# Patient Record
Sex: FEMALE | Race: BLACK OR AFRICAN AMERICAN | NOT HISPANIC OR LATINO | Employment: UNEMPLOYED | URBAN - METROPOLITAN AREA
[De-identification: names, ages, dates, MRNs, and addresses within clinical notes are randomized per-mention and may not be internally consistent; named-entity substitution may affect disease eponyms.]

---

## 2017-01-13 DIAGNOSIS — F32.A DEPRESSION, UNSPECIFIED DEPRESSION TYPE: ICD-10-CM

## 2017-01-13 RX ORDER — SERTRALINE HYDROCHLORIDE 100 MG/1
TABLET, FILM COATED ORAL
Qty: 30 TABLET | Refills: 0 | Status: SHIPPED | OUTPATIENT
Start: 2017-01-13 | End: 2017-05-03 | Stop reason: SDUPTHER

## 2017-01-13 RX ORDER — SERTRALINE HYDROCHLORIDE 100 MG/1
TABLET, FILM COATED ORAL
Qty: 30 TABLET | Refills: 0 | Status: SHIPPED | OUTPATIENT
Start: 2017-01-13 | End: 2017-02-23 | Stop reason: SDUPTHER

## 2017-02-23 ENCOUNTER — PATIENT MESSAGE (OUTPATIENT)
Dept: FAMILY MEDICINE | Facility: CLINIC | Age: 62
End: 2017-02-23

## 2017-02-23 DIAGNOSIS — T78.40XA ALLERGIC REACTION, INITIAL ENCOUNTER: ICD-10-CM

## 2017-02-23 DIAGNOSIS — F32.A DEPRESSION, UNSPECIFIED DEPRESSION TYPE: ICD-10-CM

## 2017-02-23 DIAGNOSIS — R52 PAIN: ICD-10-CM

## 2017-03-01 RX ORDER — SERTRALINE HYDROCHLORIDE 100 MG/1
TABLET, FILM COATED ORAL
Qty: 30 TABLET | Refills: 0 | Status: SHIPPED | OUTPATIENT
Start: 2017-03-01 | End: 2017-06-26

## 2017-03-01 RX ORDER — OXYCODONE AND ACETAMINOPHEN 7.5; 325 MG/1; MG/1
1 TABLET ORAL 2 TIMES DAILY
Qty: 60 TABLET | Refills: 0 | Status: SHIPPED | OUTPATIENT
Start: 2017-03-01 | End: 2017-05-08

## 2017-03-01 RX ORDER — TRIAMCINOLONE ACETONIDE 1 MG/G
OINTMENT TOPICAL 2 TIMES DAILY
Qty: 454 G | Refills: 0 | Status: SHIPPED | OUTPATIENT
Start: 2017-03-01 | End: 2017-08-09 | Stop reason: SDUPTHER

## 2017-03-02 ENCOUNTER — PATIENT MESSAGE (OUTPATIENT)
Dept: FAMILY MEDICINE | Facility: CLINIC | Age: 62
End: 2017-03-02

## 2017-03-02 DIAGNOSIS — I10 ESSENTIAL HYPERTENSION: ICD-10-CM

## 2017-03-02 RX ORDER — LORATADINE 10 MG/1
10 TABLET ORAL DAILY
Qty: 30 TABLET | Refills: 5 | Status: SHIPPED | OUTPATIENT
Start: 2017-03-02 | End: 2017-05-03 | Stop reason: SDUPTHER

## 2017-03-02 RX ORDER — SPIRONOLACTONE 25 MG/1
25 TABLET ORAL DAILY
Qty: 30 TABLET | Refills: 2 | Status: SHIPPED | OUTPATIENT
Start: 2017-03-02 | End: 2017-06-26 | Stop reason: SDUPTHER

## 2017-03-13 DIAGNOSIS — R52 PAIN: ICD-10-CM

## 2017-03-13 DIAGNOSIS — G57.12 MERALGIA PARAESTHETICA, LEFT: ICD-10-CM

## 2017-03-13 RX ORDER — IBUPROFEN 400 MG/1
TABLET ORAL
Qty: 60 TABLET | Refills: 0 | Status: SHIPPED | OUTPATIENT
Start: 2017-03-13 | End: 2017-05-08

## 2017-05-03 ENCOUNTER — PATIENT MESSAGE (OUTPATIENT)
Dept: FAMILY MEDICINE | Facility: CLINIC | Age: 62
End: 2017-05-03

## 2017-05-03 ENCOUNTER — TELEPHONE (OUTPATIENT)
Dept: FAMILY MEDICINE | Facility: CLINIC | Age: 62
End: 2017-05-03

## 2017-05-03 DIAGNOSIS — J30.2 SEASONAL ALLERGIC RHINITIS, UNSPECIFIED ALLERGIC RHINITIS TRIGGER: Primary | ICD-10-CM

## 2017-05-03 DIAGNOSIS — F32.A DEPRESSION, UNSPECIFIED DEPRESSION TYPE: ICD-10-CM

## 2017-05-03 DIAGNOSIS — R52 PAIN: ICD-10-CM

## 2017-05-03 RX ORDER — OXYCODONE AND ACETAMINOPHEN 7.5; 325 MG/1; MG/1
1 TABLET ORAL 2 TIMES DAILY
Qty: 60 TABLET | Refills: 0 | Status: CANCELLED | OUTPATIENT
Start: 2017-05-03

## 2017-05-08 ENCOUNTER — LAB VISIT (OUTPATIENT)
Dept: LAB | Facility: HOSPITAL | Age: 62
End: 2017-05-08
Attending: FAMILY MEDICINE
Payer: COMMERCIAL

## 2017-05-08 ENCOUNTER — OFFICE VISIT (OUTPATIENT)
Dept: FAMILY MEDICINE | Facility: CLINIC | Age: 62
End: 2017-05-08
Payer: COMMERCIAL

## 2017-05-08 VITALS
BODY MASS INDEX: 50.66 KG/M2 | TEMPERATURE: 99 F | HEIGHT: 63 IN | RESPIRATION RATE: 14 BRPM | DIASTOLIC BLOOD PRESSURE: 76 MMHG | WEIGHT: 285.94 LBS | OXYGEN SATURATION: 95 % | SYSTOLIC BLOOD PRESSURE: 128 MMHG | HEART RATE: 82 BPM

## 2017-05-08 DIAGNOSIS — E11.9 TYPE 2 DIABETES MELLITUS WITHOUT COMPLICATION, WITHOUT LONG-TERM CURRENT USE OF INSULIN: ICD-10-CM

## 2017-05-08 DIAGNOSIS — I10 ESSENTIAL HYPERTENSION: Primary | ICD-10-CM

## 2017-05-08 DIAGNOSIS — F32.A DEPRESSION, UNSPECIFIED DEPRESSION TYPE: ICD-10-CM

## 2017-05-08 DIAGNOSIS — R52 PAIN: ICD-10-CM

## 2017-05-08 DIAGNOSIS — N39.0 URINARY TRACT INFECTION WITHOUT HEMATURIA, SITE UNSPECIFIED: ICD-10-CM

## 2017-05-08 DIAGNOSIS — M51.36 DDD (DEGENERATIVE DISC DISEASE), LUMBAR: ICD-10-CM

## 2017-05-08 DIAGNOSIS — I10 ESSENTIAL HYPERTENSION: ICD-10-CM

## 2017-05-08 DIAGNOSIS — G47.01 INSOMNIA DUE TO MEDICAL CONDITION: ICD-10-CM

## 2017-05-08 DIAGNOSIS — M54.16 LUMBAR RADICULAR PAIN: ICD-10-CM

## 2017-05-08 DIAGNOSIS — E66.01 MORBID OBESITY, UNSPECIFIED OBESITY TYPE: ICD-10-CM

## 2017-05-08 DIAGNOSIS — R51.9 NONINTRACTABLE HEADACHE, UNSPECIFIED CHRONICITY PATTERN, UNSPECIFIED HEADACHE TYPE: ICD-10-CM

## 2017-05-08 LAB
ALBUMIN SERPL BCP-MCNC: 3.3 G/DL
ALP SERPL-CCNC: 87 U/L
ALT SERPL W/O P-5'-P-CCNC: 11 U/L
ANION GAP SERPL CALC-SCNC: 8 MMOL/L
AST SERPL-CCNC: 15 U/L
BASOPHILS # BLD AUTO: 0.02 K/UL
BASOPHILS NFR BLD: 0.3 %
BILIRUB SERPL-MCNC: 0.5 MG/DL
BILIRUB SERPL-MCNC: NEGATIVE MG/DL
BLOOD URINE, POC: ABNORMAL
BUN SERPL-MCNC: 31 MG/DL
CALCIUM SERPL-MCNC: 9.9 MG/DL
CHLORIDE SERPL-SCNC: 104 MMOL/L
CHOLEST/HDLC SERPL: 3 {RATIO}
CO2 SERPL-SCNC: 30 MMOL/L
COLOR, POC UA: ABNORMAL
CREAT SERPL-MCNC: 1.3 MG/DL
DIFFERENTIAL METHOD: ABNORMAL
EOSINOPHIL # BLD AUTO: 0.2 K/UL
EOSINOPHIL NFR BLD: 2.4 %
ERYTHROCYTE [DISTWIDTH] IN BLOOD BY AUTOMATED COUNT: 15 %
EST. GFR  (AFRICAN AMERICAN): 50.5 ML/MIN/1.73 M^2
EST. GFR  (NON AFRICAN AMERICAN): 43.8 ML/MIN/1.73 M^2
GLUCOSE SERPL-MCNC: 84 MG/DL
GLUCOSE UR QL STRIP: NORMAL
HCT VFR BLD AUTO: 40.1 %
HDL/CHOLESTEROL RATIO: 33.5 %
HDLC SERPL-MCNC: 206 MG/DL
HDLC SERPL-MCNC: 69 MG/DL
HGB BLD-MCNC: 13.2 G/DL
KETONES UR QL STRIP: NEGATIVE
LDLC SERPL CALC-MCNC: 125 MG/DL
LEUKOCYTE ESTERASE URINE, POC: ABNORMAL
LYMPHOCYTES # BLD AUTO: 2.2 K/UL
LYMPHOCYTES NFR BLD: 31.9 %
MCH RBC QN AUTO: 30.9 PG
MCHC RBC AUTO-ENTMCNC: 32.9 %
MCV RBC AUTO: 94 FL
MONOCYTES # BLD AUTO: 0.5 K/UL
MONOCYTES NFR BLD: 7.2 %
NEUTROPHILS # BLD AUTO: 3.9 K/UL
NEUTROPHILS NFR BLD: 57.9 %
NITRITE, POC UA: POSITIVE
NONHDLC SERPL-MCNC: 137 MG/DL
PH, POC UA: 5
PLATELET # BLD AUTO: 283 K/UL
PMV BLD AUTO: 10.9 FL
POTASSIUM SERPL-SCNC: 5.2 MMOL/L
PROT SERPL-MCNC: 7.4 G/DL
PROTEIN, POC: ABNORMAL
RBC # BLD AUTO: 4.27 M/UL
SODIUM SERPL-SCNC: 142 MMOL/L
SPECIFIC GRAVITY, POC UA: 1.02
T4 FREE SERPL-MCNC: 1.06 NG/DL
TRIGL SERPL-MCNC: 60 MG/DL
TSH SERPL DL<=0.005 MIU/L-ACNC: 5.99 UIU/ML
UROBILINOGEN, POC UA: NORMAL
WBC # BLD AUTO: 6.8 K/UL

## 2017-05-08 PROCEDURE — 84439 ASSAY OF FREE THYROXINE: CPT

## 2017-05-08 PROCEDURE — 85025 COMPLETE CBC W/AUTO DIFF WBC: CPT

## 2017-05-08 PROCEDURE — 80061 LIPID PANEL: CPT

## 2017-05-08 PROCEDURE — 80053 COMPREHEN METABOLIC PANEL: CPT

## 2017-05-08 PROCEDURE — 1160F RVW MEDS BY RX/DR IN RCRD: CPT | Mod: S$GLB,,, | Performed by: FAMILY MEDICINE

## 2017-05-08 PROCEDURE — 87077 CULTURE AEROBIC IDENTIFY: CPT

## 2017-05-08 PROCEDURE — 87186 SC STD MICRODIL/AGAR DIL: CPT

## 2017-05-08 PROCEDURE — 99214 OFFICE O/P EST MOD 30 MIN: CPT | Mod: 25,S$GLB,, | Performed by: FAMILY MEDICINE

## 2017-05-08 PROCEDURE — 99999 PR PBB SHADOW E&M-EST. PATIENT-LVL IV: CPT | Mod: PBBFAC,,, | Performed by: FAMILY MEDICINE

## 2017-05-08 PROCEDURE — 3078F DIAST BP <80 MM HG: CPT | Mod: S$GLB,,, | Performed by: FAMILY MEDICINE

## 2017-05-08 PROCEDURE — 84443 ASSAY THYROID STIM HORMONE: CPT

## 2017-05-08 PROCEDURE — 83036 HEMOGLOBIN GLYCOSYLATED A1C: CPT

## 2017-05-08 PROCEDURE — 3074F SYST BP LT 130 MM HG: CPT | Mod: S$GLB,,, | Performed by: FAMILY MEDICINE

## 2017-05-08 PROCEDURE — 3060F POS MICROALBUMINURIA REV: CPT | Mod: S$GLB,,, | Performed by: FAMILY MEDICINE

## 2017-05-08 PROCEDURE — 3046F HEMOGLOBIN A1C LEVEL >9.0%: CPT | Mod: S$GLB,,, | Performed by: FAMILY MEDICINE

## 2017-05-08 PROCEDURE — 81002 URINALYSIS NONAUTO W/O SCOPE: CPT | Mod: S$GLB,,, | Performed by: FAMILY MEDICINE

## 2017-05-08 PROCEDURE — 87086 URINE CULTURE/COLONY COUNT: CPT

## 2017-05-08 PROCEDURE — 87088 URINE BACTERIA CULTURE: CPT

## 2017-05-08 PROCEDURE — 36415 COLL VENOUS BLD VENIPUNCTURE: CPT | Mod: PO

## 2017-05-08 RX ORDER — LORATADINE 10 MG/1
10 TABLET ORAL DAILY
Qty: 30 TABLET | Refills: 5 | Status: SHIPPED | OUTPATIENT
Start: 2017-05-08 | End: 2018-01-31

## 2017-05-08 RX ORDER — OXYCODONE AND ACETAMINOPHEN 10; 325 MG/1; MG/1
1 TABLET ORAL EVERY 12 HOURS PRN
Qty: 60 TABLET | Refills: 0 | Status: SHIPPED | OUTPATIENT
Start: 2017-05-08 | End: 2017-08-09 | Stop reason: SDUPTHER

## 2017-05-08 RX ORDER — ALPRAZOLAM 0.25 MG/1
0.25 TABLET ORAL NIGHTLY PRN
Qty: 30 TABLET | Refills: 0 | Status: SHIPPED | OUTPATIENT
Start: 2017-05-08 | End: 2017-05-08

## 2017-05-08 RX ORDER — CIPROFLOXACIN 500 MG/1
500 TABLET ORAL 2 TIMES DAILY
Qty: 14 TABLET | Refills: 0 | Status: SHIPPED | OUTPATIENT
Start: 2017-05-08 | End: 2017-05-15

## 2017-05-08 RX ORDER — SERTRALINE HYDROCHLORIDE 100 MG/1
TABLET, FILM COATED ORAL
Qty: 30 TABLET | Refills: 0 | Status: SHIPPED | OUTPATIENT
Start: 2017-05-08 | End: 2017-05-08 | Stop reason: SDUPTHER

## 2017-05-08 RX ORDER — ALPRAZOLAM 0.5 MG/1
0.5 TABLET ORAL 3 TIMES DAILY
Qty: 15 TABLET | Refills: 0 | Status: SHIPPED | OUTPATIENT
Start: 2017-05-08 | End: 2017-06-07

## 2017-05-08 NOTE — PROGRESS NOTES
Chief Complaint   Patient presents with    Hypertension     f/u htn, pain med , a1c       HPI  Sandee Evans is a 61 y.o. female with multiple medical diagnoses as listed in the medical history and problem list that presents for follow up.    Headaches - few month hx, intermittent, prog worsening, states mild visual changes, no nausea or photophobia, no elevated BP readings, 2 episodes last 3 months. No UTI symptoms. +diff sleeping at night.     URI - +sinus congestion, PND, +sinus pressure.     Pt is known to me and was last seen by me on 12/13/2016.    PAST MEDICAL HISTORY:  Past Medical History:   Diagnosis Date    Anticoagulant long-term use     Xarelto    Arthritis     Atrial fibrillation     Degenerative disc disease     Depression     Diabetes mellitus     TYPE 2    Hyperlipidemia     Hypertension     Obesity, morbid     Other abnormal glucose     pre-diabetes    Sleep apnea     Smoker     Thyroid disease     on meds 8-9 years ago. hypothyroidism.no malignancy    TMJ (temporomandibular joint disorder)     jaw clicking    Urge incontinence     Wears glasses        PAST SURGICAL HISTORY:  Past Surgical History:   Procedure Laterality Date    BREAST LUMPECTOMY Right     HYSTERECTOMY      rt.knee surgery 2016      underarm gland\ Bilateral        SOCIAL HISTORY:  Social History     Social History    Marital status:      Spouse name: N/A    Number of children: N/A    Years of education: N/A     Occupational History    Not on file.     Social History Main Topics    Smoking status: Current Every Day Smoker     Packs/day: 0.25     Years: 30.00     Types: Cigarettes    Smokeless tobacco: Never Used    Alcohol use No    Drug use: No    Sexual activity: Yes     Partners: Male     Other Topics Concern    Not on file     Social History Narrative       FAMILY HISTORY:  Family History   Problem Relation Age of Onset    Diabetes Mother     Diabetes Brother     No Known Problems  Father     No Known Problems Sister     No Known Problems Maternal Aunt     No Known Problems Maternal Uncle     No Known Problems Paternal Aunt     No Known Problems Paternal Uncle     No Known Problems Maternal Grandmother     No Known Problems Maternal Grandfather     No Known Problems Paternal Grandmother     No Known Problems Paternal Grandfather     Amblyopia Neg Hx     Blindness Neg Hx     Cancer Neg Hx     Cataracts Neg Hx     Glaucoma Neg Hx     Hypertension Neg Hx     Macular degeneration Neg Hx     Retinal detachment Neg Hx     Strabismus Neg Hx     Stroke Neg Hx     Thyroid disease Neg Hx        ALLERGIES AND MEDICATIONS: updated and reviewed.  Review of patient's allergies indicates:   Allergen Reactions    Ace inhibitors      Cough       Current Outpatient Prescriptions   Medication Sig Dispense Refill    fluticasone (FLONASE) 50 mcg/actuation nasal spray 1 spray by Nasal route as needed.   12    hydrOXYzine (ATARAX) 25 MG tablet Take 1 tablet (25 mg total) by mouth 3 (three) times daily as needed for Itching. 40 tablet 1    loratadine (CLARITIN) 10 mg tablet Take 1 tablet (10 mg total) by mouth once daily. 30 tablet 5    metformin (GLUCOPHAGE) 500 MG tablet Take 1 tablet (500 mg total) by mouth 2 (two) times daily with meals. 60 tablet 5    sertraline (ZOLOFT) 100 MG tablet TAKE 1 TABLET(100 MG) BY MOUTH EVERY DAY 30 tablet 0    simvastatin (ZOCOR) 20 MG tablet Take 1 tablet (20 mg total) by mouth every evening. 30 tablet 5    spironolactone (ALDACTONE) 25 MG tablet Take 1 tablet (25 mg total) by mouth once daily. 30 tablet 2    triamcinolone acetonide 0.1% (KENALOG) 0.1 % ointment Apply topically 2 (two) times daily. No more than 7-14 days at a time, between knees & upper thighs. 454 g 0    alprazolam (XANAX) 0.5 MG tablet Take 1 tablet (0.5 mg total) by mouth 3 (three) times daily. 15 tablet 0    ciprofloxacin HCl (CIPRO) 500 MG tablet Take 1 tablet (500 mg total) by  "mouth 2 (two) times daily. 14 tablet 0    oxycodone-acetaminophen (PERCOCET)  mg per tablet Take 1 tablet by mouth every 12 (twelve) hours as needed for Pain. 60 tablet 0     No current facility-administered medications for this visit.        ROS  Review of Systems   Constitutional: Negative for activity change, appetite change and fever.   HENT: Negative for congestion and sore throat.    Eyes: Negative for visual disturbance.   Respiratory: Negative for cough and shortness of breath.    Cardiovascular: Negative for chest pain.   Gastrointestinal: Negative for abdominal pain, diarrhea, nausea and vomiting.   Endocrine: Negative.    Genitourinary: Negative for dysuria.   Musculoskeletal: Positive for arthralgias and myalgias. Negative for back pain.   Skin: Negative for rash.   Allergic/Immunologic: Negative.    Neurological: Positive for headaches. Negative for dizziness and weakness.   Hematological: Negative.    Psychiatric/Behavioral: Positive for sleep disturbance. Negative for agitation and confusion.       Physical Exam  Vitals:    05/08/17 1309   BP: 128/76   Pulse: 82   Resp: 14   Temp: 98.7 °F (37.1 °C)    Body mass index is 50.65 kg/(m^2).  Weight: 129.7 kg (285 lb 15 oz)   Height: 5' 3" (160 cm)     Physical Exam   Constitutional: She is oriented to person, place, and time. She appears well-developed and well-nourished.   HENT:   Head: Normocephalic and atraumatic.   Eyes: Conjunctivae and EOM are normal. Pupils are equal, round, and reactive to light.   Neck: Normal range of motion. Neck supple.   Cardiovascular: Normal rate, regular rhythm and normal heart sounds.    Pulmonary/Chest: Effort normal and breath sounds normal.   Abdominal: Soft. Bowel sounds are normal.   Musculoskeletal: Normal range of motion.   Neurological: She is alert and oriented to person, place, and time. She has normal reflexes.   Skin: Skin is warm and dry.   Psychiatric: She has a normal mood and affect. Her behavior is " normal. Judgment and thought content normal.       Health Maintenance       Date Due Completion Date    TETANUS VACCINE 12/28/1971 ---    Zoster Vaccine 12/28/2013 ---    Hemoglobin A1c 5/5/2017 5/5/2016    Override on 7/22/2014: Not Clinically Appropriate    Influenza Vaccine 8/1/2017 1/13/2016    Override on 1/1/2015: Done (3/18/15 pt stated had it done 1/2015 at [facility] with Dr. Dodd)    Foot Exam 8/5/2017 8/5/2016    Override on 8/7/2015: Done    Mammogram 5/17/2018 5/17/2016    Colonoscopy 8/2/2018 8/2/2013 (Done)    Override on 8/2/2013: Done    Pneumococcal PPSV23 (Medium Risk) (2) 12/28/2018 5/20/2013    Lipid Panel 4/7/2020 4/7/2015          Assessment & Plan    Essential hypertension  -     Hemoglobin A1c; Future; Expected date: 5/8/17  -     POCT urine dipstick without microscope  -     CBC auto differential; Future; Expected date: 5/8/17  -     Comprehensive metabolic panel; Future; Expected date: 5/8/17  -     Lipid panel; Future; Expected date: 5/8/17  -     T4, free; Future; Expected date: 5/8/17  -     TSH; Future; Expected date: 5/8/17  -     Hemoglobin A1c; Future; Expected date: 5/8/17  - Assess labs today    Depression, unspecified depression type  - Continue current medication regimen as prescribed    Lumbar radicular pain  -     oxycodone-acetaminophen (PERCOCET)  mg per tablet; Take 1 tablet by mouth every 12 (twelve) hours as needed for Pain.  Dispense: 60 tablet; Refill: 0    Insomnia due to medical condition  -     alprazolam (XANAX) 0.5 MG tablet; Take 1 tablet (0.5 mg total) by mouth 3 (three) times daily.  Dispense: 15 tablet; Refill: 0  - Continue current medication regimen as prescribed    Type 2 diabetes mellitus without complication, without long-term current use of insulin  - Continue current medication regimen as prescribed    Nonintractable headache, unspecified chronicity pattern, unspecified headache type  - Will treat UTI at this time and assess  labs  - Pending lab testing    Urinary tract infection without hematuria, site unspecified  -     CULTURE, URINE  -     ciprofloxacin HCl (CIPRO) 500 MG tablet; Take 1 tablet (500 mg total) by mouth 2 (two) times daily.  Dispense: 14 tablet; Refill: 0  - Treat at this time.        Return in about 4 weeks (around 6/5/2017).

## 2017-05-09 LAB
ESTIMATED AVG GLUCOSE: 126 MG/DL
ESTIMATED AVG GLUCOSE: 126 MG/DL
HBA1C MFR BLD HPLC: 6 %
HBA1C MFR BLD HPLC: 6 %

## 2017-05-11 LAB — BACTERIA UR CULT: NORMAL

## 2017-05-12 ENCOUNTER — TELEPHONE (OUTPATIENT)
Dept: FAMILY MEDICINE | Facility: CLINIC | Age: 62
End: 2017-05-12

## 2017-05-12 NOTE — TELEPHONE ENCOUNTER
L/M on patient's cell that I faxed over her refill on her Xanax #[phone] no r/f's, to her pharmacy, [facility] on [address].

## 2017-05-26 DIAGNOSIS — E11.9 TYPE 2 DIABETES MELLITUS WITHOUT COMPLICATION: ICD-10-CM

## 2017-06-26 DIAGNOSIS — I10 ESSENTIAL HYPERTENSION: ICD-10-CM

## 2017-06-26 DIAGNOSIS — F32.A DEPRESSION, UNSPECIFIED DEPRESSION TYPE: ICD-10-CM

## 2017-06-26 RX ORDER — SERTRALINE HYDROCHLORIDE 100 MG/1
TABLET, FILM COATED ORAL
Qty: 30 TABLET | Refills: 0 | Status: SHIPPED | OUTPATIENT
Start: 2017-06-26 | End: 2017-08-09 | Stop reason: SDUPTHER

## 2017-06-26 RX ORDER — SPIRONOLACTONE 25 MG/1
TABLET ORAL
Qty: 30 TABLET | Refills: 0 | Status: SHIPPED | OUTPATIENT
Start: 2017-06-26 | End: 2017-08-09 | Stop reason: SDUPTHER

## 2017-07-07 ENCOUNTER — OFFICE VISIT (OUTPATIENT)
Dept: FAMILY MEDICINE | Facility: CLINIC | Age: 62
End: 2017-07-07
Payer: COMMERCIAL

## 2017-07-07 VITALS
OXYGEN SATURATION: 95 % | DIASTOLIC BLOOD PRESSURE: 84 MMHG | WEIGHT: 285.25 LBS | BODY MASS INDEX: 50.54 KG/M2 | RESPIRATION RATE: 17 BRPM | SYSTOLIC BLOOD PRESSURE: 110 MMHG | HEART RATE: 92 BPM | HEIGHT: 63 IN | TEMPERATURE: 99 F

## 2017-07-07 DIAGNOSIS — J01.90 ACUTE SINUSITIS, RECURRENCE NOT SPECIFIED, UNSPECIFIED LOCATION: Primary | ICD-10-CM

## 2017-07-07 PROCEDURE — 99213 OFFICE O/P EST LOW 20 MIN: CPT | Mod: S$GLB,,, | Performed by: FAMILY MEDICINE

## 2017-07-07 PROCEDURE — 99999 PR PBB SHADOW E&M-EST. PATIENT-LVL III: CPT | Mod: PBBFAC,,, | Performed by: FAMILY MEDICINE

## 2017-07-07 RX ORDER — ALPRAZOLAM 0.5 MG/1
TABLET ORAL
Refills: 0 | COMMUNITY
Start: 2017-05-08 | End: 2017-08-16

## 2017-07-07 RX ORDER — ALPRAZOLAM 0.25 MG/1
TABLET ORAL
Refills: 0 | COMMUNITY
Start: 2017-05-12 | End: 2017-08-09 | Stop reason: SDUPTHER

## 2017-07-07 RX ORDER — METHYLPREDNISOLONE 4 MG/1
TABLET ORAL
Qty: 1 PACKAGE | Refills: 0 | Status: SHIPPED | OUTPATIENT
Start: 2017-07-07 | End: 2018-01-31

## 2017-08-09 ENCOUNTER — OFFICE VISIT (OUTPATIENT)
Dept: FAMILY MEDICINE | Facility: CLINIC | Age: 62
End: 2017-08-09
Payer: COMMERCIAL

## 2017-08-09 VITALS
TEMPERATURE: 98 F | HEART RATE: 90 BPM | HEIGHT: 63 IN | DIASTOLIC BLOOD PRESSURE: 74 MMHG | WEIGHT: 278.44 LBS | OXYGEN SATURATION: 97 % | BODY MASS INDEX: 49.34 KG/M2 | RESPIRATION RATE: 17 BRPM | SYSTOLIC BLOOD PRESSURE: 128 MMHG

## 2017-08-09 DIAGNOSIS — T78.40XA ALLERGIC REACTION, INITIAL ENCOUNTER: ICD-10-CM

## 2017-08-09 DIAGNOSIS — I48.20 CHRONIC ATRIAL FIBRILLATION: ICD-10-CM

## 2017-08-09 DIAGNOSIS — G47.01 INSOMNIA DUE TO MEDICAL CONDITION: ICD-10-CM

## 2017-08-09 DIAGNOSIS — E66.01 MORBID OBESITY, UNSPECIFIED OBESITY TYPE: ICD-10-CM

## 2017-08-09 DIAGNOSIS — I10 ESSENTIAL HYPERTENSION: ICD-10-CM

## 2017-08-09 DIAGNOSIS — E78.5 HYPERLIPIDEMIA, UNSPECIFIED HYPERLIPIDEMIA TYPE: ICD-10-CM

## 2017-08-09 DIAGNOSIS — M17.11 PRIMARY OSTEOARTHRITIS OF RIGHT KNEE: Primary | ICD-10-CM

## 2017-08-09 DIAGNOSIS — M54.16 LUMBAR RADICULAR PAIN: ICD-10-CM

## 2017-08-09 DIAGNOSIS — F32.A DEPRESSION, UNSPECIFIED DEPRESSION TYPE: ICD-10-CM

## 2017-08-09 DIAGNOSIS — M17.10 OSTEOARTHRITIS, LOCALIZED, KNEE: ICD-10-CM

## 2017-08-09 DIAGNOSIS — E11.9 TYPE 2 DIABETES MELLITUS WITHOUT COMPLICATION, UNSPECIFIED LONG TERM INSULIN USE STATUS: ICD-10-CM

## 2017-08-09 PROCEDURE — 3044F HG A1C LEVEL LT 7.0%: CPT | Mod: S$GLB,,, | Performed by: FAMILY MEDICINE

## 2017-08-09 PROCEDURE — 3008F BODY MASS INDEX DOCD: CPT | Mod: S$GLB,,, | Performed by: FAMILY MEDICINE

## 2017-08-09 PROCEDURE — 3074F SYST BP LT 130 MM HG: CPT | Mod: S$GLB,,, | Performed by: FAMILY MEDICINE

## 2017-08-09 PROCEDURE — 99214 OFFICE O/P EST MOD 30 MIN: CPT | Mod: S$GLB,,, | Performed by: FAMILY MEDICINE

## 2017-08-09 PROCEDURE — 3078F DIAST BP <80 MM HG: CPT | Mod: S$GLB,,, | Performed by: FAMILY MEDICINE

## 2017-08-09 PROCEDURE — 99999 PR PBB SHADOW E&M-EST. PATIENT-LVL III: CPT | Mod: PBBFAC,,, | Performed by: FAMILY MEDICINE

## 2017-08-09 PROCEDURE — 82570 ASSAY OF URINE CREATININE: CPT

## 2017-08-09 RX ORDER — SERTRALINE HYDROCHLORIDE 100 MG/1
TABLET, FILM COATED ORAL
Qty: 90 TABLET | Refills: 1 | Status: SHIPPED | OUTPATIENT
Start: 2017-08-09 | End: 2018-01-02 | Stop reason: SDUPTHER

## 2017-08-09 RX ORDER — SIMVASTATIN 20 MG/1
20 TABLET, FILM COATED ORAL NIGHTLY
Qty: 90 TABLET | Refills: 1 | Status: SHIPPED | OUTPATIENT
Start: 2017-08-09 | End: 2018-01-02 | Stop reason: SDUPTHER

## 2017-08-09 RX ORDER — TRIAMCINOLONE ACETONIDE 1 MG/G
OINTMENT TOPICAL 2 TIMES DAILY
Qty: 454 G | Refills: 0 | Status: SHIPPED | OUTPATIENT
Start: 2017-08-09 | End: 2018-05-04 | Stop reason: SDUPTHER

## 2017-08-09 RX ORDER — METFORMIN HYDROCHLORIDE 500 MG/1
500 TABLET ORAL 2 TIMES DAILY WITH MEALS
Qty: 180 TABLET | Refills: 2 | Status: SHIPPED | OUTPATIENT
Start: 2017-08-09 | End: 2018-01-02 | Stop reason: SDUPTHER

## 2017-08-09 RX ORDER — ALPRAZOLAM 0.25 MG/1
TABLET ORAL
Qty: 30 TABLET | Refills: 0 | Status: SHIPPED | OUTPATIENT
Start: 2017-08-09 | End: 2017-08-16 | Stop reason: SDUPTHER

## 2017-08-09 RX ORDER — OXYCODONE AND ACETAMINOPHEN 10; 325 MG/1; MG/1
1 TABLET ORAL EVERY 12 HOURS PRN
Qty: 60 TABLET | Refills: 0 | Status: SHIPPED | OUTPATIENT
Start: 2017-08-09 | End: 2017-08-16 | Stop reason: SDUPTHER

## 2017-08-09 RX ORDER — SPIRONOLACTONE 25 MG/1
TABLET ORAL
Qty: 90 TABLET | Refills: 1 | Status: SHIPPED | OUTPATIENT
Start: 2017-08-09 | End: 2018-01-02 | Stop reason: SDUPTHER

## 2017-08-09 NOTE — PROGRESS NOTES
Chief Complaint   Patient presents with    Diabetes    Routine Foot Care    lt knee pain       HPI  Sandee Evans is a 61 y.o. female with multiple medical diagnoses as listed in the medical history and problem list that presents for follow up.      Left knee pain - overall states pain medications not lasting as long as previously. R knee s/p TKA which is doing well.    Cardiology/HTN, HLD - overall doing well. Compliant with medications.    DM2 - states overall doing well, well controlled.     Pt is known to me and was last seen by me on 5/8/2017.    PAST MEDICAL HISTORY:  Past Medical History:   Diagnosis Date    Anticoagulant long-term use     Xarelto    Arthritis     Atrial fibrillation     Degenerative disc disease     Depression     Diabetes mellitus     TYPE 2    Hyperlipidemia     Hypertension     Obesity, morbid     Other abnormal glucose     pre-diabetes    Sleep apnea     Smoker     Thyroid disease     on meds 8-9 years ago. hypothyroidism.no malignancy    TMJ (temporomandibular joint disorder)     jaw clicking    Urge incontinence     Wears glasses        PAST SURGICAL HISTORY:  Past Surgical History:   Procedure Laterality Date    BREAST LUMPECTOMY Right     HYSTERECTOMY      rt.knee surgery 2016      underarm gland\ Bilateral        SOCIAL HISTORY:  Social History     Social History    Marital status:      Spouse name: N/A    Number of children: N/A    Years of education: N/A     Occupational History    Not on file.     Social History Main Topics    Smoking status: Current Every Day Smoker     Packs/day: 0.25     Years: 30.00     Types: Cigarettes    Smokeless tobacco: Never Used    Alcohol use No    Drug use: No    Sexual activity: Yes     Partners: Male     Other Topics Concern    Not on file     Social History Narrative    No narrative on file       FAMILY HISTORY:  Family History   Problem Relation Age of Onset    Diabetes Mother     Diabetes Brother      No Known Problems Father     No Known Problems Sister     No Known Problems Maternal Aunt     No Known Problems Maternal Uncle     No Known Problems Paternal Aunt     No Known Problems Paternal Uncle     No Known Problems Maternal Grandmother     No Known Problems Maternal Grandfather     No Known Problems Paternal Grandmother     No Known Problems Paternal Grandfather     Amblyopia Neg Hx     Blindness Neg Hx     Cancer Neg Hx     Cataracts Neg Hx     Glaucoma Neg Hx     Hypertension Neg Hx     Macular degeneration Neg Hx     Retinal detachment Neg Hx     Strabismus Neg Hx     Stroke Neg Hx     Thyroid disease Neg Hx        ALLERGIES AND MEDICATIONS: updated and reviewed.  Review of patient's allergies indicates:   Allergen Reactions    Ace inhibitors      Cough       Current Outpatient Prescriptions   Medication Sig Dispense Refill    fluticasone (FLONASE) 50 mcg/actuation nasal spray 1 spray by Nasal route as needed.   12    metformin (GLUCOPHAGE) 500 MG tablet Take 1 tablet (500 mg total) by mouth 2 (two) times daily with meals. 180 tablet 2    sertraline (ZOLOFT) 100 MG tablet TAKE 1 TABLET(100 MG) BY MOUTH EVERY DAY 90 tablet 1    simvastatin (ZOCOR) 20 MG tablet Take 1 tablet (20 mg total) by mouth every evening. 90 tablet 1    spironolactone (ALDACTONE) 25 MG tablet TAKE 1 TABLET(25 MG) BY MOUTH EVERY DAY 90 tablet 1    triamcinolone acetonide 0.1% (KENALOG) 0.1 % ointment Apply topically 2 (two) times daily. No more than 7-14 days at a time, between knees & upper thighs. 454 g 0    alprazolam (XANAX) 0.25 MG tablet TK ONE PO QD AT NIGHT PRN FOR ANXIETY 30 tablet 0    hydrOXYzine (ATARAX) 25 MG tablet Take 1 tablet (25 mg total) by mouth 3 (three) times daily as needed for Itching. 40 tablet 1    loratadine (CLARITIN) 10 mg tablet Take 1 tablet (10 mg total) by mouth once daily. 30 tablet 5    methylPREDNISolone (MEDROL DOSEPACK) 4 mg tablet use as directed 1 Package 0     "oxycodone-acetaminophen (PERCOCET)  mg per tablet Take 1 tablet by mouth every 12 (twelve) hours as needed for Pain. 60 tablet 0     No current facility-administered medications for this visit.        ROS  Review of Systems   Constitutional: Negative for activity change, appetite change and fever.   HENT: Negative for congestion and sore throat.    Eyes: Negative for visual disturbance.   Respiratory: Negative for cough and shortness of breath.    Cardiovascular: Negative for chest pain.   Gastrointestinal: Negative for abdominal pain, diarrhea, nausea and vomiting.   Endocrine: Negative.    Genitourinary: Negative for dysuria.   Musculoskeletal: Positive for arthralgias. Negative for back pain.   Skin: Negative for rash.   Allergic/Immunologic: Negative.    Neurological: Negative for dizziness and weakness.   Hematological: Negative.    Psychiatric/Behavioral: Negative for agitation and confusion.       Physical Exam  Vitals:    08/09/17 1101   BP: 128/74   Pulse: 90   Resp: 17   Temp: 98.3 °F (36.8 °C)    Body mass index is 49.32 kg/m².  Weight: 126.3 kg (278 lb 7.1 oz)   Height: 5' 3" (160 cm)     Physical Exam   Constitutional: She is oriented to person, place, and time. She appears well-developed and well-nourished.   HENT:   Head: Normocephalic.   Neurological: She is alert and oriented to person, place, and time.   Psychiatric: She has a normal mood and affect. Her behavior is normal. Judgment and thought content normal.       Health Maintenance       Date Due Completion Date    TETANUS VACCINE 12/28/1971 ---    Zoster Vaccine 12/28/2013 ---    Urine Microalbumin 01/13/2017 1/13/2016    Override on 7/22/2014: Not Clinically Appropriate    Influenza Vaccine 08/01/2017 1/13/2016    Override on 1/1/2015: Done (3/18/15 pt stated had it done 1/2015 [facility] with Dr. Dodd)    Foot Exam 08/05/2017 8/5/2016    Override on 8/7/2015: Done    Eye Exam 12/27/2017 12/27/2016    Override on 12/27/2016: " Done    Override on 3/20/2012: Done    Lipid Panel 05/08/2018 5/8/2017    Hemoglobin A1c 05/08/2018 5/8/2017    Override on 7/22/2014: Not Clinically Appropriate    Mammogram 05/17/2018 5/17/2016    Colonoscopy 08/02/2018 8/2/2013 (Done)    Override on 8/2/2013: Done    Pneumococcal PPSV23 (Medium Risk) (2) 12/28/2018 5/20/2013          Assessment & Plan    Lumbar radicular pain  -     Discontinue: oxycodone-acetaminophen (PERCOCET)  mg per tablet; Take 1 tablet by mouth every 12 (twelve) hours as needed for Pain.  Dispense: 60 tablet; Refill: 0  - Continue current medication regimen as prescribed    Depression, unspecified depression type  -     sertraline (ZOLOFT) 100 MG tablet; TAKE 1 TABLET(100 MG) BY MOUTH EVERY DAY  Dispense: 90 tablet; Refill: 1  -     Discontinue: alprazolam (XANAX) 0.25 MG tablet; TK ONE PO QD AT NIGHT PRN FOR ANXIETY  Dispense: 30 tablet; Refill: 0    Hyperlipidemia, unspecified hyperlipidemia type  -     simvastatin (ZOCOR) 20 MG tablet; Take 1 tablet (20 mg total) by mouth every evening.  Dispense: 90 tablet; Refill: 1    Chronic atrial fibrillation, Essential hypertension  -     spironolactone (ALDACTONE) 25 MG tablet; TAKE 1 TABLET(25 MG) BY MOUTH EVERY DAY  Dispense: 90 tablet; Refill: 1  - Continue current medication regimen as prescribed    Osteoarthritis, localized, knee  -     oxycodone-acetaminophen (PERCOCET)  mg per tablet; Take 1 tablet by mouth every 12 (twelve) hours as needed for Pain.  Dispense: 60 tablet; Refill: 0    Allergic reaction, initial encounter  -     triamcinolone acetonide 0.1% (KENALOG) 0.1 % ointment; Apply topically 2 (two) times daily. No more than 7-14 days at a time, between knees & upper thighs.  Dispense: 454 g; Refill: 0    Type 2 diabetes mellitus without complication, unspecified long term insulin use status  -     metformin (GLUCOPHAGE) 500 MG tablet; Take 1 tablet (500 mg total) by mouth 2 (two) times daily with meals.  Dispense: 180  tablet; Refill: 2  -     Microalbumin/creatinine urine ratio  - Continue current medication regimen as prescribed        Return in about 4 weeks (around 9/6/2017).

## 2017-08-10 LAB
CREAT UR-MCNC: 231 MG/DL
MICROALBUMIN UR DL<=1MG/L-MCNC: 29 UG/ML
MICROALBUMIN/CREATININE RATIO: 12.6 UG/MG

## 2017-08-16 ENCOUNTER — TELEPHONE (OUTPATIENT)
Dept: FAMILY MEDICINE | Facility: CLINIC | Age: 62
End: 2017-08-16

## 2017-08-16 DIAGNOSIS — F32.A DEPRESSION, UNSPECIFIED DEPRESSION TYPE: ICD-10-CM

## 2017-08-16 DIAGNOSIS — M54.16 LUMBAR RADICULAR PAIN: ICD-10-CM

## 2017-08-16 DIAGNOSIS — M17.10 OSTEOARTHRITIS, LOCALIZED, KNEE: ICD-10-CM

## 2017-08-16 RX ORDER — ALPRAZOLAM 0.25 MG/1
TABLET ORAL
Qty: 30 TABLET | Refills: 0 | Status: SHIPPED | OUTPATIENT
Start: 2017-08-16 | End: 2018-05-28

## 2017-08-16 RX ORDER — OXYCODONE AND ACETAMINOPHEN 10; 325 MG/1; MG/1
1 TABLET ORAL EVERY 12 HOURS PRN
Qty: 60 TABLET | Refills: 0 | Status: SHIPPED | OUTPATIENT
Start: 2017-08-16 | End: 2018-01-02 | Stop reason: SDUPTHER

## 2017-08-16 NOTE — TELEPHONE ENCOUNTER
Patient called in regards to her pain meds she thought were sent at [facility] 8/9. Patient notified of print issues that day and  agreed to send to pharmacy but will need paper script going forward.lm for patient to be notified of the same.

## 2017-11-29 ENCOUNTER — PATIENT MESSAGE (OUTPATIENT)
Dept: OPTOMETRY | Facility: CLINIC | Age: 62
End: 2017-11-29

## 2017-12-18 ENCOUNTER — OFFICE VISIT (OUTPATIENT)
Dept: OPTOMETRY | Facility: CLINIC | Age: 62
End: 2017-12-18
Payer: COMMERCIAL

## 2017-12-18 DIAGNOSIS — H52.7 REFRACTIVE ERROR: ICD-10-CM

## 2017-12-18 DIAGNOSIS — H25.13 NUCLEAR SCLEROSIS, BILATERAL: ICD-10-CM

## 2017-12-18 DIAGNOSIS — H04.123 DRY EYE SYNDROME, BILATERAL: ICD-10-CM

## 2017-12-18 DIAGNOSIS — E11.9 DIABETES MELLITUS TYPE 2 WITHOUT RETINOPATHY: Primary | ICD-10-CM

## 2017-12-18 PROCEDURE — 92015 DETERMINE REFRACTIVE STATE: CPT | Mod: S$GLB,,, | Performed by: OPTOMETRIST

## 2017-12-18 PROCEDURE — 99999 PR PBB SHADOW E&M-EST. PATIENT-LVL II: CPT | Mod: PBBFAC,,, | Performed by: OPTOMETRIST

## 2017-12-18 PROCEDURE — 92014 COMPRE OPH EXAM EST PT 1/>: CPT | Mod: S$GLB,,, | Performed by: OPTOMETRIST

## 2017-12-18 NOTE — PROGRESS NOTES
"Subjective:       Patient ID: Sandee Evans is a 61 y.o. female      Chief Complaint   Patient presents with    Diabetic Eye Exam     History of Present Illness  Dls: 12/27/16 Dr. Hung    Pt here for diabetic eye exam.   Pt states no changes in vision. Pt wears pal's. Pt c/o dry eyes ou itching ou tearing ou no burning no pain some ha's no floaters.     Eye meds:  otc gtts ou prn     Hemoglobin A1C       Date                     Value               Ref Range           Status                05/08/2017               6.0                 4.5 - 6.2 %         Final                  05/05/2016               5.8                 4.5 - 6.2 %         Final            ----------     Assessment/Plan:     1. Diabetes mellitus type 2 without retinopathy  No diabetic retinopathy. Educated patient on importance of good blood sugar control, compliance with meds, and follow up care with PCP. Return in 1 year for dilated eye exam, sooner PRN.    2. Dry eye syndrome, bilateral  Recommend artificial tears. 1 drop 4x per day and PRN. Discussed different drop options - AT/PFAT/gel/ointment. Chronicity of disease and treatment discussed.    3. Nuclear sclerosis, bilateral  Educated patient on the presence of cataracts and effects on vision, including clouded, blurred or dim vision, increasing difficulty with vision at night, sensitivity to light and glare, need for brighter light for reading and other activities, and seeing "halos" around lights. No surgery at this time. Recheck in one year.    4. Refractive error  Educated patient on refractive error and discussed lens options. Dispensed updated spectacle Rx. Educated about adaptation period to new specs.    Eyeglass Final Rx     Eyeglass Final Rx       Sphere Cylinder Axis Add    Right -0.75 +1.75 175 +2.50    Left -0.50 +0.75 005 +2.50    Type:  PAL    Expiration Date:  12/19/2018                Return in about 1 year (around 12/18/2018) for Diabetic Eye Exam.     "

## 2017-12-18 NOTE — PATIENT INSTRUCTIONS
Cataracts are an expected change to your eye when the natural lens inside of the eye becomes cloudy or opaque. You currently do have cataracts, but they do not require any surgery at this time. We will monitor their development with your annual eye exam and help you decide when the appropriate time for them to be removed.        What Are Cataracts?    A clear lens in the eye focuses light. This lets the eye see images sharply. With age, the lens slowly becomes cloudy. The cloudy lens is a cataract. A cataract scatters light and makes it hard for the eye to focus. Cataracts often form in both eyes. But one lens may cloud faster than the other.    The aging of your lens  Your lens may cloud so slowly that you don`t notice any vision changes at first. But as the cataract gets worse, the eye has a harder time focusing. In early stages, glasses may help you see better. As the lens gets more cloudy, your doctor may recommend surgery to restore your vision.    A clear lens allows your eye to bring objects sharply into focus.  A mild cataract may slightly blur your vision.  A dense cataract can severely blur your vision.    Date Last Reviewed: 6/14/2015  © 0893-2632 [employer]. [address] [place] [address], [place] [address]. All rights reserved. This information is not intended as a substitute for professional medical care. Always follow your healthcare professional's instructions.

## 2017-12-31 ENCOUNTER — HOSPITAL ENCOUNTER (EMERGENCY)
Facility: OTHER | Age: 62
Discharge: HOME | End: 2017-12-31
Attending: INTERNAL MEDICINE
Payer: COMMERCIAL

## 2017-12-31 VITALS
HEART RATE: 105 BPM | OXYGEN SATURATION: 95 % | DIASTOLIC BLOOD PRESSURE: 76 MMHG | BODY MASS INDEX: 48.73 KG/M2 | HEIGHT: 63 IN | WEIGHT: 275 LBS | SYSTOLIC BLOOD PRESSURE: 123 MMHG | TEMPERATURE: 100 F | RESPIRATION RATE: 18 BRPM

## 2017-12-31 DIAGNOSIS — B34.9 ACUTE VIRAL SYNDROME: ICD-10-CM

## 2017-12-31 DIAGNOSIS — L73.2 HIDRADENITIS SUPPURATIVA OF RIGHT AXILLA: Primary | ICD-10-CM

## 2017-12-31 LAB
CTP QC/QA: YES
FLUAV AG NPH QL: NEGATIVE
FLUBV AG NPH QL: NEGATIVE
POCT GLUCOSE: 101 MG/DL (ref 70–110)

## 2017-12-31 PROCEDURE — 99284 EMERGENCY DEPT VISIT MOD MDM: CPT

## 2017-12-31 PROCEDURE — 87804 INFLUENZA ASSAY W/OPTIC: CPT

## 2017-12-31 PROCEDURE — 25000003 PHARM REV CODE 250: Performed by: EMERGENCY MEDICINE

## 2017-12-31 RX ORDER — DOXYCYCLINE 100 MG/1
100 CAPSULE ORAL 2 TIMES DAILY
Qty: 20 CAPSULE | Refills: 0 | Status: SHIPPED | OUTPATIENT
Start: 2017-12-31 | End: 2018-01-10

## 2017-12-31 RX ORDER — ACETAMINOPHEN 500 MG
1000 TABLET ORAL
Status: COMPLETED | OUTPATIENT
Start: 2017-12-31 | End: 2017-12-31

## 2017-12-31 RX ORDER — AZELASTINE 1 MG/ML
2 SPRAY, METERED NASAL 2 TIMES DAILY
Qty: 30 ML | Refills: 0 | Status: SHIPPED | OUTPATIENT
Start: 2017-12-31 | End: 2018-05-28

## 2017-12-31 RX ORDER — FLUTICASONE PROPIONATE 50 MCG
2 SPRAY, SUSPENSION (ML) NASAL DAILY
Qty: 15 G | Refills: 0 | Status: SHIPPED | OUTPATIENT
Start: 2017-12-31 | End: 2019-06-18 | Stop reason: SDUPTHER

## 2017-12-31 RX ORDER — OSELTAMIVIR PHOSPHATE 75 MG/1
75 CAPSULE ORAL 2 TIMES DAILY
Qty: 10 CAPSULE | Refills: 0 | Status: SHIPPED | OUTPATIENT
Start: 2017-12-31 | End: 2018-01-05

## 2017-12-31 RX ADMIN — ACETAMINOPHEN 1000 MG: 500 TABLET ORAL at 05:12

## 2018-01-01 ENCOUNTER — PATIENT MESSAGE (OUTPATIENT)
Dept: FAMILY MEDICINE | Facility: CLINIC | Age: 63
End: 2018-01-01

## 2018-01-01 NOTE — ED NOTES
Pt presents with c/o headache, productive cough with green sputum, dizziness, nausea; pt reports OTC meds not helping to control symptoms; pt reports family members with similar symptoms; no resp distress noted; resp clear, E/U; pt on RA; skin warm, dry, intact; NADN: will continue to monitor

## 2018-01-01 NOTE — ED PROVIDER NOTES
Encounter Date: 12/31/2017       History     Chief Complaint   Patient presents with    flu like symptoms     HA, cough, nausea since last night     62-year-old female presents to the emergency department complaining of cough, headache and fever since last night.  She also would like treatment for recurrent hidradenitis right axillary region.      The history is provided by the patient. No  was used.   URI   The primary symptoms include fever, cough and rash. The current episode started yesterday. This is a new problem. The problem has not changed since onset.The fever began yesterday. The fever has been unchanged since its onset. The maximum temperature recorded prior to her arrival was unknown.   The cough began yesterday. The cough is productive. The sputum is clear.   The onset of the illness is associated with exposure to sick contacts. Symptoms associated with the illness include congestion and rhinorrhea.     Review of patient's allergies indicates:   Allergen Reactions    Ace inhibitors      Cough       Past Medical History:   Diagnosis Date    Anticoagulant long-term use     Xarelto    Arthritis     Atrial fibrillation     Degenerative disc disease     Depression     Diabetes mellitus     TYPE 2    Hyperlipidemia     Hypertension     Nuclear sclerosis, bilateral 12/18/2017    Obesity, morbid     Other abnormal glucose     pre-diabetes    Sleep apnea     Smoker     Thyroid disease     on meds 8-9 years ago. hypothyroidism.no malignancy    TMJ (temporomandibular joint disorder)     jaw clicking    Urge incontinence     Wears glasses      Past Surgical History:   Procedure Laterality Date    BREAST LUMPECTOMY Right     HYSTERECTOMY      rt.knee surgery 2016      underarm gland\ Bilateral      Family History   Problem Relation Age of Onset    Diabetes Mother     Diabetes Brother     No Known Problems Father     No Known Problems Sister     No Known Problems  Maternal Aunt     No Known Problems Maternal Uncle     No Known Problems Paternal Aunt     No Known Problems Paternal Uncle     No Known Problems Maternal Grandmother     No Known Problems Maternal Grandfather     No Known Problems Paternal Grandmother     No Known Problems Paternal Grandfather     Amblyopia Neg Hx     Blindness Neg Hx     Cancer Neg Hx     Cataracts Neg Hx     Glaucoma Neg Hx     Hypertension Neg Hx     Macular degeneration Neg Hx     Retinal detachment Neg Hx     Strabismus Neg Hx     Stroke Neg Hx     Thyroid disease Neg Hx      Social History   Substance Use Topics    Smoking status: Current Every Day Smoker     Packs/day: 0.25     Years: 30.00     Types: Cigarettes    Smokeless tobacco: Never Used    Alcohol use No     Review of Systems   Constitutional: Positive for fever.   HENT: Positive for congestion and rhinorrhea.    Respiratory: Positive for cough.    Skin: Positive for color change and rash.   All other systems reviewed and are negative.      Physical Exam     Initial Vitals [12/31/17 1655]   BP Pulse Resp Temp SpO2   (!) 139/90 (!) 116 20 (!) 101.2 °F (38.4 °C) 95 %      MAP       106.33         Physical Exam    Nursing note and vitals reviewed.  Constitutional: She appears well-developed and well-nourished. No distress.   Obese   HENT:   Head: Normocephalic and atraumatic.   Right Ear: External ear normal.   Left Ear: External ear normal.   Clear post nasal drip, posterior oropharyngeal erythema without exudate or edema, enlarged nasal turbinates, clear nasal discharge   Eyes: EOM are normal.   Neck: Normal range of motion.   Cardiovascular: Normal rate and regular rhythm.   Pulmonary/Chest: Breath sounds normal. No respiratory distress.   Abdominal: Soft.   Musculoskeletal: Normal range of motion.   Neurological: She is alert. She has normal strength.   Skin: Skin is warm and dry.   Right axillary erythema with multiple areas of induration and slight tenderness  to palpation   Psychiatric: She has a normal mood and affect.         ED Course   Procedures  Labs Reviewed   POCT INFLUENZA A/B   POCT GLUCOSE   POCT GLUCOSE             Medical Decision Making:   Initial Assessment:   62-year-old female presents to the emergency department complaining of cough, headache and fever since last night.  She also would like treatment for recurrent hidradenitis right axillary region.  Differential Diagnosis:   Influenza  Acute nasopharyngitis  Bronchitis  Pneumonia  Abscess  Cellulitis  Hidradenitis  ED Management:  Patient was given instructions for acute viral syndrome and hidradenitis.  Prescriptions for doxycycline, Astelin, fluticasone, and Tamiflu were given.  Patient was advised to follow-up with her primary care physician in 2 days for reevaluation.                   ED Course      Clinical Impression:   The primary encounter diagnosis was Hidradenitis suppurativa of right axilla. A diagnosis of Acute viral syndrome was also pertinent to this visit.    Disposition:   Disposition: Discharged  Condition: Stable                        Minesh Li MD  12/31/17 3908

## 2018-01-02 ENCOUNTER — OFFICE VISIT (OUTPATIENT)
Dept: FAMILY MEDICINE | Facility: CLINIC | Age: 63
End: 2018-01-02
Payer: COMMERCIAL

## 2018-01-02 VITALS
DIASTOLIC BLOOD PRESSURE: 72 MMHG | SYSTOLIC BLOOD PRESSURE: 118 MMHG | TEMPERATURE: 99 F | HEART RATE: 84 BPM | RESPIRATION RATE: 14 BRPM | HEIGHT: 63 IN | WEIGHT: 273.13 LBS | OXYGEN SATURATION: 96 % | BODY MASS INDEX: 48.39 KG/M2

## 2018-01-02 DIAGNOSIS — E11.9 TYPE 2 DIABETES MELLITUS WITHOUT COMPLICATION, UNSPECIFIED LONG TERM INSULIN USE STATUS: ICD-10-CM

## 2018-01-02 DIAGNOSIS — M17.11 PRIMARY OSTEOARTHRITIS OF RIGHT KNEE: ICD-10-CM

## 2018-01-02 DIAGNOSIS — M51.36 DDD (DEGENERATIVE DISC DISEASE), LUMBAR: ICD-10-CM

## 2018-01-02 DIAGNOSIS — E66.01 OBESITY, MORBID: ICD-10-CM

## 2018-01-02 DIAGNOSIS — M17.10 OSTEOARTHRITIS, LOCALIZED, KNEE: ICD-10-CM

## 2018-01-02 DIAGNOSIS — Z23 NEEDS FLU SHOT: Primary | ICD-10-CM

## 2018-01-02 DIAGNOSIS — I10 ESSENTIAL HYPERTENSION: ICD-10-CM

## 2018-01-02 DIAGNOSIS — J06.9 UPPER RESPIRATORY TRACT INFECTION, UNSPECIFIED TYPE: ICD-10-CM

## 2018-01-02 DIAGNOSIS — I48.20 CHRONIC ATRIAL FIBRILLATION: ICD-10-CM

## 2018-01-02 DIAGNOSIS — G89.29 OTHER CHRONIC PAIN: ICD-10-CM

## 2018-01-02 DIAGNOSIS — F32.A DEPRESSION, UNSPECIFIED DEPRESSION TYPE: ICD-10-CM

## 2018-01-02 DIAGNOSIS — M54.16 LUMBAR RADICULAR PAIN: ICD-10-CM

## 2018-01-02 DIAGNOSIS — E78.5 HYPERLIPIDEMIA, UNSPECIFIED HYPERLIPIDEMIA TYPE: ICD-10-CM

## 2018-01-02 PROCEDURE — 99999 PR PBB SHADOW E&M-EST. PATIENT-LVL III: CPT | Mod: PBBFAC,,, | Performed by: FAMILY MEDICINE

## 2018-01-02 PROCEDURE — 99214 OFFICE O/P EST MOD 30 MIN: CPT | Mod: S$GLB,,, | Performed by: FAMILY MEDICINE

## 2018-01-02 RX ORDER — OXYCODONE AND ACETAMINOPHEN 10; 325 MG/1; MG/1
1 TABLET ORAL EVERY 12 HOURS PRN
Qty: 60 TABLET | Refills: 0 | Status: SHIPPED | OUTPATIENT
Start: 2018-01-02 | End: 2018-05-28 | Stop reason: SDUPTHER

## 2018-01-02 RX ORDER — SERTRALINE HYDROCHLORIDE 100 MG/1
TABLET, FILM COATED ORAL
Qty: 90 TABLET | Refills: 1 | Status: SHIPPED | OUTPATIENT
Start: 2018-01-02 | End: 2018-05-28 | Stop reason: SDUPTHER

## 2018-01-02 RX ORDER — SPIRONOLACTONE 25 MG/1
TABLET ORAL
Qty: 90 TABLET | Refills: 1 | Status: SHIPPED | OUTPATIENT
Start: 2018-01-02 | End: 2018-08-07

## 2018-01-02 RX ORDER — SIMVASTATIN 20 MG/1
20 TABLET, FILM COATED ORAL NIGHTLY
Qty: 90 TABLET | Refills: 1 | Status: SHIPPED | OUTPATIENT
Start: 2018-01-02 | End: 2018-07-03 | Stop reason: SDUPTHER

## 2018-01-02 RX ORDER — METFORMIN HYDROCHLORIDE 500 MG/1
500 TABLET ORAL 2 TIMES DAILY WITH MEALS
Qty: 180 TABLET | Refills: 2 | Status: SHIPPED | OUTPATIENT
Start: 2018-01-02 | End: 2018-05-28 | Stop reason: SDUPTHER

## 2018-01-02 RX ORDER — CODEINE PHOSPHATE AND GUAIFENESIN 10; 100 MG/5ML; MG/5ML
5 SOLUTION ORAL 3 TIMES DAILY PRN
Qty: 180 ML | Refills: 0 | Status: SHIPPED | OUTPATIENT
Start: 2018-01-02 | End: 2018-01-12

## 2018-01-02 NOTE — PROGRESS NOTES
Chief Complaint   Patient presents with    URI     this is an ED f/u viral        HPI  Sandee Evans is a 62 y.o. female with multiple medical diagnoses as listed in the medical history and problem list that presents for URI.    URI - improving symptoms, improved nausea and HA, +continues cough which hinders sleep    R arm discomfort/lesion - dx with H. Suppurativa, currently on doxycycline, +exudate, yellow/bloody, 1 month hx, also hx of procedure.      Pt is known to me and was last seen by me on 8/9/2017.    PAST MEDICAL HISTORY:  Past Medical History:   Diagnosis Date    Anticoagulant long-term use     Xarelto    Arthritis     Atrial fibrillation     Degenerative disc disease     Depression     Diabetes mellitus     TYPE 2    Hyperlipidemia     Hypertension     Nuclear sclerosis, bilateral 12/18/2017    Obesity, morbid     Other abnormal glucose     pre-diabetes    Sleep apnea     Smoker     Thyroid disease     on meds 8-9 years ago. hypothyroidism.no malignancy    TMJ (temporomandibular joint disorder)     jaw clicking    Urge incontinence     Wears glasses        PAST SURGICAL HISTORY:  Past Surgical History:   Procedure Laterality Date    BREAST LUMPECTOMY Right     HYSTERECTOMY      rt.knee surgery 2016      underarm gland\ Bilateral        SOCIAL HISTORY:  Social History     Social History    Marital status:      Spouse name: N/A    Number of children: N/A    Years of education: N/A     Occupational History    Not on file.     Social History Main Topics    Smoking status: Current Every Day Smoker     Packs/day: 0.25     Years: 30.00     Types: Cigarettes    Smokeless tobacco: Never Used    Alcohol use No    Drug use: No    Sexual activity: Yes     Partners: Male     Other Topics Concern    Not on file     Social History Narrative    No narrative on file       FAMILY HISTORY:  Family History   Problem Relation Age of Onset    Diabetes Mother     Diabetes Brother      No Known Problems Father     No Known Problems Sister     No Known Problems Maternal Aunt     No Known Problems Maternal Uncle     No Known Problems Paternal Aunt     No Known Problems Paternal Uncle     No Known Problems Maternal Grandmother     No Known Problems Maternal Grandfather     No Known Problems Paternal Grandmother     No Known Problems Paternal Grandfather     Amblyopia Neg Hx     Blindness Neg Hx     Cancer Neg Hx     Cataracts Neg Hx     Glaucoma Neg Hx     Hypertension Neg Hx     Macular degeneration Neg Hx     Retinal detachment Neg Hx     Strabismus Neg Hx     Stroke Neg Hx     Thyroid disease Neg Hx        ALLERGIES AND MEDICATIONS: updated and reviewed.  Review of patient's allergies indicates:   Allergen Reactions    Ace inhibitors      Cough       Current Outpatient Prescriptions   Medication Sig Dispense Refill    alprazolam (XANAX) 0.25 MG tablet TK ONE PO QD AT NIGHT PRN FOR ANXIETY 30 tablet 0    azelastine (ASTELIN) 137 mcg (0.1 %) nasal spray 2 sprays (274 mcg total) by Nasal route 2 (two) times daily. 30 mL 0    fluticasone (FLONASE) 50 mcg/actuation nasal spray 2 sprays by Each Nare route once daily. 15 g 0    loratadine (CLARITIN) 10 mg tablet Take 1 tablet (10 mg total) by mouth once daily. 30 tablet 5    metFORMIN (GLUCOPHAGE) 500 MG tablet Take 1 tablet (500 mg total) by mouth 2 (two) times daily with meals. 180 tablet 2    sertraline (ZOLOFT) 100 MG tablet TAKE 1 TABLET(100 MG) BY MOUTH EVERY DAY 90 tablet 1    simvastatin (ZOCOR) 20 MG tablet Take 1 tablet (20 mg total) by mouth every evening. 90 tablet 1    spironolactone (ALDACTONE) 25 MG tablet TAKE 1 TABLET(25 MG) BY MOUTH EVERY DAY 90 tablet 1    triamcinolone acetonide 0.1% (KENALOG) 0.1 % ointment Apply topically 2 (two) times daily. No more than 7-14 days at a time, between knees & upper thighs. 454 g 0    methylPREDNISolone (MEDROL DOSEPACK) 4 mg tablet use as directed 1 Package 0     "oxyCODONE-acetaminophen (PERCOCET)  mg per tablet Take 1 tablet by mouth every 12 (twelve) hours as needed for Pain. 60 tablet 0     No current facility-administered medications for this visit.        ROS  Review of Systems   Constitutional: Negative for activity change and unexpected weight change.   HENT: Positive for rhinorrhea. Negative for hearing loss and trouble swallowing.    Eyes: Positive for discharge and visual disturbance.   Respiratory: Negative for wheezing.    Cardiovascular: Negative for chest pain and palpitations.   Gastrointestinal: Negative for blood in stool, constipation, diarrhea and vomiting.   Endocrine: Positive for polydipsia and polyuria.   Genitourinary: Negative for difficulty urinating, dysuria, hematuria and menstrual problem.   Musculoskeletal: Positive for arthralgias and joint swelling. Negative for neck pain.   Neurological: Positive for weakness and headaches.   Psychiatric/Behavioral: Positive for dysphoric mood. Negative for confusion.       Physical Exam  Vitals:    01/02/18 0939   BP: 118/72   Pulse: 84   Resp: 14   Temp: 99.4 °F (37.4 °C)    Body mass index is 48.39 kg/m².  Weight: 123.9 kg (273 lb 2.4 oz)   Height: 5' 3" (160 cm)     Physical Exam   Constitutional: She is oriented to person, place, and time. She appears well-developed and well-nourished.   HENT:   Head: Normocephalic.   Mouth/Throat: No oropharyngeal exudate.   Erythematous pharynx  Erythematous, edematous nares  Mild dull TMs bilaterally   Eyes: Pupils are equal, round, and reactive to light. No scleral icterus.   Neck: Normal range of motion. Neck supple.   Cardiovascular: Normal rate, regular rhythm and normal heart sounds.    Pulmonary/Chest: Effort normal and breath sounds normal. No respiratory distress.   Abdominal: Soft. Bowel sounds are normal. There is no tenderness.   Musculoskeletal:   R axilla - 5 cm in length, indurated area, +drainage   Lymphadenopathy:     She has cervical adenopathy. "   Neurological: She is alert and oriented to person, place, and time.   Skin: Skin is warm. No rash noted.   Psychiatric: She has a normal mood and affect. Her behavior is normal. Judgment and thought content normal.       Health Maintenance       Date Due Completion Date    TETANUS VACCINE 12/28/1971 ---    Zoster Vaccine 12/28/2013 ---    Influenza Vaccine 08/01/2017 1/13/2016    Override on 1/1/2015: Done (3/18/15 pt stated had it done 1/2015 [facility] with Dr. Dodd)    Lipid Panel 05/08/2018 5/8/2017    Hemoglobin A1c 05/08/2018 5/8/2017    Override on 7/22/2014: Not Clinically Appropriate    Mammogram 05/17/2018 5/17/2016    Colonoscopy 08/02/2018 8/2/2013 (Done)    Override on 8/2/2013: Done    Foot Exam 08/09/2018 8/9/2017    Override on 8/7/2015: Done    Urine Microalbumin 08/09/2018 8/9/2017    Override on 7/22/2014: Not Clinically Appropriate    Eye Exam 12/18/2018 12/18/2017    Override on 12/18/2017: Done    Override on 12/27/2016: Done    Override on 3/20/2012: Done    Pneumococcal PPSV23 (Medium Risk) (2) 12/28/2018 5/20/2013          Assessment & Plan    Other chronic pain, DDD (degenerative disc disease), lumbar  - Continue current medication regimen as prescribed     Depression, unspecified depression type  -     sertraline (ZOLOFT) 100 MG tablet; TAKE 1 TABLET(100 MG) BY MOUTH EVERY DAY  Dispense: 90 tablet; Refill: 1  - Continue current medication regimen as prescribed    Hyperlipidemia, unspecified hyperlipidemia type  -     simvastatin (ZOCOR) 20 MG tablet; Take 1 tablet (20 mg total) by mouth every evening.  Dispense: 90 tablet; Refill: 1  - Continue current medication regimen as prescribed    Chronic atrial fibrillation  - Continue current medication regimen as prescribed  - Cont to monitor    Primary osteoarthritis of right knee, Lumbar radicular pain  -     oxyCODONE-acetaminophen (PERCOCET)  mg per tablet; Take 1 tablet by mouth every 12 (twelve) hours as needed for Pain.   Dispense: 60 tablet; Refill: 0    Osteoarthritis, localized, knee  -     oxyCODONE-acetaminophen (PERCOCET)  mg per tablet; Take 1 tablet by mouth every 12 (twelve) hours as needed for Pain.  Dispense: 60 tablet; Refill: 0    Essential hypertension  -     spironolactone (ALDACTONE) 25 MG tablet; TAKE 1 TABLET(25 MG) BY MOUTH EVERY DAY  Dispense: 90 tablet; Refill: 1    Type 2 diabetes mellitus without complication, unspecified long term insulin use status  -     metFORMIN (GLUCOPHAGE) 500 MG tablet; Take 1 tablet (500 mg total) by mouth 2 (two) times daily with meals.  Dispense: 180 tablet; Refill: 2    Upper respiratory tract infection, unspecified type  -     guaifenesin-codeine 100-10 mg/5 ml (CHERATUSSIN AC)  mg/5 mL syrup; Take 5 mLs by mouth 3 (three) times daily as needed.  Dispense: 180 mL; Refill: 0  - Monitor symptoms at this time  - Symptoms improving        Follow-up in about 4 weeks (around 1/30/2018), or if symptoms worsen or fail to improve.

## 2018-01-31 ENCOUNTER — OFFICE VISIT (OUTPATIENT)
Dept: FAMILY MEDICINE | Facility: CLINIC | Age: 63
End: 2018-01-31
Payer: COMMERCIAL

## 2018-01-31 VITALS
TEMPERATURE: 100 F | RESPIRATION RATE: 20 BRPM | OXYGEN SATURATION: 93 % | HEIGHT: 63 IN | BODY MASS INDEX: 47.7 KG/M2 | DIASTOLIC BLOOD PRESSURE: 80 MMHG | HEART RATE: 103 BPM | SYSTOLIC BLOOD PRESSURE: 142 MMHG | WEIGHT: 269.19 LBS

## 2018-01-31 DIAGNOSIS — J18.9 ATYPICAL PNEUMONIA: Primary | ICD-10-CM

## 2018-01-31 DIAGNOSIS — R09.81 SINUS CONGESTION: ICD-10-CM

## 2018-01-31 PROCEDURE — 99999 PR PBB SHADOW E&M-EST. PATIENT-LVL III: CPT | Mod: PBBFAC,,, | Performed by: FAMILY MEDICINE

## 2018-01-31 PROCEDURE — 3008F BODY MASS INDEX DOCD: CPT | Mod: S$GLB,,, | Performed by: FAMILY MEDICINE

## 2018-01-31 PROCEDURE — 99214 OFFICE O/P EST MOD 30 MIN: CPT | Mod: S$GLB,,, | Performed by: FAMILY MEDICINE

## 2018-01-31 RX ORDER — CODEINE PHOSPHATE AND GUAIFENESIN 10; 100 MG/5ML; MG/5ML
5 SOLUTION ORAL 3 TIMES DAILY PRN
Qty: 236 ML | Refills: 0 | Status: SHIPPED | OUTPATIENT
Start: 2018-01-31 | End: 2018-02-05 | Stop reason: SDUPTHER

## 2018-01-31 RX ORDER — LEVOCETIRIZINE DIHYDROCHLORIDE 5 MG/1
5 TABLET, FILM COATED ORAL NIGHTLY
Qty: 30 TABLET | Refills: 11 | Status: SHIPPED | OUTPATIENT
Start: 2018-01-31 | End: 2018-05-28

## 2018-01-31 RX ORDER — AZITHROMYCIN 250 MG/1
TABLET, FILM COATED ORAL
Qty: 6 TABLET | Refills: 0 | Status: SHIPPED | OUTPATIENT
Start: 2018-01-31 | End: 2018-02-05 | Stop reason: ALTCHOICE

## 2018-01-31 RX ORDER — LORATADINE 10 MG/1
10 TABLET ORAL DAILY
Qty: 30 TABLET | Refills: 3 | Status: CANCELLED | OUTPATIENT
Start: 2018-01-31

## 2018-01-31 NOTE — PROGRESS NOTES
Subjective:       Patient ID: Sandee Evans is a 62 y.o. female.    Chief Complaint: Sinus Problem (chills, sore throat, cough, chest congestion and headache; dx flu in 12/31/2017)    HPI    Onset of flu A on 12/31 that involved coughing chest congestion that began to improve, but has worsened again with the same sxs with the addition of throat pain x 5 days ago.     She was taking cough syrup and tylenol which have helped somewhat.         Current Outpatient Prescriptions on File Prior to Visit   Medication Sig Dispense Refill    alprazolam (XANAX) 0.25 MG tablet TK ONE PO QD AT NIGHT PRN FOR ANXIETY 30 tablet 0    azelastine (ASTELIN) 137 mcg (0.1 %) nasal spray 2 sprays (274 mcg total) by Nasal route 2 (two) times daily. 30 mL 0    fluticasone (FLONASE) 50 mcg/actuation nasal spray 2 sprays by Each Nare route once daily. 15 g 0    metFORMIN (GLUCOPHAGE) 500 MG tablet Take 1 tablet (500 mg total) by mouth 2 (two) times daily with meals. 180 tablet 2    oxyCODONE-acetaminophen (PERCOCET)  mg per tablet Take 1 tablet by mouth every 12 (twelve) hours as needed for Pain. 60 tablet 0    sertraline (ZOLOFT) 100 MG tablet TAKE 1 TABLET(100 MG) BY MOUTH EVERY DAY 90 tablet 1    simvastatin (ZOCOR) 20 MG tablet Take 1 tablet (20 mg total) by mouth every evening. 90 tablet 1    spironolactone (ALDACTONE) 25 MG tablet TAKE 1 TABLET(25 MG) BY MOUTH EVERY DAY 90 tablet 1    triamcinolone acetonide 0.1% (KENALOG) 0.1 % ointment Apply topically 2 (two) times daily. No more than 7-14 days at a time, between knees & upper thighs. 454 g 0    [DISCONTINUED] loratadine (CLARITIN) 10 mg tablet Take 1 tablet (10 mg total) by mouth once daily. 30 tablet 5    [DISCONTINUED] methylPREDNISolone (MEDROL DOSEPACK) 4 mg tablet use as directed 1 Package 0     No current facility-administered medications on file prior to visit.        Past Medical History:   Diagnosis Date    Anticoagulant long-term use     Xarelto     Arthritis     Atrial fibrillation     Degenerative disc disease     Depression     Diabetes mellitus     TYPE 2    Hyperlipidemia     Hypertension     Nuclear sclerosis, bilateral 12/18/2017    Obesity, morbid     Other abnormal glucose     pre-diabetes    Sleep apnea     Smoker     Thyroid disease     on meds 8-9 years ago. hypothyroidism.no malignancy    TMJ (temporomandibular joint disorder)     jaw clicking    Urge incontinence     Wears glasses        Family History   Problem Relation Age of Onset    Diabetes Mother     Diabetes Brother     No Known Problems Father     No Known Problems Sister     No Known Problems Maternal Aunt     No Known Problems Maternal Uncle     No Known Problems Paternal Aunt     No Known Problems Paternal Uncle     No Known Problems Maternal Grandmother     No Known Problems Maternal Grandfather     No Known Problems Paternal Grandmother     No Known Problems Paternal Grandfather     Amblyopia Neg Hx     Blindness Neg Hx     Cancer Neg Hx     Cataracts Neg Hx     Glaucoma Neg Hx     Hypertension Neg Hx     Macular degeneration Neg Hx     Retinal detachment Neg Hx     Strabismus Neg Hx     Stroke Neg Hx     Thyroid disease Neg Hx         reports that she has been smoking Cigarettes.  She has a 7.50 pack-year smoking history. She has never used smokeless tobacco. She reports that she does not drink alcohol or use drugs.    Review of Systems   Constitutional: Positive for chills.   HENT: Positive for postnasal drip, rhinorrhea and sore throat.    Respiratory: Positive for cough and wheezing.    Cardiovascular: Negative for chest pain.   Allergic/Immunologic: Positive for environmental allergies.   Neurological: Positive for headaches.       Objective:     Vitals:    01/31/18 1146   BP: (!) 142/80   Pulse: 103   Resp: 20   Temp: 100.2 °F (37.9 °C)        Physical Exam   Constitutional: She is oriented to person, place, and time. She appears  well-developed and well-nourished. No distress.   HENT:   Head: Normocephalic and atraumatic.   Right Ear: External ear normal. Tympanic membrane is not injected and not bulging. No middle ear effusion.   Left Ear: External ear normal. Tympanic membrane is not injected and not bulging.  No middle ear effusion.   Nose: No mucosal edema.   Mouth/Throat: Posterior oropharyngeal erythema present. No oropharyngeal exudate, posterior oropharyngeal edema or tonsillar abscesses.   PND   Eyes: Conjunctivae and EOM are normal. Right eye exhibits no discharge. Left eye exhibits no discharge. No scleral icterus.   Cardiovascular: Normal rate and regular rhythm.  Exam reveals no gallop and no friction rub.    No murmur heard.  Pulmonary/Chest: Effort normal and breath sounds normal. No respiratory distress. She has no wheezes. She has no rales (minimal in RLLF that clear with coughing).   Lymphadenopathy:     She has no cervical adenopathy.   Neurological: She is oriented to person, place, and time.   Skin: She is not diaphoretic.   Psychiatric: She has a normal mood and affect.   Vitals reviewed.      Assessment:       1. Atypical pneumonia    2. Sinus congestion        Plan:       Sandee was seen today for sinus problem.    Diagnoses and all orders for this visit:    Atypical pneumonia  -     guaifenesin-codeine 100-10 mg/5 ml (TUSSI-ORGANIDIN NR)  mg/5 mL syrup; Take 5 mLs by mouth 3 (three) times daily as needed for Cough.  -     azithromycin (ZITHROMAX Z-HEMA) 250 MG tablet; Take 2 pills day 1, then 1 pill day 2-5.  Patient with 20+ day history of respiratory symptoms.  New dx - pt educated on disease etiology, prognosis and treatment. Answered pts questions.    Sinus congestion  -     levocetirizine (XYZAL) 5 MG tablet; Take 1 tablet (5 mg total) by mouth every evening.  New dx - pt educated on disease etiology, prognosis and treatment. Answered pts questions.    Other orders  -     Cancel: loratadine (CLARITIN) 10 mg  tablet; Take 1 tablet (10 mg total) by mouth once daily.            Follow-up if symptoms worsen or fail to improve.      Pt verbalized understanding and agreed with our plan.

## 2018-01-31 NOTE — PROGRESS NOTES
Health Maintenance             Zoster Vaccine hx chickenpox ; inform pt can get vaccine at pharmacy.     Influenza Vaccine Postpone

## 2018-02-05 ENCOUNTER — HOSPITAL ENCOUNTER (OUTPATIENT)
Dept: RADIOLOGY | Facility: HOSPITAL | Age: 63
Discharge: HOME | End: 2018-02-05
Attending: FAMILY MEDICINE
Payer: COMMERCIAL

## 2018-02-05 ENCOUNTER — OFFICE VISIT (OUTPATIENT)
Dept: FAMILY MEDICINE | Facility: CLINIC | Age: 63
End: 2018-02-05
Payer: COMMERCIAL

## 2018-02-05 VITALS
HEIGHT: 63 IN | OXYGEN SATURATION: 94 % | SYSTOLIC BLOOD PRESSURE: 102 MMHG | TEMPERATURE: 98 F | HEART RATE: 83 BPM | BODY MASS INDEX: 45.58 KG/M2 | WEIGHT: 257.25 LBS | RESPIRATION RATE: 24 BRPM | DIASTOLIC BLOOD PRESSURE: 60 MMHG

## 2018-02-05 DIAGNOSIS — J18.9 ATYPICAL PNEUMONIA: ICD-10-CM

## 2018-02-05 DIAGNOSIS — Z23 NEED FOR VACCINATION: Primary | ICD-10-CM

## 2018-02-05 DIAGNOSIS — M51.36 DDD (DEGENERATIVE DISC DISEASE), LUMBAR: ICD-10-CM

## 2018-02-05 DIAGNOSIS — F32.A DEPRESSION, UNSPECIFIED DEPRESSION TYPE: ICD-10-CM

## 2018-02-05 DIAGNOSIS — J06.9 UPPER RESPIRATORY TRACT INFECTION, UNSPECIFIED TYPE: ICD-10-CM

## 2018-02-05 DIAGNOSIS — I10 ESSENTIAL HYPERTENSION: ICD-10-CM

## 2018-02-05 DIAGNOSIS — R11.0 NAUSEA: ICD-10-CM

## 2018-02-05 PROCEDURE — 3008F BODY MASS INDEX DOCD: CPT | Mod: S$GLB,,, | Performed by: FAMILY MEDICINE

## 2018-02-05 PROCEDURE — 71046 X-RAY EXAM CHEST 2 VIEWS: CPT | Mod: TC,PO

## 2018-02-05 PROCEDURE — 96372 THER/PROPH/DIAG INJ SC/IM: CPT | Mod: S$GLB,,, | Performed by: FAMILY MEDICINE

## 2018-02-05 PROCEDURE — 94640 AIRWAY INHALATION TREATMENT: CPT | Mod: 59,S$GLB,, | Performed by: FAMILY MEDICINE

## 2018-02-05 PROCEDURE — 71046 X-RAY EXAM CHEST 2 VIEWS: CPT | Mod: 26,,, | Performed by: RADIOLOGY

## 2018-02-05 PROCEDURE — 99999 PR PBB SHADOW E&M-EST. PATIENT-LVL III: CPT | Mod: PBBFAC,,, | Performed by: FAMILY MEDICINE

## 2018-02-05 PROCEDURE — 99214 OFFICE O/P EST MOD 30 MIN: CPT | Mod: 25,S$GLB,, | Performed by: FAMILY MEDICINE

## 2018-02-05 RX ORDER — CODEINE PHOSPHATE AND GUAIFENESIN 10; 100 MG/5ML; MG/5ML
5 SOLUTION ORAL 3 TIMES DAILY PRN
Qty: 236 ML | Refills: 0 | Status: SHIPPED | OUTPATIENT
Start: 2018-02-05 | End: 2018-02-15

## 2018-02-05 RX ORDER — PROMETHAZINE HYDROCHLORIDE 25 MG/ML
12.5 INJECTION, SOLUTION INTRAMUSCULAR; INTRAVENOUS
Status: COMPLETED | OUTPATIENT
Start: 2018-02-05 | End: 2018-02-05

## 2018-02-05 RX ORDER — PROMETHAZINE HYDROCHLORIDE 25 MG/ML
25 INJECTION, SOLUTION INTRAMUSCULAR; INTRAVENOUS
Status: DISCONTINUED | OUTPATIENT
Start: 2018-02-05 | End: 2018-02-05

## 2018-02-05 RX ORDER — ALBUTEROL SULFATE 90 UG/1
2 AEROSOL, METERED RESPIRATORY (INHALATION) EVERY 6 HOURS PRN
Qty: 1 EACH | Refills: 11 | Status: SHIPPED | OUTPATIENT
Start: 2018-02-05 | End: 2021-02-18

## 2018-02-05 RX ORDER — ALBUTEROL SULFATE 2.5 MG/.5ML
2.5 SOLUTION RESPIRATORY (INHALATION)
Status: COMPLETED | OUTPATIENT
Start: 2018-02-05 | End: 2018-02-05

## 2018-02-05 RX ADMIN — ALBUTEROL SULFATE 2.5 MG: 2.5 SOLUTION RESPIRATORY (INHALATION) at 03:02

## 2018-02-05 RX ADMIN — PROMETHAZINE HYDROCHLORIDE 12.5 MG: 25 INJECTION, SOLUTION INTRAMUSCULAR; INTRAVENOUS at 04:02

## 2018-02-05 NOTE — PROGRESS NOTES
No chief complaint on file.      HPI  Sandee Evans is a 62 y.o. female with multiple medical diagnoses as listed in the medical history and problem list that presents for follow up.      Dx with flu A 1 month ago, improved, symptoms worsened 1 week ago, dx with atypical pna. Today - increased cough productive yellow/brown, decreased appetite, +nausea, no diarrhea, +fever. No changes in urinary symptoms.     Pt is known to me and was last seen by me on 1/2/2018.    PAST MEDICAL HISTORY:  Past Medical History:   Diagnosis Date    Anticoagulant long-term use     Xarelto    Arthritis     Atrial fibrillation     Degenerative disc disease     Depression     Diabetes mellitus     TYPE 2    Hyperlipidemia     Hypertension     Nuclear sclerosis, bilateral 12/18/2017    Obesity, morbid     Other abnormal glucose     pre-diabetes    Sleep apnea     Smoker     Thyroid disease     on meds 8-9 years ago. hypothyroidism.no malignancy    TMJ (temporomandibular joint disorder)     jaw clicking    Urge incontinence     Wears glasses        PAST SURGICAL HISTORY:  Past Surgical History:   Procedure Laterality Date    BREAST LUMPECTOMY Right     HYSTERECTOMY      rt.knee surgery 2016      underarm gland\ Bilateral        SOCIAL HISTORY:  Social History     Social History    Marital status:      Spouse name: N/A    Number of children: N/A    Years of education: N/A     Occupational History    Not on file.     Social History Main Topics    Smoking status: Current Every Day Smoker     Packs/day: 0.25     Years: 30.00     Types: Cigarettes    Smokeless tobacco: Never Used    Alcohol use No    Drug use: No    Sexual activity: Yes     Partners: Male     Other Topics Concern    Not on file     Social History Narrative    No narrative on file       FAMILY HISTORY:  Family History   Problem Relation Age of Onset    Diabetes Mother     Diabetes Brother     No Known Problems Father     No Known  Problems Sister     No Known Problems Maternal Aunt     No Known Problems Maternal Uncle     No Known Problems Paternal Aunt     No Known Problems Paternal Uncle     No Known Problems Maternal Grandmother     No Known Problems Maternal Grandfather     No Known Problems Paternal Grandmother     No Known Problems Paternal Grandfather     Amblyopia Neg Hx     Blindness Neg Hx     Cancer Neg Hx     Cataracts Neg Hx     Glaucoma Neg Hx     Hypertension Neg Hx     Macular degeneration Neg Hx     Retinal detachment Neg Hx     Strabismus Neg Hx     Stroke Neg Hx     Thyroid disease Neg Hx        ALLERGIES AND MEDICATIONS: updated and reviewed.  Review of patient's allergies indicates:   Allergen Reactions    Ace inhibitors      Cough       Current Outpatient Prescriptions   Medication Sig Dispense Refill    alprazolam (XANAX) 0.25 MG tablet TK ONE PO QD AT NIGHT PRN FOR ANXIETY 30 tablet 0    levocetirizine (XYZAL) 5 MG tablet Take 1 tablet (5 mg total) by mouth every evening. 30 tablet 11    metFORMIN (GLUCOPHAGE) 500 MG tablet Take 1 tablet (500 mg total) by mouth 2 (two) times daily with meals. 180 tablet 2    oxyCODONE-acetaminophen (PERCOCET)  mg per tablet Take 1 tablet by mouth every 12 (twelve) hours as needed for Pain. 60 tablet 0    sertraline (ZOLOFT) 100 MG tablet TAKE 1 TABLET(100 MG) BY MOUTH EVERY DAY 90 tablet 1    simvastatin (ZOCOR) 20 MG tablet Take 1 tablet (20 mg total) by mouth every evening. 90 tablet 1    spironolactone (ALDACTONE) 25 MG tablet TAKE 1 TABLET(25 MG) BY MOUTH EVERY DAY 90 tablet 1    triamcinolone acetonide 0.1% (KENALOG) 0.1 % ointment Apply topically 2 (two) times daily. No more than 7-14 days at a time, between knees & upper thighs. 454 g 0    albuterol 90 mcg/actuation inhaler Inhale 2 puffs into the lungs every 6 (six) hours as needed for Wheezing. 1 each 11    azelastine (ASTELIN) 137 mcg (0.1 %) nasal spray 2 sprays (274 mcg total) by Nasal  "route 2 (two) times daily. 30 mL 0    fluticasone (FLONASE) 50 mcg/actuation nasal spray 2 sprays by Each Nare route once daily. 15 g 0    guaifenesin-codeine 100-10 mg/5 ml (TUSSI-ORGANIDIN NR)  mg/5 mL syrup Take 5 mLs by mouth 3 (three) times daily as needed for Cough. 236 mL 0     No current facility-administered medications for this visit.        ROS  Review of Systems   Constitutional: Negative for activity change, appetite change and fever.   HENT: Positive for congestion, postnasal drip, rhinorrhea, sinus pressure and sneezing.    Eyes: Positive for itching.   Respiratory: Positive for cough. Negative for shortness of breath and wheezing.    Cardiovascular: Negative for chest pain.   Gastrointestinal: Negative for abdominal pain, diarrhea and nausea.   Endocrine: Negative for polydipsia.   Genitourinary: Negative for dysuria.   Musculoskeletal: Negative for neck stiffness.   Skin: Negative for rash.   Neurological: Negative for numbness.   Psychiatric/Behavioral: Negative for agitation and dysphoric mood.       Physical Exam  Vitals:    02/05/18 1430   BP: 102/60   Pulse: 83   Resp: (!) 24   Temp: 97.8 °F (36.6 °C)    Body mass index is 45.57 kg/m².  Weight: 116.7 kg (257 lb 4.4 oz)   Height: 5' 3" (160 cm)     Physical Exam   Constitutional: She is oriented to person, place, and time. She appears well-developed and well-nourished.   HENT:   Head: Normocephalic.   Mouth/Throat: No oropharyngeal exudate.   Erythematous pharynx  Erythematous, edematous nares  Mild dull TMs bilaterally   Eyes: Pupils are equal, round, and reactive to light. No scleral icterus.   Neck: Normal range of motion. Neck supple.   Cardiovascular: Normal rate, regular rhythm and normal heart sounds.    Pulmonary/Chest: She has wheezes.   Abdominal: Soft. Bowel sounds are normal. There is no tenderness.   Lymphadenopathy:     She has cervical adenopathy.   Neurological: She is alert and oriented to person, place, and time. "   Skin: Skin is warm. No rash noted.   Psychiatric: She has a normal mood and affect. Her behavior is normal. Judgment and thought content normal.       Health Maintenance       Date Due Completion Date    TETANUS VACCINE 12/28/1971 ---    Zoster Vaccine 12/28/2013 ---    Influenza Vaccine 08/01/2017 1/13/2016    Override on 1/1/2015: Done (3/18/15 pt stated had it done 1/2015 at [facility] with Dr. Dodd)    Lipid Panel 05/08/2018 5/8/2017    Hemoglobin A1c 05/08/2018 5/8/2017    Override on 7/22/2014: Not Clinically Appropriate    Mammogram 05/17/2018 5/17/2016    Colonoscopy 08/02/2018 8/2/2013 (Done)    Override on 8/2/2013: Done    Foot Exam 08/09/2018 8/9/2017    Override on 8/7/2015: Done    Urine Microalbumin 08/09/2018 8/9/2017    Override on 7/22/2014: Not Clinically Appropriate    Eye Exam 12/18/2018 12/18/2017    Override on 12/18/2017: Done    Override on 12/27/2016: Done    Override on 3/20/2012: Done    Pneumococcal PPSV23 (Medium Risk) (2) 12/28/2018 5/20/2013    Low Dose Statin 02/05/2019 2/5/2018          Assessment & Plan    Need for vaccination    Upper respiratory tract infection, unspecified type  -     X-Ray Chest PA And Lateral; Future; Expected date: 02/05/2018  -     albuterol sulfate nebulizer solution 2.5 mg; Take 2.5 mg by nebulization one time.    Atypical pneumonia  -     guaifenesin-codeine 100-10 mg/5 ml (TUSSI-ORGANIDIN NR)  mg/5 mL syrup; Take 5 mLs by mouth 3 (three) times daily as needed for Cough.  Dispense: 236 mL; Refill: 0  -     albuterol 90 mcg/actuation inhaler; Inhale 2 puffs into the lungs every 6 (six) hours as needed for Wheezing.  Dispense: 1 each; Refill: 11  - Will provide symptomatic relief  - Overall improving slowly    Nausea  -     Lipase; Future; Expected date: 02/05/2018  -     Amylase; Future; Expected date: 02/05/2018  -     promethazine injection 12.5 mg; Inject 0.5 mLs (12.5 mg total) into the muscle one time.    HTN, chronic AFIB  - Continue  current medication regimen as prescribed        Follow-up in about 4 weeks (around 3/5/2018).

## 2018-02-06 ENCOUNTER — LAB VISIT (OUTPATIENT)
Dept: LAB | Facility: HOSPITAL | Age: 63
End: 2018-02-06
Attending: FAMILY MEDICINE
Payer: COMMERCIAL

## 2018-02-06 DIAGNOSIS — E11.9 TYPE 2 DIABETES MELLITUS WITHOUT COMPLICATION: ICD-10-CM

## 2018-02-06 PROCEDURE — 82043 UR ALBUMIN QUANTITATIVE: CPT

## 2018-02-07 LAB
CREAT UR-MCNC: 370 MG/DL
MICROALBUMIN UR DL<=1MG/L-MCNC: 808 UG/ML
MICROALBUMIN/CREATININE RATIO: 218.4 UG/MG

## 2018-02-21 ENCOUNTER — OFFICE VISIT (OUTPATIENT)
Dept: FAMILY MEDICINE | Facility: CLINIC | Age: 63
End: 2018-02-21
Payer: COMMERCIAL

## 2018-02-21 VITALS
HEIGHT: 63 IN | RESPIRATION RATE: 17 BRPM | WEIGHT: 257.06 LBS | TEMPERATURE: 98 F | BODY MASS INDEX: 45.55 KG/M2 | OXYGEN SATURATION: 97 % | SYSTOLIC BLOOD PRESSURE: 116 MMHG | HEART RATE: 76 BPM | DIASTOLIC BLOOD PRESSURE: 74 MMHG

## 2018-02-21 DIAGNOSIS — Z23 NEED FOR 23-POLYVALENT PNEUMOCOCCAL POLYSACCHARIDE VACCINE: ICD-10-CM

## 2018-02-21 DIAGNOSIS — M51.36 DDD (DEGENERATIVE DISC DISEASE), LUMBAR: Primary | ICD-10-CM

## 2018-02-21 DIAGNOSIS — M54.16 LUMBAR RADICULAR PAIN: ICD-10-CM

## 2018-02-21 DIAGNOSIS — R19.7 DIARRHEA, UNSPECIFIED TYPE: ICD-10-CM

## 2018-02-21 DIAGNOSIS — I10 ESSENTIAL HYPERTENSION: ICD-10-CM

## 2018-02-21 DIAGNOSIS — Z23 NEED FOR INFLUENZA VACCINATION: ICD-10-CM

## 2018-02-21 DIAGNOSIS — E78.5 HYPERLIPIDEMIA, UNSPECIFIED HYPERLIPIDEMIA TYPE: ICD-10-CM

## 2018-02-21 DIAGNOSIS — E11.9 TYPE 2 DIABETES MELLITUS WITHOUT COMPLICATION, WITHOUT LONG-TERM CURRENT USE OF INSULIN: ICD-10-CM

## 2018-02-21 DIAGNOSIS — F32.A DEPRESSION, UNSPECIFIED DEPRESSION TYPE: ICD-10-CM

## 2018-02-21 PROCEDURE — 90686 IIV4 VACC NO PRSV 0.5 ML IM: CPT | Mod: S$GLB,,, | Performed by: FAMILY MEDICINE

## 2018-02-21 PROCEDURE — 90471 IMMUNIZATION ADMIN: CPT | Mod: S$GLB,,, | Performed by: FAMILY MEDICINE

## 2018-02-21 PROCEDURE — 3074F SYST BP LT 130 MM HG: CPT | Mod: S$GLB,,, | Performed by: FAMILY MEDICINE

## 2018-02-21 PROCEDURE — 99214 OFFICE O/P EST MOD 30 MIN: CPT | Mod: 25,S$GLB,, | Performed by: FAMILY MEDICINE

## 2018-02-21 PROCEDURE — 90472 IMMUNIZATION ADMIN EACH ADD: CPT | Mod: S$GLB,,, | Performed by: FAMILY MEDICINE

## 2018-02-21 PROCEDURE — 90732 PPSV23 VACC 2 YRS+ SUBQ/IM: CPT | Mod: S$GLB,,, | Performed by: FAMILY MEDICINE

## 2018-02-21 PROCEDURE — 99999 PR PBB SHADOW E&M-EST. PATIENT-LVL III: CPT | Mod: PBBFAC,,, | Performed by: FAMILY MEDICINE

## 2018-02-21 PROCEDURE — 3078F DIAST BP <80 MM HG: CPT | Mod: S$GLB,,, | Performed by: FAMILY MEDICINE

## 2018-02-21 NOTE — PROGRESS NOTES
Chief Complaint   Patient presents with    Pneumonia     follow up     Immunizations     flu , zoster ,        HPI  Sandee Evans is a 62 y.o. female with multiple medical diagnoses as listed in the medical history and problem list that presents for follow up.      Recently dx with URI/atypical PNA - symptoms today resolved.     R shoulder - 2 day hx, intermittent prior, no meds at this time.    L knee pain - chronic, ice/soak, known severe OA, pending decision on TKA.     Diarrhea - most meals, 30 minutes after will have diarrhea, although intermittently will have normal BM, states improved with carb intake. Few month hx overall.     Pt is known to me and was last seen by me on 2/5/2018.    PAST MEDICAL HISTORY:  Past Medical History:   Diagnosis Date    Anticoagulant long-term use     Xarelto    Arthritis     Atrial fibrillation     Degenerative disc disease     Depression     Diabetes mellitus     TYPE 2    Hyperlipidemia     Hypertension     Nuclear sclerosis, bilateral 12/18/2017    Obesity, morbid     Other abnormal glucose     pre-diabetes    Sleep apnea     Smoker     Thyroid disease     on meds 8-9 years ago. hypothyroidism.no malignancy    TMJ (temporomandibular joint disorder)     jaw clicking    Urge incontinence     Wears glasses        PAST SURGICAL HISTORY:  Past Surgical History:   Procedure Laterality Date    BREAST LUMPECTOMY Right     HYSTERECTOMY      rt.knee surgery 2016      underarm gland\ Bilateral        SOCIAL HISTORY:  Social History     Social History    Marital status:      Spouse name: N/A    Number of children: N/A    Years of education: N/A     Occupational History    Not on file.     Social History Main Topics    Smoking status: Current Every Day Smoker     Packs/day: 0.25     Years: 30.00     Types: Cigarettes    Smokeless tobacco: Never Used    Alcohol use No    Drug use: No    Sexual activity: Yes     Partners: Male     Other Topics  Concern    Not on file     Social History Narrative    No narrative on file       FAMILY HISTORY:  Family History   Problem Relation Age of Onset    Diabetes Mother     Diabetes Brother     No Known Problems Father     No Known Problems Sister     No Known Problems Maternal Aunt     No Known Problems Maternal Uncle     No Known Problems Paternal Aunt     No Known Problems Paternal Uncle     No Known Problems Maternal Grandmother     No Known Problems Maternal Grandfather     No Known Problems Paternal Grandmother     No Known Problems Paternal Grandfather     Amblyopia Neg Hx     Blindness Neg Hx     Cancer Neg Hx     Cataracts Neg Hx     Glaucoma Neg Hx     Hypertension Neg Hx     Macular degeneration Neg Hx     Retinal detachment Neg Hx     Strabismus Neg Hx     Stroke Neg Hx     Thyroid disease Neg Hx        ALLERGIES AND MEDICATIONS: updated and reviewed.  Review of patient's allergies indicates:   Allergen Reactions    Ace inhibitors      Cough       Current Outpatient Prescriptions   Medication Sig Dispense Refill    albuterol 90 mcg/actuation inhaler Inhale 2 puffs into the lungs every 6 (six) hours as needed for Wheezing. 1 each 11    alprazolam (XANAX) 0.25 MG tablet TK ONE PO QD AT NIGHT PRN FOR ANXIETY 30 tablet 0    fluticasone (FLONASE) 50 mcg/actuation nasal spray 2 sprays by Each Nare route once daily. 15 g 0    metFORMIN (GLUCOPHAGE) 500 MG tablet Take 1 tablet (500 mg total) by mouth 2 (two) times daily with meals. 180 tablet 2    oxyCODONE-acetaminophen (PERCOCET)  mg per tablet Take 1 tablet by mouth every 12 (twelve) hours as needed for Pain. 60 tablet 0    sertraline (ZOLOFT) 100 MG tablet TAKE 1 TABLET(100 MG) BY MOUTH EVERY DAY 90 tablet 1    simvastatin (ZOCOR) 20 MG tablet Take 1 tablet (20 mg total) by mouth every evening. 90 tablet 1    spironolactone (ALDACTONE) 25 MG tablet TAKE 1 TABLET(25 MG) BY MOUTH EVERY DAY 90 tablet 1    azelastine  "(ASTELIN) 137 mcg (0.1 %) nasal spray 2 sprays (274 mcg total) by Nasal route 2 (two) times daily. 30 mL 0    levocetirizine (XYZAL) 5 MG tablet Take 1 tablet (5 mg total) by mouth every evening. 30 tablet 11    triamcinolone acetonide 0.1% (KENALOG) 0.1 % ointment Apply topically 2 (two) times daily. No more than 7-14 days at a time, between knees & upper thighs. 454 g 0     No current facility-administered medications for this visit.        ROS  Review of Systems   Per HPI    Physical Exam  Vitals:    02/21/18 1044   BP: 116/74   Pulse: 76   Resp: 17   Temp: 97.8 °F (36.6 °C)    Body mass index is 45.54 kg/m².  Weight: 116.6 kg (257 lb 0.9 oz)   Height: 5' 3" (160 cm)     Physical Exam   Constitutional: She is oriented to person, place, and time. She appears well-developed and well-nourished.   HENT:   Head: Normocephalic and atraumatic.   Eyes: Conjunctivae and EOM are normal. Pupils are equal, round, and reactive to light.   Neck: Normal range of motion. Neck supple.   Cardiovascular: Normal rate, regular rhythm and normal heart sounds.    Pulmonary/Chest: Effort normal and breath sounds normal.   Abdominal: Soft. Bowel sounds are normal.   Musculoskeletal: Normal range of motion.   Neurological: She is alert and oriented to person, place, and time. She has normal reflexes.   Skin: Skin is warm and dry.   Psychiatric: She has a normal mood and affect. Her behavior is normal. Judgment and thought content normal.       Health Maintenance       Date Due Completion Date    TETANUS VACCINE 12/28/1971 ---    Zoster Vaccine 12/28/2013 ---    Influenza Vaccine 08/01/2017 1/13/2016    Override on 1/1/2015: Done (3/18/15 pt stated had it done 1/2015 at [facility] with Dr. Dodd)    Lipid Panel 05/08/2018 5/8/2017    Hemoglobin A1c 05/08/2018 5/8/2017    Override on 7/22/2014: Not Clinically Appropriate    Mammogram 05/17/2018 5/17/2016    Colonoscopy 08/02/2018 8/2/2013 (Done)    Override on 8/2/2013: Done    " Foot Exam 08/09/2018 8/9/2017    Override on 8/7/2015: Done    Eye Exam 12/18/2018 12/18/2017    Override on 12/18/2017: Done    Override on 12/27/2016: Done    Override on 3/20/2012: Done    Pneumococcal PPSV23 (Medium Risk) (2) 12/28/2018 5/20/2013    Urine Microalbumin 02/06/2019 2/6/2018    Override on 7/22/2014: Not Clinically Appropriate    Low Dose Statin 02/09/2019 2/9/2018          Assessment & Plan    DDD (degenerative disc disease), lumbar, Lumbar radicular pain  - Continue current medication regimen as prescribed    Depression, unspecified depression type  - Continue current medication regimen as prescribed    Essential hypertension  - Continue current medication regimen as prescribed  - Overall doing well    Hyperlipidemia, unspecified hyperlipidemia type  - Continue current medication regimen as prescribed    Type 2 diabetes mellitus without complication, without long-term current use of insulin  - Continue current medication regimen as prescribed    Diarrhea, unspecified type  -     Tissue transglutaminase, IgA; Future; Expected date: 02/21/2018  -     IgA; Future; Expected date: 02/21/2018  -     H. PYLORI ANTIBODY, IGG; Future; Expected date: 02/21/2018  -     US Abdomen Complete; Future; Expected date: 02/21/2018  - Assess labs and imaging     Need for influenza vaccination  -     Influenza - Quadrivalent (3 years & older) (PF)  -     (In Office Administered) Pneumococcal Polysaccharide Vaccine (23 Valent) (SQ/IM)    Need for 23-polyvalent pneumococcal polysaccharide vaccine        Follow-up in about 4 weeks (around 3/21/2018).

## 2018-02-22 ENCOUNTER — LAB VISIT (OUTPATIENT)
Dept: LAB | Facility: HOSPITAL | Age: 63
End: 2018-02-22
Attending: FAMILY MEDICINE
Payer: COMMERCIAL

## 2018-02-22 DIAGNOSIS — R19.7 DIARRHEA, UNSPECIFIED TYPE: ICD-10-CM

## 2018-02-22 LAB — IGA SERPL-MCNC: 247 MG/DL

## 2018-02-22 PROCEDURE — 82784 ASSAY IGA/IGD/IGG/IGM EACH: CPT

## 2018-02-22 PROCEDURE — 86677 HELICOBACTER PYLORI ANTIBODY: CPT

## 2018-02-22 PROCEDURE — 83516 IMMUNOASSAY NONANTIBODY: CPT

## 2018-02-22 PROCEDURE — 36415 COLL VENOUS BLD VENIPUNCTURE: CPT | Mod: PO

## 2018-02-24 ENCOUNTER — PATIENT MESSAGE (OUTPATIENT)
Dept: FAMILY MEDICINE | Facility: CLINIC | Age: 63
End: 2018-02-24

## 2018-02-26 LAB
H PYLORI IGG SERPL QL IA: NEGATIVE
TTG IGA SER IA-ACNC: 9 UNITS

## 2018-03-07 ENCOUNTER — HOSPITAL ENCOUNTER (OUTPATIENT)
Dept: RADIOLOGY | Facility: HOSPITAL | Age: 63
Discharge: HOME | End: 2018-03-07
Attending: FAMILY MEDICINE
Payer: COMMERCIAL

## 2018-03-07 DIAGNOSIS — R19.7 DIARRHEA, UNSPECIFIED TYPE: ICD-10-CM

## 2018-03-07 PROCEDURE — 76700 US EXAM ABDOM COMPLETE: CPT | Mod: 26,,, | Performed by: RADIOLOGY

## 2018-03-07 PROCEDURE — 76700 US EXAM ABDOM COMPLETE: CPT | Mod: TC

## 2018-03-13 DIAGNOSIS — F32.A DEPRESSION, UNSPECIFIED DEPRESSION TYPE: ICD-10-CM

## 2018-03-13 RX ORDER — SERTRALINE HYDROCHLORIDE 100 MG/1
TABLET, FILM COATED ORAL
Qty: 90 TABLET | Refills: 0 | Status: SHIPPED | OUTPATIENT
Start: 2018-03-13 | End: 2018-05-28 | Stop reason: SDUPTHER

## 2018-04-18 ENCOUNTER — PATIENT MESSAGE (OUTPATIENT)
Dept: FAMILY MEDICINE | Facility: CLINIC | Age: 63
End: 2018-04-18

## 2018-04-18 ENCOUNTER — OFFICE VISIT (OUTPATIENT)
Dept: FAMILY MEDICINE | Facility: CLINIC | Age: 63
End: 2018-04-18
Payer: COMMERCIAL

## 2018-04-18 VITALS
WEIGHT: 247.38 LBS | BODY MASS INDEX: 43.83 KG/M2 | HEIGHT: 63 IN | TEMPERATURE: 99 F | DIASTOLIC BLOOD PRESSURE: 86 MMHG | SYSTOLIC BLOOD PRESSURE: 122 MMHG | HEART RATE: 85 BPM | OXYGEN SATURATION: 95 % | RESPIRATION RATE: 16 BRPM

## 2018-04-18 DIAGNOSIS — G89.29 CHRONIC PAIN OF LEFT KNEE: ICD-10-CM

## 2018-04-18 DIAGNOSIS — Z23 NEED FOR VACCINATION: Primary | ICD-10-CM

## 2018-04-18 DIAGNOSIS — R60.0 LOWER EXTREMITY EDEMA: ICD-10-CM

## 2018-04-18 DIAGNOSIS — M25.562 CHRONIC PAIN OF LEFT KNEE: ICD-10-CM

## 2018-04-18 PROCEDURE — 96372 THER/PROPH/DIAG INJ SC/IM: CPT | Mod: S$GLB,,, | Performed by: FAMILY MEDICINE

## 2018-04-18 PROCEDURE — 99214 OFFICE O/P EST MOD 30 MIN: CPT | Mod: 25,S$GLB,, | Performed by: FAMILY MEDICINE

## 2018-04-18 PROCEDURE — 3079F DIAST BP 80-89 MM HG: CPT | Mod: CPTII,S$GLB,, | Performed by: FAMILY MEDICINE

## 2018-04-18 PROCEDURE — 99999 PR PBB SHADOW E&M-EST. PATIENT-LVL IV: CPT | Mod: PBBFAC,,, | Performed by: FAMILY MEDICINE

## 2018-04-18 PROCEDURE — 3074F SYST BP LT 130 MM HG: CPT | Mod: CPTII,S$GLB,, | Performed by: FAMILY MEDICINE

## 2018-04-18 RX ORDER — KETOROLAC TROMETHAMINE 30 MG/ML
30 INJECTION, SOLUTION INTRAMUSCULAR; INTRAVENOUS ONCE
Status: COMPLETED | OUTPATIENT
Start: 2018-04-18 | End: 2018-04-18

## 2018-04-18 RX ORDER — LORATADINE 10 MG/1
TABLET ORAL
Refills: 5 | COMMUNITY
Start: 2018-03-17 | End: 2018-05-28

## 2018-04-18 RX ADMIN — KETOROLAC TROMETHAMINE 30 MG: 30 INJECTION, SOLUTION INTRAMUSCULAR; INTRAVENOUS at 01:04

## 2018-04-18 NOTE — PROGRESS NOTES
Chief Complaint   Patient presents with    Leg Swelling     level 10 pain in left leg       HPI  Sandee Evans is a 62 y.o. female with multiple medical diagnoses as listed in the medical history and problem list that presents for L leg swelling.      L leg edema - 3 day hx, prog worsening, states diff ambulating and weight bearing, no injury sustained, L knee recommended to be replaced, also s/p R TKA. States increased activity    Pt is known to me and was last seen by me on 2/21/2018.    PAST MEDICAL HISTORY:  Past Medical History:   Diagnosis Date    Anticoagulant long-term use     Xarelto    Arthritis     Atrial fibrillation     Degenerative disc disease     Depression     Diabetes mellitus     TYPE 2    Hyperlipidemia     Hypertension     Nuclear sclerosis, bilateral 12/18/2017    Obesity, morbid     Other abnormal glucose     pre-diabetes    Sleep apnea     Smoker     Thyroid disease     on meds 8-9 years ago. hypothyroidism.no malignancy    TMJ (temporomandibular joint disorder)     jaw clicking    Urge incontinence     Wears glasses        PAST SURGICAL HISTORY:  Past Surgical History:   Procedure Laterality Date    BREAST LUMPECTOMY Right     HYSTERECTOMY      rt.knee surgery 2016      underarm gland\ Bilateral        SOCIAL HISTORY:  Social History     Social History    Marital status:      Spouse name: N/A    Number of children: N/A    Years of education: N/A     Occupational History    Not on file.     Social History Main Topics    Smoking status: Current Every Day Smoker     Packs/day: 0.25     Years: 30.00     Types: Cigarettes    Smokeless tobacco: Never Used    Alcohol use No    Drug use: No    Sexual activity: Yes     Partners: Male     Other Topics Concern    Not on file     Social History Narrative    No narrative on file       FAMILY HISTORY:  Family History   Problem Relation Age of Onset    Diabetes Mother     Diabetes Brother     No Known Problems  Father     No Known Problems Sister     No Known Problems Maternal Aunt     No Known Problems Maternal Uncle     No Known Problems Paternal Aunt     No Known Problems Paternal Uncle     No Known Problems Maternal Grandmother     No Known Problems Maternal Grandfather     No Known Problems Paternal Grandmother     No Known Problems Paternal Grandfather     Amblyopia Neg Hx     Blindness Neg Hx     Cancer Neg Hx     Cataracts Neg Hx     Glaucoma Neg Hx     Hypertension Neg Hx     Macular degeneration Neg Hx     Retinal detachment Neg Hx     Strabismus Neg Hx     Stroke Neg Hx     Thyroid disease Neg Hx        ALLERGIES AND MEDICATIONS: updated and reviewed.  Review of patient's allergies indicates:   Allergen Reactions    Ace inhibitors      Cough       Current Outpatient Prescriptions   Medication Sig Dispense Refill    albuterol 90 mcg/actuation inhaler Inhale 2 puffs into the lungs every 6 (six) hours as needed for Wheezing. 1 each 11    alprazolam (XANAX) 0.25 MG tablet TK ONE PO QD AT NIGHT PRN FOR ANXIETY 30 tablet 0    azelastine (ASTELIN) 137 mcg (0.1 %) nasal spray 2 sprays (274 mcg total) by Nasal route 2 (two) times daily. 30 mL 0    fluticasone (FLONASE) 50 mcg/actuation nasal spray 2 sprays by Each Nare route once daily. 15 g 0    levocetirizine (XYZAL) 5 MG tablet Take 1 tablet (5 mg total) by mouth every evening. 30 tablet 11    metFORMIN (GLUCOPHAGE) 500 MG tablet Take 1 tablet (500 mg total) by mouth 2 (two) times daily with meals. 180 tablet 2    oxyCODONE-acetaminophen (PERCOCET)  mg per tablet Take 1 tablet by mouth every 12 (twelve) hours as needed for Pain. 60 tablet 0    sertraline (ZOLOFT) 100 MG tablet TAKE 1 TABLET(100 MG) BY MOUTH EVERY DAY 90 tablet 1    sertraline (ZOLOFT) 100 MG tablet TAKE 1 TABLET(100 MG) BY MOUTH EVERY DAY 90 tablet 0    simvastatin (ZOCOR) 20 MG tablet Take 1 tablet (20 mg total) by mouth every evening. 90 tablet 1     "spironolactone (ALDACTONE) 25 MG tablet TAKE 1 TABLET(25 MG) BY MOUTH EVERY DAY 90 tablet 1    triamcinolone acetonide 0.1% (KENALOG) 0.1 % ointment Apply topically 2 (two) times daily. No more than 7-14 days at a time, between knees & upper thighs. 454 g 0    loratadine (CLARITIN) 10 mg tablet   5     No current facility-administered medications for this visit.        ROS  Review of Systems   Constitutional: Negative for activity change, appetite change and fever.   HENT: Negative for congestion and sore throat.    Eyes: Negative for visual disturbance.   Respiratory: Negative for cough and shortness of breath.    Cardiovascular: Negative for chest pain.   Gastrointestinal: Negative for abdominal pain, diarrhea, nausea and vomiting.   Endocrine: Negative.    Genitourinary: Negative for dysuria.   Musculoskeletal: Positive for arthralgias and myalgias. Negative for back pain.   Skin: Negative for rash.   Allergic/Immunologic: Negative.    Neurological: Negative for dizziness and weakness.   Hematological: Negative.    Psychiatric/Behavioral: Positive for sleep disturbance. Negative for agitation and confusion.       Physical Exam  Vitals:    04/18/18 1147   BP: 122/86   Pulse: 85   Resp: 16   Temp: 98.6 °F (37 °C)    Body mass index is 43.82 kg/m².  Weight: 112.2 kg (247 lb 5.7 oz)   Height: 5' 3" (160 cm)     Physical Exam   Constitutional: She is oriented to person, place, and time. She appears well-developed and well-nourished.   HENT:   Head: Normocephalic and atraumatic.   Eyes: Conjunctivae and EOM are normal. Pupils are equal, round, and reactive to light.   Neck: Normal range of motion. Neck supple.   Cardiovascular: Normal rate, regular rhythm and normal heart sounds.    Pulmonary/Chest: Effort normal and breath sounds normal.   Abdominal: Soft. Bowel sounds are normal.   Musculoskeletal: Normal range of motion.   L knee - severe dec ROM, TTP global   Neurological: She is alert and oriented to person, " place, and time. She has normal reflexes.   Skin: Skin is warm and dry.   Psychiatric: She has a normal mood and affect. Her behavior is normal. Judgment and thought content normal.       Health Maintenance       Date Due Completion Date    TETANUS VACCINE 12/28/1971 ---    Zoster Vaccine 12/28/2013 ---    Mammogram 05/17/2018 5/17/2016    Lipid Panel 05/08/2018 5/8/2017    Hemoglobin A1c 05/08/2018 5/8/2017    Override on 7/22/2014: Not Clinically Appropriate    Colonoscopy 08/02/2018 8/2/2013 (Done)    Override on 8/2/2013: Done    Foot Exam 08/09/2018 8/9/2017    Override on 8/7/2015: Done    Eye Exam 12/18/2018 12/18/2017    Override on 12/18/2017: Done    Override on 12/27/2016: Done    Override on 3/20/2012: Done    Urine Microalbumin 02/06/2019 2/6/2018    Override on 7/22/2014: Not Clinically Appropriate    Low Dose Statin 04/18/2019 4/18/2018    Pneumococcal PPSV23 (Medium Risk) (2) 02/21/2023 2/21/2018          Assessment & Plan    Lower extremity edema  -     US Lower Extremity Veins Bilateral; Future; Expected date: 04/18/2018  -     Ambulatory referral to Orthopedics  - Will refer for full evaluation, pt is s/p R TKA    Chronic pain of left knee  -     ketorolac injection 30 mg; Inject 1 mL (30 mg total) into the muscle once.  -     Ambulatory referral to Orthopedics        Follow-up in about 4 weeks (around 5/16/2018).

## 2018-04-25 ENCOUNTER — PATIENT MESSAGE (OUTPATIENT)
Dept: FAMILY MEDICINE | Facility: CLINIC | Age: 63
End: 2018-04-25

## 2018-04-25 DIAGNOSIS — Z12.31 ENCOUNTER FOR SCREENING MAMMOGRAM FOR BREAST CANCER: Primary | ICD-10-CM

## 2018-04-27 ENCOUNTER — HOSPITAL ENCOUNTER (OUTPATIENT)
Dept: RADIOLOGY | Facility: HOSPITAL | Age: 63
Discharge: HOME | End: 2018-04-27
Attending: FAMILY MEDICINE
Payer: COMMERCIAL

## 2018-04-27 DIAGNOSIS — I73.9 PAD (PERIPHERAL ARTERY DISEASE): Primary | ICD-10-CM

## 2018-04-27 DIAGNOSIS — R60.0 LOWER EXTREMITY EDEMA: ICD-10-CM

## 2018-04-27 PROCEDURE — 93970 EXTREMITY STUDY: CPT | Mod: 26,,, | Performed by: RADIOLOGY

## 2018-04-27 PROCEDURE — 93970 EXTREMITY STUDY: CPT | Mod: TC

## 2018-05-01 ENCOUNTER — PATIENT MESSAGE (OUTPATIENT)
Dept: FAMILY MEDICINE | Facility: CLINIC | Age: 63
End: 2018-05-01

## 2018-05-02 ENCOUNTER — TELEPHONE (OUTPATIENT)
Dept: ADMINISTRATIVE | Facility: HOSPITAL | Age: 63
End: 2018-05-02

## 2018-05-04 DIAGNOSIS — T78.40XA ALLERGIC REACTION, INITIAL ENCOUNTER: ICD-10-CM

## 2018-05-07 RX ORDER — TRIAMCINOLONE ACETONIDE 1 MG/G
OINTMENT TOPICAL
Qty: 454 G | Refills: 0 | Status: SHIPPED | OUTPATIENT
Start: 2018-05-07 | End: 2019-06-18 | Stop reason: SDUPTHER

## 2018-05-10 DIAGNOSIS — E11.9 TYPE 2 DIABETES MELLITUS WITHOUT COMPLICATION: ICD-10-CM

## 2018-05-21 ENCOUNTER — HOSPITAL ENCOUNTER (OUTPATIENT)
Dept: RADIOLOGY | Facility: HOSPITAL | Age: 63
Discharge: HOME | End: 2018-05-21
Attending: FAMILY MEDICINE
Payer: COMMERCIAL

## 2018-05-21 ENCOUNTER — PATIENT MESSAGE (OUTPATIENT)
Dept: FAMILY MEDICINE | Facility: CLINIC | Age: 63
End: 2018-05-21

## 2018-05-21 DIAGNOSIS — Z12.31 ENCOUNTER FOR SCREENING MAMMOGRAM FOR BREAST CANCER: ICD-10-CM

## 2018-05-21 DIAGNOSIS — E11.9 TYPE 2 DIABETES MELLITUS WITHOUT COMPLICATION: ICD-10-CM

## 2018-05-21 PROCEDURE — 77063 BREAST TOMOSYNTHESIS BI: CPT | Mod: 26,,, | Performed by: RADIOLOGY

## 2018-05-21 PROCEDURE — 77067 SCR MAMMO BI INCL CAD: CPT | Mod: 26,,, | Performed by: RADIOLOGY

## 2018-05-21 PROCEDURE — 77067 SCR MAMMO BI INCL CAD: CPT | Mod: TC

## 2018-05-28 ENCOUNTER — LAB VISIT (OUTPATIENT)
Dept: LAB | Facility: HOSPITAL | Age: 63
End: 2018-05-28
Attending: FAMILY MEDICINE
Payer: COMMERCIAL

## 2018-05-28 ENCOUNTER — OFFICE VISIT (OUTPATIENT)
Dept: FAMILY MEDICINE | Facility: CLINIC | Age: 63
End: 2018-05-28
Payer: COMMERCIAL

## 2018-05-28 VITALS
DIASTOLIC BLOOD PRESSURE: 88 MMHG | WEIGHT: 257.06 LBS | BODY MASS INDEX: 45.55 KG/M2 | SYSTOLIC BLOOD PRESSURE: 138 MMHG | OXYGEN SATURATION: 95 % | RESPIRATION RATE: 20 BRPM | HEART RATE: 88 BPM | TEMPERATURE: 99 F | HEIGHT: 63 IN

## 2018-05-28 DIAGNOSIS — I48.20 CHRONIC ATRIAL FIBRILLATION: ICD-10-CM

## 2018-05-28 DIAGNOSIS — E11.9 TYPE 2 DIABETES MELLITUS WITHOUT COMPLICATION, WITHOUT LONG-TERM CURRENT USE OF INSULIN: ICD-10-CM

## 2018-05-28 DIAGNOSIS — M54.16 LUMBAR RADICULAR PAIN: ICD-10-CM

## 2018-05-28 DIAGNOSIS — Z23 NEED FOR VACCINATION: Primary | ICD-10-CM

## 2018-05-28 DIAGNOSIS — F32.A DEPRESSION, UNSPECIFIED DEPRESSION TYPE: ICD-10-CM

## 2018-05-28 DIAGNOSIS — E11.9 TYPE 2 DIABETES MELLITUS WITHOUT COMPLICATION: ICD-10-CM

## 2018-05-28 DIAGNOSIS — M51.36 DDD (DEGENERATIVE DISC DISEASE), LUMBAR: ICD-10-CM

## 2018-05-28 DIAGNOSIS — I73.9 PVD (PERIPHERAL VASCULAR DISEASE): ICD-10-CM

## 2018-05-28 DIAGNOSIS — M17.11 PRIMARY OSTEOARTHRITIS OF RIGHT KNEE: ICD-10-CM

## 2018-05-28 DIAGNOSIS — M17.10 OSTEOARTHRITIS, LOCALIZED, KNEE: ICD-10-CM

## 2018-05-28 DIAGNOSIS — G89.29 OTHER CHRONIC PAIN: ICD-10-CM

## 2018-05-28 DIAGNOSIS — E66.01 OBESITY, MORBID: ICD-10-CM

## 2018-05-28 LAB
CHOLEST SERPL-MCNC: 219 MG/DL
CHOLEST/HDLC SERPL: 3.5 {RATIO}
ESTIMATED AVG GLUCOSE: 108 MG/DL
HBA1C MFR BLD HPLC: 5.4 %
HDLC SERPL-MCNC: 63 MG/DL
HDLC SERPL: 28.8 %
LDLC SERPL CALC-MCNC: 135 MG/DL
NONHDLC SERPL-MCNC: 156 MG/DL
TRIGL SERPL-MCNC: 105 MG/DL

## 2018-05-28 PROCEDURE — 80061 LIPID PANEL: CPT

## 2018-05-28 PROCEDURE — 3075F SYST BP GE 130 - 139MM HG: CPT | Mod: CPTII,S$GLB,, | Performed by: FAMILY MEDICINE

## 2018-05-28 PROCEDURE — 3079F DIAST BP 80-89 MM HG: CPT | Mod: CPTII,S$GLB,, | Performed by: FAMILY MEDICINE

## 2018-05-28 PROCEDURE — 3008F BODY MASS INDEX DOCD: CPT | Mod: CPTII,S$GLB,, | Performed by: FAMILY MEDICINE

## 2018-05-28 PROCEDURE — 36415 COLL VENOUS BLD VENIPUNCTURE: CPT | Mod: PO

## 2018-05-28 PROCEDURE — 99999 PR PBB SHADOW E&M-EST. PATIENT-LVL III: CPT | Mod: PBBFAC,,, | Performed by: FAMILY MEDICINE

## 2018-05-28 PROCEDURE — 90471 IMMUNIZATION ADMIN: CPT | Mod: S$GLB,,, | Performed by: FAMILY MEDICINE

## 2018-05-28 PROCEDURE — 99214 OFFICE O/P EST MOD 30 MIN: CPT | Mod: 25,S$GLB,, | Performed by: FAMILY MEDICINE

## 2018-05-28 PROCEDURE — 90715 TDAP VACCINE 7 YRS/> IM: CPT | Mod: S$GLB,,, | Performed by: FAMILY MEDICINE

## 2018-05-28 PROCEDURE — 3044F HG A1C LEVEL LT 7.0%: CPT | Mod: CPTII,S$GLB,, | Performed by: FAMILY MEDICINE

## 2018-05-28 PROCEDURE — 83036 HEMOGLOBIN GLYCOSYLATED A1C: CPT

## 2018-05-28 RX ORDER — OXYCODONE AND ACETAMINOPHEN 10; 325 MG/1; MG/1
1 TABLET ORAL EVERY 12 HOURS PRN
Qty: 60 TABLET | Refills: 0 | Status: SHIPPED | OUTPATIENT
Start: 2018-05-28 | End: 2018-11-14 | Stop reason: SDUPTHER

## 2018-05-28 RX ORDER — SERTRALINE HYDROCHLORIDE 100 MG/1
TABLET, FILM COATED ORAL
Qty: 90 TABLET | Refills: 1 | Status: SHIPPED | OUTPATIENT
Start: 2018-05-28 | End: 2018-10-11 | Stop reason: SDUPTHER

## 2018-05-28 RX ORDER — METFORMIN HYDROCHLORIDE 500 MG/1
500 TABLET ORAL 2 TIMES DAILY WITH MEALS
Qty: 180 TABLET | Refills: 2 | Status: SHIPPED | OUTPATIENT
Start: 2018-05-28 | End: 2019-06-18 | Stop reason: SDUPTHER

## 2018-05-28 NOTE — PROGRESS NOTES
Tdap vaccine administered IM to left deltoid. VIS form given. Tolerated well. No adverse reaction noted.

## 2018-05-28 NOTE — PROGRESS NOTES
Chief Complaint   Patient presents with    Annual Exam       HPI  Sandee Evans is a 62 y.o. female with multiple medical diagnoses as listed in the medical history and problem list that presents for annual exam.      Annual exam    Has vascular follow up for PVD.    Ortho - pending referral for severe DJD.      States decreased appetite previously has changed to increased appetite over the past few weeks. Ice cream, brownies, pasta, potato chips.     Pt is known to me and was last seen by me on 4/18/2018.    PAST MEDICAL HISTORY:  Past Medical History:   Diagnosis Date    Anticoagulant long-term use     Xarelto    Arthritis     Atrial fibrillation     Breast cyst     Degenerative disc disease     Depression     Diabetes mellitus     TYPE 2    Hyperlipidemia     Hypertension     Nuclear sclerosis, bilateral 12/18/2017    Obesity, morbid     Other abnormal glucose     pre-diabetes    Sleep apnea     Smoker     Thyroid disease     on meds 8-9 years ago. hypothyroidism.no malignancy    TMJ (temporomandibular joint disorder)     jaw clicking    Urge incontinence     Wears glasses        PAST SURGICAL HISTORY:  Past Surgical History:   Procedure Laterality Date    BREAST BIOPSY Right     over 10 yrs ago/ benign    BREAST CYST EXCISION Right     BREAST LUMPECTOMY Right     over 10 yrs ago, ex bx/ benign    HYSTERECTOMY      OOPHORECTOMY      rt.knee surgery 2016      underarm gland\ Bilateral        SOCIAL HISTORY:  Social History     Social History    Marital status:      Spouse name: N/A    Number of children: N/A    Years of education: N/A     Occupational History    Not on file.     Social History Main Topics    Smoking status: Current Every Day Smoker     Packs/day: 0.25     Years: 30.00     Types: Cigarettes    Smokeless tobacco: Never Used    Alcohol use No    Drug use: No    Sexual activity: Yes     Partners: Male     Other Topics Concern    Not on file     Social  History Narrative    No narrative on file       FAMILY HISTORY:  Family History   Problem Relation Age of Onset    Diabetes Mother     Diabetes Brother     No Known Problems Father     No Known Problems Sister     No Known Problems Maternal Aunt     No Known Problems Maternal Uncle     No Known Problems Paternal Aunt     No Known Problems Paternal Uncle     No Known Problems Maternal Grandmother     No Known Problems Maternal Grandfather     No Known Problems Paternal Grandmother     No Known Problems Paternal Grandfather     Amblyopia Neg Hx     Blindness Neg Hx     Cancer Neg Hx     Cataracts Neg Hx     Glaucoma Neg Hx     Hypertension Neg Hx     Macular degeneration Neg Hx     Retinal detachment Neg Hx     Strabismus Neg Hx     Stroke Neg Hx     Thyroid disease Neg Hx        ALLERGIES AND MEDICATIONS: updated and reviewed.  Review of patient's allergies indicates:   Allergen Reactions    Ace inhibitors      Cough       Current Outpatient Prescriptions   Medication Sig Dispense Refill    albuterol 90 mcg/actuation inhaler Inhale 2 puffs into the lungs every 6 (six) hours as needed for Wheezing. 1 each 11    fluticasone (FLONASE) 50 mcg/actuation nasal spray 2 sprays by Each Nare route once daily. 15 g 0    metFORMIN (GLUCOPHAGE) 500 MG tablet Take 1 tablet (500 mg total) by mouth 2 (two) times daily with meals. 180 tablet 2    oxyCODONE-acetaminophen (PERCOCET)  mg per tablet Take 1 tablet by mouth every 12 (twelve) hours as needed for Pain. 60 tablet 0    sertraline (ZOLOFT) 100 MG tablet TAKE 1 TABLET(100 MG) BY MOUTH EVERY DAY 90 tablet 1    simvastatin (ZOCOR) 20 MG tablet Take 1 tablet (20 mg total) by mouth every evening. 90 tablet 1    spironolactone (ALDACTONE) 25 MG tablet TAKE 1 TABLET(25 MG) BY MOUTH EVERY DAY 90 tablet 1    triamcinolone acetonide 0.1% (KENALOG) 0.1 % ointment APPLY BETWEEN KNEES AND UPPER THIGHS TWICE DAILY FOR NO MORE THAN 7-14 DAYS 454 g 0  "    No current facility-administered medications for this visit.        ROS  Review of Systems   Constitutional: Positive for activity change. Negative for unexpected weight change.   HENT: Negative for hearing loss, rhinorrhea and trouble swallowing.    Eyes: Positive for visual disturbance. Negative for discharge.   Respiratory: Negative for chest tightness and wheezing.    Cardiovascular: Negative for chest pain and palpitations.   Gastrointestinal: Positive for constipation. Negative for blood in stool, diarrhea and vomiting.   Endocrine: Negative for polydipsia and polyuria.   Genitourinary: Negative for difficulty urinating, dysuria, hematuria and menstrual problem.   Musculoskeletal: Positive for arthralgias, joint swelling and neck pain.   Neurological: Negative for weakness and headaches.   Psychiatric/Behavioral: Positive for dysphoric mood. Negative for confusion.       Physical Exam  Vitals:    05/28/18 1125   BP: 138/88   Pulse: 88   Resp: 20   Temp: 99.3 °F (37.4 °C)    Body mass index is 45.54 kg/m².  Weight: 116.6 kg (257 lb 0.9 oz)   Height: 5' 3" (160 cm)     Physical Exam   Constitutional: She is oriented to person, place, and time. She appears well-developed and well-nourished.   HENT:   Head: Normocephalic and atraumatic.   Eyes: Conjunctivae and EOM are normal. Pupils are equal, round, and reactive to light.   Neck: Normal range of motion. Neck supple.   Cardiovascular: Normal rate, regular rhythm and normal heart sounds.    Pulmonary/Chest: Effort normal and breath sounds normal.   Abdominal: Soft. Bowel sounds are normal.   Musculoskeletal: Normal range of motion.   L knee - severe dec ROM, TTP global   Neurological: She is alert and oriented to person, place, and time. She has normal reflexes.   Skin: Skin is warm and dry.   Psychiatric: She has a normal mood and affect. Her behavior is normal. Judgment and thought content normal.       Health Maintenance       Date Due Completion Date    " TETANUS VACCINE 12/28/1973 ---    Zoster Vaccine 12/28/2013 ---    Lipid Panel 05/08/2018 5/8/2017    Hemoglobin A1c 05/08/2018 5/8/2017    Override on 7/22/2014: Not Clinically Appropriate    Influenza Vaccine 08/01/2018 2/21/2018    Override on 1/1/2015: Done (3/18/15 pt stated had it done 1/2015 at [facility] with Dr. Ddod)    Colonoscopy 08/02/2018 8/2/2013 (Done)    Override on 8/2/2013: Done    Foot Exam 08/09/2018 8/9/2017    Override on 8/7/2015: Done    Eye Exam 12/18/2018 12/18/2017    Override on 12/18/2017: Done    Override on 12/27/2016: Done    Override on 3/20/2012: Done    Urine Microalbumin 02/06/2019 2/6/2018    Override on 7/22/2014: Not Clinically Appropriate    Low Dose Statin 05/28/2019 5/28/2018    Mammogram 05/21/2020 5/21/2018    Pneumococcal PPSV23 (Medium Risk) (2) 02/21/2023 2/21/2018          Assessment & Plan    Need for vaccination  -     Tdap Vaccine    Other chronic pain, DDD (degenerative disc disease), lumbar  - Continue current medication regimen as prescribed    Depression, unspecified depression type  -     sertraline (ZOLOFT) 100 MG tablet; TAKE 1 TABLET(100 MG) BY MOUTH EVERY DAY  Dispense: 90 tablet; Refill: 1    Obesity, morbid, Type 2 diabetes mellitus without complication, without long-term current use of insulin  - Continue current medication regimen as prescribed  - Discussed dietary restrictions  -      metFORMIN (GLUCOPHAGE) 500 MG tablet; Take 1 tablet (500 mg total) by mouth 2 (two) times daily with meals.  Dispense: 180 tablet; Refill: 2    Lumbar radicular pain  -     oxyCODONE-acetaminophen (PERCOCET)  mg per tablet; Take 1 tablet by mouth every 12 (twelve) hours as needed for Pain.  Dispense: 60 tablet; Refill: 0    Osteoarthritis, localized, knee  -     oxyCODONE-acetaminophen (PERCOCET)  mg per tablet; Take 1 tablet by mouth every 12 (twelve) hours as needed for Pain.  Dispense: 60 tablet; Refill: 0    Chronic A fib, PVD  - Continue current  medication regimen as prescribed  - Cont follow up with Vascular as scheduled    Follow-up in about 4 weeks (around 6/25/2018).

## 2018-05-31 ENCOUNTER — OFFICE VISIT (OUTPATIENT)
Dept: VASCULAR SURGERY | Facility: CLINIC | Age: 63
End: 2018-05-31
Payer: COMMERCIAL

## 2018-05-31 VITALS
BODY MASS INDEX: 45.57 KG/M2 | SYSTOLIC BLOOD PRESSURE: 124 MMHG | DIASTOLIC BLOOD PRESSURE: 80 MMHG | HEIGHT: 63 IN | WEIGHT: 257.19 LBS | HEART RATE: 80 BPM

## 2018-05-31 DIAGNOSIS — R60.0 LEG EDEMA, LEFT: Primary | ICD-10-CM

## 2018-05-31 DIAGNOSIS — I73.9 PVD (PERIPHERAL VASCULAR DISEASE): ICD-10-CM

## 2018-05-31 PROCEDURE — 99999 PR PBB SHADOW E&M-EST. PATIENT-LVL III: CPT | Mod: PBBFAC,,, | Performed by: SURGERY

## 2018-05-31 PROCEDURE — 99244 OFF/OP CNSLTJ NEW/EST MOD 40: CPT | Mod: S$GLB,,, | Performed by: SURGERY

## 2018-05-31 NOTE — PATIENT INSTRUCTIONS
Low-Salt Diet  This diet removes foods that are high in salt. It also limits the amount of salt you use when cooking. It is most often used for people with high blood pressure, edema (fluid retention), and kidney, liver, or heart disease.  Table salt contains the mineral sodium. Your body needs sodium to work normally. But too much sodium can make your health problems worse. Your healthcare provider is recommending a low-salt (also called low-sodium) diet for you. Your total daily allowance of salt is 1,500 to 2,300 milligrams (mg). It is less than 1 teaspoon of table salt. This means you can have only about 500 to 700 mg of sodium at each meal. People with certain health problems should limit salt intake to the lower end of the recommended range.    When you cook, dont add much salt. If you can cook without using salt, even better. Dont add salt to your food at the table.  When shopping, read food labels. Salt is often called sodium on the label. Choose foods that are salt-free, low salt, or very low salt. Note that foods with reduced salt may not lower your salt intake enough.    Beans, potatoes, and pasta  Ok: Dry beans, split peas, lentils, potatoes, rice, macaroni, pasta, spaghetti without added salt  Avoid: Potato chips, tortilla chips, and similar products  Breads and cereals  Ok: Low-sodium breads, rolls, cereals, and cakes; low-salt crackers, matzo crackers  Avoid: Salted crackers, pretzels, popcorn, Latvian toast, pancakes, muffins  Dairy  Ok: Milk, chocolate milk, hot chocolate mix, low-salt cheeses, and yogurt  Avoid: Processed cheese and cheese spreads; Roquefort, Camembert, and cottage cheese; buttermilk, instant breakfast drink  Desserts  Ok: Ice cream, frozen yogurt, juice bars, gelatin, cookies and pies, sugar, honey, jelly, hard candy  Avoid: Most pies, cakes and cookies prepared or processed with salt; instant pudding  Drinks  Ok: Tea, coffee, fizzy (carbonated) drinks, juices  Avoid: Flavored  coffees, electrolyte replacement drinks, sports drinks  Meats  Ok: All fresh meat, fish, poultry, low-salt tuna, eggs, egg substitute  Avoid: Smoked, pickled, brine-cured, or salted meats and fish. This includes david, chipped beef, corned beef, hot dogs, deli meats, ham, kosher meats, salt pork, sausage, canned tuna, salted codfish, smoked salmon, herring, sardines, or anchovies.  Seasonings and spices  Ok: Most seasonings are okay. Good substitutes for salt include: fresh herb blends, hot sauce, lemon, garlic, taveras, vinegar, dry mustard, parsley, cilantro, horseradish, tomato paste, regular margarine, mayonnaise, unsalted butter, cream cheese, vegetable oil, cream, low-salt salad dressing and gravy.  Avoid: Regular ketchup, relishes, pickles, soy sauce, teriyaki sauce, Worcestershire sauce, BBQ sauce, tartar sauce, meat tenderizer, chili sauce, regular gravy, regular salad dressing, salted butter  Soups  Ok: Low-salt soups and broths made with allowed foods  Avoid: Bouillon cubes, soups with smoked or salted meats, regular soup and broth  Vegetables  Ok: Most vegetables are okay; also low-salt tomato and vegetable juices  Avoid: Sauerkraut and other brine-soaked vegetables; pickles and other pickled vegetables; tomato juice, olives  Date Last Reviewed: 8/1/2016 © 2000-2017 [employer]. [address] [place] [id number]. All rights reserved. This information is not intended as a substitute for professional medical care. Always follow your healthcare professional's instructions.        Tips for Using Less Salt    Most people with heart problems need to eat less salt (sodium). Reducing the amount of salt you eat may help control your blood pressure. The higher your blood pressure, the greater your risk for heart disease, stroke, blindness, and kidney problems.  At the store  · Make low-salt choices by reading labels carefully. Look for the total amount of sodium per serving.  · Use more fresh  food. Buy more fruits and vegetables. Select lean meats, fish, and poultry.  · Use fewer frozen, canned, and packaged foods which often contain a lot of sodium.  · Use plain frozen vegetables without sauces or toppings. These products are often low- or no-sodium.  · Opt for reduced-sodium or no-salt-added versions of canned vegetables and soups.  In the kitchen  · Don't add salt to food when you're cooking. Season with flavorings such as onion, garlic, pepper, salt-free herbal blends, and lemon or lime juice.  · Use a cookbook containing low-salt recipes. It can give you ideas for tasty meals that are healthy for your heart.  · Sprinkle salt-free herbal blends on vegetables and meat.  · Drain and rinse canned foods, such as canned beans and vegetables, before cooking or eating.  Eating out  · Tell the  you're on a low-salt diet. Ask questions about the menu.  · Order fish, chicken, and meat broiled, baked, poached, or grilled without salt, butter, or breading.  · Use lemon, pepper, and salt-free herb mixes to add flavor.  · Choose plain steamed rice, boiled noodles, and baked or boiled potatoes. Top potatoes with chives and a little sour cream.     Beware! Salt goes by many other names. Limit foods with these words listed as ingredients: salt, sodium, soy sauce, baking soda, baking powder, MSG, monosodium, Na (the chemical symbol for sodium). Some antacids are also high in salt.   Date Last Reviewed: 6/19/2015  © 1375-6025 [employer]. [address] [place] [address], [place] [address]. All rights reserved. This information is not intended as a substitute for professional medical care. Always follow your healthcare professional's instructions.        Low-Salt Choices  Eating salt (sodium) can make your body retain too much water. Excess water makes your heart work harder. Canned, packaged, and frozen foods are easy to prepare, but they are often high in sodium. Here are some ideas for low-salt foods you  can easily prepare yourself.    For breakfast  · Fruit or 100% fruit juice  · Whole-wheat bread or an English muffin. Compare sodium content on labels.  · Low-fat milk or yogurt  · Unsalted eggs  · Shredded wheat  · Corn tortillas  · Unsalted steamed rice  · Regular (not instant) hot cereal, made without salt  Stay away from:  · Sausage, david, and ham  · Flour tortillas  · Packaged muffins, pancakes, and biscuits  · Instant hot cereals  · Cottage cheese  For lunch and dinner  · Fresh fish, chicken, turkey, or meat--baked, broiled, or roasted without salt  · Dry beans, cooked without salt  · Tofu, stir-fried without salt  · Unsalted fresh fruit and vegetables, or frozen or canned fruit and vegetables with no added salt  Stay away from:  · Lunch or deli meat that is cured or smoked  · Cheese  · Tomato juice and catsup  · Canned vegetables, soups, and fish not labeled as no-salt-added or reduced sodium  · Packaged gravies and sauces  · Olives, pickles, and relish  · Bottled salad dressings  For snacks and desserts  · Yogurt  · Unsalted, air popped popcorn  · Unsalted nuts or seeds  Stay away from:  · Pies and cakes  · Packaged dessert mixes  · Pizza  · Canned and packaged puddings  · Pretzels, chips, crackers, and nuts--unless the label says unsalted  Date Last Reviewed: 6/17/2015  © 7979-2571 [employer]. [address] [place] [address]. All rights reserved. This information is not intended as a substitute for professional medical care. Always follow your healthcare professional's instructions.        Bakers Cyst    You have a Bakers cyst. This is a lump or bulge in the back of your knee. It is caused when extra joint fluid flows into a small sac behind the knee. The extra fluid occurs because arthritis or a torn cartilage irritates the knee joint.  A small Bakers cyst often has no symptoms. A larger cyst can cause knee pain or a feeling of pressure behind your knee when you try to fully  straighten or bend that joint. A Bakers cyst can leak, leading fluid to move down into your lower leg. This causes swelling, pain, and redness.  Treatment involves draining the extra fluid. Or medicine may be injected to reduce redness and swelling. If the extra fluid is caused by a torn cartilage, then surgery to repair the cartilage may be the best treatment option. If arthritis is the cause, and it does not get better with treatment, surgery can be done to remove the cyst.  Home care  · If you have knee pain, stay off the affected leg as much as possible until the pain eases.  · Apply an ice pack to the painful area for no more than 20 minutes. Do this every 3 to 6 hours for the first 24 to 48 hours. Keep using ice packs 3 to 4 times a day for the next few days, as needed for pain. To make an ice pack, put ice cubes in a sealed zip-lock plastic bag. Wrap the bag in a clean, thin towel or cloth. Never put ice or an ice pack directly on the skin.  · If you were given a hook-and-loop knee brace, you may open the brace to apply ice. Unless told otherwise, you may remove the brace to bathe and sleep.  · You may use over-the-counter pain medicine to control pain, unless another medicine was prescribed. Talk with your provider before using these medicines if you have chronic liver or kidney disease, or have ever had a stomach ulcer or GI (gastrointestinal) bleeding.     · If crutches or a walker have been recommended, dont bear full weight on your injured leg until you can do so without pain. Check with your provider before returning to sports or full work duties.  Follow-up care  Follow up with your healthcare provider within 1 to 2 weeks, or as advised.  If X-rays were taken, you will be notified of any new findings that may affect your care.  When to seek medical advice  Call your healthcare provider right away if any of these occur:  · Toes or foot become swollen, cold, blue, numb or tingly  · Pain or swelling  increases  · Warmth or redness appears over the knee  · Redness, swelling or pain occurs in the calf or lower leg  Date Last Reviewed: 11/20/2015 © 2000-2017 [employer]. [address] [place] [address], [place] [address]. All rights reserved. This information is not intended as a substitute for professional medical care. Always follow your healthcare professional's instructions.        Treatment for Bakers Cyst (Popliteal Cyst)  A Bakers cyst (popliteal cyst) is a fluid-filled sac that forms behind the knee.  Types of treatment  You likely wont need any treatment if you dont have any symptoms from your Bakers cyst. Some Bakers cysts go away without any treatment. If your cyst starts causing symptoms, you might need treatment at that time.  If you do have symptoms, you may be treated depending on the cause of your cyst. For example, you may need medicine for rheumatoid arthritis. Or you may need physical therapy for osteoarthritis.  Other treatments for a Bakers cyst can include:  · Over-the-counter pain medicines  · Arthrocentesis to remove extra fluid from the joint space  · Steroid injection into the joint to reduce cyst size  · Surgery to remove the cyst  Possible complications of a Bakers cyst  In rare cases, a Bakers cyst may cause complications. The cyst may get larger, which may cause redness and swelling. The cyst may also rupture, causing warmth, redness, and pain in your calf.  The symptoms may be the same as a blood clot in the veins of the legs. Your health care provider may need imaging tests of your leg to make sure you dont have a clot. Rupture can also lead to its own complications, such as:  · Trapping of a tibial nerve. This cases calf pain and numbness behind the leg. It can be treated with arthrocentesis and steroid injections.  · Blockage of the popliteal artery. This causes pain and lack of blood flow to the leg. It can also be treated with arthrocentesis and steroid  injections.  · Compartment syndrome. This causes intense pain and problems moving the foot or toes. Compartment syndrome is a medical emergency. It needs immediate surgery. It can lead to permanent muscle damage if not treated right away.  When to call the health care provider  If your cyst starts causing mild symptoms, plan to see your health care provider soon. See him or her right away if you have symptoms such as redness and swelling of your leg. These symptoms may mean your Bakers cyst has ruptured.      Date Last Reviewed: 7/21/2015 © 2000-2017 [employer]. [address] [place] [id number]. All rights reserved. This information is not intended as a substitute for professional medical care. Always follow your healthcare professional's instructions.        Understanding Bakers Cyst (Popliteal Cyst)  A Bakers cyst (popliteal cyst) is a fluid-filled sac that forms behind the knee.  Parts of the knee  The knee is a complex joint that has many parts. The lower end of the thighbone (femur) rotates on the upper end of the shinbone (tibia). There are several small bursae around the knee joint. These are small sacs filled with a special fluid (synovial fluid) that cushions the rest of the joint. Between the bones is a space that also contains this fluid.  What causes a Bakers cyst?  It is caused when extra fluid from the knee joint flows into the small bursa that sits behind the knee. When this sac fills with too much fluid, its called a Bakers cyst. This might happen when an injury or disease irritates the knee joint.   In adults, other problems with the knee joint often cause the Bakers cyst. Injury or a knee disorder can change the normal structure of the knee joint. This can cause a cyst to form.  The synovial fluid inside the joint space may build up as a result of injury or disease. As the pressure builds up, the fluid may bulge into the back of the knee. This can cause the  cyst.  Symptoms of a Bakers cyst  A Bakers cyst often doesnt cause symptoms. A cyst will more often be seen on an imaging test, like MRI, done for other reasons. If you do have symptoms, they may include:  · Pain in the back of the knee  · Knee stiffness  · Sense of swelling or fullness behind the knee, especially when you straighten your leg  · A swelling behind the knee that goes away when you bend your knee  These symptoms tend to get worse when standing for a long time, or being active.  Diagnosing a Bakers cyst  Your healthcare provider will ask you about your medical history and your symptoms. He or she will give you a physical exam, which will include a careful exam of your knee. Its important to make sure your symptoms are caused by a Bakers cyst and not a tumor or a blood clot.  If the cause of your symptoms is not clear, you may have imaging tests, such as:  · Ultrasound, to look at the cyst in more detail  · X-ray, to get more information about the bones of the joint  · MRI, if the diagnosis is still unclear after ultrasound, or if your health care provider is considering surgery  Date Last Reviewed: 4/1/2017  © 1602-9468 [employer]. [address] [place] [address]. All rights reserved. This information is not intended as a substitute for professional medical care. Always follow your healthcare professional's instructions.        Smoking and Peripheral Arterial Disease (PAD)  Smoking is the greatest single danger to the health of your arteries. It puts you at higher risk for peripheral arterial disease (PAD). PAD is a disease of the arteries in the legs. If you have PAD, its likely that other parts of your body are diseased, too. That puts you at high risk for heart attack or stroke. Read on to learn how smoking can lead to PAD and affect your health.  How can smoking lead to PAD?  Smoking causes swelling and redness (inflammation) that leads to plaque forming. Plaque is a  waxy material made up of cholesterol and other particles. It can build up in your artery walls. When there is too much plaque, your arteries can become narrowed and restrict blood flow. This then raises your risk for PAD and blood clots. It also worsens other risk factors, such as high blood pressure and high cholesterol. These are things that make you more likely to have artery disease.  What happens if you dont quit smoking?  · You have 2 to 4 times the risk of dying from a heart attack or stroke as a nonsmoker.  · You have a greater risk of getting severe PAD, pain in your legs when walking (claudication), dead body tissue due to lack of blood flow (gangrene), or having a leg or foot amputated.  · You are at greater risk for abdominal aortic aneurysm (AAA). This is a bulge in the aorta, a major artery. It can burst suddenly and be fatal.  What happens if you quit smoking?  · Your risk for heart attack and stroke drops as soon as you quit smoking.  · After 1 year of not smoking, your risk for heart attack falls by 50%.  · In 5 to 15 years after you quit, your risk for heart attack or stroke is the same as someone who never smoked.  · Your risk for amputation and other complications of PAD is reduced.  · Your risk of developing AAA decreases.     For more information  · Instinctiv.gov/wquo-gh-lh-expert  · National Cancer [place] Smoking Quitline:5-388-21H-QUIT (5-918-042-8569)      Date Last Reviewed: 6/1/2016  © 5970-1800 [employer]. [address] [place] [address], [place] [address]. All rights reserved. This information is not intended as a substitute for professional medical care. Always follow your healthcare professional's instructions.        A Walking Program for Peripheral Arterial Disease (PAD)  Peripheral arterial disease (PAD) is a condition where the arteries in the legs are severely damaged. When you have PAD, walking can be painful. So you may start to walk less. Walking less makes your leg  muscles weaker. It then becomes harder and more painful to walk. A walking program can break this cycle. This sheet gives you tips on how to get started.     Walking farther without pain  Exercise strengthens leg muscles that are out of shape. It also trains these muscles to work with less oxygen. This helps your leg muscles work better even with reduced blood flow to your legs. When you have PAD, a walking program can be helpful. Your program can:  · Let you walk longer and farther without claudication. This is an ache in your legs during exercise that goes away with rest.  · Let you do more and be more active  · Add to your overall health and well-being  · Help you control your blood sugar and blood pressure  · Help you become healthier with no risk and at little or no cost  Getting started  Your local hospital, vascular center, or cardiac rehab center may have a special walking program for people with PAD. If so, this is your best option. But if you cant find a program, or its not covered by insurance, you can still walk on your own. Follow these steps at each session:  · Step 1. Start at a pace that lets you walk for 5 to 10 minutes before you start to feel claudication. This feeling is unpleasant, but it doesnt hurt you. Keep going until the pain makes you feel that you need to stop.  · Step 2. Stop and rest for 3 to 5 minutes, just long enough for the pain to go away. You can rest standing or sitting. (Some people like to bring along a cane or a lightweight folding chair.)  · Step 3. Again walk at a pace that lets you walk for 5 to 10 minutes more before you feel pain. This may be slower than your starting pace in step 1. Then rest again.  · Step 4. Repeat this process until youve walked for 45 minutes. This should be about 60 to 80 minutes total, including resting time. You may not be able to do a full 45 minutes at first. Do as much as you can, and increase your time as you improve.  Making the most of  your program  · Walk at least 3 times a week. Take no more than 2 days off between sessions. If you stop walking, even for a week or two, you can lose the health benefits of your program.  · Find a good place to walk. A treadmill or a track may be better at first. That way, you wont run the risk of going too far and finding that you cant walk back. Be sure to have a place to walk indoors in bad weather, such as a gym or a mall.  · Wear shoes with sturdy, flexible soles. The heel should fit without slipping. You should have room to wiggle your toes.  · Keep track of how long you walk. A pedometer will show your daily progress. It can also show how much farther you can walk as time goes by.  · Ask a friend to keep you company. You may enjoy walking with someone else. Or you may want to make your walking sessions a time to relax by yourself.  · Make it fun. Listen to music while you walk and rest. Walk in a favorite park. Get to know the people at the gym, or the folks that you pass on your route. While you rest, you can window-shop, read, or feed the birds. Do whatever will help you enjoy your exercise sessions.  Date Last Reviewed: 6/1/2016  © 1257-7173 [employer]. [address] [place] [address], [place] [id number]. All rights reserved. This information is not intended as a substitute for professional medical care. Always follow your healthcare professional's instructions.        Understanding Peripheral Arterial Disease    Peripheral arteries deliver oxygen-rich blood to the tissues outside the heart. As you age, your arteries become stiffer and thicker. In addition, risk factors, such as smoking and high cholesterol, can damage the artery lining. This allows a buildup of fat and other materials (plaque) to form within the artery walls. The buildup of plaque narrows the space inside the artery and sometimes blocks blood flow. Peripheral arterial disease (PAD) happens when blood flow through the arteries is  reduced because of plaque buildup. It often happens in the legs and feet, but can also happen elsewhere in the body. If this buildup happens in the a large artery in the neck (carotid artery), it can be a major contributor to stroke.  A healthy artery  An artery is a muscular tube that carries oxgen rich blood and nutrients from the heart to the rest of the body. It has a smooth lining and flexible walls that allow blood to pass freely. When active, muscles need more oxygen. This increases blood flow. Healthy arteries can adapt to meet this need.  A damaged artery    PAD begins when the lining of an artery is damaged. This is often because of risk factors, such as smoking, older age, or diabetes. Plaque then starts to form within the artery wall. At this stage, blood flows normally, so youre not likely to have symptoms.  A narrowed artery    If plaque continues to build up, the space inside the artery narrows. The artery walls become less able to expand. The artery still provides enough blood and oxygen to your muscles during rest. But when youre active, the increased demand for blood cant be met. As a result, your leg may cramp or ache when you walk.  A blocked artery    An artery can become blocked by plaque or by a blood clot lodged in a narrowed section. When this happens, oxygen cant reach the muscle below the blockage. Then you may feel pain when lying down or when you are not active (rest pain). This type of pain is especially common at night when youre lying flat. In time, the affected tissue can die. This can lead to the loss of a toe or foot.  Date Last Reviewed: 5/1/2016 © 2000-2017 [employer]. [address] [place] [address], [place] [address]. All rights reserved. This information is not intended as a substitute for professional medical care. Always follow your healthcare professional's instructions.

## 2018-05-31 NOTE — LETTER
May 31, 2018      Steven Ruffin MD  [address] [place] [address]           [facility]  [address] [facility] [address]  [facility] [address]  Phone: [phone]  Fax: [phone]          Patient: Sandee Evans   MR Number: [id number]   YOB: 1955   Date of Visit: 5/31/2018       Dear Dr. Steven Ruffin:    Thank you for referring Sandee Evans to me for evaluation. Attached you will find relevant portions of my assessment and plan of care.    If you have questions, please do not hesitate to call me. I look forward to following Sandee Evans along with you.    Sincerely,    Stas Elam MD    Enclosure  CC:  No Recipients    If you would like to receive this communication electronically, please contact [email or web address] or [phone] to request more information on [employer] Link access.    For providers and/or their staff who would like to refer a patient to [facility], please contact us through our one-stop-shop provider referral line, [facility] , at [phone].    If you feel you have received this communication in error or would no longer like to receive these types of communications, please e-mail [email or web address]

## 2018-06-02 DIAGNOSIS — J30.2 SEASONAL ALLERGIC RHINITIS: ICD-10-CM

## 2018-06-04 RX ORDER — LORATADINE 10 MG/1
TABLET ORAL
Qty: 30 TABLET | Refills: 0 | Status: SHIPPED | OUTPATIENT
Start: 2018-06-04 | End: 2018-07-03 | Stop reason: SDUPTHER

## 2018-07-03 DIAGNOSIS — J30.2 SEASONAL ALLERGIC RHINITIS: ICD-10-CM

## 2018-07-03 DIAGNOSIS — E78.5 HYPERLIPIDEMIA, UNSPECIFIED HYPERLIPIDEMIA TYPE: ICD-10-CM

## 2018-07-03 RX ORDER — LORATADINE 10 MG/1
TABLET ORAL
Qty: 30 TABLET | Refills: 0 | Status: SHIPPED | OUTPATIENT
Start: 2018-07-03 | End: 2018-08-10 | Stop reason: SDUPTHER

## 2018-07-03 RX ORDER — SIMVASTATIN 20 MG/1
TABLET, FILM COATED ORAL
Qty: 90 TABLET | Refills: 0 | Status: SHIPPED | OUTPATIENT
Start: 2018-07-03 | End: 2018-10-02 | Stop reason: SDUPTHER

## 2018-08-07 DIAGNOSIS — I10 ESSENTIAL HYPERTENSION: ICD-10-CM

## 2018-08-07 RX ORDER — SPIRONOLACTONE 25 MG/1
TABLET ORAL
Qty: 90 TABLET | Refills: 0 | Status: SHIPPED | OUTPATIENT
Start: 2018-08-07 | End: 2018-11-14 | Stop reason: SDUPTHER

## 2018-08-10 DIAGNOSIS — J30.2 SEASONAL ALLERGIC RHINITIS: ICD-10-CM

## 2018-08-10 RX ORDER — LORATADINE 10 MG/1
TABLET ORAL
Qty: 30 TABLET | Refills: 0 | Status: SHIPPED | OUTPATIENT
Start: 2018-08-10 | End: 2019-06-18

## 2018-08-30 ENCOUNTER — OFFICE VISIT (OUTPATIENT)
Dept: VASCULAR SURGERY | Facility: CLINIC | Age: 63
End: 2018-08-30
Payer: COMMERCIAL

## 2018-08-30 ENCOUNTER — HOSPITAL ENCOUNTER (OUTPATIENT)
Dept: RADIOLOGY | Facility: HOSPITAL | Age: 63
Discharge: HOME | End: 2018-08-30
Attending: SURGERY
Payer: COMMERCIAL

## 2018-08-30 VITALS
HEIGHT: 63 IN | HEART RATE: 78 BPM | DIASTOLIC BLOOD PRESSURE: 80 MMHG | SYSTOLIC BLOOD PRESSURE: 140 MMHG | BODY MASS INDEX: 46.34 KG/M2 | WEIGHT: 261.56 LBS

## 2018-08-30 DIAGNOSIS — R60.0 LEG EDEMA, LEFT: ICD-10-CM

## 2018-08-30 DIAGNOSIS — I87.2 VENOUS INSUFFICIENCY OF BOTH LOWER EXTREMITIES: Primary | ICD-10-CM

## 2018-08-30 PROCEDURE — 3079F DIAST BP 80-89 MM HG: CPT | Mod: CPTII,S$GLB,, | Performed by: SURGERY

## 2018-08-30 PROCEDURE — 99214 OFFICE O/P EST MOD 30 MIN: CPT | Mod: S$GLB,,, | Performed by: SURGERY

## 2018-08-30 PROCEDURE — 99999 PR PBB SHADOW E&M-EST. PATIENT-LVL III: CPT | Mod: PBBFAC,,, | Performed by: SURGERY

## 2018-08-30 PROCEDURE — 93970 EXTREMITY STUDY: CPT | Mod: TC

## 2018-08-30 PROCEDURE — 93970 EXTREMITY STUDY: CPT | Mod: 26,,, | Performed by: RADIOLOGY

## 2018-08-30 PROCEDURE — 3008F BODY MASS INDEX DOCD: CPT | Mod: CPTII,S$GLB,, | Performed by: SURGERY

## 2018-08-30 PROCEDURE — 3077F SYST BP >= 140 MM HG: CPT | Mod: CPTII,S$GLB,, | Performed by: SURGERY

## 2018-08-30 NOTE — PATIENT INSTRUCTIONS
Understanding Chronic Venous Insufficiency  Problems with the veins in the legs may lead to chronic venous insufficiency (CVI). CVI means that there is a long-term problem with the veins not being able to pump blood back to your heart. When this happens, blood stays in the legs and causes swelling and aching.   Two problems that may lead to chronic venous insufficiency are:  · Damaged valves. Valves keep blood flowing from the legs through the blood vessels and back to the heart. When the valves are damaged, blood does not flow as well.   · Deep vein thrombosis (DVT). Blood clots may form in the deep veins of the legs. This may cause pain, redness, and swelling in the legs. It may also block the flow of blood back to the heart. Seek immediate medical care if you have these symptoms.  · A blood clot in the leg can also break off and travel to the lungs. This is called pulmonary embolism (PE). In the lungs, the clot can cut off the flow of blood. This may cause chest pain, trouble breathing, sweating, a fast heartbeat, coughing (may cough up blood), and fainting. It is a medical emergency and may cause death. Call 911 if you have these symptoms.  · Healthcare providers call the two conditions, DVT and PE, venous thromboembolism (VTE).  CVI cant be cured, but you can control leg swelling to reduce the likelihood of ulcers (sores).  Recognizing the symptoms  Be aware of the following:  · If you stand or sit with your feet down for long periods, your legs may ache or feel heavy.  · Swollen ankles are possibly the most common symptom of CVI.  · As swelling increases, the skin over your ankles may show red spots or a brownish tinge. The skin may feel leathery or scaly, and may start to itch.  · If swelling is not controlled, an ulcer (open wound) may form.  What you can do  Reduce your risk of developing ulcers by doing the following:  · Increase blood flow back to your heart by elevating your legs, exercising daily,  and wearing elastic stockings.  · Boost blood flow in your legs by losing excess weight.  · If you must stand or sit in one place for a period of time, keep your blood moving by wiggling your toes, shifting your body position, and rising up on the balls of your feet.    Date Last Reviewed: 5/1/2016  © 2249-6902 [employer]. [address] [place] [address], [place] [address]. All rights reserved. This information is not intended as a substitute for professional medical care. Always follow your healthcare professional's instructions.        Putting on Compression Stockings     Turn the stocking inside-out, then fit it over your toes and heel.          Roll the stocking up your leg.            Once stockings are on, make sure the top of the stocking is about two fingers width below the crease of the knee (or the groin if you wear thigh-high stockings).          Use equipment, such as a stocking solo, or wear rubber gloves to make it easier to put on compression stockings.         Elastic compression stockings are prescribed to treat many vein problems. Wearing them may be the most important thing you do to manage your symptoms. The stockings fit tightly around your ankle, gradually reducing in pressure as they go up your legs. This helps keep blood flowing to your heart. As a result, swelling is reduced. Your healthcare provider will prescribe stockings at a safe pressure for you. He or she will also tell you how often to wear and remove the stockings. Follow these instructions closely. Also, do not buy or wear compression stockings without first seeing your healthcare provider.  Tips for wear and care  To wear stockings safely and to get the most benefit:  · Wear the length prescribed by your healthcare provider.  · Pull them to the designated height and no farther. Dont let them bunch at the top. This can restrict blood flow and increase swelling.  · Wear the stockings for the amount of time your healthcare  provider recommends. Replace them when they start to feel loose. This will likely be every 3 to 6 months.  · Remove them as your healthcare provider directs. When removed, wash your legs. Then check your legs and feet for sores. Call your healthcare provider if you find a sore. Dont put the stockings back on unless your healthcare provider directs.   · Wash the stockings as instructed. They may need to be hand-washed.  Date Last Reviewed: 5/1/2016  © 9583-7569 [employer]. [address] [place] [address], [place] [address]. All rights reserved. This information is not intended as a substitute for professional medical care. Always follow your healthcare professional's instructions.

## 2018-08-30 NOTE — LETTER
August 30, 2018        Steven Ruffin MD  [address] [place] [address]             [facility]  [address] [facility] [address]  [facility] [address]  Phone: [phone]  Fax: [phone]   Patient: Sandee Evans   MR Number: [id number]   YOB: 1955   Date of Visit: 8/30/2018       Dear Dr. Ruffin:    Thank you for referring Sandee Evans to me for evaluation. Below are the relevant portions of my assessment and plan of care.            If you have questions, please do not hesitate to call me. I look forward to following Sandee along with you.    Sincerely,      Stas Elam MD           CC  No Recipients

## 2018-10-02 DIAGNOSIS — E78.5 HYPERLIPIDEMIA, UNSPECIFIED HYPERLIPIDEMIA TYPE: ICD-10-CM

## 2018-10-02 RX ORDER — SIMVASTATIN 20 MG/1
TABLET, FILM COATED ORAL
Qty: 90 TABLET | Refills: 0 | Status: SHIPPED | OUTPATIENT
Start: 2018-10-02 | End: 2019-01-14 | Stop reason: SDUPTHER

## 2018-10-11 DIAGNOSIS — F32.A DEPRESSION, UNSPECIFIED DEPRESSION TYPE: ICD-10-CM

## 2018-10-11 RX ORDER — SERTRALINE HYDROCHLORIDE 100 MG/1
TABLET, FILM COATED ORAL
Qty: 90 TABLET | Refills: 1 | Status: ON HOLD | OUTPATIENT
Start: 2018-10-11 | End: 2019-02-22 | Stop reason: SDUPTHER

## 2018-11-06 ENCOUNTER — PATIENT MESSAGE (OUTPATIENT)
Dept: FAMILY MEDICINE | Facility: CLINIC | Age: 63
End: 2018-11-06

## 2018-11-14 ENCOUNTER — OFFICE VISIT (OUTPATIENT)
Dept: FAMILY MEDICINE | Facility: CLINIC | Age: 63
End: 2018-11-14
Payer: COMMERCIAL

## 2018-11-14 VITALS
HEIGHT: 63 IN | RESPIRATION RATE: 16 BRPM | OXYGEN SATURATION: 96 % | BODY MASS INDEX: 47.93 KG/M2 | DIASTOLIC BLOOD PRESSURE: 80 MMHG | SYSTOLIC BLOOD PRESSURE: 116 MMHG | HEART RATE: 90 BPM | WEIGHT: 270.5 LBS | TEMPERATURE: 99 F

## 2018-11-14 DIAGNOSIS — E66.01 OBESITY, MORBID: ICD-10-CM

## 2018-11-14 DIAGNOSIS — M17.11 PRIMARY OSTEOARTHRITIS OF RIGHT KNEE: ICD-10-CM

## 2018-11-14 DIAGNOSIS — M51.36 DDD (DEGENERATIVE DISC DISEASE), LUMBAR: ICD-10-CM

## 2018-11-14 DIAGNOSIS — I48.20 CHRONIC ATRIAL FIBRILLATION: ICD-10-CM

## 2018-11-14 DIAGNOSIS — Z12.11 SCREEN FOR COLON CANCER: ICD-10-CM

## 2018-11-14 DIAGNOSIS — I10 ESSENTIAL HYPERTENSION: ICD-10-CM

## 2018-11-14 DIAGNOSIS — M17.10 OSTEOARTHRITIS, LOCALIZED, KNEE: ICD-10-CM

## 2018-11-14 DIAGNOSIS — M54.16 LUMBAR RADICULAR PAIN: ICD-10-CM

## 2018-11-14 DIAGNOSIS — Z23 NEED FOR INFLUENZA VACCINATION: Primary | ICD-10-CM

## 2018-11-14 PROCEDURE — 99999 PR PBB SHADOW E&M-EST. PATIENT-LVL V: CPT | Mod: PBBFAC,,, | Performed by: FAMILY MEDICINE

## 2018-11-14 PROCEDURE — 99214 OFFICE O/P EST MOD 30 MIN: CPT | Mod: 25,S$GLB,, | Performed by: FAMILY MEDICINE

## 2018-11-14 PROCEDURE — 90686 IIV4 VACC NO PRSV 0.5 ML IM: CPT | Mod: S$GLB,,, | Performed by: FAMILY MEDICINE

## 2018-11-14 PROCEDURE — 90471 IMMUNIZATION ADMIN: CPT | Mod: S$GLB,,, | Performed by: FAMILY MEDICINE

## 2018-11-14 PROCEDURE — 3008F BODY MASS INDEX DOCD: CPT | Mod: CPTII,S$GLB,, | Performed by: FAMILY MEDICINE

## 2018-11-14 PROCEDURE — 3079F DIAST BP 80-89 MM HG: CPT | Mod: CPTII,S$GLB,, | Performed by: FAMILY MEDICINE

## 2018-11-14 PROCEDURE — 3074F SYST BP LT 130 MM HG: CPT | Mod: CPTII,S$GLB,, | Performed by: FAMILY MEDICINE

## 2018-11-14 RX ORDER — SPIRONOLACTONE 25 MG/1
TABLET ORAL
Qty: 90 TABLET | Refills: 2 | Status: SHIPPED | OUTPATIENT
Start: 2018-11-14 | End: 2019-08-01 | Stop reason: SDUPTHER

## 2018-11-14 RX ORDER — OXYCODONE AND ACETAMINOPHEN 10; 325 MG/1; MG/1
1 TABLET ORAL EVERY 12 HOURS PRN
Qty: 60 TABLET | Refills: 0 | Status: SHIPPED | OUTPATIENT
Start: 2018-11-14 | End: 2018-11-14 | Stop reason: SDUPTHER

## 2018-11-14 RX ORDER — OXYCODONE AND ACETAMINOPHEN 10; 325 MG/1; MG/1
1 TABLET ORAL DAILY
Qty: 60 TABLET | Refills: 0 | Status: ON HOLD | OUTPATIENT
Start: 2018-11-14 | End: 2019-02-22 | Stop reason: HOSPADM

## 2018-11-14 NOTE — PROGRESS NOTES
Chief Complaint   Patient presents with    Diabetic Foot Exam    Hyperlipidemia     follow up        HPI  Sandee Evans is a 62 y.o. female with multiple medical diagnoses as listed in the medical history and problem list that presents for follow up.    Ortho - pending L TKA, has follow up today.     Vascular - also pending follow up.    Pt is known to me and was last seen by me on 5/28/2018.    PAST MEDICAL HISTORY:  Past Medical History:   Diagnosis Date    Anticoagulant long-term use     Xarelto    Arthritis     Atrial fibrillation     Breast cyst     Degenerative disc disease     Depression     Diabetes mellitus     TYPE 2    Hyperlipidemia     Hypertension     Nuclear sclerosis, bilateral 12/18/2017    Obesity, morbid     Other abnormal glucose     pre-diabetes    Sleep apnea     Smoker     Thyroid disease     on meds 8-9 years ago. hypothyroidism.no malignancy    TMJ (temporomandibular joint disorder)     jaw clicking    Urge incontinence     Wears glasses        PAST SURGICAL HISTORY:  Past Surgical History:   Procedure Laterality Date    ARTHROPLASTY-KNEE Right 5/23/2016    Performed [facility]    BREAST BIOPSY Right     over 10 yrs ago/ benign    BREAST CYST EXCISION Right     BREAST LUMPECTOMY Right     over 10 yrs ago, ex bx/ benign    CARDIOVERSION N/A 7/20/2015    Performed by [facility]ardo [facility]ik [facility] at [facility] CATH LAB    COLONOSCOPY N/A 8/2/2013    [facility] at [facility] ENDO (4TH FLR)    ESOPHAGOGASTRODUODENOSCOPY (EGD) N/A 6/22/2015    Performed by Kevin Yeager [facility] at [facility] ENDO (2ND FLR)    HYSTERECTOMY      OOPHORECTOMY      rt.knee surgery 2016      ULTRASOUND-ENDOSCOPIC-UPPER N/A 6/22/2015    Performed by [facility] at [facility] ENDO (2ND FLR)    underarm gland\ Bilateral        SOCIAL HISTORY:  Social History     Socioeconomic History    Marital status:      Spouse name: Not on file    Number  of children: Not on file    Years of education: Not on file    Highest education level: Not on file   Social Needs    Financial resource strain: Not on file    Food insecurity - worry: Not on file    Food insecurity - inability: Not on file    Transportation needs - medical: Not on file    Transportation needs - non-medical: Not on file   Occupational History    Not on file   Tobacco Use    Smoking status: Current Every Day Smoker     Packs/day: 0.25     Years: 30.00     Pack years: 7.50     Types: Cigarettes    Smokeless tobacco: Never Used   Substance and Sexual Activity    Alcohol use: No    Drug use: No    Sexual activity: Yes     Partners: Male   Other Topics Concern    Not on file   Social History Narrative    Not on file       FAMILY HISTORY:  Family History   Problem Relation Age of Onset    Diabetes Mother     Diabetes Brother     No Known Problems Father     No Known Problems Sister     No Known Problems Maternal Aunt     No Known Problems Maternal Uncle     No Known Problems Paternal Aunt     No Known Problems Paternal Uncle     No Known Problems Maternal Grandmother     No Known Problems Maternal Grandfather     No Known Problems Paternal Grandmother     No Known Problems Paternal Grandfather     Amblyopia Neg Hx     Blindness Neg Hx     Cancer Neg Hx     Cataracts Neg Hx     Glaucoma Neg Hx     Hypertension Neg Hx     Macular degeneration Neg Hx     Retinal detachment Neg Hx     Strabismus Neg Hx     Stroke Neg Hx     Thyroid disease Neg Hx        ALLERGIES AND MEDICATIONS: updated and reviewed.  Review of patient's allergies indicates:   Allergen Reactions    Ace inhibitors      Cough       Current Outpatient Medications   Medication Sig Dispense Refill    albuterol 90 mcg/actuation inhaler Inhale 2 puffs into the lungs every 6 (six) hours as needed for Wheezing. 1 each 11    fluticasone (FLONASE) 50 mcg/actuation nasal spray 2 sprays by Each Nare route once  "daily. 15 g 0    loratadine (CLARITIN) 10 mg tablet TAKE 1 TABLET(10 MG) BY MOUTH EVERY DAY 30 tablet 0    metFORMIN (GLUCOPHAGE) 500 MG tablet Take 1 tablet (500 mg total) by mouth 2 (two) times daily with meals. 180 tablet 2    sertraline (ZOLOFT) 100 MG tablet TAKE 1 TABLET(100 MG) BY MOUTH EVERY DAY 90 tablet 1    simvastatin (ZOCOR) 20 MG tablet TAKE 1 TABLET(20 MG) BY MOUTH EVERY EVENING 90 tablet 0    spironolactone (ALDACTONE) 25 MG tablet TAKE 1 TABLET(25 MG) BY MOUTH EVERY DAY 90 tablet 2    triamcinolone acetonide 0.1% (KENALOG) 0.1 % ointment APPLY BETWEEN KNEES AND UPPER THIGHS TWICE DAILY FOR NO MORE THAN 7-14 DAYS 454 g 0    oxyCODONE-acetaminophen (PERCOCET)  mg per tablet Take 1 tablet by mouth once daily. 60 tablet 0     No current facility-administered medications for this visit.        ROS  Review of Systems   Constitutional: Negative for activity change, appetite change and fever.   HENT: Negative for congestion and sore throat.    Eyes: Negative for visual disturbance.   Respiratory: Negative for cough and shortness of breath.    Cardiovascular: Negative for chest pain.   Gastrointestinal: Negative for abdominal pain, diarrhea, nausea and vomiting.   Endocrine: Negative.    Genitourinary: Negative for dysuria.   Musculoskeletal: Positive for myalgias. Negative for arthralgias and back pain.   Skin: Negative for rash.   Allergic/Immunologic: Negative.    Neurological: Negative for dizziness, weakness and headaches.   Hematological: Negative.    Psychiatric/Behavioral: Positive for sleep disturbance. Negative for agitation and confusion.       Physical Exam  Vitals:    11/14/18 0808   BP: 116/80   Pulse: 90   Resp: 16   Temp: 99 °F (37.2 °C)    Body mass index is 47.92 kg/m².  Weight: 122.7 kg (270 lb 8.1 oz)   Height: 5' 3" (160 cm)     Physical Exam   Constitutional: She is oriented to person, place, and time. She appears well-developed and well-nourished.   HENT:   Head: " Normocephalic and atraumatic.   Eyes: Conjunctivae and EOM are normal. Pupils are equal, round, and reactive to light.   Neck: Normal range of motion. Neck supple.   Cardiovascular: Normal rate, regular rhythm and normal heart sounds.   Pulmonary/Chest: Effort normal and breath sounds normal.   Abdominal: Soft. Bowel sounds are normal.   Musculoskeletal: Normal range of motion.   L knee - severe dec ROM, TTP global   Neurological: She is alert and oriented to person, place, and time. She has normal reflexes.   Skin: Skin is warm and dry.   Psychiatric: She has a normal mood and affect. Her behavior is normal. Judgment and thought content normal.       Health Maintenance       Date Due Completion Date    Zoster Vaccine 12/28/2013 ---    Influenza Vaccine 08/01/2018 2/21/2018    Override on 1/1/2015: Done (3/18/15 pt stated had it done 1/2015 at [facility] with Dr. Dodd)    Colonoscopy 08/02/2018 8/2/2013 (Done)    Override on 8/2/2013: Done    Foot Exam 08/09/2018 8/9/2017    Override on 8/7/2015: Done    Eye Exam 12/18/2018 12/18/2017    Override on 12/18/2017: Done    Override on 12/27/2016: Done    Override on 3/20/2012: Done    Urine Microalbumin 02/06/2019 2/6/2018    Override on 7/22/2014: Not Clinically Appropriate    Lipid Panel 05/28/2019 5/28/2018    Hemoglobin A1c 05/28/2019 5/28/2018    Override on 7/22/2014: Not Clinically Appropriate    Low Dose Statin 10/02/2019 10/2/2018    Mammogram 05/21/2020 5/21/2018    Pneumococcal PPSV23 (Medium Risk) (2) 02/21/2023 2/21/2018    TETANUS VACCINE 05/28/2028 5/28/2018          Assessment & Plan    Need for influenza vaccination  -     Influenza - Quadrivalent (3 years & older) (PF)    Screen for colon cancer  -     Case request GI: COLONOSCOPY    Lumbar radicular pain  -     Discontinue: oxyCODONE-acetaminophen (PERCOCET)  mg per tablet; Take 1 tablet by mouth every 12 (twelve) hours as needed for Pain.  Dispense: 60 tablet; Refill: 0  -      oxyCODONE-acetaminophen (PERCOCET)  mg per tablet; Take 1 tablet by mouth once daily.  Dispense: 60 tablet; Refill: 0    Osteoarthritis, localized, knee  -     Discontinue: oxyCODONE-acetaminophen (PERCOCET)  mg per tablet; Take 1 tablet by mouth every 12 (twelve) hours as needed for Pain.  Dispense: 60 tablet; Refill: 0  -     oxyCODONE-acetaminophen (PERCOCET)  mg per tablet; Take 1 tablet by mouth once daily.  Dispense: 60 tablet; Refill: 0    Essential hypertension, Chronic atrial fibrillation  -     spironolactone (ALDACTONE) 25 MG tablet; TAKE 1 TABLET(25 MG) BY MOUTH EVERY DAY  Dispense: 90 tablet; Refill: 2  - Continue current medication regimen as prescribed    DDD (degenerative disc disease), lumbar  - Continue current medication regimen as prescribed    Obesity, morbid, Primary osteoarthritis of right knee  - Pending L TKA, with hope to increase activity    Other orders  -     Cancel: Case request GI: COLONOSCOPY        Follow-up in about 3 months (around 2/14/2019), or if symptoms worsen or fail to improve.

## 2019-01-14 ENCOUNTER — TELEPHONE (OUTPATIENT)
Dept: FAMILY MEDICINE | Facility: CLINIC | Age: 64
End: 2019-01-14

## 2019-01-14 DIAGNOSIS — E78.5 HYPERLIPIDEMIA, UNSPECIFIED HYPERLIPIDEMIA TYPE: ICD-10-CM

## 2019-01-14 RX ORDER — SIMVASTATIN 20 MG/1
TABLET, FILM COATED ORAL
Qty: 90 TABLET | Refills: 0 | Status: SHIPPED | OUTPATIENT
Start: 2019-01-14 | End: 2019-07-24 | Stop reason: SDUPTHER

## 2019-01-30 ENCOUNTER — OFFICE VISIT (OUTPATIENT)
Dept: FAMILY MEDICINE | Facility: CLINIC | Age: 64
End: 2019-01-30
Payer: COMMERCIAL

## 2019-01-30 ENCOUNTER — HOSPITAL ENCOUNTER (OUTPATIENT)
Dept: RADIOLOGY | Facility: HOSPITAL | Age: 64
Discharge: HOME | End: 2019-01-30
Attending: FAMILY MEDICINE
Payer: COMMERCIAL

## 2019-01-30 VITALS
TEMPERATURE: 98 F | WEIGHT: 273.81 LBS | BODY MASS INDEX: 48.52 KG/M2 | HEIGHT: 63 IN | DIASTOLIC BLOOD PRESSURE: 94 MMHG | HEART RATE: 88 BPM | RESPIRATION RATE: 17 BRPM | SYSTOLIC BLOOD PRESSURE: 138 MMHG | OXYGEN SATURATION: 97 %

## 2019-01-30 DIAGNOSIS — Z12.11 COLON CANCER SCREENING: Primary | ICD-10-CM

## 2019-01-30 DIAGNOSIS — G89.29 OTHER CHRONIC PAIN: ICD-10-CM

## 2019-01-30 DIAGNOSIS — Z01.810 PREOP CARDIOVASCULAR EXAM: ICD-10-CM

## 2019-01-30 DIAGNOSIS — I73.9 PVD (PERIPHERAL VASCULAR DISEASE): ICD-10-CM

## 2019-01-30 DIAGNOSIS — M94.9 DISORDER OF BONE AND ARTICULAR CARTILAGE: ICD-10-CM

## 2019-01-30 DIAGNOSIS — M89.9 DISORDER OF BONE AND ARTICULAR CARTILAGE: ICD-10-CM

## 2019-01-30 DIAGNOSIS — I48.20 CHRONIC ATRIAL FIBRILLATION: ICD-10-CM

## 2019-01-30 DIAGNOSIS — M51.36 DDD (DEGENERATIVE DISC DISEASE), LUMBAR: ICD-10-CM

## 2019-01-30 LAB
BILIRUB SERPL-MCNC: ABNORMAL MG/DL
BLOOD URINE, POC: ABNORMAL
COLOR, POC UA: YELLOW
GLUCOSE UR QL STRIP: NORMAL
KETONES UR QL STRIP: ABNORMAL
LEUKOCYTE ESTERASE URINE, POC: ABNORMAL
NITRITE, POC UA: ABNORMAL
PH, POC UA: 5
PROTEIN, POC: ABNORMAL
SPECIFIC GRAVITY, POC UA: 1.02
UROBILINOGEN, POC UA: NORMAL

## 2019-01-30 PROCEDURE — 3080F PR MOST RECENT DIASTOLIC BLOOD PRESSURE >= 90 MM HG: ICD-10-PCS | Mod: CPTII,S$GLB,, | Performed by: FAMILY MEDICINE

## 2019-01-30 PROCEDURE — 3008F BODY MASS INDEX DOCD: CPT | Mod: CPTII,S$GLB,, | Performed by: FAMILY MEDICINE

## 2019-01-30 PROCEDURE — 81001 POCT URINALYSIS, DIPSTICK OR TABLET REAGENT, AUTOMATED, WITH MICROSCOP: ICD-10-PCS | Mod: S$GLB,,, | Performed by: FAMILY MEDICINE

## 2019-01-30 PROCEDURE — 71046 XR CHEST PA AND LATERAL: ICD-10-PCS | Mod: 26,,, | Performed by: RADIOLOGY

## 2019-01-30 PROCEDURE — 99999 PR PBB SHADOW E&M-EST. PATIENT-LVL IV: ICD-10-PCS | Mod: PBBFAC,,, | Performed by: FAMILY MEDICINE

## 2019-01-30 PROCEDURE — 81001 URINALYSIS AUTO W/SCOPE: CPT | Mod: S$GLB,,, | Performed by: FAMILY MEDICINE

## 2019-01-30 PROCEDURE — 99214 PR OFFICE/OUTPT VISIT, EST, LEVL IV, 30-39 MIN: ICD-10-PCS | Mod: 25,S$GLB,, | Performed by: FAMILY MEDICINE

## 2019-01-30 PROCEDURE — 71046 X-RAY EXAM CHEST 2 VIEWS: CPT | Mod: 26,,, | Performed by: RADIOLOGY

## 2019-01-30 PROCEDURE — 1101F PR PT FALLS ASSESS DOC 0-1 FALLS W/OUT INJ PAST YR: ICD-10-PCS | Mod: CPTII,S$GLB,, | Performed by: FAMILY MEDICINE

## 2019-01-30 PROCEDURE — 3008F PR BODY MASS INDEX (BMI) DOCUMENTED: ICD-10-PCS | Mod: CPTII,S$GLB,, | Performed by: FAMILY MEDICINE

## 2019-01-30 PROCEDURE — 99214 OFFICE O/P EST MOD 30 MIN: CPT | Mod: 25,S$GLB,, | Performed by: FAMILY MEDICINE

## 2019-01-30 PROCEDURE — 3075F SYST BP GE 130 - 139MM HG: CPT | Mod: CPTII,S$GLB,, | Performed by: FAMILY MEDICINE

## 2019-01-30 PROCEDURE — 71046 X-RAY EXAM CHEST 2 VIEWS: CPT | Mod: TC,FY,PO

## 2019-01-30 PROCEDURE — 1101F PT FALLS ASSESS-DOCD LE1/YR: CPT | Mod: CPTII,S$GLB,, | Performed by: FAMILY MEDICINE

## 2019-01-30 PROCEDURE — 3080F DIAST BP >= 90 MM HG: CPT | Mod: CPTII,S$GLB,, | Performed by: FAMILY MEDICINE

## 2019-01-30 PROCEDURE — 3075F PR MOST RECENT SYSTOLIC BLOOD PRESS GE 130-139MM HG: ICD-10-PCS | Mod: CPTII,S$GLB,, | Performed by: FAMILY MEDICINE

## 2019-01-30 PROCEDURE — 99999 PR PBB SHADOW E&M-EST. PATIENT-LVL IV: CPT | Mod: PBBFAC,,, | Performed by: FAMILY MEDICINE

## 2019-01-30 NOTE — PROGRESS NOTES
Chief Complaint   Patient presents with    Pre-op Exam     [facility]        HPI  Sandee Evans is a 63 y.o. female with multiple medical diagnoses as listed in the medical history and problem list that presents for preop.    Cardiology - hx of Afib, prior anticoagulation, followed closely, as follow up visit scheduled.     Here for preop of L TKA.     Today denies CP, SOB    Pt is known to me and was last seen by me on 11/14/2018.    PAST MEDICAL HISTORY:  Past Medical History:   Diagnosis Date    Anticoagulant long-term use     Xarelto    Arthritis     Atrial fibrillation     Breast cyst     Degenerative disc disease     Depression     Diabetes mellitus     TYPE 2    Hyperlipidemia     Hypertension     Nuclear sclerosis, bilateral 12/18/2017    Obesity, morbid     Other abnormal glucose     pre-diabetes    Sleep apnea     Smoker     Thyroid disease     on meds 8-9 years ago. hypothyroidism.no malignancy    TMJ (temporomandibular joint disorder)     jaw clicking    Urge incontinence     Wears glasses        PAST SURGICAL HISTORY:  Past Surgical History:   Procedure Laterality Date    ARTHROPLASTY-KNEE Right 5/23/2016    Performed by Med Hanson [facility] at [facility] OR    BREAST BIOPSY Right     over 10 yrs ago/ benign    BREAST CYST EXCISION Right     BREAST LUMPECTOMY Right     over 10 yrs ago, ex bx/ benign    CARDIOVERSION N/A 7/20/2015    Performed by Eduardo Naik MD at [facility] CATH LAB    COLONOSCOPY N/A 8/2/2013    Performed by Dakotah Montoya MD at [facility] ENDO (4TH FLR)    ESOPHAGOGASTRODUODENOSCOPY (EGD) N/A 6/22/2015    Performed by Kevin Yeager MD at [facility] ENDO (2ND FLR)    HYSTERECTOMY      OOPHORECTOMY      rt.knee surgery 2016      ULTRASOUND-ENDOSCOPIC-UPPER N/A 6/22/2015    Performed by Kevin [facility]ager [facility] at [facility] ENDO (2ND FLR)    underarm gland\ Bilateral        SOCIAL HISTORY:  Social History     Socioeconomic History     Marital status:      Spouse name: Not on file    Number of children: Not on file    Years of education: Not on file    Highest education level: Not on file   Social Needs    Financial resource strain: Not on file    Food insecurity - worry: Not on file    Food insecurity - inability: Not on file    Transportation needs - medical: Not on file    Transportation needs - non-medical: Not on file   Occupational History    Not on file   Tobacco Use    Smoking status: Current Every Day Smoker     Packs/day: 0.25     Years: 30.00     Pack years: 7.50     Types: Cigarettes    Smokeless tobacco: Never Used   Substance and Sexual Activity    Alcohol use: No    Drug use: No    Sexual activity: Yes     Partners: Male   Other Topics Concern    Not on file   Social History Narrative    Not on file       FAMILY HISTORY:  Family History   Problem Relation Age of Onset    Diabetes Mother     Diabetes Brother     No Known Problems Father     No Known Problems Sister     No Known Problems Maternal Aunt     No Known Problems Maternal Uncle     No Known Problems Paternal Aunt     No Known Problems Paternal Uncle     No Known Problems Maternal Grandmother     No Known Problems Maternal Grandfather     No Known Problems Paternal Grandmother     No Known Problems Paternal Grandfather     Amblyopia Neg Hx     Blindness Neg Hx     Cancer Neg Hx     Cataracts Neg Hx     Glaucoma Neg Hx     Hypertension Neg Hx     Macular degeneration Neg Hx     Retinal detachment Neg Hx     Strabismus Neg Hx     Stroke Neg Hx     Thyroid disease Neg Hx        ALLERGIES AND MEDICATIONS: updated and reviewed.  Review of patient's allergies indicates:   Allergen Reactions    Ace inhibitors      Cough       Current Outpatient Medications   Medication Sig Dispense Refill    albuterol 90 mcg/actuation inhaler Inhale 2 puffs into the lungs every 6 (six) hours as needed for Wheezing. 1 each 11    fluticasone  "(FLONASE) 50 mcg/actuation nasal spray 2 sprays by Each Nare route once daily. 15 g 0    metFORMIN (GLUCOPHAGE) 500 MG tablet Take 1 tablet (500 mg total) by mouth 2 (two) times daily with meals. 180 tablet 2    oxyCODONE-acetaminophen (PERCOCET)  mg per tablet Take 1 tablet by mouth once daily. 60 tablet 0    sertraline (ZOLOFT) 100 MG tablet TAKE 1 TABLET(100 MG) BY MOUTH EVERY DAY 90 tablet 1    simvastatin (ZOCOR) 20 MG tablet TAKE 1 TABLET(20 MG) BY MOUTH EVERY EVENING 90 tablet 0    spironolactone (ALDACTONE) 25 MG tablet TAKE 1 TABLET(25 MG) BY MOUTH EVERY DAY 90 tablet 2    triamcinolone acetonide 0.1% (KENALOG) 0.1 % ointment APPLY BETWEEN KNEES AND UPPER THIGHS TWICE DAILY FOR NO MORE THAN 7-14 DAYS 454 g 0    loratadine (CLARITIN) 10 mg tablet TAKE 1 TABLET(10 MG) BY MOUTH EVERY DAY 30 tablet 0     No current facility-administered medications for this visit.        ROS  Review of Systems   Constitutional: Negative for activity change, appetite change and fever.   HENT: Negative for congestion and sore throat.    Eyes: Negative for visual disturbance.   Respiratory: Negative for cough and shortness of breath.    Cardiovascular: Negative for chest pain.   Gastrointestinal: Negative for abdominal pain, diarrhea, nausea and vomiting.   Endocrine: Negative.    Genitourinary: Negative for dysuria.   Musculoskeletal: Positive for arthralgias, gait problem, joint swelling and myalgias. Negative for back pain.   Skin: Negative for rash.   Allergic/Immunologic: Negative.    Neurological: Negative for dizziness, weakness and headaches.   Hematological: Negative.    Psychiatric/Behavioral: Positive for sleep disturbance. Negative for agitation and confusion.       Physical Exam  Vitals:    01/30/19 1141   BP: (!) 138/94   Pulse: 88   Resp: 17   Temp: 97.7 °F (36.5 °C)    Body mass index is 48.5 kg/m².  Weight: 124.2 kg (273 lb 13 oz)   Height: 5' 3" (160 cm)     Physical Exam   Constitutional: She is " oriented to person, place, and time. She appears well-developed and well-nourished.   HENT:   Head: Normocephalic and atraumatic.   Eyes: Conjunctivae and EOM are normal. Pupils are equal, round, and reactive to light.   Neck: Normal range of motion. Neck supple.   Cardiovascular: Normal rate, regular rhythm and normal heart sounds.   Pulmonary/Chest: Effort normal and breath sounds normal.   Abdominal: Soft. Bowel sounds are normal.   Musculoskeletal: Normal range of motion.   L knee - severe dec ROM, TTP global   Neurological: She is alert and oriented to person, place, and time. She has normal reflexes.   Skin: Skin is warm and dry.   Psychiatric: She has a normal mood and affect. Her behavior is normal. Judgment and thought content normal.       Health Maintenance       Date Due Completion Date    DEXA SCAN 12/28/1993 ---    Colonoscopy 08/02/2018 8/2/2013 (Done)    Override on 8/2/2013: Done    Foot Exam 08/09/2018 8/9/2017    Override on 8/7/2015: Done    Eye Exam 12/18/2018 12/18/2017    Override on 12/18/2017: Done    Override on 12/27/2016: Done    Override on 3/20/2012: Done    Urine Microalbumin 02/06/2019 2/6/2018    Override on 7/22/2014: Not Clinically Appropriate    Zoster Vaccine 11/13/2019 (Originally 12/28/2013) ---    Pneumococcal Vaccine (65+ Low/Medium Risk) (1 of 2 - PCV13) 02/21/2019 2/21/2018    Lipid Panel 05/28/2019 5/28/2018    Hemoglobin A1c 05/28/2019 5/28/2018    Override on 7/22/2014: Not Clinically Appropriate    Low Dose Statin 01/30/2020 1/30/2019    Mammogram 05/21/2020 5/21/2018    TETANUS VACCINE 05/28/2028 5/28/2018          Assessment & Plan    Colon cancer screening  -     Case request GI: COLONOSCOPY    Disorder of bone and articular cartilage  -     DXA Bone Density Spine And Hip; Future; Expected date: 01/30/2019    Other chronic pain, DDD (degenerative disc disease), lumbar  - Continue current medication regimen as prescribed    Chronic atrial fibrillation, PVD (peripheral  vascular disease)  - Continue current medication regimen as prescribed    Preop cardiovascular exam  -     POCT URINE DIPSTICK WITH MICROSCOPE, AUTOMATED  -     X-Ray Chest PA And Lateral; Future; Expected date: 01/30/2019  -     CBC auto differential; Future; Expected date: 01/30/2019  -     Comprehensive metabolic panel; Future; Expected date: 01/30/2019  -     Protime-INR; Future; Expected date: 01/30/2019  -     Sedimentation rate; Future; Expected date: 01/30/2019  - Assess labs, imaging, EKG  - Pending review        Follow-up in about 3 months (around 4/30/2019), or if symptoms worsen or fail to improve.

## 2019-02-04 ENCOUNTER — HOSPITAL ENCOUNTER (OUTPATIENT)
Dept: RADIOLOGY | Facility: HOSPITAL | Age: 64
Discharge: HOME | End: 2019-02-04
Attending: FAMILY MEDICINE
Payer: COMMERCIAL

## 2019-02-04 DIAGNOSIS — M94.9 DISORDER OF BONE AND ARTICULAR CARTILAGE: ICD-10-CM

## 2019-02-04 DIAGNOSIS — M89.9 DISORDER OF BONE AND ARTICULAR CARTILAGE: ICD-10-CM

## 2019-02-04 PROCEDURE — 77080 DXA BONE DENSITY AXIAL: CPT | Mod: TC

## 2019-02-04 PROCEDURE — 77080 DEXA BONE DENSITY SPINE HIP: ICD-10-PCS | Mod: 26,,, | Performed by: RADIOLOGY

## 2019-02-04 PROCEDURE — 77080 DXA BONE DENSITY AXIAL: CPT | Mod: 26,,, | Performed by: RADIOLOGY

## 2019-02-06 ENCOUNTER — OFFICE VISIT (OUTPATIENT)
Dept: CARDIOLOGY | Facility: CLINIC | Age: 64
End: 2019-02-06
Payer: COMMERCIAL

## 2019-02-06 VITALS
BODY MASS INDEX: 48.43 KG/M2 | RESPIRATION RATE: 20 BRPM | OXYGEN SATURATION: 96 % | SYSTOLIC BLOOD PRESSURE: 122 MMHG | HEART RATE: 85 BPM | WEIGHT: 273.38 LBS | DIASTOLIC BLOOD PRESSURE: 68 MMHG

## 2019-02-06 DIAGNOSIS — I73.9 PVD (PERIPHERAL VASCULAR DISEASE): ICD-10-CM

## 2019-02-06 DIAGNOSIS — I48.91 A-FIB: ICD-10-CM

## 2019-02-06 DIAGNOSIS — I10 ESSENTIAL HYPERTENSION: ICD-10-CM

## 2019-02-06 DIAGNOSIS — E11.9 TYPE 2 DIABETES MELLITUS WITHOUT COMPLICATION, WITHOUT LONG-TERM CURRENT USE OF INSULIN: ICD-10-CM

## 2019-02-06 DIAGNOSIS — E78.2 MIXED HYPERLIPIDEMIA: ICD-10-CM

## 2019-02-06 DIAGNOSIS — I48.20 CHRONIC ATRIAL FIBRILLATION: Primary | ICD-10-CM

## 2019-02-06 DIAGNOSIS — G89.29 OTHER CHRONIC PAIN: ICD-10-CM

## 2019-02-06 PROCEDURE — 3044F HG A1C LEVEL LT 7.0%: CPT | Mod: CPTII,S$GLB,, | Performed by: INTERNAL MEDICINE

## 2019-02-06 PROCEDURE — 93000 ELECTROCARDIOGRAM COMPLETE: CPT | Mod: S$GLB,,, | Performed by: INTERNAL MEDICINE

## 2019-02-06 PROCEDURE — 99214 OFFICE O/P EST MOD 30 MIN: CPT | Mod: S$GLB,,, | Performed by: INTERNAL MEDICINE

## 2019-02-06 PROCEDURE — 1101F PR PT FALLS ASSESS DOC 0-1 FALLS W/OUT INJ PAST YR: ICD-10-PCS | Mod: CPTII,S$GLB,, | Performed by: INTERNAL MEDICINE

## 2019-02-06 PROCEDURE — 3078F PR MOST RECENT DIASTOLIC BLOOD PRESSURE < 80 MM HG: ICD-10-PCS | Mod: CPTII,S$GLB,, | Performed by: INTERNAL MEDICINE

## 2019-02-06 PROCEDURE — 3008F BODY MASS INDEX DOCD: CPT | Mod: CPTII,S$GLB,, | Performed by: INTERNAL MEDICINE

## 2019-02-06 PROCEDURE — 3074F PR MOST RECENT SYSTOLIC BLOOD PRESSURE < 130 MM HG: ICD-10-PCS | Mod: CPTII,S$GLB,, | Performed by: INTERNAL MEDICINE

## 2019-02-06 PROCEDURE — 93000 EKG 12-LEAD: ICD-10-PCS | Mod: S$GLB,,, | Performed by: INTERNAL MEDICINE

## 2019-02-06 PROCEDURE — 1101F PT FALLS ASSESS-DOCD LE1/YR: CPT | Mod: CPTII,S$GLB,, | Performed by: INTERNAL MEDICINE

## 2019-02-06 PROCEDURE — 99999 PR PBB SHADOW E&M-EST. PATIENT-LVL III: ICD-10-PCS | Mod: PBBFAC,,, | Performed by: INTERNAL MEDICINE

## 2019-02-06 PROCEDURE — 99214 PR OFFICE/OUTPT VISIT, EST, LEVL IV, 30-39 MIN: ICD-10-PCS | Mod: S$GLB,,, | Performed by: INTERNAL MEDICINE

## 2019-02-06 PROCEDURE — 3078F DIAST BP <80 MM HG: CPT | Mod: CPTII,S$GLB,, | Performed by: INTERNAL MEDICINE

## 2019-02-06 PROCEDURE — 3044F PR MOST RECENT HEMOGLOBIN A1C LEVEL <7.0%: ICD-10-PCS | Mod: CPTII,S$GLB,, | Performed by: INTERNAL MEDICINE

## 2019-02-06 PROCEDURE — 3008F PR BODY MASS INDEX (BMI) DOCUMENTED: ICD-10-PCS | Mod: CPTII,S$GLB,, | Performed by: INTERNAL MEDICINE

## 2019-02-06 PROCEDURE — 99999 PR PBB SHADOW E&M-EST. PATIENT-LVL III: CPT | Mod: PBBFAC,,, | Performed by: INTERNAL MEDICINE

## 2019-02-06 PROCEDURE — 3074F SYST BP LT 130 MM HG: CPT | Mod: CPTII,S$GLB,, | Performed by: INTERNAL MEDICINE

## 2019-02-06 NOTE — PROGRESS NOTES
Subjective:    Patient ID:  Sandee Evans is a 63 y.o. female who presents for follow-up of Atrial Fibrillation      HPI  Previous history:  Here for f/u of atrial fibrillation. Doesn't have any complaints. Hasn't felt palp. Is in rehabilitation for her knee and tolerated surgery. She's not had any chest pain, shortness of breath or palpitations. She's expands no PND, orthopnea or lower edema. She denies any dizziness, presyncope or syncope. His had no problems with her medicines in particular her blood thinner.  She did not go to her sleep clinic appointment.  She has a previous diagnosis of obstructive sleep apnea from prior sleep study but never had her mask arranged.    Today:  Patient is here for follow-up of paroxysmal atrial fibrillation.  She is labeled as chronic and has been in normal sinus rhythm and was last seen in 2016.  EKG today confirms this.  She was lost to follow-up and thought she was discharged because she felt everything was okay.  She has not taken her blood thinner but is only been on aspirin.  She gets shortness of breath on heavy activity but relieved with rest.  She does have some associated substernal heaviness.  She has had no breakthrough tachycardia palpitations.  She denies any PND, orthopnea has stable lower edema relieved with elevation.  She is preop for knee replacement and is unable to go up a flight of stairs due to her degenerative joint condition    Review of Systems   Constitution: Negative.   HENT: Negative.    Eyes: Negative.    Cardiovascular: Positive for dyspnea on exertion. Negative for chest pain, irregular heartbeat, leg swelling, near-syncope, orthopnea, palpitations, paroxysmal nocturnal dyspnea and syncope.   Respiratory: Positive for shortness of breath.    Skin: Negative.    Musculoskeletal: Positive for joint pain and joint swelling.   Gastrointestinal: Negative for abdominal pain, constipation and diarrhea.   Genitourinary: Negative for dysuria.    Neurological: Negative.    Psychiatric/Behavioral: Negative.         Objective:    Physical Exam   Constitutional: She is oriented to person, place, and time. She appears well-developed and well-nourished.   HENT:   Head: Normocephalic and atraumatic.   Eyes: Conjunctivae and EOM are normal. Pupils are equal, round, and reactive to light.   Neck: Normal range of motion. Neck supple. No thyromegaly present.   Cardiovascular: Normal rate and regular rhythm.   No murmur heard.  Pulmonary/Chest: Effort normal and breath sounds normal. No respiratory distress.   Abdominal: Soft. Bowel sounds are normal.   Musculoskeletal: She exhibits no edema.   Neurological: She is alert and oriented to person, place, and time.   Skin: Skin is warm and dry.   Psychiatric: She has a normal mood and affect. Her behavior is normal.       EKG shows normal sinus rhythm nonspecific ST-T changes    Assessment:       1. Chronic atrial fibrillation    2. PVD (peripheral vascular disease)    3. Essential hypertension    4. Mixed hyperlipidemia    5. Type 2 diabetes mellitus without complication, without long-term current use of insulin    6. Other chronic pain    7. A-fib         Plan:       -continue current medical therapy  -intermediate pretest probability with low functional exercise tolerance, plan for baseline echo and nuclear stress  -check Holter with arrhythmia history  -referred to sleep clinic a currently refuses  -xarelto for OAC  -counseled on tobacco and referred to clinic    RTC  1 week with testing now

## 2019-02-08 ENCOUNTER — HOSPITAL ENCOUNTER (OUTPATIENT)
Dept: CARDIOLOGY | Facility: HOSPITAL | Age: 64
Discharge: HOME | End: 2019-02-08
Attending: INTERNAL MEDICINE
Payer: COMMERCIAL

## 2019-02-08 ENCOUNTER — HOSPITAL ENCOUNTER (OUTPATIENT)
Dept: RADIOLOGY | Facility: HOSPITAL | Age: 64
Discharge: HOME | End: 2019-02-08
Attending: INTERNAL MEDICINE
Payer: COMMERCIAL

## 2019-02-08 VITALS — HEIGHT: 62 IN | BODY MASS INDEX: 47.11 KG/M2 | WEIGHT: 256 LBS

## 2019-02-08 DIAGNOSIS — I48.20 CHRONIC ATRIAL FIBRILLATION: ICD-10-CM

## 2019-02-08 LAB
AORTIC ROOT ANNULUS: 2.64 CM
AORTIC VALVE CUSP SEPERATION: 1.73 CM
AV INDEX (PROSTH): 0.8
AV MEAN GRADIENT: 3 MMHG
AV PEAK GRADIENT: 7.95 MMHG
AV VALVE AREA: 3.79 CM2
AV VELOCITY RATIO: 0.75
BSA FOR ECHO PROCEDURE: 2.25 M2
CV ECHO LV RWT: 0.52 CM
CV STRESS BASE HR: 72 BPM
DIASTOLIC BLOOD PRESSURE: 86 MMHG
DOP CALC AO PEAK VEL: 1.41 M/S
DOP CALC AO VTI: 27.51 CM
DOP CALC LVOT AREA: 4.75 CM2
DOP CALC LVOT DIAMETER: 2.46 CM
DOP CALC LVOT PEAK VEL: 1.06 M/S
DOP CALC LVOT STROKE VOLUME: 104.13 CM3
DOP CALCLVOT PEAK VEL VTI: 21.92 CM
E WAVE DECELERATION TIME: 156.01 MSEC
E/A RATIO: 1.27
ECHO LV POSTERIOR WALL: 1.12 CM (ref 0.6–1.1)
FRACTIONAL SHORTENING: 36 % (ref 28–44)
INTERVENTRICULAR SEPTUM: 1.09 CM (ref 0.6–1.1)
IVRT: 0.09 MSEC
LA MAJOR: 5.8 CM
LA MINOR: 5.32 CM
LA WIDTH: 4.58 CM
LEFT ATRIUM SIZE: 3.53 CM
LEFT ATRIUM VOLUME INDEX: 35.9 ML/M2
LEFT ATRIUM VOLUME: 76.26 CM3
LEFT INTERNAL DIMENSION IN SYSTOLE: 2.75 CM (ref 2.1–4)
LEFT VENTRICLE DIASTOLIC VOLUME INDEX: 39.63 ML/M2
LEFT VENTRICLE DIASTOLIC VOLUME: 84.14 ML
LEFT VENTRICLE MASS INDEX: 77.8 G/M2
LEFT VENTRICLE SYSTOLIC VOLUME INDEX: 13.3 ML/M2
LEFT VENTRICLE SYSTOLIC VOLUME: 28.25 ML
LEFT VENTRICULAR INTERNAL DIMENSION IN DIASTOLE: 4.32 CM (ref 3.5–6)
LEFT VENTRICULAR MASS: 165.19 G
MV PEAK A VEL: 0.63 M/S
MV PEAK E VEL: 0.8 M/S
NUC REST DIASTOLIC VOLUME INDEX: 109
NUC REST EJECTION FRACTION: 55
NUC REST SYSTOLIC VOLUME INDEX: 49
OHS CV CPX 85 PERCENT MAX PREDICTED HEART RATE MALE: 126
OHS CV CPX MAX PREDICTED HEART RATE: 149
OHS CV CPX PATIENT IS FEMALE: 1
OHS CV CPX PATIENT IS MALE: 0
OHS CV CPX PEAK DIASTOLIC BLOOD PRESSURE: 79 MMHG
OHS CV CPX PEAK HEAR RATE: 91 BPM
OHS CV CPX PEAK RATE PRESSURE PRODUCT: NORMAL
OHS CV CPX PEAK SYSTOLIC BLOOD PRESSURE: 223 MMHG
OHS CV CPX PERCENT MAX PREDICTED HEART RATE ACHIEVED: 61
OHS CV CPX RATE PRESSURE PRODUCT PRESENTING: NORMAL
PISA TR MAX VEL: 3.01 M/S
PULM VEIN S/D RATIO: 1.44
PV PEAK D VEL: 0.62 M/S
PV PEAK S VEL: 0.89 M/S
RA MAJOR: 4.45 CM
RA WIDTH: 3.67 CM
RIGHT VENTRICULAR END-DIASTOLIC DIMENSION: 2.94 CM
RV TISSUE DOPPLER FREE WALL SYSTOLIC VELOCITY 1 (APICAL 4 CHAMBER VIEW): 7.11 M/S
SINUS: 2.53 CM
STJ: 2.48 CM
SYSTOLIC BLOOD PRESSURE: 156 MMHG
TR MAX PG: 36.24 MMHG
TRICUSPID ANNULAR PLANE SYSTOLIC EXCURSION: 1.57 CM

## 2019-02-08 PROCEDURE — 93016 STRESS TEST WITH MYOCARDIAL PERFUSION (CUPID ONLY): ICD-10-PCS | Mod: ,,, | Performed by: INTERNAL MEDICINE

## 2019-02-08 PROCEDURE — 93306 TTE W/DOPPLER COMPLETE: CPT

## 2019-02-08 PROCEDURE — 93306 TTE W/DOPPLER COMPLETE: CPT | Mod: 26,,, | Performed by: INTERNAL MEDICINE

## 2019-02-08 PROCEDURE — 93018 STRESS TEST WITH MYOCARDIAL PERFUSION (CUPID ONLY): ICD-10-PCS | Mod: ,,, | Performed by: INTERNAL MEDICINE

## 2019-02-08 PROCEDURE — 93018 CV STRESS TEST I&R ONLY: CPT | Mod: ,,, | Performed by: INTERNAL MEDICINE

## 2019-02-08 PROCEDURE — 93227 HOLTER MONITOR - 24 HOUR (CUPID ONLY): ICD-10-PCS | Mod: ,,, | Performed by: INTERNAL MEDICINE

## 2019-02-08 PROCEDURE — 93016 CV STRESS TEST SUPVJ ONLY: CPT | Mod: ,,, | Performed by: INTERNAL MEDICINE

## 2019-02-08 PROCEDURE — 63600175 PHARM REV CODE 636 W HCPCS: Performed by: INTERNAL MEDICINE

## 2019-02-08 PROCEDURE — 93227 XTRNL ECG REC<48 HR R&I: CPT | Mod: ,,, | Performed by: INTERNAL MEDICINE

## 2019-02-08 PROCEDURE — 93306 TRANSTHORACIC ECHO (TTE) COMPLETE (CUPID ONLY): ICD-10-PCS | Mod: 26,,, | Performed by: INTERNAL MEDICINE

## 2019-02-08 PROCEDURE — 78452 STRESS TEST WITH MYOCARDIAL PERFUSION (CUPID ONLY): ICD-10-PCS | Mod: 26,,, | Performed by: INTERNAL MEDICINE

## 2019-02-08 PROCEDURE — 93017 CV STRESS TEST TRACING ONLY: CPT

## 2019-02-08 PROCEDURE — A9502 TC99M TETROFOSMIN: HCPCS

## 2019-02-08 PROCEDURE — 93225 XTRNL ECG REC<48 HRS REC: CPT

## 2019-02-08 PROCEDURE — 78452 HT MUSCLE IMAGE SPECT MULT: CPT | Mod: 26,,, | Performed by: INTERNAL MEDICINE

## 2019-02-08 RX ORDER — REGADENOSON 0.08 MG/ML
0.4 INJECTION, SOLUTION INTRAVENOUS ONCE
Status: COMPLETED | OUTPATIENT
Start: 2019-02-08 | End: 2019-02-08

## 2019-02-08 RX ADMIN — REGADENOSON 0.4 MG: 0.08 INJECTION, SOLUTION INTRAVENOUS at 09:02

## 2019-02-12 LAB
OHS CV EVENT MONITOR DAY: 1
OHS CV HOLTER LENGTH DECIMAL HOURS: 47.98
OHS CV HOLTER LENGTH HOURS: 23
OHS CV HOLTER LENGTH MINUTES: 59

## 2019-02-13 ENCOUNTER — HOSPITAL ENCOUNTER (OUTPATIENT)
Dept: PREADMISSION TESTING | Facility: HOSPITAL | Age: 64
Discharge: HOME | End: 2019-02-13
Attending: ORTHOPAEDIC SURGERY
Payer: COMMERCIAL

## 2019-02-13 VITALS
OXYGEN SATURATION: 98 % | RESPIRATION RATE: 17 BRPM | TEMPERATURE: 99 F | SYSTOLIC BLOOD PRESSURE: 135 MMHG | DIASTOLIC BLOOD PRESSURE: 90 MMHG | HEIGHT: 63 IN | BODY MASS INDEX: 48.98 KG/M2 | HEART RATE: 94 BPM | WEIGHT: 276.44 LBS

## 2019-02-13 DIAGNOSIS — Z01.818 PRE-OP TESTING: Primary | ICD-10-CM

## 2019-02-13 LAB
APTT BLDCRRT: 30.5 SEC
ESTIMATED AVG GLUCOSE: 111 MG/DL
HBA1C MFR BLD HPLC: 5.5 %

## 2019-02-13 PROCEDURE — 85730 THROMBOPLASTIN TIME PARTIAL: CPT

## 2019-02-13 PROCEDURE — 83036 HEMOGLOBIN GLYCOSYLATED A1C: CPT

## 2019-02-13 RX ORDER — ASPIRIN 81 MG/1
81 TABLET ORAL DAILY
Status: ON HOLD | COMMUNITY
End: 2019-02-22 | Stop reason: HOSPADM

## 2019-02-13 NOTE — DISCHARGE INSTRUCTIONS
"  Your surgery is scheduled for __Tuesday Feb. 19, 2019__.    Call [phone] between 2 p.m. and 5 p.m. on   _Monday_ to find out your arrival time for the day of your surgery.      Please report to SAME DAY SURGERY UNIT on the 2nd FLOOR at _______ a.m.  Use front door entrance. The doors open at 0530 am.      If you need WHEELCHAIR assistance please call  [phone] from your cell phone or "[phone]"  from the  hospital courtesy phone located to the right after you enter the hospital lobby.      INSTRUCTIONS IMPORTANT!!!  ¨ Do not eat or drink after 12 midnight-including water. OK to brush teeth, no   gum, candy or mints!    ¨ Take only these medicines with a small swallow of water-morning of surgery.  Take Claritin and pain med (if needed) with water.    Return Sat. Feb. 16th at 11 AM [facility]  ADDITIONAL BLOOD TEST    __x__  Prep instructions:   SHOWER     _x___  Please shower using Hibiclens soap the night before AND  the morning of your surgery/procedure. Do not use Hibiclens on your face or genitals               If your surgery is around your belly button (Navel) be sure to wash inside your belly button also. Rinse hibiclens off completely.  _x___  No shaving of procedural area at least 4-5 days before surgery due to  increased risk of skin irritation and/or possible infection.  _x___  Do not wear makeup, including mascara. WEARING EYE MAKEUP MAY LEAD TO SERIOUS EYE INJURY during surgery.  _x___  No powder, lotions or creams to your body.  _x___  You may wear only deodorant on the day of surgery.  _x___  Please remove all jewelry, including piercings and leave at home.  _x___  No money or valuables needed. Please leave at home.  You may bring your cell phone.  ____  Please bring any documents given by your doctor.  _x___  If going home the same day, arrange for a ride home. You will not be able to   drive if Anesthesia was used.  ____  Children under 18 years require a parent / guardian present " the entire time   they are in surgery / recovery.  _x___  Wear loose fitting clothing. Allow for dressings, bandages.  ____  Stop Aspirin, Ibuprofen, Motrin and Aleve at least 3-5 days before  surgery, unless otherwise instructed by your doctor, or the nurse.      x       You MAY use Tylenol/acetaminophen until day of surgery.  _x___  If you take diabetic medication, do not take am of surgery unless instructed by Doctor.  _x___  Call MD for temperature above 101 degrees.        _x___ Stop taking any Fish Oil supplement or any Vitamins that contain Vitamin    E at least 5 days prior to surgery.              I have read or had read and explained to me, and understand the above information.  Additional comments or instructions:Please call   [phone] if you have any questions regarding the instructions above.

## 2019-02-14 ENCOUNTER — OFFICE VISIT (OUTPATIENT)
Dept: CARDIOLOGY | Facility: CLINIC | Age: 64
End: 2019-02-14
Payer: COMMERCIAL

## 2019-02-14 VITALS
BODY MASS INDEX: 49.21 KG/M2 | SYSTOLIC BLOOD PRESSURE: 124 MMHG | HEART RATE: 83 BPM | OXYGEN SATURATION: 93 % | RESPIRATION RATE: 16 BRPM | WEIGHT: 277.75 LBS | DIASTOLIC BLOOD PRESSURE: 80 MMHG

## 2019-02-14 DIAGNOSIS — E11.9 TYPE 2 DIABETES MELLITUS WITHOUT COMPLICATION, WITHOUT LONG-TERM CURRENT USE OF INSULIN: ICD-10-CM

## 2019-02-14 DIAGNOSIS — I10 ESSENTIAL HYPERTENSION: ICD-10-CM

## 2019-02-14 DIAGNOSIS — F32.0 CURRENT MILD EPISODE OF MAJOR DEPRESSIVE DISORDER WITHOUT PRIOR EPISODE: ICD-10-CM

## 2019-02-14 DIAGNOSIS — E78.2 MIXED HYPERLIPIDEMIA: ICD-10-CM

## 2019-02-14 DIAGNOSIS — I48.0 PAROXYSMAL ATRIAL FIBRILLATION: Primary | ICD-10-CM

## 2019-02-14 DIAGNOSIS — E66.01 OBESITY, MORBID: ICD-10-CM

## 2019-02-14 DIAGNOSIS — I73.9 PVD (PERIPHERAL VASCULAR DISEASE): ICD-10-CM

## 2019-02-14 PROCEDURE — 3008F BODY MASS INDEX DOCD: CPT | Mod: CPTII,S$GLB,, | Performed by: INTERNAL MEDICINE

## 2019-02-14 PROCEDURE — 3079F PR MOST RECENT DIASTOLIC BLOOD PRESSURE 80-89 MM HG: ICD-10-PCS | Mod: CPTII,S$GLB,, | Performed by: INTERNAL MEDICINE

## 2019-02-14 PROCEDURE — 3079F DIAST BP 80-89 MM HG: CPT | Mod: CPTII,S$GLB,, | Performed by: INTERNAL MEDICINE

## 2019-02-14 PROCEDURE — 3044F PR MOST RECENT HEMOGLOBIN A1C LEVEL <7.0%: ICD-10-PCS | Mod: CPTII,S$GLB,, | Performed by: INTERNAL MEDICINE

## 2019-02-14 PROCEDURE — 99214 PR OFFICE/OUTPT VISIT, EST, LEVL IV, 30-39 MIN: ICD-10-PCS | Mod: S$GLB,,, | Performed by: INTERNAL MEDICINE

## 2019-02-14 PROCEDURE — 1101F PR PT FALLS ASSESS DOC 0-1 FALLS W/OUT INJ PAST YR: ICD-10-PCS | Mod: CPTII,S$GLB,, | Performed by: INTERNAL MEDICINE

## 2019-02-14 PROCEDURE — 99999 PR PBB SHADOW E&M-EST. PATIENT-LVL III: ICD-10-PCS | Mod: PBBFAC,,, | Performed by: INTERNAL MEDICINE

## 2019-02-14 PROCEDURE — 99214 OFFICE O/P EST MOD 30 MIN: CPT | Mod: S$GLB,,, | Performed by: INTERNAL MEDICINE

## 2019-02-14 PROCEDURE — 3074F PR MOST RECENT SYSTOLIC BLOOD PRESSURE < 130 MM HG: ICD-10-PCS | Mod: CPTII,S$GLB,, | Performed by: INTERNAL MEDICINE

## 2019-02-14 PROCEDURE — 1101F PT FALLS ASSESS-DOCD LE1/YR: CPT | Mod: CPTII,S$GLB,, | Performed by: INTERNAL MEDICINE

## 2019-02-14 PROCEDURE — 3008F PR BODY MASS INDEX (BMI) DOCUMENTED: ICD-10-PCS | Mod: CPTII,S$GLB,, | Performed by: INTERNAL MEDICINE

## 2019-02-14 PROCEDURE — 3074F SYST BP LT 130 MM HG: CPT | Mod: CPTII,S$GLB,, | Performed by: INTERNAL MEDICINE

## 2019-02-14 PROCEDURE — 3044F HG A1C LEVEL LT 7.0%: CPT | Mod: CPTII,S$GLB,, | Performed by: INTERNAL MEDICINE

## 2019-02-14 PROCEDURE — 99999 PR PBB SHADOW E&M-EST. PATIENT-LVL III: CPT | Mod: PBBFAC,,, | Performed by: INTERNAL MEDICINE

## 2019-02-14 RX ORDER — METOPROLOL TARTRATE 25 MG/1
25 TABLET, FILM COATED ORAL 2 TIMES DAILY
Qty: 60 TABLET | Refills: 3 | Status: SHIPPED | OUTPATIENT
Start: 2019-02-14 | End: 2019-10-24 | Stop reason: SDUPTHER

## 2019-02-14 NOTE — LETTER
February 14, 2019    Sandee Evans  [address] [address]             [facility] [place] - Cardiology  [address]  [place] [address]  Phone: [phone]   Mrs. Sandee Evans was seen by me on 2/14/2019 and is cleared for surgery with low to intermediate risk. Ok to hold Xerelto for 48 hours, must continue beta-blocker.        If you have any questions or concerns, please don't hesitate to call.    Sincerely,      Eduardo Naik MD

## 2019-02-14 NOTE — PROGRESS NOTES
Subjective:    Patient ID:  Sandee Evans is a 63 y.o. female who presents for follow-up of Results      HPI    Previous history:  Patient is here for follow-up of paroxysmal atrial fibrillation.  She is labeled as chronic and has been in normal sinus rhythm and was last seen in 2016.  EKG today confirms this.  She was lost to follow-up and thought she was discharged because she felt everything was okay.  She has not taken her blood thinner but is only been on aspirin.  She gets shortness of breath on heavy activity but relieved with rest.  She does have some associated substernal heaviness.  She has had no breakthrough tachycardia palpitations.  She denies any PND, orthopnea has stable lower edema relieved with elevation.  She is preop for knee replacement and is unable to go up a flight of stairs due to her degenerative joint condition    Today:  Here for follow-up of paroxysmal atrial fibrillation.  She underwent testing as below for preop clearance.  We if cleared at low to intermediate risk and she is aware that anything can happen with the stress of surgery and in particular with her tobacco use disorder.  We discussed that this is the main things she needs to work on postoperatively as well as rehab.  She denies any further cardiopulmonary complaints.  We went over her medicines as well and discussed that she has to hold her blood thinner for at least 2 days prior to the procedure but continue her beta-blocker perioperatively.    Review of Systems   Constitution: Negative.   HENT: Negative.    Eyes: Negative.    Cardiovascular: Positive for dyspnea on exertion. Negative for chest pain, irregular heartbeat, leg swelling, near-syncope, orthopnea, palpitations, paroxysmal nocturnal dyspnea and syncope.   Respiratory: Positive for shortness of breath.    Skin: Negative.    Musculoskeletal: Positive for joint pain and joint swelling.   Gastrointestinal: Negative for abdominal pain, constipation and diarrhea.    Genitourinary: Negative for dysuria.   Neurological: Negative.    Psychiatric/Behavioral: Negative.         Objective:    Physical Exam   Constitutional: She is oriented to person, place, and time. She appears well-developed and well-nourished.   HENT:   Head: Normocephalic and atraumatic.   Eyes: Conjunctivae and EOM are normal. Pupils are equal, round, and reactive to light.   Neck: Normal range of motion. Neck supple. No thyromegaly present.   Cardiovascular: Normal rate and regular rhythm.   No murmur heard.  Pulmonary/Chest: Effort normal and breath sounds normal. No respiratory distress.   Abdominal: Soft. Bowel sounds are normal.   Musculoskeletal: She exhibits no edema.   Neurological: She is alert and oriented to person, place, and time.   Skin: Skin is warm and dry.   Psychiatric: She has a normal mood and affect. Her behavior is normal.       EKG shows normal sinus rhythm nonspecific ST-T changes    Echo:  2-19  · Normal left ventricular systolic function. The estimated ejection fraction is 65%  · No wall motion abnormalities.  · Concentric left ventricular remodeling.  · Normal right ventricular systolic function.  · Indeterminate central venous pressure. Estimated PA pressure is at least 36.24 mmHg.  · Interatrial septal aneurysm present.    NST:  · Normal Marian MPI  · The perfusion scan is free of evidence from myocardial ischemia or injury.  · An ejection fraction of 55 % at rest  · LV cavity size is normal at rest.  · Resting wall motion is physiologic.     Holter:  Sinus rhythm with infrequent PACs/PVCs  Single 5 beat run of SVT.  Diary not returned.    Assessment:       1. Paroxysmal atrial fibrillation    2. Essential hypertension    3. Mixed hyperlipidemia    4. Current mild episode of major depressive disorder without prior episode    5. Type 2 diabetes mellitus without complication, without long-term current use of insulin    6. PVD (peripheral vascular disease)    7. Obesity, morbid          Plan:       -continue current medical therapy  -continue beta-blocker perioperatively  -low to intermediate risk for CV events, okay to hold OAC times 48 hr for the procedure  -referred to sleep clinic a currently refuses  -xarelto for OAC  -counseled on tobacco and referred to clinic    RTC 6 months

## 2019-02-18 ENCOUNTER — ANESTHESIA EVENT (OUTPATIENT)
Dept: SURGERY | Facility: HOSPITAL | Age: 64
DRG: 470 | End: 2019-02-18
Payer: COMMERCIAL

## 2019-02-19 ENCOUNTER — ANESTHESIA (OUTPATIENT)
Dept: SURGERY | Facility: HOSPITAL | Age: 64
DRG: 470 | End: 2019-02-19
Payer: COMMERCIAL

## 2019-02-19 ENCOUNTER — HOSPITAL ENCOUNTER (INPATIENT)
Facility: HOSPITAL | Age: 64
LOS: 3 days | Discharge: HOME HEALTH | DRG: 470 | End: 2019-02-22
Attending: ORTHOPAEDIC SURGERY | Admitting: ORTHOPAEDIC SURGERY
Payer: COMMERCIAL

## 2019-02-19 DIAGNOSIS — F32.A DEPRESSION, UNSPECIFIED DEPRESSION TYPE: ICD-10-CM

## 2019-02-19 DIAGNOSIS — M17.12 PRIMARY OSTEOARTHRITIS OF LEFT KNEE: Primary | ICD-10-CM

## 2019-02-19 DIAGNOSIS — I73.9 PVD (PERIPHERAL VASCULAR DISEASE): ICD-10-CM

## 2019-02-19 DIAGNOSIS — M17.11 OSTEOARTHRITIS OF RIGHT KNEE, UNSPECIFIED OSTEOARTHRITIS TYPE: ICD-10-CM

## 2019-02-19 DIAGNOSIS — I48.0 PAROXYSMAL ATRIAL FIBRILLATION: ICD-10-CM

## 2019-02-19 LAB
BASOPHILS # BLD AUTO: 0.03 K/UL
BASOPHILS NFR BLD: 0.4 %
DIFFERENTIAL METHOD: NORMAL
EOSINOPHIL # BLD AUTO: 0.2 K/UL
EOSINOPHIL NFR BLD: 2.6 %
ERYTHROCYTE [DISTWIDTH] IN BLOOD BY AUTOMATED COUNT: 14.3 %
HCT VFR BLD AUTO: 39 %
HGB BLD-MCNC: 12.5 G/DL
LYMPHOCYTES # BLD AUTO: 2.6 K/UL
LYMPHOCYTES NFR BLD: 35.6 %
MCH RBC QN AUTO: 30.3 PG
MCHC RBC AUTO-ENTMCNC: 32.1 G/DL
MCV RBC AUTO: 95 FL
MONOCYTES # BLD AUTO: 0.7 K/UL
MONOCYTES NFR BLD: 9.7 %
NEUTROPHILS # BLD AUTO: 3.8 K/UL
NEUTROPHILS NFR BLD: 51.7 %
PLATELET # BLD AUTO: 245 K/UL
PMV BLD AUTO: 10.3 FL
POCT GLUCOSE: 101 MG/DL (ref 70–110)
POCT GLUCOSE: 152 MG/DL (ref 70–110)
POCT GLUCOSE: 80 MG/DL (ref 70–110)
POCT GLUCOSE: 94 MG/DL (ref 70–110)
RBC # BLD AUTO: 4.12 M/UL
WBC # BLD AUTO: 7.33 K/UL

## 2019-02-19 PROCEDURE — 63600175 PHARM REV CODE 636 W HCPCS: Performed by: NURSE ANESTHETIST, CERTIFIED REGISTERED

## 2019-02-19 PROCEDURE — S0020 INJECTION, BUPIVICAINE HYDRO: HCPCS | Performed by: ANESTHESIOLOGY

## 2019-02-19 PROCEDURE — 36415 COLL VENOUS BLD VENIPUNCTURE: CPT

## 2019-02-19 PROCEDURE — 63600175 PHARM REV CODE 636 W HCPCS: Performed by: ORTHOPAEDIC SURGERY

## 2019-02-19 PROCEDURE — D9220A PRA ANESTHESIA: Mod: CRNA,,, | Performed by: NURSE ANESTHETIST, CERTIFIED REGISTERED

## 2019-02-19 PROCEDURE — 37000009 HC ANESTHESIA EA ADD 15 MINS: Performed by: ORTHOPAEDIC SURGERY

## 2019-02-19 PROCEDURE — D9220A PRA ANESTHESIA: Mod: ANES,,, | Performed by: ANESTHESIOLOGY

## 2019-02-19 PROCEDURE — 37000008 HC ANESTHESIA 1ST 15 MINUTES: Performed by: ORTHOPAEDIC SURGERY

## 2019-02-19 PROCEDURE — D9220A PRA ANESTHESIA: ICD-10-PCS | Mod: CRNA,,, | Performed by: NURSE ANESTHETIST, CERTIFIED REGISTERED

## 2019-02-19 PROCEDURE — 36000710: Performed by: ORTHOPAEDIC SURGERY

## 2019-02-19 PROCEDURE — 11000001 HC ACUTE MED/SURG PRIVATE ROOM

## 2019-02-19 PROCEDURE — 97530 THERAPEUTIC ACTIVITIES: CPT

## 2019-02-19 PROCEDURE — C9290 INJ, BUPIVACAINE LIPOSOME: HCPCS | Performed by: ORTHOPAEDIC SURGERY

## 2019-02-19 PROCEDURE — 25000003 PHARM REV CODE 250: Performed by: ANESTHESIOLOGY

## 2019-02-19 PROCEDURE — S0020 INJECTION, BUPIVICAINE HYDRO: HCPCS | Performed by: ORTHOPAEDIC SURGERY

## 2019-02-19 PROCEDURE — D9220A PRA ANESTHESIA: ICD-10-PCS | Mod: ANES,,, | Performed by: ANESTHESIOLOGY

## 2019-02-19 PROCEDURE — C1713 ANCHOR/SCREW BN/BN,TIS/BN: HCPCS | Performed by: ORTHOPAEDIC SURGERY

## 2019-02-19 PROCEDURE — 85025 COMPLETE CBC W/AUTO DIFF WBC: CPT

## 2019-02-19 PROCEDURE — 27201423 OPTIME MED/SURG SUP & DEVICES STERILE SUPPLY: Performed by: ORTHOPAEDIC SURGERY

## 2019-02-19 PROCEDURE — 36000711: Performed by: ORTHOPAEDIC SURGERY

## 2019-02-19 PROCEDURE — 71000039 HC RECOVERY, EACH ADD'L HOUR: Performed by: ORTHOPAEDIC SURGERY

## 2019-02-19 PROCEDURE — 25000003 PHARM REV CODE 250: Performed by: ORTHOPAEDIC SURGERY

## 2019-02-19 PROCEDURE — 71000033 HC RECOVERY, INTIAL HOUR: Performed by: ORTHOPAEDIC SURGERY

## 2019-02-19 PROCEDURE — C1776 JOINT DEVICE (IMPLANTABLE): HCPCS | Performed by: ORTHOPAEDIC SURGERY

## 2019-02-19 DEVICE — TRAY TIBIAL CEMENT SZ 3 COBAL: Type: IMPLANTABLE DEVICE | Site: KNEE | Status: FUNCTIONAL

## 2019-02-19 DEVICE — PATELLA OVAL DOME 35MM: Type: IMPLANTABLE DEVICE | Site: KNEE | Status: FUNCTIONAL

## 2019-02-19 DEVICE — CEMENT BONE SMARTSET HV 40 GR.: Type: IMPLANTABLE DEVICE | Site: KNEE | Status: FUNCTIONAL

## 2019-02-19 DEVICE — INSERT TIBIAL PFC SZ 3 10MM: Type: IMPLANTABLE DEVICE | Site: KNEE | Status: FUNCTIONAL

## 2019-02-19 DEVICE — IMPLANTABLE DEVICE: Type: IMPLANTABLE DEVICE | Site: KNEE | Status: FUNCTIONAL

## 2019-02-19 RX ORDER — FLUTICASONE PROPIONATE 50 MCG
2 SPRAY, SUSPENSION (ML) NASAL DAILY
Status: DISCONTINUED | OUTPATIENT
Start: 2019-02-19 | End: 2019-02-22 | Stop reason: HOSPADM

## 2019-02-19 RX ORDER — SODIUM CHLORIDE, SODIUM LACTATE, POTASSIUM CHLORIDE, CALCIUM CHLORIDE 600; 310; 30; 20 MG/100ML; MG/100ML; MG/100ML; MG/100ML
INJECTION, SOLUTION INTRAVENOUS CONTINUOUS
Status: DISCONTINUED | OUTPATIENT
Start: 2019-02-19 | End: 2019-02-20

## 2019-02-19 RX ORDER — MORPHINE SULFATE 10 MG/ML
2 INJECTION INTRAMUSCULAR; INTRAVENOUS; SUBCUTANEOUS
Status: DISCONTINUED | OUTPATIENT
Start: 2019-02-19 | End: 2019-02-22 | Stop reason: HOSPADM

## 2019-02-19 RX ORDER — GABAPENTIN 400 MG/1
1200 CAPSULE ORAL
Status: DISCONTINUED | OUTPATIENT
Start: 2019-02-19 | End: 2019-02-19

## 2019-02-19 RX ORDER — SODIUM CHLORIDE, SODIUM LACTATE, POTASSIUM CHLORIDE, CALCIUM CHLORIDE 600; 310; 30; 20 MG/100ML; MG/100ML; MG/100ML; MG/100ML
INJECTION, SOLUTION INTRAVENOUS CONTINUOUS
Status: DISCONTINUED | OUTPATIENT
Start: 2019-02-19 | End: 2019-02-19

## 2019-02-19 RX ORDER — BUPIVACAINE HYDROCHLORIDE 2.5 MG/ML
INJECTION, SOLUTION INFILTRATION; PERINEURAL
Status: DISCONTINUED | OUTPATIENT
Start: 2019-02-19 | End: 2019-02-19 | Stop reason: HOSPADM

## 2019-02-19 RX ORDER — CELECOXIB 100 MG/1
400 CAPSULE ORAL
Status: DISCONTINUED | OUTPATIENT
Start: 2019-02-19 | End: 2019-02-19

## 2019-02-19 RX ORDER — METFORMIN HYDROCHLORIDE 500 MG/1
500 TABLET ORAL 2 TIMES DAILY WITH MEALS
Status: DISCONTINUED | OUTPATIENT
Start: 2019-02-19 | End: 2019-02-20

## 2019-02-19 RX ORDER — OXYCODONE HYDROCHLORIDE 5 MG/1
10 TABLET ORAL EVERY 4 HOURS PRN
Status: DISCONTINUED | OUTPATIENT
Start: 2019-02-19 | End: 2019-02-22 | Stop reason: HOSPADM

## 2019-02-19 RX ORDER — METHYLPREDNISOLONE ACETATE 40 MG/ML
INJECTION, SUSPENSION INTRA-ARTICULAR; INTRALESIONAL; INTRAMUSCULAR; SOFT TISSUE
Status: DISCONTINUED | OUTPATIENT
Start: 2019-02-19 | End: 2019-02-19 | Stop reason: HOSPADM

## 2019-02-19 RX ORDER — ZOLPIDEM TARTRATE 5 MG/1
5 TABLET ORAL NIGHTLY PRN
Status: DISCONTINUED | OUTPATIENT
Start: 2019-02-19 | End: 2019-02-22 | Stop reason: HOSPADM

## 2019-02-19 RX ORDER — ONDANSETRON 2 MG/ML
4 INJECTION INTRAMUSCULAR; INTRAVENOUS EVERY 12 HOURS PRN
Status: DISCONTINUED | OUTPATIENT
Start: 2019-02-19 | End: 2019-02-20

## 2019-02-19 RX ORDER — SPIRONOLACTONE 25 MG/1
25 TABLET ORAL DAILY
Status: DISCONTINUED | OUTPATIENT
Start: 2019-02-19 | End: 2019-02-21

## 2019-02-19 RX ORDER — MUPIROCIN 20 MG/G
1 OINTMENT TOPICAL 2 TIMES DAILY
Status: DISCONTINUED | OUTPATIENT
Start: 2019-02-19 | End: 2019-02-22 | Stop reason: HOSPADM

## 2019-02-19 RX ORDER — BUPIVACAINE HYDROCHLORIDE 5 MG/ML
INJECTION, SOLUTION EPIDURAL; INTRACAUDAL
Status: COMPLETED | OUTPATIENT
Start: 2019-02-19 | End: 2019-02-19

## 2019-02-19 RX ORDER — HYDROMORPHONE HYDROCHLORIDE 2 MG/ML
0.2 INJECTION, SOLUTION INTRAMUSCULAR; INTRAVENOUS; SUBCUTANEOUS EVERY 5 MIN PRN
Status: DISCONTINUED | OUTPATIENT
Start: 2019-02-19 | End: 2019-02-19 | Stop reason: HOSPADM

## 2019-02-19 RX ORDER — CEFAZOLIN SODIUM 1 G/3ML
INJECTION, POWDER, FOR SOLUTION INTRAMUSCULAR; INTRAVENOUS
Status: DISCONTINUED | OUTPATIENT
Start: 2019-02-19 | End: 2019-02-19 | Stop reason: HOSPADM

## 2019-02-19 RX ORDER — FAMOTIDINE 20 MG/1
20 TABLET, FILM COATED ORAL 2 TIMES DAILY
Status: DISCONTINUED | OUTPATIENT
Start: 2019-02-19 | End: 2019-02-22 | Stop reason: HOSPADM

## 2019-02-19 RX ORDER — LIDOCAINE HYDROCHLORIDE 10 MG/ML
1 INJECTION, SOLUTION EPIDURAL; INFILTRATION; INTRACAUDAL; PERINEURAL ONCE
Status: DISCONTINUED | OUTPATIENT
Start: 2019-02-19 | End: 2019-02-19

## 2019-02-19 RX ORDER — GENTAMICIN SULFATE 40 MG/ML
INJECTION, SOLUTION INTRAMUSCULAR; INTRAVENOUS
Status: DISCONTINUED | OUTPATIENT
Start: 2019-02-19 | End: 2019-02-19 | Stop reason: HOSPADM

## 2019-02-19 RX ORDER — BISACODYL 10 MG
10 SUPPOSITORY, RECTAL RECTAL DAILY PRN
Status: DISCONTINUED | OUTPATIENT
Start: 2019-02-19 | End: 2019-02-22 | Stop reason: HOSPADM

## 2019-02-19 RX ORDER — SIMVASTATIN 10 MG/1
20 TABLET, FILM COATED ORAL NIGHTLY
Status: DISCONTINUED | OUTPATIENT
Start: 2019-02-19 | End: 2019-02-22 | Stop reason: HOSPADM

## 2019-02-19 RX ORDER — SODIUM CHLORIDE 0.9 % (FLUSH) 0.9 %
3 SYRINGE (ML) INJECTION
Status: DISCONTINUED | OUTPATIENT
Start: 2019-02-19 | End: 2019-02-22 | Stop reason: HOSPADM

## 2019-02-19 RX ORDER — ALBUTEROL SULFATE 90 UG/1
2 AEROSOL, METERED RESPIRATORY (INHALATION) EVERY 6 HOURS PRN
Status: DISCONTINUED | OUTPATIENT
Start: 2019-02-19 | End: 2019-02-20

## 2019-02-19 RX ORDER — ACETAMINOPHEN 10 MG/ML
1000 INJECTION, SOLUTION INTRAVENOUS EVERY 8 HOURS
Status: COMPLETED | OUTPATIENT
Start: 2019-02-19 | End: 2019-02-20

## 2019-02-19 RX ORDER — SERTRALINE HYDROCHLORIDE 50 MG/1
100 TABLET, FILM COATED ORAL DAILY
Status: DISCONTINUED | OUTPATIENT
Start: 2019-02-19 | End: 2019-02-22 | Stop reason: HOSPADM

## 2019-02-19 RX ORDER — DOCUSATE SODIUM 100 MG/1
100 CAPSULE, LIQUID FILLED ORAL EVERY 12 HOURS
Status: DISCONTINUED | OUTPATIENT
Start: 2019-02-19 | End: 2019-02-22 | Stop reason: HOSPADM

## 2019-02-19 RX ORDER — TRANEXAMIC ACID 100 MG/ML
2000 INJECTION, SOLUTION INTRAVENOUS ONCE
Status: DISCONTINUED | OUTPATIENT
Start: 2019-02-19 | End: 2019-02-19

## 2019-02-19 RX ORDER — SODIUM CHLORIDE 0.9 % (FLUSH) 0.9 %
5 SYRINGE (ML) INJECTION
Status: DISCONTINUED | OUTPATIENT
Start: 2019-02-19 | End: 2019-02-22 | Stop reason: HOSPADM

## 2019-02-19 RX ORDER — PHENYLEPHRINE HYDROCHLORIDE 10 MG/ML
INJECTION INTRAVENOUS
Status: DISCONTINUED | OUTPATIENT
Start: 2019-02-19 | End: 2019-02-19

## 2019-02-19 RX ORDER — METOPROLOL TARTRATE 25 MG/1
25 TABLET, FILM COATED ORAL 2 TIMES DAILY
Status: DISCONTINUED | OUTPATIENT
Start: 2019-02-19 | End: 2019-02-22 | Stop reason: HOSPADM

## 2019-02-19 RX ORDER — MIDAZOLAM HYDROCHLORIDE 1 MG/ML
INJECTION, SOLUTION INTRAMUSCULAR; INTRAVENOUS
Status: DISCONTINUED | OUTPATIENT
Start: 2019-02-19 | End: 2019-02-19

## 2019-02-19 RX ADMIN — MORPHINE SULFATE 2 MG: 10 INJECTION INTRAVENOUS at 08:02

## 2019-02-19 RX ADMIN — TRANEXAMIC ACID 2000 MG: 100 INJECTION, SOLUTION INTRAVENOUS at 11:02

## 2019-02-19 RX ADMIN — SERTRALINE HYDROCHLORIDE 100 MG: 50 TABLET ORAL at 05:02

## 2019-02-19 RX ADMIN — CELECOXIB 400 MG: 100 CAPSULE ORAL at 08:02

## 2019-02-19 RX ADMIN — CEFAZOLIN SODIUM 50 ML: 2 SOLUTION INTRAVENOUS at 10:02

## 2019-02-19 RX ADMIN — FAMOTIDINE 20 MG: 20 TABLET ORAL at 09:02

## 2019-02-19 RX ADMIN — METFORMIN HYDROCHLORIDE 500 MG: 500 TABLET ORAL at 05:02

## 2019-02-19 RX ADMIN — RIVAROXABAN 10 MG: 10 TABLET, FILM COATED ORAL at 05:02

## 2019-02-19 RX ADMIN — ACETAMINOPHEN 1000 MG: 10 INJECTION, SOLUTION INTRAVENOUS at 01:02

## 2019-02-19 RX ADMIN — MIDAZOLAM HYDROCHLORIDE 1 MG: 1 INJECTION, SOLUTION INTRAMUSCULAR; INTRAVENOUS at 10:02

## 2019-02-19 RX ADMIN — PHENYLEPHRINE HYDROCHLORIDE 100 MCG: 10 INJECTION INTRAVENOUS at 10:02

## 2019-02-19 RX ADMIN — DOCUSATE SODIUM 100 MG: 100 CAPSULE, LIQUID FILLED ORAL at 09:02

## 2019-02-19 RX ADMIN — SODIUM CHLORIDE, SODIUM LACTATE, POTASSIUM CHLORIDE, AND CALCIUM CHLORIDE: .6; .31; .03; .02 INJECTION, SOLUTION INTRAVENOUS at 09:02

## 2019-02-19 RX ADMIN — ACETAMINOPHEN 1000 MG: 10 INJECTION, SOLUTION INTRAVENOUS at 09:02

## 2019-02-19 RX ADMIN — GABAPENTIN 1200 MG: 400 CAPSULE ORAL at 08:02

## 2019-02-19 RX ADMIN — BUPIVACAINE HYDROCHLORIDE 3 ML: 5 INJECTION, SOLUTION EPIDURAL; INTRACAUDAL; PERINEURAL at 10:02

## 2019-02-19 RX ADMIN — SIMVASTATIN 20 MG: 10 TABLET, FILM COATED ORAL at 09:02

## 2019-02-19 RX ADMIN — OXYCODONE HYDROCHLORIDE 10 MG: 5 TABLET ORAL at 05:02

## 2019-02-19 RX ADMIN — SPIRONOLACTONE 25 MG: 25 TABLET ORAL at 06:02

## 2019-02-19 RX ADMIN — CEFAZOLIN SODIUM 50 ML: 2 SOLUTION INTRAVENOUS at 09:02

## 2019-02-19 RX ADMIN — MUPIROCIN 1 G: 20 OINTMENT TOPICAL at 09:02

## 2019-02-19 RX ADMIN — MIDAZOLAM HYDROCHLORIDE 1 MG: 1 INJECTION, SOLUTION INTRAMUSCULAR; INTRAVENOUS at 09:02

## 2019-02-19 RX ADMIN — SODIUM CHLORIDE, SODIUM LACTATE, POTASSIUM CHLORIDE, AND CALCIUM CHLORIDE: .6; .31; .03; .02 INJECTION, SOLUTION INTRAVENOUS at 08:02

## 2019-02-19 RX ADMIN — SODIUM CHLORIDE, SODIUM LACTATE, POTASSIUM CHLORIDE, AND CALCIUM CHLORIDE 100 ML/HR: .6; .31; .03; .02 INJECTION, SOLUTION INTRAVENOUS at 01:02

## 2019-02-19 RX ADMIN — METOPROLOL TARTRATE 25 MG: 25 TABLET ORAL at 09:02

## 2019-02-19 RX ADMIN — MORPHINE SULFATE 2 MG: 10 INJECTION INTRAVENOUS at 11:02

## 2019-02-19 NOTE — ANESTHESIA PROCEDURE NOTES
Spinal    Diagnosis: tkr left  Patient location during procedure: holding area  Start time: 2/19/2019 9:55 AM  Timeout: 2/19/2019 9:55 AM  End time: 2/19/2019 10:10 AM  Staffing  Anesthesiologist: Tino Sullivan MD  Performed: anesthesiologist   Preanesthetic Checklist  Completed: patient identified, site marked, pre-op evaluation, timeout performed, IV checked, risks and benefits discussed and monitors and equipment checked  Spinal Block  Patient position: sitting  Prep: ChloraPrep  Patient monitoring: heart rate, cardiac monitor and continuous pulse ox  Approach: midline  Location: L2-3  Injection technique: single shot  CSF Fluid: clear free-flowing CSF  Needle  Needle type: pencil-tip   Needle gauge: 22 G  Needle length: 3.5 in  Additional Documentation: negative aspiration for CSF, negative aspiration for heme and no paresthesia on injection  Needle localization: anatomical landmarks  Assessment  Sensory level: T6   Dermatomal levels determined by alcohol wipe  Ease of block: easy  Patient's tolerance of the procedure: comfortable throughout block and no complaints  Additional Notes  2 attempts with 25 g..could not localize..procedure completed with 22 gauge..

## 2019-02-19 NOTE — ANESTHESIA POSTPROCEDURE EVALUATION
"Anesthesia Post Evaluation    Patient: Sandee Evans    Procedure(s) Performed: Procedure(s) (LRB):  ARTHROPLASTY, KNEE, TOTAL (Left)    Final Anesthesia Type: spinal  Patient location during evaluation: PACU  Patient participation: Yes- Able to Participate  Level of consciousness: awake and alert, oriented and awake  Post-procedure vital signs: reviewed and stable  Pain management: adequate  Airway patency: patent  PONV status at discharge: No PONV  Anesthetic complications: no      Cardiovascular status: blood pressure returned to baseline, hemodynamically stable and stable  Respiratory status: unassisted and spontaneous ventilation  Hydration status: euvolemic  Follow-up not needed.        Visit Vitals  BP (!) 145/65 (BP Location: Right arm, Patient Position: Lying)   Pulse 62   Temp 36.5 °C (97.7 °F) (Axillary)   Resp 16   Ht 5' 3" (1.6 m)   Wt 125.4 kg (276 lb 7 oz)   SpO2 95%   Breastfeeding? No   BMI 48.97 kg/m²       Pain/Dianne Score: Pain Rating Prior to Med Admin: 0 (2/19/2019  1:16 PM)  Pain Rating Post Med Admin: 0 (2/19/2019 12:23 PM)  Dianne Score: 8 (2/19/2019 12:50 PM)        "

## 2019-02-19 NOTE — PROGRESS NOTES
Physical Therapy   CPM placement    Sandee Evans   MRN: [id number]     PT orders received for CPM placement s/p L TKA. Patient had spinal and no complaint of pain. CPM placed to left knee and set at 0-80 degrees. CPM placed at 13:30 and nurse notified to remove at 15:30. PT will continue to follow patient.     Jessica Dorantes, PT

## 2019-02-19 NOTE — ANESTHESIA PREPROCEDURE EVALUATION
02/19/2019  Sandee Evans is a 63 y.o., female.    Anesthesia Evaluation         Review of Systems  Anesthesia Hx:  No previous Anesthesia   Social:  Non-Smoker    Hematology/Oncology:  Hematology Normal   Oncology Normal     EENT/Dental:EENT/Dental Normal   Cardiovascular:   Hypertension Dysrhythmias atrial fibrillation Stress and echo ok  Patient chart listed as on xarelto..patieant said she has script and will begin after surgery..has not taken this med for over 1 year..newly perscribed..patient asked several times to avoid confusion..crna present and pacu nurse   Pulmonary:   COPD Sleep Apnea    Renal/:  Renal/ Normal     Hepatic/GI:  Hepatic/GI Normal    Musculoskeletal:   Arthritis     Neurological:   Neuromuscular Disease,    Endocrine:   Diabetes    Dermatological:  Skin Normal    Psych:   Psychiatric History          Physical Exam  General:  Well nourished    Airway/Jaw/Neck:  Airway Findings: Mallampati: II TM Distance: < 4 cm      Dental:  DENTAL FINDINGS: Normal   Chest/Lungs:  Chest/Lungs Clear    Heart/Vascular:  Heart Findings: Normal       Mental Status:  Mental Status Findings:  Cooperative, Alert and Oriented         Anesthesia Plan  Type of Anesthesia, risks & benefits discussed:  Anesthesia Type:  general  Patient's Preference:   Intra-op Monitoring Plan: standard ASA monitors  Intra-op Monitoring Plan Comments:   Post Op Pain Control Plan: multimodal analgesia, IV/PO Opioids PRN and per primary service following discharge from PACU  Post Op Pain Control Plan Comments:   Induction:    Beta Blocker:  Patient is not currently on a Beta-Blocker (No further documentation required).       Informed Consent: Patient understands risks and agrees with Anesthesia plan.  Questions answered. Anesthesia consent signed with patient.  ASA Score: 3     Day of Surgery Review of History & Physical:     H&P update referred to the provider.  H&P completed by Anesthesiologist.   Anesthesia Plan Notes: npo        Ready For Surgery From Anesthesia Perspective.

## 2019-02-19 NOTE — TRANSFER OF CARE
"Anesthesia Transfer of Care Note    Patient: Sandee Evans    Procedure(s) Performed: Procedure(s) (LRB):  ARTHROPLASTY, KNEE, TOTAL (Left)    Patient location: PACU    Anesthesia Type: MAC and spinal    Transport from OR: Transported from OR on room air with adequate spontaneous ventilation    Post pain: adequate analgesia    Post assessment: no apparent anesthetic complications    Post vital signs: stable    Level of consciousness: sedated    Nausea/Vomiting: no nausea/vomiting    Complications: none    Transfer of care protocol was followed      Last vitals:   Visit Vitals  BP (!) 145/65 (BP Location: Right arm, Patient Position: Lying)   Pulse 62   Temp 36.5 °C (97.7 °F) (Axillary)   Resp 16   Ht 5' 3" (1.6 m)   Wt 125.4 kg (276 lb 7 oz)   SpO2 95%   Breastfeeding? No   BMI 48.97 kg/m²     "

## 2019-02-19 NOTE — BRIEF OP NOTE
Operative Note         SUMMARY     Surgery Date: 2/19/2019     Surgeon(s) and Role:     * Med Hanson MD - Primary    Pre-op Diagnosis:  Primary osteoarthritis of left knee [M17.12]    Post-op Diagnosis:  Same, obesity, DM    Procedure(s) (LRB):  ARTHROPLASTY, KNEE, TOTAL (Left)    Anesthesia: Choice    Findings/Key Components:    arthritis    Specimen:    No specimen to Pathology orders placed during procedure(s).     Specimen (12h ago, onward)    None        Pt at increased risk complications due to obesity, DM, Afib, stopped smoking 3 weeks ago. Pt understood increased risks and still insisted she needed surgery, unable to live with pain.    Estimated Blood Loss: 20 mL

## 2019-02-19 NOTE — OP NOTE
DATE OF PROCEDURE:  02/19/2019.    SURGEON:  Med Hanson M.D.    ASSISTANT:  GLENROY Owens, DIVYA.    PREOPERATIVE DIAGNOSES:  Severe osteoarthritis, left knee, morbid obesity,   diabetes and AFib.    POSTOPERATIVE DIAGNOSES:  Severe osteoarthritis, left knee, morbid obesity,   diabetes and AFib.    PROCEDURE:  Left total knee replacement with DePuy sigma cruciate retaining   cemented prosthesis size 4 narrow femur, 3 tibia, 35 mm patellar button.    ANESTHESIA:  Spinal.    COMPLICATIONS:  None.    BLOOD LOSS:  100 mL    BLOOD GIVEN:  None.    INDICATIONS FOR PROCEDURE:  Mr. Sandee Evans is a 63-year-old female who   previously underwent right total knee replacement with no complications.  She   has had increasing pain in the left knee and reports she is no longer able to   tolerate the pain and has minimal ability to ambulate.  She has 15-degree   flexion contracture and only 85 degrees of flexion.  She is morbidly obese.  She   was a previous smoker and reports she stopped smoking three weeks ago and will   continue to stop smoking.  He has diabetes and AFib, although she was only   taking aspirin.  She was cleared by Cardiology and primary care physician for   surgery.  Surgery was postponed and delayed as long as possible because the   patient has increased risk and we wanted her stop smoking.  We wanted to undergo   weight loss, but this was not possible according to her.  She understands she   has an increased risk of infection, DVT, blood clot and arthrofibrosis and early   loosening due to her morbid obesity.  She reports she cannot live with   recurrent pain and insists on undergoing left total knee replacement.  She had a   successful right total knee replacement with no complications.  There is no   guarantee that she would have no complications in left total knee replacement.    She understands this.    PROCEDURE IN DETAIL:  Following obtaining proper informed consent from the    patient, she was taken to Operating Room and given spinal anesthesia.  The   patient was given IV antibiotics and tranexamic acid.  The left leg was prepped   and draped in usual sterile fashion.  Tourniquet was inflated at 300 mmHg for 43   minutes during the case.  After prepping and draping, an anterior midline   incision was made and medial parapatellar arthrotomy was performed.  The patient   had a great deal of synovitis and a partial synovectomy was performed.    Osteophytes were removed.  Medial and lateral meniscus were excised.  The ACL   was absent.  The PCL was retained.    Retractors were placed around the proximal tibia to protect soft tissue.  An   extramedullary tibial guide was used to cut the tibia perpendicular to the shaft   of the tibia.  Soft tissue releases were necessary medially to align the knee.    A drillbit was used to enter the intramedullary canal of the femur and a long   intramedullary guidewire was used with the distal cutting block set at 12 mm.    The distal cut was made and the sizer was placed and a size 4 narrow was the   best fit for the femur.  A size 4 block cuts were made and the remainder of the   ligaments were protected with retractors.  The patella was very arthritic and   was resurfaced with 35 mm, all polyethylene, 3-hole button patella.  Trials were   placed and after ligament balancing, a 10 mm curved plus insert provided   excellent stability and full range of motion.    All 3 components were cemented in place using third generation cement technique.    Once cement was hardened, the tourniquet was released and hemostasis obtained   using electrocautery.  The capsule was injected with Marcaine, Duramorph,   Depo-Medrol, and Exparel.  Wound was irrigated again and closed with #1   interrupted Ethibond sutures deep, #1 running Vicryl suture deep and 2-0 Vicryl   sutures subcutaneous with staples to the skin.  Sterile dressing was applied.    The patient was taken to  the Recovery Room in stable condition.      AWA/TIFFANY  dd: 02/19/2019 12:26:51 (CST)  td: 02/19/2019 12:52:43 (CST)  Doc ID   #[id number]  Job ID #[id number]    CC:

## 2019-02-19 NOTE — PROGRESS NOTES
Physical Therapy  Missed Evaluation    Sandee Evans   MRN: [id number]     PT evaluation orders received and chart reviewed. Attempted PT evaluation but patient unable to maintain alertness level in order to participate in PT evaluation. Nurse Galo notified. Will attempt evaluation again in AM.     Jessica Dorantes, PT

## 2019-02-20 LAB
ANION GAP SERPL CALC-SCNC: 8 MMOL/L
BASOPHILS # BLD AUTO: 0.01 K/UL
BASOPHILS NFR BLD: 0.1 %
BUN SERPL-MCNC: 25 MG/DL
CALCIUM SERPL-MCNC: 9.1 MG/DL
CHLORIDE SERPL-SCNC: 104 MMOL/L
CO2 SERPL-SCNC: 27 MMOL/L
CREAT SERPL-MCNC: 1 MG/DL
DIFFERENTIAL METHOD: ABNORMAL
EOSINOPHIL # BLD AUTO: 0 K/UL
EOSINOPHIL NFR BLD: 0.1 %
ERYTHROCYTE [DISTWIDTH] IN BLOOD BY AUTOMATED COUNT: 14.3 %
EST. GFR  (AFRICAN AMERICAN): >60 ML/MIN/1.73 M^2
EST. GFR  (NON AFRICAN AMERICAN): 59 ML/MIN/1.73 M^2
GLUCOSE SERPL-MCNC: 147 MG/DL
HCT VFR BLD AUTO: 39.2 %
HGB BLD-MCNC: 12.3 G/DL
LYMPHOCYTES # BLD AUTO: 1.2 K/UL
LYMPHOCYTES NFR BLD: 11.2 %
MCH RBC QN AUTO: 30.8 PG
MCHC RBC AUTO-ENTMCNC: 31.4 G/DL
MCV RBC AUTO: 98 FL
MONOCYTES # BLD AUTO: 0.6 K/UL
MONOCYTES NFR BLD: 5.8 %
NEUTROPHILS # BLD AUTO: 8.6 K/UL
NEUTROPHILS NFR BLD: 82.8 %
PLATELET # BLD AUTO: 318 K/UL
PMV BLD AUTO: 10.9 FL
POCT GLUCOSE: 118 MG/DL (ref 70–110)
POCT GLUCOSE: 120 MG/DL (ref 70–110)
POCT GLUCOSE: 139 MG/DL (ref 70–110)
POCT GLUCOSE: 144 MG/DL (ref 70–110)
POTASSIUM SERPL-SCNC: 5.1 MMOL/L
RBC # BLD AUTO: 4 M/UL
SODIUM SERPL-SCNC: 139 MMOL/L
WBC # BLD AUTO: 10.44 K/UL

## 2019-02-20 PROCEDURE — 25000003 PHARM REV CODE 250: Performed by: ORTHOPAEDIC SURGERY

## 2019-02-20 PROCEDURE — 97161 PT EVAL LOW COMPLEX 20 MIN: CPT

## 2019-02-20 PROCEDURE — 97165 OT EVAL LOW COMPLEX 30 MIN: CPT

## 2019-02-20 PROCEDURE — 94761 N-INVAS EAR/PLS OXIMETRY MLT: CPT

## 2019-02-20 PROCEDURE — 11000001 HC ACUTE MED/SURG PRIVATE ROOM

## 2019-02-20 PROCEDURE — 97116 GAIT TRAINING THERAPY: CPT

## 2019-02-20 PROCEDURE — 97110 THERAPEUTIC EXERCISES: CPT

## 2019-02-20 PROCEDURE — 25000003 PHARM REV CODE 250: Performed by: HOSPITALIST

## 2019-02-20 PROCEDURE — 80048 BASIC METABOLIC PNL TOTAL CA: CPT

## 2019-02-20 PROCEDURE — 36415 COLL VENOUS BLD VENIPUNCTURE: CPT

## 2019-02-20 PROCEDURE — 94799 UNLISTED PULMONARY SVC/PX: CPT

## 2019-02-20 PROCEDURE — 99900035 HC TECH TIME PER 15 MIN (STAT)

## 2019-02-20 PROCEDURE — 25000242 PHARM REV CODE 250 ALT 637 W/ HCPCS: Performed by: ORTHOPAEDIC SURGERY

## 2019-02-20 PROCEDURE — 63600175 PHARM REV CODE 636 W HCPCS: Performed by: ORTHOPAEDIC SURGERY

## 2019-02-20 PROCEDURE — 85025 COMPLETE CBC W/AUTO DIFF WBC: CPT

## 2019-02-20 PROCEDURE — 97535 SELF CARE MNGMENT TRAINING: CPT

## 2019-02-20 RX ORDER — INSULIN ASPART 100 [IU]/ML
1-10 INJECTION, SOLUTION INTRAVENOUS; SUBCUTANEOUS
Status: DISCONTINUED | OUTPATIENT
Start: 2019-02-20 | End: 2019-02-22 | Stop reason: HOSPADM

## 2019-02-20 RX ORDER — ONDANSETRON 2 MG/ML
8 INJECTION INTRAMUSCULAR; INTRAVENOUS EVERY 8 HOURS PRN
Status: DISCONTINUED | OUTPATIENT
Start: 2019-02-20 | End: 2019-02-22 | Stop reason: HOSPADM

## 2019-02-20 RX ORDER — IPRATROPIUM BROMIDE AND ALBUTEROL SULFATE 2.5; .5 MG/3ML; MG/3ML
3 SOLUTION RESPIRATORY (INHALATION) EVERY 6 HOURS PRN
Status: DISCONTINUED | OUTPATIENT
Start: 2019-02-20 | End: 2019-02-22 | Stop reason: HOSPADM

## 2019-02-20 RX ORDER — IBUPROFEN 200 MG
24 TABLET ORAL
Status: DISCONTINUED | OUTPATIENT
Start: 2019-02-20 | End: 2019-02-22 | Stop reason: HOSPADM

## 2019-02-20 RX ORDER — IBUPROFEN 200 MG
16 TABLET ORAL
Status: DISCONTINUED | OUTPATIENT
Start: 2019-02-20 | End: 2019-02-22 | Stop reason: HOSPADM

## 2019-02-20 RX ORDER — GLUCAGON 1 MG
1 KIT INJECTION
Status: DISCONTINUED | OUTPATIENT
Start: 2019-02-20 | End: 2019-02-22 | Stop reason: HOSPADM

## 2019-02-20 RX ADMIN — MUPIROCIN 1 G: 20 OINTMENT TOPICAL at 09:02

## 2019-02-20 RX ADMIN — METOPROLOL TARTRATE 25 MG: 25 TABLET ORAL at 08:02

## 2019-02-20 RX ADMIN — FLUTICASONE PROPIONATE 100 MCG: 50 SPRAY, METERED NASAL at 02:02

## 2019-02-20 RX ADMIN — ACETAMINOPHEN 1000 MG: 10 INJECTION, SOLUTION INTRAVENOUS at 05:02

## 2019-02-20 RX ADMIN — FLUTICASONE PROPIONATE 100 MCG: 50 SPRAY, METERED NASAL at 08:02

## 2019-02-20 RX ADMIN — DOCUSATE SODIUM 100 MG: 100 CAPSULE, LIQUID FILLED ORAL at 10:02

## 2019-02-20 RX ADMIN — SODIUM CHLORIDE, SODIUM LACTATE, POTASSIUM CHLORIDE, AND CALCIUM CHLORIDE: .6; .31; .03; .02 INJECTION, SOLUTION INTRAVENOUS at 05:02

## 2019-02-20 RX ADMIN — SIMVASTATIN 20 MG: 10 TABLET, FILM COATED ORAL at 09:02

## 2019-02-20 RX ADMIN — METOPROLOL TARTRATE 25 MG: 25 TABLET ORAL at 10:02

## 2019-02-20 RX ADMIN — FAMOTIDINE 20 MG: 20 TABLET ORAL at 10:02

## 2019-02-20 RX ADMIN — FAMOTIDINE 20 MG: 20 TABLET ORAL at 08:02

## 2019-02-20 RX ADMIN — DOCUSATE SODIUM 100 MG: 100 CAPSULE, LIQUID FILLED ORAL at 08:02

## 2019-02-20 RX ADMIN — OXYCODONE HYDROCHLORIDE 10 MG: 5 TABLET ORAL at 02:02

## 2019-02-20 RX ADMIN — OXYCODONE HYDROCHLORIDE 10 MG: 5 TABLET ORAL at 08:02

## 2019-02-20 RX ADMIN — RIVAROXABAN 10 MG: 10 TABLET, FILM COATED ORAL at 08:02

## 2019-02-20 RX ADMIN — SPIRONOLACTONE 25 MG: 25 TABLET ORAL at 08:02

## 2019-02-20 RX ADMIN — METFORMIN HYDROCHLORIDE 500 MG: 500 TABLET ORAL at 05:02

## 2019-02-20 RX ADMIN — METFORMIN HYDROCHLORIDE 500 MG: 500 TABLET ORAL at 08:02

## 2019-02-20 RX ADMIN — MUPIROCIN 1 G: 20 OINTMENT TOPICAL at 08:02

## 2019-02-20 RX ADMIN — MORPHINE SULFATE 2 MG: 10 INJECTION INTRAVENOUS at 12:02

## 2019-02-20 RX ADMIN — OXYCODONE HYDROCHLORIDE 10 MG: 5 TABLET ORAL at 10:02

## 2019-02-20 RX ADMIN — SERTRALINE HYDROCHLORIDE 100 MG: 50 TABLET ORAL at 08:02

## 2019-02-20 NOTE — PROGRESS NOTES
Progress Note  Orthopedics    Admit Date: 2/19/2019  Post-operative Day: 1 Day Post-Op  Hospital Day: 2    Follow-up For: Procedure(s) (LRB):  ARTHROPLASTY, KNEE, TOTAL (Left)    SUBJECTIVE:     Sandee Evans is 63 y.o. and is now 1 day post surgery. Pain is controlled with current analgesics.. She  She is ambulating.  Weight Bearing: WBAT    A&O. AFVSS. NVI L LE.  Incision clean and dry. No active bleeding/drainage. H&H - 12.3/39.2. Ambulating 40 feet with PT.    OBJECTIVE:     Vital Signs (Most Recent)  Temp: 98.4 °F (36.9 °C) (02/20/19 0745)  Pulse: 67 (02/20/19 0745)  Resp: 18 (02/20/19 0745)  BP: (!) 143/82 (02/20/19 0745)  SpO2: (!) 92 % (02/20/19 0900)      Physical Exam:  Dressing: clean, dry and intact  Incision: healing well, no significant drainage, no dehiscence, no significant erythema  DVT Evaluation: No evidence of DVT seen on physical exam.  Neurovascular: 5/5 motor strength, sensation intact, palpable pulses    Laboratory:  Recent Results (from the past 24 hour(s))   CBC auto differential    Collection Time: 02/19/19  1:04 PM   Result Value Ref Range    WBC 7.33 3.90 - 12.70 K/uL    RBC 4.12 4.00 - 5.40 M/uL    Hemoglobin 12.5 12.0 - 16.0 g/dL    Hematocrit 39.0 37.0 - 48.5 %    MCV 95 82 - 98 fL    MCH 30.3 27.0 - 31.0 pg    MCHC 32.1 32.0 - 36.0 g/dL    RDW 14.3 11.5 - 14.5 %    Platelets 245 150 - 350 K/uL    MPV 10.3 9.2 - 12.9 fL    Gran # (ANC) 3.8 1.8 - 7.7 K/uL    Lymph # 2.6 1.0 - 4.8 K/uL    Mono # 0.7 0.3 - 1.0 K/uL    Eos # 0.2 0.0 - 0.5 K/uL    Baso # 0.03 0.00 - 0.20 K/uL    Gran% 51.7 38.0 - 73.0 %    Lymph% 35.6 18.0 - 48.0 %    Mono% 9.7 4.0 - 15.0 %    Eosinophil% 2.6 0.0 - 8.0 %    Basophil% 0.4 0.0 - 1.9 %    Differential Method Automated    POCT glucose    Collection Time: 02/19/19  3:51 PM   Result Value Ref Range    POCT Glucose 94 70 - 110 mg/dL   POCT glucose    Collection Time: 02/19/19  7:49 PM   Result Value Ref Range    POCT Glucose 152 (H) 70 - 110 mg/dL   CBC auto  differential    Collection Time: 02/20/19  3:56 AM   Result Value Ref Range    WBC 10.44 3.90 - 12.70 K/uL    RBC 4.00 4.00 - 5.40 M/uL    Hemoglobin 12.3 12.0 - 16.0 g/dL    Hematocrit 39.2 37.0 - 48.5 %    MCV 98 82 - 98 fL    MCH 30.8 27.0 - 31.0 pg    MCHC 31.4 (L) 32.0 - 36.0 g/dL    RDW 14.3 11.5 - 14.5 %    Platelets 318 150 - 350 K/uL    MPV 10.9 9.2 - 12.9 fL    Gran # (ANC) 8.6 (H) 1.8 - 7.7 K/uL    Lymph # 1.2 1.0 - 4.8 K/uL    Mono # 0.6 0.3 - 1.0 K/uL    Eos # 0.0 0.0 - 0.5 K/uL    Baso # 0.01 0.00 - 0.20 K/uL    Gran% 82.8 (H) 38.0 - 73.0 %    Lymph% 11.2 (L) 18.0 - 48.0 %    Mono% 5.8 4.0 - 15.0 %    Eosinophil% 0.1 0.0 - 8.0 %    Basophil% 0.1 0.0 - 1.9 %    Differential Method Automated    Basic metabolic panel    Collection Time: 02/20/19  3:56 AM   Result Value Ref Range    Sodium 139 136 - 145 mmol/L    Potassium 5.1 3.5 - 5.1 mmol/L    Chloride 104 95 - 110 mmol/L    CO2 27 23 - 29 mmol/L    Glucose 147 (H) 70 - 110 mg/dL    BUN, Bld 25 (H) 8 - 23 mg/dL    Creatinine 1.0 0.5 - 1.4 mg/dL    Calcium 9.1 8.7 - 10.5 mg/dL    Anion Gap 8 8 - 16 mmol/L    eGFR if African American >60 >60 mL/min/1.73 m^2    eGFR if non African American 59 (A) >60 mL/min/1.73 m^2   POCT glucose    Collection Time: 02/20/19  8:39 AM   Result Value Ref Range    POCT Glucose 144 (H) 70 - 110 mg/dL   POCT glucose    Collection Time: 02/20/19 11:58 AM   Result Value Ref Range    POCT Glucose 120 (H) 70 - 110 mg/dL         ASSESSMENT/PLAN:     Assessment: orthopedic stable  Status Post: L TKA    Plan: con't with PT and DVT prophy. Aquacel changed today. Will stay on Xarleto 10mg po qd while here and 3 weeks at home, then resume 20mg po qd as per Cards recommendation.  Plan d/c home in 1-2 days with HH PT.

## 2019-02-20 NOTE — PROGRESS NOTES
SW met with patient to discuss discharge plan. SW informed patient therapy recommends home health/PT with a rolling walker. Patient in agreement with [facility] arranging home health and rolling walker. Patient informed SW she would like a hospital bed. SW informed patient she would have to find out from DME provider if insurance would be willing to pay cost of hospital bed due to patient not being bed bound. Patient informed SW she would not want the hospital bed that was provided to her from last DME company. SW informed patient she can find out cash price for an electric bed. MATIAS uploaded clinicals via [facility] to [facility] to arrange home health services for patient. MATIAS contacted [place] with [facility] to arrange for rolling walker and obtain price of hospital bed.

## 2019-02-20 NOTE — PLAN OF CARE
Problem: Occupational Therapy Goal  Goal: Occupational Therapy Goal  Goals to be met by: 2/23/19    Patient will increase functional independence with ADLs by performing:    UE Dressing with Modified [place].  LE Dressing with Stand-by Assistance.  Grooming while standing at sink with Stand-by Assistance.  Toileting from bedside commode with Modified [place] and Supervision for hygiene and clothing management.   Supine to sit with Modified [place] and Supervision.  Step transfer with Stand-by Assistance  Toilet transfer to bedside commode with Stand-by Assistance.  Upper extremity exercise program x15 reps per handout, with independence.    Outcome: Ongoing (interventions implemented as appropriate)  The patient will benefit from OT to address functional deficits.    Comments: The patient is requesting a hospital bed and a tub transfer bench for home use.

## 2019-02-20 NOTE — PLAN OF CARE
Problem: Adult Inpatient Plan of Care  Goal: Plan of Care Review  Outcome: Ongoing (interventions implemented as appropriate)  bg monitoring and SSI prn, pt able to address needs, moves independently, IVF cont and IV Tylenol, medicated x 2 Morphine 2 mg, safety maintained, sister at bedside assisting w/care, bed low locked and in position, will cont plan of care

## 2019-02-20 NOTE — PROGRESS NOTES
0100 pt states she would like small catheter to be discontinued at 0600 when scheduled Tylenol due

## 2019-02-20 NOTE — NURSING
Placed call out to Dr. Hanson in reference to elevated BP, spoke to Dr. Stanton, states to consult Hospitalist for BP management.

## 2019-02-20 NOTE — PT/OT/SLP EVAL
Physical Therapy Evaluation    Patient Name:  Sandee Evans   MRN:  [id number]    Recommendations:     Discharge Recommendations:  (home with home health PT )   Discharge Equipment Recommendations: tub bench (patient requesting hospital bed )   Barriers to discharge: bedroom on the 2nd floor    Assessment:     Sandee Evans is a 63 y.o. female admitted with a medical diagnosis of DJD (degenerative joint disease) of knee.  She presents with the following impairments/functional limitations:  weakness, impaired functional mobilty, gait instability, impaired balance, decreased lower extremity function, decreased safety awareness, pain, impaired skin, edema, orthopedic precautions, impaired joint extensibility, decreased ROM.    Rehab Prognosis: Good; patient would benefit from acute skilled PT services to address these deficits and reach maximum level of function.    Recent Surgery: Procedure(s) (LRB):  ARTHROPLASTY, KNEE, TOTAL (Left) 1 Day Post-Op    Plan:     During this hospitalization, patient to be seen BID to address the identified rehab impairments via gait training, therapeutic activities, therapeutic exercises and progress toward the following goals:    · Plan of Care Expires:  03/06/19    Subjective     Chief Complaint: Pain in left knee.   Patient/Family Comments/goals: To walk.   Pain/Comfort:  · Pain Rating 1: 3/10  · Location - Side 1: Left  · Location 1: knee  · Pain Addressed 1: Pre-medicate for activity, Cessation of Activity, Nurse notified  · Pain Rating Post-Intervention 1: 7/10    Living Environment:  Patient lives with spouse in a 2 story house. Prior to admission, patients level of function was ambulatory with a hurrycane. Equipment used at home: bedside commode, walker, rolling, hurrycane, and quad cane. Upon discharge, patient will have assistance from spouse, sister, and daughter.    Objective:     Communicated with nurse Rosenthal prior to session.  Patient found long sitting in bed with  peripheral IV, cryotherapy with spouse and sister upon PT entry to room.    General Precautions: Standard, fall   Orthopedic Precautions:LLE weight bearing as tolerated   Braces: N/A     Exams:  · Cognitive Exam:  Patient is oriented to Person, Place, Time and Situation  · Gross Motor Coordination:  limited on L LE due to pain  · Sensation:    · -       Intact  · Skin Integrity/Edema:      · -       Skin integrity: Visible skin intact  · RLE ROM: WFL  · RLE Strength: WFL  · LLE ROM: WFL except limited at knee ~10 to 50 degrees  · LLE Strength: WFL    Functional Mobility:  · Bed Mobility:     · Supine to Sit: stand by assistance  · Transfers:     · Sit to Stand:  minimum assistance with rolling walker  · Gait:  Patient ambulated 40ft with Rolling Walker and CGA using 3-point gait. Patient demonstrated decreased dmitriy, decreased velocity of limb motion, decreased step length and decreased toe-to-floor clearance during gait due to impaired balance, decreased flexibility, pain, decreased ROM and decreased strength.    Therapeutic Activities and Exercises:  Patient educated in and performed B LE seated therex x10 reps for A/P, LAQ, heel slies, and hip flex.     AM-PAC 6 CLICK MOBILITY  Total Score:18     Patient left up in chair with all lines intact, call button in reach, nurse Galo notified and spouse and sister present.    GOALS:   Multidisciplinary Problems     Physical Therapy Goals        Problem: Physical Therapy Goal    Goal Priority Disciplines Outcome Goal Variances Interventions   Physical Therapy Goal     PT, PT/OT      Description:  Goals to be met by: 3/6/19    Patient will increase functional independence with mobility by performin. Sit to stand transfer with Supervision  2. Gait x250 feet with Supervision using Rolling Walker  3. Lower extremity exercise program x30 reps per handout, with supervision                      History:     Past Medical History:   Diagnosis Date    Anticoagulant  long-term use     Xarelto    Arthritis     Atrial fibrillation     Breast cyst     Degenerative disc disease     Depression     Diabetes mellitus     Pre diabetic    Hyperlipidemia     Hypertension     Nuclear sclerosis, bilateral 12/18/2017    Obesity, morbid     Other abnormal glucose     pre-diabetes    Sleep apnea     Smoker     Thyroid disease     on meds 8-9 years ago. hypothyroidism.no malignancy    TMJ (temporomandibular joint disorder)     jaw clicking    Urge incontinence     Wears glasses        Past Surgical History:   Procedure Laterality Date    ARTHROPLASTY, KNEE, TOTAL Left 2/19/2019    [facility]    ARTHROPLASTY-KNEE Right 5/23/2016    [facility]    BREAST BIOPSY Right     over 10 yrs ago/ benign    BREAST CYST EXCISION Right     BREAST LUMPECTOMY Right     over 10 yrs ago, ex bx/ benign    CARDIOVERSION N/A 7/20/2015    [facility]ik [facility] at [facility] CATH LAB    COLONOSCOPY N/A 8/2/2013    Performed by [facility]oya [facility] at [facility] ENDO (4TH FLR)    ESOPHAGOGASTRODUODENOSCOPY (EGD) N/A 6/22/2015    Performed by Kevin Yeager MD at [facility] ENDO (2ND FLR)    HYSTERECTOMY      OOPHORECTOMY      rt.knee surgery 2016      ULTRASOUND-ENDOSCOPIC-UPPER N/A 6/22/2015    Performed by [facility] at [facility] ENDO (2ND FLR)    underarm gland\ Bilateral        Time Tracking:     PT Received On: 02/20/19  PT Start Time: 0955     PT Stop Time: 1039  PT Total Time (min): 44 min     Billable Minutes: Evaluation  15 and Therapeutic Exercise 15 with OT present    Jessica Dorantes, PT  02/20/2019

## 2019-02-20 NOTE — PT/OT/SLP EVAL
Occupational Therapy   Evaluation/Treatment    Name: Sandee Evans  MRN: [id number]  Admitting Diagnosis:  DJD (degenerative joint disease) of knee 1 Day Post-Op    Recommendations:     Discharge Recommendations: (home with family assist)  Discharge Equipment Recommendations:  tub bench, hospital bed  Barriers to discharge:  Inaccessible home environment(bed and bathroom is on the 2nd floor)    Assessment:     Sandee Evans is a 63 y.o. female with a medical diagnosis of DJD (degenerative joint disease) of knee.  The patient tolerated OT eval with good pain tolerance. Performance deficits affecting function: impaired self care skills, impaired endurance, impaired functional mobilty, gait instability, decreased lower extremity function, impaired balance, pain, decreased safety awareness, edema, impaired skin, orthopedic precautions.      Rehab Prognosis: Good; patient would benefit from acute skilled OT services to address these deficits and reach maximum level of function.       Plan:     Patient to be seen 6 x/week to address the above listed problems via self-care/home management, therapeutic activities, therapeutic exercises  · Plan of Care Expires: 02/23/19  · Plan of Care Reviewed with: patient, spouse, sibling(sister)    Subjective     Chief Complaint: ready to walk  Patient/Family Comments/goals: return to PLOF    Occupational Profile:  Living Environment: The patient lives with her spouse in a 2 story house with a bedroom and bathroom on the 2nd floor.   Previous level of function: Mod I amb and self care  Roles and Routines: The spouse assist the patient as needed at home. The patient states she had a hospital bed on the 1st floor after her 1st TKA  Equipment Used at Home:  bedside commode, walker, rolling  Assistance upon Discharge: spouse, sister, daughter    Pain/Comfort:  Pain Rating 1: 3/10  Location - Side 1: Left  Location 1: knee  Pain Addressed 1: Nurse notified, Cessation of Activity,  Pre-medicate for activity    Patients cultural, spiritual, Hinduism conflicts given the current situation:      Objective:     Communicated with: nurseGalo prior to session.  Patient found sitting in bed in long sitting with spouse and sister present with cryotherapy, peripheral IV upon OT entry to room.    General Precautions: Standard, fall, diabetic   Orthopedic Precautions:LLE weight bearing as tolerated   Braces: N/A     Occupational Performance:    Bed Mobility:    · Patient completed Scooting/Bridging with stand by assistance  · Patient completed Supine to Sit with stand by assistance    Functional Mobility/Transfers:  · Patient completed Sit <> Stand Transfer with contact guard assistance and and verbal cues for technique  with  rolling walker   · Functional Mobility: The patient amb using a RWn with CGA  in the hallway with PT with chair to follow for safety. The patient c/o right thigh cramp while ambulating.    Activities of Daily Living:  · Lower Body Dressing: stand by assistance to don B socks in bed from long sitting.   · Toileting: The patient requested a diaper 2* urinery incontinence. The patient was given a BSC for future use. The patient was educated re: need to use a BSC with the assist of nursing.    Cognitive/Visual Perceptual:  Cognitive/Psychosocial Skills:     -       Oriented to: Person, Place, Time and Situation   -       Follows Commands/attention:Follows multistep  commands  -       Communication: clear/fluent  -       Memory: No Deficits noted  -       Safety awareness/insight to disability: intact   -       Mood/Affect/Coping skills/emotional control: Appropriate to situation    Physical Exam:  Balance: -       good sitting, fair+ stand  Postural examination/scapula alignment:    -       No postural abnormalities identified  Skin integrity: left knee incision covered by dressing, Polar Ice and Ace wrap  Edema:  left leg  Sensation:    -       Intact  Upper Extremity Range of  Motion:     -       Right Upper Extremity: WFL  -       Left Upper Extremity: WFL  Upper Extremity Strength:    -       Right Upper Extremity: WFL  -       Left Upper Extremity: WFL    AMPAC 6 Click ADL:  AMPAC Total Score: 20    Treatment & Education:  The patient participated in OT eval. The patient was educated re: OT role and POC. The patient was given a BSC and educated re: need to use a BSC as able with nursing assist.  Education:    Patient left up in chair with all lines intact, call button in reach, nurse notified and family present    GOALS:   Multidisciplinary Problems     Occupational Therapy Goals        Problem: Occupational Therapy Goal    Goal Priority Disciplines Outcome Interventions   Occupational Therapy Goal     OT, PT/OT Ongoing (interventions implemented as appropriate)    Description:  Goals to be met by: 2/23/19    Patient will increase functional independence with ADLs by performing:    UE Dressing with Modified Rayle.  LE Dressing with Stand-by Assistance.  Grooming while standing at sink with Stand-by Assistance.  Toileting from bedside commode with Modified Rayle and Supervision for hygiene and clothing management.   Supine to sit with Modified Rayle and Supervision.  Step transfer with Stand-by Assistance  Toilet transfer to bedside commode with Stand-by Assistance.  Upper extremity exercise program x15 reps per handout, with independence.                      History:     Past Medical History:   Diagnosis Date    Anticoagulant long-term use     Xarelto    Arthritis     Atrial fibrillation     Breast cyst     Degenerative disc disease     Depression     Diabetes mellitus     Pre diabetic    Hyperlipidemia     Hypertension     Nuclear sclerosis, bilateral 12/18/2017    Obesity, morbid     Other abnormal glucose     pre-diabetes    Sleep apnea     Smoker     Thyroid disease     on meds 8-9 years ago. hypothyroidism.no malignancy    TMJ  (temporomandibular joint disorder)     jaw clicking    Urge incontinence     Wears glasses          Past Surgical History:   Procedure Laterality Date    ARTHROPLASTY, KNEE, TOTAL Left 2/19/2019    [facility]formed [facility] at [facility]    ARTHROPLASTY-KNEE Right 5/23/2016    [facility] at [facility]    BREAST BIOPSY Right     over 10 yrs ago/ benign    BREAST CYST EXCISION Right     BREAST LUMPECTOMY Right     over 10 yrs ago, ex bx/ benign    CARDIOVERSION N/A 7/20/2015    Performed by Eduardo [facility]ik [facility] at [facility] CATH LAB    COLONOSCOPY N/A 8/2/2013    Performed by [facility] at [facility] ENDO (4TH FLR)    ESOPHAGOGASTRODUODENOSCOPY (EGD) N/A 6/22/2015    Performed by Kevin Yeager MD at [facility] ENDO (2ND FLR)    HYSTERECTOMY      OOPHORECTOMY      rt.knee surgery 2016      ULTRASOUND-ENDOSCOPIC-UPPER N/A 6/22/2015    Performed by [facility]ager [facility] at [facility] ENDO (2ND FLR)    underarm gland\ Bilateral        Time Tracking:     OT Date of Treatment: 02/20/19  OT Start Time: 0953  OT Stop Time: 1016  OT Total Time (min): 23 min    Billable Minutes:Evaluation 15 (with PT)  Self Care/Home Management 8  Total Time 23     Christy Lazaro OT  2/20/2019

## 2019-02-20 NOTE — PLAN OF CARE
Problem: Physical Therapy Goal  Goal: Physical Therapy Goal  Goals to be met by: 3/6/19    Patient will increase functional independence with mobility by performin. Sit to stand transfer with Supervision  2. Gait x250 feet with Supervision using Rolling Walker  3. Lower extremity exercise program x30 reps per handout, with supervision      Patient ambulated ~40ft with RW and CGA. She would benefit from HH PT at discharge.

## 2019-02-21 LAB
ANION GAP SERPL CALC-SCNC: 8 MMOL/L
BASOPHILS # BLD AUTO: 0.01 K/UL
BASOPHILS NFR BLD: 0.1 %
BUN SERPL-MCNC: 29 MG/DL
CALCIUM SERPL-MCNC: 8.7 MG/DL
CHLORIDE SERPL-SCNC: 104 MMOL/L
CO2 SERPL-SCNC: 28 MMOL/L
CREAT SERPL-MCNC: 0.9 MG/DL
DIFFERENTIAL METHOD: ABNORMAL
EOSINOPHIL # BLD AUTO: 0.1 K/UL
EOSINOPHIL NFR BLD: 1.2 %
ERYTHROCYTE [DISTWIDTH] IN BLOOD BY AUTOMATED COUNT: 14.2 %
EST. GFR  (AFRICAN AMERICAN): >60 ML/MIN/1.73 M^2
EST. GFR  (NON AFRICAN AMERICAN): >60 ML/MIN/1.73 M^2
ESTIMATED AVG GLUCOSE: 117 MG/DL
GLUCOSE SERPL-MCNC: 131 MG/DL
HBA1C MFR BLD HPLC: 5.7 %
HCT VFR BLD AUTO: 35 %
HGB BLD-MCNC: 11 G/DL
LYMPHOCYTES # BLD AUTO: 2.4 K/UL
LYMPHOCYTES NFR BLD: 26.2 %
MAGNESIUM SERPL-MCNC: 1.9 MG/DL
MCH RBC QN AUTO: 30.7 PG
MCHC RBC AUTO-ENTMCNC: 31.4 G/DL
MCV RBC AUTO: 98 FL
MONOCYTES # BLD AUTO: 0.8 K/UL
MONOCYTES NFR BLD: 9.3 %
NEUTROPHILS # BLD AUTO: 5.7 K/UL
NEUTROPHILS NFR BLD: 63.2 %
PHOSPHATE SERPL-MCNC: 2.7 MG/DL
PLATELET # BLD AUTO: 275 K/UL
PMV BLD AUTO: 10.8 FL
POCT GLUCOSE: 124 MG/DL (ref 70–110)
POCT GLUCOSE: 130 MG/DL (ref 70–110)
POCT GLUCOSE: 139 MG/DL (ref 70–110)
POCT GLUCOSE: 147 MG/DL (ref 70–110)
POTASSIUM SERPL-SCNC: 4.5 MMOL/L
RBC # BLD AUTO: 3.58 M/UL
SODIUM SERPL-SCNC: 140 MMOL/L
WBC # BLD AUTO: 9.04 K/UL

## 2019-02-21 PROCEDURE — 25000003 PHARM REV CODE 250: Performed by: HOSPITALIST

## 2019-02-21 PROCEDURE — 97535 SELF CARE MNGMENT TRAINING: CPT

## 2019-02-21 PROCEDURE — 84100 ASSAY OF PHOSPHORUS: CPT

## 2019-02-21 PROCEDURE — 97530 THERAPEUTIC ACTIVITIES: CPT

## 2019-02-21 PROCEDURE — 83735 ASSAY OF MAGNESIUM: CPT

## 2019-02-21 PROCEDURE — 97116 GAIT TRAINING THERAPY: CPT

## 2019-02-21 PROCEDURE — 25000003 PHARM REV CODE 250: Performed by: ORTHOPAEDIC SURGERY

## 2019-02-21 PROCEDURE — 80048 BASIC METABOLIC PNL TOTAL CA: CPT

## 2019-02-21 PROCEDURE — 97110 THERAPEUTIC EXERCISES: CPT

## 2019-02-21 PROCEDURE — 63600175 PHARM REV CODE 636 W HCPCS: Performed by: ORTHOPAEDIC SURGERY

## 2019-02-21 PROCEDURE — 11000001 HC ACUTE MED/SURG PRIVATE ROOM

## 2019-02-21 PROCEDURE — 94761 N-INVAS EAR/PLS OXIMETRY MLT: CPT

## 2019-02-21 PROCEDURE — 83036 HEMOGLOBIN GLYCOSYLATED A1C: CPT

## 2019-02-21 PROCEDURE — 36415 COLL VENOUS BLD VENIPUNCTURE: CPT

## 2019-02-21 PROCEDURE — 94799 UNLISTED PULMONARY SVC/PX: CPT

## 2019-02-21 PROCEDURE — 85025 COMPLETE CBC W/AUTO DIFF WBC: CPT

## 2019-02-21 RX ORDER — ASPIRIN 81 MG/1
81 TABLET ORAL DAILY
Status: DISCONTINUED | OUTPATIENT
Start: 2019-02-21 | End: 2019-02-22

## 2019-02-21 RX ORDER — CLONIDINE HYDROCHLORIDE 0.1 MG/1
0.1 TABLET ORAL EVERY 4 HOURS PRN
Status: DISCONTINUED | OUTPATIENT
Start: 2019-02-21 | End: 2019-02-22 | Stop reason: HOSPADM

## 2019-02-21 RX ADMIN — MUPIROCIN 1 G: 20 OINTMENT TOPICAL at 08:02

## 2019-02-21 RX ADMIN — SIMVASTATIN 20 MG: 10 TABLET, FILM COATED ORAL at 08:02

## 2019-02-21 RX ADMIN — MORPHINE SULFATE 2 MG: 10 INJECTION INTRAVENOUS at 06:02

## 2019-02-21 RX ADMIN — OXYCODONE HYDROCHLORIDE 10 MG: 5 TABLET ORAL at 03:02

## 2019-02-21 RX ADMIN — METOPROLOL TARTRATE 25 MG: 25 TABLET ORAL at 08:02

## 2019-02-21 RX ADMIN — OXYCODONE HYDROCHLORIDE 10 MG: 5 TABLET ORAL at 08:02

## 2019-02-21 RX ADMIN — FAMOTIDINE 20 MG: 20 TABLET ORAL at 09:02

## 2019-02-21 RX ADMIN — METOPROLOL TARTRATE 25 MG: 25 TABLET ORAL at 09:02

## 2019-02-21 RX ADMIN — FAMOTIDINE 20 MG: 20 TABLET ORAL at 08:02

## 2019-02-21 RX ADMIN — OXYCODONE HYDROCHLORIDE 10 MG: 5 TABLET ORAL at 01:02

## 2019-02-21 RX ADMIN — FLUTICASONE PROPIONATE 100 MCG: 50 SPRAY, METERED NASAL at 09:02

## 2019-02-21 RX ADMIN — ZOLPIDEM TARTRATE 5 MG: 5 TABLET ORAL at 01:02

## 2019-02-21 RX ADMIN — SERTRALINE HYDROCHLORIDE 100 MG: 50 TABLET ORAL at 09:02

## 2019-02-21 RX ADMIN — DOCUSATE SODIUM 100 MG: 100 CAPSULE, LIQUID FILLED ORAL at 09:02

## 2019-02-21 RX ADMIN — RIVAROXABAN 10 MG: 10 TABLET, FILM COATED ORAL at 09:02

## 2019-02-21 RX ADMIN — ASPIRIN 81 MG: 81 TABLET, COATED ORAL at 09:02

## 2019-02-21 RX ADMIN — DOCUSATE SODIUM 100 MG: 100 CAPSULE, LIQUID FILLED ORAL at 08:02

## 2019-02-21 RX ADMIN — MUPIROCIN 1 G: 20 OINTMENT TOPICAL at 09:02

## 2019-02-21 NOTE — PROGRESS NOTES
Pt educated on current,  new  Medication. Pt states she will not accept Insulin. Pt states she has never and will not accept insulin and request to speak with a doctor to discuss her DM management.

## 2019-02-21 NOTE — PT/OT/SLP PROGRESS
Physical Therapy Treatment    Patient Name:  Sandee Evans   MRN:  [id number]    Recommendations:     Discharge Recommendations:  (home with home health PT and family assistance)   Discharge Equipment Recommendations: tub bench(patient requesting hospital bed due to bedroom on 2nd floor)   Barriers to discharge: bedroom on 2nd level    Assessment:     Sandee Evans is a 63 y.o. female admitted with a medical diagnosis of DJD (degenerative joint disease) of knee.  She presents with the following impairments/functional limitations:  weakness, impaired endurance, impaired functional mobilty, gait instability, impaired balance, decreased lower extremity function, decreased safety awareness, pain, decreased ROM, edema, impaired joint extensibility, orthopedic precautions, impaired skin.    Rehab Prognosis: Good; patient would benefit from acute skilled PT services to address these deficits and reach maximum level of function.    Recent Surgery: Procedure(s) (LRB):  ARTHROPLASTY, KNEE, TOTAL (Left) 1 Day Post-Op    Plan:     During this hospitalization, patient to be seen BID to address the identified rehab impairments via gait training, therapeutic activities, therapeutic exercises and progress toward the following goals:    · Plan of Care Expires:  03/06/19    Subjective     Chief Complaint: Pain from CPM.   Patient/Family Comments/goals: To walk without pain.   Pain/Comfort:  · Pain Rating 1: (yes but did not rate with number)  · Location - Side 1: Left  · Location 1: knee  · Pain Addressed 1: Pre-medicate for activity, Cessation of Activity, Nurse notified, Reposition  · Pain Rating Post-Intervention 1: 7/10      Objective:     Communicated with nurse Rosenthal prior to session.  Patient found supine in bed with CPM on and cryotherapy, peripheral IV with daughter present upon PT entry to room.     General Precautions: Standard, fall   Orthopedic Precautions:LLE weight bearing as tolerated   Braces: N/A     Functional  Mobility:  · Bed Mobility:     · Supine to Sit: minimum assistance for L LE management  · Transfers:     · Sit to Stand:  minimum assistance with rolling walker  · Gait:  Patient ambulated 80ft with Rolling Walker and CGA using 3-point gait. Patient demonstrated decreased dmitriy, decreased velocity of limb motion, decreased step length and decreased toe-to-floor clearance during gait due to impaired balance, decreased flexibility, pain, decreased ROM and decreased strength.    AM-PAC 6 CLICK MOBILITY  Turning over in bed (including adjusting bedclothes, sheets and blankets)?: 4  Sitting down on and standing up from a chair with arms (e.g., wheelchair, bedside commode, etc.): 3  Moving from lying on back to sitting on the side of the bed?: 4  Moving to and from a bed to a chair (including a wheelchair)?: 3  Need to walk in hospital room?: 3  Climbing 3-5 steps with a railing?: 1  Basic Mobility Total Score: 18     Therapeutic Activities and Exercises:   Patient educated in and performed B LE seated therex x10 reps for A/P, LAQ, and heel slides.    Patient left up in chair with all lines intact, call button in reach, nurse Galo notified and daughter present.    GOALS:   Multidisciplinary Problems     Physical Therapy Goals        Problem: Physical Therapy Goal    Goal Priority Disciplines Outcome Goal Variances Interventions   Physical Therapy Goal     PT, PT/OT      Description:  Goals to be met by: 3/6/19    Patient will increase functional independence with mobility by performin. Sit to stand transfer with Supervision  2. Gait x250 feet with Supervision using Rolling Walker  3. Lower extremity exercise program x30 reps per handout, with supervision                      Time Tracking:     PT Received On: 19  PT Start Time: 1534     PT Stop Time: 1605  PT Total Time (min): 31 min     Billable Minutes: Gait Training 16 and Therapeutic Exercise 15    Treatment Type: Treatment  PT/PTA: PT     PTA Visit  Number: 0     Jessica Dorantes, PT  02/20/2019

## 2019-02-21 NOTE — PROGRESS NOTES
Pt polar ice wrap on and secure. Ice container refilled. Pt tolerating ice therapy well. Resting with eyes close. No s/s of distress.

## 2019-02-21 NOTE — PROGRESS NOTES
[facility] Hospital Medicine  Progress Note    Patient Name: Sandee Evans  MRN: [id number]  Patient Class: IP- Inpatient   Admission Date: 2/19/2019  Length of Stay: 2 days  Attending Physician: Med Hanson MD  Primary Care Provider: Steven Ruffin MD        Subjective:     Principal Problem:DJD (degenerative joint disease) of knee    HPI:  See H&P.    Hospital Course:  Patient is S/P elective  Left  TKA,duo to severe OA,  PT,Ot is following.  hospital medicine is doing medical management,exept pain on knee,she has no other complain,  No BM yet,added keke lax and lactulsoe,  started on prn clonidine ,DC aldactone,check daily BMP.    Interval History: complain of knee pain.    Review of Systems   Constitutional: Positive for activity change and appetite change.   HENT: Negative for congestion and drooling.    Eyes: Negative for discharge and itching.   Respiratory: Negative for apnea.    Genitourinary: Negative for difficulty urinating.   Musculoskeletal: Positive for arthralgias.     Objective:     Vital Signs (Most Recent):  Temp: 98.8 °F (37.1 °C) (02/21/19 0835)  Pulse: 72 (02/21/19 0835)  Resp: 18 (02/21/19 0835)  BP: (!) 150/79 (02/21/19 0835)  SpO2: (!) 94 % (02/21/19 0835) Vital Signs (24h Range):  Temp:  [98.1 °F (36.7 °C)-98.8 °F (37.1 °C)] 98.8 °F (37.1 °C)  Pulse:  [69-85] 72  Resp:  [17-18] 18  SpO2:  [90 %-96 %] 94 %  BP: (146-174)/(73-81) 150/79     Weight: 125.4 kg (276 lb 7 oz)  Body mass index is 48.97 kg/m².    Intake/Output Summary (Last 24 hours) at 2/21/2019 0851  Last data filed at 2/20/2019 1735  Gross per 24 hour   Intake 480 ml   Output --   Net 480 ml      Physical Exam   Constitutional: She is oriented to person, place, and time. No distress.   HENT:   Head: Atraumatic.   Eyes: EOM are normal.   Neck: Neck supple.   Cardiovascular: Normal rate and regular rhythm.   Pulmonary/Chest: She is in respiratory distress.   Abdominal: Soft.   Musculoskeletal: She  exhibits tenderness.   Neurological: She is oriented to person, place, and time.       Significant Labs:   BMP:   Recent Labs   Lab 02/20/19  0356 02/21/19  0344   *  --      --    K 5.1  --      --    CO2 27  --    BUN 25*  --    CREATININE 1.0  --    CALCIUM 9.1  --    MG  --  1.9     CBC:   Recent Labs   Lab 02/19/19  1304 02/20/19  0356 02/21/19  0344   WBC 7.33 10.44 9.04   HGB 12.5 12.3 11.0*   HCT 39.0 39.2 35.0*    318 275       Significant Imaging: reviewed.    Assessment/Plan:      * DJD (degenerative joint disease) of knee    S/P elective left TKA,pain management,PT,OT        PVD (peripheral vascular disease)    On statin,ASA.       Type 2 diabetes mellitus without complication    On SSI,Hba1c 5.7.       Chronic atrial fibrillation    Stable on BB and  OAC.       Obesity, morbid    Lose weight as out patient.       Essential hypertension    started on prn clonidine ,DC aldactone,check daily BMP.       Hyperlipidemia    On statin.         VTE Risk Mitigation (From admission, onward)        Ordered     rivaroxaban tablet 10 mg  Daily      02/19/19 1523     IP VTE HIGH RISK PATIENT  Once      02/19/19 1523     Place sequential compression device  Until discontinued      02/19/19 1323     Place sequential compression device  Until discontinued      02/19/19 1323     Place SAI hose  Until discontinued      02/19/19 1323     Place SAI hose  Until discontinued      02/19/19 1323     Place sequential compression device  Until discontinued      02/19/19 1323              Luz Ware MD  Department of [place] [facility]   [facility]

## 2019-02-21 NOTE — CONSULTS
[facility]  Hospital Medicine  Consult    Patient Name: Sandee Evans  MRN: [id number]  Admission Date: 02/20/2019  Attending Physician: Rafael Dolan MD, MPH      PCP:     Steven Ruffin MD    CC/reason for consult:     Hospital Medicine consulted for blood pressure and other medical management    HISTORY OF PRESENT ILLNESS:     Sandee Evans is a 63 y.o. female that (in part)  has a past medical history of Anticoagulant long-term use, Arthritis, Atrial fibrillation, Breast cyst, Degenerative disc disease, Depression, Diabetes mellitus, Hyperlipidemia, Hypertension, Nuclear sclerosis, bilateral, Obesity, morbid, Other abnormal glucose, Sleep apnea, Smoker, Thyroid disease, TMJ (temporomandibular joint disorder), Urge incontinence, and Wears glasses.  has a past surgical history that includes Hysterectomy; underarm gland\ (Bilateral); rt.knee surgery 2016; Oophorectomy; Breast cyst excision (Right); Breast lumpectomy (Right); Breast biopsy (Right); and Total knee arthroplasty (Left, 2/19/2019). Presents to [facility] for elective left knee arthroplasty due to severe osteoarthritis.  Patient is postop day 1.  Underwent surgery without anesthetic or surgical complications.      The patient is only had 1 reading of systolic blood pressure greater than 160 mm Hg in 24 hr.  Home medication regimen was reviewed the patient and she states she is compliant with her medications at home.  She denies cough, shortness of breath, looks, stridor, wheezing, or night sweats.  No chest pain, dyspnea exertion, syncope, PND, edema, cyanosis, or palpitations.  Painful the active and passive range of motion of right knee which is expected after surgery.  Well controlled with current pain regimen.  No issues with constipation.  She is a cigarette smoker.  Smoking cessation discussed.  Offered nicotine patch.    Hospital medicine has been asked to admit for further evaluation and  treatment.     Of  REVIEW OF SYSTEMS:     -- Constitutional: No fever or chills.  -- Eyes: No visual changes, diplopia, pain, tearing, blind spots, or discharge.   -- Ears, nose, mouth, throat, and face: No congestion, sore throat, epistaxis, d/c, bleeding gums, neck stiffness masses, or dental issues.  -- Respiratory: No cough, shortness of breath, hemoptysis, stridor, wheezing, or night sweats.  -- Cardiovascular: No chest pain, ESQUIVEL, syncope, PND, edema, cyanosis, or palpitations.   -- Gastrointestinal: No vomiting, abdominal pain, hematemesis, melena, dyspepsia, or change in bowel habits.  -- Genitourinary: No hematuria, dysuria, frequency, urgency, nocturia, polyuria, stones, or incontinence.  -- Integument/breast: No rash, pruritis, pigmentation changes, dryness, or changes in hair  -- Hematologic/lymphatic:  Positive for easy bruising.  Denies lymphadenopathy.   -- Musculoskeletal:  As above in the HPI  -- Neurological: No seizures, headaches, incoordination, paraesthesias, ataxia, vertigo, or tremors.  -- Behavioral/Psych: No auditory or visual hallucinations, depression, or suicidal/homicidal ideations.  -- Endocrine: No heat or cold intolerance, polydipsia, or unintentional weight gain / loss.  -- Allergy/Immunologic: No recurrent infections or adverse reaction to food, insects, or difficulty breathing.        PAST MEDICAL / SURGICAL HISTORY:     Past Medical History:   Diagnosis Date    Anticoagulant long-term use     Xarelto    Arthritis     Atrial fibrillation     Breast cyst     Degenerative disc disease     Depression     Diabetes mellitus     Pre diabetic    Hyperlipidemia     Hypertension     Nuclear sclerosis, bilateral 12/18/2017    Obesity, morbid     Other abnormal glucose     pre-diabetes    Sleep apnea     Smoker     Thyroid disease     on meds 8-9 years ago. hypothyroidism.no malignancy    TMJ (temporomandibular joint disorder)     jaw clicking    Urge incontinence     Wears  glasses      Past Surgical History:   Procedure Laterality Date    ARTHROPLASTY, KNEE, TOTAL Left 2/19/2019    [facility]    ARTHROPLASTY-KNEE Right 5/23/2016    [facility]    BREAST BIOPSY Right     over 10 yrs ago/ benign    BREAST CYST EXCISION Right     BREAST LUMPECTOMY Right     over 10 yrs ago, ex bx/ benign    CARDIOVERSION N/A 7/20/2015    [facility] CATH LAB    COLONOSCOPY N/A 8/2/2013    [facility] ENDO (4TH FLR)    ESOPHAGOGASTRODUODENOSCOPY (EGD) N/A 6/22/2015    Performed by Kevin [facility]ager [facility] at [facility] ENDO (2ND FLR)    HYSTERECTOMY      OOPHORECTOMY      rt.knee surgery 2016      ULTRASOUND-ENDOSCOPIC-UPPER N/A 6/22/2015    Performed by [facility] at [facility] ENDO (2ND FLR)    underarm gland\ Bilateral          FAMILY HISTORY:     Family History   Problem Relation Age of Onset    Diabetes Mother     Diabetes Brother     No Known Problems Father     No Known Problems Sister     No Known Problems Maternal Aunt     No Known Problems Maternal Uncle     No Known Problems Paternal Aunt     No Known Problems Paternal Uncle     No Known Problems Maternal Grandmother     No Known Problems Maternal Grandfather     No Known Problems Paternal Grandmother     No Known Problems Paternal Grandfather     Amblyopia Neg Hx     Blindness Neg Hx     Cancer Neg Hx     Cataracts Neg Hx     Glaucoma Neg Hx     Hypertension Neg Hx     Macular degeneration Neg Hx     Retinal detachment Neg Hx     Strabismus Neg Hx     Stroke Neg Hx     Thyroid disease Neg Hx          SOCIAL HISTORY:     Social History     Socioeconomic History    Marital status:      Spouse name: None    Number of children: None    Years of education: None    Highest education level: None   Social Needs    Financial resource strain: None    Food insecurity -  worry: None    Food insecurity - inability: None    Transportation needs - medical: None    Transportation needs - non-medical: None   Occupational History    None   Tobacco Use    Smoking status: Current Every Day Smoker     Packs/day: 0.25     Years: 30.00     Pack years: 7.50     Types: Cigarettes    Smokeless tobacco: Never Used   Substance and Sexual Activity    Alcohol use: No    Drug use: No    Sexual activity: Yes     Partners: Male   Other Topics Concern    None   Social History Narrative    None         ALLERGIES:       Review of patient's allergies indicates:   Allergen Reactions    Ace inhibitors      Cough           HEALTH SCREENING:     Influenza vaccine  up-to-date for this season.  Prevnar 13 pneumonia vaccine = no evidence of previous vaccination found in the medical record      HOME MEDICATIONS:     Prior to Admission medications    Medication Sig Start Date End Date Taking? Authorizing Provider   albuterol 90 mcg/actuation inhaler Inhale 2 puffs into the lungs every 6 (six) hours as needed for Wheezing. 2/5/18  Yes Steven Ruffin MD   fluticasone (FLONASE) 50 mcg/actuation nasal spray 2 sprays by Each Nare route once daily. 12/31/17  Yes Minesh Li MD   metFORMIN (GLUCOPHAGE) 500 MG tablet Take 1 tablet (500 mg total) by mouth 2 (two) times daily with meals. 5/28/18  Yes Steven Ruffin MD   rivaroxaban (XARELTO) 20 mg Tab Take 1 tablet (20 mg total) by mouth once daily. 2/14/19  Yes Eduardo Naik MD   sertraline (ZOLOFT) 100 MG tablet TAKE 1 TABLET(100 MG) BY MOUTH EVERY DAY 10/11/18  Yes Steven Ruffin MD   simvastatin (ZOCOR) 20 MG tablet TAKE 1 TABLET(20 MG) BY MOUTH EVERY EVENING 1/14/19  Yes Steven Ruffin MD   spironolactone (ALDACTONE) 25 MG tablet TAKE 1 TABLET(25 MG) BY MOUTH EVERY DAY 11/14/18  Yes Steven Ruffin MD   aspirin (ECOTRIN) 81 MG EC tablet Take 81 mg by mouth once daily.    Historical Provider, MD   loratadine (CLARITIN) 10 mg tablet TAKE 1  TABLET(10 MG) BY MOUTH EVERY DAY 8/10/18   Myah Carballo PA-C   metoprolol tartrate (LOPRESSOR) 25 MG tablet Take 1 tablet (25 mg total) by mouth 2 (two) times daily. 2/14/19   Eduardo aNik MD   oxyCODONE-acetaminophen (PERCOCET)  mg per tablet Take 1 tablet by mouth once daily. 11/14/18   Steven Ruffin MD   triamcinolone acetonide 0.1% (KENALOG) 0.1 % ointment APPLY BETWEEN KNEES AND UPPER THIGHS TWICE DAILY FOR NO MORE THAN 7-14 DAYS 5/7/18   Steven Ruffin MD          HOSPITAL MEDICATIONS:     Scheduled Meds:    docusate sodium  100 mg Oral Q12H    famotidine  20 mg Oral BID    fluticasone  2 spray Each Nare Daily    metoprolol tartrate  25 mg Oral BID    mupirocin  1 g Nasal BID    rivaroxaban  10 mg Oral Daily    sertraline  100 mg Oral Daily    simvastatin  20 mg Oral QHS    spironolactone  25 mg Oral Daily     Continuous Infusions:   PRN Meds: albuterol-ipratropium, bisacodyl, dextrose 50%, dextrose 50%, glucagon (human recombinant), glucose, glucose, insulin aspart U-100, morphine, ondansetron, oxyCODONE, pneumoc 13-savannah conj-dip cr(PF), sodium chloride 0.9%, sodium chloride 0.9%, zolpidem      PHYSICAL EXAM:     Wt Readings from Last 1 Encounters:   02/18/19 1007 125.4 kg (276 lb 7 oz)     Body mass index is 48.97 kg/m².  Vitals:    02/20/19 1345 02/20/19 1712 02/20/19 1934 02/20/19 2038   BP: (!) 174/77 (!) 146/74 (!) 157/73    BP Location:   Left arm    Patient Position:   Sitting    Pulse: 70 69 78 81   Resp:  18 18 17   Temp:  98.4 °F (36.9 °C) 98.2 °F (36.8 °C)    TempSrc:  Oral Oral    SpO2: (!) 90% 95% 95% 96%   Weight:       Height:              -- General appearance: well developed. appears stated age   -- Head: normocephalic, atraumatic   -- Eyes: conjunctivae clear. Extraocular muscles intact  -- Nose: Nares normal. Septum midline.   -- Mouth/Throat: lips, mucosa, and tongue normal. no throat erythema.   -- Neck: supple, symmetrical, trachea midline, no JVD and thyroid not  grossly enlarged, appears symmetric  -- Lungs: clear to auscultation bilaterally. normal respiratory effort. No use of accessory muscles.   -- Chest wall: no tenderness. equal bilateral chest rise   -- Heart: regular rate and regular rhythm. S1, S2 normal.  no click, rub or gallop   -- Abdomen: soft, non-tender, non-distended, non-tympanic; bowel sounds normal; no masses  -- Extremities:  Right knee with surgical dressing intact.  No obvious bleeding or drainage. Some mild pain with both active and passive range of motion.  no cyanosis, clubbing or edema.   -- Pulses: 2+ and symmetric   -- Skin:  Scattered ecchymoses.  color normal, texture normal, turgor normal. No rashes or lesions.   -- Neurologic: Normal strength and tone. No focal numbness or weakness. CNII-XII intact. David coma scale: eyes open spontaneously-4, oriented & converses-5, obeys commands-6.      LABORATORY STUDIES:     Recent Results (from the past 36 hour(s))   POCT glucose    Collection Time: 02/19/19 12:47 PM   Result Value Ref Range    POCT Glucose 80 70 - 110 mg/dL   CBC auto differential    Collection Time: 02/19/19  1:04 PM   Result Value Ref Range    WBC 7.33 3.90 - 12.70 K/uL    RBC 4.12 4.00 - 5.40 M/uL    Hemoglobin 12.5 12.0 - 16.0 g/dL    Hematocrit 39.0 37.0 - 48.5 %    MCV 95 82 - 98 fL    MCH 30.3 27.0 - 31.0 pg    MCHC 32.1 32.0 - 36.0 g/dL    RDW 14.3 11.5 - 14.5 %    Platelets 245 150 - 350 K/uL    MPV 10.3 9.2 - 12.9 fL    Gran # (ANC) 3.8 1.8 - 7.7 K/uL    Lymph # 2.6 1.0 - 4.8 K/uL    Mono # 0.7 0.3 - 1.0 K/uL    Eos # 0.2 0.0 - 0.5 K/uL    Baso # 0.03 0.00 - 0.20 K/uL    Gran% 51.7 38.0 - 73.0 %    Lymph% 35.6 18.0 - 48.0 %    Mono% 9.7 4.0 - 15.0 %    Eosinophil% 2.6 0.0 - 8.0 %    Basophil% 0.4 0.0 - 1.9 %    Differential Method Automated    POCT glucose    Collection Time: 02/19/19  3:51 PM   Result Value Ref Range    POCT Glucose 94 70 - 110 mg/dL   POCT glucose    Collection Time: 02/19/19  7:49 PM   Result Value Ref  Range    POCT Glucose 152 (H) 70 - 110 mg/dL   CBC auto differential    Collection Time: 02/20/19  3:56 AM   Result Value Ref Range    WBC 10.44 3.90 - 12.70 K/uL    RBC 4.00 4.00 - 5.40 M/uL    Hemoglobin 12.3 12.0 - 16.0 g/dL    Hematocrit 39.2 37.0 - 48.5 %    MCV 98 82 - 98 fL    MCH 30.8 27.0 - 31.0 pg    MCHC 31.4 (L) 32.0 - 36.0 g/dL    RDW 14.3 11.5 - 14.5 %    Platelets 318 150 - 350 K/uL    MPV 10.9 9.2 - 12.9 fL    Gran # (ANC) 8.6 (H) 1.8 - 7.7 K/uL    Lymph # 1.2 1.0 - 4.8 K/uL    Mono # 0.6 0.3 - 1.0 K/uL    Eos # 0.0 0.0 - 0.5 K/uL    Baso # 0.01 0.00 - 0.20 K/uL    Gran% 82.8 (H) 38.0 - 73.0 %    Lymph% 11.2 (L) 18.0 - 48.0 %    Mono% 5.8 4.0 - 15.0 %    Eosinophil% 0.1 0.0 - 8.0 %    Basophil% 0.1 0.0 - 1.9 %    Differential Method Automated    Basic metabolic panel    Collection Time: 02/20/19  3:56 AM   Result Value Ref Range    Sodium 139 136 - 145 mmol/L    Potassium 5.1 3.5 - 5.1 mmol/L    Chloride 104 95 - 110 mmol/L    CO2 27 23 - 29 mmol/L    Glucose 147 (H) 70 - 110 mg/dL    BUN, Bld 25 (H) 8 - 23 mg/dL    Creatinine 1.0 0.5 - 1.4 mg/dL    Calcium 9.1 8.7 - 10.5 mg/dL    Anion Gap 8 8 - 16 mmol/L    eGFR if African American >60 >60 mL/min/1.73 m^2    eGFR if non African American 59 (A) >60 mL/min/1.73 m^2   POCT glucose    Collection Time: 02/20/19  8:39 AM   Result Value Ref Range    POCT Glucose 144 (H) 70 - 110 mg/dL   POCT glucose    Collection Time: 02/20/19 11:58 AM   Result Value Ref Range    POCT Glucose 120 (H) 70 - 110 mg/dL   POCT glucose    Collection Time: 02/20/19  5:15 PM   Result Value Ref Range    POCT Glucose 139 (H) 70 - 110 mg/dL   POCT glucose    Collection Time: 02/20/19  7:35 PM   Result Value Ref Range    POCT Glucose 118 (H) 70 - 110 mg/dL       Lab Results   Component Value Date    INR 0.9 01/30/2019    INR 0.9 05/23/2016    INR 1.3 (H) 05/16/2016     Lab Results   Component Value Date    HGBA1C 5.5 02/13/2019     Recent Labs     02/19/19  0840 02/19/19  1247  02/19/19  1551 02/19/19  1949 02/20/19  0839 02/20/19  1158 02/20/19  1715 02/20/19  1935   POCTGLUCOSE 101 80 94 152* 144* 120* 139* 118*           CONSULTS:     IP CONSULT TO HOSPITALIST       ASSESSMENT & PLAN:     Primary Diagnosis:  DJD (degenerative joint disease) of knee    Active Hospital Problems    Diagnosis  POA    *DJD (degenerative joint disease) of knee [M17.10]  Yes     Priority: 1 - High    PVD (peripheral vascular disease) [I73.9]  Yes    Type 2 diabetes mellitus without complication [E11.9]  Yes    Chronic atrial fibrillation [I48.2]  Yes    Hyperlipidemia [E78.5]  Yes    Hypertension [I10]  Yes    Obesity, morbid [E66.01]  Yes      Resolved Hospital Problems   No resolved problems to display.       Degenerative joint disease of right knee status post total knee arthroplasty  · Management per primary team.  · No acute issues    Essential Hypertension  · Goal while inpatient is a systolic blood pressure less than 160mmHg.  Patient has had only 1 reading with systolic blood pressure greater than 160 in the last 24 hr.  Likely secondary to pain in the perioperative period  · BP in acceptable range at this time  · Continue current home regimen with hold parameters  · PRN antihypertensives available    Chronic anticoagulation  · For treatment of chronic atrial fibrillation  · Will stay on Xarleto 10mg po qd while here and 3 weeks at home, then resume 20mg po qd as per Cards recommendation.  · Monitor closely for bleeding in this perioperative period    chronic atrial fibrillation  · Currently rate controlled  · Maintain with beta-blocker with hold parameters  · Monitor on telemetry  · Maintain magnesium around 2.0  · Maintain potassium around 4.0  · Continue Xarelto as noted above    Diabetes mellitus type 2  · BG in acceptable range at this time  · Maintain w/ subcutaneous insulin management order set  · Hold oral diabetic meds  · ADA 1800 kcal diet  · BG goal while in patient is  <180mg/dL  · HgA1c = Pending    Hyperlipidemia   · Lipid panel - as an outpatient  · Cardiac diet  · Continue statin    Morbid obesity  Body mass index is 48.97 kg/m².  · Order a cardiac diet  · Counseling given  · Nutrition consult as an outpatient            VTE Risk Mitigation (From admission, onward)        Ordered     rivaroxaban tablet 10 mg  Daily      02/19/19 1523     IP VTE HIGH RISK PATIENT  Once      02/19/19 1523     Place sequential compression device  Until discontinued      02/19/19 1323     Place sequential compression device  Until discontinued      02/19/19 1323     Place SAI hose  Until discontinued      02/19/19 1323     Place SAI hose  Until discontinued      02/19/19 1323     Place sequential compression device  Until discontinued      02/19/19 1323            Adult PRN medications available   DVT prophylaxis given       DISPOSITION:     Hospital Medicine service will continue to follow for further evaluation and treatment.    Chart reviewed and updated where applicable.      ===============================================================    Rafael Dolan MD, MPH  Department of Hospital Medicine   [facility]  [phone] pg  (7pm - 6am)          This note is dictated using [employer] voice recognition software.  There are word recognition mistakes that are occasionally missed on review.

## 2019-02-21 NOTE — PLAN OF CARE
Problem: Diabetes Comorbidity  Goal: Blood Glucose Level Within Desired Range    Intervention: Maintain Glycemic Control   02/21/19 1334   Monitor and Manage Ketoacidosis   Glycemic Management blood glucose monitoring;oral hydration promoted         Problem: Adult Inpatient Plan of Care  Goal: Plan of Care Review   02/21/19 0424   Plan of Care Review   Plan of Care Reviewed With patient;daughter

## 2019-02-21 NOTE — PROGRESS NOTES
Kevan with [facility] informed  patient already received a rolling walker and hospital 5/26/16, insurance will not cover replacement until 2021. Kevan informed MATIAS steve price for an electric bed is $1495.00.

## 2019-02-21 NOTE — HOSPITAL COURSE
Patient is S/P elective  Left  TKA,duo to severe OA,  PT,OT is following.  hospital medicine is doing medical management,exept pain on knee,she has no other complain,  No BM yet,added miralax and lactulsoe,did not work,will have nema.  started on prn clonidine ,DC aldactone,check daily BMP.  Pain is better  controled today.

## 2019-02-21 NOTE — NURSING
As per Physical therapist, ok to leave CPM off secondary to pt c/o it be very uncomfortable and bars digging into to her thighs, pt and daughter informed that its ok to leave CPM, both agree.

## 2019-02-21 NOTE — PROGRESS NOTES
Pt states pain of 9/10 and that she is restless. Pt polar ice refilled. PRN oxy 10mg po and Ambien administered to assist with pt comfort.

## 2019-02-21 NOTE — PT/OT/SLP PROGRESS
Occupational Therapy   Treatment    Name: Sandee Evans  MRN: [id number]  Admitting Diagnosis:  DJD (degenerative joint disease) of knee  2 Days Post-Op    Recommendations:     Discharge Recommendations: (home with family assist)  Discharge Equipment Recommendations:  tub bench, hospital bed  Barriers to discharge: N/A       Assessment:     Sandee Evans is a 63 y.o. female with a medical diagnosis of DJD (degenerative joint disease) of knee.  She presents with the following performance deficits affecting function: weakness, impaired endurance, impaired self care skills, impaired functional mobilty, gait instability, decreased lower extremity function, decreased upper extremity function, decreased safety awareness, pain, decreased ROM, edema, orthopedic precautions. Patient tolerated grooming tasks standing at sink today. Patient progressing.   Rehab Prognosis:  Good; patient would benefit from acute skilled OT services to address these deficits and reach maximum level of function.       Plan:     Patient to be seen 6 x/week to address the above listed problems via self-care/home management, therapeutic activities, therapeutic exercises  · Plan of Care Expires: 02/23/19  · Plan of Care Reviewed with: patient, spouse, sibling(sister)    Subjective   Patient agreeable to therapy.   Pain/Comfort:  · Pain Rating 1: 9/10  · Location - Side 1: Left  · Location 1: knee  · Pain Addressed 1: Pre-medicate for activity, Reposition, Nurse notified  · Pain Rating Post-Intervention 1: 5/10    Objective:     Communicated with: Nurse Otero prior to session.  Patient found up in chair with peripheral IV upon OT entry to room.    General Precautions: Standard, fall   Orthopedic Precautions:LLE weight bearing as tolerated   Braces: N/A     Occupational Performance:     Bed Mobility:    · Patient completed Scooting/Bridging with stand by assistance  · Patient completed Sit to Supine with minimum assistance with LLE     Functional  Mobility/Transfers:  · Patient completed Sit <> Stand Transfer with contact guard assistance and minimum assistance  with  rolling walker   · Functional Mobility: Patient able to ambulate from chair to sink and bed with CGA/RW and verbal cues for safety awareness.     Activities of Daily Living:  · Grooming: stand by assistance standing at sink to perform oral care   · Upper Body Dressing: modified independence to doff back gown seated EOB    [facility] Click ADL: 20    Treatment & Education:  Patient performed bed mobility, functional transfers, and ADL's as above.  Patient educated on knee safety with ADL's and transfers. Patient provided with handout.  Patient educated on and performed BUE therex HEP with green theraband x 10-15 reps. Patient performed x 10 reps B shoulder abd, B shoulder flex, B shoulder horizontal abd, and x 15 reps B elbow flex, and B elbow ext between rest. Patient educated on HEP via verbal instruction, demonstration, and handout.   Patient verbalizes understanding of the above.     Patient left HOB elevated, FCD placed, polar ice refilled and placed, with all lines intact, call button in reach, bed alarm on, nurse notified and family presentEducation:      GOALS:   Multidisciplinary Problems     Occupational Therapy Goals        Problem: Occupational Therapy Goal    Goal Priority Disciplines Outcome Interventions   Occupational Therapy Goal     OT, PT/OT Ongoing (interventions implemented as appropriate)    Description:  Goals to be met by: 2/23/19    Patient will increase functional independence with ADLs by performing:    UE Dressing with Modified [place].  LE Dressing with Stand-by Assistance.  Grooming while standing at sink with Stand-by Assistance. Met  Toileting from bedside commode with Modified [place] and Supervision for hygiene and clothing management.   Supine to sit with Modified [place] and Supervision.  Step transfer with Stand-by Assistance  Toilet transfer to  bedside commode with Stand-by Assistance.  Upper extremity exercise program x15 reps per handout, with independence.                      Time Tracking:     OT Date of Treatment: 02/21/19  OT Start Time: 1559  OT Stop Time: 1630  OT Total Time (min): 31 min    Billable Minutes:Self Care/Home Management 15  Therapeutic Exercise 15    CHANTELLE Aguirre  2/21/2019

## 2019-02-21 NOTE — PLAN OF CARE
Problem: Adult Inpatient Plan of Care  Goal: Plan of Care Review  Pt comfort level met via PRN PO meds. Pt able to rest post PRN insomnia med. Pt IP=100 during shift. Pt Tolerates polar ice therapy and and ectention of LLE/ Knee. Pt fever and chill free. Voids. No BM during shift. Pt AOx4

## 2019-02-21 NOTE — PT/OT/SLP PROGRESS
"Physical Therapy Treatment    Patient Name:  Sandee Evans   MRN:  [id number]    Recommendations:     Discharge Recommendations:  (Home with HH PT and family assistance)   Discharge Equipment Recommendations: tub bench(pt requesting hospital bed due to bedroom on 2nd floor)   Barriers to discharge: Inaccessible home (2nd story home)    Assessment:     Sandee Evans is a 63 y.o. female admitted with a medical diagnosis of DJD (degenerative joint disease) of knee.  She presents with the following impairments/functional limitations:  weakness, impaired endurance, impaired self care skills, impaired functional mobilty, impaired balance, gait instability, decreased coordination, decreased lower extremity function, pain, decreased ROM, edema, impaired joint extensibility, orthopedic precautions, decreased safety awareness, decreased upper extremity function, impaired coordination, impaired skin.  Pt limited with ambulation today due to increased lethargy from taking Ambien last night.  Pt continuously fell asleep during session requiring increased verbal and tactile cues to stay awake.    Rehab Prognosis: Good; patient would benefit from acute skilled PT services to address these deficits and reach maximum level of function.    Recent Surgery: Procedure(s) (LRB):  ARTHROPLASTY, KNEE, TOTAL (Left) 2 Days Post-Op    Plan:     During this hospitalization, patient to be seen BID to address the identified rehab impairments via gait training, therapeutic activities, therapeutic exercises and progress toward the following goals:    · Plan of Care Expires:  03/06/19    Subjective     Chief Complaint: tired  Patient/Family Comments/goals: Pt states " I am so tired from taking that medicine."  Pain/Comfort:  · Pain Rating 1: 8/10(0/10 at rest 8/10 with ambulation)  · Location - Side 1: Left  · Location 1: knee  · Pain Addressed 1: Pre-medicate for activity, Reposition, Cessation of Activity, Nurse notified      Objective: "     Communicated with pt's nurseDebbie, prior to session.  Patient found HOB elevated cryotherapy, peripheral IV(B FCD)  upon PT entry to room.     General Precautions: Standard, fall   Orthopedic Precautions:LLE weight bearing as tolerated   Braces:       Functional Mobility: Pt falling asleep and required extra time to perform all tasks  · Bed Mobility:     · Scooting: contact guard assistance  · Supine to Sit: Min A for leg lift  · Sit to Supine: Min A for leg lift  · Transfers:     · Sit to Stand:  stand by assistance and contact guard assistance with rolling walker  · Bed to Chair: contact guard assistance with  rolling walker  using  Step Transfer  · Gait: Pt ambulated ~50ft with RW, SBA with decreased heel>toe.  Pt falling asleep during ambulation requiring cues to stay awake due to safety.  Pt with decreased dmitriy, saftey, weight shifting ability, postural control, and foot clearance  · Balance: good sitting and standing      AM-PAC 6 CLICK MOBILITY  Turning over in bed (including adjusting bedclothes, sheets and blankets)?: 4  Sitting down on and standing up from a chair with arms (e.g., wheelchair, bedside commode, etc.): 3  Moving from lying on back to sitting on the side of the bed?: 4  Moving to and from a bed to a chair (including a wheelchair)?: 3  Need to walk in hospital room?: 3  Climbing 3-5 steps with a railing?: 2  Basic Mobility Total Score: 19       Therapeutic Activities and Exercises: CPM placed to pt's LLE 0-80* at 1100 and to be removed at 1300.  Pt's nurse, Debbie, notified.   Pt unable to perform exercises at this time due to continuously falling asleep during treatment session.  Per pt's nurse, pt took ambien last night to assist with sleeping.    Patient left HOB elevated CPM to LLE, FCDS with all lines intact, call button in reach, bed alarm on and pt's nursedebbie notified..    GOALS:   Multidisciplinary Problems     Physical Therapy Goals        Problem: Physical Therapy Goal     Goal Priority Disciplines Outcome Goal Variances Interventions   Physical Therapy Goal     PT, PT/OT      Description:  Goals to be met by: 3/6/19    Patient will increase functional independence with mobility by performin. Sit to stand transfer with Supervision  2. Gait x250 feet with Supervision using Rolling Walker  3. Lower extremity exercise program x30 reps per handout, with supervision                      Time Tracking:     PT Received On: 19  PT Start Time: 1025     PT Stop Time: 1100  PT Total Time (min): 35 min     Billable Minutes: Gait Training 25 and Therapeutic Activity 10    Treatment Type: Treatment  PT/PTA: PTA     PTA Visit Number: 1     Vivien Keller, PTA  2019

## 2019-02-21 NOTE — PT/OT/SLP PROGRESS
"PM Physical Therapy Treatment    Patient Name:  Sandee Evans   MRN:  [id number]    Recommendations:     Discharge Recommendations:  (Home with HHPT and family assistance)   Discharge Equipment Recommendations: tub bench(pt requesting hospital bed due to pt's room on 2nd floor)   Barriers to discharge: Inaccessible home    Assessment:     Sandee Evans is a 63 y.o. female admitted with a medical diagnosis of DJD (degenerative joint disease) of knee.  She presents with the following impairments/functional limitations:  weakness, impaired endurance, impaired functional mobilty, impaired balance, impaired self care skills, gait instability, decreased coordination, decreased lower extremity function, decreased upper extremity function, decreased safety awareness, decreased ROM, edema, impaired joint extensibility, impaired coordination, impaired skin, orthopedic precautions.  Pt with improved alertness this afternoon.  Pt ambulated ~200ft with RW, Sup.    Rehab Prognosis: Good; patient would benefit from acute skilled PT services to address these deficits and reach maximum level of function.    Recent Surgery: Procedure(s) (LRB):  ARTHROPLASTY, KNEE, TOTAL (Left) 2 Days Post-Op    Plan:     During this hospitalization, patient to be seen BID to address the identified rehab impairments via gait training, therapeutic activities, therapeutic exercises and progress toward the following goals:    · Plan of Care Expires:  03/06/19    Subjective     Chief Complaint: needing to use the bathroom  Patient/Family Comments/goals: Pt states " I am up now.  I just needed to sleep that off."  Pain/Comfort:  · Pain Rating 1: 8/10  · Location - Side 1: Left  · Location 1: knee  · Pain Addressed 1: Reposition, Cessation of Activity, Nurse notified      Objective:     Communicated with pt's nurse, Shanna, prior to session.  Patient found HOB elevated with daughter present FCD, peripheral IV, cryotherapy  upon PT entry to room.     General " "Precautions: Standard, fall   Orthopedic Precautions:LLE weight bearing as tolerated   Braces:       Functional Mobility:  · Bed Mobility:     · Scooting: Min A with leg lift  · Supine to Sit: Min A with leg lift  · Transfers:     · Sit to Stand:  stand by assistance with no AD and rolling walker  · Bed to BScommode: contact guard assistance with  no AD  using  Stand Pivot  · Gait: Pt ambulated ~200ft with RW, Sup with cues for proper RW mangament, safety awareness, and sequencing.  Pt with decreased step length, velocity of limb, weight shifting ability, dmitriy, and step length  · Balance: good sitting and standing      AM-PAC 6 CLICK MOBILITY  Turning over in bed (including adjusting bedclothes, sheets and blankets)?: 4  Sitting down on and standing up from a chair with arms (e.g., wheelchair, bedside commode, etc.): 4  Moving from lying on back to sitting on the side of the bed?: 3  Moving to and from a bed to a chair (including a wheelchair)?: 4  Need to walk in hospital room?: 4  Climbing 3-5 steps with a railing?: 3  Basic Mobility Total Score: 22       Therapeutic Activities and Exercises:   Pt performed therex to BLEs x15 reps AROM including: hip flexion, LAQs, ankle pumps, quad sets, heel slides( 5 x10" holds to LLE only)  Pt's nurse, Shanna, educated to place CPM this evening [id number], Shanna, verbalized understanding.    Patient left up in chair with all lines intact, call button in reach, pt's nurse, Shanna, notified and daughter and friend present..    GOALS:   Multidisciplinary Problems     Physical Therapy Goals        Problem: Physical Therapy Goal    Goal Priority Disciplines Outcome Goal Variances Interventions   Physical Therapy Goal     PT, PT/OT Ongoing (interventions implemented as appropriate)     Description:  Goals to be met by: 3/6/19    Patient will increase functional independence with mobility by performin. Sit to stand transfer with Supervision  2. Gait x250 feet with Supervision using " Rolling Walker  3. Lower extremity exercise program x30 reps per handout, with supervision                      Time Tracking:     PT Received On: 02/21/19  PT Start Time: 1519     PT Stop Time: 1550  PT Total Time (min): 31 min     Billable Minutes: Gait Training 16 and Therapeutic Exercise 15    Treatment Type: Treatment  PT/PTA: PTA     PTA Visit Number: 1     Vivienper Keller, PTA  02/21/2019

## 2019-02-21 NOTE — SUBJECTIVE & OBJECTIVE
Interval History: complain of knee pain.    Review of Systems   Constitutional: Positive for activity change and appetite change.   HENT: Negative for congestion and drooling.    Eyes: Negative for discharge and itching.   Respiratory: Negative for apnea.    Genitourinary: Negative for difficulty urinating.   Musculoskeletal: Positive for arthralgias.     Objective:     Vital Signs (Most Recent):  Temp: 98.8 °F (37.1 °C) (02/21/19 0835)  Pulse: 72 (02/21/19 0835)  Resp: 18 (02/21/19 0835)  BP: (!) 150/79 (02/21/19 0835)  SpO2: (!) 94 % (02/21/19 0835) Vital Signs (24h Range):  Temp:  [98.1 °F (36.7 °C)-98.8 °F (37.1 °C)] 98.8 °F (37.1 °C)  Pulse:  [69-85] 72  Resp:  [17-18] 18  SpO2:  [90 %-96 %] 94 %  BP: (146-174)/(73-81) 150/79     Weight: 125.4 kg (276 lb 7 oz)  Body mass index is 48.97 kg/m².    Intake/Output Summary (Last 24 hours) at 2/21/2019 0851  Last data filed at 2/20/2019 1735  Gross per 24 hour   Intake 480 ml   Output --   Net 480 ml      Physical Exam   Constitutional: She is oriented to person, place, and time. No distress.   HENT:   Head: Atraumatic.   Eyes: EOM are normal.   Neck: Neck supple.   Cardiovascular: Normal rate and regular rhythm.   Pulmonary/Chest: She is in respiratory distress.   Abdominal: Soft.   Musculoskeletal: She exhibits tenderness.   Neurological: She is oriented to person, place, and time.       Significant Labs:   BMP:   Recent Labs   Lab 02/20/19  0356 02/21/19  0344   *  --      --    K 5.1  --      --    CO2 27  --    BUN 25*  --    CREATININE 1.0  --    CALCIUM 9.1  --    MG  --  1.9     CBC:   Recent Labs   Lab 02/19/19  1304 02/20/19  0356 02/21/19  0344   WBC 7.33 10.44 9.04   HGB 12.5 12.3 11.0*   HCT 39.0 39.2 35.0*    318 874       Significant Imaging: reviewed.

## 2019-02-21 NOTE — PLAN OF CARE
Problem: Occupational Therapy Goal  Goal: Occupational Therapy Goal  Goals to be met by: 2/23/19    Patient will increase functional independence with ADLs by performing:    UE Dressing with Modified [place].  LE Dressing with Stand-by Assistance.  Grooming while standing at sink with Stand-by Assistance. Met  Toileting from bedside commode with Modified [place] and Supervision for hygiene and clothing management.   Supine to sit with Modified [place] and Supervision.  Step transfer with Stand-by Assistance  Toilet transfer to bedside commode with Stand-by Assistance.  Upper extremity exercise program x15 reps per handout, with independence.     Outcome: Ongoing (interventions implemented as appropriate)  Patient tolerated standing at sink for grooming tasks today. Patient progressing and will benefit from continued OT to address functional deficits.

## 2019-02-21 NOTE — PLAN OF CARE
Problem: Physical Therapy Goal  Goal: Physical Therapy Goal  Goals to be met by: 3/6/19    Patient will increase functional independence with mobility by performin. Sit to stand transfer with Supervision  2. Gait x250 feet with Supervision using Rolling Walker  3. Lower extremity exercise program x30 reps per handout, with supervision     Outcome: Ongoing (interventions implemented as appropriate)  Pt limited with ambulation today due to increased lethargy from taking Ambien last night.  Pt continuously fell asleep during session requiring increased verbal and tactile cues to stay awake.    1550:Pt with improved alertness this afternoon.  Pt ambulated ~200ft with RW, Sup.

## 2019-02-22 VITALS
RESPIRATION RATE: 17 BRPM | BODY MASS INDEX: 48.98 KG/M2 | HEART RATE: 65 BPM | SYSTOLIC BLOOD PRESSURE: 152 MMHG | DIASTOLIC BLOOD PRESSURE: 90 MMHG | TEMPERATURE: 98 F | WEIGHT: 276.44 LBS | HEIGHT: 63 IN | OXYGEN SATURATION: 94 %

## 2019-02-22 PROBLEM — K59.09 OTHER CONSTIPATION: Status: ACTIVE | Noted: 2019-02-22

## 2019-02-22 LAB
ANION GAP SERPL CALC-SCNC: 7 MMOL/L
BASOPHILS # BLD AUTO: 0.01 K/UL
BASOPHILS NFR BLD: 0.1 %
BUN SERPL-MCNC: 24 MG/DL
CALCIUM SERPL-MCNC: 8.8 MG/DL
CHLORIDE SERPL-SCNC: 103 MMOL/L
CO2 SERPL-SCNC: 32 MMOL/L
CREAT SERPL-MCNC: 1 MG/DL
DIFFERENTIAL METHOD: ABNORMAL
EOSINOPHIL # BLD AUTO: 0.2 K/UL
EOSINOPHIL NFR BLD: 2.1 %
ERYTHROCYTE [DISTWIDTH] IN BLOOD BY AUTOMATED COUNT: 14.3 %
EST. GFR  (AFRICAN AMERICAN): >60 ML/MIN/1.73 M^2
EST. GFR  (NON AFRICAN AMERICAN): 59 ML/MIN/1.73 M^2
GLUCOSE SERPL-MCNC: 116 MG/DL
HCT VFR BLD AUTO: 33.8 %
HGB BLD-MCNC: 10.3 G/DL
LYMPHOCYTES # BLD AUTO: 2.7 K/UL
LYMPHOCYTES NFR BLD: 28.7 %
MCH RBC QN AUTO: 29.9 PG
MCHC RBC AUTO-ENTMCNC: 30.5 G/DL
MCV RBC AUTO: 98 FL
MONOCYTES # BLD AUTO: 1 K/UL
MONOCYTES NFR BLD: 10.5 %
NEUTROPHILS # BLD AUTO: 5.5 K/UL
NEUTROPHILS NFR BLD: 58.6 %
PLATELET # BLD AUTO: 270 K/UL
PMV BLD AUTO: 10.8 FL
POCT GLUCOSE: 106 MG/DL (ref 70–110)
POCT GLUCOSE: 129 MG/DL (ref 70–110)
POTASSIUM SERPL-SCNC: 4.7 MMOL/L
RBC # BLD AUTO: 3.44 M/UL
SODIUM SERPL-SCNC: 142 MMOL/L
WBC # BLD AUTO: 9.31 K/UL

## 2019-02-22 PROCEDURE — 25000003 PHARM REV CODE 250: Performed by: HOSPITALIST

## 2019-02-22 PROCEDURE — 36415 COLL VENOUS BLD VENIPUNCTURE: CPT

## 2019-02-22 PROCEDURE — 80048 BASIC METABOLIC PNL TOTAL CA: CPT

## 2019-02-22 PROCEDURE — 25000003 PHARM REV CODE 250: Performed by: ORTHOPAEDIC SURGERY

## 2019-02-22 PROCEDURE — 97110 THERAPEUTIC EXERCISES: CPT

## 2019-02-22 PROCEDURE — 85025 COMPLETE CBC W/AUTO DIFF WBC: CPT

## 2019-02-22 PROCEDURE — 97116 GAIT TRAINING THERAPY: CPT

## 2019-02-22 RX ORDER — SERTRALINE HYDROCHLORIDE 100 MG/1
TABLET, FILM COATED ORAL
Qty: 90 TABLET | Refills: 1
Start: 2019-02-22 | End: 2019-07-09 | Stop reason: SDUPTHER

## 2019-02-22 RX ADMIN — RIVAROXABAN 10 MG: 10 TABLET, FILM COATED ORAL at 09:02

## 2019-02-22 RX ADMIN — OXYCODONE HYDROCHLORIDE 10 MG: 5 TABLET ORAL at 06:02

## 2019-02-22 RX ADMIN — OXYCODONE HYDROCHLORIDE 10 MG: 5 TABLET ORAL at 02:02

## 2019-02-22 RX ADMIN — DOCUSATE SODIUM 100 MG: 100 CAPSULE, LIQUID FILLED ORAL at 09:02

## 2019-02-22 RX ADMIN — SERTRALINE HYDROCHLORIDE 100 MG: 50 TABLET ORAL at 09:02

## 2019-02-22 RX ADMIN — METOPROLOL TARTRATE 25 MG: 25 TABLET ORAL at 09:02

## 2019-02-22 RX ADMIN — FLUTICASONE PROPIONATE 100 MCG: 50 SPRAY, METERED NASAL at 09:02

## 2019-02-22 RX ADMIN — MUPIROCIN 1 G: 20 OINTMENT TOPICAL at 09:02

## 2019-02-22 RX ADMIN — OXYCODONE HYDROCHLORIDE 10 MG: 5 TABLET ORAL at 12:02

## 2019-02-22 RX ADMIN — SODIUM PHOSPHATE, DIBASIC AND SODIUM PHOSPHATE, MONOBASIC 1 ENEMA: 7; 19 ENEMA RECTAL at 09:02

## 2019-02-22 RX ADMIN — FAMOTIDINE 20 MG: 20 TABLET ORAL at 09:02

## 2019-02-22 NOTE — PT/OT/SLP DISCHARGE
Occupational Therapy Discharge Summary    Sandee Evans  MRN: [id number]   Principal Problem: DJD (degenerative joint disease) of knee      Patient Discharged from acute Occupational Therapy on 2/22/19.  Please refer to prior OT notes for functional status.    Assessment:      Goals partially met.    Objective:     GOALS:   Multidisciplinary Problems     Occupational Therapy Goals        Problem: Occupational Therapy Goal    Goal Priority Disciplines Outcome Interventions   Occupational Therapy Goal     OT, PT/OT Ongoing (interventions implemented as appropriate)    Description:  Goals to be met by: 2/23/19    Patient will increase functional independence with ADLs by performing:    UE Dressing with Modified [place].  LE Dressing with Stand-by Assistance.  Grooming while standing at sink with Stand-by Assistance. Met  Toileting from bedside commode with Modified [place] and Supervision for hygiene and clothing management.   Supine to sit with Modified [place] and Supervision.  Step transfer with Stand-by Assistance  Toilet transfer to bedside commode with Stand-by Assistance.  Upper extremity exercise program x15 reps per handout, with independence.                       Reasons for Discontinuation of Therapy Services  Transfer to alternate level of care.      Plan:     Patient Discharged to: Home with Home Health Service    Christy Lazaro OT  2/22/2019

## 2019-02-22 NOTE — NURSING
Pt trying to leave prior to PT. Patient notified she needs to see PT prior to d/c. PT in the room, will cont to monitor.

## 2019-02-22 NOTE — SUBJECTIVE & OBJECTIVE
Interval History: no BM yet.    Review of Systems   Constitutional: Positive for activity change and appetite change.   HENT: Negative for congestion and drooling.    Eyes: Negative for discharge and itching.   Respiratory: Negative for apnea.    Gastrointestinal: Positive for constipation.   Genitourinary: Negative for difficulty urinating.   Musculoskeletal: Positive for arthralgias.     Objective:     Vital Signs (Most Recent):  Temp: 98.2 °F (36.8 °C) (02/22/19 0807)  Pulse: 75 (02/22/19 0819)  Resp: 18 (02/22/19 0807)  BP: (!) 144/66 (02/22/19 0819)  SpO2: 95 % (02/22/19 0819) Vital Signs (24h Range):  Temp:  [98 °F (36.7 °C)-98.8 °F (37.1 °C)] 98.2 °F (36.8 °C)  Pulse:  [69-76] 75  Resp:  [18-20] 18  SpO2:  [93 %-96 %] 95 %  BP: (135-161)/() 144/66     Weight: 125.4 kg (276 lb 7 oz)  Body mass index is 48.97 kg/m².    Intake/Output Summary (Last 24 hours) at 2/22/2019 0829  Last data filed at 2/21/2019 1800  Gross per 24 hour   Intake 720 ml   Output --   Net 720 ml      Physical Exam   Constitutional: She is oriented to person, place, and time. No distress.   HENT:   Head: Atraumatic.   Eyes: EOM are normal.   Neck: Neck supple.   Cardiovascular: Normal rate and regular rhythm.   Pulmonary/Chest: She is in respiratory distress.   Abdominal: Soft.   Musculoskeletal: She exhibits tenderness.   Neurological: She is oriented to person, place, and time.       Significant Labs:   BMP:   Recent Labs   Lab 02/21/19  0344 02/22/19  0452   * 116*    142   K 4.5 4.7    103   CO2 28 32*   BUN 29* 24*   CREATININE 0.9 1.0   CALCIUM 8.7 8.8   MG 1.9  --      CBC:   Recent Labs   Lab 02/21/19  0344 02/22/19  0452   WBC 9.04 9.31   HGB 11.0* 10.3*   HCT 35.0* 33.8*    270       Significant Imaging: reviewed.

## 2019-02-22 NOTE — PT/OT/SLP PROGRESS
Physical Therapy Treatment    Patient Name:  Sandee Evans   MRN:  [id number]    10:10AM Attempted PT treatment but patient on the toilet after being given an enema.       Recommendations:     Discharge Recommendations:  (home with home health PT and family assistance)   Discharge Equipment Recommendations: tub bench(patient requesting hospital bed due to bedroom on 2nd floor)   Barriers to discharge: None    Assessment:     Sandee Evans is a 63 y.o. female admitted with a medical diagnosis of DJD (degenerative joint disease) of knee.  She presents with the following impairments/functional limitations:  weakness, impaired endurance, impaired functional mobilty, gait instability, impaired balance, decreased lower extremity function, decreased ROM, orthopedic precautions, impaired skin, decreased safety awareness, impaired joint extensibility.    Rehab Prognosis: Good; patient would benefit from acute skilled PT services to address these deficits and reach maximum level of function.    Recent Surgery: Procedure(s) (LRB):  ARTHROPLASTY, KNEE, TOTAL (Left) 3 Days Post-Op    Plan:     During this hospitalization, patient to be seen BID to address the identified rehab impairments via gait training, therapeutic activities, therapeutic exercises and progress toward the following goals:    · Plan of Care Expires:  03/06/19    Subjective     Chief Complaint: Pain in left knee.   Patient/Family Comments/goals: To go home.   Pain/Comfort:  · Pain Rating 1: other (see comments)(yes but did not rate with number)  · Location - Side 1: Left  · Location 1: knee  · Pain Addressed 1: Pre-medicate for activity, Cessation of Activity, Nurse notified    Objective:     Communicated with nurse Agruelles prior to session.  Patient found seated in bedside chair peripheral IV  upon PT entry to room.     General Precautions: Standard, fall   Orthopedic Precautions:LLE weight bearing as tolerated   Braces: N/A     Functional  Mobility:  · Transfers:     · Sit to Stand:  stand by assistance with rolling walker  · Gait:  Patient ambulated 135ft with Rolling Walker and SBA using 3-point gait. Patient demonstrated decreased dmitriy, decreased velocity of limb motion, decreased step length and decreased toe-to-floor clearance during gait due to impaired balance, pain, decreased ROM and decreased strength.      AM-PAC 6 CLICK MOBILITY  Turning over in bed (including adjusting bedclothes, sheets and blankets)?: 4  Sitting down on and standing up from a chair with arms (e.g., wheelchair, bedside commode, etc.): 4  Moving from lying on back to sitting on the side of the bed?: 4  Moving to and from a bed to a chair (including a wheelchair)?: 4  Need to walk in hospital room?: 4  Climbing 3-5 steps with a railing?: 3  Basic Mobility Total Score: 23     Therapeutic Activities and Exercises:   Patient performed B LE seated therex x15 reps for A/P, LAQ (AAROM on L), heel slides, and hip flex (AAROM on L). Patient stated that she has the exercise handout from [facility] [place] and she will perform them at home.     Patient left up in chair with all lines intact, call button in reach, nurse Kindra notified and spouse and family present.    GOALS:   Multidisciplinary Problems     Physical Therapy Goals        Problem: Physical Therapy Goal    Goal Priority Disciplines Outcome Goal Variances Interventions   Physical Therapy Goal     PT, PT/OT Ongoing (interventions implemented as appropriate)     Description:  Goals to be met by: 3/6/19    Patient will increase functional independence with mobility by performin. Sit to stand transfer with Supervision  2. Gait x250 feet with Supervision using Rolling Walker  3. Lower extremity exercise program x30 reps per handout, with supervision                      Time Tracking:     PT Received On: 19  PT Start Time: 1135     PT Stop Time: 1203  PT Total Time (min): 28 min     Billable Minutes: Gait Training 14  and Therapeutic Exercise 14    Treatment Type: Treatment  PT/PTA: PT     PTA Visit Number: 0     Jessica Dorantes, PT  02/22/2019     13:14 Patient declined second PT session stating that she was ready to go home and did not feel that she needed another session. Nurse Arguelles notified.

## 2019-02-22 NOTE — NURSING
Placed call to REYNA Mcgovern regarding pt not having BM. PA notified that enema was administered with no result and pt passing gas. Will cont to monitor.

## 2019-02-22 NOTE — NURSING
New orders given to continue with D/C even if no BM occurs per REYNA Mcgovern. If pt does not have BM by Monday 2/25, go to the ER. Will notify patient.

## 2019-02-22 NOTE — PLAN OF CARE
"SW met with patient to provide discharge follow-up instructions. SW reviewed with patient contents of "[facility] Packet" including "help at home", "things patient responsible for to manage her health at home" and "preferences". Patient was able to verbalize her help at home will be her spouse and adult children. SW discussed with patient how she will manage her health at home is by going on her doctor appointments and taking her medications. SW reiterated to patient she took her preferences into consideration by arranging home health with [facility]. EDUCATION: Patient provided with educational information on Post-Operative Discharge Instructions.  Information reviewed and placed in "My Healthcare Packet" to be brought home for patient to use as resource after discharge.  Information included:  signs and symptoms to look for and when to call the doctor if experiencing any symptoms that may indicate a medical emergency: CALL 911. All questions answered.  Teach back method used. Patient was able to verbalize understanding of signs and symptoms and managing her health by paying attention to black stool and fever. SW informed nurse Kindra tripathi manger completed all discharge planning for patient and is clear to discharge from case management standpoint.       02/22/19 1107   Final Note   Assessment Type Final Discharge Note   Anticipated Discharge Disposition Home-Health  ([facility], [facility])   Hospital Follow Up  Appt(s) scheduled? Yes   Discharge plans and expectations educations in teach back method with documentation complete? Yes   Right Care Referral Info   Post Acute Recommendation SNF / Sub-Acute Rehab   Referral Type Homecare   Facility Name [address] [address]     "

## 2019-02-22 NOTE — NURSING
D/C instructions given to patient regarding follow up appts, medications to take and how to care for self @ home. Pt educated on keeping SAI hose on x 3 weeks. Xaretla 10 mg x3 weeks then resume 20 mg dose. Ok to shower, no bath, do not remove dressing. Pt stated understanding. Pt in no distress. Will cont to monitor. Awaiting PT for last session and then will be d/c'ed home. PT educated if she does not have BM by Monday, go to ED, pt stated understanding.

## 2019-02-22 NOTE — NURSING
Pt requesting another enema as she didn't have a BM with the first enema. Md varghese, need to wait to see if BM occurs. PT requesting a hospital bed and a XL shower chair, will notify CM.

## 2019-02-22 NOTE — PROGRESS NOTES
MATIAS contacted Dr. Hanson office @ [phone] to schedule ortho follow-up, MATIAS spoke to Kisha, appointment scheduled for 3/6/19 @ 2:15pm.

## 2019-02-22 NOTE — DISCHARGE SUMMARY
".Physician Discharge Summary     Patient ID:  Sandee Evans  [id number]  63 y.o.  1955    Admit date: 2/19/2019    Discharge date and time: No discharge date for patient encounter.     Admitting Physician: Med Hanson MD     Admission Diagnoses: Primary osteoarthritis of left knee [M17.12]  Primary osteoarthritis of left knee [M17.12]  Primary osteoarthritis of left knee [M17.12]  Primary osteoarthritis of left knee [M17.12]    Discharge Diagnoses: L knee OA    Admission Condition: good    Discharged Condition: good    Discharge Exam:    BP (!) 144/66   Pulse 75   Temp 98.2 °F (36.8 °C) (Oral)   Resp 18   Ht 5' 3" (1.6 m)   Wt 125.4 kg (276 lb 7 oz)   SpO2 95%   Breastfeeding? No   BMI 48.97 kg/m²     General Appearance:    Alert, cooperative, no distress, appears stated age   Head:    Normocephalic, without obvious abnormality, atraumatic   Eyes:    PERRL, conjunctiva/corneas clear, EOM's intact, fundi     benign, both eyes   Ears:    Normal TM's and external ear canals, both ears   Nose:   Nares normal, septum midline, mucosa normal, no drainage    or sinus tenderness   Throat:   Lips, mucosa, and tongue normal; teeth and gums normal   Neck:   Supple, symmetrical, trachea midline, no adenopathy;     thyroid:  no enlargement/tenderness/nodules; no carotid    bruit or JVD   Back:     Symmetric, no curvature, ROM normal, no CVA tenderness   Lungs:     Clear to auscultation bilaterally, respirations unlabored   Chest Wall:    No tenderness or deformity    Heart:    Regular rate and rhythm, S1 and S2 normal, no murmur, rub   or gallop   Breast Exam:    No tenderness, masses, or nipple abnormality   Abdomen:     Soft, non-tender, bowel sounds active all four quadrants,     no masses, no organomegaly   Genitalia:    Normal female without lesion, discharge or tenderness   Rectal:    Normal tone, no masses or tenderness;    guaiac negative stool   Extremities:   Extremities normal, atraumatic, no " cyanosis or edema   Pulses:   2+ and symmetric all extremities   Skin:   Skin color, texture, turgor normal, no rashes or lesions   Lymph nodes:   Cervical, supraclavicular, and axillary nodes normal   Neurologic:   CNII-XII intact, normal strength, sensation and reflexes     throughout       Disposition: Home Health    Patient Instructions:   Current Discharge Medication List      CONTINUE these medications which have CHANGED    Details   rivaroxaban (XARELTO) 10 mg Tab Take 1 tablet (10 mg total) by mouth once daily.  Qty: 21 tablet, Refills: 0      sertraline (ZOLOFT) 100 MG tablet TAKE 1 TABLET(100 MG) BY MOUTH EVERY DAY - start this dose after 21 days of 10mg dose  Qty: 90 tablet, Refills: 1    Associated Diagnoses: Depression, unspecified depression type         CONTINUE these medications which have NOT CHANGED    Details   albuterol 90 mcg/actuation inhaler Inhale 2 puffs into the lungs every 6 (six) hours as needed for Wheezing.  Qty: 1 each, Refills: 11    Associated Diagnoses: Atypical pneumonia      fluticasone (FLONASE) 50 mcg/actuation nasal spray 2 sprays by Each Nare route once daily.  Qty: 15 g, Refills: 0      metFORMIN (GLUCOPHAGE) 500 MG tablet Take 1 tablet (500 mg total) by mouth 2 (two) times daily with meals.  Qty: 180 tablet, Refills: 2      simvastatin (ZOCOR) 20 MG tablet TAKE 1 TABLET(20 MG) BY MOUTH EVERY EVENING  Qty: 90 tablet, Refills: 0    Associated Diagnoses: Hyperlipidemia, unspecified hyperlipidemia type      spironolactone (ALDACTONE) 25 MG tablet TAKE 1 TABLET(25 MG) BY MOUTH EVERY DAY  Qty: 90 tablet, Refills: 2    Associated Diagnoses: Essential hypertension      loratadine (CLARITIN) 10 mg tablet TAKE 1 TABLET(10 MG) BY MOUTH EVERY DAY  Qty: 30 tablet, Refills: 0    Associated Diagnoses: Seasonal allergic rhinitis      metoprolol tartrate (LOPRESSOR) 25 MG tablet Take 1 tablet (25 mg total) by mouth 2 (two) times daily.  Qty: 60 tablet, Refills: 3      triamcinolone acetonide  0.1% (KENALOG) 0.1 % ointment APPLY BETWEEN KNEES AND UPPER THIGHS TWICE DAILY FOR NO MORE THAN 7-14 DAYS  Qty: 454 g, Refills: 0    Associated Diagnoses: Allergic reaction, initial encounter         STOP taking these medications       aspirin (ECOTRIN) 81 MG EC tablet        oxyCODONE-acetaminophen (PERCOCET)  mg per tablet            Activity: activity as tolerated  Diet: diabetic diet  Wound Care: keep wound clean and dry, reinforce dressing PRN, ice to area for comfort and DO NOT REMOVE AQUACELL    Follow-up with Dr Hanson in 2 weeks.    Signed:  Med Hanson  2/22/2019  8:58 AM

## 2019-02-22 NOTE — PLAN OF CARE
Problem: Adult Inpatient Plan of Care  Goal: Plan of Care Review  Outcome: Ongoing (interventions implemented as appropriate)   02/22/19 0131   Plan of Care Review   Plan of Care Reviewed With patient   Uneventful hours. No falls or injuries noted.turned with assist.Encourage weight and turning every 2 hours and prn. Dressing tact to left knee Polar ice in place..Afebrile. Pain under control at this time.

## 2019-02-22 NOTE — PROGRESS NOTES
Per patient request SW contacted [facility] to order electric hospital bed. Patient spouse will cover cost.

## 2019-02-22 NOTE — PROGRESS NOTES
Progress Note  Orthopedics    Admit Date: 2/19/2019  Post-operative Day: 3 Days Post-Op  Hospital Day: 4    Follow-up For: Procedure(s) (LRB):  ARTHROPLASTY, KNEE, TOTAL (Left)    SUBJECTIVE:     Sandee Evans is 63 y.o. and is now 3 days post surgery. Pain is controlled with current analgesics.. She  She is ambulating.  Weight Bearing: WBAT    A&O. AFVSS. NVI L LE. Incision clean and dry.  H&H - 11.0/35.0.  Ambulating 200 feet with PT.    OBJECTIVE:     Vital Signs (Most Recent)  Temp: 98.2 °F (36.8 °C) (02/22/19 0807)  Pulse: 75 (02/22/19 0819)  Resp: 18 (02/22/19 0807)  BP: (!) 144/66 (02/22/19 0819)  SpO2: 95 % (02/22/19 0819)      Physical Exam:  Dressing: clean, dry and intact  Incision: healing well, no significant drainage, no dehiscence, no significant erythema  DVT Evaluation: No evidence of DVT seen on physical exam.  Negative Bre's sign.  Neurovascular: 5/5 motor strength, sensation intact, palpable pulses    Laboratory:  Recent Results (from the past 24 hour(s))   POCT glucose    Collection Time: 02/21/19 11:52 AM   Result Value Ref Range    POCT Glucose 147 (H) 70 - 110 mg/dL   POCT glucose    Collection Time: 02/21/19  5:20 PM   Result Value Ref Range    POCT Glucose 139 (H) 70 - 110 mg/dL   CBC auto differential    Collection Time: 02/22/19  4:52 AM   Result Value Ref Range    WBC 9.31 3.90 - 12.70 K/uL    RBC 3.44 (L) 4.00 - 5.40 M/uL    Hemoglobin 10.3 (L) 12.0 - 16.0 g/dL    Hematocrit 33.8 (L) 37.0 - 48.5 %    MCV 98 82 - 98 fL    MCH 29.9 27.0 - 31.0 pg    MCHC 30.5 (L) 32.0 - 36.0 g/dL    RDW 14.3 11.5 - 14.5 %    Platelets 270 150 - 350 K/uL    MPV 10.8 9.2 - 12.9 fL    Gran # (ANC) 5.5 1.8 - 7.7 K/uL    Lymph # 2.7 1.0 - 4.8 K/uL    Mono # 1.0 0.3 - 1.0 K/uL    Eos # 0.2 0.0 - 0.5 K/uL    Baso # 0.01 0.00 - 0.20 K/uL    Gran% 58.6 38.0 - 73.0 %    Lymph% 28.7 18.0 - 48.0 %    Mono% 10.5 4.0 - 15.0 %    Eosinophil% 2.1 0.0 - 8.0 %    Basophil% 0.1 0.0 - 1.9 %    Differential Method  Automated    Basic metabolic panel    Collection Time: 02/22/19  4:52 AM   Result Value Ref Range    Sodium 142 136 - 145 mmol/L    Potassium 4.7 3.5 - 5.1 mmol/L    Chloride 103 95 - 110 mmol/L    CO2 32 (H) 23 - 29 mmol/L    Glucose 116 (H) 70 - 110 mg/dL    BUN, Bld 24 (H) 8 - 23 mg/dL    Creatinine 1.0 0.5 - 1.4 mg/dL    Calcium 8.8 8.7 - 10.5 mg/dL    Anion Gap 7 (L) 8 - 16 mmol/L    eGFR if African American >60 >60 mL/min/1.73 m^2    eGFR if non African American 59 (A) >60 mL/min/1.73 m^2         ASSESSMENT/PLAN:     Assessment: orthopedic stable, condition improving  Status Post: L TKA    Plan: discharge home this PM after PT X2.  WBAT. OK to shower once home.  Do not remove Aquacel dressing.  Con't Teds B/L for 3 weeks.  Con't Xarelto 10mg po qd for 3 weeks. Then resume cardiology dose of 20mg po qd.  RTC 2 weeks.

## 2019-02-22 NOTE — PROGRESS NOTES
[facility] Hospital Medicine  Progress Note    Patient Name: Sandee Evans  MRN: [id number]  Patient Class: IP- Inpatient   Admission Date: 2/19/2019  Length of Stay: 3 days  Attending Physician: Med Hanson MD  Primary Care Provider: Steven Ruffin MD        Subjective:     Principal Problem:DJD (degenerative joint disease) of knee    HPI:  See H&P.    Hospital Course:  Patient is S/P elective  Left  TKA,duo to severe OA,  PT,OT is following.  hospital medicine is doing medical management,exept pain on knee,she has no other complain,  No BM yet,added miralax and lactulsoe,did not work,will have nema.  started on prn clonidine ,DC aldactone,check daily BMP.  Pain is better  controled today.    Interval History: no BM yet.    Review of Systems   Constitutional: Positive for activity change and appetite change.   HENT: Negative for congestion and drooling.    Eyes: Negative for discharge and itching.   Respiratory: Negative for apnea.    Gastrointestinal: Positive for constipation.   Genitourinary: Negative for difficulty urinating.   Musculoskeletal: Positive for arthralgias.     Objective:     Vital Signs (Most Recent):  Temp: 98.2 °F (36.8 °C) (02/22/19 0807)  Pulse: 75 (02/22/19 0819)  Resp: 18 (02/22/19 0807)  BP: (!) 144/66 (02/22/19 0819)  SpO2: 95 % (02/22/19 0819) Vital Signs (24h Range):  Temp:  [98 °F (36.7 °C)-98.8 °F (37.1 °C)] 98.2 °F (36.8 °C)  Pulse:  [69-76] 75  Resp:  [18-20] 18  SpO2:  [93 %-96 %] 95 %  BP: (135-161)/() 144/66     Weight: 125.4 kg (276 lb 7 oz)  Body mass index is 48.97 kg/m².    Intake/Output Summary (Last 24 hours) at 2/22/2019 0829  Last data filed at 2/21/2019 1800  Gross per 24 hour   Intake 720 ml   Output --   Net 720 ml      Physical Exam   Constitutional: She is oriented to person, place, and time. No distress.   HENT:   Head: Atraumatic.   Eyes: EOM are normal.   Neck: Neck supple.   Cardiovascular: Normal rate and regular rhythm.    Pulmonary/Chest: She is in respiratory distress.   Abdominal: Soft.   Musculoskeletal: She exhibits tenderness.   Neurological: She is oriented to person, place, and time.       Significant Labs:   BMP:   Recent Labs   Lab 02/21/19  0344 02/22/19  0452   * 116*    142   K 4.5 4.7    103   CO2 28 32*   BUN 29* 24*   CREATININE 0.9 1.0   CALCIUM 8.7 8.8   MG 1.9  --      CBC:   Recent Labs   Lab 02/21/19  0344 02/22/19  0452   WBC 9.04 9.31   HGB 11.0* 10.3*   HCT 35.0* 33.8*    270       Significant Imaging: reviewed.    Assessment/Plan:      * DJD (degenerative joint disease) of knee    S/P elective left TKA,pain management,PT,OT ,pain is better today.       Other constipation    Pills did not word,will have eema.       PVD (peripheral vascular disease)    On statin,ASA.       Type 2 diabetes mellitus without complication    On SSI,Hba1c 5.7.       Chronic atrial fibrillation    Stable on BB and  OAC.       Obesity, morbid    Lose weight as out patient.       Essential hypertension    started on prn clonidine ,DC aldactone,check daily BMP.       Hyperlipidemia    On statin.         VTE Risk Mitigation (From admission, onward)        Ordered     rivaroxaban tablet 10 mg  Daily      02/19/19 1523     IP VTE HIGH RISK PATIENT  Once      02/19/19 1523     Place sequential compression device  Until discontinued      02/19/19 1323     Place sequential compression device  Until discontinued      02/19/19 1323     Place SAI hose  Until discontinued      02/19/19 1323     Place SAI hose  Until discontinued      02/19/19 1323     Place sequential compression device  Until discontinued      02/19/19 1323              Luz Ware MD  Department of Hospital Medicine   [facility]

## 2019-02-22 NOTE — PROGRESS NOTES
Follow-up Information     Med Hanson MD. Go on 3/6/2019.    Specialty:  Orthopedic Surgery  Why:  For suture removal, For wound re-check, Outpatient Services, Orthopedic Surgery Follow-up Appointment, Please arrive to clinic for 2:15PM   Contact information:  [address] [place] [address]  [phone]             [facility].    Specialty:  Home Health Services  Why:  Home Health Provider  Contact information:  [address]  [place] [address]  [phone]                   [facility]    WRITTEN HEALTHCARE AND DISCHARGE INFORMATION                        Help at Home           [phone]  After discharge for assistance [facility] On Call Nurse Care Line 24/7  Assistance    Things You are responsible For To Manage Your Care At Home:  1.    Getting your prescriptions filled   2.    Taking your medications as directed, DO NOT MISS ANY DOSES!  3.    Going to your follow-up doctor appointment. This is important because it  allow the doctor to monitor your progress and determine if  any changes need to made to your treatment plan.     Thank you for choosing [facility] for your care.  Please answer any calls you may receive from [facility] we want to continue to support you as you manage your healthcare needs. [facility] is happy to have the opportunity to serve you.     Sincerely,  Your [facility] Team,  Genevieve Manley LMSW   II  [phone]    WRITTEN HEALTHCARE AND DISCHARGE INFORMATION                        Help at Home           [phone]  After discharge for assistance [facility] On Call Nurse Care Line 24/7  Assistance    Things You are responsible For To Manage Your Care At Home:  1.    Getting your prescriptions filled   2.    Taking your medications as directed, DO NOT MISS ANY DOSES!  3.    Going to your follow-up doctor appointment. This is important because it  allow the doctor to monitor your progress and  determine if  any changes need to made to your treatment plan.     Thank you for choosing [facility] for your care.  Please answer any calls you may receive from [facility] we want to continue to support you as you manage your healthcare needs. [facility] is happy to have the opportunity to serve you.     Sincerely,  Your [facility] Team,  Genevieve Manley MATIAS   II  [phone]

## 2019-03-19 NOTE — PRE-PROCEDURE INSTRUCTIONS
Spoke with client and instructed to hold taking of Xarelto for two days prior to colonoscopy verbalized good understanding of this .

## 2019-03-21 DIAGNOSIS — Z12.11 COLON CANCER SCREENING: Primary | ICD-10-CM

## 2019-06-04 DIAGNOSIS — Z12.39 SCREENING FOR BREAST CANCER: Primary | ICD-10-CM

## 2019-06-04 DIAGNOSIS — E11.9 DIABETES MELLITUS WITHOUT COMPLICATION: ICD-10-CM

## 2019-06-18 ENCOUNTER — OFFICE VISIT (OUTPATIENT)
Dept: OPTOMETRY | Facility: CLINIC | Age: 64
End: 2019-06-18
Payer: COMMERCIAL

## 2019-06-18 ENCOUNTER — HOSPITAL ENCOUNTER (OUTPATIENT)
Dept: RADIOLOGY | Facility: HOSPITAL | Age: 64
Discharge: HOME | End: 2019-06-18
Attending: FAMILY MEDICINE
Payer: COMMERCIAL

## 2019-06-18 ENCOUNTER — OFFICE VISIT (OUTPATIENT)
Dept: FAMILY MEDICINE | Facility: CLINIC | Age: 64
End: 2019-06-18
Payer: COMMERCIAL

## 2019-06-18 VITALS
HEART RATE: 92 BPM | SYSTOLIC BLOOD PRESSURE: 116 MMHG | BODY MASS INDEX: 48.2 KG/M2 | DIASTOLIC BLOOD PRESSURE: 80 MMHG | HEIGHT: 63 IN | WEIGHT: 272.06 LBS | RESPIRATION RATE: 20 BRPM | OXYGEN SATURATION: 97 % | TEMPERATURE: 99 F

## 2019-06-18 DIAGNOSIS — H25.13 NUCLEAR SCLEROSIS, BILATERAL: ICD-10-CM

## 2019-06-18 DIAGNOSIS — E11.9 TYPE 2 DIABETES MELLITUS WITHOUT RETINOPATHY: Primary | ICD-10-CM

## 2019-06-18 DIAGNOSIS — H04.123 DRY EYE SYNDROME, BILATERAL: ICD-10-CM

## 2019-06-18 DIAGNOSIS — E78.2 MIXED HYPERLIPIDEMIA: ICD-10-CM

## 2019-06-18 DIAGNOSIS — Z12.39 SCREENING FOR BREAST CANCER: ICD-10-CM

## 2019-06-18 DIAGNOSIS — E11.9 TYPE 2 DIABETES MELLITUS WITHOUT COMPLICATION, WITHOUT LONG-TERM CURRENT USE OF INSULIN: ICD-10-CM

## 2019-06-18 DIAGNOSIS — H52.7 REFRACTIVE ERROR: ICD-10-CM

## 2019-06-18 DIAGNOSIS — M25.562 ACUTE PAIN OF LEFT KNEE: ICD-10-CM

## 2019-06-18 DIAGNOSIS — J30.2 SEASONAL ALLERGIC RHINITIS, UNSPECIFIED TRIGGER: ICD-10-CM

## 2019-06-18 DIAGNOSIS — M25.562 ACUTE PAIN OF LEFT KNEE: Primary | ICD-10-CM

## 2019-06-18 DIAGNOSIS — Z96.652 CHRONIC KNEE PAIN AFTER TOTAL REPLACEMENT OF LEFT KNEE JOINT: ICD-10-CM

## 2019-06-18 DIAGNOSIS — Z23 NEED FOR PNEUMOCOCCAL VACCINATION: ICD-10-CM

## 2019-06-18 DIAGNOSIS — L98.9 SKIN LESION: ICD-10-CM

## 2019-06-18 DIAGNOSIS — M25.562 CHRONIC KNEE PAIN AFTER TOTAL REPLACEMENT OF LEFT KNEE JOINT: ICD-10-CM

## 2019-06-18 DIAGNOSIS — G89.29 CHRONIC KNEE PAIN AFTER TOTAL REPLACEMENT OF LEFT KNEE JOINT: ICD-10-CM

## 2019-06-18 LAB
LEFT EYE DM RETINOPATHY: NEGATIVE
RIGHT EYE DM RETINOPATHY: NEGATIVE

## 2019-06-18 PROCEDURE — 77063 BREAST TOMOSYNTHESIS BI: CPT | Mod: 26,,, | Performed by: RADIOLOGY

## 2019-06-18 PROCEDURE — 3079F DIAST BP 80-89 MM HG: CPT | Mod: CPTII,S$GLB,, | Performed by: FAMILY MEDICINE

## 2019-06-18 PROCEDURE — 99999 PR PBB SHADOW E&M-EST. PATIENT-LVL V: ICD-10-PCS | Mod: PBBFAC,,, | Performed by: FAMILY MEDICINE

## 2019-06-18 PROCEDURE — 90670 PCV13 VACCINE IM: CPT | Mod: S$GLB,,, | Performed by: FAMILY MEDICINE

## 2019-06-18 PROCEDURE — 99215 PR OFFICE/OUTPT VISIT, EST, LEVL V, 40-54 MIN: ICD-10-PCS | Mod: S$GLB,,, | Performed by: FAMILY MEDICINE

## 2019-06-18 PROCEDURE — 90670 PNEUMOCOCCAL CONJUGATE VACCINE 13-VALENT LESS THAN 5YO & GREATER THAN: ICD-10-PCS | Mod: S$GLB,,, | Performed by: FAMILY MEDICINE

## 2019-06-18 PROCEDURE — 73562 X-RAY EXAM OF KNEE 3: CPT | Mod: 26,LT,, | Performed by: RADIOLOGY

## 2019-06-18 PROCEDURE — 1101F PR PT FALLS ASSESS DOC 0-1 FALLS W/OUT INJ PAST YR: ICD-10-PCS | Mod: CPTII,S$GLB,, | Performed by: FAMILY MEDICINE

## 2019-06-18 PROCEDURE — 92015 PR REFRACTION: ICD-10-PCS | Mod: S$GLB,,, | Performed by: OPTOMETRIST

## 2019-06-18 PROCEDURE — 73562 X-RAY EXAM OF KNEE 3: CPT | Mod: TC,FY,PO,LT

## 2019-06-18 PROCEDURE — 3079F PR MOST RECENT DIASTOLIC BLOOD PRESSURE 80-89 MM HG: ICD-10-PCS | Mod: CPTII,S$GLB,, | Performed by: FAMILY MEDICINE

## 2019-06-18 PROCEDURE — 77063 MAMMO DIGITAL SCREENING BILAT WITH TOMOSYNTHESIS_CAD: ICD-10-PCS | Mod: 26,,, | Performed by: RADIOLOGY

## 2019-06-18 PROCEDURE — 90471 PNEUMOCOCCAL CONJUGATE VACCINE 13-VALENT LESS THAN 5YO & GREATER THAN: ICD-10-PCS | Mod: S$GLB,,, | Performed by: FAMILY MEDICINE

## 2019-06-18 PROCEDURE — 99999 PR PBB SHADOW E&M-EST. PATIENT-LVL V: CPT | Mod: PBBFAC,,, | Performed by: FAMILY MEDICINE

## 2019-06-18 PROCEDURE — 3044F PR MOST RECENT HEMOGLOBIN A1C LEVEL <7.0%: ICD-10-PCS | Mod: CPTII,S$GLB,, | Performed by: FAMILY MEDICINE

## 2019-06-18 PROCEDURE — 99999 PR PBB SHADOW E&M-EST. PATIENT-LVL I: ICD-10-PCS | Mod: PBBFAC,,, | Performed by: OPTOMETRIST

## 2019-06-18 PROCEDURE — 3008F BODY MASS INDEX DOCD: CPT | Mod: CPTII,S$GLB,, | Performed by: FAMILY MEDICINE

## 2019-06-18 PROCEDURE — 73562 XR KNEE 3 VIEW LEFT: ICD-10-PCS | Mod: 26,LT,, | Performed by: RADIOLOGY

## 2019-06-18 PROCEDURE — 77067 MAMMO DIGITAL SCREENING BILAT WITH TOMOSYNTHESIS_CAD: ICD-10-PCS | Mod: 26,,, | Performed by: RADIOLOGY

## 2019-06-18 PROCEDURE — 92014 PR EYE EXAM, EST PATIENT,COMPREHESV: ICD-10-PCS | Mod: S$GLB,,, | Performed by: OPTOMETRIST

## 2019-06-18 PROCEDURE — 3074F PR MOST RECENT SYSTOLIC BLOOD PRESSURE < 130 MM HG: ICD-10-PCS | Mod: CPTII,S$GLB,, | Performed by: FAMILY MEDICINE

## 2019-06-18 PROCEDURE — 3074F SYST BP LT 130 MM HG: CPT | Mod: CPTII,S$GLB,, | Performed by: FAMILY MEDICINE

## 2019-06-18 PROCEDURE — 92014 COMPRE OPH EXAM EST PT 1/>: CPT | Mod: S$GLB,,, | Performed by: OPTOMETRIST

## 2019-06-18 PROCEDURE — 77067 SCR MAMMO BI INCL CAD: CPT | Mod: 26,,, | Performed by: RADIOLOGY

## 2019-06-18 PROCEDURE — 90471 IMMUNIZATION ADMIN: CPT | Mod: S$GLB,,, | Performed by: FAMILY MEDICINE

## 2019-06-18 PROCEDURE — 3008F PR BODY MASS INDEX (BMI) DOCUMENTED: ICD-10-PCS | Mod: CPTII,S$GLB,, | Performed by: FAMILY MEDICINE

## 2019-06-18 PROCEDURE — 3044F HG A1C LEVEL LT 7.0%: CPT | Mod: CPTII,S$GLB,, | Performed by: FAMILY MEDICINE

## 2019-06-18 PROCEDURE — 77067 SCR MAMMO BI INCL CAD: CPT | Mod: TC,PO

## 2019-06-18 PROCEDURE — 99999 PR PBB SHADOW E&M-EST. PATIENT-LVL I: CPT | Mod: PBBFAC,,, | Performed by: OPTOMETRIST

## 2019-06-18 PROCEDURE — 99215 OFFICE O/P EST HI 40 MIN: CPT | Mod: S$GLB,,, | Performed by: FAMILY MEDICINE

## 2019-06-18 PROCEDURE — 92015 DETERMINE REFRACTIVE STATE: CPT | Mod: S$GLB,,, | Performed by: OPTOMETRIST

## 2019-06-18 PROCEDURE — 1101F PT FALLS ASSESS-DOCD LE1/YR: CPT | Mod: CPTII,S$GLB,, | Performed by: FAMILY MEDICINE

## 2019-06-18 RX ORDER — NAPROXEN 375 MG/1
375 TABLET ORAL 2 TIMES DAILY PRN
Qty: 180 TABLET | Refills: 1 | Status: SHIPPED | OUTPATIENT
Start: 2019-06-18 | End: 2019-06-18

## 2019-06-18 RX ORDER — FLUTICASONE PROPIONATE 50 MCG
2 SPRAY, SUSPENSION (ML) NASAL DAILY
Qty: 15 G | Refills: 0 | Status: SHIPPED | OUTPATIENT
Start: 2019-06-18 | End: 2019-07-22 | Stop reason: SDUPTHER

## 2019-06-18 RX ORDER — TRIAMCINOLONE ACETONIDE 1 MG/G
OINTMENT TOPICAL
Qty: 454 G | Refills: 0 | Status: SHIPPED | OUTPATIENT
Start: 2019-06-18 | End: 2019-07-24 | Stop reason: SDUPTHER

## 2019-06-18 RX ORDER — METFORMIN HYDROCHLORIDE 500 MG/1
500 TABLET ORAL 2 TIMES DAILY WITH MEALS
Qty: 180 TABLET | Refills: 2 | Status: SHIPPED | OUTPATIENT
Start: 2019-06-18 | End: 2020-01-21 | Stop reason: SDUPTHER

## 2019-06-18 RX ORDER — RIVAROXABAN 20 MG/1
20 TABLET, FILM COATED ORAL DAILY
Refills: 11 | COMMUNITY
Start: 2019-05-10 | End: 2020-03-23 | Stop reason: SDUPTHER

## 2019-06-18 RX ORDER — TRAMADOL HYDROCHLORIDE 50 MG/1
50 TABLET ORAL EVERY 12 HOURS PRN
Qty: 15 TABLET | Refills: 0 | Status: SHIPPED | OUTPATIENT
Start: 2019-06-18 | End: 2019-07-05 | Stop reason: SDUPTHER

## 2019-06-18 NOTE — PROGRESS NOTES
Health Maintenance Due   Topic     Colonoscopy  Scheduled 6/25/19    Foot Exam  Foot prep to be examine today if PCP have time     Eye Exam  Will get pt to schedule to Dr Hung    Urine Microalbumin  Scheduled to get collected today     Pneumococcal Vaccine (65+ Low/Medium Risk) (1 of 2 - PCV13) Orders in ; ok to get today     Lipid Panel  Scheduled to get done today      Zoster: hx chickenpox ; inform pt can get vaccine at pharmacy.

## 2019-06-18 NOTE — PROGRESS NOTES
Please inform patient that the x-ray shows some mild swelling but no fracture or other misalignment.  Continue treatment plan as discussed.  Patient to notify PCP in 2 weeks if not progressing.

## 2019-06-18 NOTE — PATIENT INSTRUCTIONS
Paul - [place] - Rosa Henriquez    Tylenol Arthritis (acetaminophen) - You can take 2 Tylenol Arthritis 1,300mg 3 times per day for pain. Maximum 3,900mg per day. Dosing spaced at least 8 hours apart.    Tylenol Extra strength (acetaminophen 500mg) - you can take up to 2 pills (100mg) 3 times per day as needed for pain. Maximum 3,000mg per day. Dosing spaced at least 4 hours apart.     Zyrtec (cetirizine) 10mg at night.

## 2019-06-18 NOTE — PROGRESS NOTES
Subjective:       Patient ID: Sandee Evans is a 63 y.o. female      Chief Complaint   Patient presents with    Diabetic Eye Exam     History of Present Illness  DLS:  12/18/17 Dr Abhilash SALGADO's PRN OU   No eye surgery     Pt here for diabetic eye check. Pt states [facility] OU is foggy mostly at near. Pt denies flashes or eye pain. Pt states itching, tearing, floaters and headaches.     Hemoglobin A1C       Date                     Value               Ref Range           Status              02/21/2019               5.7 (H)             4.0 - 5.6 %         Final              02/13/2019               5.5                 4.0 - 5.6 %         Final                05/28/2018               5.4                 4.0 - 5.6 %         Final               Assessment/Plan:     1. Type 2 diabetes mellitus without retinopathy  No diabetic retinopathy. Discussed with pt the effects of diabetes on vision, importance of good blood sugar control, compliance with meds, and follow up care with PCP. Return in 1 year for dilated eye exam, sooner PRN.    2. Dry eye syndrome, bilateral  Recommend artificial tears (Systane/Refresh/Blink/Thera Tears). 1 drop 4x per day and PRN. Discussed different drop options - AT/PFAT/gel/ointment. Chronicity of disease and treatment discussed.    3. Nuclear sclerosis, bilateral  Educated patient on refractive error and discussed lens options. Dispensed updated spectacle Rx. Educated about adaptation period to new specs.    4. Refractive error  Educated patient on refractive error and discussed lens options. Dispensed updated spectacle Rx. Educated about adaptation period to new specs.    Eyeglass Final Rx     Eyeglass Final Rx       Sphere Cylinder Axis Add    Right -0.50 +1.50 175 +2.75    Left [place] +0.75 005 +2.75    Expiration Date:  6/18/2020                  Follow up in about 1 year (around 6/18/2020) for Diabetic Eye Exam.

## 2019-06-18 NOTE — PROGRESS NOTES
Subjective:       Patient ID: Sandee Evans is a 63 y.o. female.    Chief Complaint: Knee Pain (follow up surgery on left knee from 2/19/19)    HPI    Dm2  - pt states that she has been off her metformin due to lack of refills. She is requesting a refill.     Breast can screening - due for mammo    Allergic Rhinitis - Chronic - stable     Falls - last fall was 3 days. She attributes this to loosing her balance. She landed on her L knee and she is experiencing significant anterior pain when she bears weight on that side.           Current Outpatient Medications on File Prior to Visit   Medication Sig Dispense Refill    albuterol 90 mcg/actuation inhaler Inhale 2 puffs into the lungs every 6 (six) hours as needed for Wheezing. 1 each 11    metoprolol tartrate (LOPRESSOR) 25 MG tablet Take 1 tablet (25 mg total) by mouth 2 (two) times daily. 60 tablet 3    rivaroxaban (XARELTO) 10 mg Tab Take 1 tablet (10 mg total) by mouth once daily. 21 tablet 0    sertraline (ZOLOFT) 100 MG tablet TAKE 1 TABLET(100 MG) BY MOUTH EVERY DAY - start this dose after 21 days of 10mg dose 90 tablet 1    simvastatin (ZOCOR) 20 MG tablet TAKE 1 TABLET(20 MG) BY MOUTH EVERY EVENING 90 tablet 0    spironolactone (ALDACTONE) 25 MG tablet TAKE 1 TABLET(25 MG) BY MOUTH EVERY DAY 90 tablet 2    XARELTO 20 mg Tab Take 20 mg by mouth once daily.  11    [DISCONTINUED] fluticasone (FLONASE) 50 mcg/actuation nasal spray 2 sprays by Each Nare route once daily. 15 g 0    [DISCONTINUED] metFORMIN (GLUCOPHAGE) 500 MG tablet Take 1 tablet (500 mg total) by mouth 2 (two) times daily with meals. 180 tablet 2    [DISCONTINUED] triamcinolone acetonide 0.1% (KENALOG) 0.1 % ointment APPLY BETWEEN KNEES AND UPPER THIGHS TWICE DAILY FOR NO MORE THAN 7-14 DAYS 454 g 0    polyethylene glycol (COLYTE) 240-22.72-6.72 -5.84 gram SolR TK 400O ML PO  ONCE  0    [DISCONTINUED] loratadine (CLARITIN) 10 mg tablet TAKE 1 TABLET(10 MG) BY MOUTH EVERY DAY 30  tablet 0     No current facility-administered medications on file prior to visit.        Past Medical History:   Diagnosis Date    Anticoagulant long-term use     Xarelto    Arthritis     Atrial fibrillation     Breast cyst     Degenerative disc disease     Depression     Diabetes mellitus     Pre diabetic    Hyperlipidemia     Hypertension     Nuclear sclerosis, bilateral 12/18/2017    Obesity, morbid     Other abnormal glucose     pre-diabetes    Sleep apnea     Smoker     Thyroid disease     on meds 8-9 years ago. hypothyroidism.no malignancy    TMJ (temporomandibular joint disorder)     jaw clicking    Urge incontinence     Wears glasses        Family History   Problem Relation Age of Onset    Diabetes Mother     Diabetes Brother     No Known Problems Father     No Known Problems Sister     No Known Problems Maternal Aunt     No Known Problems Maternal Uncle     No Known Problems Paternal Aunt     No Known Problems Paternal Uncle     No Known Problems Maternal Grandmother     No Known Problems Maternal Grandfather     No Known Problems Paternal Grandmother     No Known Problems Paternal Grandfather     Amblyopia Neg Hx     Blindness Neg Hx     Cancer Neg Hx     Cataracts Neg Hx     Glaucoma Neg Hx     Hypertension Neg Hx     Macular degeneration Neg Hx     Retinal detachment Neg Hx     Strabismus Neg Hx     Stroke Neg Hx     Thyroid disease Neg Hx         reports that she has been smoking cigarettes.  She started smoking about 46 years ago. She has been smoking about 0.41 packs per day. She has never used smokeless tobacco. She reports that she does not drink alcohol or use drugs.    Review of Systems   Constitutional: Negative for activity change and unexpected weight change.   HENT: Negative for hearing loss, rhinorrhea and trouble swallowing.    Eyes: Positive for discharge and visual disturbance.   Respiratory: Positive for wheezing. Negative for chest tightness.     Cardiovascular: Negative for chest pain and palpitations.   Gastrointestinal: Positive for constipation. Negative for blood in stool, diarrhea and vomiting.   Endocrine: Negative for polydipsia and polyuria.   Genitourinary: Negative for difficulty urinating, dysuria, hematuria and menstrual problem.   Musculoskeletal: Positive for arthralgias and joint swelling. Negative for neck pain.   Neurological: Positive for headaches. Negative for weakness.   Psychiatric/Behavioral: Negative for dysphoric mood.       Objective:     Vitals:    06/18/19 0929   BP: 116/80   Pulse: 92   Resp: 20   Temp: 98.6 °F (37 °C)        Physical Exam   Constitutional: She appears well-developed. No distress.   MO   HENT:   Head: Normocephalic and atraumatic.   Eyes: Conjunctivae are normal. No scleral icterus.   Pulmonary/Chest: Effort normal.   Musculoskeletal:   antalgic gait   Neurological: She is alert.   Skin: She is not diaphoretic.   Psychiatric: She has a normal mood and affect. Her behavior is normal.   Vitals reviewed.      Assessment:       1. Acute pain of left knee    2. Chronic knee pain after total replacement of left knee joint    3. Need for pneumococcal vaccination    4. Mixed hyperlipidemia    5. Seasonal allergic rhinitis, unspecified trigger    6. Type 2 diabetes mellitus without complication, without long-term current use of insulin    7. Skin lesion        Plan:       Sandee was seen today for knee pain.    Diagnoses and all orders for this visit:    Acute pain of left knee  -     X-Ray Knee 3 View Left; Future  -     Discontinue: naproxen (EC-NAPROSYN) 375 MG TbEC EC tablet; Take 1 tablet (375 mg total) by mouth 2 (two) times daily as needed.  -     traMADol (ULTRAM) 50 mg tablet; Take 1 tablet (50 mg total) by mouth every 12 (twelve) hours as needed for Pain.  New dx - pt educated on disease etiology, prognosis and treatment. Answered pts questions.    I recommended patient use Tylenol arthritis for baseline pain  and to use the tramadol only when pain is severe.  She is not a candidate for anti-inflammatories due to being on chronic Xarelto therapy for atrial fibrillation.     Patient understands that tramadol not a long-term due to its risks.  Patient understands this is a high risk medication and nose had taken appropriately.  Chronic knee pain after total replacement of left knee joint  -     traMADol (ULTRAM) 50 mg tablet; Take 1 tablet (50 mg total) by mouth every 12 (twelve) hours as needed for Pain.    Need for pneumococcal vaccination  -     Pneumococcal Conjugate Vaccine (13 Valent) (IM)    Mixed hyperlipidemia  -     Lipid panel; Future    Seasonal allergic rhinitis, unspecified trigger  -     fluticasone propionate (FLONASE) 50 mcg/actuation nasal spray; 2 sprays (100 mcg total) by Each Nare route once daily.  - Chronic - stable     Pt is doing well on current therapy. No side effects noted. Will continue current therapy.  Type 2 diabetes mellitus without complication, without long-term current use of insulin  -     metFORMIN (GLUCOPHAGE) 500 MG tablet; Take 1 tablet (500 mg total) by mouth 2 (two) times daily with meals.  - Chronic - stable     Pt is doing well on current therapy. No side effects noted. Will continue current therapy.    Skin lesion  -     triamcinolone acetonide 0.1% (KENALOG) 0.1 % ointment; APPLY BETWEEN KNEES AND UPPER THIGHS TWICE DAILY FOR NO MORE THAN 7-14 DAYS  - Chronic - stable     Pt is doing well on current therapy. No side effects noted. Will continue current therapy.          Follow up in about 1 month (around 7/16/2019) for L knee pain, allergies, routine f/u.        Pt verbalized understanding and agreed with our plan.

## 2019-06-19 ENCOUNTER — TELEPHONE (OUTPATIENT)
Dept: FAMILY MEDICINE | Facility: CLINIC | Age: 64
End: 2019-06-19

## 2019-06-19 NOTE — TELEPHONE ENCOUNTER
----- Message from Ivan Clement MD sent at 6/19/2019 12:52 PM CDT -----  Please notify patient results are normal! Please have pt follow up or contact me if they have any questions. Follow up as previously discussed. Thanks.

## 2019-06-19 NOTE — TELEPHONE ENCOUNTER
----- Message from Ivan Clement MD sent at 6/18/2019  4:48 PM CDT -----  Please inform patient that the x-ray shows some mild swelling but no fracture or other misalignment.  Continue treatment plan as discussed.  Patient to notify PCP in 2 weeks if not progressing.

## 2019-06-25 ENCOUNTER — ANESTHESIA EVENT (OUTPATIENT)
Dept: ENDOSCOPY | Facility: HOSPITAL | Age: 64
End: 2019-06-25
Payer: COMMERCIAL

## 2019-06-25 ENCOUNTER — ANESTHESIA (OUTPATIENT)
Dept: ENDOSCOPY | Facility: HOSPITAL | Age: 64
End: 2019-06-25
Payer: COMMERCIAL

## 2019-06-25 ENCOUNTER — HOSPITAL ENCOUNTER (OUTPATIENT)
Facility: HOSPITAL | Age: 64
Discharge: HOME | End: 2019-06-25
Attending: INTERNAL MEDICINE | Admitting: INTERNAL MEDICINE
Payer: COMMERCIAL

## 2019-06-25 VITALS
TEMPERATURE: 98 F | HEART RATE: 63 BPM | DIASTOLIC BLOOD PRESSURE: 67 MMHG | BODY MASS INDEX: 48.2 KG/M2 | OXYGEN SATURATION: 100 % | SYSTOLIC BLOOD PRESSURE: 147 MMHG | RESPIRATION RATE: 18 BRPM | HEIGHT: 63 IN | WEIGHT: 272 LBS

## 2019-06-25 DIAGNOSIS — Z12.11 SCREEN FOR COLON CANCER: ICD-10-CM

## 2019-06-25 LAB — POCT GLUCOSE: 82 MG/DL (ref 70–110)

## 2019-06-25 PROCEDURE — G0121 COLON CA SCRN NOT HI RSK IND: HCPCS | Performed by: INTERNAL MEDICINE

## 2019-06-25 PROCEDURE — D9220A PRA ANESTHESIA: Mod: ANES,,, | Performed by: ANESTHESIOLOGY

## 2019-06-25 PROCEDURE — D9220A PRA ANESTHESIA: ICD-10-PCS | Mod: CRNA,,, | Performed by: REGISTERED NURSE

## 2019-06-25 PROCEDURE — 37000009 HC ANESTHESIA EA ADD 15 MINS: Performed by: INTERNAL MEDICINE

## 2019-06-25 PROCEDURE — D9220A PRA ANESTHESIA: ICD-10-PCS | Mod: ANES,,, | Performed by: ANESTHESIOLOGY

## 2019-06-25 PROCEDURE — 37000008 HC ANESTHESIA 1ST 15 MINUTES: Performed by: INTERNAL MEDICINE

## 2019-06-25 PROCEDURE — G0105 COLORECTAL SCRN; HI RISK IND: HCPCS | Performed by: INTERNAL MEDICINE

## 2019-06-25 PROCEDURE — G0105 COLORECTAL SCRN; HI RISK IND: ICD-10-PCS | Mod: ,,, | Performed by: INTERNAL MEDICINE

## 2019-06-25 PROCEDURE — 63600175 PHARM REV CODE 636 W HCPCS: Performed by: REGISTERED NURSE

## 2019-06-25 PROCEDURE — G0105 COLORECTAL SCRN; HI RISK IND: HCPCS | Mod: ,,, | Performed by: INTERNAL MEDICINE

## 2019-06-25 PROCEDURE — D9220A PRA ANESTHESIA: Mod: CRNA,,, | Performed by: REGISTERED NURSE

## 2019-06-25 PROCEDURE — 25000003 PHARM REV CODE 250: Performed by: ANESTHESIOLOGY

## 2019-06-25 RX ORDER — PROPOFOL 10 MG/ML
VIAL (ML) INTRAVENOUS
Status: COMPLETED
Start: 2019-06-25 | End: 2019-06-25

## 2019-06-25 RX ORDER — LIDOCAINE HYDROCHLORIDE 20 MG/ML
INJECTION, SOLUTION EPIDURAL; INFILTRATION; INTRACAUDAL; PERINEURAL
Status: DISCONTINUED
Start: 2019-06-25 | End: 2019-06-25 | Stop reason: HOSPADM

## 2019-06-25 RX ORDER — ESMOLOL HYDROCHLORIDE 10 MG/ML
INJECTION INTRAVENOUS
Status: DISCONTINUED
Start: 2019-06-25 | End: 2019-06-25 | Stop reason: WASHOUT

## 2019-06-25 RX ORDER — HYDRALAZINE HYDROCHLORIDE 20 MG/ML
INJECTION INTRAMUSCULAR; INTRAVENOUS
Status: DISCONTINUED | OUTPATIENT
Start: 2019-06-25 | End: 2019-06-25

## 2019-06-25 RX ORDER — SODIUM CHLORIDE 9 MG/ML
INJECTION, SOLUTION INTRAVENOUS CONTINUOUS
Status: DISCONTINUED | OUTPATIENT
Start: 2019-06-25 | End: 2019-06-25 | Stop reason: HOSPADM

## 2019-06-25 RX ORDER — LIDOCAINE HCL/PF 100 MG/5ML
SYRINGE (ML) INTRAVENOUS
Status: DISCONTINUED | OUTPATIENT
Start: 2019-06-25 | End: 2019-06-25

## 2019-06-25 RX ORDER — HYDRALAZINE HYDROCHLORIDE 20 MG/ML
INJECTION INTRAMUSCULAR; INTRAVENOUS
Status: COMPLETED
Start: 2019-06-25 | End: 2019-06-25

## 2019-06-25 RX ORDER — PROPOFOL 10 MG/ML
VIAL (ML) INTRAVENOUS
Status: DISCONTINUED | OUTPATIENT
Start: 2019-06-25 | End: 2019-06-25

## 2019-06-25 RX ADMIN — PROPOFOL 20 MG: 10 INJECTION, EMULSION INTRAVENOUS at 10:06

## 2019-06-25 RX ADMIN — HYDRALAZINE HYDROCHLORIDE 10 MG: 20 INJECTION INTRAMUSCULAR; INTRAVENOUS at 10:06

## 2019-06-25 RX ADMIN — PROPOFOL 30 MG: 10 INJECTION, EMULSION INTRAVENOUS at 10:06

## 2019-06-25 RX ADMIN — PROPOFOL 40 MG: 10 INJECTION, EMULSION INTRAVENOUS at 10:06

## 2019-06-25 RX ADMIN — SODIUM CHLORIDE: 0.9 INJECTION, SOLUTION INTRAVENOUS at 09:06

## 2019-06-25 RX ADMIN — LIDOCAINE HYDROCHLORIDE 100 MG: 20 INJECTION, SOLUTION INTRAVENOUS at 10:06

## 2019-06-25 RX ADMIN — PROPOFOL 70 MG: 10 INJECTION, EMULSION INTRAVENOUS at 10:06

## 2019-06-25 NOTE — [employer] PATIENT INSTRUCTIONS
Discharge Summary/Instructions after an Endoscopic Procedure  Patient Name: Sandee Evans  Patient MRN: [id number]  Patient YOB: 1955 Tuesday, June 25, 2019  Micheline Flores MD  RESTRICTIONS:  During your procedure today, you received medications for sedation.  These   medications may affect your judgment, balance and coordination.  Therefore,   for 24 hours, you have the following restrictions:   - DO NOT drive a car, operate machinery, make legal/financial decisions,   sign important papers or drink alcohol.    ACTIVITY:  Today: no heavy lifting, straining or running due to procedural   sedation/anesthesia.  The following day: return to full activity including work.  DIET:  Eat and drink normally unless instructed otherwise.     TREATMENT FOR COMMON SIDE EFFECTS:  - Mild abdominal pain, nausea, belching, bloating or excessive gas:  rest,   eat lightly and use a heating pad.  - Sore Throat: treat with throat lozenges and/or gargle with warm salt   water.  - Because air was used during the procedure, expelling large amounts of air   from your rectum or belching is normal.  - If a bowel prep was taken, you may not have a bowel movement for 1-3 days.    This is normal.  SYMPTOMS TO WATCH FOR AND REPORT TO YOUR PHYSICIAN:  1. Abdominal pain or bloating, other than gas cramps.  2. Chest pain.  3. Back pain.  4. Signs of infection such as: chills or fever occurring within 24 hours   after the procedure.  5. Rectal bleeding, which would show as bright red, maroon, or black stools.   (A tablespoon of blood from the rectum is not serious, especially if   hemorrhoids are present.)  6. Vomiting.  7. Weakness or dizziness.  GO DIRECTLY TO THE NEAREST EMERGENCY ROOM IF YOU HAVE ANY OF THE FOLLOWING:      Difficulty breathing              Chills and/or fever over 101 F   Persistent vomiting and/or vomiting blood   Severe abdominal pain   Severe chest pain   Black, tarry stools   Bleeding- more than one  tablespoon   Any other symptom or condition that you feel may need urgent attention  Your doctor recommends these additional instructions:  If any biopsies were taken, your doctors clinic will contact you in 1 to 2   weeks with any results.  - Discharge patient to home.   - Resume previous diet.   - Repeat colonoscopy in 5 years for surveillance.  For questions, problems or results please call your physician - Micheline Flores MD at Work:  [phone].  [facility] can be reached at [phone]     IF A COMPLICATION OR EMERGENCY SITUATION ARISES AND YOU ARE UNABLE TO REACH   YOUR PHYSICIAN - GO DIRECTLY TO THE EMERGENCY ROOM.  MD Micheline Moore MD  6/25/2019 10:52:07 AM  This report has been verified and signed electronically.  [employer]

## 2019-06-25 NOTE — ANESTHESIA PREPROCEDURE EVALUATION
06/25/2019    Pre-operative evaluation for Procedure(s) (LRB):  COLONOSCOPY (N/A)    Sandee Evans is a 63 y.o. female     Patient Active Problem List   Diagnosis    Urge incontinence    DDD (degenerative disc disease), lumbar    DJD (degenerative joint disease) of knee    Hyperlipidemia    Depressed    Insomnia    Vitamin D deficiency disease    Lumbar radicular pain    Essential hypertension    Obesity, morbid    Colon polyps    Diverticulosis    Chronic pain    Pre-diabetes    A-fib    Dysphagia    Chronic atrial fibrillation    Nausea    History of pancreatitis    Osteoarthritis of right knee    Gross hematuria    Depression    Type 2 diabetes mellitus without complication    Dry eye syndrome, bilateral    Nuclear sclerosis, bilateral    Refractive error    Hidradenitis suppurativa of right axilla    Acute viral syndrome    PVD (peripheral vascular disease)    Other constipation       Review of patient's allergies indicates:   Allergen Reactions    Ace inhibitors      Cough         No current facility-administered medications on file prior to encounter.      Current Outpatient Medications on File Prior to Encounter   Medication Sig Dispense Refill    albuterol 90 mcg/actuation inhaler Inhale 2 puffs into the lungs every 6 (six) hours as needed for Wheezing. 1 each 11    spironolactone (ALDACTONE) 25 MG tablet TAKE 1 TABLET(25 MG) BY MOUTH EVERY DAY 90 tablet 2       Past Surgical History:   Procedure Laterality Date    ARTHROPLASTY, KNEE, TOTAL Left 2/19/2019    [facility]    ARTHROPLASTY-KNEE Right 5/23/2016    [facility]    BREAST BIOPSY Right     over 10 yrs ago/ benign    BREAST CYST EXCISION Right     BREAST LUMPECTOMY Right     over 10 yrs ago, ex bx/ benign    CARDIOVERSION N/A  7/20/2015    [facility]    COLONOSCOPY N/A 8/2/2013    [facility]O (4TH F[facility])    ESOPHAGOGASTRODUODENOSCOPY (EGD) N/A 6/22/2015    Performed by [facility] at [facility] (2ND FLR)    HYSTERECTOMY      OOPHORECTOMY      rt.knee surgery 2016      ULTRASOUND-ENDOSCOPIC-UPPER N/A 6/22/2015    Performed [facility] at [facility] (2ND FLR)    underarm gland\ Bilateral        BMP  Lab Results   Component Value Date     02/22/2019    K 4.7 02/22/2019     02/22/2019    CO2 32 (H) 02/22/2019    BUN 24 (H) 02/22/2019    CREATININE 1.0 02/22/2019    CALCIUM 8.8 02/22/2019    ANIONGAP 7 (L) 02/22/2019    ESTGFRAFRICA >60 02/22/2019    EGFRNONAA 59 (A) 02/22/2019       2D Echo:  Results for orders placed or performed during the hospital encounter of 05/18/15   2D echo with color flow doppler   Result Value Ref Range    QEF 55 55 - 65    Mitral Valve Regurgitation MILD     Diastolic Dysfunction Yes (A)     Est. PA Systolic Pressure 32.59     Tricuspid Valve Regurgitation MILD          Anesthesia Evaluation    I have reviewed the Patient Summary Reports.     I have reviewed the Medications.     Review of Systems  Anesthesia Hx:  No previous Anesthesia  History of prior surgery of interest to airway management or planning: Denies Family Hx of Anesthesia complications.   Denies Personal Hx of Anesthesia complications.   Social:  Non-Smoker    Hematology/Oncology:  Hematology Normal   Oncology Normal     EENT/Dental:EENT/Dental Normal   Cardiovascular:   Hypertension Dysrhythmias atrial fibrillation Last took Xarelto on Satur   Pulmonary:   COPD Sleep Apnea    Renal/:  Renal/ Normal     Hepatic/GI:  Hepatic/GI Normal Bowel Prep. H/o pancreatitis    Musculoskeletal:   Arthritis     Neurological:   Neuromuscular Disease,    Endocrine:   Diabetes (borderline diabetic)  Metabolic Disorders, Obesity / BMI >  30  Dermatological:  Skin Normal    Psych:   Psychiatric History          Physical Exam  General:  Well nourished    Airway/Jaw/Neck:  Airway Findings: Mallampati: II TM Distance: < 4 cm  Jaw/Neck Findings:  Neck ROM: Normal ROM      Dental:  Dental Findings: Periodontal disease, Mild   Chest/Lungs:  Chest/Lungs Findings: Clear to auscultation     Heart/Vascular:  Heart Findings: Rhythm: Irregularly Irregular        Mental Status:  Mental Status Findings:  Cooperative, Alert and Oriented         Anesthesia Plan  Type of Anesthesia, risks & benefits discussed:  Anesthesia Type:  general  Patient's Preference:   Intra-op Monitoring Plan: standard ASA monitors  Intra-op Monitoring Plan Comments:   Post Op Pain Control Plan: multimodal analgesia, IV/PO Opioids PRN and per primary service following discharge from PACU  Post Op Pain Control Plan Comments:   Induction:   IV  Beta Blocker:  Patient is not currently on a Beta-Blocker (No further documentation required).       Informed Consent: Patient understands risks and agrees with Anesthesia plan.  Questions answered. Anesthesia consent signed with patient.  ASA Score: 3     Day of Surgery Review of History & Physical: I have interviewed and examined the patient. I have reviewed the patient's H&P dated:  There are no significant changes.  H&P update referred to the provider.     Anesthesia Plan Notes: npo        Ready For Surgery From Anesthesia Perspective.

## 2019-06-25 NOTE — ANESTHESIA POSTPROCEDURE EVALUATION
Anesthesia Post Evaluation    Patient: Sadnee Evans    Procedure(s) Performed: Procedure(s) (LRB):  COLONOSCOPY (N/A)    Final Anesthesia Type: general  Patient location during evaluation: GI PACU  Patient participation: Yes- Able to Participate  Level of consciousness: awake and alert and oriented  Post-procedure vital signs: reviewed and stable  Pain management: adequate  Airway patency: patent  PONV status at discharge: No PONV  Anesthetic complications: no      Cardiovascular status: blood pressure returned to baseline and hemodynamically stable  Respiratory status: unassisted, spontaneous ventilation and room air  Hydration status: euvolemic  Follow-up not needed.          Vitals Value Taken Time   /69 6/25/2019 11:08 AM   Temp 36.5 °C (97.7 °F) 6/25/2019 10:53 AM   Pulse 63 6/25/2019 11:08 AM   Resp 18 6/25/2019 11:08 AM   SpO2 100 % 6/25/2019 11:08 AM         No case tracking events are documented in the log.      Pain/Dianne Score: Dianne Score: 10 (6/25/2019 11:08 AM)

## 2019-06-25 NOTE — H&P
HPI:  Pt is a 63 y.o. female with a h/o colon polyps  here for colonoscopy.    Past Medical History:   Diagnosis Date    Anticoagulant long-term use     Xarelto    Arthritis     Atrial fibrillation     Breast cyst     Degenerative disc disease     Depression     Diabetes mellitus     Pre diabetic    Hyperlipidemia     Hypertension     Nuclear sclerosis, bilateral 12/18/2017    Obesity, morbid     Other abnormal glucose     pre-diabetes    Sleep apnea     Smoker     Thyroid disease     on meds 8-9 years ago. hypothyroidism.no malignancy    TMJ (temporomandibular joint disorder)     jaw clicking    Urge incontinence     Wears glasses        Past Surgical History:   Procedure Laterality Date    ARTHROPLASTY, KNEE, TOTAL Left 2/19/2019    [facility]    ARTHROPLASTY-KNEE Right 5/23/2016    [facility] by Med [facility] at [facility] OR    BREAST BIOPSY Right     over 10 yrs ago/ benign    BREAST CYST EXCISION Right     BREAST LUMPECTOMY Right     over 10 yrs ago, ex bx/ benign    CARDIOVERSION N/A 7/20/2015    Performed by [facility]ik [facility] at [facility] CATH LAB    COLONOSCOPY N/A 8/2/2013    [facility] at [facility] ENDO (4TH FLR)    ESOPHAGOGASTRODUODENOSCOPY (EGD) N/A 6/22/2015    Performed by Kevin Yeager [facility] at [facility] ENDO (2ND FLR)    HYSTERECTOMY      OOPHORECTOMY      rt.knee surgery 2016      ULTRASOUND-ENDOSCOPIC-UPPER N/A 6/22/2015    Performed by [facility] at [facility] ENDO (2ND FLR)    underarm gland\ Bilateral        Family History   Problem Relation Age of Onset    Diabetes Mother     Diabetes Brother     No Known Problems Father     No Known Problems Sister     No Known Problems Maternal Aunt     No Known Problems Maternal Uncle     No Known Problems Paternal Aunt     No Known Problems Paternal Uncle     No Known Problems Maternal Grandmother     No Known Problems Maternal Grandfather     No  Known Problems Paternal Grandmother     No Known Problems Paternal Grandfather     Amblyopia Neg Hx     Blindness Neg Hx     Cancer Neg Hx     Cataracts Neg Hx     Glaucoma Neg Hx     Hypertension Neg Hx     Macular degeneration Neg Hx     Retinal detachment Neg Hx     Strabismus Neg Hx     Stroke Neg Hx     Thyroid disease Neg Hx        Social History     Socioeconomic History    Marital status:      Spouse name: Not on file    Number of children: Not on file    Years of education: Not on file    Highest education level: Not on file   Occupational History    Not on file   Social Needs    Financial resource strain: Not hard at all    Food insecurity:     Worry: Never true     Inability: Never true    Transportation needs:     Medical: No     Non-medical: No   Tobacco Use    Smoking status: Current Every Day Smoker     Packs/day: 0.41     Types: Cigarettes     Start date: 1973    Smokeless tobacco: Never Used    Tobacco comment: 6/18/19 smoke 5 cig a day    Substance and Sexual Activity    Alcohol use: No     Frequency: Never     Drinks per session: Patient refused     Binge frequency: Never    Drug use: No    Sexual activity: Yes     Partners: Male   Lifestyle    Physical activity:     Days per week: 1 day     Minutes per session: 10 min    Stress: Only a little   Relationships    Social connections:     Talks on phone: Three times a week     Gets together: Once a week     Attends [employer] service: Not on file     Active member of club or organization: Yes     Attends meetings of clubs or organizations: 1 to 4 times per year     Relationship status:    Other Topics Concern    Not on file   Social History Narrative    Not on file           Review of patient's allergies indicates:   Allergen Reactions    Ace inhibitors      Cough         No current facility-administered medications on file prior to encounter.      Current Outpatient Medications on File Prior to Encounter  "  Medication Sig Dispense Refill    albuterol 90 mcg/actuation inhaler Inhale 2 puffs into the lungs every 6 (six) hours as needed for Wheezing. 1 each 11    spironolactone (ALDACTONE) 25 MG tablet TAKE 1 TABLET(25 MG) BY MOUTH EVERY DAY 90 tablet 2     Scheduled Meds:  Continuous Infusions:   sodium chloride 0.9% 50 mL/hr at 06/25/19 0946     PRN Meds:.    Review of Systems:  CONSTITUTIONAL: Negative for weakness, fever, weight loss, weight gain.  HEENT: Eyes: Negative for double/blurred vision. Ears: Negative pain or loss of hearing. Nose:Negative for nasal congestion. Negative for rhinorrhea Mouth: Negative for dry mouth/pain Throat: Negative for masses or hoarseness.  CARDIOVASCULAR: Negative for chest pain or palpitations.  RESPIRATORY: Negative for SOB, wheezes  GASTROINTESTINAL: See HPI  GENITOURINARY: Negative for dysuria/hematuria.  MUSCULOSKELETAL: Negative for osteoarthritis, muscle pain.  SKIN: Negative for rashes/lesions.  NEUROLOGIC: Negative for headaches, numbness/tingling.  ENDOCRINE: Negative for diabetes or thyroid abnormalities.  HEMATOLOGIC: Negative for anemia or blood dyscrasias.    Patient Vitals for the past 24 hrs:   BP Temp Temp src Pulse Resp SpO2 Height Weight   06/25/19 0945 (!) 167/77 98.2 °F (36.8 °C) Oral 66 18 97 % 5' 3" (1.6 m) 123.4 kg (272 lb)       Gen: Well developed, well nourished, no apparent distress, cooperative  HEENT: Anicteric, PERRLA, normocephalic, neck symmetrical  CV: S1, S2, no murmers/rubs, non-displaced PMI  Lungs: CTA-B, normal excursion  Abd: Soft, NT, ND, normal BS's, no HSM  Ext: No c/c/e, 1+ DP pulses to BLE's    Labs  No results for input(s): GLUCOSE, CALCIUM, PROT, NA, K, CO2, CL, BUN, CREATININE, ALKPHOS, ALT, AST, BILITOT in the last 24 hours.    Invalid input(s):  ALBUMIN  No results found for this or any previous visit (from the past 336 hour(s)).  No results for input(s): PT, INR, APTT in the last 24 hours.      Assessment:  Pt. Is a 63 y.o. female " with a h/o colon polyps    Recommendations:  colonoscopy    I would like to take this opportunity to thank you for this consult.  If you have any questions or concerns, please do not hesitate to contact me.

## 2019-06-25 NOTE — TRANSFER OF CARE
"Anesthesia Transfer of Care Note    Patient: Sandee Evans    Procedure(s) Performed: Procedure(s) (LRB):  COLONOSCOPY (N/A)    Patient location: GI    Anesthesia Type: general    Transport from OR: Transported from OR on room air with adequate spontaneous ventilation    Post pain: adequate analgesia    Post assessment: no apparent anesthetic complications and tolerated procedure well    Post vital signs: stable    Level of consciousness: awake and alert    Nausea/Vomiting: no nausea/vomiting    Complications: none    Transfer of care protocol was followed      Last vitals:   Visit Vitals  BP (!) 158/72   Pulse (!) 57   Temp 36.5 °C (97.7 °F) (Oral)   Resp 18   Ht 5' 3" (1.6 m)   Wt 123.4 kg (272 lb)   SpO2 100%   Breastfeeding? No   BMI 48.18 kg/m²     "

## 2019-07-01 DIAGNOSIS — M25.562 ACUTE PAIN OF LEFT KNEE: ICD-10-CM

## 2019-07-01 DIAGNOSIS — Z96.652 CHRONIC KNEE PAIN AFTER TOTAL REPLACEMENT OF LEFT KNEE JOINT: ICD-10-CM

## 2019-07-01 DIAGNOSIS — G89.29 CHRONIC KNEE PAIN AFTER TOTAL REPLACEMENT OF LEFT KNEE JOINT: ICD-10-CM

## 2019-07-01 DIAGNOSIS — M25.562 CHRONIC KNEE PAIN AFTER TOTAL REPLACEMENT OF LEFT KNEE JOINT: ICD-10-CM

## 2019-07-05 DIAGNOSIS — M25.562 ACUTE PAIN OF LEFT KNEE: ICD-10-CM

## 2019-07-05 DIAGNOSIS — Z96.652 CHRONIC KNEE PAIN AFTER TOTAL REPLACEMENT OF LEFT KNEE JOINT: ICD-10-CM

## 2019-07-05 DIAGNOSIS — G89.29 CHRONIC KNEE PAIN AFTER TOTAL REPLACEMENT OF LEFT KNEE JOINT: ICD-10-CM

## 2019-07-05 DIAGNOSIS — M25.562 CHRONIC KNEE PAIN AFTER TOTAL REPLACEMENT OF LEFT KNEE JOINT: ICD-10-CM

## 2019-07-05 RX ORDER — TRAMADOL HYDROCHLORIDE 50 MG/1
TABLET ORAL
Qty: 15 TABLET | Refills: 0 | OUTPATIENT
Start: 2019-07-05

## 2019-07-05 RX ORDER — TRAMADOL HYDROCHLORIDE 50 MG/1
50 TABLET ORAL EVERY 12 HOURS PRN
Qty: 6 TABLET | Refills: 0 | Status: SHIPPED | OUTPATIENT
Start: 2019-07-05 | End: 2019-07-24

## 2019-07-09 ENCOUNTER — PATIENT MESSAGE (OUTPATIENT)
Dept: FAMILY MEDICINE | Facility: CLINIC | Age: 64
End: 2019-07-09

## 2019-07-09 DIAGNOSIS — F32.A DEPRESSION, UNSPECIFIED DEPRESSION TYPE: ICD-10-CM

## 2019-07-09 RX ORDER — SERTRALINE HYDROCHLORIDE 100 MG/1
TABLET, FILM COATED ORAL
Qty: 90 TABLET | Refills: 1 | Status: CANCELLED
Start: 2019-07-09

## 2019-07-09 RX ORDER — SERTRALINE HYDROCHLORIDE 100 MG/1
TABLET, FILM COATED ORAL
Qty: 90 TABLET | Refills: 1
Start: 2019-07-09 | End: 2019-07-10 | Stop reason: SDUPTHER

## 2019-07-10 ENCOUNTER — PATIENT MESSAGE (OUTPATIENT)
Dept: FAMILY MEDICINE | Facility: CLINIC | Age: 64
End: 2019-07-10

## 2019-07-10 DIAGNOSIS — F32.A DEPRESSION, UNSPECIFIED DEPRESSION TYPE: ICD-10-CM

## 2019-07-10 RX ORDER — SERTRALINE HYDROCHLORIDE 100 MG/1
TABLET, FILM COATED ORAL
Qty: 90 TABLET | Refills: 1 | Status: SHIPPED | OUTPATIENT
Start: 2019-07-10 | End: 2019-07-24

## 2019-07-22 DIAGNOSIS — J30.2 SEASONAL ALLERGIC RHINITIS, UNSPECIFIED TRIGGER: ICD-10-CM

## 2019-07-22 RX ORDER — FLUTICASONE PROPIONATE 50 MCG
SPRAY, SUSPENSION (ML) NASAL
Qty: 16 ML | Refills: 6 | Status: SHIPPED | OUTPATIENT
Start: 2019-07-22 | End: 2020-01-09 | Stop reason: SDUPTHER

## 2019-07-24 ENCOUNTER — OFFICE VISIT (OUTPATIENT)
Dept: FAMILY MEDICINE | Facility: CLINIC | Age: 64
End: 2019-07-24
Payer: COMMERCIAL

## 2019-07-24 ENCOUNTER — TELEPHONE (OUTPATIENT)
Dept: FAMILY MEDICINE | Facility: CLINIC | Age: 64
End: 2019-07-24

## 2019-07-24 VITALS
HEART RATE: 75 BPM | DIASTOLIC BLOOD PRESSURE: 80 MMHG | OXYGEN SATURATION: 95 % | WEIGHT: 276.44 LBS | SYSTOLIC BLOOD PRESSURE: 124 MMHG | RESPIRATION RATE: 20 BRPM | TEMPERATURE: 98 F | BODY MASS INDEX: 48.98 KG/M2 | HEIGHT: 63 IN

## 2019-07-24 DIAGNOSIS — I10 ESSENTIAL HYPERTENSION: ICD-10-CM

## 2019-07-24 DIAGNOSIS — F32.0 CURRENT MILD EPISODE OF MAJOR DEPRESSIVE DISORDER WITHOUT PRIOR EPISODE: Primary | ICD-10-CM

## 2019-07-24 DIAGNOSIS — I48.20 CHRONIC ATRIAL FIBRILLATION: ICD-10-CM

## 2019-07-24 DIAGNOSIS — E78.5 HYPERLIPIDEMIA, UNSPECIFIED HYPERLIPIDEMIA TYPE: ICD-10-CM

## 2019-07-24 DIAGNOSIS — L98.9 SKIN LESION: ICD-10-CM

## 2019-07-24 PROCEDURE — 1101F PR PT FALLS ASSESS DOC 0-1 FALLS W/OUT INJ PAST YR: ICD-10-PCS | Mod: CPTII,S$GLB,, | Performed by: FAMILY MEDICINE

## 2019-07-24 PROCEDURE — 99999 PR PBB SHADOW E&M-EST. PATIENT-LVL IV: CPT | Mod: PBBFAC,,, | Performed by: FAMILY MEDICINE

## 2019-07-24 PROCEDURE — 3079F PR MOST RECENT DIASTOLIC BLOOD PRESSURE 80-89 MM HG: ICD-10-PCS | Mod: CPTII,S$GLB,, | Performed by: FAMILY MEDICINE

## 2019-07-24 PROCEDURE — 99214 OFFICE O/P EST MOD 30 MIN: CPT | Mod: S$GLB,,, | Performed by: FAMILY MEDICINE

## 2019-07-24 PROCEDURE — 3074F PR MOST RECENT SYSTOLIC BLOOD PRESSURE < 130 MM HG: ICD-10-PCS | Mod: CPTII,S$GLB,, | Performed by: FAMILY MEDICINE

## 2019-07-24 PROCEDURE — 3008F BODY MASS INDEX DOCD: CPT | Mod: CPTII,S$GLB,, | Performed by: FAMILY MEDICINE

## 2019-07-24 PROCEDURE — 3008F PR BODY MASS INDEX (BMI) DOCUMENTED: ICD-10-PCS | Mod: CPTII,S$GLB,, | Performed by: FAMILY MEDICINE

## 2019-07-24 PROCEDURE — 1101F PT FALLS ASSESS-DOCD LE1/YR: CPT | Mod: CPTII,S$GLB,, | Performed by: FAMILY MEDICINE

## 2019-07-24 PROCEDURE — 99999 PR PBB SHADOW E&M-EST. PATIENT-LVL IV: ICD-10-PCS | Mod: PBBFAC,,, | Performed by: FAMILY MEDICINE

## 2019-07-24 PROCEDURE — 3079F DIAST BP 80-89 MM HG: CPT | Mod: CPTII,S$GLB,, | Performed by: FAMILY MEDICINE

## 2019-07-24 PROCEDURE — 3074F SYST BP LT 130 MM HG: CPT | Mod: CPTII,S$GLB,, | Performed by: FAMILY MEDICINE

## 2019-07-24 PROCEDURE — 99214 PR OFFICE/OUTPT VISIT, EST, LEVL IV, 30-39 MIN: ICD-10-PCS | Mod: S$GLB,,, | Performed by: FAMILY MEDICINE

## 2019-07-24 RX ORDER — TRIAMCINOLONE ACETONIDE 1 MG/G
OINTMENT TOPICAL
Qty: 454 G | Refills: 0 | Status: SHIPPED | OUTPATIENT
Start: 2019-07-24 | End: 2020-12-21 | Stop reason: SDUPTHER

## 2019-07-24 RX ORDER — SIMVASTATIN 20 MG/1
TABLET, FILM COATED ORAL
Qty: 90 TABLET | Refills: 1 | Status: SHIPPED | OUTPATIENT
Start: 2019-07-24 | End: 2020-01-07 | Stop reason: SDUPTHER

## 2019-07-24 NOTE — PROGRESS NOTES
Health Maintenance Due   Topic     Foot Exam  Foot prep to be examine today if PCP have time        hx chickenpox ; inform pt can get vaccine at pharmacy.

## 2019-07-25 ENCOUNTER — PATIENT MESSAGE (OUTPATIENT)
Dept: FAMILY MEDICINE | Facility: CLINIC | Age: 64
End: 2019-07-25

## 2019-08-01 DIAGNOSIS — I10 ESSENTIAL HYPERTENSION: ICD-10-CM

## 2019-08-02 RX ORDER — SPIRONOLACTONE 25 MG/1
TABLET ORAL
Qty: 90 TABLET | Refills: 2 | Status: SHIPPED | OUTPATIENT
Start: 2019-08-02 | End: 2020-02-27 | Stop reason: SDUPTHER

## 2019-08-29 ENCOUNTER — LAB VISIT (OUTPATIENT)
Dept: LAB | Facility: HOSPITAL | Age: 64
End: 2019-08-29
Attending: PHYSICIAN ASSISTANT
Payer: COMMERCIAL

## 2019-08-29 ENCOUNTER — OFFICE VISIT (OUTPATIENT)
Dept: FAMILY MEDICINE | Facility: CLINIC | Age: 64
End: 2019-08-29
Payer: COMMERCIAL

## 2019-08-29 VITALS
BODY MASS INDEX: 48.98 KG/M2 | HEART RATE: 84 BPM | TEMPERATURE: 99 F | HEIGHT: 63 IN | DIASTOLIC BLOOD PRESSURE: 70 MMHG | OXYGEN SATURATION: 95 % | WEIGHT: 276.44 LBS | RESPIRATION RATE: 18 BRPM | SYSTOLIC BLOOD PRESSURE: 118 MMHG

## 2019-08-29 DIAGNOSIS — N30.01 ACUTE CYSTITIS WITH HEMATURIA: Primary | ICD-10-CM

## 2019-08-29 DIAGNOSIS — R73.03 PRE-DIABETES: ICD-10-CM

## 2019-08-29 DIAGNOSIS — M51.36 DDD (DEGENERATIVE DISC DISEASE), LUMBAR: ICD-10-CM

## 2019-08-29 DIAGNOSIS — B35.1 ONYCHOMYCOSIS: ICD-10-CM

## 2019-08-29 LAB
ALBUMIN SERPL BCP-MCNC: 3.2 G/DL (ref 3.5–5.2)
ALP SERPL-CCNC: 95 U/L (ref 55–135)
ALT SERPL W/O P-5'-P-CCNC: 10 U/L (ref 10–44)
ANION GAP SERPL CALC-SCNC: 8 MMOL/L (ref 8–16)
AST SERPL-CCNC: 13 U/L (ref 10–40)
BILIRUB SERPL-MCNC: 0.6 MG/DL (ref 0.1–1)
BILIRUB SERPL-MCNC: ABNORMAL MG/DL
BLOOD URINE, POC: ABNORMAL
BUN SERPL-MCNC: 19 MG/DL (ref 8–23)
CALCIUM SERPL-MCNC: 9.7 MG/DL (ref 8.7–10.5)
CHLORIDE SERPL-SCNC: 100 MMOL/L (ref 95–110)
CO2 SERPL-SCNC: 31 MMOL/L (ref 23–29)
COLOR, POC UA: ABNORMAL
CREAT SERPL-MCNC: 1 MG/DL (ref 0.5–1.4)
EST. GFR  (AFRICAN AMERICAN): >60 ML/MIN/1.73 M^2
EST. GFR  (NON AFRICAN AMERICAN): 59.3 ML/MIN/1.73 M^2
ESTIMATED AVG GLUCOSE: 111 MG/DL (ref 68–131)
GLUCOSE SERPL-MCNC: 92 MG/DL (ref 70–110)
GLUCOSE UR QL STRIP: NORMAL
HBA1C MFR BLD HPLC: 5.5 % (ref 4–5.6)
KETONES UR QL STRIP: ABNORMAL
LEUKOCYTE ESTERASE URINE, POC: ABNORMAL
NITRITE, POC UA: POSITIVE
PH, POC UA: 5
POTASSIUM SERPL-SCNC: 4.5 MMOL/L (ref 3.5–5.1)
PROT SERPL-MCNC: 7.4 G/DL (ref 6–8.4)
PROTEIN, POC: ABNORMAL
SODIUM SERPL-SCNC: 139 MMOL/L (ref 136–145)
SPECIFIC GRAVITY, POC UA: 1.02
UROBILINOGEN, POC UA: 1

## 2019-08-29 PROCEDURE — 1101F PT FALLS ASSESS-DOCD LE1/YR: CPT | Mod: CPTII,S$GLB,, | Performed by: PHYSICIAN ASSISTANT

## 2019-08-29 PROCEDURE — 3008F BODY MASS INDEX DOCD: CPT | Mod: CPTII,S$GLB,, | Performed by: PHYSICIAN ASSISTANT

## 2019-08-29 PROCEDURE — 81002 URINALYSIS NONAUTO W/O SCOPE: CPT | Mod: S$GLB,,, | Performed by: PHYSICIAN ASSISTANT

## 2019-08-29 PROCEDURE — 99214 PR OFFICE/OUTPT VISIT, EST, LEVL IV, 30-39 MIN: ICD-10-PCS | Mod: 25,S$GLB,, | Performed by: PHYSICIAN ASSISTANT

## 2019-08-29 PROCEDURE — 3008F PR BODY MASS INDEX (BMI) DOCUMENTED: ICD-10-PCS | Mod: CPTII,S$GLB,, | Performed by: PHYSICIAN ASSISTANT

## 2019-08-29 PROCEDURE — 3074F PR MOST RECENT SYSTOLIC BLOOD PRESSURE < 130 MM HG: ICD-10-PCS | Mod: CPTII,S$GLB,, | Performed by: PHYSICIAN ASSISTANT

## 2019-08-29 PROCEDURE — 3074F SYST BP LT 130 MM HG: CPT | Mod: CPTII,S$GLB,, | Performed by: PHYSICIAN ASSISTANT

## 2019-08-29 PROCEDURE — 80053 COMPREHEN METABOLIC PANEL: CPT

## 2019-08-29 PROCEDURE — 3078F PR MOST RECENT DIASTOLIC BLOOD PRESSURE < 80 MM HG: ICD-10-PCS | Mod: CPTII,S$GLB,, | Performed by: PHYSICIAN ASSISTANT

## 2019-08-29 PROCEDURE — 3078F DIAST BP <80 MM HG: CPT | Mod: CPTII,S$GLB,, | Performed by: PHYSICIAN ASSISTANT

## 2019-08-29 PROCEDURE — 36415 COLL VENOUS BLD VENIPUNCTURE: CPT | Mod: PO

## 2019-08-29 PROCEDURE — 99214 OFFICE O/P EST MOD 30 MIN: CPT | Mod: 25,S$GLB,, | Performed by: PHYSICIAN ASSISTANT

## 2019-08-29 PROCEDURE — 83036 HEMOGLOBIN GLYCOSYLATED A1C: CPT

## 2019-08-29 PROCEDURE — 99999 PR PBB SHADOW E&M-EST. PATIENT-LVL V: CPT | Mod: PBBFAC,,, | Performed by: PHYSICIAN ASSISTANT

## 2019-08-29 PROCEDURE — 81002 POCT URINE DIPSTICK WITHOUT MICROSCOPE: ICD-10-PCS | Mod: S$GLB,,, | Performed by: PHYSICIAN ASSISTANT

## 2019-08-29 PROCEDURE — 99999 PR PBB SHADOW E&M-EST. PATIENT-LVL V: ICD-10-PCS | Mod: PBBFAC,,, | Performed by: PHYSICIAN ASSISTANT

## 2019-08-29 PROCEDURE — 1101F PR PT FALLS ASSESS DOC 0-1 FALLS W/OUT INJ PAST YR: ICD-10-PCS | Mod: CPTII,S$GLB,, | Performed by: PHYSICIAN ASSISTANT

## 2019-08-29 RX ORDER — CICLOPIROX 80 MG/ML
SOLUTION TOPICAL NIGHTLY
Qty: 6.6 ML | Refills: 1 | Status: SHIPPED | OUTPATIENT
Start: 2019-08-29 | End: 2021-02-18

## 2019-08-29 RX ORDER — SULFAMETHOXAZOLE AND TRIMETHOPRIM 800; 160 MG/1; MG/1
1 TABLET ORAL 2 TIMES DAILY
Qty: 10 TABLET | Refills: 0 | Status: SHIPPED | OUTPATIENT
Start: 2019-08-29 | End: 2019-09-03

## 2019-08-29 RX ORDER — SERTRALINE HYDROCHLORIDE 100 MG/1
TABLET, FILM COATED ORAL
Refills: 1 | COMMUNITY
Start: 2019-08-05 | End: 2020-09-03

## 2019-08-29 NOTE — PROGRESS NOTES
Subjective:       Patient ID: Sandee vEans is a 63 y.o. female.    Chief Complaint: Urinary Tract Infection    Urinary Tract Infection    This is a new problem. The current episode started yesterday. The problem has been unchanged. The quality of the pain is described as burning. There has been no fever. Associated symptoms include frequency, hesitancy and withholding. Pertinent negatives include no discharge, flank pain, hematuria, nausea or weight loss. She has tried nothing for the symptoms. There is no history of recurrent UTIs.     Social History     Socioeconomic History    Marital status:      Spouse name: Not on file    Number of children: Not on file    Years of education: Not on file    Highest education level: Not on file   Occupational History    Not on file   Social Needs    Financial resource strain: Not hard at all    Food insecurity:     Worry: Never true     Inability: Never true    Transportation needs:     Medical: No     Non-medical: No   Tobacco Use    Smoking status: Current Every Day Smoker     Packs/day: 0.41     Types: Cigarettes     Start date: 1973    Smokeless tobacco: Never Used    Tobacco comment: 6/18/19 smoke 5 cig a day casual   Substance and Sexual Activity    Alcohol use: No     Frequency: Never     Drinks per session: Patient refused     Binge frequency: Never    Drug use: No    Sexual activity: Yes     Partners: Male   Lifestyle    Physical activity:     Days per week: 1 day     Minutes per session: 10 min    Stress: Only a little   Relationships    Social connections:     Talks on phone: Three times a week     Gets together: Once a week     Attends Confucianism service: Not on file     Active member of club or organization: Yes     Attends meetings of clubs or organizations: 1 to 4 times per year     Relationship status:    Other Topics Concern    Not on file   Social History Narrative    Not on file       Review of Systems   Constitutional:  Negative for fever and weight loss.   Cardiovascular: Negative for chest pain.   Gastrointestinal: Positive for bowel incontinence. Negative for abdominal pain and nausea.   Genitourinary: Positive for bladder incontinence, dysuria, frequency and hesitancy. Negative for flank pain, hematuria and pelvic pain.   Musculoskeletal: Positive for back pain.   Neurological: Positive for tingling, weakness, numbness, headaches and paresthesias.       Objective:     Sensory exam of the foot is normal, tested with the monofilament. Good pulses, no lesions or ulcers, good peripheral pulses.    Physical Exam   Constitutional: She is oriented to person, place, and time. She appears well-developed. No distress.   obese   HENT:   Head: Normocephalic and atraumatic.   Abdominal: There is no CVA tenderness.   Neurological: She is alert and oriented to person, place, and time.   Skin: Skin is warm and dry. She is not diaphoretic.   Psychiatric: She has a normal mood and affect. Her behavior is normal.   Vitals reviewed.      Assessment:       1. Acute cystitis with hematuria    2. Pre-diabetes    3. DDD (degenerative disc disease), lumbar    4. Onychomycosis        Plan:         Sandee was seen today for urinary tract infection.    Diagnoses and all orders for this visit:    Acute cystitis with hematuria  -     POCT URINE DIPSTICK WITHOUT MICROSCOPE  -     sulfamethoxazole-trimethoprim 800-160mg (BACTRIM DS) 800-160 mg Tab; Take 1 tablet by mouth 2 (two) times daily. for 5 days  -     Push fluids, tylenol as needed, call if no relief, unable to send for culture due to not enough urine. If still having symptoms will need new specimen     Pre-diabetes  -     Hemoglobin A1c; Future  -     Comprehensive metabolic panel; Future    DDD (degenerative disc disease), lumbar  -     Ambulatory Referral to Physical/Occupational Therapy    Onychomycosis  -     ciclopirox (PENLAC) 8 % Soln; Apply topically nightly.

## 2019-09-13 ENCOUNTER — CLINICAL SUPPORT (OUTPATIENT)
Dept: REHABILITATION | Facility: HOSPITAL | Age: 64
End: 2019-09-13
Attending: PHYSICIAN ASSISTANT
Payer: COMMERCIAL

## 2019-09-13 DIAGNOSIS — M54.50 CHRONIC BILATERAL LOW BACK PAIN WITHOUT SCIATICA: ICD-10-CM

## 2019-09-13 DIAGNOSIS — G89.29 CHRONIC BILATERAL LOW BACK PAIN WITHOUT SCIATICA: ICD-10-CM

## 2019-09-13 PROCEDURE — 97162 PT EVAL MOD COMPLEX 30 MIN: CPT | Mod: PN

## 2019-09-13 NOTE — PROGRESS NOTES
"  [facility] - PHYSICAL THERAPY EVALUATION     Name: Sandee Evans  Clinic Number: [id number]    Therapy Diagnosis:   Encounter Diagnosis   Name Primary?    Chronic bilateral low back pain without sciatica       Physician: Myah Carballo, DIVYA    Physician Orders: PT Eval and Treat   Medical Diagnosis from Referral: DDD (degenerative disc disease), lumbar  Evaluation Date: 9/13/2019  Authorization Period Expiration: 9/30/19  Plan of Care Expiration: 9/30/19  Reassessment Due: 9/30/19  Visit # / Visits authorized: 1/ 8    Time In: 12:55  Time Out: 2:00  Total Billable Time: 65 minutes    Precautions:  Afib, PVD, Left TKA, right knee surgery (unspecifed)       Subjective   Date of onset: Last MD visit to address symptoms 8/29/19  History of current condition - Sandee reports: she has had pain for more than 10 years. So far it has been fairly tolerable but recently  It has worsened over the past year. She thought it was because of her knees but she contineus to have low back pain with prolonged standing (<5 mintues). She does report tingling down her lateral  left leg which started before she had the knee surgery. Her doctor reports this is due to her "veins." She reports she is worsening overall.     Secondary: Right neck pain pain with coughing      Medical History:   Past Medical History:   Diagnosis Date    Anticoagulant long-term use     Xarelto    Arthritis     Atrial fibrillation     Breast cyst     Degenerative disc disease     Depression     Diabetes mellitus     Pre diabetic    Hyperlipidemia     Hypertension     Nuclear sclerosis, bilateral 12/18/2017    Obesity, morbid     Other abnormal glucose     pre-diabetes    Sleep apnea     Smoker     Thyroid disease     on meds 8-9 years ago. hypothyroidism.no malignancy    TMJ (temporomandibular joint disorder)     jaw clicking    Urge incontinence     Wears glasses        Surgical History:   Sandee Evans  has a past surgical history " that includes Hysterectomy; underarm gland\ (Bilateral); rt.knee surgery 2016; Oophorectomy; Breast cyst excision (Right); Breast lumpectomy (Right); Breast biopsy (Right); Total knee arthroplasty (Left, 2/19/2019); and Colonoscopy (N/A, 6/25/2019).    Medications:   Sandee has a current medication list which includes the following prescription(s): albuterol, ciclopirox, fluticasone propionate, metformin, metoprolol tartrate, sertraline, simvastatin, spironolactone, triamcinolone acetonide 0.1%, and xarelto.    Allergies:   Review of patient's allergies indicates:   Allergen Reactions    Ace inhibitors      Cough          Imaging,    2/4/2013       Narrative     MRI LUMBAR SPINE WITHOUT CONTRAST    COMPARISON: Lumbar spine radiograph 4/30/12    TECHNIQUE:  Sagittal T1, T2, and STIR;  axial T1 and T2 sequences through the lumbar spine were acquired without contrast.    FINDINGS:  There are 5 lumbar type vertebrae.    Vertebral bodies are normal in height and alignment.  Marrow signal intensity is normal.  The conus terminates at L1.  The distal cord and cauda equina are normal.    L1-L2:  No disc herniation. No spinal canal or neuroforaminal narrowing.    L2-L3:  Minimal diffuse disc bulge.  No spinal canal or neuroforamina narrowing.    L3-L4:  Minimal diffuse disc bulge and mild facet arthropathy.  No spinal canal or neuroforamina narrowing.    L4-L5:  There is a mild diffuse disc bulge and severe bilateral facet arthropathy associated with thickening of the ligamentum flavum.  These contribute to mild spinal canal narrowing and mild left neuroforaminal narrowing.    L5-S1:  There is severe bilateral facet arthropathy and mild diffuse disc bulging contributing to mild spinal canal narrowing and moderate left and mild right neuroforamina narrowing.    A masslike structure is partially imaged, and demonstrates T1 and T2 hyperintensity and appears to be situated within the left oblique musculature.  This is  incompletely evaluated but may represent an intramuscular lipoma.  Please see image 26 of series   6.      Impression       Degenerative features worst at L4-5 and L5-S1 level there is mild spinal canal narrowing and bearing region are from narrowing predominately related to severe facet arthropathy.    Possible 5-cm mass partially imaged, which appears be within the left oblique musculature and may represent a lipoma.  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  Where is your pain the worst? Back   2.  Is your pain constant or intermittent? Intermittent  3.  Does bending forward make your typical pain worse? NO, rising from bend does increase pain   4.  Since the start of your back pain, has there been a change in your bowel or bladder? No   5.  What can't you do now that you use to be able to do? Run up stairs, running after grandchildren, clean her home in one day, showering without holding on       Pts goals: Be able to go up stairs and shower without holding onto something       Red Flag Screening:   Cough  Sneeze  Strain: (+) some increased neck pain with  coughing   Bladder/ bowel: (--) Some urinary leakage and loose stools   Falls: (+) Fell on uneven surface and lost balance, needed assistance to get, when doing PT for leg , fell on knee when doing exercises   Night pain: (--)  Unexplained weight loss: (--)  General health: Good     OBJECTIVE     Postural examination/scapula alignment: Rounded shoulder, Head forward, Increased kyphosis and significant lordosis  Joint integrity: Not tested   Sitting: Fair  Standing: Fair  Correction of posture: better with lumbar roll    MOVEMENT LOSS    ROM Loss   Flexion hypermobile   Extension minimal loss   Side bending Right minimal loss   Side bending Left minimal loss   Rotation Right moderate loss   Rotation Left moderate loss     Single Leg Stance: Right 10 seconds, Left 7 seconds   Trendelenburg: Right +, Left +  Functional Squat Mechanics: Poor knee, lumbar and hip flexion, unstable on rising       Hip Flexiblity:   Supine Flexion:              Right minimal loss   Left minimal loss  Supine Internal rotation: Right minimal loss Left minimal loss  Hamstrings 90/90:          Right within functional limits Left within functional limits  Prone Quadriceps:         Right  Left   Prone Extension:            Right  Left           REPEATED TEST MOVEMENTS:  Repeated Flexion in Standing no effect   Repeated Extension in Standing end range pain  no worse   Repeated Flexion in lying better   Repeated Extension in lying  NT             Lower Extremity Strength   Right LE Left LE    Hip flexion: 4-/5 Hip flexion: 3+/5   Hip extension:   Hip extension:    Hip abduction: 4-/5 Hip abduction: 4-/5   Hip adduction:  4-/5 Hip adduction:  4-/5   Hip Internal rotation    Hip Internal rotation    Knee Flexion 4/5 Knee Flexion 4+/5   Knee Extension 4+/5 Knee Extension 5/5   Ankle dorsiflexion: 4+/5 Ankle dorsiflexion: 4+/5   Ankle plantarflexion: 4/5 Ankle plantarflexion: 4/5       GAIT:  Assistive Device used: none  Level of Assistance:  independent  Patient displays the following gait deviations:  antalgic gait.     Special Tests:   Test Name  Test Result   Prone Instability Test    SI Joint Provocation Test    Straight Leg Raise (--)   Neural Tension Test (--)   Crossed Straight Leg Raise (--)   Walking on toes Unable to perform   Walking on heels  Unable to perform       NEUROLOGICAL SCREENING     Sensory deficit: LE intact to light touch    CMS Impairment/Limitation/Restriction for FOTO Lumbar Survey    Therapist reviewed FOTO scores for Sandee Evans on 9/13/2019.   FOTO documents entered into [employer] - see Media section.    Limitation Score: 62%%  Category: Mobility    Current : CL = least 60% but < 80% impaired, limited or restricted  Goal: CK = at least 40% but < 60% impaired, limited or restricted  Discharge:              Treatment   Treatment Time In: 1:45  Treatment Time Out: 1:50  Total Treatment time separate from Evaluation: 10 minutes      Sandee received therapeutic exercises to develop/improve posture, lumbar/cervical ROM, strength and muscular endurance for 10 minutes including the following exercises:     Clamshells 10x 3x, 4x weekly   Supine pelvic tilts 5-10 s/h 5x, 2x daily  LTR 10x 3x/daily  SKTC 10x, 2-3x/daily   Z-lie 10-20 min, 2x daily    Written Home Exercises Provided: yes.  Exercises were reviewed and Sandee was able to demonstrate them prior to the end of the session.  Sandee demonstrated good  understanding of the education provided.     See EMR under Patient Instructions for exercises provided 9/13/2019.      Education provided:   - Patient received education regarding proper posture and body mechanics.  Patient was given top 10 tips handout which discusses posture seated, standing, lifting correctly, components of exercise, importance of nutrition and hydration, and importance of sleep.  - Wade roll tried, recommended, and purchase information was provided.  - Pt encouraged to perform HEP 2-3x daily to maximize  "treatment benefits. Pt expressed understanding of instruction and will try.   - Pt educated on strategies to safely perform examination and exercise using "Stop Light" analogy to describe how to avoid or stop irritating motions, proceed with caution with some movements, and progress positive exercises.   - Pt informed physical therapy examination is performed with their consent. They are able to stop at any time if people feels uncomfortable or not willing to perform any activity. Pt agrees.   - Discussed smoking cessation program and other stress management strategies to reduce tobacco smoking frequency.         Assessment   Sandee is a 63 y.o. female referred to [facility] with a medical diagnosis of DDD (degenerative disc disease), lumbar. Pt presents with decreased lumbar ROM and LE ROM, decreased LE strength, decreased hip flexibility, poor postural alignment, poor body mechanics, impaired SLS balance, poor-fair tolerance to ADLs. Pt has previously completed PT for her knees but does not continue HEP and maintains a relatively sedentary lifestyle. She feels she is ready to improve activity level and make healthy changes to improve her health such as exercising more and reducing tobacco smoking.       Pain Pattern: 1 PEN       Pt prognosis is Good.   Pt will benefit from skilled outpatient Physical Therapy to address the deficits stated above and in the chart below, provide pt/family education, and to maximize pt's level of independence. Based on the above history and physical examination an active physical therapy program is recommended.  Pt will continue to benefit from skilled outpatient physical therapy to address the deficits listed below in the chart, provide pt/family education and to maximize pt's level of independence in the home and community environment. .       Plan of care discussed with patient: Yes  Pt's spiritual, cultural and educational needs considered and patient is agreeable to the " plan of care and goals as stated below:     Anticipated Barriers for therapy: Previous non-compliance with PT HEP, sedentary lifestyle, obesity     PT Evaluation Completed? Yes    Medical necessity is demonstrated by the following problem list.    Pt presents with the following impairments:     History  Co-morbidities and personal factors that may impact the plan of care Co-morbidities:   see precautions    Personal Factors:   lifestyle     moderate   Examination  Body Structures and Functions, activity limitations and participation restrictions that may impact the plan of care Body Regions:   back  lower extremities    Body Systems:    gross symmetry  ROM  strength  gross coordinated movement  balance  gait  transitions  motor control    Participation Restrictions:   None    Activity limitations:   Learning and applying knowledge  no deficits    General Tasks and Commands  no deficits    Communication  no deficits    Mobility  lifting and carrying objects  walking  driving (bike, car, motorcycle)  Stairs    Self care  dressing    Domestic Life  shopping  cooking  doing house work (cleaning house, washing dishes, laundry)  assisting others    Interactions/Relationships  family relationships    Life Areas  no deficits    Community and Social Life  community life  recreation and leisure  Moravian and spirituality         high   Clinical Presentation evolving clinical presentation with changing clinical characteristics moderate   Decision Making/ Complexity Score: moderate       GOALS: Pt is in agreement with the following goals.    Short term goals:  6 weeks or 10 visits   1.  Pt will demonstrate increased lumbar extension ROM to minimal limitations from the initial ROM value with improvements noted in functional ROM and ability to perform ADLs.  2.  Pt will demonstrate increased MMT of LE to at least 4/5 in all planes improved ability to perform bending, lifting, and carrying activities safely, confidently.    3.   "Patient report a reduction in worst pain score by 1-2 points for improved tolerance for standing up to 5 minutes.  4.  Pt able to perform HEP correctly with minimal cueing or supervision from therapist to encourage independent management of symptoms.       Long term goals: 13 weeks or 20 visits   1. Pt will demonstrate increased lumbar ROM to WFL initial ROM value, resulting in improved ability to perform functional fwd bending while standing and sitting.   2. Pt will demonstrate increased maximum MMT to at least 4+/5 compared to the initial value resulting in improved ability to perform bending, lifting, and carrying activities safely, confidently.  3. Pt to demonstrate ability to independently control and reduce their pain through posture positioning and mechanical movements throughout a typical day.  4.  Pt will demonstrate reduced pain and improved functional outcomes as reported on the FOTO by reaching a score of CK = at least 40% but < 60% impaired, limited or restricted or less in order to demonstrate subjective improvement in pt's condition.    5. Pt will demonstrate independence with the HEP at discharge  6.  Pt will report ability to ascend and descend stairs without increased pain and minimal use of UE assistance (patient goal)  7.  Pt will demonstrate appropriate squat technique for improved tolerance of lifting and carrying functional activity needed for household tasks.       Plan   Outpatient physical therapy 2x week for 10 weeks or 20 visits to include the following:   - Patient education  - Therapeutic exercise  - Manual therapy  - Performance testing   - Neuromuscular Re-education  - Therapeutic activity   - Modalities    Pt may be seen by PTA as part of the rehabilitation team.     Therapist: Tiffanie Galloway, PT  9/14/2019    "I certify the need for these services furnished under this plan of treatment and while under my care."    ____________________________________  Physician/Referring " Practitioner    _______________  Date of Signature

## 2019-09-14 PROBLEM — M54.50 CHRONIC BILATERAL LOW BACK PAIN WITHOUT SCIATICA: Status: ACTIVE | Noted: 2019-09-14

## 2019-09-14 PROBLEM — G89.29 CHRONIC BILATERAL LOW BACK PAIN WITHOUT SCIATICA: Status: ACTIVE | Noted: 2019-09-14

## 2019-09-14 NOTE — PLAN OF CARE
"  [facility] - PHYSICAL THERAPY EVALUATION     Name: Sandee Evans  Clinic Number: [id number]    Therapy Diagnosis:   Encounter Diagnosis   Name Primary?    Chronic bilateral low back pain without sciatica       Physician: Myah Carballo, DIVYA    Physician Orders: PT Eval and Treat   Medical Diagnosis from Referral: DDD (degenerative disc disease), lumbar  Evaluation Date: 9/13/2019  Authorization Period Expiration: 9/30/19  Plan of Care Expiration: 9/30/19  Reassessment Due: 9/30/19  Visit # / Visits authorized: 1/ 8    Time In: 12:55  Time Out: 2:00  Total Billable Time: 65 minutes    Precautions:  Afib, PVD, Left TKA, right knee surgery (unspecifed)       Subjective   Date of onset: Last MD visit to address symptoms 8/29/19  History of current condition - Sandee reports: she has had pain for more than 10 years. So far it has been fairly tolerable but recently  It has worsened over the past year. She thought it was because of her knees but she contineus to have low back pain with prolonged standing (<5 mintues). She does report tingling down her lateral  left leg which started before she had the knee surgery. Her doctor reports this is due to her "veins." She reports she is worsening overall.     Secondary: Right neck pain pain with coughing      Medical History:   Past Medical History:   Diagnosis Date    Anticoagulant long-term use     Xarelto    Arthritis     Atrial fibrillation     Breast cyst     Degenerative disc disease     Depression     Diabetes mellitus     Pre diabetic    Hyperlipidemia     Hypertension     Nuclear sclerosis, bilateral 12/18/2017    Obesity, morbid     Other abnormal glucose     pre-diabetes    Sleep apnea     Smoker     Thyroid disease     on meds 8-9 years ago. hypothyroidism.no malignancy    TMJ (temporomandibular joint disorder)     jaw clicking    Urge incontinence     Wears glasses        Surgical History:   Sandee Evans  has a past surgical history " that includes Hysterectomy; underarm gland\ (Bilateral); rt.knee surgery 2016; Oophorectomy; Breast cyst excision (Right); Breast lumpectomy (Right); Breast biopsy (Right); Total knee arthroplasty (Left, 2/19/2019); and Colonoscopy (N/A, 6/25/2019).    Medications:   Sandee has a current medication list which includes the following prescription(s): albuterol, ciclopirox, fluticasone propionate, metformin, metoprolol tartrate, sertraline, simvastatin, spironolactone, triamcinolone acetonide 0.1%, and xarelto.    Allergies:   Review of patient's allergies indicates:   Allergen Reactions    Ace inhibitors      Cough          Imaging,    2/4/2013       Narrative     MRI LUMBAR SPINE WITHOUT CONTRAST    COMPARISON: Lumbar spine radiograph 4/30/12    TECHNIQUE:  Sagittal T1, T2, and STIR;  axial T1 and T2 sequences through the lumbar spine were acquired without contrast.    FINDINGS:  There are 5 lumbar type vertebrae.    Vertebral bodies are normal in height and alignment.  Marrow signal intensity is normal.  The conus terminates at L1.  The distal cord and cauda equina are normal.    L1-L2:  No disc herniation. No spinal canal or neuroforaminal narrowing.    L2-L3:  Minimal diffuse disc bulge.  No spinal canal or neuroforamina narrowing.    L3-L4:  Minimal diffuse disc bulge and mild facet arthropathy.  No spinal canal or neuroforamina narrowing.    L4-L5:  There is a mild diffuse disc bulge and severe bilateral facet arthropathy associated with thickening of the ligamentum flavum.  These contribute to mild spinal canal narrowing and mild left neuroforaminal narrowing.    L5-S1:  There is severe bilateral facet arthropathy and mild diffuse disc bulging contributing to mild spinal canal narrowing and moderate left and mild right neuroforamina narrowing.    A masslike structure is partially imaged, and demonstrates T1 and T2 hyperintensity and appears to be situated within the left oblique musculature.  This is  incompletely evaluated but may represent an intramuscular lipoma.  Please see image 26 of series   6.      Impression       Degenerative features worst at L4-5 and L5-S1 level there is mild spinal canal narrowing and bearing region are from narrowing predominately related to severe facet arthropathy.    Possible 5-cm mass partially imaged, which appears be within the left oblique musculature and may represent a lipoma.  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  Where is your pain the worst? Back   2.  Is your pain constant or intermittent? Intermittent  3.  Does bending forward make your typical pain worse? NO, rising from bend does increase pain   4.  Since the start of your back pain, has there been a change in your bowel or bladder? No   5.  What can't you do now that you use to be able to do? Run up stairs, running after grandchildren, clean her home in one day, showering without holding on       Pts goals: Be able to go up stairs and shower without holding onto something       Red Flag Screening:   Cough  Sneeze  Strain: (+) some increased neck pain with  coughing   Bladder/ bowel: (--) Some urinary leakage and loose stools   Falls: (+) Fell on uneven surface and lost balance, needed assistance to get, when doing PT for leg , fell on knee when doing exercises   Night pain: (--)  Unexplained weight loss: (--)  General health: Good     OBJECTIVE     Postural examination/scapula alignment: Rounded shoulder, Head forward, Increased kyphosis and significant lordosis  Joint integrity: Not tested   Sitting: Fair  Standing: Fair  Correction of posture: better with lumbar roll    MOVEMENT LOSS    ROM Loss   Flexion hypermobile   Extension minimal loss   Side bending Right minimal loss   Side bending Left minimal loss   Rotation Right moderate loss   Rotation Left moderate loss     Single Leg Stance: Right 10 seconds, Left 7 seconds   Trendelenburg: Right +, Left +  Functional Squat Mechanics: Poor knee, lumbar and hip flexion, unstable on rising       Hip Flexiblity:   Supine Flexion:              Right minimal loss   Left minimal loss  Supine Internal rotation: Right minimal loss Left minimal loss  Hamstrings 90/90:          Right within functional limits Left within functional limits  Prone Quadriceps:         Right  Left   Prone Extension:            Right  Left           REPEATED TEST MOVEMENTS:  Repeated Flexion in Standing no effect   Repeated Extension in Standing end range pain  no worse   Repeated Flexion in lying better   Repeated Extension in lying  NT             Lower Extremity Strength   Right LE Left LE    Hip flexion: 4-/5 Hip flexion: 3+/5   Hip extension:   Hip extension:    Hip abduction: 4-/5 Hip abduction: 4-/5   Hip adduction:  4-/5 Hip adduction:  4-/5   Hip Internal rotation    Hip Internal rotation    Knee Flexion 4/5 Knee Flexion 4+/5   Knee Extension 4+/5 Knee Extension 5/5   Ankle dorsiflexion: 4+/5 Ankle dorsiflexion: 4+/5   Ankle plantarflexion: 4/5 Ankle plantarflexion: 4/5       GAIT:  Assistive Device used: none  Level of Assistance:  independent  Patient displays the following gait deviations:  antalgic gait.     Special Tests:   Test Name  Test Result   Prone Instability Test    SI Joint Provocation Test    Straight Leg Raise (--)   Neural Tension Test (--)   Crossed Straight Leg Raise (--)   Walking on toes Unable to perform   Walking on heels  Unable to perform       NEUROLOGICAL SCREENING     Sensory deficit: LE intact to light touch    CMS Impairment/Limitation/Restriction for FOTO Lumbar Survey    Therapist reviewed FOTO scores for Sandee Evans on 9/13/2019.   FOTO documents entered into [facility] - see Media section.    Limitation Score: 62%%  Category: Mobility    Current : CL = least 60% but < 80% impaired, limited or restricted  Goal: CK = at least 40% but < 60% impaired, limited or restricted  Discharge:              Treatment   Treatment Time In: 1:45  Treatment Time Out: 1:50  Total Treatment time separate from Evaluation: 10 minutes      Sandee received therapeutic exercises to develop/improve posture, lumbar/cervical ROM, strength and muscular endurance for 10 minutes including the following exercises:     Clamshells 10x 3x, 4x weekly   Supine pelvic tilts 5-10 s/h 5x, 2x daily  LTR 10x 3x/daily  SKTC 10x, 2-3x/daily   Z-lie 10-20 min, 2x daily    Written Home Exercises Provided: yes.  Exercises were reviewed and Sandee was able to demonstrate them prior to the end of the session.  Sandee demonstrated good  understanding of the education provided.     See EMR under Patient Instructions for exercises provided 9/13/2019.      Education provided:   - Patient received education regarding proper posture and body mechanics.  Patient was given top 10 tips handout which discusses posture seated, standing, lifting correctly, components of exercise, importance of nutrition and hydration, and importance of sleep.  - Wade roll tried, recommended, and purchase information was provided.  - Pt encouraged to perform HEP 2-3x daily to maximize  "treatment benefits. Pt expressed understanding of instruction and will try.   - Pt educated on strategies to safely perform examination and exercise using "Stop Light" analogy to describe how to avoid or stop irritating motions, proceed with caution with some movements, and progress positive exercises.   - Pt informed physical therapy examination is performed with their consent. They are able to stop at any time if people feels uncomfortable or not willing to perform any activity. Pt agrees.   - Discussed smoking cessation program and other stress management strategies to reduce tobacco smoking frequency.         Assessment   Sandee is a 63 y.o. female referred to [facility] with a medical diagnosis of DDD (degenerative disc disease), lumbar. Pt presents with decreased lumbar ROM and LE ROM, decreased LE strength, decreased hip flexibility, poor postural alignment, poor body mechanics, impaired SLS balance, poor-fair tolerance to ADLs. Pt has previously completed PT for her knees but does not continue HEP and maintains a relatively sedentary lifestyle. She feels she is ready to improve activity level and make healthy changes to improve her health such as exercising more and reducing tobacco smoking. She reported feeling better by the of the session today.       Pain Pattern: 1 PEN       Pt prognosis is Good.   Pt will benefit from skilled outpatient Physical Therapy to address the deficits stated above and in the chart below, provide pt/family education, and to maximize pt's level of independence. Based on the above history and physical examination an active physical therapy program is recommended.  Pt will continue to benefit from skilled outpatient physical therapy to address the deficits listed below in the chart, provide pt/family education and to maximize pt's level of independence in the home and community environment. .       Plan of care discussed with patient: Yes  Pt's spiritual, cultural and " educational needs considered and patient is agreeable to the plan of care and goals as stated below:     Anticipated Barriers for therapy: Previous non-compliance with PT HEP, sedentary lifestyle, obesity     PT Evaluation Completed? Yes    Medical necessity is demonstrated by the following problem list.    Pt presents with the following impairments:     History  Co-morbidities and personal factors that may impact the plan of care Co-morbidities:   see precautions    Personal Factors:   lifestyle     moderate   Examination  Body Structures and Functions, activity limitations and participation restrictions that may impact the plan of care Body Regions:   back  lower extremities    Body Systems:    gross symmetry  ROM  strength  gross coordinated movement  balance  gait  transitions  motor control    Participation Restrictions:   None    Activity limitations:   Learning and applying knowledge  no deficits    General Tasks and Commands  no deficits    Communication  no deficits    Mobility  lifting and carrying objects  walking  driving (bike, car, motorcycle)  Stairs    Self care  dressing    Domestic Life  shopping  cooking  doing house work (cleaning house, washing dishes, laundry)  assisting others    Interactions/Relationships  family relationships    Life Areas  no deficits    Community and Social Life  community life  recreation and leisure  Cheondoism and spirituality         high   Clinical Presentation evolving clinical presentation with changing clinical characteristics moderate   Decision Making/ Complexity Score: moderate       GOALS: Pt is in agreement with the following goals.    Short term goals:  6 weeks or 10 visits   1.  Pt will demonstrate increased lumbar extension ROM to minimal limitations from the initial ROM value with improvements noted in functional ROM and ability to perform ADLs.  2.  Pt will demonstrate increased MMT of LE to at least 4/5 in all planes improved ability to perform bending,  "lifting, and carrying activities safely, confidently.    3.  Patient report a reduction in worst pain score by 1-2 points for improved tolerance for standing up to 5 minutes.  4.  Pt able to perform HEP correctly with minimal cueing or supervision from therapist to encourage independent management of symptoms.       Long term goals: 13 weeks or 20 visits   1. Pt will demonstrate increased lumbar ROM to WFL initial ROM value, resulting in improved ability to perform functional fwd bending while standing and sitting.   2. Pt will demonstrate increased maximum MMT to at least 4+/5 compared to the initial value resulting in improved ability to perform bending, lifting, and carrying activities safely, confidently.  3. Pt to demonstrate ability to independently control and reduce their pain through posture positioning and mechanical movements throughout a typical day.  4.  Pt will demonstrate reduced pain and improved functional outcomes as reported on the FOTO by reaching a score of CK = at least 40% but < 60% impaired, limited or restricted or less in order to demonstrate subjective improvement in pt's condition.    5. Pt will demonstrate independence with the HEP at discharge  6.  Pt will report ability to ascend and descend stairs without increased pain and minimal use of UE assistance (patient goal)  7.  Pt will demonstrate appropriate squat technique for improved tolerance of lifting and carrying functional activity needed for household tasks.       Plan   Outpatient physical therapy 2x week for 10 weeks or 20 visits to include the following:   - Patient education  - Therapeutic exercise  - Manual therapy  - Performance testing   - Neuromuscular Re-education  - Therapeutic activity   - Modalities    Pt may be seen by PTA as part of the rehabilitation team.     Therapist: Tiffanie Galloway, PT  9/14/2019    "I certify the need for these services furnished under this plan of treatment and while under my " "care."    ____________________________________  Physician/Referring Practitioner    _______________  Date of Signature            "

## 2019-09-20 ENCOUNTER — CLINICAL SUPPORT (OUTPATIENT)
Dept: REHABILITATION | Facility: HOSPITAL | Age: 64
End: 2019-09-20
Attending: PHYSICIAN ASSISTANT
Payer: COMMERCIAL

## 2019-09-20 DIAGNOSIS — M54.50 CHRONIC BILATERAL LOW BACK PAIN WITHOUT SCIATICA: ICD-10-CM

## 2019-09-20 DIAGNOSIS — G89.29 CHRONIC BILATERAL LOW BACK PAIN WITHOUT SCIATICA: ICD-10-CM

## 2019-09-20 PROCEDURE — 97110 THERAPEUTIC EXERCISES: CPT | Mod: PN

## 2019-09-20 NOTE — PROGRESS NOTES
Physical Therapy Daily Treatment Note     Name: Sandee Evans  Clinic Number: [id number]    Therapy Diagnosis: No diagnosis found.  Physician: Myah Carballo, DIVYA    Visit Date: 9/20/2019    Physician Orders: PT Eval and Treat   Medical Diagnosis from Referral: DDD (degenerative disc disease), lumbar  Evaluation Date: 9/13/2019  Authorization Period Expiration: 9/30/19  Plan of Care Expiration: 9/30/19  Reassessment Due: 9/30/19  Visit # / Visits authorized: 2/ 8       Time In:  9:05  Time Out: 10:13     Total Billable Time: 58 minutes    Precautions:  Afib, PVD, Left TKA, right knee surgery (unspecifed)     Subjective     Pt reports: she felt pretty good after the last session. Her back feels pretty good but her upper shoulders are sore.  She was compliant with home exercise program.  Response to previous treatment: with minimal soreness  Functional change: no change yet    Pain: 0/10  Location: central tailbone pain    Objective     Sandee received therapeutic exercises to develop strength, endurance, ROM, flexibility, posture and core stabilization for 50 minutes including:      Clamshells 10x 3x, 4x weekly   Supine pelvic tilts 5-10 s/h 5x   LTR 10x   SKTC 10x  Open book stretch 10x  Standing hip abduction 10x 2  Standing and seated heel to toe raises 10 x 2   Seated alternating hip flexion 15x 2   Use of lumbar roll and postural corrections  8 minutes on Nustep       Home Exercises Provided and Patient Education Provided     Education provided:   - Postural corrections  -Purpose and importance of regular exercise.     Written Home Exercises Provided: yes.  Exercises were reviewed and Sandee was able to demonstrate them prior to the end of the session.  Sandee demonstrated good  understanding of the education provided.     See EMR under Patient Instructions for exercises provided 9/20/2019.    Assessment     Pt presents to physical therapy for 2nd visit following initial evaluation. Pt reports minimal shoulder  soreness following initial evaluation. Reviewed HEP exercises. Pt performed with mild verbal cues for technique.  Progressed supine and upright exercises to improve core and hip strength with positive response.   Sandee is progressing well towards her goals.   Pt prognosis is Good.     Pt will continue to benefit from skilled outpatient physical therapy to address the deficits listed in the problem list box on initial evaluation, provide pt/family education and to maximize pt's level of independence in the home and community environment.     Pt's spiritual, cultural and educational needs considered and pt agreeable to plan of care and goals.     Anticipated barriers to physical therapy: Previous non-compliance with PT HEP, sedentary lifestyle, obesity     Goals: Short term goals:  6 weeks or 10 visits   1.  Pt will demonstrate increased lumbar extension ROM to minimal limitations from the initial ROM value with improvements noted in functional ROM and ability to perform ADLs.  2.  Pt will demonstrate increased MMT of LE to at least 4/5 in all planes improved ability to perform bending, lifting, and carrying activities safely, confidently.     3.  Patient report a reduction in worst pain score by 1-2 points for improved tolerance for standing up to 5 minutes.  4.  Pt able to perform HEP correctly with minimal cueing or supervision from therapist to encourage independent management of symptoms.         Long term goals: 13 weeks or 20 visits   1. Pt will demonstrate increased lumbar ROM to WFL initial ROM value, resulting in improved ability to perform functional fwd bending while standing and sitting.   2. Pt will demonstrate increased maximum MMT to at least 4+/5 compared to the initial value resulting in improved ability to perform bending, lifting, and carrying activities safely, confidently.  3. Pt to demonstrate ability to independently control and reduce their pain through posture positioning and mechanical  movements throughout a typical day.  4.  Pt will demonstrate reduced pain and improved functional outcomes as reported on the FOTO by reaching a score of CK = at least 40% but < 60% impaired, limited or restricted or less in order to demonstrate subjective improvement in pt's condition.    5. Pt will demonstrate independence with the HEP at discharge  6.  Pt will report ability to ascend and descend stairs without increased pain and minimal use of UE assistance (patient goal)  7.  Pt will demonstrate appropriate squat technique for improved tolerance of lifting and carrying functional activity needed for household tasks.        Plan     Outpatient physical therapy 1-2x week for 4-8 weeks  - Patient education  - Therapeutic exercise  - Manual therapy  - Performance testing   - Neuromuscular Re-education  - Therapeutic activity   - Modalities     Pt may be seen by PTA as part of the rehabilitation team.        Tiffanie Galloway, PT

## 2019-09-24 ENCOUNTER — CLINICAL SUPPORT (OUTPATIENT)
Dept: REHABILITATION | Facility: HOSPITAL | Age: 64
End: 2019-09-24
Attending: PHYSICIAN ASSISTANT
Payer: COMMERCIAL

## 2019-09-24 DIAGNOSIS — G89.29 CHRONIC BILATERAL LOW BACK PAIN WITHOUT SCIATICA: ICD-10-CM

## 2019-09-24 DIAGNOSIS — M54.50 CHRONIC BILATERAL LOW BACK PAIN WITHOUT SCIATICA: ICD-10-CM

## 2019-09-24 PROCEDURE — 97110 THERAPEUTIC EXERCISES: CPT | Mod: PN

## 2019-09-24 NOTE — PROGRESS NOTES
Physical Therapy Daily Treatment Note     Name: Sandee Evans  Clinic Number: [id number]    Therapy Diagnosis:   Encounter Diagnosis   Name Primary?    Chronic bilateral low back pain without sciatica      Physician: Myah Carballo, DIVYA    Visit Date: 9/24/2019    Physician Orders: PT Eval and Treat   Medical Diagnosis from Referral: DDD (degenerative disc disease), lumbar  Evaluation Date: 9/13/2019  Authorization Period Expiration: 9/30/19  Plan of Care Expiration: 9/30/19  Reassessment Due: 9/30/19  Visit # / Visits authorized: 3/ 8       Time In:  9:15  Time Out: 10:00     Total Billable Time: 45 minutes    Precautions:  Afib, PVD, Left TKA, right knee surgery (unspecifed)     Subjective     Pt reports: she felt pretty good after the last session. She has a headache today which is why she was late to the session.      She was compliant with home exercise program.  Response to previous treatment: with minimal soreness  Functional change: no change yet    Pain: 0/10  Location: central tailbone pain    Objective     Sandee received therapeutic exercises to develop strength, endurance, ROM, flexibility, posture and core stabilization for 50 minutes including:    Bolded exercises performed today:    LTR 10x   SKTC 10x  Clamshells 10x 3x, 4x weekly   Supine pelvic tilts 5-10 s/h 5x   Open book stretch 10x  Standing hip abduction 10x 2  Standing and seated heel to toe raises 10 x 2   Seated alternating hip flexion 15x 2       +supine cervical retraction   +Seated cervical retraction +extension with towel over pressure  +Scapular retraction 10x   +Hip hinging forward lumbar flexion, seated 15x   +upper trapezius stretch 3x 15 s/h     Manual: STM to upper traps, Cervical traction +cervical retraction with PT over pressure 5 minutes    Home Exercises Provided and Patient Education Provided     Education provided:   - Postural corrections  -Purpose and importance of daily exercise.     Written Home Exercises Provided:  yes.  Exercises were reviewed and Sandee was able to demonstrate them prior to the end of the session.  Sandee demonstrated good  understanding of the education provided.     See EMR under Patient Instructions for exercises provided 9/20/2019.    Assessment     Pt presents to physical therapy with moderate headache which abolished with gentle cervical traction and neck exercises, postural correction strategies. Also recommended use of cervical roll to help improve neutral cervical alignment while sleeping. Pt reports moderate increase in low back pain after standing hip extension exercises. Reduced following hip hinging lumbar flexion exercises. Attempted to perform cardio on bike and TM but pt had difficulty getting on machines. Plan to work on sit<>stand, stairs, review lumbar HEP and postural exercises next session.       Sandee is progressing well towards her goals.   Pt prognosis is Good.     Pt will continue to benefit from skilled outpatient physical therapy to address the deficits listed in the problem list box on initial evaluation, provide pt/family education and to maximize pt's level of independence in the home and community environment.     Pt's spiritual, cultural and educational needs considered and pt agreeable to plan of care and goals.     Anticipated barriers to physical therapy: Previous non-compliance with PT HEP, sedentary lifestyle, obesity     Goals: Short term goals:  6 weeks or 10 visits   1.  Pt will demonstrate increased lumbar extension ROM to minimal limitations from the initial ROM value with improvements noted in functional ROM and ability to perform ADLs.  2.  Pt will demonstrate increased MMT of LE to at least 4/5 in all planes improved ability to perform bending, lifting, and carrying activities safely, confidently.     3.  Patient report a reduction in worst pain score by 1-2 points for improved tolerance for standing up to 5 minutes.  4.  Pt able to perform HEP correctly with  minimal cueing or supervision from therapist to encourage independent management of symptoms.         Long term goals: 13 weeks or 20 visits   1. Pt will demonstrate increased lumbar ROM to WFL initial ROM value, resulting in improved ability to perform functional fwd bending while standing and sitting.   2. Pt will demonstrate increased maximum MMT to at least 4+/5 compared to the initial value resulting in improved ability to perform bending, lifting, and carrying activities safely, confidently.  3. Pt to demonstrate ability to independently control and reduce their pain through posture positioning and mechanical movements throughout a typical day.  4.  Pt will demonstrate reduced pain and improved functional outcomes as reported on the FOTO by reaching a score of CK = at least 40% but < 60% impaired, limited or restricted or less in order to demonstrate subjective improvement in pt's condition.    5. Pt will demonstrate independence with the HEP at discharge  6.  Pt will report ability to ascend and descend stairs without increased pain and minimal use of UE assistance (patient goal)  7.  Pt will demonstrate appropriate squat technique for improved tolerance of lifting and carrying functional activity needed for household tasks.        Plan     Outpatient physical therapy 1-2x week for 4-8 weeks  - Patient education  - Therapeutic exercise  - Manual therapy  - Performance testing   - Neuromuscular Re-education  - Therapeutic activity   - Modalities     Pt may be seen by PTA as part of the rehabilitation team.        Tiffanie Galloway, PT

## 2019-09-26 ENCOUNTER — CLINICAL SUPPORT (OUTPATIENT)
Dept: REHABILITATION | Facility: HOSPITAL | Age: 64
End: 2019-09-26
Attending: PHYSICIAN ASSISTANT
Payer: COMMERCIAL

## 2019-09-26 DIAGNOSIS — M54.50 CHRONIC BILATERAL LOW BACK PAIN WITHOUT SCIATICA: ICD-10-CM

## 2019-09-26 DIAGNOSIS — G89.29 CHRONIC BILATERAL LOW BACK PAIN WITHOUT SCIATICA: ICD-10-CM

## 2019-09-26 PROCEDURE — 97110 THERAPEUTIC EXERCISES: CPT | Mod: PN

## 2019-09-26 NOTE — PLAN OF CARE
Physical Therapy Daily Treatment Note     Name: Sandee Evans  Clinic Number: [id number]    Therapy Diagnosis:   Encounter Diagnosis   Name Primary?    Chronic bilateral low back pain without sciatica      Physician: Myah Carballo, DIVYA    Visit Date: 9/26/2019    Physician Orders: PT Eval and Treat   Medical Diagnosis from Referral: DDD (degenerative disc disease), lumbar  Evaluation Date: 9/13/2019  Authorization Period Expiration: 9/30/19  Plan of Care Expiration: 9/30/19  Reassessment Due: 10/16/19   Visit # / Visits authorized: 4/ 8       Time In:  9:15  Time Out: 10:00     Total Billable Time: 45 minutes    Precautions:  Afib, PVD, Left TKA, right knee surgery (unspecifed)     Subjective     Pt reports: she felt pretty good after the last session. Her headache resolved.    She was compliant with home exercise program.  Response to previous treatment: with some soreness of the legs  Functional change: able to stand up to 30 minutes during bible study    Pain: 0/10  Location: central tailbone pain    Objective     Sandee received therapeutic exercises to develop strength, endurance, ROM, flexibility, posture and core stabilization for 60 minutes including:    Bolded exercises performed today:    LTR 10x   SKTC 10x  Clamshells 10x 2x  Supine pelvic tilts 5-10 s/h 5x   + PPT + bridge 5x  Open book stretch 10x  Standing hip abduction 10x 2  Standing and seated heel to toe raises 10 x 2   Seated alternating hip flexion 15x 2       SIt<>stand 5 reps, 3x   +Toe taps on stairs 10x  +Step ups 10x 2 (with and without UE support)     Supine cervical retraction   Seated cervical retraction + extension with towel over pressure  Scapular retraction 10x   Hip hinging forward lumbar flexion, seated 15x   Upper trapezius stretch 3x 15 s/h     Home Exercises Provided and Patient Education Provided     Education provided:   - Postural corrections  -Purpose and importance of daily exercise.     Written Home Exercises Provided:  yes.  Exercises were reviewed and Sandee was able to demonstrate them prior to the end of the session.  Sandee demonstrated good  understanding of the education provided.     See EMR under Patient Instructions for exercises provided 9/20/2019.    Assessment     Pt presents to physical therapy with minimal low back and neck stiffness which improved by end of treatment session. Pt able to perform HEP with minimal v/c for technique. Progressed some upright exercises to hep with transitional motions such as sit<>stand. Also attempted toe tapping and step ups on 4' stairs per difficulty ascending stairs with moderate difficulty. Also attempted some balance on airex with v/c to engage core. Pt is demonstrating improved tolerance to dynamic balance and hip stability exercises and reports improving functional tolerance to prolonged standing activity.     Sandee is progressing well towards her goals.   Pt prognosis is Good.     Pt will continue to benefit from skilled outpatient physical therapy to address the deficits listed in the problem list box on initial evaluation, provide pt/family education and to maximize pt's level of independence in the home and community environment.     Pt's spiritual, cultural and educational needs considered and pt agreeable to plan of care and goals.     Anticipated barriers to physical therapy: Previous non-compliance with PT HEP, sedentary lifestyle, obesity     Goals: Short term goals:  4 weeks or 4 visits   1.  Pt will demonstrate increased lumbar extension ROM to minimal limitations from the initial ROM value with improvements noted in functional ROM and ability to perform ADLs. Progressing  2.  Pt will demonstrate increased MMT of LE to at least 4/5 in all planes improved ability to perform bending, lifting, and carrying activities safely, confidently.  Progressing     3.  Patient report a reduction in worst pain score by 1-2 points for improved tolerance for standing up to 5 minutes.  Met 9/26/19  4.  Pt able to perform HEP correctly with minimal cueing or supervision from therapist to encourage independent management of symptoms. Progressing        Long term goals: 8 weeks or 8 visits   1. Pt will demonstrate increased lumbar ROM to WFL initial ROM value, resulting in improved ability to perform functional fwd bending while standing and sitting.   2. Pt will demonstrate increased maximum MMT to at least 4+/5 compared to the initial value resulting in improved ability to perform bending, lifting, and carrying activities safely, confidently.  3. Pt to demonstrate ability to independently control and reduce their pain through posture positioning and mechanical movements throughout a typical day.  4.  Pt will demonstrate reduced pain and improved functional outcomes as reported on the FOTO by reaching a score of CK = at least 40% but < 60% impaired, limited or restricted or less in order to demonstrate subjective improvement in pt's condition.    5. Pt will demonstrate independence with the HEP at discharge  6.  Pt will report ability to ascend and descend stairs without increased pain and minimal use of UE assistance (patient goal)  7.  Pt will demonstrate appropriate squat technique for improved tolerance of lifting and carrying functional activity needed for household tasks.        Plan     Outpatient physical therapy 1-2x week for 4-8 weeks  - Patient education  - Therapeutic exercise  - Manual therapy  - Performance testing   - Neuromuscular Re-education  - Therapeutic activity   - Modalities     Pt may be seen by PTA as part of the rehabilitation team.        Tiffanie Galloway, PT

## 2019-09-26 NOTE — PROGRESS NOTES
Physical Therapy Daily Treatment Note     Name: Sandee Evans  Clinic Number: [id number]    Therapy Diagnosis:   Encounter Diagnosis   Name Primary?    Chronic bilateral low back pain without sciatica      Physician: Myah Carballo, DIVYA    Visit Date: 9/26/2019    Physician Orders: PT Eval and Treat   Medical Diagnosis from Referral: DDD (degenerative disc disease), lumbar  Evaluation Date: 9/13/2019  Authorization Period Expiration: 9/30/19  Plan of Care Expiration: 9/30/19  Reassessment Due: 10/16/19   Visit # / Visits authorized: 4/ 8       Time In:  9:15  Time Out: 10:00     Total Billable Time: 45 minutes    Precautions:  Afib, PVD, Left TKA, right knee surgery (unspecifed)     Subjective     Pt reports: she felt pretty good after the last session. Her headache resolved.    She was compliant with home exercise program.  Response to previous treatment: with some soreness of the legs  Functional change: able to stand up to 30 minutes during bible study    Pain: 0/10  Location: central tailbone pain    Objective     Sandee received therapeutic exercises to develop strength, endurance, ROM, flexibility, posture and core stabilization for 60 minutes including:    Bolded exercises performed today:    LTR 10x   SKTC 10x  Clamshells 10x 2x  Supine pelvic tilts 5-10 s/h 5x   + PPT + bridge 5x  Open book stretch 10x  Standing hip abduction 10x 2  Standing and seated heel to toe raises 10 x 2   Seated alternating hip flexion 15x 2       SIt<>stand 5 reps, 3x   +Toe taps on stairs 10x  +Step ups 10x 2 (with and without UE support)     Supine cervical retraction   Seated cervical retraction + extension with towel over pressure  Scapular retraction 10x   Hip hinging forward lumbar flexion, seated 15x   Upper trapezius stretch 3x 15 s/h     Home Exercises Provided and Patient Education Provided     Education provided:   - Postural corrections  -Purpose and importance of daily exercise.     Written Home Exercises Provided:  yes.  Exercises were reviewed and Sandee was able to demonstrate them prior to the end of the session.  Sandee demonstrated good  understanding of the education provided.     See EMR under Patient Instructions for exercises provided 9/20/2019.    Assessment     Pt presents to physical therapy with minimal low back and neck stiffness which improved by end of treatment session. Pt able to perform HEP with minimal v/c for technique. Progressed some upright exercises to hep with transitional motions such as sit<>stand. Also attempted toe tapping and step ups on 4' stairs per difficulty ascending stairs with moderate difficulty. Also attempted some balance on airex with v/c to engage core. Pt is demonstrating improved tolerance to dynamic balance and hip stability exercises and reports improving functional tolerance to prolonged standing activity.     Sandee is progressing well towards her goals.   Pt prognosis is Good.     Pt will continue to benefit from skilled outpatient physical therapy to address the deficits listed in the problem list box on initial evaluation, provide pt/family education and to maximize pt's level of independence in the home and community environment.     Pt's spiritual, cultural and educational needs considered and pt agreeable to plan of care and goals.     Anticipated barriers to physical therapy: Previous non-compliance with PT HEP, sedentary lifestyle, obesity     Goals: Short term goals:  4 weeks or 4 visits   1.  Pt will demonstrate increased lumbar extension ROM to minimal limitations from the initial ROM value with improvements noted in functional ROM and ability to perform ADLs. Progressing  2.  Pt will demonstrate increased MMT of LE to at least 4/5 in all planes improved ability to perform bending, lifting, and carrying activities safely, confidently.  Progressing     3.  Patient report a reduction in worst pain score by 1-2 points for improved tolerance for standing up to 5 minutes.  Met 9/26/19  4.  Pt able to perform HEP correctly with minimal cueing or supervision from therapist to encourage independent management of symptoms. Progressing        Long term goals: 8 weeks or 8 visits   1. Pt will demonstrate increased lumbar ROM to WFL initial ROM value, resulting in improved ability to perform functional fwd bending while standing and sitting.   2. Pt will demonstrate increased maximum MMT to at least 4+/5 compared to the initial value resulting in improved ability to perform bending, lifting, and carrying activities safely, confidently.  3. Pt to demonstrate ability to independently control and reduce their pain through posture positioning and mechanical movements throughout a typical day.  4.  Pt will demonstrate reduced pain and improved functional outcomes as reported on the FOTO by reaching a score of CK = at least 40% but < 60% impaired, limited or restricted or less in order to demonstrate subjective improvement in pt's condition.    5. Pt will demonstrate independence with the HEP at discharge  6.  Pt will report ability to ascend and descend stairs without increased pain and minimal use of UE assistance (patient goal)  7.  Pt will demonstrate appropriate squat technique for improved tolerance of lifting and carrying functional activity needed for household tasks.        Plan     Outpatient physical therapy 1-2x week for 4-8 weeks  - Patient education  - Therapeutic exercise  - Manual therapy  - Performance testing   - Neuromuscular Re-education  - Therapeutic activity   - Modalities     Pt may be seen by PTA as part of the rehabilitation team.        Tiffanie Galloway, PT

## 2019-10-04 ENCOUNTER — CLINICAL SUPPORT (OUTPATIENT)
Dept: REHABILITATION | Facility: HOSPITAL | Age: 64
End: 2019-10-04
Attending: PHYSICIAN ASSISTANT
Payer: COMMERCIAL

## 2019-10-04 DIAGNOSIS — M54.50 CHRONIC BILATERAL LOW BACK PAIN WITHOUT SCIATICA: ICD-10-CM

## 2019-10-04 DIAGNOSIS — G89.29 CHRONIC BILATERAL LOW BACK PAIN WITHOUT SCIATICA: ICD-10-CM

## 2019-10-04 PROCEDURE — 97110 THERAPEUTIC EXERCISES: CPT | Mod: PN

## 2019-10-04 NOTE — PROGRESS NOTES
"  Physical Therapy Daily Treatment Note     Name: Sandee Evans  Clinic Number: [id number]    Therapy Diagnosis:   Encounter Diagnosis   Name Primary?    Chronic bilateral low back pain without sciatica      Physician: Myah Carballo, DIVYA    Visit Date: 10/4/2019    Physician Orders: PT Eval and Treat   Medical Diagnosis from Referral: DDD (degenerative disc disease), lumbar  Evaluation Date: 9/13/2019  Authorization Period Expiration: 9/30/19  Plan of Care Expiration: 9/30/19  Reassessment Due: 10/16/19   Visit # / Visits authorized: 5/ 8       Time In:  1040  Time Out: 1130     Total Billable Time: 30 minutes    Precautions:  Afib, PVD, Left TKA, right knee surgery (unspecifed)     Subjective     Pt reports: continued pain, mostly when standing for an extended amount of time or walking a long distance.    She was compliant with home exercise program.  Response to previous treatment: no adverse reaction  Functional change: able to stand up to 30 minutes during bible study    Pain: did not rate/10  Location: central tailbone pain    Objective     Sandee received therapeutic exercises to develop strength, endurance, ROM, flexibility, posture and core stabilization for 60 minutes including:    Bolded exercises performed today:   NuStep x 8'  LTR 2 x 10   SKTC 10x  Clamshells 2 x 10  Supine pelvic tilts 5-10 s/h 5x   + PPT + bridge 2 x 5  Open book stretch 10x  Standing hip abduction 10x 2  Standing and seated heel to toe raises 10 x 2   Seated alternating hip flexion 15x 2       SIt<>stand 5 reps,  with cueing for eccentric control   Toe taps on stairs 2 x 10  Step ups 2 x 10 (with and without UE support)     Supine cervical retraction   Seated cervical retraction + extension with towel over pressure  Scapular retraction 1   Hip hinging forward lumbar flexion, seated 15x   Upper trapezius stretch 3x 30"     Home Exercises Provided and Patient Education Provided     Education provided:   - Postural corrections  -Purpose " and importance of daily exercise.     Written Home Exercises Provided: yes.  Exercises were reviewed and Sandee was able to demonstrate them prior to the end of the session.  Sandee demonstrated good  understanding of the education provided.     See EMR under Patient Instructions for exercises provided 9/20/2019.    Assessment     Pt demonstrated good tolerance to session and progression of exercises. Verbal and tactile cueing for postural awareness and body mechanics. Pt required heavy cueing for awareness and for eccentric control during sit<>stands.     Sandee is progressing well towards her goals.   Pt prognosis is Good.     Pt will continue to benefit from skilled outpatient physical therapy to address the deficits listed in the problem list box on initial evaluation, provide pt/family education and to maximize pt's level of independence in the home and community environment.     Pt's spiritual, cultural and educational needs considered and pt agreeable to plan of care and goals.     Anticipated barriers to physical therapy: Previous non-compliance with PT HEP, sedentary lifestyle, obesity     Goals: Short term goals:  4 weeks or 4 visits   1.  Pt will demonstrate increased lumbar extension ROM to minimal limitations from the initial ROM value with improvements noted in functional ROM and ability to perform ADLs. Progressing  2.  Pt will demonstrate increased MMT of LE to at least 4/5 in all planes improved ability to perform bending, lifting, and carrying activities safely, confidently.  Progressing     3.  Patient report a reduction in worst pain score by 1-2 points for improved tolerance for standing up to 5 minutes. Met 9/26/19  4.  Pt able to perform HEP correctly with minimal cueing or supervision from therapist to encourage independent management of symptoms. Progressing        Long term goals: 8 weeks or 8 visits   1. Pt will demonstrate increased lumbar ROM to WFL initial ROM value, resulting in  improved ability to perform functional fwd bending while standing and sitting.   2. Pt will demonstrate increased maximum MMT to at least 4+/5 compared to the initial value resulting in improved ability to perform bending, lifting, and carrying activities safely, confidently.  3. Pt to demonstrate ability to independently control and reduce their pain through posture positioning and mechanical movements throughout a typical day.  4.  Pt will demonstrate reduced pain and improved functional outcomes as reported on the FOTO by reaching a score of CK = at least 40% but < 60% impaired, limited or restricted or less in order to demonstrate subjective improvement in pt's condition.    5. Pt will demonstrate independence with the HEP at discharge  6.  Pt will report ability to ascend and descend stairs without increased pain and minimal use of UE assistance (patient goal)  7.  Pt will demonstrate appropriate squat technique for improved tolerance of lifting and carrying functional activity needed for household tasks.        Plan     Outpatient physical therapy 1-2x week for 4-8 weeks  - Patient education  - Therapeutic exercise  - Manual therapy  - Performance testing   - Neuromuscular Re-education  - Therapeutic activity   - Modalities     Pt may be seen by PTA as part of the rehabilitation team.        Ana Harrington, PTA

## 2019-10-10 ENCOUNTER — PATIENT OUTREACH (OUTPATIENT)
Dept: ADMINISTRATIVE | Facility: HOSPITAL | Age: 64
End: 2019-10-10

## 2019-10-22 ENCOUNTER — CLINICAL SUPPORT (OUTPATIENT)
Dept: REHABILITATION | Facility: HOSPITAL | Age: 64
End: 2019-10-22
Attending: PHYSICIAN ASSISTANT
Payer: COMMERCIAL

## 2019-10-22 DIAGNOSIS — M54.50 CHRONIC BILATERAL LOW BACK PAIN WITHOUT SCIATICA: ICD-10-CM

## 2019-10-22 DIAGNOSIS — G89.29 CHRONIC BILATERAL LOW BACK PAIN WITHOUT SCIATICA: ICD-10-CM

## 2019-10-22 PROCEDURE — 97110 THERAPEUTIC EXERCISES: CPT | Mod: PN

## 2019-10-22 NOTE — PROGRESS NOTES
"       Stas Elam MD VI                       [facility]                         08/30/2018    HPI:  Sandee Evans is a 62 y.o. female with   Patient Active Problem List   Diagnosis    Urge incontinence    DDD (degenerative disc disease), lumbar    DJD (degenerative joint disease) of knee    Hyperlipidemia    Depressed    Insomnia    Vitamin D deficiency disease    Lumbar radicular pain    Hypertension    Obesity, morbid    Colon polyps    Diverticulosis    Chronic pain    Pre-diabetes    A-fib    Dysphagia    Chronic atrial fibrillation    Nausea    History of pancreatitis    Osteoarthritis of right knee    Gross hematuria    Depression    Type 2 diabetes mellitus without complication    Dry eye syndrome, bilateral    Nuclear sclerosis, bilateral    Refractive error    Hidradenitis suppurativa of right axilla    Acute viral syndrome    PVD (peripheral vascular disease)    being managed by PCP and specialists who is here today for evaluation of PVD.  Pt has bilateral knee pain, is s/p R knee replacement.  Uses a cane to walk due to some mornings having difficulty getting out of bed.  Describes left lower extremity "pin and needle" pain.  Patient states location is left knee radiating down calf occurring for 2 years.  Associated signs and symptoms include edema.  Quality is sharp and severity is 10/10.  Symptoms began after her R knee replacement 2 years ago.  Alleviating factors include pain medication.  Worsening factors include standing and pressure.  Also has lower back pain.    no MI  no Stroke  Tobacco use: 1.5 pack per week x 30 yrs    Interval history: cont to experience LLE pain, thigh paresthesias and "Pin and needle" pain from L knee to calf, +edema LLE, not compliant with compression.  Compliant with low Na diet.    Past Medical History:   Diagnosis Date    Anticoagulant long-term use     Xarelto    Arthritis     Atrial fibrillation     Breast " CM=596 bpm, OVOV=525/64 mmhg, BmX0=719.0 %, Resp=17 B/min, EtCO2=35 mmHg, Apnea=3 Seconds, Pierre=3 cyst     Degenerative disc disease     Depression     Diabetes mellitus     TYPE 2    Hyperlipidemia     Hypertension     Nuclear sclerosis, bilateral 12/18/2017    Obesity, morbid     Other abnormal glucose     pre-diabetes    Sleep apnea     Smoker     Thyroid disease     on meds 8-9 years ago. hypothyroidism.no malignancy    TMJ (temporomandibular joint disorder)     jaw clicking    Urge incontinence     Wears glasses      Past Surgical History:   Procedure Laterality Date    BREAST BIOPSY Right     over 10 yrs ago/ benign    BREAST CYST EXCISION Right     BREAST LUMPECTOMY Right     over 10 yrs ago, ex bx/ benign    HYSTERECTOMY      OOPHORECTOMY      rt.knee surgery 2016      underarm gland\ Bilateral      Family History   Problem Relation Age of Onset    Diabetes Mother     Diabetes Brother     No Known Problems Father     No Known Problems Sister     No Known Problems Maternal Aunt     No Known Problems Maternal Uncle     No Known Problems Paternal Aunt     No Known Problems Paternal Uncle     No Known Problems Maternal Grandmother     No Known Problems Maternal Grandfather     No Known Problems Paternal Grandmother     No Known Problems Paternal Grandfather     Amblyopia Neg Hx     Blindness Neg Hx     Cancer Neg Hx     Cataracts Neg Hx     Glaucoma Neg Hx     Hypertension Neg Hx     Macular degeneration Neg Hx     Retinal detachment Neg Hx     Strabismus Neg Hx     Stroke Neg Hx     Thyroid disease Neg Hx      Social History     Socioeconomic History    Marital status:      Spouse name: Not on file    Number of children: Not on file    Years of education: Not on file    Highest education level: Not on file   Social Needs    Financial resource strain: Not on file    Food insecurity - worry: Not on file    Food insecurity - inability: Not on file    Transportation needs - medical: Not on file    Transportation needs - non-medical: Not on file    Occupational History    Not on file   Tobacco Use    Smoking status: Current Every Day Smoker     Packs/day: 0.25     Years: 30.00     Pack years: 7.50     Types: Cigarettes    Smokeless tobacco: Never Used   Substance and Sexual Activity    Alcohol use: No    Drug use: No    Sexual activity: Yes     Partners: Male   Other Topics Concern    Not on file   Social History Narrative    Not on file       Current Outpatient Medications:     albuterol 90 mcg/actuation inhaler, Inhale 2 puffs into the lungs every 6 (six) hours as needed for Wheezing., Disp: 1 each, Rfl: 11    fluticasone (FLONASE) 50 mcg/actuation nasal spray, 2 sprays by Each Nare route once daily., Disp: 15 g, Rfl: 0    loratadine (CLARITIN) 10 mg tablet, TAKE 1 TABLET(10 MG) BY MOUTH EVERY DAY, Disp: 30 tablet, Rfl: 0    metFORMIN (GLUCOPHAGE) 500 MG tablet, Take 1 tablet (500 mg total) by mouth 2 (two) times daily with meals., Disp: 180 tablet, Rfl: 2    oxyCODONE-acetaminophen (PERCOCET)  mg per tablet, Take 1 tablet by mouth every 12 (twelve) hours as needed for Pain., Disp: 60 tablet, Rfl: 0    sertraline (ZOLOFT) 100 MG tablet, TAKE 1 TABLET(100 MG) BY MOUTH EVERY DAY, Disp: 90 tablet, Rfl: 1    spironolactone (ALDACTONE) 25 MG tablet, TAKE 1 TABLET(25 MG) BY MOUTH EVERY DAY, Disp: 90 tablet, Rfl: 0    triamcinolone acetonide 0.1% (KENALOG) 0.1 % ointment, APPLY BETWEEN KNEES AND UPPER THIGHS TWICE DAILY FOR NO MORE THAN 7-14 DAYS, Disp: 454 g, Rfl: 0    simvastatin (ZOCOR) 20 MG tablet, TAKE 1 TABLET(20 MG) BY MOUTH EVERY EVENING, Disp: 90 tablet, Rfl: 0    REVIEW OF SYSTEMS:  General: No fevers or chills; ENT: No sore throat; Allergy and Immunology: no persistent infections; Hematological and Lymphatic: No history of bleeding or easy bruising; Endocrine: negative; Respiratory: no cough, shortness of breath, or wheezing; Cardiovascular: no chest pain or dyspnea on exertion; Gastrointestinal: no abdominal pain/back, change  in bowel habits, or bloody stools; Genito-Urinary: no dysuria, trouble voiding, or hematuria; Musculoskeletal: negative; Neurological: no TIA or stroke symptoms; Psychiatric: no nervousness, anxiety or depression.    PHYSICAL EXAM:   Right Arm BP - Sittin/80 (18 1317)  Left Arm BP - Sittin/78 (18 1317)  Pulse: 78         General appearance:  Alert, well-appearing, and in no distress.  Oriented to person, place, and time                    Neurological: Normal speech, no focal findings noted; CN II - XII grossly intact. RLE with sensation to light touch, LLE with sensation to light touch.            Musculoskeletal: Digits/nail without cyanosis/clubbing.  Strength 5/5 BLE.                    Neck: Supple, no significant adenopathy, no carotid bruit can be auscultated                  Chest:  Clear to auscultation, no wheezes, rales or rhonchi, symmetric air entry. No use of accessory muscles               Cardiac: Normal rate and regular rhythm, S1 and S2 normal            Abdomen: Soft, nontender, nondistended, no masses or organomegaly, no hernia     No rebound tenderness noted; bowel sounds normal     No groin adenopathy      Extremities:   1+ R femoral pulse, 1+ L femoral pulse     1+ R popliteal pulse, 1+ L popliteal pulse     1+ R PT pulse, 1+ L PT pulse     2+ R DP pulse, 2+ L DP pulse     2+ RLE edema, 2+ LLE edema    Skin: RLE without wound; LLE without wound    LAB RESULTS:  No results found for: CBC  Lab Results   Component Value Date    LABPROT 9.8 2016    INR 0.9 2016     Lab Results   Component Value Date     2017    K 5.2 (H) 2017     2017    CO2 30 (H) 2017    GLU 84 2017    BUN 31 (H) 2017    CREATININE 1.3 2017    CALCIUM 9.9 2017    ANIONGAP 8 2017    EGFRNONAA 43.8 (A) 2017     Lab Results   Component Value Date    WBC 6.80 2017    RBC 4.27 2017    HGB 13.2 2017    HCT 40.1  05/08/2017    MCV 94 05/08/2017    MCH 30.9 05/08/2017    MCHC 32.9 05/08/2017    RDW 15.0 (H) 05/08/2017     05/08/2017    MPV 10.9 05/08/2017    GRAN 3.9 05/08/2017    GRAN 57.9 05/08/2017    LYMPH 2.2 05/08/2017    LYMPH 31.9 05/08/2017    MONO 0.5 05/08/2017    MONO 7.2 05/08/2017    EOS 0.2 05/08/2017    BASO 0.02 05/08/2017    EOSINOPHIL 2.4 05/08/2017    BASOPHIL 0.3 05/08/2017    DIFFMETHOD Automated 05/08/2017     .  Lab Results   Component Value Date    HGBA1C 5.4 05/28/2018       IMAGING:  All pertinent imaging has been reviewed and interpreted independently.    -No DVT, incidentally detected L SFA stenosis proximally.    8/2018: Bilateral GSV reflux.  No DVT    IMP/PLAN:  62 y.o. female with   Patient Active Problem List   Diagnosis    Urge incontinence    DDD (degenerative disc disease), lumbar    DJD (degenerative joint disease) of knee    Hyperlipidemia    Depressed    Insomnia    Vitamin D deficiency disease    Lumbar radicular pain    Hypertension    Obesity, morbid    Colon polyps    Diverticulosis    Chronic pain    Pre-diabetes    A-fib    Dysphagia    Chronic atrial fibrillation    Nausea    History of pancreatitis    Osteoarthritis of right knee    Gross hematuria    Depression    Type 2 diabetes mellitus without complication    Dry eye syndrome, bilateral    Nuclear sclerosis, bilateral    Refractive error    Hidradenitis suppurativa of right axilla    Acute viral syndrome    PVD (peripheral vascular disease)    being managed by PCP and specialists who is here today for evaluation of LLE pain.    -Incidentally detected L SFA stenosis, no DVT; LLE pain likely MSK in etiology - rec eval by Ortho for mgmt of knee pain, arthritis and Baker's cyst possibly causing compression to surrounding structures  -BLE edema - recommend compression with Rx stockings, elevation, dietary changes associated with water and sodium intake discussed at length with patient; pt  states she desires to have her L knee evaluated and be seen in f/u to discuss EVLT  -RTC 4 weeks    I spent 15 minutes evaluating this patient and greater than 50% of the time was spent counseling, coordinator care and discussing the plan of care.  All questions were answered and patient stated understanding with agreement with the above treatment plan.    Stas Elam MD [facility]  Vascular and Endovascular Surgery

## 2019-10-22 NOTE — PROGRESS NOTES
"  Physical Therapy Daily Treatment Note     Name: Sandee Evans  Clinic Number: [id number]    Therapy Diagnosis:   Encounter Diagnosis   Name Primary?    Chronic bilateral low back pain without sciatica      Physician: Myah Carballo, DIVYA    Visit Date: 10/22/2019    Physician Orders: PT Eval and Treat   Medical Diagnosis from Referral: DDD (degenerative disc disease), lumbar  Evaluation Date: 9/13/2019  Authorization Period Expiration: 9/30/19  Plan of Care Expiration: 9/30/19  Reassessment Due: 10/16/19   Visit # / Visits authorized: 6/ 8       Time In:  1040  Time Out: 1130     Total Billable Time: 40 minutes    Precautions:  Afib, PVD, Left TKA, right knee surgery (unspecifed)     Subjective     Pt reports:overall therapy is helping.     She was compliant with home exercise program.  Response to previous treatment: no adverse reaction  Functional change: able to stand up to 30 minutes during bible study    Pain: 4/10  Location: central tailbone pain    Objective     Sandee received therapeutic exercises to develop strength, endurance, ROM, flexibility, posture and core stabilization for 60 minutes including:    Bolded exercises performed today:   NuStep x 8'  LTR 2 x 10   SKTC 10x  Clamshells 2 x 10  Supine pelvic tilts 5-10 s/h 5x   + PPT + bridge 2 x 5  Open book stretch 10x  Standing hip abduction 10x 2  Standing and seated heel to toe raises 10 x 2   Seated alternating hip flexion 15x 2       SIt<>stand 5 reps,  with cueing for eccentric control   Toe taps on stairs 2 x 10  Step ups 2 x 10 (with and without UE support)     Supine cervical retraction   Seated cervical retraction + extension with towel over pressure  Scapular retraction 1   Hip hinging forward lumbar flexion, seated 15x   Upper trapezius stretch 3x 30"     Home Exercises Provided and Patient Education Provided     Education provided:   - Postural corrections  -Purpose and importance of daily exercise.     Written Home Exercises Provided: " yes.  Exercises were reviewed and Sandee was able to demonstrate them prior to the end of the session.  Sandee demonstrated good  understanding of the education provided.     See EMR under Patient Instructions for exercises provided 9/20/2019.    Assessment     No c/o increased discomfort with prescribed activities. Requires decreased cueing for postural awareness,  body mechanics and eccentric control with squatting. Good response to exercise progression. Sandee is progressing well towards her goals.     Pt prognosis is Good.     Pt will continue to benefit from skilled outpatient physical therapy to address the deficits listed in the problem list box on initial evaluation, provide pt/family education and to maximize pt's level of independence in the home and community environment.     Pt's spiritual, cultural and educational needs considered and pt agreeable to plan of care and goals.     Anticipated barriers to physical therapy: Previous non-compliance with PT HEP, sedentary lifestyle, obesity     Goals: Short term goals:  4 weeks or 4 visits   1.  Pt will demonstrate increased lumbar extension ROM to minimal limitations from the initial ROM value with improvements noted in functional ROM and ability to perform ADLs. Progressing  2.  Pt will demonstrate increased MMT of LE to at least 4/5 in all planes improved ability to perform bending, lifting, and carrying activities safely, confidently.  Progressing     3.  Patient report a reduction in worst pain score by 1-2 points for improved tolerance for standing up to 5 minutes. Met 9/26/19  4.  Pt able to perform HEP correctly with minimal cueing or supervision from therapist to encourage independent management of symptoms. Progressing        Long term goals: 8 weeks or 8 visits   1. Pt will demonstrate increased lumbar ROM to WFL initial ROM value, resulting in improved ability to perform functional fwd bending while standing and sitting.   2. Pt will demonstrate  increased maximum MMT to at least 4+/5 compared to the initial value resulting in improved ability to perform bending, lifting, and carrying activities safely, confidently.  3. Pt to demonstrate ability to independently control and reduce their pain through posture positioning and mechanical movements throughout a typical day.  4.  Pt will demonstrate reduced pain and improved functional outcomes as reported on the FOTO by reaching a score of CK = at least 40% but < 60% impaired, limited or restricted or less in order to demonstrate subjective improvement in pt's condition.    5. Pt will demonstrate independence with the HEP at discharge  6.  Pt will report ability to ascend and descend stairs without increased pain and minimal use of UE assistance (patient goal)  7.  Pt will demonstrate appropriate squat technique for improved tolerance of lifting and carrying functional activity needed for household tasks.        Plan     Outpatient physical therapy 1-2x week for 4-8 weeks  - Patient education  - Therapeutic exercise  - Manual therapy  - Performance testing   - Neuromuscular Re-education  - Therapeutic activity   - Modalities     Pt may be seen by PTA as part of the rehabilitation team.        Сергей Brumfield, PT

## 2019-10-24 ENCOUNTER — OFFICE VISIT (OUTPATIENT)
Dept: FAMILY MEDICINE | Facility: CLINIC | Age: 64
End: 2019-10-24
Payer: COMMERCIAL

## 2019-10-24 VITALS
TEMPERATURE: 99 F | HEART RATE: 78 BPM | WEIGHT: 275.38 LBS | BODY MASS INDEX: 48.79 KG/M2 | OXYGEN SATURATION: 96 % | DIASTOLIC BLOOD PRESSURE: 80 MMHG | RESPIRATION RATE: 17 BRPM | SYSTOLIC BLOOD PRESSURE: 128 MMHG | HEIGHT: 63 IN

## 2019-10-24 DIAGNOSIS — Z00.00 ANNUAL PHYSICAL EXAM: ICD-10-CM

## 2019-10-24 DIAGNOSIS — E11.9 TYPE 2 DIABETES MELLITUS WITHOUT COMPLICATION, WITHOUT LONG-TERM CURRENT USE OF INSULIN: ICD-10-CM

## 2019-10-24 DIAGNOSIS — I48.0 PAROXYSMAL ATRIAL FIBRILLATION: Primary | ICD-10-CM

## 2019-10-24 PROCEDURE — 99214 PR OFFICE/OUTPT VISIT, EST, LEVL IV, 30-39 MIN: ICD-10-PCS | Mod: S$GLB,,, | Performed by: FAMILY MEDICINE

## 2019-10-24 PROCEDURE — 3074F SYST BP LT 130 MM HG: CPT | Mod: CPTII,S$GLB,, | Performed by: FAMILY MEDICINE

## 2019-10-24 PROCEDURE — 99214 OFFICE O/P EST MOD 30 MIN: CPT | Mod: S$GLB,,, | Performed by: FAMILY MEDICINE

## 2019-10-24 PROCEDURE — 99999 PR PBB SHADOW E&M-EST. PATIENT-LVL IV: CPT | Mod: PBBFAC,,, | Performed by: FAMILY MEDICINE

## 2019-10-24 PROCEDURE — 3074F PR MOST RECENT SYSTOLIC BLOOD PRESSURE < 130 MM HG: ICD-10-PCS | Mod: CPTII,S$GLB,, | Performed by: FAMILY MEDICINE

## 2019-10-24 PROCEDURE — 3044F HG A1C LEVEL LT 7.0%: CPT | Mod: CPTII,S$GLB,, | Performed by: FAMILY MEDICINE

## 2019-10-24 PROCEDURE — 3044F PR MOST RECENT HEMOGLOBIN A1C LEVEL <7.0%: ICD-10-PCS | Mod: CPTII,S$GLB,, | Performed by: FAMILY MEDICINE

## 2019-10-24 PROCEDURE — 1101F PT FALLS ASSESS-DOCD LE1/YR: CPT | Mod: CPTII,S$GLB,, | Performed by: FAMILY MEDICINE

## 2019-10-24 PROCEDURE — 1101F PR PT FALLS ASSESS DOC 0-1 FALLS W/OUT INJ PAST YR: ICD-10-PCS | Mod: CPTII,S$GLB,, | Performed by: FAMILY MEDICINE

## 2019-10-24 PROCEDURE — 3008F BODY MASS INDEX DOCD: CPT | Mod: CPTII,S$GLB,, | Performed by: FAMILY MEDICINE

## 2019-10-24 PROCEDURE — 3008F PR BODY MASS INDEX (BMI) DOCUMENTED: ICD-10-PCS | Mod: CPTII,S$GLB,, | Performed by: FAMILY MEDICINE

## 2019-10-24 PROCEDURE — 3079F PR MOST RECENT DIASTOLIC BLOOD PRESSURE 80-89 MM HG: ICD-10-PCS | Mod: CPTII,S$GLB,, | Performed by: FAMILY MEDICINE

## 2019-10-24 PROCEDURE — 3079F DIAST BP 80-89 MM HG: CPT | Mod: CPTII,S$GLB,, | Performed by: FAMILY MEDICINE

## 2019-10-24 PROCEDURE — 99999 PR PBB SHADOW E&M-EST. PATIENT-LVL IV: ICD-10-PCS | Mod: PBBFAC,,, | Performed by: FAMILY MEDICINE

## 2019-10-24 RX ORDER — METOPROLOL TARTRATE 25 MG/1
25 TABLET, FILM COATED ORAL 2 TIMES DAILY
Qty: 180 TABLET | Refills: 1 | Status: SHIPPED | OUTPATIENT
Start: 2019-10-24 | End: 2020-03-10

## 2019-10-25 NOTE — PROGRESS NOTES
Subjective:       Patient ID: Sandee Evans is a 63 y.o. female.    Chief Complaint: Follow-up (3 month) and Diabetic Foot Exam      HPI  63-year-old female presents to [facility] care.  Patient states she is doing well overall.  Denies any complaints at this time.  States she may need a refill on her metoprolol.      Review of Systems   Constitutional: Negative.    HENT: Negative.    Respiratory: Negative.    Cardiovascular: Negative.    Gastrointestinal: Negative.    Endocrine: Negative.    Genitourinary: Negative.    Musculoskeletal: Negative.    Neurological: Negative.    Psychiatric/Behavioral: Negative.           Past Medical History:   Diagnosis Date    Anticoagulant long-term use     Xarelto    Arthritis     Atrial fibrillation     Breast cyst     Degenerative disc disease     Depression     Diabetes mellitus     Pre diabetic    Hyperlipidemia     Hypertension     Nuclear sclerosis, bilateral 12/18/2017    Obesity, morbid     Other abnormal glucose     pre-diabetes    Sleep apnea     Smoker     Thyroid disease     on meds 8-9 years ago. hypothyroidism.no malignancy    TMJ (temporomandibular joint disorder)     jaw clicking    Urge incontinence     Wears glasses      Past Surgical History:   Procedure Laterality Date    BREAST BIOPSY Right     over 10 yrs ago/ benign    BREAST CYST EXCISION Right     BREAST LUMPECTOMY Right     over 10 yrs ago, ex bx/ benign    COLONOSCOPY N/A 6/25/2019    Procedure: COLONOSCOPY;  Surgeon: Micheline Flores MD;  Location: [facility] ENDO;  Service: Endoscopy;  Laterality: N/A;  RX XARELTO ok to hold (2 days) per Dr. Naik see scan 3/20/19    HYSTERECTOMY      OOPHORECTOMY      rt.knee surgery 2016      TOTAL KNEE ARTHROPLASTY Left 2/19/2019    Procedure: ARTHROPLASTY, KNEE, TOTAL;  Surgeon: Med Hanson MD;  Location: [facility] OR;  Service: Orthopedics;  Laterality: Left;  10AM START PER  PER JAYLIN TEXT @ 8:34AM ON 2-18-19  SUPINE  [facility] TANMAY  SHALINI [phone] TEXTED HIM ON 1-24-19 @ 7:57AM  RN PRE OP 2-13-19--BMI--48.9    underarm gland\ Bilateral      Family History   Problem Relation Age of Onset    Diabetes Mother     Diabetes Brother     No Known Problems Father     No Known Problems Sister     No Known Problems Maternal Aunt     No Known Problems Maternal Uncle     No Known Problems Paternal Aunt     No Known Problems Paternal Uncle     No Known Problems Maternal Grandmother     No Known Problems Maternal Grandfather     No Known Problems Paternal Grandmother     No Known Problems Paternal Grandfather     Amblyopia Neg Hx     Blindness Neg Hx     Cancer Neg Hx     Cataracts Neg Hx     Glaucoma Neg Hx     Hypertension Neg Hx     Macular degeneration Neg Hx     Retinal detachment Neg Hx     Strabismus Neg Hx     Stroke Neg Hx     Thyroid disease Neg Hx      Social History     Socioeconomic History    Marital status:      Spouse name: Not on file    Number of children: Not on file    Years of education: Not on file    Highest education level: Not on file   Occupational History    Not on file   Social Needs    Financial resource strain: Not hard at all    Food insecurity:     Worry: Never true     Inability: Never true    Transportation needs:     Medical: No     Non-medical: No   Tobacco Use    Smoking status: Current Every Day Smoker     Packs/day: 0.41     Types: Cigarettes     Start date: 1973    Smokeless tobacco: Never Used    Tobacco comment: 6/18/19 smoke 5 cig a day casual   Substance and Sexual Activity    Alcohol use: No     Frequency: Never     Drinks per session: Patient refused     Binge frequency: Never    Drug use: No    Sexual activity: Yes     Partners: Male   Lifestyle    Physical activity:     Days per week: 1 day     Minutes per session: 10 min    Stress: Only a little   Relationships    Social connections:     Talks on phone: Three times a week     Gets together: Once a week      "Attends Hoah[employer]ism service: Not on file     Active member of club or organization: Yes     Attends meetings of clubs or organizations: 1 to 4 times per year     Relationship status:    Other Topics Concern    Not on file   Social History Narrative    Not on file       Current Outpatient Medications:     albuterol 90 mcg/actuation inhaler, Inhale 2 puffs into the lungs every 6 (six) hours as needed for Wheezing., Disp: 1 each, Rfl: 11    ciclopirox (PENLAC) 8 % Soln, Apply topically nightly., Disp: 6.6 mL, Rfl: 1    fluticasone propionate (FLONASE) 50 mcg/actuation nasal spray, USE 2 SPRAYS IN EACH NOSTRIL ONCE DAILY, Disp: 16 mL, Rfl: 6    metFORMIN (GLUCOPHAGE) 500 MG tablet, Take 1 tablet (500 mg total) by mouth 2 (two) times daily with meals., Disp: 180 tablet, Rfl: 2    metoprolol tartrate (LOPRESSOR) 25 MG tablet, Take 1 tablet (25 mg total) by mouth 2 (two) times daily., Disp: 180 tablet, Rfl: 1    sertraline (ZOLOFT) 100 MG tablet, , Disp: , Rfl: 1    simvastatin (ZOCOR) 20 MG tablet, TAKE 1 TABLET(20 MG) BY MOUTH EVERY EVENING, Disp: 90 tablet, Rfl: 1    spironolactone (ALDACTONE) 25 MG tablet, TAKE 1 TABLET(25 MG) BY MOUTH EVERY DAY, Disp: 90 tablet, Rfl: 2    triamcinolone acetonide 0.1% (KENALOG) 0.1 % ointment, APPLY BETWEEN KNEES AND UPPER THIGHS TWICE DAILY FOR NO MORE THAN 7-14 DAYS, Disp: 454 g, Rfl: 0    XARELTO 20 mg Tab, Take 20 mg by mouth once daily., Disp: , Rfl: 11   Objective:      Vitals:    10/24/19 1027   BP: 128/80   BP Location: Right arm   Patient Position: Sitting   BP Method: Medium (Manual)   Pulse: 78   Resp: 17   Temp: 98.6 °F (37 °C)   TempSrc: Oral   SpO2: 96%   Weight: 124.9 kg (275 lb 5.7 oz)   Height: 5' 3" (1.6 m)       Physical Exam   Constitutional: She is oriented to person, place, and time. No distress.   HENT:   Head: Normocephalic and atraumatic.   Eyes: Conjunctivae are normal.   Neck: Neck supple.   Cardiovascular: Normal rate, regular rhythm and " normal heart sounds. Exam reveals no gallop and no friction rub.   No murmur heard.  Pulmonary/Chest: Effort normal and breath sounds normal. She has no wheezes. She has no rales.   Neurological: She is alert and oriented to person, place, and time.   Skin: Skin is warm and dry.   Psychiatric: She has a normal mood and affect. Her behavior is normal. Judgment and thought content normal.      Protective Sensation (w/ 10 gram monofilament):  Right: Intact  Left: Intact    Visual Inspection:  Normal -  Bilateral    Pedal Pulses:   Right: Present  Left: Present    Posterior tibialis:   Right:Present  Left: Present        Assessment:       1. Paroxysmal atrial fibrillation    2. Type 2 diabetes mellitus without complication, without long-term current use of insulin    3. Annual physical exam        Plan:       Paroxysmal atrial fibrillation  -     metoprolol tartrate (LOPRESSOR) 25 MG tablet; Take 1 tablet (25 mg total) by mouth 2 (two) times daily.  Dispense: 180 tablet; Refill: 1    Type 2 diabetes mellitus without complication, without long-term current use of insulin  - Cont meds  -     Comprehensive metabolic panel; Future; Expected date: 10/24/2019  -     Hemoglobin A1c; Future; Expected date: 10/24/2019    Annual physical exam  -     CBC auto differential; Future; Expected date: 10/24/2019  -     Comprehensive metabolic panel; Future; Expected date: 10/24/2019  -     Hemoglobin A1c; Future; Expected date: 10/24/2019  -     TSH; Future; Expected date: 10/24/2019  -     Lipid panel; Future; Expected date: 10/24/2019  -     T4, free; Future; Expected date: 10/24/2019    lab work for visit in 3 months.              Kuldeep Miller MD

## 2019-10-29 ENCOUNTER — CLINICAL SUPPORT (OUTPATIENT)
Dept: FAMILY MEDICINE | Facility: CLINIC | Age: 64
End: 2019-10-29
Payer: COMMERCIAL

## 2019-10-29 DIAGNOSIS — Z23 FLU VACCINE NEED: Primary | ICD-10-CM

## 2019-10-29 PROCEDURE — 90471 FLU VACCINE - HIGH DOSE (65+) PRESERVATIVE FREE IM: ICD-10-PCS | Mod: S$GLB,,, | Performed by: FAMILY MEDICINE

## 2019-10-29 PROCEDURE — 99999 PR PBB SHADOW E&M-EST. PATIENT-LVL I: ICD-10-PCS | Mod: PBBFAC,,,

## 2019-10-29 PROCEDURE — 90662 FLU VACCINE - HIGH DOSE (65+) PRESERVATIVE FREE IM: ICD-10-PCS | Mod: S$GLB,,, | Performed by: FAMILY MEDICINE

## 2019-10-29 PROCEDURE — 99999 PR PBB SHADOW E&M-EST. PATIENT-LVL I: CPT | Mod: PBBFAC,,,

## 2019-10-29 PROCEDURE — 90471 IMMUNIZATION ADMIN: CPT | Mod: S$GLB,,, | Performed by: FAMILY MEDICINE

## 2019-10-29 PROCEDURE — 90662 IIV NO PRSV INCREASED AG IM: CPT | Mod: S$GLB,,, | Performed by: FAMILY MEDICINE

## 2019-11-06 ENCOUNTER — CLINICAL SUPPORT (OUTPATIENT)
Dept: REHABILITATION | Facility: HOSPITAL | Age: 64
End: 2019-11-06
Attending: PHYSICIAN ASSISTANT
Payer: COMMERCIAL

## 2019-11-06 DIAGNOSIS — M54.50 CHRONIC BILATERAL LOW BACK PAIN WITHOUT SCIATICA: ICD-10-CM

## 2019-11-06 DIAGNOSIS — G89.29 CHRONIC BILATERAL LOW BACK PAIN WITHOUT SCIATICA: ICD-10-CM

## 2019-11-06 PROCEDURE — 97110 THERAPEUTIC EXERCISES: CPT | Mod: PN

## 2019-11-06 NOTE — PROGRESS NOTES
"  Physical Therapy Daily Treatment Note     Name: Sandee Evans  Clinic Number: [id number]    Therapy Diagnosis:   Encounter Diagnosis   Name Primary?    Chronic bilateral low back pain without sciatica      Physician: Myah Carballo, DIVYA    Visit Date: 11/6/2019    Physician Orders: PT Eval and Treat   Medical Diagnosis from Referral: DDD (degenerative disc disease), lumbar  Evaluation Date: 9/13/2019  Authorization Period Expiration: 9/30/19  Plan of Care Expiration: 9/30/19  Reassessment Due: 10/16/19   Visit # / Visits authorized: 3/20 (7 total visits)       Time In:  1040  Time Out: 1145   Total Time: 65  Total Billable Time: 60  minutes    Precautions:  Afib, PVD, Left TKA, right knee surgery (unspecifed)     Subjective     Pt reports: pain continues to decrease with therapy.  No longer experiences tingling down LLE.     She was compliant with home exercise program.  Response to previous treatment: no adverse reaction  Functional change: able to stand up to 30 minutes during bible study    Pain: 1/10  Location: central tailbone pain    Objective     Sandee received therapeutic exercises to develop strength, endurance, ROM, flexibility, posture and core stabilization for 55 minutes including:    Bolded exercises performed today:    NuStep x 8'    Supine:   LTR 2 x 10   SKTC 5 x10"   Clamshells with Yellow TB 2 x 10 (Progressed 11/6)  Supine pelvic tilts 3" x20  PPT + bridge 2 x 10  Open book stretch 10x  + Ball Squeezes 3" x20    Standing:   SIt<>stands from 18in plyo(blue foam on top) x10 reps,  with cueing for eccentric control   Toe taps on stairs 2 x 10 (without UE assist)  Step ups 2 x 10 (without UE support)   +Scapular retractions with RED TB x20  + Pulldowns with RED TB x20  + Pallof Press (resisted Rotation) with Yellow TB x20      Seated:  Hip hinging forward lumbar flexion, seated x15  Upper trapezius stretch 3x 30"       Not Performed:  Standing hip abduction 10x 2  Standing and seated heel to toe " raises 10 x 2   Seated alternating hip flexion 15x 2     Supine cervical retraction   Seated cervical retraction + extension with towel over pressure    Manual Therapy to L IT band and quad x10 minutes.  MFR via IASTM        Home Exercises Provided and Patient Education Provided     Education provided:   - Postural corrections  -Purpose and importance of daily exercise.     Written Home Exercises Provided: yes.  Exercises were reviewed and Sandee was able to demonstrate them prior to the end of the session.  Sandee demonstrated good  understanding of the education provided.     See EMR under Patient Instructions for exercises provided 9/20/2019.    Assessment     Pt demonstrated good tolerance to session and progression of exercises. Verbal and tactile cueing for postural awareness and body mechanics. Improved eccentric control with stand>sit.  However, pt displays decreased core and gluteal strength.  Tingling to LLE has been abolished and pain has centralized to lumbar region only.  Myofascial inconsistencies to L distal IT band/quad region.  Improve glut and core strengthening as tolerated by pt.    Sandee is progressing well towards her goals.   Pt prognosis is Good.     Pt will continue to benefit from skilled outpatient physical therapy to address the deficits listed in the problem list box on initial evaluation, provide pt/family education and to maximize pt's level of independence in the home and community environment.     Pt's spiritual, cultural and educational needs considered and pt agreeable to plan of care and goals.     Anticipated barriers to physical therapy: Previous non-compliance with PT HEP, sedentary lifestyle, obesity     Goals: Short term goals:  4 weeks or 4 visits   1.  Pt will demonstrate increased lumbar extension ROM to minimal limitations from the initial ROM value with improvements noted in functional ROM and ability to perform ADLs. Progressing  2.  Pt will demonstrate increased MMT  of LE to at least 4/5 in all planes improved ability to perform bending, lifting, and carrying activities safely, confidently.  Progressing     3.  Patient report a reduction in worst pain score by 1-2 points for improved tolerance for standing up to 5 minutes. Met 9/26/19  4.  Pt able to perform HEP correctly with minimal cueing or supervision from therapist to encourage independent management of symptoms. Progressing        Long term goals: 8 weeks or 8 visits   1. Pt will demonstrate increased lumbar ROM to WFL initial ROM value, resulting in improved ability to perform functional fwd bending while standing and sitting.   2. Pt will demonstrate increased maximum MMT to at least 4+/5 compared to the initial value resulting in improved ability to perform bending, lifting, and carrying activities safely, confidently.  3. Pt to demonstrate ability to independently control and reduce their pain through posture positioning and mechanical movements throughout a typical day.  4.  Pt will demonstrate reduced pain and improved functional outcomes as reported on the FOTO by reaching a score of CK = at least 40% but < 60% impaired, limited or restricted or less in order to demonstrate subjective improvement in pt's condition.    5. Pt will demonstrate independence with the HEP at discharge  6.  Pt will report ability to ascend and descend stairs without increased pain and minimal use of UE assistance (patient goal)  7.  Pt will demonstrate appropriate squat technique for improved tolerance of lifting and carrying functional activity needed for household tasks.        Plan     Outpatient physical therapy 1-2x week for 4-8 weeks  - Patient education  - Therapeutic exercise  - Manual therapy  - Performance testing   - Neuromuscular Re-education  - Therapeutic activity   - Modalities     Pt may be seen by PTA as part of the rehabilitation team.        Vivien Keller PTA

## 2019-11-08 ENCOUNTER — CLINICAL SUPPORT (OUTPATIENT)
Dept: REHABILITATION | Facility: HOSPITAL | Age: 64
End: 2019-11-08
Attending: PHYSICIAN ASSISTANT
Payer: COMMERCIAL

## 2019-11-08 DIAGNOSIS — G89.29 CHRONIC BILATERAL LOW BACK PAIN WITHOUT SCIATICA: ICD-10-CM

## 2019-11-08 DIAGNOSIS — M54.50 CHRONIC BILATERAL LOW BACK PAIN WITHOUT SCIATICA: ICD-10-CM

## 2019-11-08 PROCEDURE — 97110 THERAPEUTIC EXERCISES: CPT | Mod: PN

## 2019-11-08 PROCEDURE — 97140 MANUAL THERAPY 1/> REGIONS: CPT | Mod: PN

## 2019-11-08 NOTE — PROGRESS NOTES
"  Physical Therapy Daily Treatment Note     Name: Sandee Evans  Clinic Number: [id number]    Therapy Diagnosis:   Encounter Diagnosis   Name Primary?    Chronic bilateral low back pain without sciatica      Physician: Myah Carballo, DIVYA    Visit Date: 11/8/2019    Physician Orders: PT Eval and Treat   Medical Diagnosis from Referral: DDD (degenerative disc disease), lumbar  Evaluation Date: 9/13/2019  Authorization Period Expiration: 9/30/19  Plan of Care Expiration: 9/30/19  Reassessment Due: 10/16/19   Visit # / Visits authorized: 4/20 (7 total visits)       Time In:  1545 (pt late to session)  Time Out: 1635   Total Time: 50  Total Billable Time: 40  minutes    Precautions:  Afib, PVD, Left TKA, right knee surgery (unspecifed)     Subjective     Pt reports: mild increase in pain today, rating pain 3/10  She was compliant with home exercise program.  Response to previous treatment: no adverse reaction  Functional change: able to stand up to 30 minutes during bible study    Pain: 3/10  Location: central tailbone pain    Objective     Sandee received therapeutic exercises to develop strength, endurance, ROM, flexibility, posture and core stabilization for 30 minutes including:    Bolded exercises performed today:    NuStep x 8'    Supine:   LTR 2 x 10   SKTC 5 x10"   Clamshells with Yellow TB 2 x 10 (Progressed 11/6)  Supine pelvic tilts 3" x20  PPT + bridge 2 x 10  Open book stretch 10x   Ball Squeezes 3" x20    Standing:   SIt<>stands from 18in plyo(blue foam on top) x10 reps,  with cueing for eccentric control   Toe taps on stairs 2 x 10 (without UE assist)  Step ups 2 x 10 (without UE support)   Scapular retractions with RED TB x20   Pulldowns with RED TB x20   Pallof Press (resisted Rotation) with Yellow TB x20      Seated:  Hip hinging forward lumbar flexion, seated x15  Upper trapezius stretch 3x 30"       Not Performed:  Standing hip abduction 10x 2  Standing and seated heel to toe raises 10 x 2   Seated " alternating hip flexion 15x 2     Supine cervical retraction   Seated cervical retraction + extension with towel over pressure    Manual Therapy to L IT band and quad x10 minutes.  MFR via theraroller -minimal to no pressure  IASTM      MHP to L ITB/quad x 8 minutes to decrease pain, tightness and soreness.    Home Exercises Provided and Patient Education Provided     Education provided:   - Postural corrections  -Purpose and importance of daily exercise.     Written Home Exercises Provided: yes.  Exercises were reviewed and Sandee was able to demonstrate them prior to the end of the session.  Sandee demonstrated good  understanding of the education provided.     See EMR under Patient Instructions for exercises provided 9/20/2019.    Assessment     Pt tolerated session well despite initial reports of pain.  Verbal and tactile cueing for postural awareness and body mechanics. Pt able to express tingling sensation to L LE has ceased, but does endorse soreness.  Myofascial inconsistencies to L distal IT band/quad region.Pt with significant tenderness to L distal ITB/lateral quad and could only tolerate minimal pressure. Pt able to express relief with MHP.  Improve glute and core strengthening as tolerated by pt.    Sandee is progressing well towards her goals.   Pt prognosis is Good.     Pt will continue to benefit from skilled outpatient physical therapy to address the deficits listed in the problem list box on initial evaluation, provide pt/family education and to maximize pt's level of independence in the home and community environment.     Pt's spiritual, cultural and educational needs considered and pt agreeable to plan of care and goals.     Anticipated barriers to physical therapy: Previous non-compliance with PT HEP, sedentary lifestyle, obesity     Goals: Short term goals:  4 weeks or 4 visits   1.  Pt will demonstrate increased lumbar extension ROM to minimal limitations from the initial ROM value with  improvements noted in functional ROM and ability to perform ADLs. Progressing  2.  Pt will demonstrate increased MMT of LE to at least 4/5 in all planes improved ability to perform bending, lifting, and carrying activities safely, confidently.  Progressing     3.  Patient report a reduction in worst pain score by 1-2 points for improved tolerance for standing up to 5 minutes. Met 9/26/19  4.  Pt able to perform HEP correctly with minimal cueing or supervision from therapist to encourage independent management of symptoms. Progressing        Long term goals: 8 weeks or 8 visits   1. Pt will demonstrate increased lumbar ROM to WFL initial ROM value, resulting in improved ability to perform functional fwd bending while standing and sitting.   2. Pt will demonstrate increased maximum MMT to at least 4+/5 compared to the initial value resulting in improved ability to perform bending, lifting, and carrying activities safely, confidently.  3. Pt to demonstrate ability to independently control and reduce their pain through posture positioning and mechanical movements throughout a typical day.  4.  Pt will demonstrate reduced pain and improved functional outcomes as reported on the FOTO by reaching a score of CK = at least 40% but < 60% impaired, limited or restricted or less in order to demonstrate subjective improvement in pt's condition.    5. Pt will demonstrate independence with the HEP at discharge  6.  Pt will report ability to ascend and descend stairs without increased pain and minimal use of UE assistance (patient goal)  7.  Pt will demonstrate appropriate squat technique for improved tolerance of lifting and carrying functional activity needed for household tasks.        Plan     Outpatient physical therapy 1-2x week for 4-8 weeks  - Patient education  - Therapeutic exercise  - Manual therapy  - Performance testing   - Neuromuscular Re-education  - Therapeutic activity   - Modalities     Pt may be seen by PTA as  part of the rehabilitation team.        Ana Harrington, PTA

## 2019-11-14 ENCOUNTER — CLINICAL SUPPORT (OUTPATIENT)
Dept: REHABILITATION | Facility: HOSPITAL | Age: 64
End: 2019-11-14
Attending: PHYSICIAN ASSISTANT
Payer: COMMERCIAL

## 2019-11-14 DIAGNOSIS — M54.50 CHRONIC BILATERAL LOW BACK PAIN WITHOUT SCIATICA: ICD-10-CM

## 2019-11-14 DIAGNOSIS — G89.29 CHRONIC BILATERAL LOW BACK PAIN WITHOUT SCIATICA: ICD-10-CM

## 2019-11-14 PROCEDURE — 97110 THERAPEUTIC EXERCISES: CPT | Mod: PN

## 2019-11-14 NOTE — PROGRESS NOTES
"  Physical Therapy Daily Treatment Note     Name: Sandee Evans  Clinic Number: [id number]    Therapy Diagnosis:   Encounter Diagnosis   Name Primary?    Chronic bilateral low back pain without sciatica      Physician: Myah Carballo PA-C    Visit Date: 11/14/2019    Physician Orders: PT Eval and Treat   Medical Diagnosis from Referral: DDD (degenerative disc disease), lumbar  Evaluation Date: 9/13/2019  Authorization Period Expiration: 9/30/19    Visit # / Visits authorized: 5/20        Time In:  1000  Time Out: 1055    Total Billable Time: 40  minutes    Precautions:  Afib, PVD, Left TKA, right knee surgery (unspecifed)     Subjective     Pt reports: overall the low back has improved with therapy.   She was compliant with home exercise program.  Response to previous treatment: no adverse reaction  Functional change: none since last visit.     Pain: 2/10  Location: central tailbone pain    Objective     MOVEMENT LOSS     ROM Loss   Flexion hypermobile   Extension minimal loss   Side bending Right minimal loss   Side bending Left minimal loss   Rotation Right minmal loss   Rotation Left Minimal loss        Sandee received therapeutic exercises to develop strength, endurance, ROM, flexibility, posture and core stabilization for 55 minutes including:    Bolded exercises performed today:    NuStep x 8'    Supine:   LTR 2 x 10   SKTC 5 x10"   Clamshells with Yellow TB 2 x 10 (Progressed 11/6)  Supine pelvic tilts 3" x20  PPT + bridge 2 x 10  Open book stretch 10x   Ball Squeezes 3" x20    Standing:   SIt<>stands from 18in plyo(blue foam on top) x10 reps,  with cueing for eccentric control   Toe taps on stairs 2 x 10 (without UE assist)  Step ups 2 x 10 (without UE support)   Scapular retractions with RED TB x20   Pulldowns with RED TB x20   Pallof Press (resisted Rotation) with Yellow TB x20      Seated:  Hip hinging forward lumbar flexion, seated x15  Upper trapezius stretch 3x 30"       Home Exercises Provided and " Patient Education Provided     Education provided:   - Postural corrections  -Purpose and importance of daily exercise.     Written Home Exercises Provided: yes.  Exercises were reviewed and Sandee was able to demonstrate them prior to the end of the session.  Sandee demonstrated good  understanding of the education provided.     See EMR under Patient Instructions for exercises provided 9/20/2019.    Assessment     Pt presents ambulating with improved posture today, decrease in trunk flexion. Requires min cueing with pelvic mobility and abdominal response. Good response to exercise progression.  Sandee is progressing well towards her goals.   Pt prognosis is Good.     Pt will continue to benefit from skilled outpatient physical therapy to address the deficits listed in the problem list box on initial evaluation, provide pt/family education and to maximize pt's level of independence in the home and community environment.     Pt's spiritual, cultural and educational needs considered and pt agreeable to plan of care and goals.     Anticipated barriers to physical therapy: Previous non-compliance with PT HEP, sedentary lifestyle, obesity       Long term goals: 8 weeks or 8 visits   Updated 11/14/19  Partially MET, in progress  1. Pt will demonstrate increased lumbar ROM to WFL initial ROM value, resulting in improved ability to perform functional fwd bending while standing and sitting.  MET  2. Pt will demonstrate increased maximum MMT to at least 4+/5 compared to the initial value resulting in improved ability to perform bending, lifting, and carrying activities safely, confidently. MET  3. Pt to demonstrate ability to independently control and reduce their pain through posture positioning and mechanical movements throughout a typical day. MET  4.  Pt will demonstrate reduced pain and improved functional outcomes as reported on the FOTO by reaching a score of CK = at least 40% but < 60% impaired, limited or restricted  or less in order to demonstrate subjective improvement in pt's condition.  NT  5. Pt will demonstrate independence with the HEP at discharge.  MET  6.  Pt will report ability to ascend and descend stairs without increased pain and minimal use of UE assistance (patient goal) NT  7.  Pt will demonstrate appropriate squat technique for improved tolerance of lifting and carrying functional activity needed for household tasks. NT       Plan     Outpatient physical therapy 1-2x week for 4-8 weeks  - Patient education  - Therapeutic exercise  - Manual therapy  - Performance testing   - Neuromuscular Re-education  - Therapeutic activity   - Modalities     Will continue 1-2 more visits progressing to d/c planning.        Сергей Brumfield, PT

## 2020-01-07 DIAGNOSIS — E78.5 HYPERLIPIDEMIA, UNSPECIFIED HYPERLIPIDEMIA TYPE: ICD-10-CM

## 2020-01-07 NOTE — TELEPHONE ENCOUNTER
Last Office Visit Info:   The patient's last visit with Kuldeep Miller MD was on 10/24/2019.    The patient's last visit in current department was on 10/29/2019.        Last CBC Results:   Lab Results   Component Value Date    WBC 9.31 02/22/2019    HGB 10.3 (L) 02/22/2019    HCT 33.8 (L) 02/22/2019     02/22/2019       Last CMP Results  Lab Results   Component Value Date     08/29/2019    K 4.5 08/29/2019     08/29/2019    CO2 31 (H) 08/29/2019    BUN 19 08/29/2019    CREATININE 1.0 08/29/2019    CALCIUM 9.7 08/29/2019    ALBUMIN 3.2 (L) 08/29/2019    AST 13 08/29/2019    ALT 10 08/29/2019       Last Lipids  Lab Results   Component Value Date    CHOL 211 (H) 06/18/2019    TRIG 90 06/18/2019    HDL 65 06/18/2019    LDLCALC 128.0 06/18/2019       Last A1C  Lab Results   Component Value Date    HGBA1C 5.5 08/29/2019       Last TSH  Lab Results   Component Value Date    TSH 5.988 (H) 05/08/2017         Current Med Refills  Medication List with Changes/Refills   Current Medications    ALBUTEROL 90 MCG/ACTUATION INHALER    Inhale 2 puffs into the lungs every 6 (six) hours as needed for Wheezing.       Start Date: 2/5/2018  End Date: --    CICLOPIROX (PENLAC) 8 % SOLN    Apply topically nightly.       Start Date: 8/29/2019 End Date: --    FLUTICASONE PROPIONATE (FLONASE) 50 MCG/ACTUATION NASAL SPRAY    USE 2 SPRAYS IN EACH NOSTRIL ONCE DAILY       Start Date: 7/22/2019 End Date: --    METFORMIN (GLUCOPHAGE) 500 MG TABLET    Take 1 tablet (500 mg total) by mouth 2 (two) times daily with meals.       Start Date: 6/18/2019 End Date: --    METOPROLOL TARTRATE (LOPRESSOR) 25 MG TABLET    Take 1 tablet (25 mg total) by mouth 2 (two) times daily.       Start Date: 10/24/2019End Date: 4/21/2020    SERTRALINE (ZOLOFT) 100 MG TABLET           Start Date: 8/5/2019  End Date: --    SIMVASTATIN (ZOCOR) 20 MG TABLET    TAKE 1 TABLET(20 MG) BY MOUTH EVERY EVENING       Start Date: 7/24/2019 End Date: --     SPIRONOLACTONE (ALDACTONE) 25 MG TABLET    TAKE 1 TABLET(25 MG) BY MOUTH EVERY DAY       Start Date: 8/2/2019  End Date: --    TRIAMCINOLONE ACETONIDE 0.1% (KENALOG) 0.1 % OINTMENT    APPLY BETWEEN KNEES AND UPPER THIGHS TWICE DAILY FOR NO MORE THAN 7-14 DAYS       Start Date: 7/24/2019 End Date: --    XARELTO 20 MG TAB    Take 20 mg by mouth once daily.       Start Date: 5/10/2019 End Date: --       Order(s) placed per written order guidelines:     Please advise.

## 2020-01-09 DIAGNOSIS — J30.2 SEASONAL ALLERGIC RHINITIS, UNSPECIFIED TRIGGER: ICD-10-CM

## 2020-01-10 RX ORDER — SIMVASTATIN 20 MG/1
TABLET, FILM COATED ORAL
Qty: 90 TABLET | Refills: 1 | Status: SHIPPED | OUTPATIENT
Start: 2020-01-10 | End: 2020-01-30 | Stop reason: SDUPTHER

## 2020-01-10 RX ORDER — FLUTICASONE PROPIONATE 50 MCG
2 SPRAY, SUSPENSION (ML) NASAL DAILY
Qty: 16 ML | Refills: 6 | Status: SHIPPED | OUTPATIENT
Start: 2020-01-10 | End: 2020-01-30 | Stop reason: SDUPTHER

## 2020-01-21 DIAGNOSIS — E11.9 TYPE 2 DIABETES MELLITUS WITHOUT COMPLICATION, WITHOUT LONG-TERM CURRENT USE OF INSULIN: ICD-10-CM

## 2020-01-21 RX ORDER — METFORMIN HYDROCHLORIDE 500 MG/1
500 TABLET ORAL 2 TIMES DAILY WITH MEALS
Qty: 180 TABLET | Refills: 2 | Status: SHIPPED | OUTPATIENT
Start: 2020-01-21 | End: 2020-10-02 | Stop reason: SDUPTHER

## 2020-01-21 NOTE — TELEPHONE ENCOUNTER
Last Office Visit Info:   The patient's last visit with Kuldeep Miller MD was on 10/24/2019.    The patient's last visit in current department was on 10/29/2019.        Last CBC Results:   Lab Results   Component Value Date    WBC 9.31 02/22/2019    HGB 10.3 (L) 02/22/2019    HCT 33.8 (L) 02/22/2019     02/22/2019       Last CMP Results  Lab Results   Component Value Date     08/29/2019    K 4.5 08/29/2019     08/29/2019    CO2 31 (H) 08/29/2019    BUN 19 08/29/2019    CREATININE 1.0 08/29/2019    CALCIUM 9.7 08/29/2019    ALBUMIN 3.2 (L) 08/29/2019    AST 13 08/29/2019    ALT 10 08/29/2019       Last Lipids  Lab Results   Component Value Date    CHOL 211 (H) 06/18/2019    TRIG 90 06/18/2019    HDL 65 06/18/2019    LDLCALC 128.0 06/18/2019       Last A1C  Lab Results   Component Value Date    HGBA1C 5.5 08/29/2019       Last TSH  Lab Results   Component Value Date    TSH 5.988 (H) 05/08/2017         Current Med Refills  Medication List with Changes/Refills   Current Medications    ALBUTEROL 90 MCG/ACTUATION INHALER    Inhale 2 puffs into the lungs every 6 (six) hours as needed for Wheezing.       Start Date: 2/5/2018  End Date: --    CICLOPIROX (PENLAC) 8 % SOLN    Apply topically nightly.       Start Date: 8/29/2019 End Date: --    FLUTICASONE PROPIONATE (FLONASE) 50 MCG/ACTUATION NASAL SPRAY    2 sprays (100 mcg total) by Each Nostril route once daily.       Start Date: 1/10/2020 End Date: --    METFORMIN (GLUCOPHAGE) 500 MG TABLET    Take 1 tablet (500 mg total) by mouth 2 (two) times daily with meals.       Start Date: 6/18/2019 End Date: --    METOPROLOL TARTRATE (LOPRESSOR) 25 MG TABLET    Take 1 tablet (25 mg total) by mouth 2 (two) times daily.       Start Date: 10/24/2019End Date: 4/21/2020    SERTRALINE (ZOLOFT) 100 MG TABLET           Start Date: 8/5/2019  End Date: --    SIMVASTATIN (ZOCOR) 20 MG TABLET    TAKE 1 TABLET(20 MG) BY MOUTH EVERY EVENING       Start Date: 1/10/2020 End  Date: --    SPIRONOLACTONE (ALDACTONE) 25 MG TABLET    TAKE 1 TABLET(25 MG) BY MOUTH EVERY DAY       Start Date: 8/2/2019  End Date: --    TRIAMCINOLONE ACETONIDE 0.1% (KENALOG) 0.1 % OINTMENT    APPLY BETWEEN KNEES AND UPPER THIGHS TWICE DAILY FOR NO MORE THAN 7-14 DAYS       Start Date: 7/24/2019 End Date: --    XARELTO 20 MG TAB    Take 20 mg by mouth once daily.       Start Date: 5/10/2019 End Date: --       Order(s) placed per written order guidelines:     Please advise.

## 2020-01-30 DIAGNOSIS — E78.5 HYPERLIPIDEMIA, UNSPECIFIED HYPERLIPIDEMIA TYPE: ICD-10-CM

## 2020-01-30 DIAGNOSIS — J30.2 SEASONAL ALLERGIC RHINITIS, UNSPECIFIED TRIGGER: ICD-10-CM

## 2020-01-30 NOTE — TELEPHONE ENCOUNTER
Last Office Visit Info:   The patient's last visit with Kuldeep Miller MD was on 10/24/2019.    The patient's last visit in current department was on 10/29/2019.        Last CBC Results:   Lab Results   Component Value Date    WBC 9.31 02/22/2019    HGB 10.3 (L) 02/22/2019    HCT 33.8 (L) 02/22/2019     02/22/2019       Last CMP Results  Lab Results   Component Value Date     08/29/2019    K 4.5 08/29/2019     08/29/2019    CO2 31 (H) 08/29/2019    BUN 19 08/29/2019    CREATININE 1.0 08/29/2019    CALCIUM 9.7 08/29/2019    ALBUMIN 3.2 (L) 08/29/2019    AST 13 08/29/2019    ALT 10 08/29/2019       Last Lipids  Lab Results   Component Value Date    CHOL 211 (H) 06/18/2019    TRIG 90 06/18/2019    HDL 65 06/18/2019    LDLCALC 128.0 06/18/2019       Last A1C  Lab Results   Component Value Date    HGBA1C 5.5 08/29/2019       Last TSH  Lab Results   Component Value Date    TSH 5.988 (H) 05/08/2017         Current Med Refills  Medication List with Changes/Refills   Current Medications    ALBUTEROL 90 MCG/ACTUATION INHALER    Inhale 2 puffs into the lungs every 6 (six) hours as needed for Wheezing.       Start Date: 2/5/2018  End Date: --    CICLOPIROX (PENLAC) 8 % SOLN    Apply topically nightly.       Start Date: 8/29/2019 End Date: --    FLUTICASONE PROPIONATE (FLONASE) 50 MCG/ACTUATION NASAL SPRAY    2 sprays (100 mcg total) by Each Nostril route once daily.       Start Date: 1/10/2020 End Date: --    METFORMIN (GLUCOPHAGE) 500 MG TABLET    Take 1 tablet (500 mg total) by mouth 2 (two) times daily with meals.       Start Date: 1/21/2020 End Date: --    METOPROLOL TARTRATE (LOPRESSOR) 25 MG TABLET    Take 1 tablet (25 mg total) by mouth 2 (two) times daily.       Start Date: 10/24/2019End Date: 4/21/2020    SERTRALINE (ZOLOFT) 100 MG TABLET           Start Date: 8/5/2019  End Date: --    SIMVASTATIN (ZOCOR) 20 MG TABLET    TAKE 1 TABLET(20 MG) BY MOUTH EVERY EVENING       Start Date: 1/10/2020 End  Date: --    SPIRONOLACTONE (ALDACTONE) 25 MG TABLET    TAKE 1 TABLET(25 MG) BY MOUTH EVERY DAY       Start Date: 8/2/2019  End Date: --    TRIAMCINOLONE ACETONIDE 0.1% (KENALOG) 0.1 % OINTMENT    APPLY BETWEEN KNEES AND UPPER THIGHS TWICE DAILY FOR NO MORE THAN 7-14 DAYS       Start Date: 7/24/2019 End Date: --    XARELTO 20 MG TAB    Take 20 mg by mouth once daily.       Start Date: 5/10/2019 End Date: --       Order(s) placed per written order guidelines:     Please advise.

## 2020-02-06 RX ORDER — FLUTICASONE PROPIONATE 50 MCG
2 SPRAY, SUSPENSION (ML) NASAL DAILY
Qty: 16 ML | Refills: 6 | Status: SHIPPED | OUTPATIENT
Start: 2020-02-06 | End: 2020-07-06

## 2020-02-06 RX ORDER — SIMVASTATIN 20 MG/1
TABLET, FILM COATED ORAL
Qty: 90 TABLET | Refills: 1 | Status: SHIPPED | OUTPATIENT
Start: 2020-02-06 | End: 2020-07-06

## 2020-02-27 DIAGNOSIS — I10 ESSENTIAL HYPERTENSION: ICD-10-CM

## 2020-02-27 RX ORDER — SPIRONOLACTONE 25 MG/1
TABLET ORAL
Qty: 90 TABLET | Refills: 2 | Status: SHIPPED | OUTPATIENT
Start: 2020-02-27 | End: 2020-10-02 | Stop reason: SDUPTHER

## 2020-02-27 NOTE — TELEPHONE ENCOUNTER
Last Office Visit Info:   The patient's last visit with Kuldeep Miller MD was on 10/24/2019.    The patient's last visit in current department was on 10/29/2019.        Last CBC Results:   Lab Results   Component Value Date    WBC 9.31 02/22/2019    HGB 10.3 (L) 02/22/2019    HCT 33.8 (L) 02/22/2019     02/22/2019       Last CMP Results  Lab Results   Component Value Date     08/29/2019    K 4.5 08/29/2019     08/29/2019    CO2 31 (H) 08/29/2019    BUN 19 08/29/2019    CREATININE 1.0 08/29/2019    CALCIUM 9.7 08/29/2019    ALBUMIN 3.2 (L) 08/29/2019    AST 13 08/29/2019    ALT 10 08/29/2019       Last Lipids  Lab Results   Component Value Date    CHOL 211 (H) 06/18/2019    TRIG 90 06/18/2019    HDL 65 06/18/2019    LDLCALC 128.0 06/18/2019       Last A1C  Lab Results   Component Value Date    HGBA1C 5.5 08/29/2019       Last TSH  Lab Results   Component Value Date    TSH 5.988 (H) 05/08/2017         Current Med Refills  Medication List with Changes/Refills   Current Medications    ALBUTEROL 90 MCG/ACTUATION INHALER    Inhale 2 puffs into the lungs every 6 (six) hours as needed for Wheezing.       Start Date: 2/5/2018  End Date: --    CICLOPIROX (PENLAC) 8 % SOLN    Apply topically nightly.       Start Date: 8/29/2019 End Date: --    FLUTICASONE PROPIONATE (FLONASE) 50 MCG/ACTUATION NASAL SPRAY    2 sprays (100 mcg total) by Each Nostril route once daily.       Start Date: 2/6/2020  End Date: --    METFORMIN (GLUCOPHAGE) 500 MG TABLET    Take 1 tablet (500 mg total) by mouth 2 (two) times daily with meals.       Start Date: 1/21/2020 End Date: --    METOPROLOL TARTRATE (LOPRESSOR) 25 MG TABLET    Take 1 tablet (25 mg total) by mouth 2 (two) times daily.       Start Date: 10/24/2019End Date: 4/21/2020    SERTRALINE (ZOLOFT) 100 MG TABLET           Start Date: 8/5/2019  End Date: --    SIMVASTATIN (ZOCOR) 20 MG TABLET    TAKE 1 TABLET(20 MG) BY MOUTH EVERY EVENING       Start Date: 2/6/2020  End  Date: --    SPIRONOLACTONE (ALDACTONE) 25 MG TABLET    TAKE 1 TABLET(25 MG) BY MOUTH EVERY DAY       Start Date: 8/2/2019  End Date: --    TRIAMCINOLONE ACETONIDE 0.1% (KENALOG) 0.1 % OINTMENT    APPLY BETWEEN KNEES AND UPPER THIGHS TWICE DAILY FOR NO MORE THAN 7-14 DAYS       Start Date: 7/24/2019 End Date: --    XARELTO 20 MG TAB    Take 20 mg by mouth once daily.       Start Date: 5/10/2019 End Date: --       Order(s) placed per written order guidelines:     Please advise.

## 2020-03-03 DIAGNOSIS — I48.0 PAROXYSMAL ATRIAL FIBRILLATION: ICD-10-CM

## 2020-03-10 RX ORDER — METOPROLOL TARTRATE 25 MG/1
TABLET, FILM COATED ORAL
Qty: 180 TABLET | Refills: 1 | Status: SHIPPED | OUTPATIENT
Start: 2020-03-10 | End: 2020-09-06 | Stop reason: SDUPTHER

## 2020-03-23 ENCOUNTER — PATIENT MESSAGE (OUTPATIENT)
Dept: FAMILY MEDICINE | Facility: CLINIC | Age: 65
End: 2020-03-23

## 2020-03-24 RX ORDER — RIVAROXABAN 20 MG/1
20 TABLET, FILM COATED ORAL DAILY
Qty: 90 TABLET | Refills: 0 | Status: SHIPPED | OUTPATIENT
Start: 2020-03-24 | End: 2020-04-28 | Stop reason: SDUPTHER

## 2020-04-02 DIAGNOSIS — I48.0 PAROXYSMAL ATRIAL FIBRILLATION: ICD-10-CM

## 2020-04-02 RX ORDER — METOPROLOL TARTRATE 25 MG/1
TABLET, FILM COATED ORAL
Qty: 180 TABLET | Refills: 1 | OUTPATIENT
Start: 2020-04-02

## 2020-04-24 ENCOUNTER — LAB VISIT (OUTPATIENT)
Dept: LAB | Facility: HOSPITAL | Age: 65
End: 2020-04-24
Attending: FAMILY MEDICINE
Payer: COMMERCIAL

## 2020-04-24 DIAGNOSIS — E11.9 TYPE 2 DIABETES MELLITUS WITHOUT COMPLICATION, WITHOUT LONG-TERM CURRENT USE OF INSULIN: ICD-10-CM

## 2020-04-24 DIAGNOSIS — Z00.00 ANNUAL PHYSICAL EXAM: ICD-10-CM

## 2020-04-24 LAB
ALBUMIN SERPL BCP-MCNC: 3.3 G/DL (ref 3.5–5.2)
ALP SERPL-CCNC: 79 U/L (ref 55–135)
ALT SERPL W/O P-5'-P-CCNC: 18 U/L (ref 10–44)
ANION GAP SERPL CALC-SCNC: 10 MMOL/L (ref 8–16)
AST SERPL-CCNC: 15 U/L (ref 10–40)
BASOPHILS # BLD AUTO: 0.03 K/UL (ref 0–0.2)
BASOPHILS NFR BLD: 0.4 % (ref 0–1.9)
BILIRUB SERPL-MCNC: 0.5 MG/DL (ref 0.1–1)
BUN SERPL-MCNC: 15 MG/DL (ref 8–23)
CALCIUM SERPL-MCNC: 10.1 MG/DL (ref 8.7–10.5)
CHLORIDE SERPL-SCNC: 102 MMOL/L (ref 95–110)
CHOLEST SERPL-MCNC: 171 MG/DL (ref 120–199)
CHOLEST/HDLC SERPL: 3.7 {RATIO} (ref 2–5)
CO2 SERPL-SCNC: 29 MMOL/L (ref 23–29)
CREAT SERPL-MCNC: 1.3 MG/DL (ref 0.5–1.4)
DIFFERENTIAL METHOD: ABNORMAL
EOSINOPHIL # BLD AUTO: 0.1 K/UL (ref 0–0.5)
EOSINOPHIL NFR BLD: 1.2 % (ref 0–8)
ERYTHROCYTE [DISTWIDTH] IN BLOOD BY AUTOMATED COUNT: 14.3 % (ref 11.5–14.5)
EST. GFR  (AFRICAN AMERICAN): 49 ML/MIN/1.73 M^2
EST. GFR  (NON AFRICAN AMERICAN): 43 ML/MIN/1.73 M^2
ESTIMATED AVG GLUCOSE: 114 MG/DL (ref 68–131)
GLUCOSE SERPL-MCNC: 106 MG/DL (ref 70–110)
HBA1C MFR BLD HPLC: 5.6 % (ref 4–5.6)
HCT VFR BLD AUTO: 46 % (ref 37–48.5)
HDLC SERPL-MCNC: 46 MG/DL (ref 40–75)
HDLC SERPL: 26.9 % (ref 20–50)
HGB BLD-MCNC: 14.5 G/DL (ref 12–16)
IMM GRANULOCYTES # BLD AUTO: 0.01 K/UL (ref 0–0.04)
IMM GRANULOCYTES NFR BLD AUTO: 0.1 % (ref 0–0.5)
LDLC SERPL CALC-MCNC: 108 MG/DL (ref 63–159)
LYMPHOCYTES # BLD AUTO: 3.1 K/UL (ref 1–4.8)
LYMPHOCYTES NFR BLD: 41.8 % (ref 18–48)
MCH RBC QN AUTO: 30.4 PG (ref 27–31)
MCHC RBC AUTO-ENTMCNC: 31.5 G/DL (ref 32–36)
MCV RBC AUTO: 96 FL (ref 82–98)
MONOCYTES # BLD AUTO: 0.5 K/UL (ref 0.3–1)
MONOCYTES NFR BLD: 6.7 % (ref 4–15)
NEUTROPHILS # BLD AUTO: 3.7 K/UL (ref 1.8–7.7)
NEUTROPHILS NFR BLD: 49.8 % (ref 38–73)
NONHDLC SERPL-MCNC: 125 MG/DL
NRBC BLD-RTO: 0 /100 WBC
PLATELET # BLD AUTO: 341 K/UL (ref 150–350)
PMV BLD AUTO: 10.4 FL (ref 9.2–12.9)
POTASSIUM SERPL-SCNC: 4.5 MMOL/L (ref 3.5–5.1)
PROT SERPL-MCNC: 7.5 G/DL (ref 6–8.4)
RBC # BLD AUTO: 4.77 M/UL (ref 4–5.4)
SODIUM SERPL-SCNC: 141 MMOL/L (ref 136–145)
T4 FREE SERPL-MCNC: 1.13 NG/DL (ref 0.71–1.51)
TRIGL SERPL-MCNC: 85 MG/DL (ref 30–150)
TSH SERPL DL<=0.005 MIU/L-ACNC: 3.07 UIU/ML (ref 0.4–4)
WBC # BLD AUTO: 7.44 K/UL (ref 3.9–12.7)

## 2020-04-24 PROCEDURE — 36415 COLL VENOUS BLD VENIPUNCTURE: CPT

## 2020-04-24 PROCEDURE — 84443 ASSAY THYROID STIM HORMONE: CPT

## 2020-04-24 PROCEDURE — 84439 ASSAY OF FREE THYROXINE: CPT

## 2020-04-24 PROCEDURE — 80053 COMPREHEN METABOLIC PANEL: CPT

## 2020-04-24 PROCEDURE — 83036 HEMOGLOBIN GLYCOSYLATED A1C: CPT

## 2020-04-24 PROCEDURE — 85025 COMPLETE CBC W/AUTO DIFF WBC: CPT

## 2020-04-24 PROCEDURE — 80061 LIPID PANEL: CPT

## 2020-04-28 ENCOUNTER — OFFICE VISIT (OUTPATIENT)
Dept: FAMILY MEDICINE | Facility: CLINIC | Age: 65
End: 2020-04-28
Payer: COMMERCIAL

## 2020-04-28 DIAGNOSIS — B96.89 ACUTE BACTERIAL SINUSITIS: Primary | ICD-10-CM

## 2020-04-28 DIAGNOSIS — I48.20 CHRONIC ATRIAL FIBRILLATION: ICD-10-CM

## 2020-04-28 DIAGNOSIS — J01.90 ACUTE BACTERIAL SINUSITIS: Primary | ICD-10-CM

## 2020-04-28 PROCEDURE — 1101F PT FALLS ASSESS-DOCD LE1/YR: CPT | Mod: CPTII,,, | Performed by: FAMILY MEDICINE

## 2020-04-28 PROCEDURE — 1159F MED LIST DOCD IN RCRD: CPT | Mod: ,,, | Performed by: FAMILY MEDICINE

## 2020-04-28 PROCEDURE — 1159F PR MEDICATION LIST DOCUMENTED IN MEDICAL RECORD: ICD-10-PCS | Mod: ,,, | Performed by: FAMILY MEDICINE

## 2020-04-28 PROCEDURE — 99214 PR OFFICE/OUTPT VISIT, EST, LEVL IV, 30-39 MIN: ICD-10-PCS | Mod: 95,,, | Performed by: FAMILY MEDICINE

## 2020-04-28 PROCEDURE — 99214 OFFICE O/P EST MOD 30 MIN: CPT | Mod: 95,,, | Performed by: FAMILY MEDICINE

## 2020-04-28 PROCEDURE — 1101F PR PT FALLS ASSESS DOC 0-1 FALLS W/OUT INJ PAST YR: ICD-10-PCS | Mod: CPTII,,, | Performed by: FAMILY MEDICINE

## 2020-04-28 RX ORDER — RIVAROXABAN 20 MG/1
20 TABLET, FILM COATED ORAL DAILY
Qty: 90 TABLET | Refills: 3 | Status: SHIPPED | OUTPATIENT
Start: 2020-04-28 | End: 2020-09-06 | Stop reason: SDUPTHER

## 2020-04-28 RX ORDER — ACETAMINOPHEN AND CODEINE PHOSPHATE 120; 12 MG/5ML; MG/5ML
5 SOLUTION ORAL NIGHTLY
Qty: 240 ML | Refills: 0 | Status: SHIPPED | OUTPATIENT
Start: 2020-04-28 | End: 2020-07-10 | Stop reason: SDUPTHER

## 2020-04-28 RX ORDER — AMOXICILLIN AND CLAVULANATE POTASSIUM 875; 125 MG/1; MG/1
1 TABLET, FILM COATED ORAL EVERY 12 HOURS
Qty: 14 TABLET | Refills: 0 | Status: SHIPPED | OUTPATIENT
Start: 2020-04-28 | End: 2020-05-05

## 2020-04-28 NOTE — PROGRESS NOTES
The patient location is: home  The chief complaint leading to consultation is: congestion  Visit type: audiovisual  Total time spent with patient: 20 mins  Each patient to whom he or she provides medical services by telemedicine is:  (1) informed of the relationship between the physician and patient and the respective role of any other health care provider with respect to management of the patient; and (2) notified that he or she may decline to receive medical services by telemedicine and may withdraw from such care at any time.    Subjective:       Patient ID: Sandee vEans is a 64 y.o. female.    Chief Complaint: No chief complaint on file.      HPI  65 yo female presents for uri symptoms. Feels she has symptoms for a month. Endorses cough, congestion, and postnasal drip. Feels flonase helps. Denies any f/c. States she would like to make sure she has her refills for her other conditions.    Review of Systems   Constitutional: Negative for activity change and unexpected weight change.   HENT: Positive for rhinorrhea. Negative for hearing loss and trouble swallowing.    Eyes: Positive for discharge and visual disturbance.   Respiratory: Positive for wheezing. Negative for chest tightness.    Cardiovascular: Positive for palpitations. Negative for chest pain.   Gastrointestinal: Positive for constipation and diarrhea. Negative for vomiting.   Endocrine: Positive for polyuria.   Genitourinary: Negative for difficulty urinating, dysuria, hematuria and menstrual problem.   Musculoskeletal: Positive for arthralgias and joint swelling. Negative for neck pain.   Neurological: Negative for weakness and headaches.   Psychiatric/Behavioral: Negative for confusion and dysphoric mood.          Past Medical History:   Diagnosis Date    Anticoagulant long-term use     Xarelto    Arthritis     Atrial fibrillation     Breast cyst     Degenerative disc disease     Depression     Diabetes mellitus     Pre diabetic     Hyperlipidemia     Hypertension     Nuclear sclerosis, bilateral 12/18/2017    Obesity, morbid     Other abnormal glucose     pre-diabetes    Sleep apnea     Smoker     Thyroid disease     on meds 8-9 years ago. hypothyroidism.no malignancy    TMJ (temporomandibular joint disorder)     jaw clicking    Urge incontinence     Wears glasses      Past Surgical History:   Procedure Laterality Date    BREAST BIOPSY Right     over 10 yrs ago/ benign    BREAST CYST EXCISION Right     BREAST LUMPECTOMY Right     over 10 yrs ago, ex bx/ benign    COLONOSCOPY N/A 6/25/2019    Procedure: COLONOSCOPY;  Surgeon: Micheline Flores MD;  Location: [facility] ENDO;  Service: Endoscopy;  Laterality: N/A;  RX XARELTO ok to hold (2 days) per Dr. Naik see scan 3/20/19    HYSTERECTOMY      OOPHORECTOMY      rt.knee surgery 2016      TOTAL KNEE ARTHROPLASTY Left 2/19/2019    Procedure: ARTHROPLASTY, KNEE, TOTAL;  Surgeon: Med Hanson MD;  Location: [facility] OR;  Service: Orthopedics;  Laterality: Left;  10AM START PER  PER JAYLIN TEXT @ 8:34AM ON 2-18-19  SUPINE  HARRIS LOYA [phone] TEXTED HIM ON 1-24-19 @ 7:57AM  RN PRE OP 2-13-19--BMI--48.9    underarm gland\ Bilateral      Family History   Problem Relation Age of Onset    Diabetes Mother     Diabetes Brother     No Known Problems Father     No Known Problems Sister     No Known Problems Maternal Aunt     No Known Problems Maternal Uncle     No Known Problems Paternal Aunt     No Known Problems Paternal Uncle     No Known Problems Maternal Grandmother     No Known Problems Maternal Grandfather     No Known Problems Paternal Grandmother     No Known Problems Paternal Grandfather     Amblyopia Neg Hx     Blindness Neg Hx     Cancer Neg Hx     Cataracts Neg Hx     Glaucoma Neg Hx     Hypertension Neg Hx     Macular degeneration Neg Hx     Retinal detachment Neg Hx     Strabismus Neg Hx     Stroke Neg Hx     Thyroid disease Neg Hx       Social History     Socioeconomic History    Marital status:      Spouse name: Not on file    Number of children: Not on file    Years of education: Not on file    Highest education level: Not on file   Occupational History    Not on file   Social Needs    Financial resource strain: Not hard at all    Food insecurity:     Worry: Never true     Inability: Never true    Transportation needs:     Medical: No     Non-medical: No   Tobacco Use    Smoking status: Current Every Day Smoker     Packs/day: 0.41     Types: Cigarettes     Start date: 1973    Smokeless tobacco: Never Used    Tobacco comment: 6/18/19 smoke 5 cig a day casual   Substance and Sexual Activity    Alcohol use: No     Frequency: Never     Drinks per session: Patient refused     Binge frequency: Never    Drug use: No    Sexual activity: Yes     Partners: Male   Lifestyle    Physical activity:     Days per week: 1 day     Minutes per session: 10 min    Stress: Only a little   Relationships    Social connections:     Talks on phone: Three times a week     Gets together: Once a week     Attends Zoroastrian service: Not on file     Active member of club or organization: Yes     Attends meetings of clubs or organizations: 1 to 4 times per year     Relationship status:    Other Topics Concern    Not on file   Social History Narrative    Not on file       Current Outpatient Medications:     acetaminophen with codeine (ACETAMINOPHEN-CODEINE) 120mg 12mg 5mL Soln, Take 5 mLs by mouth every evening., Disp: 240 mL, Rfl: 0    albuterol 90 mcg/actuation inhaler, Inhale 2 puffs into the lungs every 6 (six) hours as needed for Wheezing., Disp: 1 each, Rfl: 11    amoxicillin-clavulanate 875-125mg (AUGMENTIN) 875-125 mg per tablet, Take 1 tablet by mouth every 12 (twelve) hours. for 7 days, Disp: 14 tablet, Rfl: 0    ciclopirox (PENLAC) 8 % Soln, Apply topically nightly., Disp: 6.6 mL, Rfl: 1    fluticasone propionate (FLONASE) 50  mcg/actuation nasal spray, 2 sprays (100 mcg total) by Each Nostril route once daily., Disp: 16 mL, Rfl: 6    metFORMIN (GLUCOPHAGE) 500 MG tablet, Take 1 tablet (500 mg total) by mouth 2 (two) times daily with meals., Disp: 180 tablet, Rfl: 2    metoprolol tartrate (LOPRESSOR) 25 MG tablet, TAKE 1 TABLET(25 MG) BY MOUTH TWICE DAILY, Disp: 180 tablet, Rfl: 1    sertraline (ZOLOFT) 100 MG tablet, , Disp: , Rfl: 1    simvastatin (ZOCOR) 20 MG tablet, TAKE 1 TABLET(20 MG) BY MOUTH EVERY EVENING, Disp: 90 tablet, Rfl: 1    spironolactone (ALDACTONE) 25 MG tablet, TAKE 1 TABLET(25 MG) BY MOUTH EVERY DAY, Disp: 90 tablet, Rfl: 2    triamcinolone acetonide 0.1% (KENALOG) 0.1 % ointment, APPLY BETWEEN KNEES AND UPPER THIGHS TWICE DAILY FOR NO MORE THAN 7-14 DAYS, Disp: 454 g, Rfl: 0    XARELTO 20 mg Tab, Take 1 tablet (20 mg total) by mouth once daily., Disp: 90 tablet, Rfl: 3   Objective:      There were no vitals filed for this visit.    Physical Exam   Constitutional: She is oriented to person, place, and time. No distress.   HENT:   Head: Normocephalic and atraumatic.   Eyes: Conjunctivae are normal.   Neck: Neck supple.   Pulmonary/Chest: Effort normal.   Musculoskeletal: Normal range of motion.   Neurological: She is alert and oriented to person, place, and time.   Skin: No rash noted. She is not diaphoretic.   Psychiatric: She has a normal mood and affect. Her behavior is normal. Judgment and thought content normal.          Assessment:       1. Acute bacterial sinusitis    2. Chronic atrial fibrillation        Plan:       Acute bacterial sinusitis  -     amoxicillin-clavulanate 875-125mg (AUGMENTIN) 875-125 mg per tablet; Take 1 tablet by mouth every 12 (twelve) hours. for 7 days  Dispense: 14 tablet; Refill: 0  -     acetaminophen with codeine (ACETAMINOPHEN-CODEINE) 120mg 12mg 5mL Soln; Take 5 mLs by mouth every evening.  Dispense: 240 mL; Refill: 0    Chronic atrial fibrillation  -     XARELTO 20 mg Tab;  Take 1 tablet (20 mg total) by mouth once daily.  Dispense: 90 tablet; Refill: 3    pt had her annual labs done as well. Shows slight decrease in GFR. Will recheck in 6-12 months            Kuldeep Miller MD      Patient note was created using [employer].  Any errors in syntax or even information may not have been identified and edited on initial review prior to signing this note.

## 2020-05-04 ENCOUNTER — PATIENT MESSAGE (OUTPATIENT)
Dept: FAMILY MEDICINE | Facility: CLINIC | Age: 65
End: 2020-05-04

## 2020-05-04 DIAGNOSIS — B96.89 ACUTE BACTERIAL SINUSITIS: Primary | ICD-10-CM

## 2020-05-04 DIAGNOSIS — J01.90 ACUTE BACTERIAL SINUSITIS: Primary | ICD-10-CM

## 2020-05-05 RX ORDER — DOXYCYCLINE 100 MG/1
100 CAPSULE ORAL 2 TIMES DAILY
Qty: 14 CAPSULE | Refills: 0 | Status: SHIPPED | OUTPATIENT
Start: 2020-05-05 | End: 2020-05-12

## 2020-06-12 ENCOUNTER — TELEPHONE (OUTPATIENT)
Dept: FAMILY MEDICINE | Facility: CLINIC | Age: 65
End: 2020-06-12

## 2020-06-12 DIAGNOSIS — Z12.31 ENCOUNTER FOR SCREENING MAMMOGRAM FOR BREAST CANCER: Primary | ICD-10-CM

## 2020-06-12 NOTE — TELEPHONE ENCOUNTER
----- Message from Miguel Hart sent at 6/12/2020  8:47 AM CDT -----  Contact: Sandee [phone]  Type:  Mammogram    Caller is requesting to schedule their annual mammogram appointment.  Order is not listed in EPIC.  Please enter order and contact patient to schedule.    Name of Caller: Sandee     Where would they like the mammogram performed? [facility]     Would the patient rather a call back or a response via [facility]? Call back     Best Call Back Number: [phone]

## 2020-06-18 ENCOUNTER — HOSPITAL ENCOUNTER (OUTPATIENT)
Dept: RADIOLOGY | Facility: HOSPITAL | Age: 65
Discharge: HOME | End: 2020-06-18
Attending: FAMILY MEDICINE
Payer: COMMERCIAL

## 2020-06-18 DIAGNOSIS — Z12.31 ENCOUNTER FOR SCREENING MAMMOGRAM FOR BREAST CANCER: ICD-10-CM

## 2020-06-18 PROCEDURE — 77067 SCR MAMMO BI INCL CAD: CPT | Mod: 26,,, | Performed by: RADIOLOGY

## 2020-06-18 PROCEDURE — 77067 MAMMO DIGITAL SCREENING BILAT WITH TOMOSYNTHESIS_CAD: ICD-10-PCS | Mod: 26,,, | Performed by: RADIOLOGY

## 2020-06-18 PROCEDURE — 77067 SCR MAMMO BI INCL CAD: CPT | Mod: TC,PO

## 2020-06-18 PROCEDURE — 77063 MAMMO DIGITAL SCREENING BILAT WITH TOMOSYNTHESIS_CAD: ICD-10-PCS | Mod: 26,,, | Performed by: RADIOLOGY

## 2020-06-18 PROCEDURE — 77063 BREAST TOMOSYNTHESIS BI: CPT | Mod: 26,,, | Performed by: RADIOLOGY

## 2020-07-07 ENCOUNTER — TELEPHONE (OUTPATIENT)
Dept: FAMILY MEDICINE | Facility: CLINIC | Age: 65
End: 2020-07-07

## 2020-07-07 NOTE — TELEPHONE ENCOUNTER
----- Message from Miguel Hart sent at 7/7/2020 10:35 AM CDT -----  Regarding: call back  Type: Patient Call Back    Who called:Sandee     What is the request in detail: The patient is returning a call to the staff     Can the clinic reply by [facility]?no    Would the patient rather a call back or a response via [facility]? Call back     Best call back number:[phone]

## 2020-07-08 ENCOUNTER — PATIENT OUTREACH (OUTPATIENT)
Dept: ADMINISTRATIVE | Facility: OTHER | Age: 65
End: 2020-07-08

## 2020-07-09 ENCOUNTER — OFFICE VISIT (OUTPATIENT)
Dept: OPTOMETRY | Facility: CLINIC | Age: 65
End: 2020-07-09
Payer: COMMERCIAL

## 2020-07-09 DIAGNOSIS — H10.13 ALLERGIC CONJUNCTIVITIS, BILATERAL: ICD-10-CM

## 2020-07-09 DIAGNOSIS — H25.13 NUCLEAR SCLEROSIS, BILATERAL: ICD-10-CM

## 2020-07-09 DIAGNOSIS — E11.9 TYPE 2 DIABETES MELLITUS WITHOUT RETINOPATHY: Primary | ICD-10-CM

## 2020-07-09 DIAGNOSIS — H04.123 DRY EYE SYNDROME, BILATERAL: ICD-10-CM

## 2020-07-09 DIAGNOSIS — H52.7 REFRACTIVE ERROR: ICD-10-CM

## 2020-07-09 LAB
LEFT EYE DM RETINOPATHY: NEGATIVE
RIGHT EYE DM RETINOPATHY: NEGATIVE

## 2020-07-09 PROCEDURE — 99999 PR PBB SHADOW E&M-EST. PATIENT-LVL III: ICD-10-PCS | Mod: PBBFAC,,, | Performed by: OPTOMETRIST

## 2020-07-09 PROCEDURE — 92014 COMPRE OPH EXAM EST PT 1/>: CPT | Mod: S$GLB,,, | Performed by: OPTOMETRIST

## 2020-07-09 PROCEDURE — 99999 PR PBB SHADOW E&M-EST. PATIENT-LVL III: CPT | Mod: PBBFAC,,, | Performed by: OPTOMETRIST

## 2020-07-09 PROCEDURE — 92015 DETERMINE REFRACTIVE STATE: CPT | Mod: S$GLB,,, | Performed by: OPTOMETRIST

## 2020-07-09 PROCEDURE — 92014 PR EYE EXAM, EST PATIENT,COMPREHESV: ICD-10-PCS | Mod: S$GLB,,, | Performed by: OPTOMETRIST

## 2020-07-09 PROCEDURE — 92015 PR REFRACTION: ICD-10-PCS | Mod: S$GLB,,, | Performed by: OPTOMETRIST

## 2020-07-09 NOTE — PROGRESS NOTES
Subjective:       Patient ID: Sandee Evans is a 64 y.o. female      Chief Complaint   Patient presents with    Diabetic Eye Exam     History of Present Illness  DLS:  06/18/19 Dr Hung    Pt here for diabetic eye check. Pt states that eyes run a lot of water and   they itch. Vision is ok with glasses.  Pt denies flashes or eye pain.      AT's PRN OU- occasionally   No eye surgery     Hemoglobin A1C       Date                     Value               Ref Range           Status                04/24/2020               5.6                 4.0 - 5.6 %         Final                 08/29/2019               5.5                 4.0 - 5.6 %         Final                 02/21/2019               5.7 (H)             4.0 - 5.6 %         Final                           Assessment/Plan:     1. Type 2 diabetes mellitus without retinopathy  No diabetic retinopathy. Discussed with pt the effects of diabetes on vision, importance of good blood sugar control, compliance with meds, and follow up care with PCP. Return in 1 year for dilated eye exam, sooner PRN.    2. Dry eye syndrome, bilateral  No diabetic retinopathy. Discussed with pt the effects of diabetes on vision, importance of good blood sugar control, compliance with meds, and follow up care with PCP. Return in 1 year for dilated eye exam, sooner PRN.    3. Allergic conjunctivitis, bilateral  Recommend Zaditor or Alaway BID OU PRN or pataday QD/BID OU  for itchiness. Artificial tears 3-4/xday.     4. Nuclear sclerosis, bilateral  Educated pt on presence of cataracts and effects on vision. No surgery at this time. Recheck in one year.    5. Refractive error  Educated patient on refractive error and discussed lens options. Dispensed updated spectacle Rx. Educated about adaptation period to new specs.    Eyeglass Final Rx     Eyeglass Final Rx       Sphere Cylinder Axis Add    Right Elbert +1.00 175 +2.75    Left Elbert +0.75 005 +2.75    Expiration Date: 7/10/2021                   Follow up in about 1 year (around 7/9/2021) for Diabetic Eye Exam.

## 2020-07-10 ENCOUNTER — OFFICE VISIT (OUTPATIENT)
Dept: FAMILY MEDICINE | Facility: CLINIC | Age: 65
End: 2020-07-10
Payer: COMMERCIAL

## 2020-07-10 VITALS
WEIGHT: 286.63 LBS | HEIGHT: 63 IN | RESPIRATION RATE: 20 BRPM | DIASTOLIC BLOOD PRESSURE: 80 MMHG | OXYGEN SATURATION: 96 % | TEMPERATURE: 99 F | SYSTOLIC BLOOD PRESSURE: 128 MMHG | HEART RATE: 88 BPM | BODY MASS INDEX: 50.79 KG/M2

## 2020-07-10 DIAGNOSIS — J32.9 CHRONIC SINUSITIS, UNSPECIFIED LOCATION: ICD-10-CM

## 2020-07-10 DIAGNOSIS — J01.90 ACUTE BACTERIAL SINUSITIS: Primary | ICD-10-CM

## 2020-07-10 DIAGNOSIS — B96.89 ACUTE BACTERIAL SINUSITIS: Primary | ICD-10-CM

## 2020-07-10 PROCEDURE — 3079F DIAST BP 80-89 MM HG: CPT | Mod: CPTII,S$GLB,, | Performed by: FAMILY MEDICINE

## 2020-07-10 PROCEDURE — 1101F PR PT FALLS ASSESS DOC 0-1 FALLS W/OUT INJ PAST YR: ICD-10-PCS | Mod: CPTII,S$GLB,, | Performed by: FAMILY MEDICINE

## 2020-07-10 PROCEDURE — 1101F PT FALLS ASSESS-DOCD LE1/YR: CPT | Mod: CPTII,S$GLB,, | Performed by: FAMILY MEDICINE

## 2020-07-10 PROCEDURE — 99214 PR OFFICE/OUTPT VISIT, EST, LEVL IV, 30-39 MIN: ICD-10-PCS | Mod: S$GLB,,, | Performed by: FAMILY MEDICINE

## 2020-07-10 PROCEDURE — 1126F PR PAIN SEVERITY QUANTIFIED, NO PAIN PRESENT: ICD-10-PCS | Mod: S$GLB,,, | Performed by: FAMILY MEDICINE

## 2020-07-10 PROCEDURE — 3074F PR MOST RECENT SYSTOLIC BLOOD PRESSURE < 130 MM HG: ICD-10-PCS | Mod: CPTII,S$GLB,, | Performed by: FAMILY MEDICINE

## 2020-07-10 PROCEDURE — 3074F SYST BP LT 130 MM HG: CPT | Mod: CPTII,S$GLB,, | Performed by: FAMILY MEDICINE

## 2020-07-10 PROCEDURE — 3008F PR BODY MASS INDEX (BMI) DOCUMENTED: ICD-10-PCS | Mod: CPTII,S$GLB,, | Performed by: FAMILY MEDICINE

## 2020-07-10 PROCEDURE — 1126F AMNT PAIN NOTED NONE PRSNT: CPT | Mod: S$GLB,,, | Performed by: FAMILY MEDICINE

## 2020-07-10 PROCEDURE — 3008F BODY MASS INDEX DOCD: CPT | Mod: CPTII,S$GLB,, | Performed by: FAMILY MEDICINE

## 2020-07-10 PROCEDURE — 99214 OFFICE O/P EST MOD 30 MIN: CPT | Mod: S$GLB,,, | Performed by: FAMILY MEDICINE

## 2020-07-10 PROCEDURE — 99999 PR PBB SHADOW E&M-EST. PATIENT-LVL IV: CPT | Mod: PBBFAC,,, | Performed by: FAMILY MEDICINE

## 2020-07-10 PROCEDURE — 1159F MED LIST DOCD IN RCRD: CPT | Mod: S$GLB,,, | Performed by: FAMILY MEDICINE

## 2020-07-10 PROCEDURE — 3079F PR MOST RECENT DIASTOLIC BLOOD PRESSURE 80-89 MM HG: ICD-10-PCS | Mod: CPTII,S$GLB,, | Performed by: FAMILY MEDICINE

## 2020-07-10 PROCEDURE — 1159F PR MEDICATION LIST DOCUMENTED IN MEDICAL RECORD: ICD-10-PCS | Mod: S$GLB,,, | Performed by: FAMILY MEDICINE

## 2020-07-10 PROCEDURE — 99999 PR PBB SHADOW E&M-EST. PATIENT-LVL IV: ICD-10-PCS | Mod: PBBFAC,,, | Performed by: FAMILY MEDICINE

## 2020-07-10 RX ORDER — AZITHROMYCIN 250 MG/1
TABLET, FILM COATED ORAL
Qty: 6 TABLET | Refills: 0 | Status: SHIPPED | OUTPATIENT
Start: 2020-07-10 | End: 2020-07-15

## 2020-07-10 RX ORDER — ACETAMINOPHEN AND CODEINE PHOSPHATE 120; 12 MG/5ML; MG/5ML
5 SOLUTION ORAL NIGHTLY
Qty: 240 ML | Refills: 0 | Status: SHIPPED | OUTPATIENT
Start: 2020-07-10 | End: 2021-02-18

## 2020-07-10 NOTE — PROGRESS NOTES
Subjective:       Patient ID: Sandee Evans is a 64 y.o. female.    Chief Complaint: Sinus Problem      HPI  64-year-old female presents for congestion.  Patient states the nose started about 1 week ago.  Patient was advised to take Zyrtec and Flonase which she feels has helped.  Patient states she has issues every few months and takes Flonase on a daily basis.  Has not had a follow-up with ENT.  Denies any fever chills.  States she has occasional issues with shortness of breath.  Review of Systems   Constitutional: Negative.  Negative for chills and fever.   HENT: Positive for postnasal drip and sore throat. Negative for ear pain.    Respiratory: Positive for cough, shortness of breath and wheezing.    Cardiovascular: Negative.  Negative for chest pain.   Gastrointestinal: Negative.    Endocrine: Negative.    Genitourinary: Negative.    Musculoskeletal: Negative.    Allergic/Immunologic: Positive for environmental allergies.   Neurological: Negative.  Negative for headaches.   Psychiatric/Behavioral: Negative.           Past Medical History:   Diagnosis Date    Anticoagulant long-term use     Xarelto    Arthritis     Atrial fibrillation     Breast cyst     Degenerative disc disease     Depression     Diabetes mellitus     Pre diabetic    Hyperlipidemia     Hypertension     Nuclear sclerosis, bilateral 12/18/2017    Obesity, morbid     Other abnormal glucose     pre-diabetes    Sleep apnea     Smoker     Thyroid disease     on meds 8-9 years ago. hypothyroidism.no malignancy    TMJ (temporomandibular joint disorder)     jaw clicking    Urge incontinence     Wears glasses      Past Surgical History:   Procedure Laterality Date    BREAST BIOPSY Right     over 10 yrs ago/ benign    BREAST CYST EXCISION Right     BREAST LUMPECTOMY Right     over 10 yrs ago, ex bx/ benign    COLONOSCOPY N/A 6/25/2019    Procedure: COLONOSCOPY;  Surgeon: Micheline Flores MD;  Location: [facility];  Service:  Endoscopy;  Laterality: N/A;  RX XARELTO ok to hold (2 days) per Dr. Naik see scan 3/20/19    HYSTERECTOMY      OOPHORECTOMY      rt.knee surgery 2016      TOTAL KNEE ARTHROPLASTY Left 2/19/2019    Procedure: ARTHROPLASTY, KNEE, TOTAL;  Surgeon: Med Hanson MD;  Location: [facility];  Service: Orthopedics;  Laterality: Left;  10AM START PER  PER JAYLIN TEXT @ 8:34AM ON 2-18-19  SUPINE  HARRIS LOYA [phone] TEXTED HIM ON 1-24-19 @ 7:57AM  RN PRE OP 2-13-19--BMI--48.9    underarm gland\ Bilateral      Family History   Problem Relation Age of Onset    Diabetes Mother     Diabetes Brother     No Known Problems Father     No Known Problems Sister     No Known Problems Maternal Aunt     No Known Problems Maternal Uncle     No Known Problems Paternal Aunt     No Known Problems Paternal Uncle     No Known Problems Maternal Grandmother     No Known Problems Maternal Grandfather     No Known Problems Paternal Grandmother     No Known Problems Paternal Grandfather     Amblyopia Neg Hx     Blindness Neg Hx     Cancer Neg Hx     Cataracts Neg Hx     Glaucoma Neg Hx     Hypertension Neg Hx     Macular degeneration Neg Hx     Retinal detachment Neg Hx     Strabismus Neg Hx     Stroke Neg Hx     Thyroid disease Neg Hx      Social History     Socioeconomic History    Marital status:      Spouse name: Not on file    Number of children: Not on file    Years of education: Not on file    Highest education level: Not on file   Occupational History    Not on file   Social Needs    Financial resource strain: Not hard at all    Food insecurity     Worry: Never true     Inability: Never true    Transportation needs     Medical: No     Non-medical: No   Tobacco Use    Smoking status: Current Every Day Smoker     Packs/day: 0.41     Types: Cigarettes     Start date: 1973    Smokeless tobacco: Never Used    Tobacco comment: 6/18/19 smoke 5 cig a day casual   Substance and Sexual  Activity    Alcohol use: No     Frequency: Never     Drinks per session: Patient refused     Binge frequency: Never    Drug use: No    Sexual activity: Yes     Partners: Male   Lifestyle    Physical activity     Days per week: 1 day     Minutes per session: 10 min    Stress: To some extent   Relationships    Social connections     Talks on phone: Once a week     Gets together: Twice a week     Attends Restorationism service: Not on file     Active member of club or organization: Yes     Attends meetings of clubs or organizations: Patient refused     Relationship status:    Other Topics Concern    Not on file   Social History Narrative    Not on file       Current Outpatient Medications:     acetaminophen with codeine (ACETAMINOPHEN-CODEINE) 120mg 12mg 5mL Soln, Take 5 mLs by mouth every evening., Disp: 240 mL, Rfl: 0    albuterol 90 mcg/actuation inhaler, Inhale 2 puffs into the lungs every 6 (six) hours as needed for Wheezing., Disp: 1 each, Rfl: 11    ciclopirox (PENLAC) 8 % Soln, Apply topically nightly., Disp: 6.6 mL, Rfl: 1    fluticasone propionate (FLONASE) 50 mcg/actuation nasal spray, SHAKE LIQUID AND USE 2 SPRAYS(100 MCG) IN EACH NOSTRIL EVERY DAY, Disp: 48 g, Rfl: 0    metFORMIN (GLUCOPHAGE) 500 MG tablet, Take 1 tablet (500 mg total) by mouth 2 (two) times daily with meals., Disp: 180 tablet, Rfl: 2    metoprolol tartrate (LOPRESSOR) 25 MG tablet, TAKE 1 TABLET(25 MG) BY MOUTH TWICE DAILY, Disp: 180 tablet, Rfl: 1    simvastatin (ZOCOR) 20 MG tablet, TAKE 1 TABLET(20 MG) BY MOUTH EVERY EVENING, Disp: 90 tablet, Rfl: 1    spironolactone (ALDACTONE) 25 MG tablet, TAKE 1 TABLET(25 MG) BY MOUTH EVERY DAY, Disp: 90 tablet, Rfl: 2    triamcinolone acetonide 0.1% (KENALOG) 0.1 % ointment, APPLY BETWEEN KNEES AND UPPER THIGHS TWICE DAILY FOR NO MORE THAN 7-14 DAYS, Disp: 454 g, Rfl: 0    XARELTO 20 mg Tab, Take 1 tablet (20 mg total) by mouth once daily., Disp: 90 tablet, Rfl: 3     "azithromycin (Z-HEMA) 250 MG tablet, Take 2 tablets by mouth on day 1; Take 1 tablet by mouth on days 2-5, Disp: 6 tablet, Rfl: 0    sertraline (ZOLOFT) 100 MG tablet, , Disp: , Rfl: 1   Objective:      Vitals:    07/10/20 1344   BP: 128/80   BP Location: Left arm   Patient Position: Sitting   BP Method: Large (Automatic)   Pulse: 88   Resp: 20   Temp: 99 °F (37.2 °C)   TempSrc: Oral   SpO2: 96%   Weight: 130 kg (286 lb 9.6 oz)   Height: 5' 3" (1.6 m)       Physical Exam  Constitutional:       General: She is not in acute distress.  HENT:      Head: Normocephalic and atraumatic.      Nose: Congestion and rhinorrhea present.   Eyes:      Conjunctiva/sclera: Conjunctivae normal.   Neck:      Musculoskeletal: Neck supple.   Cardiovascular:      Rate and Rhythm: Normal rate and regular rhythm.      Heart sounds: Normal heart sounds. No murmur. No friction rub. No gallop.    Pulmonary:      Effort: Pulmonary effort is normal.      Breath sounds: Normal breath sounds. No wheezing or rales.   Skin:     General: Skin is warm and dry.   Neurological:      Mental Status: She is alert and oriented to person, place, and time.   Psychiatric:         Behavior: Behavior normal.         Thought Content: Thought content normal.         Judgment: Judgment normal.            Assessment:       1. Acute bacterial sinusitis    2. Chronic sinusitis, unspecified location        Plan:       Acute bacterial sinusitis  - Advised pt to take abx if symptoms last a total of 10-14 days. Advised pt about otc meds to use. Advised pt about honey and saltwater gargling PRN.  -     acetaminophen with codeine (ACETAMINOPHEN-CODEINE) 120mg 12mg 5mL Soln; Take 5 mLs by mouth every evening.  Dispense: 240 mL; Refill: 0  -     azithromycin (Z-HEMA) 250 MG tablet; Take 2 tablets by mouth on day 1; Take 1 tablet by mouth on days 2-5  Dispense: 6 tablet; Refill: 0    Chronic sinusitis, unspecified location  -     Ambulatory referral/consult to ENT; Future; " Expected date: 07/17/2020                  Kuldeep Miller MD      Patient note was created using [employer].  Any errors in syntax or even information may not have been identified and edited on initial review prior to signing this note.

## 2020-07-16 ENCOUNTER — TELEPHONE (OUTPATIENT)
Dept: FAMILY MEDICINE | Facility: CLINIC | Age: 65
End: 2020-07-16

## 2020-08-20 ENCOUNTER — TELEPHONE (OUTPATIENT)
Dept: OTOLARYNGOLOGY | Facility: CLINIC | Age: 65
End: 2020-08-20

## 2020-08-20 DIAGNOSIS — J32.9 SINUSITIS, UNSPECIFIED CHRONICITY, UNSPECIFIED LOCATION: Primary | ICD-10-CM

## 2020-08-20 NOTE — TELEPHONE ENCOUNTER
Called Patient and explained to her that she will need to have a Covid Test prior to see Dr. Nascimento, Patient has full understanding and scheduled.

## 2020-08-21 DIAGNOSIS — E11.9 TYPE 2 DIABETES MELLITUS WITHOUT COMPLICATION: ICD-10-CM

## 2020-08-28 ENCOUNTER — LAB VISIT (OUTPATIENT)
Dept: FAMILY MEDICINE | Facility: CLINIC | Age: 65
End: 2020-08-28
Payer: COMMERCIAL

## 2020-08-28 DIAGNOSIS — J32.9 SINUSITIS, UNSPECIFIED CHRONICITY, UNSPECIFIED LOCATION: ICD-10-CM

## 2020-08-28 PROCEDURE — U0003 INFECTIOUS AGENT DETECTION BY NUCLEIC ACID (DNA OR RNA); SEVERE ACUTE RESPIRATORY SYNDROME CORONAVIRUS 2 (SARS-COV-2) (CORONAVIRUS DISEASE [COVID-19]), AMPLIFIED PROBE TECHNIQUE, MAKING USE OF HIGH THROUGHPUT TECHNOLOGIES AS DESCRIBED BY CMS-2020-01-R: HCPCS

## 2020-08-29 LAB — SARS-COV-2 RNA RESP QL NAA+PROBE: NOT DETECTED

## 2020-08-31 ENCOUNTER — OFFICE VISIT (OUTPATIENT)
Dept: OTOLARYNGOLOGY | Facility: CLINIC | Age: 65
End: 2020-08-31
Payer: COMMERCIAL

## 2020-08-31 VITALS
TEMPERATURE: 99 F | BODY MASS INDEX: 50.45 KG/M2 | WEIGHT: 284.75 LBS | DIASTOLIC BLOOD PRESSURE: 80 MMHG | HEIGHT: 63 IN | SYSTOLIC BLOOD PRESSURE: 122 MMHG

## 2020-08-31 DIAGNOSIS — G47.33 OSA (OBSTRUCTIVE SLEEP APNEA): Primary | ICD-10-CM

## 2020-08-31 DIAGNOSIS — R05.9 COUGH: ICD-10-CM

## 2020-08-31 DIAGNOSIS — R09.A2 GLOBUS SENSATION: ICD-10-CM

## 2020-08-31 DIAGNOSIS — J30.1 NON-SEASONAL ALLERGIC RHINITIS DUE TO POLLEN: ICD-10-CM

## 2020-08-31 PROCEDURE — 1126F AMNT PAIN NOTED NONE PRSNT: CPT | Mod: S$GLB,,, | Performed by: OTOLARYNGOLOGY

## 2020-08-31 PROCEDURE — 1159F PR MEDICATION LIST DOCUMENTED IN MEDICAL RECORD: ICD-10-PCS | Mod: S$GLB,,, | Performed by: OTOLARYNGOLOGY

## 2020-08-31 PROCEDURE — 3074F PR MOST RECENT SYSTOLIC BLOOD PRESSURE < 130 MM HG: ICD-10-PCS | Mod: CPTII,S$GLB,, | Performed by: OTOLARYNGOLOGY

## 2020-08-31 PROCEDURE — 1159F MED LIST DOCD IN RCRD: CPT | Mod: S$GLB,,, | Performed by: OTOLARYNGOLOGY

## 2020-08-31 PROCEDURE — 3074F SYST BP LT 130 MM HG: CPT | Mod: CPTII,S$GLB,, | Performed by: OTOLARYNGOLOGY

## 2020-08-31 PROCEDURE — 31575 PR LARYNGOSCOPY, FLEXIBLE; DIAGNOSTIC: ICD-10-PCS | Mod: S$GLB,,, | Performed by: OTOLARYNGOLOGY

## 2020-08-31 PROCEDURE — 3008F BODY MASS INDEX DOCD: CPT | Mod: CPTII,S$GLB,, | Performed by: OTOLARYNGOLOGY

## 2020-08-31 PROCEDURE — 1126F PR PAIN SEVERITY QUANTIFIED, NO PAIN PRESENT: ICD-10-PCS | Mod: S$GLB,,, | Performed by: OTOLARYNGOLOGY

## 2020-08-31 PROCEDURE — 3079F PR MOST RECENT DIASTOLIC BLOOD PRESSURE 80-89 MM HG: ICD-10-PCS | Mod: CPTII,S$GLB,, | Performed by: OTOLARYNGOLOGY

## 2020-08-31 PROCEDURE — 99204 OFFICE O/P NEW MOD 45 MIN: CPT | Mod: 25,S$GLB,, | Performed by: OTOLARYNGOLOGY

## 2020-08-31 PROCEDURE — 1101F PT FALLS ASSESS-DOCD LE1/YR: CPT | Mod: CPTII,S$GLB,, | Performed by: OTOLARYNGOLOGY

## 2020-08-31 PROCEDURE — 99204 PR OFFICE/OUTPT VISIT, NEW, LEVL IV, 45-59 MIN: ICD-10-PCS | Mod: 25,S$GLB,, | Performed by: OTOLARYNGOLOGY

## 2020-08-31 PROCEDURE — 3008F PR BODY MASS INDEX (BMI) DOCUMENTED: ICD-10-PCS | Mod: CPTII,S$GLB,, | Performed by: OTOLARYNGOLOGY

## 2020-08-31 PROCEDURE — 1101F PR PT FALLS ASSESS DOC 0-1 FALLS W/OUT INJ PAST YR: ICD-10-PCS | Mod: CPTII,S$GLB,, | Performed by: OTOLARYNGOLOGY

## 2020-08-31 PROCEDURE — 31575 DIAGNOSTIC LARYNGOSCOPY: CPT | Mod: S$GLB,,, | Performed by: OTOLARYNGOLOGY

## 2020-08-31 PROCEDURE — 3079F DIAST BP 80-89 MM HG: CPT | Mod: CPTII,S$GLB,, | Performed by: OTOLARYNGOLOGY

## 2020-08-31 RX ORDER — AZELASTINE 1 MG/ML
1 SPRAY, METERED NASAL 2 TIMES DAILY
Qty: 30 ML | Refills: 3 | Status: SHIPPED | OUTPATIENT
Start: 2020-08-31 | End: 2023-04-13 | Stop reason: SDUPTHER

## 2020-08-31 NOTE — PROGRESS NOTES
OTOLARYNGOLOGY CLINIC NOTE  Date:  08/31/2020     Chief complaint:  Chief Complaint   Patient presents with    Sinus Problem    Nasal Congestion       History of Present Illness  Sandee Evans is a 64 y.o. female  presenting today for a new evaluation and treatment of   postnaasl drainage-symptoms have been going on for years. Diagnosed with sinus infection last in july of 2020. Needs abx twice per year  When gets sinus infections feels heaviness around eyes and head. And coughs. Gets congested. No runny nose. Gets every spring and fall are worst times of year. When gets infections has colored phlegm. Cough gets worse with sinus infections but also had cough daily with some clear phlegm   + throat clearing. Occasional globus  No issues with swallowing  No voice change- only with congestion   Uses flonase daily ( mostly daily) ; just squirts straight into her nose.     Has sleep apnea but does not wear mask. Has never tried.   No allergy testing    No rhinorrhea    Past Medical History  Past Medical History:   Diagnosis Date    Anticoagulant long-term use     Xarelto    Arthritis     Atrial fibrillation     Breast cyst     Degenerative disc disease     Depression     Diabetes mellitus     Pre diabetic    Hyperlipidemia     Hypertension     Nuclear sclerosis, bilateral 12/18/2017    Obesity, morbid     Other abnormal glucose     pre-diabetes    Sleep apnea     Smoker     Thyroid disease     on meds 8-9 years ago. hypothyroidism.no malignancy    TMJ (temporomandibular joint disorder)     jaw clicking    Urge incontinence     Wears glasses         Past Surgical History  Past Surgical History:   Procedure Laterality Date    BREAST BIOPSY Right     over 10 yrs ago/ benign    BREAST CYST EXCISION Right     BREAST LUMPECTOMY Right     over 10 yrs ago, ex bx/ benign    COLONOSCOPY N/A 6/25/2019    Procedure: COLONOSCOPY;  Surgeon: Micheline Flores MD;  Location: [facility];  Service: Endoscopy;   Laterality: N/A;  RX XARELTO ok to hold (2 days) per Dr. Naik see scan 3/20/19    HYSTERECTOMY      OOPHORECTOMY      rt.knee surgery 2016      TOTAL KNEE ARTHROPLASTY Left 2/19/2019    Procedure: ARTHROPLASTY, KNEE, TOTAL;  Surgeon: Med Hanson MD;  Location: [facility];  Service: Orthopedics;  Laterality: Left;  10AM START PER  PER JAYLIN TEXT @ 8:34AM ON 2-18-19  SUPINE  HARRIS LOYA [phone] TEXTED HIM ON 1-24-19 @ 7:57AM  RN PRE OP 2-13-19--BMI--48.9    underarm gland\ Bilateral         Medications  Current Outpatient Medications on File Prior to Visit   Medication Sig Dispense Refill    acetaminophen with codeine (ACETAMINOPHEN-CODEINE) 120mg 12mg 5mL Soln Take 5 mLs by mouth every evening. 240 mL 0    albuterol 90 mcg/actuation inhaler Inhale 2 puffs into the lungs every 6 (six) hours as needed for Wheezing. 1 each 11    ciclopirox (PENLAC) 8 % Soln Apply topically nightly. 6.6 mL 1    fluticasone propionate (FLONASE) 50 mcg/actuation nasal spray SHAKE LIQUID AND USE 2 SPRAYS(100 MCG) IN EACH NOSTRIL EVERY DAY 48 g 0    metFORMIN (GLUCOPHAGE) 500 MG tablet Take 1 tablet (500 mg total) by mouth 2 (two) times daily with meals. 180 tablet 2    metoprolol tartrate (LOPRESSOR) 25 MG tablet TAKE 1 TABLET(25 MG) BY MOUTH TWICE DAILY 180 tablet 1    sertraline (ZOLOFT) 100 MG tablet   1    simvastatin (ZOCOR) 20 MG tablet TAKE 1 TABLET(20 MG) BY MOUTH EVERY EVENING 90 tablet 1    spironolactone (ALDACTONE) 25 MG tablet TAKE 1 TABLET(25 MG) BY MOUTH EVERY DAY 90 tablet 2    triamcinolone acetonide 0.1% (KENALOG) 0.1 % ointment APPLY BETWEEN KNEES AND UPPER THIGHS TWICE DAILY FOR NO MORE THAN 7-14 DAYS 454 g 0    XARELTO 20 mg Tab Take 1 tablet (20 mg total) by mouth once daily. 90 tablet 3     No current facility-administered medications on file prior to visit.        Review of Systems  Review of Systems   Constitutional: Positive for malaise/fatigue.   HENT: Negative for congestion,  ear pain and nosebleeds.         Occasional itching of ears- on and off. No change in smell   Eyes: Negative for double vision.        Has floaters   Respiratory: Positive for cough, shortness of breath and wheezing.    Cardiovascular: Negative for palpitations.   Gastrointestinal: Negative.  Negative for heartburn.   Genitourinary: Negative.    Musculoskeletal: Positive for back pain.   Skin: Positive for rash.   Neurological: Negative.  Negative for dizziness and headaches.   Psychiatric/Behavioral: Positive for depression.        Social History   reports that she has been smoking cigarettes. She started smoking about 47 years ago. She has been smoking about 0.41 packs per day. She has never used smokeless tobacco. She reports that she does not drink alcohol or use drugs.     Family History  Family History   Problem Relation Age of Onset    Diabetes Mother     Diabetes Brother         Physical Exam   Vitals:    08/31/20 0852   BP: 122/80   Temp: 98.5 °F (36.9 °C)    Body mass index is 50.44 kg/m².          GENERAL: no acute distress.  HEAD: normocephalic.   SKIN: no lesions of skin of head and neck area.  EYES: lids and lashes normal. Pupils equal and reactive to light. Extraocular motions intact. No proptosis  EARS: external ear without lesion, normal pinna shape and position.  External auditory canal with normal cerumen, tympanic membrane fully visible, no perforation , no retraction. No middle ear effusion. Ossicles intact.   NOSE: external nose without abnormality, septum grossly midline on anterior rhinoscopy  ORAL CAVITY/OROPHARYNX: no mass or lesion of gingiva/buccal mucosa/floor of mouth/tongue. tongue midline and mobile. No fasciculations.  Symmetric palate rise. Uvula midline.   NECK: trachea midline. No parotid mass nor tenderness. No submandibular gland mass nor tenderness.   LYMPH NODES:No cervical lymphadenopathy.  RESPIRATORY: no stridor, no stertor. Voice normal. Respirations nonlabored.  NEURO:  alert, responds to questions appropriately.   Cranial nerve exam as indicated in above sections and additionally showed intact facial sensation to light touch bilaterally in V1,V2 and V3. Facial movement symmetric with good eye closure and symmetric smile.   PSYCH:mood appropriate    PROCEDURE NOTE  NAME OF PROCEDURE: Flexible Laryngoscopy, diagnostic  INDICATIONS: chronic cough, throat clearing  FINDINGS: no LPR    Consent: After procedure was explained in detail and all questions answered, verbal consent was obtained for performing flexible laryngoscopy.  Anesthesia: topical 4% lidocaine and neosynephrine  Procedure: With patient in seated position, the scope was inserted into the bilateral  nasal passageway and then advanced atraumatically on the right side into the nasopharynx to examine the following structures  Nasal cavity: left septum deviated, turbinate hypertrophy on left more than right . No purulent drainage. Edema of middle meatus on left.   Nasopharynx: no mass or lesion noted in nasopharynx.   Oropharynx: base of tongue without  mass or ulceration. Lingual tonsils normal in appearance  Hypopharynx: posterior pharyngeal wall without mass or lesion. No pooling of secretions. Pyriform sinuses visible without mass or lesion  Larynx: epiglottis normal without lesion. False vocal folds without edema/erythema/lesion. True vocal folds mobile and without lesion. No interarytenoid edema nor erythema . Postcricoid region without edema or lesion  Subglottis: visualized portion of subglottis normal in appearance    After examination performed, the scope was removed atraumatically . The patient tolerated the procedure well.Photodocumentation obtained with representative images below, all images uploaded in media section of epic.                         Imaging:  The patient does not have any pertinent and/or recent imaging of the head and neck.     Labs:  CBC  Recent Labs   Lab 02/21/19  0344 02/22/19  0450  04/24/20  0906   WBC 9.04 9.31 7.44   Hemoglobin 11.0 L 10.3 L 14.5   Hematocrit 35.0 L 33.8 L 46.0   Mean Corpuscular Volume 98 98 96   Platelets 275 270 341     BMP  Recent Labs   Lab 02/21/19  0344 02/22/19  0452 08/29/19  1020 04/24/20  0906   Glucose 131 H 116 H 92 106   Sodium 140 142 139 141   Potassium 4.5 4.7 4.5 4.5   Chloride 104 103 100 102   CO2 28 32 H 31 H 29   BUN, Bld 29 H 24 H 19 15   Creatinine 0.9 1.0 1.0 1.3   Calcium 8.7 8.8 9.7 10.1   Phosphorus 2.7  --   --   --    Magnesium 1.9  --   --   --      COAGS  Recent Labs   Lab 01/30/19  1356   INR 0.9       Assessment  1. HIEU (obstructive sleep apnea)  - Ambulatory referral/consult to Sleep Disorders; Future  - COVID-19 Routine Screening; Future    2. Cough    3. Non-seasonal allergic rhinitis due to pollen    4. Globus sensation       Plan:  Discussed plan of care with patient in detail and all questions answered. Patient reported understanding of plan of care.     Allergic rhinitis: Will start dual nasal spray therapy as combo of steroid and antihistamine nasal spray has been shown in evidence based studies to be better than either alone. Discussed medication administration technique ( point toward outer corner of eye and not towards nasal septum) and use nasal saline prior to medication sprays. Has rx for flonase, astelin rx sent. Reviewed expected side effects    Globus likely secondary to PND    Cough: could be due to PND from [place]. No evidence of laryngopharyngo reflux on exam today.  If persists, may need sinus ct scan. Will also get input from pulmonary regarding cough     HIEU: have referred her to pulm for sleep eval as well for untreated HIEU. Last seen in 2015- I am unable to find prior sleep study in epic    Follow up 2-3 months

## 2020-08-31 NOTE — PATIENT INSTRUCTIONS
"Information and instructions from your visit with me today:    · Go ahead and start using both of these medication sprays after saline rinse for 1 month to see if it helps with cough and breathing through nose.   · Start using the following medication nasal sprays:   · Fluticasone spray:    This medication is a steroid spray. It stays within the nose and does not have absorption into the body that leads to side effects that one has with oral steroid medication. Fluticasone nasal spray is the same as the Flonase brand nasal spray. Discuss with your pharmacist if the price is lower over the counter or with a prescription ( this varies depending on insurance). The medication that is over the counter is the same as the prescription medication. Use this medication as instructed on the prescription, 1-2 sprays on each side of your nose twice daily.     · Azelastine  spray:  This medication is an antihistamine used to treat nasal symptoms of allergy, which works specifically in the nose unlike antihistamine pills which have more of an effect on the whole body. Use this medication as instructed on the prescription, 1 spray on each side of your nose twice daily.     Additional instructions for medication sprays  Place the tip of the medication bottle in your nose and aim slightly up and out on each side to get medication high and deep into your nose and sinuses, and not have it all deposit in the very front of your nose. Aim the tip of the nozzle towards the outer corner of your eye . You can imagine aiming towards the back of your eyeball on each side for this, as opposed to straight back to the center of your nose and head.     You need to use this medication every day regardless of symptoms, as it takes time ( a few weeks) to work and get the benefits. It does not work on an "as needed" basis like taking a decongestant. If your symptoms only occur in a particular season, then the medication can be used seasonally instead " of year long. For seasonal symptoms, you should start using the spray twice daily a month before when you normally have symptoms ( for example, if symptoms start in August, should start at the end of June).     · Start nasal irrigations with saline solution- you can either use a rinse or a mist spray:    SALINE SINUS RINSE ([employer] brand): You should do a full bottle, half on one side of your nose and half on the other, 1-2 times per day (or more if able to, you cannot do this too much). Follow the instructions on the box: mix the salt packet with clean water (bottle, previously boiled, distilled, etc -- not tap water) to the line on the bottle to make the irrigation.         NASAL SALINE SPRAY ( simply saline and arm and hammer are examples) There are several different brands found in the cold and flu aisle of the pharmacy. You can use any brand of saline spray - this will deliver the saline by a gentle mist ( if you have difficulty or discomfort with nasal rinse/ a lot of fluid in the nose, this will be more comfortable).     Always rinse your nose with saline prior to using medication sprays and wait a couple of hours before using again. You can use the saline throughout the day to help with stuffy nose or dry nose.    · Do not use nasal decongestant sprays such as Afrin or similar products long term ( over 3 days) .  This can cause long term physical nasal addiction. Afrin should only be used if having nose bleeds, severe nasal congestion , or severe ear pain/fullness and should not be used for more than 2-3 days in a row . It is a not a medication that should be used for a long period of time.     It was nice meeting you today, and I look forward to helping you feel better soon. Please don't hesitate to call if you have any other questions or concerns, or if I can be of any assistance in the meantime.      Priscilla Nascimento MD    [facility]     Phone  [phone]    Fax       [phone]        Priscilla Nascimento MD  Otorhinolaryngology

## 2020-09-02 ENCOUNTER — PATIENT OUTREACH (OUTPATIENT)
Dept: ADMINISTRATIVE | Facility: OTHER | Age: 65
End: 2020-09-02

## 2020-09-03 ENCOUNTER — TELEPHONE (OUTPATIENT)
Dept: PULMONOLOGY | Facility: CLINIC | Age: 65
End: 2020-09-03

## 2020-09-03 ENCOUNTER — OFFICE VISIT (OUTPATIENT)
Dept: PULMONOLOGY | Facility: CLINIC | Age: 65
End: 2020-09-03
Payer: COMMERCIAL

## 2020-09-03 VITALS
HEIGHT: 63 IN | WEIGHT: 284.5 LBS | OXYGEN SATURATION: 96 % | HEART RATE: 100 BPM | SYSTOLIC BLOOD PRESSURE: 110 MMHG | DIASTOLIC BLOOD PRESSURE: 80 MMHG | BODY MASS INDEX: 50.41 KG/M2

## 2020-09-03 DIAGNOSIS — G47.00 INSOMNIA, UNSPECIFIED TYPE: ICD-10-CM

## 2020-09-03 DIAGNOSIS — F17.200 TOBACCO DEPENDENCE: ICD-10-CM

## 2020-09-03 DIAGNOSIS — G47.33 OSA (OBSTRUCTIVE SLEEP APNEA): Primary | ICD-10-CM

## 2020-09-03 DIAGNOSIS — R06.09 DOE (DYSPNEA ON EXERTION): ICD-10-CM

## 2020-09-03 DIAGNOSIS — E66.01 OBESITY, MORBID: ICD-10-CM

## 2020-09-03 PROCEDURE — 1126F AMNT PAIN NOTED NONE PRSNT: CPT | Mod: S$GLB,,, | Performed by: NURSE PRACTITIONER

## 2020-09-03 PROCEDURE — 3079F PR MOST RECENT DIASTOLIC BLOOD PRESSURE 80-89 MM HG: ICD-10-PCS | Mod: CPTII,S$GLB,, | Performed by: NURSE PRACTITIONER

## 2020-09-03 PROCEDURE — 3074F PR MOST RECENT SYSTOLIC BLOOD PRESSURE < 130 MM HG: ICD-10-PCS | Mod: CPTII,S$GLB,, | Performed by: NURSE PRACTITIONER

## 2020-09-03 PROCEDURE — 3008F BODY MASS INDEX DOCD: CPT | Mod: CPTII,S$GLB,, | Performed by: NURSE PRACTITIONER

## 2020-09-03 PROCEDURE — 1159F PR MEDICATION LIST DOCUMENTED IN MEDICAL RECORD: ICD-10-PCS | Mod: S$GLB,,, | Performed by: NURSE PRACTITIONER

## 2020-09-03 PROCEDURE — 1101F PR PT FALLS ASSESS DOC 0-1 FALLS W/OUT INJ PAST YR: ICD-10-PCS | Mod: CPTII,S$GLB,, | Performed by: NURSE PRACTITIONER

## 2020-09-03 PROCEDURE — 1159F MED LIST DOCD IN RCRD: CPT | Mod: S$GLB,,, | Performed by: NURSE PRACTITIONER

## 2020-09-03 PROCEDURE — 1126F PR PAIN SEVERITY QUANTIFIED, NO PAIN PRESENT: ICD-10-PCS | Mod: S$GLB,,, | Performed by: NURSE PRACTITIONER

## 2020-09-03 PROCEDURE — 1101F PT FALLS ASSESS-DOCD LE1/YR: CPT | Mod: CPTII,S$GLB,, | Performed by: NURSE PRACTITIONER

## 2020-09-03 PROCEDURE — 99999 PR PBB SHADOW E&M-EST. PATIENT-LVL V: CPT | Mod: PBBFAC,,, | Performed by: NURSE PRACTITIONER

## 2020-09-03 PROCEDURE — 99203 OFFICE O/P NEW LOW 30 MIN: CPT | Mod: S$GLB,,, | Performed by: NURSE PRACTITIONER

## 2020-09-03 PROCEDURE — 3008F PR BODY MASS INDEX (BMI) DOCUMENTED: ICD-10-PCS | Mod: CPTII,S$GLB,, | Performed by: NURSE PRACTITIONER

## 2020-09-03 PROCEDURE — 3074F SYST BP LT 130 MM HG: CPT | Mod: CPTII,S$GLB,, | Performed by: NURSE PRACTITIONER

## 2020-09-03 PROCEDURE — 3079F DIAST BP 80-89 MM HG: CPT | Mod: CPTII,S$GLB,, | Performed by: NURSE PRACTITIONER

## 2020-09-03 PROCEDURE — 99203 PR OFFICE/OUTPT VISIT, NEW, LEVL III, 30-44 MIN: ICD-10-PCS | Mod: S$GLB,,, | Performed by: NURSE PRACTITIONER

## 2020-09-03 PROCEDURE — 99999 PR PBB SHADOW E&M-EST. PATIENT-LVL V: ICD-10-PCS | Mod: PBBFAC,,, | Performed by: NURSE PRACTITIONER

## 2020-09-03 RX ORDER — TRAZODONE HYDROCHLORIDE 50 MG/1
TABLET ORAL
Qty: 60 TABLET | Refills: 1 | Status: SHIPPED | OUTPATIENT
Start: 2020-09-03 | End: 2021-01-19

## 2020-09-03 NOTE — LETTER
September 10, 2020      Priscilla Nascimento MD  [address] [facility] [address]  [place] [address]           [facility]  [address]  [place] [address]  Phone: [phone]  Fax: [phone]          Patient: Sandee Evans   MR Number: [id number]   YOB: 1955   Date of Visit: 9/3/2020       Dear Dr. Priscilla Nascimento:    Thank you for referring Sandee Evans to me for evaluation. Attached you will find relevant portions of my assessment and plan of care.    If you have questions, please do not hesitate to call me. I look forward to following Sandee Evans along with you.    Sincerely,    Cherelle Bray, SURJIT    Enclosure  CC:  No Recipients    If you would like to receive this communication electronically, please contact [email or web address] or [phone] to request more information on [facility] Link access.    For providers and/or their staff who would like to refer a patient to [facility], please contact us through our one-stop-shop provider referral line, [facility], at [phone].    If you feel you have received this communication in error or would no longer like to receive these types of communications, please e-mail [email or web address]

## 2020-09-03 NOTE — PROGRESS NOTES
Sandee Evans  was seen as a new patient at the request of Priscilla Linda MD for the evaluation of  possible sleep apnea.    CHIEF COMPLAINT:    Chief Complaint   Patient presents with    Sleep Apnea       HISTORY OF PRESENT ILLNESS: Sandee Evans is a 64 y.o. female smoker with Obesity, HTN, DM, HLD, Atrial fibrillation, PVD, s/p bilateral knee sx no pain  is here for sleep evaluation. Patient with symptoms of  snoring, excessive daytime sleepiness, fatigue and difficulty staying asleep .      Patient does not have: sleep paralysis, cataplexy, sleep walking,, hypnic hallucinations and bruxism.  Patient does have: RLS, frequent tossing and turning    [place] Sleepiness Scale score 1    SLEEP ROUTINE:  Activity the hour prior to sleep: watch tv    Bed partner:    Time to bed:  10 pm   Lights off:  usually  Sleep onset latency:  Several hours        Disruptions or awakenings:    None to several times   Wakeup time:      6 am to 2 pm depends on how the night went  Perceived sleep quality:  Tire on most days     Daytime naps:      Occasional 2 hours nap (drowsy)    Weekend sleep routine:      Same schedule  Caffeine use: coffee 2-3 cups  exercise habit:   none        PAST MEDICAL HISTORY:    Active Ambulatory Problems     Diagnosis Date Noted    Urge incontinence 01/11/2013    DDD (degenerative disc disease), lumbar 01/11/2013    DJD (degenerative joint disease) of knee 01/11/2013    Hyperlipidemia 01/11/2013    Depressed 01/11/2013    Insomnia 01/11/2013    Vitamin D deficiency disease 01/11/2013    Lumbar radicular pain 01/11/2013    Essential hypertension 01/11/2013    Obesity, morbid     Colon polyps 08/09/2013    Diverticulosis 08/09/2013    Chronic pain 08/09/2013    Pre-diabetes 08/09/2013    A-fib 03/20/2015    Dysphagia 06/22/2015    Chronic atrial fibrillation 07/20/2015    Nausea 09/22/2015    History of pancreatitis 09/22/2015    Osteoarthritis of right knee  05/23/2016    Gross hematuria 05/24/2016    Depression 05/30/2016    Type 2 diabetes mellitus without complication 05/08/2017    Dry eye syndrome, bilateral 12/18/2017    Nuclear sclerosis, bilateral 12/18/2017    Refractive error 12/18/2017    Hidradenitis suppurativa of right axilla 12/31/2017    Acute viral syndrome 12/31/2017    PVD (peripheral vascular disease) 05/28/2018    Other constipation 02/22/2019    Screen for colon cancer 06/25/2019    Chronic bilateral low back pain without sciatica 09/14/2019    ESQUIVEL (dyspnea on exertion) 09/03/2020    Tobacco dependence 09/03/2020    HIEU (obstructive sleep apnea) 09/03/2020     Resolved Ambulatory Problems     Diagnosis Date Noted    Encounter for screening colonoscopy 08/02/2013     Past Medical History:   Diagnosis Date    Anticoagulant long-term use     Arthritis     Atrial fibrillation     Breast cyst     Degenerative disc disease     Diabetes mellitus     Hypertension     Other abnormal glucose     Sleep apnea     Smoker     Thyroid disease     TMJ (temporomandibular joint disorder)     Wears glasses                 PAST SURGICAL HISTORY:    Past Surgical History:   Procedure Laterality Date    BREAST BIOPSY Right     over 10 yrs ago/ benign    BREAST CYST EXCISION Right     BREAST LUMPECTOMY Right     over 10 yrs ago, ex bx/ benign    COLONOSCOPY N/A 6/25/2019    Procedure: COLONOSCOPY;  Surgeon: Micheline Flores MD;  Location: [facility] ENDO;  Service: Endoscopy;  Laterality: N/A;  RX XARELTO ok to hold (2 days) per Dr. Naik see scan 3/20/19    HYSTERECTOMY      OOPHORECTOMY      rt.knee surgery 2016      TOTAL KNEE ARTHROPLASTY Left 2/19/2019    Procedure: ARTHROPLASTY, KNEE, TOTAL;  Surgeon: Med Hanson MD;  Location: [facility] OR;  Service: Orthopedics;  Laterality: Left;  10AM START PER  PER JAYLIN TEXT @ 8:34AM ON 2-18-19  SUPINE  HARRIS TANMAY SHALINI [phone] TEXTED HIM ON 1-24-19 @ 7:57AM  RN PRE OP  2-13-19--BMI--48.9    underarm gland\ Bilateral          FAMILY HISTORY:                Family History   Problem Relation Age of Onset    Diabetes Mother     Diabetes Brother     No Known Problems Father     No Known Problems Sister     No Known Problems Maternal Aunt     No Known Problems Maternal Uncle     No Known Problems Paternal Aunt     No Known Problems Paternal Uncle     No Known Problems Maternal Grandmother     No Known Problems Maternal Grandfather     No Known Problems Paternal Grandmother     No Known Problems Paternal Grandfather     Amblyopia Neg Hx     Blindness Neg Hx     Cancer Neg Hx     Cataracts Neg Hx     Glaucoma Neg Hx     Hypertension Neg Hx     Macular degeneration Neg Hx     Retinal detachment Neg Hx     Strabismus Neg Hx     Stroke Neg Hx     Thyroid disease Neg Hx        SOCIAL HISTORY:          Tobacco:   Social History     Tobacco Use   Smoking Status Current Every Day Smoker    Packs/day: 0.50    Years: 47.00    Pack years: 23.50    Types: Cigarettes    Start date: 1973   Smokeless Tobacco Never Used   Tobacco Comment    6/18/19 smoke 5 cig a day casual       alcohol use:    Social History     Substance and Sexual Activity   Alcohol Use No    Frequency: Never    Drinks per session: Patient refused    Binge frequency: Never                 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 240 mL 0    albuterol 90 mcg/actuation inhaler Inhale 2 puffs into the lungs every 6 (six) hours as needed for Wheezing. 1 each 11    azelastine (ASTELIN) 137 mcg (0.1 %) nasal spray 1 spray (137 mcg total) by Nasal route 2 (two) times daily. 30 mL 3     ciclopirox (PENLAC) 8 % Soln Apply topically nightly. 6.6 mL 1    fluticasone propionate (FLONASE) 50 mcg/actuation nasal spray SHAKE LIQUID AND USE 2 SPRAYS(100 MCG) IN EACH NOSTRIL EVERY DAY 48 g 0    metFORMIN (GLUCOPHAGE) 500 MG tablet Take 1 tablet (500 mg total) by mouth 2 (two) times daily with meals. 180 tablet 2    simvastatin (ZOCOR) 20 MG tablet TAKE 1 TABLET(20 MG) BY MOUTH EVERY EVENING 90 tablet 1    spironolactone (ALDACTONE) 25 MG tablet TAKE 1 TABLET(25 MG) BY MOUTH EVERY DAY 90 tablet 2    triamcinolone acetonide 0.1% (KENALOG) 0.1 % ointment APPLY BETWEEN KNEES AND UPPER THIGHS TWICE DAILY FOR NO MORE THAN 7-14 DAYS 454 g 0    XARELTO 20 mg Tab Take 1 tablet (20 mg total) by mouth once daily. 90 tablet 3    metoprolol tartrate (LOPRESSOR) 25 MG tablet Take 1 tablet (25 mg total) by mouth 2 (two) times a day. 180 tablet 1    traZODone (DESYREL) 50 MG tablet Take 1-2 tablet by mouth nightly as needed for sleep 60 tablet 1     No current facility-administered medications for this visit.                   REVIEW OF SYSTEMS:   Review of Systems   Constitutional: Positive for fatigue. Negative for fever, chills, weight loss, weight gain, activity change, appetite change and night sweats.   HENT: Negative for congestion.    Eyes: Negative.    Respiratory: Positive for snoring, dyspnea on extertion (associate with weight ) and somnolence. Negative for cough, sputum production, chest tightness, wheezing, orthopnea, previous hospitialization due to pulmonary problems and use of rescue inhaler.    Cardiovascular: Negative for chest pain and palpitations.   Genitourinary: Negative.    Endocrine: endocrine negative   Musculoskeletal: Negative for gait problem.   Skin: Negative.    Gastrointestinal: Negative for acid reflux.   Neurological: Negative.    Psychiatric/Behavioral: Positive for sleep disturbance.        Feel can't stretch leg        PHYSICAL EXAM:  Vitals:    09/03/20 0923 09/03/20 0954  "  BP: (!) 155/99 110/80   Pulse: 100    SpO2: 96%    Weight: 129.1 kg (284 lb 8.1 oz)    Height: 5' 3" (1.6 m)    PainSc: 0-No pain      Body mass index is 50.4 kg/m².   Physical Exam   Constitutional: She is oriented to person, place, and time. She appears well-developed. No distress. She is obese.   HENT:   Head: Normocephalic.   Neck: Neck supple.   Cardiovascular: Normal rate, regular rhythm and normal heart sounds.   Pulmonary/Chest: Normal expansion, effort normal and breath sounds normal.   Musculoskeletal:         General: No edema.   Neurological: She is alert and oriented to person, place, and time.   ambulatory   Skin: Skin is warm. She is not diaphoretic.   Psychiatric: She has a normal mood and affect. Her behavior is normal. Judgment and thought content normal.                                               DATA:  TSH:  Lab Results   Component Value Date    TSH 3.074 04/24/2020     CBC:  Lab Results   Component Value Date    WBC 7.44 04/24/2020    HGB 14.5 04/24/2020    HCT 46.0 04/24/2020    MCV 96 04/24/2020     04/24/2020     BMP:  Lab Results   Component Value Date     04/24/2020    K 4.5 04/24/2020     04/24/2020    CO2 29 04/24/2020    BUN 15 04/24/2020    CREATININE 1.3 04/24/2020    CALCIUM 10.1 04/24/2020    ANIONGAP 10 04/24/2020    ESTGFRAFRICA 49 (A) 04/24/2020    EGFRNONAA 43 (A) 04/24/2020     HgbA1C:  Lab Results   Component Value Date    HGBA1C 5.6 04/24/2020            ASSESSMENT    ICD-10-CM ICD-9-CM    1. HIEU (obstructive sleep apnea)  G47.33 327.23 Ambulatory referral/consult to Sleep Disorders      Home Sleep Studies   2. Obesity, morbid  E66.01 278.01 Ambulatory referral/consult to Bariatric Medicine   3. ESQUIVEL (dyspnea on exertion)  R06.09 786.09 Complete PFT with bronchodilator      Six Minute Walk Test to qualify for Home Oxygen   4. Tobacco dependence  F17.200 305.1    5. Insomnia, unspecified type  G47.00 780.52 traZODone (DESYREL) 50 MG tablet "       PLAN:    Problem List Items Addressed This Visit        Unprioritized    ESQUIVEL (dyspnea on exertion)    Overview     Current heavy smoker-recommend smoking cessation  Pulmonary studies         Relevant Orders    Complete PFT with bronchodilator    Six Minute Walk Test to qualify for Home Oxygen    Insomnia    Overview     Trial trazodone to help with sleep onset         Relevant Medications    traZODone (DESYREL) 50 MG tablet    Obesity, morbid    Overview     Patient is aware of need for weight loss  and has been asked to make an effort to improve diet and interested in bariatric medicine           Relevant Orders    Ambulatory referral/consult to Bariatric Medicine    HIEU (obstructive sleep apnea) - Primary    Overview     Pt symptomatic-snoring, excessive daytime sleepiness, fatigue and difficulty staying asleep. Plan: HST         Relevant Orders    Home Sleep Studies    Tobacco dependence          Thank you for allowing me the opportunity to participate in the care of your patient.    Patient will Follow up follow up following test (audiovisit).     Please cc note to  Priscilla Linda MD.

## 2020-09-03 NOTE — PATIENT INSTRUCTIONS
Dr. Alyx Lopez  [facility]  [address] [facility] [address]  [phone]    Information regarding testing ordered by your provider today:    HOME SLEEP STUDY:  Your provider has ordered a home sleep study. This order has been sent to our billing/pre-service department to be approved by your insurance company. Once the study has been approved (this can take up to 14 business days depending on your insurance), we will call you to schedule an appointment. Once the appointment is scheduled, our Financial Clearance Call Center will be able to provide you with an anticipated out of pocket cost such as a co-payment or unmet deductible amount. The Financial Clearance Call Center can be reached at [phone]. You may also call your insurance company directly and give them the procedure code CPT 28666 to receive an estimate of your out of pocket cost.

## 2020-09-06 DIAGNOSIS — I48.20 CHRONIC ATRIAL FIBRILLATION: ICD-10-CM

## 2020-09-06 DIAGNOSIS — I48.0 PAROXYSMAL ATRIAL FIBRILLATION: ICD-10-CM

## 2020-09-10 RX ORDER — METOPROLOL TARTRATE 25 MG/1
25 TABLET, FILM COATED ORAL 2 TIMES DAILY
Qty: 180 TABLET | Refills: 1 | Status: SHIPPED | OUTPATIENT
Start: 2020-09-10 | End: 2020-10-02 | Stop reason: SDUPTHER

## 2020-09-11 RX ORDER — RIVAROXABAN 20 MG/1
20 TABLET, FILM COATED ORAL DAILY
Qty: 90 TABLET | Refills: 0 | Status: SHIPPED | OUTPATIENT
Start: 2020-09-11 | End: 2020-10-02 | Stop reason: SDUPTHER

## 2020-09-23 ENCOUNTER — TELEPHONE (OUTPATIENT)
Dept: SLEEP MEDICINE | Facility: HOSPITAL | Age: 65
End: 2020-09-23

## 2020-10-02 ENCOUNTER — TELEPHONE (OUTPATIENT)
Dept: FAMILY MEDICINE | Facility: CLINIC | Age: 65
End: 2020-10-02

## 2020-10-02 ENCOUNTER — OFFICE VISIT (OUTPATIENT)
Dept: FAMILY MEDICINE | Facility: CLINIC | Age: 65
End: 2020-10-02
Payer: COMMERCIAL

## 2020-10-02 VITALS
RESPIRATION RATE: 20 BRPM | HEART RATE: 77 BPM | OXYGEN SATURATION: 94 % | SYSTOLIC BLOOD PRESSURE: 120 MMHG | WEIGHT: 288.81 LBS | HEIGHT: 63 IN | TEMPERATURE: 98 F | BODY MASS INDEX: 51.17 KG/M2 | DIASTOLIC BLOOD PRESSURE: 84 MMHG

## 2020-10-02 DIAGNOSIS — Z23 NEED FOR INFLUENZA VACCINATION: ICD-10-CM

## 2020-10-02 DIAGNOSIS — I48.20 CHRONIC ATRIAL FIBRILLATION: ICD-10-CM

## 2020-10-02 DIAGNOSIS — L73.2 HIDRADENITIS SUPPURATIVA OF LEFT AXILLA: Primary | ICD-10-CM

## 2020-10-02 DIAGNOSIS — E11.9 TYPE 2 DIABETES MELLITUS WITHOUT COMPLICATION, WITHOUT LONG-TERM CURRENT USE OF INSULIN: ICD-10-CM

## 2020-10-02 DIAGNOSIS — I10 ESSENTIAL HYPERTENSION: ICD-10-CM

## 2020-10-02 DIAGNOSIS — I48.0 PAROXYSMAL ATRIAL FIBRILLATION: ICD-10-CM

## 2020-10-02 DIAGNOSIS — E78.5 HYPERLIPIDEMIA, UNSPECIFIED HYPERLIPIDEMIA TYPE: ICD-10-CM

## 2020-10-02 PROCEDURE — 99214 PR OFFICE/OUTPT VISIT, EST, LEVL IV, 30-39 MIN: ICD-10-PCS | Mod: 25,S$GLB,, | Performed by: FAMILY MEDICINE

## 2020-10-02 PROCEDURE — 3079F PR MOST RECENT DIASTOLIC BLOOD PRESSURE 80-89 MM HG: ICD-10-PCS | Mod: CPTII,S$GLB,, | Performed by: FAMILY MEDICINE

## 2020-10-02 PROCEDURE — 3074F PR MOST RECENT SYSTOLIC BLOOD PRESSURE < 130 MM HG: ICD-10-PCS | Mod: CPTII,S$GLB,, | Performed by: FAMILY MEDICINE

## 2020-10-02 PROCEDURE — 1101F PT FALLS ASSESS-DOCD LE1/YR: CPT | Mod: CPTII,S$GLB,, | Performed by: FAMILY MEDICINE

## 2020-10-02 PROCEDURE — 99999 PR PBB SHADOW E&M-EST. PATIENT-LVL IV: ICD-10-PCS | Mod: PBBFAC,,, | Performed by: FAMILY MEDICINE

## 2020-10-02 PROCEDURE — 1125F AMNT PAIN NOTED PAIN PRSNT: CPT | Mod: S$GLB,,, | Performed by: FAMILY MEDICINE

## 2020-10-02 PROCEDURE — 3008F BODY MASS INDEX DOCD: CPT | Mod: CPTII,S$GLB,, | Performed by: FAMILY MEDICINE

## 2020-10-02 PROCEDURE — 90662 FLU VACCINE - HIGH DOSE (65+) PRESERVATIVE FREE IM: ICD-10-PCS | Mod: S$GLB,,, | Performed by: FAMILY MEDICINE

## 2020-10-02 PROCEDURE — 90471 IMMUNIZATION ADMIN: CPT | Mod: S$GLB,,, | Performed by: FAMILY MEDICINE

## 2020-10-02 PROCEDURE — 1159F PR MEDICATION LIST DOCUMENTED IN MEDICAL RECORD: ICD-10-PCS | Mod: S$GLB,,, | Performed by: FAMILY MEDICINE

## 2020-10-02 PROCEDURE — 96372 PR INJECTION,THERAP/PROPH/DIAG2ST, IM OR SUBCUT: ICD-10-PCS | Mod: 59,S$GLB,, | Performed by: FAMILY MEDICINE

## 2020-10-02 PROCEDURE — 99999 PR PBB SHADOW E&M-EST. PATIENT-LVL IV: CPT | Mod: PBBFAC,,, | Performed by: FAMILY MEDICINE

## 2020-10-02 PROCEDURE — 3079F DIAST BP 80-89 MM HG: CPT | Mod: CPTII,S$GLB,, | Performed by: FAMILY MEDICINE

## 2020-10-02 PROCEDURE — 90471 FLU VACCINE - HIGH DOSE (65+) PRESERVATIVE FREE IM: ICD-10-PCS | Mod: S$GLB,,, | Performed by: FAMILY MEDICINE

## 2020-10-02 PROCEDURE — 99214 OFFICE O/P EST MOD 30 MIN: CPT | Mod: 25,S$GLB,, | Performed by: FAMILY MEDICINE

## 2020-10-02 PROCEDURE — 3044F PR MOST RECENT HEMOGLOBIN A1C LEVEL <7.0%: ICD-10-PCS | Mod: CPTII,S$GLB,, | Performed by: FAMILY MEDICINE

## 2020-10-02 PROCEDURE — 3008F PR BODY MASS INDEX (BMI) DOCUMENTED: ICD-10-PCS | Mod: CPTII,S$GLB,, | Performed by: FAMILY MEDICINE

## 2020-10-02 PROCEDURE — 1101F PR PT FALLS ASSESS DOC 0-1 FALLS W/OUT INJ PAST YR: ICD-10-PCS | Mod: CPTII,S$GLB,, | Performed by: FAMILY MEDICINE

## 2020-10-02 PROCEDURE — 3044F HG A1C LEVEL LT 7.0%: CPT | Mod: CPTII,S$GLB,, | Performed by: FAMILY MEDICINE

## 2020-10-02 PROCEDURE — 90662 IIV NO PRSV INCREASED AG IM: CPT | Mod: S$GLB,,, | Performed by: FAMILY MEDICINE

## 2020-10-02 PROCEDURE — 1125F PR PAIN SEVERITY QUANTIFIED, PAIN PRESENT: ICD-10-PCS | Mod: S$GLB,,, | Performed by: FAMILY MEDICINE

## 2020-10-02 PROCEDURE — 96372 THER/PROPH/DIAG INJ SC/IM: CPT | Mod: 59,S$GLB,, | Performed by: FAMILY MEDICINE

## 2020-10-02 PROCEDURE — 1159F MED LIST DOCD IN RCRD: CPT | Mod: S$GLB,,, | Performed by: FAMILY MEDICINE

## 2020-10-02 PROCEDURE — 3074F SYST BP LT 130 MM HG: CPT | Mod: CPTII,S$GLB,, | Performed by: FAMILY MEDICINE

## 2020-10-02 RX ORDER — RIVAROXABAN 20 MG/1
20 TABLET, FILM COATED ORAL DAILY
Qty: 30 TABLET | Refills: 5 | Status: SHIPPED | OUTPATIENT
Start: 2020-10-02 | End: 2021-04-01 | Stop reason: SDUPTHER

## 2020-10-02 RX ORDER — KETOROLAC TROMETHAMINE 30 MG/ML
30 INJECTION, SOLUTION INTRAMUSCULAR; INTRAVENOUS
Status: COMPLETED | OUTPATIENT
Start: 2020-10-02 | End: 2020-10-02

## 2020-10-02 RX ORDER — TRAMADOL HYDROCHLORIDE 50 MG/1
50 TABLET ORAL EVERY 8 HOURS PRN
Qty: 25 TABLET | Refills: 0 | Status: ON HOLD | OUTPATIENT
Start: 2020-10-02 | End: 2021-01-26 | Stop reason: CLARIF

## 2020-10-02 RX ORDER — SULFAMETHOXAZOLE AND TRIMETHOPRIM 800; 160 MG/1; MG/1
1 TABLET ORAL 2 TIMES DAILY
Qty: 14 TABLET | Refills: 0 | Status: SHIPPED | OUTPATIENT
Start: 2020-10-02 | End: 2020-10-09

## 2020-10-02 RX ORDER — SPIRONOLACTONE 25 MG/1
TABLET ORAL
Qty: 30 TABLET | Refills: 5 | Status: ON HOLD | OUTPATIENT
Start: 2020-10-02 | End: 2021-01-26 | Stop reason: CLARIF

## 2020-10-02 RX ORDER — METOPROLOL TARTRATE 25 MG/1
25 TABLET, FILM COATED ORAL 2 TIMES DAILY
Qty: 60 TABLET | Refills: 5 | Status: SHIPPED | OUTPATIENT
Start: 2020-10-02 | End: 2021-03-08 | Stop reason: SDUPTHER

## 2020-10-02 RX ORDER — METFORMIN HYDROCHLORIDE 500 MG/1
500 TABLET ORAL 2 TIMES DAILY WITH MEALS
Qty: 60 TABLET | Refills: 5 | Status: SHIPPED | OUTPATIENT
Start: 2020-10-02 | End: 2021-04-01 | Stop reason: SDUPTHER

## 2020-10-02 RX ORDER — SIMVASTATIN 20 MG/1
20 TABLET, FILM COATED ORAL NIGHTLY
Qty: 30 TABLET | Refills: 5 | Status: SHIPPED | OUTPATIENT
Start: 2020-10-02 | End: 2021-02-09

## 2020-10-02 RX ADMIN — KETOROLAC TROMETHAMINE 30 MG: 30 INJECTION, SOLUTION INTRAMUSCULAR; INTRAVENOUS at 03:10

## 2020-10-02 NOTE — PROGRESS NOTES
Pt received influenza vaccine/ketorolac inj. Tolerated well. Pt instructed to wait 15mins to be assessed for adverse reactions. verbalized understanding.

## 2020-10-02 NOTE — TELEPHONE ENCOUNTER
----- Message from Marysol Lewis sent at 10/2/2020  9:06 AM CDT -----  Type: Patient Call Back    Who called: Self     What is the request in detail: calling in regards to message left earlier. Pt. States she is in extreme pain from cyst under her arm and needs to be seen today by .     Can the clinic reply by [facility]? Call back     Would the patient rather a call back or a response via [facility]? Call back     Best call back number: [phone]

## 2020-10-05 ENCOUNTER — PATIENT MESSAGE (OUTPATIENT)
Dept: ADMINISTRATIVE | Facility: HOSPITAL | Age: 65
End: 2020-10-05

## 2020-10-06 NOTE — PROGRESS NOTES
Subjective:       Patient ID: Sandee Evans is a 64 y.o. female.    Chief Complaint: Cyst (Left armpit)      HPI  [age] female presents for severe pain in her left axilla.  States the pain has been severe from yesterday.  Is extremely.  Any fever chills.  States she had this once before. States she needs refills medications as well.    Review of Systems   Constitutional: Negative.    HENT: Negative.    Respiratory: Negative.    Cardiovascular: Negative.    Gastrointestinal: Negative.    Endocrine: Negative.    Genitourinary: Negative.    Musculoskeletal: Positive for myalgias.   Neurological: Negative.    Psychiatric/Behavioral: Negative.           Past Medical History:   Diagnosis Date    Anticoagulant long-term use     Xarelto    Arthritis     Atrial fibrillation     Breast cyst     Degenerative disc disease     Depression     Diabetes mellitus     Pre diabetic    Hyperlipidemia     Hypertension     Nuclear sclerosis, bilateral 12/18/2017    Obesity, morbid     Other abnormal glucose     pre-diabetes    Sleep apnea     Smoker     Thyroid disease     on meds 8-9 years ago. hypothyroidism.no malignancy    TMJ (temporomandibular joint disorder)     jaw clicking    Urge incontinence     Wears glasses      Past Surgical History:   Procedure Laterality Date    BREAST BIOPSY Right     over 10 yrs ago/ benign    BREAST CYST EXCISION Right     BREAST LUMPECTOMY Right     over 10 yrs ago, ex bx/ benign    COLONOSCOPY N/A 6/25/2019    Procedure: COLONOSCOPY;  Surgeon: Micheline Flores MD;  Location: [facility] ENDO;  Service: Endoscopy;  Laterality: N/A;  RX XARELTO ok to hold (2 days) per Dr. Naik see scan 3/20/19    HYSTERECTOMY      OOPHORECTOMY      rt.knee surgery 2016      TOTAL KNEE ARTHROPLASTY Left 2/19/2019    Procedure: ARTHROPLASTY, KNEE, TOTAL;  Surgeon: Med Hanson MD;  Location: [facility] OR;  Service: Orthopedics;  Laterality: Left;  10AM START PER  PER JAYLIN TEXT @  8:34AM ON 2-18-19  SUPINE  HARRIS LOYA [phone] TEXTED HIM ON 1-24-19 @ 7:57AM  RN PRE OP 2-13-19--BMI--48.9    underarm gland\ Bilateral      Family History   Problem Relation Age of Onset    Diabetes Mother     Diabetes Brother     No Known Problems Father     No Known Problems Sister     No Known Problems Maternal Aunt     No Known Problems Maternal Uncle     No Known Problems Paternal Aunt     No Known Problems Paternal Uncle     No Known Problems Maternal Grandmother     No Known Problems Maternal Grandfather     No Known Problems Paternal Grandmother     No Known Problems Paternal Grandfather     Amblyopia Neg Hx     Blindness Neg Hx     Cancer Neg Hx     Cataracts Neg Hx     Glaucoma Neg Hx     Hypertension Neg Hx     Macular degeneration Neg Hx     Retinal detachment Neg Hx     Strabismus Neg Hx     Stroke Neg Hx     Thyroid disease Neg Hx      Social History     Socioeconomic History    Marital status:      Spouse name: Not on file    Number of children: Not on file    Years of education: Not on file    Highest education level: Not on file   Occupational History    Not on file   Social Needs    Financial resource strain: Not hard at all    Food insecurity     Worry: Never true     Inability: Never true    Transportation needs     Medical: No     Non-medical: No   Tobacco Use    Smoking status: Current Every Day Smoker     Packs/day: 0.50     Years: 47.00     Pack years: 23.50     Types: Cigarettes     Start date: 1973    Smokeless tobacco: Never Used    Tobacco comment: 6/18/19 smoke 5 cig a day casual   Substance and Sexual Activity    Alcohol use: No     Frequency: Never     Drinks per session: Patient refused     Binge frequency: Never    Drug use: No    Sexual activity: Yes     Partners: Male   Lifestyle    Physical activity     Days per week: 1 day     Minutes per session: 10 min    Stress: To some extent   Relationships    Social connections      Talks on phone: Once a week     Gets together: Twice a week     Attends Sikhism service: Not on file     Active member of club or organization: Yes     Attends meetings of clubs or organizations: Patient refused     Relationship status:    Other Topics Concern    Not on file   Social History Narrative    Not on file       Current Outpatient Medications:     acetaminophen with codeine (ACETAMINOPHEN-CODEINE) 120mg 12mg 5mL Soln, Take 5 mLs by mouth every evening., Disp: 240 mL, Rfl: 0    albuterol 90 mcg/actuation inhaler, Inhale 2 puffs into the lungs every 6 (six) hours as needed for Wheezing., Disp: 1 each, Rfl: 11    azelastine (ASTELIN) 137 mcg (0.1 %) nasal spray, 1 spray (137 mcg total) by Nasal route 2 (two) times daily., Disp: 30 mL, Rfl: 3    ciclopirox (PENLAC) 8 % Soln, Apply topically nightly., Disp: 6.6 mL, Rfl: 1    fluticasone propionate (FLONASE) 50 mcg/actuation nasal spray, SHAKE LIQUID AND USE 2 SPRAYS(100 MCG) IN EACH NOSTRIL EVERY DAY, Disp: 48 g, Rfl: 0    metFORMIN (GLUCOPHAGE) 500 MG tablet, Take 1 tablet (500 mg total) by mouth 2 (two) times daily with meals., Disp: 60 tablet, Rfl: 5    metoprolol tartrate (LOPRESSOR) 25 MG tablet, Take 1 tablet (25 mg total) by mouth 2 (two) times a day., Disp: 60 tablet, Rfl: 5    simvastatin (ZOCOR) 20 MG tablet, Take 1 tablet (20 mg total) by mouth every evening., Disp: 30 tablet, Rfl: 5    spironolactone (ALDACTONE) 25 MG tablet, TAKE 1 TABLET(25 MG) BY MOUTH EVERY DAY, Disp: 30 tablet, Rfl: 5    traZODone (DESYREL) 50 MG tablet, Take 1-2 tablet by mouth nightly as needed for sleep, Disp: 60 tablet, Rfl: 1    triamcinolone acetonide 0.1% (KENALOG) 0.1 % ointment, APPLY BETWEEN KNEES AND UPPER THIGHS TWICE DAILY FOR NO MORE THAN 7-14 DAYS, Disp: 454 g, Rfl: 0    XARELTO 20 mg Tab, Take 1 tablet (20 mg total) by mouth once daily., Disp: 30 tablet, Rfl: 5    sulfamethoxazole-trimethoprim 800-160mg (BACTRIM DS) 800-160 mg Tab,  "Take 1 tablet by mouth 2 (two) times daily. for 7 days, Disp: 14 tablet, Rfl: 0    traMADoL (ULTRAM) 50 mg tablet, Take 1 tablet (50 mg total) by mouth every 8 (eight) hours as needed for Pain., Disp: 25 tablet, Rfl: 0   Objective:      Vitals:    10/02/20 1452   BP: 120/84   BP Location: Right arm   Patient Position: Sitting   BP Method: Large (Manual)   Pulse: 77   Resp: 20   Temp: 98.3 °F (36.8 °C)   TempSrc: Oral   SpO2: (!) 94%   Weight: 131 kg (288 lb 12.8 oz)   Height: 5' 3" (1.6 m)       Physical Exam  Constitutional:       General: She is not in acute distress.  HENT:      Head: Normocephalic and atraumatic.   Eyes:      Conjunctiva/sclera: Conjunctivae normal.   Neck:      Musculoskeletal: Neck supple.   Cardiovascular:      Rate and Rhythm: Normal rate and regular rhythm.      Heart sounds: Normal heart sounds. No murmur. No friction rub. No gallop.    Pulmonary:      Effort: Pulmonary effort is normal.      Breath sounds: Normal breath sounds. No wheezing or rales.   Musculoskeletal:         General: Tenderness (over L axilla. erythematous and indurated) present.   Skin:     General: Skin is warm and dry.   Neurological:      Mental Status: She is alert and oriented to person, place, and time.   Psychiatric:         Behavior: Behavior normal.         Thought Content: Thought content normal.         Judgment: Judgment normal.            Assessment:       1. Hidradenitis suppurativa of left axilla    2. Need for influenza vaccination    3. Type 2 diabetes mellitus without complication, without long-term current use of insulin    4. Essential hypertension    5. Hyperlipidemia, unspecified hyperlipidemia type    6. Chronic atrial fibrillation    7. Paroxysmal atrial fibrillation        Plan:       Hidradenitis suppurativa of left axilla  -     traMADoL (ULTRAM) 50 mg tablet; Take 1 tablet (50 mg total) by mouth every 8 (eight) hours as needed for Pain.  Dispense: 25 tablet; Refill: 0  -     ketorolac " injection 30 mg  -     sulfamethoxazole-trimethoprim 800-160mg (BACTRIM DS) 800-160 mg Tab; Take 1 tablet by mouth 2 (two) times daily. for 7 days  Dispense: 14 tablet; Refill: 0    Need for influenza vaccination  -     Influenza - High Dose (65+) (PF) (IM)    Type 2 diabetes mellitus without complication, without long-term current use of insulin  -     metFORMIN (GLUCOPHAGE) 500 MG tablet; Take 1 tablet (500 mg total) by mouth 2 (two) times daily with meals.  Dispense: 60 tablet; Refill: 5    Essential hypertension  -     spironolactone (ALDACTONE) 25 MG tablet; TAKE 1 TABLET(25 MG) BY MOUTH EVERY DAY  Dispense: 30 tablet; Refill: 5    Hyperlipidemia, unspecified hyperlipidemia type  -     simvastatin (ZOCOR) 20 MG tablet; Take 1 tablet (20 mg total) by mouth every evening.  Dispense: 30 tablet; Refill: 5    Chronic atrial fibrillation  -     XARELTO 20 mg Tab; Take 1 tablet (20 mg total) by mouth once daily.  Dispense: 30 tablet; Refill: 5    Paroxysmal atrial fibrillation  -     metoprolol tartrate (LOPRESSOR) 25 MG tablet; Take 1 tablet (25 mg total) by mouth 2 (two) times a day.  Dispense: 60 tablet; Refill: 5    sent augmentin. Refilled meds.            Patient note was created using [employer].  Any errors in syntax or even information may not have been identified and edited on initial review prior to signing this note.

## 2020-10-13 ENCOUNTER — HOSPITAL ENCOUNTER (OUTPATIENT)
Dept: SLEEP MEDICINE | Facility: HOSPITAL | Age: 65
Discharge: HOME | End: 2020-10-13
Attending: NURSE PRACTITIONER
Payer: COMMERCIAL

## 2020-10-13 DIAGNOSIS — G47.33 OSA (OBSTRUCTIVE SLEEP APNEA): ICD-10-CM

## 2020-10-13 PROCEDURE — 95800 PR SLEEP STUDY, UNATTENDED, RECORD HEART RATE/O2 SAT/RESP ANAL/SLEEP TIME: ICD-10-PCS | Mod: 26,,, | Performed by: INTERNAL MEDICINE

## 2020-10-13 PROCEDURE — 95800 SLP STDY UNATTENDED: CPT | Mod: 26,,, | Performed by: INTERNAL MEDICINE

## 2020-10-13 PROCEDURE — 95800 SLP STDY UNATTENDED: CPT

## 2020-10-14 NOTE — PROGRESS NOTES
The HST device was returned and uploaded this morning. There was no note attached, this indicates that no issues reported by the patient during the study.

## 2020-10-15 ENCOUNTER — PATIENT MESSAGE (OUTPATIENT)
Dept: PULMONOLOGY | Facility: CLINIC | Age: 65
End: 2020-10-15

## 2020-10-15 DIAGNOSIS — G47.33 OSA (OBSTRUCTIVE SLEEP APNEA): Primary | ICD-10-CM

## 2020-10-15 NOTE — PROCEDURES
"Dear Provider,     You have ordered sleep LAB services to perform the sleep study for Sandee Evans.  The sleep study that you ordered is complete.      Please find Sleep Study result in "Chart Review" under the "Media tab."      As the ordering provider, you are responsible for reviewing the results and implementing a treatment plan with your patient.    If you need a Sleep Medicine provider to explain the sleep study findings and arrange treatment for the patient, please refer patient for consultation to our Sleep Clinic via [facility] with Ambulatory Consult Sleep.    To do that please place an order for an  "Ambulatory Consult Sleep" - it will go to our clinic work queue for our Medical Assistant to contact the patient for an appointment.     For any questions, please contact our clinic staff at [phone] to talk to clinical staff.   "

## 2020-10-15 NOTE — TELEPHONE ENCOUNTER
Pt inform of HST results, recommend in lab cpap titration study. Pt verbalized understandig, an email has been sen to pt as well.

## 2020-11-03 ENCOUNTER — TELEPHONE (OUTPATIENT)
Dept: SLEEP MEDICINE | Facility: HOSPITAL | Age: 65
End: 2020-11-03

## 2020-11-13 DIAGNOSIS — E11.9 TYPE 2 DIABETES MELLITUS WITHOUT COMPLICATION: ICD-10-CM

## 2020-12-21 ENCOUNTER — OFFICE VISIT (OUTPATIENT)
Dept: FAMILY MEDICINE | Facility: CLINIC | Age: 65
End: 2020-12-21
Payer: COMMERCIAL

## 2020-12-21 ENCOUNTER — PATIENT OUTREACH (OUTPATIENT)
Dept: ADMINISTRATIVE | Facility: HOSPITAL | Age: 65
End: 2020-12-21

## 2020-12-21 ENCOUNTER — HOSPITAL ENCOUNTER (EMERGENCY)
Facility: HOSPITAL | Age: 65
Discharge: HOME | End: 2020-12-21
Attending: EMERGENCY MEDICINE
Payer: COMMERCIAL

## 2020-12-21 ENCOUNTER — HOSPITAL ENCOUNTER (OUTPATIENT)
Dept: RADIOLOGY | Facility: HOSPITAL | Age: 65
Discharge: HOME | End: 2020-12-21
Attending: PHYSICIAN ASSISTANT
Payer: COMMERCIAL

## 2020-12-21 VITALS
OXYGEN SATURATION: 97 % | SYSTOLIC BLOOD PRESSURE: 114 MMHG | TEMPERATURE: 98 F | BODY MASS INDEX: 48.2 KG/M2 | HEIGHT: 63 IN | HEART RATE: 78 BPM | WEIGHT: 272.06 LBS | DIASTOLIC BLOOD PRESSURE: 76 MMHG | RESPIRATION RATE: 18 BRPM

## 2020-12-21 VITALS
TEMPERATURE: 99 F | HEIGHT: 63 IN | RESPIRATION RATE: 17 BRPM | DIASTOLIC BLOOD PRESSURE: 91 MMHG | OXYGEN SATURATION: 94 % | HEART RATE: 91 BPM | SYSTOLIC BLOOD PRESSURE: 163 MMHG | BODY MASS INDEX: 48.2 KG/M2 | WEIGHT: 272 LBS

## 2020-12-21 DIAGNOSIS — K85.90 ACUTE PANCREATITIS WITHOUT INFECTION OR NECROSIS, UNSPECIFIED PANCREATITIS TYPE: Primary | ICD-10-CM

## 2020-12-21 DIAGNOSIS — L98.9 SKIN LESION: ICD-10-CM

## 2020-12-21 DIAGNOSIS — K85.90 ACUTE PANCREATITIS, UNSPECIFIED COMPLICATION STATUS, UNSPECIFIED PANCREATITIS TYPE: Primary | ICD-10-CM

## 2020-12-21 DIAGNOSIS — R10.13 EPIGASTRIC ABDOMINAL PAIN: ICD-10-CM

## 2020-12-21 DIAGNOSIS — R10.9 RIGHT SIDED ABDOMINAL PAIN: ICD-10-CM

## 2020-12-21 DIAGNOSIS — L73.2 HIDRADENITIS SUPPURATIVA OF LEFT AXILLA: ICD-10-CM

## 2020-12-21 LAB
CTP QC/QA: YES
SARS-COV-2 RDRP RESP QL NAA+PROBE: NEGATIVE

## 2020-12-21 PROCEDURE — U0002 COVID-19 LAB TEST NON-CDC: HCPCS | Performed by: EMERGENCY MEDICINE

## 2020-12-21 PROCEDURE — 76700 US EXAM ABDOM COMPLETE: CPT | Mod: TC

## 2020-12-21 PROCEDURE — 1125F AMNT PAIN NOTED PAIN PRSNT: CPT | Mod: S$GLB,,, | Performed by: PHYSICIAN ASSISTANT

## 2020-12-21 PROCEDURE — 99999 PR PBB SHADOW E&M-EST. PATIENT-LVL V: ICD-10-PCS | Mod: PBBFAC,,, | Performed by: PHYSICIAN ASSISTANT

## 2020-12-21 PROCEDURE — 3288F FALL RISK ASSESSMENT DOCD: CPT | Mod: CPTII,S$GLB,, | Performed by: PHYSICIAN ASSISTANT

## 2020-12-21 PROCEDURE — 1159F PR MEDICATION LIST DOCUMENTED IN MEDICAL RECORD: ICD-10-PCS | Mod: S$GLB,,, | Performed by: PHYSICIAN ASSISTANT

## 2020-12-21 PROCEDURE — 3288F PR FALLS RISK ASSESSMENT DOCUMENTED: ICD-10-PCS | Mod: CPTII,S$GLB,, | Performed by: PHYSICIAN ASSISTANT

## 2020-12-21 PROCEDURE — 3008F BODY MASS INDEX DOCD: CPT | Mod: CPTII,S$GLB,, | Performed by: PHYSICIAN ASSISTANT

## 2020-12-21 PROCEDURE — 3078F PR MOST RECENT DIASTOLIC BLOOD PRESSURE < 80 MM HG: ICD-10-PCS | Mod: CPTII,S$GLB,, | Performed by: PHYSICIAN ASSISTANT

## 2020-12-21 PROCEDURE — 1101F PT FALLS ASSESS-DOCD LE1/YR: CPT | Mod: CPTII,S$GLB,, | Performed by: PHYSICIAN ASSISTANT

## 2020-12-21 PROCEDURE — 99999 PR PBB SHADOW E&M-EST. PATIENT-LVL V: CPT | Mod: PBBFAC,,, | Performed by: PHYSICIAN ASSISTANT

## 2020-12-21 PROCEDURE — 25500020 PHARM REV CODE 255: Performed by: EMERGENCY MEDICINE

## 2020-12-21 PROCEDURE — 99214 PR OFFICE/OUTPT VISIT, EST, LEVL IV, 30-39 MIN: ICD-10-PCS | Mod: S$GLB,,, | Performed by: PHYSICIAN ASSISTANT

## 2020-12-21 PROCEDURE — 99285 EMERGENCY DEPT VISIT HI MDM: CPT | Mod: 25

## 2020-12-21 PROCEDURE — 1101F PR PT FALLS ASSESS DOC 0-1 FALLS W/OUT INJ PAST YR: ICD-10-PCS | Mod: CPTII,S$GLB,, | Performed by: PHYSICIAN ASSISTANT

## 2020-12-21 PROCEDURE — 1125F PR PAIN SEVERITY QUANTIFIED, PAIN PRESENT: ICD-10-PCS | Mod: S$GLB,,, | Performed by: PHYSICIAN ASSISTANT

## 2020-12-21 PROCEDURE — 96374 THER/PROPH/DIAG INJ IV PUSH: CPT

## 2020-12-21 PROCEDURE — 3078F DIAST BP <80 MM HG: CPT | Mod: CPTII,S$GLB,, | Performed by: PHYSICIAN ASSISTANT

## 2020-12-21 PROCEDURE — 99214 OFFICE O/P EST MOD 30 MIN: CPT | Mod: S$GLB,,, | Performed by: PHYSICIAN ASSISTANT

## 2020-12-21 PROCEDURE — 25000003 PHARM REV CODE 250: Performed by: EMERGENCY MEDICINE

## 2020-12-21 PROCEDURE — 3074F PR MOST RECENT SYSTOLIC BLOOD PRESSURE < 130 MM HG: ICD-10-PCS | Mod: CPTII,S$GLB,, | Performed by: PHYSICIAN ASSISTANT

## 2020-12-21 PROCEDURE — 76700 US EXAM ABDOM COMPLETE: CPT | Mod: 26,,, | Performed by: RADIOLOGY

## 2020-12-21 PROCEDURE — 1159F MED LIST DOCD IN RCRD: CPT | Mod: S$GLB,,, | Performed by: PHYSICIAN ASSISTANT

## 2020-12-21 PROCEDURE — 3074F SYST BP LT 130 MM HG: CPT | Mod: CPTII,S$GLB,, | Performed by: PHYSICIAN ASSISTANT

## 2020-12-21 PROCEDURE — 76700 US ABDOMEN COMPLETE: ICD-10-PCS | Mod: 26,,, | Performed by: RADIOLOGY

## 2020-12-21 PROCEDURE — 3008F PR BODY MASS INDEX (BMI) DOCUMENTED: ICD-10-PCS | Mod: CPTII,S$GLB,, | Performed by: PHYSICIAN ASSISTANT

## 2020-12-21 PROCEDURE — 63600175 PHARM REV CODE 636 W HCPCS: Performed by: EMERGENCY MEDICINE

## 2020-12-21 PROCEDURE — 96375 TX/PRO/DX INJ NEW DRUG ADDON: CPT

## 2020-12-21 RX ORDER — ONDANSETRON 2 MG/ML
4 INJECTION INTRAMUSCULAR; INTRAVENOUS
Status: COMPLETED | OUTPATIENT
Start: 2020-12-21 | End: 2020-12-21

## 2020-12-21 RX ORDER — OXYCODONE AND ACETAMINOPHEN 5; 325 MG/1; MG/1
1 TABLET ORAL EVERY 4 HOURS PRN
Qty: 13 TABLET | Refills: 0 | Status: SHIPPED | OUTPATIENT
Start: 2020-12-21 | End: 2020-12-24 | Stop reason: SDUPTHER

## 2020-12-21 RX ORDER — TRIAMCINOLONE ACETONIDE 1 MG/G
OINTMENT TOPICAL
Qty: 454 G | Refills: 0 | Status: SHIPPED | OUTPATIENT
Start: 2020-12-21 | End: 2021-07-08 | Stop reason: SDUPTHER

## 2020-12-21 RX ORDER — ONDANSETRON 4 MG/1
4 TABLET, FILM COATED ORAL EVERY 6 HOURS
Qty: 12 TABLET | Refills: 0 | Status: SHIPPED | OUTPATIENT
Start: 2020-12-21 | End: 2021-04-01

## 2020-12-21 RX ORDER — MORPHINE SULFATE 4 MG/ML
4 INJECTION, SOLUTION INTRAMUSCULAR; INTRAVENOUS
Status: COMPLETED | OUTPATIENT
Start: 2020-12-21 | End: 2020-12-21

## 2020-12-21 RX ADMIN — IOHEXOL 100 ML: 350 INJECTION, SOLUTION INTRAVENOUS at 03:12

## 2020-12-21 RX ADMIN — MORPHINE SULFATE 4 MG: 4 INJECTION, SOLUTION INTRAMUSCULAR; INTRAVENOUS at 03:12

## 2020-12-21 RX ADMIN — SODIUM CHLORIDE 1000 ML: 0.9 INJECTION, SOLUTION INTRAVENOUS at 03:12

## 2020-12-21 RX ADMIN — ONDANSETRON 4 MG: 2 INJECTION INTRAMUSCULAR; INTRAVENOUS at 03:12

## 2020-12-21 NOTE — DISCHARGE INSTRUCTIONS
Thank you for coming to our Emergency Department today. It is important to remember that some problems are difficult to diagnose and may not be found during your first visit. Be sure to follow up with your primary care doctor and review any labs/imaging that was performed with them. If you do not have a primary care doctor, you may contact the one listed on your discharge paperwork or you may also call the [facility] Appointment Desk at [phone] to schedule an appointment with one.     All medications may potentially have side effects and it is impossible to predict which medications may give you side effects. If you feel that you are having a negative effect of any medication you should immediately stop taking them and seek medical attention.    Return to the ER with any questions/concerns, new/concerning symptoms, worsening or failure to improve. Do not drive or make any important decisions for 24 hours if you have received any pain medications, sedatives or mood altering drugs during your ER visit.

## 2020-12-21 NOTE — ED TRIAGE NOTES
Pt presents to the ED from her PCP due to abnormal lab values. States they informed her to come here because of her pancreas. NAD noted. VSS.  at the bedside. Will continue to monitor.

## 2020-12-21 NOTE — ED PROVIDER NOTES
"Encounter Date: 12/21/2020    SCRIBE #1 NOTE: I, Trace Harrington, am scribing for, and in the presence of,  Chris German MD. I have scribed the following portions of the note - Other sections scribed: HPI, ROS, PE.       History     Chief Complaint   Patient presents with    Nausea     started x 3 weeks     Vomiting     sent from MD office for "Pancreastitis"     The patient is a 64-year-old female with a PMHx of A-fib, Diabetes Mellitus, Hypertension, and Hyperlipidemia who presents to the ED complaining of epigastric abdominal pain that onset three weeks ago. She has associated nausea and vomiting. She reports three episodes of emesis last night, and two the night before. She states she was referred to ED by primary care physician for acute pancreatitis. She reports dehydration. She denies fever. She notes sick contact with her grandchild. No alcohol or drug use, but admits to tobacco.    The history is provided by the patient. No  was used.     Review of patient's allergies indicates:   Allergen Reactions    Ace inhibitors      Cough       Past Medical History:   Diagnosis Date    Anticoagulant long-term use     Xarelto    Arthritis     Atrial fibrillation     Breast cyst     Degenerative disc disease     Depression     Diabetes mellitus     Pre diabetic    Hyperlipidemia     Hypertension     Nuclear sclerosis, bilateral 12/18/2017    Obesity, morbid     Other abnormal glucose     pre-diabetes    Sleep apnea     Smoker     Thyroid disease     on meds 8-9 years ago. hypothyroidism.no malignancy    TMJ (temporomandibular joint disorder)     jaw clicking    Urge incontinence     Wears glasses      Past Surgical History:   Procedure Laterality Date    BREAST BIOPSY Right     over 10 yrs ago/ benign    BREAST CYST EXCISION Right     BREAST LUMPECTOMY Right     over 10 yrs ago, ex bx/ benign    COLONOSCOPY N/A 6/25/2019    Procedure: COLONOSCOPY;  Surgeon: Micheline Flores, " MD;  Location: [facility] ENDO;  Service: Endoscopy;  Laterality: N/A;  RX XARELTO ok to hold (2 days) per Dr. Naik see scan 3/20/19    HYSTERECTOMY      OOPHORECTOMY      rt.knee surgery 2016      TOTAL KNEE ARTHROPLASTY Left 2/19/2019    Procedure: ARTHROPLASTY, KNEE, TOTAL;  Surgeon: Med Hanson MD;  Location: [facility] OR;  Service: Orthopedics;  Laterality: Left;  10AM START PER  PER JAYLIN TEXT @ 8:34AM ON 2-18-19  SUPINE  HARRIS LOYA [phone] TEXTED HIM ON 1-24-19 @ 7:57AM  RN PRE OP 2-13-19--BMI--48.9    underarm gland\ Bilateral      Family History   Problem Relation Age of Onset    Diabetes Mother     Diabetes Brother     No Known Problems Father     No Known Problems Sister     No Known Problems Maternal Aunt     No Known Problems Maternal Uncle     No Known Problems Paternal Aunt     No Known Problems Paternal Uncle     No Known Problems Maternal Grandmother     No Known Problems Maternal Grandfather     No Known Problems Paternal Grandmother     No Known Problems Paternal Grandfather     Amblyopia Neg Hx     Blindness Neg Hx     Cancer Neg Hx     Cataracts Neg Hx     Glaucoma Neg Hx     Hypertension Neg Hx     Macular degeneration Neg Hx     Retinal detachment Neg Hx     Strabismus Neg Hx     Stroke Neg Hx     Thyroid disease Neg Hx      Social History     Tobacco Use    Smoking status: Current Every Day Smoker     Packs/day: 0.50     Years: 47.00     Pack years: 23.50     Types: Cigarettes     Start date: 1973    Smokeless tobacco: Never Used    Tobacco comment: 6/18/19 smoke 5 cig a day casual   Substance Use Topics    Alcohol use: No     Frequency: Never     Drinks per session: Patient refused     Binge frequency: Never    Drug use: No     Review of Systems   Constitutional: Negative for chills and fever.   HENT: Negative for congestion and sore throat.    Eyes: Negative for pain and visual disturbance.   Respiratory: Negative for chest tightness and  shortness of breath.    Cardiovascular: Negative for chest pain.   Gastrointestinal: Positive for abdominal pain (epigastric), nausea and vomiting.   Endocrine: Negative for polydipsia and polyuria.   Musculoskeletal: Negative for back pain, neck pain and neck stiffness.   Skin: Negative for rash.   Allergic/Immunologic: Negative for immunocompromised state.   Neurological: Negative for dizziness and weakness.   Hematological: Does not bruise/bleed easily.   Psychiatric/Behavioral: Negative for agitation and behavioral problems.   All other systems reviewed and are negative.      Physical Exam     Initial Vitals [12/21/20 1438]   BP Pulse Resp Temp SpO2   126/87 80 20 98.7 °F (37.1 °C) 97 %      MAP       --         Physical Exam    Nursing note and vitals reviewed.  Constitutional: She appears well-developed and well-nourished.   HENT:   Head: Normocephalic and atraumatic.   Mouth/Throat: Oropharynx is clear and moist and mucous membranes are normal.   Eyes: Conjunctivae and EOM are normal. Pupils are equal, round, and reactive to light. Right conjunctiva is not injected. Left conjunctiva is not injected. No scleral icterus.   Neck: Normal range of motion and full passive range of motion without pain. Neck supple.   Cardiovascular: Normal rate, regular rhythm, S1 normal, S2 normal, normal heart sounds and normal pulses. Exam reveals no gallop and no friction rub.    No murmur heard.  Pulses:       Radial pulses are 2+ on the right side and 2+ on the left side.   Pulmonary/Chest: Effort normal and breath sounds normal. No respiratory distress.   Abdominal: Soft. She exhibits no distension. There is abdominal tenderness.   Epigastric tenderness.   Musculoskeletal: Normal range of motion. No edema.      Comments: Good active ROM of all extremities. No lower extremity edema or cyanosis.    Neurological: No cranial nerve deficit. Gait normal.   A&Ox4, normal speech.   Skin: Skin is warm. No ecchymosis and no rash noted.    Psychiatric: She has a normal mood and affect. Thought content normal.         ED Course   Procedures  Labs Reviewed   SARS-COV-2 RDRP GENE          Imaging Results          CT Abdomen Pelvis With Contrast (Final result)  Result time 12/21/20 16:15:38    Final result by Ronal Salgado MD (12/21/20 16:15:38)                 Impression:      1. CT findings consistent with acute interstitial edematous pancreatitis.  No evidence of parenchymal necrosis.  No peripancreatic drainable fluid collections.  Adjacent splenic vasculature appears patent.  2. Stable right adrenal adenoma.  3. Colonic diverticulosis without associated inflammatory changes.  4. Too small to characterize renal hypodensities, one of which was characterized as a simple cyst on recent abdominal ultrasound.  5. Enhancing hepatic lesions, similar in size when compared to the previous CT and favored to represent hemangiomas.  6. Enlarging fat attenuation lesion along the inferior aspect of the left lateral abdominal wall musculature, favored to represent a lipoma and presumably intermuscular in location.  7. Prominent aortic and coronary atherosclerotic calcification.      Electronically signed by: Ronal Salgado MD  Date:    12/21/2020  Time:    16:15             Narrative:    EXAMINATION:  CT ABDOMEN PELVIS WITH CONTRAST    CLINICAL HISTORY:  Epigastric pain;    TECHNIQUE:  5 mm axial images were obtained through the abdomen and pelvis following administration of 100 cc of Omnipaque 350 IV contrast.  Coronal and sagittal reformats were performed.    COMPARISON:  CT 05/24/2016.    FINDINGS:  Visualized heart demonstrates moderate dense coronary artery atherosclerosis and moderate calcification of the aortic annulus.  No pericardial effusion.    Visualized lungs are clear.  No pleural effusions.    The liver is normal in size.  Lesions within the right hepatic dome and superior aspect of the left lobe/segment 2 demonstrate apparent peripheral  discontinuous nodular enhancement suggesting hemangiomas and appear unchanged when compared to the previous CT.  Portal vasculature is patent.  No intrahepatic bile duct dilatation.    Gallbladder is unremarkable.  Common bile duct is normal in caliber.    Stomach, duodenum and spleen are normal.    2.1 cm low-attenuation right adrenal lesion appears unchanged when compared to the previous exam where it demonstrated characteristics consistent with an adenoma.  There is stable nodularity of the left adrenal gland without discrete lesion.    There is peripancreatic fat stranding.  Pancreatic parenchyma demonstrates homogeneous enhancement without focal lesions or hypoenhancing regions.  No pancreatic ductal dilatation.  No peripancreatic drainable fluid collections.    Kidneys are normal in size and location.  There are bilateral too small to characterize renal hypodensities, none of which meet criteria for simple cysts.  No nephrolithiasis.  No hydronephrosis or hydroureter.  Bladder is fluid filled and unremarkable.    Uterus is surgically absent.  Ovaries are not definitely seen.  No pelvic free fluid.    The small bowel is normal in caliber.  There are several scattered colonic diverticuli.  The appendix is normal.  No obstruction or inflammatory changes.    The abdominal aorta tapers normally with prominent atherosclerotic calcification.  Celiac artery, SMA, bilateral renal arteries and SHAHZAD demonstrate appropriate contrast opacification.  IVC and iliac vessels are not well evaluated secondary to technique.    No ascites or intra-abdominal free air.  No abdominal or pelvic lymph node enlargement.  No focal mesenteric masses.    There has been interval increased size of a fat containing lesion located along the inferior aspect of the left lateral abdominal wall musculature, now measuring 10.9 cm (coronal series 601, image 42) and consistent with a lipoma.  Remaining visualized subcutaneous soft tissues are within  normal limits.    There are degenerative changes of the spine.  Subchondral sclerosis noted about the SI joints, presumably degenerative in nature.  No acute osseous abnormalities.                                 Medical Decision Making:   Initial Assessment:   64 year old patient presenting 2/2 abdominal pain in the epigastrium with the reported elevated lipase level.  I was able to review labs and lipase is mildly elevated.  CT abdomen pelvis or the patient along with COVID.    Also considered but less likely:     AAA: location inconsistent, no bruits, no history of HTN  Cholecystitis: location inconsistent, no relation with meals, negative maynards  SBO:  CT negative  Appendicitis: location inconsistent, no fever, no rebound/guarding  Mesenteric ischemia: HPI inconsistent, does not coincide with meals, other dx more likely  Kidney stone: no radiation to back or cva tenderness, no dysuria, no hematuria  Pancreatitis: no history of alcohol abuse, unlikely gallstone obstructing, location inconsistent  Diverticulitis: age and location not most common, no history of diverticulitis, no fever, no wbc  Ectopic:  Status post menopause    Patient discharged home with percocet and zofran. Return precautions given, patient understands and agrees with plan. All questions answered.  Instructed to follow up with PCP. I discussed with the patient/family the diagnosis, treatment plan, indications for return to the emergency department, and for expected follow-up. The patient/family verbalized an understanding. The patient/family is asked if there are any questions or concerns. We discuss the case, until all issues are addressed to the patient/familys satisfaction. Patient/family understands and is agreeable to the plan.   Chris German        Clinical Tests:   Lab Tests: Ordered and Reviewed  Radiological Study: Ordered and Reviewed            Scribe Attestation:   Scribe #1: I performed the above scribed service and the  documentation accurately describes the services I performed. I attest to the accuracy of the note.                      Clinical Impression:     ICD-10-CM ICD-9-CM   1. Acute pancreatitis without infection or necrosis, unspecified pancreatitis type  K85.90 577.0                   1. Acute pancreatitis without infection or necrosis, unspecified pancreatitis type            ED Disposition Condition    Discharge Stable        ED Prescriptions     Medication Sig Dispense Start Date End Date Auth. Provider    oxyCODONE-acetaminophen (PERCOCET) 5-325 mg per tablet Take 1 tablet by mouth every 4 (four) hours as needed. 13 tablet 12/21/2020  Chris Arreola MD    ondansetron (ZOFRAN) 4 MG tablet Take 1 tablet (4 mg total) by mouth every 6 (six) hours. 12 tablet 12/21/2020  Chris Arreola MD        Follow-up Information     Follow up With Specialties Details Why Contact Info    Kuldeep Miller MD Family Medicine Schedule an appointment as soon as possible for a visit in 2 days  [address] [place] [address]  [phone]                              I, chris arreola, personally performed the services described in this documentation. All medical record entries made by the scribe were at my direction and in my presence. I have reviewed the chart and agree that the record reflects my personal performance and is accurate and complete.                 Chris Arreola MD  12/21/20 3032

## 2020-12-21 NOTE — PROGRESS NOTES
Subjective:       Patient ID: Sandee Evans is a 64 y.o. female.    Chief Complaint: Nausea (stared 3 weeks ago ), Abdominal Pain, and Constipation    Abdominal Pain  This is a new problem. The current episode started 1 to 4 weeks ago. The onset quality is gradual. The problem occurs constantly. The problem has been unchanged. The pain is located in the generalized abdominal region. The quality of the pain is aching. The abdominal pain does not radiate. Associated symptoms include anorexia, belching, constipation, diarrhea (x3), flatus, nausea and vomiting. Pertinent negatives include no fever, headaches, hematochezia, melena or myalgias. Treatments tried: immodium. The treatment provided no relief. Her past medical history is significant for pancreatitis. There is no history of gallstones.     Pt with a h/o pancreatitis in 2015. Denies alcohol intake.    Social History     Socioeconomic History    Marital status:      Spouse name: Not on file    Number of children: Not on file    Years of education: Not on file    Highest education level: Not on file   Occupational History    Not on file   Social Needs    Financial resource strain: Not hard at all    Food insecurity     Worry: Never true     Inability: Never true    Transportation needs     Medical: No     Non-medical: No   Tobacco Use    Smoking status: Current Every Day Smoker     Packs/day: 0.50     Years: 47.00     Pack years: 23.50     Types: Cigarettes     Start date: 1973    Smokeless tobacco: Never Used    Tobacco comment: 6/18/19 smoke 5 cig a day casual   Substance and Sexual Activity    Alcohol use: No     Frequency: Never     Drinks per session: Patient refused     Binge frequency: Never    Drug use: No    Sexual activity: Yes     Partners: Male   Lifestyle    Physical activity     Days per week: 1 day     Minutes per session: 10 min    Stress: To some extent   Relationships    Social connections     Talks on phone: Once a  week     Gets together: Twice a week     Attends [employer] service: Not on file     Active member of club or organization: Yes     Attends meetings of clubs or organizations: Patient refused     Relationship status:    Other Topics Concern    Not on file   Social History Narrative    Not on file       Review of Systems   Constitutional: Negative for chills, diaphoresis and fever.   Respiratory: Negative for shortness of breath.    Gastrointestinal: Positive for abdominal pain, anorexia, constipation, diarrhea (x3), flatus, nausea and vomiting. Negative for hematochezia and melena.   Musculoskeletal: Negative for myalgias.   Neurological: Negative for headaches.         Objective:      Physical Exam  Constitutional:       Appearance: Normal appearance.      Comments: Patient appears uncomfortable   Cardiovascular:      Rate and Rhythm: Normal rate and regular rhythm.      Pulses: Normal pulses.      Heart sounds: Normal heart sounds.   Abdominal:      General: Abdomen is flat. Bowel sounds are normal.      Tenderness: There is abdominal tenderness in the right upper quadrant and epigastric area. There is no guarding or rebound.      Hernia: No hernia is present.   Skin:     General: Skin is warm and dry.   Neurological:      General: No focal deficit present.      Mental Status: She is alert and oriented to person, place, and time.   Psychiatric:         Mood and Affect: Mood normal.         Behavior: Behavior normal.         Assessment:       1. Acute pancreatitis, unspecified complication status, unspecified pancreatitis type    2. Right sided abdominal pain    3. Epigastric abdominal pain    4. Skin lesion        Plan:         Sandee was seen today for nausea, abdominal pain and constipation.    Diagnoses and all orders for this visit:    Acute pancreatitis, unspecified complication status, unspecified pancreatitis type  -   Discussed results with patient, advised ED for IV fluids and pain control, she  will go now (2:10 pm)     Right sided abdominal pain  -     Lipase; Future  -     Comprehensive Metabolic Panel; Future  -     CBC Auto Differential; Future  -     US Abdomen Complete; Future    Epigastric abdominal pain  -     Lipase; Future  -     Comprehensive Metabolic Panel; Future  -     CBC Auto Differential; Future  -     US Abdomen Complete; Future    Skin lesion  -     triamcinolone acetonide 0.1% (KENALOG) 0.1 % ointment; APPLY BETWEEN KNEES AND UPPER THIGHS TWICE DAILY FOR NO MORE THAN 7-14 DAYS

## 2020-12-24 ENCOUNTER — OFFICE VISIT (OUTPATIENT)
Dept: FAMILY MEDICINE | Facility: CLINIC | Age: 65
End: 2020-12-24
Payer: COMMERCIAL

## 2020-12-24 VITALS
WEIGHT: 273.13 LBS | BODY MASS INDEX: 48.39 KG/M2 | TEMPERATURE: 98 F | SYSTOLIC BLOOD PRESSURE: 128 MMHG | HEART RATE: 112 BPM | RESPIRATION RATE: 16 BRPM | OXYGEN SATURATION: 96 % | HEIGHT: 63 IN | DIASTOLIC BLOOD PRESSURE: 88 MMHG

## 2020-12-24 DIAGNOSIS — Z72.0 TOBACCO USE: ICD-10-CM

## 2020-12-24 DIAGNOSIS — K85.80 OTHER ACUTE PANCREATITIS WITHOUT INFECTION OR NECROSIS: Primary | ICD-10-CM

## 2020-12-24 DIAGNOSIS — D18.03 HEMANGIOMA OF LIVER: ICD-10-CM

## 2020-12-24 PROCEDURE — 99214 OFFICE O/P EST MOD 30 MIN: CPT | Mod: S$GLB,,, | Performed by: FAMILY MEDICINE

## 2020-12-24 PROCEDURE — 3074F SYST BP LT 130 MM HG: CPT | Mod: CPTII,S$GLB,, | Performed by: FAMILY MEDICINE

## 2020-12-24 PROCEDURE — 1159F MED LIST DOCD IN RCRD: CPT | Mod: S$GLB,,, | Performed by: FAMILY MEDICINE

## 2020-12-24 PROCEDURE — 3288F FALL RISK ASSESSMENT DOCD: CPT | Mod: CPTII,S$GLB,, | Performed by: FAMILY MEDICINE

## 2020-12-24 PROCEDURE — 3079F DIAST BP 80-89 MM HG: CPT | Mod: CPTII,S$GLB,, | Performed by: FAMILY MEDICINE

## 2020-12-24 PROCEDURE — 1126F AMNT PAIN NOTED NONE PRSNT: CPT | Mod: S$GLB,,, | Performed by: FAMILY MEDICINE

## 2020-12-24 PROCEDURE — 1126F PR PAIN SEVERITY QUANTIFIED, NO PAIN PRESENT: ICD-10-PCS | Mod: S$GLB,,, | Performed by: FAMILY MEDICINE

## 2020-12-24 PROCEDURE — 3288F PR FALLS RISK ASSESSMENT DOCUMENTED: ICD-10-PCS | Mod: CPTII,S$GLB,, | Performed by: FAMILY MEDICINE

## 2020-12-24 PROCEDURE — 1159F PR MEDICATION LIST DOCUMENTED IN MEDICAL RECORD: ICD-10-PCS | Mod: S$GLB,,, | Performed by: FAMILY MEDICINE

## 2020-12-24 PROCEDURE — 99999 PR PBB SHADOW E&M-EST. PATIENT-LVL III: ICD-10-PCS | Mod: PBBFAC,,, | Performed by: FAMILY MEDICINE

## 2020-12-24 PROCEDURE — 1101F PT FALLS ASSESS-DOCD LE1/YR: CPT | Mod: CPTII,S$GLB,, | Performed by: FAMILY MEDICINE

## 2020-12-24 PROCEDURE — 3074F PR MOST RECENT SYSTOLIC BLOOD PRESSURE < 130 MM HG: ICD-10-PCS | Mod: CPTII,S$GLB,, | Performed by: FAMILY MEDICINE

## 2020-12-24 PROCEDURE — 3008F BODY MASS INDEX DOCD: CPT | Mod: CPTII,S$GLB,, | Performed by: FAMILY MEDICINE

## 2020-12-24 PROCEDURE — 3079F PR MOST RECENT DIASTOLIC BLOOD PRESSURE 80-89 MM HG: ICD-10-PCS | Mod: CPTII,S$GLB,, | Performed by: FAMILY MEDICINE

## 2020-12-24 PROCEDURE — 1101F PR PT FALLS ASSESS DOC 0-1 FALLS W/OUT INJ PAST YR: ICD-10-PCS | Mod: CPTII,S$GLB,, | Performed by: FAMILY MEDICINE

## 2020-12-24 PROCEDURE — 99214 PR OFFICE/OUTPT VISIT, EST, LEVL IV, 30-39 MIN: ICD-10-PCS | Mod: S$GLB,,, | Performed by: FAMILY MEDICINE

## 2020-12-24 PROCEDURE — 3008F PR BODY MASS INDEX (BMI) DOCUMENTED: ICD-10-PCS | Mod: CPTII,S$GLB,, | Performed by: FAMILY MEDICINE

## 2020-12-24 PROCEDURE — 99999 PR PBB SHADOW E&M-EST. PATIENT-LVL III: CPT | Mod: PBBFAC,,, | Performed by: FAMILY MEDICINE

## 2020-12-24 RX ORDER — ONDANSETRON 8 MG/1
8 TABLET, ORALLY DISINTEGRATING ORAL EVERY 8 HOURS PRN
Qty: 40 TABLET | Refills: 0 | Status: SHIPPED | OUTPATIENT
Start: 2020-12-24 | End: 2021-01-06 | Stop reason: SDUPTHER

## 2020-12-24 RX ORDER — OXYCODONE AND ACETAMINOPHEN 5; 325 MG/1; MG/1
1 TABLET ORAL EVERY 4 HOURS PRN
Qty: 45 TABLET | Refills: 0 | Status: SHIPPED | OUTPATIENT
Start: 2020-12-24 | End: 2021-03-02 | Stop reason: SDUPTHER

## 2020-12-29 NOTE — PROGRESS NOTES
Subjective:       Patient ID: Sandee Evans is a 65 y.o. female.    Chief Complaint: Follow-up      HPI   65-year-old female presents for ED follow-up.  Patient was complaining about abdominal pain was referred to ED.  Is found to have elevated lipase.  Patient received a CT scan which showed pancreatitis.  Patient denies any alcohol use but does have a significant history of smoking.  Has mild elevation in lipase previously as well.  CT showed hemangiomas on liver as well.      Review of Systems   Constitutional: Negative.    HENT: Negative.    Respiratory: Negative.    Cardiovascular: Negative.    Gastrointestinal: Negative.    Endocrine: Negative.    Genitourinary: Negative.    Musculoskeletal: Negative.    Neurological: Negative.    Psychiatric/Behavioral: Negative.           Past Medical History:   Diagnosis Date    Anticoagulant long-term use     Xarelto    Arthritis     Atrial fibrillation     Breast cyst     Degenerative disc disease     Depression     Diabetes mellitus     Pre diabetic    Hyperlipidemia     Hypertension     Nuclear sclerosis, bilateral 12/18/2017    Obesity, morbid     Other abnormal glucose     pre-diabetes    Sleep apnea     Smoker     Thyroid disease     on meds 8-9 years ago. hypothyroidism.no malignancy    TMJ (temporomandibular joint disorder)     jaw clicking    Urge incontinence     Wears glasses      Past Surgical History:   Procedure Laterality Date    BREAST BIOPSY Right     over 10 yrs ago/ benign    BREAST CYST EXCISION Right     BREAST LUMPECTOMY Right     over 10 yrs ago, ex bx/ benign    COLONOSCOPY N/A 6/25/2019    Procedure: COLONOSCOPY;  Surgeon: Micheline Flores MD;  Location: [facility];  Service: Endoscopy;  Laterality: N/A;  RX XARELTO ok to hold (2 days) per Dr. Naik see scan 3/20/19    HYSTERECTOMY      OOPHORECTOMY      rt.knee surgery 2016      TOTAL KNEE ARTHROPLASTY Left 2/19/2019    Procedure: ARTHROPLASTY, KNEE, TOTAL;  Surgeon:  Med Hanson MD;  Location: [facility] OR;  Service: Orthopedics;  Laterality: Left;  10AM START PER  PER JAYLIN TEXT @ 8:34AM ON 2-18-19  SUPINE  HARRIS LOYA [phone] TEXTED HIM ON 1-24-19 @ 7:57AM  RN PRE OP 2-13-19--BMI--48.9    underarm gland\ Bilateral      Family History   Problem Relation Age of Onset    Diabetes Mother     Diabetes Brother     No Known Problems Father     No Known Problems Sister     No Known Problems Maternal Aunt     No Known Problems Maternal Uncle     No Known Problems Paternal Aunt     No Known Problems Paternal Uncle     No Known Problems Maternal Grandmother     No Known Problems Maternal Grandfather     No Known Problems Paternal Grandmother     No Known Problems Paternal Grandfather     Amblyopia Neg Hx     Blindness Neg Hx     Cancer Neg Hx     Cataracts Neg Hx     Glaucoma Neg Hx     Hypertension Neg Hx     Macular degeneration Neg Hx     Retinal detachment Neg Hx     Strabismus Neg Hx     Stroke Neg Hx     Thyroid disease Neg Hx      Social History     Socioeconomic History    Marital status:      Spouse name: Not on file    Number of children: Not on file    Years of education: Not on file    Highest education level: Not on file   Occupational History    Not on file   Social Needs    Financial resource strain: Not hard at all    Food insecurity     Worry: Never true     Inability: Never true    Transportation needs     Medical: No     Non-medical: No   Tobacco Use    Smoking status: Current Every Day Smoker     Packs/day: 0.50     Years: 47.00     Pack years: 23.50     Types: Cigarettes     Start date: 1973    Smokeless tobacco: Never Used    Tobacco comment: 6/18/19 smoke 5 cig a day casual   Substance and Sexual Activity    Alcohol use: No     Frequency: Never     Drinks per session: Patient refused     Binge frequency: Never    Drug use: No    Sexual activity: Yes     Partners: Male   Lifestyle    Physical activity      Days per week: 1 day     Minutes per session: 10 min    Stress: To some extent   Relationships    Social connections     Talks on phone: Once a week     Gets together: Twice a week     Attends Judaism service: Not on file     Active member of club or organization: Yes     Attends meetings of clubs or organizations: Patient refused     Relationship status:    Other Topics Concern    Not on file   Social History Narrative    Not on file       Current Outpatient Medications:     acetaminophen with codeine (ACETAMINOPHEN-CODEINE) 120mg 12mg 5mL Soln, Take 5 mLs by mouth every evening. (Patient not taking: Reported on 12/21/2020), Disp: 240 mL, Rfl: 0    albuterol 90 mcg/actuation inhaler, Inhale 2 puffs into the lungs every 6 (six) hours as needed for Wheezing. (Patient not taking: Reported on 12/21/2020), Disp: 1 each, Rfl: 11    azelastine (ASTELIN) 137 mcg (0.1 %) nasal spray, 1 spray (137 mcg total) by Nasal route 2 (two) times daily., Disp: 30 mL, Rfl: 3    ciclopirox (PENLAC) 8 % Soln, Apply topically nightly., Disp: 6.6 mL, Rfl: 1    fluticasone propionate (FLONASE) 50 mcg/actuation nasal spray, SHAKE LIQUID AND USE 2 SPRAYS(100 MCG) IN EACH NOSTRIL EVERY DAY (Patient not taking: Reported on 12/21/2020), Disp: 48 g, Rfl: 0    metFORMIN (GLUCOPHAGE) 500 MG tablet, Take 1 tablet (500 mg total) by mouth 2 (two) times daily with meals., Disp: 60 tablet, Rfl: 5    metoprolol tartrate (LOPRESSOR) 25 MG tablet, Take 1 tablet (25 mg total) by mouth 2 (two) times a day., Disp: 60 tablet, Rfl: 5    ondansetron (ZOFRAN) 4 MG tablet, Take 1 tablet (4 mg total) by mouth every 6 (six) hours., Disp: 12 tablet, Rfl: 0    ondansetron (ZOFRAN-ODT) 8 MG TbDL, Take 1 tablet (8 mg total) by mouth every 8 (eight) hours as needed (nausea)., Disp: 40 tablet, Rfl: 0    oxyCODONE-acetaminophen (PERCOCET) 5-325 mg per tablet, Take 1 tablet by mouth every 4 (four) hours as needed., Disp: 45 tablet, Rfl: 0     "simvastatin (ZOCOR) 20 MG tablet, Take 1 tablet (20 mg total) by mouth every evening., Disp: 30 tablet, Rfl: 5    spironolactone (ALDACTONE) 25 MG tablet, TAKE 1 TABLET(25 MG) BY MOUTH EVERY DAY, Disp: 30 tablet, Rfl: 5    traMADoL (ULTRAM) 50 mg tablet, Take 1 tablet (50 mg total) by mouth every 8 (eight) hours as needed for Pain. (Patient not taking: Reported on 12/21/2020), Disp: 25 tablet, Rfl: 0    traZODone (DESYREL) 50 MG tablet, Take 1-2 tablet by mouth nightly as needed for sleep, Disp: 60 tablet, Rfl: 1    triamcinolone acetonide 0.1% (KENALOG) 0.1 % ointment, APPLY BETWEEN KNEES AND UPPER THIGHS TWICE DAILY FOR NO MORE THAN 7-14 DAYS, Disp: 454 g, Rfl: 0    XARELTO 20 mg Tab, Take 1 tablet (20 mg total) by mouth once daily., Disp: 30 tablet, Rfl: 5   Objective:      Vitals:    12/24/20 0819   BP: 128/88   BP Location: Left arm   Patient Position: Sitting   BP Method: Large (Manual)   Pulse: (!) 112   Resp: 16   Temp: 98.1 °F (36.7 °C)   SpO2: 96%   Weight: 123.9 kg (273 lb 2.4 oz)   Height: 5' 3" (1.6 m)       Physical Exam  Constitutional:       General: She is not in acute distress.  HENT:      Head: Normocephalic and atraumatic.   Eyes:      Conjunctiva/sclera: Conjunctivae normal.   Neck:      Musculoskeletal: Neck supple.   Cardiovascular:      Rate and Rhythm: Normal rate and regular rhythm.      Heart sounds: Normal heart sounds. No murmur. No friction rub. No gallop.    Pulmonary:      Effort: Pulmonary effort is normal.      Breath sounds: Normal breath sounds. No wheezing or rales.   Abdominal:      Tenderness: There is abdominal tenderness.   Skin:     General: Skin is warm and dry.   Neurological:      Mental Status: She is alert and oriented to person, place, and time.   Psychiatric:         Behavior: Behavior normal.         Thought Content: Thought content normal.         Judgment: Judgment normal.            Assessment:       1. Other acute pancreatitis without infection or necrosis  "   2. Tobacco use    3. Hemangioma of liver        Plan:       Other acute pancreatitis without infection or necrosis  - Advised aggressive hydration. Advised complete cessation of tob use. Most likely pt has chronic pancreatitis due to smoking. Given percocets for pain relief and zofran for nausea. Referred to GI due to CT findings.  -     oxyCODONE-acetaminophen (PERCOCET) 5-325 mg per tablet; Take 1 tablet by mouth every 4 (four) hours as needed.  Dispense: 45 tablet; Refill: 0  -     Ambulatory referral/consult to Gastroenterology; Future; Expected date: 12/31/2020  -     ondansetron (ZOFRAN-ODT) 8 MG TbDL; Take 1 tablet (8 mg total) by mouth every 8 (eight) hours as needed (nausea).  Dispense: 40 tablet; Refill: 0    Tobacco use  Importance of smoking cessation discussed with patient. 5 minutes spent counseling patient on risks of smoking, benefits of stopping and ways of stopping. Patient is ready to quit. States she will f/u w/ smoking cessation.    Hemangioma of liver  -     Ambulatory referral/consult to Gastroenterology; Future; Expected date: 12/31/2020                Patient note was created using [employer].  Any errors in syntax or even information may not have been identified and edited on initial review prior to signing this note.

## 2021-01-04 ENCOUNTER — PATIENT MESSAGE (OUTPATIENT)
Dept: ADMINISTRATIVE | Facility: HOSPITAL | Age: 66
End: 2021-01-04

## 2021-01-06 ENCOUNTER — OFFICE VISIT (OUTPATIENT)
Dept: FAMILY MEDICINE | Facility: CLINIC | Age: 66
End: 2021-01-06
Payer: COMMERCIAL

## 2021-01-06 VITALS
WEIGHT: 275.13 LBS | HEART RATE: 71 BPM | DIASTOLIC BLOOD PRESSURE: 82 MMHG | SYSTOLIC BLOOD PRESSURE: 110 MMHG | BODY MASS INDEX: 48.74 KG/M2 | RESPIRATION RATE: 18 BRPM | TEMPERATURE: 98 F | OXYGEN SATURATION: 97 %

## 2021-01-06 DIAGNOSIS — D18.03 HEMANGIOMA OF LIVER: ICD-10-CM

## 2021-01-06 DIAGNOSIS — K85.80 OTHER ACUTE PANCREATITIS WITHOUT INFECTION OR NECROSIS: Primary | ICD-10-CM

## 2021-01-06 DIAGNOSIS — Z72.0 TOBACCO USE: ICD-10-CM

## 2021-01-06 PROCEDURE — 1126F AMNT PAIN NOTED NONE PRSNT: CPT | Mod: S$GLB,,, | Performed by: FAMILY MEDICINE

## 2021-01-06 PROCEDURE — 3288F PR FALLS RISK ASSESSMENT DOCUMENTED: ICD-10-PCS | Mod: CPTII,S$GLB,, | Performed by: FAMILY MEDICINE

## 2021-01-06 PROCEDURE — 3008F PR BODY MASS INDEX (BMI) DOCUMENTED: ICD-10-PCS | Mod: CPTII,S$GLB,, | Performed by: FAMILY MEDICINE

## 2021-01-06 PROCEDURE — 99999 PR PBB SHADOW E&M-EST. PATIENT-LVL IV: CPT | Mod: PBBFAC,,, | Performed by: FAMILY MEDICINE

## 2021-01-06 PROCEDURE — 3079F DIAST BP 80-89 MM HG: CPT | Mod: CPTII,S$GLB,, | Performed by: FAMILY MEDICINE

## 2021-01-06 PROCEDURE — 3008F BODY MASS INDEX DOCD: CPT | Mod: CPTII,S$GLB,, | Performed by: FAMILY MEDICINE

## 2021-01-06 PROCEDURE — 1101F PT FALLS ASSESS-DOCD LE1/YR: CPT | Mod: CPTII,S$GLB,, | Performed by: FAMILY MEDICINE

## 2021-01-06 PROCEDURE — 1126F PR PAIN SEVERITY QUANTIFIED, NO PAIN PRESENT: ICD-10-PCS | Mod: S$GLB,,, | Performed by: FAMILY MEDICINE

## 2021-01-06 PROCEDURE — 1159F MED LIST DOCD IN RCRD: CPT | Mod: S$GLB,,, | Performed by: FAMILY MEDICINE

## 2021-01-06 PROCEDURE — 99999 PR PBB SHADOW E&M-EST. PATIENT-LVL IV: ICD-10-PCS | Mod: PBBFAC,,, | Performed by: FAMILY MEDICINE

## 2021-01-06 PROCEDURE — 3074F SYST BP LT 130 MM HG: CPT | Mod: CPTII,S$GLB,, | Performed by: FAMILY MEDICINE

## 2021-01-06 PROCEDURE — 3079F PR MOST RECENT DIASTOLIC BLOOD PRESSURE 80-89 MM HG: ICD-10-PCS | Mod: CPTII,S$GLB,, | Performed by: FAMILY MEDICINE

## 2021-01-06 PROCEDURE — 3288F FALL RISK ASSESSMENT DOCD: CPT | Mod: CPTII,S$GLB,, | Performed by: FAMILY MEDICINE

## 2021-01-06 PROCEDURE — 99214 OFFICE O/P EST MOD 30 MIN: CPT | Mod: S$GLB,,, | Performed by: FAMILY MEDICINE

## 2021-01-06 PROCEDURE — 1159F PR MEDICATION LIST DOCUMENTED IN MEDICAL RECORD: ICD-10-PCS | Mod: S$GLB,,, | Performed by: FAMILY MEDICINE

## 2021-01-06 PROCEDURE — 99214 PR OFFICE/OUTPT VISIT, EST, LEVL IV, 30-39 MIN: ICD-10-PCS | Mod: S$GLB,,, | Performed by: FAMILY MEDICINE

## 2021-01-06 PROCEDURE — 3074F PR MOST RECENT SYSTOLIC BLOOD PRESSURE < 130 MM HG: ICD-10-PCS | Mod: CPTII,S$GLB,, | Performed by: FAMILY MEDICINE

## 2021-01-06 PROCEDURE — 1101F PR PT FALLS ASSESS DOC 0-1 FALLS W/OUT INJ PAST YR: ICD-10-PCS | Mod: CPTII,S$GLB,, | Performed by: FAMILY MEDICINE

## 2021-01-06 RX ORDER — ONDANSETRON 8 MG/1
8 TABLET, ORALLY DISINTEGRATING ORAL EVERY 8 HOURS PRN
Qty: 40 TABLET | Refills: 0 | Status: SHIPPED | OUTPATIENT
Start: 2021-01-06 | End: 2021-03-11 | Stop reason: SDUPTHER

## 2021-01-11 ENCOUNTER — TELEPHONE (OUTPATIENT)
Dept: GASTROENTEROLOGY | Facility: CLINIC | Age: 66
End: 2021-01-11

## 2021-01-11 ENCOUNTER — PATIENT OUTREACH (OUTPATIENT)
Dept: ADMINISTRATIVE | Facility: OTHER | Age: 66
End: 2021-01-11

## 2021-01-12 ENCOUNTER — OFFICE VISIT (OUTPATIENT)
Dept: GASTROENTEROLOGY | Facility: CLINIC | Age: 66
End: 2021-01-12
Payer: COMMERCIAL

## 2021-01-12 ENCOUNTER — LAB VISIT (OUTPATIENT)
Dept: LAB | Facility: HOSPITAL | Age: 66
End: 2021-01-12
Attending: INTERNAL MEDICINE
Payer: COMMERCIAL

## 2021-01-12 VITALS — BODY MASS INDEX: 47.8 KG/M2 | WEIGHT: 269.81 LBS

## 2021-01-12 DIAGNOSIS — K85.80 OTHER ACUTE PANCREATITIS WITHOUT INFECTION OR NECROSIS: ICD-10-CM

## 2021-01-12 DIAGNOSIS — D18.03 HEMANGIOMA OF LIVER: ICD-10-CM

## 2021-01-12 PROCEDURE — 3008F BODY MASS INDEX DOCD: CPT | Mod: CPTII,S$GLB,, | Performed by: INTERNAL MEDICINE

## 2021-01-12 PROCEDURE — 99999 PR PBB SHADOW E&M-EST. PATIENT-LVL III: CPT | Mod: PBBFAC,,, | Performed by: INTERNAL MEDICINE

## 2021-01-12 PROCEDURE — 82787 IGG 1 2 3 OR 4 EACH: CPT | Mod: 59

## 2021-01-12 PROCEDURE — 99204 OFFICE O/P NEW MOD 45 MIN: CPT | Mod: S$GLB,,, | Performed by: INTERNAL MEDICINE

## 2021-01-12 PROCEDURE — 99204 PR OFFICE/OUTPT VISIT, NEW, LEVL IV, 45-59 MIN: ICD-10-PCS | Mod: S$GLB,,, | Performed by: INTERNAL MEDICINE

## 2021-01-12 PROCEDURE — 1159F MED LIST DOCD IN RCRD: CPT | Mod: S$GLB,,, | Performed by: INTERNAL MEDICINE

## 2021-01-12 PROCEDURE — 36415 COLL VENOUS BLD VENIPUNCTURE: CPT

## 2021-01-12 PROCEDURE — 99999 PR PBB SHADOW E&M-EST. PATIENT-LVL III: ICD-10-PCS | Mod: PBBFAC,,, | Performed by: INTERNAL MEDICINE

## 2021-01-12 PROCEDURE — 1159F PR MEDICATION LIST DOCUMENTED IN MEDICAL RECORD: ICD-10-PCS | Mod: S$GLB,,, | Performed by: INTERNAL MEDICINE

## 2021-01-12 PROCEDURE — 3008F PR BODY MASS INDEX (BMI) DOCUMENTED: ICD-10-PCS | Mod: CPTII,S$GLB,, | Performed by: INTERNAL MEDICINE

## 2021-01-15 ENCOUNTER — PATIENT MESSAGE (OUTPATIENT)
Dept: GASTROENTEROLOGY | Facility: CLINIC | Age: 66
End: 2021-01-15

## 2021-01-15 LAB
IGG1 SER-MCNC: 604 MG/DL (ref 382–929)
IGG2 SER-MCNC: 331 MG/DL (ref 242–700)
IGG3 SER-MCNC: 128 MG/DL (ref 22–176)
IGG4 SER-MCNC: 17 MG/DL (ref 4–86)

## 2021-01-20 ENCOUNTER — TELEPHONE (OUTPATIENT)
Dept: GASTROENTEROLOGY | Facility: CLINIC | Age: 66
End: 2021-01-20

## 2021-01-20 ENCOUNTER — PATIENT MESSAGE (OUTPATIENT)
Dept: GASTROENTEROLOGY | Facility: CLINIC | Age: 66
End: 2021-01-20

## 2021-01-20 DIAGNOSIS — K85.80 OTHER ACUTE PANCREATITIS WITHOUT INFECTION OR NECROSIS: Primary | ICD-10-CM

## 2021-01-20 DIAGNOSIS — Z01.812 PRE-PROCEDURE LAB EXAM: ICD-10-CM

## 2021-01-22 ENCOUNTER — TELEPHONE (OUTPATIENT)
Dept: ENDOSCOPY | Facility: HOSPITAL | Age: 66
End: 2021-01-22

## 2021-01-23 ENCOUNTER — LAB VISIT (OUTPATIENT)
Dept: INTERNAL MEDICINE | Facility: CLINIC | Age: 66
End: 2021-01-23
Payer: COMMERCIAL

## 2021-01-23 DIAGNOSIS — Z01.812 PRE-PROCEDURE LAB EXAM: ICD-10-CM

## 2021-01-23 PROCEDURE — U0003 INFECTIOUS AGENT DETECTION BY NUCLEIC ACID (DNA OR RNA); SEVERE ACUTE RESPIRATORY SYNDROME CORONAVIRUS 2 (SARS-COV-2) (CORONAVIRUS DISEASE [COVID-19]), AMPLIFIED PROBE TECHNIQUE, MAKING USE OF HIGH THROUGHPUT TECHNOLOGIES AS DESCRIBED BY CMS-2020-01-R: HCPCS

## 2021-01-24 LAB — SARS-COV-2 RNA RESP QL NAA+PROBE: NOT DETECTED

## 2021-01-26 ENCOUNTER — ANESTHESIA (OUTPATIENT)
Dept: ENDOSCOPY | Facility: HOSPITAL | Age: 66
End: 2021-01-26
Payer: COMMERCIAL

## 2021-01-26 ENCOUNTER — HOSPITAL ENCOUNTER (OUTPATIENT)
Facility: HOSPITAL | Age: 66
Discharge: HOME | End: 2021-01-26
Attending: INTERNAL MEDICINE | Admitting: INTERNAL MEDICINE
Payer: COMMERCIAL

## 2021-01-26 ENCOUNTER — ANESTHESIA EVENT (OUTPATIENT)
Dept: ENDOSCOPY | Facility: HOSPITAL | Age: 66
End: 2021-01-26
Payer: COMMERCIAL

## 2021-01-26 VITALS
SYSTOLIC BLOOD PRESSURE: 110 MMHG | OXYGEN SATURATION: 94 % | BODY MASS INDEX: 45.93 KG/M2 | HEART RATE: 90 BPM | WEIGHT: 269 LBS | TEMPERATURE: 98 F | HEIGHT: 64 IN | RESPIRATION RATE: 12 BRPM | DIASTOLIC BLOOD PRESSURE: 70 MMHG

## 2021-01-26 PROCEDURE — 63600175 PHARM REV CODE 636 W HCPCS: Performed by: NURSE ANESTHETIST, CERTIFIED REGISTERED

## 2021-01-26 PROCEDURE — 25000003 PHARM REV CODE 250: Performed by: NURSE ANESTHETIST, CERTIFIED REGISTERED

## 2021-01-26 PROCEDURE — 37000009 HC ANESTHESIA EA ADD 15 MINS: Performed by: INTERNAL MEDICINE

## 2021-01-26 PROCEDURE — 43259 EGD US EXAM DUODENUM/JEJUNUM: CPT | Mod: ,,, | Performed by: INTERNAL MEDICINE

## 2021-01-26 PROCEDURE — 37000008 HC ANESTHESIA 1ST 15 MINUTES: Performed by: INTERNAL MEDICINE

## 2021-01-26 PROCEDURE — 43259 EGD US EXAM DUODENUM/JEJUNUM: CPT | Performed by: INTERNAL MEDICINE

## 2021-01-26 PROCEDURE — 43259 PR ENDOSCOPIC ULTRASOUND EXAM: ICD-10-PCS | Mod: ,,, | Performed by: INTERNAL MEDICINE

## 2021-01-26 RX ORDER — SODIUM CHLORIDE 0.9 % (FLUSH) 0.9 %
10 SYRINGE (ML) INJECTION
Status: CANCELLED | OUTPATIENT
Start: 2021-01-26

## 2021-01-26 RX ORDER — PROPOFOL 10 MG/ML
VIAL (ML) INTRAVENOUS
Status: DISCONTINUED | OUTPATIENT
Start: 2021-01-26 | End: 2021-01-26

## 2021-01-26 RX ORDER — SODIUM CHLORIDE 9 MG/ML
INJECTION, SOLUTION INTRAVENOUS CONTINUOUS
Status: CANCELLED | OUTPATIENT
Start: 2021-01-26

## 2021-01-26 RX ORDER — LIDOCAINE HYDROCHLORIDE 20 MG/ML
INJECTION INTRAVENOUS
Status: DISCONTINUED | OUTPATIENT
Start: 2021-01-26 | End: 2021-01-26

## 2021-01-26 RX ORDER — PROPOFOL 10 MG/ML
VIAL (ML) INTRAVENOUS CONTINUOUS PRN
Status: DISCONTINUED | OUTPATIENT
Start: 2021-01-26 | End: 2021-01-26

## 2021-01-26 RX ADMIN — PROPOFOL 150 MCG/KG/MIN: 10 INJECTION, EMULSION INTRAVENOUS at 08:01

## 2021-01-26 RX ADMIN — LIDOCAINE HYDROCHLORIDE 100 MG: 20 INJECTION, SOLUTION INTRAVENOUS at 08:01

## 2021-01-26 RX ADMIN — PROPOFOL 80 MG: 10 INJECTION, EMULSION INTRAVENOUS at 08:01

## 2021-02-09 ENCOUNTER — OFFICE VISIT (OUTPATIENT)
Dept: FAMILY MEDICINE | Facility: CLINIC | Age: 66
End: 2021-02-09
Payer: COMMERCIAL

## 2021-02-09 DIAGNOSIS — I73.9 PVD (PERIPHERAL VASCULAR DISEASE): ICD-10-CM

## 2021-02-09 DIAGNOSIS — Z87.19 HISTORY OF PANCREATITIS: Primary | ICD-10-CM

## 2021-02-09 DIAGNOSIS — Z72.0 TOBACCO USE: ICD-10-CM

## 2021-02-09 DIAGNOSIS — I25.10 CORONARY ARTERY DISEASE, ANGINA PRESENCE UNSPECIFIED, UNSPECIFIED VESSEL OR LESION TYPE, UNSPECIFIED WHETHER NATIVE OR TRANSPLANTED HEART: ICD-10-CM

## 2021-02-09 DIAGNOSIS — I48.20 CHRONIC ATRIAL FIBRILLATION: ICD-10-CM

## 2021-02-09 DIAGNOSIS — I10 ESSENTIAL HYPERTENSION: ICD-10-CM

## 2021-02-09 DIAGNOSIS — I70.0 AORTIC ATHEROSCLEROSIS: ICD-10-CM

## 2021-02-09 PROCEDURE — 1159F PR MEDICATION LIST DOCUMENTED IN MEDICAL RECORD: ICD-10-PCS | Mod: ,,, | Performed by: FAMILY MEDICINE

## 2021-02-09 PROCEDURE — 99214 OFFICE O/P EST MOD 30 MIN: CPT | Mod: 95,,, | Performed by: FAMILY MEDICINE

## 2021-02-09 PROCEDURE — 1159F MED LIST DOCD IN RCRD: CPT | Mod: ,,, | Performed by: FAMILY MEDICINE

## 2021-02-09 PROCEDURE — 99214 PR OFFICE/OUTPT VISIT, EST, LEVL IV, 30-39 MIN: ICD-10-PCS | Mod: 95,,, | Performed by: FAMILY MEDICINE

## 2021-02-09 RX ORDER — ATORVASTATIN CALCIUM 40 MG/1
40 TABLET, FILM COATED ORAL DAILY
Qty: 90 TABLET | Refills: 3 | Status: SHIPPED | OUTPATIENT
Start: 2021-02-09 | End: 2021-04-01 | Stop reason: SDUPTHER

## 2021-02-18 ENCOUNTER — OFFICE VISIT (OUTPATIENT)
Dept: CARDIOLOGY | Facility: CLINIC | Age: 66
End: 2021-02-18
Payer: COMMERCIAL

## 2021-02-18 ENCOUNTER — CLINICAL SUPPORT (OUTPATIENT)
Dept: SMOKING CESSATION | Facility: CLINIC | Age: 66
End: 2021-02-18
Payer: COMMERCIAL

## 2021-02-18 VITALS
DIASTOLIC BLOOD PRESSURE: 76 MMHG | BODY MASS INDEX: 46.4 KG/M2 | SYSTOLIC BLOOD PRESSURE: 128 MMHG | WEIGHT: 271.81 LBS | OXYGEN SATURATION: 98 % | HEART RATE: 95 BPM | HEIGHT: 64 IN

## 2021-02-18 DIAGNOSIS — I25.10 CORONARY ARTERY DISEASE, ANGINA PRESENCE UNSPECIFIED, UNSPECIFIED VESSEL OR LESION TYPE, UNSPECIFIED WHETHER NATIVE OR TRANSPLANTED HEART: ICD-10-CM

## 2021-02-18 DIAGNOSIS — I48.0 PAROXYSMAL ATRIAL FIBRILLATION: ICD-10-CM

## 2021-02-18 DIAGNOSIS — R06.09 DOE (DYSPNEA ON EXERTION): ICD-10-CM

## 2021-02-18 DIAGNOSIS — F17.200 NICOTINE DEPENDENCE: Primary | ICD-10-CM

## 2021-02-18 DIAGNOSIS — I73.9 PVD (PERIPHERAL VASCULAR DISEASE): ICD-10-CM

## 2021-02-18 DIAGNOSIS — I48.20 CHRONIC ATRIAL FIBRILLATION: ICD-10-CM

## 2021-02-18 DIAGNOSIS — I10 ESSENTIAL HYPERTENSION: ICD-10-CM

## 2021-02-18 DIAGNOSIS — I70.0 AORTIC ATHEROSCLEROSIS: ICD-10-CM

## 2021-02-18 DIAGNOSIS — E78.2 MIXED HYPERLIPIDEMIA: Primary | ICD-10-CM

## 2021-02-18 PROCEDURE — 3078F DIAST BP <80 MM HG: CPT | Mod: CPTII,S$GLB,, | Performed by: INTERNAL MEDICINE

## 2021-02-18 PROCEDURE — 1159F PR MEDICATION LIST DOCUMENTED IN MEDICAL RECORD: ICD-10-PCS | Mod: S$GLB,,, | Performed by: INTERNAL MEDICINE

## 2021-02-18 PROCEDURE — 3288F FALL RISK ASSESSMENT DOCD: CPT | Mod: CPTII,S$GLB,, | Performed by: INTERNAL MEDICINE

## 2021-02-18 PROCEDURE — 3074F PR MOST RECENT SYSTOLIC BLOOD PRESSURE < 130 MM HG: ICD-10-PCS | Mod: CPTII,S$GLB,, | Performed by: INTERNAL MEDICINE

## 2021-02-18 PROCEDURE — 99999 PR PBB SHADOW E&M-EST. PATIENT-LVL I: ICD-10-PCS | Mod: PBBFAC,,,

## 2021-02-18 PROCEDURE — 99404 PREV MED CNSL INDIV APPRX 60: CPT | Mod: S$GLB,,,

## 2021-02-18 PROCEDURE — 3078F PR MOST RECENT DIASTOLIC BLOOD PRESSURE < 80 MM HG: ICD-10-PCS | Mod: CPTII,S$GLB,, | Performed by: INTERNAL MEDICINE

## 2021-02-18 PROCEDURE — 1126F PR PAIN SEVERITY QUANTIFIED, NO PAIN PRESENT: ICD-10-PCS | Mod: S$GLB,,, | Performed by: INTERNAL MEDICINE

## 2021-02-18 PROCEDURE — 99214 OFFICE O/P EST MOD 30 MIN: CPT | Mod: S$GLB,,, | Performed by: INTERNAL MEDICINE

## 2021-02-18 PROCEDURE — 1101F PR PT FALLS ASSESS DOC 0-1 FALLS W/OUT INJ PAST YR: ICD-10-PCS | Mod: CPTII,S$GLB,, | Performed by: INTERNAL MEDICINE

## 2021-02-18 PROCEDURE — 99999 PR PBB SHADOW E&M-EST. PATIENT-LVL IV: ICD-10-PCS | Mod: PBBFAC,,, | Performed by: INTERNAL MEDICINE

## 2021-02-18 PROCEDURE — 3008F PR BODY MASS INDEX (BMI) DOCUMENTED: ICD-10-PCS | Mod: CPTII,S$GLB,, | Performed by: INTERNAL MEDICINE

## 2021-02-18 PROCEDURE — 3008F BODY MASS INDEX DOCD: CPT | Mod: CPTII,S$GLB,, | Performed by: INTERNAL MEDICINE

## 2021-02-18 PROCEDURE — 93000 ELECTROCARDIOGRAM COMPLETE: CPT | Mod: S$GLB,,, | Performed by: INTERNAL MEDICINE

## 2021-02-18 PROCEDURE — 99999 PR PBB SHADOW E&M-EST. PATIENT-LVL I: CPT | Mod: PBBFAC,,,

## 2021-02-18 PROCEDURE — 1101F PT FALLS ASSESS-DOCD LE1/YR: CPT | Mod: CPTII,S$GLB,, | Performed by: INTERNAL MEDICINE

## 2021-02-18 PROCEDURE — 3288F PR FALLS RISK ASSESSMENT DOCUMENTED: ICD-10-PCS | Mod: CPTII,S$GLB,, | Performed by: INTERNAL MEDICINE

## 2021-02-18 PROCEDURE — 1126F AMNT PAIN NOTED NONE PRSNT: CPT | Mod: S$GLB,,, | Performed by: INTERNAL MEDICINE

## 2021-02-18 PROCEDURE — 99404 PR PREVENT COUNSEL,INDIV,60 MIN: ICD-10-PCS | Mod: S$GLB,,,

## 2021-02-18 PROCEDURE — 99214 PR OFFICE/OUTPT VISIT, EST, LEVL IV, 30-39 MIN: ICD-10-PCS | Mod: S$GLB,,, | Performed by: INTERNAL MEDICINE

## 2021-02-18 PROCEDURE — 93000 EKG 12-LEAD: ICD-10-PCS | Mod: S$GLB,,, | Performed by: INTERNAL MEDICINE

## 2021-02-18 PROCEDURE — 99999 PR PBB SHADOW E&M-EST. PATIENT-LVL IV: CPT | Mod: PBBFAC,,, | Performed by: INTERNAL MEDICINE

## 2021-02-18 PROCEDURE — 3074F SYST BP LT 130 MM HG: CPT | Mod: CPTII,S$GLB,, | Performed by: INTERNAL MEDICINE

## 2021-02-18 PROCEDURE — 1159F MED LIST DOCD IN RCRD: CPT | Mod: S$GLB,,, | Performed by: INTERNAL MEDICINE

## 2021-02-18 RX ORDER — MICONAZOLE NITRATE 2 %
CREAM (GRAM) TOPICAL
Qty: 200 EACH | Refills: 0 | Status: SHIPPED | OUTPATIENT
Start: 2021-02-18 | End: 2023-02-10

## 2021-02-18 RX ORDER — VARENICLINE TARTRATE 0.5 (11)-1
KIT ORAL
Qty: 53 TABLET | Refills: 0 | Status: SHIPPED | OUTPATIENT
Start: 2021-02-18 | End: 2022-08-15

## 2021-02-25 ENCOUNTER — IMMUNIZATION (OUTPATIENT)
Dept: OBSTETRICS AND GYNECOLOGY | Facility: CLINIC | Age: 66
End: 2021-02-25
Payer: COMMERCIAL

## 2021-02-25 DIAGNOSIS — Z23 NEED FOR VACCINATION: Primary | ICD-10-CM

## 2021-02-25 PROCEDURE — 0001A COVID-19, MRNA, LNP-S, PF, 30 MCG/0.3 ML DOSE VACCINE: ICD-10-PCS | Mod: CV19,S$GLB,, | Performed by: FAMILY MEDICINE

## 2021-02-25 PROCEDURE — 91300 COVID-19, MRNA, LNP-S, PF, 30 MCG/0.3 ML DOSE VACCINE: ICD-10-PCS | Mod: S$GLB,,, | Performed by: FAMILY MEDICINE

## 2021-02-25 PROCEDURE — 0001A COVID-19, MRNA, LNP-S, PF, 30 MCG/0.3 ML DOSE VACCINE: CPT | Mod: CV19,S$GLB,, | Performed by: FAMILY MEDICINE

## 2021-02-25 PROCEDURE — 91300 COVID-19, MRNA, LNP-S, PF, 30 MCG/0.3 ML DOSE VACCINE: CPT | Mod: S$GLB,,, | Performed by: FAMILY MEDICINE

## 2021-02-27 NOTE — PROGRESS NOTES
Flu and Pneumococcal 23 vaccines administered IM to left deltoid.VIS forms given. Tolerated well. No complaints. No adverse reaction noted.   Juni Lockhart  [facility]  [address] [place] [address]  Phone: [phone]  Fax: [phone]  Follow Up Time: 1-3 Days

## 2021-03-01 ENCOUNTER — CLINICAL SUPPORT (OUTPATIENT)
Dept: SMOKING CESSATION | Facility: CLINIC | Age: 66
End: 2021-03-01
Payer: COMMERCIAL

## 2021-03-01 ENCOUNTER — HOSPITAL ENCOUNTER (OUTPATIENT)
Dept: CARDIOLOGY | Facility: HOSPITAL | Age: 66
Discharge: HOME | End: 2021-03-01
Attending: INTERNAL MEDICINE
Payer: COMMERCIAL

## 2021-03-01 DIAGNOSIS — I48.20 CHRONIC ATRIAL FIBRILLATION: ICD-10-CM

## 2021-03-01 DIAGNOSIS — R06.09 DOE (DYSPNEA ON EXERTION): ICD-10-CM

## 2021-03-01 DIAGNOSIS — I10 ESSENTIAL HYPERTENSION: ICD-10-CM

## 2021-03-01 DIAGNOSIS — I25.10 CORONARY ARTERY DISEASE, ANGINA PRESENCE UNSPECIFIED, UNSPECIFIED VESSEL OR LESION TYPE, UNSPECIFIED WHETHER NATIVE OR TRANSPLANTED HEART: ICD-10-CM

## 2021-03-01 DIAGNOSIS — I48.0 PAROXYSMAL ATRIAL FIBRILLATION: ICD-10-CM

## 2021-03-01 DIAGNOSIS — I73.9 PVD (PERIPHERAL VASCULAR DISEASE): ICD-10-CM

## 2021-03-01 DIAGNOSIS — I70.0 AORTIC ATHEROSCLEROSIS: ICD-10-CM

## 2021-03-01 DIAGNOSIS — E78.2 MIXED HYPERLIPIDEMIA: ICD-10-CM

## 2021-03-01 DIAGNOSIS — F17.200 NICOTINE DEPENDENCE: Primary | ICD-10-CM

## 2021-03-01 LAB
AV INDEX (PROSTH): 1.01
AV MEAN GRADIENT: 3 MMHG
AV PEAK GRADIENT: 4 MMHG
AV VALVE AREA: 3.24 CM2
AV VELOCITY RATIO: 0.87
CV ECHO LV RWT: 0.55 CM
DOP CALC AO PEAK VEL: 1.05 M/S
DOP CALC AO VTI: 16.6 CM
DOP CALC LVOT AREA: 3.2 CM2
DOP CALC LVOT DIAMETER: 2.02 CM
DOP CALC LVOT PEAK VEL: 0.91 M/S
DOP CALC LVOT STROKE VOLUME: 53.75 CM3
DOP CALC RVOT PEAK VEL: 0.47 M/S
DOP CALC RVOT VTI: 6.55 CM
DOP CALCLVOT PEAK VEL VTI: 16.78 CM
E WAVE DECELERATION TIME: 128.17 MSEC
E/A RATIO: 2.58
ECHO LV POSTERIOR WALL: 1.24 CM (ref 0.6–1.1)
FRACTIONAL SHORTENING: 33 % (ref 28–44)
INTERVENTRICULAR SEPTUM: 0.95 CM (ref 0.6–1.1)
IVRT: 114.19 MSEC
LA MAJOR: 6.16 CM
LA MINOR: 6.69 CM
LA WIDTH: 6.36 CM
LEFT ATRIUM SIZE: 5.24 CM
LEFT ATRIUM VOLUME MOD: 171.69 CM3
LEFT ATRIUM VOLUME: 181.69 CM3
LEFT INTERNAL DIMENSION IN SYSTOLE: 3 CM (ref 2.1–4)
LEFT VENTRICLE DIASTOLIC VOLUME: 91.4 ML
LEFT VENTRICLE SYSTOLIC VOLUME: 35.07 ML
LEFT VENTRICULAR INTERNAL DIMENSION IN DIASTOLE: 4.48 CM (ref 3.5–6)
LEFT VENTRICULAR MASS: 172.68 G
MV MEAN GRADIENT: 0 MMHG
MV PEAK A VEL: 0.31 M/S
MV PEAK E VEL: 0.8 M/S
MV PEAK GRADIENT: 4 MMHG
MV STENOSIS PRESSURE HALF TIME: 37.17 MS
MV VALVE AREA P 1/2 METHOD: 5.92 CM2
PISA MRMAX VEL: 0.05 M/S
PISA TR MAX VEL: 3.74 M/S
PULM VEIN S/D RATIO: 1.25
PV MEAN GRADIENT: 1 MMHG
PV PEAK D VEL: 0.4 M/S
PV PEAK GRADIENT: 1 MMHG
PV PEAK S VEL: 0.5 M/S
PV PEAK VELOCITY: 0.69 CM/S
RA MAJOR: 5.24 CM
RA PRESSURE: 8 MMHG
RA WIDTH: 4.2 CM
RIGHT VENTRICULAR END-DIASTOLIC DIMENSION: 3.79 CM
SINUS: 3.62 CM
STJ: 3 CM
TR MAX PG: 56 MMHG
TRICUSPID ANNULAR PLANE SYSTOLIC EXCURSION: 1.25 CM
TV REST PULMONARY ARTERY PRESSURE: 64 MMHG

## 2021-03-01 PROCEDURE — 99999 PR PBB SHADOW E&M-EST. PATIENT-LVL I: ICD-10-PCS | Mod: PBBFAC,,,

## 2021-03-01 PROCEDURE — 93227 XTRNL ECG REC<48 HR R&I: CPT | Mod: ,,, | Performed by: INTERNAL MEDICINE

## 2021-03-01 PROCEDURE — 93306 ECHO (CUPID ONLY): ICD-10-PCS | Mod: 26,,, | Performed by: INTERNAL MEDICINE

## 2021-03-01 PROCEDURE — 99999 PR PBB SHADOW E&M-EST. PATIENT-LVL I: CPT | Mod: PBBFAC,,,

## 2021-03-01 PROCEDURE — 93226 XTRNL ECG REC<48 HR SCAN A/R: CPT

## 2021-03-01 PROCEDURE — 93306 TTE W/DOPPLER COMPLETE: CPT

## 2021-03-01 PROCEDURE — 99404 PR PREVENT COUNSEL,INDIV,60 MIN: ICD-10-PCS | Mod: S$GLB,,,

## 2021-03-01 PROCEDURE — 93227 HOLTER MONITOR - 24 HOUR (CUPID ONLY): ICD-10-PCS | Mod: ,,, | Performed by: INTERNAL MEDICINE

## 2021-03-01 PROCEDURE — 99404 PREV MED CNSL INDIV APPRX 60: CPT | Mod: S$GLB,,,

## 2021-03-01 PROCEDURE — 93306 TTE W/DOPPLER COMPLETE: CPT | Mod: 26,,, | Performed by: INTERNAL MEDICINE

## 2021-03-02 ENCOUNTER — LAB VISIT (OUTPATIENT)
Dept: LAB | Facility: HOSPITAL | Age: 66
End: 2021-03-02
Attending: PHYSICIAN ASSISTANT
Payer: COMMERCIAL

## 2021-03-02 ENCOUNTER — TELEPHONE (OUTPATIENT)
Dept: GASTROENTEROLOGY | Facility: CLINIC | Age: 66
End: 2021-03-02

## 2021-03-02 ENCOUNTER — OFFICE VISIT (OUTPATIENT)
Dept: FAMILY MEDICINE | Facility: CLINIC | Age: 66
End: 2021-03-02
Payer: COMMERCIAL

## 2021-03-02 VITALS
WEIGHT: 270.5 LBS | BODY MASS INDEX: 46.18 KG/M2 | DIASTOLIC BLOOD PRESSURE: 70 MMHG | HEART RATE: 66 BPM | TEMPERATURE: 98 F | SYSTOLIC BLOOD PRESSURE: 104 MMHG | HEIGHT: 64 IN | OXYGEN SATURATION: 98 % | RESPIRATION RATE: 18 BRPM

## 2021-03-02 LAB
BASOPHILS # BLD AUTO: 0.03 K/UL (ref 0–0.2)
BASOPHILS NFR BLD: 0.4 % (ref 0–1.9)
DIFFERENTIAL METHOD: ABNORMAL
EOSINOPHIL # BLD AUTO: 0.1 K/UL (ref 0–0.5)
EOSINOPHIL NFR BLD: 1.7 % (ref 0–8)
ERYTHROCYTE [DISTWIDTH] IN BLOOD BY AUTOMATED COUNT: 13.7 % (ref 11.5–14.5)
HCT VFR BLD AUTO: 48.2 % (ref 37–48.5)
HGB BLD-MCNC: 15 G/DL (ref 12–16)
IMM GRANULOCYTES # BLD AUTO: 0.01 K/UL (ref 0–0.04)
IMM GRANULOCYTES NFR BLD AUTO: 0.1 % (ref 0–0.5)
LIPASE SERPL-CCNC: 40 U/L (ref 4–60)
LYMPHOCYTES # BLD AUTO: 2.8 K/UL (ref 1–4.8)
LYMPHOCYTES NFR BLD: 38 % (ref 18–48)
MCH RBC QN AUTO: 31.3 PG (ref 27–31)
MCHC RBC AUTO-ENTMCNC: 31.1 G/DL (ref 32–36)
MCV RBC AUTO: 100 FL (ref 82–98)
MONOCYTES # BLD AUTO: 0.5 K/UL (ref 0.3–1)
MONOCYTES NFR BLD: 6.1 % (ref 4–15)
NEUTROPHILS # BLD AUTO: 4 K/UL (ref 1.8–7.7)
NEUTROPHILS NFR BLD: 53.7 % (ref 38–73)
NRBC BLD-RTO: 0 /100 WBC
OHS CV EVENT MONITOR DAY: 0
OHS CV HOLTER LENGTH DECIMAL HOURS: 23.98
OHS CV HOLTER LENGTH HOURS: 23
OHS CV HOLTER LENGTH MINUTES: 59
PLATELET # BLD AUTO: 311 K/UL (ref 150–350)
PMV BLD AUTO: 11.5 FL (ref 9.2–12.9)
RBC # BLD AUTO: 4.8 M/UL (ref 4–5.4)
WBC # BLD AUTO: 7.48 K/UL (ref 3.9–12.7)

## 2021-03-02 PROCEDURE — 85025 COMPLETE CBC W/AUTO DIFF WBC: CPT

## 2021-03-02 PROCEDURE — 3074F SYST BP LT 130 MM HG: CPT | Mod: CPTII,S$GLB,, | Performed by: PHYSICIAN ASSISTANT

## 2021-03-02 PROCEDURE — 1101F PR PT FALLS ASSESS DOC 0-1 FALLS W/OUT INJ PAST YR: ICD-10-PCS | Mod: CPTII,S$GLB,, | Performed by: PHYSICIAN ASSISTANT

## 2021-03-02 PROCEDURE — 3008F PR BODY MASS INDEX (BMI) DOCUMENTED: ICD-10-PCS | Mod: CPTII,S$GLB,, | Performed by: PHYSICIAN ASSISTANT

## 2021-03-02 PROCEDURE — 1159F MED LIST DOCD IN RCRD: CPT | Mod: S$GLB,,, | Performed by: PHYSICIAN ASSISTANT

## 2021-03-02 PROCEDURE — 99214 PR OFFICE/OUTPT VISIT, EST, LEVL IV, 30-39 MIN: ICD-10-PCS | Mod: S$GLB,,, | Performed by: PHYSICIAN ASSISTANT

## 2021-03-02 PROCEDURE — 99214 OFFICE O/P EST MOD 30 MIN: CPT | Mod: S$GLB,,, | Performed by: PHYSICIAN ASSISTANT

## 2021-03-02 PROCEDURE — 1159F PR MEDICATION LIST DOCUMENTED IN MEDICAL RECORD: ICD-10-PCS | Mod: S$GLB,,, | Performed by: PHYSICIAN ASSISTANT

## 2021-03-02 PROCEDURE — 3078F DIAST BP <80 MM HG: CPT | Mod: CPTII,S$GLB,, | Performed by: PHYSICIAN ASSISTANT

## 2021-03-02 PROCEDURE — 1125F PR PAIN SEVERITY QUANTIFIED, PAIN PRESENT: ICD-10-PCS | Mod: S$GLB,,, | Performed by: PHYSICIAN ASSISTANT

## 2021-03-02 PROCEDURE — 1101F PT FALLS ASSESS-DOCD LE1/YR: CPT | Mod: CPTII,S$GLB,, | Performed by: PHYSICIAN ASSISTANT

## 2021-03-02 PROCEDURE — 1125F AMNT PAIN NOTED PAIN PRSNT: CPT | Mod: S$GLB,,, | Performed by: PHYSICIAN ASSISTANT

## 2021-03-02 PROCEDURE — 83690 ASSAY OF LIPASE: CPT

## 2021-03-02 PROCEDURE — 99999 PR PBB SHADOW E&M-EST. PATIENT-LVL IV: ICD-10-PCS | Mod: PBBFAC,,, | Performed by: PHYSICIAN ASSISTANT

## 2021-03-02 PROCEDURE — 3288F PR FALLS RISK ASSESSMENT DOCUMENTED: ICD-10-PCS | Mod: CPTII,S$GLB,, | Performed by: PHYSICIAN ASSISTANT

## 2021-03-02 PROCEDURE — 3288F FALL RISK ASSESSMENT DOCD: CPT | Mod: CPTII,S$GLB,, | Performed by: PHYSICIAN ASSISTANT

## 2021-03-02 PROCEDURE — 3078F PR MOST RECENT DIASTOLIC BLOOD PRESSURE < 80 MM HG: ICD-10-PCS | Mod: CPTII,S$GLB,, | Performed by: PHYSICIAN ASSISTANT

## 2021-03-02 PROCEDURE — 3074F PR MOST RECENT SYSTOLIC BLOOD PRESSURE < 130 MM HG: ICD-10-PCS | Mod: CPTII,S$GLB,, | Performed by: PHYSICIAN ASSISTANT

## 2021-03-02 PROCEDURE — 3008F BODY MASS INDEX DOCD: CPT | Mod: CPTII,S$GLB,, | Performed by: PHYSICIAN ASSISTANT

## 2021-03-02 PROCEDURE — 99999 PR PBB SHADOW E&M-EST. PATIENT-LVL IV: CPT | Mod: PBBFAC,,, | Performed by: PHYSICIAN ASSISTANT

## 2021-03-02 PROCEDURE — 36415 COLL VENOUS BLD VENIPUNCTURE: CPT | Mod: PO

## 2021-03-02 RX ORDER — OXYCODONE AND ACETAMINOPHEN 5; 325 MG/1; MG/1
1 TABLET ORAL EVERY 4 HOURS PRN
Qty: 30 TABLET | Refills: 0 | Status: SHIPPED | OUTPATIENT
Start: 2021-03-02 | End: 2022-08-15

## 2021-03-08 ENCOUNTER — OFFICE VISIT (OUTPATIENT)
Dept: CARDIOLOGY | Facility: CLINIC | Age: 66
End: 2021-03-08
Payer: COMMERCIAL

## 2021-03-08 ENCOUNTER — TELEPHONE (OUTPATIENT)
Dept: SMOKING CESSATION | Facility: CLINIC | Age: 66
End: 2021-03-08

## 2021-03-08 ENCOUNTER — TELEPHONE (OUTPATIENT)
Dept: FAMILY MEDICINE | Facility: CLINIC | Age: 66
End: 2021-03-08

## 2021-03-08 VITALS
WEIGHT: 265 LBS | HEART RATE: 84 BPM | SYSTOLIC BLOOD PRESSURE: 128 MMHG | BODY MASS INDEX: 45.24 KG/M2 | OXYGEN SATURATION: 98 % | DIASTOLIC BLOOD PRESSURE: 70 MMHG | HEIGHT: 64 IN

## 2021-03-08 DIAGNOSIS — I48.20 CHRONIC ATRIAL FIBRILLATION: ICD-10-CM

## 2021-03-08 DIAGNOSIS — I48.0 PAROXYSMAL ATRIAL FIBRILLATION: ICD-10-CM

## 2021-03-08 DIAGNOSIS — I73.9 PVD (PERIPHERAL VASCULAR DISEASE): ICD-10-CM

## 2021-03-08 DIAGNOSIS — I10 ESSENTIAL HYPERTENSION: ICD-10-CM

## 2021-03-08 DIAGNOSIS — E78.2 MIXED HYPERLIPIDEMIA: ICD-10-CM

## 2021-03-08 DIAGNOSIS — R06.09 DOE (DYSPNEA ON EXERTION): Primary | ICD-10-CM

## 2021-03-08 PROCEDURE — 99214 PR OFFICE/OUTPT VISIT, EST, LEVL IV, 30-39 MIN: ICD-10-PCS | Mod: S$GLB,,, | Performed by: INTERNAL MEDICINE

## 2021-03-08 PROCEDURE — 1126F PR PAIN SEVERITY QUANTIFIED, NO PAIN PRESENT: ICD-10-PCS | Mod: S$GLB,,, | Performed by: INTERNAL MEDICINE

## 2021-03-08 PROCEDURE — 1159F PR MEDICATION LIST DOCUMENTED IN MEDICAL RECORD: ICD-10-PCS | Mod: S$GLB,,, | Performed by: INTERNAL MEDICINE

## 2021-03-08 PROCEDURE — 3074F SYST BP LT 130 MM HG: CPT | Mod: CPTII,S$GLB,, | Performed by: INTERNAL MEDICINE

## 2021-03-08 PROCEDURE — 3074F PR MOST RECENT SYSTOLIC BLOOD PRESSURE < 130 MM HG: ICD-10-PCS | Mod: CPTII,S$GLB,, | Performed by: INTERNAL MEDICINE

## 2021-03-08 PROCEDURE — 3078F PR MOST RECENT DIASTOLIC BLOOD PRESSURE < 80 MM HG: ICD-10-PCS | Mod: CPTII,S$GLB,, | Performed by: INTERNAL MEDICINE

## 2021-03-08 PROCEDURE — 1101F PT FALLS ASSESS-DOCD LE1/YR: CPT | Mod: CPTII,S$GLB,, | Performed by: INTERNAL MEDICINE

## 2021-03-08 PROCEDURE — 3008F PR BODY MASS INDEX (BMI) DOCUMENTED: ICD-10-PCS | Mod: CPTII,S$GLB,, | Performed by: INTERNAL MEDICINE

## 2021-03-08 PROCEDURE — 99214 OFFICE O/P EST MOD 30 MIN: CPT | Mod: S$GLB,,, | Performed by: INTERNAL MEDICINE

## 2021-03-08 PROCEDURE — 99999 PR PBB SHADOW E&M-EST. PATIENT-LVL IV: CPT | Mod: PBBFAC,,, | Performed by: INTERNAL MEDICINE

## 2021-03-08 PROCEDURE — 3288F PR FALLS RISK ASSESSMENT DOCUMENTED: ICD-10-PCS | Mod: CPTII,S$GLB,, | Performed by: INTERNAL MEDICINE

## 2021-03-08 PROCEDURE — 1126F AMNT PAIN NOTED NONE PRSNT: CPT | Mod: S$GLB,,, | Performed by: INTERNAL MEDICINE

## 2021-03-08 PROCEDURE — 1159F MED LIST DOCD IN RCRD: CPT | Mod: S$GLB,,, | Performed by: INTERNAL MEDICINE

## 2021-03-08 PROCEDURE — 3078F DIAST BP <80 MM HG: CPT | Mod: CPTII,S$GLB,, | Performed by: INTERNAL MEDICINE

## 2021-03-08 PROCEDURE — 3008F BODY MASS INDEX DOCD: CPT | Mod: CPTII,S$GLB,, | Performed by: INTERNAL MEDICINE

## 2021-03-08 PROCEDURE — 3288F FALL RISK ASSESSMENT DOCD: CPT | Mod: CPTII,S$GLB,, | Performed by: INTERNAL MEDICINE

## 2021-03-08 PROCEDURE — 99999 PR PBB SHADOW E&M-EST. PATIENT-LVL IV: ICD-10-PCS | Mod: PBBFAC,,, | Performed by: INTERNAL MEDICINE

## 2021-03-08 PROCEDURE — 1101F PR PT FALLS ASSESS DOC 0-1 FALLS W/OUT INJ PAST YR: ICD-10-PCS | Mod: CPTII,S$GLB,, | Performed by: INTERNAL MEDICINE

## 2021-03-08 RX ORDER — METOPROLOL TARTRATE 50 MG/1
50 TABLET ORAL 2 TIMES DAILY
Qty: 180 TABLET | Refills: 3 | Status: SHIPPED | OUTPATIENT
Start: 2021-03-08 | End: 2021-07-29

## 2021-03-09 ENCOUNTER — PATIENT MESSAGE (OUTPATIENT)
Dept: FAMILY MEDICINE | Facility: CLINIC | Age: 66
End: 2021-03-09

## 2021-03-11 ENCOUNTER — OFFICE VISIT (OUTPATIENT)
Dept: FAMILY MEDICINE | Facility: CLINIC | Age: 66
End: 2021-03-11
Payer: COMMERCIAL

## 2021-03-11 VITALS
TEMPERATURE: 98 F | DIASTOLIC BLOOD PRESSURE: 74 MMHG | HEART RATE: 76 BPM | BODY MASS INDEX: 46.43 KG/M2 | OXYGEN SATURATION: 99 % | WEIGHT: 270.5 LBS | SYSTOLIC BLOOD PRESSURE: 118 MMHG | RESPIRATION RATE: 18 BRPM

## 2021-03-11 DIAGNOSIS — F41.9 ANXIETY: ICD-10-CM

## 2021-03-11 DIAGNOSIS — K21.9 GASTROESOPHAGEAL REFLUX DISEASE WITHOUT ESOPHAGITIS: ICD-10-CM

## 2021-03-11 DIAGNOSIS — D17.1 LIPOMA OF TORSO: ICD-10-CM

## 2021-03-11 DIAGNOSIS — K85.80 OTHER ACUTE PANCREATITIS WITHOUT INFECTION OR NECROSIS: Primary | ICD-10-CM

## 2021-03-11 DIAGNOSIS — K85.80 OTHER ACUTE PANCREATITIS WITHOUT INFECTION OR NECROSIS: ICD-10-CM

## 2021-03-11 PROCEDURE — 3078F PR MOST RECENT DIASTOLIC BLOOD PRESSURE < 80 MM HG: ICD-10-PCS | Mod: CPTII,S$GLB,, | Performed by: FAMILY MEDICINE

## 2021-03-11 PROCEDURE — 1125F AMNT PAIN NOTED PAIN PRSNT: CPT | Mod: S$GLB,,, | Performed by: FAMILY MEDICINE

## 2021-03-11 PROCEDURE — 1101F PT FALLS ASSESS-DOCD LE1/YR: CPT | Mod: CPTII,S$GLB,, | Performed by: FAMILY MEDICINE

## 2021-03-11 PROCEDURE — 1159F PR MEDICATION LIST DOCUMENTED IN MEDICAL RECORD: ICD-10-PCS | Mod: S$GLB,,, | Performed by: FAMILY MEDICINE

## 2021-03-11 PROCEDURE — 1101F PR PT FALLS ASSESS DOC 0-1 FALLS W/OUT INJ PAST YR: ICD-10-PCS | Mod: CPTII,S$GLB,, | Performed by: FAMILY MEDICINE

## 2021-03-11 PROCEDURE — 99214 PR OFFICE/OUTPT VISIT, EST, LEVL IV, 30-39 MIN: ICD-10-PCS | Mod: S$GLB,,, | Performed by: FAMILY MEDICINE

## 2021-03-11 PROCEDURE — 3078F DIAST BP <80 MM HG: CPT | Mod: CPTII,S$GLB,, | Performed by: FAMILY MEDICINE

## 2021-03-11 PROCEDURE — 3288F FALL RISK ASSESSMENT DOCD: CPT | Mod: CPTII,S$GLB,, | Performed by: FAMILY MEDICINE

## 2021-03-11 PROCEDURE — 99999 PR PBB SHADOW E&M-EST. PATIENT-LVL IV: ICD-10-PCS | Mod: PBBFAC,,, | Performed by: FAMILY MEDICINE

## 2021-03-11 PROCEDURE — 3288F PR FALLS RISK ASSESSMENT DOCUMENTED: ICD-10-PCS | Mod: CPTII,S$GLB,, | Performed by: FAMILY MEDICINE

## 2021-03-11 PROCEDURE — 3074F PR MOST RECENT SYSTOLIC BLOOD PRESSURE < 130 MM HG: ICD-10-PCS | Mod: CPTII,S$GLB,, | Performed by: FAMILY MEDICINE

## 2021-03-11 PROCEDURE — 99214 OFFICE O/P EST MOD 30 MIN: CPT | Mod: S$GLB,,, | Performed by: FAMILY MEDICINE

## 2021-03-11 PROCEDURE — 3074F SYST BP LT 130 MM HG: CPT | Mod: CPTII,S$GLB,, | Performed by: FAMILY MEDICINE

## 2021-03-11 PROCEDURE — 1159F MED LIST DOCD IN RCRD: CPT | Mod: S$GLB,,, | Performed by: FAMILY MEDICINE

## 2021-03-11 PROCEDURE — 99999 PR PBB SHADOW E&M-EST. PATIENT-LVL IV: CPT | Mod: PBBFAC,,, | Performed by: FAMILY MEDICINE

## 2021-03-11 PROCEDURE — 1125F PR PAIN SEVERITY QUANTIFIED, PAIN PRESENT: ICD-10-PCS | Mod: S$GLB,,, | Performed by: FAMILY MEDICINE

## 2021-03-11 PROCEDURE — 3008F BODY MASS INDEX DOCD: CPT | Mod: CPTII,S$GLB,, | Performed by: FAMILY MEDICINE

## 2021-03-11 PROCEDURE — 3008F PR BODY MASS INDEX (BMI) DOCUMENTED: ICD-10-PCS | Mod: CPTII,S$GLB,, | Performed by: FAMILY MEDICINE

## 2021-03-11 RX ORDER — SPIRONOLACTONE 25 MG/1
25 TABLET ORAL DAILY
COMMUNITY
Start: 2021-03-09 | End: 2021-04-08

## 2021-03-11 RX ORDER — PANTOPRAZOLE SODIUM 20 MG/1
20 TABLET, DELAYED RELEASE ORAL DAILY
Qty: 30 TABLET | Refills: 0 | Status: SHIPPED | OUTPATIENT
Start: 2021-03-11 | End: 2021-03-12

## 2021-03-11 RX ORDER — ONDANSETRON 8 MG/1
8 TABLET, ORALLY DISINTEGRATING ORAL EVERY 6 HOURS PRN
Qty: 60 TABLET | Refills: 2 | Status: SHIPPED | OUTPATIENT
Start: 2021-03-11 | End: 2023-03-29

## 2021-03-11 RX ORDER — SERTRALINE HYDROCHLORIDE 50 MG/1
50 TABLET, FILM COATED ORAL DAILY
Qty: 30 TABLET | Refills: 0 | Status: SHIPPED | OUTPATIENT
Start: 2021-03-11 | End: 2021-04-01 | Stop reason: SDUPTHER

## 2021-03-12 RX ORDER — PANTOPRAZOLE SODIUM 20 MG/1
TABLET, DELAYED RELEASE ORAL
Qty: 90 TABLET | Refills: 0 | Status: SHIPPED | OUTPATIENT
Start: 2021-03-12 | End: 2021-04-08

## 2021-03-15 ENCOUNTER — PATIENT OUTREACH (OUTPATIENT)
Dept: ADMINISTRATIVE | Facility: OTHER | Age: 66
End: 2021-03-15

## 2021-03-17 ENCOUNTER — OFFICE VISIT (OUTPATIENT)
Dept: GASTROENTEROLOGY | Facility: CLINIC | Age: 66
End: 2021-03-17
Payer: COMMERCIAL

## 2021-03-17 DIAGNOSIS — K85.80 OTHER ACUTE PANCREATITIS WITHOUT INFECTION OR NECROSIS: Primary | ICD-10-CM

## 2021-03-17 PROCEDURE — 1101F PR PT FALLS ASSESS DOC 0-1 FALLS W/OUT INJ PAST YR: ICD-10-PCS | Mod: CPTII,S$GLB,, | Performed by: INTERNAL MEDICINE

## 2021-03-17 PROCEDURE — 99999 PR PBB SHADOW E&M-EST. PATIENT-LVL III: ICD-10-PCS | Mod: PBBFAC,,, | Performed by: INTERNAL MEDICINE

## 2021-03-17 PROCEDURE — 99999 PR PBB SHADOW E&M-EST. PATIENT-LVL III: CPT | Mod: PBBFAC,,, | Performed by: INTERNAL MEDICINE

## 2021-03-17 PROCEDURE — 3288F FALL RISK ASSESSMENT DOCD: CPT | Mod: CPTII,S$GLB,, | Performed by: INTERNAL MEDICINE

## 2021-03-17 PROCEDURE — 1101F PT FALLS ASSESS-DOCD LE1/YR: CPT | Mod: CPTII,S$GLB,, | Performed by: INTERNAL MEDICINE

## 2021-03-17 PROCEDURE — 1159F MED LIST DOCD IN RCRD: CPT | Mod: S$GLB,,, | Performed by: INTERNAL MEDICINE

## 2021-03-17 PROCEDURE — 3288F PR FALLS RISK ASSESSMENT DOCUMENTED: ICD-10-PCS | Mod: CPTII,S$GLB,, | Performed by: INTERNAL MEDICINE

## 2021-03-17 PROCEDURE — 1159F PR MEDICATION LIST DOCUMENTED IN MEDICAL RECORD: ICD-10-PCS | Mod: S$GLB,,, | Performed by: INTERNAL MEDICINE

## 2021-03-17 PROCEDURE — 99213 PR OFFICE/OUTPT VISIT, EST, LEVL III, 20-29 MIN: ICD-10-PCS | Mod: S$GLB,,, | Performed by: INTERNAL MEDICINE

## 2021-03-17 PROCEDURE — 99213 OFFICE O/P EST LOW 20 MIN: CPT | Mod: S$GLB,,, | Performed by: INTERNAL MEDICINE

## 2021-03-18 ENCOUNTER — IMMUNIZATION (OUTPATIENT)
Dept: OBSTETRICS AND GYNECOLOGY | Facility: CLINIC | Age: 66
End: 2021-03-18
Payer: COMMERCIAL

## 2021-03-18 DIAGNOSIS — Z23 NEED FOR VACCINATION: Primary | ICD-10-CM

## 2021-03-18 PROCEDURE — 0002A COVID-19, MRNA, LNP-S, PF, 30 MCG/0.3 ML DOSE VACCINE: ICD-10-PCS | Mod: CV19,S$GLB,, | Performed by: FAMILY MEDICINE

## 2021-03-18 PROCEDURE — 91300 COVID-19, MRNA, LNP-S, PF, 30 MCG/0.3 ML DOSE VACCINE: CPT | Mod: S$GLB,,, | Performed by: FAMILY MEDICINE

## 2021-03-18 PROCEDURE — 0002A COVID-19, MRNA, LNP-S, PF, 30 MCG/0.3 ML DOSE VACCINE: CPT | Mod: CV19,S$GLB,, | Performed by: FAMILY MEDICINE

## 2021-03-18 PROCEDURE — 91300 COVID-19, MRNA, LNP-S, PF, 30 MCG/0.3 ML DOSE VACCINE: ICD-10-PCS | Mod: S$GLB,,, | Performed by: FAMILY MEDICINE

## 2021-04-01 ENCOUNTER — OFFICE VISIT (OUTPATIENT)
Dept: FAMILY MEDICINE | Facility: CLINIC | Age: 66
End: 2021-04-01
Payer: COMMERCIAL

## 2021-04-01 ENCOUNTER — LAB VISIT (OUTPATIENT)
Dept: LAB | Facility: HOSPITAL | Age: 66
End: 2021-04-01
Attending: FAMILY MEDICINE
Payer: COMMERCIAL

## 2021-04-01 VITALS
OXYGEN SATURATION: 97 % | RESPIRATION RATE: 18 BRPM | WEIGHT: 259.5 LBS | SYSTOLIC BLOOD PRESSURE: 100 MMHG | DIASTOLIC BLOOD PRESSURE: 70 MMHG | BODY MASS INDEX: 44.3 KG/M2 | HEART RATE: 68 BPM | TEMPERATURE: 98 F | HEIGHT: 64 IN

## 2021-04-01 DIAGNOSIS — I48.20 CHRONIC ATRIAL FIBRILLATION: ICD-10-CM

## 2021-04-01 DIAGNOSIS — I25.10 CORONARY ARTERY DISEASE, ANGINA PRESENCE UNSPECIFIED, UNSPECIFIED VESSEL OR LESION TYPE, UNSPECIFIED WHETHER NATIVE OR TRANSPLANTED HEART: ICD-10-CM

## 2021-04-01 DIAGNOSIS — E11.9 TYPE 2 DIABETES MELLITUS WITHOUT COMPLICATION, WITHOUT LONG-TERM CURRENT USE OF INSULIN: ICD-10-CM

## 2021-04-01 DIAGNOSIS — F41.9 ANXIETY: ICD-10-CM

## 2021-04-01 DIAGNOSIS — I73.9 PVD (PERIPHERAL VASCULAR DISEASE): ICD-10-CM

## 2021-04-01 DIAGNOSIS — I70.0 AORTIC ATHEROSCLEROSIS: ICD-10-CM

## 2021-04-01 DIAGNOSIS — Z00.00 ANNUAL PHYSICAL EXAM: Primary | ICD-10-CM

## 2021-04-01 DIAGNOSIS — G47.00 INSOMNIA, UNSPECIFIED TYPE: ICD-10-CM

## 2021-04-01 LAB
ALBUMIN SERPL BCP-MCNC: 3.3 G/DL (ref 3.5–5.2)
ALP SERPL-CCNC: 90 U/L (ref 55–135)
ALT SERPL W/O P-5'-P-CCNC: 11 U/L (ref 10–44)
ANION GAP SERPL CALC-SCNC: 9 MMOL/L (ref 8–16)
AST SERPL-CCNC: 16 U/L (ref 10–40)
BASOPHILS # BLD AUTO: 0.04 K/UL (ref 0–0.2)
BASOPHILS NFR BLD: 0.5 % (ref 0–1.9)
BILIRUB SERPL-MCNC: 1.1 MG/DL (ref 0.1–1)
BUN SERPL-MCNC: 20 MG/DL (ref 8–23)
CALCIUM SERPL-MCNC: 9.5 MG/DL (ref 8.7–10.5)
CHLORIDE SERPL-SCNC: 101 MMOL/L (ref 95–110)
CHOLEST SERPL-MCNC: 134 MG/DL (ref 120–199)
CHOLEST/HDLC SERPL: 3.4 {RATIO} (ref 2–5)
CO2 SERPL-SCNC: 29 MMOL/L (ref 23–29)
CREAT SERPL-MCNC: 1.4 MG/DL (ref 0.5–1.4)
DIFFERENTIAL METHOD: ABNORMAL
EOSINOPHIL # BLD AUTO: 0.1 K/UL (ref 0–0.5)
EOSINOPHIL NFR BLD: 1.7 % (ref 0–8)
ERYTHROCYTE [DISTWIDTH] IN BLOOD BY AUTOMATED COUNT: 14 % (ref 11.5–14.5)
EST. GFR  (AFRICAN AMERICAN): 44.9 ML/MIN/1.73 M^2
EST. GFR  (NON AFRICAN AMERICAN): 38.9 ML/MIN/1.73 M^2
GLUCOSE SERPL-MCNC: 99 MG/DL (ref 70–110)
HCT VFR BLD AUTO: 49.6 % (ref 37–48.5)
HDLC SERPL-MCNC: 40 MG/DL (ref 40–75)
HDLC SERPL: 29.9 % (ref 20–50)
HGB BLD-MCNC: 15.9 G/DL (ref 12–16)
IMM GRANULOCYTES # BLD AUTO: 0.03 K/UL (ref 0–0.04)
IMM GRANULOCYTES NFR BLD AUTO: 0.4 % (ref 0–0.5)
LDLC SERPL CALC-MCNC: 80 MG/DL (ref 63–159)
LYMPHOCYTES # BLD AUTO: 2.6 K/UL (ref 1–4.8)
LYMPHOCYTES NFR BLD: 34 % (ref 18–48)
MCH RBC QN AUTO: 31.7 PG (ref 27–31)
MCHC RBC AUTO-ENTMCNC: 32.1 G/DL (ref 32–36)
MCV RBC AUTO: 99 FL (ref 82–98)
MONOCYTES # BLD AUTO: 0.6 K/UL (ref 0.3–1)
MONOCYTES NFR BLD: 7.4 % (ref 4–15)
NEUTROPHILS # BLD AUTO: 4.2 K/UL (ref 1.8–7.7)
NEUTROPHILS NFR BLD: 56 % (ref 38–73)
NONHDLC SERPL-MCNC: 94 MG/DL
NRBC BLD-RTO: 0 /100 WBC
PLATELET # BLD AUTO: 310 K/UL (ref 150–450)
PMV BLD AUTO: 11.4 FL (ref 9.2–12.9)
POTASSIUM SERPL-SCNC: 4.8 MMOL/L (ref 3.5–5.1)
PROT SERPL-MCNC: 7.5 G/DL (ref 6–8.4)
RBC # BLD AUTO: 5.01 M/UL (ref 4–5.4)
SODIUM SERPL-SCNC: 139 MMOL/L (ref 136–145)
T4 FREE SERPL-MCNC: 1.21 NG/DL (ref 0.71–1.51)
TRIGL SERPL-MCNC: 70 MG/DL (ref 30–150)
TSH SERPL DL<=0.005 MIU/L-ACNC: 4.88 UIU/ML (ref 0.4–4)
WBC # BLD AUTO: 7.58 K/UL (ref 3.9–12.7)

## 2021-04-01 PROCEDURE — 3008F PR BODY MASS INDEX (BMI) DOCUMENTED: ICD-10-PCS | Mod: CPTII,S$GLB,, | Performed by: FAMILY MEDICINE

## 2021-04-01 PROCEDURE — 3008F BODY MASS INDEX DOCD: CPT | Mod: CPTII,S$GLB,, | Performed by: FAMILY MEDICINE

## 2021-04-01 PROCEDURE — 84439 ASSAY OF FREE THYROXINE: CPT | Performed by: FAMILY MEDICINE

## 2021-04-01 PROCEDURE — 99397 PER PM REEVAL EST PAT 65+ YR: CPT | Mod: S$GLB,,, | Performed by: FAMILY MEDICINE

## 2021-04-01 PROCEDURE — 3288F PR FALLS RISK ASSESSMENT DOCUMENTED: ICD-10-PCS | Mod: CPTII,S$GLB,, | Performed by: FAMILY MEDICINE

## 2021-04-01 PROCEDURE — 99999 PR PBB SHADOW E&M-EST. PATIENT-LVL IV: CPT | Mod: PBBFAC,,, | Performed by: FAMILY MEDICINE

## 2021-04-01 PROCEDURE — 1126F PR PAIN SEVERITY QUANTIFIED, NO PAIN PRESENT: ICD-10-PCS | Mod: S$GLB,,, | Performed by: FAMILY MEDICINE

## 2021-04-01 PROCEDURE — 80053 COMPREHEN METABOLIC PANEL: CPT | Performed by: FAMILY MEDICINE

## 2021-04-01 PROCEDURE — 99397 PR PREVENTIVE VISIT,EST,65 & OVER: ICD-10-PCS | Mod: S$GLB,,, | Performed by: FAMILY MEDICINE

## 2021-04-01 PROCEDURE — 84443 ASSAY THYROID STIM HORMONE: CPT | Performed by: FAMILY MEDICINE

## 2021-04-01 PROCEDURE — 1126F AMNT PAIN NOTED NONE PRSNT: CPT | Mod: S$GLB,,, | Performed by: FAMILY MEDICINE

## 2021-04-01 PROCEDURE — 1101F PR PT FALLS ASSESS DOC 0-1 FALLS W/OUT INJ PAST YR: ICD-10-PCS | Mod: CPTII,S$GLB,, | Performed by: FAMILY MEDICINE

## 2021-04-01 PROCEDURE — 85025 COMPLETE CBC W/AUTO DIFF WBC: CPT | Performed by: FAMILY MEDICINE

## 2021-04-01 PROCEDURE — 99999 PR PBB SHADOW E&M-EST. PATIENT-LVL IV: ICD-10-PCS | Mod: PBBFAC,,, | Performed by: FAMILY MEDICINE

## 2021-04-01 PROCEDURE — 1101F PT FALLS ASSESS-DOCD LE1/YR: CPT | Mod: CPTII,S$GLB,, | Performed by: FAMILY MEDICINE

## 2021-04-01 PROCEDURE — 80061 LIPID PANEL: CPT | Performed by: FAMILY MEDICINE

## 2021-04-01 PROCEDURE — 83036 HEMOGLOBIN GLYCOSYLATED A1C: CPT | Performed by: FAMILY MEDICINE

## 2021-04-01 PROCEDURE — 3288F FALL RISK ASSESSMENT DOCD: CPT | Mod: CPTII,S$GLB,, | Performed by: FAMILY MEDICINE

## 2021-04-01 PROCEDURE — 36415 COLL VENOUS BLD VENIPUNCTURE: CPT | Mod: PO | Performed by: FAMILY MEDICINE

## 2021-04-01 RX ORDER — SIMVASTATIN 20 MG/1
20 TABLET, FILM COATED ORAL NIGHTLY
COMMUNITY
Start: 2021-03-18 | End: 2021-04-01

## 2021-04-01 RX ORDER — TRAZODONE HYDROCHLORIDE 50 MG/1
TABLET ORAL
Qty: 60 TABLET | Refills: 1 | Status: SHIPPED | OUTPATIENT
Start: 2021-04-01 | End: 2021-06-16

## 2021-04-01 RX ORDER — RIVAROXABAN 20 MG/1
20 TABLET, FILM COATED ORAL DAILY
Qty: 30 TABLET | Refills: 5 | Status: SHIPPED | OUTPATIENT
Start: 2021-04-01 | End: 2021-05-05

## 2021-04-01 RX ORDER — METFORMIN HYDROCHLORIDE 500 MG/1
500 TABLET ORAL
Qty: 90 TABLET | Refills: 1 | Status: SHIPPED | OUTPATIENT
Start: 2021-04-01 | End: 2021-04-08

## 2021-04-01 RX ORDER — METOPROLOL TARTRATE 25 MG/1
25 TABLET, FILM COATED ORAL 2 TIMES DAILY
COMMUNITY
Start: 2021-03-09 | End: 2021-04-01

## 2021-04-01 RX ORDER — ATORVASTATIN CALCIUM 40 MG/1
40 TABLET, FILM COATED ORAL DAILY
Qty: 90 TABLET | Refills: 3 | Status: SHIPPED | OUTPATIENT
Start: 2021-04-01 | End: 2021-09-21 | Stop reason: SDUPTHER

## 2021-04-01 RX ORDER — SERTRALINE HYDROCHLORIDE 50 MG/1
50 TABLET, FILM COATED ORAL DAILY
Qty: 90 TABLET | Refills: 1 | Status: SHIPPED | OUTPATIENT
Start: 2021-04-01 | End: 2021-07-29 | Stop reason: SDUPTHER

## 2021-04-02 LAB
ESTIMATED AVG GLUCOSE: 114 MG/DL (ref 68–131)
HBA1C MFR BLD: 5.6 % (ref 4–5.6)

## 2021-04-06 ENCOUNTER — PATIENT MESSAGE (OUTPATIENT)
Dept: FAMILY MEDICINE | Facility: CLINIC | Age: 66
End: 2021-04-06

## 2021-04-08 ENCOUNTER — OFFICE VISIT (OUTPATIENT)
Dept: FAMILY MEDICINE | Facility: CLINIC | Age: 66
End: 2021-04-08
Payer: COMMERCIAL

## 2021-04-08 DIAGNOSIS — I48.20 CHRONIC ATRIAL FIBRILLATION: ICD-10-CM

## 2021-04-08 DIAGNOSIS — K21.9 GASTROESOPHAGEAL REFLUX DISEASE WITHOUT ESOPHAGITIS: ICD-10-CM

## 2021-04-08 DIAGNOSIS — E11.9 TYPE 2 DIABETES MELLITUS WITHOUT COMPLICATION, WITHOUT LONG-TERM CURRENT USE OF INSULIN: ICD-10-CM

## 2021-04-08 DIAGNOSIS — R79.89 ELEVATED TSH: ICD-10-CM

## 2021-04-08 DIAGNOSIS — I10 ESSENTIAL HYPERTENSION: Primary | ICD-10-CM

## 2021-04-08 DIAGNOSIS — R80.9 MICROALBUMINURIA: ICD-10-CM

## 2021-04-08 DIAGNOSIS — K85.80 OTHER ACUTE PANCREATITIS WITHOUT INFECTION OR NECROSIS: ICD-10-CM

## 2021-04-08 PROCEDURE — 99214 PR OFFICE/OUTPT VISIT, EST, LEVL IV, 30-39 MIN: ICD-10-PCS | Mod: 95,,, | Performed by: FAMILY MEDICINE

## 2021-04-08 PROCEDURE — 99214 OFFICE O/P EST MOD 30 MIN: CPT | Mod: 95,,, | Performed by: FAMILY MEDICINE

## 2021-04-08 RX ORDER — LOSARTAN POTASSIUM 25 MG/1
25 TABLET ORAL DAILY
Qty: 90 TABLET | Refills: 1 | Status: SHIPPED | OUTPATIENT
Start: 2021-04-08 | End: 2021-09-21 | Stop reason: SDUPTHER

## 2021-04-08 RX ORDER — FAMOTIDINE 20 MG/1
20 TABLET, FILM COATED ORAL 2 TIMES DAILY PRN
Qty: 60 TABLET | Refills: 2 | Status: SHIPPED | OUTPATIENT
Start: 2021-04-08 | End: 2023-01-26 | Stop reason: ALTCHOICE

## 2021-04-28 ENCOUNTER — PATIENT MESSAGE (OUTPATIENT)
Dept: FAMILY MEDICINE | Facility: CLINIC | Age: 66
End: 2021-04-28

## 2021-05-04 DIAGNOSIS — I48.20 CHRONIC ATRIAL FIBRILLATION: ICD-10-CM

## 2021-05-04 DIAGNOSIS — E78.5 HYPERLIPIDEMIA, UNSPECIFIED HYPERLIPIDEMIA TYPE: ICD-10-CM

## 2021-05-05 RX ORDER — SIMVASTATIN 20 MG/1
TABLET, FILM COATED ORAL
Qty: 90 TABLET | OUTPATIENT
Start: 2021-05-05

## 2021-05-05 RX ORDER — RIVAROXABAN 20 MG/1
TABLET, FILM COATED ORAL
Qty: 90 TABLET | Refills: 1 | Status: SHIPPED | OUTPATIENT
Start: 2021-05-05 | End: 2021-09-21 | Stop reason: SDUPTHER

## 2021-05-11 NOTE — PROGRESS NOTES
Chief Complaint   Patient presents with    Cough     stared 2 weeks ago     lt earache     earche stared this week     yellow mocus    Sore Throat    Chest Congestion       Sandee Evans is a 61 y.o. female who presents per the Chief Complaint.  Pt is known to this practice but has not been seen by me previously.  All known chronic medical issues have been documented.          Cough   This is a new problem. The current episode started 1 to 4 weeks ago. The problem has been unchanged. The problem occurs every few minutes. The cough is productive of sputum. Associated symptoms include nasal congestion, rhinorrhea and a sore throat. Pertinent negatives include no chest pain, chills, ear congestion, ear pain, fever, headaches, heartburn, hemoptysis, myalgias, postnasal drip, rash, shortness of breath, sweats, weight loss or wheezing. Nothing aggravates the symptoms. She has tried OTC cough suppressant for the symptoms. The treatment provided mild relief. There is no history of asthma, bronchiectasis, bronchitis, COPD, emphysema, environmental allergies or pneumonia.   URI    This is a new problem. The current episode started 1 to 4 weeks ago. The problem has been gradually improving. There has been no fever. Associated symptoms include congestion, coughing, a plugged ear sensation, rhinorrhea and a sore throat. Pertinent negatives include no abdominal pain, chest pain, diarrhea, dysuria, ear pain, headaches, joint pain, joint swelling, nausea, neck pain, rash, sinus pain, sneezing, swollen glands, vomiting or wheezing. She has tried antihistamine and NSAIDs for the symptoms. The treatment provided moderate relief.        ROS  Review of Systems   Constitutional: Negative for chills, diaphoresis, fever and weight loss.   HENT: Positive for congestion, rhinorrhea and sore throat. Negative for ear pain, postnasal drip, sinus pressure, sneezing and trouble swallowing.    Eyes: Negative for pain and discharge.  "  Respiratory: Positive for cough. Negative for hemoptysis, shortness of breath and wheezing.    Cardiovascular: Negative for chest pain.   Gastrointestinal: Negative for abdominal pain, constipation, diarrhea, heartburn, nausea and vomiting.   Genitourinary: Negative for difficulty urinating, dysuria, flank pain and urgency.   Musculoskeletal: Negative.  Negative for joint pain, myalgias and neck pain.   Skin: Negative.  Negative for rash.   Allergic/Immunologic: Negative for environmental allergies, food allergies and immunocompromised state.   Neurological: Negative for dizziness, light-headedness and headaches.   Psychiatric/Behavioral: Negative.        Physical Exam  Vitals:    07/07/17 1152   BP: 110/84   Pulse: 92   Resp: 17   Temp: 99.4 °F (37.4 °C)    Body mass index is 50.53 kg/m².  Weight: 129.4 kg (285 lb 4.4 oz)   Height: 5' 3" (160 cm)     Physical Exam   Constitutional: She appears well-developed and well-nourished. She is cooperative.  Non-toxic appearance. She does not have a sickly appearance. She does not appear ill. No distress.   HENT:   Head: Normocephalic and atraumatic.   Right Ear: Hearing, external ear and ear canal normal. No tenderness. No foreign bodies. No mastoid tenderness. Tympanic membrane is bulging. Tympanic membrane is not injected, not scarred, not perforated, not erythematous and not retracted. No decreased hearing is noted.   Left Ear: Hearing, external ear and ear canal normal. No tenderness. No foreign bodies. No mastoid tenderness. Tympanic membrane is bulging. Tympanic membrane is not injected, not scarred, not perforated, not erythematous and not retracted. No decreased hearing is noted.   Nose: Mucosal edema and rhinorrhea present. No nose lacerations, sinus tenderness, nasal deformity, septal deviation or nasal septal hematoma. No epistaxis. Right sinus exhibits no maxillary sinus tenderness and no frontal sinus tenderness. Left sinus exhibits no maxillary sinus " tenderness and no frontal sinus tenderness.   Mouth/Throat: Uvula is midline and mucous membranes are normal. She does not have dentures. No oral lesions. No dental abscesses or dental caries. Posterior oropharyngeal erythema present. No oropharyngeal exudate, posterior oropharyngeal edema or tonsillar abscesses.   Eyes: Conjunctivae and EOM are normal. Pupils are equal, round, and reactive to light. Right eye exhibits no chemosis, no discharge and no exudate. No foreign body present in the right eye. Left eye exhibits no chemosis, no discharge and no exudate. No foreign body present in the left eye. No scleral icterus.   Neck: Normal range of motion and full passive range of motion without pain. No neck rigidity.   Cardiovascular: Normal rate, regular rhythm, S1 normal, S2 normal and normal heart sounds.  Exam reveals no gallop and no friction rub.    No murmur heard.  Pulmonary/Chest: Effort normal and breath sounds normal. She has no decreased breath sounds. She has no wheezes. She has no rhonchi. She has no rales.   Lymphadenopathy:        Head (right side): No submental, no submandibular, no tonsillar, no preauricular and no posterior auricular adenopathy present.        Head (left side): No submental, no submandibular, no tonsillar, no preauricular and no posterior auricular adenopathy present.   Neurological: She is alert.       Assessment & Plan    1. Acute sinusitis, recurrence not specified, unspecified location  Patient was advised to remain hydrated throughout illness and to use an antihistamine like loratadine or cetirizine daily.  I advised that a multi-symptom medication like Nyquil or Tylenol Cold and Flu or a sinus decongestant like Sudafed may raise blood pressure and should be used for no more than three days to alleviate symptoms.  If symptoms worsen, patient should follow up for further evaluation.  Short course of oral steroid given for symptom relief.  - methylPREDNISolone (MEDROL DOSEPACK) 4  mg tablet; use as directed  Dispense: 1 Package; Refill: 0      Follow up documented    ACTIVE MEDICAL ISSUES:  Documented in Problem List    PAST MEDICAL HISTORY  Documented    PAST SURGICAL HISTORY:  Documented    SOCIAL HISTORY:  Documented    FAMILY HISTORY:  Documented    ALLERGIES AND MEDICATIONS: updated and reviewed.  Documented    Health Maintenance       Date Due Completion Date    TETANUS VACCINE 12/28/1971 ---    Zoster Vaccine 12/28/2013 ---    Urine Microalbumin 01/13/2017 1/13/2016    Override on 7/22/2014: Not Clinically Appropriate    Foot Exam 08/05/2017 8/5/2016    Override on 8/7/2015: Done    Influenza Vaccine 08/01/2017 1/13/2016    Override on 1/1/2015: Done (3/18/15 pt stated had it done 1/2015 at [facility] with Dr. Dodd)    Eye Exam 12/27/2017 12/27/2016    Override on 12/27/2016: Done    Override on 3/20/2012: Done    Lipid Panel 05/08/2018 5/8/2017    Hemoglobin A1c 05/08/2018 5/8/2017    Override on 7/22/2014: Not Clinically Appropriate    Mammogram 05/17/2018 5/17/2016    Colonoscopy 08/02/2018 8/2/2013 (Done)    Override on 8/2/2013: Done    Pneumococcal PPSV23 (Medium Risk) (2) 12/28/2018 5/20/2013           Yes - the patient is able to be screened

## 2021-06-23 DIAGNOSIS — Z12.31 OTHER SCREENING MAMMOGRAM: ICD-10-CM

## 2021-06-29 ENCOUNTER — HOSPITAL ENCOUNTER (OUTPATIENT)
Dept: RADIOLOGY | Facility: HOSPITAL | Age: 66
Discharge: HOME | End: 2021-06-29
Attending: FAMILY MEDICINE
Payer: COMMERCIAL

## 2021-06-29 DIAGNOSIS — Z12.31 OTHER SCREENING MAMMOGRAM: ICD-10-CM

## 2021-06-29 PROCEDURE — 77063 BREAST TOMOSYNTHESIS BI: CPT | Mod: 26,,, | Performed by: RADIOLOGY

## 2021-06-29 PROCEDURE — 77067 SCR MAMMO BI INCL CAD: CPT | Mod: 26,,, | Performed by: RADIOLOGY

## 2021-06-29 PROCEDURE — 77063 MAMMO DIGITAL SCREENING BILAT WITH TOMO: ICD-10-PCS | Mod: 26,,, | Performed by: RADIOLOGY

## 2021-06-29 PROCEDURE — 77067 MAMMO DIGITAL SCREENING BILAT WITH TOMO: ICD-10-PCS | Mod: 26,,, | Performed by: RADIOLOGY

## 2021-06-29 PROCEDURE — 77067 SCR MAMMO BI INCL CAD: CPT | Mod: TC

## 2021-07-08 ENCOUNTER — OFFICE VISIT (OUTPATIENT)
Dept: FAMILY MEDICINE | Facility: CLINIC | Age: 66
End: 2021-07-08
Payer: COMMERCIAL

## 2021-07-08 VITALS
TEMPERATURE: 99 F | HEIGHT: 64 IN | SYSTOLIC BLOOD PRESSURE: 138 MMHG | OXYGEN SATURATION: 93 % | RESPIRATION RATE: 20 BRPM | WEIGHT: 290.38 LBS | DIASTOLIC BLOOD PRESSURE: 92 MMHG | HEART RATE: 82 BPM | BODY MASS INDEX: 49.57 KG/M2

## 2021-07-08 DIAGNOSIS — R06.02 SOB (SHORTNESS OF BREATH): Primary | ICD-10-CM

## 2021-07-08 DIAGNOSIS — R51.9 NONINTRACTABLE HEADACHE, UNSPECIFIED CHRONICITY PATTERN, UNSPECIFIED HEADACHE TYPE: ICD-10-CM

## 2021-07-08 DIAGNOSIS — F17.210 NICOTINE DEPENDENCE, CIGARETTES, UNCOMPLICATED: ICD-10-CM

## 2021-07-08 DIAGNOSIS — Z72.0 TOBACCO USE: ICD-10-CM

## 2021-07-08 DIAGNOSIS — I83.893 VARICOSE VEINS OF LEG WITH SWELLING, BILATERAL: ICD-10-CM

## 2021-07-08 DIAGNOSIS — L98.9 SKIN LESION: ICD-10-CM

## 2021-07-08 DIAGNOSIS — R60.0 BILATERAL LOWER EXTREMITY EDEMA: ICD-10-CM

## 2021-07-08 PROCEDURE — 3008F PR BODY MASS INDEX (BMI) DOCUMENTED: ICD-10-PCS | Mod: CPTII,S$GLB,, | Performed by: FAMILY MEDICINE

## 2021-07-08 PROCEDURE — 99214 OFFICE O/P EST MOD 30 MIN: CPT | Mod: S$GLB,,, | Performed by: FAMILY MEDICINE

## 2021-07-08 PROCEDURE — 3044F PR MOST RECENT HEMOGLOBIN A1C LEVEL <7.0%: ICD-10-PCS | Mod: CPTII,S$GLB,, | Performed by: FAMILY MEDICINE

## 2021-07-08 PROCEDURE — 3008F BODY MASS INDEX DOCD: CPT | Mod: CPTII,S$GLB,, | Performed by: FAMILY MEDICINE

## 2021-07-08 PROCEDURE — 1159F PR MEDICATION LIST DOCUMENTED IN MEDICAL RECORD: ICD-10-PCS | Mod: S$GLB,,, | Performed by: FAMILY MEDICINE

## 2021-07-08 PROCEDURE — 99214 PR OFFICE/OUTPT VISIT, EST, LEVL IV, 30-39 MIN: ICD-10-PCS | Mod: S$GLB,,, | Performed by: FAMILY MEDICINE

## 2021-07-08 PROCEDURE — 1101F PR PT FALLS ASSESS DOC 0-1 FALLS W/OUT INJ PAST YR: ICD-10-PCS | Mod: CPTII,S$GLB,, | Performed by: FAMILY MEDICINE

## 2021-07-08 PROCEDURE — 1126F AMNT PAIN NOTED NONE PRSNT: CPT | Mod: S$GLB,,, | Performed by: FAMILY MEDICINE

## 2021-07-08 PROCEDURE — 99999 PR PBB SHADOW E&M-EST. PATIENT-LVL V: CPT | Mod: PBBFAC,,, | Performed by: FAMILY MEDICINE

## 2021-07-08 PROCEDURE — 3288F FALL RISK ASSESSMENT DOCD: CPT | Mod: CPTII,S$GLB,, | Performed by: FAMILY MEDICINE

## 2021-07-08 PROCEDURE — 99999 PR PBB SHADOW E&M-EST. PATIENT-LVL V: ICD-10-PCS | Mod: PBBFAC,,, | Performed by: FAMILY MEDICINE

## 2021-07-08 PROCEDURE — 1126F PR PAIN SEVERITY QUANTIFIED, NO PAIN PRESENT: ICD-10-PCS | Mod: S$GLB,,, | Performed by: FAMILY MEDICINE

## 2021-07-08 PROCEDURE — 3044F HG A1C LEVEL LT 7.0%: CPT | Mod: CPTII,S$GLB,, | Performed by: FAMILY MEDICINE

## 2021-07-08 PROCEDURE — 1159F MED LIST DOCD IN RCRD: CPT | Mod: S$GLB,,, | Performed by: FAMILY MEDICINE

## 2021-07-08 PROCEDURE — 3288F PR FALLS RISK ASSESSMENT DOCUMENTED: ICD-10-PCS | Mod: CPTII,S$GLB,, | Performed by: FAMILY MEDICINE

## 2021-07-08 PROCEDURE — 1101F PT FALLS ASSESS-DOCD LE1/YR: CPT | Mod: CPTII,S$GLB,, | Performed by: FAMILY MEDICINE

## 2021-07-08 RX ORDER — FUROSEMIDE 20 MG/1
20 TABLET ORAL 2 TIMES DAILY
Qty: 60 TABLET | Refills: 0 | Status: ON HOLD | OUTPATIENT
Start: 2021-07-08 | End: 2021-07-24 | Stop reason: SDUPTHER

## 2021-07-08 RX ORDER — TRIAMCINOLONE ACETONIDE 1 MG/G
OINTMENT TOPICAL
Qty: 454 G | Refills: 11 | Status: SHIPPED | OUTPATIENT
Start: 2021-07-08 | End: 2022-10-21

## 2021-07-12 ENCOUNTER — TELEPHONE (OUTPATIENT)
Dept: NEUROLOGY | Facility: CLINIC | Age: 66
End: 2021-07-12

## 2021-07-12 ENCOUNTER — PATIENT OUTREACH (OUTPATIENT)
Dept: ADMINISTRATIVE | Facility: OTHER | Age: 66
End: 2021-07-12

## 2021-07-12 DIAGNOSIS — E11.9 TYPE 2 DIABETES MELLITUS WITHOUT COMPLICATION, UNSPECIFIED WHETHER LONG TERM INSULIN USE: Primary | ICD-10-CM

## 2021-07-13 ENCOUNTER — HOSPITAL ENCOUNTER (OUTPATIENT)
Dept: RADIOLOGY | Facility: HOSPITAL | Age: 66
Discharge: HOME | End: 2021-07-13
Attending: FAMILY MEDICINE
Payer: COMMERCIAL

## 2021-07-13 DIAGNOSIS — R06.02 SOB (SHORTNESS OF BREATH): ICD-10-CM

## 2021-07-13 DIAGNOSIS — R60.0 BILATERAL LOWER EXTREMITY EDEMA: ICD-10-CM

## 2021-07-13 PROCEDURE — 93970 EXTREMITY STUDY: CPT | Mod: TC

## 2021-07-13 PROCEDURE — 93970 EXTREMITY STUDY: CPT | Mod: 26,,, | Performed by: RADIOLOGY

## 2021-07-13 PROCEDURE — 93970 US LOWER EXTREMITY VEINS BILATERAL: ICD-10-PCS | Mod: 26,,, | Performed by: RADIOLOGY

## 2021-07-14 ENCOUNTER — OFFICE VISIT (OUTPATIENT)
Dept: NEUROLOGY | Facility: CLINIC | Age: 66
End: 2021-07-14
Payer: COMMERCIAL

## 2021-07-14 VITALS
BODY MASS INDEX: 47.09 KG/M2 | HEIGHT: 64 IN | DIASTOLIC BLOOD PRESSURE: 90 MMHG | SYSTOLIC BLOOD PRESSURE: 138 MMHG | HEART RATE: 88 BPM | WEIGHT: 275.81 LBS

## 2021-07-14 DIAGNOSIS — R51.9 NONINTRACTABLE HEADACHE, UNSPECIFIED CHRONICITY PATTERN, UNSPECIFIED HEADACHE TYPE: ICD-10-CM

## 2021-07-14 DIAGNOSIS — G47.33 OSA (OBSTRUCTIVE SLEEP APNEA): ICD-10-CM

## 2021-07-14 DIAGNOSIS — R41.3 MEMORY LOSS: Primary | ICD-10-CM

## 2021-07-14 PROCEDURE — 3008F BODY MASS INDEX DOCD: CPT | Mod: CPTII,S$GLB,, | Performed by: NEUROLOGICAL SURGERY

## 2021-07-14 PROCEDURE — 1101F PR PT FALLS ASSESS DOC 0-1 FALLS W/OUT INJ PAST YR: ICD-10-PCS | Mod: CPTII,S$GLB,, | Performed by: NEUROLOGICAL SURGERY

## 2021-07-14 PROCEDURE — 99203 OFFICE O/P NEW LOW 30 MIN: CPT | Mod: S$GLB,,, | Performed by: NEUROLOGICAL SURGERY

## 2021-07-14 PROCEDURE — 99999 PR PBB SHADOW E&M-EST. PATIENT-LVL V: CPT | Mod: PBBFAC,,, | Performed by: NEUROLOGICAL SURGERY

## 2021-07-14 PROCEDURE — 1101F PT FALLS ASSESS-DOCD LE1/YR: CPT | Mod: CPTII,S$GLB,, | Performed by: NEUROLOGICAL SURGERY

## 2021-07-14 PROCEDURE — 3075F SYST BP GE 130 - 139MM HG: CPT | Mod: CPTII,S$GLB,, | Performed by: NEUROLOGICAL SURGERY

## 2021-07-14 PROCEDURE — 3080F PR MOST RECENT DIASTOLIC BLOOD PRESSURE >= 90 MM HG: ICD-10-PCS | Mod: CPTII,S$GLB,, | Performed by: NEUROLOGICAL SURGERY

## 2021-07-14 PROCEDURE — 3288F PR FALLS RISK ASSESSMENT DOCUMENTED: ICD-10-PCS | Mod: CPTII,S$GLB,, | Performed by: NEUROLOGICAL SURGERY

## 2021-07-14 PROCEDURE — 1126F AMNT PAIN NOTED NONE PRSNT: CPT | Mod: CPTII,S$GLB,, | Performed by: NEUROLOGICAL SURGERY

## 2021-07-14 PROCEDURE — 3075F PR MOST RECENT SYSTOLIC BLOOD PRESS GE 130-139MM HG: ICD-10-PCS | Mod: CPTII,S$GLB,, | Performed by: NEUROLOGICAL SURGERY

## 2021-07-14 PROCEDURE — 1126F PR PAIN SEVERITY QUANTIFIED, NO PAIN PRESENT: ICD-10-PCS | Mod: CPTII,S$GLB,, | Performed by: NEUROLOGICAL SURGERY

## 2021-07-14 PROCEDURE — 3044F HG A1C LEVEL LT 7.0%: CPT | Mod: CPTII,S$GLB,, | Performed by: NEUROLOGICAL SURGERY

## 2021-07-14 PROCEDURE — 3288F FALL RISK ASSESSMENT DOCD: CPT | Mod: CPTII,S$GLB,, | Performed by: NEUROLOGICAL SURGERY

## 2021-07-14 PROCEDURE — 3008F PR BODY MASS INDEX (BMI) DOCUMENTED: ICD-10-PCS | Mod: CPTII,S$GLB,, | Performed by: NEUROLOGICAL SURGERY

## 2021-07-14 PROCEDURE — 99203 PR OFFICE/OUTPT VISIT, NEW, LEVL III, 30-44 MIN: ICD-10-PCS | Mod: S$GLB,,, | Performed by: NEUROLOGICAL SURGERY

## 2021-07-14 PROCEDURE — 99999 PR PBB SHADOW E&M-EST. PATIENT-LVL V: ICD-10-PCS | Mod: PBBFAC,,, | Performed by: NEUROLOGICAL SURGERY

## 2021-07-14 PROCEDURE — 3044F PR MOST RECENT HEMOGLOBIN A1C LEVEL <7.0%: ICD-10-PCS | Mod: CPTII,S$GLB,, | Performed by: NEUROLOGICAL SURGERY

## 2021-07-14 PROCEDURE — 3080F DIAST BP >= 90 MM HG: CPT | Mod: CPTII,S$GLB,, | Performed by: NEUROLOGICAL SURGERY

## 2021-07-15 ENCOUNTER — OFFICE VISIT (OUTPATIENT)
Dept: FAMILY MEDICINE | Facility: CLINIC | Age: 66
End: 2021-07-15
Payer: COMMERCIAL

## 2021-07-15 VITALS
TEMPERATURE: 98 F | RESPIRATION RATE: 20 BRPM | OXYGEN SATURATION: 98 % | HEART RATE: 87 BPM | DIASTOLIC BLOOD PRESSURE: 80 MMHG | WEIGHT: 275.13 LBS | SYSTOLIC BLOOD PRESSURE: 128 MMHG | BODY MASS INDEX: 46.97 KG/M2 | HEIGHT: 64 IN

## 2021-07-15 DIAGNOSIS — R60.0 BILATERAL LOWER EXTREMITY EDEMA: ICD-10-CM

## 2021-07-15 DIAGNOSIS — R06.02 SOB (SHORTNESS OF BREATH): Primary | ICD-10-CM

## 2021-07-15 PROCEDURE — 3288F PR FALLS RISK ASSESSMENT DOCUMENTED: ICD-10-PCS | Mod: CPTII,S$GLB,, | Performed by: FAMILY MEDICINE

## 2021-07-15 PROCEDURE — 1159F PR MEDICATION LIST DOCUMENTED IN MEDICAL RECORD: ICD-10-PCS | Mod: S$GLB,,, | Performed by: FAMILY MEDICINE

## 2021-07-15 PROCEDURE — 3008F BODY MASS INDEX DOCD: CPT | Mod: CPTII,S$GLB,, | Performed by: FAMILY MEDICINE

## 2021-07-15 PROCEDURE — 3008F PR BODY MASS INDEX (BMI) DOCUMENTED: ICD-10-PCS | Mod: CPTII,S$GLB,, | Performed by: FAMILY MEDICINE

## 2021-07-15 PROCEDURE — 99999 PR PBB SHADOW E&M-EST. PATIENT-LVL IV: ICD-10-PCS | Mod: PBBFAC,,, | Performed by: FAMILY MEDICINE

## 2021-07-15 PROCEDURE — 1101F PR PT FALLS ASSESS DOC 0-1 FALLS W/OUT INJ PAST YR: ICD-10-PCS | Mod: CPTII,S$GLB,, | Performed by: FAMILY MEDICINE

## 2021-07-15 PROCEDURE — 1126F AMNT PAIN NOTED NONE PRSNT: CPT | Mod: S$GLB,,, | Performed by: FAMILY MEDICINE

## 2021-07-15 PROCEDURE — 99999 PR PBB SHADOW E&M-EST. PATIENT-LVL IV: CPT | Mod: PBBFAC,,, | Performed by: FAMILY MEDICINE

## 2021-07-15 PROCEDURE — 1159F MED LIST DOCD IN RCRD: CPT | Mod: S$GLB,,, | Performed by: FAMILY MEDICINE

## 2021-07-15 PROCEDURE — 1126F PR PAIN SEVERITY QUANTIFIED, NO PAIN PRESENT: ICD-10-PCS | Mod: S$GLB,,, | Performed by: FAMILY MEDICINE

## 2021-07-15 PROCEDURE — 3288F FALL RISK ASSESSMENT DOCD: CPT | Mod: CPTII,S$GLB,, | Performed by: FAMILY MEDICINE

## 2021-07-15 PROCEDURE — 99214 OFFICE O/P EST MOD 30 MIN: CPT | Mod: S$GLB,,, | Performed by: FAMILY MEDICINE

## 2021-07-15 PROCEDURE — 1101F PT FALLS ASSESS-DOCD LE1/YR: CPT | Mod: CPTII,S$GLB,, | Performed by: FAMILY MEDICINE

## 2021-07-15 PROCEDURE — 99214 PR OFFICE/OUTPT VISIT, EST, LEVL IV, 30-39 MIN: ICD-10-PCS | Mod: S$GLB,,, | Performed by: FAMILY MEDICINE

## 2021-07-16 ENCOUNTER — OFFICE VISIT (OUTPATIENT)
Dept: CARDIOLOGY | Facility: CLINIC | Age: 66
End: 2021-07-16
Payer: COMMERCIAL

## 2021-07-16 VITALS
SYSTOLIC BLOOD PRESSURE: 130 MMHG | OXYGEN SATURATION: 86 % | DIASTOLIC BLOOD PRESSURE: 88 MMHG | BODY MASS INDEX: 46.67 KG/M2 | WEIGHT: 273.38 LBS | HEIGHT: 64 IN | HEART RATE: 102 BPM

## 2021-07-16 DIAGNOSIS — I48.20 CHRONIC ATRIAL FIBRILLATION: ICD-10-CM

## 2021-07-16 DIAGNOSIS — E78.2 MIXED HYPERLIPIDEMIA: ICD-10-CM

## 2021-07-16 DIAGNOSIS — I50.33 ACUTE ON CHRONIC DIASTOLIC CONGESTIVE HEART FAILURE: ICD-10-CM

## 2021-07-16 DIAGNOSIS — G47.33 OSA (OBSTRUCTIVE SLEEP APNEA): ICD-10-CM

## 2021-07-16 DIAGNOSIS — R06.09 DOE (DYSPNEA ON EXERTION): Primary | ICD-10-CM

## 2021-07-16 DIAGNOSIS — I10 ESSENTIAL HYPERTENSION: ICD-10-CM

## 2021-07-16 PROCEDURE — 3075F SYST BP GE 130 - 139MM HG: CPT | Mod: CPTII,S$GLB,, | Performed by: INTERNAL MEDICINE

## 2021-07-16 PROCEDURE — 99999 PR PBB SHADOW E&M-EST. PATIENT-LVL IV: CPT | Mod: PBBFAC,,, | Performed by: INTERNAL MEDICINE

## 2021-07-16 PROCEDURE — 99214 OFFICE O/P EST MOD 30 MIN: CPT | Mod: S$GLB,,, | Performed by: INTERNAL MEDICINE

## 2021-07-16 PROCEDURE — 93000 ELECTROCARDIOGRAM COMPLETE: CPT | Mod: S$GLB,,, | Performed by: INTERNAL MEDICINE

## 2021-07-16 PROCEDURE — 99999 PR PBB SHADOW E&M-EST. PATIENT-LVL IV: ICD-10-PCS | Mod: PBBFAC,,, | Performed by: INTERNAL MEDICINE

## 2021-07-16 PROCEDURE — 1159F PR MEDICATION LIST DOCUMENTED IN MEDICAL RECORD: ICD-10-PCS | Mod: S$GLB,,, | Performed by: INTERNAL MEDICINE

## 2021-07-16 PROCEDURE — 3288F FALL RISK ASSESSMENT DOCD: CPT | Mod: CPTII,S$GLB,, | Performed by: INTERNAL MEDICINE

## 2021-07-16 PROCEDURE — 1126F PR PAIN SEVERITY QUANTIFIED, NO PAIN PRESENT: ICD-10-PCS | Mod: S$GLB,,, | Performed by: INTERNAL MEDICINE

## 2021-07-16 PROCEDURE — 1159F MED LIST DOCD IN RCRD: CPT | Mod: S$GLB,,, | Performed by: INTERNAL MEDICINE

## 2021-07-16 PROCEDURE — 1101F PR PT FALLS ASSESS DOC 0-1 FALLS W/OUT INJ PAST YR: ICD-10-PCS | Mod: CPTII,S$GLB,, | Performed by: INTERNAL MEDICINE

## 2021-07-16 PROCEDURE — 1101F PT FALLS ASSESS-DOCD LE1/YR: CPT | Mod: CPTII,S$GLB,, | Performed by: INTERNAL MEDICINE

## 2021-07-16 PROCEDURE — 3288F PR FALLS RISK ASSESSMENT DOCUMENTED: ICD-10-PCS | Mod: CPTII,S$GLB,, | Performed by: INTERNAL MEDICINE

## 2021-07-16 PROCEDURE — 3079F DIAST BP 80-89 MM HG: CPT | Mod: CPTII,S$GLB,, | Performed by: INTERNAL MEDICINE

## 2021-07-16 PROCEDURE — 93000 EKG 12-LEAD: ICD-10-PCS | Mod: S$GLB,,, | Performed by: INTERNAL MEDICINE

## 2021-07-16 PROCEDURE — 99214 PR OFFICE/OUTPT VISIT, EST, LEVL IV, 30-39 MIN: ICD-10-PCS | Mod: S$GLB,,, | Performed by: INTERNAL MEDICINE

## 2021-07-16 PROCEDURE — 3075F PR MOST RECENT SYSTOLIC BLOOD PRESS GE 130-139MM HG: ICD-10-PCS | Mod: CPTII,S$GLB,, | Performed by: INTERNAL MEDICINE

## 2021-07-16 PROCEDURE — 3079F PR MOST RECENT DIASTOLIC BLOOD PRESSURE 80-89 MM HG: ICD-10-PCS | Mod: CPTII,S$GLB,, | Performed by: INTERNAL MEDICINE

## 2021-07-16 PROCEDURE — 3008F BODY MASS INDEX DOCD: CPT | Mod: CPTII,S$GLB,, | Performed by: INTERNAL MEDICINE

## 2021-07-16 PROCEDURE — 1126F AMNT PAIN NOTED NONE PRSNT: CPT | Mod: S$GLB,,, | Performed by: INTERNAL MEDICINE

## 2021-07-16 PROCEDURE — 3008F PR BODY MASS INDEX (BMI) DOCUMENTED: ICD-10-PCS | Mod: CPTII,S$GLB,, | Performed by: INTERNAL MEDICINE

## 2021-07-19 ENCOUNTER — PATIENT MESSAGE (OUTPATIENT)
Dept: FAMILY MEDICINE | Facility: CLINIC | Age: 66
End: 2021-07-19

## 2021-07-20 ENCOUNTER — HOSPITAL ENCOUNTER (INPATIENT)
Facility: HOSPITAL | Age: 66
LOS: 4 days | Discharge: HOME HEALTH | DRG: 291 | End: 2021-07-24
Attending: EMERGENCY MEDICINE | Admitting: HOSPITALIST
Payer: COMMERCIAL

## 2021-07-20 DIAGNOSIS — I48.91 ATRIAL FIBRILLATION, UNSPECIFIED TYPE: ICD-10-CM

## 2021-07-20 DIAGNOSIS — J40 BRONCHITIS WITH ACUTE WHEEZING: ICD-10-CM

## 2021-07-20 DIAGNOSIS — J81.0 ACUTE PULMONARY EDEMA: ICD-10-CM

## 2021-07-20 DIAGNOSIS — R06.03 RESPIRATORY DISTRESS: ICD-10-CM

## 2021-07-20 DIAGNOSIS — R06.02 SHORTNESS OF BREATH: ICD-10-CM

## 2021-07-20 DIAGNOSIS — I50.33 ACUTE ON CHRONIC DIASTOLIC CONGESTIVE HEART FAILURE: Primary | ICD-10-CM

## 2021-07-20 DIAGNOSIS — R60.0 BILATERAL LOWER EXTREMITY EDEMA: ICD-10-CM

## 2021-07-20 DIAGNOSIS — R06.02 SOB (SHORTNESS OF BREATH): ICD-10-CM

## 2021-07-20 DIAGNOSIS — I50.9 CHF (CONGESTIVE HEART FAILURE): ICD-10-CM

## 2021-07-20 DIAGNOSIS — R09.02 HYPOXIA: ICD-10-CM

## 2021-07-20 DIAGNOSIS — G47.33 OSA (OBSTRUCTIVE SLEEP APNEA): ICD-10-CM

## 2021-07-20 PROBLEM — R53.81 DEBILITY: Status: ACTIVE | Noted: 2021-07-20

## 2021-07-20 PROBLEM — J96.01 ACUTE HYPOXEMIC RESPIRATORY FAILURE: Status: ACTIVE | Noted: 2021-07-20

## 2021-07-20 PROBLEM — G93.41 ACUTE METABOLIC ENCEPHALOPATHY: Status: ACTIVE | Noted: 2021-07-20

## 2021-07-20 PROBLEM — I27.20 PULMONARY HYPERTENSION: Status: ACTIVE | Noted: 2021-07-20

## 2021-07-20 PROBLEM — Z72.0 TOBACCO ABUSE: Status: ACTIVE | Noted: 2021-07-20

## 2021-07-20 PROBLEM — E03.9 HYPOTHYROIDISM: Status: ACTIVE | Noted: 2021-07-20

## 2021-07-20 LAB
ALBUMIN SERPL BCP-MCNC: 3.2 G/DL (ref 3.5–5.2)
ALLENS TEST: ABNORMAL
ALP SERPL-CCNC: 92 U/L (ref 55–135)
ALT SERPL W/O P-5'-P-CCNC: 15 U/L (ref 10–44)
AMMONIA PLAS-SCNC: 56 UMOL/L (ref 10–50)
ANION GAP SERPL CALC-SCNC: 8 MMOL/L (ref 8–16)
AORTIC ROOT ANNULUS: 3.21 CM
AORTIC VALVE CUSP SEPERATION: 2.02 CM
AST SERPL-CCNC: 18 U/L (ref 10–40)
AV INDEX (PROSTH): 0.66
AV MEAN GRADIENT: 4 MMHG
AV PEAK GRADIENT: 6 MMHG
AV VALVE AREA: 2.23 CM2
AV VELOCITY RATIO: 0.79
BASOPHILS # BLD AUTO: 0.02 K/UL (ref 0–0.2)
BASOPHILS NFR BLD: 0.3 % (ref 0–1.9)
BILIRUB SERPL-MCNC: 0.9 MG/DL (ref 0.1–1)
BNP SERPL-MCNC: 367 PG/ML (ref 0–99)
BSA FOR ECHO PROCEDURE: 2.36 M2
BUN SERPL-MCNC: 18 MG/DL (ref 8–23)
CALCIUM SERPL-MCNC: 9.3 MG/DL (ref 8.7–10.5)
CHLORIDE SERPL-SCNC: 98 MMOL/L (ref 95–110)
CHOLEST SERPL-MCNC: 136 MG/DL (ref 120–199)
CHOLEST/HDLC SERPL: 2.8 {RATIO} (ref 2–5)
CO2 SERPL-SCNC: 36 MMOL/L (ref 23–29)
CREAT SERPL-MCNC: 1 MG/DL (ref 0.5–1.4)
CTP QC/QA: YES
CV ECHO LV RWT: 0.41 CM
D DIMER PPP IA.FEU-MCNC: 0.69 MG/L FEU
DELSYS: ABNORMAL
DIFFERENTIAL METHOD: ABNORMAL
DOP CALC AO PEAK VEL: 1.21 M/S
DOP CALC AO VTI: 23.96 CM
DOP CALC LVOT AREA: 3.4 CM2
DOP CALC LVOT DIAMETER: 2.07 CM
DOP CALC LVOT PEAK VEL: 0.95 M/S
DOP CALC LVOT STROKE VOLUME: 53.55 CM3
DOP CALCLVOT PEAK VEL VTI: 15.92 CM
ECHO LV POSTERIOR WALL: 0.96 CM (ref 0.6–1.1)
EJECTION FRACTION: 60 %
EOSINOPHIL # BLD AUTO: 0.2 K/UL (ref 0–0.5)
EOSINOPHIL NFR BLD: 2.5 % (ref 0–8)
EP: 8
ERYTHROCYTE [DISTWIDTH] IN BLOOD BY AUTOMATED COUNT: 14.7 % (ref 11.5–14.5)
ERYTHROCYTE [SEDIMENTATION RATE] IN BLOOD BY WESTERGREN METHOD: 8 MM/H
EST. GFR  (AFRICAN AMERICAN): >60 ML/MIN/1.73 M^2
EST. GFR  (NON AFRICAN AMERICAN): 58 ML/MIN/1.73 M^2
FIO2: 0.3
FRACTIONAL SHORTENING: 28 % (ref 28–44)
GLUCOSE SERPL-MCNC: 93 MG/DL (ref 70–110)
HCO3 UR-SCNC: 42.9 MMOL/L (ref 24–28)
HCO3 UR-SCNC: 43.9 MMOL/L (ref 24–28)
HCO3 UR-SCNC: 46.2 MMOL/L (ref 24–28)
HCT VFR BLD AUTO: 44.5 % (ref 37–48.5)
HDLC SERPL-MCNC: 48 MG/DL (ref 40–75)
HDLC SERPL: 35.3 % (ref 20–50)
HGB BLD-MCNC: 14.5 G/DL (ref 12–16)
IMM GRANULOCYTES # BLD AUTO: 0.04 K/UL (ref 0–0.04)
IMM GRANULOCYTES NFR BLD AUTO: 0.6 % (ref 0–0.5)
INTERVENTRICULAR SEPTUM: 1.09 CM (ref 0.6–1.1)
IP: 15
IVRT: 66.9 MSEC
LA MAJOR: 7.19 CM
LA MINOR: 6.98 CM
LA WIDTH: 4.44 CM
LDLC SERPL CALC-MCNC: 76.6 MG/DL (ref 63–159)
LEFT ATRIUM SIZE: 4.76 CM
LEFT ATRIUM VOLUME INDEX: 57.1 ML/M2
LEFT ATRIUM VOLUME: 127.25 CM3
LEFT INTERNAL DIMENSION IN SYSTOLE: 3.34 CM (ref 2.1–4)
LEFT VENTRICLE DIASTOLIC VOLUME INDEX: 45.21 ML/M2
LEFT VENTRICLE DIASTOLIC VOLUME: 100.82 ML
LEFT VENTRICLE MASS INDEX: 75 G/M2
LEFT VENTRICLE SYSTOLIC VOLUME INDEX: 20.4 ML/M2
LEFT VENTRICLE SYSTOLIC VOLUME: 45.6 ML
LEFT VENTRICULAR INTERNAL DIMENSION IN DIASTOLE: 4.67 CM (ref 3.5–6)
LEFT VENTRICULAR MASS: 168.35 G
LYMPHOCYTES # BLD AUTO: 2 K/UL (ref 1–4.8)
LYMPHOCYTES NFR BLD: 31.5 % (ref 18–48)
MAGNESIUM SERPL-MCNC: 1.7 MG/DL (ref 1.6–2.6)
MCH RBC QN AUTO: 31.8 PG (ref 27–31)
MCHC RBC AUTO-ENTMCNC: 32.6 G/DL (ref 32–36)
MCV RBC AUTO: 98 FL (ref 82–98)
MIN VOL: 6
MODE: ABNORMAL
MONOCYTES # BLD AUTO: 0.7 K/UL (ref 0.3–1)
MONOCYTES NFR BLD: 10.8 % (ref 4–15)
MV PEAK E VEL: 0.83 M/S
NEUTROPHILS # BLD AUTO: 3.4 K/UL (ref 1.8–7.7)
NEUTROPHILS NFR BLD: 54.3 % (ref 38–73)
NONHDLC SERPL-MCNC: 88 MG/DL
NRBC BLD-RTO: 0 /100 WBC
PCO2 BLDA: 68.3 MMHG (ref 35–45)
PCO2 BLDA: 79.4 MMHG (ref 35–45)
PCO2 BLDA: 87 MMHG (ref 35–45)
PH SMN: 7.33 [PH] (ref 7.35–7.45)
PH SMN: 7.35 [PH] (ref 7.35–7.45)
PH SMN: 7.41 [PH] (ref 7.35–7.45)
PISA TR MAX VEL: 3.2 M/S
PLATELET # BLD AUTO: 260 K/UL (ref 150–450)
PMV BLD AUTO: 10.8 FL (ref 9.2–12.9)
PO2 BLDA: 55 MMHG (ref 80–100)
PO2 BLDA: 68 MMHG (ref 80–100)
PO2 BLDA: 95 MMHG (ref 80–100)
POC BE: 14 MMOL/L
POC BE: 14 MMOL/L
POC BE: 15 MMOL/L
POC SATURATED O2: 86 % (ref 95–100)
POC SATURATED O2: 91 % (ref 95–100)
POC SATURATED O2: 96 % (ref 95–100)
POC TCO2: 45 MMOL/L (ref 23–27)
POC TCO2: 46 MMOL/L (ref 23–27)
POC TCO2: 49 MMOL/L (ref 23–27)
POCT GLUCOSE: 161 MG/DL (ref 70–110)
POTASSIUM SERPL-SCNC: 3.9 MMOL/L (ref 3.5–5.1)
PROT SERPL-MCNC: 7.1 G/DL (ref 6–8.4)
PV PEAK VELOCITY: 1.05 CM/S
RA MAJOR: 5.98 CM
RA PRESSURE: 8 MMHG
RA WIDTH: 5.14 CM
RBC # BLD AUTO: 4.56 M/UL (ref 4–5.4)
RIGHT VENTRICULAR END-DIASTOLIC DIMENSION: 4.27 CM
RV TISSUE DOPPLER FREE WALL SYSTOLIC VELOCITY 1 (APICAL 4 CHAMBER VIEW): 7.52 CM/S
SAMPLE: ABNORMAL
SARS-COV-2 RDRP RESP QL NAA+PROBE: NEGATIVE
SINUS: 3.15 CM
SITE: ABNORMAL
SODIUM SERPL-SCNC: 142 MMOL/L (ref 136–145)
SPONT RATE: 15
STJ: 2.4 CM
TR MAX PG: 41 MMHG
TRICUSPID ANNULAR PLANE SYSTOLIC EXCURSION: 1.34 CM
TRIGL SERPL-MCNC: 57 MG/DL (ref 30–150)
TROPONIN I SERPL DL<=0.01 NG/ML-MCNC: 0.01 NG/ML (ref 0–0.03)
TROPONIN I SERPL DL<=0.01 NG/ML-MCNC: <0.006 NG/ML (ref 0–0.03)
TSH SERPL DL<=0.005 MIU/L-ACNC: 1.14 UIU/ML (ref 0.4–4)
TV REST PULMONARY ARTERY PRESSURE: 49 MMHG
WBC # BLD AUTO: 6.31 K/UL (ref 3.9–12.7)

## 2021-07-20 PROCEDURE — 96374 THER/PROPH/DIAG INJ IV PUSH: CPT

## 2021-07-20 PROCEDURE — 84484 ASSAY OF TROPONIN QUANT: CPT | Performed by: PHYSICIAN ASSISTANT

## 2021-07-20 PROCEDURE — 99285 EMERGENCY DEPT VISIT HI MDM: CPT | Mod: 25

## 2021-07-20 PROCEDURE — 93010 ELECTROCARDIOGRAM REPORT: CPT | Mod: ,,, | Performed by: INTERNAL MEDICINE

## 2021-07-20 PROCEDURE — 21400001 HC TELEMETRY ROOM

## 2021-07-20 PROCEDURE — 27100107 HC POCKET PEAK FLOW METER

## 2021-07-20 PROCEDURE — 25000242 PHARM REV CODE 250 ALT 637 W/ HCPCS: Performed by: HOSPITALIST

## 2021-07-20 PROCEDURE — 99900035 HC TECH TIME PER 15 MIN (STAT)

## 2021-07-20 PROCEDURE — 63600175 PHARM REV CODE 636 W HCPCS: Performed by: HOSPITALIST

## 2021-07-20 PROCEDURE — 36600 WITHDRAWAL OF ARTERIAL BLOOD: CPT

## 2021-07-20 PROCEDURE — 83735 ASSAY OF MAGNESIUM: CPT | Performed by: HOSPITALIST

## 2021-07-20 PROCEDURE — 82140 ASSAY OF AMMONIA: CPT | Performed by: HOSPITALIST

## 2021-07-20 PROCEDURE — 25000242 PHARM REV CODE 250 ALT 637 W/ HCPCS: Performed by: EMERGENCY MEDICINE

## 2021-07-20 PROCEDURE — 94761 N-INVAS EAR/PLS OXIMETRY MLT: CPT

## 2021-07-20 PROCEDURE — 82746 ASSAY OF FOLIC ACID SERUM: CPT | Performed by: HOSPITALIST

## 2021-07-20 PROCEDURE — 94640 AIRWAY INHALATION TREATMENT: CPT

## 2021-07-20 PROCEDURE — 80053 COMPREHEN METABOLIC PANEL: CPT | Performed by: PHYSICIAN ASSISTANT

## 2021-07-20 PROCEDURE — 82803 BLOOD GASES ANY COMBINATION: CPT

## 2021-07-20 PROCEDURE — 85379 FIBRIN DEGRADATION QUANT: CPT | Performed by: HOSPITALIST

## 2021-07-20 PROCEDURE — 84484 ASSAY OF TROPONIN QUANT: CPT | Mod: 91 | Performed by: HOSPITALIST

## 2021-07-20 PROCEDURE — 94660 CPAP INITIATION&MGMT: CPT

## 2021-07-20 PROCEDURE — 86592 SYPHILIS TEST NON-TREP QUAL: CPT | Performed by: HOSPITALIST

## 2021-07-20 PROCEDURE — 25000003 PHARM REV CODE 250: Performed by: HOSPITALIST

## 2021-07-20 PROCEDURE — 99223 1ST HOSP IP/OBS HIGH 75: CPT | Mod: 25,,, | Performed by: INTERNAL MEDICINE

## 2021-07-20 PROCEDURE — 80061 LIPID PANEL: CPT | Performed by: HOSPITALIST

## 2021-07-20 PROCEDURE — 27000190 HC CPAP FULL FACE MASK W/VALVE

## 2021-07-20 PROCEDURE — 27000221 HC OXYGEN, UP TO 24 HOURS

## 2021-07-20 PROCEDURE — 93005 ELECTROCARDIOGRAM TRACING: CPT

## 2021-07-20 PROCEDURE — 93010 EKG 12-LEAD: ICD-10-PCS | Mod: ,,, | Performed by: INTERNAL MEDICINE

## 2021-07-20 PROCEDURE — 82607 VITAMIN B-12: CPT | Performed by: HOSPITALIST

## 2021-07-20 PROCEDURE — 83036 HEMOGLOBIN GLYCOSYLATED A1C: CPT | Performed by: HOSPITALIST

## 2021-07-20 PROCEDURE — 85025 COMPLETE CBC W/AUTO DIFF WBC: CPT | Performed by: PHYSICIAN ASSISTANT

## 2021-07-20 PROCEDURE — 84443 ASSAY THYROID STIM HORMONE: CPT | Performed by: HOSPITALIST

## 2021-07-20 PROCEDURE — U0002 COVID-19 LAB TEST NON-CDC: HCPCS | Performed by: PHYSICIAN ASSISTANT

## 2021-07-20 PROCEDURE — 94760 N-INVAS EAR/PLS OXIMETRY 1: CPT

## 2021-07-20 PROCEDURE — 63600175 PHARM REV CODE 636 W HCPCS: Performed by: EMERGENCY MEDICINE

## 2021-07-20 PROCEDURE — 99223 PR INITIAL HOSPITAL CARE,LEVL III: ICD-10-PCS | Mod: 25,,, | Performed by: INTERNAL MEDICINE

## 2021-07-20 PROCEDURE — 83880 ASSAY OF NATRIURETIC PEPTIDE: CPT | Performed by: PHYSICIAN ASSISTANT

## 2021-07-20 RX ORDER — FAMOTIDINE 20 MG/1
20 TABLET, FILM COATED ORAL DAILY
Status: DISCONTINUED | OUTPATIENT
Start: 2021-07-20 | End: 2021-07-24 | Stop reason: HOSPADM

## 2021-07-20 RX ORDER — IPRATROPIUM BROMIDE AND ALBUTEROL SULFATE 2.5; .5 MG/3ML; MG/3ML
3 SOLUTION RESPIRATORY (INHALATION) EVERY 4 HOURS PRN
Status: DISCONTINUED | OUTPATIENT
Start: 2021-07-20 | End: 2021-07-21

## 2021-07-20 RX ORDER — AZELASTINE 1 MG/ML
1 SPRAY, METERED NASAL 2 TIMES DAILY
Status: DISCONTINUED | OUTPATIENT
Start: 2021-07-20 | End: 2021-07-24 | Stop reason: HOSPADM

## 2021-07-20 RX ORDER — ATORVASTATIN CALCIUM 40 MG/1
40 TABLET, FILM COATED ORAL DAILY
Status: DISCONTINUED | OUTPATIENT
Start: 2021-07-20 | End: 2021-07-24 | Stop reason: HOSPADM

## 2021-07-20 RX ORDER — SODIUM CHLORIDE 0.9 % (FLUSH) 0.9 %
10 SYRINGE (ML) INJECTION
Status: DISCONTINUED | OUTPATIENT
Start: 2021-07-20 | End: 2021-07-24 | Stop reason: HOSPADM

## 2021-07-20 RX ORDER — AMOXICILLIN 250 MG
1 CAPSULE ORAL 2 TIMES DAILY
Status: DISCONTINUED | OUTPATIENT
Start: 2021-07-20 | End: 2021-07-24 | Stop reason: HOSPADM

## 2021-07-20 RX ORDER — ONDANSETRON 2 MG/ML
4 INJECTION INTRAMUSCULAR; INTRAVENOUS EVERY 8 HOURS PRN
Status: DISCONTINUED | OUTPATIENT
Start: 2021-07-20 | End: 2021-07-24 | Stop reason: HOSPADM

## 2021-07-20 RX ORDER — FLUTICASONE PROPIONATE 50 MCG
1 SPRAY, SUSPENSION (ML) NASAL DAILY
Status: DISCONTINUED | OUTPATIENT
Start: 2021-07-21 | End: 2021-07-24 | Stop reason: HOSPADM

## 2021-07-20 RX ORDER — SERTRALINE HYDROCHLORIDE 50 MG/1
50 TABLET, FILM COATED ORAL DAILY
Status: DISCONTINUED | OUTPATIENT
Start: 2021-07-20 | End: 2021-07-24 | Stop reason: HOSPADM

## 2021-07-20 RX ORDER — METHYLPREDNISOLONE SOD SUCC 125 MG
125 VIAL (EA) INJECTION
Status: COMPLETED | OUTPATIENT
Start: 2021-07-20 | End: 2021-07-20

## 2021-07-20 RX ORDER — FUROSEMIDE 10 MG/ML
60 INJECTION INTRAMUSCULAR; INTRAVENOUS
Status: COMPLETED | OUTPATIENT
Start: 2021-07-20 | End: 2021-07-20

## 2021-07-20 RX ORDER — FUROSEMIDE 10 MG/ML
40 INJECTION INTRAMUSCULAR; INTRAVENOUS
Status: DISCONTINUED | OUTPATIENT
Start: 2021-07-20 | End: 2021-07-21

## 2021-07-20 RX ORDER — IPRATROPIUM BROMIDE AND ALBUTEROL SULFATE 2.5; .5 MG/3ML; MG/3ML
3 SOLUTION RESPIRATORY (INHALATION)
Status: COMPLETED | OUTPATIENT
Start: 2021-07-20 | End: 2021-07-20

## 2021-07-20 RX ORDER — NICOTINE 7MG/24HR
1 PATCH, TRANSDERMAL 24 HOURS TRANSDERMAL DAILY
Status: DISCONTINUED | OUTPATIENT
Start: 2021-07-20 | End: 2021-07-24 | Stop reason: HOSPADM

## 2021-07-20 RX ORDER — METOPROLOL TARTRATE 50 MG/1
50 TABLET ORAL 2 TIMES DAILY
Status: DISCONTINUED | OUTPATIENT
Start: 2021-07-20 | End: 2021-07-21

## 2021-07-20 RX ORDER — CYCLOBENZAPRINE HCL 5 MG
5 TABLET ORAL EVERY 8 HOURS PRN
Status: DISCONTINUED | OUTPATIENT
Start: 2021-07-20 | End: 2021-07-22

## 2021-07-20 RX ORDER — LOSARTAN POTASSIUM 25 MG/1
25 TABLET ORAL DAILY
Status: DISCONTINUED | OUTPATIENT
Start: 2021-07-20 | End: 2021-07-21

## 2021-07-20 RX ADMIN — METOPROLOL TARTRATE 50 MG: 50 TABLET, FILM COATED ORAL at 03:07

## 2021-07-20 RX ADMIN — LOSARTAN POTASSIUM 25 MG: 25 TABLET, FILM COATED ORAL at 02:07

## 2021-07-20 RX ADMIN — IPRATROPIUM BROMIDE AND ALBUTEROL SULFATE 3 ML: .5; 3 SOLUTION RESPIRATORY (INHALATION) at 06:07

## 2021-07-20 RX ADMIN — METHYLPREDNISOLONE SODIUM SUCCINATE 125 MG: 125 INJECTION, POWDER, FOR SOLUTION INTRAMUSCULAR; INTRAVENOUS at 12:07

## 2021-07-20 RX ADMIN — FUROSEMIDE 40 MG: 10 INJECTION, SOLUTION INTRAMUSCULAR; INTRAVENOUS at 02:07

## 2021-07-20 RX ADMIN — FUROSEMIDE 60 MG: 10 INJECTION, SOLUTION INTRAMUSCULAR; INTRAVENOUS at 11:07

## 2021-07-20 RX ADMIN — FAMOTIDINE 20 MG: 20 TABLET ORAL at 02:07

## 2021-07-20 RX ADMIN — ATORVASTATIN CALCIUM 40 MG: 40 TABLET, FILM COATED ORAL at 02:07

## 2021-07-20 RX ADMIN — IPRATROPIUM BROMIDE AND ALBUTEROL SULFATE 3 ML: .5; 3 SOLUTION RESPIRATORY (INHALATION) at 11:07

## 2021-07-21 PROBLEM — J96.21 ACUTE ON CHRONIC RESPIRATORY FAILURE WITH HYPOXIA AND HYPERCAPNIA: Status: ACTIVE | Noted: 2021-07-20

## 2021-07-21 PROBLEM — J96.22 ACUTE ON CHRONIC RESPIRATORY FAILURE WITH HYPOXIA AND HYPERCAPNIA: Status: ACTIVE | Noted: 2021-07-20

## 2021-07-21 LAB
ALLENS TEST: ABNORMAL
AMMONIA PLAS-SCNC: 51 UMOL/L (ref 10–50)
ANION GAP SERPL CALC-SCNC: 12 MMOL/L (ref 8–16)
ANION GAP SERPL CALC-SCNC: 15 MMOL/L (ref 8–16)
BUN SERPL-MCNC: 16 MG/DL (ref 8–23)
BUN SERPL-MCNC: 28 MG/DL (ref 8–23)
CALCIUM SERPL-MCNC: 9.4 MG/DL (ref 8.7–10.5)
CALCIUM SERPL-MCNC: 9.6 MG/DL (ref 8.7–10.5)
CHLORIDE SERPL-SCNC: 91 MMOL/L (ref 95–110)
CHLORIDE SERPL-SCNC: 95 MMOL/L (ref 95–110)
CO2 SERPL-SCNC: 35 MMOL/L (ref 23–29)
CO2 SERPL-SCNC: 38 MMOL/L (ref 23–29)
CREAT SERPL-MCNC: 1 MG/DL (ref 0.5–1.4)
CREAT SERPL-MCNC: 1.6 MG/DL (ref 0.5–1.4)
DELSYS: ABNORMAL
EST. GFR  (AFRICAN AMERICAN): 38 ML/MIN/1.73 M^2
EST. GFR  (AFRICAN AMERICAN): >60 ML/MIN/1.73 M^2
EST. GFR  (NON AFRICAN AMERICAN): 33 ML/MIN/1.73 M^2
EST. GFR  (NON AFRICAN AMERICAN): 58 ML/MIN/1.73 M^2
ESTIMATED AVG GLUCOSE: 111 MG/DL (ref 68–131)
FIO2: 40
FOLATE SERPL-MCNC: 8.8 NG/ML (ref 4–24)
GLUCOSE SERPL-MCNC: 113 MG/DL (ref 70–110)
GLUCOSE SERPL-MCNC: 141 MG/DL (ref 70–110)
HBA1C MFR BLD: 5.5 % (ref 4–5.6)
HCO3 UR-SCNC: 48.6 MMOL/L (ref 24–28)
MAGNESIUM SERPL-MCNC: 1.5 MG/DL (ref 1.6–2.6)
MAGNESIUM SERPL-MCNC: 1.7 MG/DL (ref 1.6–2.6)
MODE: ABNORMAL
PCO2 BLDA: 74.4 MMHG (ref 35–45)
PH SMN: 7.42 [PH] (ref 7.35–7.45)
PO2 BLDA: 75 MMHG (ref 80–100)
POC BE: 19 MMOL/L
POC SATURATED O2: 94 % (ref 95–100)
POC TCO2: >50 MMOL/L (ref 23–27)
POTASSIUM SERPL-SCNC: 4 MMOL/L (ref 3.5–5.1)
POTASSIUM SERPL-SCNC: 5.1 MMOL/L (ref 3.5–5.1)
SAMPLE: ABNORMAL
SITE: ABNORMAL
SODIUM SERPL-SCNC: 142 MMOL/L (ref 136–145)
SODIUM SERPL-SCNC: 144 MMOL/L (ref 136–145)
TROPONIN I SERPL DL<=0.01 NG/ML-MCNC: <0.006 NG/ML (ref 0–0.03)
VIT B12 SERPL-MCNC: 927 PG/ML (ref 210–950)

## 2021-07-21 PROCEDURE — 94640 AIRWAY INHALATION TREATMENT: CPT

## 2021-07-21 PROCEDURE — 20000000 HC ICU ROOM

## 2021-07-21 PROCEDURE — 25000003 PHARM REV CODE 250: Performed by: HOSPITALIST

## 2021-07-21 PROCEDURE — 80048 BASIC METABOLIC PNL TOTAL CA: CPT | Performed by: HOSPITALIST

## 2021-07-21 PROCEDURE — 90792 PR PSYCHIATRIC DIAGNOSTIC EVALUATION W/MEDICAL SERVICES: ICD-10-PCS | Mod: ,,, | Performed by: PSYCHIATRY & NEUROLOGY

## 2021-07-21 PROCEDURE — 99223 PR INITIAL HOSPITAL CARE,LEVL III: ICD-10-PCS | Mod: ,,, | Performed by: INTERNAL MEDICINE

## 2021-07-21 PROCEDURE — 83735 ASSAY OF MAGNESIUM: CPT | Mod: 91 | Performed by: INTERNAL MEDICINE

## 2021-07-21 PROCEDURE — 82140 ASSAY OF AMMONIA: CPT | Performed by: FAMILY MEDICINE

## 2021-07-21 PROCEDURE — 36600 WITHDRAWAL OF ARTERIAL BLOOD: CPT

## 2021-07-21 PROCEDURE — 36415 COLL VENOUS BLD VENIPUNCTURE: CPT | Performed by: INTERNAL MEDICINE

## 2021-07-21 PROCEDURE — 82803 BLOOD GASES ANY COMBINATION: CPT

## 2021-07-21 PROCEDURE — 99900035 HC TECH TIME PER 15 MIN (STAT)

## 2021-07-21 PROCEDURE — 99223 1ST HOSP IP/OBS HIGH 75: CPT | Mod: ,,, | Performed by: INTERNAL MEDICINE

## 2021-07-21 PROCEDURE — 90792 PSYCH DIAG EVAL W/MED SRVCS: CPT | Mod: ,,, | Performed by: PSYCHIATRY & NEUROLOGY

## 2021-07-21 PROCEDURE — 63600175 PHARM REV CODE 636 W HCPCS: Performed by: INTERNAL MEDICINE

## 2021-07-21 PROCEDURE — 94660 CPAP INITIATION&MGMT: CPT

## 2021-07-21 PROCEDURE — 83735 ASSAY OF MAGNESIUM: CPT | Performed by: HOSPITALIST

## 2021-07-21 PROCEDURE — 99291 PR CRITICAL CARE, E/M 30-74 MINUTES: ICD-10-PCS | Mod: ,,, | Performed by: INTERNAL MEDICINE

## 2021-07-21 PROCEDURE — 80048 BASIC METABOLIC PNL TOTAL CA: CPT | Mod: 91 | Performed by: INTERNAL MEDICINE

## 2021-07-21 PROCEDURE — 25000242 PHARM REV CODE 250 ALT 637 W/ HCPCS: Performed by: HOSPITALIST

## 2021-07-21 PROCEDURE — 94761 N-INVAS EAR/PLS OXIMETRY MLT: CPT

## 2021-07-21 PROCEDURE — 25000242 PHARM REV CODE 250 ALT 637 W/ HCPCS: Performed by: INTERNAL MEDICINE

## 2021-07-21 PROCEDURE — 99291 CRITICAL CARE FIRST HOUR: CPT | Mod: ,,, | Performed by: INTERNAL MEDICINE

## 2021-07-21 PROCEDURE — 63600175 PHARM REV CODE 636 W HCPCS: Performed by: HOSPITALIST

## 2021-07-21 PROCEDURE — 36415 COLL VENOUS BLD VENIPUNCTURE: CPT | Performed by: FAMILY MEDICINE

## 2021-07-21 PROCEDURE — 27000221 HC OXYGEN, UP TO 24 HOURS

## 2021-07-21 PROCEDURE — 94760 N-INVAS EAR/PLS OXIMETRY 1: CPT

## 2021-07-21 RX ORDER — OXYCODONE AND ACETAMINOPHEN 5; 325 MG/1; MG/1
1 TABLET ORAL EVERY 6 HOURS PRN
Status: DISCONTINUED | OUTPATIENT
Start: 2021-07-21 | End: 2021-07-24 | Stop reason: HOSPADM

## 2021-07-21 RX ORDER — IPRATROPIUM BROMIDE AND ALBUTEROL SULFATE 2.5; .5 MG/3ML; MG/3ML
3 SOLUTION RESPIRATORY (INHALATION) EVERY 4 HOURS
Status: DISCONTINUED | OUTPATIENT
Start: 2021-07-21 | End: 2021-07-24 | Stop reason: HOSPADM

## 2021-07-21 RX ORDER — FUROSEMIDE 10 MG/ML
60 INJECTION INTRAMUSCULAR; INTRAVENOUS ONCE
Status: COMPLETED | OUTPATIENT
Start: 2021-07-21 | End: 2021-07-21

## 2021-07-21 RX ADMIN — FLUTICASONE PROPIONATE 50 MCG: 50 SPRAY, METERED NASAL at 11:07

## 2021-07-21 RX ADMIN — ATORVASTATIN CALCIUM 40 MG: 40 TABLET, FILM COATED ORAL at 10:07

## 2021-07-21 RX ADMIN — RIVAROXABAN 20 MG: 20 TABLET, FILM COATED ORAL at 04:07

## 2021-07-21 RX ADMIN — IPRATROPIUM BROMIDE AND ALBUTEROL SULFATE 3 ML: .5; 3 SOLUTION RESPIRATORY (INHALATION) at 11:07

## 2021-07-21 RX ADMIN — DOCUSATE SODIUM 50 MG AND SENNOSIDES 8.6 MG 1 TABLET: 8.6; 5 TABLET, FILM COATED ORAL at 10:07

## 2021-07-21 RX ADMIN — FUROSEMIDE 60 MG: 10 INJECTION, SOLUTION INTRAMUSCULAR; INTRAVENOUS at 10:07

## 2021-07-21 RX ADMIN — IPRATROPIUM BROMIDE AND ALBUTEROL SULFATE 3 ML: .5; 3 SOLUTION RESPIRATORY (INHALATION) at 04:07

## 2021-07-21 RX ADMIN — IPRATROPIUM BROMIDE AND ALBUTEROL SULFATE 3 ML: .5; 3 SOLUTION RESPIRATORY (INHALATION) at 07:07

## 2021-07-21 RX ADMIN — LOSARTAN POTASSIUM 25 MG: 25 TABLET, FILM COATED ORAL at 10:07

## 2021-07-21 RX ADMIN — SERTRALINE 50 MG: 50 TABLET, FILM COATED ORAL at 10:07

## 2021-07-21 RX ADMIN — FAMOTIDINE 20 MG: 20 TABLET ORAL at 10:07

## 2021-07-21 RX ADMIN — METHYLPREDNISOLONE SODIUM SUCCINATE 80 MG: 40 INJECTION, POWDER, FOR SOLUTION INTRAMUSCULAR; INTRAVENOUS at 09:07

## 2021-07-21 RX ADMIN — METOPROLOL TARTRATE 50 MG: 50 TABLET, FILM COATED ORAL at 10:07

## 2021-07-21 RX ADMIN — FUROSEMIDE 40 MG: 10 INJECTION, SOLUTION INTRAMUSCULAR; INTRAVENOUS at 01:07

## 2021-07-21 RX ADMIN — AZELASTINE HYDROCHLORIDE 137 MCG: 137 SPRAY, METERED NASAL at 11:07

## 2021-07-21 RX ADMIN — AZELASTINE HYDROCHLORIDE 137 MCG: 137 SPRAY, METERED NASAL at 09:07

## 2021-07-22 ENCOUNTER — PATIENT MESSAGE (OUTPATIENT)
Dept: PULMONOLOGY | Facility: CLINIC | Age: 66
End: 2021-07-22

## 2021-07-22 ENCOUNTER — PATIENT MESSAGE (OUTPATIENT)
Dept: OPTOMETRY | Facility: CLINIC | Age: 66
End: 2021-07-22

## 2021-07-22 PROBLEM — J96.02 ACUTE RESPIRATORY FAILURE WITH HYPOXIA AND HYPERCAPNIA: Status: ACTIVE | Noted: 2021-07-20

## 2021-07-22 PROBLEM — N17.9 AKI (ACUTE KIDNEY INJURY): Status: ACTIVE | Noted: 2021-07-22

## 2021-07-22 PROBLEM — R06.03 RESPIRATORY DISTRESS: Status: ACTIVE | Noted: 2021-07-22

## 2021-07-22 LAB
ANION GAP SERPL CALC-SCNC: 11 MMOL/L (ref 8–16)
ANION GAP SERPL CALC-SCNC: 11 MMOL/L (ref 8–16)
BUN SERPL-MCNC: 32 MG/DL (ref 8–23)
BUN SERPL-MCNC: 41 MG/DL (ref 8–23)
CALCIUM SERPL-MCNC: 9 MG/DL (ref 8.7–10.5)
CALCIUM SERPL-MCNC: 9.4 MG/DL (ref 8.7–10.5)
CHLORIDE SERPL-SCNC: 90 MMOL/L (ref 95–110)
CHLORIDE SERPL-SCNC: 92 MMOL/L (ref 95–110)
CO2 SERPL-SCNC: 37 MMOL/L (ref 23–29)
CO2 SERPL-SCNC: 38 MMOL/L (ref 23–29)
CREAT SERPL-MCNC: 1.3 MG/DL (ref 0.5–1.4)
CREAT SERPL-MCNC: 1.6 MG/DL (ref 0.5–1.4)
EST. GFR  (AFRICAN AMERICAN): 38 ML/MIN/1.73 M^2
EST. GFR  (AFRICAN AMERICAN): 49 ML/MIN/1.73 M^2
EST. GFR  (NON AFRICAN AMERICAN): 33 ML/MIN/1.73 M^2
EST. GFR  (NON AFRICAN AMERICAN): 43 ML/MIN/1.73 M^2
GLUCOSE SERPL-MCNC: 134 MG/DL (ref 70–110)
GLUCOSE SERPL-MCNC: 253 MG/DL (ref 70–110)
MAGNESIUM SERPL-MCNC: 1.8 MG/DL (ref 1.6–2.6)
MAGNESIUM SERPL-MCNC: 1.8 MG/DL (ref 1.6–2.6)
POTASSIUM SERPL-SCNC: 3.8 MMOL/L (ref 3.5–5.1)
POTASSIUM SERPL-SCNC: 4 MMOL/L (ref 3.5–5.1)
RPR SER QL: NORMAL
SODIUM SERPL-SCNC: 138 MMOL/L (ref 136–145)
SODIUM SERPL-SCNC: 141 MMOL/L (ref 136–145)

## 2021-07-22 PROCEDURE — 94761 N-INVAS EAR/PLS OXIMETRY MLT: CPT

## 2021-07-22 PROCEDURE — 27000221 HC OXYGEN, UP TO 24 HOURS

## 2021-07-22 PROCEDURE — 63600175 PHARM REV CODE 636 W HCPCS: Performed by: INTERNAL MEDICINE

## 2021-07-22 PROCEDURE — 97161 PT EVAL LOW COMPLEX 20 MIN: CPT

## 2021-07-22 PROCEDURE — 25000003 PHARM REV CODE 250: Performed by: HOSPITALIST

## 2021-07-22 PROCEDURE — 25000242 PHARM REV CODE 250 ALT 637 W/ HCPCS: Performed by: HOSPITALIST

## 2021-07-22 PROCEDURE — 94760 N-INVAS EAR/PLS OXIMETRY 1: CPT

## 2021-07-22 PROCEDURE — 83735 ASSAY OF MAGNESIUM: CPT | Mod: 91 | Performed by: INTERNAL MEDICINE

## 2021-07-22 PROCEDURE — 94640 AIRWAY INHALATION TREATMENT: CPT

## 2021-07-22 PROCEDURE — 21400001 HC TELEMETRY ROOM

## 2021-07-22 PROCEDURE — 97165 OT EVAL LOW COMPLEX 30 MIN: CPT

## 2021-07-22 PROCEDURE — 63600175 PHARM REV CODE 636 W HCPCS: Performed by: HOSPITALIST

## 2021-07-22 PROCEDURE — 94660 CPAP INITIATION&MGMT: CPT

## 2021-07-22 PROCEDURE — 99900035 HC TECH TIME PER 15 MIN (STAT)

## 2021-07-22 PROCEDURE — 36415 COLL VENOUS BLD VENIPUNCTURE: CPT | Performed by: INTERNAL MEDICINE

## 2021-07-22 PROCEDURE — S4991 NICOTINE PATCH NONLEGEND: HCPCS | Performed by: HOSPITALIST

## 2021-07-22 PROCEDURE — 25000242 PHARM REV CODE 250 ALT 637 W/ HCPCS: Performed by: INTERNAL MEDICINE

## 2021-07-22 PROCEDURE — 80048 BASIC METABOLIC PNL TOTAL CA: CPT | Performed by: INTERNAL MEDICINE

## 2021-07-22 RX ORDER — BENZONATATE 100 MG/1
100 CAPSULE ORAL 3 TIMES DAILY PRN
Status: DISCONTINUED | OUTPATIENT
Start: 2021-07-22 | End: 2021-07-24 | Stop reason: HOSPADM

## 2021-07-22 RX ORDER — METOPROLOL TARTRATE 50 MG/1
100 TABLET ORAL 2 TIMES DAILY
Status: DISCONTINUED | OUTPATIENT
Start: 2021-07-22 | End: 2021-07-24 | Stop reason: HOSPADM

## 2021-07-22 RX ORDER — FUROSEMIDE 40 MG/1
40 TABLET ORAL DAILY
Status: DISCONTINUED | OUTPATIENT
Start: 2021-07-23 | End: 2021-07-24 | Stop reason: HOSPADM

## 2021-07-22 RX ORDER — MUPIROCIN 20 MG/G
OINTMENT TOPICAL 2 TIMES DAILY
Status: DISCONTINUED | OUTPATIENT
Start: 2021-07-22 | End: 2021-07-24 | Stop reason: HOSPADM

## 2021-07-22 RX ADMIN — FLUTICASONE PROPIONATE 50 MCG: 50 SPRAY, METERED NASAL at 08:07

## 2021-07-22 RX ADMIN — DOCUSATE SODIUM 50 MG AND SENNOSIDES 8.6 MG 1 TABLET: 8.6; 5 TABLET, FILM COATED ORAL at 08:07

## 2021-07-22 RX ADMIN — METHYLPREDNISOLONE SODIUM SUCCINATE 80 MG: 40 INJECTION, POWDER, FOR SOLUTION INTRAMUSCULAR; INTRAVENOUS at 08:07

## 2021-07-22 RX ADMIN — IPRATROPIUM BROMIDE AND ALBUTEROL SULFATE 3 ML: .5; 3 SOLUTION RESPIRATORY (INHALATION) at 04:07

## 2021-07-22 RX ADMIN — IPRATROPIUM BROMIDE AND ALBUTEROL SULFATE 3 ML: .5; 3 SOLUTION RESPIRATORY (INHALATION) at 11:07

## 2021-07-22 RX ADMIN — IPRATROPIUM BROMIDE AND ALBUTEROL SULFATE 3 ML: .5; 3 SOLUTION RESPIRATORY (INHALATION) at 03:07

## 2021-07-22 RX ADMIN — RIVAROXABAN 20 MG: 20 TABLET, FILM COATED ORAL at 05:07

## 2021-07-22 RX ADMIN — ATORVASTATIN CALCIUM 40 MG: 40 TABLET, FILM COATED ORAL at 08:07

## 2021-07-22 RX ADMIN — SERTRALINE 50 MG: 50 TABLET, FILM COATED ORAL at 08:07

## 2021-07-22 RX ADMIN — MUPIROCIN: 20 OINTMENT TOPICAL at 10:07

## 2021-07-22 RX ADMIN — IPRATROPIUM BROMIDE AND ALBUTEROL SULFATE 3 ML: .5; 3 SOLUTION RESPIRATORY (INHALATION) at 07:07

## 2021-07-22 RX ADMIN — NICOTINE 1 PATCH: 7 PATCH TRANSDERMAL at 08:07

## 2021-07-22 RX ADMIN — BENZONATATE 100 MG: 100 CAPSULE ORAL at 08:07

## 2021-07-22 RX ADMIN — FAMOTIDINE 20 MG: 20 TABLET ORAL at 08:07

## 2021-07-22 RX ADMIN — AZELASTINE HYDROCHLORIDE 137 MCG: 137 SPRAY, METERED NASAL at 08:07

## 2021-07-22 RX ADMIN — MUPIROCIN: 20 OINTMENT TOPICAL at 08:07

## 2021-07-22 RX ADMIN — METOPROLOL TARTRATE 100 MG: 50 TABLET, FILM COATED ORAL at 10:07

## 2021-07-22 RX ADMIN — METHYLPREDNISOLONE SODIUM SUCCINATE 80 MG: 40 INJECTION, POWDER, FOR SOLUTION INTRAMUSCULAR; INTRAVENOUS at 09:07

## 2021-07-22 RX ADMIN — METOPROLOL TARTRATE 100 MG: 50 TABLET, FILM COATED ORAL at 08:07

## 2021-07-23 LAB
ANION GAP SERPL CALC-SCNC: 10 MMOL/L (ref 8–16)
ANION GAP SERPL CALC-SCNC: 9 MMOL/L (ref 8–16)
BASOPHILS # BLD AUTO: 0.01 K/UL (ref 0–0.2)
BASOPHILS NFR BLD: 0.1 % (ref 0–1.9)
BUN SERPL-MCNC: 41 MG/DL (ref 8–23)
BUN SERPL-MCNC: 42 MG/DL (ref 8–23)
CALCIUM SERPL-MCNC: 9 MG/DL (ref 8.7–10.5)
CALCIUM SERPL-MCNC: 9.6 MG/DL (ref 8.7–10.5)
CHLORIDE SERPL-SCNC: 91 MMOL/L (ref 95–110)
CHLORIDE SERPL-SCNC: 93 MMOL/L (ref 95–110)
CO2 SERPL-SCNC: 37 MMOL/L (ref 23–29)
CO2 SERPL-SCNC: 39 MMOL/L (ref 23–29)
CREAT SERPL-MCNC: 1.2 MG/DL (ref 0.5–1.4)
CREAT SERPL-MCNC: 1.4 MG/DL (ref 0.5–1.4)
DIFFERENTIAL METHOD: ABNORMAL
EOSINOPHIL # BLD AUTO: 0 K/UL (ref 0–0.5)
EOSINOPHIL NFR BLD: 0 % (ref 0–8)
ERYTHROCYTE [DISTWIDTH] IN BLOOD BY AUTOMATED COUNT: 14.6 % (ref 11.5–14.5)
EST. GFR  (AFRICAN AMERICAN): 45 ML/MIN/1.73 M^2
EST. GFR  (AFRICAN AMERICAN): 54 ML/MIN/1.73 M^2
EST. GFR  (NON AFRICAN AMERICAN): 39 ML/MIN/1.73 M^2
EST. GFR  (NON AFRICAN AMERICAN): 47 ML/MIN/1.73 M^2
GLUCOSE SERPL-MCNC: 146 MG/DL (ref 70–110)
GLUCOSE SERPL-MCNC: 183 MG/DL (ref 70–110)
HCT VFR BLD AUTO: 44.9 % (ref 37–48.5)
HGB BLD-MCNC: 14.3 G/DL (ref 12–16)
IMM GRANULOCYTES # BLD AUTO: 0.05 K/UL (ref 0–0.04)
IMM GRANULOCYTES NFR BLD AUTO: 0.4 % (ref 0–0.5)
LYMPHOCYTES # BLD AUTO: 1 K/UL (ref 1–4.8)
LYMPHOCYTES NFR BLD: 8.8 % (ref 18–48)
MAGNESIUM SERPL-MCNC: 2 MG/DL (ref 1.6–2.6)
MAGNESIUM SERPL-MCNC: 2 MG/DL (ref 1.6–2.6)
MCH RBC QN AUTO: 31.6 PG (ref 27–31)
MCHC RBC AUTO-ENTMCNC: 31.8 G/DL (ref 32–36)
MCV RBC AUTO: 99 FL (ref 82–98)
MONOCYTES # BLD AUTO: 0.2 K/UL (ref 0.3–1)
MONOCYTES NFR BLD: 1.6 % (ref 4–15)
NEUTROPHILS # BLD AUTO: 10.4 K/UL (ref 1.8–7.7)
NEUTROPHILS NFR BLD: 89.1 % (ref 38–73)
NRBC BLD-RTO: 0 /100 WBC
PLATELET # BLD AUTO: 251 K/UL (ref 150–450)
PMV BLD AUTO: 11.3 FL (ref 9.2–12.9)
POTASSIUM SERPL-SCNC: 3.9 MMOL/L (ref 3.5–5.1)
POTASSIUM SERPL-SCNC: 4.3 MMOL/L (ref 3.5–5.1)
RBC # BLD AUTO: 4.53 M/UL (ref 4–5.4)
SODIUM SERPL-SCNC: 139 MMOL/L (ref 136–145)
SODIUM SERPL-SCNC: 140 MMOL/L (ref 136–145)
WBC # BLD AUTO: 11.64 K/UL (ref 3.9–12.7)

## 2021-07-23 PROCEDURE — 80048 BASIC METABOLIC PNL TOTAL CA: CPT | Mod: 91 | Performed by: HOSPITALIST

## 2021-07-23 PROCEDURE — 21400001 HC TELEMETRY ROOM

## 2021-07-23 PROCEDURE — 97535 SELF CARE MNGMENT TRAINING: CPT

## 2021-07-23 PROCEDURE — S4991 NICOTINE PATCH NONLEGEND: HCPCS | Performed by: HOSPITALIST

## 2021-07-23 PROCEDURE — 94664 DEMO&/EVAL PT USE INHALER: CPT

## 2021-07-23 PROCEDURE — 63600175 PHARM REV CODE 636 W HCPCS: Performed by: HOSPITALIST

## 2021-07-23 PROCEDURE — 25000003 PHARM REV CODE 250: Performed by: HOSPITALIST

## 2021-07-23 PROCEDURE — 85025 COMPLETE CBC W/AUTO DIFF WBC: CPT | Performed by: HOSPITALIST

## 2021-07-23 PROCEDURE — 25000242 PHARM REV CODE 250 ALT 637 W/ HCPCS: Performed by: HOSPITALIST

## 2021-07-23 PROCEDURE — 94660 CPAP INITIATION&MGMT: CPT

## 2021-07-23 PROCEDURE — 99900035 HC TECH TIME PER 15 MIN (STAT)

## 2021-07-23 PROCEDURE — 97116 GAIT TRAINING THERAPY: CPT | Mod: CQ

## 2021-07-23 PROCEDURE — 83735 ASSAY OF MAGNESIUM: CPT | Mod: 91 | Performed by: HOSPITALIST

## 2021-07-23 PROCEDURE — 94640 AIRWAY INHALATION TREATMENT: CPT

## 2021-07-23 PROCEDURE — 94799 UNLISTED PULMONARY SVC/PX: CPT

## 2021-07-23 PROCEDURE — 27000646 HC AEROBIKA DEVICE

## 2021-07-23 PROCEDURE — 97530 THERAPEUTIC ACTIVITIES: CPT | Mod: CQ

## 2021-07-23 PROCEDURE — 36415 COLL VENOUS BLD VENIPUNCTURE: CPT | Performed by: HOSPITALIST

## 2021-07-23 PROCEDURE — 94761 N-INVAS EAR/PLS OXIMETRY MLT: CPT

## 2021-07-23 RX ADMIN — MUPIROCIN: 20 OINTMENT TOPICAL at 08:07

## 2021-07-23 RX ADMIN — IPRATROPIUM BROMIDE AND ALBUTEROL SULFATE 3 ML: .5; 3 SOLUTION RESPIRATORY (INHALATION) at 03:07

## 2021-07-23 RX ADMIN — METHYLPREDNISOLONE SODIUM SUCCINATE 80 MG: 40 INJECTION, POWDER, FOR SOLUTION INTRAMUSCULAR; INTRAVENOUS at 09:07

## 2021-07-23 RX ADMIN — DOCUSATE SODIUM 50 MG AND SENNOSIDES 8.6 MG 1 TABLET: 8.6; 5 TABLET, FILM COATED ORAL at 08:07

## 2021-07-23 RX ADMIN — FUROSEMIDE 40 MG: 40 TABLET ORAL at 08:07

## 2021-07-23 RX ADMIN — RIVAROXABAN 20 MG: 20 TABLET, FILM COATED ORAL at 04:07

## 2021-07-23 RX ADMIN — METOPROLOL TARTRATE 100 MG: 50 TABLET, FILM COATED ORAL at 09:07

## 2021-07-23 RX ADMIN — NICOTINE 1 PATCH: 7 PATCH TRANSDERMAL at 08:07

## 2021-07-23 RX ADMIN — IPRATROPIUM BROMIDE AND ALBUTEROL SULFATE 3 ML: .5; 3 SOLUTION RESPIRATORY (INHALATION) at 04:07

## 2021-07-23 RX ADMIN — IPRATROPIUM BROMIDE AND ALBUTEROL SULFATE 3 ML: .5; 3 SOLUTION RESPIRATORY (INHALATION) at 11:07

## 2021-07-23 RX ADMIN — METHYLPREDNISOLONE SODIUM SUCCINATE 80 MG: 40 INJECTION, POWDER, FOR SOLUTION INTRAMUSCULAR; INTRAVENOUS at 08:07

## 2021-07-23 RX ADMIN — AZELASTINE HYDROCHLORIDE 137 MCG: 137 SPRAY, METERED NASAL at 09:07

## 2021-07-23 RX ADMIN — AZELASTINE HYDROCHLORIDE 137 MCG: 137 SPRAY, METERED NASAL at 08:07

## 2021-07-23 RX ADMIN — IPRATROPIUM BROMIDE AND ALBUTEROL SULFATE 3 ML: .5; 3 SOLUTION RESPIRATORY (INHALATION) at 07:07

## 2021-07-23 RX ADMIN — FAMOTIDINE 20 MG: 20 TABLET ORAL at 08:07

## 2021-07-23 RX ADMIN — IPRATROPIUM BROMIDE AND ALBUTEROL SULFATE 3 ML: .5; 3 SOLUTION RESPIRATORY (INHALATION) at 08:07

## 2021-07-23 RX ADMIN — FLUTICASONE PROPIONATE 50 MCG: 50 SPRAY, METERED NASAL at 08:07

## 2021-07-23 RX ADMIN — ATORVASTATIN CALCIUM 40 MG: 40 TABLET, FILM COATED ORAL at 08:07

## 2021-07-23 RX ADMIN — SERTRALINE 50 MG: 50 TABLET, FILM COATED ORAL at 08:07

## 2021-07-23 RX ADMIN — MUPIROCIN: 20 OINTMENT TOPICAL at 09:07

## 2021-07-23 RX ADMIN — METOPROLOL TARTRATE 100 MG: 50 TABLET, FILM COATED ORAL at 08:07

## 2021-07-24 VITALS
WEIGHT: 256.81 LBS | RESPIRATION RATE: 18 BRPM | OXYGEN SATURATION: 97 % | SYSTOLIC BLOOD PRESSURE: 147 MMHG | HEIGHT: 64 IN | HEART RATE: 79 BPM | BODY MASS INDEX: 43.84 KG/M2 | DIASTOLIC BLOOD PRESSURE: 88 MMHG | TEMPERATURE: 98 F

## 2021-07-24 LAB
ANION GAP SERPL CALC-SCNC: 14 MMOL/L (ref 8–16)
BASOPHILS # BLD AUTO: 0.01 K/UL (ref 0–0.2)
BASOPHILS NFR BLD: 0.1 % (ref 0–1.9)
BUN SERPL-MCNC: 44 MG/DL (ref 8–23)
CALCIUM SERPL-MCNC: 9.7 MG/DL (ref 8.7–10.5)
CHLORIDE SERPL-SCNC: 92 MMOL/L (ref 95–110)
CO2 SERPL-SCNC: 33 MMOL/L (ref 23–29)
CREAT SERPL-MCNC: 1.4 MG/DL (ref 0.5–1.4)
DIFFERENTIAL METHOD: ABNORMAL
EOSINOPHIL # BLD AUTO: 0 K/UL (ref 0–0.5)
EOSINOPHIL NFR BLD: 0 % (ref 0–8)
ERYTHROCYTE [DISTWIDTH] IN BLOOD BY AUTOMATED COUNT: 14.5 % (ref 11.5–14.5)
EST. GFR  (AFRICAN AMERICAN): 45 ML/MIN/1.73 M^2
EST. GFR  (NON AFRICAN AMERICAN): 39 ML/MIN/1.73 M^2
GLUCOSE SERPL-MCNC: 263 MG/DL (ref 70–110)
HCT VFR BLD AUTO: 50 % (ref 37–48.5)
HGB BLD-MCNC: 15.9 G/DL (ref 12–16)
IMM GRANULOCYTES # BLD AUTO: 0.07 K/UL (ref 0–0.04)
IMM GRANULOCYTES NFR BLD AUTO: 0.7 % (ref 0–0.5)
LYMPHOCYTES # BLD AUTO: 1.1 K/UL (ref 1–4.8)
LYMPHOCYTES NFR BLD: 10.4 % (ref 18–48)
MAGNESIUM SERPL-MCNC: 2 MG/DL (ref 1.6–2.6)
MCH RBC QN AUTO: 31.2 PG (ref 27–31)
MCHC RBC AUTO-ENTMCNC: 31.8 G/DL (ref 32–36)
MCV RBC AUTO: 98 FL (ref 82–98)
MONOCYTES # BLD AUTO: 0.3 K/UL (ref 0.3–1)
MONOCYTES NFR BLD: 2.8 % (ref 4–15)
NEUTROPHILS # BLD AUTO: 9 K/UL (ref 1.8–7.7)
NEUTROPHILS NFR BLD: 86 % (ref 38–73)
NRBC BLD-RTO: 0 /100 WBC
PLATELET # BLD AUTO: 269 K/UL (ref 150–450)
PMV BLD AUTO: 11.8 FL (ref 9.2–12.9)
POTASSIUM SERPL-SCNC: 3.8 MMOL/L (ref 3.5–5.1)
RBC # BLD AUTO: 5.09 M/UL (ref 4–5.4)
SODIUM SERPL-SCNC: 139 MMOL/L (ref 136–145)
WBC # BLD AUTO: 10.45 K/UL (ref 3.9–12.7)

## 2021-07-24 PROCEDURE — S4991 NICOTINE PATCH NONLEGEND: HCPCS | Performed by: HOSPITALIST

## 2021-07-24 PROCEDURE — 94761 N-INVAS EAR/PLS OXIMETRY MLT: CPT

## 2021-07-24 PROCEDURE — 99900035 HC TECH TIME PER 15 MIN (STAT)

## 2021-07-24 PROCEDURE — 80048 BASIC METABOLIC PNL TOTAL CA: CPT | Performed by: HOSPITALIST

## 2021-07-24 PROCEDURE — 25000003 PHARM REV CODE 250: Performed by: HOSPITALIST

## 2021-07-24 PROCEDURE — 94640 AIRWAY INHALATION TREATMENT: CPT

## 2021-07-24 PROCEDURE — 25000242 PHARM REV CODE 250 ALT 637 W/ HCPCS: Performed by: HOSPITALIST

## 2021-07-24 PROCEDURE — 36415 COLL VENOUS BLD VENIPUNCTURE: CPT | Performed by: HOSPITALIST

## 2021-07-24 PROCEDURE — 94799 UNLISTED PULMONARY SVC/PX: CPT

## 2021-07-24 PROCEDURE — 83735 ASSAY OF MAGNESIUM: CPT | Performed by: HOSPITALIST

## 2021-07-24 PROCEDURE — 85025 COMPLETE CBC W/AUTO DIFF WBC: CPT | Performed by: HOSPITALIST

## 2021-07-24 PROCEDURE — 63600175 PHARM REV CODE 636 W HCPCS: Performed by: HOSPITALIST

## 2021-07-24 RX ORDER — FUROSEMIDE 20 MG/1
20 TABLET ORAL 2 TIMES DAILY
Qty: 60 TABLET | Refills: 0 | Status: SHIPPED | OUTPATIENT
Start: 2021-07-24 | End: 2021-07-29 | Stop reason: SDUPTHER

## 2021-07-24 RX ADMIN — AZELASTINE HYDROCHLORIDE 137 MCG: 137 SPRAY, METERED NASAL at 10:07

## 2021-07-24 RX ADMIN — FLUTICASONE PROPIONATE 50 MCG: 50 SPRAY, METERED NASAL at 10:07

## 2021-07-24 RX ADMIN — FUROSEMIDE 40 MG: 40 TABLET ORAL at 10:07

## 2021-07-24 RX ADMIN — IPRATROPIUM BROMIDE AND ALBUTEROL SULFATE 3 ML: .5; 3 SOLUTION RESPIRATORY (INHALATION) at 08:07

## 2021-07-24 RX ADMIN — METHYLPREDNISOLONE SODIUM SUCCINATE 80 MG: 40 INJECTION, POWDER, FOR SOLUTION INTRAMUSCULAR; INTRAVENOUS at 10:07

## 2021-07-24 RX ADMIN — FAMOTIDINE 20 MG: 20 TABLET ORAL at 10:07

## 2021-07-24 RX ADMIN — ATORVASTATIN CALCIUM 40 MG: 40 TABLET, FILM COATED ORAL at 10:07

## 2021-07-24 RX ADMIN — NICOTINE 1 PATCH: 7 PATCH TRANSDERMAL at 09:07

## 2021-07-24 RX ADMIN — DOCUSATE SODIUM 50 MG AND SENNOSIDES 8.6 MG 1 TABLET: 8.6; 5 TABLET, FILM COATED ORAL at 10:07

## 2021-07-24 RX ADMIN — SERTRALINE 50 MG: 50 TABLET, FILM COATED ORAL at 10:07

## 2021-07-24 RX ADMIN — IPRATROPIUM BROMIDE AND ALBUTEROL SULFATE 3 ML: .5; 3 SOLUTION RESPIRATORY (INHALATION) at 11:07

## 2021-07-24 RX ADMIN — MUPIROCIN: 20 OINTMENT TOPICAL at 09:07

## 2021-07-24 RX ADMIN — IPRATROPIUM BROMIDE AND ALBUTEROL SULFATE 3 ML: .5; 3 SOLUTION RESPIRATORY (INHALATION) at 04:07

## 2021-07-24 RX ADMIN — METOPROLOL TARTRATE 100 MG: 50 TABLET, FILM COATED ORAL at 10:07

## 2021-07-25 PROCEDURE — G0180 MD CERTIFICATION HHA PATIENT: HCPCS | Mod: ,,, | Performed by: HOSPITALIST

## 2021-07-25 PROCEDURE — G0180 PR HOME HEALTH MD CERTIFICATION: ICD-10-PCS | Mod: ,,, | Performed by: HOSPITALIST

## 2021-07-26 ENCOUNTER — LAB VISIT (OUTPATIENT)
Dept: LAB | Facility: HOSPITAL | Age: 66
End: 2021-07-26
Attending: FAMILY MEDICINE
Payer: COMMERCIAL

## 2021-07-26 DIAGNOSIS — E78.2 MIXED HYPERLIPIDEMIA: ICD-10-CM

## 2021-07-26 DIAGNOSIS — I48.20 CHRONIC ATRIAL FIBRILLATION: ICD-10-CM

## 2021-07-26 DIAGNOSIS — R06.09 DOE (DYSPNEA ON EXERTION): ICD-10-CM

## 2021-07-26 DIAGNOSIS — E11.9 TYPE 2 DIABETES MELLITUS WITHOUT COMPLICATION, WITHOUT LONG-TERM CURRENT USE OF INSULIN: ICD-10-CM

## 2021-07-26 DIAGNOSIS — R80.9 MICROALBUMINURIA: ICD-10-CM

## 2021-07-26 DIAGNOSIS — I50.33 ACUTE ON CHRONIC DIASTOLIC CONGESTIVE HEART FAILURE: ICD-10-CM

## 2021-07-26 DIAGNOSIS — K21.9 GASTROESOPHAGEAL REFLUX DISEASE WITHOUT ESOPHAGITIS: ICD-10-CM

## 2021-07-26 DIAGNOSIS — R60.0 BILATERAL LOWER EXTREMITY EDEMA: ICD-10-CM

## 2021-07-26 DIAGNOSIS — R79.89 ELEVATED TSH: ICD-10-CM

## 2021-07-26 DIAGNOSIS — K85.80 OTHER ACUTE PANCREATITIS WITHOUT INFECTION OR NECROSIS: ICD-10-CM

## 2021-07-26 DIAGNOSIS — I10 ESSENTIAL HYPERTENSION: ICD-10-CM

## 2021-07-26 DIAGNOSIS — R06.02 SOB (SHORTNESS OF BREATH): ICD-10-CM

## 2021-07-26 DIAGNOSIS — G47.33 OSA (OBSTRUCTIVE SLEEP APNEA): ICD-10-CM

## 2021-07-26 LAB
ALBUMIN SERPL BCP-MCNC: 2.9 G/DL (ref 3.5–5.2)
ALP SERPL-CCNC: 76 U/L (ref 55–135)
ALT SERPL W/O P-5'-P-CCNC: 20 U/L (ref 10–44)
ANION GAP SERPL CALC-SCNC: 10 MMOL/L (ref 8–16)
ANION GAP SERPL CALC-SCNC: 10 MMOL/L (ref 8–16)
ANISOCYTOSIS BLD QL SMEAR: SLIGHT
AST SERPL-CCNC: 19 U/L (ref 10–40)
BASOPHILS # BLD AUTO: 0.02 K/UL (ref 0–0.2)
BASOPHILS NFR BLD: 0.2 % (ref 0–1.9)
BILIRUB SERPL-MCNC: 0.8 MG/DL (ref 0.1–1)
BNP SERPL-MCNC: 80 PG/ML (ref 0–99)
BNP SERPL-MCNC: 80 PG/ML (ref 0–99)
BUN SERPL-MCNC: 44 MG/DL (ref 8–23)
BUN SERPL-MCNC: 44 MG/DL (ref 8–23)
CALCIUM SERPL-MCNC: 9.5 MG/DL (ref 8.7–10.5)
CALCIUM SERPL-MCNC: 9.5 MG/DL (ref 8.7–10.5)
CHLORIDE SERPL-SCNC: 96 MMOL/L (ref 95–110)
CHLORIDE SERPL-SCNC: 96 MMOL/L (ref 95–110)
CO2 SERPL-SCNC: 38 MMOL/L (ref 23–29)
CO2 SERPL-SCNC: 38 MMOL/L (ref 23–29)
CREAT SERPL-MCNC: 1.5 MG/DL (ref 0.5–1.4)
CREAT SERPL-MCNC: 1.5 MG/DL (ref 0.5–1.4)
DACRYOCYTES BLD QL SMEAR: ABNORMAL
DIFFERENTIAL METHOD: ABNORMAL
EOSINOPHIL # BLD AUTO: 0.2 K/UL (ref 0–0.5)
EOSINOPHIL NFR BLD: 2.2 % (ref 0–8)
ERYTHROCYTE [DISTWIDTH] IN BLOOD BY AUTOMATED COUNT: 14.6 % (ref 11.5–14.5)
EST. GFR  (AFRICAN AMERICAN): 41.3 ML/MIN/1.73 M^2
EST. GFR  (AFRICAN AMERICAN): 41.3 ML/MIN/1.73 M^2
EST. GFR  (NON AFRICAN AMERICAN): 35.8 ML/MIN/1.73 M^2
EST. GFR  (NON AFRICAN AMERICAN): 35.8 ML/MIN/1.73 M^2
ESTIMATED AVG GLUCOSE: 120 MG/DL (ref 68–131)
FOLATE SERPL-MCNC: 6.1 NG/ML (ref 4–24)
GIANT PLATELETS BLD QL SMEAR: PRESENT
GLUCOSE SERPL-MCNC: 89 MG/DL (ref 70–110)
GLUCOSE SERPL-MCNC: 89 MG/DL (ref 70–110)
HBA1C MFR BLD: 5.8 % (ref 4–5.6)
HCT VFR BLD AUTO: 51 % (ref 37–48.5)
HGB BLD-MCNC: 16 G/DL (ref 12–16)
HYPOCHROMIA BLD QL SMEAR: ABNORMAL
IMM GRANULOCYTES # BLD AUTO: 0.03 K/UL (ref 0–0.04)
IMM GRANULOCYTES NFR BLD AUTO: 0.3 % (ref 0–0.5)
LYMPHOCYTES # BLD AUTO: 4.3 K/UL (ref 1–4.8)
LYMPHOCYTES NFR BLD: 46 % (ref 18–48)
MCH RBC QN AUTO: 31.5 PG (ref 27–31)
MCHC RBC AUTO-ENTMCNC: 31.4 G/DL (ref 32–36)
MCV RBC AUTO: 100 FL (ref 82–98)
MONOCYTES # BLD AUTO: 0.7 K/UL (ref 0.3–1)
MONOCYTES NFR BLD: 7.2 % (ref 4–15)
NEUTROPHILS # BLD AUTO: 4.1 K/UL (ref 1.8–7.7)
NEUTROPHILS NFR BLD: 44.1 % (ref 38–73)
NRBC BLD-RTO: 0 /100 WBC
OVALOCYTES BLD QL SMEAR: ABNORMAL
PLATELET # BLD AUTO: 300 K/UL (ref 150–450)
PLATELET BLD QL SMEAR: ABNORMAL
PMV BLD AUTO: 11.9 FL (ref 9.2–12.9)
POIKILOCYTOSIS BLD QL SMEAR: SLIGHT
POTASSIUM SERPL-SCNC: 4.1 MMOL/L (ref 3.5–5.1)
POTASSIUM SERPL-SCNC: 4.1 MMOL/L (ref 3.5–5.1)
PROT SERPL-MCNC: 6.5 G/DL (ref 6–8.4)
RBC # BLD AUTO: 5.08 M/UL (ref 4–5.4)
SODIUM SERPL-SCNC: 144 MMOL/L (ref 136–145)
SODIUM SERPL-SCNC: 144 MMOL/L (ref 136–145)
T4 FREE SERPL-MCNC: 1.12 NG/DL (ref 0.71–1.51)
TSH SERPL DL<=0.005 MIU/L-ACNC: 6.97 UIU/ML (ref 0.4–4)
VIT B12 SERPL-MCNC: 1025 PG/ML (ref 210–950)
WBC # BLD AUTO: 9.26 K/UL (ref 3.9–12.7)

## 2021-07-26 PROCEDURE — 80053 COMPREHEN METABOLIC PANEL: CPT | Performed by: FAMILY MEDICINE

## 2021-07-26 PROCEDURE — 84443 ASSAY THYROID STIM HORMONE: CPT | Performed by: FAMILY MEDICINE

## 2021-07-26 PROCEDURE — 82746 ASSAY OF FOLIC ACID SERUM: CPT | Performed by: FAMILY MEDICINE

## 2021-07-26 PROCEDURE — 36415 COLL VENOUS BLD VENIPUNCTURE: CPT | Mod: PO | Performed by: FAMILY MEDICINE

## 2021-07-26 PROCEDURE — 83036 HEMOGLOBIN GLYCOSYLATED A1C: CPT | Performed by: FAMILY MEDICINE

## 2021-07-26 PROCEDURE — 82607 VITAMIN B-12: CPT | Performed by: FAMILY MEDICINE

## 2021-07-26 PROCEDURE — 83880 ASSAY OF NATRIURETIC PEPTIDE: CPT | Performed by: FAMILY MEDICINE

## 2021-07-26 PROCEDURE — 84439 ASSAY OF FREE THYROXINE: CPT | Performed by: FAMILY MEDICINE

## 2021-07-26 PROCEDURE — 85025 COMPLETE CBC W/AUTO DIFF WBC: CPT | Performed by: FAMILY MEDICINE

## 2021-07-29 ENCOUNTER — OFFICE VISIT (OUTPATIENT)
Dept: CARDIOLOGY | Facility: CLINIC | Age: 66
End: 2021-07-29
Payer: COMMERCIAL

## 2021-07-29 ENCOUNTER — LAB VISIT (OUTPATIENT)
Dept: PRIMARY CARE CLINIC | Facility: CLINIC | Age: 66
End: 2021-07-29
Payer: COMMERCIAL

## 2021-07-29 ENCOUNTER — OFFICE VISIT (OUTPATIENT)
Dept: FAMILY MEDICINE | Facility: CLINIC | Age: 66
End: 2021-07-29
Payer: COMMERCIAL

## 2021-07-29 ENCOUNTER — OFFICE VISIT (OUTPATIENT)
Dept: PULMONOLOGY | Facility: CLINIC | Age: 66
End: 2021-07-29
Payer: COMMERCIAL

## 2021-07-29 VITALS
DIASTOLIC BLOOD PRESSURE: 60 MMHG | TEMPERATURE: 100 F | HEART RATE: 101 BPM | RESPIRATION RATE: 20 BRPM | SYSTOLIC BLOOD PRESSURE: 102 MMHG | WEIGHT: 262.56 LBS | HEIGHT: 64 IN | BODY MASS INDEX: 44.83 KG/M2 | OXYGEN SATURATION: 89 %

## 2021-07-29 VITALS
HEIGHT: 64 IN | HEART RATE: 92 BPM | WEIGHT: 263.25 LBS | OXYGEN SATURATION: 95 % | BODY MASS INDEX: 44.94 KG/M2 | TEMPERATURE: 100 F | SYSTOLIC BLOOD PRESSURE: 122 MMHG | DIASTOLIC BLOOD PRESSURE: 88 MMHG

## 2021-07-29 VITALS
BODY MASS INDEX: 43.28 KG/M2 | HEART RATE: 67 BPM | SYSTOLIC BLOOD PRESSURE: 112 MMHG | HEIGHT: 64 IN | DIASTOLIC BLOOD PRESSURE: 80 MMHG | OXYGEN SATURATION: 93 % | WEIGHT: 253.5 LBS

## 2021-07-29 DIAGNOSIS — E78.2 MIXED HYPERLIPIDEMIA: ICD-10-CM

## 2021-07-29 DIAGNOSIS — J44.1 COPD EXACERBATION: ICD-10-CM

## 2021-07-29 DIAGNOSIS — F32.0 CURRENT MILD EPISODE OF MAJOR DEPRESSIVE DISORDER WITHOUT PRIOR EPISODE: ICD-10-CM

## 2021-07-29 DIAGNOSIS — J20.9 ACUTE BRONCHITIS, UNSPECIFIED ORGANISM: ICD-10-CM

## 2021-07-29 DIAGNOSIS — R06.02 SOB (SHORTNESS OF BREATH): ICD-10-CM

## 2021-07-29 DIAGNOSIS — J96.12 CHRONIC RESPIRATORY FAILURE WITH HYPOXIA AND HYPERCAPNIA: Primary | ICD-10-CM

## 2021-07-29 DIAGNOSIS — R06.09 DOE (DYSPNEA ON EXERTION): Primary | ICD-10-CM

## 2021-07-29 DIAGNOSIS — R05.9 COUGH: ICD-10-CM

## 2021-07-29 DIAGNOSIS — I10 ESSENTIAL HYPERTENSION: ICD-10-CM

## 2021-07-29 DIAGNOSIS — F17.200 TOBACCO DEPENDENCE: ICD-10-CM

## 2021-07-29 DIAGNOSIS — I73.9 PVD (PERIPHERAL VASCULAR DISEASE): ICD-10-CM

## 2021-07-29 DIAGNOSIS — R09.89 PULMONARY CONGESTION: Primary | ICD-10-CM

## 2021-07-29 DIAGNOSIS — I27.20 PULMONARY HYPERTENSION: ICD-10-CM

## 2021-07-29 DIAGNOSIS — F41.9 ANXIETY: ICD-10-CM

## 2021-07-29 DIAGNOSIS — I50.33 ACUTE ON CHRONIC DIASTOLIC CONGESTIVE HEART FAILURE: ICD-10-CM

## 2021-07-29 DIAGNOSIS — Z72.0 TOBACCO USE: ICD-10-CM

## 2021-07-29 DIAGNOSIS — G47.33 OSA (OBSTRUCTIVE SLEEP APNEA): ICD-10-CM

## 2021-07-29 DIAGNOSIS — J96.11 CHRONIC RESPIRATORY FAILURE WITH HYPOXIA AND HYPERCAPNIA: Primary | ICD-10-CM

## 2021-07-29 DIAGNOSIS — I48.20 CHRONIC ATRIAL FIBRILLATION: ICD-10-CM

## 2021-07-29 DIAGNOSIS — R60.0 BILATERAL LOWER EXTREMITY EDEMA: ICD-10-CM

## 2021-07-29 PROCEDURE — 3074F PR MOST RECENT SYSTOLIC BLOOD PRESSURE < 130 MM HG: ICD-10-PCS | Mod: CPTII,S$GLB,, | Performed by: FAMILY MEDICINE

## 2021-07-29 PROCEDURE — 3288F FALL RISK ASSESSMENT DOCD: CPT | Mod: CPTII,S$GLB,, | Performed by: FAMILY MEDICINE

## 2021-07-29 PROCEDURE — 1160F RVW MEDS BY RX/DR IN RCRD: CPT | Mod: CPTII,S$GLB,, | Performed by: INTERNAL MEDICINE

## 2021-07-29 PROCEDURE — 1111F PR DISCHARGE MEDS RECONCILED W/ CURRENT OUTPATIENT MED LIST: ICD-10-PCS | Mod: CPTII,S$GLB,, | Performed by: INTERNAL MEDICINE

## 2021-07-29 PROCEDURE — 3288F PR FALLS RISK ASSESSMENT DOCUMENTED: ICD-10-PCS | Mod: CPTII,S$GLB,, | Performed by: INTERNAL MEDICINE

## 2021-07-29 PROCEDURE — 99999 PR PBB SHADOW E&M-EST. PATIENT-LVL V: CPT | Mod: PBBFAC,,, | Performed by: NURSE PRACTITIONER

## 2021-07-29 PROCEDURE — 1126F AMNT PAIN NOTED NONE PRSNT: CPT | Mod: CPTII,S$GLB,, | Performed by: NURSE PRACTITIONER

## 2021-07-29 PROCEDURE — 3008F PR BODY MASS INDEX (BMI) DOCUMENTED: ICD-10-PCS | Mod: CPTII,S$GLB,, | Performed by: FAMILY MEDICINE

## 2021-07-29 PROCEDURE — 1159F MED LIST DOCD IN RCRD: CPT | Mod: CPTII,S$GLB,, | Performed by: INTERNAL MEDICINE

## 2021-07-29 PROCEDURE — 1101F PT FALLS ASSESS-DOCD LE1/YR: CPT | Mod: CPTII,S$GLB,, | Performed by: INTERNAL MEDICINE

## 2021-07-29 PROCEDURE — 1111F PR DISCHARGE MEDS RECONCILED W/ CURRENT OUTPATIENT MED LIST: ICD-10-PCS | Mod: CPTII,S$GLB,, | Performed by: NURSE PRACTITIONER

## 2021-07-29 PROCEDURE — 1160F PR REVIEW ALL MEDS BY PRESCRIBER/CLIN PHARMACIST DOCUMENTED: ICD-10-PCS | Mod: CPTII,S$GLB,, | Performed by: NURSE PRACTITIONER

## 2021-07-29 PROCEDURE — 3074F PR MOST RECENT SYSTOLIC BLOOD PRESSURE < 130 MM HG: ICD-10-PCS | Mod: CPTII,S$GLB,, | Performed by: NURSE PRACTITIONER

## 2021-07-29 PROCEDURE — 3079F PR MOST RECENT DIASTOLIC BLOOD PRESSURE 80-89 MM HG: ICD-10-PCS | Mod: CPTII,S$GLB,, | Performed by: INTERNAL MEDICINE

## 2021-07-29 PROCEDURE — 99214 PR OFFICE/OUTPT VISIT, EST, LEVL IV, 30-39 MIN: ICD-10-PCS | Mod: S$GLB,,, | Performed by: INTERNAL MEDICINE

## 2021-07-29 PROCEDURE — 1101F PR PT FALLS ASSESS DOC 0-1 FALLS W/OUT INJ PAST YR: ICD-10-PCS | Mod: CPTII,S$GLB,, | Performed by: FAMILY MEDICINE

## 2021-07-29 PROCEDURE — 1101F PR PT FALLS ASSESS DOC 0-1 FALLS W/OUT INJ PAST YR: ICD-10-PCS | Mod: CPTII,S$GLB,, | Performed by: NURSE PRACTITIONER

## 2021-07-29 PROCEDURE — 3288F PR FALLS RISK ASSESSMENT DOCUMENTED: ICD-10-PCS | Mod: CPTII,S$GLB,, | Performed by: NURSE PRACTITIONER

## 2021-07-29 PROCEDURE — 3008F PR BODY MASS INDEX (BMI) DOCUMENTED: ICD-10-PCS | Mod: CPTII,S$GLB,, | Performed by: INTERNAL MEDICINE

## 2021-07-29 PROCEDURE — 99999 PR PBB SHADOW E&M-EST. PATIENT-LVL V: ICD-10-PCS | Mod: PBBFAC,,, | Performed by: NURSE PRACTITIONER

## 2021-07-29 PROCEDURE — 1159F PR MEDICATION LIST DOCUMENTED IN MEDICAL RECORD: ICD-10-PCS | Mod: CPTII,S$GLB,, | Performed by: INTERNAL MEDICINE

## 2021-07-29 PROCEDURE — 3288F FALL RISK ASSESSMENT DOCD: CPT | Mod: CPTII,S$GLB,, | Performed by: NURSE PRACTITIONER

## 2021-07-29 PROCEDURE — 3074F SYST BP LT 130 MM HG: CPT | Mod: CPTII,S$GLB,, | Performed by: NURSE PRACTITIONER

## 2021-07-29 PROCEDURE — 3044F PR MOST RECENT HEMOGLOBIN A1C LEVEL <7.0%: ICD-10-PCS | Mod: CPTII,S$GLB,, | Performed by: NURSE PRACTITIONER

## 2021-07-29 PROCEDURE — 3044F HG A1C LEVEL LT 7.0%: CPT | Mod: CPTII,S$GLB,, | Performed by: INTERNAL MEDICINE

## 2021-07-29 PROCEDURE — 99999 PR PBB SHADOW E&M-EST. PATIENT-LVL IV: ICD-10-PCS | Mod: PBBFAC,,, | Performed by: FAMILY MEDICINE

## 2021-07-29 PROCEDURE — 1126F PR PAIN SEVERITY QUANTIFIED, NO PAIN PRESENT: ICD-10-PCS | Mod: CPTII,S$GLB,, | Performed by: INTERNAL MEDICINE

## 2021-07-29 PROCEDURE — U0003 INFECTIOUS AGENT DETECTION BY NUCLEIC ACID (DNA OR RNA); SEVERE ACUTE RESPIRATORY SYNDROME CORONAVIRUS 2 (SARS-COV-2) (CORONAVIRUS DISEASE [COVID-19]), AMPLIFIED PROBE TECHNIQUE, MAKING USE OF HIGH THROUGHPUT TECHNOLOGIES AS DESCRIBED BY CMS-2020-01-R: HCPCS | Performed by: INTERNAL MEDICINE

## 2021-07-29 PROCEDURE — 99214 OFFICE O/P EST MOD 30 MIN: CPT | Mod: S$GLB,,, | Performed by: FAMILY MEDICINE

## 2021-07-29 PROCEDURE — 99999 PR PBB SHADOW E&M-EST. PATIENT-LVL V: ICD-10-PCS | Mod: PBBFAC,,, | Performed by: INTERNAL MEDICINE

## 2021-07-29 PROCEDURE — 3044F HG A1C LEVEL LT 7.0%: CPT | Mod: CPTII,S$GLB,, | Performed by: FAMILY MEDICINE

## 2021-07-29 PROCEDURE — 3074F SYST BP LT 130 MM HG: CPT | Mod: CPTII,S$GLB,, | Performed by: FAMILY MEDICINE

## 2021-07-29 PROCEDURE — 1126F AMNT PAIN NOTED NONE PRSNT: CPT | Mod: CPTII,S$GLB,, | Performed by: INTERNAL MEDICINE

## 2021-07-29 PROCEDURE — 99214 PR OFFICE/OUTPT VISIT, EST, LEVL IV, 30-39 MIN: ICD-10-PCS | Mod: S$GLB,,, | Performed by: FAMILY MEDICINE

## 2021-07-29 PROCEDURE — 1159F MED LIST DOCD IN RCRD: CPT | Mod: CPTII,S$GLB,, | Performed by: NURSE PRACTITIONER

## 2021-07-29 PROCEDURE — 3008F BODY MASS INDEX DOCD: CPT | Mod: CPTII,S$GLB,, | Performed by: INTERNAL MEDICINE

## 2021-07-29 PROCEDURE — 1111F DSCHRG MED/CURRENT MED MERGE: CPT | Mod: CPTII,S$GLB,, | Performed by: INTERNAL MEDICINE

## 2021-07-29 PROCEDURE — 3044F HG A1C LEVEL LT 7.0%: CPT | Mod: CPTII,S$GLB,, | Performed by: NURSE PRACTITIONER

## 2021-07-29 PROCEDURE — 3078F PR MOST RECENT DIASTOLIC BLOOD PRESSURE < 80 MM HG: ICD-10-PCS | Mod: CPTII,S$GLB,, | Performed by: FAMILY MEDICINE

## 2021-07-29 PROCEDURE — 3288F PR FALLS RISK ASSESSMENT DOCUMENTED: ICD-10-PCS | Mod: CPTII,S$GLB,, | Performed by: FAMILY MEDICINE

## 2021-07-29 PROCEDURE — 1111F PR DISCHARGE MEDS RECONCILED W/ CURRENT OUTPATIENT MED LIST: ICD-10-PCS | Mod: CPTII,S$GLB,, | Performed by: FAMILY MEDICINE

## 2021-07-29 PROCEDURE — 99214 OFFICE O/P EST MOD 30 MIN: CPT | Mod: S$GLB,,, | Performed by: NURSE PRACTITIONER

## 2021-07-29 PROCEDURE — 3074F SYST BP LT 130 MM HG: CPT | Mod: CPTII,S$GLB,, | Performed by: INTERNAL MEDICINE

## 2021-07-29 PROCEDURE — 1126F PR PAIN SEVERITY QUANTIFIED, NO PAIN PRESENT: ICD-10-PCS | Mod: CPTII,S$GLB,, | Performed by: FAMILY MEDICINE

## 2021-07-29 PROCEDURE — 1101F PT FALLS ASSESS-DOCD LE1/YR: CPT | Mod: CPTII,S$GLB,, | Performed by: FAMILY MEDICINE

## 2021-07-29 PROCEDURE — 3078F DIAST BP <80 MM HG: CPT | Mod: CPTII,S$GLB,, | Performed by: FAMILY MEDICINE

## 2021-07-29 PROCEDURE — 3044F PR MOST RECENT HEMOGLOBIN A1C LEVEL <7.0%: ICD-10-PCS | Mod: CPTII,S$GLB,, | Performed by: FAMILY MEDICINE

## 2021-07-29 PROCEDURE — 3079F PR MOST RECENT DIASTOLIC BLOOD PRESSURE 80-89 MM HG: ICD-10-PCS | Mod: CPTII,S$GLB,, | Performed by: NURSE PRACTITIONER

## 2021-07-29 PROCEDURE — 3079F DIAST BP 80-89 MM HG: CPT | Mod: CPTII,S$GLB,, | Performed by: NURSE PRACTITIONER

## 2021-07-29 PROCEDURE — 1111F DSCHRG MED/CURRENT MED MERGE: CPT | Mod: CPTII,S$GLB,, | Performed by: FAMILY MEDICINE

## 2021-07-29 PROCEDURE — 99214 OFFICE O/P EST MOD 30 MIN: CPT | Mod: S$GLB,,, | Performed by: INTERNAL MEDICINE

## 2021-07-29 PROCEDURE — 3288F FALL RISK ASSESSMENT DOCD: CPT | Mod: CPTII,S$GLB,, | Performed by: INTERNAL MEDICINE

## 2021-07-29 PROCEDURE — 3008F BODY MASS INDEX DOCD: CPT | Mod: CPTII,S$GLB,, | Performed by: NURSE PRACTITIONER

## 2021-07-29 PROCEDURE — 99214 PR OFFICE/OUTPT VISIT, EST, LEVL IV, 30-39 MIN: ICD-10-PCS | Mod: S$GLB,,, | Performed by: NURSE PRACTITIONER

## 2021-07-29 PROCEDURE — 99999 PR PBB SHADOW E&M-EST. PATIENT-LVL V: CPT | Mod: PBBFAC,,, | Performed by: INTERNAL MEDICINE

## 2021-07-29 PROCEDURE — 1126F PR PAIN SEVERITY QUANTIFIED, NO PAIN PRESENT: ICD-10-PCS | Mod: CPTII,S$GLB,, | Performed by: NURSE PRACTITIONER

## 2021-07-29 PROCEDURE — 3008F BODY MASS INDEX DOCD: CPT | Mod: CPTII,S$GLB,, | Performed by: FAMILY MEDICINE

## 2021-07-29 PROCEDURE — 3044F PR MOST RECENT HEMOGLOBIN A1C LEVEL <7.0%: ICD-10-PCS | Mod: CPTII,S$GLB,, | Performed by: INTERNAL MEDICINE

## 2021-07-29 PROCEDURE — 1101F PR PT FALLS ASSESS DOC 0-1 FALLS W/OUT INJ PAST YR: ICD-10-PCS | Mod: CPTII,S$GLB,, | Performed by: INTERNAL MEDICINE

## 2021-07-29 PROCEDURE — 3008F PR BODY MASS INDEX (BMI) DOCUMENTED: ICD-10-PCS | Mod: CPTII,S$GLB,, | Performed by: NURSE PRACTITIONER

## 2021-07-29 PROCEDURE — 1101F PT FALLS ASSESS-DOCD LE1/YR: CPT | Mod: CPTII,S$GLB,, | Performed by: NURSE PRACTITIONER

## 2021-07-29 PROCEDURE — 1159F PR MEDICATION LIST DOCUMENTED IN MEDICAL RECORD: ICD-10-PCS | Mod: CPTII,S$GLB,, | Performed by: NURSE PRACTITIONER

## 2021-07-29 PROCEDURE — 1160F RVW MEDS BY RX/DR IN RCRD: CPT | Mod: CPTII,S$GLB,, | Performed by: NURSE PRACTITIONER

## 2021-07-29 PROCEDURE — 1160F PR REVIEW ALL MEDS BY PRESCRIBER/CLIN PHARMACIST DOCUMENTED: ICD-10-PCS | Mod: CPTII,S$GLB,, | Performed by: INTERNAL MEDICINE

## 2021-07-29 PROCEDURE — 99999 PR PBB SHADOW E&M-EST. PATIENT-LVL IV: CPT | Mod: PBBFAC,,, | Performed by: FAMILY MEDICINE

## 2021-07-29 PROCEDURE — 3074F PR MOST RECENT SYSTOLIC BLOOD PRESSURE < 130 MM HG: ICD-10-PCS | Mod: CPTII,S$GLB,, | Performed by: INTERNAL MEDICINE

## 2021-07-29 PROCEDURE — 3079F DIAST BP 80-89 MM HG: CPT | Mod: CPTII,S$GLB,, | Performed by: INTERNAL MEDICINE

## 2021-07-29 PROCEDURE — 1111F DSCHRG MED/CURRENT MED MERGE: CPT | Mod: CPTII,S$GLB,, | Performed by: NURSE PRACTITIONER

## 2021-07-29 PROCEDURE — U0005 INFEC AGEN DETEC AMPLI PROBE: HCPCS | Performed by: INTERNAL MEDICINE

## 2021-07-29 PROCEDURE — 1126F AMNT PAIN NOTED NONE PRSNT: CPT | Mod: CPTII,S$GLB,, | Performed by: FAMILY MEDICINE

## 2021-07-29 RX ORDER — SERTRALINE HYDROCHLORIDE 100 MG/1
100 TABLET, FILM COATED ORAL DAILY
Qty: 90 TABLET | Refills: 1 | Status: SHIPPED | OUTPATIENT
Start: 2021-07-29 | End: 2021-09-21 | Stop reason: SDUPTHER

## 2021-07-29 RX ORDER — FUROSEMIDE 20 MG/1
40 TABLET ORAL 2 TIMES DAILY
Qty: 90 TABLET | Refills: 2 | Status: SHIPPED | OUTPATIENT
Start: 2021-07-29 | End: 2021-08-04

## 2021-07-29 RX ORDER — ALBUTEROL SULFATE 90 UG/1
2 AEROSOL, METERED RESPIRATORY (INHALATION) EVERY 6 HOURS PRN
Qty: 18 G | Refills: 2 | Status: SHIPPED | OUTPATIENT
Start: 2021-07-29 | End: 2021-08-20

## 2021-07-29 RX ORDER — METOPROLOL SUCCINATE 100 MG/1
100 TABLET, EXTENDED RELEASE ORAL DAILY
Qty: 90 TABLET | Refills: 3 | Status: SHIPPED | OUTPATIENT
Start: 2021-07-29 | End: 2021-09-21 | Stop reason: SDUPTHER

## 2021-07-29 RX ORDER — DOXYCYCLINE 100 MG/1
100 CAPSULE ORAL EVERY 12 HOURS
Qty: 20 CAPSULE | Refills: 0 | Status: SHIPPED | OUTPATIENT
Start: 2021-07-29 | End: 2021-08-08

## 2021-07-29 RX ORDER — PREDNISONE 20 MG/1
60 TABLET ORAL DAILY
Qty: 15 TABLET | Refills: 0 | Status: SHIPPED | OUTPATIENT
Start: 2021-07-29 | End: 2021-08-03

## 2021-07-30 LAB
SARS-COV-2 RNA RESP QL NAA+PROBE: NOT DETECTED
SARS-COV-2- CYCLE NUMBER: -1

## 2021-08-02 PROBLEM — J96.11 CHRONIC RESPIRATORY FAILURE WITH HYPOXIA AND HYPERCAPNIA: Status: ACTIVE | Noted: 2021-07-20

## 2021-08-02 PROBLEM — J96.12 CHRONIC RESPIRATORY FAILURE WITH HYPOXIA AND HYPERCAPNIA: Status: ACTIVE | Noted: 2021-07-20

## 2021-08-02 PROBLEM — J20.9 ACUTE BRONCHITIS: Status: ACTIVE | Noted: 2021-08-02

## 2021-08-02 PROBLEM — J44.1 COPD EXACERBATION: Status: ACTIVE | Noted: 2021-08-02

## 2021-08-03 ENCOUNTER — TELEPHONE (OUTPATIENT)
Dept: PULMONOLOGY | Facility: CLINIC | Age: 66
End: 2021-08-03

## 2021-08-04 RX ORDER — FUROSEMIDE 20 MG/1
TABLET ORAL
Qty: 368 TABLET | Refills: 0 | Status: SHIPPED | OUTPATIENT
Start: 2021-08-04 | End: 2021-08-16

## 2021-08-10 ENCOUNTER — EXTERNAL HOME HEALTH (OUTPATIENT)
Dept: HOME HEALTH SERVICES | Facility: HOSPITAL | Age: 66
End: 2021-08-10
Payer: COMMERCIAL

## 2021-08-16 ENCOUNTER — PATIENT MESSAGE (OUTPATIENT)
Dept: PULMONOLOGY | Facility: CLINIC | Age: 66
End: 2021-08-16

## 2021-08-17 ENCOUNTER — OFFICE VISIT (OUTPATIENT)
Dept: PULMONOLOGY | Facility: CLINIC | Age: 66
End: 2021-08-17
Payer: COMMERCIAL

## 2021-08-17 DIAGNOSIS — J96.12 CHRONIC RESPIRATORY FAILURE WITH HYPOXIA AND HYPERCAPNIA: Primary | ICD-10-CM

## 2021-08-17 DIAGNOSIS — J96.11 CHRONIC RESPIRATORY FAILURE WITH HYPOXIA AND HYPERCAPNIA: Primary | ICD-10-CM

## 2021-08-17 DIAGNOSIS — F17.200 TOBACCO DEPENDENCE: ICD-10-CM

## 2021-08-17 DIAGNOSIS — G47.33 OSA (OBSTRUCTIVE SLEEP APNEA): ICD-10-CM

## 2021-08-17 DIAGNOSIS — J42 CHRONIC BRONCHITIS, UNSPECIFIED CHRONIC BRONCHITIS TYPE: ICD-10-CM

## 2021-08-17 DIAGNOSIS — I27.20 PULMONARY HYPERTENSION: ICD-10-CM

## 2021-08-17 PROCEDURE — 99214 OFFICE O/P EST MOD 30 MIN: CPT | Mod: 95,,, | Performed by: NURSE PRACTITIONER

## 2021-08-17 PROCEDURE — 3044F HG A1C LEVEL LT 7.0%: CPT | Mod: CPTII,,, | Performed by: NURSE PRACTITIONER

## 2021-08-17 PROCEDURE — 99214 PR OFFICE/OUTPT VISIT, EST, LEVL IV, 30-39 MIN: ICD-10-PCS | Mod: 95,,, | Performed by: NURSE PRACTITIONER

## 2021-08-17 PROCEDURE — 1111F PR DISCHARGE MEDS RECONCILED W/ CURRENT OUTPATIENT MED LIST: ICD-10-PCS | Mod: CPTII,,, | Performed by: NURSE PRACTITIONER

## 2021-08-17 PROCEDURE — 3044F PR MOST RECENT HEMOGLOBIN A1C LEVEL <7.0%: ICD-10-PCS | Mod: CPTII,,, | Performed by: NURSE PRACTITIONER

## 2021-08-17 PROCEDURE — 1111F DSCHRG MED/CURRENT MED MERGE: CPT | Mod: CPTII,,, | Performed by: NURSE PRACTITIONER

## 2021-08-19 ENCOUNTER — LAB VISIT (OUTPATIENT)
Dept: LAB | Facility: HOSPITAL | Age: 66
End: 2021-08-19
Attending: INTERNAL MEDICINE
Payer: COMMERCIAL

## 2021-08-19 DIAGNOSIS — E78.2 MIXED HYPERLIPIDEMIA: ICD-10-CM

## 2021-08-19 DIAGNOSIS — G47.33 OSA (OBSTRUCTIVE SLEEP APNEA): ICD-10-CM

## 2021-08-19 DIAGNOSIS — I73.9 PVD (PERIPHERAL VASCULAR DISEASE): ICD-10-CM

## 2021-08-19 DIAGNOSIS — I50.33 ACUTE ON CHRONIC DIASTOLIC CONGESTIVE HEART FAILURE: ICD-10-CM

## 2021-08-19 DIAGNOSIS — I10 ESSENTIAL HYPERTENSION: ICD-10-CM

## 2021-08-19 DIAGNOSIS — I48.20 CHRONIC ATRIAL FIBRILLATION: ICD-10-CM

## 2021-08-19 DIAGNOSIS — R06.09 DOE (DYSPNEA ON EXERTION): ICD-10-CM

## 2021-08-19 LAB
ANION GAP SERPL CALC-SCNC: 9 MMOL/L (ref 8–16)
BNP SERPL-MCNC: 49 PG/ML (ref 0–99)
BUN SERPL-MCNC: 33 MG/DL (ref 8–23)
CALCIUM SERPL-MCNC: 9.5 MG/DL (ref 8.7–10.5)
CHLORIDE SERPL-SCNC: 97 MMOL/L (ref 95–110)
CO2 SERPL-SCNC: 36 MMOL/L (ref 23–29)
CREAT SERPL-MCNC: 1.5 MG/DL (ref 0.5–1.4)
EST. GFR  (AFRICAN AMERICAN): 41 ML/MIN/1.73 M^2
EST. GFR  (NON AFRICAN AMERICAN): 36 ML/MIN/1.73 M^2
GLUCOSE SERPL-MCNC: 77 MG/DL (ref 70–110)
POTASSIUM SERPL-SCNC: 4.1 MMOL/L (ref 3.5–5.1)
SODIUM SERPL-SCNC: 142 MMOL/L (ref 136–145)

## 2021-08-19 PROCEDURE — 83880 ASSAY OF NATRIURETIC PEPTIDE: CPT | Performed by: INTERNAL MEDICINE

## 2021-08-19 PROCEDURE — 36415 COLL VENOUS BLD VENIPUNCTURE: CPT | Mod: PO | Performed by: INTERNAL MEDICINE

## 2021-08-19 PROCEDURE — 80048 BASIC METABOLIC PNL TOTAL CA: CPT | Performed by: INTERNAL MEDICINE

## 2021-08-24 ENCOUNTER — PATIENT MESSAGE (OUTPATIENT)
Dept: PULMONOLOGY | Facility: CLINIC | Age: 66
End: 2021-08-24

## 2021-08-24 PROBLEM — J42 CHRONIC BRONCHITIS: Status: ACTIVE | Noted: 2021-08-24

## 2021-09-03 DIAGNOSIS — J44.1 COPD EXACERBATION: ICD-10-CM

## 2021-09-03 DIAGNOSIS — J20.9 ACUTE BRONCHITIS, UNSPECIFIED ORGANISM: ICD-10-CM

## 2021-09-03 DIAGNOSIS — R05.9 COUGH: ICD-10-CM

## 2021-09-07 ENCOUNTER — PATIENT MESSAGE (OUTPATIENT)
Dept: PULMONOLOGY | Facility: CLINIC | Age: 66
End: 2021-09-07

## 2021-09-07 RX ORDER — DOXYCYCLINE 100 MG/1
CAPSULE ORAL
Qty: 20 CAPSULE | Refills: 0 | OUTPATIENT
Start: 2021-09-07

## 2021-09-13 ENCOUNTER — OFFICE VISIT (OUTPATIENT)
Dept: PULMONOLOGY | Facility: CLINIC | Age: 66
End: 2021-09-13
Payer: COMMERCIAL

## 2021-09-13 DIAGNOSIS — G47.33 OSA (OBSTRUCTIVE SLEEP APNEA): ICD-10-CM

## 2021-09-13 DIAGNOSIS — F17.200 TOBACCO DEPENDENCE: ICD-10-CM

## 2021-09-13 DIAGNOSIS — J42 CHRONIC BRONCHITIS, UNSPECIFIED CHRONIC BRONCHITIS TYPE: ICD-10-CM

## 2021-09-13 DIAGNOSIS — J96.12 CHRONIC RESPIRATORY FAILURE WITH HYPOXIA AND HYPERCAPNIA: Primary | ICD-10-CM

## 2021-09-13 DIAGNOSIS — F17.210 NICOTINE DEPENDENCE, CIGARETTES, UNCOMPLICATED: ICD-10-CM

## 2021-09-13 DIAGNOSIS — J96.11 CHRONIC RESPIRATORY FAILURE WITH HYPOXIA AND HYPERCAPNIA: Primary | ICD-10-CM

## 2021-09-13 PROCEDURE — 4010F PR ACE/ARB THEARPY RXD/TAKEN: ICD-10-PCS | Mod: CPTII,95,, | Performed by: NURSE PRACTITIONER

## 2021-09-13 PROCEDURE — 99214 OFFICE O/P EST MOD 30 MIN: CPT | Mod: 95,,, | Performed by: NURSE PRACTITIONER

## 2021-09-13 PROCEDURE — 3060F PR POS MICROALBUMINURIA RESULT DOCUMENTED/REVIEW: ICD-10-PCS | Mod: CPTII,95,, | Performed by: NURSE PRACTITIONER

## 2021-09-13 PROCEDURE — 3044F HG A1C LEVEL LT 7.0%: CPT | Mod: CPTII,95,, | Performed by: NURSE PRACTITIONER

## 2021-09-13 PROCEDURE — 3060F POS MICROALBUMINURIA REV: CPT | Mod: CPTII,95,, | Performed by: NURSE PRACTITIONER

## 2021-09-13 PROCEDURE — 4010F ACE/ARB THERAPY RXD/TAKEN: CPT | Mod: CPTII,95,, | Performed by: NURSE PRACTITIONER

## 2021-09-13 PROCEDURE — 3044F PR MOST RECENT HEMOGLOBIN A1C LEVEL <7.0%: ICD-10-PCS | Mod: CPTII,95,, | Performed by: NURSE PRACTITIONER

## 2021-09-13 PROCEDURE — 99214 PR OFFICE/OUTPT VISIT, EST, LEVL IV, 30-39 MIN: ICD-10-PCS | Mod: 95,,, | Performed by: NURSE PRACTITIONER

## 2021-09-13 PROCEDURE — 3066F NEPHROPATHY DOC TX: CPT | Mod: CPTII,95,, | Performed by: NURSE PRACTITIONER

## 2021-09-13 PROCEDURE — 3066F PR DOCUMENTATION OF TREATMENT FOR NEPHROPATHY: ICD-10-PCS | Mod: CPTII,95,, | Performed by: NURSE PRACTITIONER

## 2021-09-14 ENCOUNTER — OFFICE VISIT (OUTPATIENT)
Dept: OPTOMETRY | Facility: CLINIC | Age: 66
End: 2021-09-14
Payer: COMMERCIAL

## 2021-09-14 DIAGNOSIS — H04.123 DRY EYE SYNDROME, BILATERAL: ICD-10-CM

## 2021-09-14 DIAGNOSIS — E11.36 TYPE 2 DIABETES MELLITUS WITH DIABETIC CATARACT, WITHOUT LONG-TERM CURRENT USE OF INSULIN: Primary | ICD-10-CM

## 2021-09-14 DIAGNOSIS — H25.13 NUCLEAR SCLEROSIS, BILATERAL: ICD-10-CM

## 2021-09-14 DIAGNOSIS — Z01.812 PRE-PROCEDURE LAB EXAM: ICD-10-CM

## 2021-09-14 DIAGNOSIS — H52.7 REFRACTIVE ERROR: ICD-10-CM

## 2021-09-14 PROCEDURE — 3060F PR POS MICROALBUMINURIA RESULT DOCUMENTED/REVIEW: ICD-10-PCS | Mod: CPTII,S$GLB,, | Performed by: OPTOMETRIST

## 2021-09-14 PROCEDURE — 99999 PR PBB SHADOW E&M-EST. PATIENT-LVL III: ICD-10-PCS | Mod: PBBFAC,,, | Performed by: OPTOMETRIST

## 2021-09-14 PROCEDURE — 3066F PR DOCUMENTATION OF TREATMENT FOR NEPHROPATHY: ICD-10-PCS | Mod: CPTII,S$GLB,, | Performed by: OPTOMETRIST

## 2021-09-14 PROCEDURE — 4010F PR ACE/ARB THEARPY RXD/TAKEN: ICD-10-PCS | Mod: CPTII,S$GLB,, | Performed by: OPTOMETRIST

## 2021-09-14 PROCEDURE — 3288F FALL RISK ASSESSMENT DOCD: CPT | Mod: CPTII,S$GLB,, | Performed by: OPTOMETRIST

## 2021-09-14 PROCEDURE — 92014 PR EYE EXAM, EST PATIENT,COMPREHESV: ICD-10-PCS | Mod: S$GLB,,, | Performed by: OPTOMETRIST

## 2021-09-14 PROCEDURE — 3044F HG A1C LEVEL LT 7.0%: CPT | Mod: CPTII,S$GLB,, | Performed by: OPTOMETRIST

## 2021-09-14 PROCEDURE — 3066F NEPHROPATHY DOC TX: CPT | Mod: CPTII,S$GLB,, | Performed by: OPTOMETRIST

## 2021-09-14 PROCEDURE — 1101F PR PT FALLS ASSESS DOC 0-1 FALLS W/OUT INJ PAST YR: ICD-10-PCS | Mod: CPTII,S$GLB,, | Performed by: OPTOMETRIST

## 2021-09-14 PROCEDURE — 1101F PT FALLS ASSESS-DOCD LE1/YR: CPT | Mod: CPTII,S$GLB,, | Performed by: OPTOMETRIST

## 2021-09-14 PROCEDURE — 4010F ACE/ARB THERAPY RXD/TAKEN: CPT | Mod: CPTII,S$GLB,, | Performed by: OPTOMETRIST

## 2021-09-14 PROCEDURE — 92014 COMPRE OPH EXAM EST PT 1/>: CPT | Mod: S$GLB,,, | Performed by: OPTOMETRIST

## 2021-09-14 PROCEDURE — 1159F MED LIST DOCD IN RCRD: CPT | Mod: CPTII,S$GLB,, | Performed by: OPTOMETRIST

## 2021-09-14 PROCEDURE — 92015 PR REFRACTION: ICD-10-PCS | Mod: S$GLB,,, | Performed by: OPTOMETRIST

## 2021-09-14 PROCEDURE — 3060F POS MICROALBUMINURIA REV: CPT | Mod: CPTII,S$GLB,, | Performed by: OPTOMETRIST

## 2021-09-14 PROCEDURE — 1160F PR REVIEW ALL MEDS BY PRESCRIBER/CLIN PHARMACIST DOCUMENTED: ICD-10-PCS | Mod: CPTII,S$GLB,, | Performed by: OPTOMETRIST

## 2021-09-14 PROCEDURE — 1159F PR MEDICATION LIST DOCUMENTED IN MEDICAL RECORD: ICD-10-PCS | Mod: CPTII,S$GLB,, | Performed by: OPTOMETRIST

## 2021-09-14 PROCEDURE — 1160F RVW MEDS BY RX/DR IN RCRD: CPT | Mod: CPTII,S$GLB,, | Performed by: OPTOMETRIST

## 2021-09-14 PROCEDURE — 92015 DETERMINE REFRACTIVE STATE: CPT | Mod: S$GLB,,, | Performed by: OPTOMETRIST

## 2021-09-14 PROCEDURE — 99999 PR PBB SHADOW E&M-EST. PATIENT-LVL III: CPT | Mod: PBBFAC,,, | Performed by: OPTOMETRIST

## 2021-09-14 PROCEDURE — 3288F PR FALLS RISK ASSESSMENT DOCUMENTED: ICD-10-PCS | Mod: CPTII,S$GLB,, | Performed by: OPTOMETRIST

## 2021-09-14 PROCEDURE — 3044F PR MOST RECENT HEMOGLOBIN A1C LEVEL <7.0%: ICD-10-PCS | Mod: CPTII,S$GLB,, | Performed by: OPTOMETRIST

## 2021-09-14 RX ORDER — METOPROLOL TARTRATE 50 MG/1
50 TABLET ORAL 2 TIMES DAILY
COMMUNITY
Start: 2021-08-25 | End: 2021-09-21

## 2021-09-16 ENCOUNTER — CLINICAL SUPPORT (OUTPATIENT)
Dept: SMOKING CESSATION | Facility: CLINIC | Age: 66
End: 2021-09-16
Payer: COMMERCIAL

## 2021-09-16 DIAGNOSIS — F17.200 NICOTINE DEPENDENCE: Primary | ICD-10-CM

## 2021-09-16 PROCEDURE — 99407 PR TOBACCO USE CESSATION INTENSIVE >10 MINUTES: ICD-10-PCS | Mod: S$GLB,,,

## 2021-09-16 PROCEDURE — 99407 BEHAV CHNG SMOKING > 10 MIN: CPT | Mod: S$GLB,,,

## 2021-09-19 ENCOUNTER — LAB VISIT (OUTPATIENT)
Dept: URGENT CARE | Facility: CLINIC | Age: 66
End: 2021-09-19
Payer: COMMERCIAL

## 2021-09-19 DIAGNOSIS — Z01.812 PRE-PROCEDURE LAB EXAM: ICD-10-CM

## 2021-09-19 PROCEDURE — U0005 INFEC AGEN DETEC AMPLI PROBE: HCPCS | Performed by: NURSE PRACTITIONER

## 2021-09-19 PROCEDURE — U0003 INFECTIOUS AGENT DETECTION BY NUCLEIC ACID (DNA OR RNA); SEVERE ACUTE RESPIRATORY SYNDROME CORONAVIRUS 2 (SARS-COV-2) (CORONAVIRUS DISEASE [COVID-19]), AMPLIFIED PROBE TECHNIQUE, MAKING USE OF HIGH THROUGHPUT TECHNOLOGIES AS DESCRIBED BY CMS-2020-01-R: HCPCS | Performed by: NURSE PRACTITIONER

## 2021-09-20 DIAGNOSIS — J44.1 COPD EXACERBATION: ICD-10-CM

## 2021-09-20 DIAGNOSIS — R05.9 COUGH: ICD-10-CM

## 2021-09-20 DIAGNOSIS — J20.9 ACUTE BRONCHITIS, UNSPECIFIED ORGANISM: ICD-10-CM

## 2021-09-20 LAB
SARS-COV-2 RNA RESP QL NAA+PROBE: NOT DETECTED
SARS-COV-2- CYCLE NUMBER: NORMAL

## 2021-09-20 RX ORDER — PREDNISONE 20 MG/1
TABLET ORAL
Qty: 15 TABLET | Refills: 0 | OUTPATIENT
Start: 2021-09-20

## 2021-09-21 ENCOUNTER — HOSPITAL ENCOUNTER (OUTPATIENT)
Dept: RESPIRATORY THERAPY | Facility: HOSPITAL | Age: 66
Discharge: HOME | End: 2021-09-21
Attending: NURSE PRACTITIONER
Payer: COMMERCIAL

## 2021-09-21 ENCOUNTER — OFFICE VISIT (OUTPATIENT)
Dept: FAMILY MEDICINE | Facility: CLINIC | Age: 66
End: 2021-09-21
Payer: COMMERCIAL

## 2021-09-21 ENCOUNTER — HOSPITAL ENCOUNTER (OUTPATIENT)
Dept: RADIOLOGY | Facility: HOSPITAL | Age: 66
Discharge: HOME | End: 2021-09-21
Attending: NURSE PRACTITIONER
Payer: COMMERCIAL

## 2021-09-21 VITALS
SYSTOLIC BLOOD PRESSURE: 132 MMHG | BODY MASS INDEX: 45.96 KG/M2 | TEMPERATURE: 98 F | HEART RATE: 93 BPM | OXYGEN SATURATION: 95 % | RESPIRATION RATE: 20 BRPM | DIASTOLIC BLOOD PRESSURE: 84 MMHG | HEIGHT: 64 IN | WEIGHT: 269.19 LBS

## 2021-09-21 VITALS — RESPIRATION RATE: 18 BRPM | OXYGEN SATURATION: 99 % | HEART RATE: 76 BPM

## 2021-09-21 DIAGNOSIS — R06.02 SOB (SHORTNESS OF BREATH): ICD-10-CM

## 2021-09-21 DIAGNOSIS — Z72.0 TOBACCO USE: ICD-10-CM

## 2021-09-21 DIAGNOSIS — F17.210 NICOTINE DEPENDENCE, CIGARETTES, UNCOMPLICATED: ICD-10-CM

## 2021-09-21 DIAGNOSIS — J44.1 COPD EXACERBATION: ICD-10-CM

## 2021-09-21 DIAGNOSIS — F41.9 ANXIETY: ICD-10-CM

## 2021-09-21 DIAGNOSIS — I50.33 ACUTE ON CHRONIC DIASTOLIC CONGESTIVE HEART FAILURE: ICD-10-CM

## 2021-09-21 DIAGNOSIS — I27.20 PULMONARY HYPERTENSION: ICD-10-CM

## 2021-09-21 DIAGNOSIS — G47.33 OSA (OBSTRUCTIVE SLEEP APNEA): ICD-10-CM

## 2021-09-21 DIAGNOSIS — I48.20 CHRONIC ATRIAL FIBRILLATION: ICD-10-CM

## 2021-09-21 DIAGNOSIS — J96.11 CHRONIC RESPIRATORY FAILURE WITH HYPOXIA AND HYPERCAPNIA: ICD-10-CM

## 2021-09-21 DIAGNOSIS — R60.0 BILATERAL LOWER EXTREMITY EDEMA: ICD-10-CM

## 2021-09-21 DIAGNOSIS — F17.200 TOBACCO DEPENDENCE: ICD-10-CM

## 2021-09-21 DIAGNOSIS — I70.0 AORTIC ATHEROSCLEROSIS: ICD-10-CM

## 2021-09-21 DIAGNOSIS — I25.10 CORONARY ARTERY DISEASE, ANGINA PRESENCE UNSPECIFIED, UNSPECIFIED VESSEL OR LESION TYPE, UNSPECIFIED WHETHER NATIVE OR TRANSPLANTED HEART: ICD-10-CM

## 2021-09-21 DIAGNOSIS — E78.2 MIXED HYPERLIPIDEMIA: ICD-10-CM

## 2021-09-21 DIAGNOSIS — I73.9 PVD (PERIPHERAL VASCULAR DISEASE): ICD-10-CM

## 2021-09-21 DIAGNOSIS — R80.9 MICROALBUMINURIA: ICD-10-CM

## 2021-09-21 DIAGNOSIS — J96.12 CHRONIC RESPIRATORY FAILURE WITH HYPOXIA AND HYPERCAPNIA: ICD-10-CM

## 2021-09-21 DIAGNOSIS — Z00.00 ANNUAL PHYSICAL EXAM: Primary | ICD-10-CM

## 2021-09-21 DIAGNOSIS — J42 CHRONIC BRONCHITIS, UNSPECIFIED CHRONIC BRONCHITIS TYPE: ICD-10-CM

## 2021-09-21 DIAGNOSIS — Z23 NEED FOR SHINGLES VACCINE: ICD-10-CM

## 2021-09-21 DIAGNOSIS — I10 ESSENTIAL HYPERTENSION: ICD-10-CM

## 2021-09-21 LAB
ALLENS TEST: ABNORMAL
DELSYS: ABNORMAL
HCO3 UR-SCNC: 33.9 MMOL/L (ref 24–28)
MODE: ABNORMAL
PCO2 BLDA: 55.6 MMHG (ref 35–45)
PH SMN: 7.39 [PH] (ref 7.35–7.45)
PO2 BLDA: 72 MMHG (ref 80–100)
POC BE: 7 MMOL/L
POC SATURATED O2: 94 % (ref 95–100)
POC TCO2: 36 MMOL/L (ref 23–27)
SAMPLE: ABNORMAL
SITE: ABNORMAL
SP02: 92

## 2021-09-21 PROCEDURE — 94060 EVALUATION OF WHEEZING: CPT | Mod: 26,,, | Performed by: INTERNAL MEDICINE

## 2021-09-21 PROCEDURE — 3079F DIAST BP 80-89 MM HG: CPT | Mod: CPTII,S$GLB,, | Performed by: FAMILY MEDICINE

## 2021-09-21 PROCEDURE — 99999 PR PBB SHADOW E&M-EST. PATIENT-LVL IV: ICD-10-PCS | Mod: PBBFAC,,, | Performed by: FAMILY MEDICINE

## 2021-09-21 PROCEDURE — 36600 WITHDRAWAL OF ARTERIAL BLOOD: CPT

## 2021-09-21 PROCEDURE — 3044F PR MOST RECENT HEMOGLOBIN A1C LEVEL <7.0%: ICD-10-PCS | Mod: CPTII,S$GLB,, | Performed by: FAMILY MEDICINE

## 2021-09-21 PROCEDURE — 1159F PR MEDICATION LIST DOCUMENTED IN MEDICAL RECORD: ICD-10-PCS | Mod: CPTII,S$GLB,, | Performed by: FAMILY MEDICINE

## 2021-09-21 PROCEDURE — 4010F PR ACE/ARB THEARPY RXD/TAKEN: ICD-10-PCS | Mod: CPTII,S$GLB,, | Performed by: FAMILY MEDICINE

## 2021-09-21 PROCEDURE — 1126F AMNT PAIN NOTED NONE PRSNT: CPT | Mod: CPTII,S$GLB,, | Performed by: FAMILY MEDICINE

## 2021-09-21 PROCEDURE — 3288F PR FALLS RISK ASSESSMENT DOCUMENTED: ICD-10-PCS | Mod: CPTII,S$GLB,, | Performed by: FAMILY MEDICINE

## 2021-09-21 PROCEDURE — 94010 BREATHING CAPACITY TEST: CPT

## 2021-09-21 PROCEDURE — 99900035 HC TECH TIME PER 15 MIN (STAT)

## 2021-09-21 PROCEDURE — 82803 BLOOD GASES ANY COMBINATION: CPT

## 2021-09-21 PROCEDURE — 3288F FALL RISK ASSESSMENT DOCD: CPT | Mod: CPTII,S$GLB,, | Performed by: FAMILY MEDICINE

## 2021-09-21 PROCEDURE — 3008F BODY MASS INDEX DOCD: CPT | Mod: CPTII,S$GLB,, | Performed by: FAMILY MEDICINE

## 2021-09-21 PROCEDURE — 99999 PR PBB SHADOW E&M-EST. PATIENT-LVL IV: CPT | Mod: PBBFAC,,, | Performed by: FAMILY MEDICINE

## 2021-09-21 PROCEDURE — 3060F POS MICROALBUMINURIA REV: CPT | Mod: CPTII,S$GLB,, | Performed by: FAMILY MEDICINE

## 2021-09-21 PROCEDURE — 99397 PR PREVENTIVE VISIT,EST,65 & OVER: ICD-10-PCS | Mod: 25,S$GLB,, | Performed by: FAMILY MEDICINE

## 2021-09-21 PROCEDURE — 1159F MED LIST DOCD IN RCRD: CPT | Mod: CPTII,S$GLB,, | Performed by: FAMILY MEDICINE

## 2021-09-21 PROCEDURE — 94727 PR PULM FUNCTION TEST BY GAS: ICD-10-PCS | Mod: 26,,, | Performed by: INTERNAL MEDICINE

## 2021-09-21 PROCEDURE — 94729 DIFFUSING CAPACITY: CPT

## 2021-09-21 PROCEDURE — 94060 PR EVAL OF BRONCHOSPASM: ICD-10-PCS | Mod: 26,,, | Performed by: INTERNAL MEDICINE

## 2021-09-21 PROCEDURE — 1126F PR PAIN SEVERITY QUANTIFIED, NO PAIN PRESENT: ICD-10-PCS | Mod: CPTII,S$GLB,, | Performed by: FAMILY MEDICINE

## 2021-09-21 PROCEDURE — 71271 CT CHEST LUNG SCREENING LOW DOSE: ICD-10-PCS | Mod: 26,,, | Performed by: RADIOLOGY

## 2021-09-21 PROCEDURE — 99397 PER PM REEVAL EST PAT 65+ YR: CPT | Mod: 25,S$GLB,, | Performed by: FAMILY MEDICINE

## 2021-09-21 PROCEDURE — 3075F PR MOST RECENT SYSTOLIC BLOOD PRESS GE 130-139MM HG: ICD-10-PCS | Mod: CPTII,S$GLB,, | Performed by: FAMILY MEDICINE

## 2021-09-21 PROCEDURE — 90750 ZOSTER RECOMBINANT VACCINE: ICD-10-PCS | Mod: S$GLB,,, | Performed by: FAMILY MEDICINE

## 2021-09-21 PROCEDURE — 3066F NEPHROPATHY DOC TX: CPT | Mod: CPTII,S$GLB,, | Performed by: FAMILY MEDICINE

## 2021-09-21 PROCEDURE — 90471 ZOSTER RECOMBINANT VACCINE: ICD-10-PCS | Mod: S$GLB,,, | Performed by: FAMILY MEDICINE

## 2021-09-21 PROCEDURE — 3044F HG A1C LEVEL LT 7.0%: CPT | Mod: CPTII,S$GLB,, | Performed by: FAMILY MEDICINE

## 2021-09-21 PROCEDURE — 94727 GAS DIL/WSHOT DETER LNG VOL: CPT | Mod: 26,,, | Performed by: INTERNAL MEDICINE

## 2021-09-21 PROCEDURE — 3060F PR POS MICROALBUMINURIA RESULT DOCUMENTED/REVIEW: ICD-10-PCS | Mod: CPTII,S$GLB,, | Performed by: FAMILY MEDICINE

## 2021-09-21 PROCEDURE — 71271 CT THORAX LUNG CANCER SCR C-: CPT | Mod: TC

## 2021-09-21 PROCEDURE — 25000242 PHARM REV CODE 250 ALT 637 W/ HCPCS: Performed by: NURSE PRACTITIONER

## 2021-09-21 PROCEDURE — 71271 CT THORAX LUNG CANCER SCR C-: CPT | Mod: 26,,, | Performed by: RADIOLOGY

## 2021-09-21 PROCEDURE — 90750 HZV VACC RECOMBINANT IM: CPT | Mod: S$GLB,,, | Performed by: FAMILY MEDICINE

## 2021-09-21 PROCEDURE — 3008F PR BODY MASS INDEX (BMI) DOCUMENTED: ICD-10-PCS | Mod: CPTII,S$GLB,, | Performed by: FAMILY MEDICINE

## 2021-09-21 PROCEDURE — 4010F ACE/ARB THERAPY RXD/TAKEN: CPT | Mod: CPTII,S$GLB,, | Performed by: FAMILY MEDICINE

## 2021-09-21 PROCEDURE — 3079F PR MOST RECENT DIASTOLIC BLOOD PRESSURE 80-89 MM HG: ICD-10-PCS | Mod: CPTII,S$GLB,, | Performed by: FAMILY MEDICINE

## 2021-09-21 PROCEDURE — 1101F PT FALLS ASSESS-DOCD LE1/YR: CPT | Mod: CPTII,S$GLB,, | Performed by: FAMILY MEDICINE

## 2021-09-21 PROCEDURE — 90471 IMMUNIZATION ADMIN: CPT | Mod: S$GLB,,, | Performed by: FAMILY MEDICINE

## 2021-09-21 PROCEDURE — 3066F PR DOCUMENTATION OF TREATMENT FOR NEPHROPATHY: ICD-10-PCS | Mod: CPTII,S$GLB,, | Performed by: FAMILY MEDICINE

## 2021-09-21 PROCEDURE — 3075F SYST BP GE 130 - 139MM HG: CPT | Mod: CPTII,S$GLB,, | Performed by: FAMILY MEDICINE

## 2021-09-21 PROCEDURE — 1101F PR PT FALLS ASSESS DOC 0-1 FALLS W/OUT INJ PAST YR: ICD-10-PCS | Mod: CPTII,S$GLB,, | Performed by: FAMILY MEDICINE

## 2021-09-21 PROCEDURE — 94729 PR C02/MEMBANE DIFFUSE CAPACITY: ICD-10-PCS | Mod: 26,,, | Performed by: INTERNAL MEDICINE

## 2021-09-21 PROCEDURE — 94727 GAS DIL/WSHOT DETER LNG VOL: CPT

## 2021-09-21 PROCEDURE — 94729 DIFFUSING CAPACITY: CPT | Mod: 26,,, | Performed by: INTERNAL MEDICINE

## 2021-09-21 RX ORDER — LOSARTAN POTASSIUM 25 MG/1
25 TABLET ORAL DAILY
Qty: 90 TABLET | Refills: 1 | Status: SHIPPED | OUTPATIENT
Start: 2021-09-21 | End: 2022-02-17 | Stop reason: SDUPTHER

## 2021-09-21 RX ORDER — RIVAROXABAN 20 MG/1
20 TABLET, FILM COATED ORAL DAILY
Qty: 90 TABLET | Refills: 3 | Status: SHIPPED | OUTPATIENT
Start: 2021-09-21 | End: 2022-02-17 | Stop reason: SDUPTHER

## 2021-09-21 RX ORDER — FUROSEMIDE 40 MG/1
40 TABLET ORAL 2 TIMES DAILY
Qty: 180 TABLET | Refills: 1 | Status: SHIPPED | OUTPATIENT
Start: 2021-09-21 | End: 2022-02-17 | Stop reason: SDUPTHER

## 2021-09-21 RX ORDER — ATORVASTATIN CALCIUM 40 MG/1
40 TABLET, FILM COATED ORAL DAILY
Qty: 90 TABLET | Refills: 1 | Status: SHIPPED | OUTPATIENT
Start: 2021-09-21 | End: 2022-06-10 | Stop reason: SDUPTHER

## 2021-09-21 RX ORDER — ALBUTEROL SULFATE 2.5 MG/.5ML
2.5 SOLUTION RESPIRATORY (INHALATION) ONCE
Status: COMPLETED | OUTPATIENT
Start: 2021-09-21 | End: 2021-09-21

## 2021-09-21 RX ORDER — METOPROLOL SUCCINATE 100 MG/1
100 TABLET, EXTENDED RELEASE ORAL DAILY
Qty: 90 TABLET | Refills: 3 | Status: SHIPPED | OUTPATIENT
Start: 2021-09-21 | End: 2022-03-23

## 2021-09-21 RX ORDER — SERTRALINE HYDROCHLORIDE 100 MG/1
100 TABLET, FILM COATED ORAL DAILY
Qty: 90 TABLET | Refills: 1 | Status: SHIPPED | OUTPATIENT
Start: 2021-09-21 | End: 2022-02-17 | Stop reason: SDUPTHER

## 2021-09-21 RX ORDER — ALBUTEROL SULFATE 90 UG/1
2 AEROSOL, METERED RESPIRATORY (INHALATION) EVERY 6 HOURS PRN
Qty: 54 G | Refills: 1 | Status: SHIPPED | OUTPATIENT
Start: 2021-09-21 | End: 2023-01-13 | Stop reason: SDUPTHER

## 2021-09-21 RX ADMIN — ALBUTEROL SULFATE 2.5 MG: 2.5 SOLUTION RESPIRATORY (INHALATION) at 09:09

## 2021-09-23 LAB
BRPFT: NORMAL
DLCO ADJ PRE: 15.01 ML/(MIN*MMHG)
DLCO SINGLE BREATH LLN: 16.1
DLCO SINGLE BREATH PRE REF: 73.7 %
DLCO SINGLE BREATH REF: 21.83
DLCOC SBVA LLN: 2.94
DLCOC SBVA PRE REF: 100.3 %
DLCOC SBVA REF: 4.39
DLCOC SINGLE BREATH LLN: 16.1
DLCOC SINGLE BREATH PRE REF: 68.7 %
DLCOC SINGLE BREATH REF: 21.83
DLCOVA LLN: 2.94
DLCOVA PRE REF: 107.5 %
DLCOVA PRE: 4.72 ML/(MIN*MMHG*L)
DLCOVA REF: 4.39
DLVAADJ PRE: 4.41 ML/(MIN*MMHG*L)
ERVN2 LLN: -16449.3
ERVN2 PRE REF: 77.6 %
ERVN2 PRE: 0.54 L
ERVN2 REF: 0.7
FEF 25 75 CHG: 22.4 %
FEF 25 75 LLN: 0.68
FEF 25 75 POST REF: 35.2 %
FEF 25 75 PRE REF: 28.8 %
FEF 25 75 REF: 1.73
FET100 CHG: 2.7 %
FEV1 CHG: 1.9 %
FEV1 FVC CHG: -1.8 %
FEV1 FVC LLN: 66
FEV1 FVC POST REF: 80.2 %
FEV1 FVC PRE REF: 81.7 %
FEV1 FVC REF: 79
FEV1 LLN: 1.39
FEV1 POST REF: 67.3 %
FEV1 PRE REF: 66 %
FEV1 REF: 1.97
FRCN2 LLN: 1.9
FRCN2 PRE REF: 57.8 %
FRCN2 REF: 2.72
FVC CHG: 3.8 %
FVC LLN: 1.8
FVC POST REF: 83.4 %
FVC PRE REF: 80.4 %
FVC REF: 2.52
IVC PRE: 1.92 L
IVC SINGLE BREATH LLN: 1.8
IVC SINGLE BREATH PRE REF: 76.2 %
IVC SINGLE BREATH REF: 2.52
PEF CHG: -8 %
PEF LLN: 3.11
PEF POST REF: 82.7 %
PEF PRE REF: 89.9 %
PEF REF: 5.12
POST FEF 25 75: 0.61 L/S
POST FET 100: 11.37 SEC
POST FEV1 FVC: 63.22 %
POST FEV1: 1.33 L
POST FVC: 2.1 L
POST PEF: 4.24 L/S
PRE DLCO: 16.08 ML/(MIN*MMHG)
PRE FEF 25 75: 0.5 L/S
PRE FET 100: 11.06 SEC
PRE FEV1 FVC: 64.36 %
PRE FEV1: 1.3 L
PRE FRC N2: 1.57 L
PRE FVC: 2.02 L
PRE PEF: 4.6 L/S
RVN2 LLN: 1.45
RVN2 PRE REF: 50.9 %
RVN2 PRE: 1.03 L
RVN2 REF: 2.02
RVN2TLCN2 LLN: 32.15
RVN2TLCN2 PRE REF: 80.1 %
RVN2TLCN2 PRE: 33.44 %
RVN2TLCN2 REF: 41.74
TLCN2 LLN: 3.98
TLCN2 PRE REF: 62 %
TLCN2 PRE: 3.08 L
TLCN2 REF: 4.97
VA PRE: 3.42 L
VA SINGLE BREATH LLN: 4.82
VA SINGLE BREATH PRE REF: 71 %
VA SINGLE BREATH REF: 4.82
VCMAXN2 LLN: 1.8
VCMAXN2 PRE REF: 81.4 %
VCMAXN2 PRE: 2.05 L
VCMAXN2 REF: 2.52

## 2021-09-28 ENCOUNTER — OFFICE VISIT (OUTPATIENT)
Dept: CARDIOLOGY | Facility: CLINIC | Age: 66
End: 2021-09-28
Payer: COMMERCIAL

## 2021-09-28 VITALS
HEART RATE: 71 BPM | DIASTOLIC BLOOD PRESSURE: 70 MMHG | HEIGHT: 64 IN | WEIGHT: 262.69 LBS | OXYGEN SATURATION: 93 % | BODY MASS INDEX: 44.85 KG/M2 | SYSTOLIC BLOOD PRESSURE: 110 MMHG | RESPIRATION RATE: 15 BRPM

## 2021-09-28 DIAGNOSIS — R06.09 DOE (DYSPNEA ON EXERTION): ICD-10-CM

## 2021-09-28 DIAGNOSIS — I73.9 PVD (PERIPHERAL VASCULAR DISEASE): ICD-10-CM

## 2021-09-28 DIAGNOSIS — I50.33 ACUTE ON CHRONIC DIASTOLIC CONGESTIVE HEART FAILURE: ICD-10-CM

## 2021-09-28 DIAGNOSIS — E78.2 MIXED HYPERLIPIDEMIA: ICD-10-CM

## 2021-09-28 DIAGNOSIS — I48.0 PAROXYSMAL ATRIAL FIBRILLATION: Primary | ICD-10-CM

## 2021-09-28 DIAGNOSIS — I10 ESSENTIAL HYPERTENSION: ICD-10-CM

## 2021-09-28 PROCEDURE — 3060F POS MICROALBUMINURIA REV: CPT | Mod: CPTII,S$GLB,, | Performed by: INTERNAL MEDICINE

## 2021-09-28 PROCEDURE — 4010F ACE/ARB THERAPY RXD/TAKEN: CPT | Mod: CPTII,S$GLB,, | Performed by: INTERNAL MEDICINE

## 2021-09-28 PROCEDURE — 1159F MED LIST DOCD IN RCRD: CPT | Mod: CPTII,S$GLB,, | Performed by: INTERNAL MEDICINE

## 2021-09-28 PROCEDURE — 99214 OFFICE O/P EST MOD 30 MIN: CPT | Mod: S$GLB,,, | Performed by: INTERNAL MEDICINE

## 2021-09-28 PROCEDURE — 99214 PR OFFICE/OUTPT VISIT, EST, LEVL IV, 30-39 MIN: ICD-10-PCS | Mod: S$GLB,,, | Performed by: INTERNAL MEDICINE

## 2021-09-28 PROCEDURE — 99999 PR PBB SHADOW E&M-EST. PATIENT-LVL V: CPT | Mod: PBBFAC,,, | Performed by: INTERNAL MEDICINE

## 2021-09-28 PROCEDURE — 1101F PR PT FALLS ASSESS DOC 0-1 FALLS W/OUT INJ PAST YR: ICD-10-PCS | Mod: CPTII,S$GLB,, | Performed by: INTERNAL MEDICINE

## 2021-09-28 PROCEDURE — 3066F PR DOCUMENTATION OF TREATMENT FOR NEPHROPATHY: ICD-10-PCS | Mod: CPTII,S$GLB,, | Performed by: INTERNAL MEDICINE

## 2021-09-28 PROCEDURE — 1126F AMNT PAIN NOTED NONE PRSNT: CPT | Mod: CPTII,S$GLB,, | Performed by: INTERNAL MEDICINE

## 2021-09-28 PROCEDURE — 3078F PR MOST RECENT DIASTOLIC BLOOD PRESSURE < 80 MM HG: ICD-10-PCS | Mod: CPTII,S$GLB,, | Performed by: INTERNAL MEDICINE

## 2021-09-28 PROCEDURE — 3044F HG A1C LEVEL LT 7.0%: CPT | Mod: CPTII,S$GLB,, | Performed by: INTERNAL MEDICINE

## 2021-09-28 PROCEDURE — 3008F PR BODY MASS INDEX (BMI) DOCUMENTED: ICD-10-PCS | Mod: CPTII,S$GLB,, | Performed by: INTERNAL MEDICINE

## 2021-09-28 PROCEDURE — 3066F NEPHROPATHY DOC TX: CPT | Mod: CPTII,S$GLB,, | Performed by: INTERNAL MEDICINE

## 2021-09-28 PROCEDURE — 3008F BODY MASS INDEX DOCD: CPT | Mod: CPTII,S$GLB,, | Performed by: INTERNAL MEDICINE

## 2021-09-28 PROCEDURE — 1159F PR MEDICATION LIST DOCUMENTED IN MEDICAL RECORD: ICD-10-PCS | Mod: CPTII,S$GLB,, | Performed by: INTERNAL MEDICINE

## 2021-09-28 PROCEDURE — 99999 PR PBB SHADOW E&M-EST. PATIENT-LVL V: ICD-10-PCS | Mod: PBBFAC,,, | Performed by: INTERNAL MEDICINE

## 2021-09-28 PROCEDURE — 3074F SYST BP LT 130 MM HG: CPT | Mod: CPTII,S$GLB,, | Performed by: INTERNAL MEDICINE

## 2021-09-28 PROCEDURE — 4010F PR ACE/ARB THEARPY RXD/TAKEN: ICD-10-PCS | Mod: CPTII,S$GLB,, | Performed by: INTERNAL MEDICINE

## 2021-09-28 PROCEDURE — 3078F DIAST BP <80 MM HG: CPT | Mod: CPTII,S$GLB,, | Performed by: INTERNAL MEDICINE

## 2021-09-28 PROCEDURE — 3288F PR FALLS RISK ASSESSMENT DOCUMENTED: ICD-10-PCS | Mod: CPTII,S$GLB,, | Performed by: INTERNAL MEDICINE

## 2021-09-28 PROCEDURE — 3044F PR MOST RECENT HEMOGLOBIN A1C LEVEL <7.0%: ICD-10-PCS | Mod: CPTII,S$GLB,, | Performed by: INTERNAL MEDICINE

## 2021-09-28 PROCEDURE — 1126F PR PAIN SEVERITY QUANTIFIED, NO PAIN PRESENT: ICD-10-PCS | Mod: CPTII,S$GLB,, | Performed by: INTERNAL MEDICINE

## 2021-09-28 PROCEDURE — 1101F PT FALLS ASSESS-DOCD LE1/YR: CPT | Mod: CPTII,S$GLB,, | Performed by: INTERNAL MEDICINE

## 2021-09-28 PROCEDURE — 3288F FALL RISK ASSESSMENT DOCD: CPT | Mod: CPTII,S$GLB,, | Performed by: INTERNAL MEDICINE

## 2021-09-28 PROCEDURE — 3074F PR MOST RECENT SYSTOLIC BLOOD PRESSURE < 130 MM HG: ICD-10-PCS | Mod: CPTII,S$GLB,, | Performed by: INTERNAL MEDICINE

## 2021-09-28 PROCEDURE — 3060F PR POS MICROALBUMINURIA RESULT DOCUMENTED/REVIEW: ICD-10-PCS | Mod: CPTII,S$GLB,, | Performed by: INTERNAL MEDICINE

## 2021-10-02 ENCOUNTER — PATIENT MESSAGE (OUTPATIENT)
Dept: PULMONOLOGY | Facility: CLINIC | Age: 66
End: 2021-10-02

## 2021-10-07 DIAGNOSIS — J44.1 COPD EXACERBATION: ICD-10-CM

## 2021-10-18 ENCOUNTER — PATIENT MESSAGE (OUTPATIENT)
Dept: PULMONOLOGY | Facility: CLINIC | Age: 66
End: 2021-10-18

## 2021-10-18 DIAGNOSIS — G47.33 OSA (OBSTRUCTIVE SLEEP APNEA): ICD-10-CM

## 2021-10-18 DIAGNOSIS — J96.12 CHRONIC RESPIRATORY FAILURE WITH HYPOXIA AND HYPERCAPNIA: Primary | ICD-10-CM

## 2021-10-18 DIAGNOSIS — J96.11 CHRONIC RESPIRATORY FAILURE WITH HYPOXIA AND HYPERCAPNIA: Primary | ICD-10-CM

## 2021-10-19 ENCOUNTER — TELEPHONE (OUTPATIENT)
Dept: PULMONOLOGY | Facility: CLINIC | Age: 66
End: 2021-10-19

## 2021-10-21 ENCOUNTER — PATIENT MESSAGE (OUTPATIENT)
Dept: PULMONOLOGY | Facility: CLINIC | Age: 66
End: 2021-10-21
Payer: COMMERCIAL

## 2021-10-26 ENCOUNTER — OFFICE VISIT (OUTPATIENT)
Dept: PULMONOLOGY | Facility: CLINIC | Age: 66
End: 2021-10-26
Payer: COMMERCIAL

## 2021-10-26 VITALS
DIASTOLIC BLOOD PRESSURE: 84 MMHG | WEIGHT: 265.19 LBS | OXYGEN SATURATION: 93 % | TEMPERATURE: 97 F | HEIGHT: 64 IN | HEART RATE: 75 BPM | SYSTOLIC BLOOD PRESSURE: 131 MMHG | BODY MASS INDEX: 45.27 KG/M2

## 2021-10-26 DIAGNOSIS — J98.11 ATELECTASIS: ICD-10-CM

## 2021-10-26 DIAGNOSIS — G47.33 OSA (OBSTRUCTIVE SLEEP APNEA): ICD-10-CM

## 2021-10-26 DIAGNOSIS — J44.9 CHRONIC OBSTRUCTIVE PULMONARY DISEASE, UNSPECIFIED COPD TYPE: Primary | ICD-10-CM

## 2021-10-26 DIAGNOSIS — J96.12 CHRONIC RESPIRATORY FAILURE WITH HYPERCAPNIA: ICD-10-CM

## 2021-10-26 DIAGNOSIS — F17.200 TOBACCO DEPENDENCE: ICD-10-CM

## 2021-10-26 DIAGNOSIS — I27.20 PULMONARY HYPERTENSION: ICD-10-CM

## 2021-10-26 PROCEDURE — 99999 PR PBB SHADOW E&M-EST. PATIENT-LVL V: CPT | Mod: PBBFAC,,, | Performed by: NURSE PRACTITIONER

## 2021-10-26 PROCEDURE — 1159F MED LIST DOCD IN RCRD: CPT | Mod: CPTII,S$GLB,, | Performed by: NURSE PRACTITIONER

## 2021-10-26 PROCEDURE — 3288F FALL RISK ASSESSMENT DOCD: CPT | Mod: CPTII,S$GLB,, | Performed by: NURSE PRACTITIONER

## 2021-10-26 PROCEDURE — 3044F PR MOST RECENT HEMOGLOBIN A1C LEVEL <7.0%: ICD-10-PCS | Mod: CPTII,S$GLB,, | Performed by: NURSE PRACTITIONER

## 2021-10-26 PROCEDURE — 1126F PR PAIN SEVERITY QUANTIFIED, NO PAIN PRESENT: ICD-10-PCS | Mod: CPTII,S$GLB,, | Performed by: NURSE PRACTITIONER

## 2021-10-26 PROCEDURE — 99214 OFFICE O/P EST MOD 30 MIN: CPT | Mod: S$GLB,,, | Performed by: NURSE PRACTITIONER

## 2021-10-26 PROCEDURE — 3066F NEPHROPATHY DOC TX: CPT | Mod: CPTII,S$GLB,, | Performed by: NURSE PRACTITIONER

## 2021-10-26 PROCEDURE — 4010F ACE/ARB THERAPY RXD/TAKEN: CPT | Mod: CPTII,S$GLB,, | Performed by: NURSE PRACTITIONER

## 2021-10-26 PROCEDURE — 1101F PR PT FALLS ASSESS DOC 0-1 FALLS W/OUT INJ PAST YR: ICD-10-PCS | Mod: CPTII,S$GLB,, | Performed by: NURSE PRACTITIONER

## 2021-10-26 PROCEDURE — 3008F PR BODY MASS INDEX (BMI) DOCUMENTED: ICD-10-PCS | Mod: CPTII,S$GLB,, | Performed by: NURSE PRACTITIONER

## 2021-10-26 PROCEDURE — 3060F POS MICROALBUMINURIA REV: CPT | Mod: CPTII,S$GLB,, | Performed by: NURSE PRACTITIONER

## 2021-10-26 PROCEDURE — 99999 PR PBB SHADOW E&M-EST. PATIENT-LVL V: ICD-10-PCS | Mod: PBBFAC,,, | Performed by: NURSE PRACTITIONER

## 2021-10-26 PROCEDURE — 3060F PR POS MICROALBUMINURIA RESULT DOCUMENTED/REVIEW: ICD-10-PCS | Mod: CPTII,S$GLB,, | Performed by: NURSE PRACTITIONER

## 2021-10-26 PROCEDURE — 1101F PT FALLS ASSESS-DOCD LE1/YR: CPT | Mod: CPTII,S$GLB,, | Performed by: NURSE PRACTITIONER

## 2021-10-26 PROCEDURE — 1126F AMNT PAIN NOTED NONE PRSNT: CPT | Mod: CPTII,S$GLB,, | Performed by: NURSE PRACTITIONER

## 2021-10-26 PROCEDURE — 3075F SYST BP GE 130 - 139MM HG: CPT | Mod: CPTII,S$GLB,, | Performed by: NURSE PRACTITIONER

## 2021-10-26 PROCEDURE — 3008F BODY MASS INDEX DOCD: CPT | Mod: CPTII,S$GLB,, | Performed by: NURSE PRACTITIONER

## 2021-10-26 PROCEDURE — 99214 PR OFFICE/OUTPT VISIT, EST, LEVL IV, 30-39 MIN: ICD-10-PCS | Mod: S$GLB,,, | Performed by: NURSE PRACTITIONER

## 2021-10-26 PROCEDURE — 3066F PR DOCUMENTATION OF TREATMENT FOR NEPHROPATHY: ICD-10-PCS | Mod: CPTII,S$GLB,, | Performed by: NURSE PRACTITIONER

## 2021-10-26 PROCEDURE — 4010F PR ACE/ARB THEARPY RXD/TAKEN: ICD-10-PCS | Mod: CPTII,S$GLB,, | Performed by: NURSE PRACTITIONER

## 2021-10-26 PROCEDURE — 3079F DIAST BP 80-89 MM HG: CPT | Mod: CPTII,S$GLB,, | Performed by: NURSE PRACTITIONER

## 2021-10-26 PROCEDURE — 3044F HG A1C LEVEL LT 7.0%: CPT | Mod: CPTII,S$GLB,, | Performed by: NURSE PRACTITIONER

## 2021-10-26 PROCEDURE — 3075F PR MOST RECENT SYSTOLIC BLOOD PRESS GE 130-139MM HG: ICD-10-PCS | Mod: CPTII,S$GLB,, | Performed by: NURSE PRACTITIONER

## 2021-10-26 PROCEDURE — 3288F PR FALLS RISK ASSESSMENT DOCUMENTED: ICD-10-PCS | Mod: CPTII,S$GLB,, | Performed by: NURSE PRACTITIONER

## 2021-10-26 PROCEDURE — 1159F PR MEDICATION LIST DOCUMENTED IN MEDICAL RECORD: ICD-10-PCS | Mod: CPTII,S$GLB,, | Performed by: NURSE PRACTITIONER

## 2021-10-26 PROCEDURE — 3079F PR MOST RECENT DIASTOLIC BLOOD PRESSURE 80-89 MM HG: ICD-10-PCS | Mod: CPTII,S$GLB,, | Performed by: NURSE PRACTITIONER

## 2021-11-16 ENCOUNTER — PATIENT MESSAGE (OUTPATIENT)
Dept: PULMONOLOGY | Facility: CLINIC | Age: 66
End: 2021-11-16
Payer: COMMERCIAL

## 2021-11-21 PROBLEM — J20.9 ACUTE BRONCHITIS: Status: RESOLVED | Noted: 2021-08-02 | Resolved: 2021-11-21

## 2021-11-22 ENCOUNTER — CLINICAL SUPPORT (OUTPATIENT)
Dept: FAMILY MEDICINE | Facility: CLINIC | Age: 66
End: 2021-11-22
Payer: COMMERCIAL

## 2021-11-22 DIAGNOSIS — Z23 NEED FOR SHINGLES VACCINE: Primary | ICD-10-CM

## 2021-11-22 PROCEDURE — 90471 ZOSTER RECOMBINANT VACCINE: ICD-10-PCS | Mod: S$GLB,,, | Performed by: FAMILY MEDICINE

## 2021-11-22 PROCEDURE — 90471 IMMUNIZATION ADMIN: CPT | Mod: S$GLB,,, | Performed by: FAMILY MEDICINE

## 2021-11-22 PROCEDURE — 90750 HZV VACC RECOMBINANT IM: CPT | Mod: S$GLB,,, | Performed by: FAMILY MEDICINE

## 2021-11-22 PROCEDURE — 90750 ZOSTER RECOMBINANT VACCINE: ICD-10-PCS | Mod: S$GLB,,, | Performed by: FAMILY MEDICINE

## 2021-11-22 PROCEDURE — 99499 NO LOS: ICD-10-PCS | Mod: S$GLB,,, | Performed by: FAMILY MEDICINE

## 2021-11-22 PROCEDURE — 99499 UNLISTED E&M SERVICE: CPT | Mod: S$GLB,,, | Performed by: FAMILY MEDICINE

## 2021-11-30 ENCOUNTER — CLINICAL SUPPORT (OUTPATIENT)
Dept: FAMILY MEDICINE | Facility: CLINIC | Age: 66
End: 2021-11-30
Payer: COMMERCIAL

## 2021-11-30 DIAGNOSIS — Z23 INFLUENZA VACCINE ADMINISTERED: Primary | ICD-10-CM

## 2021-11-30 PROCEDURE — 90471 IMMUNIZATION ADMIN: CPT | Mod: S$GLB,,, | Performed by: FAMILY MEDICINE

## 2021-11-30 PROCEDURE — 99999 PR PBB SHADOW E&M-EST. PATIENT-LVL I: CPT | Mod: PBBFAC,,,

## 2021-11-30 PROCEDURE — 90694 VACC AIIV4 NO PRSRV 0.5ML IM: CPT | Mod: S$GLB,,, | Performed by: FAMILY MEDICINE

## 2021-11-30 PROCEDURE — 90471 FLU VACCINE - QUADRIVALENT - ADJUVANTED: ICD-10-PCS | Mod: S$GLB,,, | Performed by: FAMILY MEDICINE

## 2021-11-30 PROCEDURE — 90694 FLU VACCINE - QUADRIVALENT - ADJUVANTED: ICD-10-PCS | Mod: S$GLB,,, | Performed by: FAMILY MEDICINE

## 2021-11-30 PROCEDURE — 99999 PR PBB SHADOW E&M-EST. PATIENT-LVL I: ICD-10-PCS | Mod: PBBFAC,,,

## 2021-12-07 ENCOUNTER — IMMUNIZATION (OUTPATIENT)
Dept: OBSTETRICS AND GYNECOLOGY | Facility: CLINIC | Age: 66
End: 2021-12-07
Payer: COMMERCIAL

## 2021-12-07 DIAGNOSIS — Z23 NEED FOR VACCINATION: Primary | ICD-10-CM

## 2021-12-07 PROCEDURE — 0004A COVID-19, MRNA, LNP-S, PF, 30 MCG/0.3 ML DOSE VACCINE: CPT | Mod: PBBFAC | Performed by: FAMILY MEDICINE

## 2022-02-17 DIAGNOSIS — R60.0 BILATERAL LOWER EXTREMITY EDEMA: ICD-10-CM

## 2022-02-17 DIAGNOSIS — F41.9 ANXIETY: ICD-10-CM

## 2022-02-17 DIAGNOSIS — I10 ESSENTIAL HYPERTENSION: ICD-10-CM

## 2022-02-17 DIAGNOSIS — R80.9 MICROALBUMINURIA: ICD-10-CM

## 2022-02-17 DIAGNOSIS — I48.20 CHRONIC ATRIAL FIBRILLATION: ICD-10-CM

## 2022-02-17 NOTE — TELEPHONE ENCOUNTER
No new care gaps identified.  Powered by [employer]. Reference number: [id number].   2/17/2022 3:23:00 AM CST

## 2022-02-25 RX ORDER — SERTRALINE HYDROCHLORIDE 100 MG/1
100 TABLET, FILM COATED ORAL DAILY
Qty: 90 TABLET | Refills: 0 | Status: SHIPPED | OUTPATIENT
Start: 2022-02-25 | End: 2022-06-10 | Stop reason: SDUPTHER

## 2022-02-25 RX ORDER — FUROSEMIDE 40 MG/1
40 TABLET ORAL 2 TIMES DAILY
Qty: 180 TABLET | Refills: 0 | Status: ON HOLD | OUTPATIENT
Start: 2022-02-25 | End: 2022-05-26 | Stop reason: SDUPTHER

## 2022-02-25 RX ORDER — RIVAROXABAN 20 MG/1
20 TABLET, FILM COATED ORAL DAILY
Qty: 90 TABLET | Refills: 0 | Status: SHIPPED | OUTPATIENT
Start: 2022-02-25 | End: 2022-06-10 | Stop reason: SDUPTHER

## 2022-02-25 RX ORDER — LOSARTAN POTASSIUM 25 MG/1
25 TABLET ORAL DAILY
Qty: 90 TABLET | Refills: 0 | Status: SHIPPED | OUTPATIENT
Start: 2022-02-25 | End: 2022-06-10 | Stop reason: SDUPTHER

## 2022-03-16 ENCOUNTER — TELEPHONE (OUTPATIENT)
Dept: FAMILY MEDICINE | Facility: CLINIC | Age: 67
End: 2022-03-16
Payer: COMMERCIAL

## 2022-03-16 ENCOUNTER — LAB VISIT (OUTPATIENT)
Dept: LAB | Facility: HOSPITAL | Age: 67
End: 2022-03-16
Attending: PHYSICIAN ASSISTANT
Payer: COMMERCIAL

## 2022-03-16 DIAGNOSIS — R30.0 DYSURIA: Primary | ICD-10-CM

## 2022-03-16 DIAGNOSIS — R30.0 DYSURIA: ICD-10-CM

## 2022-03-16 DIAGNOSIS — N30.01 ACUTE CYSTITIS WITH HEMATURIA: ICD-10-CM

## 2022-03-16 LAB
BILIRUB SERPL-MCNC: ABNORMAL MG/DL
BLOOD URINE, POC: ABNORMAL
CLARITY, POC UA: ABNORMAL
COLOR, POC UA: YELLOW
GLUCOSE UR QL STRIP: NORMAL
KETONES UR QL STRIP: ABNORMAL
LEUKOCYTE ESTERASE URINE, POC: ABNORMAL
NITRITE, POC UA: ABNORMAL
PH, POC UA: 6
PROTEIN, POC: ABNORMAL
SPECIFIC GRAVITY, POC UA: 1.01
UROBILINOGEN, POC UA: NORMAL

## 2022-03-16 PROCEDURE — 87086 URINE CULTURE/COLONY COUNT: CPT | Performed by: PHYSICIAN ASSISTANT

## 2022-03-16 PROCEDURE — 87088 URINE BACTERIA CULTURE: CPT | Performed by: PHYSICIAN ASSISTANT

## 2022-03-16 PROCEDURE — 81002 URINALYSIS NONAUTO W/O SCOPE: CPT | Mod: S$GLB,,, | Performed by: FAMILY MEDICINE

## 2022-03-16 PROCEDURE — 81002 POCT URINE DIPSTICK WITHOUT MICROSCOPE: ICD-10-PCS | Mod: S$GLB,,, | Performed by: FAMILY MEDICINE

## 2022-03-16 PROCEDURE — 87077 CULTURE AEROBIC IDENTIFY: CPT | Performed by: PHYSICIAN ASSISTANT

## 2022-03-16 PROCEDURE — 87186 SC STD MICRODIL/AGAR DIL: CPT | Performed by: PHYSICIAN ASSISTANT

## 2022-03-16 RX ORDER — SULFAMETHOXAZOLE AND TRIMETHOPRIM 800; 160 MG/1; MG/1
1 TABLET ORAL 2 TIMES DAILY
Qty: 14 TABLET | Refills: 0 | Status: SHIPPED | OUTPATIENT
Start: 2022-03-16 | End: 2022-03-23

## 2022-03-16 NOTE — TELEPHONE ENCOUNTER
----- Message from Jeni Moore sent at 3/16/2022  9:47 AM CDT -----  Type: Patient Call Back       What is the request in detail:  pt calling to speak to a nurse regarding calling in something for UTI.      Can the clinic reply by [facility]? No       Would the patient rather a call back or a response via [facility]? Call back       Best call back number: [phone]          [id number] [place] [address]  [address] [place] [address] [address]  [facility] [address]  Phone: [phone] Fax: [phone]          Thank you.

## 2022-03-16 NOTE — TELEPHONE ENCOUNTER
Pt having pain when she urinates, states started mild but now it is getting worse; no appointments available, are you able to place orders for urine specimen

## 2022-03-18 DIAGNOSIS — I48.20 CHRONIC ATRIAL FIBRILLATION: ICD-10-CM

## 2022-03-18 LAB — BACTERIA UR CULT: ABNORMAL

## 2022-03-18 NOTE — TELEPHONE ENCOUNTER
No new care gaps identified.  Powered by [employer]. Reference number: [id number].   3/18/2022 4:20:40 AM CDT

## 2022-03-23 RX ORDER — METOPROLOL SUCCINATE 100 MG/1
TABLET, EXTENDED RELEASE ORAL
Qty: 90 TABLET | Refills: 1 | Status: SHIPPED | OUTPATIENT
Start: 2022-03-23 | End: 2022-09-13 | Stop reason: SDUPTHER

## 2022-03-24 NOTE — TELEPHONE ENCOUNTER
Refill Authorization Note   Sandee Evans  is requesting a refill authorization.  Brief Assessment and Rationale for Refill:  Approve     Medication Therapy Plan:       Medication Reconciliation Completed: No   Comments:   --->Care Gap information included below if applicable.       Requested Prescriptions   Pending Prescriptions Disp Refills    metoprolol succinate (TOPROL-XL) 100 MG 24 hr tablet [Pharmacy Med Name: METOPROLOL ER SUCCINATE 100MG TABS] 90 tablet 1     Sig: TAKE 1 TABLET(100 MG) BY MOUTH EVERY DAY       Cardiovascular:  Beta Blockers Passed - 3/23/2022  7:01 PM        Passed - Patient is at least 18 years old        Passed - Last BP in normal range within 360 days     BP Readings from Last 3 Encounters:   10/26/21 131/84   09/28/21 110/70   09/21/21 132/84               Passed - Last Heart Rate in normal range within 360 days     Pulse Readings from Last 1 Encounters:   10/26/21 75              Passed - Valid encounter within last 15 months     Recent Visits  Date Type Provider Dept   09/21/21 Office Visit Kuldeep Miller MD [facility]   07/29/21 Office Visit Kuldeep Miller MD [facility]   07/15/21 Office Visit Kuldeep Miller MD [facility]   07/08/21 Office Visit Kuldeep Miller MD [facility]   04/08/21 Office Visit Kuldeep Miller MD [facility]   04/01/21 Office Visit Kuldeep Miller MD [facility]   03/11/21 Office Visit Kuldeep Miller MD [facility]   02/09/21 Office Visit Kuldeep Miller MD [facility]   01/06/21 Office Visit Kuldeep Miller MD [facility]   12/24/20 Office Visit Kuldeep Miller MD [facility]   Showing recent visits within past 720 days and meeting all other requirements  Future Appointments  No visits were found meeting these conditions.  Showing future appointments within next 150 days and meeting all other requirements                     Appointments  past 12m or future 3m with PCP    Date Provider   Last Visit   9/21/2021 Kuldeep Miller MD   Next Visit   Visit date not found Kuldeep Miller MD   ED visits in past 90 days: 0     Note composed:7:02 PM 03/23/2022

## 2022-05-11 DIAGNOSIS — E11.9 TYPE 2 DIABETES MELLITUS WITHOUT COMPLICATION: ICD-10-CM

## 2022-05-20 ENCOUNTER — HOSPITAL ENCOUNTER (INPATIENT)
Facility: HOSPITAL | Age: 67
LOS: 6 days | Discharge: HOME | DRG: 189 | End: 2022-05-26
Attending: EMERGENCY MEDICINE | Admitting: HOSPITALIST
Payer: COMMERCIAL

## 2022-05-20 DIAGNOSIS — R60.0 BILATERAL LOWER EXTREMITY EDEMA: ICD-10-CM

## 2022-05-20 DIAGNOSIS — J96.12 CHRONIC RESPIRATORY FAILURE WITH HYPERCAPNIA: ICD-10-CM

## 2022-05-20 DIAGNOSIS — R50.9 FEBRILE ILLNESS: ICD-10-CM

## 2022-05-20 DIAGNOSIS — J96.02 ACUTE RESPIRATORY FAILURE WITH HYPOXIA AND HYPERCARBIA: ICD-10-CM

## 2022-05-20 DIAGNOSIS — J96.11 CHRONIC RESPIRATORY FAILURE WITH HYPOXIA AND HYPERCAPNIA: Primary | ICD-10-CM

## 2022-05-20 DIAGNOSIS — I50.33 ACUTE ON CHRONIC DIASTOLIC CONGESTIVE HEART FAILURE: ICD-10-CM

## 2022-05-20 DIAGNOSIS — J41.0 SIMPLE CHRONIC BRONCHITIS: ICD-10-CM

## 2022-05-20 DIAGNOSIS — J96.90 RESPIRATORY FAILURE: ICD-10-CM

## 2022-05-20 DIAGNOSIS — R06.02 SOB (SHORTNESS OF BREATH): ICD-10-CM

## 2022-05-20 DIAGNOSIS — J96.12 CHRONIC RESPIRATORY FAILURE WITH HYPOXIA AND HYPERCAPNIA: Primary | ICD-10-CM

## 2022-05-20 DIAGNOSIS — J96.01 ACUTE RESPIRATORY FAILURE WITH HYPOXIA AND HYPERCARBIA: ICD-10-CM

## 2022-05-20 PROBLEM — R30.0 DYSURIA: Status: ACTIVE | Noted: 2022-05-20

## 2022-05-20 LAB
ALBUMIN SERPL BCP-MCNC: 3.1 G/DL (ref 3.5–5.2)
ALLENS TEST: ABNORMAL
ALP SERPL-CCNC: 86 U/L (ref 55–135)
ALT SERPL W/O P-5'-P-CCNC: 15 U/L (ref 10–44)
AMPHET+METHAMPHET UR QL: NEGATIVE
ANION GAP SERPL CALC-SCNC: 9 MMOL/L (ref 8–16)
AST SERPL-CCNC: 17 U/L (ref 10–40)
BARBITURATES UR QL SCN>200 NG/ML: NEGATIVE
BASOPHILS # BLD AUTO: 0.03 K/UL (ref 0–0.2)
BASOPHILS NFR BLD: 0.3 % (ref 0–1.9)
BENZODIAZ UR QL SCN>200 NG/ML: NEGATIVE
BILIRUB SERPL-MCNC: 1.6 MG/DL (ref 0.1–1)
BNP SERPL-MCNC: 452 PG/ML (ref 0–99)
BUN SERPL-MCNC: 21 MG/DL (ref 8–23)
BZE UR QL SCN: NEGATIVE
CALCIUM SERPL-MCNC: 9.3 MG/DL (ref 8.7–10.5)
CANNABINOIDS UR QL SCN: NEGATIVE
CHLORIDE SERPL-SCNC: 99 MMOL/L (ref 95–110)
CO2 SERPL-SCNC: 29 MMOL/L (ref 23–29)
CREAT SERPL-MCNC: 1.2 MG/DL (ref 0.5–1.4)
CREAT UR-MCNC: 132 MG/DL (ref 15–325)
CTP QC/QA: YES
CTP QC/QA: YES
D DIMER PPP IA.FEU-MCNC: 1.17 MG/L FEU
DELSYS: ABNORMAL
DIFFERENTIAL METHOD: ABNORMAL
EOSINOPHIL # BLD AUTO: 0 K/UL (ref 0–0.5)
EOSINOPHIL NFR BLD: 0.1 % (ref 0–8)
ERYTHROCYTE [DISTWIDTH] IN BLOOD BY AUTOMATED COUNT: 14.2 % (ref 11.5–14.5)
EST. GFR  (AFRICAN AMERICAN): 54 ML/MIN/1.73 M^2
EST. GFR  (NON AFRICAN AMERICAN): 47 ML/MIN/1.73 M^2
FLOW: 2
GLUCOSE SERPL-MCNC: 113 MG/DL (ref 70–110)
HCO3 UR-SCNC: 35.9 MMOL/L (ref 24–28)
HCT VFR BLD AUTO: 43.5 % (ref 37–48.5)
HGB BLD-MCNC: 14 G/DL (ref 12–16)
IMM GRANULOCYTES # BLD AUTO: 0.09 K/UL (ref 0–0.04)
IMM GRANULOCYTES NFR BLD AUTO: 0.8 % (ref 0–0.5)
LACTATE SERPL-SCNC: 1.2 MMOL/L (ref 0.5–2.2)
LYMPHOCYTES # BLD AUTO: 0.9 K/UL (ref 1–4.8)
LYMPHOCYTES NFR BLD: 7.7 % (ref 18–48)
MCH RBC QN AUTO: 32.3 PG (ref 27–31)
MCHC RBC AUTO-ENTMCNC: 32.2 G/DL (ref 32–36)
MCV RBC AUTO: 101 FL (ref 82–98)
METHADONE UR QL SCN>300 NG/ML: NEGATIVE
MODE: ABNORMAL
MONOCYTES # BLD AUTO: 1.1 K/UL (ref 0.3–1)
MONOCYTES NFR BLD: 9 % (ref 4–15)
NEUTROPHILS # BLD AUTO: 9.7 K/UL (ref 1.8–7.7)
NEUTROPHILS NFR BLD: 82.1 % (ref 38–73)
NRBC BLD-RTO: 0 /100 WBC
OPIATES UR QL SCN: NEGATIVE
PCO2 BLDA: 72.8 MMHG (ref 35–45)
PCP UR QL SCN>25 NG/ML: NEGATIVE
PH SMN: 7.3 [PH] (ref 7.35–7.45)
PLATELET # BLD AUTO: 191 K/UL (ref 150–450)
PMV BLD AUTO: 11.1 FL (ref 9.2–12.9)
PO2 BLDA: 43 MMHG (ref 40–60)
POC BE: 7 MMOL/L
POC MOLECULAR INFLUENZA A AGN: NEGATIVE
POC MOLECULAR INFLUENZA B AGN: NEGATIVE
POC SATURATED O2: 71 % (ref 95–100)
POC TCO2: 38 MMOL/L (ref 24–29)
POTASSIUM SERPL-SCNC: 4.5 MMOL/L (ref 3.5–5.1)
PROCALCITONIN SERPL IA-MCNC: 0.58 NG/ML
PROT SERPL-MCNC: 7.2 G/DL (ref 6–8.4)
RBC # BLD AUTO: 4.33 M/UL (ref 4–5.4)
SAMPLE: ABNORMAL
SARS-COV-2 RDRP RESP QL NAA+PROBE: NEGATIVE
SITE: ABNORMAL
SODIUM SERPL-SCNC: 137 MMOL/L (ref 136–145)
TOXICOLOGY INFORMATION: NORMAL
TROPONIN I SERPL DL<=0.01 NG/ML-MCNC: <0.006 NG/ML (ref 0–0.03)
WBC # BLD AUTO: 11.81 K/UL (ref 3.9–12.7)

## 2022-05-20 PROCEDURE — U0002 COVID-19 LAB TEST NON-CDC: HCPCS | Performed by: EMERGENCY MEDICINE

## 2022-05-20 PROCEDURE — 94640 AIRWAY INHALATION TREATMENT: CPT

## 2022-05-20 PROCEDURE — 96374 THER/PROPH/DIAG INJ IV PUSH: CPT

## 2022-05-20 PROCEDURE — 25000003 PHARM REV CODE 250: Performed by: EMERGENCY MEDICINE

## 2022-05-20 PROCEDURE — 82803 BLOOD GASES ANY COMBINATION: CPT

## 2022-05-20 PROCEDURE — 99900035 HC TECH TIME PER 15 MIN (STAT)

## 2022-05-20 PROCEDURE — 94761 N-INVAS EAR/PLS OXIMETRY MLT: CPT

## 2022-05-20 PROCEDURE — 94660 CPAP INITIATION&MGMT: CPT

## 2022-05-20 PROCEDURE — 20000000 HC ICU ROOM

## 2022-05-20 PROCEDURE — 80307 DRUG TEST PRSMV CHEM ANLYZR: CPT | Performed by: EMERGENCY MEDICINE

## 2022-05-20 PROCEDURE — 87502 INFLUENZA DNA AMP PROBE: CPT

## 2022-05-20 PROCEDURE — 93010 ELECTROCARDIOGRAM REPORT: CPT | Mod: ,,, | Performed by: INTERNAL MEDICINE

## 2022-05-20 PROCEDURE — 87077 CULTURE AEROBIC IDENTIFY: CPT | Performed by: EMERGENCY MEDICINE

## 2022-05-20 PROCEDURE — 84484 ASSAY OF TROPONIN QUANT: CPT | Performed by: EMERGENCY MEDICINE

## 2022-05-20 PROCEDURE — 27000221 HC OXYGEN, UP TO 24 HOURS

## 2022-05-20 PROCEDURE — 93005 ELECTROCARDIOGRAM TRACING: CPT

## 2022-05-20 PROCEDURE — 85025 COMPLETE CBC W/AUTO DIFF WBC: CPT | Performed by: EMERGENCY MEDICINE

## 2022-05-20 PROCEDURE — 99285 EMERGENCY DEPT VISIT HI MDM: CPT | Mod: 25

## 2022-05-20 PROCEDURE — 63600175 PHARM REV CODE 636 W HCPCS: Performed by: EMERGENCY MEDICINE

## 2022-05-20 PROCEDURE — 80053 COMPREHEN METABOLIC PANEL: CPT | Performed by: EMERGENCY MEDICINE

## 2022-05-20 PROCEDURE — 93010 EKG 12-LEAD: ICD-10-PCS | Mod: ,,, | Performed by: INTERNAL MEDICINE

## 2022-05-20 PROCEDURE — 25000242 PHARM REV CODE 250 ALT 637 W/ HCPCS: Performed by: EMERGENCY MEDICINE

## 2022-05-20 PROCEDURE — 85379 FIBRIN DEGRADATION QUANT: CPT | Performed by: EMERGENCY MEDICINE

## 2022-05-20 PROCEDURE — 84145 PROCALCITONIN (PCT): CPT | Performed by: EMERGENCY MEDICINE

## 2022-05-20 PROCEDURE — 27000190 HC CPAP FULL FACE MASK W/VALVE

## 2022-05-20 PROCEDURE — 87040 BLOOD CULTURE FOR BACTERIA: CPT | Mod: 59 | Performed by: EMERGENCY MEDICINE

## 2022-05-20 PROCEDURE — 87186 SC STD MICRODIL/AGAR DIL: CPT | Performed by: EMERGENCY MEDICINE

## 2022-05-20 PROCEDURE — 83605 ASSAY OF LACTIC ACID: CPT | Performed by: EMERGENCY MEDICINE

## 2022-05-20 PROCEDURE — 83880 ASSAY OF NATRIURETIC PEPTIDE: CPT | Performed by: EMERGENCY MEDICINE

## 2022-05-20 RX ORDER — SODIUM CHLORIDE 0.9 % (FLUSH) 0.9 %
10 SYRINGE (ML) INJECTION
Status: DISCONTINUED | OUTPATIENT
Start: 2022-05-20 | End: 2022-05-26 | Stop reason: HOSPADM

## 2022-05-20 RX ORDER — METHYLPREDNISOLONE SOD SUCC 125 MG
125 VIAL (EA) INJECTION EVERY 8 HOURS
Status: DISCONTINUED | OUTPATIENT
Start: 2022-05-21 | End: 2022-05-21

## 2022-05-20 RX ORDER — INSULIN ASPART 100 [IU]/ML
0-5 INJECTION, SOLUTION INTRAVENOUS; SUBCUTANEOUS EVERY 6 HOURS PRN
Status: DISCONTINUED | OUTPATIENT
Start: 2022-05-20 | End: 2022-05-21

## 2022-05-20 RX ORDER — METOPROLOL TARTRATE 1 MG/ML
5 INJECTION, SOLUTION INTRAVENOUS
Status: COMPLETED | OUTPATIENT
Start: 2022-05-20 | End: 2022-05-20

## 2022-05-20 RX ORDER — LOSARTAN POTASSIUM 25 MG/1
25 TABLET ORAL DAILY
Status: DISCONTINUED | OUTPATIENT
Start: 2022-05-21 | End: 2022-05-26 | Stop reason: HOSPADM

## 2022-05-20 RX ORDER — ATORVASTATIN CALCIUM 40 MG/1
40 TABLET, FILM COATED ORAL DAILY
Status: DISCONTINUED | OUTPATIENT
Start: 2022-05-21 | End: 2022-05-26 | Stop reason: HOSPADM

## 2022-05-20 RX ORDER — LEVOFLOXACIN 5 MG/ML
750 INJECTION, SOLUTION INTRAVENOUS
Status: DISCONTINUED | OUTPATIENT
Start: 2022-05-20 | End: 2022-05-21

## 2022-05-20 RX ORDER — GLUCAGON 1 MG
1 KIT INJECTION
Status: DISCONTINUED | OUTPATIENT
Start: 2022-05-20 | End: 2022-05-26 | Stop reason: HOSPADM

## 2022-05-20 RX ORDER — IPRATROPIUM BROMIDE AND ALBUTEROL SULFATE 2.5; .5 MG/3ML; MG/3ML
3 SOLUTION RESPIRATORY (INHALATION) EVERY 4 HOURS PRN
Status: DISCONTINUED | OUTPATIENT
Start: 2022-05-20 | End: 2022-05-21

## 2022-05-20 RX ORDER — FUROSEMIDE 10 MG/ML
60 INJECTION INTRAMUSCULAR; INTRAVENOUS
Status: DISCONTINUED | OUTPATIENT
Start: 2022-05-20 | End: 2022-05-22

## 2022-05-20 RX ORDER — ACETAMINOPHEN 500 MG
1000 TABLET ORAL
Status: COMPLETED | OUTPATIENT
Start: 2022-05-20 | End: 2022-05-20

## 2022-05-20 RX ORDER — METOPROLOL SUCCINATE 50 MG/1
100 TABLET, EXTENDED RELEASE ORAL DAILY
Status: DISCONTINUED | OUTPATIENT
Start: 2022-05-21 | End: 2022-05-26 | Stop reason: HOSPADM

## 2022-05-20 RX ORDER — IPRATROPIUM BROMIDE AND ALBUTEROL SULFATE 2.5; .5 MG/3ML; MG/3ML
3 SOLUTION RESPIRATORY (INHALATION) ONCE
Status: COMPLETED | OUTPATIENT
Start: 2022-05-20 | End: 2022-05-20

## 2022-05-20 RX ORDER — NALOXONE HCL 0.4 MG/ML
0.4 VIAL (ML) INJECTION ONCE
Status: DISCONTINUED | OUTPATIENT
Start: 2022-05-20 | End: 2022-05-21

## 2022-05-20 RX ADMIN — ACETAMINOPHEN 1000 MG: 500 TABLET ORAL at 04:05

## 2022-05-20 RX ADMIN — IPRATROPIUM BROMIDE AND ALBUTEROL SULFATE 3 ML: 2.5; .5 SOLUTION RESPIRATORY (INHALATION) at 04:05

## 2022-05-20 RX ADMIN — FUROSEMIDE 60 MG: 10 INJECTION, SOLUTION INTRAMUSCULAR; INTRAVENOUS at 11:05

## 2022-05-20 RX ADMIN — METOROPROLOL TARTRATE 5 MG: 5 INJECTION, SOLUTION INTRAVENOUS at 04:05

## 2022-05-20 NOTE — ED NOTES
Adult Physical Assessment  LOC: Sandee Evans, 66 y.o. female verified via two identifiers.  The patient is awake, alert, oriented and speaking minimally and in short answers  at this time.  APPEARANCE: Patient resting comfortably and appears to be in no acute distress at this time. Patient is clean and well groomed, patient's clothing is properly fastened.  SKIN:The skin is warm and dry, color consistent with ethnicity, patient has normal skin turgor and moist mucus membranes, skin intact, no breakdown or brusing noted.  MUSCULOSKELETAL: Patient moving all extremities well, no obvious swelling or deformities noted.  RESPIRATORY: PROLONGED exp wheeze, Airway is open and patent, respirations are spontaneous, patient has a INCREASED effort and rate, some accessory muscle use noted.  CARDIAC: Patient is in A-fib w intermittent RVR , capillary refill < 3 seconds in all extremities  ABDOMEN: OBESE, Soft and non tender to palpation, no abdominal distention noted. Bowel sounds present in all four quadrants.  NEUROLOGIC: Eyes open spontaneously, behavior appropriate to situation, follows commands, facial expression symmetrical, bilateral hand grasp equal and even, purposeful motor response noted, normal sensation in all extremities when touched with a finger.

## 2022-05-20 NOTE — ED PROVIDER NOTES
".  SCRIBE #1 NOTE: I, Ezio Mccann, am scribing for, and in the presence of,  Alistair Loya MD. I have scribed the following portions of the note - Other sections scribed: HPI, ROS, PE.            EM PHYSICIAN NOTE    HPI  This patient presents with a complaint of   Chief Complaint   Patient presents with    Shortness of Breath     Pt reporting SOB x "months ago" with new onset nausea and headache. Pt poor historian, denies CHF, COPD, asthma, HTN, DM.        HPI: Sandee Evans is a 66 y.o. female, with a PMHx of A-Fib, CHF, HLDM and HTN, who presents to the ED complaining of worsening SOB, onset a few months ago. Patient prompted to the ED after experiencing drowsiness, stating she slept most of today.  reports patient felt nauseas after eating and reports one episode of diarrhea yesterday. Additionally complaining of dysuria, onset 1 week ago. Reports generalized weakness, headache and cough. Also complains of bilateral leg swelling, with left leg affected more than right. Compliant with Xeralto, with last dose taken yesterday. Also endorses the use of at home breathing treatments and inhaler. Reports she is fully immunized against COVID-19. Endorses regular cigarette use. No other exacerbating or alleviating factors. Patient denies the use of O2 at home. Additionally denies fever, chest pain, rash or other associated symptoms.  She did not take her medications today.      REVIEW of PMH, SOC History and Family History:  Past Medical History:   Diagnosis Date    Anticoagulant long-term use     Xarelto    Arthritis     Atrial fibrillation     Breast cyst     CHF (congestive heart failure)     Degenerative disc disease     Edema     HLD (hyperlipidemia)     Hx of psychiatric care     Hyperlipidemia     Hypertension     Hypothyroidism     Nuclear sclerosis, bilateral 12/18/2017    Obesity, morbid     HIEU (obstructive sleep apnea)     Other abnormal glucose     pre-diabetes    " Pre-diabetes     Psychiatric problem     Requires assistance with activities of daily living (ADL)     Sleep apnea     Smoker     SOB (shortness of breath)     SOB (shortness of breath)     Thyroid disease     on meds 8-9 years ago. hypothyroidism.no malignancy    TMJ (temporomandibular joint disorder)     jaw clicking    Tobacco abuse     Unsteady gait     Urge incontinence     Weakness generalized     Wears glasses      Social history noncontributory  Family history noncontributory    Review of patient's allergies indicates:   Allergen Reactions    Ace inhibitors      Cough             REVIEW of SYSTEMS  Source: Patient  The nurse's notes and triage vital signs were reviewed.  GENERAL/CONSTITUTIONAL: SEE HPI  CARDIOVASCULAR: There is no report of chest pain   RESPIRATORY:  SEE HPI  GASTROINTESTINAL:  SEE HPI  MUSCULOSKELETAL:  SEE HPI  SKIN AND BREASTS: There is no report of easy bruising, skin redness, skin rash.  HEMATOLOGIC/LYMPHATIC: There is no report of anemia, bleeding or clotting defects.  Patient takes Xarelto for AFib  The remainder of the ROS is negative.        PHYSICAL EXAMINATION    ED Triage Vitals   Enc Vitals Group      BP 05/20/22 1411 (!) 168/84      Pulse 05/20/22 1411 108      Resp 05/20/22 1411 20      Temp 05/20/22 1411 (!) 102.1 °F (38.9 °C)      Temp src 05/20/22 1411 Oral      SpO2 05/20/22 1411 (!) 90 %      Weight 05/20/22 1413 267 lb      Height --       Head Circumference --       Peak Flow --       Pain Score --       Pain Loc --       Pain Edu? --       Excl. in GC? --      Vital signs and Pulse Ox reviewed in clinical context. Abnormalities noted: Hypertension. Febrile  Pt's level of consciousness is alert, and the patient is in mild distress.  Skin: warm, pink and dry. Capillary refill is less than 2 seconds.  Mucosa:moist  Head and Neck:  Neck is supple.  I am unable to appreciate JVD   Cardiac exam: Irregular regular. 2+ DP pulses bilaterally  Pulmonary exam:  prolonged expiratory phase with wheezing  Abd Exam: soft, nontender   Musculoskeletal: no joint tenderness, deformity or swelling   Neurologic: GCS: GCS 15; 5 over 5 strength, cranial nerves intact, neck supple       Initial Impression:  Febrile illness, hypoxia  Plan:  Rule out COVID, rule pneumonia, labs, imaging, reassess  Alistair Loya MD, 3:17 PM 5/20/2022      Medical decision making:   Nurses notes and Vital Signs reviewed.  Orders Placed This Encounter   Procedures    Blood culture    Blood culture    X-Ray Chest AP Portable    CT Head Without Contrast    US Lower Extremity Veins Bilateral    NM Lung Scan Ventilation Perfusion    CBC Auto Differential    Comprehensive Metabolic Panel    BNP    D-Dimer, Quantitative    Troponin I    Procalcitonin    Lactic Acid, Plasma    Basic metabolic panel    Magnesium    Phosphorus    CBC auto differential    Drug screen panel, emergency    Hemoglobin A1c    Diet Low Sodium, 2gm [facility]; Fluid - 1500mL    Vital signs    Cardiac Monitoring - Adult    Fluid restriction    Height and weight On admission. Standing Weight Method required.    Daily weights On presentation to floor. Standing Weight Method required.    Strict intake and output 1.5 Liters maximum per 24 hours.    Strict intake and output 1.5 Liters maximum per 24 hours.    Notify Physician    Notify Physician - Potential Need of Opioid Reversal    Place sequential compression device    Straight Cath    If any glucose result is less than 50 or greater than 400:    If 2nd result is less than 50 or greater than 400:    Re-check Blood Glucose    Full code    Inpatient consult to Social Work/Case Management    Inpatient consult to Registered Dietitian/Nutritionist    Oxygen Continuous    Inhalation Treatment Once    POCT Venous Blood Gas Once    Pulse Oximetry Q4H    Inhalation Treatment Q4H PRN    POCT COVID-19 Rapid Screening    POCT Influenza A/B Molecular     EKG 12-lead    Saline lock IV    Possible Hospitalization    Admit to Inpatient    Fall precautions       ED Course as of 05/20/22 2144   Fri May 20, 2022   1921 BNP is 452.  [MH]   1921 Lactic acid is normal [MH]   1921 D-dimer is elevated 1.17 [MH]   1921 Procalcitonin is elevated 0.58 [MH]   1921 CMP is  appears to be non contributory []   1922 CBC within limits [MH]   1922 Influenza negative [MH]   1922 COVID negative [MH]     1922 Chest x-ray is normal []   2032 CO2 is elevated at 72.  [MH]   2140 Patient has refused cath UA.  She did report dysuria.  Due to the fever, will add Rocephin []   2143 CXR unchanged []   2143 My independent interpretation of the EKG is atrial fib with RVR.  The rate is 127. There is no evidence of acute infarct.  []      ED Course User Index  [MH] Micelle JOAN Loya MD     Repeat sepsis exam at 1952 did not reveal a change in the patient's exam.  Capillary refill remains normal.  Patient is awake but occasionally dozes off.  Iregularly irregular.  No evidence of severe sepsis.    Diagnoses that have been ruled out:   None   Diagnoses that are still under consideration:   None   Final diagnoses:   SOB (shortness of breath)   Febrile illness   Acute respiratory failure with hypoxia and hypercarbia   Bilateral lower extremity edema       This note was created using Dictation Software.  This program may occasionally misinterpret certain words and phrases.      SCRIBE ATTESTATION NOTE:  I attest that I personally performed the services documented by the scribe and acknowledged and confirm the content of the note.   Nurses notes were reviewed.  Alistair Loya        Nurses notes were reviewed.    Scribe Attestation:   Scribe #1: I performed the above scribed service and the documentation accurately describes the services I performed. I attest to the accuracy of the note.          CRITICAL CARE TIME:  These services included the following: chart data review, reviewing  nursing notes and researching old charts from internal and external sources, documentation time, consultant collaboration regarding findings and treatment options, medication orders and management, direct patient care, vital sign assessments, physical exam reassessments, and ordering, interpreting and reviewing diagnostic studies/lab tests.    Aggregate critical care time was approximately 35 minutes, which includes only time during which I was engaged in work directly related to the patient's care, as described above, whether at the bedside or elsewhere in the Emergency Department.  It did not include time spent performing other reported procedures or the services of residents, students, nurses or physician assistants.        Transfer of Care: 9:37 PM  The medical management of Sandee Evans has been transferred to the admitting team.  At this time, the patient's condition is unchanged, and this was relayed to the accepting team.  Please see notes from the admitting team for continued care.  Alistair Loya 9:37 PM           ED Disposition Condition    Admit            Alistair Loya MD  05/20/22 2139       Alistair Loya MD  05/20/22 2140       Alistair Loya MD  05/20/22 2147       Alistair Loya MD  05/20/22 2252

## 2022-05-21 PROBLEM — B34.9 ACUTE VIRAL SYNDROME: Status: RESOLVED | Noted: 2017-12-31 | Resolved: 2022-05-21

## 2022-05-21 PROBLEM — R53.81 DEBILITY: Status: RESOLVED | Noted: 2021-07-20 | Resolved: 2022-05-21

## 2022-05-21 PROBLEM — K59.09 OTHER CONSTIPATION: Status: RESOLVED | Noted: 2019-02-22 | Resolved: 2022-05-21

## 2022-05-21 PROBLEM — G93.41 ACUTE METABOLIC ENCEPHALOPATHY: Status: RESOLVED | Noted: 2021-07-20 | Resolved: 2022-05-21

## 2022-05-21 PROBLEM — N17.9 AKI (ACUTE KIDNEY INJURY): Status: RESOLVED | Noted: 2021-07-22 | Resolved: 2022-05-21

## 2022-05-21 LAB
ANION GAP SERPL CALC-SCNC: 14 MMOL/L (ref 8–16)
AV INDEX (PROSTH): 0.65
AV MEAN GRADIENT: 3 MMHG
AV PEAK GRADIENT: 5 MMHG
AV VALVE AREA: 1.99 CM2
AV VELOCITY RATIO: 0.71
BASOPHILS # BLD AUTO: 0.02 K/UL (ref 0–0.2)
BASOPHILS NFR BLD: 0.2 % (ref 0–1.9)
BSA FOR ECHO PROCEDURE: 2.38 M2
BUN SERPL-MCNC: 21 MG/DL (ref 8–23)
CALCIUM SERPL-MCNC: 9.5 MG/DL (ref 8.7–10.5)
CHLORIDE SERPL-SCNC: 98 MMOL/L (ref 95–110)
CO2 SERPL-SCNC: 29 MMOL/L (ref 23–29)
CREAT SERPL-MCNC: 0.9 MG/DL (ref 0.5–1.4)
CV ECHO LV RWT: 0.37 CM
DIFFERENTIAL METHOD: ABNORMAL
DOP CALC AO PEAK VEL: 1.11 M/S
DOP CALC AO VTI: 21.44 CM
DOP CALC LVOT AREA: 3 CM2
DOP CALC LVOT DIAMETER: 1.97 CM
DOP CALC LVOT PEAK VEL: 0.79 M/S
DOP CALC LVOT STROKE VOLUME: 42.59 CM3
DOP CALCLVOT PEAK VEL VTI: 13.98 CM
E WAVE DECELERATION TIME: 154.6 MSEC
E/A RATIO: 4.89
E/E' RATIO: 6.29 M/S
ECHO LV POSTERIOR WALL: 0.95 CM (ref 0.6–1.1)
EJECTION FRACTION: 60 %
EOSINOPHIL # BLD AUTO: 0.1 K/UL (ref 0–0.5)
EOSINOPHIL NFR BLD: 0.7 % (ref 0–8)
ERYTHROCYTE [DISTWIDTH] IN BLOOD BY AUTOMATED COUNT: 14.1 % (ref 11.5–14.5)
EST. GFR  (AFRICAN AMERICAN): >60 ML/MIN/1.73 M^2
EST. GFR  (NON AFRICAN AMERICAN): >60 ML/MIN/1.73 M^2
FRACTIONAL SHORTENING: 30 % (ref 28–44)
GLUCOSE SERPL-MCNC: 91 MG/DL (ref 70–110)
HCT VFR BLD AUTO: 45.4 % (ref 37–48.5)
HGB BLD-MCNC: 14.8 G/DL (ref 12–16)
IMM GRANULOCYTES # BLD AUTO: 0.06 K/UL (ref 0–0.04)
IMM GRANULOCYTES NFR BLD AUTO: 0.5 % (ref 0–0.5)
INTERVENTRICULAR SEPTUM: 0.94 CM (ref 0.6–1.1)
IVRT: 88.49 MSEC
LA MAJOR: 7.38 CM
LA MINOR: 7.11 CM
LA WIDTH: 5.44 CM
LEFT ATRIUM SIZE: 5.37 CM
LEFT ATRIUM VOLUME INDEX: 80.3 ML/M2
LEFT ATRIUM VOLUME: 179.84 CM3
LEFT INTERNAL DIMENSION IN SYSTOLE: 3.6 CM (ref 2.1–4)
LEFT VENTRICLE DIASTOLIC VOLUME INDEX: 56.52 ML/M2
LEFT VENTRICLE DIASTOLIC VOLUME: 126.61 ML
LEFT VENTRICLE MASS INDEX: 79 G/M2
LEFT VENTRICLE SYSTOLIC VOLUME INDEX: 24.3 ML/M2
LEFT VENTRICLE SYSTOLIC VOLUME: 54.43 ML
LEFT VENTRICULAR INTERNAL DIMENSION IN DIASTOLE: 5.15 CM (ref 3.5–6)
LEFT VENTRICULAR MASS: 177.25 G
LV LATERAL E/E' RATIO: 4.63 M/S
LV SEPTAL E/E' RATIO: 9.78 M/S
LYMPHOCYTES # BLD AUTO: 1 K/UL (ref 1–4.8)
LYMPHOCYTES NFR BLD: 8.7 % (ref 18–48)
MAGNESIUM SERPL-MCNC: 1.7 MG/DL (ref 1.6–2.6)
MCH RBC QN AUTO: 32.1 PG (ref 27–31)
MCHC RBC AUTO-ENTMCNC: 32.6 G/DL (ref 32–36)
MCV RBC AUTO: 99 FL (ref 82–98)
MONOCYTES # BLD AUTO: 1.2 K/UL (ref 0.3–1)
MONOCYTES NFR BLD: 10.5 % (ref 4–15)
MV PEAK A VEL: 0.18 M/S
MV PEAK E VEL: 0.88 M/S
MV STENOSIS PRESSURE HALF TIME: 44.83 MS
MV VALVE AREA P 1/2 METHOD: 4.91 CM2
NEUTROPHILS # BLD AUTO: 9.3 K/UL (ref 1.8–7.7)
NEUTROPHILS NFR BLD: 79.4 % (ref 38–73)
NRBC BLD-RTO: 0 /100 WBC
PHOSPHATE SERPL-MCNC: 3.3 MG/DL (ref 2.7–4.5)
PISA TR MAX VEL: 2.51 M/S
PLATELET # BLD AUTO: ABNORMAL K/UL (ref 150–450)
PMV BLD AUTO: ABNORMAL FL (ref 9.2–12.9)
POCT GLUCOSE: 164 MG/DL (ref 70–110)
POCT GLUCOSE: 222 MG/DL (ref 70–110)
POCT GLUCOSE: 267 MG/DL (ref 70–110)
POCT GLUCOSE: 96 MG/DL (ref 70–110)
POCT GLUCOSE: 99 MG/DL (ref 70–110)
POTASSIUM SERPL-SCNC: 4.5 MMOL/L (ref 3.5–5.1)
PV PEAK VELOCITY: 0.63 CM/S
RA MAJOR: 5.38 CM
RA PRESSURE: 8 MMHG
RA WIDTH: 4.99 CM
RBC # BLD AUTO: 4.61 M/UL (ref 4–5.4)
RIGHT VENTRICULAR END-DIASTOLIC DIMENSION: 4.25 CM
RV TISSUE DOPPLER FREE WALL SYSTOLIC VELOCITY 1 (APICAL 4 CHAMBER VIEW): 6.65 CM/S
SINUS: 3.1 CM
SODIUM SERPL-SCNC: 141 MMOL/L (ref 136–145)
STJ: 2.08 CM
TDI LATERAL: 0.19 M/S
TDI SEPTAL: 0.09 M/S
TDI: 0.14 M/S
TR MAX PG: 25 MMHG
TRICUSPID ANNULAR PLANE SYSTOLIC EXCURSION: 1.81 CM
TV REST PULMONARY ARTERY PRESSURE: 33 MMHG
WBC # BLD AUTO: 11.68 K/UL (ref 3.9–12.7)

## 2022-05-21 PROCEDURE — 99223 PR INITIAL HOSPITAL CARE,LEVL III: ICD-10-PCS | Mod: ,,, | Performed by: INTERNAL MEDICINE

## 2022-05-21 PROCEDURE — 63600175 PHARM REV CODE 636 W HCPCS: Performed by: HOSPITALIST

## 2022-05-21 PROCEDURE — 36415 COLL VENOUS BLD VENIPUNCTURE: CPT | Performed by: EMERGENCY MEDICINE

## 2022-05-21 PROCEDURE — 83036 HEMOGLOBIN GLYCOSYLATED A1C: CPT | Performed by: EMERGENCY MEDICINE

## 2022-05-21 PROCEDURE — 85025 COMPLETE CBC W/AUTO DIFF WBC: CPT | Performed by: EMERGENCY MEDICINE

## 2022-05-21 PROCEDURE — 83735 ASSAY OF MAGNESIUM: CPT | Performed by: EMERGENCY MEDICINE

## 2022-05-21 PROCEDURE — 25000003 PHARM REV CODE 250: Performed by: EMERGENCY MEDICINE

## 2022-05-21 PROCEDURE — 63600175 PHARM REV CODE 636 W HCPCS: Performed by: EMERGENCY MEDICINE

## 2022-05-21 PROCEDURE — 84100 ASSAY OF PHOSPHORUS: CPT | Performed by: EMERGENCY MEDICINE

## 2022-05-21 PROCEDURE — 94761 N-INVAS EAR/PLS OXIMETRY MLT: CPT

## 2022-05-21 PROCEDURE — 99900035 HC TECH TIME PER 15 MIN (STAT)

## 2022-05-21 PROCEDURE — 94760 N-INVAS EAR/PLS OXIMETRY 1: CPT

## 2022-05-21 PROCEDURE — 99223 1ST HOSP IP/OBS HIGH 75: CPT | Mod: ,,, | Performed by: INTERNAL MEDICINE

## 2022-05-21 PROCEDURE — 87449 NOS EACH ORGANISM AG IA: CPT | Performed by: HOSPITALIST

## 2022-05-21 PROCEDURE — 25000003 PHARM REV CODE 250: Performed by: HOSPITALIST

## 2022-05-21 PROCEDURE — 21400001 HC TELEMETRY ROOM

## 2022-05-21 PROCEDURE — 27000207 HC ISOLATION

## 2022-05-21 PROCEDURE — 27000221 HC OXYGEN, UP TO 24 HOURS

## 2022-05-21 PROCEDURE — 80048 BASIC METABOLIC PNL TOTAL CA: CPT | Performed by: EMERGENCY MEDICINE

## 2022-05-21 PROCEDURE — 27000190 HC CPAP FULL FACE MASK W/VALVE

## 2022-05-21 PROCEDURE — 94660 CPAP INITIATION&MGMT: CPT

## 2022-05-21 RX ORDER — IBUPROFEN 200 MG
16 TABLET ORAL
Status: DISCONTINUED | OUTPATIENT
Start: 2022-05-21 | End: 2022-05-26 | Stop reason: HOSPADM

## 2022-05-21 RX ORDER — FLUTICASONE FUROATE AND VILANTEROL 100; 25 UG/1; UG/1
1 POWDER RESPIRATORY (INHALATION) DAILY
Status: DISCONTINUED | OUTPATIENT
Start: 2022-05-22 | End: 2022-05-26 | Stop reason: HOSPADM

## 2022-05-21 RX ORDER — LEVOFLOXACIN 750 MG/1
750 TABLET ORAL DAILY
Status: DISCONTINUED | OUTPATIENT
Start: 2022-05-22 | End: 2022-05-22

## 2022-05-21 RX ORDER — ONDANSETRON 2 MG/ML
4 INJECTION INTRAMUSCULAR; INTRAVENOUS EVERY 6 HOURS PRN
Status: DISCONTINUED | OUTPATIENT
Start: 2022-05-21 | End: 2022-05-26 | Stop reason: HOSPADM

## 2022-05-21 RX ORDER — INSULIN ASPART 100 [IU]/ML
0-5 INJECTION, SOLUTION INTRAVENOUS; SUBCUTANEOUS
Status: DISCONTINUED | OUTPATIENT
Start: 2022-05-21 | End: 2022-05-26 | Stop reason: HOSPADM

## 2022-05-21 RX ORDER — IBUPROFEN 200 MG
24 TABLET ORAL
Status: DISCONTINUED | OUTPATIENT
Start: 2022-05-21 | End: 2022-05-26 | Stop reason: HOSPADM

## 2022-05-21 RX ORDER — AMOXICILLIN 250 MG
1 CAPSULE ORAL DAILY PRN
Status: DISCONTINUED | OUTPATIENT
Start: 2022-05-21 | End: 2022-05-26 | Stop reason: HOSPADM

## 2022-05-21 RX ORDER — ACETAMINOPHEN 325 MG/1
650 TABLET ORAL EVERY 6 HOURS PRN
Status: DISCONTINUED | OUTPATIENT
Start: 2022-05-21 | End: 2022-05-26 | Stop reason: HOSPADM

## 2022-05-21 RX ORDER — PREDNISONE 20 MG/1
40 TABLET ORAL DAILY
Status: DISCONTINUED | OUTPATIENT
Start: 2022-05-21 | End: 2022-05-25

## 2022-05-21 RX ORDER — LEVALBUTEROL 1.25 MG/.5ML
1.25 SOLUTION, CONCENTRATE RESPIRATORY (INHALATION) EVERY 8 HOURS PRN
Status: DISCONTINUED | OUTPATIENT
Start: 2022-05-21 | End: 2022-05-26 | Stop reason: HOSPADM

## 2022-05-21 RX ORDER — SERTRALINE HYDROCHLORIDE 50 MG/1
100 TABLET, FILM COATED ORAL DAILY
Status: DISCONTINUED | OUTPATIENT
Start: 2022-05-21 | End: 2022-05-26 | Stop reason: HOSPADM

## 2022-05-21 RX ORDER — TRAZODONE HYDROCHLORIDE 50 MG/1
50 TABLET ORAL NIGHTLY PRN
Status: DISCONTINUED | OUTPATIENT
Start: 2022-05-21 | End: 2022-05-26 | Stop reason: HOSPADM

## 2022-05-21 RX ORDER — IPRATROPIUM BROMIDE AND ALBUTEROL SULFATE 2.5; .5 MG/3ML; MG/3ML
3 SOLUTION RESPIRATORY (INHALATION) EVERY 6 HOURS
Status: DISCONTINUED | OUTPATIENT
Start: 2022-05-21 | End: 2022-05-21

## 2022-05-21 RX ADMIN — FUROSEMIDE 60 MG: 10 INJECTION, SOLUTION INTRAMUSCULAR; INTRAVENOUS at 11:05

## 2022-05-21 RX ADMIN — RIVAROXABAN 20 MG: 20 TABLET, FILM COATED ORAL at 08:05

## 2022-05-21 RX ADMIN — ATORVASTATIN CALCIUM 40 MG: 40 TABLET, FILM COATED ORAL at 08:05

## 2022-05-21 RX ADMIN — LOSARTAN POTASSIUM 25 MG: 25 TABLET, FILM COATED ORAL at 08:05

## 2022-05-21 RX ADMIN — FUROSEMIDE 60 MG: 10 INJECTION, SOLUTION INTRAMUSCULAR; INTRAVENOUS at 10:05

## 2022-05-21 RX ADMIN — LEVOFLOXACIN 750 MG: 750 INJECTION, SOLUTION INTRAVENOUS at 12:05

## 2022-05-21 RX ADMIN — METHYLPREDNISOLONE SODIUM SUCCINATE 125 MG: 125 INJECTION, POWDER, FOR SOLUTION INTRAMUSCULAR; INTRAVENOUS at 06:05

## 2022-05-21 RX ADMIN — SERTRALINE HYDROCHLORIDE 100 MG: 50 TABLET ORAL at 11:05

## 2022-05-21 RX ADMIN — PREDNISONE 40 MG: 20 TABLET ORAL at 11:05

## 2022-05-21 RX ADMIN — METOPROLOL SUCCINATE 100 MG: 50 TABLET, EXTENDED RELEASE ORAL at 08:05

## 2022-05-21 NOTE — ASSESSMENT & PLAN NOTE
Patient refusing sample of UA.  Presumptive positive in setting of fever and respiratory decline.      My overall impression is sepsis. Vital signs were reviewed and noted in progress note.  Antibiotics given and cultures were taken.  Latest lactate reviewed, they are-   Latest Reference Range & Units 05/20/22 16:15   Lactate, Fadi 0.5 - 2.2 mmol/L 1.2 [1]

## 2022-05-21 NOTE — PROGRESS NOTES
[facility]  Progress Note    Patient Name: Sandee Evans  MRN: [id number]  Patient Class: IP- Inpatient   Admission Date: 5/20/2022  Length of Stay: 1 days  Attending Physician: Alexa Barbosa MD  Primary Care Provider: Kuldeep Miller MD        Subjective:     Principal Problem:Acute respiratory failure with hypoxia and hypercarbia        HPI:  66 y.o. female PMHx HIEU, ?COPD vs Asthma, CHF, Afib on Xarelto, HTN presents with presents with increased fatigue and AMS x 2-3 days.  Symptoms have, per family, progressively getting worse. Patient has SOB for the last few months also appears to be worsening.  reports patient felt nauseas after eating and reports one episode of diarrhea yesterday. Also endorses the use of at home breathing treatments and inhaler. Reports she is fully immunized against COVID-19.      ED course: WBC and lactic acid normal.  Elevated d dimer, procal and BNP. CXR and CTH unremarkable.Patient refused UA. VBG shows evidence of hypercapnia with ph7.3 pCOx 72. Patient was started on bipap by ED MD.       Admitted to Hospital Medicine for further evaluation      Overview/Hospital Course:  Ms Sandee Evans is a 66 y.o. woman admitted with acute hypoxic/hypercapnic respiratory failure due to acute on chronic diastolic CHF, exacerbation of COPD, and untreated HIEU. She initially required BiPAP but was quickly weaned to O2 by NC. She states that she sometimes skips lasix doses due to urinary incontinence. She does not have BiPAP at home. She is still using tobacco. Started IV lasix for CHF exacerbation, steroids/nebs/levofloxacin for COPD exacerbation. Stepped down to floor.       Interval History: Feeling much better this morning than last night. No shortness of breath currently. Lower extremity swelling improved. She states she misses lasix doses when she is busy out of the house due to urinary incontinence. She is still smoking cigarettes. She does not  have CPAP or BiPAP at home for HIEU treatment but is planning on fixing that this month at follow up appointment. Her  is at bedside and is very concerned with her shortness of breath.     Review of Systems   Constitutional:  Negative for chills and fever.   Respiratory:  Negative for cough, shortness of breath and wheezing.    Cardiovascular:  Negative for chest pain and leg swelling.   Gastrointestinal:  Negative for abdominal pain, constipation, diarrhea, nausea and vomiting.   Genitourinary:  Negative for difficulty urinating.   Musculoskeletal:  Negative for arthralgias and myalgias.   Skin:  Negative for rash.   Neurological:  Negative for weakness, numbness and headaches.   Psychiatric/Behavioral:  Negative for confusion.    Objective:     Vital Signs (Most Recent):  Temp: 97.4 °F (36.3 °C) (05/21/22 0300)  Pulse: 84 (05/21/22 0911)  Resp: (!) 21 (05/21/22 0911)  BP: 109/77 (05/21/22 0600)  SpO2: 95 % (05/21/22 0911)   Vital Signs (24h Range):  Temp:  [97.4 °F (36.3 °C)-102.1 °F (38.9 °C)] 97.4 °F (36.3 °C)  Pulse:  [] 84  Resp:  [18-28] 21  SpO2:  [89 %-100 %] 95 %  BP: (102-168)/(57-84) 109/77     Weight: 125.2 kg (276 lb)  Body mass index is 47.38 kg/m².    Intake/Output Summary (Last 24 hours) at 5/21/2022 1045  Last data filed at 5/21/2022 0614  Gross per 24 hour   Intake 149.98 ml   Output 2450 ml   Net -2300.02 ml      Physical Exam  Vitals and nursing note reviewed.   Constitutional:       General: She is not in acute distress.     Appearance: She is obese. She is not toxic-appearing.   HENT:      Head: Normocephalic and atraumatic.      Nose: Nose normal.      Mouth/Throat:      Mouth: Mucous membranes are moist.   Eyes:      General: No scleral icterus.     Conjunctiva/sclera: Conjunctivae normal.   Cardiovascular:      Rate and Rhythm: Normal rate. Rhythm irregular.      Pulses: Normal pulses.      Heart sounds: Normal heart sounds. No murmur heard.    No gallop.   Pulmonary:       Effort: Pulmonary effort is normal. No respiratory distress.      Breath sounds: Normal breath sounds. No wheezing or rales.      Comments: 2L NC  Abdominal:      General: Bowel sounds are normal. There is no distension.      Palpations: Abdomen is soft.      Tenderness: There is no abdominal tenderness. There is no guarding.   Musculoskeletal:      Right lower leg: Edema present.      Left lower leg: No edema.   Skin:     General: Skin is warm and dry.   Neurological:      Mental Status: She is alert and oriented to person, place, and time.       Significant Labs: All pertinent labs within the past 24 hours have been reviewed.    Significant Imaging: I have reviewed all pertinent imaging results/findings within the past 24 hours.      Assessment/Plan:      * Acute respiratory failure with hypoxia and hypercarbia  Patient admitted with Hypercapnic and Hypoxic which is Chronic.  she is not on home oxygen. Signs/symptoms of respiratory failure include- increased work of breathing present on admission. This has resolved after BiPAP.   - Labs and images were reviewed. Patient Has recent ABG, which has been reviewed..   - Supplemental oxygen was provided and noted- Nasal Cannula 2 LPM   - Respiratory failure is due to- CHF, COPD and HIEU/suspected OHS   - CHF: follow up TTE, continue lasix. Discussed need for lasix adherence  - COPD: continue steroids/nebs/levofloxacin. Discussed need for smoking cessation  - HIEU: Pulm consult- will order CPAP vs BiPAP for home use  - Pulm follow up on discharge         Dysuria  Check UA, especially in setting of fever and elevated procal    Chronic obstructive pulmonary disease with acute exacerbation  Admitted with shortness of breath, fever  With acute hypoxic/hypercapnic respiratory failure requiring BiPAP on admit.   Last PFTs 9/2021 with obstructive and restrictive physiology  CXR no infiltrate  Started prednisone 40mg daily x5 days, nebs, levofloxacin x5 days  Now on 2L NC.  Continue BiPAP QHS  Encouraged smoking cessation  Pulmonary follow up on discharge        Pulmonary hypertension  Group 2 and 3 PHTN  Repeat TTE      Acute on chronic diastolic congestive heart failure  Patient admitted with shortness of breath, edema consistent with acute on chronic diastolic CHF. Cause of exacerbation is missed lasix doses    BNP  Recent Labs   Lab 05/20/22  1615   *     CXR reviewed  Recent Labs   Lab 05/20/22  1615   TROPONINI <0.006     EKG personally reviewed and shows AFib  Started CHF pathway  Start on IV lasix diuresis. Monitor ins and outs  TTE pending   Continue Bb, ARB  Nutrition consulted for low salt cardiac diet education  Discussed need to take lasix on discharge   Cardiology follow up on discharge      HIEU (obstructive sleep apnea)  Does not currently have CPAP at home  Needs this prior to discharge as it contributes to her presentation with hypoxic/hypercapnic respiratory failure  Will discuss settings with Dr Puente today and place order. Case management will need to arrange this prior to discharge.     Tobacco dependence  Patient was counseled on smoking cessation for 4 minutes. We discussed how smoking is detrimental to the patient's health, specifically her respiratory failure.     Type 2 diabetes mellitus without complication  A1c:   Lab Results   Component Value Date    HGBA1C 6.3 (H) 09/21/2021   Well controlled    Meds: SSI PRN to maintain goal 140-180  ADA diet, accuchecks, hypoglycemic protocol      Longstanding persistent atrial fibrillation  Patient with Persistent (7 days or more) atrial fibrillation which is rate controlled currently with Beta Blocker. Patient is currently in atrial fibrillation. [id number] Score: 4. Anticoagulation indicated. Anticoagulation done with Xarelto.          Severe obesity (BMI >= 40)  Body mass index is 47.38 kg/m². Morbid obesity complicates all aspects of disease management from diagnostic modalities to treatment. Weight loss  encouraged and health benefits explained to patient.   - contributes to HIEU  - contributes to restrictive physiology seen on prior PFTs      Essential hypertension  BP is currently well controlled  Continue home losartan         VTE Risk Mitigation (From admission, onward)         Ordered     rivaroxaban tablet 20 mg  Daily         05/20/22 2210     IP VTE HIGH RISK PATIENT  Once         05/20/22 2210     Place sequential compression device  Until discontinued         05/20/22 2210                Discharge Planning   JACQUELINE:      Code Status: Full Code   Is the patient medically ready for discharge?:     Reason for patient still in hospital (select all that apply): Patient trending condition               Critical care time spent on the evaluation and treatment of severe organ dysfunction, review of pertinent labs and imaging studies, discussions with consulting providers and discussions with patient/family: 30 minutes.      Alexa Barbosa MD  Department of Hospital Medicine   [facility] [place] Int[place] Care

## 2022-05-21 NOTE — ASSESSMENT & PLAN NOTE
Patient admitted with Hypercapnic and Hypoxic which is Chronic.  she is not on home oxygen. Signs/symptoms of respiratory failure include- increased work of breathing present on admission. This has resolved after BiPAP.   - Labs and images were reviewed. Patient Has recent ABG, which has been reviewed..   - Supplemental oxygen was provided and noted- Nasal Cannula 2 LPM   - Respiratory failure is due to- CHF, COPD and HIEU/suspected OHS   - CHF: follow up TTE, continue lasix. Discussed need for lasix adherence  - COPD: continue steroids/nebs/levofloxacin. Discussed need for smoking cessation  - HIEU: Pulm consult- will order CPAP vs BiPAP for home use  - Pulm follow up on discharge

## 2022-05-21 NOTE — ASSESSMENT & PLAN NOTE
Admitted to inpatient status  -On admit noted elevated BNP without pulmonary edema on CXR  -Diuresis  -Strict ins/outs and daily weights  -Monitor on telemetry  - Consider Echo and Cardiology if no improvement of symptoms

## 2022-05-21 NOTE — HPI
66 y.o. female PMHx HIEU, ?COPD vs Asthma, CHF, Afib on Xarelto, HTN presents with presents with increased fatigue and AMS x 2-3 days.  Symptoms have, per family, progressively getting worse. Patient has SOB for the last few months also appears to be worsening.  reports patient felt nauseas after eating and reports one episode of diarrhea yesterday. Also endorses the use of at home breathing treatments and inhaler. Reports she is fully immunized against COVID-19.      ED course: WBC and lactic acid normal.  Elevated d dimer, procal and BNP. CXR and CTH unremarkable.Patient refused UA. VBG shows evidence of hypercapnia with ph7.3 pCOx 72. Patient was started on bipap by ED MD.       Admitted to Hospital Medicine for further evaluation

## 2022-05-21 NOTE — ASSESSMENT & PLAN NOTE
-ahi 90  -tolerated bipap well during hospitalization.  -would benefit from bipap upon discharge to prevent readmission  -patient does have a sleep clinic appointment 5/24/22.  It would be more expeditious if bipap set up can be arranged upon discharged.  SURJIT Castro can fine tune as an outpatient with titration study.

## 2022-05-21 NOTE — CONSULTS
Consult received for fluid and salt restricted diet education. This RD covering remotely for weekend coverage. Diet handouts attached to d/c paperwork. Inpatient RD to follow up with education.

## 2022-05-21 NOTE — SUBJECTIVE & OBJECTIVE
Past Medical History:   Diagnosis Date    Anticoagulant long-term use     Xarelto    Arthritis     Atrial fibrillation     Breast cyst     CHF (congestive heart failure)     Degenerative disc disease     Edema     HLD (hyperlipidemia)     Hx of psychiatric care     Hyperlipidemia     Hypertension     Hypothyroidism     Nuclear sclerosis, bilateral 12/18/2017    Obesity, morbid     HIEU (obstructive sleep apnea)     Other abnormal glucose     pre-diabetes    Pre-diabetes     Psychiatric problem     Requires assistance with activities of daily living (ADL)     Sleep apnea     Smoker     SOB (shortness of breath)     SOB (shortness of breath)     Thyroid disease     on meds 8-9 years ago. hypothyroidism.no malignancy    TMJ (temporomandibular joint disorder)     jaw clicking    Tobacco abuse     Unsteady gait     Urge incontinence     Weakness generalized     Wears glasses        Past Surgical History:   Procedure Laterality Date    BREAST BIOPSY Right     over 10 yrs ago/ benign    BREAST CYST EXCISION Right     BREAST LUMPECTOMY Right     over 10 yrs ago, ex bx/ benign    COLONOSCOPY N/A 6/25/2019    Procedure: COLONOSCOPY;  Surgeon: Micheline Flores MD;  Location: [facility] ENDO;  Service: Endoscopy;  Laterality: N/A;  RX XARELTO ok to hold (2 days) per Dr. Naik see scan 3/20/19    ENDOSCOPIC ULTRASOUND OF UPPER GASTROINTESTINAL TRACT N/A 1/26/2021    Procedure: ULTRASOUND-ENDOSCOPIC-UPPER;  Surgeon: Stevenson Philippe MD;  Location: [facility] ENDO;  Service: Endoscopy;  Laterality: N/A;    HYSTERECTOMY      JOINT REPLACEMENT Bilateral     knees    OOPHORECTOMY      rt.knee surgery 2016      TOTAL KNEE ARTHROPLASTY Left 2/19/2019    Procedure: ARTHROPLASTY, KNEE, TOTAL;  Surgeon: Med Hanson MD;  Location: [facility] OR;  Service: Orthopedics;  Laterality: Left;  10AM START PER  PER JAYLIN TEXT @ 8:34AM ON 2-18-19  SUPINE  HARRIS LOYA [phone] TEXTED HIM ON 1-24-19 @ 7:57AM  RN PRE OP 2-13-19--BMI--48.9    underarm  gland\ Bilateral        Review of patient's allergies indicates:   Allergen Reactions    Ace inhibitors      Cough         Family History       Problem Relation (Age of Onset)    Cancer Mother, Brother    Diabetes Mother, Brother    Hypertension Mother    No Known Problems Father, Sister, Maternal Aunt, Maternal Uncle, Paternal Aunt, Paternal Uncle, Maternal Grandmother, Maternal Grandfather, Paternal Grandmother, Paternal Grandfather          Tobacco Use    Smoking status: Current Every Day Smoker     Packs/day: 0.50     Years: 47.00     Pack years: 23.50     Types: Cigarettes     Start date: 1973    Smokeless tobacco: Never Used   Substance and Sexual Activity    Alcohol use: No    Drug use: No    Sexual activity: Yes     Partners: Male         Review of Systems   Constitutional:  Negative for chills, fever and unexpected weight change.   HENT:  Positive for congestion and tinnitus. Negative for sore throat.    Respiratory:  Positive for cough and shortness of breath. Negative for wheezing.    Cardiovascular:  Negative for chest pain and leg swelling.   Gastrointestinal:  Negative for abdominal pain, constipation, diarrhea, nausea and vomiting.   Genitourinary:  Negative for difficulty urinating.   Musculoskeletal:  Negative for arthralgias and myalgias.   Skin:  Negative for rash.   Neurological:  Positive for weakness and light-headedness. Negative for numbness and headaches.   Psychiatric/Behavioral:  Negative for confusion.    Objective:     Vital Signs (Most Recent):  Temp: 98.6 °F (37 °C) (05/21/22 1326)  Pulse: 98 (05/21/22 1326)  Resp: 18 (05/21/22 1326)  BP: 122/82 (05/21/22 1326)  SpO2: (!) 94 % (05/21/22 1326)   Vital Signs (24h Range):  Temp:  [97.4 °F (36.3 °C)-102.1 °F (38.9 °C)] 98.6 °F (37 °C)  Pulse:  [] 98  Resp:  [16-28] 18  SpO2:  [89 %-100 %] 94 %  BP: (102-138)/(57-84) 122/82     Weight: 125.2 kg (276 lb)  Body mass index is 47.38 kg/m².      Intake/Output Summary (Last 24 hours) at  5/21/2022 1538  Last data filed at 5/21/2022 0614  Gross per 24 hour   Intake 149.98 ml   Output 2450 ml   Net -2300.02 ml       Physical Exam  Vitals and nursing note reviewed.   Constitutional:       General: She is not in acute distress.     Appearance: She is obese. She is not toxic-appearing.   HENT:      Head: Normocephalic and atraumatic.      Nose: Nose normal.      Mouth/Throat:      Mouth: Mucous membranes are moist.   Eyes:      General: No scleral icterus.     Conjunctiva/sclera: Conjunctivae normal.   Cardiovascular:      Rate and Rhythm: Normal rate. Rhythm irregular.      Pulses: Normal pulses.      Heart sounds: Normal heart sounds. No murmur heard.    No gallop.   Pulmonary:      Effort: Pulmonary effort is normal. No respiratory distress.      Breath sounds: Normal breath sounds. No wheezing or rales.      Comments: 2L NC  Abdominal:      General: Bowel sounds are normal. There is no distension.      Palpations: Abdomen is soft.      Tenderness: There is no abdominal tenderness. There is no guarding.   Musculoskeletal:      Right lower leg: Edema present.      Left lower leg: No edema.   Skin:     General: Skin is warm and dry.   Neurological:      Mental Status: She is alert and oriented to person, place, and time.       Vents:  Oxygen Concentration (%): 30 (05/21/22 0911)    Lines/Drains/Airways       Peripheral Intravenous Line  Duration                  Peripheral IV - Single Lumen 05/20/22 1522 20 G Left Antecubital 1 day         Peripheral IV - Single Lumen 05/20/22 2320 20 G Left Upper Arm <1 day                    Significant Labs:    CBC/Anemia Profile:  Recent Labs   Lab 05/20/22  1615 05/21/22  0406   WBC 11.81 11.68   HGB 14.0 14.8   HCT 43.5 45.4    SEE COMMENT   * 99*   RDW 14.2 14.1        Chemistries:  Recent Labs   Lab 05/20/22  1615 05/21/22  0406    141   K 4.5 4.5   CL 99 98   CO2 29 29   BUN 21 21   CREATININE 1.2 0.9   CALCIUM 9.3 9.5   ALBUMIN 3.1*  --     PROT 7.2  --    BILITOT 1.6*  --    ALKPHOS 86  --    ALT 15  --    AST 17  --    MG  --  1.7   PHOS  --  3.3       BNP  Recent Labs   Lab 05/20/22  1615   *     Echo 5/21/22  The left ventricle is normal in size with normal systolic function.  The estimated ejection fraction is 60%.  Normal right ventricular size with normal right ventricular systolic function.  The estimated PA systolic pressure is 33 mmHg.  Atrial fibrillation observed.      ABGs:   Recent Labs   Lab 05/20/22 2007   PH 7.301*   PCO2 72.8*   HCO3 35.9*   POCSATURATED 71*   BE 7     11/13/20 Hsat ahi of 90    PFT 9/21/21 Ratio of 64%; FVC 2.02 L (80%); FEV1 1.3 L (66%); TLC 3.08 L (62%); dlco 16 (74%)     Significant Imaging:   I have reviewed all pertinent imaging results/findings within the past 24 hours.  CT: I have reviewed all pertinent results/findings within the past 24 hours and my personal findings are:  9/13/21 no increased reticulation.  No effusion.  No emphysematous changes  CXR: I have reviewed all pertinent results/findings within the past 24 hours and my personal findings are:  5/20/22 poor penetration.  no consolidation or effusion.

## 2022-05-21 NOTE — ASSESSMENT & PLAN NOTE
Admitted with shortness of breath, fever  With acute hypoxic/hypercapnic respiratory failure requiring BiPAP on admit.   Last PFTs 9/2021 with obstructive and restrictive physiology  CXR no infiltrate  Started prednisone 40mg daily x5 days, nebs, levofloxacin x5 days  Now on 2L NC. Continue BiPAP QHS  Encouraged smoking cessation  Pulmonary follow up on discharge

## 2022-05-21 NOTE — ASSESSMENT & PLAN NOTE
Patient with Hypercapnic Respiratory failure which is Acute on chronic.  she is not on home oxygen. Supplemental oxygen was provided and noted- Oxygen Concentration (%):  [30] 30.   Signs/symptoms of respiratory failure include- lethargy. Contributing diagnoses includes - CHF, COPD and Obesity Hypoventilation Labs and images were reviewed. Patient Has not had a recent ABG. Will treat underlying causes and adjust management of respiratory failure as follows- - nebs, steroids, antibiotics, bipap, supplemental oxygen and reassess.

## 2022-05-21 NOTE — ASSESSMENT & PLAN NOTE
Body mass index is 47.38 kg/m². Morbid obesity complicates all aspects of disease management from diagnostic modalities to treatment. Weight loss encouraged and health benefits explained to patient.   - contributes to HIEU  - contributes to restrictive physiology seen on prior PFTs

## 2022-05-21 NOTE — ASSESSMENT & PLAN NOTE
Patient with Persistent (7 days or more) atrial fibrillation which is uncontrolled currently with Beta Blocker. Patient is currently in atrial fibrillation.[id number] Score: 4. Anticoagulation indicated. Anticoagulation done with Xarelto.

## 2022-05-21 NOTE — ASSESSMENT & PLAN NOTE
Does not currently have CPAP at home  Needs this prior to discharge as it contributes to her presentation with hypoxic/hypercapnic respiratory failure  Will discuss settings with Dr Puente today and place order. Case management will need to arrange this prior to discharge.

## 2022-05-21 NOTE — ASSESSMENT & PLAN NOTE
A1c:   Lab Results   Component Value Date    HGBA1C 6.3 (H) 09/21/2021   Well controlled    Meds: SSI PRN to maintain goal 140-180  ADA diet, accuchecks, hypoglycemic protocol

## 2022-05-21 NOTE — ASSESSMENT & PLAN NOTE
Latest Reference Range & Units 09/21/21 08:51   Hemoglobin A1C External 4.0 - 5.6 % 6.3 (H) [1]     Serial accucheck. Diabetic diet. IRSS

## 2022-05-21 NOTE — ASSESSMENT & PLAN NOTE
Patient admitted with shortness of breath, edema consistent with acute on chronic diastolic CHF. Cause of exacerbation is missed lasix doses    BNP  Recent Labs   Lab 05/20/22  1615   *     CXR reviewed  Recent Labs   Lab 05/20/22  1615   TROPONINI <0.006     EKG personally reviewed and shows AFib  Started CHF pathway  Start on IV lasix diuresis. Monitor ins and outs  TTE pending   Continue Bb, ARB  Nutrition consulted for low salt cardiac diet education  Discussed need to take lasix on discharge   Cardiology follow up on discharge

## 2022-05-21 NOTE — HOSPITAL COURSE
Ms Sandee Evans is a 66 y.o. woman admitted with acute hypoxic/hypercapnic respiratory failure due to acute on chronic diastolic CHF, exacerbation of COPD, and untreated HIEU. She initially required BiPAP but was quickly weaned to O2 by NC. She states that she sometimes skips lasix doses due to urinary incontinence. She does not have BiPAP at home. She is still using tobacco. Started IV lasix for CHF exacerbation, steroids/nebs/levofloxacin for COPD exacerbation. Stepped down to floor on 5/21/22. Patient grew out E-coli on blood cultures- (S) to Cipro. Patient clinically improved and was discharged to home with out patient PT/OT on 5/24/22. Bi-pap will be arranged. Activity as tolerated. Diet- low NA/ADA 2000 yang diet    Already has pulmonary follow up.

## 2022-05-21 NOTE — CONSULTS
[facility] - Telemetry  Pulmonology  Consult Note    Patient Name: Sandee Evans  MRN: [id number]  Admission Date: 5/20/2022  Hospital Length of Stay: 1 days  Code Status: Full Code  Attending Physician: Alexa Barbosa MD  Primary Care Provider: Kuldeep Miller MD   Principal Problem: Acute respiratory failure with hypoxia and hypercarbia    [unfilled]  Subjective:     HPI:  Patient is 66 y.o. female  has a past medical history of Anticoagulant long-term use, Arthritis, Atrial fibrillation, Breast cyst, CHF (congestive heart failure), Degenerative disc disease, Edema, HLD (hyperlipidemia), psychiatric care, Hyperlipidemia, Hypertension, Hypothyroidism, Nuclear sclerosis, bilateral (12/18/2017), Obesity, morbid, HIEU (obstructive sleep apnea), Other abnormal glucose, Pre-diabetes, Psychiatric problem, Requires assistance with activities of daily living (ADL), Sleep apnea, Smoker, SOB (shortness of breath), SOB (shortness of breath), Thyroid disease, TMJ (temporomandibular joint disorder), Tobacco abuse, Unsteady gait, Urge incontinence, Weakness generalized, and Wears glasses. presented to [facility] on 5/20/22 with chronic sob.    chronic talbert 30 steps.  Lived with .   Difficulty with adl.  Symptoms have, per family, progressively getting worse. Patient has SOB for the last few months also appears to be worsening.  reports patient felt nauseas after eating and reports one episode of diarrhea yesterday. Also endorses the use of at home breathing treatments and inhaler. Reports she is fully immunized against COVID-19.      ED course: WBC and lactic acid normal.  Elevated d dimer, procal and BNP. CXR and CTH unremarkable.Patient refused UA. VBG shows evidence of hypercapnia with ph7.3 pCOx 72. Patient was started on bipap by ED MD.       Admitted to Hospital Medicine for further evaluation.  Patient was treated with lasix, steroid, nebs, and levaquin.  Pulmonary was consulted for further inputs.       Per patient, dyspnea has improved since admission.  No chest pain.  +lower extremities swelling.  +orthopnea  (4 pillows orthopnea). Orthopnea has improve since admission.  Patient is followed by SURJIT Bray as an outpatient. S/p hsat but did not get titration.  Patient has appointment with SURJIT Bray on 5/24/22.  Slept well with bipap during hospital stay.  Smoked 9 cigarettes per day.  Smoke since teenage years.        Past Medical History:   Diagnosis Date    Anticoagulant long-term use     Xarelto    Arthritis     Atrial fibrillation     Breast cyst     CHF (congestive heart failure)     Degenerative disc disease     Edema     HLD (hyperlipidemia)     Hx of psychiatric care     Hyperlipidemia     Hypertension     Hypothyroidism     Nuclear sclerosis, bilateral 12/18/2017    Obesity, morbid     HIEU (obstructive sleep apnea)     Other abnormal glucose     pre-diabetes    Pre-diabetes     Psychiatric problem     Requires assistance with activities of daily living (ADL)     Sleep apnea     Smoker     SOB (shortness of breath)     SOB (shortness of breath)     Thyroid disease     on meds 8-9 years ago. hypothyroidism.no malignancy    TMJ (temporomandibular joint disorder)     jaw clicking    Tobacco abuse     Unsteady gait     Urge incontinence     Weakness generalized     Wears glasses        Past Surgical History:   Procedure Laterality Date    BREAST BIOPSY Right     over 10 yrs ago/ benign    BREAST CYST EXCISION Right     BREAST LUMPECTOMY Right     over 10 yrs ago, ex bx/ benign    COLONOSCOPY N/A 6/25/2019    Procedure: COLONOSCOPY;  Surgeon: Micheline Flores MD;  [facility];  Service: Endoscopy;  Laterality: N/A;  RX XARELTO ok to hold (2 days) per Dr. Naik see scan 3/20/19    ENDOSCOPIC ULTRASOUND OF UPPER GASTROINTESTINAL TRACT N/A 1/26/2021    Procedure: ULTRASOUND-ENDOSCOPIC-UPPER;  Surgeon: Stevenson Philippe MD;  Location: [facility];  Service: Endoscopy;   Laterality: N/A;    HYSTERECTOMY      JOINT REPLACEMENT Bilateral     knees    OOPHORECTOMY      rt.knee surgery 2016      TOTAL KNEE ARTHROPLASTY Left 2/19/2019    Procedure: ARTHROPLASTY, KNEE, TOTAL;  Surgeon: Med Hanson MD;  Location: [facility] OR;  Service: Orthopedics;  Laterality: Left;  10AM START PER  PER JAYLIN TEXT @ 8:34AM ON 2-18-19  SUPINE  HARRIS LOYA [phone] TEXTED HIM ON 1-24-19 @ 7:57AM  RN PRE OP 2-13-19--BMI--48.9    underarm gland\ Bilateral        Review of patient's allergies indicates:   Allergen Reactions    Ace inhibitors      Cough         Family History       Problem Relation (Age of Onset)    Cancer Mother, Brother    Diabetes Mother, Brother    Hypertension Mother    No Known Problems Father, Sister, Maternal Aunt, Maternal Uncle, Paternal Aunt, Paternal Uncle, Maternal Grandmother, Maternal Grandfather, Paternal Grandmother, Paternal Grandfather          Tobacco Use    Smoking status: Current Every Day Smoker     Packs/day: 0.50     Years: 47.00     Pack years: 23.50     Types: Cigarettes     Start date: 1973    Smokeless tobacco: Never Used   Substance and Sexual Activity    Alcohol use: No    Drug use: No    Sexual activity: Yes     Partners: Male         Review of Systems   Constitutional:  Negative for chills, fever and unexpected weight change.   HENT:  Positive for congestion and tinnitus. Negative for sore throat.    Respiratory:  Positive for cough and shortness of breath. Negative for wheezing.    Cardiovascular:  Negative for chest pain and leg swelling.   Gastrointestinal:  Negative for abdominal pain, constipation, diarrhea, nausea and vomiting.   Genitourinary:  Negative for difficulty urinating.   Musculoskeletal:  Negative for arthralgias and myalgias.   Skin:  Negative for rash.   Neurological:  Positive for weakness and light-headedness. Negative for numbness and headaches.   Psychiatric/Behavioral:  Negative for confusion.    Objective:      Vital Signs (Most Recent):  Temp: 98.6 °F (37 °C) (05/21/22 1326)  Pulse: 98 (05/21/22 1326)  Resp: 18 (05/21/22 1326)  BP: 122/82 (05/21/22 1326)  SpO2: (!) 94 % (05/21/22 1326)   Vital Signs (24h Range):  Temp:  [97.4 °F (36.3 °C)-102.1 °F (38.9 °C)] 98.6 °F (37 °C)  Pulse:  [] 98  Resp:  [16-28] 18  SpO2:  [89 %-100 %] 94 %  BP: (102-138)/(57-84) 122/82     Weight: 125.2 kg (276 lb)  Body mass index is 47.38 kg/m².      Intake/Output Summary (Last 24 hours) at 5/21/2022 1538  Last data filed at 5/21/2022 0614  Gross per 24 hour   Intake 149.98 ml   Output 2450 ml   Net -2300.02 ml       Physical Exam  Vitals and nursing note reviewed.   Constitutional:       General: She is not in acute distress.     Appearance: She is obese. She is not toxic-appearing.   HENT:      Head: Normocephalic and atraumatic.      Nose: Nose normal.      Mouth/Throat:      Mouth: Mucous membranes are moist.   Eyes:      General: No scleral icterus.     Conjunctiva/sclera: Conjunctivae normal.   Cardiovascular:      Rate and Rhythm: Normal rate. Rhythm irregular.      Pulses: Normal pulses.      Heart sounds: Normal heart sounds. No murmur heard.    No gallop.   Pulmonary:      Effort: Pulmonary effort is normal. No respiratory distress.      Breath sounds: Normal breath sounds. No wheezing or rales.      Comments: 2L NC  Abdominal:      General: Bowel sounds are normal. There is no distension.      Palpations: Abdomen is soft.      Tenderness: There is no abdominal tenderness. There is no guarding.   Musculoskeletal:      Right lower leg: Edema present.      Left lower leg: No edema.   Skin:     General: Skin is warm and dry.   Neurological:      Mental Status: She is alert and oriented to person, place, and time.       Vents:  Oxygen Concentration (%): 30 (05/21/22 0911)    Lines/Drains/Airways       Peripheral Intravenous Line  Duration                  Peripheral IV - Single Lumen 05/20/22 1522 20 G Left Antecubital 1 day          Peripheral IV - Single Lumen 05/20/22 2320 20 G Left Upper Arm <1 day                    Significant Labs:    CBC/Anemia Profile:  Recent Labs   Lab 05/20/22  1615 05/21/22  0406   WBC 11.81 11.68   HGB 14.0 14.8   HCT 43.5 45.4    SEE COMMENT   * 99*   RDW 14.2 14.1        Chemistries:  Recent Labs   Lab 05/20/22  1615 05/21/22  0406    141   K 4.5 4.5   CL 99 98   CO2 29 29   BUN 21 21   CREATININE 1.2 0.9   CALCIUM 9.3 9.5   ALBUMIN 3.1*  --    PROT 7.2  --    BILITOT 1.6*  --    ALKPHOS 86  --    ALT 15  --    AST 17  --    MG  --  1.7   PHOS  --  3.3       BNP  Recent Labs   Lab 05/20/22  1615   *     Echo 5/21/22  · The left ventricle is normal in size with normal systolic function.  · The estimated ejection fraction is 60%.  · Normal right ventricular size with normal right ventricular systolic function.  · The estimated PA systolic pressure is 33 mmHg.  · Atrial fibrillation observed.      ABGs:   Recent Labs   Lab 05/20/22 2007   PH 7.301*   PCO2 72.8*   HCO3 35.9*   POCSATURATED 71*   BE 7     11/13/20 Hsat ahi of 90    PFT 9/21/21 Ratio of 64%; FVC 2.02 L (80%); FEV1 1.3 L (66%); TLC 3.08 L (62%); dlco 16 (74%)     Significant Imaging:   I have reviewed all pertinent imaging results/findings within the past 24 hours.  CT: I have reviewed all pertinent results/findings within the past 24 hours and my personal findings are:  9/13/21 no increased reticulation.  No effusion.  No emphysematous changes  CXR: I have reviewed all pertinent results/findings within the past 24 hours and my personal findings are:  5/20/22 poor penetration.  no consolidation or effusion.        ABG  Recent Labs   Lab 05/20/22 2007   PH 7.301*   PO2 43   PCO2 72.8*   HCO3 35.9*   BE 7     Assessment/Plan:     * Acute respiratory failure with hypoxia and hypercarbia  -mutifactorial.  Obesity hypoventilation + copd + diastolic dysfunction.    -agree with multi prong approach of diurestics + steroid +  nebs + inhalers.    -cxr without overt consolidation.  -recommend antibiotic de-escalation.     COPD (chronic obstructive pulmonary disease)  -fev1 66 %.   -encourage smoking cessation.  -agree with spiriva.  Will add laba/ics  -complete 5-10 days of steroid.        HIEU (obstructive sleep apnea)  -ahi 90  -tolerated bipap well during hospitalization.  -would benefit from bipap upon discharge to prevent readmission  -patient does have a sleep clinic appointment 5/24/22.  It would be more expeditious if bipap set up can be arranged upon discharged.  SURJIT Castro can fine tune as an outpatient with titration study.            Thank you for your consult. I will sign off. Please contact us if you have any additional questions.     Sebastian Puente MD  Pulmonology  [facility] - Telemetry

## 2022-05-21 NOTE — ASSESSMENT & PLAN NOTE
Patient was counseled on smoking cessation for 4 minutes. We discussed how smoking is detrimental to the patient's health, specifically her respiratory failure.

## 2022-05-21 NOTE — HPI
Patient is 66 y.o. female  has a past medical history of Anticoagulant long-term use, Arthritis, Atrial fibrillation, Breast cyst, CHF (congestive heart failure), Degenerative disc disease, Edema, HLD (hyperlipidemia), psychiatric care, Hyperlipidemia, Hypertension, Hypothyroidism, Nuclear sclerosis, bilateral (12/18/2017), Obesity, morbid, HIEU (obstructive sleep apnea), Other abnormal glucose, Pre-diabetes, Psychiatric problem, Requires assistance with activities of daily living (ADL), Sleep apnea, Smoker, SOB (shortness of breath), SOB (shortness of breath), Thyroid disease, TMJ (temporomandibular joint disorder), Tobacco abuse, Unsteady gait, Urge incontinence, Weakness generalized, and Wears glasses. presented to [facility] on 5/20/22 with chronic sob.    chronic talbert 30 steps.  Lived with .   Difficulty with adl.  Symptoms have, per family, progressively getting worse. Patient has SOB for the last few months also appears to be worsening.  reports patient felt nauseas after eating and reports one episode of diarrhea yesterday. Also endorses the use of at home breathing treatments and inhaler. Reports she is fully immunized against COVID-19.      ED course: WBC and lactic acid normal.  Elevated d dimer, procal and BNP. CXR and CTH unremarkable.Patient refused UA. VBG shows evidence of hypercapnia with ph7.3 pCOx 72. Patient was started on bipap by ED MD.       Admitted to Hospital Medicine for further evaluation.  Patient was treated with lasix, steroid, nebs, and levaquin.  Pulmonary was consulted for further inputs.      Per patient, dyspnea has improved since admission.  No chest pain.  +lower extremities swelling.  +orthopnea  (4 pillows orthopnea). Orthopnea has improve since admission.  Patient is followed by SURJIT Bray as an outpatient. S/p hsat but did not get titration.  Patient has appointment with SURJIT Bray on 5/24/22.  Slept well with bipap during hospital stay.  Smoked 9  cigarettes per day.  Smoke since teenage years.

## 2022-05-21 NOTE — ASSESSMENT & PLAN NOTE
Patient with Persistent (7 days or more) atrial fibrillation which is rate controlled currently with Beta Blocker. Patient is currently in atrial fibrillation. [id number] Score: 4. Anticoagulation indicated. Anticoagulation done with Xarelto.

## 2022-05-21 NOTE — PROVIDER TRANSFER
Ms Sandee Evans is a 66 y.o. woman admitted with acute hypoxic/hypercapnic respiratory failure due to acute on chronic diastolic CHF, exacerbation of COPD, and untreated HIEU. She initially required BiPAP but was quickly weaned to O2 by NC. She states that she sometimes skips lasix doses due to urinary incontinence. She does not have BiPAP at home. She is still using tobacco. Started IV lasix for CHF exacerbation, steroids/nebs/levofloxacin for COPD exacerbation. Stepped down to floor.     To do  - continue IV lasix, may be able to change to PO tomorrow  - needs home BiPAP set up on discharge  - walk test prior to discharge     Alexa Barbosa MD  05/21/2022  4:58 PM

## 2022-05-21 NOTE — PLAN OF CARE
Problem: Adult Inpatient Plan of Care  Goal: Plan of Care Review  Outcome: Ongoing, Progressing  Goal: Patient-Specific Goal (Individualized)  Outcome: Ongoing, Progressing  Goal: Absence of Hospital-Acquired Illness or Injury  Outcome: Ongoing, Progressing  Goal: Optimal Comfort and Wellbeing  Outcome: Ongoing, Progressing  Goal: Readiness for Transition of Care  Outcome: Ongoing, Progressing     Problem: Bariatric Environmental Safety  Goal: Safety Maintained with Care  Outcome: Ongoing, Progressing     Problem: Infection  Goal: Absence of Infection Signs and Symptoms  Outcome: Ongoing, Progressing     Problem: Diabetes Comorbidity  Goal: Blood Glucose Level Within Targeted Range  Outcome: Ongoing, Progressing     Problem: Fluid and Electrolyte Imbalance (Acute Kidney Injury/Impairment)  Goal: Fluid and Electrolyte Balance  Outcome: Ongoing, Progressing     Problem: Oral Intake Inadequate (Acute Kidney Injury/Impairment)  Goal: Optimal Nutrition Intake  Outcome: Ongoing, Progressing     Problem: Renal Function Impairment (Acute Kidney Injury/Impairment)  Goal: Effective Renal Function  Outcome: Ongoing, Progressing     Problem: Skin Injury Risk Increased  Goal: Skin Health and Integrity  Outcome: Ongoing, Progressing    Received pt from ED, escorted by the nurse and RT. The pt came up on the BiPAP with oxygen saturations in the 90s. Bed bath provided, linen changed, and new purewick applied upon arrival. The pt remained on BiPAP throughout the night, and tolerated it well. Per the pt, she has recently undergone a sleep study where they suggested she wear a BiPAP at home. However, the pt notes her insurance will not assist with the BiPAP payment until she reaches her deductible. Otherwise, she is compliant with her medications as prescribed by her PCP. The pt is resting comfortably, with no acute distress noted.

## 2022-05-21 NOTE — ASSESSMENT & PLAN NOTE
CT Sept 2021 Lungs: There are no abnormal opacities that require further evaluation.  The largest opacity in the right lung appears solid and measures 0.2 cm on series 4, image 104.  The lungs show no findings consistent with emphysema.  Right basilar scarring.  Dependent bilateral atelectasis.      an acute increase in symptoms beyond normal day-to-day variation that leads to a change in medication.     With hypoxemia will give supplemental oxygen be titrated to a target of 88 to 92 percent pulse oxygen saturation, rather than using high-flow, nontitrated oxygen, inhaled short-acting bronchodilator therapy, course of systemic glucocorticoids, antibiotics.    Continue NIV

## 2022-05-21 NOTE — H&P
"[facility] [place] Emergency Dept  [facility] Medicine  History & Physical    Patient Name: Sandee Evans  MRN: [id number]  Patient Class: IP- Inpatient  Admission Date: 5/20/2022  Attending Physician: Alexa Talbert MD  Primary Care Provider: Kuldeep Miller MD         Patient information was obtained from spouse/SO, past medical records and ER records.     Subjective:     Principal Problem:Acute respiratory failure with hypoxia and hypercarbia    Chief Complaint:     Chief Complaint   Patient presents with    Shortness of Breath     Pt reporting SOB x "months ago" with new onset nausea and headache. Pt poor historian, denies CHF, COPD, asthma, HTN, DM.        HPI: 66 y.o. female PMHx HIEU, ?COPD vs Asthma, CHF, Afib on Xarelto, HTN presents with presents with increased fatigue and AMS x 2-3 days.  Symptoms have, per family, progressively getting worse. Patient has SOB for the last few months also appears to be worsening.  reports patient felt nauseas after eating and reports one episode of diarrhea yesterday. Also endorses the use of at home breathing treatments and inhaler. Reports she is fully immunized against COVID-19.     ED course: WBC and lactic acid normal.  Elevated d dimer, procal and BNP. CXR and CTH unremarkable.Patient refused UA. VBG shows evidence of hypercapnia with ph7.3 pCOx 72. Patient was started on bipap by ED MD.      Admitted to Hospital Medicine for further evaluation    Past Medical History:   Diagnosis Date    Anticoagulant long-term use     Xarelto    Arthritis     Atrial fibrillation     Breast cyst     CHF (congestive heart failure)     Degenerative disc disease     Edema     HLD (hyperlipidemia)     Hx of psychiatric care     Hyperlipidemia     Hypertension     Hypothyroidism     Nuclear sclerosis, bilateral 12/18/2017    Obesity, morbid     HIEU (obstructive sleep apnea)     Other abnormal glucose     pre-diabetes    Pre-diabetes     Psychiatric problem     " Requires assistance with activities of daily living (ADL)     Sleep apnea     Smoker     SOB (shortness of breath)     SOB (shortness of breath)     Thyroid disease     on meds 8-9 years ago. hypothyroidism.no malignancy    TMJ (temporomandibular joint disorder)     jaw clicking    Tobacco abuse     Unsteady gait     Urge incontinence     Weakness generalized     Wears glasses      Past Surgical History:   Procedure Laterality Date    BREAST BIOPSY Right     over 10 yrs ago/ benign    BREAST CYST EXCISION Right     BREAST LUMPECTOMY Right     over 10 yrs ago, ex bx/ benign    COLONOSCOPY N/A 6/25/2019    Procedure: COLONOSCOPY;  Surgeon: Micheline Flores MD;  Location: [facility] ENDO;  Service: Endoscopy;  Laterality: N/A;  RX XARELTO ok to hold (2 days) per Dr. Naik see scan 3/20/19    ENDOSCOPIC ULTRASOUND OF UPPER GASTROINTESTINAL TRACT N/A 1/26/2021    Procedure: ULTRASOUND-ENDOSCOPIC-UPPER;  Surgeon: Stevenson Philippe MD;  [facility] ENDO;  Service: Endoscopy;  Laterality: N/A;    HYSTERECTOMY      JOINT REPLACEMENT Bilateral     knees    OOPHORECTOMY      rt.knee surgery 2016      TOTAL KNEE ARTHROPLASTY Left 2/19/2019    Procedure: ARTHROPLASTY, KNEE, TOTAL;  Surgeon: Med Hanson MD;  Location: [facility] OR;  Service: Orthopedics;  Laterality: Left;  10AM START PER  PER JAYLIN TEXT @ 8:34AM ON 2-18-19  SUPINE  MAIKELPAWAN WISELESINGER [phone] TEXTED HIM ON 1-24-19 @ 7:57AM  RN PRE OP 2-13-19--BMI--48.9    underarm gland\ Bilateral      Social History     Tobacco Use    Smoking status: Current Every Day Smoker     Packs/day: 0.50     Years: 47.00     Pack years: 23.50     Types: Cigarettes     Start date: 1973    Smokeless tobacco: Never Used   Substance Use Topics    Alcohol use: No    Drug use: No     Family History   Problem Relation Age of Onset    Diabetes Mother     Hypertension Mother     Cancer Mother     Diabetes Brother     Cancer Brother     No Known Problems Father      No Known Problems Sister     No Known Problems Maternal Aunt     No Known Problems Maternal Uncle     No Known Problems Paternal Aunt     No Known Problems Paternal Uncle     No Known Problems Maternal Grandmother     No Known Problems Maternal Grandfather     No Known Problems Paternal Grandmother     No Known Problems Paternal Grandfather     Amblyopia Neg Hx     Blindness Neg Hx     Cataracts Neg Hx     Glaucoma Neg Hx     Macular degeneration Neg Hx     Retinal detachment Neg Hx     Strabismus Neg Hx     Stroke Neg Hx     Thyroid disease Neg Hx      Review of patient's allergies indicates:   Allergen Reactions    Ace inhibitors      Cough         Current Outpatient Medications:     albuterol (PROVENTIL/VENTOLIN HFA) 90 mcg/actuation inhaler, Inhale 2 puffs into the lungs every 6 (six) hours as needed for Wheezing or Shortness of Breath. Rescue, Disp: 54 g, Rfl: 1    atorvastatin (LIPITOR) 40 MG tablet, Take 1 tablet (40 mg total) by mouth once daily., Disp: 90 tablet, Rfl: 1    azelastine (ASTELIN) 137 mcg (0.1 %) nasal spray, 1 spray (137 mcg total) by Nasal route 2 (two) times daily., Disp: 30 mL, Rfl: 3    famotidine (PEPCID) 20 MG tablet, Take 1 tablet (20 mg total) by mouth 2 (two) times daily as needed., Disp: 60 tablet, Rfl: 2    fluticasone propionate (FLONASE) 50 mcg/actuation nasal spray, SHAKE LIQUID AND USE 2 SPRAYS(100 MCG) IN EACH NOSTRIL EVERY DAY, Disp: 48 g, Rfl: 0    furosemide (LASIX) 40 MG tablet, Take 1 tablet (40 mg total) by mouth 2 (two) times a day., Disp: 180 tablet, Rfl: 0    losartan (COZAAR) 25 MG tablet, Take 1 tablet (25 mg total) by mouth once daily., Disp: 90 tablet, Rfl: 0    metoprolol succinate (TOPROL-XL) 100 MG 24 hr tablet, TAKE 1 TABLET(100 MG) BY MOUTH EVERY DAY, Disp: 90 tablet, Rfl: 1    nicotine, polacrilex, (NICORETTE) 2 mg Gum, Use 1-2 per hour in place of a cigarette. Limit to 10 a day - oral. . (Patient taking differently: Use 1-2 per  hour in place of a cigarette. Limit to 10 a day - oral. .), Disp: 200 each, Rfl: 0    ondansetron (ZOFRAN-ODT) 8 MG TbDL, Take 1 tablet (8 mg total) by mouth every 6 (six) hours as needed (nausea)., Disp: 60 tablet, Rfl: 2    oxyCODONE-acetaminophen (PERCOCET) 5-325 mg per tablet, Take 1 tablet by mouth every 4 (four) hours as needed., Disp: 30 tablet, Rfl: 0    sertraline (ZOLOFT) 100 MG tablet, Take 1 tablet (100 mg total) by mouth once daily., Disp: 90 tablet, Rfl: 0    SPIRIVA RESPIMAT 2.5 mcg/actuation inhaler, INHALE 2 PUFFS INTO THE LUNGS DAILY, Disp: 4 g, Rfl: 5    traZODone (DESYREL) 50 MG tablet, TAKE 1 TO 2 TABLETS BY MOUTH EVERY NIGHT AS NEEDED FOR SLEEP, Disp: 60 tablet, Rfl: 1    triamcinolone acetonide 0.1% (KENALOG) 0.1 % ointment, APPLY BETWEEN KNEES AND UPPER THIGHS TWICE DAILY FOR NO MORE THAN 7-14 DAYS, Disp: 454 g, Rfl: 11    varenicline (CHANTIX STARTING MONTH BOX) 0.5 mg (11)- 1 mg (42) tablet, Follow package directions. (Patient taking differently: Follow package directions.), Disp: 53 tablet, Rfl: 0    XARELTO 20 mg Tab, Take 1 tablet (20 mg total) by mouth once daily., Disp: 90 tablet, Rfl: 0         Review of Systems as per HPI  Review of Systems   Unable to perform ROS: Mental status change   Constitutional: Negative for fever.   Respiratory: Positive for cough.    Cardiovascular: Negative for chest pain.   Genitourinary: Positive for dysuria.   Neurological: Positive for weakness and headaches.         Physical Exam     ED Triage Vitals   Enc Vitals Group      BP 05/20/22 1411 (!) 168/84      Pulse 05/20/22 1411 108      Resp 05/20/22 1411 20      Temp 05/20/22 1411 (!) 102.1 °F (38.9 °C)      Temp src 05/20/22 1411 Oral      SpO2 05/20/22 1411 (!) 90 %      Weight 05/20/22 1413 267 lb      Height --       Head Circumference --       Peak Flow --       Pain Score --       Pain Loc --       Pain Edu? --       Excl. in GC? --      Vitals:    05/20/22 1645 05/20/22 1822 05/20/22  1903 05/20/22 2016   BP:   (!) 138/59    Pulse: 106  79 75   Resp: 19   (!) 21   Temp:  99.1 °F (37.3 °C)     TempSrc:  Oral     SpO2: 96%  96% 97%   Weight:           Vital signs and nursing assessment noted: resolving fever    Physical Exam  Constitutional:       General: She is not in acute distress.     Appearance: She is overweight. She is ill-appearing. She is not toxic-appearing.   Neurological:      General: No focal deficit present.      Mental Status: She is lethargic.      Cranial Nerves: Cranial nerves are intact.      Motor: No tremor, atrophy or seizure activity.   Psychiatric:      Comments: MARYJANE       HEENT:  PERRLA, EOMI, moist membranes, nl conjunctiva, no scleral icterus, no nystagmus, no nodes/nodules, soft, supple, FROM, no trachial deviation  CV:   Irregularly irregular, no m/r/g, 2+ radial pulses, <2sec cap refill, no obvious JVD  RESP:  CTA B, no w/r/r, equal and bilateral chest rise, no respiratory distress  ABD:   soft, Nontender, Nondistended, +BS, no guarding/rebound  :   Deferred  EXT:   FROM, RICARDO x 4, no edema, no swelling, no calf tenderness, no bony tenderness, no warmth or redness, no crepitus, no obvious deformity  LYMPH:  no gross adenopathy  SKIN:  Warm, dry, intact, no rashes/lesions or masses, nl color, no pallor       Tests   Significant Labs and/or Imaging: I have reviewed all pertinent results/findings within the past 24 hours.  Labs Reviewed   CBC W/ AUTO DIFFERENTIAL - Abnormal; Notable for the following components:       Result Value     (*)     MCH 32.3 (*)     Immature Granulocytes 0.8 (*)     Gran # (ANC) 9.7 (*)     Immature Grans (Abs) 0.09 (*)     Lymph # 0.9 (*)     Mono # 1.1 (*)     Gran % 82.1 (*)     Lymph % 7.7 (*)     All other components within normal limits   COMPREHENSIVE METABOLIC PANEL - Abnormal; Notable for the following components:    Glucose 113 (*)     Albumin 3.1 (*)     Total Bilirubin 1.6 (*)     eGFR if  54 (*)     eGFR if  non  47 (*)     All other components within normal limits   B-TYPE NATRIURETIC PEPTIDE - Abnormal; Notable for the following components:     (*)     All other components within normal limits   D DIMER, QUANTITATIVE - Abnormal; Notable for the following components:    D-Dimer 1.17 (*)     All other components within normal limits   PROCALCITONIN - Abnormal; Notable for the following components:    Procalcitonin 0.58 (*)     All other components within normal limits   ISTAT PROCEDURE - Abnormal; Notable for the following components:    POC PH 7.301 (*)     POC PCO2 72.8 (*)     POC HCO3 35.9 (*)     POC SATURATED O2 71 (*)     POC TCO2 38 (*)     All other components within normal limits   CULTURE, BLOOD   CULTURE, BLOOD   TROPONIN I   LACTIC ACID, PLASMA   DRUG SCREEN PANEL, URINE EMERGENCY   SARS-COV-2 RDRP GENE   POCT INFLUENZA A/B MOLECULAR   POCT GLUCOSE MONITORING CONTINUOUS     US Lower Extremity Veins Bilateral   Final Result      No evidence of lower extremity deep venous thrombosis.         Electronically signed by: Shmuel Freeman MD   Date:    05/20/2022   Time:    22:25      CT Head Without Contrast   Final Result      No acute intracranial abnormalities identified.         Electronically signed by: Shmuel Freeman MD   Date:    05/20/2022   Time:    22:00      X-Ray Chest AP Portable   Final Result      No detrimental change or radiographic acute intrathoracic process seen on this limited single view.         Electronically signed by: Sukhwinder Wan MD   Date:    05/20/2022   Time:    16:47      NM Lung Scan Ventilation Perfusion    (Results Pending)     Results for orders placed or performed during the hospital encounter of 07/20/21   EKG 12-lead    Collection Time: 07/20/21 10:20 AM    Narrative    Test Reason : R06.02,    Vent. Rate : 094 BPM     Atrial Rate : 079 BPM     P-R Int : 000 ms          QRS Dur : 076 ms      QT Int : 364 ms       P-R-T Axes : 000 121 053 degrees     QTc  Int : 455 ms    Atrial fibrillation with premature ventricular or aberrantly conducted  complexes  Right axis deviation  Anteroseptal infarct (cited on or before 18-FEB-2021)  Abnormal ECG  When compared with ECG of 20-JUL-2021 10:18,  Significant changes have occurred  Confirmed by Rafael Carr MD ([id number]) on 7/22/2021 10:48:32 PM    Referred By:             Confirmed By:Rafael Carr MD     EKG    No STEMI  Atrial fibrillation with rapid ventricular response (127)  Septal infarct (cited on or before 18-FEB-2021)  Abnormal ECG  When compared with ECG of 20-JUL-2021 10:  Assessment/Plan:     * Acute respiratory failure with hypoxia and hypercarbia  Patient with Hypercapnic Respiratory failure which is Acute on chronic.  she is not on home oxygen. Supplemental oxygen was provided and noted- Oxygen Concentration (%):  [30] 30.   Signs/symptoms of respiratory failure include- lethargy. Contributing diagnoses includes - CHF, COPD and Obesity Hypoventilation Labs and images were reviewed. Patient Has not had a recent ABG. Will treat underlying causes and adjust management of respiratory failure as follows- - nebs, steroids, antibiotics, bipap, supplemental oxygen and reassess.      COPD (chronic obstructive pulmonary disease)  CT Sept 2021 Lungs: There are no abnormal opacities that require further evaluation.  The largest opacity in the right lung appears solid and measures 0.2 cm on series 4, image 104.  The lungs show no findings consistent with emphysema.  Right basilar scarring.  Dependent bilateral atelectasis.      an acute increase in symptoms beyond normal day-to-day variation that leads to a change in medication.     With hypoxemia will give supplemental oxygen be titrated to a target of 88 to 92 percent pulse oxygen saturation, rather than using high-flow, nontitrated oxygen, inhaled short-acting bronchodilator therapy, course of systemic glucocorticoids, antibiotics.    Continue NIV    HIEU (obstructive  sleep apnea)  Currently on bipap. Monitor with blood gas    Acute on chronic diastolic congestive heart failure  Admitted to inpatient status  -On admit noted elevated BNP without pulmonary edema on CXR  -Diuresis  -Strict ins/outs and daily weights  -Monitor on telemetry  - Consider Echo and Cardiology if no improvement of symptoms    Chronic atrial fibrillation  Patient with Persistent (7 days or more) atrial fibrillation which is uncontrolled currently with Beta Blocker. Patient is currently in atrial fibrillation.[id number] Score: 4. Anticoagulation indicated. Anticoagulation done with Xarelto.        Dysuria  Patient refusing sample of UA.  Presumptive positive in setting of fever and respiratory decline.      My overall impression is sepsis. Vital signs were reviewed and noted in progress note.  Antibiotics given and cultures were taken.  Latest lactate reviewed, they are-   Latest Reference Range & Units 05/20/22 16:15   Lactate, Fadi 0.5 - 2.2 mmol/L 1.2 [1]           Type 2 diabetes mellitus without complication   Latest Reference Range & Units 09/21/21 08:51   Hemoglobin A1C External 4.0 - 5.6 % 6.3 (H) [1]     Serial accucheck. Diabetic diet. IRSS        Essential hypertension  Continue home medication      VTE Risk Mitigation (From admission, onward)         Ordered     rivaroxaban tablet 20 mg  Daily         05/20/22 2210     IP VTE HIGH RISK PATIENT  Once         05/20/22 2210     Place sequential compression device  Until discontinued         05/20/22 2210              Patient placed on continuous cardiac monitor, automatic blood pressure cuff and continuous pulse oximeter.  Critical care was necessary to treat or prevent imminent or life-threatening deterioration.   Critical care time: 31 min  Time spent at the bedside, reviewing test results, discussing the case with staff and/or consult(s), documenting the medical record and time spent with family members discussing treatment and management  decisions.    The time involved in the performance of separately reportable procedures was not counted toward critical care time.             Maria Fernanda Tran MD  Department of Hospital Medicine   [facility] - [place], [place] - Emergency Dept   Silver Nitrate Text: The wound bed was treated with silver nitrate after the biopsy was performed.

## 2022-05-21 NOTE — ASSESSMENT & PLAN NOTE
-mutifactorial.  Obesity hypoventilation + copd + diastolic dysfunction.    -agree with multi prong approach of diurestics + steroid + nebs + inhalers.    -cxr without overt consolidation.  -recommend antibiotic de-escalation.

## 2022-05-21 NOTE — ASSESSMENT & PLAN NOTE
-fev1 66 %.   -encourage smoking cessation.  -agree with spiriva.  Will add laba/ics  -complete 5-10 days of steroid.

## 2022-05-21 NOTE — RESPIRATORY THERAPY
Your BMI is Body mass index is 22.11 kg/(m^2).  Weight management is a personal decision.  If you are interested in exploring weight loss strategies, the following discussion covers the approaches that may be successful. Body mass index (BMI) is one way to tell whether you are at a healthy weight, overweight, or obese. It measures your weight in relation to your height.  A BMI of 18.5 to 24.9 is in the healthy range. A person with a BMI of 25 to 29.9 is considered overweight, and someone with a BMI of 30 or greater is considered obese. More than two-thirds of American adults are considered overweight or obese.  Being overweight or obese increases the risk for further weight gain. Excess weight may lead to heart disease and diabetes.  Creating and following plans for healthy eating and physical activity may help you improve your health.  Weight control is part of healthy lifestyle and includes exercise, emotional health, and healthy eating habits. Careful eating habits lifelong are the mainstay of weight control. Though there are significant health benefits from weight loss, long-term weight loss with diet alone may be very difficult to achieve- studies show long-term success with dietary management in less than 10% of people. Attaining a healthy weight may be especially difficult to achieve in those with severe obesity. In some cases, medications, devices and surgical management might be considered.  What can you do?  If you are overweight or obese and are interested in methods for weight loss, you should discuss this with your provider.     Consider reducing daily calorie intake by 500 calories.     Keep a food journal.     Avoiding skipping meals, consider cutting portions instead.    Diet combined with exercise helps maintain muscle while optimizing fat loss. Strength training is particularly important for building and maintaining muscle mass. Exercise helps reduce stress, increase energy, and improves fitness.  Patient transported to  on  BIPAP with previous settings. 12/6, R 14, FIO2 30%. Large full face mask. Tolerated well. Nurse notified.    Pre transport: 2318  Heart rate: 72  Saturation: 100%     Post transport: 2330  Heart rate: 80  Saturation: 99%   Increasing exercise without diet control, however, may not burn enough calories to loose weight.       Start walking three days a week 10-20 minutes at a time    Work towards walking thirty minutes five days a week     Eventually, increase the speed of your walking for 1-2 minutes at time    In addition, we recommend that you review healthy lifestyles and methods for weight loss available through the National Institutes of Health patient information sites:  http://win.niddk.nih.gov/publications/index.htm    And look into health and wellness programs that may be available through your health insurance provider, employer, local community center, or stef club.

## 2022-05-22 LAB
ANION GAP SERPL CALC-SCNC: 11 MMOL/L (ref 8–16)
BUN SERPL-MCNC: 35 MG/DL (ref 8–23)
C DIFF GDH STL QL: NEGATIVE
C DIFF TOX A+B STL QL IA: NEGATIVE
CALCIUM SERPL-MCNC: 9.5 MG/DL (ref 8.7–10.5)
CHLORIDE SERPL-SCNC: 96 MMOL/L (ref 95–110)
CO2 SERPL-SCNC: 32 MMOL/L (ref 23–29)
CREAT SERPL-MCNC: 1.2 MG/DL (ref 0.5–1.4)
EST. GFR  (AFRICAN AMERICAN): 54 ML/MIN/1.73 M^2
EST. GFR  (NON AFRICAN AMERICAN): 47 ML/MIN/1.73 M^2
ESTIMATED AVG GLUCOSE: 114 MG/DL (ref 68–131)
GLUCOSE SERPL-MCNC: 163 MG/DL (ref 70–110)
HBA1C MFR BLD: 5.6 % (ref 4–5.6)
POCT GLUCOSE: 122 MG/DL (ref 70–110)
POCT GLUCOSE: 140 MG/DL (ref 70–110)
POCT GLUCOSE: 160 MG/DL (ref 70–110)
POCT GLUCOSE: 230 MG/DL (ref 70–110)
POTASSIUM SERPL-SCNC: 3.7 MMOL/L (ref 3.5–5.1)
SODIUM SERPL-SCNC: 139 MMOL/L (ref 136–145)

## 2022-05-22 PROCEDURE — 25000242 PHARM REV CODE 250 ALT 637 W/ HCPCS: Performed by: INTERNAL MEDICINE

## 2022-05-22 PROCEDURE — 27000207 HC ISOLATION

## 2022-05-22 PROCEDURE — 94640 AIRWAY INHALATION TREATMENT: CPT

## 2022-05-22 PROCEDURE — 94760 N-INVAS EAR/PLS OXIMETRY 1: CPT

## 2022-05-22 PROCEDURE — 97161 PT EVAL LOW COMPLEX 20 MIN: CPT

## 2022-05-22 PROCEDURE — 25000003 PHARM REV CODE 250: Performed by: HOSPITALIST

## 2022-05-22 PROCEDURE — 80048 BASIC METABOLIC PNL TOTAL CA: CPT | Performed by: EMERGENCY MEDICINE

## 2022-05-22 PROCEDURE — 99900035 HC TECH TIME PER 15 MIN (STAT)

## 2022-05-22 PROCEDURE — 27000221 HC OXYGEN, UP TO 24 HOURS

## 2022-05-22 PROCEDURE — 97530 THERAPEUTIC ACTIVITIES: CPT

## 2022-05-22 PROCEDURE — 25000242 PHARM REV CODE 250 ALT 637 W/ HCPCS: Performed by: HOSPITALIST

## 2022-05-22 PROCEDURE — 21400001 HC TELEMETRY ROOM

## 2022-05-22 PROCEDURE — 63600175 PHARM REV CODE 636 W HCPCS: Performed by: HOSPITALIST

## 2022-05-22 PROCEDURE — 25000003 PHARM REV CODE 250: Performed by: EMERGENCY MEDICINE

## 2022-05-22 PROCEDURE — 25000003 PHARM REV CODE 250: Performed by: INTERNAL MEDICINE

## 2022-05-22 PROCEDURE — 36415 COLL VENOUS BLD VENIPUNCTURE: CPT | Performed by: EMERGENCY MEDICINE

## 2022-05-22 RX ORDER — CIPROFLOXACIN 500 MG/1
500 TABLET ORAL EVERY 12 HOURS
Status: DISCONTINUED | OUTPATIENT
Start: 2022-05-22 | End: 2022-05-26 | Stop reason: HOSPADM

## 2022-05-22 RX ORDER — FUROSEMIDE 40 MG/1
40 TABLET ORAL 2 TIMES DAILY
Status: DISCONTINUED | OUTPATIENT
Start: 2022-05-22 | End: 2022-05-26

## 2022-05-22 RX ADMIN — FUROSEMIDE 40 MG: 40 TABLET ORAL at 06:05

## 2022-05-22 RX ADMIN — LEVOFLOXACIN 750 MG: 750 TABLET, FILM COATED ORAL at 08:05

## 2022-05-22 RX ADMIN — PREDNISONE 40 MG: 20 TABLET ORAL at 08:05

## 2022-05-22 RX ADMIN — METOPROLOL SUCCINATE 100 MG: 50 TABLET, EXTENDED RELEASE ORAL at 08:05

## 2022-05-22 RX ADMIN — LOSARTAN POTASSIUM 25 MG: 25 TABLET, FILM COATED ORAL at 08:05

## 2022-05-22 RX ADMIN — CIPROFLOXACIN 500 MG: 500 TABLET, FILM COATED ORAL at 08:05

## 2022-05-22 RX ADMIN — ACETAMINOPHEN 650 MG: 325 TABLET ORAL at 11:05

## 2022-05-22 RX ADMIN — SERTRALINE HYDROCHLORIDE 100 MG: 50 TABLET ORAL at 08:05

## 2022-05-22 RX ADMIN — TIOTROPIUM BROMIDE INHALATION SPRAY 2 PUFF: 3.12 SPRAY, METERED RESPIRATORY (INHALATION) at 07:05

## 2022-05-22 RX ADMIN — CIPROFLOXACIN 500 MG: 500 TABLET, FILM COATED ORAL at 12:05

## 2022-05-22 RX ADMIN — ATORVASTATIN CALCIUM 40 MG: 40 TABLET, FILM COATED ORAL at 08:05

## 2022-05-22 RX ADMIN — FLUTICASONE FUROATE AND VILANTEROL TRIFENATATE 1 PUFF: 100; 25 POWDER RESPIRATORY (INHALATION) at 07:05

## 2022-05-22 RX ADMIN — RIVAROXABAN 20 MG: 20 TABLET, FILM COATED ORAL at 08:05

## 2022-05-22 NOTE — PLAN OF CARE
Problem: Physical Therapy  Goal: Physical Therapy Goal  Description: Goals to be met by: 22     Patient will increase functional independence with mobility by performin. Supine to sit with Modified [place]  2. Rolling to Left and Right with Modified [place]  3. Sit to stand transfer with Modified [place]  4. Bed to chair transfer with Modified [place]  5. Gait >500 feet with Modified [place] using Single-point Cane   6. Upper/Lower extremity exercise program 2 sets x15 reps per handout, with independence    Outcome: Ongoing, Progressing     Pt ambulated ~400 ft with SBA using cane, spO2 RA ~94% and HR ~100 bpm.

## 2022-05-22 NOTE — ASSESSMENT & PLAN NOTE
Patient admitted with Hypercapnic and Hypoxic which is Chronic.  she is not on home oxygen. Signs/symptoms of respiratory failure include- increased work of breathing present on admission. This has resolved after BiPAP.   - Labs and images were reviewed. Patient Has recent ABG, which has been reviewed..   - Supplemental oxygen was provided and noted- Nasal Cannula 1 LPM   - Respiratory failure is due to- CHF, COPD and HIEU/suspected OHS   - CHF: follow up TTE, continue lasix. Discussed need for lasix adherence  - COPD: continue steroids/nebs/levofloxacin. Discussed need for smoking cessation  - HIEU: Pulm consult- will order CPAP vs BiPAP for home use  - Pulm follow up on discharge     Changing to po lasix today.  Home likely on 5/23.

## 2022-05-22 NOTE — PROGRESS NOTES
[facility] Medicine  Progress Note    Patient Name: Sandee Evans  MRN: [id number]  Patient Class: IP- Inpatient   Admission Date: 5/20/2022  Length of Stay: 2 days  Attending Physician: Shaq Kovacs MD  Primary Care Provider: Kuldeep Miller MD        Subjective:     Principal Problem:Acute respiratory failure with hypoxia and hypercarbia        HPI:  66 y.o. female PMHx HIEU, ?COPD vs Asthma, CHF, Afib on Xarelto, HTN presents with presents with increased fatigue and AMS x 2-3 days.  Symptoms have, per family, progressively getting worse. Patient has SOB for the last few months also appears to be worsening.  reports patient felt nauseas after eating and reports one episode of diarrhea yesterday. Also endorses the use of at home breathing treatments and inhaler. Reports she is fully immunized against COVID-19.      ED course: WBC and lactic acid normal.  Elevated d dimer, procal and BNP. CXR and CTH unremarkable.Patient refused UA. VBG shows evidence of hypercapnia with ph7.3 pCOx 72. Patient was started on bipap by ED MD.       Admitted to Hospital Medicine for further evaluation      Overview/Hospital Course:  Ms Sandee Evans is a 66 y.o. woman admitted with acute hypoxic/hypercapnic respiratory failure due to acute on chronic diastolic CHF, exacerbation of COPD, and untreated HIEU. She initially required BiPAP but was quickly weaned to O2 by NC. She states that she sometimes skips lasix doses due to urinary incontinence. She does not have BiPAP at home. She is still using tobacco. Started IV lasix for CHF exacerbation, steroids/nebs/levofloxacin for COPD exacerbation. Stepped down to floor on 5/21/22. Patient grew out E-coli on blood cultures. Repeat from 5/21 were NG      Interval History: No new issues     Review of Systems   Constitutional:  Negative for activity change and appetite change.   HENT:  Negative for congestion and dental problem.    Eyes:  Negative for  discharge and itching.   Respiratory:  Negative for apnea.    Cardiovascular:  Negative for chest pain.   Gastrointestinal:  Negative for abdominal distention and abdominal pain.   Endocrine: Negative for cold intolerance.   Genitourinary:  Negative for difficulty urinating and dyspareunia.   Musculoskeletal:  Negative for arthralgias and back pain.   Neurological:  Negative for dizziness and facial asymmetry.   Psychiatric/Behavioral:  Negative for agitation and behavioral problems.    Objective:     Vital Signs (Most Recent):  Temp: 97.9 °F (36.6 °C) (05/22/22 0819)  Pulse: 86 (05/22/22 0819)  Resp: 18 (05/22/22 0819)  BP: 127/73 (05/22/22 0819)  SpO2: (!) 94 % (05/22/22 0819)   Vital Signs (24h Range):  Temp:  [97.6 °F (36.4 °C)-98.9 °F (37.2 °C)] 97.9 °F (36.6 °C)  Pulse:  [72-98] 86  Resp:  [18-23] 18  SpO2:  [94 %-98 %] 94 %  BP: (113-135)/(70-82) 127/73     Weight: 125.2 kg (276 lb)  Body mass index is 47.38 kg/m².    Intake/Output Summary (Last 24 hours) at 5/22/2022 1002  Last data filed at 5/22/2022 0500  Gross per 24 hour   Intake --   Output 1300 ml   Net -1300 ml      Physical Exam  Vitals and nursing note reviewed.   Constitutional:       General: She is not in acute distress.     Appearance: Normal appearance. She is not ill-appearing or toxic-appearing.   HENT:      Head: Normocephalic and atraumatic.   Eyes:      Conjunctiva/sclera: Conjunctivae normal.   Neck:      Vascular: No carotid bruit.   Cardiovascular:      Rate and Rhythm: Normal rate and regular rhythm.   Pulmonary:      Effort: Pulmonary effort is normal. No respiratory distress.   Abdominal:      General: Bowel sounds are normal. There is no distension.      Tenderness: There is no abdominal tenderness.   Skin:     General: Skin is warm and dry.   Neurological:      Mental Status: She is alert and oriented to person, place, and time.   Psychiatric:         Mood and Affect: Mood normal.         Behavior: Behavior normal.        Significant Labs: All pertinent labs within the past 24 hours have been reviewed.  BMP:   Recent Labs   Lab 05/21/22  0406 05/22/22  0443   GLU 91 163*    139   K 4.5 3.7   CL 98 96   CO2 29 32*   BUN 21 35*   CREATININE 0.9 1.2   CALCIUM 9.5 9.5   MG 1.7  --      CBC:   Recent Labs   Lab 05/20/22  1615 05/21/22  0406   WBC 11.81 11.68   HGB 14.0 14.8   HCT 43.5 45.4    SEE COMMENT       Significant Imaging:       Assessment/Plan:      * Acute respiratory failure with hypoxia and hypercarbia  Patient admitted with Hypercapnic and Hypoxic which is Chronic.  she is not on home oxygen. Signs/symptoms of respiratory failure include- increased work of breathing present on admission. This has resolved after BiPAP.   - Labs and images were reviewed. Patient Has recent ABG, which has been reviewed..   - Supplemental oxygen was provided and noted- Nasal Cannula 1 LPM   - Respiratory failure is due to- CHF, COPD and HIEU/suspected OHS   - CHF: follow up TTE, continue lasix. Discussed need for lasix adherence  - COPD: continue steroids/nebs/levofloxacin. Discussed need for smoking cessation  - HIEU: Pulm consult- will order CPAP vs BiPAP for home use  - Pulm follow up on discharge     Changing to po lasix today.  Home likely on 5/23.         Dysuria  Check UA, especially in setting of fever and elevated procal    COPD (chronic obstructive pulmonary disease)  Admitted with shortness of breath, fever  With acute hypoxic/hypercapnic respiratory failure requiring BiPAP on admit.   Last PFTs 9/2021 with obstructive and restrictive physiology  CXR no infiltrate  Started prednisone 40mg daily x5 days, nebs, levofloxacin x5 days  Now on 2L NC. Continue BiPAP QHS  Encouraged smoking cessation  Pulmonary follow up on discharge        Pulmonary hypertension  Group 2 and 3 PHTN  Repeat TTE      Acute on chronic diastolic congestive heart failure  Patient admitted with shortness of breath, edema consistent with acute on  chronic diastolic CHF. Cause of exacerbation is missed lasix doses    BNP  Recent Labs   Lab 05/20/22  1615   *     CXR reviewed  Recent Labs   Lab 05/20/22  1615   TROPONINI <0.006     EKG personally reviewed and shows AFib  Started CHF pathway  Start on IV lasix diuresis. Monitor ins and outs  TTE pending   Continue Bb, ARB  Nutrition consulted for low salt cardiac diet education  Discussed need to take lasix on discharge   Cardiology follow up on discharge      HIEU (obstructive sleep apnea)  Does not currently have CPAP at home  Needs this prior to discharge as it contributes to her presentation with hypoxic/hypercapnic respiratory failure  Will discuss settings with Dr Puente today and place order. Case management will need to arrange this prior to discharge.     Tobacco dependence  Patient was counseled on smoking cessation for 4 minutes. We discussed how smoking is detrimental to the patient's health, specifically her respiratory failure.     Type 2 diabetes mellitus without complication  A1c:   Lab Results   Component Value Date    HGBA1C 6.3 (H) 09/21/2021   Well controlled    Meds: SSI PRN to maintain goal 140-180  ADA diet, accuchecks, hypoglycemic protocol      Longstanding persistent atrial fibrillation  Patient with Persistent (7 days or more) atrial fibrillation which is rate controlled currently with Beta Blocker. Patient is currently in atrial fibrillation. [id number] Score: 4. Anticoagulation indicated. Anticoagulation done with Xarelto.          Severe obesity (BMI >= 40)  Body mass index is 47.38 kg/m². Morbid obesity complicates all aspects of disease management from diagnostic modalities to treatment. Weight loss encouraged and health benefits explained to patient.   - contributes to HIEU  - contributes to restrictive physiology seen on prior PFTs      Essential hypertension  BP is currently well controlled  Continue home losartan       E-coli bacteremia- cultures from 5/20 (S) to Cipro.  Will treat 2 weeks from 5/20. Zosyn for now.     VTE Risk Mitigation (From admission, onward)         Ordered     rivaroxaban tablet 20 mg  Daily         05/20/22 2210     IP VTE HIGH RISK PATIENT  Once         05/20/22 2210     Place sequential compression device  Until discontinued         05/20/22 2210                Discharge Planning   JACQUELINE:      Code Status: Full Code   Is the patient medically ready for discharge?:     Reason for patient still in hospital (select all that apply): Patient unstable  Discharge Plan A: Home with family (Follow-up)                  Shaq Bautista MD  Department of Hospital Medicine   [facility]

## 2022-05-22 NOTE — NURSING
Dr. Kovacs notified about pts IV being removed due infiltration last pm & she doesn't want to be stuck for new IV.

## 2022-05-22 NOTE — PLAN OF CARE
Problem: Adult Inpatient Plan of Care  Goal: Plan of Care Review  Outcome: Ongoing, Progressing  Goal: Patient-Specific Goal (Individualized)  Outcome: Ongoing, Progressing  Goal: Absence of Hospital-Acquired Illness or Injury  Outcome: Ongoing, Progressing  Goal: Optimal Comfort and Wellbeing  Outcome: Ongoing, Progressing  Goal: Readiness for Transition of Care  Outcome: Ongoing, Progressing     Problem: Bariatric Environmental Safety  Goal: Safety Maintained with Care  Outcome: Ongoing, Progressing     Problem: Infection  Goal: Absence of Infection Signs and Symptoms  Outcome: Ongoing, Progressing     Problem: Diabetes Comorbidity  Goal: Blood Glucose Level Within Targeted Range  Outcome: Ongoing, Progressing     Problem: Fluid and Electrolyte Imbalance (Acute Kidney Injury/Impairment)  Goal: Fluid and Electrolyte Balance  Outcome: Ongoing, Progressing     Problem: Oral Intake Inadequate (Acute Kidney Injury/Impairment)  Goal: Optimal Nutrition Intake  Outcome: Ongoing, Progressing     Problem: Renal Function Impairment (Acute Kidney Injury/Impairment)  Goal: Effective Renal Function  Outcome: Ongoing, Progressing     Problem: Skin Injury Risk Increased  Goal: Skin Health and Integrity  Outcome: Ongoing, Progressing

## 2022-05-22 NOTE — NURSING
Patient report received from CLAUDIA Ramirez to resume pt care. Patient received awake, alert, and oriented x 4, no complaints voiced from pt at this time. No discomfort or distress noted in pt. Oriented self and plan of care to pt. Pt verbalize understanding.

## 2022-05-22 NOTE — SUBJECTIVE & OBJECTIVE
Interval History: No new issues     Review of Systems   Constitutional:  Negative for activity change and appetite change.   HENT:  Negative for congestion and dental problem.    Eyes:  Negative for discharge and itching.   Respiratory:  Negative for apnea.    Cardiovascular:  Negative for chest pain.   Gastrointestinal:  Negative for abdominal distention and abdominal pain.   Endocrine: Negative for cold intolerance.   Genitourinary:  Negative for difficulty urinating and dyspareunia.   Musculoskeletal:  Negative for arthralgias and back pain.   Neurological:  Negative for dizziness and facial asymmetry.   Psychiatric/Behavioral:  Negative for agitation and behavioral problems.    Objective:     Vital Signs (Most Recent):  Temp: 97.9 °F (36.6 °C) (05/22/22 0819)  Pulse: 86 (05/22/22 0819)  Resp: 18 (05/22/22 0819)  BP: 127/73 (05/22/22 0819)  SpO2: (!) 94 % (05/22/22 0819)   Vital Signs (24h Range):  Temp:  [97.6 °F (36.4 °C)-98.9 °F (37.2 °C)] 97.9 °F (36.6 °C)  Pulse:  [72-98] 86  Resp:  [18-23] 18  SpO2:  [94 %-98 %] 94 %  BP: (113-135)/(70-82) 127/73     Weight: 125.2 kg (276 lb)  Body mass index is 47.38 kg/m².    Intake/Output Summary (Last 24 hours) at 5/22/2022 1002  Last data filed at 5/22/2022 0500  Gross per 24 hour   Intake --   Output 1300 ml   Net -1300 ml      Physical Exam  Vitals and nursing note reviewed.   Constitutional:       General: She is not in acute distress.     Appearance: Normal appearance. She is not ill-appearing or toxic-appearing.   HENT:      Head: Normocephalic and atraumatic.   Eyes:      Conjunctiva/sclera: Conjunctivae normal.   Neck:      Vascular: No carotid bruit.   Cardiovascular:      Rate and Rhythm: Normal rate and regular rhythm.   Pulmonary:      Effort: Pulmonary effort is normal. No respiratory distress.   Abdominal:      General: Bowel sounds are normal. There is no distension.      Tenderness: There is no abdominal tenderness.   Skin:     General: Skin is warm and  dry.   Neurological:      Mental Status: She is alert and oriented to person, place, and time.   Psychiatric:         Mood and Affect: Mood normal.         Behavior: Behavior normal.       Significant Labs: All pertinent labs within the past 24 hours have been reviewed.  BMP:   Recent Labs   Lab 05/21/22  0406 05/22/22  0443   GLU 91 163*    139   K 4.5 3.7   CL 98 96   CO2 29 32*   BUN 21 35*   CREATININE 0.9 1.2   CALCIUM 9.5 9.5   MG 1.7  --      CBC:   Recent Labs   Lab 05/20/22  1615 05/21/22  0406   WBC 11.81 11.68   HGB 14.0 14.8   HCT 43.5 45.4    SEE COMMENT       Significant Imaging:

## 2022-05-22 NOTE — PLAN OF CARE
Pt lying in bed resting quietly.  No distress noted.  Call bell within reach.  Bed locked and in lowest position.  Pt reminded to call for assistance/pain/needs.        Problem: Adult Inpatient Plan of Care  Goal: Plan of Care Review  Outcome: Ongoing, Not Progressing  Goal: Patient-Specific Goal (Individualized)  Outcome: Ongoing, Not Progressing  Goal: Absence of Hospital-Acquired Illness or Injury  Outcome: Ongoing, Not Progressing  Goal: Optimal Comfort and Wellbeing  Outcome: Ongoing, Not Progressing  Goal: Readiness for Transition of Care  Outcome: Ongoing, Not Progressing     Problem: Bariatric Environmental Safety  Goal: Safety Maintained with Care  Outcome: Ongoing, Not Progressing     Problem: Infection  Goal: Absence of Infection Signs and Symptoms  Outcome: Ongoing, Not Progressing     Problem: Diabetes Comorbidity  Goal: Blood Glucose Level Within Targeted Range  Outcome: Ongoing, Not Progressing     Problem: Fluid and Electrolyte Imbalance (Acute Kidney Injury/Impairment)  Goal: Fluid and Electrolyte Balance  Outcome: Ongoing, Not Progressing     Problem: Oral Intake Inadequate (Acute Kidney Injury/Impairment)  Goal: Optimal Nutrition Intake  Outcome: Ongoing, Not Progressing     Problem: Renal Function Impairment (Acute Kidney Injury/Impairment)  Goal: Effective Renal Function  Outcome: Ongoing, Not Progressing     Problem: Skin Injury Risk Increased  Goal: Skin Health and Integrity  Outcome: Ongoing, Not Progressing

## 2022-05-22 NOTE — NURSING
"Went into pt room to give pt her medication.  Informed pt that she would be getting insulin and lasix.  Pt upset stating "Why do you all keep on coming in here trying to give me insulin?  I am not on any insulin.  If you look on my medication list, there is not any insulin listed on there".  Explained to the pt that the medication was ordered by the physician here and hat was the reason why it was being bought into her.  Pt given IV lasix and was complaining of burning at the site.  IV checked, no swelling or redness noted.  IV has positive blood return.  Informed pt that we could get someone to come with the US machine to start a new IV.  Pt states she would be ok with that.      "

## 2022-05-22 NOTE — NURSING
Patient awake, alert, and oriented x 4, resting in bed watching TV.  No complaints or concerns voiced from patient at this time. No discomfort or distress noted in pt. Bed in low position and locked. Call light within reach.

## 2022-05-22 NOTE — PLAN OF CARE
[facility] - [place] - Telemetry  Initial Discharge Assessment       Primary Care Provider: Kuldeep Miller MD Prefers AM appointments    Admission Diagnosis: SOB (shortness of breath) [R06.02]  Febrile illness [R50.9]  Bilateral lower extremity edema [R60.0]  Acute respiratory failure with hypoxia and hypercarbia [J96.01, J96.02]  Acute respiratory failure with hypoxia and hypercapnia [J96.01, J96.02]    Admission Date: 5/20/2022  Expected Discharge Date:     Discharge Barriers Identified: None    Payor: BLUE CROSS BLUE [employer] / Plan: BCBS ALL OUT OF STATE / Product Type: PPO /     Extended Emergency Contact Information  Primary Emergency Contact: Corwin Evans  Address: [address] [place] [address]  Work Phone: [phone]  Mobile Phone: [phone]  Relation: Spouse  Secondary Emergency Contact: Marce Evans  Mobile Phone: [phone]  Relation: Daughter    Discharge Plan A: Home with family (Follow-up)  Discharge Plan B: Other (TBD)      [id number] [place] [facility] [address] [facility] [address]  [address] [facility] [address]  Phone: [phone] Fax: [phone]      Initial Assessment (most recent)     Adult Discharge Assessment - 05/22/22 0754        Discharge Assessment    Assessment Type Discharge Planning Assessment     Confirmed/corrected address, phone number and insurance Yes     Confirmed Demographics Correct on Facesheet     Source of Information patient;health record     Reason For Admission SOB     Lives With spouse     Facility Arrived From: Home     Do you expect to return to your current living situation? Yes     Do you have help at home or someone to help you manage your care at home? Yes     Who are your caregiver(s) and their phone number(s)? Corwin-spouse: [phone]; [phone]; Marce-daughter: [phone]     Prior to hospitilization cognitive status: Alert/Oriented     Current cognitive  status: Alert/Oriented     Walking or Climbing Stairs Difficulty ambulation difficulty, requires equipment     Mobility Management RW, Cn     Dressing/Bathing Difficulty bathing difficulty, requires equipment     Dressing/Bathing Management SC     Home Accessibility wheelchair accessible     Home Layout Able to live on 1st floor     Equipment Currently Used at Home other (see comments)   TBD: Desires BiPAP    Readmission within 30 days? No     Patient currently being followed by outpatient case management? No     Do you currently have service(s) that help you manage your care at home? No     Do you take prescription medications? Yes     Do you have prescription coverage? Yes     Coverage BCBS     Do you have any problems affording any of your prescribed medications? No     Is the patient taking medications as prescribed? yes     Who is going to help you get home at discharge? Corwin-spouse     How do you get to doctors appointments? car, drives self     Are you on dialysis? No     Do you take coumadin? No     Discharge Plan A Home with family   Follow-up    Discharge Plan B Other   TBD    DME Needed Upon Discharge  other (see comments)   TBD    Discharge Plan discussed with: Patient     Discharge Barriers Identified None        Relationship/Environment    Name(s) of Who Lives With Patient Corwin-spouse               SW Role explained to patient; two patient identifiers recognized; SW contact information placed on Communication board. Discussed patient managing health care at home; determined who would be helping patient at home with recovery: Corwin, spouse will help with recovery at home.    PCP: Kuldeep Miller MD Prefers AM appointments    Extended Emergency Contact Information  Primary Emergency Contact: Corwin Evans  Address: [address] [place] [id number] [facility]  Work Phone: [phone]  Mobile Phone: [phone]  Relation: Spouse  Secondary Emergency Contact:  Marce Evans  Mobile Phone: [phone]  Relation: Daughter     [id number] - [place] - [address] AT [facility] [address]  [address]  [place] [address]  Phone: [phone] Fax: [phone]    Payor: BLUE CROSS BLUE [facility] / Plan: BCBS ALL OUT OF STATE / Product Type: PPO /

## 2022-05-22 NOTE — PT/OT/SLP EVAL
Physical Therapy Evaluation    Patient Name:  Sandee Evans   MRN:  [id number]    Recommendations:     Discharge Recommendations:  outpatient PT   Discharge Equipment Recommendations: none   Barriers to discharge: None    Assessment:     Sandee Evans is a 66 y.o. female admitted with a medical diagnosis of Acute respiratory failure with hypoxia and hypercarbia.  She presents with the following impairments/functional limitations:  weakness, impaired endurance, impaired functional mobilty, impaired balance, decreased upper extremity function, decreased lower extremity function, impaired cardiopulmonary response to activity.    Rehab Prognosis: Good; patient would benefit from acute skilled PT services to address these deficits and reach maximum level of function.    Recent Surgery: * No surgery found *      Plan:     During this hospitalization, patient to be seen 2 x/week to address the identified rehab impairments via gait training, therapeutic activities, therapeutic exercises and progress toward the following goals:    · Plan of Care Expires:  06/05/22    Subjective     Chief Complaint: N/A  Patient/Family Comments/goals: Pt agreeable to therapy.   Pain/Comfort:  · Pain Rating 1: 0/10      Living Environment:  Pt lives with spouse, dtr, and 2 young grandchildren in a 2SH with no concerns at entry.  Pt's bedroom is on the 1st floor.    Prior to admission, patients level of function was mod I with ambulation using hurrycane.  Pt was driving PTA.  Equipment at home: rollator, walker, rolling, bedside commode, bath bench, cane, straight ((hurrycane)).  Upon discharge, patient will have assistance from family.    Objective:     Patient found HOB elevated with oxygen, telemetry, and Purewick upon PT entry to room.    General Precautions: Standard, fall, diabetic, respiratory   Orthopedic Precautions:N/A   Braces: N/A  Respiratory Status: Nasal cannula, flow 1 L/min    Exams:  · Cognitive Exam:  Patient was able to  follow multiple commands.   · Gross Motor Coordination:  WFL  · Postural Exam:  Patient presented with the following abnormalities:    · -       anterior pelvic tilt  · -       obesity  · Sensation:    · -       Intact  light/touch BUE/BLE  · Skin Integrity/Edema:      · -       Skin integrity: Visible skin intact  · -       Edema: None noted BUE/BLE  · BUE ROM: WFL  · BUE Strength: WFL  · BLE ROM: WFL  · BLE Strength: WFL    Functional Mobility:  · Bed Mobility:     · Scooting: modified independence  · Supine to Sit: modified independence with HOB elevated; Pt able to don her personal incontinence underwear EOB.    · Transfers:     · Sit to Stand:  stand by assistance with no AD  · Bed to Chair: stand by assistance with  straight cane  using  Step Transfer  · Gait: Pt ambulated ~400 ft with SBA using hurrycane.  Pt with decreased step length and dmitriy.  spO2 on RA ~94% and HR ~100 bpm.    · Balance: Pt with fair+ dynamic standing balance.     Therapeutic Activities and Exercises:  Pt educated acute skilled PT services and goals.  Pt encouraged OOB>chair and ambulate with nursing supervision while in the hospital.  Pt also educated on outpatient PT services once D/C'ed.  Pt reported familiar with [facility] outpatient PT services on [place] in the past.      Pt issued/educated on HEP for seated/supine LE therex.  Pt encouraged to perform LE therex 2x/day ~10-15 reps.  Pt verbalized good understanding of all teachings.    BLE seated therex x15 reps: hip flex, hip abd/add, GS, LAQ, and AP     AM-PAC 6 CLICK MOBILITY  Total Score:22     Patient left up in chair with all lines intact, call button in reach and nurse James notified.  Lunch tray table set-up.     GOALS:   Multidisciplinary Problems     Physical Therapy Goals        Problem: Physical Therapy    Goal Priority Disciplines Outcome Goal Variances Interventions   Physical Therapy Goal     PT, PT/OT Ongoing, Progressing     Description: Goals to be met by:  22     Patient will increase functional independence with mobility by performin. Supine to sit with Modified [place]  2. Rolling to Left and Right with Modified [place]  3. Sit to stand transfer with Modified [place]  4. Bed to chair transfer with Modified [place]  5. Gait >500 feet with Modified [place] using Single-point Cane   6. Upper/Lower extremity exercise program 2 sets x15 reps per handout, with independence                     History:     Past Medical History:   Diagnosis Date    Anticoagulant long-term use     Xarelto    Arthritis     Atrial fibrillation     Breast cyst     CHF (congestive heart failure)     Degenerative disc disease     Edema     HLD (hyperlipidemia)     Hx of psychiatric care     Hyperlipidemia     Hypertension     Hypothyroidism     Nuclear sclerosis, bilateral 2017    Obesity, morbid     HIEU (obstructive sleep apnea)     Other abnormal glucose     pre-diabetes    Pre-diabetes     Psychiatric problem     Requires assistance with activities of daily living (ADL)     Sleep apnea     Smoker     SOB (shortness of breath)     SOB (shortness of breath)     Thyroid disease     on meds 8-9 years ago. hypothyroidism.no malignancy    TMJ (temporomandibular joint disorder)     jaw clicking    Tobacco abuse     Unsteady gait     Urge incontinence     Weakness generalized     Wears glasses        Past Surgical History:   Procedure Laterality Date    BREAST BIOPSY Right     over 10 yrs ago/ benign    BREAST CYST EXCISION Right     BREAST LUMPECTOMY Right     over 10 yrs ago, ex bx/ benign    COLONOSCOPY N/A 2019    Procedure: COLONOSCOPY;  Surgeon: Micheline Flores MD;  Location: [facility];  Service: Endoscopy;  Laterality: N/A;  RX XARELTO ok to hold (2 days) per Dr. Naik see scan 3/20/19    ENDOSCOPIC ULTRASOUND OF UPPER GASTROINTESTINAL TRACT N/A 2021    Procedure: ULTRASOUND-ENDOSCOPIC-UPPER;  Surgeon:  Stevenson Philippe MD;  Location: [facility] ENDO;  Service: Endoscopy;  Laterality: N/A;    HYSTERECTOMY      JOINT REPLACEMENT Bilateral     knees    OOPHORECTOMY      rt.knee surgery 2016      TOTAL KNEE ARTHROPLASTY Left 2/19/2019    Procedure: ARTHROPLASTY, KNEE, TOTAL;  Surgeon: Med Hanson MD;  Location: [facility] OR;  Service: Orthopedics;  Laterality: Left;  10AM START PER  PER JAYLIN TEXT @ 8:34AM ON 2-18-19  SUP[facility]  [facility] [phone] TEXTED HIM ON 1-24-19 @ 7:57AM  RN PRE OP 2-13-19--BMI--48.9    underarm gland\ Bilateral        Time Tracking:     PT Received On: 05/22/22  PT Start Time: 1126     PT Stop Time: 1150  PT Total Time (min): 24 min     Billable Minutes: Evaluation 14 min and Therapeutic Activity 10 min      05/22/2022

## 2022-05-23 LAB
ANION GAP SERPL CALC-SCNC: 12 MMOL/L (ref 8–16)
BACTERIA BLD CULT: ABNORMAL
BUN SERPL-MCNC: 46 MG/DL (ref 8–23)
CALCIUM SERPL-MCNC: 9 MG/DL (ref 8.7–10.5)
CHLORIDE SERPL-SCNC: 96 MMOL/L (ref 95–110)
CO2 SERPL-SCNC: 31 MMOL/L (ref 23–29)
CREAT SERPL-MCNC: 1.2 MG/DL (ref 0.5–1.4)
EST. GFR  (AFRICAN AMERICAN): 54 ML/MIN/1.73 M^2
EST. GFR  (NON AFRICAN AMERICAN): 47 ML/MIN/1.73 M^2
GLUCOSE SERPL-MCNC: 121 MG/DL (ref 70–110)
POCT GLUCOSE: 133 MG/DL (ref 70–110)
POCT GLUCOSE: 134 MG/DL (ref 70–110)
POCT GLUCOSE: 169 MG/DL (ref 70–110)
POCT GLUCOSE: 205 MG/DL (ref 70–110)
POTASSIUM SERPL-SCNC: 3.9 MMOL/L (ref 3.5–5.1)
SODIUM SERPL-SCNC: 139 MMOL/L (ref 136–145)

## 2022-05-23 PROCEDURE — 94761 N-INVAS EAR/PLS OXIMETRY MLT: CPT

## 2022-05-23 PROCEDURE — 87040 BLOOD CULTURE FOR BACTERIA: CPT | Performed by: INTERNAL MEDICINE

## 2022-05-23 PROCEDURE — 97116 GAIT TRAINING THERAPY: CPT

## 2022-05-23 PROCEDURE — 94640 AIRWAY INHALATION TREATMENT: CPT

## 2022-05-23 PROCEDURE — 27000221 HC OXYGEN, UP TO 24 HOURS

## 2022-05-23 PROCEDURE — 99900035 HC TECH TIME PER 15 MIN (STAT)

## 2022-05-23 PROCEDURE — 25000003 PHARM REV CODE 250: Performed by: HOSPITALIST

## 2022-05-23 PROCEDURE — 80048 BASIC METABOLIC PNL TOTAL CA: CPT | Performed by: EMERGENCY MEDICINE

## 2022-05-23 PROCEDURE — 94660 CPAP INITIATION&MGMT: CPT

## 2022-05-23 PROCEDURE — 25000003 PHARM REV CODE 250: Performed by: EMERGENCY MEDICINE

## 2022-05-23 PROCEDURE — 97110 THERAPEUTIC EXERCISES: CPT

## 2022-05-23 PROCEDURE — 21400001 HC TELEMETRY ROOM

## 2022-05-23 PROCEDURE — 25000003 PHARM REV CODE 250: Performed by: INTERNAL MEDICINE

## 2022-05-23 PROCEDURE — 63600175 PHARM REV CODE 636 W HCPCS: Performed by: HOSPITALIST

## 2022-05-23 PROCEDURE — 36415 COLL VENOUS BLD VENIPUNCTURE: CPT | Performed by: INTERNAL MEDICINE

## 2022-05-23 RX ADMIN — SERTRALINE HYDROCHLORIDE 100 MG: 50 TABLET ORAL at 08:05

## 2022-05-23 RX ADMIN — ATORVASTATIN CALCIUM 40 MG: 40 TABLET, FILM COATED ORAL at 08:05

## 2022-05-23 RX ADMIN — PREDNISONE 40 MG: 20 TABLET ORAL at 08:05

## 2022-05-23 RX ADMIN — ACETAMINOPHEN 650 MG: 325 TABLET ORAL at 11:05

## 2022-05-23 RX ADMIN — FUROSEMIDE 40 MG: 40 TABLET ORAL at 06:05

## 2022-05-23 RX ADMIN — CIPROFLOXACIN 500 MG: 500 TABLET, FILM COATED ORAL at 08:05

## 2022-05-23 RX ADMIN — LOSARTAN POTASSIUM 25 MG: 25 TABLET, FILM COATED ORAL at 08:05

## 2022-05-23 RX ADMIN — CIPROFLOXACIN 500 MG: 500 TABLET, FILM COATED ORAL at 09:05

## 2022-05-23 RX ADMIN — TIOTROPIUM BROMIDE INHALATION SPRAY 2 PUFF: 3.12 SPRAY, METERED RESPIRATORY (INHALATION) at 07:05

## 2022-05-23 RX ADMIN — FLUTICASONE FUROATE AND VILANTEROL TRIFENATATE 1 PUFF: 100; 25 POWDER RESPIRATORY (INHALATION) at 07:05

## 2022-05-23 RX ADMIN — RIVAROXABAN 20 MG: 20 TABLET, FILM COATED ORAL at 08:05

## 2022-05-23 RX ADMIN — FUROSEMIDE 40 MG: 40 TABLET ORAL at 08:05

## 2022-05-23 RX ADMIN — METOPROLOL SUCCINATE 100 MG: 50 TABLET, EXTENDED RELEASE ORAL at 08:05

## 2022-05-23 NOTE — SUBJECTIVE & OBJECTIVE
Interval History:  No new issues     Review of Systems   Constitutional:  Negative for activity change and appetite change.   HENT:  Negative for congestion and dental problem.    Eyes:  Negative for discharge and itching.   Respiratory:  Negative for apnea.    Cardiovascular:  Negative for chest pain.   Gastrointestinal:  Negative for abdominal distention and abdominal pain.   Endocrine: Negative for cold intolerance.   Genitourinary:  Negative for difficulty urinating and dyspareunia.   Musculoskeletal:  Negative for arthralgias and back pain.   Neurological:  Negative for dizziness and facial asymmetry.   Psychiatric/Behavioral:  Negative for agitation and behavioral problems.    Objective:     Vital Signs (Most Recent):  Temp: 97.6 °F (36.4 °C) (05/23/22 0819)  Pulse: 77 (05/23/22 0819)  Resp: 20 (05/23/22 0819)  BP: 124/70 (05/23/22 0819)  SpO2: 100 % (05/23/22 0819) Vital Signs (24h Range):  Temp:  [96.3 °F (35.7 °C)-98.9 °F (37.2 °C)] 97.6 °F (36.4 °C)  Pulse:  [64-93] 77  Resp:  [17-22] 20  SpO2:  [95 %-100 %] 100 %  BP: (115-134)/(58-78) 124/70     Weight: 122.8 kg (270 lb 11.6 oz)  Body mass index is 46.47 kg/m².    Intake/Output Summary (Last 24 hours) at 5/23/2022 0858  Last data filed at 5/23/2022 0145  Gross per 24 hour   Intake 480 ml   Output 400 ml   Net 80 ml      Physical Exam  Vitals and nursing note reviewed.   Constitutional:       General: She is not in acute distress.     Appearance: Normal appearance. She is not ill-appearing or toxic-appearing.   HENT:      Head: Normocephalic and atraumatic.   Eyes:      Conjunctiva/sclera: Conjunctivae normal.   Neck:      Vascular: No carotid bruit.   Cardiovascular:      Rate and Rhythm: Normal rate and regular rhythm.   Pulmonary:      Effort: Pulmonary effort is normal. No respiratory distress.   Abdominal:      General: Bowel sounds are normal. There is no distension.      Tenderness: There is no abdominal tenderness.   Skin:     General: Skin is  warm and dry.   Neurological:      Mental Status: She is alert and oriented to person, place, and time.   Psychiatric:         Mood and Affect: Mood normal.         Behavior: Behavior normal.       Significant Labs: All pertinent labs within the past 24 hours have been reviewed.  BMP:   Recent Labs   Lab 05/23/22  0323   *      K 3.9   CL 96   CO2 31*   BUN 46*   CREATININE 1.2   CALCIUM 9.0     CBC: No results for input(s): WBC, HGB, HCT, PLT in the last 48 hours.    Significant Imaging:

## 2022-05-23 NOTE — PROGRESS NOTES
[facility] Medicine  Progress Note    Patient Name: Sandee Evans  MRN: [id number]  Patient Class: IP- Inpatient   Admission Date: 5/20/2022  Length of Stay: 3 days  Attending Physician: Shaq Kovacs MD  Primary Care Provider: Kuldeep Miller MD        Subjective:     Principal Problem:Acute respiratory failure with hypoxia and hypercarbia        HPI:  66 y.o. female PMHx HIEU, ?COPD vs Asthma, CHF, Afib on Xarelto, HTN presents with presents with increased fatigue and AMS x 2-3 days.  Symptoms have, per family, progressively getting worse. Patient has SOB for the last few months also appears to be worsening.  reports patient felt nauseas after eating and reports one episode of diarrhea yesterday. Also endorses the use of at home breathing treatments and inhaler. Reports she is fully immunized against COVID-19.      ED course: WBC and lactic acid normal.  Elevated d dimer, procal and BNP. CXR and CTH unremarkable.Patient refused UA. VBG shows evidence of hypercapnia with ph7.3 pCOx 72. Patient was started on bipap by ED MD.       Admitted to Hospital Medicine for further evaluation      Overview/Hospital Course:  Ms Sandee Evans is a 66 y.o. woman admitted with acute hypoxic/hypercapnic respiratory failure due to acute on chronic diastolic CHF, exacerbation of COPD, and untreated HIEU. She initially required BiPAP but was quickly weaned to O2 by NC. She states that she sometimes skips lasix doses due to urinary incontinence. She does not have BiPAP at home. She is still using tobacco. Started IV lasix for CHF exacerbation, steroids/nebs/levofloxacin for COPD exacerbation. Stepped down to floor on 5/21/22. Patient grew out E-coli on blood cultures.       Interval History:  No new issues     Review of Systems   Constitutional:  Negative for activity change and appetite change.   HENT:  Negative for congestion and dental problem.    Eyes:  Negative for discharge and itching.    Respiratory:  Negative for apnea.    Cardiovascular:  Negative for chest pain.   Gastrointestinal:  Negative for abdominal distention and abdominal pain.   Endocrine: Negative for cold intolerance.   Genitourinary:  Negative for difficulty urinating and dyspareunia.   Musculoskeletal:  Negative for arthralgias and back pain.   Neurological:  Negative for dizziness and facial asymmetry.   Psychiatric/Behavioral:  Negative for agitation and behavioral problems.    Objective:     Vital Signs (Most Recent):  Temp: 97.6 °F (36.4 °C) (05/23/22 0819)  Pulse: 77 (05/23/22 0819)  Resp: 20 (05/23/22 0819)  BP: 124/70 (05/23/22 0819)  SpO2: 100 % (05/23/22 0819) Vital Signs (24h Range):  Temp:  [96.3 °F (35.7 °C)-98.9 °F (37.2 °C)] 97.6 °F (36.4 °C)  Pulse:  [64-93] 77  Resp:  [17-22] 20  SpO2:  [95 %-100 %] 100 %  BP: (115-134)/(58-78) 124/70     Weight: 122.8 kg (270 lb 11.6 oz)  Body mass index is 46.47 kg/m².    Intake/Output Summary (Last 24 hours) at 5/23/2022 0858  Last data filed at 5/23/2022 0145  Gross per 24 hour   Intake 480 ml   Output 400 ml   Net 80 ml      Physical Exam  Vitals and nursing note reviewed.   Constitutional:       General: She is not in acute distress.     Appearance: Normal appearance. She is not ill-appearing or toxic-appearing.   HENT:      Head: Normocephalic and atraumatic.   Eyes:      Conjunctiva/sclera: Conjunctivae normal.   Neck:      Vascular: No carotid bruit.   Cardiovascular:      Rate and Rhythm: Normal rate and regular rhythm.   Pulmonary:      Effort: Pulmonary effort is normal. No respiratory distress.   Abdominal:      General: Bowel sounds are normal. There is no distension.      Tenderness: There is no abdominal tenderness.   Skin:     General: Skin is warm and dry.   Neurological:      Mental Status: She is alert and oriented to person, place, and time.   Psychiatric:         Mood and Affect: Mood normal.         Behavior: Behavior normal.       Significant Labs: All  pertinent labs within the past 24 hours have been reviewed.  BMP:   Recent Labs   Lab 05/23/22  0323   *      K 3.9   CL 96   CO2 31*   BUN 46*   CREATININE 1.2   CALCIUM 9.0     CBC: No results for input(s): WBC, HGB, HCT, PLT in the last 48 hours.    Significant Imaging:       Assessment/Plan:      * Acute respiratory failure with hypoxia and hypercarbia  Patient admitted with Hypercapnic and Hypoxic which is Chronic.  she is not on home oxygen. Signs/symptoms of respiratory failure include- increased work of breathing present on admission. This has resolved after BiPAP.   - Labs and images were reviewed. Patient Has recent ABG, which has been reviewed..   - Supplemental oxygen was provided and noted- Nasal Cannula 1 LPM   - Respiratory failure is due to- CHF, COPD and HIEU/suspected OHS   - CHF: follow up TTE, continue lasix. Discussed need for lasix adherence  - COPD: continue steroids/nebs/levofloxacin. Discussed need for smoking cessation  - HIEU: Pulm consult- will order CPAP vs BiPAP for home use  - Pulm follow up on discharge     Changing to po lasix today.  Home likely on 5/23.         Dysuria  Check UA, especially in setting of fever and elevated procal    COPD (chronic obstructive pulmonary disease)  Admitted with shortness of breath, fever  With acute hypoxic/hypercapnic respiratory failure requiring BiPAP on admit.   Last PFTs 9/2021 with obstructive and restrictive physiology  CXR no infiltrate  Started prednisone 40mg daily x5 days, nebs, levofloxacin x5 days  Now on 2L NC. Continue BiPAP QHS  Encouraged smoking cessation  Pulmonary follow up on discharge        Pulmonary hypertension  Group 2 and 3 PHTN  Repeat TTE      Acute on chronic diastolic congestive heart failure  Patient admitted with shortness of breath, edema consistent with acute on chronic diastolic CHF. Cause of exacerbation is missed lasix doses    BNP  Recent Labs   Lab 05/20/22  1615   *     CXR reviewed  Recent  Labs   Lab 05/20/22  1615   TROPONINI <0.006     EKG personally reviewed and shows AFib  Started CHF pathway  Start on IV lasix diuresis. Monitor ins and outs  TTE pending   Continue Bb, ARB  Nutrition consulted for low salt cardiac diet education  Discussed need to take lasix on discharge   Cardiology follow up on discharge      HIEU (obstructive sleep apnea)  Does not currently have CPAP at home  Needs this prior to discharge as it contributes to her presentation with hypoxic/hypercapnic respiratory failure  Will discuss settings with Dr Puente today and place order. Case management will need to arrange this prior to discharge.     Tobacco dependence  Patient was counseled on smoking cessation for 4 minutes. We discussed how smoking is detrimental to the patient's health, specifically her respiratory failure.     Type 2 diabetes mellitus without complication  A1c:   Lab Results   Component Value Date    HGBA1C 6.3 (H) 09/21/2021   Well controlled    Meds: SSI PRN to maintain goal 140-180  ADA diet, accuchecks, hypoglycemic protocol      Longstanding persistent atrial fibrillation  Patient with Persistent (7 days or more) atrial fibrillation which is rate controlled currently with Beta Blocker. Patient is currently in atrial fibrillation. [id number] Score: 4. Anticoagulation indicated. Anticoagulation done with Xarelto.          Severe obesity (BMI >= 40)  Body mass index is 47.38 kg/m². Morbid obesity complicates all aspects of disease management from diagnostic modalities to treatment. Weight loss encouraged and health benefits explained to patient.   - contributes to HIEU  - contributes to restrictive physiology seen on prior PFTs      Essential hypertension  BP is currently well controlled  Continue home losartan       e-coli bacteremia without sepsis (S) to cipro.  Repeating blood cultures today       VTE Risk Mitigation (From admission, onward)         Ordered     rivaroxaban tablet 20 mg  Daily         05/20/22  2210     IP VTE HIGH RISK PATIENT  Once         05/20/22 2210     Place sequential compression device  Until discontinued         05/20/22 2210                Discharge Planning   JACQUELINE:      Code Status: Full Code   Is the patient medically ready for discharge?:     Reason for patient still in hospital (select all that apply): Patient unstable  Discharge Plan A: Home with family (Follow-up)        Home likely on 5/24.           Shaq Bautista MD  Department of [facility] Medicine   [facility]

## 2022-05-23 NOTE — PT/OT/SLP PROGRESS
Physical Therapy Treatment    Patient Name:  Sandee Evans   MRN:  [id number]    Recommendations:     Discharge Recommendations:  outpatient PT   Discharge Equipment Recommendations: none   Barriers to discharge: None    Assessment:     Sandee Evans is a 66 y.o. female admitted with a medical diagnosis of Acute respiratory failure with hypoxia and hypercarbia.  She presents with the following impairments/functional limitations:  weakness, impaired endurance, impaired functional mobilty, impaired balance, decreased upper extremity function, decreased lower extremity function, impaired cardiopulmonary response to activity.    Rehab Prognosis: Good; patient would benefit from acute skilled PT services to address these deficits and reach maximum level of function.    Recent Surgery: * No surgery found *      Plan:     During this hospitalization, patient to be seen 2 x/week to address the identified rehab impairments via gait training, therapeutic activities, therapeutic exercises and progress toward the following goals:    · Plan of Care Expires:  06/05/22    Subjective     Chief Complaint: N/A  Patient/Family Comments/goals: Pt agreeable to therapy.   Pain/Comfort:  · Pain Rating 1: 0/10      Objective:     Patient found HOB elevated with oxygen, telemetry, and Purewick upon PT entry to room.     General Precautions: Standard, fall, diabetic, respiratory   Orthopedic Precautions:N/A   Braces: N/A  Respiratory Status: Nasal cannula, flow 1 L/min     Functional Mobility:  · Bed Mobility:     · Scooting: modified independence  · Supine to Sit: modified independence with HOB elevated; Pt able to remove Purewick and don her personal incontinence underwear mod I.     · Transfers:     · Sit to Stand:  modified independence with no AD  · Bed to Chair: stand by assistance using  Step Transfer  · Gait: Pt ambulated ~250 ft SBA using no AD.  Pt declined gait training with SPC today.  Pt with mild unsteadiness, decreased step  length, and decreased dmitriy.  Pt declined further gait training.   · Balance: Pt with fair dynamic standing balance.       AM-PAC 6 CLICK MOBILITY  Turning over in bed (including adjusting bedclothes, sheets and blankets)?: 4  Sitting down on and standing up from a chair with arms (e.g., wheelchair, bedside commode, etc.): 4  Moving from lying on back to sitting on the side of the bed?: 4  Moving to and from a bed to a chair (including a wheelchair)?: 4  Need to walk in hospital room?: 3  Climbing 3-5 steps with a railing?: 3  Basic Mobility Total Score: 22       Therapeutic Activities and Exercises:  · Pt completed 1 set x10 reps BUE HEP with red theraband in seated position:  · Shoulder horizontal ab/dduction  · External rotation  · Shoulder flexion/extension with ab/dduction  · Elbow extension  · Elbow flexion   · Handout placed on table with theraband placed within reach of pt   · Pt encouraged to complete 2x ~10 reps a day.       Patient left up in chair with all lines intact, call button in reach and nurse James notified.  Tray table in front.     GOALS:   Multidisciplinary Problems     Physical Therapy Goals        Problem: Physical Therapy    Goal Priority Disciplines Outcome Goal Variances Interventions   Physical Therapy Goal     PT, PT/OT Ongoing, Progressing     Description: Goals to be met by: 22     Patient will increase functional independence with mobility by performin. Supine to sit with Modified [place]  2. Rolling to Left and Right with Modified [place]  3. Sit to stand transfer with Modified [place]  4. Bed to chair transfer with Modified [place]  5. Gait >500 feet with Modified [place] using Single-point Cane   6. Upper/Lower extremity exercise program 2 sets x15 reps per handout, with independence                     Time Tracking:     PT Received On: 22  PT Start Time: 1511     PT Stop Time: 1536  PT Total Time (min): 25 min     Billable Minutes:  Gait Training 13 min and Therapeutic Exercise 12 min    Treatment Type: Treatment              05/23/2022

## 2022-05-23 NOTE — PLAN OF CARE
Pt lying in bed resting quietly.  No distress noted.  Call bell within reach.  Bed locked and in lowest position.  Pt reminded to call for any pain/assistance/needs.      Problem: Adult Inpatient Plan of Care  Goal: Plan of Care Review  Outcome: Ongoing, Progressing  Goal: Patient-Specific Goal (Individualized)  Outcome: Ongoing, Progressing  Goal: Absence of Hospital-Acquired Illness or Injury  Outcome: Ongoing, Progressing  Goal: Optimal Comfort and Wellbeing  Outcome: Ongoing, Progressing  Goal: Readiness for Transition of Care  Outcome: Ongoing, Progressing     Problem: Bariatric Environmental Safety  Goal: Safety Maintained with Care  Outcome: Ongoing, Progressing     Problem: Infection  Goal: Absence of Infection Signs and Symptoms  Outcome: Ongoing, Progressing     Problem: Diabetes Comorbidity  Goal: Blood Glucose Level Within Targeted Range  Outcome: Ongoing, Progressing     Problem: Fluid and Electrolyte Imbalance (Acute Kidney Injury/Impairment)  Goal: Fluid and Electrolyte Balance  Outcome: Ongoing, Progressing     Problem: Oral Intake Inadequate (Acute Kidney Injury/Impairment)  Goal: Optimal Nutrition Intake  Outcome: Ongoing, Progressing     Problem: Renal Function Impairment (Acute Kidney Injury/Impairment)  Goal: Effective Renal Function  Outcome: Ongoing, Progressing     Problem: Skin Injury Risk Increased  Goal: Skin Health and Integrity  Outcome: Ongoing, Progressing

## 2022-05-23 NOTE — PROGRESS NOTES
Food & Nutrition  Education    Diet Education: Low Na, FR  Time Spent: 15 minutes  Learners: Patient    Nutrition Education provided with handouts:   Heart Healthy, Low Na Nutrition Therapy  + Clinical Reference attachments to d/c documents      Comments: Discussed diet with patient - daughter still does all the cooking at home provided handouts to give to daughter. Stated she doesn't cook with much salt - edu best not to use any added salt as many of the items we cook with already have salt so no need to add extra. Discussed fluid intake, pt stated she does drink a lot - edu on importance of maintaining FR as this will assist in minimizing many of her symptoms. Stated she doesn't take her lasix as she is supposed to because it makes her go to the bathroom all the time - discussed part of that is due to her non-compliance with FR. If she were to be more dilligant with FR and taking her medication as prescribed lasix would have better ability to work and after getting excesses fluid off - additional bathroom trips may lessen especially with less fluid intake. Pt verbalized understanding.    All questions and concerns answered. Dietitian's contact information provided.     Please Re-consult as needed        Thanks!

## 2022-05-23 NOTE — PLAN OF CARE
Problem: Physical Therapy  Goal: Physical Therapy Goal  Description: Goals to be met by: 22     Patient will increase functional independence with mobility by performin. Supine to sit with Modified [place]  2. Rolling to Left and Right with Modified [place]  3. Sit to stand transfer with Modified [place]  4. Bed to chair transfer with Modified [place]  5. Gait >500 feet with Modified [place] using Single-point Cane   6. Upper/Lower extremity exercise program 2 sets x15 reps per handout, with independence    Outcome: Ongoing, Progressing     Pt ambulated ~250 ft with SBA using no AD.

## 2022-05-23 NOTE — CONSULTS
SW spoke with patient about obtaining Bipap. Patient stated that she will be able get help in obtaining Bipap.     Case Management does not pay for co-pays or deductibles.

## 2022-05-24 ENCOUNTER — TELEPHONE (OUTPATIENT)
Dept: PULMONOLOGY | Facility: CLINIC | Age: 67
End: 2022-05-24
Payer: COMMERCIAL

## 2022-05-24 PROBLEM — R30.0 DYSURIA: Status: RESOLVED | Noted: 2022-05-20 | Resolved: 2022-05-24

## 2022-05-24 PROBLEM — J96.01 ACUTE RESPIRATORY FAILURE WITH HYPOXIA AND HYPERCARBIA: Status: RESOLVED | Noted: 2022-05-20 | Resolved: 2022-05-24

## 2022-05-24 PROBLEM — I50.33 ACUTE ON CHRONIC DIASTOLIC CONGESTIVE HEART FAILURE: Status: RESOLVED | Noted: 2021-07-16 | Resolved: 2022-05-24

## 2022-05-24 PROBLEM — J96.02 ACUTE RESPIRATORY FAILURE WITH HYPOXIA AND HYPERCARBIA: Status: RESOLVED | Noted: 2022-05-20 | Resolved: 2022-05-24

## 2022-05-24 LAB
ALLENS TEST: ABNORMAL
ANION GAP SERPL CALC-SCNC: 14 MMOL/L (ref 8–16)
BACTERIA BLD CULT: NORMAL
BUN SERPL-MCNC: 48 MG/DL (ref 8–23)
CALCIUM SERPL-MCNC: 9.6 MG/DL (ref 8.7–10.5)
CHLORIDE SERPL-SCNC: 96 MMOL/L (ref 95–110)
CO2 SERPL-SCNC: 28 MMOL/L (ref 23–29)
CREAT SERPL-MCNC: 1.3 MG/DL (ref 0.5–1.4)
DELSYS: ABNORMAL
EST. GFR  (AFRICAN AMERICAN): 49 ML/MIN/1.73 M^2
EST. GFR  (NON AFRICAN AMERICAN): 42 ML/MIN/1.73 M^2
GLUCOSE SERPL-MCNC: 110 MG/DL (ref 70–110)
HCO3 UR-SCNC: 39.1 MMOL/L (ref 24–28)
PCO2 BLDA: 60.2 MMHG (ref 35–45)
PH SMN: 7.42 [PH] (ref 7.35–7.45)
PO2 BLDA: 58 MMHG (ref 80–100)
POC BE: 12 MMOL/L
POC SATURATED O2: 89 % (ref 95–100)
POC TCO2: 41 MMOL/L (ref 23–27)
POCT GLUCOSE: 103 MG/DL (ref 70–110)
POCT GLUCOSE: 151 MG/DL (ref 70–110)
POCT GLUCOSE: 187 MG/DL (ref 70–110)
POCT GLUCOSE: 222 MG/DL (ref 70–110)
POTASSIUM SERPL-SCNC: 3.7 MMOL/L (ref 3.5–5.1)
SAMPLE: ABNORMAL
SITE: ABNORMAL
SODIUM SERPL-SCNC: 138 MMOL/L (ref 136–145)

## 2022-05-24 PROCEDURE — 21400001 HC TELEMETRY ROOM

## 2022-05-24 PROCEDURE — 99900035 HC TECH TIME PER 15 MIN (STAT)

## 2022-05-24 PROCEDURE — 36600 WITHDRAWAL OF ARTERIAL BLOOD: CPT

## 2022-05-24 PROCEDURE — 25000003 PHARM REV CODE 250: Performed by: EMERGENCY MEDICINE

## 2022-05-24 PROCEDURE — 80048 BASIC METABOLIC PNL TOTAL CA: CPT | Performed by: EMERGENCY MEDICINE

## 2022-05-24 PROCEDURE — 94640 AIRWAY INHALATION TREATMENT: CPT

## 2022-05-24 PROCEDURE — 82803 BLOOD GASES ANY COMBINATION: CPT

## 2022-05-24 PROCEDURE — 63600175 PHARM REV CODE 636 W HCPCS: Performed by: HOSPITALIST

## 2022-05-24 PROCEDURE — 25000003 PHARM REV CODE 250: Performed by: INTERNAL MEDICINE

## 2022-05-24 PROCEDURE — 36415 COLL VENOUS BLD VENIPUNCTURE: CPT | Performed by: EMERGENCY MEDICINE

## 2022-05-24 PROCEDURE — 27000221 HC OXYGEN, UP TO 24 HOURS

## 2022-05-24 PROCEDURE — 25000003 PHARM REV CODE 250: Performed by: HOSPITALIST

## 2022-05-24 RX ORDER — PREDNISONE 20 MG/1
20 TABLET ORAL DAILY
Qty: 5 TABLET | Refills: 0 | Status: SHIPPED | OUTPATIENT
Start: 2022-05-24 | End: 2022-05-26 | Stop reason: SDUPTHER

## 2022-05-24 RX ORDER — CIPROFLOXACIN 500 MG/1
500 TABLET ORAL EVERY 12 HOURS
Qty: 28 TABLET | Refills: 0 | Status: SHIPPED | OUTPATIENT
Start: 2022-05-24 | End: 2022-05-26 | Stop reason: SDUPTHER

## 2022-05-24 RX ADMIN — PREDNISONE 40 MG: 20 TABLET ORAL at 08:05

## 2022-05-24 RX ADMIN — LOSARTAN POTASSIUM 25 MG: 25 TABLET, FILM COATED ORAL at 08:05

## 2022-05-24 RX ADMIN — RIVAROXABAN 20 MG: 20 TABLET, FILM COATED ORAL at 08:05

## 2022-05-24 RX ADMIN — TIOTROPIUM BROMIDE INHALATION SPRAY 2 PUFF: 3.12 SPRAY, METERED RESPIRATORY (INHALATION) at 07:05

## 2022-05-24 RX ADMIN — FUROSEMIDE 40 MG: 40 TABLET ORAL at 08:05

## 2022-05-24 RX ADMIN — FLUTICASONE FUROATE AND VILANTEROL TRIFENATATE 1 PUFF: 100; 25 POWDER RESPIRATORY (INHALATION) at 07:05

## 2022-05-24 RX ADMIN — SERTRALINE HYDROCHLORIDE 100 MG: 50 TABLET ORAL at 08:05

## 2022-05-24 RX ADMIN — METOPROLOL SUCCINATE 100 MG: 50 TABLET, EXTENDED RELEASE ORAL at 08:05

## 2022-05-24 RX ADMIN — ATORVASTATIN CALCIUM 40 MG: 40 TABLET, FILM COATED ORAL at 08:05

## 2022-05-24 RX ADMIN — CIPROFLOXACIN 500 MG: 500 TABLET, FILM COATED ORAL at 09:05

## 2022-05-24 RX ADMIN — CIPROFLOXACIN 500 MG: 500 TABLET, FILM COATED ORAL at 08:05

## 2022-05-24 RX ADMIN — FUROSEMIDE 40 MG: 40 TABLET ORAL at 05:05

## 2022-05-24 NOTE — PLAN OF CARE
Problem: Adult Inpatient Plan of Care  Goal: Plan of Care Review  Outcome: Ongoing, Progressing  Goal: Patient-Specific Goal (Individualized)  Outcome: Ongoing, Progressing  Goal: Absence of Hospital-Acquired Illness or Injury  Outcome: Ongoing, Progressing  Intervention: Identify and Manage Fall Risk  Flowsheets (Taken 5/24/2022 0543)  Safety Promotion/Fall Prevention:   assistive device/personal item within reach   side rails raised x 2  Intervention: Prevent Skin Injury  Flowsheets (Taken 5/24/2022 0543)  Body Position: position changed independently  Skin Protection: transparent dressing maintained  Intervention: Prevent and Manage VTE (Venous Thromboembolism) Risk  Flowsheets (Taken 5/24/2022 0543)  Activity Management: Up in chair - L3  VTE Prevention/Management: ambulation promoted  Range of Motion: active ROM (range of motion) encouraged  Intervention: Prevent Infection  Flowsheets (Taken 5/24/2022 0543)  Infection Prevention: hand hygiene promoted  Goal: Optimal Comfort and Wellbeing  Outcome: Ongoing, Progressing  Intervention: Monitor Pain and Promote Comfort  Flowsheets (Taken 5/24/2022 0543)  Pain Management Interventions: quiet environment facilitated  Intervention: Provide Person-Centered Care  Flowsheets (Taken 5/24/2022 0543)  Trust Relationship/Rapport:   care explained   choices provided   emotional support provided   empathic listening provided   questions answered   thoughts/feelings acknowledged   questions encouraged  Goal: Readiness for Transition of Care  Outcome: Ongoing, Progressing     Problem: Bariatric Environmental Safety  Goal: Safety Maintained with Care  Outcome: Ongoing, Progressing     Problem: Infection  Goal: Absence of Infection Signs and Symptoms  Outcome: Ongoing, Progressing  Intervention: Prevent or Manage Infection  Flowsheets (Taken 5/24/2022 0543)  Infection Management: aseptic technique maintained     Problem: Fluid and Electrolyte Imbalance (Acute Kidney  Injury/Impairment)  Goal: Fluid and Electrolyte Balance  Outcome: Ongoing, Progressing  Intervention: Monitor and Manage Fluid and Electrolyte Balance  Flowsheets (Taken 5/24/2022 0543)  Fluid/Electrolyte Management: fluids restricted     Problem: Oral Intake Inadequate (Acute Kidney Injury/Impairment)  Goal: Optimal Nutrition Intake  Outcome: Ongoing, Progressing     Problem: Renal Function Impairment (Acute Kidney Injury/Impairment)  Goal: Effective Renal Function  Outcome: Ongoing, Progressing  Intervention: Monitor and Support Renal Function  Flowsheets (Taken 5/24/2022 0543)  Medication Review/Management: medications reviewed     Problem: Skin Injury Risk Increased  Goal: Skin Health and Integrity  Outcome: Ongoing, Progressing  Intervention: Optimize Skin Protection  Flowsheets (Taken 5/24/2022 0543)  Pressure Reduction Techniques: frequent weight shift encouraged  Pressure Reduction Devices: pressure-redistributing mattress utilized  Skin Protection: transparent dressing maintained  Head of Bed (HOB) Positioning: HOB elevated

## 2022-05-24 NOTE — PLAN OF CARE
[facility] - Telemetry  Discharge Reassessment    Primary Care Provider: Kuldeep Miller MD    Expected Discharge Date: 5/24/2022     MATIAS informed patient that Bipap will cost $3900 and supplies quarterly will be $400. Patient stated that she is willing to pay for the Bipap out of pocket. MATIAS informed patient that she will notify Dory at [facility] and inquire if she can give patient a call. MATIAS sent Dory a secure chat.     Reassessment (most recent)     Discharge Reassessment - 05/24/22 1543        Discharge Reassessment    Assessment Type Discharge Planning Reassessment     Did the patient's condition or plan change since previous assessment? No     Discharge Plan discussed with: Patient;Spouse/sig other     Name(s) and Number(s) Corwin Evans (Spouse)   [phone] (Mobile)     Communicated JACQUELINE with patient/caregiver Yes     Discharge Plan A Home with family     Discharge Plan B Home with family     DME Needed Upon Discharge  BIPAP     Discharge Barriers Identified None     Why the patient remains in the hospital --   [facility] auth       Post-Acute Status    Post-Acute Authorization [facility] Status Pending payor review     Coverage BCBS     Other Status No Post-Acute Service Needs     Discharge Delays Post-Acute Set-up

## 2022-05-24 NOTE — PLAN OF CARE
[facility] - Telemetry  Discharge Final Note    Primary Care Provider: Kuldeep Miller MD    Expected Discharge Date: 5/24/2022     All needs met. Appointments scheduled. SW notified nurse Adeline that patient is ready for discharge from case management standpoint.     Final Discharge Note (most recent)     Final Note - 05/24/22 0714        Final Note    Assessment Type Final Discharge Note     Anticipated Discharge Disposition Home or Self Care     What phone number can be called within the next 1-3 days to see how you are doing after discharge? [phone]     Hospital Resources/Appts/Education Provided Appointments scheduled and added to AVS;Appointments scheduled by Navigator/Coordinator        Post-Acute Status    Post-Acute Authorization Other     Coverage BCBS     Other Status No Post-Acute Service Needs     Discharge Delays None known at this time                 Important Message from Medicare

## 2022-05-24 NOTE — SUBJECTIVE & OBJECTIVE
Interval History:  Wants to go home. Comfortable on RA      Review of Systems   Constitutional:  Negative for activity change and appetite change.   HENT:  Negative for congestion and dental problem.    Eyes:  Negative for discharge and itching.   Respiratory:  Negative for apnea.    Cardiovascular:  Negative for chest pain.   Gastrointestinal:  Negative for abdominal distention and abdominal pain.   Endocrine: Negative for cold intolerance.   Genitourinary:  Negative for difficulty urinating and dyspareunia.   Musculoskeletal:  Negative for arthralgias and back pain.   Neurological:  Negative for dizziness and facial asymmetry.   Psychiatric/Behavioral:  Negative for agitation and behavioral problems.    Objective:     Vital Signs (Most Recent):  Temp: 97.8 °F (36.6 °C) (05/24/22 0410)  Pulse: 72 (05/24/22 0410)  Resp: 18 (05/24/22 0410)  BP: 127/68 (05/24/22 0410)  SpO2: 96 % (05/24/22 0410) Vital Signs (24h Range):  Temp:  [97.6 °F (36.4 °C)-98.5 °F (36.9 °C)] 97.8 °F (36.6 °C)  Pulse:  [65-84] 72  Resp:  [18-20] 18  SpO2:  [95 %-100 %] 96 %  BP: (109-127)/(68-79) 127/68     Weight: 123.6 kg (272 lb 7.8 oz)  Body mass index is 46.77 kg/m².    Intake/Output Summary (Last 24 hours) at 5/24/2022 0645  Last data filed at 5/23/2022 1200  Gross per 24 hour   Intake --   Output 500 ml   Net -500 ml      Physical Exam  Vitals and nursing note reviewed.   Constitutional:       General: She is not in acute distress.     Appearance: Normal appearance. She is not ill-appearing or toxic-appearing.   HENT:      Head: Normocephalic and atraumatic.   Eyes:      Conjunctiva/sclera: Conjunctivae normal.   Neck:      Vascular: No carotid bruit.   Cardiovascular:      Rate and Rhythm: Normal rate and regular rhythm.   Pulmonary:      Effort: Pulmonary effort is normal. No respiratory distress.   Abdominal:      General: Bowel sounds are normal. There is no distension.      Tenderness: There is no abdominal tenderness.   Skin:      General: Skin is warm and dry.   Neurological:      Mental Status: She is alert and oriented to person, place, and time.   Psychiatric:         Mood and Affect: Mood normal.         Behavior: Behavior normal.       Significant Labs: All pertinent labs within the past 24 hours have been reviewed.  BMP:   Recent Labs   Lab 05/24/22  0500         K 3.7   CL 96   CO2 28   BUN 48*   CREATININE 1.3   CALCIUM 9.6     CBC: No results for input(s): WBC, HGB, HCT, PLT in the last 48 hours.    Significant Imaging:

## 2022-05-24 NOTE — NURSING
Pt lying in bed awake and alert. No pain at this time. Bed locked in lowest position. Call light within reach.

## 2022-05-24 NOTE — ASSESSMENT & PLAN NOTE
Patient admitted with Hypercapnic and Hypoxic which is Chronic.  she is not on home oxygen. Signs/symptoms of respiratory failure include- increased work of breathing present on admission. This has resolved after BiPAP.   - Labs and images were reviewed. Patient Has recent ABG, which has been reviewed..   - Supplemental oxygen was provided and noted- Nasal Cannula 1 LPM   - Respiratory failure is due to- CHF, COPD and HIEU/suspected OHS   - CHF: follow up TTE, continue lasix. Discussed need for lasix adherence  - COPD: continue steroids/nebs/levofloxacin. Discussed need for smoking cessation  - HIEU: Pulm consult- will order CPAP vs BiPAP for home use  - Pulm follow up on discharge     Changing to po lasix today.  Home likely on 5/23.     Home today

## 2022-05-24 NOTE — TELEPHONE ENCOUNTER
Called patient and changed appointment to a virtual.            ----- Message from Reno Mauricio sent at 5/23/2022  6:47 PM CDT -----       Type: Patient Returning Call    Who Called: Pt  Who Left Message for Patient: NA  Does the patient know what this is regarding?: Patient needs a call back before appointment on 05/24 at 2PM. She is currently in the hospital and she was not told that she would be receiving transportation to the provider's office. She wants to confirm.   Would the patient rather a call back or a response via [facility]? Call  Best Call Back Number: [phone]  Additional Information: Please assist, thank you!

## 2022-05-24 NOTE — NURSING
Bedside report given to day nurse. Pt has no sign of distress. Safety precautions are maintained with bed alarm set, bed in lowest position, bed wheels locked, and call light in reach. Chart check completed.

## 2022-05-24 NOTE — PLAN OF CARE
Dory from [facility] informed SW that she submitted for auth. Dory stated that it takes 48 hours to get a determination. SW notified Dr. Kovacs via secure chat. SW also notified patient that she is not discharging today. SW explained that she can't leave until determination is made about Bipap by [employer]BS.        05/24/22 1524   Post-Acute Status   Post-Acute Authorization [facility] Status Pending payor review   Coverage BCBS   Other Status No Post-Acute Service Needs   Hospital Resources/Appts/Education Provided Appointments scheduled and added to AVS;Appointments scheduled by Navigator/Coordinator   Discharge Delays (!) Post-Acute Set-up   Discharge Plan   Discharge Plan A Home with family   Discharge Plan B Home with family

## 2022-05-24 NOTE — PLAN OF CARE
MATIAS sent message to Dory at [facility] to follow up on Bipap order.     9:50 am    Dory messaged MATIAS to inform that Bipap order needs a Humidifier, Water Chamber, Filter and Chinstrap have to be added to the order  Also the ABG isn't good. Dory stated that the one done 4 days ago doesn't qualify. It needs to be done arterial on room air. The last one that would qualify was done 8 months ago so I will need to do a new one. Dory also stated that chronic respiratory failure with hypercapnia also has to be included in progress note. MATIAS notified Dr. Kovacs via secure chat.

## 2022-05-24 NOTE — PROGRESS NOTES
[facility] Medicine  Progress Note    Patient Name: Sandee Evans  MRN: [id number]  Patient Class: IP- Inpatient   Admission Date: 5/20/2022  Length of Stay: 4 days  Attending Physician: Shaq Kovacs MD  Primary Care Provider: Kuldeep Miller MD        Subjective:     Principal Problem:Acute respiratory failure with hypoxia and hypercarbia        HPI:  66 y.o. female PMHx HIEU, ?COPD vs Asthma, CHF, Afib on Xarelto, HTN presents with presents with increased fatigue and AMS x 2-3 days.  Symptoms have, per family, progressively getting worse. Patient has SOB for the last few months also appears to be worsening.  reports patient felt nauseas after eating and reports one episode of diarrhea yesterday. Also endorses the use of at home breathing treatments and inhaler. Reports she is fully immunized against COVID-19.      ED course: WBC and lactic acid normal.  Elevated d dimer, procal and BNP. CXR and CTH unremarkable.Patient refused UA. VBG shows evidence of hypercapnia with ph7.3 pCOx 72. Patient was started on bipap by ED MD.       Admitted to Hospital Medicine for further evaluation      Overview/Hospital Course:  Ms Sandee Evans is a 66 y.o. woman admitted with acute hypoxic/hypercapnic respiratory failure due to acute on chronic diastolic CHF, exacerbation of COPD, and untreated HIEU. She initially required BiPAP but was quickly weaned to O2 by NC. She states that she sometimes skips lasix doses due to urinary incontinence. She does not have BiPAP at home. She is still using tobacco. Started IV lasix for CHF exacerbation, steroids/nebs/levofloxacin for COPD exacerbation. Stepped down to floor on 5/21/22. Patient grew out E-coli on blood cultures- (S) to Cipro. Patient clinically improved and was discharged to home with out patient PT/OT on 5/24/22. Bi-pap will be arranged. Activity as tolerated. Diet- low NA. Already has pulmonary follow up.         Interval History:  Wants to  go home. Comfortable on RA      Review of Systems   Constitutional:  Negative for activity change and appetite change.   HENT:  Negative for congestion and dental problem.    Eyes:  Negative for discharge and itching.   Respiratory:  Negative for apnea.    Cardiovascular:  Negative for chest pain.   Gastrointestinal:  Negative for abdominal distention and abdominal pain.   Endocrine: Negative for cold intolerance.   Genitourinary:  Negative for difficulty urinating and dyspareunia.   Musculoskeletal:  Negative for arthralgias and back pain.   Neurological:  Negative for dizziness and facial asymmetry.   Psychiatric/Behavioral:  Negative for agitation and behavioral problems.    Objective:     Vital Signs (Most Recent):  Temp: 97.8 °F (36.6 °C) (05/24/22 0410)  Pulse: 72 (05/24/22 0410)  Resp: 18 (05/24/22 0410)  BP: 127/68 (05/24/22 0410)  SpO2: 96 % (05/24/22 0410) Vital Signs (24h Range):  Temp:  [97.6 °F (36.4 °C)-98.5 °F (36.9 °C)] 97.8 °F (36.6 °C)  Pulse:  [65-84] 72  Resp:  [18-20] 18  SpO2:  [95 %-100 %] 96 %  BP: (109-127)/(68-79) 127/68     Weight: 123.6 kg (272 lb 7.8 oz)  Body mass index is 46.77 kg/m².    Intake/Output Summary (Last 24 hours) at 5/24/2022 0645  Last data filed at 5/23/2022 1200  Gross per 24 hour   Intake --   Output 500 ml   Net -500 ml      Physical Exam  Vitals and nursing note reviewed.   Constitutional:       General: She is not in acute distress.     Appearance: Normal appearance. She is not ill-appearing or toxic-appearing.   HENT:      Head: Normocephalic and atraumatic.   Eyes:      Conjunctiva/sclera: Conjunctivae normal.   Neck:      Vascular: No carotid bruit.   Cardiovascular:      Rate and Rhythm: Normal rate and regular rhythm.   Pulmonary:      Effort: Pulmonary effort is normal. No respiratory distress.   Abdominal:      General: Bowel sounds are normal. There is no distension.      Tenderness: There is no abdominal tenderness.   Skin:     General: Skin is warm and dry.    Neurological:      Mental Status: She is alert and oriented to person, place, and time.   Psychiatric:         Mood and Affect: Mood normal.         Behavior: Behavior normal.       Significant Labs: All pertinent labs within the past 24 hours have been reviewed.  BMP:   Recent Labs   Lab 05/24/22  0500         K 3.7   CL 96   CO2 28   BUN 48*   CREATININE 1.3   CALCIUM 9.6     CBC: No results for input(s): WBC, HGB, HCT, PLT in the last 48 hours.    Significant Imaging:       Assessment/Plan:      * Acute respiratory failure with hypoxia and hypercarbia  Patient admitted with Hypercapnic and Hypoxic which is Chronic.  she is not on home oxygen. Signs/symptoms of respiratory failure include- increased work of breathing present on admission. This has resolved after BiPAP.   - Labs and images were reviewed. Patient Has recent ABG, which has been reviewed..   - Supplemental oxygen was provided and noted- Nasal Cannula 1 LPM   - Respiratory failure is due to- CHF, COPD and HIEU/suspected OHS   - CHF: follow up TTE, continue lasix. Discussed need for lasix adherence  - COPD: continue steroids/nebs/levofloxacin. Discussed need for smoking cessation  - HIEU: Pulm consult- will order CPAP vs BiPAP for home use  - Pulm follow up on discharge     Changing to po lasix today.  Home likely on 5/23.     Home today         Dysuria  Check UA, especially in setting of fever and elevated procal    COPD (chronic obstructive pulmonary disease)  Admitted with shortness of breath, fever  With acute hypoxic/hypercapnic respiratory failure requiring BiPAP on admit.   Last PFTs 9/2021 with obstructive and restrictive physiology  CXR no infiltrate  Started prednisone 40mg daily x5 days, nebs, levofloxacin x5 days  Now on 2L NC. Continue BiPAP QHS  Encouraged smoking cessation  Pulmonary follow up on discharge        Pulmonary hypertension  Group 2 and 3 PHTN  Repeat TTE      Acute on chronic diastolic congestive heart  failure  Patient admitted with shortness of breath, edema consistent with acute on chronic diastolic CHF. Cause of exacerbation is missed lasix doses    BNP  Recent Labs   Lab 05/20/22  1615   *     CXR reviewed  Recent Labs   Lab 05/20/22  1615   TROPONINI <0.006     EKG personally reviewed and shows AFib  Started CHF pathway  Start on IV lasix diuresis. Monitor ins and outs  TTE pending   Continue Bb, ARB  Nutrition consulted for low salt cardiac diet education  Discussed need to take lasix on discharge   Cardiology follow up on discharge      HIEU (obstructive sleep apnea)  Does not currently have CPAP at home  Needs this prior to discharge as it contributes to her presentation with hypoxic/hypercapnic respiratory failure  Will discuss settings with Dr Puente today and place order. Case management will need to arrange this prior to discharge.     Tobacco dependence  Patient was counseled on smoking cessation for 4 minutes. We discussed how smoking is detrimental to the patient's health, specifically her respiratory failure.     Type 2 diabetes mellitus without complication  A1c:   Lab Results   Component Value Date    HGBA1C 6.3 (H) 09/21/2021   Well controlled    Meds: SSI PRN to maintain goal 140-180  ADA diet, accuchecks, hypoglycemic protocol      Longstanding persistent atrial fibrillation  Patient with Persistent (7 days or more) atrial fibrillation which is rate controlled currently with Beta Blocker. Patient is currently in atrial fibrillation. [id number] Score: 4. Anticoagulation indicated. Anticoagulation done with Xarelto.          Severe obesity (BMI >= 40)  Body mass index is 47.38 kg/m². Morbid obesity complicates all aspects of disease management from diagnostic modalities to treatment. Weight loss encouraged and health benefits explained to patient.   - contributes to HIEU  - contributes to restrictive physiology seen on prior PFTs      Essential hypertension  BP is currently well  controlled  Continue home losartan       chronic respiratory failure with hypercapnia- arranging Bi-pap  VTE Risk Mitigation (From admission, onward)         Ordered     rivaroxaban tablet 20 mg  Daily         05/20/22 2210     IP VTE HIGH RISK PATIENT  Once         05/20/22 2210     Place sequential compression device  Until discontinued         05/20/22 2210                Discharge Planning   JACQUELINE: 5/24/2022     Code Status: Full Code   Is the patient medically ready for discharge?:     Reason for patient still in hospital (select all that apply): Patient unstable  Discharge Plan A: Home with family (Follow-up)                  Shaq Bautista MD  Department of Hospital Medicine   [facility]

## 2022-05-24 NOTE — DISCHARGE SUMMARY
[facility] Medicine  Discharge Summary      Patient Name: Sandee Evans  MRN: [id number]  Patient Class: IP- Inpatient  Admission Date: 5/20/2022  Hospital Length of Stay: 4 days  Discharge Date and Time:  5/26/22 505 pm  Attending Physician: Shaq Kovacs MD   Discharging Provider: Shaq Kovacs MD  Primary Care Provider: Kuldeep Miller MD      HPI:   66 y.o. female PMHx HIEU, ?COPD vs Asthma, CHF, Afib on Xarelto, HTN presents with presents with increased fatigue and AMS x 2-3 days.  Symptoms have, per family, progressively getting worse. Patient has SOB for the last few months also appears to be worsening.  reports patient felt nauseas after eating and reports one episode of diarrhea yesterday. Also endorses the use of at home breathing treatments and inhaler. Reports she is fully immunized against COVID-19.      ED course: WBC and lactic acid normal.  Elevated d dimer, procal and BNP. CXR and CTH unremarkable.Patient refused UA. VBG shows evidence of hypercapnia with ph7.3 pCOx 72. Patient was started on bipap by ED MD.       Admitted to Hospital Medicine for further evaluation      * No surgery found *      Hospital Course:   Ms Sandee Evans is a 66 y.o. woman admitted with acute hypoxic/hypercapnic respiratory failure due to acute on chronic diastolic CHF, exacerbation of COPD, and untreated HIEU. She initially required BiPAP but was quickly weaned to O2 by NC. She states that she sometimes skips lasix doses due to urinary incontinence. She does not have BiPAP at home. She is still using tobacco. Started IV lasix for CHF exacerbation, steroids/nebs/levofloxacin for COPD exacerbation. Stepped down to floor on 5/21/22. Patient grew out E-coli on blood cultures- (S) to Cipro. Patient clinically improved and was discharged to home with out patient PT/OT on 5/24/22. Bi-pap will be arranged. Activity as tolerated. Diet- low NA/ADA 2000 yang diet    Already has pulmonary  follow up.          Goals of Care Treatment Preferences:  Code Status: Full Code      Consults:   Consults (From admission, onward)        Status Ordering Provider     Inpatient consult to Pulmonology  Once        Provider:  Sebastian Puente MD    Completed NATHALY MORGAN     Inpatient consult to Social Work  Once        Provider:  (Not yet assigned)    Completed NATHALY MORGAN     Inpatient consult to Social Work/Case Management  Once        Provider:  (Not yet assigned)    Completed EVELINE HU     Inpatient consult to Registered Dietitian/Nutritionist  Once        Provider:  (Not yet assigned)    Completed EVELINE HU          No new Assessment & Plan notes have been filed under this hospital service since the last note was generated.  Service: Hospital Medicine    Final Active Diagnoses:    Diagnosis Date Noted POA    COPD (chronic obstructive pulmonary disease) [J44.9] 08/02/2021 Yes    Pulmonary hypertension [I27.20] 07/20/2021 Yes    HIEU (obstructive sleep apnea) [G47.33] 09/03/2020 Yes    Tobacco dependence [F17.200] 09/03/2020 Yes    Type 2 diabetes mellitus without complication [E11.9] 05/08/2017 Yes    Longstanding persistent atrial fibrillation [I48.11] 07/20/2015 Yes    Essential hypertension [I10] 01/11/2013 Yes    Severe obesity (BMI >= 40) [E66.01]  Yes      Problems Resolved During this Admission:    Diagnosis Date Noted Date Resolved POA    PRINCIPAL PROBLEM:  Acute respiratory failure with hypoxia and hypercarbia [J96.01, J96.02] 05/20/2022 05/24/2022 Yes    Dysuria [R30.0] 05/20/2022 05/24/2022 Yes    Acute on chronic diastolic congestive heart failure [I50.33] 07/16/2021 05/24/2022 Yes       Discharged Condition: good    Disposition: Home or Self Care    Follow Up:   Follow-up Information     Kuldeep Miller MD Follow up in 1 week(s).    Specialty: Family Medicine  Contact information:  [address] [place] [address] [phone]                        Patient Instructions:      Ambulatory referral/consult to Physical/Occupational Therapy   Standing Status: Future   Referral Priority: Routine Referral Type: Physical Medicine   Referral Reason: Specialty Services Required   Number of Visits Requested: 1       Significant Diagnostic Studies:     Pending Diagnostic Studies:     None         Medications:  Reconciled Home Medications:      Medication List      CONTINUE taking these medications    albuterol 90 mcg/actuation inhaler  Commonly known as: PROVENTIL/VENTOLIN HFA  Inhale 2 puffs into the lungs every 6 (six) hours as needed for Wheezing or Shortness of Breath. Rescue     azelastine 137 mcg (0.1 %) nasal spray  Commonly known as: ASTELIN  1 spray (137 mcg total) by Nasal route 2 (two) times daily.     CHANTIX STARTING MONTH BOX 0.5 mg (11)- 1 mg (42) tablet  Generic drug: varenicline  Follow package directions.     famotidine 20 MG tablet  Commonly known as: PEPCID  Take 1 tablet (20 mg total) by mouth 2 (two) times daily as needed.     fluticasone propionate 50 mcg/actuation nasal spray  Commonly known as: FLONASE  SHAKE LIQUID AND USE 2 SPRAYS(100 MCG) IN EACH NOSTRIL EVERY DAY     metoprolol succinate 100 MG 24 hr tablet  Commonly known as: TOPROL-XL  TAKE 1 TABLET(100 MG) BY MOUTH EVERY DAY     nicotine (polacrilex) 2 mg Gum  Commonly known as: NICORETTE  Use 1-2 per hour in place of a cigarette. Limit to 10 a day - oral. .     ondansetron 8 MG Tbdl  Commonly known as: ZOFRAN-ODT  Take 1 tablet (8 mg total) by mouth every 6 (six) hours as needed (nausea).     oxyCODONE-acetaminophen 5-325 mg per tablet  Commonly known as: PERCOCET  Take 1 tablet by mouth every 4 (four) hours as needed.     SPIRIVA RESPIMAT 2.5 mcg/actuation inhaler  Generic drug: tiotropium bromide  INHALE 2 PUFFS INTO THE LUNGS DAILY     triamcinolone acetonide 0.1% 0.1 % ointment  Commonly known as: KENALOG  APPLY BETWEEN KNEES AND UPPER THIGHS TWICE DAILY FOR NO MORE THAN 7-14 DAYS         STOP taking these medications    furosemide 40 MG tablet  Commonly known as: LASIX        ASK your doctor about these medications    ciprofloxacin HCl 500 MG tablet  Commonly known as: CIPRO  Take 1 tablet (500 mg total) by mouth every 12 (twelve) hours. for 14 days  Ask about: Should I take this medication?     predniSONE 20 MG tablet  Commonly known as: DELTASONE  Take 1 tablet (20 mg total) by mouth once daily. for 3 days  Ask about: Should I take this medication?            Indwelling Lines/Drains at time of discharge:   Lines/Drains/Airways     None                 The patient location is: [address]      Visit type: audiovisual      VIRTUAL TELENOTE        [facility]    Present with the patient at the time of the telemed/virtual assessment:       The attending portion of this evaluation, treatment, and documentation was performed per Christian Lopez MD via Telemedicine AudioVisual using the secure [employer] software platform with 2 way audio/video. The provider was located off-site and the patient is located in the hospital. The aforementioned video software was utilized to document the relevant history and physical exam.        Time spent on the discharge of patient: > 35 minutes         Christian Lopez MD  Department of Hospital Medicine  [facility]

## 2022-05-24 NOTE — PLAN OF CARE
MATIAS spoke with Katt in Respiratory. Katt informed MATIAS that she was currently with patient drawing ABG's.

## 2022-05-25 LAB
ANION GAP SERPL CALC-SCNC: 8 MMOL/L (ref 8–16)
BILIRUB UR QL STRIP: NEGATIVE
BUN SERPL-MCNC: 45 MG/DL (ref 8–23)
CALCIUM SERPL-MCNC: 9.1 MG/DL (ref 8.7–10.5)
CHLORIDE SERPL-SCNC: 95 MMOL/L (ref 95–110)
CLARITY UR: CLEAR
CO2 SERPL-SCNC: 38 MMOL/L (ref 23–29)
COLOR UR: COLORLESS
CREAT SERPL-MCNC: 1.1 MG/DL (ref 0.5–1.4)
EST. GFR  (AFRICAN AMERICAN): 60 ML/MIN/1.73 M^2
EST. GFR  (NON AFRICAN AMERICAN): 52 ML/MIN/1.73 M^2
GLUCOSE SERPL-MCNC: 103 MG/DL (ref 70–110)
GLUCOSE UR QL STRIP: NEGATIVE
HGB UR QL STRIP: NEGATIVE
KETONES UR QL STRIP: NEGATIVE
LEUKOCYTE ESTERASE UR QL STRIP: NEGATIVE
NITRITE UR QL STRIP: NEGATIVE
PH UR STRIP: 5 [PH] (ref 5–8)
POCT GLUCOSE: 143 MG/DL (ref 70–110)
POCT GLUCOSE: 180 MG/DL (ref 70–110)
POCT GLUCOSE: 238 MG/DL (ref 70–110)
POCT GLUCOSE: 96 MG/DL (ref 70–110)
POTASSIUM SERPL-SCNC: 3.5 MMOL/L (ref 3.5–5.1)
PROT UR QL STRIP: NEGATIVE
SODIUM SERPL-SCNC: 141 MMOL/L (ref 136–145)
SP GR UR STRIP: 1.01 (ref 1–1.03)
URN SPEC COLLECT METH UR: ABNORMAL
UROBILINOGEN UR STRIP-ACNC: NEGATIVE EU/DL

## 2022-05-25 PROCEDURE — 81003 URINALYSIS AUTO W/O SCOPE: CPT | Performed by: HOSPITALIST

## 2022-05-25 PROCEDURE — 25000003 PHARM REV CODE 250: Performed by: HOSPITALIST

## 2022-05-25 PROCEDURE — 80048 BASIC METABOLIC PNL TOTAL CA: CPT | Performed by: EMERGENCY MEDICINE

## 2022-05-25 PROCEDURE — 99900035 HC TECH TIME PER 15 MIN (STAT)

## 2022-05-25 PROCEDURE — 25000003 PHARM REV CODE 250: Performed by: INTERNAL MEDICINE

## 2022-05-25 PROCEDURE — 94660 CPAP INITIATION&MGMT: CPT

## 2022-05-25 PROCEDURE — 21400001 HC TELEMETRY ROOM

## 2022-05-25 PROCEDURE — 63600175 PHARM REV CODE 636 W HCPCS: Performed by: INTERNAL MEDICINE

## 2022-05-25 PROCEDURE — 36415 COLL VENOUS BLD VENIPUNCTURE: CPT | Performed by: EMERGENCY MEDICINE

## 2022-05-25 PROCEDURE — 63600175 PHARM REV CODE 636 W HCPCS: Performed by: HOSPITALIST

## 2022-05-25 PROCEDURE — 94640 AIRWAY INHALATION TREATMENT: CPT

## 2022-05-25 PROCEDURE — 25000003 PHARM REV CODE 250: Performed by: EMERGENCY MEDICINE

## 2022-05-25 RX ORDER — PREDNISONE 20 MG/1
20 TABLET ORAL DAILY
Status: DISCONTINUED | OUTPATIENT
Start: 2022-05-25 | End: 2022-05-26 | Stop reason: HOSPADM

## 2022-05-25 RX ADMIN — METOPROLOL SUCCINATE 100 MG: 50 TABLET, EXTENDED RELEASE ORAL at 08:05

## 2022-05-25 RX ADMIN — ACETAMINOPHEN 650 MG: 325 TABLET ORAL at 12:05

## 2022-05-25 RX ADMIN — TIOTROPIUM BROMIDE INHALATION SPRAY 2 PUFF: 3.12 SPRAY, METERED RESPIRATORY (INHALATION) at 07:05

## 2022-05-25 RX ADMIN — CIPROFLOXACIN 500 MG: 500 TABLET, FILM COATED ORAL at 09:05

## 2022-05-25 RX ADMIN — ATORVASTATIN CALCIUM 40 MG: 40 TABLET, FILM COATED ORAL at 08:05

## 2022-05-25 RX ADMIN — PREDNISONE 20 MG: 20 TABLET ORAL at 12:05

## 2022-05-25 RX ADMIN — TRAZODONE HYDROCHLORIDE 50 MG: 50 TABLET ORAL at 11:05

## 2022-05-25 RX ADMIN — FLUTICASONE FUROATE AND VILANTEROL TRIFENATATE 1 PUFF: 100; 25 POWDER RESPIRATORY (INHALATION) at 07:05

## 2022-05-25 RX ADMIN — PREDNISONE 40 MG: 20 TABLET ORAL at 08:05

## 2022-05-25 RX ADMIN — SERTRALINE HYDROCHLORIDE 100 MG: 50 TABLET ORAL at 08:05

## 2022-05-25 RX ADMIN — CIPROFLOXACIN 500 MG: 500 TABLET, FILM COATED ORAL at 08:05

## 2022-05-25 RX ADMIN — LOSARTAN POTASSIUM 25 MG: 25 TABLET, FILM COATED ORAL at 08:05

## 2022-05-25 RX ADMIN — FUROSEMIDE 40 MG: 40 TABLET ORAL at 06:05

## 2022-05-25 RX ADMIN — FUROSEMIDE 40 MG: 40 TABLET ORAL at 08:05

## 2022-05-25 RX ADMIN — RIVAROXABAN 20 MG: 20 TABLET, FILM COATED ORAL at 08:05

## 2022-05-25 NOTE — PLAN OF CARE
MATIAS followed up with Dory at [facility] in regard to Bipap for patient. MATIAS inquired if she spoke with patient about paying for Bipap out of pocket. Dory stated that patient said that she would like to wait to see what the insurance company says before paying for it. Dory stated that patient told her that $3900 is a lot of money.

## 2022-05-25 NOTE — NURSING
Bedside report given to day nurse. Pt resting in bed on BiPaP, has no sign of distress. Safety precautions are maintained with bed alarm set, bed in lowest position, bed wheels locked, and call light in reach. Chart check completed.

## 2022-05-25 NOTE — PLAN OF CARE
Problem: Adult Inpatient Plan of Care  Goal: Plan of Care Review  Outcome: Ongoing, Progressing  Goal: Patient-Specific Goal (Individualized)  Outcome: Ongoing, Progressing  Goal: Absence of Hospital-Acquired Illness or Injury  Outcome: Ongoing, Progressing  Intervention: Identify and Manage Fall Risk  Flowsheets (Taken 5/25/2022 0607)  Safety Promotion/Fall Prevention:   assistive device/personal item within reach   side rails raised x 2  Intervention: Prevent Skin Injury  Flowsheets (Taken 5/25/2022 0607)  Body Position: supine  Skin Protection: transparent dressing maintained  Intervention: Prevent and Manage VTE (Venous Thromboembolism) Risk  Flowsheets (Taken 5/25/2022 0607)  Activity Management: Up in chair - L3  Range of Motion: active ROM (range of motion) encouraged  Intervention: Prevent Infection  Flowsheets (Taken 5/25/2022 0607)  Infection Prevention: hand hygiene promoted  Goal: Optimal Comfort and Wellbeing  Outcome: Ongoing, Progressing  Intervention: Monitor Pain and Promote Comfort  Flowsheets (Taken 5/25/2022 0607)  Pain Management Interventions: quiet environment facilitated  Intervention: Provide Person-Centered Care  Flowsheets (Taken 5/25/2022 0607)  Trust Relationship/Rapport:   care explained   emotional support provided   empathic listening provided   questions answered   choices provided  Goal: Readiness for Transition of Care  Outcome: Ongoing, Progressing     Problem: Bariatric Environmental Safety  Goal: Safety Maintained with Care  Outcome: Ongoing, Progressing     Problem: Infection  Goal: Absence of Infection Signs and Symptoms  Outcome: Ongoing, Progressing     Problem: Diabetes Comorbidity  Goal: Blood Glucose Level Within Targeted Range  Outcome: Ongoing, Progressing  Intervention: Monitor and Manage Glycemia  Flowsheets (Taken 5/25/2022 0607)  Glycemic Management: blood glucose monitored     Problem: Fluid and Electrolyte Imbalance (Acute Kidney Injury/Impairment)  Goal: Fluid  and Electrolyte Balance  Outcome: Ongoing, Progressing     Problem: Oral Intake Inadequate (Acute Kidney Injury/Impairment)  Goal: Optimal Nutrition Intake  Outcome: Ongoing, Progressing     Problem: Renal Function Impairment (Acute Kidney Injury/Impairment)  Goal: Effective Renal Function  Outcome: Ongoing, Progressing     Problem: Skin Injury Risk Increased  Goal: Skin Health and Integrity  Outcome: Ongoing, Progressing  Intervention: Optimize Skin Protection  Flowsheets (Taken 5/25/2022 0607)  Pressure Reduction Techniques: frequent weight shift encouraged  Skin Protection: transparent dressing maintained  Head of Bed (HOB) Positioning: HOB elevated

## 2022-05-25 NOTE — PLAN OF CARE
Problem: Adult Inpatient Plan of Care  Goal: Plan of Care Review  Outcome: Ongoing, Progressing  Goal: Patient-Specific Goal (Individualized)  Outcome: Ongoing, Progressing  Goal: Absence of Hospital-Acquired Illness or Injury  Outcome: Ongoing, Progressing  Goal: Optimal Comfort and Wellbeing  Outcome: Ongoing, Progressing  Goal: Readiness for Transition of Care  Outcome: Ongoing, Progressing     Problem: Bariatric Environmental Safety  Goal: Safety Maintained with Care  Outcome: Ongoing, Progressing     Problem: Infection  Goal: Absence of Infection Signs and Symptoms  Outcome: Ongoing, Progressing     Problem: Diabetes Comorbidity  Goal: Blood Glucose Level Within Targeted Range  Outcome: Ongoing, Progressing     Problem: Fluid and Electrolyte Imbalance (Acute Kidney Injury/Impairment)  Goal: Fluid and Electrolyte Balance  Outcome: Ongoing, Progressing

## 2022-05-25 NOTE — PROGRESS NOTES
[facility] Medicine  Progress Note    Patient Name: Sandee Evans  MRN: [id number]  Patient Class: IP- Inpatient   Admission Date: 5/20/2022  Length of Stay: 5 days  Attending Physician: Shaq Kovacs MD  Primary Care Provider: Kuldeep Miller MD        Subjective:     Principal Problem:Acute respiratory failure with hypoxia and hypercarbia        HPI:  66 y.o. female PMHx HIEU, ?COPD vs Asthma, CHF, Afib on Xarelto, HTN presents with presents with increased fatigue and AMS x 2-3 days.  Symptoms have, per family, progressively getting worse. Patient has SOB for the last few months also appears to be worsening.  reports patient felt nauseas after eating and reports one episode of diarrhea yesterday. Also endorses the use of at home breathing treatments and inhaler. Reports she is fully immunized against COVID-19.      ED course: WBC and lactic acid normal.  Elevated d dimer, procal and BNP. CXR and CTH unremarkable.Patient refused UA. VBG shows evidence of hypercapnia with ph7.3 pCOx 72. Patient was started on bipap by ED MD.       Admitted to Hospital Medicine for further evaluation      Overview/Hospital Course:  Ms Sandee Evans is a 66 y.o. woman admitted with acute hypoxic/hypercapnic respiratory failure due to acute on chronic diastolic CHF, exacerbation of COPD, and untreated HIEU. She initially required BiPAP but was quickly weaned to O2 by NC. She states that she sometimes skips lasix doses due to urinary incontinence. She does not have BiPAP at home. She is still using tobacco. Started IV lasix for CHF exacerbation, steroids/nebs/levofloxacin for COPD exacerbation. Stepped down to floor on 5/21/22. Patient grew out E-coli on blood cultures- (S) to Cipro. Patient clinically improved and was discharged to home with out patient PT/OT on 5/24/22. Bi-pap will be arranged. Activity as tolerated. Diet- low NA/ADA 2000 yang diet    Already has pulmonary follow up.          Interval History:  No new issues     Review of Systems   Constitutional:  Negative for activity change and appetite change.   HENT:  Negative for congestion and dental problem.    Eyes:  Negative for discharge and itching.   Respiratory:  Negative for apnea.    Cardiovascular:  Negative for chest pain.   Gastrointestinal:  Negative for abdominal distention and abdominal pain.   Endocrine: Negative for cold intolerance.   Genitourinary:  Negative for difficulty urinating and dyspareunia.   Musculoskeletal:  Negative for arthralgias and back pain.   Neurological:  Negative for dizziness and facial asymmetry.   Psychiatric/Behavioral:  Negative for agitation and behavioral problems.    Objective:     Vital Signs (Most Recent):  Temp: 97.8 °F (36.6 °C) (05/25/22 0756)  Pulse: 79 (05/25/22 0756)  Resp: 18 (05/25/22 0756)  BP: 136/86 (05/25/22 0756)  SpO2: (!) 94 % (05/25/22 0756)   Vital Signs (24h Range):  Temp:  [97 °F (36.1 °C)-98.4 °F (36.9 °C)] 97.8 °F (36.6 °C)  Pulse:  [63-97] 79  Resp:  [16-20] 18  SpO2:  [91 %-99 %] 94 %  BP: (107-161)/(73-92) 136/86     Weight: 123.6 kg (272 lb 7.8 oz)  Body mass index is 46.77 kg/m².    Intake/Output Summary (Last 24 hours) at 5/25/2022 0918  Last data filed at 5/25/2022 0905  Gross per 24 hour   Intake 960 ml   Output 2200 ml   Net -1240 ml      Physical Exam  Vitals and nursing note reviewed.   Constitutional:       General: She is not in acute distress.     Appearance: Normal appearance. She is not ill-appearing or toxic-appearing.   HENT:      Head: Normocephalic and atraumatic.   Eyes:      Conjunctiva/sclera: Conjunctivae normal.   Neck:      Vascular: No carotid bruit.   Cardiovascular:      Rate and Rhythm: Normal rate and regular rhythm.   Pulmonary:      Effort: Pulmonary effort is normal. No respiratory distress.   Abdominal:      General: Bowel sounds are normal. There is no distension.      Tenderness: There is no abdominal tenderness.   Skin:     General:  Skin is warm and dry.   Neurological:      Mental Status: She is alert and oriented to person, place, and time.   Psychiatric:         Mood and Affect: Mood normal.         Behavior: Behavior normal.       Significant Labs: All pertinent labs within the past 24 hours have been reviewed.  BMP:   Recent Labs   Lab 05/25/22  0537         K 3.5   CL 95   CO2 38*   BUN 45*   CREATININE 1.1   CALCIUM 9.1     CBC: No results for input(s): WBC, HGB, HCT, PLT in the last 48 hours.    Significant Imaging:       Assessment/Plan:      COPD (chronic obstructive pulmonary disease)  Admitted with shortness of breath, fever  With acute hypoxic/hypercapnic respiratory failure requiring BiPAP on admit.   Last PFTs 9/2021 with obstructive and restrictive physiology  CXR no infiltrate  Started prednisone 40mg daily x5 days, nebs, levofloxacin x5 days  Now on 2L NC. Continue BiPAP QHS  Encouraged smoking cessation  Pulmonary follow up on discharge        Pulmonary hypertension  Group 2 and 3 PHTN  Repeat TTE      HIEU (obstructive sleep apnea)  Does not currently have CPAP at home  Needs this prior to discharge as it contributes to her presentation with hypoxic/hypercapnic respiratory failure  Will discuss settings with Dr Puente today and place order. Case management will need to arrange this prior to discharge.     Tobacco dependence  Patient was counseled on smoking cessation for 4 minutes. We discussed how smoking is detrimental to the patient's health, specifically her respiratory failure.     Type 2 diabetes mellitus without complication  A1c:   Lab Results   Component Value Date    HGBA1C 6.3 (H) 09/21/2021   Well controlled    Meds: SSI PRN to maintain goal 140-180  ADA diet, accuchecks, hypoglycemic protocol      Longstanding persistent atrial fibrillation  Patient with Persistent (7 days or more) atrial fibrillation which is rate controlled currently with Beta Blocker. Patient is currently in atrial fibrillation.  [id number] Score: 4. Anticoagulation indicated. Anticoagulation done with Xarelto.          Severe obesity (BMI >= 40)  Body mass index is 47.38 kg/m². Morbid obesity complicates all aspects of disease management from diagnostic modalities to treatment. Weight loss encouraged and health benefits explained to patient.   - contributes to HIEU  - contributes to restrictive physiology seen on prior PFTs      Essential hypertension  BP is currently well controlled  Continue home losartan       Acute on chronic respiratory failure- attempted discharge on 5/24 but Bi-pap was not approved by insurance. Will DC to home once we have approval.     VTE Risk Mitigation (From admission, onward)         Ordered     rivaroxaban tablet 20 mg  Daily         05/20/22 2210     IP VTE HIGH RISK PATIENT  Once         05/20/22 2210     Place sequential compression device  Until discontinued         05/20/22 2210                Discharge Planning   JACQUELINE: 5/24/2022     Code Status: Full Code   Is the patient medically ready for discharge?:     Reason for patient still in hospital (select all that apply):   Discharge Plan A: Home with family   Discharge Delays: (!) Post-Acute Set-up              Shaq Bautista MD  Department of Hospital Medicine   [facility] - Telemetry

## 2022-05-25 NOTE — SUBJECTIVE & OBJECTIVE
Interval History:  No new issues     Review of Systems   Constitutional:  Negative for activity change and appetite change.   HENT:  Negative for congestion and dental problem.    Eyes:  Negative for discharge and itching.   Respiratory:  Negative for apnea.    Cardiovascular:  Negative for chest pain.   Gastrointestinal:  Negative for abdominal distention and abdominal pain.   Endocrine: Negative for cold intolerance.   Genitourinary:  Negative for difficulty urinating and dyspareunia.   Musculoskeletal:  Negative for arthralgias and back pain.   Neurological:  Negative for dizziness and facial asymmetry.   Psychiatric/Behavioral:  Negative for agitation and behavioral problems.    Objective:     Vital Signs (Most Recent):  Temp: 97.8 °F (36.6 °C) (05/25/22 0756)  Pulse: 79 (05/25/22 0756)  Resp: 18 (05/25/22 0756)  BP: 136/86 (05/25/22 0756)  SpO2: (!) 94 % (05/25/22 0756)   Vital Signs (24h Range):  Temp:  [97 °F (36.1 °C)-98.4 °F (36.9 °C)] 97.8 °F (36.6 °C)  Pulse:  [63-97] 79  Resp:  [16-20] 18  SpO2:  [91 %-99 %] 94 %  BP: (107-161)/(73-92) 136/86     Weight: 123.6 kg (272 lb 7.8 oz)  Body mass index is 46.77 kg/m².    Intake/Output Summary (Last 24 hours) at 5/25/2022 0918  Last data filed at 5/25/2022 0905  Gross per 24 hour   Intake 960 ml   Output 2200 ml   Net -1240 ml      Physical Exam  Vitals and nursing note reviewed.   Constitutional:       General: She is not in acute distress.     Appearance: Normal appearance. She is not ill-appearing or toxic-appearing.   HENT:      Head: Normocephalic and atraumatic.   Eyes:      Conjunctiva/sclera: Conjunctivae normal.   Neck:      Vascular: No carotid bruit.   Cardiovascular:      Rate and Rhythm: Normal rate and regular rhythm.   Pulmonary:      Effort: Pulmonary effort is normal. No respiratory distress.   Abdominal:      General: Bowel sounds are normal. There is no distension.      Tenderness: There is no abdominal tenderness.   Skin:     General: Skin is  warm and dry.   Neurological:      Mental Status: She is alert and oriented to person, place, and time.   Psychiatric:         Mood and Affect: Mood normal.         Behavior: Behavior normal.       Significant Labs: All pertinent labs within the past 24 hours have been reviewed.  BMP:   Recent Labs   Lab 05/25/22  0537         K 3.5   CL 95   CO2 38*   BUN 45*   CREATININE 1.1   CALCIUM 9.1     CBC: No results for input(s): WBC, HGB, HCT, PLT in the last 48 hours.    Significant Imaging:

## 2022-05-25 NOTE — RESPIRATORY THERAPY
RT WENT TO PLACE PATIENT ON BIPAP. PT STATED THAT SHE WANTED A BATH FIRST. NURSE NOTIFIED. RT TOLD PATIENT TO HAVE NURSE CALL WHEN SHE WAS READY  TO GO ON BIPAP.

## 2022-05-26 VITALS
BODY MASS INDEX: 48.05 KG/M2 | HEART RATE: 69 BPM | WEIGHT: 271.19 LBS | DIASTOLIC BLOOD PRESSURE: 78 MMHG | TEMPERATURE: 98 F | OXYGEN SATURATION: 95 % | HEIGHT: 63 IN | SYSTOLIC BLOOD PRESSURE: 121 MMHG | RESPIRATION RATE: 20 BRPM

## 2022-05-26 LAB
ANION GAP SERPL CALC-SCNC: 8 MMOL/L (ref 8–16)
BUN SERPL-MCNC: 54 MG/DL (ref 8–23)
CALCIUM SERPL-MCNC: 9.6 MG/DL (ref 8.7–10.5)
CHLORIDE SERPL-SCNC: 93 MMOL/L (ref 95–110)
CO2 SERPL-SCNC: 40 MMOL/L (ref 23–29)
CREAT SERPL-MCNC: 1.5 MG/DL (ref 0.5–1.4)
EST. GFR  (AFRICAN AMERICAN): 41 ML/MIN/1.73 M^2
EST. GFR  (NON AFRICAN AMERICAN): 36 ML/MIN/1.73 M^2
GLUCOSE SERPL-MCNC: 135 MG/DL (ref 70–110)
POCT GLUCOSE: 133 MG/DL (ref 70–110)
POCT GLUCOSE: 171 MG/DL (ref 70–110)
POTASSIUM SERPL-SCNC: 4.6 MMOL/L (ref 3.5–5.1)
SODIUM SERPL-SCNC: 141 MMOL/L (ref 136–145)

## 2022-05-26 PROCEDURE — 94660 CPAP INITIATION&MGMT: CPT

## 2022-05-26 PROCEDURE — 99900035 HC TECH TIME PER 15 MIN (STAT)

## 2022-05-26 PROCEDURE — 25000003 PHARM REV CODE 250: Performed by: EMERGENCY MEDICINE

## 2022-05-26 PROCEDURE — 25000003 PHARM REV CODE 250: Performed by: INTERNAL MEDICINE

## 2022-05-26 PROCEDURE — 94640 AIRWAY INHALATION TREATMENT: CPT

## 2022-05-26 PROCEDURE — 36415 COLL VENOUS BLD VENIPUNCTURE: CPT | Performed by: EMERGENCY MEDICINE

## 2022-05-26 PROCEDURE — 25000003 PHARM REV CODE 250: Performed by: HOSPITALIST

## 2022-05-26 PROCEDURE — 80048 BASIC METABOLIC PNL TOTAL CA: CPT | Performed by: EMERGENCY MEDICINE

## 2022-05-26 PROCEDURE — 63600175 PHARM REV CODE 636 W HCPCS: Performed by: INTERNAL MEDICINE

## 2022-05-26 RX ORDER — PREDNISONE 20 MG/1
20 TABLET ORAL DAILY
Qty: 3 TABLET | Refills: 0 | Status: SHIPPED | OUTPATIENT
Start: 2022-05-26 | End: 2022-05-29

## 2022-05-26 RX ORDER — FUROSEMIDE 40 MG/1
40 TABLET ORAL 2 TIMES DAILY
Qty: 180 TABLET | Refills: 0
Start: 2022-05-26 | End: 2022-06-10 | Stop reason: SDUPTHER

## 2022-05-26 RX ORDER — CIPROFLOXACIN 500 MG/1
500 TABLET ORAL EVERY 12 HOURS
Qty: 28 TABLET | Refills: 0 | Status: SHIPPED | OUTPATIENT
Start: 2022-05-26 | End: 2022-06-10

## 2022-05-26 RX ADMIN — ATORVASTATIN CALCIUM 40 MG: 40 TABLET, FILM COATED ORAL at 09:05

## 2022-05-26 RX ADMIN — RIVAROXABAN 20 MG: 20 TABLET, FILM COATED ORAL at 09:05

## 2022-05-26 RX ADMIN — SODIUM CHLORIDE 1000 ML: 0.9 INJECTION, SOLUTION INTRAVENOUS at 02:05

## 2022-05-26 RX ADMIN — PREDNISONE 20 MG: 20 TABLET ORAL at 09:05

## 2022-05-26 RX ADMIN — CIPROFLOXACIN 500 MG: 500 TABLET, FILM COATED ORAL at 09:05

## 2022-05-26 RX ADMIN — TIOTROPIUM BROMIDE INHALATION SPRAY 2 PUFF: 3.12 SPRAY, METERED RESPIRATORY (INHALATION) at 08:05

## 2022-05-26 RX ADMIN — LOSARTAN POTASSIUM 25 MG: 25 TABLET, FILM COATED ORAL at 09:05

## 2022-05-26 RX ADMIN — FUROSEMIDE 40 MG: 40 TABLET ORAL at 09:05

## 2022-05-26 RX ADMIN — SERTRALINE HYDROCHLORIDE 100 MG: 50 TABLET ORAL at 09:05

## 2022-05-26 RX ADMIN — FLUTICASONE FUROATE AND VILANTEROL TRIFENATATE 1 PUFF: 100; 25 POWDER RESPIRATORY (INHALATION) at 08:05

## 2022-05-26 NOTE — PLAN OF CARE
Problem: Adult Inpatient Plan of Care  Goal: Plan of Care Review  Outcome: Met  Goal: Patient-Specific Goal (Individualized)  Outcome: Met  Goal: Absence of Hospital-Acquired Illness or Injury  Outcome: Met  Goal: Optimal Comfort and Wellbeing  Outcome: Met  Goal: Readiness for Transition of Care  Outcome: Met     Problem: Bariatric Environmental Safety  Goal: Safety Maintained with Care  Outcome: Met     Problem: Infection  Goal: Absence of Infection Signs and Symptoms  Outcome: Met

## 2022-05-26 NOTE — PLAN OF CARE
[place] - Telemetry  Discharge Final Note    Primary Care Provider: Kuldeep Miller MD    Expected Discharge Date: 5/26/2022     All needs met. Appointments scheduled. Bipap will be provided by [facility]. MATIAS notified nurse Safia that patient is ready for discharge from case management standpoint. MATIAS notified Safia that Bipap will be delivered in 30 minutes.     Final Discharge Note (most recent)     Final Note - 05/26/22 1414        Final Note    Assessment Type Final Discharge Note     Anticipated Discharge Disposition Home or Self Care     What phone number can be called within the next 1-3 days to see how you are doing after discharge? [phone]     Hospital Resources/Appts/Education Provided Appointments scheduled by Navigator/Coordinator;Appointments scheduled and added to AVS        Post-Acute Status    Post-Acute Authorization HME     HME Status Set-up Complete/Auth obtained     Coverage BCBS     Other Status No Post-Acute Service Needs     Discharge Delays None known at this time                 Important Message from Medicare             Contact Info     [facility]   Specialty: DME Provider    [address] [place] [id number]   Phone: [phone]       Next Steps: Follow up

## 2022-05-26 NOTE — NURSING
Bolus completed, OK per Dr Lopez to DC after bolus (no repeat labs necessary). Discharge instructions given to patient  at bedside. Patient verbalized understanding of instructions. Patient states willingness to comply. Saline lock removed. Tele monitoring removed. Patient says that her  will be coming at approx 5 pm to get her.

## 2022-05-26 NOTE — CONSULTS
Thank you for your consult to [facility]. We have reviewed the patient chart. This patient does meet criteria for [facility] service at this time. Will assume care on 05/26/22 at 6AM     Andria Cuevas MD  [facility]

## 2022-05-26 NOTE — NURSING
Patient escorted by RN to family vehicle for discharge home, has BiPAP, meds, all personal belongings and is fully dressed. Patient accompanied by  Corwin. No apparent distress noted.

## 2022-05-26 NOTE — PLAN OF CARE
Problem: Adult Inpatient Plan of Care  Goal: Plan of Care Review  Outcome: Ongoing, Progressing  Goal: Patient-Specific Goal (Individualized)  Outcome: Ongoing, Progressing  Goal: Absence of Hospital-Acquired Illness or Injury  Outcome: Ongoing, Progressing  Goal: Optimal Comfort and Wellbeing  Outcome: Ongoing, Progressing  Goal: Readiness for Transition of Care  Outcome: Ongoing, Progressing     Problem: Bariatric Environmental Safety  Goal: Safety Maintained with Care  Outcome: Ongoing, Progressing     Problem: Diabetes Comorbidity  Goal: Blood Glucose Level Within Targeted Range  Outcome: Ongoing, Progressing     Problem: Fluid and Electrolyte Imbalance (Acute Kidney Injury/Impairment)  Goal: Fluid and Electrolyte Balance  Outcome: Ongoing, Progressing     Problem: Renal Function Impairment (Acute Kidney Injury/Impairment)  Goal: Effective Renal Function  Outcome: Ongoing, Progressing     Problem: Skin Injury Risk Increased  Goal: Skin Health and Integrity  Outcome: Ongoing, Progressing   Remain Afib with PVC on  on telemetry monitor. PRN medication effective for sleep. Explained plan of care,  NO IV placed awaiting D/C. verbalized understanding. Educated pt .on new medicine . No injury during shift, Side rails up x 2, call light by bedside.

## 2022-05-26 NOTE — NURSING
Report given to nurse Lianne who will assume the patient's care. Will pass on to night supervisor that patient needs an iv with the ultrasound. Completed chart check

## 2022-05-26 NOTE — PLAN OF CARE
Patient approved for Bipap per Dory at [facility]. Dory stated that Bipap will be delivered this afternoon.     MATIAS notified SURJIT Contreras via secure chat.      05/26/22 1156   Post-Acute Status   Post-Acute Authorization [facility] Status Set-up Complete/Auth obtained   Coverage BCBS   Other Status No Post-Acute Service Needs   Hospital Resources/Appts/Education Provided Appointments scheduled and added to AVS;Appointments scheduled by Navigator/Coordinator   Discharge Delays None known at this time   Discharge Plan   Discharge Plan A Home with family   Discharge Plan B Home with family

## 2022-05-27 LAB
BACTERIA BLD CULT: NORMAL
BACTERIA BLD CULT: NORMAL

## 2022-06-07 ENCOUNTER — TELEPHONE (OUTPATIENT)
Dept: FAMILY MEDICINE | Facility: CLINIC | Age: 67
End: 2022-06-07
Payer: COMMERCIAL

## 2022-06-07 DIAGNOSIS — Z12.31 ENCOUNTER FOR SCREENING MAMMOGRAM FOR BREAST CANCER: Primary | ICD-10-CM

## 2022-06-07 NOTE — TELEPHONE ENCOUNTER
----- Message from Иван Negrete sent at 6/7/2022  9:12 AM CDT -----   Name of Who is Calling:     What is the request in detail:  patient request call back in reference to mammogram order Please contact to further discuss and advise      Can the clinic reply by [facility]:     What Number to Call Back if not in [facility]:  [phone]

## 2022-06-10 ENCOUNTER — LAB VISIT (OUTPATIENT)
Dept: LAB | Facility: HOSPITAL | Age: 67
End: 2022-06-10
Attending: FAMILY MEDICINE
Payer: COMMERCIAL

## 2022-06-10 ENCOUNTER — OFFICE VISIT (OUTPATIENT)
Dept: FAMILY MEDICINE | Facility: CLINIC | Age: 67
End: 2022-06-10
Payer: COMMERCIAL

## 2022-06-10 VITALS
WEIGHT: 274.69 LBS | RESPIRATION RATE: 16 BRPM | HEIGHT: 63 IN | HEART RATE: 82 BPM | SYSTOLIC BLOOD PRESSURE: 116 MMHG | BODY MASS INDEX: 48.67 KG/M2 | DIASTOLIC BLOOD PRESSURE: 80 MMHG | OXYGEN SATURATION: 95 % | TEMPERATURE: 99 F

## 2022-06-10 DIAGNOSIS — E11.36 TYPE 2 DIABETES MELLITUS WITH DIABETIC CATARACT, WITHOUT LONG-TERM CURRENT USE OF INSULIN: ICD-10-CM

## 2022-06-10 DIAGNOSIS — J96.12 CHRONIC RESPIRATORY FAILURE WITH HYPERCAPNIA: Primary | ICD-10-CM

## 2022-06-10 DIAGNOSIS — I10 ESSENTIAL HYPERTENSION: ICD-10-CM

## 2022-06-10 DIAGNOSIS — N18.31 CHRONIC KIDNEY DISEASE, STAGE 3A: ICD-10-CM

## 2022-06-10 DIAGNOSIS — I73.9 PVD (PERIPHERAL VASCULAR DISEASE): ICD-10-CM

## 2022-06-10 DIAGNOSIS — I50.33 ACUTE ON CHRONIC DIASTOLIC CONGESTIVE HEART FAILURE: ICD-10-CM

## 2022-06-10 DIAGNOSIS — F41.9 ANXIETY: ICD-10-CM

## 2022-06-10 DIAGNOSIS — G47.33 OSA (OBSTRUCTIVE SLEEP APNEA): ICD-10-CM

## 2022-06-10 DIAGNOSIS — G47.00 INSOMNIA, UNSPECIFIED TYPE: ICD-10-CM

## 2022-06-10 DIAGNOSIS — I70.0 AORTIC ATHEROSCLEROSIS: ICD-10-CM

## 2022-06-10 DIAGNOSIS — F32.0 CURRENT MILD EPISODE OF MAJOR DEPRESSIVE DISORDER WITHOUT PRIOR EPISODE: ICD-10-CM

## 2022-06-10 DIAGNOSIS — I48.20 CHRONIC ATRIAL FIBRILLATION: ICD-10-CM

## 2022-06-10 DIAGNOSIS — R80.9 MICROALBUMINURIA: ICD-10-CM

## 2022-06-10 DIAGNOSIS — I25.10 CORONARY ARTERY DISEASE: ICD-10-CM

## 2022-06-10 DIAGNOSIS — R60.0 BILATERAL LOWER EXTREMITY EDEMA: ICD-10-CM

## 2022-06-10 LAB
ANION GAP SERPL CALC-SCNC: 9 MMOL/L (ref 8–16)
BNP SERPL-MCNC: 113 PG/ML (ref 0–99)
BUN SERPL-MCNC: 20 MG/DL (ref 8–23)
CALCIUM SERPL-MCNC: 9.3 MG/DL (ref 8.7–10.5)
CHLORIDE SERPL-SCNC: 104 MMOL/L (ref 95–110)
CO2 SERPL-SCNC: 28 MMOL/L (ref 23–29)
CREAT SERPL-MCNC: 1.1 MG/DL (ref 0.5–1.4)
EST. GFR  (AFRICAN AMERICAN): 59.6 ML/MIN/1.73 M^2
EST. GFR  (NON AFRICAN AMERICAN): 51.7 ML/MIN/1.73 M^2
GLUCOSE SERPL-MCNC: 78 MG/DL (ref 70–110)
POTASSIUM SERPL-SCNC: 4.4 MMOL/L (ref 3.5–5.1)
SODIUM SERPL-SCNC: 141 MMOL/L (ref 136–145)

## 2022-06-10 PROCEDURE — 3044F PR MOST RECENT HEMOGLOBIN A1C LEVEL <7.0%: ICD-10-PCS | Mod: CPTII,S$GLB,, | Performed by: FAMILY MEDICINE

## 2022-06-10 PROCEDURE — 3008F PR BODY MASS INDEX (BMI) DOCUMENTED: ICD-10-PCS | Mod: CPTII,S$GLB,, | Performed by: FAMILY MEDICINE

## 2022-06-10 PROCEDURE — 99999 PR PBB SHADOW E&M-EST. PATIENT-LVL IV: ICD-10-PCS | Mod: PBBFAC,,, | Performed by: FAMILY MEDICINE

## 2022-06-10 PROCEDURE — 80048 BASIC METABOLIC PNL TOTAL CA: CPT | Performed by: FAMILY MEDICINE

## 2022-06-10 PROCEDURE — 3074F SYST BP LT 130 MM HG: CPT | Mod: CPTII,S$GLB,, | Performed by: FAMILY MEDICINE

## 2022-06-10 PROCEDURE — 3288F PR FALLS RISK ASSESSMENT DOCUMENTED: ICD-10-PCS | Mod: CPTII,S$GLB,, | Performed by: FAMILY MEDICINE

## 2022-06-10 PROCEDURE — 1111F PR DISCHARGE MEDS RECONCILED W/ CURRENT OUTPATIENT MED LIST: ICD-10-PCS | Mod: CPTII,S$GLB,, | Performed by: FAMILY MEDICINE

## 2022-06-10 PROCEDURE — 1159F PR MEDICATION LIST DOCUMENTED IN MEDICAL RECORD: ICD-10-PCS | Mod: CPTII,S$GLB,, | Performed by: FAMILY MEDICINE

## 2022-06-10 PROCEDURE — 4010F PR ACE/ARB THEARPY RXD/TAKEN: ICD-10-PCS | Mod: CPTII,S$GLB,, | Performed by: FAMILY MEDICINE

## 2022-06-10 PROCEDURE — 3288F FALL RISK ASSESSMENT DOCD: CPT | Mod: CPTII,S$GLB,, | Performed by: FAMILY MEDICINE

## 2022-06-10 PROCEDURE — 83880 ASSAY OF NATRIURETIC PEPTIDE: CPT | Performed by: FAMILY MEDICINE

## 2022-06-10 PROCEDURE — 1101F PR PT FALLS ASSESS DOC 0-1 FALLS W/OUT INJ PAST YR: ICD-10-PCS | Mod: CPTII,S$GLB,, | Performed by: FAMILY MEDICINE

## 2022-06-10 PROCEDURE — 3008F BODY MASS INDEX DOCD: CPT | Mod: CPTII,S$GLB,, | Performed by: FAMILY MEDICINE

## 2022-06-10 PROCEDURE — 3079F DIAST BP 80-89 MM HG: CPT | Mod: CPTII,S$GLB,, | Performed by: FAMILY MEDICINE

## 2022-06-10 PROCEDURE — 1126F PR PAIN SEVERITY QUANTIFIED, NO PAIN PRESENT: ICD-10-PCS | Mod: CPTII,S$GLB,, | Performed by: FAMILY MEDICINE

## 2022-06-10 PROCEDURE — 1126F AMNT PAIN NOTED NONE PRSNT: CPT | Mod: CPTII,S$GLB,, | Performed by: FAMILY MEDICINE

## 2022-06-10 PROCEDURE — 1101F PT FALLS ASSESS-DOCD LE1/YR: CPT | Mod: CPTII,S$GLB,, | Performed by: FAMILY MEDICINE

## 2022-06-10 PROCEDURE — 1159F MED LIST DOCD IN RCRD: CPT | Mod: CPTII,S$GLB,, | Performed by: FAMILY MEDICINE

## 2022-06-10 PROCEDURE — 3074F PR MOST RECENT SYSTOLIC BLOOD PRESSURE < 130 MM HG: ICD-10-PCS | Mod: CPTII,S$GLB,, | Performed by: FAMILY MEDICINE

## 2022-06-10 PROCEDURE — 3044F HG A1C LEVEL LT 7.0%: CPT | Mod: CPTII,S$GLB,, | Performed by: FAMILY MEDICINE

## 2022-06-10 PROCEDURE — 1111F DSCHRG MED/CURRENT MED MERGE: CPT | Mod: CPTII,S$GLB,, | Performed by: FAMILY MEDICINE

## 2022-06-10 PROCEDURE — 99214 PR OFFICE/OUTPT VISIT, EST, LEVL IV, 30-39 MIN: ICD-10-PCS | Mod: S$GLB,,, | Performed by: FAMILY MEDICINE

## 2022-06-10 PROCEDURE — 36415 COLL VENOUS BLD VENIPUNCTURE: CPT | Mod: PO | Performed by: FAMILY MEDICINE

## 2022-06-10 PROCEDURE — 99214 OFFICE O/P EST MOD 30 MIN: CPT | Mod: S$GLB,,, | Performed by: FAMILY MEDICINE

## 2022-06-10 PROCEDURE — 3079F PR MOST RECENT DIASTOLIC BLOOD PRESSURE 80-89 MM HG: ICD-10-PCS | Mod: CPTII,S$GLB,, | Performed by: FAMILY MEDICINE

## 2022-06-10 PROCEDURE — 4010F ACE/ARB THERAPY RXD/TAKEN: CPT | Mod: CPTII,S$GLB,, | Performed by: FAMILY MEDICINE

## 2022-06-10 PROCEDURE — 99999 PR PBB SHADOW E&M-EST. PATIENT-LVL IV: CPT | Mod: PBBFAC,,, | Performed by: FAMILY MEDICINE

## 2022-06-10 RX ORDER — TRAZODONE HYDROCHLORIDE 50 MG/1
TABLET ORAL
Qty: 60 TABLET | Refills: 2 | Status: SHIPPED | OUTPATIENT
Start: 2022-06-10 | End: 2023-02-10

## 2022-06-10 RX ORDER — FUROSEMIDE 40 MG/1
40 TABLET ORAL 2 TIMES DAILY
Qty: 180 TABLET | Refills: 0 | Status: SHIPPED | OUTPATIENT
Start: 2022-06-10 | End: 2022-06-28 | Stop reason: SDUPTHER

## 2022-06-10 RX ORDER — LOSARTAN POTASSIUM 25 MG/1
25 TABLET ORAL DAILY
Qty: 90 TABLET | Refills: 1 | Status: SHIPPED | OUTPATIENT
Start: 2022-06-10 | End: 2022-09-13 | Stop reason: SDUPTHER

## 2022-06-10 RX ORDER — SERTRALINE HYDROCHLORIDE 100 MG/1
100 TABLET, FILM COATED ORAL DAILY
Qty: 90 TABLET | Refills: 1 | Status: SHIPPED | OUTPATIENT
Start: 2022-06-10 | End: 2022-08-06

## 2022-06-10 RX ORDER — RIVAROXABAN 20 MG/1
20 TABLET, FILM COATED ORAL DAILY
Qty: 90 TABLET | Refills: 1 | Status: SHIPPED | OUTPATIENT
Start: 2022-06-10 | End: 2022-09-13 | Stop reason: SDUPTHER

## 2022-06-10 RX ORDER — ATORVASTATIN CALCIUM 40 MG/1
40 TABLET, FILM COATED ORAL DAILY
Qty: 90 TABLET | Refills: 1 | Status: SHIPPED | OUTPATIENT
Start: 2022-06-10 | End: 2022-09-13 | Stop reason: SDUPTHER

## 2022-06-10 NOTE — PROGRESS NOTES
Health Maintenance Due   Topic     Foot Exam      DEXA Scan  Scheduled     Diabetes Urine Screening  CONSULT WITH PCP    COVID-19 Vaccine (4 - Booster for Pfizer series) Pt will call to schedule when ready     Mammogram  Scheduled

## 2022-06-13 ENCOUNTER — OFFICE VISIT (OUTPATIENT)
Dept: PULMONOLOGY | Facility: CLINIC | Age: 67
End: 2022-06-13
Payer: COMMERCIAL

## 2022-06-13 ENCOUNTER — TELEPHONE (OUTPATIENT)
Dept: FAMILY MEDICINE | Facility: CLINIC | Age: 67
End: 2022-06-13
Payer: COMMERCIAL

## 2022-06-13 VITALS
WEIGHT: 277.44 LBS | HEIGHT: 63 IN | HEART RATE: 65 BPM | OXYGEN SATURATION: 97 % | BODY MASS INDEX: 49.16 KG/M2 | SYSTOLIC BLOOD PRESSURE: 132 MMHG | DIASTOLIC BLOOD PRESSURE: 70 MMHG

## 2022-06-13 DIAGNOSIS — I27.20 PULMONARY HYPERTENSION: ICD-10-CM

## 2022-06-13 DIAGNOSIS — G47.33 OSA (OBSTRUCTIVE SLEEP APNEA): Primary | ICD-10-CM

## 2022-06-13 DIAGNOSIS — J44.89 COPD WITH CHRONIC BRONCHITIS AND EMPHYSEMA: ICD-10-CM

## 2022-06-13 DIAGNOSIS — F17.200 TOBACCO DEPENDENCE: ICD-10-CM

## 2022-06-13 DIAGNOSIS — J43.9 COPD WITH CHRONIC BRONCHITIS AND EMPHYSEMA: Primary | ICD-10-CM

## 2022-06-13 DIAGNOSIS — R05.9 COUGH: ICD-10-CM

## 2022-06-13 DIAGNOSIS — J44.89 COPD WITH CHRONIC BRONCHITIS AND EMPHYSEMA: Primary | ICD-10-CM

## 2022-06-13 DIAGNOSIS — J96.12 CHRONIC RESPIRATORY FAILURE WITH HYPERCAPNIA: ICD-10-CM

## 2022-06-13 DIAGNOSIS — J43.9 COPD WITH CHRONIC BRONCHITIS AND EMPHYSEMA: ICD-10-CM

## 2022-06-13 PROBLEM — N18.31 CHRONIC KIDNEY DISEASE, STAGE 3A: Status: ACTIVE | Noted: 2022-06-13

## 2022-06-13 PROCEDURE — 4010F ACE/ARB THERAPY RXD/TAKEN: CPT | Mod: CPTII,S$GLB,, | Performed by: NURSE PRACTITIONER

## 2022-06-13 PROCEDURE — 99214 OFFICE O/P EST MOD 30 MIN: CPT | Mod: S$GLB,,, | Performed by: NURSE PRACTITIONER

## 2022-06-13 PROCEDURE — 3075F SYST BP GE 130 - 139MM HG: CPT | Mod: CPTII,S$GLB,, | Performed by: NURSE PRACTITIONER

## 2022-06-13 PROCEDURE — 99214 PR OFFICE/OUTPT VISIT, EST, LEVL IV, 30-39 MIN: ICD-10-PCS | Mod: S$GLB,,, | Performed by: NURSE PRACTITIONER

## 2022-06-13 PROCEDURE — 1111F PR DISCHARGE MEDS RECONCILED W/ CURRENT OUTPATIENT MED LIST: ICD-10-PCS | Mod: CPTII,S$GLB,, | Performed by: NURSE PRACTITIONER

## 2022-06-13 PROCEDURE — 3288F PR FALLS RISK ASSESSMENT DOCUMENTED: ICD-10-PCS | Mod: CPTII,S$GLB,, | Performed by: NURSE PRACTITIONER

## 2022-06-13 PROCEDURE — 3288F FALL RISK ASSESSMENT DOCD: CPT | Mod: CPTII,S$GLB,, | Performed by: NURSE PRACTITIONER

## 2022-06-13 PROCEDURE — 3044F HG A1C LEVEL LT 7.0%: CPT | Mod: CPTII,S$GLB,, | Performed by: NURSE PRACTITIONER

## 2022-06-13 PROCEDURE — 3075F PR MOST RECENT SYSTOLIC BLOOD PRESS GE 130-139MM HG: ICD-10-PCS | Mod: CPTII,S$GLB,, | Performed by: NURSE PRACTITIONER

## 2022-06-13 PROCEDURE — 1101F PR PT FALLS ASSESS DOC 0-1 FALLS W/OUT INJ PAST YR: ICD-10-PCS | Mod: CPTII,S$GLB,, | Performed by: NURSE PRACTITIONER

## 2022-06-13 PROCEDURE — 3078F PR MOST RECENT DIASTOLIC BLOOD PRESSURE < 80 MM HG: ICD-10-PCS | Mod: CPTII,S$GLB,, | Performed by: NURSE PRACTITIONER

## 2022-06-13 PROCEDURE — 3078F DIAST BP <80 MM HG: CPT | Mod: CPTII,S$GLB,, | Performed by: NURSE PRACTITIONER

## 2022-06-13 PROCEDURE — 3008F BODY MASS INDEX DOCD: CPT | Mod: CPTII,S$GLB,, | Performed by: NURSE PRACTITIONER

## 2022-06-13 PROCEDURE — 1111F DSCHRG MED/CURRENT MED MERGE: CPT | Mod: CPTII,S$GLB,, | Performed by: NURSE PRACTITIONER

## 2022-06-13 PROCEDURE — 3008F PR BODY MASS INDEX (BMI) DOCUMENTED: ICD-10-PCS | Mod: CPTII,S$GLB,, | Performed by: NURSE PRACTITIONER

## 2022-06-13 PROCEDURE — 99999 PR PBB SHADOW E&M-EST. PATIENT-LVL IV: ICD-10-PCS | Mod: PBBFAC,,, | Performed by: NURSE PRACTITIONER

## 2022-06-13 PROCEDURE — 99999 PR PBB SHADOW E&M-EST. PATIENT-LVL IV: CPT | Mod: PBBFAC,,, | Performed by: NURSE PRACTITIONER

## 2022-06-13 PROCEDURE — 1126F AMNT PAIN NOTED NONE PRSNT: CPT | Mod: CPTII,S$GLB,, | Performed by: NURSE PRACTITIONER

## 2022-06-13 PROCEDURE — 3044F PR MOST RECENT HEMOGLOBIN A1C LEVEL <7.0%: ICD-10-PCS | Mod: CPTII,S$GLB,, | Performed by: NURSE PRACTITIONER

## 2022-06-13 PROCEDURE — 4010F PR ACE/ARB THEARPY RXD/TAKEN: ICD-10-PCS | Mod: CPTII,S$GLB,, | Performed by: NURSE PRACTITIONER

## 2022-06-13 PROCEDURE — 1126F PR PAIN SEVERITY QUANTIFIED, NO PAIN PRESENT: ICD-10-PCS | Mod: CPTII,S$GLB,, | Performed by: NURSE PRACTITIONER

## 2022-06-13 PROCEDURE — 1101F PT FALLS ASSESS-DOCD LE1/YR: CPT | Mod: CPTII,S$GLB,, | Performed by: NURSE PRACTITIONER

## 2022-06-13 NOTE — PROGRESS NOTES
Subjective:       Patient ID: Sandee Evans is a 66 y.o. female.    Chief Complaint: Hospital Follow Up      HPI  67 yo female presents for hosp f/u. Went to hosp for ams. States she was feeling sob for a few days prior. Pt had low o2 nad was on bipap. Given abx for pos bc and was given lasix. States she is doing better. Had an nicolás.    Review of Systems   Constitutional: Negative.    HENT: Negative.    Respiratory: Negative.    Cardiovascular: Negative.    Gastrointestinal: Negative.    Endocrine: Negative.    Genitourinary: Negative.    Musculoskeletal: Negative.    Neurological: Negative.    Psychiatric/Behavioral: Negative.           Past Medical History:   Diagnosis Date    Anticoagulant long-term use     Xarelto    Arthritis     Atrial fibrillation     Breast cyst     CHF (congestive heart failure)     Degenerative disc disease     Edema     HLD (hyperlipidemia)     Hx of psychiatric care     Hyperlipidemia     Hypertension     Hypothyroidism     Nuclear sclerosis, bilateral 12/18/2017    Obesity, morbid     HIEU (obstructive sleep apnea)     Other abnormal glucose     pre-diabetes    Pre-diabetes     Psychiatric problem     Requires assistance with activities of daily living (ADL)     Sleep apnea     Smoker     SOB (shortness of breath)     SOB (shortness of breath)     Thyroid disease     on meds 8-9 years ago. hypothyroidism.no malignancy    TMJ (temporomandibular joint disorder)     jaw clicking    Tobacco abuse     Unsteady gait     Urge incontinence     Weakness generalized     Wears glasses      Past Surgical History:   Procedure Laterality Date    BREAST BIOPSY Right     over 10 yrs ago/ benign    BREAST CYST EXCISION Right     BREAST LUMPECTOMY Right     over 10 yrs ago, ex bx/ benign    COLONOSCOPY N/A 6/25/2019    Procedure: COLONOSCOPY;  Surgeon: Micheline Flores MD;  Location: [facility];  Service: Endoscopy;  Laterality: N/A;  RX XARELTO ok to hold (2 days) per  Dr. Naik see scan 3/20/19    ENDOSCOPIC ULTRASOUND OF UPPER GASTROINTESTINAL TRACT N/A 1/26/2021    Procedure: ULTRASOUND-ENDOSCOPIC-UPPER;  Surgeon: Stevenson Philippe MD;  Location: [facility];  Service: Endoscopy;  Laterality: N/A;    HYSTERECTOMY      JOINT REPLACEMENT Bilateral     knees    OOPHORECTOMY      rt.knee surgery 2016      TOTAL KNEE ARTHROPLASTY Left 2/19/2019    Procedure: ARTHROPLASTY, KNEE, TOTAL;  Surgeon: Med Hanson MD;  Location: [facility] OR;  Service: Orthopedics;  Laterality: Left;  10AM START PER  PER JAYLIN TEXT @ 8:34AM ON 2-18-19  SUPINE  HARRIS LOYA [phone] TEXTED HIM ON 1-24-19 @ 7:57AM  RN PRE OP 2-13-19--BMI--48.9    underarm gland\ Bilateral      Family History   Problem Relation Age of Onset    Diabetes Mother     Hypertension Mother     Cancer Mother     Diabetes Brother     Cancer Brother     No Known Problems Father     No Known Problems Sister     No Known Problems Maternal Aunt     No Known Problems Maternal Uncle     No Known Problems Paternal Aunt     No Known Problems Paternal Uncle     No Known Problems Maternal Grandmother     No Known Problems Maternal Grandfather     No Known Problems Paternal Grandmother     No Known Problems Paternal Grandfather     Amblyopia Neg Hx     Blindness Neg Hx     Cataracts Neg Hx     Glaucoma Neg Hx     Macular degeneration Neg Hx     Retinal detachment Neg Hx     Strabismus Neg Hx     Stroke Neg Hx     Thyroid disease Neg Hx      Social History     Socioeconomic History    Marital status:    Tobacco Use    Smoking status: Current Every Day Smoker     Packs/day: 0.50     Years: 47.00     Pack years: 23.50     Types: Cigarettes     Start date: 1973    Smokeless tobacco: Never Used   Substance and Sexual Activity    Alcohol use: No    Drug use: No    Sexual activity: Yes     Partners: Male     Social Determinants of Health     Financial Resource Strain: Low Risk     Difficulty of  Paying Living Expenses: Not hard at all   Food Insecurity: No Food Insecurity    Worried About Running Out of Food in the Last Year: Never true    Ran Out of Food in the Last Year: Never true   Transportation Needs: No Transportation Needs    Lack of Transportation (Medical): No    Lack of Transportation (Non-Medical): No   Physical Activity: Insufficiently Active    Days of Exercise per Week: 2 days    Minutes of Exercise per Session: 10 min   Stress: Stress Concern Present    Feeling of Stress : Rather much   Social Connections: Unknown    Frequency of Communication with Friends and Family: Once a week    Frequency of Social Gatherings with Friends and Family: Once a week    Active Member of Clubs or Organizations: Yes    Attends Club or Organization Meetings: 1 to 4 times per year    Marital Status:    Housing Stability: Low Risk     Unable to Pay for Housing in the Last Year: No    Number of Places Lived in the Last Year: 2    Unstable Housing in the Last Year: No       Current Outpatient Medications:     albuterol (PROVENTIL/VENTOLIN HFA) 90 mcg/actuation inhaler, Inhale 2 puffs into the lungs every 6 (six) hours as needed for Wheezing or Shortness of Breath. Rescue, Disp: 54 g, Rfl: 1    azelastine (ASTELIN) 137 mcg (0.1 %) nasal spray, 1 spray (137 mcg total) by Nasal route 2 (two) times daily., Disp: 30 mL, Rfl: 3    famotidine (PEPCID) 20 MG tablet, Take 1 tablet (20 mg total) by mouth 2 (two) times daily as needed., Disp: 60 tablet, Rfl: 2    fluticasone propionate (FLONASE) 50 mcg/actuation nasal spray, SHAKE LIQUID AND USE 2 SPRAYS(100 MCG) IN EACH NOSTRIL EVERY DAY, Disp: 48 g, Rfl: 0    metoprolol succinate (TOPROL-XL) 100 MG 24 hr tablet, TAKE 1 TABLET(100 MG) BY MOUTH EVERY DAY, Disp: 90 tablet, Rfl: 1    nicotine, polacrilex, (NICORETTE) 2 mg Gum, Use 1-2 per hour in place of a cigarette. Limit to 10 a day - oral. . (Patient taking differently: Use 1-2 per hour in place  "of a cigarette. Limit to 10 a day - oral. .), Disp: 200 each, Rfl: 0    ondansetron (ZOFRAN-ODT) 8 MG TbDL, Take 1 tablet (8 mg total) by mouth every 6 (six) hours as needed (nausea)., Disp: 60 tablet, Rfl: 2    SPIRIVA RESPIMAT 2.5 mcg/actuation inhaler, INHALE 2 PUFFS INTO THE LUNGS DAILY, Disp: 4 g, Rfl: 5    triamcinolone acetonide 0.1% (KENALOG) 0.1 % ointment, APPLY BETWEEN KNEES AND UPPER THIGHS TWICE DAILY FOR NO MORE THAN 7-14 DAYS, Disp: 454 g, Rfl: 11    varenicline (CHANTIX STARTING MONTH BOX) 0.5 mg (11)- 1 mg (42) tablet, Follow package directions. (Patient taking differently: Follow package directions.), Disp: 53 tablet, Rfl: 0    atorvastatin (LIPITOR) 40 MG tablet, Take 1 tablet (40 mg total) by mouth once daily., Disp: 90 tablet, Rfl: 1    furosemide (LASIX) 40 MG tablet, Take 1 tablet (40 mg total) by mouth 2 (two) times a day. HOLD UNTIL PCP FOLLOW UP IN 1 WEEK AND BMP CHECK TO MAKE SURE CREATINE IS BACK TO BASELINE, Disp: 180 tablet, Rfl: 0    losartan (COZAAR) 25 MG tablet, Take 1 tablet (25 mg total) by mouth once daily., Disp: 90 tablet, Rfl: 1    oxyCODONE-acetaminophen (PERCOCET) 5-325 mg per tablet, Take 1 tablet by mouth every 4 (four) hours as needed., Disp: 30 tablet, Rfl: 0    sertraline (ZOLOFT) 100 MG tablet, Take 1 tablet (100 mg total) by mouth once daily., Disp: 90 tablet, Rfl: 1    traZODone (DESYREL) 50 MG tablet, TAKE 1 TO 2 TABLETS BY MOUTH EVERY NIGHT AS NEEDED FOR SLEEP, Disp: 60 tablet, Rfl: 2    XARELTO 20 mg Tab, Take 1 tablet (20 mg total) by mouth once daily., Disp: 90 tablet, Rfl: 1   Objective:      Vitals:    06/10/22 0720   BP: 116/80   BP Location: Right arm   Patient Position: Sitting   BP Method: Large (Manual)   Pulse: 82   Resp: 16   Temp: 99 °F (37.2 °C)   TempSrc: Oral   SpO2: 95%   Weight: 124.6 kg (274 lb 11.1 oz)   Height: 5' 3" (1.6 m)       Physical Exam  Constitutional:       General: She is not in acute distress.  HENT:      Head: " Normocephalic and atraumatic.   Eyes:      Conjunctiva/sclera: Conjunctivae normal.   Cardiovascular:      Rate and Rhythm: Normal rate and regular rhythm.      Heart sounds: Normal heart sounds. No murmur heard.    No friction rub. No gallop.   Pulmonary:      Effort: Pulmonary effort is normal.      Breath sounds: Normal breath sounds. No wheezing or rales.   Musculoskeletal:      Cervical back: Neck supple.   Skin:     General: Skin is warm and dry.   Neurological:      Mental Status: She is alert and oriented to person, place, and time.   Psychiatric:         Behavior: Behavior normal.         Thought Content: Thought content normal.         Judgment: Judgment normal.            Assessment:       1. Chronic respiratory failure with hypercapnia    2. Chronic atrial fibrillation    3. Essential hypertension    4. Microalbuminuria    5. Anxiety    6. PVD (peripheral vascular disease)    7. Aortic atherosclerosis    8. Coronary artery disease    9. Insomnia, unspecified type    10. HIEU (obstructive sleep apnea)    11. Acute on chronic diastolic congestive heart failure    12. Bilateral lower extremity edema    13. Chronic kidney disease, stage 3a    14. Current mild episode of major depressive disorder without prior episode    15. Type 2 diabetes mellitus with diabetic cataract, without long-term current use of insulin        Plan:       Chronic respiratory failure with hypercapnia    Chronic atrial fibrillation  -     XARELTO 20 mg Tab; Take 1 tablet (20 mg total) by mouth once daily.  Dispense: 90 tablet; Refill: 1    Essential hypertension  -     losartan (COZAAR) 25 MG tablet; Take 1 tablet (25 mg total) by mouth once daily.  Dispense: 90 tablet; Refill: 1    Microalbuminuria  -     losartan (COZAAR) 25 MG tablet; Take 1 tablet (25 mg total) by mouth once daily.  Dispense: 90 tablet; Refill: 1    Anxiety  -     sertraline (ZOLOFT) 100 MG tablet; Take 1 tablet (100 mg total) by mouth once daily.  Dispense: 90  tablet; Refill: 1    PVD (peripheral vascular disease)  -     atorvastatin (LIPITOR) 40 MG tablet; Take 1 tablet (40 mg total) by mouth once daily.  Dispense: 90 tablet; Refill: 1    Aortic atherosclerosis  -     atorvastatin (LIPITOR) 40 MG tablet; Take 1 tablet (40 mg total) by mouth once daily.  Dispense: 90 tablet; Refill: 1    Coronary artery disease  -     atorvastatin (LIPITOR) 40 MG tablet; Take 1 tablet (40 mg total) by mouth once daily.  Dispense: 90 tablet; Refill: 1    Insomnia, unspecified type  -     traZODone (DESYREL) 50 MG tablet; TAKE 1 TO 2 TABLETS BY MOUTH EVERY NIGHT AS NEEDED FOR SLEEP  Dispense: 60 tablet; Refill: 2    HIEU (obstructive sleep apnea)  -     BASIC METABOLIC PANEL; Future; Expected date: 06/10/2022  -     B-TYPE NATRIURETIC PEPTIDE; Future; Expected date: 06/10/2022    Acute on chronic diastolic congestive heart failure  -     BASIC METABOLIC PANEL; Future; Expected date: 06/10/2022  -     B-TYPE NATRIURETIC PEPTIDE; Future; Expected date: 06/10/2022    Bilateral lower extremity edema  -     furosemide (LASIX) 40 MG tablet; Take 1 tablet (40 mg total) by mouth 2 (two) times a day. HOLD UNTIL PCP FOLLOW UP IN 1 WEEK AND BMP CHECK TO MAKE SURE CREATINE IS BACK TO BASELINE  Dispense: 180 tablet; Refill: 0    Chronic kidney disease, stage 3a    Current mild episode of major depressive disorder without prior episode    Type 2 diabetes mellitus with diabetic cataract, without long-term current use of insulin    cont meds. F/u labs.            Patient note was created using [employer].  Any errors in syntax or even information may not have been identified and edited on initial review prior to signing this note.

## 2022-06-13 NOTE — TELEPHONE ENCOUNTER
----- Message from Kuldeep Miller MD sent at 6/13/2022 12:29 AM CDT -----  Labs show a great improvement. Cont meds.

## 2022-06-13 NOTE — PROGRESS NOTES
Notes:   HISTORY OF PRESENT ILLNESS: Sandee Evans is a 66 y.o. female current smoker but cutting back to 2-3 cig a day  with Obesity, HTN, DM, HLD, Atrial fibrillation on OAC, PVD, s/p bilateral knee sx, severe HIEU AHI 90   is here for pulmonary/sleep follow up.     Patient was admitted 7/20-7/24/2021 for  acute hypoxic,hypercapnic respiratory failure for A/C diastolic CHF exacerbation, and HIEU. Symptoms improved on IV Lasix and nightly bipap.     Patient has been set up with BiPAP on interim and reports sleeping better with BiPAP.  She is sleeping longer in feels more energetic.  ESS today 2 when lying down to rest in the afternoon if circumstances permit.    Presents with a recurrent 10 as T setting 18 over 5 rate 12 usage 4.2 1 hours HI 12.8 usage 16 out of 17 days average use 4.8 hours week 2 liters/minute.  Pressure was adjusted 18/6 at visit for patient comfort.    Overall patient reports improved breathing and resolution of cough on diuretic. If she eats too much salt and start swelling, her cough returns. Uses her 1 rescue a year.  Compliant with Spiriva.    ABG 7/21/2021 ph 7.4 pco2 74 pO2 75 - ordered to resume bipap but pt does not qualify based on ABG per DME since we do not have a baseline on RA    Sleep study: 10-13/2020: AHI 90  ECHO 7/20/2021: EF 60% pasp 49 mmHg, +AF  PFT 9/21/2021: ratio 64 fev1 66 fvc 80 tlc 62 dlco 73  ABG 9/21/21 pH 7.3 pCO2 55.6 pO2 72 SpO2 94 on RA  CT chest 9/21/2021: Lungs: There are no abnormal opacities that require further evaluation.  The largest opacity in the right lung appears solid and measures 0.2 cm on series 4, image 104.  The lungs show no findings consistent with emphysema.  Right basilar scarring.  Dependent bilateral atelectasis  Heart and pericardium: Cardiomegaly.  No pericardial effusion.   Aorta and vasculature: Atherosclerosis including coronary arteries.  Main pulmonary artery is upper limits of normal measuring 3.0 cm.    PAST MEDICAL HISTORY:     Active Ambulatory Problems     Diagnosis Date Noted    Urge incontinence 01/11/2013    DDD (degenerative disc disease), lumbar 01/11/2013    DJD (degenerative joint disease) of knee 01/11/2013    Hyperlipidemia 01/11/2013    Depressed 01/11/2013    Insomnia 01/11/2013    Vitamin D deficiency disease 01/11/2013    Lumbar radicular pain 01/11/2013    Essential hypertension 01/11/2013    Severe obesity (BMI >= 40)     Colon polyps 08/09/2013    Diverticulosis 08/09/2013    Chronic pain 08/09/2013    Pre-diabetes 08/09/2013    Longstanding persistent atrial fibrillation 07/20/2015    History of pancreatitis 09/22/2015    Osteoarthritis of right knee 05/23/2016    Depression 05/30/2016    Type 2 diabetes mellitus with diabetic cataract, without long-term current use of insulin     Dry eye syndrome, bilateral 12/18/2017    Nuclear sclerosis, bilateral 12/18/2017    Refractive error 12/18/2017    Hidradenitis suppurativa of right axilla 12/31/2017    PVD (peripheral vascular disease) 05/28/2018    Screen for colon cancer 06/25/2019    Chronic bilateral low back pain without sciatica 09/14/2019    ESQUIVEL (dyspnea on exertion) 09/03/2020    Tobacco dependence 09/03/2020    HIEU (obstructive sleep apnea) 09/03/2020    Chronic respiratory failure with hypercapnia 07/20/2021    Pulmonary hypertension 07/20/2021    Tobacco abuse 07/20/2021    Hypothyroidism 07/20/2021    Current mild episode of major depressive disorder without prior episode 07/29/2021    COPD with chronic bronchitis and emphysema 08/02/2021    Atelectasis 10/26/2021    Chronic kidney disease, stage 3a 06/13/2022     Resolved Ambulatory Problems     Diagnosis Date Noted    Encounter for screening colonoscopy 08/02/2013    A-fib 03/20/2015    Dysphagia 06/22/2015    Nausea 09/22/2015    Gross hematuria 05/24/2016    Acute viral syndrome 12/31/2017    Other constipation 02/22/2019    Acute on chronic diastolic congestive  heart failure 07/16/2021    Debility 07/20/2021    Acute metabolic encephalopathy 07/20/2021    ANNA MARIE (acute kidney injury) 07/22/2021    Acute bronchitis 08/02/2021    Acute respiratory failure with hypoxia and hypercarbia 05/20/2022    Dysuria 05/20/2022     Past Medical History:   Diagnosis Date    Anticoagulant long-term use     Arthritis     Atrial fibrillation     Breast cyst     CHF (congestive heart failure)     Degenerative disc disease     Edema     HLD (hyperlipidemia)     Hx of psychiatric care     Hypertension     Obesity, morbid     Other abnormal glucose     Psychiatric problem     Requires assistance with activities of daily living (ADL)     Sleep apnea     Smoker     SOB (shortness of breath)     SOB (shortness of breath)     Thyroid disease     TMJ (temporomandibular joint disorder)     Unsteady gait     Weakness generalized     Wears glasses                 PAST SURGICAL HISTORY:    Past Surgical History:   Procedure Laterality Date    BREAST BIOPSY Right     over 10 yrs ago/ benign    BREAST CYST EXCISION Right     BREAST LUMPECTOMY Right     over 10 yrs ago, ex bx/ benign    COLONOSCOPY N/A 6/25/2019    Procedure: COLONOSCOPY;  Surgeon: Micheline Flores MD;  Location: [facility] ENDO;  Service: Endoscopy;  Laterality: N/A;  RX XARELTO ok to hold (2 days) per Dr. Naik see scan 3/20/19    ENDOSCOPIC ULTRASOUND OF UPPER GASTROINTESTINAL TRACT N/A 1/26/2021    Procedure: ULTRASOUND-ENDOSCOPIC-UPPER;  Surgeon: Stevenson Philippe MD;  Location: [facility] ENDO;  Service: Endoscopy;  Laterality: N/A;    HYSTERECTOMY      JOINT REPLACEMENT Bilateral     knees    OOPHORECTOMY      rt.knee surgery 2016      TOTAL KNEE ARTHROPLASTY Left 2/19/2019    Procedure: ARTHROPLASTY, KNEE, TOTAL;  Surgeon: Med Hanson MD;  Location: [facility] OR;  Service: Orthopedics;  Laterality: Left;  10AM START PER  PER JAYLIN TEXT @ 8:34AM ON 2-18-19  SUPINE  HARRIS LOYA [phone] TEXTED  HIM ON 1-24-19 @ 7:57AM  RN PRE OP 2-13-19--BMI--48.9    underarm gland\ Bilateral          FAMILY HISTORY:                Family History   Problem Relation Age of Onset    Diabetes Mother     Hypertension Mother     Cancer Mother     Diabetes Brother     Cancer Brother     No Known Problems Father     No Known Problems Sister     No Known Problems Maternal Aunt     No Known Problems Maternal Uncle     No Known Problems Paternal Aunt     No Known Problems Paternal Uncle     No Known Problems Maternal Grandmother     No Known Problems Maternal Grandfather     No Known Problems Paternal Grandmother     No Known Problems Paternal Grandfather     Amblyopia Neg Hx     Blindness Neg Hx     Cataracts Neg Hx     Glaucoma Neg Hx     Macular degeneration Neg Hx     Retinal detachment Neg Hx     Strabismus Neg Hx     Stroke Neg Hx     Thyroid disease Neg Hx        SOCIAL HISTORY:          Tobacco:   Social History     Tobacco Use   Smoking Status Current Every Day Smoker    Packs/day: 0.50    Years: 47.00    Pack years: 23.50    Types: Cigarettes    Start date: 1973   Smokeless Tobacco Never Used       alcohol use:    Social History     Substance and Sexual Activity   Alcohol Use No                   ALLERGIES:    Review of patient's allergies indicates:   Allergen Reactions    Ace inhibitors      Cough         CURRENT MEDICATIONS:    Current Outpatient Medications   Medication Sig Dispense Refill    albuterol (PROVENTIL/VENTOLIN HFA) 90 mcg/actuation inhaler Inhale 2 puffs into the lungs every 6 (six) hours as needed for Wheezing or Shortness of Breath. Rescue 54 g 1    atorvastatin (LIPITOR) 40 MG tablet Take 1 tablet (40 mg total) by mouth once daily. 90 tablet 1    azelastine (ASTELIN) 137 mcg (0.1 %) nasal spray 1 spray (137 mcg total) by Nasal route 2 (two) times daily. 30 mL 3    fluticasone propionate (FLONASE) 50 mcg/actuation nasal spray SHAKE LIQUID AND USE 2 SPRAYS(100 MCG) IN  EACH NOSTRIL EVERY DAY 48 g 0    losartan (COZAAR) 25 MG tablet Take 1 tablet (25 mg total) by mouth once daily. 90 tablet 1    metoprolol succinate (TOPROL-XL) 100 MG 24 hr tablet TAKE 1 TABLET(100 MG) BY MOUTH EVERY DAY 90 tablet 1    nicotine, polacrilex, (NICORETTE) 2 mg Gum Use 1-2 per hour in place of a cigarette. Limit to 10 a day - oral. . (Patient taking differently: Use 1-2 per hour in place of a cigarette. Limit to 10 a day - oral. .) 200 each 0    ondansetron (ZOFRAN-ODT) 8 MG TbDL Take 1 tablet (8 mg total) by mouth every 6 (six) hours as needed (nausea). 60 tablet 2    oxyCODONE-acetaminophen (PERCOCET) 5-325 mg per tablet Take 1 tablet by mouth every 4 (four) hours as needed. 30 tablet 0    sertraline (ZOLOFT) 100 MG tablet Take 1 tablet (100 mg total) by mouth once daily. 90 tablet 1    SPIRIVA RESPIMAT 2.5 mcg/actuation inhaler INHALE 2 PUFFS INTO THE LUNGS DAILY 4 g 5    traZODone (DESYREL) 50 MG tablet TAKE 1 TO 2 TABLETS BY MOUTH EVERY NIGHT AS NEEDED FOR SLEEP 60 tablet 2    triamcinolone acetonide 0.1% (KENALOG) 0.1 % ointment APPLY BETWEEN KNEES AND UPPER THIGHS TWICE DAILY FOR NO MORE THAN 7-14 DAYS 454 g 11    varenicline (CHANTIX STARTING MONTH BOX) 0.5 mg (11)- 1 mg (42) tablet Follow package directions. (Patient taking differently: Follow package directions.) 53 tablet 0    XARELTO 20 mg Tab Take 1 tablet (20 mg total) by mouth once daily. 90 tablet 1    famotidine (PEPCID) 20 MG tablet Take 1 tablet (20 mg total) by mouth 2 (two) times daily as needed. 60 tablet 2    furosemide (LASIX) 40 MG tablet Take 1 tablet (40 mg total) by mouth once daily. 180 tablet 0     No current facility-administered medications for this visit.                Cutting fluid in take   REVIEW OF SYSTEMS:   Review of Systems   Constitutional: Positive for fatigue. Negative for fever, chills, weight loss, activity change, appetite change and night sweats.   HENT: Negative for postnasal drip and  "congestion.    Eyes: Negative.    Respiratory: Positive for dyspnea on extertion (just with exercise now, marching, leg stretch, walking, getting better) and use of rescue inhaler (jhx pna and it helped but not on this regularly. 1 inhaler a year). Negative for cough, sputum production, chest tightness, wheezing, orthopnea and previous hospitialization due to pulmonary problems.         Breathing is better  She is on spiriva  Reduce salt, fluid pill     Cardiovascular: Negative for chest pain, palpitations and leg swelling.   Genitourinary: Negative.    Endocrine: endocrine negative   Musculoskeletal: Positive for gait problem (with cane).   Skin: Negative.    Gastrointestinal: Negative for nausea, vomiting and acid reflux.   Neurological: Negative for dizziness, light-headedness and headaches.   Psychiatric/Behavioral: Positive for sleep disturbance (pillows x 3, frequent awakening with cough). The patient is nervous/anxious.         PHYSICAL EXAM:  Vitals:    06/13/22 0818   BP: 132/70   Pulse: 65   SpO2: 97%   Weight: 125.8 kg (277 lb 7.2 oz)   Height: 5' 3" (1.6 m)   PainSc: 0-No pain     Body mass index is 49.15 kg/m².   Physical Exam   Constitutional: She is oriented to person, place, and time. She appears well-developed. No distress. She is obese.   HENT:   Head: Normocephalic.   Cardiovascular: Normal rate.   No murmur heard.  irregular   Pulmonary/Chest: Normal expansion, effort normal and breath sounds normal. No respiratory distress.   Musculoskeletal:         General: Edema present.   Neurological: She is alert and oriented to person, place, and time.   cane   Skin: She is not diaphoretic.   Psychiatric: She has a normal mood and affect. Her behavior is normal. Judgment and thought content normal.         Lab Results   Component Value Date    TSH 5.513 (H) 09/21/2021      Lab Results   Component Value Date    WBC 11.68 05/21/2022    HGB 14.8 05/21/2022    HCT 45.4 05/21/2022    MCV 99 (H) 05/21/2022    " PLT SEE COMMENT 05/21/2022     BMP  Lab Results   Component Value Date     06/10/2022    K 4.4 06/10/2022     06/10/2022    CO2 28 06/10/2022    BUN 20 06/10/2022    CREATININE 1.1 06/10/2022    CALCIUM 9.3 06/10/2022    ANIONGAP 9 06/10/2022    ESTGFRAFRICA 59.6 (A) 06/10/2022    EGFRNONAA 51.7 (A) 06/10/2022     Lab Results   Component Value Date    HGBA1C 5.6 05/21/2022        ASSESSMENT    ICD-10-CM ICD-9-CM    1. HIEU (obstructive sleep apnea)  G47.33 327.23    2. Tobacco dependence  F17.200 305.1    3. Chronic respiratory failure with hypercapnia  J96.12 518.83    4. COPD with chronic bronchitis and emphysema  J44.9 491.20 Six Minute Walk Test to qualify for Home Oxygen   5. Pulmonary hypertension  I27.20 416.8        PLAN:    Problem List Items Addressed This Visit        Unprioritized    Chronic respiratory failure with hypercapnia    Overview     PFT 9/21/2021: ratio 64 fev1 1.30 (66) fvc 80 tlc 62 dlco 73  ABG 9/21/21 pH 7.3 pCO2 55.6 pO2 72 SpO2 94 on RA  Hx hospitalization 7/2021  Resume bipap           COPD with chronic bronchitis and emphysema    Overview     PFT 9/21/2021: ratio 64 fev1 1.30 (66) fvc 80 tlc 62 dlco 73  ABG 9/21/21 pH 7.3 pCO2 55.6 pO2 72 SpO2 94 on RA  CT chest 9/21/2021: Lungs: There are no abnormal opacities that require further evaluation.  The largest opacity in the right lung appears solid and measures 0.2 cm on series 4, image 104.  The lungs show no findings consistent with emphysema.  Right basilar scarring.  Dependent bilateral atelectasis  Pt is on spiriva which helps, rare need rescue             Relevant Orders    Six Minute Walk Test to qualify for Home Oxygen    HIEU (obstructive sleep apnea) - Primary    Overview     Pt symptomatic-snoring, excessive daytime sleepiness, fatigue and difficulty staying asleep.   AHI 90  Need requalifying ABG or consder trial apap since cpap titration not available due to respironics recall and covid surge           Pulmonary  hypertension    Overview     ECHO 7/20/2021: EF 60% pasp 49 mmHg, +AF, Cardiopulmonary and HIEU           Tobacco dependence    Overview     Pt is  cutting back, now 3-5 cig a day               Patient will Follow up in about 9 weeks (around 8/15/2022).

## 2022-06-23 DIAGNOSIS — E11.9 TYPE 2 DIABETES MELLITUS WITHOUT COMPLICATION: ICD-10-CM

## 2022-06-28 ENCOUNTER — OFFICE VISIT (OUTPATIENT)
Dept: CARDIOLOGY | Facility: CLINIC | Age: 67
End: 2022-06-28
Payer: COMMERCIAL

## 2022-06-28 ENCOUNTER — IMMUNIZATION (OUTPATIENT)
Dept: OBSTETRICS AND GYNECOLOGY | Facility: CLINIC | Age: 67
End: 2022-06-28
Payer: COMMERCIAL

## 2022-06-28 VITALS
HEART RATE: 78 BPM | OXYGEN SATURATION: 95 % | RESPIRATION RATE: 18 BRPM | WEIGHT: 272.25 LBS | HEIGHT: 63 IN | BODY MASS INDEX: 48.24 KG/M2 | DIASTOLIC BLOOD PRESSURE: 86 MMHG | SYSTOLIC BLOOD PRESSURE: 118 MMHG

## 2022-06-28 DIAGNOSIS — E78.2 MIXED HYPERLIPIDEMIA: ICD-10-CM

## 2022-06-28 DIAGNOSIS — I73.9 PVD (PERIPHERAL VASCULAR DISEASE): ICD-10-CM

## 2022-06-28 DIAGNOSIS — R06.09 DOE (DYSPNEA ON EXERTION): ICD-10-CM

## 2022-06-28 DIAGNOSIS — I27.20 PULMONARY HYPERTENSION: Primary | ICD-10-CM

## 2022-06-28 DIAGNOSIS — J43.9 COPD WITH CHRONIC BRONCHITIS AND EMPHYSEMA: ICD-10-CM

## 2022-06-28 DIAGNOSIS — Z23 NEED FOR VACCINATION: Primary | ICD-10-CM

## 2022-06-28 DIAGNOSIS — I10 ESSENTIAL HYPERTENSION: ICD-10-CM

## 2022-06-28 DIAGNOSIS — I48.11 LONGSTANDING PERSISTENT ATRIAL FIBRILLATION: ICD-10-CM

## 2022-06-28 DIAGNOSIS — J44.89 COPD WITH CHRONIC BRONCHITIS AND EMPHYSEMA: ICD-10-CM

## 2022-06-28 DIAGNOSIS — R60.0 BILATERAL LOWER EXTREMITY EDEMA: ICD-10-CM

## 2022-06-28 PROCEDURE — 3074F PR MOST RECENT SYSTOLIC BLOOD PRESSURE < 130 MM HG: ICD-10-PCS | Mod: CPTII,S$GLB,, | Performed by: INTERNAL MEDICINE

## 2022-06-28 PROCEDURE — 99999 PR PBB SHADOW E&M-EST. PATIENT-LVL IV: ICD-10-PCS | Mod: PBBFAC,,, | Performed by: INTERNAL MEDICINE

## 2022-06-28 PROCEDURE — 3008F PR BODY MASS INDEX (BMI) DOCUMENTED: ICD-10-PCS | Mod: CPTII,S$GLB,, | Performed by: INTERNAL MEDICINE

## 2022-06-28 PROCEDURE — 1159F PR MEDICATION LIST DOCUMENTED IN MEDICAL RECORD: ICD-10-PCS | Mod: CPTII,S$GLB,, | Performed by: INTERNAL MEDICINE

## 2022-06-28 PROCEDURE — 3288F FALL RISK ASSESSMENT DOCD: CPT | Mod: CPTII,S$GLB,, | Performed by: INTERNAL MEDICINE

## 2022-06-28 PROCEDURE — 3044F PR MOST RECENT HEMOGLOBIN A1C LEVEL <7.0%: ICD-10-PCS | Mod: CPTII,S$GLB,, | Performed by: INTERNAL MEDICINE

## 2022-06-28 PROCEDURE — 3079F DIAST BP 80-89 MM HG: CPT | Mod: CPTII,S$GLB,, | Performed by: INTERNAL MEDICINE

## 2022-06-28 PROCEDURE — 1126F PR PAIN SEVERITY QUANTIFIED, NO PAIN PRESENT: ICD-10-PCS | Mod: CPTII,S$GLB,, | Performed by: INTERNAL MEDICINE

## 2022-06-28 PROCEDURE — 3288F PR FALLS RISK ASSESSMENT DOCUMENTED: ICD-10-PCS | Mod: CPTII,S$GLB,, | Performed by: INTERNAL MEDICINE

## 2022-06-28 PROCEDURE — 1101F PT FALLS ASSESS-DOCD LE1/YR: CPT | Mod: CPTII,S$GLB,, | Performed by: INTERNAL MEDICINE

## 2022-06-28 PROCEDURE — 4010F ACE/ARB THERAPY RXD/TAKEN: CPT | Mod: CPTII,S$GLB,, | Performed by: INTERNAL MEDICINE

## 2022-06-28 PROCEDURE — 99214 OFFICE O/P EST MOD 30 MIN: CPT | Mod: S$GLB,,, | Performed by: INTERNAL MEDICINE

## 2022-06-28 PROCEDURE — 99999 PR PBB SHADOW E&M-EST. PATIENT-LVL IV: CPT | Mod: PBBFAC,,, | Performed by: INTERNAL MEDICINE

## 2022-06-28 PROCEDURE — 1126F AMNT PAIN NOTED NONE PRSNT: CPT | Mod: CPTII,S$GLB,, | Performed by: INTERNAL MEDICINE

## 2022-06-28 PROCEDURE — 91305 COVID-19, MRNA, LNP-S, PF, 30 MCG/0.3 ML DOSE VACCINE (PFIZER): CPT | Mod: PBBFAC | Performed by: FAMILY MEDICINE

## 2022-06-28 PROCEDURE — 99214 PR OFFICE/OUTPT VISIT, EST, LEVL IV, 30-39 MIN: ICD-10-PCS | Mod: S$GLB,,, | Performed by: INTERNAL MEDICINE

## 2022-06-28 PROCEDURE — 3044F HG A1C LEVEL LT 7.0%: CPT | Mod: CPTII,S$GLB,, | Performed by: INTERNAL MEDICINE

## 2022-06-28 PROCEDURE — 1101F PR PT FALLS ASSESS DOC 0-1 FALLS W/OUT INJ PAST YR: ICD-10-PCS | Mod: CPTII,S$GLB,, | Performed by: INTERNAL MEDICINE

## 2022-06-28 PROCEDURE — 3074F SYST BP LT 130 MM HG: CPT | Mod: CPTII,S$GLB,, | Performed by: INTERNAL MEDICINE

## 2022-06-28 PROCEDURE — 3079F PR MOST RECENT DIASTOLIC BLOOD PRESSURE 80-89 MM HG: ICD-10-PCS | Mod: CPTII,S$GLB,, | Performed by: INTERNAL MEDICINE

## 2022-06-28 PROCEDURE — 3008F BODY MASS INDEX DOCD: CPT | Mod: CPTII,S$GLB,, | Performed by: INTERNAL MEDICINE

## 2022-06-28 PROCEDURE — 1159F MED LIST DOCD IN RCRD: CPT | Mod: CPTII,S$GLB,, | Performed by: INTERNAL MEDICINE

## 2022-06-28 PROCEDURE — 4010F PR ACE/ARB THEARPY RXD/TAKEN: ICD-10-PCS | Mod: CPTII,S$GLB,, | Performed by: INTERNAL MEDICINE

## 2022-06-28 RX ORDER — FUROSEMIDE 40 MG/1
40 TABLET ORAL DAILY
Qty: 180 TABLET | Refills: 0 | Status: SHIPPED | OUTPATIENT
Start: 2022-06-28 | End: 2023-05-07 | Stop reason: SDUPTHER

## 2022-06-28 NOTE — PROGRESS NOTES
Subjective:    Patient ID:  Sandee Evans is a 66 y.o. female who presents for follow-up of No chief complaint on file.      HPI        Chronic A-fib on xarelto, Diastolic CHF, HTN, DM, HLD, PAD, tobacco abuse     Previously followed by Dr Naik  Patient is here for follow-up of paroxysmal atrial fibrillation.  She is labeled as chronic and has been in normal sinus rhythm and was last seen in 2016.  EKG today confirms this.  She was lost to follow-up and thought she was discharged because she felt everything was okay.  She has not taken her blood thinner but is only been on aspirin.  She gets shortness of breath on heavy activity but relieved with rest.  She does have some associated substernal heaviness.  She has had no breakthrough tachycardia palpitations.  She denies any PND, orthopnea has stable lower edema relieved with elevation.  She is preop for knee replacement and is unable to go up a flight of stairs due to her degenerative joint condition     Here for follow-up of paroxysmal atrial fibrillation.  She underwent testing as below for preop clearance.  We if cleared at low to intermediate risk and she is aware that anything can happen with the stress of surgery and in particular with her tobacco use disorder.  We discussed that this is the main things she needs to work on postoperatively as well as rehab.  She denies any further cardiopulmonary complaints.  We went over her medicines as well and discussed that she has to hold her blood thinner for at least 2 days prior to the procedure but continue her beta-blocker perioperatively.     Echo 5/21/22  · The left ventricle is normal in size with normal systolic function.  · The estimated ejection fraction is 60%.  · Normal right ventricular size with normal right ventricular systolic function.  · The estimated PA systolic pressure is 33 mmHg.  · Atrial fibrillation observed.    NST: 2/8/19  · Normal [facility] MPI  · The perfusion scan is free of evidence from  myocardial ischemia or injury.  · An ejection fraction of 55 % at rest  · LV cavity size is normal at rest.  · Resting wall motion is physiologic.     Holter: 3/1/21  · Atrial fibrillation with ventricular rates varying between 61 and 188 bpm with an average of 104 bpm. Total time in atrial fibrillation was approximately 24 hours.  · There were frequent PVCs totalling 2296 and averaging 95.75 per hour. There were 92 monomorphic couplets. There were 3 bigeminal cycles. There were 7 monomorphic triplets.  · The diary was returned incomplete.     2/18/21 Denies CP. Mild stable ESQUIVEL  EKG A-fib 94 NSSTT changes - unaware of being back in A-fib - last EKG 2/8/19 NSR  Back in A-fib - duration unknown. Discussed LAURA/CV versus rate control - given lack of symptoms we are both in agreement for rate control.   Echo and holter to evaluate A-fib control  Continue Rx for PAD, HTN, DM, A-fib      3/8/21 Denies CP or SOB. Reports some issues with nausea and feels that she has a mass in her abdomen that moves around   Increase metoprolol 50 bid for better A-fib rate control  Continue Rx for HTN, HLD, PAD, DM  OV 6 months     7/16/21 Reports SOB and LE edema for 2 weeks. Was started on lasix 3 days ago without much improvement  EKG A-fib NSSTT changes  Echo, BNP, BMP for CHF  Continue current diuretic regimen - needs to go to the ER if symptoms worsen  Continue Rx for HTN, HLD, PAD, DM, A-fib, CHF     7/29/21 Feels better since discharge. Still with mild LE edema and ESQUIVEL  On lasix 40 bid     Continue Rx for HTN, HLD, PAD, DM, A-fib, CHF  OV 1 month with BNP, BMP        9/28/21 Denies CP. Less SOB and LE edema improved  BP controlled      Continue Rx for HTN, HLD, PAD, DM, A-fib, CHF  OV 3 months with BNP, BMP    Labs 6/10/22  K 4.4  Cr 1.1      Admitted 5/20/22  Ms Sandee Evans is a 66 y.o. woman admitted with acute hypoxic/hypercapnic respiratory failure due to acute on chronic diastolic CHF, exacerbation of COPD, and  untreated HIEU. She initially required BiPAP but was quickly weaned to O2 by NC. She states that she sometimes skips lasix doses due to urinary incontinence. She does not have BiPAP at home. She is still using tobacco. Started IV lasix for CHF exacerbation, steroids/nebs/levofloxacin for COPD exacerbation. Stepped down to floor on 5/21/22. Patient grew out E-coli on blood cultures- (S) to Cipro. Patient clinically improved and was discharged to home with out patient PT/OT on 5/24/22. Bi-pap will be arranged. Activity as tolerated. Diet- low NA/ADA 2000 yang diet    Already has pulmonary follow up.     6/28/22 Denies CP, SOB improved from discharge  BP controlled. Lasix has been held since discharge    Review of Systems   Constitutional: Negative for decreased appetite.   HENT: Negative for ear discharge.    Eyes: Negative for blurred vision.   Respiratory: Negative for hemoptysis.    Endocrine: Negative for polyphagia.   Hematologic/Lymphatic: Negative for adenopathy.   Skin: Negative for color change.   Musculoskeletal: Negative for joint swelling.   Genitourinary: Negative for bladder incontinence.   Neurological: Negative for brief paralysis.   Psychiatric/Behavioral: Negative for hallucinations.   Allergic/Immunologic: Negative for hives.        Objective:    Physical Exam  Constitutional:       Appearance: She is well-developed.   HENT:      Head: Normocephalic and atraumatic.   Eyes:      Conjunctiva/sclera: Conjunctivae normal.      Pupils: Pupils are equal, round, and reactive to light.   Cardiovascular:      Rate and Rhythm: Normal rate. Rhythm irregular.      Pulses: Intact distal pulses.      Heart sounds: Normal heart sounds.   Pulmonary:      Effort: Pulmonary effort is normal.      Breath sounds: Normal breath sounds.   Abdominal:      General: Bowel sounds are normal.      Palpations: Abdomen is soft.   Musculoskeletal:         General: Normal range of motion.      Cervical back: Normal range of motion  and neck supple.   Skin:     General: Skin is warm and dry.   Neurological:      Mental Status: She is alert and oriented to person, place, and time.           Assessment:       1. Pulmonary hypertension    2. COPD with chronic bronchitis and emphysema    3. Essential hypertension    4. ESQUIVEL (dyspnea on exertion)    5. Mixed hyperlipidemia    6. Longstanding persistent atrial fibrillation    7. PVD (peripheral vascular disease)         Plan:       Restart lasix 40 qd  OV 3 months with BNP, BMP      Continue Rx for HTN, HLD, PAD, DM, A-fib, CHF

## 2022-07-01 ENCOUNTER — HOSPITAL ENCOUNTER (OUTPATIENT)
Dept: RADIOLOGY | Facility: HOSPITAL | Age: 67
Discharge: HOME | End: 2022-07-01
Attending: FAMILY MEDICINE
Payer: COMMERCIAL

## 2022-07-01 ENCOUNTER — PATIENT MESSAGE (OUTPATIENT)
Dept: FAMILY MEDICINE | Facility: CLINIC | Age: 67
End: 2022-07-01
Payer: COMMERCIAL

## 2022-07-01 DIAGNOSIS — Z12.31 ENCOUNTER FOR SCREENING MAMMOGRAM FOR BREAST CANCER: ICD-10-CM

## 2022-07-01 PROCEDURE — 77067 MAMMO DIGITAL SCREENING BILAT WITH TOMO: ICD-10-PCS | Mod: 26,,, | Performed by: RADIOLOGY

## 2022-07-01 PROCEDURE — 77063 BREAST TOMOSYNTHESIS BI: CPT | Mod: 26,,, | Performed by: RADIOLOGY

## 2022-07-01 PROCEDURE — 77067 SCR MAMMO BI INCL CAD: CPT | Mod: 26,,, | Performed by: RADIOLOGY

## 2022-07-01 PROCEDURE — 77063 BREAST TOMOSYNTHESIS BI: CPT | Mod: TC

## 2022-07-01 PROCEDURE — 77063 MAMMO DIGITAL SCREENING BILAT WITH TOMO: ICD-10-PCS | Mod: 26,,, | Performed by: RADIOLOGY

## 2022-08-06 DIAGNOSIS — F41.9 ANXIETY: ICD-10-CM

## 2022-08-06 RX ORDER — SERTRALINE HYDROCHLORIDE 100 MG/1
TABLET, FILM COATED ORAL
Qty: 90 TABLET | Refills: 3 | Status: SHIPPED | OUTPATIENT
Start: 2022-08-06 | End: 2022-09-13 | Stop reason: SDUPTHER

## 2022-08-06 NOTE — TELEPHONE ENCOUNTER
Refill Decision Note   Sandee Evans  is requesting a refill authorization.  Brief Assessment and Rationale for Refill:  Approve     Medication Therapy Plan:       Medication Reconciliation Completed: No   Comments:     No Care Gaps recommended.     Note composed:12:02 PM 08/06/2022

## 2022-08-06 NOTE — TELEPHONE ENCOUNTER
No new care gaps identified.  [facility] Embedded Care Gaps. Reference number: [id number]. 8/06/2022   3:58:09 AM SAIT

## 2022-08-08 ENCOUNTER — HOSPITAL ENCOUNTER (OUTPATIENT)
Dept: RESPIRATORY THERAPY | Facility: HOSPITAL | Age: 67
Discharge: HOME | End: 2022-08-08
Attending: NURSE PRACTITIONER
Payer: COMMERCIAL

## 2022-08-08 VITALS — OXYGEN SATURATION: 98 % | HEART RATE: 75 BPM | RESPIRATION RATE: 18 BRPM

## 2022-08-08 DIAGNOSIS — R05.9 COUGH: ICD-10-CM

## 2022-08-08 DIAGNOSIS — J96.12 CHRONIC RESPIRATORY FAILURE WITH HYPERCAPNIA: ICD-10-CM

## 2022-08-08 DIAGNOSIS — J44.89 COPD WITH CHRONIC BRONCHITIS AND EMPHYSEMA: ICD-10-CM

## 2022-08-08 DIAGNOSIS — J43.9 COPD WITH CHRONIC BRONCHITIS AND EMPHYSEMA: ICD-10-CM

## 2022-08-08 PROCEDURE — 94060 PR EVAL OF BRONCHOSPASM: ICD-10-PCS | Mod: 26,59,, | Performed by: INTERNAL MEDICINE

## 2022-08-08 PROCEDURE — 94727 GAS DIL/WSHOT DETER LNG VOL: CPT

## 2022-08-08 PROCEDURE — 94618 PULMONARY STRESS TESTING: CPT | Mod: 26,,, | Performed by: INTERNAL MEDICINE

## 2022-08-08 PROCEDURE — 94618 PULMONARY STRESS TESTING: CPT

## 2022-08-08 PROCEDURE — 94729 PR C02/MEMBANE DIFFUSE CAPACITY: ICD-10-PCS | Mod: 26,,, | Performed by: INTERNAL MEDICINE

## 2022-08-08 PROCEDURE — 94618 PULMONARY STRESS TESTING: ICD-10-PCS | Mod: 26,,, | Performed by: INTERNAL MEDICINE

## 2022-08-08 PROCEDURE — 94010 BREATHING CAPACITY TEST: CPT

## 2022-08-08 PROCEDURE — 25000242 PHARM REV CODE 250 ALT 637 W/ HCPCS: Performed by: NURSE PRACTITIONER

## 2022-08-08 PROCEDURE — 94727 PR PULM FUNCTION TEST BY GAS: ICD-10-PCS | Mod: 26,,, | Performed by: INTERNAL MEDICINE

## 2022-08-08 PROCEDURE — 94729 DIFFUSING CAPACITY: CPT | Mod: 26,,, | Performed by: INTERNAL MEDICINE

## 2022-08-08 PROCEDURE — 94729 DIFFUSING CAPACITY: CPT

## 2022-08-08 PROCEDURE — 94060 EVALUATION OF WHEEZING: CPT | Mod: 26,59,, | Performed by: INTERNAL MEDICINE

## 2022-08-08 PROCEDURE — 94727 GAS DIL/WSHOT DETER LNG VOL: CPT | Mod: 26,,, | Performed by: INTERNAL MEDICINE

## 2022-08-08 RX ORDER — ALBUTEROL SULFATE 2.5 MG/.5ML
2.5 SOLUTION RESPIRATORY (INHALATION) ONCE
Status: COMPLETED | OUTPATIENT
Start: 2022-08-08 | End: 2022-08-08

## 2022-08-08 RX ADMIN — ALBUTEROL SULFATE 2.5 MG: 2.5 SOLUTION RESPIRATORY (INHALATION) at 09:08

## 2022-08-10 LAB
6MWT: NORMAL
BRPFT: NORMAL
DLCO ADJ PRE: 18.44 ML/(MIN*MMHG)
DLCO SINGLE BREATH LLN: 15.22
DLCO SINGLE BREATH PRE REF: 88 %
DLCO SINGLE BREATH REF: 20.95
DLCOC SBVA LLN: 2.88
DLCOC SBVA PRE REF: 119.9 %
DLCOC SBVA REF: 4.39
DLCOC SINGLE BREATH LLN: 15.22
DLCOC SINGLE BREATH PRE REF: 88 %
DLCOC SINGLE BREATH REF: 20.95
DLCOVA LLN: 2.88
DLCOVA PRE REF: 119.9 %
DLCOVA PRE: 5.26 ML/(MIN*MMHG*L)
DLCOVA REF: 4.39
DLVAADJ PRE: 5.26 ML/(MIN*MMHG*L)
ERVN2 LLN: -16449.33
ERVN2 PRE REF: 62.9 %
ERVN2 PRE: 0.42 L
ERVN2 REF: 0.67
FEF 25 75 CHG: 24.2 %
FEF 25 75 LLN: 0.64
FEF 25 75 POST REF: 51.2 %
FEF 25 75 PRE REF: 41.2 %
FEF 25 75 REF: 1.66
FET100 CHG: -29.1 %
FEV1 CHG: -5.4 %
FEV1 FVC CHG: 7 %
FEV1 FVC LLN: 66
FEV1 FVC POST REF: 86.9 %
FEV1 FVC PRE REF: 81.3 %
FEV1 FVC REF: 79
FEV1 LLN: 1.32
FEV1 POST REF: 76.7 %
FEV1 PRE REF: 81.1 %
FEV1 REF: 1.88
FRCN2 LLN: 1.83
FRCN2 PRE REF: 72 %
FRCN2 REF: 2.65
FVC CHG: -11.6 %
FVC LLN: 1.7
FVC POST REF: 87.8 %
FVC PRE REF: 99.2 %
FVC REF: 2.39
IVC PRE: 1.9 L
IVC SINGLE BREATH LLN: 1.7
IVC SINGLE BREATH PRE REF: 79.5 %
IVC SINGLE BREATH REF: 2.39
PEF CHG: -7.8 %
PEF LLN: 2.91
PEF POST REF: 87.3 %
PEF PRE REF: 94.6 %
PEF REF: 4.85
POST FEF 25 75: 0.85 L/S
POST FET 100: 8.36 SEC
POST FEV1 FVC: 68.54 %
POST FEV1: 1.44 L
POST FVC: 2.1 L
POST PEF: 4.23 L/S
PRE DLCO: 18.44 ML/(MIN*MMHG)
PRE FEF 25 75: 0.69 L/S
PRE FET 100: 11.79 SEC
PRE FEV1 FVC: 64.08 %
PRE FEV1: 1.52 L
PRE FRC N2: 1.91 L
PRE FVC: 2.37 L
PRE PEF: 4.59 L/S
RVN2 LLN: 1.41
RVN2 PRE REF: 75.1 %
RVN2 PRE: 1.49 L
RVN2 REF: 1.98
RVN2TLCN2 LLN: 32.49
RVN2TLCN2 PRE REF: 108.9 %
RVN2TLCN2 PRE: 45.85 %
RVN2TLCN2 REF: 42.08
TLCN2 LLN: 3.78
TLCN2 PRE REF: 68.1 %
TLCN2 PRE: 3.25 L
TLCN2 REF: 4.77
VA PRE: 3.5 L
VA SINGLE BREATH LLN: 4.62
VA SINGLE BREATH PRE REF: 75.8 %
VA SINGLE BREATH REF: 4.62
VCMAXN2 LLN: 1.7
VCMAXN2 PRE REF: 73.6 %
VCMAXN2 PRE: 1.76 L
VCMAXN2 REF: 2.39

## 2022-08-15 ENCOUNTER — OFFICE VISIT (OUTPATIENT)
Dept: PULMONOLOGY | Facility: CLINIC | Age: 67
End: 2022-08-15
Payer: COMMERCIAL

## 2022-08-15 VITALS
OXYGEN SATURATION: 97 % | SYSTOLIC BLOOD PRESSURE: 112 MMHG | BODY MASS INDEX: 49.36 KG/M2 | TEMPERATURE: 97 F | HEIGHT: 63 IN | WEIGHT: 278.56 LBS | DIASTOLIC BLOOD PRESSURE: 72 MMHG | HEART RATE: 93 BPM

## 2022-08-15 DIAGNOSIS — J30.9 ALLERGIC RHINITIS, UNSPECIFIED SEASONALITY, UNSPECIFIED TRIGGER: ICD-10-CM

## 2022-08-15 DIAGNOSIS — R91.1 SOLITARY PULMONARY NODULE: ICD-10-CM

## 2022-08-15 DIAGNOSIS — J96.12 CHRONIC RESPIRATORY FAILURE WITH HYPERCAPNIA: Primary | ICD-10-CM

## 2022-08-15 DIAGNOSIS — J44.89 COPD WITH CHRONIC BRONCHITIS: ICD-10-CM

## 2022-08-15 DIAGNOSIS — F17.200 TOBACCO DEPENDENCE: ICD-10-CM

## 2022-08-15 DIAGNOSIS — G47.33 OSA TREATED WITH BIPAP: ICD-10-CM

## 2022-08-15 PROCEDURE — 1159F PR MEDICATION LIST DOCUMENTED IN MEDICAL RECORD: ICD-10-PCS | Mod: CPTII,S$GLB,, | Performed by: NURSE PRACTITIONER

## 2022-08-15 PROCEDURE — 3078F DIAST BP <80 MM HG: CPT | Mod: CPTII,S$GLB,, | Performed by: NURSE PRACTITIONER

## 2022-08-15 PROCEDURE — 4010F PR ACE/ARB THEARPY RXD/TAKEN: ICD-10-PCS | Mod: CPTII,S$GLB,, | Performed by: NURSE PRACTITIONER

## 2022-08-15 PROCEDURE — 1101F PR PT FALLS ASSESS DOC 0-1 FALLS W/OUT INJ PAST YR: ICD-10-PCS | Mod: CPTII,S$GLB,, | Performed by: NURSE PRACTITIONER

## 2022-08-15 PROCEDURE — 3008F BODY MASS INDEX DOCD: CPT | Mod: CPTII,S$GLB,, | Performed by: NURSE PRACTITIONER

## 2022-08-15 PROCEDURE — 1126F PR PAIN SEVERITY QUANTIFIED, NO PAIN PRESENT: ICD-10-PCS | Mod: CPTII,S$GLB,, | Performed by: NURSE PRACTITIONER

## 2022-08-15 PROCEDURE — 1126F AMNT PAIN NOTED NONE PRSNT: CPT | Mod: CPTII,S$GLB,, | Performed by: NURSE PRACTITIONER

## 2022-08-15 PROCEDURE — 3044F PR MOST RECENT HEMOGLOBIN A1C LEVEL <7.0%: ICD-10-PCS | Mod: CPTII,S$GLB,, | Performed by: NURSE PRACTITIONER

## 2022-08-15 PROCEDURE — 99999 PR PBB SHADOW E&M-EST. PATIENT-LVL V: ICD-10-PCS | Mod: PBBFAC,,, | Performed by: NURSE PRACTITIONER

## 2022-08-15 PROCEDURE — 3288F PR FALLS RISK ASSESSMENT DOCUMENTED: ICD-10-PCS | Mod: CPTII,S$GLB,, | Performed by: NURSE PRACTITIONER

## 2022-08-15 PROCEDURE — 1101F PT FALLS ASSESS-DOCD LE1/YR: CPT | Mod: CPTII,S$GLB,, | Performed by: NURSE PRACTITIONER

## 2022-08-15 PROCEDURE — 3008F PR BODY MASS INDEX (BMI) DOCUMENTED: ICD-10-PCS | Mod: CPTII,S$GLB,, | Performed by: NURSE PRACTITIONER

## 2022-08-15 PROCEDURE — 3074F SYST BP LT 130 MM HG: CPT | Mod: CPTII,S$GLB,, | Performed by: NURSE PRACTITIONER

## 2022-08-15 PROCEDURE — 3288F FALL RISK ASSESSMENT DOCD: CPT | Mod: CPTII,S$GLB,, | Performed by: NURSE PRACTITIONER

## 2022-08-15 PROCEDURE — 99999 PR PBB SHADOW E&M-EST. PATIENT-LVL V: CPT | Mod: PBBFAC,,, | Performed by: NURSE PRACTITIONER

## 2022-08-15 PROCEDURE — 1160F PR REVIEW ALL MEDS BY PRESCRIBER/CLIN PHARMACIST DOCUMENTED: ICD-10-PCS | Mod: CPTII,S$GLB,, | Performed by: NURSE PRACTITIONER

## 2022-08-15 PROCEDURE — 3074F PR MOST RECENT SYSTOLIC BLOOD PRESSURE < 130 MM HG: ICD-10-PCS | Mod: CPTII,S$GLB,, | Performed by: NURSE PRACTITIONER

## 2022-08-15 PROCEDURE — 99214 OFFICE O/P EST MOD 30 MIN: CPT | Mod: S$GLB,,, | Performed by: NURSE PRACTITIONER

## 2022-08-15 PROCEDURE — 4010F ACE/ARB THERAPY RXD/TAKEN: CPT | Mod: CPTII,S$GLB,, | Performed by: NURSE PRACTITIONER

## 2022-08-15 PROCEDURE — 1160F RVW MEDS BY RX/DR IN RCRD: CPT | Mod: CPTII,S$GLB,, | Performed by: NURSE PRACTITIONER

## 2022-08-15 PROCEDURE — 3078F PR MOST RECENT DIASTOLIC BLOOD PRESSURE < 80 MM HG: ICD-10-PCS | Mod: CPTII,S$GLB,, | Performed by: NURSE PRACTITIONER

## 2022-08-15 PROCEDURE — 1159F MED LIST DOCD IN RCRD: CPT | Mod: CPTII,S$GLB,, | Performed by: NURSE PRACTITIONER

## 2022-08-15 PROCEDURE — 99214 PR OFFICE/OUTPT VISIT, EST, LEVL IV, 30-39 MIN: ICD-10-PCS | Mod: S$GLB,,, | Performed by: NURSE PRACTITIONER

## 2022-08-15 PROCEDURE — 3044F HG A1C LEVEL LT 7.0%: CPT | Mod: CPTII,S$GLB,, | Performed by: NURSE PRACTITIONER

## 2022-08-15 RX ORDER — CETIRIZINE HYDROCHLORIDE 10 MG/1
10 TABLET ORAL DAILY
Qty: 90 TABLET | Refills: 3 | Status: SHIPPED | OUTPATIENT
Start: 2022-08-15 | End: 2024-01-12

## 2022-08-15 NOTE — PROGRESS NOTES
Notes:   HISTORY OF PRESENT ILLNESS: Sandee Evans is a 66 y.o. female current smoker but cutting back to 2-3 cig a day  with Obesity, HTN, DM, HLD, Atrial fibrillation on OAC, PVD, s/p bilateral knee sx, severe HIEU AHI 90   is here for management of COPD and obstructive sleep apnea and CPAP equipment check.      Patient was admitted 7/20-7/24/2021 for  acute hypoxic,hypercapnic respiratory failure for A/C diastolic CHF exacerbation, and HIEU. Symptoms improved on IV Lasix and nightly bipap. Overall patient reports improved breathing and resolution of cough on diuretic. If she eats too much salt and start swelling, her cough returns. Uses her 1 rescue a year.  Compliant with Spiriva.  Walk 96% drop90%  8/8/2022: Spirometry shows mild obstruction. Lung volume determination shows mild restriction is also present. Spirometry remains unimproved following bronchodilator. DLCO is normal.     Sleep study: 10-13/2020: AHI 90  BIPAP 5/26/2022  Patient reports up pressure feels a bit low so just a pressure to 18 7 at visit which patient reports is comfortable  Patient reports sleeping better with BiPAP.  She is sleeping longer in feels more energetic.  ESS today 2 when lying down to rest in the afternoon if circumstances permit.  Usage 07/17/2022 - 08/15/2022  Usage days 27/30 days (90%)  >= 4 hours 20 days (67%)  < 4 hours 7 days (23%)  Usage hours 115 hours 33 minutes  Average usage (total days) 3 hours 51 minutes  Average usage (days used) 4 hours 17 minutes  Median usage (days used) 4 hours 27 minutes  Total used hours (value since last reset - 08/15/2022) 304 hours  AirCuCharacter Boostere 10   Serial number [id number]  Mode Spont Timed  IPAP 18 cmH2O  EPAP 6 cmH2O  Respiratory rate 12 bpm  Therapy  Leaks - L/min Median: 0.0 95th percentile: 5.1 Maximum: 19.8  Events per hour AI: 1.6 HI: 7.2 AHI: 8.8  Adjusted pressure 18/6 to 18/7      ABG 7/21/2021 ph 7.4 pco2 74 pO2 75 - ordered to resume bipap but pt does not qualify based  on ABG per DME since we do not have a baseline on RA    Sleep study: 10-13/2020: AHI 90  ECHO 7/20/2021: EF 60% pasp 49 mmHg, +AF  PFT 9/21/2021: ratio 64 fev1 66 fvc 80 tlc 62 dlco 73  ABG 9/21/21 pH 7.3 pCO2 55.6 pO2 72 SpO2 94 on RA  CT chest 9/21/2021: Lungs: There are no abnormal opacities that require further evaluation.  The largest opacity in the right lung appears solid and measures 0.2 cm on series 4, image 104.  The lungs show no findings consistent with emphysema.  Right basilar scarring.  Dependent bilateral atelectasis  Heart and pericardium: Cardiomegaly.  No pericardial effusion.   Aorta and vasculature: Atherosclerosis including coronary arteries.  Main pulmonary artery is upper limits of normal measuring 3.0 cm.    PAST MEDICAL HISTORY:    Active Ambulatory Problems     Diagnosis Date Noted    Urge incontinence 01/11/2013    DDD (degenerative disc disease), lumbar 01/11/2013    DJD (degenerative joint disease) of knee 01/11/2013    Hyperlipidemia 01/11/2013    Depressed 01/11/2013    Insomnia 01/11/2013    Vitamin D deficiency disease 01/11/2013    Lumbar radicular pain 01/11/2013    Essential hypertension 01/11/2013    Severe obesity (BMI >= 40)     Colon polyps 08/09/2013    Diverticulosis 08/09/2013    Chronic pain 08/09/2013    Pre-diabetes 08/09/2013    Longstanding persistent atrial fibrillation 07/20/2015    History of pancreatitis 09/22/2015    Osteoarthritis of right knee 05/23/2016    Depression 05/30/2016    Type 2 diabetes mellitus with diabetic cataract, without long-term current use of insulin     Dry eye syndrome, bilateral 12/18/2017    Nuclear sclerosis, bilateral 12/18/2017    Refractive error 12/18/2017    Hidradenitis suppurativa of right axilla 12/31/2017    PVD (peripheral vascular disease) 05/28/2018    Screen for colon cancer 06/25/2019    Chronic bilateral low back pain without sciatica 09/14/2019    ESQUIVEL (dyspnea on exertion) 09/03/2020    Tobacco dependence 09/03/2020     HIEU treated with BiPAP 09/03/2020    Chronic respiratory failure with hypercapnia 07/20/2021    Pulmonary hypertension 07/20/2021    Tobacco abuse 07/20/2021    Hypothyroidism 07/20/2021    Current mild episode of major depressive disorder without prior episode 07/29/2021    COPD with chronic bronchitis 08/02/2021    Atelectasis 10/26/2021    Chronic kidney disease, stage 3a 06/13/2022    Solitary pulmonary nodule 09/02/2022     Resolved Ambulatory Problems     Diagnosis Date Noted    Encounter for screening colonoscopy 08/02/2013    A-fib 03/20/2015    Dysphagia 06/22/2015    Nausea 09/22/2015    Gross hematuria 05/24/2016    Acute viral syndrome 12/31/2017    Other constipation 02/22/2019    Acute on chronic diastolic congestive heart failure 07/16/2021    Debility 07/20/2021    Acute metabolic encephalopathy 07/20/2021    ANNA MARIE (acute kidney injury) 07/22/2021    Acute bronchitis 08/02/2021    Acute respiratory failure with hypoxia and hypercarbia 05/20/2022    Dysuria 05/20/2022     Past Medical History:   Diagnosis Date    Anticoagulant long-term use     Arthritis     Atrial fibrillation     Breast cyst     CHF (congestive heart failure)     Degenerative disc disease     Edema     HLD (hyperlipidemia)     Hx of psychiatric care     Hypertension     Obesity, morbid     HIEU (obstructive sleep apnea)     Other abnormal glucose     Psychiatric problem     Requires assistance with activities of daily living (ADL)     Sleep apnea     Smoker     SOB (shortness of breath)     SOB (shortness of breath)     Thyroid disease     TMJ (temporomandibular joint disorder)     Unsteady gait     Weakness generalized     Wears glasses                 PAST SURGICAL HISTORY:    Past Surgical History:   Procedure Laterality Date    BREAST BIOPSY Right     over 10 yrs ago/ benign    BREAST CYST EXCISION Right     BREAST LUMPECTOMY Right     over 10 yrs ago, ex bx/ benign    COLONOSCOPY N/A 6/25/2019    Procedure: COLONOSCOPY;  Surgeon:  Micheline Flores MD;  Location: [facility] ENDO;  Service: Endoscopy;  Laterality: N/A;  RX XARELTO ok to hold (2 days) per Dr. Naik see scan 3/20/19    ENDOSCOPIC ULTRASOUND OF UPPER GASTROINTESTINAL TRACT N/A 1/26/2021    Procedure: ULTRASOUND-ENDOSCOPIC-UPPER;  Surgeon: Stevenson Philippe MD;  Location: [facility] ENDO;  Service: Endoscopy;  Laterality: N/A;    HYSTERECTOMY      JOINT REPLACEMENT Bilateral     knees    OOPHORECTOMY      rt.knee surgery 2016      TOTAL KNEE ARTHROPLASTY Left 2/19/2019    Procedure: ARTHROPLASTY, KNEE, TOTAL;  Surgeon: Med Hanson MD;  Location: [facility] OR;  Service: Orthopedics;  Laterality: Left;  10AM START PER  PER JAYLIN TEXT @ 8:34AM ON 2-18-19  SUPINE  HARRIS LOYA [phone] TEXTED HIM ON 1-24-19 @ 7:57AM  RN PRE OP 2-13-19--BMI--48.9    underarm gland\ Bilateral          FAMILY HISTORY:                Family History   Problem Relation Age of Onset    Diabetes Mother     Hypertension Mother     Cancer Mother     Diabetes Brother     Cancer Brother     No Known Problems Father     No Known Problems Sister     No Known Problems Maternal Aunt     No Known Problems Maternal Uncle     No Known Problems Paternal Aunt     No Known Problems Paternal Uncle     No Known Problems Maternal Grandmother     No Known Problems Maternal Grandfather     No Known Problems Paternal Grandmother     No Known Problems Paternal Grandfather     Amblyopia Neg Hx     Blindness Neg Hx     Cataracts Neg Hx     Glaucoma Neg Hx     Macular degeneration Neg Hx     Retinal detachment Neg Hx     Strabismus Neg Hx     Stroke Neg Hx     Thyroid disease Neg Hx        SOCIAL HISTORY:          Tobacco:   Social History     Tobacco Use   Smoking Status Every Day    Packs/day: 0.50    Years: 47.00    Pack years: 23.50    Types: Cigarettes    Start date: 1973   Smokeless Tobacco Current       alcohol use:    Social History     Substance and Sexual Activity   Alcohol Use No                   ALLERGIES:    Review of  patient's allergies indicates:   Allergen Reactions    Ace inhibitors      Cough         CURRENT MEDICATIONS:    Current Outpatient Medications   Medication Sig Dispense Refill    albuterol (PROVENTIL/VENTOLIN HFA) 90 mcg/actuation inhaler Inhale 2 puffs into the lungs every 6 (six) hours as needed for Wheezing or Shortness of Breath. Rescue 54 g 1    atorvastatin (LIPITOR) 40 MG tablet Take 1 tablet (40 mg total) by mouth once daily. 90 tablet 1    azelastine (ASTELIN) 137 mcg (0.1 %) nasal spray 1 spray (137 mcg total) by Nasal route 2 (two) times daily. 30 mL 3    fluticasone propionate (FLONASE) 50 mcg/actuation nasal spray SHAKE LIQUID AND USE 2 SPRAYS(100 MCG) IN EACH NOSTRIL EVERY DAY 48 g 0    furosemide (LASIX) 40 MG tablet Take 1 tablet (40 mg total) by mouth once daily. 180 tablet 0    losartan (COZAAR) 25 MG tablet Take 1 tablet (25 mg total) by mouth once daily. 90 tablet 1    metoprolol succinate (TOPROL-XL) 100 MG 24 hr tablet TAKE 1 TABLET(100 MG) BY MOUTH EVERY DAY 90 tablet 1    nicotine, polacrilex, (NICORETTE) 2 mg Gum Use 1-2 per hour in place of a cigarette. Limit to 10 a day - oral. . (Patient taking differently: Use 1-2 per hour in place of a cigarette. Limit to 10 a day - oral. .) 200 each 0    sertraline (ZOLOFT) 100 MG tablet TAKE 1 TABLET(100 MG) BY MOUTH EVERY DAY 90 tablet 3    SPIRIVA RESPIMAT 2.5 mcg/actuation inhaler INHALE 2 PUFFS BY MOUTH INTO THE LUNGS DAILY( CONTROLLER) 4 g 5    traZODone (DESYREL) 50 MG tablet TAKE 1 TO 2 TABLETS BY MOUTH EVERY NIGHT AS NEEDED FOR SLEEP 60 tablet 2    triamcinolone acetonide 0.1% (KENALOG) 0.1 % ointment APPLY BETWEEN KNEES AND UPPER THIGHS TWICE DAILY FOR NO MORE THAN 7-14 DAYS 454 g 11    XARELTO 20 mg Tab Take 1 tablet (20 mg total) by mouth once daily. 90 tablet 1    cetirizine (ZYRTEC) 10 MG tablet Take 1 tablet (10 mg total) by mouth once daily. 90 tablet 3    famotidine (PEPCID) 20 MG tablet Take 1 tablet (20 mg total) by mouth 2 (two)  "times daily as needed. 60 tablet 2    ondansetron (ZOFRAN-ODT) 8 MG TbDL Take 1 tablet (8 mg total) by mouth every 6 (six) hours as needed (nausea). 60 tablet 2     No current facility-administered medications for this visit.                Cutting fluid in take   REVIEW OF SYSTEMS:   Review of Systems   Constitutional:  Positive for fatigue. Negative for fever, chills, weight loss, activity change, appetite change and night sweats.   HENT:  Positive for postnasal drip and congestion.    Eyes: Negative.    Respiratory:  Positive for dyspnea on extertion (just with exercise now, marching, leg stretch, walking, getting better) and use of rescue inhaler. Negative for cough, sputum production, chest tightness, wheezing, orthopnea and previous hospitialization due to pulmonary problems.         Breathing is better  She is on spiriva (helping)  Reduce salt, fluid pill  Cutting down to 0-1 cigarette  Using albuterol for coughing spell about once a month   Cardiovascular:  Negative for chest pain, palpitations and leg swelling.   Genitourinary: Negative.    Endocrine: endocrine negative    Musculoskeletal:  Positive for gait problem (with cane).   Skin: Negative.    Gastrointestinal:  Negative for nausea, vomiting and acid reflux.   Neurological:  Negative for dizziness, light-headedness and headaches.   Psychiatric/Behavioral:  Positive for sleep disturbance (pillows x 3, occasional cough when not taking diuretic). The patient is nervous/anxious.       PHYSICAL EXAM:  Vitals:    08/15/22 1111   BP: 112/72   Pulse: 93   Temp: 97.1 °F (36.2 °C)   SpO2: 97%   Weight: 126.4 kg (278 lb 8.8 oz)   Height: 5' 3" (1.6 m)   PainSc: 0-No pain     Body mass index is 49.34 kg/m².   Physical Exam   Constitutional: She is oriented to person, place, and time. She appears well-developed. No distress. She is obese.   HENT:   Head: Normocephalic.   Cardiovascular: Normal rate.   No murmur heard.  irregular   Pulmonary/Chest: Normal " expansion, effort normal and breath sounds normal. No respiratory distress.   Musculoskeletal:         General: No edema.   Neurological: She is alert and oriented to person, place, and time.   cane   Skin: She is not diaphoretic.   Psychiatric: She has a normal mood and affect. Her behavior is normal. Judgment and thought content normal.       Lab Results   Component Value Date    TSH 5.513 (H) 09/21/2021      Lab Results   Component Value Date    WBC 11.68 05/21/2022    HGB 14.8 05/21/2022    HCT 45.4 05/21/2022    MCV 99 (H) 05/21/2022    PLT SEE COMMENT 05/21/2022     BMP  Lab Results   Component Value Date     06/10/2022    K 4.4 06/10/2022     06/10/2022    CO2 28 06/10/2022    BUN 20 06/10/2022    CREATININE 1.1 06/10/2022    CALCIUM 9.3 06/10/2022    ANIONGAP 9 06/10/2022    ESTGFRAFRICA 59.6 (A) 06/10/2022    EGFRNONAA 51.7 (A) 06/10/2022     Lab Results   Component Value Date    HGBA1C 5.6 05/21/2022        ASSESSMENT    ICD-10-CM ICD-9-CM    1. Chronic respiratory failure with hypercapnia  J96.12 518.83       2. Tobacco dependence  F17.200 305.1 CT Chest Lung Screening Low Dose      3. Solitary pulmonary nodule  R91.1 793.11 CT Chest Lung Screening Low Dose      4. HIEU treated with BiPAP  G47.33 327.23       5. Allergic rhinitis, unspecified seasonality, unspecified trigger  J30.9 477.9 cetirizine (ZYRTEC) 10 MG tablet      6. COPD with chronic bronchitis  J44.9 491.20           PLAN:    Problem List Items Addressed This Visit          Unprioritized    Chronic respiratory failure with hypercapnia - Primary    Overview     PFT 9/21/2021: ratio 64 fev1 1.30 (66) fvc 80 tlc 62 dlco 73  ABG 9/21/21 pH 7.3 pCO2 55.6 pO2 72 SpO2 94 on RA  Hx hospitalization 7/2021  Resume bipap  No home oxygen         COPD with chronic bronchitis    Overview     PFT 9/21/2021: ratio 64 fev1 1.30 (66) fvc 80 tlc 62 dlco 73  ABG 9/21/21 pH 7.3 pCO2 55.6 pO2 72 SpO2 94 on RA  CT chest 9/21/2021: Lungs: There are no  abnormal opacities that require further evaluation.  The largest opacity in the right lung appears solid and measures 0.2 cm on series 4, image 104.  The lungs show no findings consistent with emphysema.  Right basilar scarring.  Dependent bilateral atelectasis  Pt is on spiriva which helps, rare need rescue           HIEU treated with BiPAP    Overview     Pt symptomatic-snoring, excessive daytime sleepiness, fatigue and difficulty staying asleep.   AHI 90  Patient is using and benefiting from cpap. Residual predicted AHI optimal.          Solitary pulmonary nodule    Overview     < 6 mm, annual LDCT         Relevant Orders    CT Chest Lung Screening Low Dose    Tobacco dependence    Overview     Pt is  cutting back, now 3-5 cig a day         Relevant Orders    CT Chest Lung Screening Low Dose     Other Visit Diagnoses       Allergic rhinitis, unspecified seasonality, unspecified trigger        Relevant Medications    cetirizine (ZYRTEC) 10 MG tablet          Patient will Follow up in about 6 months (around 2/15/2023), or if symptoms worsen or fail to improve.

## 2022-08-30 DIAGNOSIS — Z78.0 MENOPAUSE: ICD-10-CM

## 2022-09-02 PROBLEM — R91.1 SOLITARY PULMONARY NODULE: Status: ACTIVE | Noted: 2022-09-02

## 2022-09-13 ENCOUNTER — OFFICE VISIT (OUTPATIENT)
Dept: FAMILY MEDICINE | Facility: CLINIC | Age: 67
End: 2022-09-13
Payer: COMMERCIAL

## 2022-09-13 VITALS
SYSTOLIC BLOOD PRESSURE: 130 MMHG | DIASTOLIC BLOOD PRESSURE: 72 MMHG | WEIGHT: 267.19 LBS | OXYGEN SATURATION: 96 % | BODY MASS INDEX: 47.34 KG/M2 | HEART RATE: 85 BPM | TEMPERATURE: 98 F | RESPIRATION RATE: 18 BRPM | HEIGHT: 63 IN

## 2022-09-13 DIAGNOSIS — M85.80 OSTEOPENIA, UNSPECIFIED LOCATION: ICD-10-CM

## 2022-09-13 DIAGNOSIS — Z00.00 ANNUAL PHYSICAL EXAM: ICD-10-CM

## 2022-09-13 DIAGNOSIS — I48.20 CHRONIC ATRIAL FIBRILLATION: ICD-10-CM

## 2022-09-13 DIAGNOSIS — I70.0 AORTIC ATHEROSCLEROSIS: ICD-10-CM

## 2022-09-13 DIAGNOSIS — Z23 INFLUENZA VACCINE NEEDED: ICD-10-CM

## 2022-09-13 DIAGNOSIS — I10 ESSENTIAL HYPERTENSION: ICD-10-CM

## 2022-09-13 DIAGNOSIS — Z78.0 POST-MENOPAUSAL: ICD-10-CM

## 2022-09-13 DIAGNOSIS — I73.9 PVD (PERIPHERAL VASCULAR DISEASE): ICD-10-CM

## 2022-09-13 DIAGNOSIS — R29.898 TRANSIENT RIGHT LEG WEAKNESS: ICD-10-CM

## 2022-09-13 DIAGNOSIS — F41.9 ANXIETY: ICD-10-CM

## 2022-09-13 DIAGNOSIS — G47.33 OSA TREATED WITH BIPAP: ICD-10-CM

## 2022-09-13 DIAGNOSIS — R21 RASH: Primary | ICD-10-CM

## 2022-09-13 DIAGNOSIS — I25.10 CORONARY ARTERY DISEASE: ICD-10-CM

## 2022-09-13 DIAGNOSIS — M54.16 LUMBAR RADICULOPATHY: ICD-10-CM

## 2022-09-13 DIAGNOSIS — R80.9 MICROALBUMINURIA: ICD-10-CM

## 2022-09-13 PROCEDURE — 3078F PR MOST RECENT DIASTOLIC BLOOD PRESSURE < 80 MM HG: ICD-10-PCS | Mod: CPTII,S$GLB,, | Performed by: FAMILY MEDICINE

## 2022-09-13 PROCEDURE — 3078F DIAST BP <80 MM HG: CPT | Mod: CPTII,S$GLB,, | Performed by: FAMILY MEDICINE

## 2022-09-13 PROCEDURE — 99214 PR OFFICE/OUTPT VISIT, EST, LEVL IV, 30-39 MIN: ICD-10-PCS | Mod: 25,S$GLB,, | Performed by: FAMILY MEDICINE

## 2022-09-13 PROCEDURE — 99999 PR PBB SHADOW E&M-EST. PATIENT-LVL V: ICD-10-PCS | Mod: PBBFAC,,, | Performed by: FAMILY MEDICINE

## 2022-09-13 PROCEDURE — 1126F AMNT PAIN NOTED NONE PRSNT: CPT | Mod: CPTII,S$GLB,, | Performed by: FAMILY MEDICINE

## 2022-09-13 PROCEDURE — 99214 OFFICE O/P EST MOD 30 MIN: CPT | Mod: 25,S$GLB,, | Performed by: FAMILY MEDICINE

## 2022-09-13 PROCEDURE — 3044F PR MOST RECENT HEMOGLOBIN A1C LEVEL <7.0%: ICD-10-PCS | Mod: CPTII,S$GLB,, | Performed by: FAMILY MEDICINE

## 2022-09-13 PROCEDURE — 90694 VACC AIIV4 NO PRSRV 0.5ML IM: CPT | Mod: S$GLB,,, | Performed by: FAMILY MEDICINE

## 2022-09-13 PROCEDURE — 90471 IMMUNIZATION ADMIN: CPT | Mod: S$GLB,,, | Performed by: FAMILY MEDICINE

## 2022-09-13 PROCEDURE — 3288F FALL RISK ASSESSMENT DOCD: CPT | Mod: CPTII,S$GLB,, | Performed by: FAMILY MEDICINE

## 2022-09-13 PROCEDURE — 3075F PR MOST RECENT SYSTOLIC BLOOD PRESS GE 130-139MM HG: ICD-10-PCS | Mod: CPTII,S$GLB,, | Performed by: FAMILY MEDICINE

## 2022-09-13 PROCEDURE — 1126F PR PAIN SEVERITY QUANTIFIED, NO PAIN PRESENT: ICD-10-PCS | Mod: CPTII,S$GLB,, | Performed by: FAMILY MEDICINE

## 2022-09-13 PROCEDURE — 3044F HG A1C LEVEL LT 7.0%: CPT | Mod: CPTII,S$GLB,, | Performed by: FAMILY MEDICINE

## 2022-09-13 PROCEDURE — 4010F PR ACE/ARB THEARPY RXD/TAKEN: ICD-10-PCS | Mod: CPTII,S$GLB,, | Performed by: FAMILY MEDICINE

## 2022-09-13 PROCEDURE — 90471 FLU VACCINE - QUADRIVALENT - ADJUVANTED: ICD-10-PCS | Mod: S$GLB,,, | Performed by: FAMILY MEDICINE

## 2022-09-13 PROCEDURE — 3075F SYST BP GE 130 - 139MM HG: CPT | Mod: CPTII,S$GLB,, | Performed by: FAMILY MEDICINE

## 2022-09-13 PROCEDURE — 4010F ACE/ARB THERAPY RXD/TAKEN: CPT | Mod: CPTII,S$GLB,, | Performed by: FAMILY MEDICINE

## 2022-09-13 PROCEDURE — 3288F PR FALLS RISK ASSESSMENT DOCUMENTED: ICD-10-PCS | Mod: CPTII,S$GLB,, | Performed by: FAMILY MEDICINE

## 2022-09-13 PROCEDURE — 99999 PR PBB SHADOW E&M-EST. PATIENT-LVL V: CPT | Mod: PBBFAC,,, | Performed by: FAMILY MEDICINE

## 2022-09-13 PROCEDURE — 1101F PT FALLS ASSESS-DOCD LE1/YR: CPT | Mod: CPTII,S$GLB,, | Performed by: FAMILY MEDICINE

## 2022-09-13 PROCEDURE — 90694 FLU VACCINE - QUADRIVALENT - ADJUVANTED: ICD-10-PCS | Mod: S$GLB,,, | Performed by: FAMILY MEDICINE

## 2022-09-13 PROCEDURE — 1101F PR PT FALLS ASSESS DOC 0-1 FALLS W/OUT INJ PAST YR: ICD-10-PCS | Mod: CPTII,S$GLB,, | Performed by: FAMILY MEDICINE

## 2022-09-13 RX ORDER — METOPROLOL SUCCINATE 100 MG/1
100 TABLET, EXTENDED RELEASE ORAL DAILY
Qty: 90 TABLET | Refills: 1 | Status: SHIPPED | OUTPATIENT
Start: 2022-09-13 | End: 2022-10-24 | Stop reason: SDUPTHER

## 2022-09-13 RX ORDER — LOSARTAN POTASSIUM 25 MG/1
25 TABLET ORAL DAILY
Qty: 90 TABLET | Refills: 1 | Status: SHIPPED | OUTPATIENT
Start: 2022-09-13 | End: 2022-10-24 | Stop reason: SDUPTHER

## 2022-09-13 RX ORDER — ATORVASTATIN CALCIUM 40 MG/1
40 TABLET, FILM COATED ORAL DAILY
Qty: 90 TABLET | Refills: 1 | Status: SHIPPED | OUTPATIENT
Start: 2022-09-13 | End: 2022-10-24 | Stop reason: SDUPTHER

## 2022-09-13 RX ORDER — GABAPENTIN 100 MG/1
100 CAPSULE ORAL NIGHTLY PRN
Qty: 90 CAPSULE | Refills: 1 | Status: SHIPPED | OUTPATIENT
Start: 2022-09-13 | End: 2023-11-13 | Stop reason: CLARIF

## 2022-09-13 RX ORDER — SERTRALINE HYDROCHLORIDE 100 MG/1
100 TABLET, FILM COATED ORAL DAILY
Qty: 90 TABLET | Refills: 3 | Status: SHIPPED | OUTPATIENT
Start: 2022-09-13 | End: 2022-10-24 | Stop reason: SDUPTHER

## 2022-09-13 RX ORDER — RIVAROXABAN 20 MG/1
20 TABLET, FILM COATED ORAL DAILY
Qty: 90 TABLET | Refills: 1 | Status: SHIPPED | OUTPATIENT
Start: 2022-09-13 | End: 2022-10-24 | Stop reason: SDUPTHER

## 2022-09-13 NOTE — PROGRESS NOTES
Health Maintenance Due   Topic     Foot Exam  Consult pcp      DEXA Scan  Pending order      Diabetes Urine Screening  Consult pcp      Influenza Vaccine (1) Pt agree to get today      Eye Exam

## 2022-09-13 NOTE — PROGRESS NOTES
Administered High Dose Flu vaccine IM to left deltoid.  Patient tolerated injection well.  Patient advised to wait in lobby for 15 minutes for observation and to report any adverse reactions immediately.  Patient verbalized understanding.

## 2022-09-14 ENCOUNTER — HOSPITAL ENCOUNTER (OUTPATIENT)
Dept: RADIOLOGY | Facility: CLINIC | Age: 67
Discharge: HOME | End: 2022-09-14
Attending: FAMILY MEDICINE
Payer: COMMERCIAL

## 2022-09-14 ENCOUNTER — TELEPHONE (OUTPATIENT)
Dept: CARDIOLOGY | Facility: CLINIC | Age: 67
End: 2022-09-14
Payer: COMMERCIAL

## 2022-09-14 DIAGNOSIS — Z78.0 MENOPAUSE: ICD-10-CM

## 2022-09-14 PROCEDURE — 77080 DXA BONE DENSITY AXIAL: CPT | Mod: 26,,, | Performed by: INTERNAL MEDICINE

## 2022-09-14 PROCEDURE — 77080 DEXA BONE DENSITY SPINE HIP: ICD-10-PCS | Mod: 26,,, | Performed by: INTERNAL MEDICINE

## 2022-09-14 PROCEDURE — 77080 DXA BONE DENSITY AXIAL: CPT | Mod: TC,PO

## 2022-09-14 NOTE — TELEPHONE ENCOUNTER
Pt reports she received a call from this office this morning and she was just returning the call. I informed her that we do not have any documentation of said call but if something comes up then we will let her know.

## 2022-09-14 NOTE — TELEPHONE ENCOUNTER
----- Message from Pooja Guzman sent at 9/14/2022 10:33 AM CDT -----  Type: Patient Call Back    Who called:self     What is the request in detail:returning call     Can the clinic reply by [facility]?    Would the patient rather a call back or a response via [facility]? Call    Best call back number: [phone] (home)

## 2022-09-15 DIAGNOSIS — M25.562 PAIN IN BOTH KNEES, UNSPECIFIED CHRONICITY: Primary | ICD-10-CM

## 2022-09-15 DIAGNOSIS — M25.561 PAIN IN BOTH KNEES, UNSPECIFIED CHRONICITY: Primary | ICD-10-CM

## 2022-09-20 ENCOUNTER — OFFICE VISIT (OUTPATIENT)
Dept: ORTHOPEDICS | Facility: CLINIC | Age: 67
End: 2022-09-20
Attending: ORTHOPAEDIC SURGERY
Payer: COMMERCIAL

## 2022-09-20 VITALS
HEIGHT: 63 IN | HEART RATE: 85 BPM | OXYGEN SATURATION: 99 % | BODY MASS INDEX: 47.31 KG/M2 | SYSTOLIC BLOOD PRESSURE: 130 MMHG | RESPIRATION RATE: 18 BRPM | WEIGHT: 267 LBS | DIASTOLIC BLOOD PRESSURE: 75 MMHG

## 2022-09-20 DIAGNOSIS — Z96.653 STATUS POST TOTAL BILATERAL KNEE REPLACEMENT: Primary | ICD-10-CM

## 2022-09-20 DIAGNOSIS — R29.898 TRANSIENT RIGHT LEG WEAKNESS: ICD-10-CM

## 2022-09-20 DIAGNOSIS — M54.16 LUMBAR RADICULOPATHY: ICD-10-CM

## 2022-09-20 PROCEDURE — 99203 PR OFFICE/OUTPT VISIT, NEW, LEVL III, 30-44 MIN: ICD-10-PCS | Mod: S$GLB,,, | Performed by: ORTHOPAEDIC SURGERY

## 2022-09-20 PROCEDURE — 3078F PR MOST RECENT DIASTOLIC BLOOD PRESSURE < 80 MM HG: ICD-10-PCS | Mod: CPTII,S$GLB,, | Performed by: ORTHOPAEDIC SURGERY

## 2022-09-20 PROCEDURE — 4010F PR ACE/ARB THEARPY RXD/TAKEN: ICD-10-PCS | Mod: CPTII,S$GLB,, | Performed by: ORTHOPAEDIC SURGERY

## 2022-09-20 PROCEDURE — 1126F AMNT PAIN NOTED NONE PRSNT: CPT | Mod: CPTII,S$GLB,, | Performed by: ORTHOPAEDIC SURGERY

## 2022-09-20 PROCEDURE — 3044F PR MOST RECENT HEMOGLOBIN A1C LEVEL <7.0%: ICD-10-PCS | Mod: CPTII,S$GLB,, | Performed by: ORTHOPAEDIC SURGERY

## 2022-09-20 PROCEDURE — 3044F HG A1C LEVEL LT 7.0%: CPT | Mod: CPTII,S$GLB,, | Performed by: ORTHOPAEDIC SURGERY

## 2022-09-20 PROCEDURE — 3075F PR MOST RECENT SYSTOLIC BLOOD PRESS GE 130-139MM HG: ICD-10-PCS | Mod: CPTII,S$GLB,, | Performed by: ORTHOPAEDIC SURGERY

## 2022-09-20 PROCEDURE — 99999 PR PBB SHADOW E&M-EST. PATIENT-LVL V: CPT | Mod: PBBFAC,,, | Performed by: ORTHOPAEDIC SURGERY

## 2022-09-20 PROCEDURE — 3008F PR BODY MASS INDEX (BMI) DOCUMENTED: ICD-10-PCS | Mod: CPTII,S$GLB,, | Performed by: ORTHOPAEDIC SURGERY

## 2022-09-20 PROCEDURE — 3008F BODY MASS INDEX DOCD: CPT | Mod: CPTII,S$GLB,, | Performed by: ORTHOPAEDIC SURGERY

## 2022-09-20 PROCEDURE — 99999 PR PBB SHADOW E&M-EST. PATIENT-LVL V: ICD-10-PCS | Mod: PBBFAC,,, | Performed by: ORTHOPAEDIC SURGERY

## 2022-09-20 PROCEDURE — 1159F MED LIST DOCD IN RCRD: CPT | Mod: CPTII,S$GLB,, | Performed by: ORTHOPAEDIC SURGERY

## 2022-09-20 PROCEDURE — 3075F SYST BP GE 130 - 139MM HG: CPT | Mod: CPTII,S$GLB,, | Performed by: ORTHOPAEDIC SURGERY

## 2022-09-20 PROCEDURE — 1159F PR MEDICATION LIST DOCUMENTED IN MEDICAL RECORD: ICD-10-PCS | Mod: CPTII,S$GLB,, | Performed by: ORTHOPAEDIC SURGERY

## 2022-09-20 PROCEDURE — 4010F ACE/ARB THERAPY RXD/TAKEN: CPT | Mod: CPTII,S$GLB,, | Performed by: ORTHOPAEDIC SURGERY

## 2022-09-20 PROCEDURE — 99203 OFFICE O/P NEW LOW 30 MIN: CPT | Mod: S$GLB,,, | Performed by: ORTHOPAEDIC SURGERY

## 2022-09-20 PROCEDURE — 3078F DIAST BP <80 MM HG: CPT | Mod: CPTII,S$GLB,, | Performed by: ORTHOPAEDIC SURGERY

## 2022-09-20 PROCEDURE — 1126F PR PAIN SEVERITY QUANTIFIED, NO PAIN PRESENT: ICD-10-PCS | Mod: CPTII,S$GLB,, | Performed by: ORTHOPAEDIC SURGERY

## 2022-09-20 RX ORDER — METHYLPREDNISOLONE 4 MG/1
TABLET ORAL
Qty: 1 EACH | Refills: 0 | Status: SHIPPED | OUTPATIENT
Start: 2022-09-20 | End: 2023-01-26 | Stop reason: ALTCHOICE

## 2022-09-20 NOTE — PROGRESS NOTES
NEW PATIENT ORTHOPAEDIC: Knee    PRIMARY CARE PHYSICIAN: Kuldeep Miller MD   REFERRING PROVIDER: Kuldeep Miller MD  [address] [place] [address]     ASSESSMENT & PLAN:    Impression:  Left Knee History of Total Knee Arthroplasty   Left Thigh Pain  Lumbar Radiculopathy    Follow Up Plan: PRN      Non operative care:    Sandee Evans has physical exam evidence of above and wishes to pursue an non-operative care. I am recommending the following: referral to pain management, Medrol Dosepack, Gabapentin.     Patient comes in with a few month history of having intense burning pain in her left thigh.  This extends from her back down the lateral aspect of her thigh into the upper portion of her calf at times.  She saw her primary care physician who diagnosed her with lumbar radiculopathy and placed her on a few medications one of which was gabapentin.  The patient is not taking gabapentin.  She does have a history of left total knee replacement done by Manny Hanson.  She does not have any significant focal knee pain.  No new radiographs were obtained today but will some were reviewed from 3 years ago.  Things appear well aligned at that time.  Again her major complaint is lateral thigh pain, there is no stem in place to explain this pain.  Clinically she has some tenderness along her iliotibial band.  This pain is not reproduced with internal or external rotation of her hip.  She does not have any range of motion or knee issues that reproduce her pain.  Her knee appears stable in both extension and flexion.  She does have some generalized knee soreness and around her pes bursa.  For the muscular component of her ITB band I think she could benefit from a Medrol Dosepak.  She does report inability to stand for prolonged periods of time secondary to lumbar spine pain.  For the burning pain I think that is more neuropathic based.  I advised her that she should start trial of gabapentin.  Ultimately I  think she would be in better hands with pain management regarding further evaluation and workup of her lumbar spine.  She is also having some cervical neck complaints along with radicular symptoms in her cervical neck that are causing weakness in her hands.  I think again this would be better evaluated by pain management. For on going knee issues despite pain mangement, would recommend follow up with Dr. Hanson.     The patient has been ordered:  Pain Prescription    CONSULTS:   Pain Management     ACTIVE PROBLEM LIST  Patient Active Problem List   Diagnosis    Urge incontinence    DDD (degenerative disc disease), lumbar    DJD (degenerative joint disease) of knee    Hyperlipidemia    Depressed    Insomnia    Vitamin D deficiency disease    Lumbar radicular pain    Essential hypertension    Severe obesity (BMI >= 40)    Colon polyps    Diverticulosis    Chronic pain    Pre-diabetes    Longstanding persistent atrial fibrillation    History of pancreatitis    Osteoarthritis of right knee    Depression    Type 2 diabetes mellitus with diabetic cataract, without long-term current use of insulin    Dry eye syndrome, bilateral    Nuclear sclerosis, bilateral    Refractive error    Hidradenitis suppurativa of right axilla    PVD (peripheral vascular disease)    Screen for colon cancer    Chronic bilateral low back pain without sciatica    ESQUIVEL (dyspnea on exertion)    Tobacco dependence    HIEU treated with BiPAP    Chronic respiratory failure with hypercapnia    Pulmonary hypertension    Tobacco abuse    Hypothyroidism    Current mild episode of major depressive disorder without prior episode    COPD with chronic bronchitis    Atelectasis    Chronic kidney disease, stage 3a    Solitary pulmonary nodule           SUBJECTIVE    CHIEF COMPLAINT: Knee Pain    HPI:   Sandee Evans is a 66 y.o. female here for evaluation and management of left knee pain. There is not a specific incident that brought about this pain. she has  had progressive problems with the knee(s) starting 6 months ago but is now progressing to interfere with activities which include: functional household ADL's, rising from a sitting position, and standing for prolonged periods of time    Currently the pain in the joint is rated at moderate with activity. The pain is intermittent and is located in the knee, located laterally and located posterior. The pain is described as burning, radiating, severe, and shooting . Relieving factors include rest and prescription medication.     Sandee Evans has no additional complaints.     PROGRESSIVE SYMPTOMS:  Pain impacting sleep  Pain worsened by weight bearing  Pain effecting living situation    FUNCTIONAL STATUS:   Walk a block or two on level ground     PREVIOUS TREATMENTS:  Medical: None  Physical Therapy: Activities Modified   Previous Orthopaedic Surgery: b/l TKA by Manny Hanson 2019 and 2016    REVIEW OF SYSTEMS:  PAIN ASSESSMENT:  See HPI.  MUSCULOSKELETAL: See HPI.  OTHER 10 point review of systems is negative except as stated in HPI above    PAST MEDICAL HISTORY   has a past medical history of Anticoagulant long-term use, Arthritis, Atrial fibrillation, Breast cyst, CHF (congestive heart failure), Degenerative disc disease, Edema, HLD (hyperlipidemia), psychiatric care, Hyperlipidemia, Hypertension, Hypothyroidism, Nuclear sclerosis, bilateral (12/18/2017), Obesity, morbid, HIEU (obstructive sleep apnea), Other abnormal glucose, Pre-diabetes, Psychiatric problem, Requires assistance with activities of daily living (ADL), Sleep apnea, Smoker, SOB (shortness of breath), SOB (shortness of breath), Thyroid disease, TMJ (temporomandibular joint disorder), Tobacco abuse, Unsteady gait, Urge incontinence, Weakness generalized, and Wears glasses.     PAST SURGICAL HISTORY   has a past surgical history that includes Hysterectomy; underarm gland\ (Bilateral); rt.knee surgery 2016; Oophorectomy; Breast cyst excision (Right);  Breast lumpectomy (Right); Breast biopsy (Right); Total knee arthroplasty (Left, 2/19/2019); Colonoscopy (N/A, 6/25/2019); Endoscopic ultrasound of upper gastrointestinal tract (N/A, 1/26/2021); and Joint replacement (Bilateral).     FAMILY HISTORY  family history includes Cancer in her brother and mother; Diabetes in her brother and mother; Hypertension in her mother; No Known Problems in her father, maternal aunt, maternal grandfather, maternal grandmother, maternal uncle, paternal aunt, paternal grandfather, paternal grandmother, paternal uncle, and sister.     SOCIAL HISTORY   reports that she has been smoking cigarettes. She started smoking about 49 years ago. She has a 23.50 pack-year smoking history. She uses smokeless tobacco. She reports that she does not drink alcohol and does not use drugs.     ALLERGIES   Review of patient's allergies indicates:   Allergen Reactions    Ace inhibitors      Cough          MEDICATIONS  Current Outpatient Medications on File Prior to Visit   Medication Sig Dispense Refill    albuterol (PROVENTIL/VENTOLIN HFA) 90 mcg/actuation inhaler Inhale 2 puffs into the lungs every 6 (six) hours as needed for Wheezing or Shortness of Breath. Rescue 54 g 1    atorvastatin (LIPITOR) 40 MG tablet Take 1 tablet (40 mg total) by mouth once daily. 90 tablet 1    azelastine (ASTELIN) 137 mcg (0.1 %) nasal spray 1 spray (137 mcg total) by Nasal route 2 (two) times daily. 30 mL 3    cetirizine (ZYRTEC) 10 MG tablet Take 1 tablet (10 mg total) by mouth once daily. 90 tablet 3    fluticasone propionate (FLONASE) 50 mcg/actuation nasal spray SHAKE LIQUID AND USE 2 SPRAYS(100 MCG) IN EACH NOSTRIL EVERY DAY 48 g 0    furosemide (LASIX) 40 MG tablet Take 1 tablet (40 mg total) by mouth once daily. 180 tablet 0    gabapentin (NEURONTIN) 100 MG capsule Take 1 capsule (100 mg total) by mouth nightly as needed. 90 capsule 1    losartan (COZAAR) 25 MG tablet Take 1 tablet (25 mg total) by mouth once daily.  "90 tablet 1    metoprolol succinate (TOPROL-XL) 100 MG 24 hr tablet Take 1 tablet (100 mg total) by mouth once daily. 90 tablet 1    nicotine, polacrilex, (NICORETTE) 2 mg Gum Use 1-2 per hour in place of a cigarette. Limit to 10 a day - oral. . (Patient taking differently: Use 1-2 per hour in place of a cigarette. Limit to 10 a day - oral. .) 200 each 0    ondansetron (ZOFRAN-ODT) 8 MG TbDL Take 1 tablet (8 mg total) by mouth every 6 (six) hours as needed (nausea). 60 tablet 2    sertraline (ZOLOFT) 100 MG tablet Take 1 tablet (100 mg total) by mouth once daily. 90 tablet 3    SPIRIVA RESPIMAT 2.5 mcg/actuation inhaler INHALE 2 PUFFS BY MOUTH INTO THE LUNGS DAILY( CONTROLLER) 4 g 5    traZODone (DESYREL) 50 MG tablet TAKE 1 TO 2 TABLETS BY MOUTH EVERY NIGHT AS NEEDED FOR SLEEP 60 tablet 2    triamcinolone acetonide 0.1% (KENALOG) 0.1 % ointment APPLY BETWEEN KNEES AND UPPER THIGHS TWICE DAILY FOR NO MORE THAN 7-14 DAYS 454 g 11    XARELTO 20 mg Tab Take 1 tablet (20 mg total) by mouth once daily. 90 tablet 1    famotidine (PEPCID) 20 MG tablet Take 1 tablet (20 mg total) by mouth 2 (two) times daily as needed. 60 tablet 2     No current facility-administered medications on file prior to visit.          PHYSICAL EXAM   height is 5' 3" (1.6 m) and weight is 121.1 kg (267 lb). Her blood pressure is 130/75 and her pulse is 85. Her respiration is 18 and oxygen saturation is 99%.   Body mass index is 47.3 kg/m².      All other systems deferred.  GENERAL:  No acute distress  HABITUS: Morbid Obese  GAIT: Antalgic  SKIN: Normal , Well healed surgical incision, and No erythema, warmth, fluctuance     KNEE EXAM:    left:   Effusion: No joint effusion  TTP: yes over Medial Joint Line and pes bursa. TTP over distal 1/3 of IT band. Some TTP over GTB. SI joint pain  no crepitus with passive knee ROM  Passive ROM: Extension 0, Flexion 115  No pain with manipulation of patella  Stable to varus/valgus stress. No increased laxity to " anterior/posterior drawer testing  No pain with IR/ER rotation of the hip  5/5 strength in knee flexion and extension, ankle plantarflexion and dorsiflexion  Neurovascular Status: Sensation intact to light touch in Sural, Saphenous, SPN, DPN, Tibial nerve distribution  2+ pulse DP/PT, normal capillary refill, foot has normal warmth    DATA:  Diagnostic tests reviewed for today's visit:     3v knee radiographs from 3 years ago were reviewed which reveal components to be in appropriate position with good alignment. There is no evidence of loosening or abnormal wear.

## 2022-09-26 ENCOUNTER — TELEPHONE (OUTPATIENT)
Dept: ADMINISTRATIVE | Facility: HOSPITAL | Age: 67
End: 2022-09-26
Payer: COMMERCIAL

## 2022-09-27 ENCOUNTER — LAB VISIT (OUTPATIENT)
Dept: LAB | Facility: HOSPITAL | Age: 67
End: 2022-09-27
Attending: INTERNAL MEDICINE
Payer: COMMERCIAL

## 2022-09-27 DIAGNOSIS — E78.2 MIXED HYPERLIPIDEMIA: ICD-10-CM

## 2022-09-27 DIAGNOSIS — J44.89 COPD WITH CHRONIC BRONCHITIS AND EMPHYSEMA: ICD-10-CM

## 2022-09-27 DIAGNOSIS — I73.9 PVD (PERIPHERAL VASCULAR DISEASE): ICD-10-CM

## 2022-09-27 DIAGNOSIS — J43.9 COPD WITH CHRONIC BRONCHITIS AND EMPHYSEMA: ICD-10-CM

## 2022-09-27 DIAGNOSIS — I10 ESSENTIAL HYPERTENSION: ICD-10-CM

## 2022-09-27 DIAGNOSIS — R06.09 DOE (DYSPNEA ON EXERTION): ICD-10-CM

## 2022-09-27 DIAGNOSIS — I27.20 PULMONARY HYPERTENSION: ICD-10-CM

## 2022-09-27 DIAGNOSIS — I48.11 LONGSTANDING PERSISTENT ATRIAL FIBRILLATION: ICD-10-CM

## 2022-09-27 DIAGNOSIS — R60.0 BILATERAL LOWER EXTREMITY EDEMA: ICD-10-CM

## 2022-09-27 LAB
ANION GAP SERPL CALC-SCNC: 10 MMOL/L (ref 8–16)
BUN SERPL-MCNC: 32 MG/DL (ref 8–23)
CALCIUM SERPL-MCNC: 9.7 MG/DL (ref 8.7–10.5)
CHLORIDE SERPL-SCNC: 103 MMOL/L (ref 95–110)
CO2 SERPL-SCNC: 30 MMOL/L (ref 23–29)
CREAT SERPL-MCNC: 1.2 MG/DL (ref 0.5–1.4)
EST. GFR  (NO RACE VARIABLE): 49.3 ML/MIN/1.73 M^2
GLUCOSE SERPL-MCNC: 80 MG/DL (ref 70–110)
POTASSIUM SERPL-SCNC: 4.4 MMOL/L (ref 3.5–5.1)
SODIUM SERPL-SCNC: 143 MMOL/L (ref 136–145)

## 2022-09-27 PROCEDURE — 36415 COLL VENOUS BLD VENIPUNCTURE: CPT | Mod: PO | Performed by: INTERNAL MEDICINE

## 2022-09-27 PROCEDURE — 80048 BASIC METABOLIC PNL TOTAL CA: CPT | Performed by: INTERNAL MEDICINE

## 2022-09-28 ENCOUNTER — OFFICE VISIT (OUTPATIENT)
Dept: CARDIOLOGY | Facility: CLINIC | Age: 67
End: 2022-09-28
Payer: COMMERCIAL

## 2022-09-28 VITALS
BODY MASS INDEX: 47.8 KG/M2 | RESPIRATION RATE: 18 BRPM | HEART RATE: 86 BPM | SYSTOLIC BLOOD PRESSURE: 140 MMHG | DIASTOLIC BLOOD PRESSURE: 57 MMHG | OXYGEN SATURATION: 96 % | WEIGHT: 269.75 LBS | HEIGHT: 63 IN

## 2022-09-28 DIAGNOSIS — I73.9 PVD (PERIPHERAL VASCULAR DISEASE): Primary | ICD-10-CM

## 2022-09-28 DIAGNOSIS — R06.09 DOE (DYSPNEA ON EXERTION): ICD-10-CM

## 2022-09-28 DIAGNOSIS — I10 ESSENTIAL HYPERTENSION: ICD-10-CM

## 2022-09-28 DIAGNOSIS — E78.2 MIXED HYPERLIPIDEMIA: ICD-10-CM

## 2022-09-28 DIAGNOSIS — I48.11 LONGSTANDING PERSISTENT ATRIAL FIBRILLATION: ICD-10-CM

## 2022-09-28 PROCEDURE — 1101F PT FALLS ASSESS-DOCD LE1/YR: CPT | Mod: CPTII,S$GLB,, | Performed by: INTERNAL MEDICINE

## 2022-09-28 PROCEDURE — 3044F HG A1C LEVEL LT 7.0%: CPT | Mod: CPTII,S$GLB,, | Performed by: INTERNAL MEDICINE

## 2022-09-28 PROCEDURE — 93000 EKG 12-LEAD: ICD-10-PCS | Mod: S$GLB,,, | Performed by: INTERNAL MEDICINE

## 2022-09-28 PROCEDURE — 1159F MED LIST DOCD IN RCRD: CPT | Mod: CPTII,S$GLB,, | Performed by: INTERNAL MEDICINE

## 2022-09-28 PROCEDURE — 1126F AMNT PAIN NOTED NONE PRSNT: CPT | Mod: CPTII,S$GLB,, | Performed by: INTERNAL MEDICINE

## 2022-09-28 PROCEDURE — 3078F DIAST BP <80 MM HG: CPT | Mod: CPTII,S$GLB,, | Performed by: INTERNAL MEDICINE

## 2022-09-28 PROCEDURE — 4010F ACE/ARB THERAPY RXD/TAKEN: CPT | Mod: CPTII,S$GLB,, | Performed by: INTERNAL MEDICINE

## 2022-09-28 PROCEDURE — 1159F PR MEDICATION LIST DOCUMENTED IN MEDICAL RECORD: ICD-10-PCS | Mod: CPTII,S$GLB,, | Performed by: INTERNAL MEDICINE

## 2022-09-28 PROCEDURE — 3008F BODY MASS INDEX DOCD: CPT | Mod: CPTII,S$GLB,, | Performed by: INTERNAL MEDICINE

## 2022-09-28 PROCEDURE — 1126F PR PAIN SEVERITY QUANTIFIED, NO PAIN PRESENT: ICD-10-PCS | Mod: CPTII,S$GLB,, | Performed by: INTERNAL MEDICINE

## 2022-09-28 PROCEDURE — 3078F PR MOST RECENT DIASTOLIC BLOOD PRESSURE < 80 MM HG: ICD-10-PCS | Mod: CPTII,S$GLB,, | Performed by: INTERNAL MEDICINE

## 2022-09-28 PROCEDURE — 3077F SYST BP >= 140 MM HG: CPT | Mod: CPTII,S$GLB,, | Performed by: INTERNAL MEDICINE

## 2022-09-28 PROCEDURE — 3288F PR FALLS RISK ASSESSMENT DOCUMENTED: ICD-10-PCS | Mod: CPTII,S$GLB,, | Performed by: INTERNAL MEDICINE

## 2022-09-28 PROCEDURE — 99999 PR PBB SHADOW E&M-EST. PATIENT-LVL IV: CPT | Mod: PBBFAC,,, | Performed by: INTERNAL MEDICINE

## 2022-09-28 PROCEDURE — 3044F PR MOST RECENT HEMOGLOBIN A1C LEVEL <7.0%: ICD-10-PCS | Mod: CPTII,S$GLB,, | Performed by: INTERNAL MEDICINE

## 2022-09-28 PROCEDURE — 99999 PR PBB SHADOW E&M-EST. PATIENT-LVL IV: ICD-10-PCS | Mod: PBBFAC,,, | Performed by: INTERNAL MEDICINE

## 2022-09-28 PROCEDURE — 93000 ELECTROCARDIOGRAM COMPLETE: CPT | Mod: S$GLB,,, | Performed by: INTERNAL MEDICINE

## 2022-09-28 PROCEDURE — 3288F FALL RISK ASSESSMENT DOCD: CPT | Mod: CPTII,S$GLB,, | Performed by: INTERNAL MEDICINE

## 2022-09-28 PROCEDURE — 99214 PR OFFICE/OUTPT VISIT, EST, LEVL IV, 30-39 MIN: ICD-10-PCS | Mod: S$GLB,,, | Performed by: INTERNAL MEDICINE

## 2022-09-28 PROCEDURE — 3008F PR BODY MASS INDEX (BMI) DOCUMENTED: ICD-10-PCS | Mod: CPTII,S$GLB,, | Performed by: INTERNAL MEDICINE

## 2022-09-28 PROCEDURE — 1101F PR PT FALLS ASSESS DOC 0-1 FALLS W/OUT INJ PAST YR: ICD-10-PCS | Mod: CPTII,S$GLB,, | Performed by: INTERNAL MEDICINE

## 2022-09-28 PROCEDURE — 99214 OFFICE O/P EST MOD 30 MIN: CPT | Mod: S$GLB,,, | Performed by: INTERNAL MEDICINE

## 2022-09-28 PROCEDURE — 4010F PR ACE/ARB THEARPY RXD/TAKEN: ICD-10-PCS | Mod: CPTII,S$GLB,, | Performed by: INTERNAL MEDICINE

## 2022-09-28 PROCEDURE — 3077F PR MOST RECENT SYSTOLIC BLOOD PRESSURE >= 140 MM HG: ICD-10-PCS | Mod: CPTII,S$GLB,, | Performed by: INTERNAL MEDICINE

## 2022-09-28 NOTE — PROGRESS NOTES
Subjective:    Patient ID:  Sandee Evans is a 66 y.o. female who presents for follow-up of No chief complaint on file.      HPI    Chronic A-fib on xarelto, Diastolic CHF, HTN, DM, HLD, PAD, tobacco abuse     Previously followed by Dr Naik  Patient is here for follow-up of paroxysmal atrial fibrillation.  She is labeled as chronic and has been in normal sinus rhythm and was last seen in 2016.  EKG today confirms this.  She was lost to follow-up and thought she was discharged because she felt everything was okay.  She has not taken her blood thinner but is only been on aspirin.  She gets shortness of breath on heavy activity but relieved with rest.  She does have some associated substernal heaviness.  She has had no breakthrough tachycardia palpitations.  She denies any PND, orthopnea has stable lower edema relieved with elevation.  She is preop for knee replacement and is unable to go up a flight of stairs due to her degenerative joint condition     Here for follow-up of paroxysmal atrial fibrillation.  She underwent testing as below for preop clearance.  We if cleared at low to intermediate risk and she is aware that anything can happen with the stress of surgery and in particular with her tobacco use disorder.  We discussed that this is the main things she needs to work on postoperatively as well as rehab.  She denies any further cardiopulmonary complaints.  We went over her medicines as well and discussed that she has to hold her blood thinner for at least 2 days prior to the procedure but continue her beta-blocker perioperatively.     Echo 5/21/22  The left ventricle is normal in size with normal systolic function.  The estimated ejection fraction is 60%.  Normal right ventricular size with normal right ventricular systolic function.  The estimated PA systolic pressure is 33 mmHg.  Atrial fibrillation observed.     NST: 2/8/19  Normal [facility] MPI  The perfusion scan is free of evidence from myocardial  ischemia or injury.  An ejection fraction of 55 % at rest  LV cavity size is normal at rest.  Resting wall motion is physiologic.     Holter: 3/1/21  Atrial fibrillation with ventricular rates varying between 61 and 188 bpm with an average of 104 bpm. Total time in atrial fibrillation was approximately 24 hours.  There were frequent PVCs totalling 2296 and averaging 95.75 per hour. There were 92 monomorphic couplets. There were 3 bigeminal cycles. There were 7 monomorphic triplets.  The diary was returned incomplete.     2/18/21 Denies CP. Mild stable ESQUIVEL  EKG A-fib 94 NSSTT changes - unaware of being back in A-fib - last EKG 2/8/19 NSR  Back in A-fib - duration unknown. Discussed LAURA/CV versus rate control - given lack of symptoms we are both in agreement for rate control.   Echo and holter to evaluate A-fib control  Continue Rx for PAD, HTN, DM, A-fib      3/8/21 Denies CP or SOB. Reports some issues with nausea and feels that she has a mass in her abdomen that moves around   Increase metoprolol 50 bid for better A-fib rate control  Continue Rx for HTN, HLD, PAD, DM  OV 6 months     7/16/21 Reports SOB and LE edema for 2 weeks. Was started on lasix 3 days ago without much improvement  EKG A-fib NSSTT changes  Echo, BNP, BMP for CHF  Continue current diuretic regimen - needs to go to the ER if symptoms worsen  Continue Rx for HTN, HLD, PAD, DM, A-fib, CHF     7/29/21 Feels better since discharge. Still with mild LE edema and ESQUIVEL  On lasix 40 bid     Continue Rx for HTN, HLD, PAD, DM, A-fib, CHF  OV 1 month with BNP, BMP        9/28/21 Denies CP. Less SOB and LE edema improved  BP controlled      Continue Rx for HTN, HLD, PAD, DM, A-fib, CHF  OV 3 months with BNP, BMP     Admitted 5/20/22  Ms Sandee Evans is a 66 y.o. woman admitted with acute hypoxic/hypercapnic respiratory failure due to acute on chronic diastolic CHF, exacerbation of COPD, and untreated HIEU. She initially required BiPAP but was quickly weaned  to O2 by NC. She states that she sometimes skips lasix doses due to urinary incontinence. She does not have BiPAP at home. She is still using tobacco. Started IV lasix for CHF exacerbation, steroids/nebs/levofloxacin for COPD exacerbation. Stepped down to floor on 5/21/22. Patient grew out E-coli on blood cultures- (S) to Cipro. Patient clinically improved and was discharged to home with out patient PT/OT on 5/24/22. Bi-pap will be arranged. Activity as tolerated. Diet- low NA/ADA 2000 yang diet    Already has pulmonary follow up.      6/28/22 Denies CP, SOB improved from discharge  BP controlled. Lasix has been held since discharge   Restart lasix 40 qd  OV 3 months with BNP, BMP      Continue Rx for HTN, HLD, PAD, DM, A-fib, CHF    Labs 9/27/22  K 4.4  Cr 1.2  BNP not done    9/28/22 Denies CP, ESQUIVEL improved after restarting lasix  Some dry cough that she thinks are allergies  BP controlled    Review of Systems   Constitutional: Negative for decreased appetite.   HENT:  Negative for ear discharge.    Eyes:  Negative for blurred vision.   Respiratory:  Negative for hemoptysis.    Endocrine: Negative for polyphagia.   Hematologic/Lymphatic: Negative for adenopathy.   Skin:  Negative for color change.   Musculoskeletal:  Negative for joint swelling.   Genitourinary:  Negative for bladder incontinence.   Neurological:  Negative for brief paralysis.   Psychiatric/Behavioral:  Negative for hallucinations.    Allergic/Immunologic: Negative for hives.      Objective:    Physical Exam  Constitutional:       Appearance: She is well-developed.   HENT:      Head: Normocephalic and atraumatic.   Eyes:      Conjunctiva/sclera: Conjunctivae normal.      Pupils: Pupils are equal, round, and reactive to light.   Cardiovascular:      Rate and Rhythm: Normal rate. Rhythm irregular.      Pulses: Intact distal pulses.      Heart sounds: Normal heart sounds.   Pulmonary:      Effort: Pulmonary effort is normal.      Breath sounds: Normal  breath sounds.   Abdominal:      General: Bowel sounds are normal.      Palpations: Abdomen is soft.   Musculoskeletal:         General: Normal range of motion.      Cervical back: Normal range of motion and neck supple.   Skin:     General: Skin is warm and dry.   Neurological:      Mental Status: She is alert and oriented to person, place, and time.         Assessment:       1. PVD (peripheral vascular disease)    2. Longstanding persistent atrial fibrillation    3. Mixed hyperlipidemia    4. Essential hypertension    5. ESQUIVEL (dyspnea on exertion)         Plan:       OV 6 months with BNP, BMP      Continue Rx for HTN, HLD, PAD, DM, A-fib, CHF

## 2022-09-29 ENCOUNTER — HOSPITAL ENCOUNTER (OUTPATIENT)
Dept: RADIOLOGY | Facility: HOSPITAL | Age: 67
Discharge: HOME | End: 2022-09-29
Attending: NURSE PRACTITIONER
Payer: COMMERCIAL

## 2022-09-29 DIAGNOSIS — F17.200 TOBACCO DEPENDENCE: ICD-10-CM

## 2022-09-29 DIAGNOSIS — R91.1 SOLITARY PULMONARY NODULE: ICD-10-CM

## 2022-09-29 PROCEDURE — 71271 CT THORAX LUNG CANCER SCR C-: CPT | Mod: TC

## 2022-09-29 PROCEDURE — 71271 CT CHEST LUNG SCREENING LOW DOSE: ICD-10-PCS | Mod: 26,,, | Performed by: RADIOLOGY

## 2022-09-29 PROCEDURE — 71271 CT THORAX LUNG CANCER SCR C-: CPT | Mod: 26,,, | Performed by: RADIOLOGY

## 2022-10-10 ENCOUNTER — PATIENT MESSAGE (OUTPATIENT)
Dept: ADMINISTRATIVE | Facility: HOSPITAL | Age: 67
End: 2022-10-10
Payer: COMMERCIAL

## 2022-10-18 NOTE — PROGRESS NOTES
Subjective:       Patient ID: Sandee Evans is a 66 y.o. female.    Chief Complaint: Follow-up      Follow-up    65 yo female presents for f/u. Doing well overall. Has a rash. Endorses pruritis. Cont to have leg weakness.    Review of Systems   Constitutional: Negative.    HENT: Negative.     Respiratory: Negative.     Cardiovascular: Negative.    Gastrointestinal: Negative.    Endocrine: Negative.    Genitourinary: Negative.    Musculoskeletal: Negative.    Neurological: Negative.    Psychiatric/Behavioral: Negative.          Past Medical History:   Diagnosis Date    Anticoagulant long-term use     Xarelto    Arthritis     Atrial fibrillation     Breast cyst     CHF (congestive heart failure)     Degenerative disc disease     Edema     HLD (hyperlipidemia)     Hx of psychiatric care     Hyperlipidemia     Hypertension     Hypothyroidism     Nuclear sclerosis, bilateral 12/18/2017    Obesity, morbid     HIEU (obstructive sleep apnea)     Other abnormal glucose     pre-diabetes    Pre-diabetes     Psychiatric problem     Requires assistance with activities of daily living (ADL)     Sleep apnea     Smoker     SOB (shortness of breath)     SOB (shortness of breath)     Thyroid disease     on meds 8-9 years ago. hypothyroidism.no malignancy    TMJ (temporomandibular joint disorder)     jaw clicking    Tobacco abuse     Unsteady gait     Urge incontinence     Weakness generalized     Wears glasses      Past Surgical History:   Procedure Laterality Date    BREAST BIOPSY Right     over 10 yrs ago/ benign    BREAST CYST EXCISION Right     BREAST LUMPECTOMY Right     over 10 yrs ago, ex bx/ benign    COLONOSCOPY N/A 6/25/2019    Procedure: COLONOSCOPY;  Surgeon: Micheline Flores MD;  Location: [facility];  Service: Endoscopy;  Laterality: N/A;  RX XARELTO ok to hold (2 days) per Dr. Naik see scan 3/20/19    ENDOSCOPIC ULTRASOUND OF UPPER GASTROINTESTINAL TRACT N/A 1/26/2021    Procedure: ULTRASOUND-ENDOSCOPIC-UPPER;   Surgeon: Stevenson Philippe MD;  Location: [facility] ENDO;  Service: Endoscopy;  Laterality: N/A;    HYSTERECTOMY      JOINT REPLACEMENT Bilateral     knees    OOPHORECTOMY      rt.knee surgery 2016      TOTAL KNEE ARTHROPLASTY Left 2/19/2019    Procedure: ARTHROPLASTY, KNEE, TOTAL;  Surgeon: Med Hanson MD;  Location: [facility] OR;  Service: Orthopedics;  Laterality: Left;  10AM START PER  PER JAYLIN TEXT @ 8:34AM ON 2-18-19  SUPINE  HARRIS LOYA [phone] TEXTED HIM ON 1-24-19 @ 7:57AM  RN PRE OP 2-13-19--BMI--48.9    underarm gland\ Bilateral      Family History   Problem Relation Age of Onset    Diabetes Mother     Hypertension Mother     Cancer Mother     Diabetes Brother     Cancer Brother     No Known Problems Father     No Known Problems Sister     No Known Problems Maternal Aunt     No Known Problems Maternal Uncle     No Known Problems Paternal Aunt     No Known Problems Paternal Uncle     No Known Problems Maternal Grandmother     No Known Problems Maternal Grandfather     No Known Problems Paternal Grandmother     No Known Problems Paternal Grandfather     Amblyopia Neg Hx     Blindness Neg Hx     Cataracts Neg Hx     Glaucoma Neg Hx     Macular degeneration Neg Hx     Retinal detachment Neg Hx     Strabismus Neg Hx     Stroke Neg Hx     Thyroid disease Neg Hx      Social History     Socioeconomic History    Marital status:    Tobacco Use    Smoking status: Every Day     Packs/day: 0.50     Years: 47.00     Pack years: 23.50     Types: Cigarettes     Start date: 1973    Smokeless tobacco: Current   Substance and Sexual Activity    Alcohol use: No    Drug use: No    Sexual activity: Yes     Partners: Male     Social Determinants of Health     Financial Resource Strain: Low Risk     Difficulty of Paying Living Expenses: Not hard at all   Food Insecurity: No Food Insecurity    Worried About Running Out of Food in the Last Year: Never true    Ran Out of Food in the Last Year: Never true    Transportation Needs: No Transportation Needs    Lack of Transportation (Medical): No    Lack of Transportation (Non-Medical): No   Physical Activity: Insufficiently Active    Days of Exercise per Week: 2 days    Minutes of Exercise per Session: 10 min   Stress: No Stress Concern Present    Feeling of Stress : Only a little   Social Connections: Unknown    Frequency of Communication with Friends and Family: More than three times a week    Frequency of Social Gatherings with Friends and Family: Once a week    Active Member of Clubs or Organizations: Yes    Attends Club or Organization Meetings: 1 to 4 times per year    Marital Status:    Housing Stability: Low Risk     Unable to Pay for Housing in the Last Year: No    Number of Places Lived in the Last Year: 2    Unstable Housing in the Last Year: No       Current Outpatient Medications:     albuterol (PROVENTIL/VENTOLIN HFA) 90 mcg/actuation inhaler, Inhale 2 puffs into the lungs every 6 (six) hours as needed for Wheezing or Shortness of Breath. Rescue, Disp: 54 g, Rfl: 1    azelastine (ASTELIN) 137 mcg (0.1 %) nasal spray, 1 spray (137 mcg total) by Nasal route 2 (two) times daily., Disp: 30 mL, Rfl: 3    cetirizine (ZYRTEC) 10 MG tablet, Take 1 tablet (10 mg total) by mouth once daily., Disp: 90 tablet, Rfl: 3    famotidine (PEPCID) 20 MG tablet, Take 1 tablet (20 mg total) by mouth 2 (two) times daily as needed., Disp: 60 tablet, Rfl: 2    fluticasone propionate (FLONASE) 50 mcg/actuation nasal spray, SHAKE LIQUID AND USE 2 SPRAYS(100 MCG) IN EACH NOSTRIL EVERY DAY, Disp: 48 g, Rfl: 0    furosemide (LASIX) 40 MG tablet, Take 1 tablet (40 mg total) by mouth once daily., Disp: 180 tablet, Rfl: 0    SPIRIVA RESPIMAT 2.5 mcg/actuation inhaler, INHALE 2 PUFFS BY MOUTH INTO THE LUNGS DAILY( CONTROLLER), Disp: 4 g, Rfl: 5    traZODone (DESYREL) 50 MG tablet, TAKE 1 TO 2 TABLETS BY MOUTH EVERY NIGHT AS NEEDED FOR SLEEP, Disp: 60 tablet, Rfl: 2    triamcinolone  "acetonide 0.1% (KENALOG) 0.1 % ointment, APPLY BETWEEN KNEES AND UPPER THIGHS TWICE DAILY FOR NO MORE THAN 7-14 DAYS, Disp: 454 g, Rfl: 11    atorvastatin (LIPITOR) 40 MG tablet, Take 1 tablet (40 mg total) by mouth once daily., Disp: 90 tablet, Rfl: 1    gabapentin (NEURONTIN) 100 MG capsule, Take 1 capsule (100 mg total) by mouth nightly as needed., Disp: 90 capsule, Rfl: 1    losartan (COZAAR) 25 MG tablet, Take 1 tablet (25 mg total) by mouth once daily., Disp: 90 tablet, Rfl: 1    methylPREDNISolone (MEDROL DOSEPACK) 4 mg tablet, use as directed, Disp: 1 each, Rfl: 0    metoprolol succinate (TOPROL-XL) 100 MG 24 hr tablet, Take 1 tablet (100 mg total) by mouth once daily., Disp: 90 tablet, Rfl: 1    nicotine, polacrilex, (NICORETTE) 2 mg Gum, Use 1-2 per hour in place of a cigarette. Limit to 10 a day - oral. . (Patient taking differently: Use 1-2 per hour in place of a cigarette. Limit to 10 a day - oral. .), Disp: 200 each, Rfl: 0    ondansetron (ZOFRAN-ODT) 8 MG TbDL, Take 1 tablet (8 mg total) by mouth every 6 (six) hours as needed (nausea)., Disp: 60 tablet, Rfl: 2    sertraline (ZOLOFT) 100 MG tablet, Take 1 tablet (100 mg total) by mouth once daily., Disp: 90 tablet, Rfl: 3    XARELTO 20 mg Tab, Take 1 tablet (20 mg total) by mouth once daily., Disp: 90 tablet, Rfl: 1   Objective:      Vitals:    09/13/22 0944   BP: 130/72   BP Location: Left arm   Patient Position: Sitting   BP Method: Large (Manual)   Pulse: 85   Resp: 18   Temp: 98 °F (36.7 °C)   TempSrc: Oral   SpO2: 96%   Weight: 121.2 kg (267 lb 3.2 oz)   Height: 5' 3" (1.6 m)       Physical Exam  Constitutional:       General: She is not in acute distress.     Appearance: She is not diaphoretic.   HENT:      Head: Normocephalic and atraumatic.   Eyes:      Conjunctiva/sclera: Conjunctivae normal.   Pulmonary:      Effort: Pulmonary effort is normal.   Musculoskeletal:      Cervical back: Neck supple.   Skin:     Findings: No rash.   Neurological: "      Mental Status: She is alert and oriented to person, place, and time.   Psychiatric:         Behavior: Behavior normal.         Thought Content: Thought content normal.         Judgment: Judgment normal.          Assessment:       1. Rash    2. Post-menopausal    3. PVD (peripheral vascular disease)    4. Aortic atherosclerosis    5. Coronary artery disease    6. Anxiety    7. Essential hypertension    8. Microalbuminuria    9. Chronic atrial fibrillation    10. Osteopenia, unspecified location    11. Lumbar radiculopathy    12. Transient right leg weakness    13. Influenza vaccine needed    14. Annual physical exam    15. HIEU treated with BiPAP          Plan:       Rash  -     Ambulatory referral/consult to Dermatology; Future; Expected date: 09/20/2022    Post-menopausal    PVD (peripheral vascular disease)  -     atorvastatin (LIPITOR) 40 MG tablet; Take 1 tablet (40 mg total) by mouth once daily.  Dispense: 90 tablet; Refill: 1    Aortic atherosclerosis  -     atorvastatin (LIPITOR) 40 MG tablet; Take 1 tablet (40 mg total) by mouth once daily.  Dispense: 90 tablet; Refill: 1    Coronary artery disease  -     atorvastatin (LIPITOR) 40 MG tablet; Take 1 tablet (40 mg total) by mouth once daily.  Dispense: 90 tablet; Refill: 1    Anxiety  -     sertraline (ZOLOFT) 100 MG tablet; Take 1 tablet (100 mg total) by mouth once daily.  Dispense: 90 tablet; Refill: 3    Essential hypertension  -     losartan (COZAAR) 25 MG tablet; Take 1 tablet (25 mg total) by mouth once daily.  Dispense: 90 tablet; Refill: 1    Microalbuminuria  -     losartan (COZAAR) 25 MG tablet; Take 1 tablet (25 mg total) by mouth once daily.  Dispense: 90 tablet; Refill: 1    Chronic atrial fibrillation  -     XARELTO 20 mg Tab; Take 1 tablet (20 mg total) by mouth once daily.  Dispense: 90 tablet; Refill: 1  -     metoprolol succinate (TOPROL-XL) 100 MG 24 hr tablet; Take 1 tablet (100 mg total) by mouth once daily.  Dispense: 90 tablet;  Refill: 1    Osteopenia, unspecified location    Lumbar radiculopathy  -     gabapentin (NEURONTIN) 100 MG capsule; Take 1 capsule (100 mg total) by mouth nightly as needed.  Dispense: 90 capsule; Refill: 1  -     Ambulatory referral/consult to Orthopedics; Future; Expected date: 09/20/2022    Transient right leg weakness  -     Ambulatory referral/consult to Orthopedics; Future; Expected date: 09/20/2022    Influenza vaccine needed  -     Influenza (FLUAD) - Quadrivalent (Adjuvanted) *Preferred* (65+) (PF)    Annual physical exam  -     CBC Auto Differential; Future; Expected date: 01/13/2023  -     Comprehensive Metabolic Panel; Future; Expected date: 01/13/2023  -     Hemoglobin A1C; Future; Expected date: 01/13/2023  -     TSH; Future; Expected date: 01/13/2023  -     Lipid Panel; Future; Expected date: 01/13/2023  -     T4, FREE; Future; Expected date: 01/13/2023    HIEU treated with BiPAP    Cont meds. Referred to ortho        Future Appointments   Date Time Provider Department Center   11/2/2022 10:30 AM Nydia Hung OD [facility] [place]   1/4/2023  9:45 AM LAB, [facility] [place]   1/13/2023  9:40 AM Kuldeep Miller MD [facility] MED [place]       Patient note was created using [employer].  Any errors in syntax or even information may not have been identified and edited on initial review prior to signing this note.

## 2022-10-24 ENCOUNTER — PATIENT MESSAGE (OUTPATIENT)
Dept: ADMINISTRATIVE | Facility: HOSPITAL | Age: 67
End: 2022-10-24
Payer: COMMERCIAL

## 2022-11-02 ENCOUNTER — OFFICE VISIT (OUTPATIENT)
Dept: OPTOMETRY | Facility: CLINIC | Age: 67
End: 2022-11-02
Payer: COMMERCIAL

## 2022-11-02 ENCOUNTER — LAB VISIT (OUTPATIENT)
Dept: LAB | Facility: HOSPITAL | Age: 67
End: 2022-11-02
Attending: FAMILY MEDICINE
Payer: COMMERCIAL

## 2022-11-02 DIAGNOSIS — H25.13 NUCLEAR SCLEROSIS, BILATERAL: ICD-10-CM

## 2022-11-02 DIAGNOSIS — E11.36 TYPE 2 DIABETES MELLITUS WITH DIABETIC CATARACT, WITHOUT LONG-TERM CURRENT USE OF INSULIN: Primary | ICD-10-CM

## 2022-11-02 DIAGNOSIS — Z00.00 ANNUAL PHYSICAL EXAM: ICD-10-CM

## 2022-11-02 DIAGNOSIS — H52.7 REFRACTIVE ERROR: ICD-10-CM

## 2022-11-02 LAB
ALBUMIN SERPL BCP-MCNC: 3.5 G/DL (ref 3.5–5.2)
ALP SERPL-CCNC: 86 U/L (ref 55–135)
ALT SERPL W/O P-5'-P-CCNC: 15 U/L (ref 10–44)
ANION GAP SERPL CALC-SCNC: 10 MMOL/L (ref 8–16)
AST SERPL-CCNC: 18 U/L (ref 10–40)
BASOPHILS # BLD AUTO: 0.02 K/UL (ref 0–0.2)
BASOPHILS NFR BLD: 0.3 % (ref 0–1.9)
BILIRUB SERPL-MCNC: 0.8 MG/DL (ref 0.1–1)
BUN SERPL-MCNC: 31 MG/DL (ref 8–23)
CALCIUM SERPL-MCNC: 10 MG/DL (ref 8.7–10.5)
CHLORIDE SERPL-SCNC: 102 MMOL/L (ref 95–110)
CHOLEST SERPL-MCNC: 183 MG/DL (ref 120–199)
CHOLEST/HDLC SERPL: 3.1 {RATIO} (ref 2–5)
CO2 SERPL-SCNC: 31 MMOL/L (ref 23–29)
CREAT SERPL-MCNC: 1.3 MG/DL (ref 0.5–1.4)
DIFFERENTIAL METHOD: ABNORMAL
EOSINOPHIL # BLD AUTO: 0.2 K/UL (ref 0–0.5)
EOSINOPHIL NFR BLD: 2.6 % (ref 0–8)
ERYTHROCYTE [DISTWIDTH] IN BLOOD BY AUTOMATED COUNT: 13.7 % (ref 11.5–14.5)
EST. GFR  (NO RACE VARIABLE): 44.8 ML/MIN/1.73 M^2
ESTIMATED AVG GLUCOSE: 117 MG/DL (ref 68–131)
GLUCOSE SERPL-MCNC: 94 MG/DL (ref 70–110)
HBA1C MFR BLD: 5.7 % (ref 4–5.6)
HCT VFR BLD AUTO: 43.2 % (ref 37–48.5)
HDLC SERPL-MCNC: 60 MG/DL (ref 40–75)
HDLC SERPL: 32.8 % (ref 20–50)
HGB BLD-MCNC: 13.5 G/DL (ref 12–16)
IMM GRANULOCYTES # BLD AUTO: 0.02 K/UL (ref 0–0.04)
IMM GRANULOCYTES NFR BLD AUTO: 0.3 % (ref 0–0.5)
LDLC SERPL CALC-MCNC: 110.6 MG/DL (ref 63–159)
LYMPHOCYTES # BLD AUTO: 2.6 K/UL (ref 1–4.8)
LYMPHOCYTES NFR BLD: 38.6 % (ref 18–48)
MCH RBC QN AUTO: 31.3 PG (ref 27–31)
MCHC RBC AUTO-ENTMCNC: 31.3 G/DL (ref 32–36)
MCV RBC AUTO: 100 FL (ref 82–98)
MONOCYTES # BLD AUTO: 0.6 K/UL (ref 0.3–1)
MONOCYTES NFR BLD: 9.5 % (ref 4–15)
NEUTROPHILS # BLD AUTO: 3.2 K/UL (ref 1.8–7.7)
NEUTROPHILS NFR BLD: 48.7 % (ref 38–73)
NONHDLC SERPL-MCNC: 123 MG/DL
NRBC BLD-RTO: 0 /100 WBC
PLATELET # BLD AUTO: 250 K/UL (ref 150–450)
PMV BLD AUTO: 11.9 FL (ref 9.2–12.9)
POTASSIUM SERPL-SCNC: 4.8 MMOL/L (ref 3.5–5.1)
PROT SERPL-MCNC: 7.2 G/DL (ref 6–8.4)
RBC # BLD AUTO: 4.32 M/UL (ref 4–5.4)
SODIUM SERPL-SCNC: 143 MMOL/L (ref 136–145)
T4 FREE SERPL-MCNC: 0.96 NG/DL (ref 0.71–1.51)
TRIGL SERPL-MCNC: 62 MG/DL (ref 30–150)
TSH SERPL DL<=0.005 MIU/L-ACNC: 4.56 UIU/ML (ref 0.4–4)
WBC # BLD AUTO: 6.63 K/UL (ref 3.9–12.7)

## 2022-11-02 PROCEDURE — 1159F MED LIST DOCD IN RCRD: CPT | Mod: CPTII,S$GLB,, | Performed by: OPTOMETRIST

## 2022-11-02 PROCEDURE — 1160F RVW MEDS BY RX/DR IN RCRD: CPT | Mod: CPTII,S$GLB,, | Performed by: OPTOMETRIST

## 2022-11-02 PROCEDURE — 3044F HG A1C LEVEL LT 7.0%: CPT | Mod: CPTII,S$GLB,, | Performed by: OPTOMETRIST

## 2022-11-02 PROCEDURE — 1126F PR PAIN SEVERITY QUANTIFIED, NO PAIN PRESENT: ICD-10-PCS | Mod: CPTII,S$GLB,, | Performed by: OPTOMETRIST

## 2022-11-02 PROCEDURE — 1160F PR REVIEW ALL MEDS BY PRESCRIBER/CLIN PHARMACIST DOCUMENTED: ICD-10-PCS | Mod: CPTII,S$GLB,, | Performed by: OPTOMETRIST

## 2022-11-02 PROCEDURE — 80053 COMPREHEN METABOLIC PANEL: CPT | Performed by: FAMILY MEDICINE

## 2022-11-02 PROCEDURE — 4010F ACE/ARB THERAPY RXD/TAKEN: CPT | Mod: CPTII,S$GLB,, | Performed by: OPTOMETRIST

## 2022-11-02 PROCEDURE — 2023F DILAT RTA XM W/O RTNOPTHY: CPT | Mod: CPTII,S$GLB,, | Performed by: OPTOMETRIST

## 2022-11-02 PROCEDURE — 99999 PR PBB SHADOW E&M-EST. PATIENT-LVL III: ICD-10-PCS | Mod: PBBFAC,,, | Performed by: OPTOMETRIST

## 2022-11-02 PROCEDURE — 1101F PT FALLS ASSESS-DOCD LE1/YR: CPT | Mod: CPTII,S$GLB,, | Performed by: OPTOMETRIST

## 2022-11-02 PROCEDURE — 3044F PR MOST RECENT HEMOGLOBIN A1C LEVEL <7.0%: ICD-10-PCS | Mod: CPTII,S$GLB,, | Performed by: OPTOMETRIST

## 2022-11-02 PROCEDURE — 92015 PR REFRACTION: ICD-10-PCS | Mod: S$GLB,,, | Performed by: OPTOMETRIST

## 2022-11-02 PROCEDURE — 80061 LIPID PANEL: CPT | Performed by: FAMILY MEDICINE

## 2022-11-02 PROCEDURE — 99999 PR PBB SHADOW E&M-EST. PATIENT-LVL III: CPT | Mod: PBBFAC,,, | Performed by: OPTOMETRIST

## 2022-11-02 PROCEDURE — 85025 COMPLETE CBC W/AUTO DIFF WBC: CPT | Performed by: FAMILY MEDICINE

## 2022-11-02 PROCEDURE — 36415 COLL VENOUS BLD VENIPUNCTURE: CPT | Mod: PO | Performed by: FAMILY MEDICINE

## 2022-11-02 PROCEDURE — 1101F PR PT FALLS ASSESS DOC 0-1 FALLS W/OUT INJ PAST YR: ICD-10-PCS | Mod: CPTII,S$GLB,, | Performed by: OPTOMETRIST

## 2022-11-02 PROCEDURE — 1159F PR MEDICATION LIST DOCUMENTED IN MEDICAL RECORD: ICD-10-PCS | Mod: CPTII,S$GLB,, | Performed by: OPTOMETRIST

## 2022-11-02 PROCEDURE — 3288F PR FALLS RISK ASSESSMENT DOCUMENTED: ICD-10-PCS | Mod: CPTII,S$GLB,, | Performed by: OPTOMETRIST

## 2022-11-02 PROCEDURE — 1126F AMNT PAIN NOTED NONE PRSNT: CPT | Mod: CPTII,S$GLB,, | Performed by: OPTOMETRIST

## 2022-11-02 PROCEDURE — 2023F PR DILATED RETINAL EXAM W/O EVID OF RETINOPATHY: ICD-10-PCS | Mod: CPTII,S$GLB,, | Performed by: OPTOMETRIST

## 2022-11-02 PROCEDURE — 92014 COMPRE OPH EXAM EST PT 1/>: CPT | Mod: S$GLB,,, | Performed by: OPTOMETRIST

## 2022-11-02 PROCEDURE — 83036 HEMOGLOBIN GLYCOSYLATED A1C: CPT | Performed by: FAMILY MEDICINE

## 2022-11-02 PROCEDURE — 92014 PR EYE EXAM, EST PATIENT,COMPREHESV: ICD-10-PCS | Mod: S$GLB,,, | Performed by: OPTOMETRIST

## 2022-11-02 PROCEDURE — 3288F FALL RISK ASSESSMENT DOCD: CPT | Mod: CPTII,S$GLB,, | Performed by: OPTOMETRIST

## 2022-11-02 PROCEDURE — 84443 ASSAY THYROID STIM HORMONE: CPT | Performed by: FAMILY MEDICINE

## 2022-11-02 PROCEDURE — 4010F PR ACE/ARB THEARPY RXD/TAKEN: ICD-10-PCS | Mod: CPTII,S$GLB,, | Performed by: OPTOMETRIST

## 2022-11-02 PROCEDURE — 92015 DETERMINE REFRACTIVE STATE: CPT | Mod: S$GLB,,, | Performed by: OPTOMETRIST

## 2022-11-02 PROCEDURE — 84439 ASSAY OF FREE THYROXINE: CPT | Performed by: FAMILY MEDICINE

## 2022-11-02 NOTE — PROGRESS NOTES
Subjective:       Patient ID: Sandee Evans is a 66 y.o. female      Chief Complaint   Patient presents with    Concerns About Ocular Health     History of Present Illness  Dls: 9/14/21 Dr. Hung     67 y/o female presents today for diabetic eye exam   Pt c/o blurry vision at distance and near ou.  Pt wears pal's.     LBS ?     No tearing  No itching  No burning  No pain  + ha's  + ou floaters  No flashes    Eye meds  Otc gtts ou prn     Hemoglobin A1C       Date                     Value               Ref Range             Status                05/21/2022               5.6                 4.0 - 5.6 %           Final                  09/21/2021               6.3 (H)             4.0 - 5.6 %           Final                  07/26/2021               5.8 (H)             4.0 - 5.6 %           Final                 Assessment/Plan:     1. Type 2 diabetes mellitus with diabetic cataract, without long-term current use of insulin  No diabetic retinopathy. Discussed with pt the effects of diabetes on vision, importance of good blood sugar control, compliance with meds, and follow up care with PCP. Return in 1 year for dilated eye exam, sooner PRN.    2. Nuclear sclerosis, bilateral  Educated pt on presence of cataracts and effects on vision. No surgery at this time. Recheck in one year, sooner PRN.    3. Refractive error  Educated patient on refractive error and discussed lens options. Dispensed updated spectacle Rx. Educated about adaptation period to new specs.    Eyeglass Final Rx       Eyeglass Final Rx         Sphere Cylinder Axis Add    Right +0.25 +1.25 170 +2.75    Left +0.50 +1.25 175 +2.75      Expiration Date: 11/2/2023                      Follow up in about 1 year (around 11/2/2023) for Diabetic Eye Exam.

## 2022-11-14 ENCOUNTER — PATIENT MESSAGE (OUTPATIENT)
Dept: FAMILY MEDICINE | Facility: CLINIC | Age: 67
End: 2022-11-14
Payer: COMMERCIAL

## 2022-11-14 DIAGNOSIS — L08.9 SKIN INFECTION: Primary | ICD-10-CM

## 2022-11-14 PROBLEM — J96.12 CHRONIC RESPIRATORY FAILURE WITH HYPERCAPNIA: Status: RESOLVED | Noted: 2021-07-20 | Resolved: 2022-11-14

## 2022-11-18 ENCOUNTER — TELEPHONE (OUTPATIENT)
Dept: FAMILY MEDICINE | Facility: CLINIC | Age: 67
End: 2022-11-18
Payer: COMMERCIAL

## 2022-11-18 ENCOUNTER — PATIENT MESSAGE (OUTPATIENT)
Dept: FAMILY MEDICINE | Facility: CLINIC | Age: 67
End: 2022-11-18
Payer: COMMERCIAL

## 2022-11-18 RX ORDER — AMOXICILLIN AND CLAVULANATE POTASSIUM 875; 125 MG/1; MG/1
1 TABLET, FILM COATED ORAL EVERY 12 HOURS
Qty: 14 TABLET | Refills: 0 | Status: SHIPPED | OUTPATIENT
Start: 2022-11-18 | End: 2022-11-25

## 2022-11-18 NOTE — TELEPHONE ENCOUNTER
Calling pt to get updated BP reading.    Pt states she has sent in messages about an urgent boil under her arm x 1 week.  Very painful, not draining.  No tx thus far aside from warm compresses.  Not able to get sooner appt and would like antibiotics.

## 2022-12-06 ENCOUNTER — PATIENT OUTREACH (OUTPATIENT)
Dept: ADMINISTRATIVE | Facility: HOSPITAL | Age: 67
End: 2022-12-06
Payer: COMMERCIAL

## 2022-12-07 ENCOUNTER — LAB VISIT (OUTPATIENT)
Dept: LAB | Facility: HOSPITAL | Age: 67
End: 2022-12-07
Attending: FAMILY MEDICINE
Payer: COMMERCIAL

## 2022-12-07 ENCOUNTER — CLINICAL SUPPORT (OUTPATIENT)
Dept: FAMILY MEDICINE | Facility: CLINIC | Age: 67
End: 2022-12-07
Payer: COMMERCIAL

## 2022-12-07 VITALS — DIASTOLIC BLOOD PRESSURE: 78 MMHG | SYSTOLIC BLOOD PRESSURE: 136 MMHG

## 2022-12-07 DIAGNOSIS — R30.0 DYSURIA: ICD-10-CM

## 2022-12-07 DIAGNOSIS — R30.0 DYSURIA: Primary | ICD-10-CM

## 2022-12-07 DIAGNOSIS — I10 ESSENTIAL HYPERTENSION: Primary | ICD-10-CM

## 2022-12-07 PROCEDURE — 87086 URINE CULTURE/COLONY COUNT: CPT | Performed by: FAMILY MEDICINE

## 2022-12-07 PROCEDURE — 99999 PR PBB SHADOW E&M-EST. PATIENT-LVL I: CPT | Mod: PBBFAC,,,

## 2022-12-07 PROCEDURE — 99999 PR PBB SHADOW E&M-EST. PATIENT-LVL I: ICD-10-PCS | Mod: PBBFAC,,,

## 2022-12-07 NOTE — PROGRESS NOTES
Sandee Evans 66 y.o. female is here today for Blood Pressure check.   History of HTN yes.    Review of patient's allergies indicates:   Allergen Reactions    Ace inhibitors      Cough       Creatinine   Date Value Ref Range Status   11/02/2022 1.3 0.5 - 1.4 mg/dL Final     Sodium   Date Value Ref Range Status   11/02/2022 143 136 - 145 mmol/L Final     Potassium   Date Value Ref Range Status   11/02/2022 4.8 3.5 - 5.1 mmol/L Final   ]  Patient verifies taking blood pressure medications on a regular basis at the same time of the day.     Current Outpatient Medications:     albuterol (PROVENTIL/VENTOLIN HFA) 90 mcg/actuation inhaler, Inhale 2 puffs into the lungs every 6 (six) hours as needed for Wheezing or Shortness of Breath. Rescue, Disp: 54 g, Rfl: 1    atorvastatin (LIPITOR) 40 MG tablet, Take 1 tablet (40 mg total) by mouth once daily., Disp: 90 tablet, Rfl: 1    azelastine (ASTELIN) 137 mcg (0.1 %) nasal spray, 1 spray (137 mcg total) by Nasal route 2 (two) times daily., Disp: 30 mL, Rfl: 3    cetirizine (ZYRTEC) 10 MG tablet, Take 1 tablet (10 mg total) by mouth once daily., Disp: 90 tablet, Rfl: 3    famotidine (PEPCID) 20 MG tablet, Take 1 tablet (20 mg total) by mouth 2 (two) times daily as needed., Disp: 60 tablet, Rfl: 2    fluticasone propionate (FLONASE) 50 mcg/actuation nasal spray, SHAKE LIQUID AND USE 2 SPRAYS(100 MCG) IN EACH NOSTRIL EVERY DAY, Disp: 48 g, Rfl: 0    furosemide (LASIX) 40 MG tablet, Take 1 tablet (40 mg total) by mouth once daily., Disp: 180 tablet, Rfl: 0    gabapentin (NEURONTIN) 100 MG capsule, Take 1 capsule (100 mg total) by mouth nightly as needed., Disp: 90 capsule, Rfl: 1    losartan (COZAAR) 25 MG tablet, Take 1 tablet (25 mg total) by mouth once daily., Disp: 90 tablet, Rfl: 1    methylPREDNISolone (MEDROL DOSEPACK) 4 mg tablet, use as directed, Disp: 1 each, Rfl: 0    metoprolol succinate (TOPROL-XL) 100 MG 24 hr tablet, Take 1 tablet (100 mg total) by mouth once  daily., Disp: 90 tablet, Rfl: 1    nicotine, polacrilex, (NICORETTE) 2 mg Gum, Use 1-2 per hour in place of a cigarette. Limit to 10 a day - oral. . (Patient taking differently: Use 1-2 per hour in place of a cigarette. Limit to 10 a day - oral. .), Disp: 200 each, Rfl: 0    ondansetron (ZOFRAN-ODT) 8 MG TbDL, Take 1 tablet (8 mg total) by mouth every 6 (six) hours as needed (nausea)., Disp: 60 tablet, Rfl: 2    sertraline (ZOLOFT) 100 MG tablet, Take 1 tablet (100 mg total) by mouth once daily., Disp: 90 tablet, Rfl: 3    SPIRIVA RESPIMAT 2.5 mcg/actuation inhaler, INHALE 2 PUFFS BY MOUTH INTO THE LUNGS DAILY( CONTROLLER), Disp: 4 g, Rfl: 5    traZODone (DESYREL) 50 MG tablet, TAKE 1 TO 2 TABLETS BY MOUTH EVERY NIGHT AS NEEDED FOR SLEEP, Disp: 60 tablet, Rfl: 2    triamcinolone acetonide 0.1% (KENALOG) 0.1 % ointment, APPLY BETWEEN THE KNEES AND UPPER THIGHS TWICE DAILY FOR NO MORE THAN 7 TO 14 DAYS, Disp: 454 g, Rfl: 1    XARELTO 20 mg Tab, Take 1 tablet (20 mg total) by mouth once daily., Disp: 90 tablet, Rfl: 1  Does patient have record of home blood pressure readings no. Readings have been averaging not done.   Last dose of blood pressure medication was taken at yesterday morning.  Patient is asymptomatic.   Complains of no complaints.    Vitals:    12/07/22 0936   BP: 136/78   BP Location: Right arm   Patient Position: Sitting   BP Method: Large (Manual)         Dr. Miller informed of nurse visit.

## 2022-12-09 LAB — BACTERIA UR CULT: NORMAL

## 2022-12-16 ENCOUNTER — TELEPHONE (OUTPATIENT)
Dept: FAMILY MEDICINE | Facility: CLINIC | Age: 67
End: 2022-12-16
Payer: COMMERCIAL

## 2022-12-16 DIAGNOSIS — R30.0 DYSURIA: Primary | ICD-10-CM

## 2022-12-16 NOTE — TELEPHONE ENCOUNTER
Attempt made to contact patient, no voicemail. If call is returned, please offer pt a virtual or in office visit with any available provider or urgent care if needed.

## 2022-12-16 NOTE — TELEPHONE ENCOUNTER
----- Message from Alexa Germain sent at 12/16/2022  8:51 AM CST -----  Regarding: Requesting Advice  .Type:  Needs Medical Advice    Who Called:   self     Symptoms (please be specific): painful urination     How long has patient had these symptoms:  yesterday morning     Pharmacy name and phone #:   .  [id number] [place] - [address] AT [facility] [address]  [address]  [place] [address]  Phone: [phone] Fax: [phone]        Would the patient rather a call back or a response via [facility]?  Call     Best Call Back Number:  [phone]

## 2022-12-16 NOTE — TELEPHONE ENCOUNTER
Pt has been having problems with painful urination at times; her last culture was negative; she is ok with referral to urology to see what is going on; orders pended

## 2022-12-16 NOTE — TELEPHONE ENCOUNTER
----- Message from Johanna Rahman sent at 12/16/2022  3:46 PM CST -----  .Type:  Patient Returning Call    Who Called: Self    Who Left Message for Patient: Ankita    Does the patient know what this is regarding?: Yes UTI    Would the patient rather a call back or a response via [facility]? Call    Best Call Back Number: .[phone] (home)       Additional Information:

## 2022-12-21 ENCOUNTER — LAB VISIT (OUTPATIENT)
Dept: LAB | Facility: HOSPITAL | Age: 67
End: 2022-12-21
Attending: FAMILY MEDICINE
Payer: COMMERCIAL

## 2022-12-21 DIAGNOSIS — R30.0 DYSURIA: ICD-10-CM

## 2022-12-21 PROCEDURE — 87186 SC STD MICRODIL/AGAR DIL: CPT | Performed by: FAMILY MEDICINE

## 2022-12-21 PROCEDURE — 87086 URINE CULTURE/COLONY COUNT: CPT | Performed by: FAMILY MEDICINE

## 2022-12-21 PROCEDURE — 87077 CULTURE AEROBIC IDENTIFY: CPT | Performed by: FAMILY MEDICINE

## 2022-12-21 PROCEDURE — 87088 URINE BACTERIA CULTURE: CPT | Performed by: FAMILY MEDICINE

## 2022-12-25 LAB — BACTERIA UR CULT: ABNORMAL

## 2022-12-27 DIAGNOSIS — N39.0 E. COLI UTI: Primary | ICD-10-CM

## 2022-12-27 DIAGNOSIS — B96.20 E. COLI UTI: Primary | ICD-10-CM

## 2022-12-27 RX ORDER — NITROFURANTOIN 25; 75 MG/1; MG/1
100 CAPSULE ORAL 2 TIMES DAILY
Qty: 14 CAPSULE | Refills: 0 | Status: SHIPPED | OUTPATIENT
Start: 2022-12-27 | End: 2023-01-03

## 2023-01-13 ENCOUNTER — OFFICE VISIT (OUTPATIENT)
Dept: FAMILY MEDICINE | Facility: CLINIC | Age: 68
End: 2023-01-13
Payer: COMMERCIAL

## 2023-01-13 ENCOUNTER — TELEPHONE (OUTPATIENT)
Dept: FAMILY MEDICINE | Facility: CLINIC | Age: 68
End: 2023-01-13

## 2023-01-13 VITALS
DIASTOLIC BLOOD PRESSURE: 64 MMHG | HEIGHT: 63 IN | SYSTOLIC BLOOD PRESSURE: 126 MMHG | RESPIRATION RATE: 18 BRPM | HEART RATE: 58 BPM | WEIGHT: 267.88 LBS | BODY MASS INDEX: 47.46 KG/M2 | OXYGEN SATURATION: 97 % | TEMPERATURE: 98 F

## 2023-01-13 DIAGNOSIS — R06.02 SOB (SHORTNESS OF BREATH): ICD-10-CM

## 2023-01-13 DIAGNOSIS — I27.20 PULMONARY HYPERTENSION: ICD-10-CM

## 2023-01-13 DIAGNOSIS — N18.31 CHRONIC KIDNEY DISEASE, STAGE 3A: ICD-10-CM

## 2023-01-13 DIAGNOSIS — I48.11 LONGSTANDING PERSISTENT ATRIAL FIBRILLATION: ICD-10-CM

## 2023-01-13 DIAGNOSIS — F32.0 CURRENT MILD EPISODE OF MAJOR DEPRESSIVE DISORDER WITHOUT PRIOR EPISODE: ICD-10-CM

## 2023-01-13 DIAGNOSIS — J44.89 COPD WITH CHRONIC BRONCHITIS: ICD-10-CM

## 2023-01-13 DIAGNOSIS — Z72.0 TOBACCO USE: ICD-10-CM

## 2023-01-13 DIAGNOSIS — I73.9 PVD (PERIPHERAL VASCULAR DISEASE): ICD-10-CM

## 2023-01-13 DIAGNOSIS — B36.9 FUNGAL RASH OF TORSO: ICD-10-CM

## 2023-01-13 DIAGNOSIS — E66.01 SEVERE OBESITY (BMI >= 40): ICD-10-CM

## 2023-01-13 DIAGNOSIS — E11.36 TYPE 2 DIABETES MELLITUS WITH DIABETIC CATARACT, WITHOUT LONG-TERM CURRENT USE OF INSULIN: Primary | ICD-10-CM

## 2023-01-13 PROCEDURE — 3288F PR FALLS RISK ASSESSMENT DOCUMENTED: ICD-10-PCS | Mod: CPTII,S$GLB,, | Performed by: FAMILY MEDICINE

## 2023-01-13 PROCEDURE — 99999 PR PBB SHADOW E&M-EST. PATIENT-LVL V: CPT | Mod: PBBFAC,,, | Performed by: FAMILY MEDICINE

## 2023-01-13 PROCEDURE — 1159F PR MEDICATION LIST DOCUMENTED IN MEDICAL RECORD: ICD-10-PCS | Mod: CPTII,S$GLB,, | Performed by: FAMILY MEDICINE

## 2023-01-13 PROCEDURE — 1126F AMNT PAIN NOTED NONE PRSNT: CPT | Mod: CPTII,S$GLB,, | Performed by: FAMILY MEDICINE

## 2023-01-13 PROCEDURE — 3074F SYST BP LT 130 MM HG: CPT | Mod: CPTII,S$GLB,, | Performed by: FAMILY MEDICINE

## 2023-01-13 PROCEDURE — 99215 PR OFFICE/OUTPT VISIT, EST, LEVL V, 40-54 MIN: ICD-10-PCS | Mod: S$GLB,,, | Performed by: FAMILY MEDICINE

## 2023-01-13 PROCEDURE — 3078F PR MOST RECENT DIASTOLIC BLOOD PRESSURE < 80 MM HG: ICD-10-PCS | Mod: CPTII,S$GLB,, | Performed by: FAMILY MEDICINE

## 2023-01-13 PROCEDURE — 99215 OFFICE O/P EST HI 40 MIN: CPT | Mod: S$GLB,,, | Performed by: FAMILY MEDICINE

## 2023-01-13 PROCEDURE — 1159F MED LIST DOCD IN RCRD: CPT | Mod: CPTII,S$GLB,, | Performed by: FAMILY MEDICINE

## 2023-01-13 PROCEDURE — 1101F PT FALLS ASSESS-DOCD LE1/YR: CPT | Mod: CPTII,S$GLB,, | Performed by: FAMILY MEDICINE

## 2023-01-13 PROCEDURE — 3008F BODY MASS INDEX DOCD: CPT | Mod: CPTII,S$GLB,, | Performed by: FAMILY MEDICINE

## 2023-01-13 PROCEDURE — 3074F PR MOST RECENT SYSTOLIC BLOOD PRESSURE < 130 MM HG: ICD-10-PCS | Mod: CPTII,S$GLB,, | Performed by: FAMILY MEDICINE

## 2023-01-13 PROCEDURE — 3072F LOW RISK FOR RETINOPATHY: CPT | Mod: CPTII,S$GLB,, | Performed by: FAMILY MEDICINE

## 2023-01-13 PROCEDURE — 3288F FALL RISK ASSESSMENT DOCD: CPT | Mod: CPTII,S$GLB,, | Performed by: FAMILY MEDICINE

## 2023-01-13 PROCEDURE — 3008F PR BODY MASS INDEX (BMI) DOCUMENTED: ICD-10-PCS | Mod: CPTII,S$GLB,, | Performed by: FAMILY MEDICINE

## 2023-01-13 PROCEDURE — 1101F PR PT FALLS ASSESS DOC 0-1 FALLS W/OUT INJ PAST YR: ICD-10-PCS | Mod: CPTII,S$GLB,, | Performed by: FAMILY MEDICINE

## 2023-01-13 PROCEDURE — 3078F DIAST BP <80 MM HG: CPT | Mod: CPTII,S$GLB,, | Performed by: FAMILY MEDICINE

## 2023-01-13 PROCEDURE — 3072F PR LOW RISK FOR RETINOPATHY: ICD-10-PCS | Mod: CPTII,S$GLB,, | Performed by: FAMILY MEDICINE

## 2023-01-13 PROCEDURE — 1126F PR PAIN SEVERITY QUANTIFIED, NO PAIN PRESENT: ICD-10-PCS | Mod: CPTII,S$GLB,, | Performed by: FAMILY MEDICINE

## 2023-01-13 PROCEDURE — 99999 PR PBB SHADOW E&M-EST. PATIENT-LVL V: ICD-10-PCS | Mod: PBBFAC,,, | Performed by: FAMILY MEDICINE

## 2023-01-13 RX ORDER — ALBUTEROL SULFATE 90 UG/1
2 AEROSOL, METERED RESPIRATORY (INHALATION) EVERY 6 HOURS PRN
Qty: 54 G | Refills: 1 | Status: SHIPPED | OUTPATIENT
Start: 2023-01-13 | End: 2023-03-29

## 2023-01-13 RX ORDER — BUPROPION HYDROCHLORIDE 150 MG/1
150 TABLET ORAL DAILY
Qty: 30 TABLET | Refills: 0 | Status: SHIPPED | OUTPATIENT
Start: 2023-01-13 | End: 2023-02-10 | Stop reason: SDUPTHER

## 2023-01-13 RX ORDER — KETOCONAZOLE 20 MG/G
CREAM TOPICAL DAILY
Qty: 60 G | Refills: 0 | Status: SHIPPED | OUTPATIENT
Start: 2023-01-13 | End: 2023-04-13 | Stop reason: SDUPTHER

## 2023-01-13 NOTE — PROGRESS NOTES
Subjective:       Patient ID: Sandee Evans is a 67 y.o. female.    Chief Complaint: Follow-up      Follow-up  Associated symptoms include a rash.   67-year-old female presents for follow-up.  Patient states antibiotics help with abscess in her axilla.  Feels she still gets depressed on occasion.  Feels it has improved after the increase in her Zoloft.  States she has a rash on her chest and shoulder area.  Endorses pruritus.    Review of Systems   Constitutional: Negative.    HENT: Negative.     Respiratory: Negative.     Cardiovascular: Negative.    Gastrointestinal: Negative.    Endocrine: Negative.    Genitourinary: Negative.    Musculoskeletal: Negative.    Skin:  Positive for rash.   Neurological: Negative.    Psychiatric/Behavioral: Negative.          Past Medical History:   Diagnosis Date    Anticoagulant long-term use     Xarelto    Arthritis     Atrial fibrillation     Breast cyst     CHF (congestive heart failure)     Degenerative disc disease     Edema     HLD (hyperlipidemia)     Hx of psychiatric care     Hyperlipidemia     Hypertension     Hypothyroidism     Nuclear sclerosis, bilateral 12/18/2017    Obesity, morbid     HIEU (obstructive sleep apnea)     Other abnormal glucose     pre-diabetes    Pre-diabetes     Psychiatric problem     Requires assistance with activities of daily living (ADL)     Sleep apnea     Smoker     SOB (shortness of breath)     SOB (shortness of breath)     Thyroid disease     on meds 8-9 years ago. hypothyroidism.no malignancy    TMJ (temporomandibular joint disorder)     jaw clicking    Tobacco abuse     Unsteady gait     Urge incontinence     Weakness generalized     Wears glasses      Past Surgical History:   Procedure Laterality Date    BREAST BIOPSY Right     over 10 yrs ago/ benign    BREAST CYST EXCISION Right     BREAST LUMPECTOMY Right     over 10 yrs ago, ex bx/ benign    COLONOSCOPY N/A 6/25/2019    Procedure: COLONOSCOPY;  Surgeon: Micheline Flores MD;   Location: [facility] ENDO;  Service: Endoscopy;  Laterality: N/A;  RX XARELTO ok to hold (2 days) per Dr. Naik see scan 3/20/19    ENDOSCOPIC ULTRASOUND OF UPPER GASTROINTESTINAL TRACT N/A 1/26/2021    Procedure: ULTRASOUND-ENDOSCOPIC-UPPER;  Surgeon: Stevenson Philippe MD;  Location: [facility] ENDO;  Service: Endoscopy;  Laterality: N/A;    HYSTERECTOMY      JOINT REPLACEMENT Bilateral     knees    OOPHORECTOMY      rt.knee surgery 2016      TOTAL KNEE ARTHROPLASTY Left 2/19/2019    Procedure: ARTHROPLASTY, KNEE, TOTAL;  Surgeon: Med Hanson MD;  Location: [facility] OR;  Service: Orthopedics;  Laterality: Left;  10AM START PER  PER JAYLIN TEXT @ 8:34AM ON 2-18-19  SUPINE  HARRIS LOYA [phone] TEXTED HIM ON 1-24-19 @ 7:57AM  RN PRE OP 2-13-19--BMI--48.9    underarm gland\ Bilateral      Family History   Problem Relation Age of Onset    Diabetes Mother     Hypertension Mother     Cancer Mother     No Known Problems Father     No Known Problems Sister     Diabetes Brother     Cancer Brother     No Known Problems Maternal Aunt     No Known Problems Maternal Uncle     No Known Problems Paternal Aunt     No Known Problems Paternal Uncle     No Known Problems Maternal Grandmother     No Known Problems Maternal Grandfather     No Known Problems Paternal Grandmother     No Known Problems Paternal Grandfather     No Known Problems Other     Amblyopia Neg Hx     Blindness Neg Hx     Cataracts Neg Hx     Glaucoma Neg Hx     Macular degeneration Neg Hx     Retinal detachment Neg Hx     Strabismus Neg Hx     Stroke Neg Hx     Thyroid disease Neg Hx      Social History     Socioeconomic History    Marital status:    Tobacco Use    Smoking status: Every Day     Packs/day: 0.50     Years: 47.00     Pack years: 23.50     Types: Cigarettes     Start date: 1973    Smokeless tobacco: Current   Substance and Sexual Activity    Alcohol use: No    Drug use: No    Sexual activity: Yes     Partners: Male     Social Determinants of  Health     Financial Resource Strain: Low Risk     Difficulty of Paying Living Expenses: Not hard at all   Food Insecurity: No Food Insecurity    Worried About Running Out of Food in the Last Year: Never true    Ran Out of Food in the Last Year: Never true   Transportation Needs: No Transportation Needs    Lack of Transportation (Medical): No    Lack of Transportation (Non-Medical): No   Physical Activity: Insufficiently Active    Days of Exercise per Week: 2 days    Minutes of Exercise per Session: 10 min   Stress: No Stress Concern Present    Feeling of Stress : Only a little   Social Connections: Unknown    Frequency of Communication with Friends and Family: More than three times a week    Frequency of Social Gatherings with Friends and Family: Once a week    Active Member of Clubs or Organizations: Yes    Attends Club or Organization Meetings: 1 to 4 times per year    Marital Status:    Housing Stability: Low Risk     Unable to Pay for Housing in the Last Year: No    Number of Places Lived in the Last Year: 2    Unstable Housing in the Last Year: No       Current Outpatient Medications:     atorvastatin (LIPITOR) 40 MG tablet, Take 1 tablet (40 mg total) by mouth once daily., Disp: 90 tablet, Rfl: 1    azelastine (ASTELIN) 137 mcg (0.1 %) nasal spray, 1 spray (137 mcg total) by Nasal route 2 (two) times daily., Disp: 30 mL, Rfl: 3    cetirizine (ZYRTEC) 10 MG tablet, Take 1 tablet (10 mg total) by mouth once daily., Disp: 90 tablet, Rfl: 3    famotidine (PEPCID) 20 MG tablet, Take 1 tablet (20 mg total) by mouth 2 (two) times daily as needed., Disp: 60 tablet, Rfl: 2    fluticasone propionate (FLONASE) 50 mcg/actuation nasal spray, SHAKE LIQUID AND USE 2 SPRAYS(100 MCG) IN EACH NOSTRIL EVERY DAY, Disp: 48 g, Rfl: 0    furosemide (LASIX) 40 MG tablet, Take 1 tablet (40 mg total) by mouth once daily., Disp: 180 tablet, Rfl: 0    gabapentin (NEURONTIN) 100 MG capsule, Take 1 capsule (100 mg total) by mouth  nightly as needed., Disp: 90 capsule, Rfl: 1    losartan (COZAAR) 25 MG tablet, Take 1 tablet (25 mg total) by mouth once daily., Disp: 90 tablet, Rfl: 1    metoprolol succinate (TOPROL-XL) 100 MG 24 hr tablet, Take 1 tablet (100 mg total) by mouth once daily., Disp: 90 tablet, Rfl: 1    sertraline (ZOLOFT) 100 MG tablet, Take 1 tablet (100 mg total) by mouth once daily., Disp: 90 tablet, Rfl: 3    SPIRIVA RESPIMAT 2.5 mcg/actuation inhaler, INHALE 2 PUFFS BY MOUTH INTO THE LUNGS DAILY( CONTROLLER), Disp: 4 g, Rfl: 5    traZODone (DESYREL) 50 MG tablet, TAKE 1 TO 2 TABLETS BY MOUTH EVERY NIGHT AS NEEDED FOR SLEEP, Disp: 60 tablet, Rfl: 2    triamcinolone acetonide 0.1% (KENALOG) 0.1 % ointment, APPLY BETWEEN THE KNEES AND UPPER THIGHS TWICE DAILY FOR NO MORE THAN 7 TO 14 DAYS, Disp: 454 g, Rfl: 1    XARELTO 20 mg Tab, Take 1 tablet (20 mg total) by mouth once daily., Disp: 90 tablet, Rfl: 1    albuterol (PROVENTIL/VENTOLIN HFA) 90 mcg/actuation inhaler, Inhale 2 puffs into the lungs every 6 (six) hours as needed for Wheezing or Shortness of Breath. Rescue, Disp: 54 g, Rfl: 1    buPROPion (WELLBUTRIN XL) 150 MG TB24 tablet, Take 1 tablet (150 mg total) by mouth once daily., Disp: 30 tablet, Rfl: 0    ketoconazole (NIZORAL) 2 % cream, Apply topically once daily., Disp: 60 g, Rfl: 0    methylPREDNISolone (MEDROL DOSEPACK) 4 mg tablet, use as directed, Disp: 1 each, Rfl: 0    nicotine, polacrilex, (NICORETTE) 2 mg Gum, Use 1-2 per hour in place of a cigarette. Limit to 10 a day - oral. . (Patient taking differently: Use 1-2 per hour in place of a cigarette. Limit to 10 a day - oral. .), Disp: 200 each, Rfl: 0    ondansetron (ZOFRAN-ODT) 8 MG TbDL, Take 1 tablet (8 mg total) by mouth every 6 (six) hours as needed (nausea)., Disp: 60 tablet, Rfl: 2   Objective:      Vitals:    01/13/23 0950   BP: 126/64   BP Location: Left arm   Patient Position: Sitting   BP Method: Large (Manual)   Pulse: (!) 58   Resp: 18   Temp: 98.4  "°F (36.9 °C)   TempSrc: Oral   SpO2: 97%   Weight: 121.5 kg (267 lb 13.7 oz)   Height: 5' 3" (1.6 m)       Physical Exam  Constitutional:       General: She is not in acute distress.     Appearance: She is not diaphoretic.   HENT:      Head: Normocephalic and atraumatic.   Eyes:      Conjunctiva/sclera: Conjunctivae normal.   Pulmonary:      Effort: Pulmonary effort is normal.   Musculoskeletal:         General: Normal range of motion.      Cervical back: Neck supple.   Skin:     Findings: Rash present.   Neurological:      Mental Status: She is alert and oriented to person, place, and time.   Psychiatric:         Behavior: Behavior normal.         Thought Content: Thought content normal.         Judgment: Judgment normal.          Assessment:       1. Type 2 diabetes mellitus with diabetic cataract, without long-term current use of insulin    2. Severe obesity (BMI >= 40)    3. Current mild episode of major depressive disorder without prior episode    4. Pulmonary hypertension    5. Longstanding persistent atrial fibrillation    6. PVD (peripheral vascular disease)    7. COPD with chronic bronchitis    8. Chronic kidney disease, stage 3a    9. SOB (shortness of breath)    10. Tobacco use    11. Fungal rash of torso          Plan:       Type 2 diabetes mellitus with diabetic cataract, without long-term current use of insulin    Severe obesity (BMI >= 40)    Current mild episode of major depressive disorder without prior episode  -     buPROPion (WELLBUTRIN XL) 150 MG TB24 tablet; Take 1 tablet (150 mg total) by mouth once daily.  Dispense: 30 tablet; Refill: 0    Pulmonary hypertension  -     albuterol (PROVENTIL/VENTOLIN HFA) 90 mcg/actuation inhaler; Inhale 2 puffs into the lungs every 6 (six) hours as needed for Wheezing or Shortness of Breath. Rescue  Dispense: 54 g; Refill: 1    Longstanding persistent atrial fibrillation    PVD (peripheral vascular disease)    COPD with chronic bronchitis    Chronic kidney " disease, stage 3a    SOB (shortness of breath)  -     albuterol (PROVENTIL/VENTOLIN HFA) 90 mcg/actuation inhaler; Inhale 2 puffs into the lungs every 6 (six) hours as needed for Wheezing or Shortness of Breath. Rescue  Dispense: 54 g; Refill: 1    Tobacco use  -     albuterol (PROVENTIL/VENTOLIN HFA) 90 mcg/actuation inhaler; Inhale 2 puffs into the lungs every 6 (six) hours as needed for Wheezing or Shortness of Breath. Rescue  Dispense: 54 g; Refill: 1    Fungal rash of torso  -     ketoconazole (NIZORAL) 2 % cream; Apply topically once daily.  Dispense: 60 g; Refill: 0    Continue medications.  Given follow cream for rash.  Added Wellbutrin for depression.  Follow-up next month.    40 minute visit with more than 50% of time spent on counseling.             Future Appointments   Date Time Provider Department Center   1/31/2023 10:45 AM Maynor Hartmann MD [facility]   2/10/2023 10:40 AM Kuldeep Miller MD [facility]       Patient note was created using [employer].  Any errors in syntax or even information may not have been identified and edited on initial review prior to signing this note.

## 2023-01-13 NOTE — PROGRESS NOTES
Health Maintenance Due   Topic     Foot Exam  Consult pcp    Diabetes Urine Screening  Consult pcp    COVID-19 Vaccine (5 - Booster for Pfizer series) Not offered at this office

## 2023-01-26 ENCOUNTER — HOSPITAL ENCOUNTER (EMERGENCY)
Facility: HOSPITAL | Age: 68
Discharge: HOME | End: 2023-01-26
Attending: EMERGENCY MEDICINE
Payer: COMMERCIAL

## 2023-01-26 VITALS
HEART RATE: 91 BPM | OXYGEN SATURATION: 93 % | SYSTOLIC BLOOD PRESSURE: 138 MMHG | DIASTOLIC BLOOD PRESSURE: 71 MMHG | TEMPERATURE: 100 F | HEIGHT: 63 IN | WEIGHT: 267 LBS | RESPIRATION RATE: 20 BRPM | BODY MASS INDEX: 47.31 KG/M2

## 2023-01-26 DIAGNOSIS — J44.1 ACUTE EXACERBATION OF CHRONIC OBSTRUCTIVE PULMONARY DISEASE (COPD): ICD-10-CM

## 2023-01-26 DIAGNOSIS — R06.02 SHORTNESS OF BREATH: Primary | ICD-10-CM

## 2023-01-26 DIAGNOSIS — J98.4 PNEUMONITIS: ICD-10-CM

## 2023-01-26 DIAGNOSIS — N30.90 CYSTITIS: ICD-10-CM

## 2023-01-26 DIAGNOSIS — R07.89 CHEST TIGHTNESS: ICD-10-CM

## 2023-01-26 DIAGNOSIS — J98.01 BRONCHOSPASM: ICD-10-CM

## 2023-01-26 LAB
ALBUMIN SERPL BCP-MCNC: 2.7 G/DL (ref 3.5–5.2)
ALP SERPL-CCNC: 83 U/L (ref 55–135)
ALT SERPL W/O P-5'-P-CCNC: 14 U/L (ref 10–44)
ANION GAP SERPL CALC-SCNC: 10 MMOL/L (ref 8–16)
AST SERPL-CCNC: 13 U/L (ref 10–40)
BACTERIA #/AREA URNS HPF: ABNORMAL /HPF
BASOPHILS # BLD AUTO: 0.03 K/UL (ref 0–0.2)
BASOPHILS NFR BLD: 0.3 % (ref 0–1.9)
BILIRUB SERPL-MCNC: 1.4 MG/DL (ref 0.1–1)
BILIRUB UR QL STRIP: NEGATIVE
BNP SERPL-MCNC: 132 PG/ML (ref 0–99)
BUN SERPL-MCNC: 23 MG/DL (ref 8–23)
CALCIUM SERPL-MCNC: 9.3 MG/DL (ref 8.7–10.5)
CHLORIDE SERPL-SCNC: 99 MMOL/L (ref 95–110)
CLARITY UR: ABNORMAL
CO2 SERPL-SCNC: 32 MMOL/L (ref 23–29)
COLOR UR: YELLOW
CREAT SERPL-MCNC: 1.1 MG/DL (ref 0.5–1.4)
CTP QC/QA: YES
CTP QC/QA: YES
DIFFERENTIAL METHOD: ABNORMAL
EOSINOPHIL # BLD AUTO: 0.1 K/UL (ref 0–0.5)
EOSINOPHIL NFR BLD: 1.2 % (ref 0–8)
ERYTHROCYTE [DISTWIDTH] IN BLOOD BY AUTOMATED COUNT: 14.3 % (ref 11.5–14.5)
EST. GFR  (NO RACE VARIABLE): 54 ML/MIN/1.73 M^2
GLUCOSE SERPL-MCNC: 89 MG/DL (ref 70–110)
GLUCOSE UR QL STRIP: NEGATIVE
HCT VFR BLD AUTO: 39.5 % (ref 37–48.5)
HGB BLD-MCNC: 12.9 G/DL (ref 12–16)
HGB UR QL STRIP: ABNORMAL
HYALINE CASTS #/AREA URNS LPF: 0 /LPF
IMM GRANULOCYTES # BLD AUTO: 0.09 K/UL (ref 0–0.04)
IMM GRANULOCYTES NFR BLD AUTO: 0.8 % (ref 0–0.5)
INR PPP: 1.4 (ref 0.8–1.2)
KETONES UR QL STRIP: NEGATIVE
LEUKOCYTE ESTERASE UR QL STRIP: ABNORMAL
LIPASE SERPL-CCNC: 29 U/L (ref 4–60)
LYMPHOCYTES # BLD AUTO: 1.3 K/UL (ref 1–4.8)
LYMPHOCYTES NFR BLD: 10.6 % (ref 18–48)
MAGNESIUM SERPL-MCNC: 1.7 MG/DL (ref 1.6–2.6)
MCH RBC QN AUTO: 31.3 PG (ref 27–31)
MCHC RBC AUTO-ENTMCNC: 32.7 G/DL (ref 32–36)
MCV RBC AUTO: 96 FL (ref 82–98)
MICROSCOPIC COMMENT: ABNORMAL
MONOCYTES # BLD AUTO: 1 K/UL (ref 0.3–1)
MONOCYTES NFR BLD: 8.3 % (ref 4–15)
NEUTROPHILS # BLD AUTO: 9.5 K/UL (ref 1.8–7.7)
NEUTROPHILS NFR BLD: 78.8 % (ref 38–73)
NITRITE UR QL STRIP: POSITIVE
NRBC BLD-RTO: 0 /100 WBC
PH UR STRIP: 7 [PH] (ref 5–8)
PLATELET # BLD AUTO: 205 K/UL (ref 150–450)
PMV BLD AUTO: 10.7 FL (ref 9.2–12.9)
POC MOLECULAR INFLUENZA A AGN: NEGATIVE
POC MOLECULAR INFLUENZA B AGN: NEGATIVE
POTASSIUM SERPL-SCNC: 3.8 MMOL/L (ref 3.5–5.1)
PROT SERPL-MCNC: 7 G/DL (ref 6–8.4)
PROT UR QL STRIP: ABNORMAL
PROTHROMBIN TIME: 14.9 SEC (ref 9–12.5)
RBC # BLD AUTO: 4.12 M/UL (ref 4–5.4)
RBC #/AREA URNS HPF: 11 /HPF (ref 0–4)
SARS-COV-2 RDRP RESP QL NAA+PROBE: NEGATIVE
SODIUM SERPL-SCNC: 141 MMOL/L (ref 136–145)
SP GR UR STRIP: 1.02 (ref 1–1.03)
SQUAMOUS #/AREA URNS HPF: 4 /HPF
TROPONIN I SERPL DL<=0.01 NG/ML-MCNC: <0.006 NG/ML (ref 0–0.03)
TSH SERPL DL<=0.005 MIU/L-ACNC: 3.26 UIU/ML (ref 0.4–4)
URN SPEC COLLECT METH UR: ABNORMAL
UROBILINOGEN UR STRIP-ACNC: ABNORMAL EU/DL
WBC # BLD AUTO: 12 K/UL (ref 3.9–12.7)
WBC #/AREA URNS HPF: >100 /HPF (ref 0–5)
WBC CLUMPS URNS QL MICRO: ABNORMAL

## 2023-01-26 PROCEDURE — 63600175 PHARM REV CODE 636 W HCPCS: Performed by: EMERGENCY MEDICINE

## 2023-01-26 PROCEDURE — 87502 INFLUENZA DNA AMP PROBE: CPT

## 2023-01-26 PROCEDURE — 87077 CULTURE AEROBIC IDENTIFY: CPT | Performed by: EMERGENCY MEDICINE

## 2023-01-26 PROCEDURE — 83690 ASSAY OF LIPASE: CPT | Performed by: EMERGENCY MEDICINE

## 2023-01-26 PROCEDURE — 84484 ASSAY OF TROPONIN QUANT: CPT | Performed by: EMERGENCY MEDICINE

## 2023-01-26 PROCEDURE — 93010 EKG 12-LEAD: ICD-10-PCS | Mod: ,,, | Performed by: INTERNAL MEDICINE

## 2023-01-26 PROCEDURE — 99285 EMERGENCY DEPT VISIT HI MDM: CPT | Mod: 25

## 2023-01-26 PROCEDURE — 83880 ASSAY OF NATRIURETIC PEPTIDE: CPT | Performed by: EMERGENCY MEDICINE

## 2023-01-26 PROCEDURE — 83735 ASSAY OF MAGNESIUM: CPT | Performed by: EMERGENCY MEDICINE

## 2023-01-26 PROCEDURE — 85025 COMPLETE CBC W/AUTO DIFF WBC: CPT | Performed by: EMERGENCY MEDICINE

## 2023-01-26 PROCEDURE — 93005 ELECTROCARDIOGRAM TRACING: CPT

## 2023-01-26 PROCEDURE — 85610 PROTHROMBIN TIME: CPT | Performed by: EMERGENCY MEDICINE

## 2023-01-26 PROCEDURE — 87088 URINE BACTERIA CULTURE: CPT | Performed by: EMERGENCY MEDICINE

## 2023-01-26 PROCEDURE — 81000 URINALYSIS NONAUTO W/SCOPE: CPT | Performed by: EMERGENCY MEDICINE

## 2023-01-26 PROCEDURE — C9113 INJ PANTOPRAZOLE SODIUM, VIA: HCPCS | Performed by: EMERGENCY MEDICINE

## 2023-01-26 PROCEDURE — 25000003 PHARM REV CODE 250: Performed by: EMERGENCY MEDICINE

## 2023-01-26 PROCEDURE — 80053 COMPREHEN METABOLIC PANEL: CPT | Performed by: EMERGENCY MEDICINE

## 2023-01-26 PROCEDURE — 96365 THER/PROPH/DIAG IV INF INIT: CPT

## 2023-01-26 PROCEDURE — 93010 ELECTROCARDIOGRAM REPORT: CPT | Mod: ,,, | Performed by: INTERNAL MEDICINE

## 2023-01-26 PROCEDURE — 84443 ASSAY THYROID STIM HORMONE: CPT | Performed by: EMERGENCY MEDICINE

## 2023-01-26 PROCEDURE — 96375 TX/PRO/DX INJ NEW DRUG ADDON: CPT

## 2023-01-26 PROCEDURE — 87186 SC STD MICRODIL/AGAR DIL: CPT | Performed by: EMERGENCY MEDICINE

## 2023-01-26 PROCEDURE — 87635 SARS-COV-2 COVID-19 AMP PRB: CPT | Performed by: EMERGENCY MEDICINE

## 2023-01-26 PROCEDURE — 87086 URINE CULTURE/COLONY COUNT: CPT | Performed by: EMERGENCY MEDICINE

## 2023-01-26 RX ORDER — PANTOPRAZOLE SODIUM 40 MG/10ML
80 INJECTION, POWDER, LYOPHILIZED, FOR SOLUTION INTRAVENOUS
Status: COMPLETED | OUTPATIENT
Start: 2023-01-26 | End: 2023-01-26

## 2023-01-26 RX ORDER — PANTOPRAZOLE SODIUM 40 MG/1
TABLET, DELAYED RELEASE ORAL
Qty: 30 TABLET | Refills: 0 | Status: SHIPPED | OUTPATIENT
Start: 2023-01-26 | End: 2023-02-10

## 2023-01-26 RX ORDER — DEXAMETHASONE SODIUM PHOSPHATE 4 MG/ML
8 INJECTION, SOLUTION INTRA-ARTICULAR; INTRALESIONAL; INTRAMUSCULAR; INTRAVENOUS; SOFT TISSUE
Status: DISCONTINUED | OUTPATIENT
Start: 2023-01-26 | End: 2023-01-26 | Stop reason: HOSPADM

## 2023-01-26 RX ORDER — PREDNISONE 20 MG/1
40 TABLET ORAL DAILY
Qty: 10 TABLET | Refills: 0 | Status: SHIPPED | OUTPATIENT
Start: 2023-01-26 | End: 2023-01-31

## 2023-01-26 RX ORDER — ONDANSETRON HYDROCHLORIDE 4 MG/5ML
4 SOLUTION ORAL ONCE
Status: COMPLETED | OUTPATIENT
Start: 2023-01-26 | End: 2023-01-26

## 2023-01-26 RX ORDER — CEFTRIAXONE 2 G/50ML
2 INJECTION, SOLUTION INTRAVENOUS
Status: COMPLETED | OUTPATIENT
Start: 2023-01-26 | End: 2023-01-26

## 2023-01-26 RX ORDER — DOXYCYCLINE 100 MG/1
100 CAPSULE ORAL 2 TIMES DAILY
Qty: 20 CAPSULE | Refills: 0 | Status: SHIPPED | OUTPATIENT
Start: 2023-01-26 | End: 2023-02-05

## 2023-01-26 RX ORDER — MAG HYDROX/ALUMINUM HYD/SIMETH 200-200-20
60 SUSPENSION, ORAL (FINAL DOSE FORM) ORAL
Status: COMPLETED | OUTPATIENT
Start: 2023-01-26 | End: 2023-01-26

## 2023-01-26 RX ORDER — ALBUTEROL SULFATE 90 UG/1
1-2 AEROSOL, METERED RESPIRATORY (INHALATION) EVERY 6 HOURS PRN
Qty: 8 G | Refills: 1 | Status: SHIPPED | OUTPATIENT
Start: 2023-01-26 | End: 2023-02-25

## 2023-01-26 RX ORDER — CEPHALEXIN 500 MG/1
1000 CAPSULE ORAL EVERY 12 HOURS
Qty: 28 CAPSULE | Refills: 0 | Status: SHIPPED | OUTPATIENT
Start: 2023-01-26 | End: 2023-02-02

## 2023-01-26 RX ADMIN — CEFTRIAXONE 2 G: 2 INJECTION, SOLUTION INTRAVENOUS at 10:01

## 2023-01-26 RX ADMIN — PANTOPRAZOLE SODIUM 80 MG: 40 INJECTION, POWDER, FOR SOLUTION INTRAVENOUS at 08:01

## 2023-01-26 RX ADMIN — ALUMINUM HYDROXIDE, MAGNESIUM HYDROXIDE, AND SIMETHICONE 60 ML: 200; 200; 20 SUSPENSION ORAL at 08:01

## 2023-01-26 RX ADMIN — ONDANSETRON 4 MG: 4 SOLUTION ORAL at 09:01

## 2023-01-26 NOTE — ED TRIAGE NOTES
Pt presents to ER with c/o epigastric chest tightness SOB and belly pain that started yesterday. Pt admits to having a history of A-fib.and CHF. Pt states she has had loose stool for a week. Pt denies NV. Pt presents with wet productive cough.and states she has a HA when coughing. Pt AAOx4

## 2023-01-26 NOTE — ED PROVIDER NOTES
Encounter Date: 1/26/2023       History     Chief Complaint   Patient presents with    Chest Pain     The patient reports a mid sternal chest tightness that started last night while she was sleeping. She states that earlier yesterday, she was having LUQ abdominal pain. She also reports lightheadedness, nausea, sob. Reports hx of A-fib. Patient also reports a severe cough and chills x 2 weeks.     Shortness of Breath    Nausea    Dizziness    Chills    Cough     HPI  This 67-year-old female presents to the emergency room complaining of nausea that started yesterday.  Patient developed some chest tightness and shortness of breath that also started this morning at 7:00 a.m..  The patient denies dysuria.  She had 4 episodes of diarrhea in the last 24 hours.  The diarrhea started yesterday.  The patient also has weakness and pain in her left arm this started 3 weeks ago.  The patient has a history of arthritis.  She has been slightly lightheaded when she 1st stands up for about a week.  He had some left upper quadrant abdominal pain yesterday that has since resolved.  She has had chills for 2 weeks and a cough for 2 weeks and headache that occurs only with coughing as well as some rhinorrhea but no sore throat.  She has a history of atrial fibrillation.  She has a history of congestive heart failure.  She smokes and has a history of morbid obesity.  She does not report a history of chronic obstructive pulmonary disease but she is on Spiriva inhaler.  She has a history thyroid disease.  She is maintained on Xarelto.  Review of patient's allergies indicates:   Allergen Reactions    Ace inhibitors      Cough       Past Medical History:   Diagnosis Date    Anticoagulant long-term use     Xarelto    Arthritis     Atrial fibrillation     Breast cyst     CHF (congestive heart failure)     Degenerative disc disease     Edema     HLD (hyperlipidemia)     Hx of psychiatric care     Hyperlipidemia     Hypertension      Hypothyroidism     Nuclear sclerosis, bilateral 12/18/2017    Obesity, morbid     HIEU (obstructive sleep apnea)     Other abnormal glucose     pre-diabetes    Pre-diabetes     Psychiatric problem     Requires assistance with activities of daily living (ADL)     Sleep apnea     Smoker     SOB (shortness of breath)     SOB (shortness of breath)     Thyroid disease     on meds 8-9 years ago. hypothyroidism.no malignancy    TMJ (temporomandibular joint disorder)     jaw clicking    Tobacco abuse     Unsteady gait     Urge incontinence     Weakness generalized     Wears glasses      Past Surgical History:   Procedure Laterality Date    BREAST BIOPSY Right     over 10 yrs ago/ benign    BREAST CYST EXCISION Right     BREAST LUMPECTOMY Right     over 10 yrs ago, ex bx/ benign    COLONOSCOPY N/A 6/25/2019    Procedure: COLONOSCOPY;  Surgeon: Micheline Flores MD;  Location: [facility] ENDO;  Service: Endoscopy;  Laterality: N/A;  RX XARELTO ok to hold (2 days) per Dr. Naik see scan 3/20/19    ENDOSCOPIC ULTRASOUND OF UPPER GASTROINTESTINAL TRACT N/A 1/26/2021    Procedure: ULTRASOUND-ENDOSCOPIC-UPPER;  Surgeon: Stevenson Philippe MD;  Location: [facility] ENDO;  Service: Endoscopy;  Laterality: N/A;    HYSTERECTOMY      JOINT REPLACEMENT Bilateral     knees    OOPHORECTOMY      rt.knee surgery 2016      TOTAL KNEE ARTHROPLASTY Left 2/19/2019    Procedure: ARTHROPLASTY, KNEE, TOTAL;  Surgeon: Med Hanson MD;  Location: [facility] OR;  Service: Orthopedics;  Laterality: Left;  10AM START PER  PER JAYLIN TEXT @ 8:34AM ON 2-18-19  SUPINE  HARRIS TANMAY LOYA [phone] TEXTED HIM ON 1-24-19 @ 7:57AM  RN PRE OP 2-13-19--BMI--48.9    underarm gland\ Bilateral      Family History   Problem Relation Age of Onset    Diabetes Mother     Hypertension Mother     Cancer Mother     No Known Problems Father     No Known Problems Sister     Diabetes Brother     Cancer Brother     No Known Problems Maternal Aunt     No Known Problems Maternal Uncle      No Known Problems Paternal Aunt     No Known Problems Paternal Uncle     No Known Problems Maternal Grandmother     No Known Problems Maternal Grandfather     No Known Problems Paternal Grandmother     No Known Problems Paternal Grandfather     No Known Problems Other     Amblyopia Neg Hx     Blindness Neg Hx     Cataracts Neg Hx     Glaucoma Neg Hx     Macular degeneration Neg Hx     Retinal detachment Neg Hx     Strabismus Neg Hx     Stroke Neg Hx     Thyroid disease Neg Hx      Social History     Tobacco Use    Smoking status: Every Day     Packs/day: 0.50     Years: 47.00     Pack years: 23.50     Types: Cigarettes     Start date: 1973    Smokeless tobacco: Current   Substance Use Topics    Alcohol use: No    Drug use: No     Review of Systems  The patient was questioned specifically with regard to the following.  General: Fever, chills, sweats. Neuro: Headache. Eyes: eye problems. ENT: Ear pain, sore throat. Cardiovascular: Chest pain. Respiratory: Cough, shortness of breath. Gastrointestinal: Abdominal pain, vomiting, diarrhea. Genitourinary: Painful urination.  Musculoskeletal: Arm and leg problems. Skin: Rash.  The review of systems was negative except for the following:  Headache cough rhinorrhea chills chest tightness left upper quadrant abdominal pain feeling lightheaded nausea shortness of breath diarrhea weakness and pain in the left arm.  Physical Exam     Initial Vitals [01/26/23 0749]   BP Pulse Resp Temp SpO2   (!) 153/96 104 20 100.1 °F (37.8 °C) (!) 92 %      MAP       --         Physical Exam  The patient was examined specifically for the following:   General:No significant distress, Good color, Warm and dry. Head and neck:Scalp atraumatic, Neck supple. Neurological:Appropriate conversation, Gross motor deficits. Eyes:Conjugate gaze, Clear corneas. ENT: No epistaxis. Cardiac: Regular rate and rhythm, Grossly normal heart tones. Pulmonary: Wheezing, Rales. Gastrointestinal: Abdominal  tenderness, Abdominal distention. Musculoskeletal: Extremity deformity, Apparent pain with range of motion of the joints. Skin: Rash.   The findings on examination were normal except for the following:  The patient has wheezing in the bases bilaterally.  The patient has an irregularly irregular rhythm.  The patient has an elevated BMI.  The neck is supple.  Mental status examination, cranial nerves, motor and sensory examination are normal.  Patient is afebrile.  The abdomen is completely nontender.  The patient's blood pressure is 153/96.   ED Course   Procedures  Labs Reviewed   COMPREHENSIVE METABOLIC PANEL - Abnormal; Notable for the following components:       Result Value    CO2 32 (*)     Albumin 2.7 (*)     Total Bilirubin 1.4 (*)     eGFR 54 (*)     All other components within normal limits   CBC W/ AUTO DIFFERENTIAL - Abnormal; Notable for the following components:    MCH 31.3 (*)     Immature Granulocytes 0.8 (*)     Gran # (ANC) 9.5 (*)     Immature Grans (Abs) 0.09 (*)     Gran % 78.8 (*)     Lymph % 10.6 (*)     All other components within normal limits   B-TYPE NATRIURETIC PEPTIDE - Abnormal; Notable for the following components:     (*)     All other components within normal limits   PROTIME-INR - Abnormal; Notable for the following components:    Prothrombin Time 14.9 (*)     INR 1.4 (*)     All other components within normal limits   URINALYSIS, REFLEX TO URINE CULTURE - Abnormal; Notable for the following components:    Appearance, UA Hazy (*)     Protein, UA 1+ (*)     Occult Blood UA 1+ (*)     Nitrite, UA Positive (*)     Urobilinogen, UA 4.0-6.0 (*)     Leukocytes, UA 3+ (*)     All other components within normal limits    Narrative:     Specimen Source->Urine   URINALYSIS MICROSCOPIC - Abnormal; Notable for the following components:    RBC, UA 11 (*)     WBC, UA >100 (*)     WBC Clumps, UA Few (*)     Bacteria Many (*)     All other components within normal limits    Narrative:      Specimen Source->Urine   CULTURE, URINE   TROPONIN I   TSH   MAGNESIUM   LIPASE   SARS-COV-2 RDRP GENE   POCT INFLUENZA A/B MOLECULAR     EKG Readings: (Independently Interpreted)   The patient is in atrial flutter with a ventricular rate of 108.  There is poor R-wave progression across precordium.  There are nonspecific ST segment and T-wave changes.  There is no definite evidence of acute myocardial infarction or malignant arrhythmia.  This is doctor Devlin dictating I independently interpreted this EKG.     Imaging Results              X-Ray Chest AP Portable (Final result)  Result time 01/26/23 08:53:49      Final result by Steven Chauhan III, MD (01/26/23 08:53:49)                   Impression:    Impression: Pulmonary edema pneumonia aspiration or sepsis.      Electronically signed by: Steven Chauhan MD  Date:    01/26/2023  Time:    08:53               Narrative:    EXAMINATION:  XR CHEST AP PORTABLE    CLINICAL HISTORY:  chest pain;    FINDINGS:  Chest one view AP portable.    There is borderline cardiomegaly aortic plaque and DJD.  Lungs show mild edema.                                    Medical decision making: Given the above radiology interprets the chest x-ray as representing pulmonary edema, aspiration, pneumonia possibly sepsis.  The study looks fairly unimpressive to me.  The patient has symptoms consistent with a viral URI.  There is no purulent sputum production.  She does have rhinorrhea.  Oxygen saturations are low at 91%.  There is no abdominal tenderness.  There is no lower extremity tenderness or edema.  I doubt pulmonary edema.  BNP is low at 132.  Atypical pneumonia remains possible.  COVID and influenza testing are negative.  The patient is doing very little coughing in the emergency room.  She does smoke cigarettes.  She is maintained on Spiriva.  This could be a mild exacerbation of COPD.  The patient has a negative troponin despite having symptoms since 3:00 a.m. in the morning.  I  doubt myocardial infarction.  There are no ST segment changes on EKG.  The patient has risk factors for coronary artery disease but she had a negative stress test in February of 2019.  Her pain is low, almost epigastric.  I wonder about gastritis and reflux esophagitis.  I reviewed her presentation with Dr. Grissom who felt she could be followed in the office.  The patient's chest discomfort may be related to an upper respiratory infection.  Given the chest x-ray typical pneumonias considered.  The patient's oxygen saturations are 93% at discharge.  The patient is not short of breath.  She is comfortable going home.  She has no dysuria but she has what appears to be very infected urine.  I am going to treat this with Keflex.  The patient was given 2 g of Rocephin in the emergency room.  I will also use doxycycline.  I will also treat with pantoprazole and Mylanta.  I will ask her to curtail her cigarette smoking while she is sick.  I will start her on bronchodilators and a short course of steroids.  Her blood sugar appears to be well controlled.  Patient's white blood count is normal against bacterial infection.  The patient's hemoglobin hematocrit are normal suggesting that anemia is not a likely a cause of shortness of breath.  The patient has some mild hyperbilirubinemia but normal AST ALT.  I doubt cholecystitis the abdomen is soft.  Lipase is normal I doubt pancreatitis.  BNP is almost normal I doubt congestive heart failure troponin is negative against myocardial infarction.  I do not feel a 2nd troponin will be useful in this patient who has had chest pain for many hours.  The TSH is normal.  I doubt hypothyroidism as a cause of the patient's weakness and presentation.  The patient's urine is very infected, I will treat her despite absence of symptoms.  I will use Keflex.       Medications   pantoprazole injection 80 mg (80 mg Intravenous Given 1/26/23 0851)   aluminum-magnesium hydroxide-simethicone  200-200-20 mg/5 mL suspension 60 mL (60 mLs Oral Given 1/26/23 0844)   ondansetron 4 mg/5 mL solution 4 mg (4 mg Oral Given 1/26/23 0904)   cefTRIAXone 2 gram/50 mL IVPB 2 g (0 g Intravenous Stopped 1/26/23 1105)                              Clinical Impression:   Final diagnoses:  [R07.89] Chest tightness  [R06.02] Shortness of breath (Primary)  [J98.01] Bronchospasm  [N30.90] Cystitis  [J18.9] Pneumonitis  [J44.1] Acute exacerbation of chronic obstructive pulmonary disease (COPD)        ED Disposition Condition    Discharge Stable          ED Prescriptions       Medication Sig Dispense Start Date End Date Auth. Provider    doxycycline (VIBRAMYCIN) 100 MG Cap Take 1 capsule (100 mg total) by mouth 2 (two) times daily. for 10 days 20 capsule 1/26/2023 2/5/2023 Stevenson Devlin MD    cephALEXin (KEFLEX) 500 MG capsule Take 2 capsules (1,000 mg total) by mouth every 12 (twelve) hours. for 7 days 28 capsule 1/26/2023 2/2/2023 Stevenson Devlin MD    pantoprazole (PROTONIX) 40 MG tablet Please take 1 tablet by mouth twice a day for 3 days, then 1 tablet daily. 30 tablet 1/26/2023 -- Stevenson Devlin MD    predniSONE (DELTASONE) 20 MG tablet Take 2 tablets (40 mg total) by mouth once daily. for 5 days 10 tablet 1/26/2023 1/31/2023 Stevenson Devlin MD    albuterol (PROVENTIL/VENTOLIN HFA) 90 mcg/actuation inhaler Inhale 1-2 puffs into the lungs every 6 (six) hours as needed for Wheezing. 8 g 1/26/2023 2/25/2023 Stevenson Devlin MD          Follow-up Information       Follow up With Specialties Details Why Contact Info    Kuldeep Miller MD Family Medicine Schedule an appointment as soon as possible for a visit in 3 days  [address] [place] [address] [phone]      Cristino Grissom MD Cardiology In 3 days Call today for an appointment [address]  [address]  [facility] [address] [phone]               Stevenson Devlin MD  01/26/23 0189

## 2023-01-26 NOTE — DISCHARGE INSTRUCTIONS
Medicines as directed.  Please return immediately if you get worse or if new problems develop.  Please follow-up with your cardiologist this week, please follow-up with your primary care doctor as well.  Please avoid caffeine carbonation alcohol cigarette citrus tomato Naprosyn aspirin ibuprofen.  Please try to avoid cigarette smoking while you are ill.  Please use Mylanta 30 mL by mouth every 4 hours as needed for chest tightness and epigastric abdominal pain.

## 2023-01-29 LAB — BACTERIA UR CULT: ABNORMAL

## 2023-01-31 ENCOUNTER — OFFICE VISIT (OUTPATIENT)
Dept: UROLOGY | Facility: CLINIC | Age: 68
End: 2023-01-31
Payer: COMMERCIAL

## 2023-01-31 VITALS — WEIGHT: 276.13 LBS | BODY MASS INDEX: 48.93 KG/M2 | HEIGHT: 63 IN

## 2023-01-31 DIAGNOSIS — R30.0 DYSURIA: ICD-10-CM

## 2023-01-31 DIAGNOSIS — N30.20 CHRONIC CYSTITIS: Primary | ICD-10-CM

## 2023-01-31 LAB
BILIRUB SERPL-MCNC: NEGATIVE MG/DL
BLOOD URINE, POC: NEGATIVE
COLOR, POC UA: YELLOW
GLUCOSE UR QL STRIP: NORMAL
KETONES UR QL STRIP: NEGATIVE
LEUKOCYTE ESTERASE URINE, POC: NEGATIVE
NITRITE, POC UA: NEGATIVE
PH, POC UA: 5
PROTEIN, POC: 30
SPECIFIC GRAVITY, POC UA: 1020
UROBILINOGEN, POC UA: NORMAL

## 2023-01-31 PROCEDURE — 99203 OFFICE O/P NEW LOW 30 MIN: CPT | Mod: S$GLB,,, | Performed by: UROLOGY

## 2023-01-31 PROCEDURE — 99999 PR PBB SHADOW E&M-EST. PATIENT-LVL V: CPT | Mod: PBBFAC,,, | Performed by: UROLOGY

## 2023-01-31 PROCEDURE — 1159F MED LIST DOCD IN RCRD: CPT | Mod: CPTII,S$GLB,, | Performed by: UROLOGY

## 2023-01-31 PROCEDURE — 3008F PR BODY MASS INDEX (BMI) DOCUMENTED: ICD-10-PCS | Mod: CPTII,S$GLB,, | Performed by: UROLOGY

## 2023-01-31 PROCEDURE — 1101F PR PT FALLS ASSESS DOC 0-1 FALLS W/OUT INJ PAST YR: ICD-10-PCS | Mod: CPTII,S$GLB,, | Performed by: UROLOGY

## 2023-01-31 PROCEDURE — 99203 PR OFFICE/OUTPT VISIT, NEW, LEVL III, 30-44 MIN: ICD-10-PCS | Mod: S$GLB,,, | Performed by: UROLOGY

## 2023-01-31 PROCEDURE — 1126F AMNT PAIN NOTED NONE PRSNT: CPT | Mod: CPTII,S$GLB,, | Performed by: UROLOGY

## 2023-01-31 PROCEDURE — 4010F PR ACE/ARB THEARPY RXD/TAKEN: ICD-10-PCS | Mod: CPTII,S$GLB,, | Performed by: UROLOGY

## 2023-01-31 PROCEDURE — 1126F PR PAIN SEVERITY QUANTIFIED, NO PAIN PRESENT: ICD-10-PCS | Mod: CPTII,S$GLB,, | Performed by: UROLOGY

## 2023-01-31 PROCEDURE — 3072F LOW RISK FOR RETINOPATHY: CPT | Mod: CPTII,S$GLB,, | Performed by: UROLOGY

## 2023-01-31 PROCEDURE — 99999 PR PBB SHADOW E&M-EST. PATIENT-LVL V: ICD-10-PCS | Mod: PBBFAC,,, | Performed by: UROLOGY

## 2023-01-31 PROCEDURE — 3072F PR LOW RISK FOR RETINOPATHY: ICD-10-PCS | Mod: CPTII,S$GLB,, | Performed by: UROLOGY

## 2023-01-31 PROCEDURE — 81001 URINALYSIS AUTO W/SCOPE: CPT | Mod: S$GLB,,, | Performed by: UROLOGY

## 2023-01-31 PROCEDURE — 1159F PR MEDICATION LIST DOCUMENTED IN MEDICAL RECORD: ICD-10-PCS | Mod: CPTII,S$GLB,, | Performed by: UROLOGY

## 2023-01-31 PROCEDURE — 1160F PR REVIEW ALL MEDS BY PRESCRIBER/CLIN PHARMACIST DOCUMENTED: ICD-10-PCS | Mod: CPTII,S$GLB,, | Performed by: UROLOGY

## 2023-01-31 PROCEDURE — 3288F FALL RISK ASSESSMENT DOCD: CPT | Mod: CPTII,S$GLB,, | Performed by: UROLOGY

## 2023-01-31 PROCEDURE — 1160F RVW MEDS BY RX/DR IN RCRD: CPT | Mod: CPTII,S$GLB,, | Performed by: UROLOGY

## 2023-01-31 PROCEDURE — 81001 POCT URINALYSIS, DIPSTICK OR TABLET REAGENT, AUTOMATED, WITH MICROSCOP: ICD-10-PCS | Mod: S$GLB,,, | Performed by: UROLOGY

## 2023-01-31 PROCEDURE — 3008F BODY MASS INDEX DOCD: CPT | Mod: CPTII,S$GLB,, | Performed by: UROLOGY

## 2023-01-31 PROCEDURE — 3288F PR FALLS RISK ASSESSMENT DOCUMENTED: ICD-10-PCS | Mod: CPTII,S$GLB,, | Performed by: UROLOGY

## 2023-01-31 PROCEDURE — 4010F ACE/ARB THERAPY RXD/TAKEN: CPT | Mod: CPTII,S$GLB,, | Performed by: UROLOGY

## 2023-01-31 PROCEDURE — 1101F PT FALLS ASSESS-DOCD LE1/YR: CPT | Mod: CPTII,S$GLB,, | Performed by: UROLOGY

## 2023-01-31 NOTE — PROGRESS NOTES
Subjective:       Patient ID: Sandee Evans is a 67 y.o. female who was referred by Darlene Nguyen MD    Chief Complaint:   Chief Complaint   Patient presents with    Urinary Tract Infection       Recurrent UTI  She has had several UTIs in the past year.  At least three.  She describes pain with voiding and pressure.  She denies hematuria.  She denies kidney stones or  procedures.  She denies history of blood clots or breast cancer.      ACTIVE MEDICAL ISSUES:  Patient Active Problem List   Diagnosis    Urge incontinence    DDD (degenerative disc disease), lumbar    DJD (degenerative joint disease) of knee    Hyperlipidemia    Depressed    Insomnia    Vitamin D deficiency disease    Lumbar radicular pain    Essential hypertension    Severe obesity (BMI >= 40)    Colon polyps    Diverticulosis    Chronic pain    Pre-diabetes    Longstanding persistent atrial fibrillation    History of pancreatitis    Osteoarthritis of right knee    Depression    Type 2 diabetes mellitus with diabetic cataract, without long-term current use of insulin    Dry eye syndrome, bilateral    Nuclear sclerosis, bilateral    Refractive error    Hidradenitis suppurativa of right axilla    PVD (peripheral vascular disease)    Screen for colon cancer    Chronic bilateral low back pain without sciatica    ESQUIVEL (dyspnea on exertion)    Tobacco dependence    HIEU treated with BiPAP    Pulmonary hypertension    Tobacco abuse    Hypothyroidism    Current mild episode of major depressive disorder without prior episode    COPD with chronic bronchitis    Atelectasis    Chronic kidney disease, stage 3a    Solitary pulmonary nodule       PAST MEDICAL HISTORY  Past Medical History:   Diagnosis Date    Anticoagulant long-term use     Xarelto    Arthritis     Atrial fibrillation     Breast cyst     CHF (congestive heart failure)     Degenerative disc disease     Edema     HLD (hyperlipidemia)     Hx of psychiatric care     Hyperlipidemia      Hypertension     Hypothyroidism     Nuclear sclerosis, bilateral 12/18/2017    Obesity, morbid     HIEU (obstructive sleep apnea)     Other abnormal glucose     pre-diabetes    Pre-diabetes     Psychiatric problem     Requires assistance with activities of daily living (ADL)     Sleep apnea     Smoker     SOB (shortness of breath)     SOB (shortness of breath)     Thyroid disease     on meds 8-9 years ago. hypothyroidism.no malignancy    TMJ (temporomandibular joint disorder)     jaw clicking    Tobacco abuse     Unsteady gait     Urge incontinence     Weakness generalized     Wears glasses        PAST SURGICAL HISTORY:  Past Surgical History:   Procedure Laterality Date    BREAST BIOPSY Right     over 10 yrs ago/ benign    BREAST CYST EXCISION Right     BREAST LUMPECTOMY Right     over 10 yrs ago, ex bx/ benign    COLONOSCOPY N/A 6/25/2019    Procedure: COLONOSCOPY;  Surgeon: Micheline Flores MD;  Location: [facility] ENDO;  Service: Endoscopy;  Laterality: N/A;  RX XARELTO ok to hold (2 days) per Dr. Naik see scan 3/20/19    ENDOSCOPIC ULTRASOUND OF UPPER GASTROINTESTINAL TRACT N/A 1/26/2021    Procedure: ULTRASOUND-ENDOSCOPIC-UPPER;  Surgeon: Stevenson Philippe MD;  Location: [facility] ENDO;  Service: Endoscopy;  Laterality: N/A;    HYSTERECTOMY      JOINT REPLACEMENT Bilateral     knees    OOPHORECTOMY      rt.knee surgery 2016      TOTAL KNEE ARTHROPLASTY Left 2/19/2019    Procedure: ARTHROPLASTY, KNEE, TOTAL;  Surgeon: Med Hanson MD;  Location: [facility] OR;  Service: Orthopedics;  Laterality: Left;  10AM START PER  PER JAYLIN TEXT @ 8:34AM ON 2-18-19  SUPINE  MAIKELPAWAN LOYA [phone] TEXTED HIM ON 1-24-19 @ 7:57AM  RN PRE OP 2-13-19--BMI--48.9    underarm gland\ Bilateral        SOCIAL HISTORY:  Social History     Tobacco Use    Smoking status: Every Day     Packs/day: 0.50     Years: 47.00     Pack years: 23.50     Types: Cigarettes     Start date: 1973    Smokeless tobacco: Current   Substance Use Topics     Alcohol use: No    Drug use: No       FAMILY HISTORY:  Family History   Problem Relation Age of Onset    Diabetes Mother     Hypertension Mother     Cancer Mother     No Known Problems Father     No Known Problems Sister     Diabetes Brother     Cancer Brother     No Known Problems Maternal Aunt     No Known Problems Maternal Uncle     No Known Problems Paternal Aunt     No Known Problems Paternal Uncle     No Known Problems Maternal Grandmother     No Known Problems Maternal Grandfather     No Known Problems Paternal Grandmother     No Known Problems Paternal Grandfather     No Known Problems Other     Amblyopia Neg Hx     Blindness Neg Hx     Cataracts Neg Hx     Glaucoma Neg Hx     Macular degeneration Neg Hx     Retinal detachment Neg Hx     Strabismus Neg Hx     Stroke Neg Hx     Thyroid disease Neg Hx        ALLERGIES AND MEDICATIONS: updated and reviewed.  Review of patient's allergies indicates:   Allergen Reactions    Ace inhibitors      Cough       Current Outpatient Medications   Medication Sig    albuterol (PROVENTIL/VENTOLIN HFA) 90 mcg/actuation inhaler Inhale 2 puffs into the lungs every 6 (six) hours as needed for Wheezing or Shortness of Breath. Rescue    albuterol (PROVENTIL/VENTOLIN HFA) 90 mcg/actuation inhaler Inhale 1-2 puffs into the lungs every 6 (six) hours as needed for Wheezing.    atorvastatin (LIPITOR) 40 MG tablet Take 1 tablet (40 mg total) by mouth once daily.    azelastine (ASTELIN) 137 mcg (0.1 %) nasal spray 1 spray (137 mcg total) by Nasal route 2 (two) times daily.    buPROPion (WELLBUTRIN XL) 150 MG TB24 tablet Take 1 tablet (150 mg total) by mouth once daily.    cephALEXin (KEFLEX) 500 MG capsule Take 2 capsules (1,000 mg total) by mouth every 12 (twelve) hours. for 7 days    cetirizine (ZYRTEC) 10 MG tablet Take 1 tablet (10 mg total) by mouth once daily.    doxycycline (VIBRAMYCIN) 100 MG Cap Take 1 capsule (100 mg total) by mouth 2 (two) times daily. for 10 days     fluticasone propionate (FLONASE) 50 mcg/actuation nasal spray SHAKE LIQUID AND USE 2 SPRAYS(100 MCG) IN EACH NOSTRIL EVERY DAY    gabapentin (NEURONTIN) 100 MG capsule Take 1 capsule (100 mg total) by mouth nightly as needed.    ketoconazole (NIZORAL) 2 % cream Apply topically once daily.    losartan (COZAAR) 25 MG tablet Take 1 tablet (25 mg total) by mouth once daily.    metoprolol succinate (TOPROL-XL) 100 MG 24 hr tablet Take 1 tablet (100 mg total) by mouth once daily.    nicotine, polacrilex, (NICORETTE) 2 mg Gum Use 1-2 per hour in place of a cigarette. Limit to 10 a day - oral. . (Patient taking differently: Use 1-2 per hour in place of a cigarette. Limit to 10 a day - oral. .)    pantoprazole (PROTONIX) 40 MG tablet Please take 1 tablet by mouth twice a day for 3 days, then 1 tablet daily.    predniSONE (DELTASONE) 20 MG tablet Take 2 tablets (40 mg total) by mouth once daily. for 5 days    sertraline (ZOLOFT) 100 MG tablet Take 1 tablet (100 mg total) by mouth once daily.    SPIRIVA RESPIMAT 2.5 mcg/actuation inhaler INHALE 2 PUFFS BY MOUTH INTO THE LUNGS DAILY( CONTROLLER)    traZODone (DESYREL) 50 MG tablet TAKE 1 TO 2 TABLETS BY MOUTH EVERY NIGHT AS NEEDED FOR SLEEP    triamcinolone acetonide 0.1% (KENALOG) 0.1 % ointment APPLY BETWEEN THE KNEES AND UPPER THIGHS TWICE DAILY FOR NO MORE THAN 7 TO 14 DAYS    XARELTO 20 mg Tab Take 1 tablet (20 mg total) by mouth once daily.    furosemide (LASIX) 40 MG tablet Take 1 tablet (40 mg total) by mouth once daily.    ondansetron (ZOFRAN-ODT) 8 MG TbDL Take 1 tablet (8 mg total) by mouth every 6 (six) hours as needed (nausea).     No current facility-administered medications for this visit.       Review of Systems   Constitutional:  Negative for chills, fatigue and fever.   Respiratory:  Negative for chest tightness and shortness of breath.    Cardiovascular:  Negative for chest pain.   Gastrointestinal:  Negative for abdominal distention, constipation, nausea and  "vomiting.   Genitourinary:  Positive for dysuria. Negative for difficulty urinating, flank pain, frequency, hematuria and urgency.   Musculoskeletal:  Negative for arthralgias.   Neurological:  Negative for light-headedness.   Psychiatric/Behavioral:  Negative for confusion.      Objective:      Vitals:    01/31/23 1154   Weight: 125.3 kg (276 lb 2 oz)   Height: 5' 3" (1.6 m)     Physical Exam  Vitals and nursing note reviewed.   Constitutional:       Appearance: She is well-developed.   HENT:      Head: Normocephalic.   Eyes:      Conjunctiva/sclera: Conjunctivae normal.   Neck:      Thyroid: No thyromegaly.      Trachea: No tracheal deviation.   Cardiovascular:      Rate and Rhythm: Normal rate.      Pulses: Normal pulses.      Heart sounds: Normal heart sounds.   Pulmonary:      Effort: Pulmonary effort is normal. No respiratory distress.      Breath sounds: Normal breath sounds. No wheezing.   Abdominal:      General: There is no distension.      Palpations: Abdomen is soft. There is no mass.      Tenderness: There is no abdominal tenderness. There is no guarding or rebound.      Hernia: No hernia is present.   Musculoskeletal:         General: No tenderness. Normal range of motion.      Cervical back: Normal range of motion.   Lymphadenopathy:      Cervical: No cervical adenopathy.   Skin:     General: Skin is warm and dry.      Findings: No erythema or rash.   Neurological:      Mental Status: She is alert and oriented to person, place, and time.   Psychiatric:         Behavior: Behavior normal.         Thought Content: Thought content normal.         Judgment: Judgment normal.       Urine dipstick shows negative for all components and positive for RBC's.  Micro exam: negative for WBC's or RBC's.    Component 5 d ago    Urine Culture, Routine  Abnormal   ESCHERICHIA COLI   >100,000 cfu/ml     Resulting Agency [facility]        Susceptibility     Escherichia coli     CULTURE, URINE     Amox/K Clav'ate <=8/4 mcg/mL " Sensitive     Amp/Sulbactam <=8/4 mcg/mL Sensitive     Ampicillin <=8 mcg/mL Sensitive     Cefazolin <=2 mcg/mL Sensitive     Cefepime <=2 mcg/mL Sensitive     Ceftriaxone <=1 mcg/mL Sensitive     Ciprofloxacin <=1 mcg/mL Sensitive     Ertapenem <=0.5 mcg/mL Sensitive     Gentamicin <=4 mcg/mL Sensitive     Levofloxacin <=2 mcg/mL Sensitive     Meropenem <=1 mcg/mL Sensitive     Minocycline <=4 mcg/mL Sensitive     Nitrofurantoin <=32 mcg/mL Sensitive     Piperacillin/Tazo <=16 mcg/mL Sensitive     Tobramycin <=4 mcg/mL Sensitive     Trimeth/Sulfa <=2/38 mcg/mL Sensitive               Linear View      Narrative  Performed by: [facility]  Specimen Source->Urine      Specimen Collected: 01/26/23 09:04             Assessment:       1. Chronic cystitis    2. Dysuria          Plan:       1. Dysuria    - Ambulatory referral/consult to Urology  - POCT urinalysis, dipstick or tablet reag    2. Chronic cystitis  Consider estrace cream    - CT Renal Stone Study ABD Pelvis WO; Future  - Cystoscopy; Future            Follow up in about 6 weeks (around 3/14/2023) for Review X-ray, cystoscopy.

## 2023-02-10 ENCOUNTER — OFFICE VISIT (OUTPATIENT)
Dept: FAMILY MEDICINE | Facility: CLINIC | Age: 68
End: 2023-02-10
Payer: COMMERCIAL

## 2023-02-10 VITALS
RESPIRATION RATE: 18 BRPM | OXYGEN SATURATION: 95 % | SYSTOLIC BLOOD PRESSURE: 138 MMHG | WEIGHT: 265.88 LBS | DIASTOLIC BLOOD PRESSURE: 64 MMHG | BODY MASS INDEX: 47.11 KG/M2 | HEIGHT: 63 IN | HEART RATE: 83 BPM | TEMPERATURE: 98 F

## 2023-02-10 DIAGNOSIS — N30.20 CHRONIC CYSTITIS: Primary | ICD-10-CM

## 2023-02-10 DIAGNOSIS — J44.89 COPD WITH CHRONIC BRONCHITIS: ICD-10-CM

## 2023-02-10 DIAGNOSIS — F32.0 CURRENT MILD EPISODE OF MAJOR DEPRESSIVE DISORDER WITHOUT PRIOR EPISODE: ICD-10-CM

## 2023-02-10 DIAGNOSIS — Z23 NEED FOR VACCINATION WITH 13-POLYVALENT PNEUMOCOCCAL CONJUGATE VACCINE: ICD-10-CM

## 2023-02-10 PROCEDURE — 3008F PR BODY MASS INDEX (BMI) DOCUMENTED: ICD-10-PCS | Mod: CPTII,S$GLB,, | Performed by: FAMILY MEDICINE

## 2023-02-10 PROCEDURE — 90471 IMMUNIZATION ADMIN: CPT | Mod: S$GLB,,, | Performed by: FAMILY MEDICINE

## 2023-02-10 PROCEDURE — 90670 PCV13 VACCINE IM: CPT | Mod: S$GLB,,, | Performed by: FAMILY MEDICINE

## 2023-02-10 PROCEDURE — 1126F AMNT PAIN NOTED NONE PRSNT: CPT | Mod: CPTII,S$GLB,, | Performed by: FAMILY MEDICINE

## 2023-02-10 PROCEDURE — 90471 PNEUMOCOCCAL CONJUGATE VACCINE 13-VALENT LESS THAN 5YO & GREATER THAN: ICD-10-PCS | Mod: S$GLB,,, | Performed by: FAMILY MEDICINE

## 2023-02-10 PROCEDURE — 99214 OFFICE O/P EST MOD 30 MIN: CPT | Mod: 25,S$GLB,, | Performed by: FAMILY MEDICINE

## 2023-02-10 PROCEDURE — 3075F PR MOST RECENT SYSTOLIC BLOOD PRESS GE 130-139MM HG: ICD-10-PCS | Mod: CPTII,S$GLB,, | Performed by: FAMILY MEDICINE

## 2023-02-10 PROCEDURE — 3288F FALL RISK ASSESSMENT DOCD: CPT | Mod: CPTII,S$GLB,, | Performed by: FAMILY MEDICINE

## 2023-02-10 PROCEDURE — 3072F PR LOW RISK FOR RETINOPATHY: ICD-10-PCS | Mod: CPTII,S$GLB,, | Performed by: FAMILY MEDICINE

## 2023-02-10 PROCEDURE — 3075F SYST BP GE 130 - 139MM HG: CPT | Mod: CPTII,S$GLB,, | Performed by: FAMILY MEDICINE

## 2023-02-10 PROCEDURE — 3078F DIAST BP <80 MM HG: CPT | Mod: CPTII,S$GLB,, | Performed by: FAMILY MEDICINE

## 2023-02-10 PROCEDURE — 4010F ACE/ARB THERAPY RXD/TAKEN: CPT | Mod: CPTII,S$GLB,, | Performed by: FAMILY MEDICINE

## 2023-02-10 PROCEDURE — 1100F PR PT FALLS ASSESS DOC 2+ FALLS/FALL W/INJURY/YR: ICD-10-PCS | Mod: CPTII,S$GLB,, | Performed by: FAMILY MEDICINE

## 2023-02-10 PROCEDURE — 3288F PR FALLS RISK ASSESSMENT DOCUMENTED: ICD-10-PCS | Mod: CPTII,S$GLB,, | Performed by: FAMILY MEDICINE

## 2023-02-10 PROCEDURE — 4010F PR ACE/ARB THEARPY RXD/TAKEN: ICD-10-PCS | Mod: CPTII,S$GLB,, | Performed by: FAMILY MEDICINE

## 2023-02-10 PROCEDURE — 99999 PR PBB SHADOW E&M-EST. PATIENT-LVL V: CPT | Mod: PBBFAC,,, | Performed by: FAMILY MEDICINE

## 2023-02-10 PROCEDURE — 90670 PNEUMOCOCCAL CONJUGATE VACCINE 13-VALENT LESS THAN 5YO & GREATER THAN: ICD-10-PCS | Mod: S$GLB,,, | Performed by: FAMILY MEDICINE

## 2023-02-10 PROCEDURE — 99214 PR OFFICE/OUTPT VISIT, EST, LEVL IV, 30-39 MIN: ICD-10-PCS | Mod: 25,S$GLB,, | Performed by: FAMILY MEDICINE

## 2023-02-10 PROCEDURE — 1100F PTFALLS ASSESS-DOCD GE2>/YR: CPT | Mod: CPTII,S$GLB,, | Performed by: FAMILY MEDICINE

## 2023-02-10 PROCEDURE — 3008F BODY MASS INDEX DOCD: CPT | Mod: CPTII,S$GLB,, | Performed by: FAMILY MEDICINE

## 2023-02-10 PROCEDURE — 3072F LOW RISK FOR RETINOPATHY: CPT | Mod: CPTII,S$GLB,, | Performed by: FAMILY MEDICINE

## 2023-02-10 PROCEDURE — 3078F PR MOST RECENT DIASTOLIC BLOOD PRESSURE < 80 MM HG: ICD-10-PCS | Mod: CPTII,S$GLB,, | Performed by: FAMILY MEDICINE

## 2023-02-10 PROCEDURE — 1126F PR PAIN SEVERITY QUANTIFIED, NO PAIN PRESENT: ICD-10-PCS | Mod: CPTII,S$GLB,, | Performed by: FAMILY MEDICINE

## 2023-02-10 PROCEDURE — 99999 PR PBB SHADOW E&M-EST. PATIENT-LVL V: ICD-10-PCS | Mod: PBBFAC,,, | Performed by: FAMILY MEDICINE

## 2023-02-10 RX ORDER — BUPROPION HYDROCHLORIDE 150 MG/1
150 TABLET ORAL DAILY
Qty: 90 TABLET | Refills: 1 | Status: SHIPPED | OUTPATIENT
Start: 2023-02-10 | End: 2023-05-07 | Stop reason: SDUPTHER

## 2023-02-10 NOTE — PROGRESS NOTES
Health Maintenance Due   Topic     Foot Exam  Consult pcp    Diabetes Urine Screening  Consult pcp    COVID-19 Vaccine (5 - Booster for Pfizer series) Not offered at this office    Pneumococcal Vaccines (Age 65+) (3 - PPSV23 if available, else PCV20) Pt agree to get today

## 2023-02-10 NOTE — PROGRESS NOTES
Administered Pneumococcal 13 vaccine IM to left deltoid.  Patient tolerated injection well, no adverse reactions noted.

## 2023-03-07 ENCOUNTER — OFFICE VISIT (OUTPATIENT)
Dept: CARDIOLOGY | Facility: CLINIC | Age: 68
End: 2023-03-07
Payer: COMMERCIAL

## 2023-03-07 VITALS
HEIGHT: 63 IN | BODY MASS INDEX: 47.72 KG/M2 | HEART RATE: 78 BPM | DIASTOLIC BLOOD PRESSURE: 78 MMHG | OXYGEN SATURATION: 96 % | SYSTOLIC BLOOD PRESSURE: 136 MMHG | RESPIRATION RATE: 15 BRPM | WEIGHT: 269.31 LBS

## 2023-03-07 DIAGNOSIS — I48.11 LONGSTANDING PERSISTENT ATRIAL FIBRILLATION: ICD-10-CM

## 2023-03-07 DIAGNOSIS — I73.9 PVD (PERIPHERAL VASCULAR DISEASE): ICD-10-CM

## 2023-03-07 DIAGNOSIS — E78.2 MIXED HYPERLIPIDEMIA: ICD-10-CM

## 2023-03-07 DIAGNOSIS — I10 ESSENTIAL HYPERTENSION: Primary | ICD-10-CM

## 2023-03-07 DIAGNOSIS — R06.09 DOE (DYSPNEA ON EXERTION): ICD-10-CM

## 2023-03-07 PROCEDURE — 3008F BODY MASS INDEX DOCD: CPT | Mod: CPTII,S$GLB,, | Performed by: INTERNAL MEDICINE

## 2023-03-07 PROCEDURE — 3078F PR MOST RECENT DIASTOLIC BLOOD PRESSURE < 80 MM HG: ICD-10-PCS | Mod: CPTII,S$GLB,, | Performed by: INTERNAL MEDICINE

## 2023-03-07 PROCEDURE — 99999 PR PBB SHADOW E&M-EST. PATIENT-LVL IV: CPT | Mod: PBBFAC,,, | Performed by: INTERNAL MEDICINE

## 2023-03-07 PROCEDURE — 3078F DIAST BP <80 MM HG: CPT | Mod: CPTII,S$GLB,, | Performed by: INTERNAL MEDICINE

## 2023-03-07 PROCEDURE — 1126F PR PAIN SEVERITY QUANTIFIED, NO PAIN PRESENT: ICD-10-PCS | Mod: CPTII,S$GLB,, | Performed by: INTERNAL MEDICINE

## 2023-03-07 PROCEDURE — 4010F ACE/ARB THERAPY RXD/TAKEN: CPT | Mod: CPTII,S$GLB,, | Performed by: INTERNAL MEDICINE

## 2023-03-07 PROCEDURE — 1159F PR MEDICATION LIST DOCUMENTED IN MEDICAL RECORD: ICD-10-PCS | Mod: CPTII,S$GLB,, | Performed by: INTERNAL MEDICINE

## 2023-03-07 PROCEDURE — 3288F PR FALLS RISK ASSESSMENT DOCUMENTED: ICD-10-PCS | Mod: CPTII,S$GLB,, | Performed by: INTERNAL MEDICINE

## 2023-03-07 PROCEDURE — 3075F PR MOST RECENT SYSTOLIC BLOOD PRESS GE 130-139MM HG: ICD-10-PCS | Mod: CPTII,S$GLB,, | Performed by: INTERNAL MEDICINE

## 2023-03-07 PROCEDURE — 99214 OFFICE O/P EST MOD 30 MIN: CPT | Mod: S$GLB,,, | Performed by: INTERNAL MEDICINE

## 2023-03-07 PROCEDURE — 1101F PT FALLS ASSESS-DOCD LE1/YR: CPT | Mod: CPTII,S$GLB,, | Performed by: INTERNAL MEDICINE

## 2023-03-07 PROCEDURE — 3008F PR BODY MASS INDEX (BMI) DOCUMENTED: ICD-10-PCS | Mod: CPTII,S$GLB,, | Performed by: INTERNAL MEDICINE

## 2023-03-07 PROCEDURE — 1126F AMNT PAIN NOTED NONE PRSNT: CPT | Mod: CPTII,S$GLB,, | Performed by: INTERNAL MEDICINE

## 2023-03-07 PROCEDURE — 3072F LOW RISK FOR RETINOPATHY: CPT | Mod: CPTII,S$GLB,, | Performed by: INTERNAL MEDICINE

## 2023-03-07 PROCEDURE — 99214 PR OFFICE/OUTPT VISIT, EST, LEVL IV, 30-39 MIN: ICD-10-PCS | Mod: S$GLB,,, | Performed by: INTERNAL MEDICINE

## 2023-03-07 PROCEDURE — 4010F PR ACE/ARB THEARPY RXD/TAKEN: ICD-10-PCS | Mod: CPTII,S$GLB,, | Performed by: INTERNAL MEDICINE

## 2023-03-07 PROCEDURE — 3288F FALL RISK ASSESSMENT DOCD: CPT | Mod: CPTII,S$GLB,, | Performed by: INTERNAL MEDICINE

## 2023-03-07 PROCEDURE — 3075F SYST BP GE 130 - 139MM HG: CPT | Mod: CPTII,S$GLB,, | Performed by: INTERNAL MEDICINE

## 2023-03-07 PROCEDURE — 1101F PR PT FALLS ASSESS DOC 0-1 FALLS W/OUT INJ PAST YR: ICD-10-PCS | Mod: CPTII,S$GLB,, | Performed by: INTERNAL MEDICINE

## 2023-03-07 PROCEDURE — 3072F PR LOW RISK FOR RETINOPATHY: ICD-10-PCS | Mod: CPTII,S$GLB,, | Performed by: INTERNAL MEDICINE

## 2023-03-07 PROCEDURE — 99999 PR PBB SHADOW E&M-EST. PATIENT-LVL IV: ICD-10-PCS | Mod: PBBFAC,,, | Performed by: INTERNAL MEDICINE

## 2023-03-07 PROCEDURE — 1159F MED LIST DOCD IN RCRD: CPT | Mod: CPTII,S$GLB,, | Performed by: INTERNAL MEDICINE

## 2023-03-07 NOTE — PROGRESS NOTES
Subjective:    Patient ID:  Sandee Evans is a 67 y.o. female who presents for follow-up of No chief complaint on file.      HPI    Chronic A-fib on xarelto, Diastolic CHF, HTN, DM, HLD, PAD, tobacco abuse     Previously followed by Dr Naik  Patient is here for follow-up of paroxysmal atrial fibrillation.  She is labeled as chronic and has been in normal sinus rhythm and was last seen in 2016.  EKG today confirms this.  She was lost to follow-up and thought she was discharged because she felt everything was okay.  She has not taken her blood thinner but is only been on aspirin.  She gets shortness of breath on heavy activity but relieved with rest.  She does have some associated substernal heaviness.  She has had no breakthrough tachycardia palpitations.  She denies any PND, orthopnea has stable lower edema relieved with elevation.  She is preop for knee replacement and is unable to go up a flight of stairs due to her degenerative joint condition     Here for follow-up of paroxysmal atrial fibrillation.  She underwent testing as below for preop clearance.  We if cleared at low to intermediate risk and she is aware that anything can happen with the stress of surgery and in particular with her tobacco use disorder.  We discussed that this is the main things she needs to work on postoperatively as well as rehab.  She denies any further cardiopulmonary complaints.  We went over her medicines as well and discussed that she has to hold her blood thinner for at least 2 days prior to the procedure but continue her beta-blocker perioperatively.     Echo 5/21/22  The left ventricle is normal in size with normal systolic function.  The estimated ejection fraction is 60%.  Normal right ventricular size with normal right ventricular systolic function.  The estimated PA systolic pressure is 33 mmHg.  Atrial fibrillation observed.     NST: 2/8/19  Normal [facility] MPI  The perfusion scan is free of evidence from myocardial  ischemia or injury.  An ejection fraction of 55 % at rest  LV cavity size is normal at rest.  Resting wall motion is physiologic.     Holter: 3/1/21  Atrial fibrillation with ventricular rates varying between 61 and 188 bpm with an average of 104 bpm. Total time in atrial fibrillation was approximately 24 hours.  There were frequent PVCs totalling 2296 and averaging 95.75 per hour. There were 92 monomorphic couplets. There were 3 bigeminal cycles. There were 7 monomorphic triplets.  The diary was returned incomplete.     2/18/21 Denies CP. Mild stable ESQUIVEL  EKG A-fib 94 NSSTT changes - unaware of being back in A-fib - last EKG 2/8/19 NSR  Back in A-fib - duration unknown. Discussed LAURA/CV versus rate control - given lack of symptoms we are both in agreement for rate control.   Echo and holter to evaluate A-fib control  Continue Rx for PAD, HTN, DM, A-fib      3/8/21 Denies CP or SOB. Reports some issues with nausea and feels that she has a mass in her abdomen that moves around   Increase metoprolol 50 bid for better A-fib rate control  Continue Rx for HTN, HLD, PAD, DM  OV 6 months     7/16/21 Reports SOB and LE edema for 2 weeks. Was started on lasix 3 days ago without much improvement  EKG A-fib NSSTT changes  Echo, BNP, BMP for CHF  Continue current diuretic regimen - needs to go to the ER if symptoms worsen  Continue Rx for HTN, HLD, PAD, DM, A-fib, CHF     7/29/21 Feels better since discharge. Still with mild LE edema and ESQUIVEL  On lasix 40 bid     Continue Rx for HTN, HLD, PAD, DM, A-fib, CHF  OV 1 month with BNP, BMP        9/28/21 Denies CP. Less SOB and LE edema improved  BP controlled      Continue Rx for HTN, HLD, PAD, DM, A-fib, CHF  OV 3 months with BNP, BMP     Admitted 5/20/22  Ms Sandee Evans is a 66 y.o. woman admitted with acute hypoxic/hypercapnic respiratory failure due to acute on chronic diastolic CHF, exacerbation of COPD, and untreated HIEU. She initially required BiPAP but was quickly weaned  to O2 by NC. She states that she sometimes skips lasix doses due to urinary incontinence. She does not have BiPAP at home. She is still using tobacco. Started IV lasix for CHF exacerbation, steroids/nebs/levofloxacin for COPD exacerbation. Stepped down to floor on 5/21/22. Patient grew out E-coli on blood cultures- (S) to Cipro. Patient clinically improved and was discharged to home with out patient PT/OT on 5/24/22. Bi-pap will be arranged. Activity as tolerated. Diet- low NA/ADA 2000 yang diet    Already has pulmonary follow up.      6/28/22 Denies CP, SOB improved from discharge  BP controlled. Lasix has been held since discharge   Restart lasix 40 qd  OV 3 months with BNP, BMP      Continue Rx for HTN, HLD, PAD, DM, A-fib, CHF     9/28/22 Denies CP, ESQUIVEL improved after restarting lasix  Some dry cough that she thinks are allergies  BP controlled  OV 6 months with BNP, BMP      Continue Rx for HTN, HLD, PAD, DM, A-fib, CHF      Went to the ER 1/26/23  This 67-year-old female presents to the emergency room complaining of nausea that started yesterday.  Patient developed some chest tightness and shortness of breath that also started this morning at 7:00 a.m..  The patient denies dysuria.  She had 4 episodes of diarrhea in the last 24 hours.  The diarrhea started yesterday.  The patient also has weakness and pain in her left arm this started 3 weeks ago.  The patient has a history of arthritis.  She has been slightly lightheaded when she 1st stands up for about a week.  He had some left upper quadrant abdominal pain yesterday that has since resolved.  She has had chills for 2 weeks and a cough for 2 weeks and headache that occurs only with coughing as well as some rhinorrhea but no sore throat.  She has a history of atrial fibrillation.  She has a history of congestive heart failure.  She smokes and has a history of morbid obesity.  She does not report a history of chronic obstructive pulmonary disease but she is on  Spiriva inhaler.  She has a history thyroid disease.  She is maintained on Xarelto.  , troponin negative  EKG A-fib 108    3/7/23 CP has resolved. Still with cough and some abdominal discomfort  BP controlled    Review of Systems   Constitutional: Negative for decreased appetite.   HENT:  Negative for ear discharge.    Eyes:  Negative for blurred vision.   Respiratory:  Negative for hemoptysis.    Endocrine: Negative for polyphagia.   Hematologic/Lymphatic: Negative for adenopathy.   Skin:  Negative for color change.   Musculoskeletal:  Negative for joint swelling.   Genitourinary:  Negative for bladder incontinence.   Neurological:  Negative for brief paralysis.   Psychiatric/Behavioral:  Negative for hallucinations.    Allergic/Immunologic: Negative for hives.      Objective:    Physical Exam  Constitutional:       Appearance: She is well-developed.   HENT:      Head: Normocephalic and atraumatic.   Eyes:      Conjunctiva/sclera: Conjunctivae normal.      Pupils: Pupils are equal, round, and reactive to light.   Cardiovascular:      Rate and Rhythm: Normal rate. Rhythm irregular.      Pulses: Intact distal pulses.      Heart sounds: Normal heart sounds.   Pulmonary:      Effort: Pulmonary effort is normal.      Breath sounds: Normal breath sounds.   Abdominal:      General: Bowel sounds are normal.      Palpations: Abdomen is soft.   Musculoskeletal:         General: Normal range of motion.      Cervical back: Normal range of motion and neck supple.   Skin:     General: Skin is warm and dry.   Neurological:      Mental Status: She is alert and oriented to person, place, and time.         Assessment:       1. Essential hypertension    2. Mixed hyperlipidemia    3. Longstanding persistent atrial fibrillation    4. PVD (peripheral vascular disease)    5. ESQUIVEL (dyspnea on exertion)         Plan:       Discussed repeat stress test given recent CP - she feels like this is from URI and would like to hold off       Continue Rx for HTN, HLD, PAD, DM, A-fib, CHF  OV 3 months

## 2023-03-08 ENCOUNTER — LAB VISIT (OUTPATIENT)
Dept: LAB | Facility: HOSPITAL | Age: 68
End: 2023-03-08
Attending: INTERNAL MEDICINE
Payer: COMMERCIAL

## 2023-03-08 DIAGNOSIS — I10 ESSENTIAL HYPERTENSION: ICD-10-CM

## 2023-03-08 DIAGNOSIS — I48.11 LONGSTANDING PERSISTENT ATRIAL FIBRILLATION: ICD-10-CM

## 2023-03-08 DIAGNOSIS — R06.09 DOE (DYSPNEA ON EXERTION): ICD-10-CM

## 2023-03-08 DIAGNOSIS — E78.2 MIXED HYPERLIPIDEMIA: ICD-10-CM

## 2023-03-08 DIAGNOSIS — I73.9 PVD (PERIPHERAL VASCULAR DISEASE): ICD-10-CM

## 2023-03-08 LAB
ANION GAP SERPL CALC-SCNC: 3 MMOL/L (ref 8–16)
BNP SERPL-MCNC: 266 PG/ML (ref 0–99)
BUN SERPL-MCNC: 32 MG/DL (ref 8–23)
CALCIUM SERPL-MCNC: 9.9 MG/DL (ref 8.7–10.5)
CHLORIDE SERPL-SCNC: 105 MMOL/L (ref 95–110)
CO2 SERPL-SCNC: 33 MMOL/L (ref 23–29)
CREAT SERPL-MCNC: 1.1 MG/DL (ref 0.5–1.4)
EST. GFR  (NO RACE VARIABLE): 54 ML/MIN/1.73 M^2
GLUCOSE SERPL-MCNC: 94 MG/DL (ref 70–110)
POTASSIUM SERPL-SCNC: 4.6 MMOL/L (ref 3.5–5.1)
SODIUM SERPL-SCNC: 141 MMOL/L (ref 136–145)

## 2023-03-08 PROCEDURE — 80048 BASIC METABOLIC PNL TOTAL CA: CPT | Performed by: INTERNAL MEDICINE

## 2023-03-08 PROCEDURE — 36415 COLL VENOUS BLD VENIPUNCTURE: CPT | Performed by: INTERNAL MEDICINE

## 2023-03-08 PROCEDURE — 83880 ASSAY OF NATRIURETIC PEPTIDE: CPT | Performed by: INTERNAL MEDICINE

## 2023-03-08 NOTE — PROGRESS NOTES
Subjective:       Patient ID: Sandee Evans is a 67 y.o. female.    Chief Complaint: Follow-up      Follow-up    67-year-old female presents for follow-up.  States she continues to have issues with her bladder.  Saw Urology prior to appointment.  Here for ED follow-up for shortness of breath.  Feels that has improved.  Denies any fever chills.      Review of Systems   Constitutional: Negative.    HENT: Negative.     Respiratory: Negative.     Cardiovascular: Negative.    Gastrointestinal: Negative.    Endocrine: Negative.    Genitourinary: Negative.    Musculoskeletal: Negative.    Neurological: Negative.    Psychiatric/Behavioral: Negative.          Past Medical History:   Diagnosis Date    Anticoagulant long-term use     Xarelto    Arthritis     Atrial fibrillation     Breast cyst     CHF (congestive heart failure)     Degenerative disc disease     Edema     HLD (hyperlipidemia)     Hx of psychiatric care     Hyperlipidemia     Hypertension     Hypothyroidism     Nuclear sclerosis, bilateral 12/18/2017    Obesity, morbid     HIEU (obstructive sleep apnea)     Other abnormal glucose     pre-diabetes    Pre-diabetes     Psychiatric problem     Requires assistance with activities of daily living (ADL)     Sleep apnea     Smoker     SOB (shortness of breath)     SOB (shortness of breath)     Thyroid disease     on meds 8-9 years ago. hypothyroidism.no malignancy    TMJ (temporomandibular joint disorder)     jaw clicking    Tobacco abuse     Unsteady gait     Urge incontinence     Weakness generalized     Wears glasses      Past Surgical History:   Procedure Laterality Date    BREAST BIOPSY Right     over 10 yrs ago/ benign    BREAST CYST EXCISION Right     BREAST LUMPECTOMY Right     over 10 yrs ago, ex bx/ benign    COLONOSCOPY N/A 6/25/2019    Procedure: COLONOSCOPY;  Surgeon: Micheline Flores MD;  Location: [facility];  Service: Endoscopy;  Laterality: N/A;  RX XARELTO ok to hold (2 days) per Dr. Naik see scan  3/20/19    ENDOSCOPIC ULTRASOUND OF UPPER GASTROINTESTINAL TRACT N/A 1/26/2021    Procedure: ULTRASOUND-ENDOSCOPIC-UPPER;  Surgeon: Stevenson Philippe MD;  Location: [facility];  Service: Endoscopy;  Laterality: N/A;    HYSTERECTOMY      JOINT REPLACEMENT Bilateral     knees    OOPHORECTOMY      rt.knee surgery 2016      TOTAL KNEE ARTHROPLASTY Left 2/19/2019    Procedure: ARTHROPLASTY, KNEE, TOTAL;  Surgeon: Med Hanson MD;  Location: [facility];  Service: Orthopedics;  Laterality: Left;  10AM START PER  PER JAYLIN TEXT @ 8:34AM ON 2-18-19  SUPINE  HARRIS LOYA [phone] TEXTED HIM ON 1-24-19 @ 7:57AM  RN PRE OP 2-13-19--BMI--48.9    underarm gland\ Bilateral      Family History   Problem Relation Age of Onset    Diabetes Mother     Hypertension Mother     Cancer Mother     No Known Problems Father     No Known Problems Sister     Diabetes Brother     Cancer Brother     No Known Problems Maternal Aunt     No Known Problems Maternal Uncle     No Known Problems Paternal Aunt     No Known Problems Paternal Uncle     No Known Problems Maternal Grandmother     No Known Problems Maternal Grandfather     No Known Problems Paternal Grandmother     No Known Problems Paternal Grandfather     No Known Problems Other     Amblyopia Neg Hx     Blindness Neg Hx     Cataracts Neg Hx     Glaucoma Neg Hx     Macular degeneration Neg Hx     Retinal detachment Neg Hx     Strabismus Neg Hx     Stroke Neg Hx     Thyroid disease Neg Hx      Social History     Socioeconomic History    Marital status:    Tobacco Use    Smoking status: Every Day     Packs/day: 0.50     Years: 47.00     Pack years: 23.50     Types: Cigarettes     Start date: 1973    Smokeless tobacco: Current   Substance and Sexual Activity    Alcohol use: No    Drug use: No    Sexual activity: Yes     Partners: Male     Social Determinants of Health     Financial Resource Strain: Low Risk     Difficulty of Paying Living Expenses: Not very hard   Food  Insecurity: No Food Insecurity    Worried About Running Out of Food in the Last Year: Never true    Ran Out of Food in the Last Year: Never true   Transportation Needs: No Transportation Needs    Lack of Transportation (Medical): No    Lack of Transportation (Non-Medical): No   Physical Activity: Insufficiently Active    Days of Exercise per Week: 1 day    Minutes of Exercise per Session: 10 min   Stress: Stress Concern Present    Feeling of Stress : To some extent   Social Connections: Unknown    Frequency of Communication with Friends and Family: Twice a week    Frequency of Social Gatherings with Friends and Family: Never    Active Member of Clubs or Organizations: Yes    Attends Club or Organization Meetings: 1 to 4 times per year    Marital Status:    Housing Stability: Low Risk     Unable to Pay for Housing in the Last Year: No    Number of Places Lived in the Last Year: 2    Unstable Housing in the Last Year: No       Current Outpatient Medications:     albuterol (PROVENTIL/VENTOLIN HFA) 90 mcg/actuation inhaler, Inhale 2 puffs into the lungs every 6 (six) hours as needed for Wheezing or Shortness of Breath. Rescue, Disp: 54 g, Rfl: 1    atorvastatin (LIPITOR) 40 MG tablet, Take 1 tablet (40 mg total) by mouth once daily., Disp: 90 tablet, Rfl: 1    azelastine (ASTELIN) 137 mcg (0.1 %) nasal spray, 1 spray (137 mcg total) by Nasal route 2 (two) times daily., Disp: 30 mL, Rfl: 3    cetirizine (ZYRTEC) 10 MG tablet, Take 1 tablet (10 mg total) by mouth once daily., Disp: 90 tablet, Rfl: 3    fluticasone propionate (FLONASE) 50 mcg/actuation nasal spray, SHAKE LIQUID AND USE 2 SPRAYS(100 MCG) IN EACH NOSTRIL EVERY DAY, Disp: 48 g, Rfl: 0    furosemide (LASIX) 40 MG tablet, Take 1 tablet (40 mg total) by mouth once daily., Disp: 180 tablet, Rfl: 0    gabapentin (NEURONTIN) 100 MG capsule, Take 1 capsule (100 mg total) by mouth nightly as needed., Disp: 90 capsule, Rfl: 1    ketoconazole (NIZORAL) 2 %  "cream, Apply topically once daily., Disp: 60 g, Rfl: 0    losartan (COZAAR) 25 MG tablet, Take 1 tablet (25 mg total) by mouth once daily., Disp: 90 tablet, Rfl: 1    metoprolol succinate (TOPROL-XL) 100 MG 24 hr tablet, Take 1 tablet (100 mg total) by mouth once daily., Disp: 90 tablet, Rfl: 1    sertraline (ZOLOFT) 100 MG tablet, Take 1 tablet (100 mg total) by mouth once daily., Disp: 90 tablet, Rfl: 3    SPIRIVA RESPIMAT 2.5 mcg/actuation inhaler, INHALE 2 PUFFS BY MOUTH INTO THE LUNGS DAILY( CONTROLLER), Disp: 4 g, Rfl: 5    triamcinolone acetonide 0.1% (KENALOG) 0.1 % ointment, APPLY BETWEEN THE KNEES AND UPPER THIGHS TWICE DAILY FOR NO MORE THAN 7 TO 14 DAYS, Disp: 454 g, Rfl: 1    XARELTO 20 mg Tab, Take 1 tablet (20 mg total) by mouth once daily., Disp: 90 tablet, Rfl: 1    buPROPion (WELLBUTRIN XL) 150 MG TB24 tablet, Take 1 tablet (150 mg total) by mouth once daily., Disp: 90 tablet, Rfl: 1    ondansetron (ZOFRAN-ODT) 8 MG TbDL, Take 1 tablet (8 mg total) by mouth every 6 (six) hours as needed (nausea)., Disp: 60 tablet, Rfl: 2   Objective:      Vitals:    02/10/23 1044   BP: 138/64   BP Location: Left arm   Patient Position: Sitting   BP Method: Large (Manual)   Pulse: 83   Resp: 18   Temp: 98 °F (36.7 °C)   TempSrc: Oral   SpO2: 95%   Weight: 120.6 kg (265 lb 14 oz)   Height: 5' 3" (1.6 m)       Physical Exam  Constitutional:       General: She is not in acute distress.  HENT:      Head: Normocephalic and atraumatic.   Eyes:      Conjunctiva/sclera: Conjunctivae normal.   Cardiovascular:      Rate and Rhythm: Normal rate and regular rhythm.      Heart sounds: Normal heart sounds. No murmur heard.    No friction rub. No gallop.   Pulmonary:      Effort: Pulmonary effort is normal.      Breath sounds: Normal breath sounds. No wheezing or rales.   Musculoskeletal:      Cervical back: Neck supple.   Skin:     General: Skin is warm and dry.   Neurological:      Mental Status: She is alert and oriented to " person, place, and time.   Psychiatric:         Behavior: Behavior normal.         Thought Content: Thought content normal.         Judgment: Judgment normal.          Assessment:       1. Chronic cystitis    2. Current mild episode of major depressive disorder without prior episode    3. COPD with chronic bronchitis    4. Need for vaccination with 13-polyvalent pneumococcal conjugate vaccine          Plan:       Chronic cystitis    Current mild episode of major depressive disorder without prior episode  -     buPROPion (WELLBUTRIN XL) 150 MG TB24 tablet; Take 1 tablet (150 mg total) by mouth once daily.  Dispense: 90 tablet; Refill: 1    COPD with chronic bronchitis    Need for vaccination with 13-polyvalent pneumococcal conjugate vaccine  -     (In Office Administered) Pneumococcal Conjugate Vaccine (13 Valent) (IM)      Continue follow-up with Urology.  Refilled Wellbutrin.  Shortness breath has resolved.  Continue other meds.        Future Appointments   Date Time Provider Department Center   3/14/2023 11:00 AM [facility] CT1 LIMIT 400 LBS [facility] CT SCAN [facility]   3/21/2023  9:00 AM Maynor Hartmann MD [facility] URO [facility]   4/13/2023  9:40 AM Kuldeep Miller MD [facility] MED [place]       Patient note was created using [email or web address].  Any errors in syntax or even information may not have been identified and edited on initial review prior to signing this note.

## 2023-03-14 ENCOUNTER — HOSPITAL ENCOUNTER (OUTPATIENT)
Dept: RADIOLOGY | Facility: HOSPITAL | Age: 68
Discharge: HOME | End: 2023-03-14
Attending: UROLOGY
Payer: COMMERCIAL

## 2023-03-14 DIAGNOSIS — N30.20 CHRONIC CYSTITIS: ICD-10-CM

## 2023-03-14 PROCEDURE — 74176 CT RENAL STONE STUDY ABD PELVIS WO: ICD-10-PCS | Mod: 26,,, | Performed by: RADIOLOGY

## 2023-03-14 PROCEDURE — 74176 CT ABD & PELVIS W/O CONTRAST: CPT | Mod: 26,,, | Performed by: RADIOLOGY

## 2023-03-14 PROCEDURE — 74176 CT ABD & PELVIS W/O CONTRAST: CPT | Mod: TC

## 2023-03-17 DIAGNOSIS — I48.20 CHRONIC ATRIAL FIBRILLATION: ICD-10-CM

## 2023-03-17 DIAGNOSIS — I25.10 CORONARY ARTERY DISEASE: ICD-10-CM

## 2023-03-17 DIAGNOSIS — R80.9 MICROALBUMINURIA: ICD-10-CM

## 2023-03-17 DIAGNOSIS — I73.9 PVD (PERIPHERAL VASCULAR DISEASE): ICD-10-CM

## 2023-03-17 DIAGNOSIS — I10 ESSENTIAL HYPERTENSION: ICD-10-CM

## 2023-03-17 DIAGNOSIS — I70.0 AORTIC ATHEROSCLEROSIS: ICD-10-CM

## 2023-03-18 RX ORDER — LOSARTAN POTASSIUM 25 MG/1
TABLET ORAL
Qty: 90 TABLET | Refills: 3 | Status: SHIPPED | OUTPATIENT
Start: 2023-03-18 | End: 2023-12-19

## 2023-03-18 RX ORDER — ATORVASTATIN CALCIUM 40 MG/1
TABLET, FILM COATED ORAL
Qty: 90 TABLET | Refills: 1 | Status: SHIPPED | OUTPATIENT
Start: 2023-03-18 | End: 2023-07-22

## 2023-03-18 NOTE — TELEPHONE ENCOUNTER
No new care gaps identified.  [facility] Embedded Care Gaps. Reference number: [id number]. 3/17/2023   7:05:57 PM CDT

## 2023-03-19 NOTE — TELEPHONE ENCOUNTER
Refill Routing Note   Medication(s) are not appropriate for processing by [facility] for the following reason(s):       Drug-disease interaction  metoprolol succinate and PVD (peripheral vascular disease)    [facility] action(s):  Defer  Approve         Appointments  past 12m or future 3m with PCP    Date Provider   Last Visit   2/10/2023 Kuldeep Miller MD   Next Visit   4/13/2023 Kuldeep Miller MD   ED visits in past 90 days: 1        Note composed:8:48 PM 03/18/2023

## 2023-03-21 ENCOUNTER — PROCEDURE VISIT (OUTPATIENT)
Dept: UROLOGY | Facility: CLINIC | Age: 68
End: 2023-03-21
Payer: COMMERCIAL

## 2023-03-21 DIAGNOSIS — N30.20 CHRONIC CYSTITIS: ICD-10-CM

## 2023-03-21 PROCEDURE — 52000 CYSTOURETHROSCOPY: CPT | Mod: S$GLB,,, | Performed by: UROLOGY

## 2023-03-21 PROCEDURE — 52000 CYSTOSCOPY: ICD-10-PCS | Mod: S$GLB,,, | Performed by: UROLOGY

## 2023-03-21 RX ORDER — METOPROLOL SUCCINATE 100 MG/1
TABLET, EXTENDED RELEASE ORAL
Qty: 90 TABLET | Refills: 3 | Status: SHIPPED | OUTPATIENT
Start: 2023-03-21 | End: 2023-05-09 | Stop reason: SDUPTHER

## 2023-03-21 NOTE — PROCEDURES
Cystoscopy    Date/Time: 3/21/2023 9:00 AM  Performed by: Maynor Hartmann MD  Authorized by: Maynor Hartmann MD     Consent Done?:  Yes (Written)  Timeout: prior to procedure the correct patient, procedure, and site was verified    Prep: patient was prepped and draped in usual sterile fashion    Local anesthesia used?: Yes    Anesthesia:  Lidocaine jelly  Local anesthetic:  Lidocaine 2% topical gel  Anesthetic total (ml):  10  Indications: recurrent UTIs    Position:  Dorsal lithotomy  Anesthesia:  Lidocaine jelly  Patient sedated?: No    Preparation: Patient was prepped and draped in usual sterile fashion    Scope type:  Flexible cystoscope  Stent inserted: No    Stent removed: No    External exam normal: Yes    Urethra normal: Yes    Bladder neck normal: Yes    Bladder normal: Yes     patient tolerated the procedure well with no immediate complications  Comments:      One area of glandularis dome

## 2023-03-29 ENCOUNTER — OFFICE VISIT (OUTPATIENT)
Dept: FAMILY MEDICINE | Facility: CLINIC | Age: 68
End: 2023-03-29
Payer: COMMERCIAL

## 2023-03-29 VITALS
TEMPERATURE: 99 F | DIASTOLIC BLOOD PRESSURE: 82 MMHG | OXYGEN SATURATION: 96 % | BODY MASS INDEX: 47.15 KG/M2 | HEIGHT: 63 IN | SYSTOLIC BLOOD PRESSURE: 130 MMHG | RESPIRATION RATE: 18 BRPM | HEART RATE: 88 BPM | WEIGHT: 266.13 LBS

## 2023-03-29 DIAGNOSIS — J44.1 COPD EXACERBATION: Primary | ICD-10-CM

## 2023-03-29 PROCEDURE — 1159F MED LIST DOCD IN RCRD: CPT | Mod: CPTII,S$GLB,, | Performed by: PHYSICIAN ASSISTANT

## 2023-03-29 PROCEDURE — 1160F PR REVIEW ALL MEDS BY PRESCRIBER/CLIN PHARMACIST DOCUMENTED: ICD-10-PCS | Mod: CPTII,S$GLB,, | Performed by: PHYSICIAN ASSISTANT

## 2023-03-29 PROCEDURE — 3079F PR MOST RECENT DIASTOLIC BLOOD PRESSURE 80-89 MM HG: ICD-10-PCS | Mod: CPTII,S$GLB,, | Performed by: PHYSICIAN ASSISTANT

## 2023-03-29 PROCEDURE — 4010F PR ACE/ARB THEARPY RXD/TAKEN: ICD-10-PCS | Mod: CPTII,S$GLB,, | Performed by: PHYSICIAN ASSISTANT

## 2023-03-29 PROCEDURE — 3075F PR MOST RECENT SYSTOLIC BLOOD PRESS GE 130-139MM HG: ICD-10-PCS | Mod: CPTII,S$GLB,, | Performed by: PHYSICIAN ASSISTANT

## 2023-03-29 PROCEDURE — 99999 PR PBB SHADOW E&M-EST. PATIENT-LVL V: ICD-10-PCS | Mod: PBBFAC,,, | Performed by: PHYSICIAN ASSISTANT

## 2023-03-29 PROCEDURE — 99214 OFFICE O/P EST MOD 30 MIN: CPT | Mod: S$GLB,,, | Performed by: PHYSICIAN ASSISTANT

## 2023-03-29 PROCEDURE — 3288F FALL RISK ASSESSMENT DOCD: CPT | Mod: CPTII,S$GLB,, | Performed by: PHYSICIAN ASSISTANT

## 2023-03-29 PROCEDURE — 3008F PR BODY MASS INDEX (BMI) DOCUMENTED: ICD-10-PCS | Mod: CPTII,S$GLB,, | Performed by: PHYSICIAN ASSISTANT

## 2023-03-29 PROCEDURE — 1159F PR MEDICATION LIST DOCUMENTED IN MEDICAL RECORD: ICD-10-PCS | Mod: CPTII,S$GLB,, | Performed by: PHYSICIAN ASSISTANT

## 2023-03-29 PROCEDURE — 1101F PR PT FALLS ASSESS DOC 0-1 FALLS W/OUT INJ PAST YR: ICD-10-PCS | Mod: CPTII,S$GLB,, | Performed by: PHYSICIAN ASSISTANT

## 2023-03-29 PROCEDURE — 3008F BODY MASS INDEX DOCD: CPT | Mod: CPTII,S$GLB,, | Performed by: PHYSICIAN ASSISTANT

## 2023-03-29 PROCEDURE — 3079F DIAST BP 80-89 MM HG: CPT | Mod: CPTII,S$GLB,, | Performed by: PHYSICIAN ASSISTANT

## 2023-03-29 PROCEDURE — 3288F PR FALLS RISK ASSESSMENT DOCUMENTED: ICD-10-PCS | Mod: CPTII,S$GLB,, | Performed by: PHYSICIAN ASSISTANT

## 2023-03-29 PROCEDURE — 1125F AMNT PAIN NOTED PAIN PRSNT: CPT | Mod: CPTII,S$GLB,, | Performed by: PHYSICIAN ASSISTANT

## 2023-03-29 PROCEDURE — 1125F PR PAIN SEVERITY QUANTIFIED, PAIN PRESENT: ICD-10-PCS | Mod: CPTII,S$GLB,, | Performed by: PHYSICIAN ASSISTANT

## 2023-03-29 PROCEDURE — 1101F PT FALLS ASSESS-DOCD LE1/YR: CPT | Mod: CPTII,S$GLB,, | Performed by: PHYSICIAN ASSISTANT

## 2023-03-29 PROCEDURE — 99214 PR OFFICE/OUTPT VISIT, EST, LEVL IV, 30-39 MIN: ICD-10-PCS | Mod: S$GLB,,, | Performed by: PHYSICIAN ASSISTANT

## 2023-03-29 PROCEDURE — 1160F RVW MEDS BY RX/DR IN RCRD: CPT | Mod: CPTII,S$GLB,, | Performed by: PHYSICIAN ASSISTANT

## 2023-03-29 PROCEDURE — 3072F LOW RISK FOR RETINOPATHY: CPT | Mod: CPTII,S$GLB,, | Performed by: PHYSICIAN ASSISTANT

## 2023-03-29 PROCEDURE — 3075F SYST BP GE 130 - 139MM HG: CPT | Mod: CPTII,S$GLB,, | Performed by: PHYSICIAN ASSISTANT

## 2023-03-29 PROCEDURE — 3072F PR LOW RISK FOR RETINOPATHY: ICD-10-PCS | Mod: CPTII,S$GLB,, | Performed by: PHYSICIAN ASSISTANT

## 2023-03-29 PROCEDURE — 99999 PR PBB SHADOW E&M-EST. PATIENT-LVL V: CPT | Mod: PBBFAC,,, | Performed by: PHYSICIAN ASSISTANT

## 2023-03-29 PROCEDURE — 4010F ACE/ARB THERAPY RXD/TAKEN: CPT | Mod: CPTII,S$GLB,, | Performed by: PHYSICIAN ASSISTANT

## 2023-03-29 RX ORDER — DOXYCYCLINE 100 MG/1
100 CAPSULE ORAL 2 TIMES DAILY
Qty: 14 EACH | Refills: 0 | Status: SHIPPED | OUTPATIENT
Start: 2023-03-29 | End: 2023-04-05

## 2023-03-29 RX ORDER — PROMETHAZINE HYDROCHLORIDE AND DEXTROMETHORPHAN HYDROBROMIDE 6.25; 15 MG/5ML; MG/5ML
5 SYRUP ORAL EVERY 6 HOURS PRN
Qty: 180 ML | Refills: 0 | Status: SHIPPED | OUTPATIENT
Start: 2023-03-29 | End: 2023-04-08

## 2023-03-29 RX ORDER — ALBUTEROL SULFATE 90 UG/1
2 AEROSOL, METERED RESPIRATORY (INHALATION) EVERY 6 HOURS PRN
COMMUNITY
Start: 2023-03-18

## 2023-03-29 RX ORDER — PREDNISONE 20 MG/1
40 TABLET ORAL DAILY
Qty: 10 TABLET | Refills: 0 | Status: SHIPPED | OUTPATIENT
Start: 2023-03-29 | End: 2023-04-03

## 2023-03-29 RX ORDER — IPRATROPIUM BROMIDE AND ALBUTEROL SULFATE 2.5; .5 MG/3ML; MG/3ML
3 SOLUTION RESPIRATORY (INHALATION) EVERY 6 HOURS PRN
Qty: 75 ML | Refills: 0 | Status: SHIPPED | OUTPATIENT
Start: 2023-03-29 | End: 2023-10-18 | Stop reason: SDUPTHER

## 2023-03-29 NOTE — PROGRESS NOTES
Health Maintenance Due   Topic     Foot Exam  Consult pcp    Diabetes Urine Screening  Consult pcp    COVID-19 Vaccine (5 - Booster for Pfizer series) Not offered at this Facility

## 2023-03-29 NOTE — PROGRESS NOTES
Subjective     Patient ID: Sandee Evans is a 67 y.o. female.    Chief Complaint: Cough and Nausea    Cough  This is a new problem. The current episode started 1 to 4 weeks ago (2 weeks). The problem has been gradually worsening. The problem occurs constantly. The cough is Productive of sputum (yellow). Associated symptoms include nasal congestion, postnasal drip, rhinorrhea, a sore throat and wheezing. Pertinent negatives include no chest pain, chills or fever. Treatments tried: flonase, albuterol, robitussin. Her past medical history is significant for COPD.   Review of Systems   Constitutional:  Negative for chills and fever.   HENT:  Positive for postnasal drip, rhinorrhea and sore throat.    Respiratory:  Positive for cough and wheezing.    Cardiovascular:  Negative for chest pain.   Gastrointestinal:  Positive for nausea.        Objective     Physical Exam  Constitutional:       Appearance: Normal appearance.   HENT:      Head: Normocephalic and atraumatic.   Cardiovascular:      Rate and Rhythm: Normal rate and regular rhythm.   Pulmonary:      Effort: Pulmonary effort is normal.      Breath sounds: Rhonchi present.   Neurological:      Mental Status: She is alert.          Assessment and Plan     Problem List Items Addressed This Visit    None       Sandee was seen today for cough and nausea.    Diagnoses and all orders for this visit:    COPD exacerbation  -     NEBULIZER FOR HOME USE  -     albuterol-ipratropium (DUO-NEB) 2.5 mg-0.5 mg/3 mL nebulizer solution; Take 3 mLs by nebulization every 6 (six) hours as needed for Wheezing. Rescue  -     doxycycline (MONODOX) 100 MG capsule; Take 1 capsule (100 mg total) by mouth 2 (two) times daily. for 7 days  -     predniSONE (DELTASONE) 20 MG tablet; Take 2 tablets (40 mg total) by mouth once daily. for 5 days  -     promethazine-dextromethorphan (PROMETHAZINE-DM) 6.25-15 mg/5 mL Syrp; Take 5 mLs by mouth every 6 (six) hours as needed.  -     ER if difficulty  breathing occurs

## 2023-04-12 ENCOUNTER — PATIENT MESSAGE (OUTPATIENT)
Dept: ADMINISTRATIVE | Facility: HOSPITAL | Age: 68
End: 2023-04-12
Payer: COMMERCIAL

## 2023-04-13 ENCOUNTER — OFFICE VISIT (OUTPATIENT)
Dept: FAMILY MEDICINE | Facility: CLINIC | Age: 68
End: 2023-04-13
Payer: COMMERCIAL

## 2023-04-13 VITALS
TEMPERATURE: 98 F | WEIGHT: 272.69 LBS | HEART RATE: 69 BPM | OXYGEN SATURATION: 95 % | DIASTOLIC BLOOD PRESSURE: 82 MMHG | SYSTOLIC BLOOD PRESSURE: 134 MMHG | HEIGHT: 63 IN | BODY MASS INDEX: 48.32 KG/M2 | RESPIRATION RATE: 18 BRPM

## 2023-04-13 DIAGNOSIS — B36.9 FUNGAL RASH OF TORSO: ICD-10-CM

## 2023-04-13 DIAGNOSIS — N18.31 CHRONIC KIDNEY DISEASE, STAGE 3A: ICD-10-CM

## 2023-04-13 DIAGNOSIS — E11.36 TYPE 2 DIABETES MELLITUS WITH DIABETIC CATARACT, WITHOUT LONG-TERM CURRENT USE OF INSULIN: ICD-10-CM

## 2023-04-13 DIAGNOSIS — J44.1 COPD EXACERBATION: Primary | ICD-10-CM

## 2023-04-13 DIAGNOSIS — J30.9 ALLERGIC RHINITIS, UNSPECIFIED SEASONALITY, UNSPECIFIED TRIGGER: ICD-10-CM

## 2023-04-13 PROCEDURE — 3072F PR LOW RISK FOR RETINOPATHY: ICD-10-PCS | Mod: CPTII,S$GLB,, | Performed by: FAMILY MEDICINE

## 2023-04-13 PROCEDURE — 99999 PR PBB SHADOW E&M-EST. PATIENT-LVL IV: CPT | Mod: PBBFAC,,, | Performed by: FAMILY MEDICINE

## 2023-04-13 PROCEDURE — 3008F BODY MASS INDEX DOCD: CPT | Mod: CPTII,S$GLB,, | Performed by: FAMILY MEDICINE

## 2023-04-13 PROCEDURE — 4010F ACE/ARB THERAPY RXD/TAKEN: CPT | Mod: CPTII,S$GLB,, | Performed by: FAMILY MEDICINE

## 2023-04-13 PROCEDURE — 1101F PT FALLS ASSESS-DOCD LE1/YR: CPT | Mod: CPTII,S$GLB,, | Performed by: FAMILY MEDICINE

## 2023-04-13 PROCEDURE — 3288F FALL RISK ASSESSMENT DOCD: CPT | Mod: CPTII,S$GLB,, | Performed by: FAMILY MEDICINE

## 2023-04-13 PROCEDURE — 1126F PR PAIN SEVERITY QUANTIFIED, NO PAIN PRESENT: ICD-10-PCS | Mod: CPTII,S$GLB,, | Performed by: FAMILY MEDICINE

## 2023-04-13 PROCEDURE — 3288F PR FALLS RISK ASSESSMENT DOCUMENTED: ICD-10-PCS | Mod: CPTII,S$GLB,, | Performed by: FAMILY MEDICINE

## 2023-04-13 PROCEDURE — 3079F DIAST BP 80-89 MM HG: CPT | Mod: CPTII,S$GLB,, | Performed by: FAMILY MEDICINE

## 2023-04-13 PROCEDURE — 3079F PR MOST RECENT DIASTOLIC BLOOD PRESSURE 80-89 MM HG: ICD-10-PCS | Mod: CPTII,S$GLB,, | Performed by: FAMILY MEDICINE

## 2023-04-13 PROCEDURE — 3075F PR MOST RECENT SYSTOLIC BLOOD PRESS GE 130-139MM HG: ICD-10-PCS | Mod: CPTII,S$GLB,, | Performed by: FAMILY MEDICINE

## 2023-04-13 PROCEDURE — 4010F PR ACE/ARB THEARPY RXD/TAKEN: ICD-10-PCS | Mod: CPTII,S$GLB,, | Performed by: FAMILY MEDICINE

## 2023-04-13 PROCEDURE — 1159F PR MEDICATION LIST DOCUMENTED IN MEDICAL RECORD: ICD-10-PCS | Mod: CPTII,S$GLB,, | Performed by: FAMILY MEDICINE

## 2023-04-13 PROCEDURE — 3075F SYST BP GE 130 - 139MM HG: CPT | Mod: CPTII,S$GLB,, | Performed by: FAMILY MEDICINE

## 2023-04-13 PROCEDURE — 99214 OFFICE O/P EST MOD 30 MIN: CPT | Mod: S$GLB,,, | Performed by: FAMILY MEDICINE

## 2023-04-13 PROCEDURE — 3072F LOW RISK FOR RETINOPATHY: CPT | Mod: CPTII,S$GLB,, | Performed by: FAMILY MEDICINE

## 2023-04-13 PROCEDURE — 1101F PR PT FALLS ASSESS DOC 0-1 FALLS W/OUT INJ PAST YR: ICD-10-PCS | Mod: CPTII,S$GLB,, | Performed by: FAMILY MEDICINE

## 2023-04-13 PROCEDURE — 99214 PR OFFICE/OUTPT VISIT, EST, LEVL IV, 30-39 MIN: ICD-10-PCS | Mod: S$GLB,,, | Performed by: FAMILY MEDICINE

## 2023-04-13 PROCEDURE — 3008F PR BODY MASS INDEX (BMI) DOCUMENTED: ICD-10-PCS | Mod: CPTII,S$GLB,, | Performed by: FAMILY MEDICINE

## 2023-04-13 PROCEDURE — 1159F MED LIST DOCD IN RCRD: CPT | Mod: CPTII,S$GLB,, | Performed by: FAMILY MEDICINE

## 2023-04-13 PROCEDURE — 1126F AMNT PAIN NOTED NONE PRSNT: CPT | Mod: CPTII,S$GLB,, | Performed by: FAMILY MEDICINE

## 2023-04-13 PROCEDURE — 99999 PR PBB SHADOW E&M-EST. PATIENT-LVL IV: ICD-10-PCS | Mod: PBBFAC,,, | Performed by: FAMILY MEDICINE

## 2023-04-13 RX ORDER — PREDNISONE 10 MG/1
TABLET ORAL
Qty: 15 TABLET | Refills: 0 | Status: SHIPPED | OUTPATIENT
Start: 2023-04-13 | End: 2023-04-23

## 2023-04-13 RX ORDER — KETOCONAZOLE 20 MG/G
CREAM TOPICAL DAILY
Qty: 60 G | Refills: 2 | Status: SHIPPED | OUTPATIENT
Start: 2023-04-13 | End: 2023-07-28 | Stop reason: SDUPTHER

## 2023-04-13 RX ORDER — AZELASTINE 1 MG/ML
1 SPRAY, METERED NASAL 2 TIMES DAILY
Qty: 30 ML | Refills: 3 | Status: SHIPPED | OUTPATIENT
Start: 2023-04-13 | End: 2023-12-11 | Stop reason: SDUPTHER

## 2023-04-13 NOTE — PROGRESS NOTES
Subjective:       Patient ID: Sandee Evans is a 67 y.o. female.    Chief Complaint: Follow-up      HPI  67-year-old female presents for 2 month follow-up.  Recently was diagnosed with COPD exacerbation.  States she has improved but her cough has restarted few days ago.  Denies any shortness of breath.  Denies any fever chills.  Would like refill on her ketoconazole.    Review of Systems   Constitutional: Negative.    HENT: Negative.     Respiratory: Negative.     Cardiovascular: Negative.    Gastrointestinal: Negative.    Endocrine: Negative.    Genitourinary: Negative.    Musculoskeletal: Negative.    Neurological: Negative.    Psychiatric/Behavioral: Negative.          Past Medical History:   Diagnosis Date    Anticoagulant long-term use     Xarelto    Arthritis     Atrial fibrillation     Breast cyst     CHF (congestive heart failure)     Degenerative disc disease     Edema     HLD (hyperlipidemia)     Hx of psychiatric care     Hyperlipidemia     Hypertension     Hypothyroidism     Nuclear sclerosis, bilateral 12/18/2017    Obesity, morbid     HIEU (obstructive sleep apnea)     Other abnormal glucose     pre-diabetes    Pre-diabetes     Psychiatric problem     Requires assistance with activities of daily living (ADL)     Sleep apnea     Smoker     SOB (shortness of breath)     SOB (shortness of breath)     Thyroid disease     on meds 8-9 years ago. hypothyroidism.no malignancy    TMJ (temporomandibular joint disorder)     jaw clicking    Tobacco abuse     Unsteady gait     Urge incontinence     Weakness generalized     Wears glasses      Past Surgical History:   Procedure Laterality Date    BREAST BIOPSY Right     over 10 yrs ago/ benign    BREAST CYST EXCISION Right     BREAST LUMPECTOMY Right     over 10 yrs ago, ex bx/ benign    COLONOSCOPY N/A 6/25/2019    Procedure: COLONOSCOPY;  Surgeon: Micheline Flores MD;  Location: [facility];  Service: Endoscopy;  Laterality: N/A;  RX XARELTO ok to hold (2 days)  per Dr. Naik see scan 3/20/19    ENDOSCOPIC ULTRASOUND OF UPPER GASTROINTESTINAL TRACT N/A 1/26/2021    Procedure: ULTRASOUND-ENDOSCOPIC-UPPER;  Surgeon: Stevenson Philippe MD;  Location: [facility];  Service: Endoscopy;  Laterality: N/A;    HYSTERECTOMY      JOINT REPLACEMENT Bilateral     knees    OOPHORECTOMY      rt.knee surgery 2016      TOTAL KNEE ARTHROPLASTY Left 2/19/2019    Procedure: ARTHROPLASTY, KNEE, TOTAL;  Surgeon: Med Hanson MD;  Location: [facility] OR;  Service: Orthopedics;  Laterality: Left;  10AM START PER  PER JAYLIN TEXT @ 8:34AM ON 2-18-19  SUPINE  HARRIS LOYA [phone] TEXTED HIM ON 1-24-19 @ 7:57AM  RN PRE OP 2-13-19--BMI--48.9    underarm gland\ Bilateral      Family History   Problem Relation Age of Onset    Diabetes Mother     Hypertension Mother     Cancer Mother     No Known Problems Father     No Known Problems Sister     Diabetes Brother     Cancer Brother     No Known Problems Maternal Aunt     No Known Problems Maternal Uncle     No Known Problems Paternal Aunt     No Known Problems Paternal Uncle     No Known Problems Maternal Grandmother     No Known Problems Maternal Grandfather     No Known Problems Paternal Grandmother     No Known Problems Paternal Grandfather     No Known Problems Other     Amblyopia Neg Hx     Blindness Neg Hx     Cataracts Neg Hx     Glaucoma Neg Hx     Macular degeneration Neg Hx     Retinal detachment Neg Hx     Strabismus Neg Hx     Stroke Neg Hx     Thyroid disease Neg Hx      Social History     Socioeconomic History    Marital status:    Tobacco Use    Smoking status: Every Day     Packs/day: 0.50     Years: 47.00     Pack years: 23.50     Types: Cigarettes     Start date: 1973    Smokeless tobacco: Current   Substance and Sexual Activity    Alcohol use: No    Drug use: No    Sexual activity: Yes     Partners: Male     Social Determinants of Health     Financial Resource Strain: Unknown    Difficulty of Paying Living Expenses: Patient  refused   Food Insecurity: Unknown    Worried About Running Out of Food in the Last Year: Patient refused    Ran Out of Food in the Last Year: Patient refused   Transportation Needs: Unknown    Lack of Transportation (Medical): Patient refused    Lack of Transportation (Non-Medical): Patient refused   Physical Activity: Insufficiently Active    Days of Exercise per Week: 1 day    Minutes of Exercise per Session: 10 min   Stress: Unknown    Feeling of Stress : Patient refused   Social Connections: Unknown    Frequency of Communication with Friends and Family: Patient refused    Frequency of Social Gatherings with Friends and Family: Patient refused    Active Member of Clubs or Organizations: Patient refused    Attends Club or Organization Meetings: Patient refused    Marital Status:    Housing Stability: Unknown    Unable to Pay for Housing in the Last Year: Patient refused    Number of Places Lived in the Last Year: 2    Unstable Housing in the Last Year: Patient refused       Current Outpatient Medications:     albuterol (PROVENTIL/VENTOLIN HFA) 90 mcg/actuation inhaler, 2 puffs every 6 (six) hours as needed., Disp: , Rfl:     albuterol-ipratropium (DUO-NEB) 2.5 mg-0.5 mg/3 mL nebulizer solution, Take 3 mLs by nebulization every 6 (six) hours as needed for Wheezing. Rescue, Disp: 75 mL, Rfl: 0    atorvastatin (LIPITOR) 40 MG tablet, TAKE 1 TABLET(40 MG) BY MOUTH EVERY DAY, Disp: 90 tablet, Rfl: 1    buPROPion (WELLBUTRIN XL) 150 MG TB24 tablet, Take 1 tablet (150 mg total) by mouth once daily., Disp: 90 tablet, Rfl: 1    cetirizine (ZYRTEC) 10 MG tablet, Take 1 tablet (10 mg total) by mouth once daily., Disp: 90 tablet, Rfl: 3    fluticasone propionate (FLONASE) 50 mcg/actuation nasal spray, SHAKE LIQUID AND USE 2 SPRAYS(100 MCG) IN EACH NOSTRIL EVERY DAY, Disp: 48 g, Rfl: 0    furosemide (LASIX) 40 MG tablet, Take 1 tablet (40 mg total) by mouth once daily., Disp: 180 tablet, Rfl: 0    gabapentin  "(NEURONTIN) 100 MG capsule, Take 1 capsule (100 mg total) by mouth nightly as needed., Disp: 90 capsule, Rfl: 1    losartan (COZAAR) 25 MG tablet, TAKE 1 TABLET(25 MG) BY MOUTH EVERY DAY, Disp: 90 tablet, Rfl: 3    metoprolol succinate (TOPROL-XL) 100 MG 24 hr tablet, TAKE 1 TABLET(100 MG) BY MOUTH EVERY DAY, Disp: 90 tablet, Rfl: 3    sertraline (ZOLOFT) 100 MG tablet, Take 1 tablet (100 mg total) by mouth once daily., Disp: 90 tablet, Rfl: 3    SPIRIVA RESPIMAT 2.5 mcg/actuation inhaler, INHALE 2 PUFFS BY MOUTH INTO THE LUNGS DAILY( CONTROLLER), Disp: 4 g, Rfl: 5    triamcinolone acetonide 0.1% (KENALOG) 0.1 % ointment, APPLY BETWEEN THE KNEES AND UPPER THIGHS TWICE DAILY FOR NO MORE THAN 7 TO 14 DAYS, Disp: 454 g, Rfl: 1    XARELTO 20 mg Tab, Take 1 tablet (20 mg total) by mouth once daily., Disp: 90 tablet, Rfl: 1    azelastine (ASTELIN) 137 mcg (0.1 %) nasal spray, 1 spray (137 mcg total) by Nasal route 2 (two) times daily., Disp: 30 mL, Rfl: 3    ketoconazole (NIZORAL) 2 % cream, Apply topically once daily., Disp: 60 g, Rfl: 2    predniSONE (DELTASONE) 10 MG tablet, Take 2 tablets (20 mg total) by mouth once daily for 5 days, THEN 1 tablet (10 mg total) once daily for 5 days., Disp: 15 tablet, Rfl: 0   Objective:      Vitals:    04/13/23 0944   BP: 134/82   BP Location: Left arm   Patient Position: Sitting   BP Method: Small (Manual)   Pulse: 69   Resp: 18   Temp: 98.3 °F (36.8 °C)   TempSrc: Oral   SpO2: 95%   Weight: 123.7 kg (272 lb 11.3 oz)   Height: 5' 3" (1.6 m)       Physical Exam  Constitutional:       General: She is not in acute distress.     Appearance: She is not diaphoretic.   HENT:      Head: Normocephalic and atraumatic.   Eyes:      Conjunctiva/sclera: Conjunctivae normal.   Pulmonary:      Effort: Pulmonary effort is normal.      Breath sounds: Examination of the right-lower field reveals decreased breath sounds. Examination of the left-lower field reveals decreased breath sounds. Decreased " breath sounds present.   Musculoskeletal:      Cervical back: Neck supple.   Skin:     Findings: No rash.   Neurological:      Mental Status: She is alert and oriented to person, place, and time.   Psychiatric:         Behavior: Behavior normal.         Thought Content: Thought content normal.         Judgment: Judgment normal.          Assessment:       1. COPD exacerbation    2. Fungal rash of torso    3. Allergic rhinitis, unspecified seasonality, unspecified trigger    4. Chronic kidney disease, stage 3a    5. Type 2 diabetes mellitus with diabetic cataract, without long-term current use of insulin        Plan:       COPD exacerbation  -     predniSONE (DELTASONE) 10 MG tablet; Take 2 tablets (20 mg total) by mouth once daily for 5 days, THEN 1 tablet (10 mg total) once daily for 5 days.  Dispense: 15 tablet; Refill: 0    Fungal rash of torso  -     ketoconazole (NIZORAL) 2 % cream; Apply topically once daily.  Dispense: 60 g; Refill: 2    Allergic rhinitis, unspecified seasonality, unspecified trigger  -     azelastine (ASTELIN) 137 mcg (0.1 %) nasal spray; 1 spray (137 mcg total) by Nasal route 2 (two) times daily.  Dispense: 30 mL; Refill: 3    Chronic kidney disease, stage 3a  -     Comprehensive Metabolic Panel; Future; Expected date: 04/13/2023    Type 2 diabetes mellitus with diabetic cataract, without long-term current use of insulin  -     Microalbumin/Creatinine Ratio, Urine; Future; Expected date: 04/13/2023  -     Hemoglobin A1C; Future; Expected date: 04/13/2023  -     Comprehensive Metabolic Panel; Future; Expected date: 04/13/2023      Given longer course of steroids.  As patient take inhaler and nebs.  Follow-up next month for diabetes check.        Future Appointments   Date Time Provider Department Center   5/12/2023 10:00 AM [facility], [facility] [place]   5/12/2023 10:15 AM SPECIMEN, [facility] SPECLAB [place]   5/16/2023  9:00 AM Kuldeep Miller MD [facility] MED [place]   5/16/2023 11:30  AM Maynor Hartmann MD [facility] URO [facility]   8/2/2023 11:00 AM [facility]       Patient note was created using [employer].  Any errors in syntax or even information may not have been identified and edited on initial review prior to signing this note.

## 2023-05-07 DIAGNOSIS — F32.0 CURRENT MILD EPISODE OF MAJOR DEPRESSIVE DISORDER WITHOUT PRIOR EPISODE: ICD-10-CM

## 2023-05-07 DIAGNOSIS — R60.0 BILATERAL LOWER EXTREMITY EDEMA: ICD-10-CM

## 2023-05-08 DIAGNOSIS — I48.20 CHRONIC ATRIAL FIBRILLATION: ICD-10-CM

## 2023-05-08 RX ORDER — METOPROLOL SUCCINATE 100 MG/1
100 TABLET, EXTENDED RELEASE ORAL DAILY
Qty: 90 TABLET | Refills: 3 | Status: CANCELLED | OUTPATIENT
Start: 2023-05-08

## 2023-05-08 NOTE — TELEPHONE ENCOUNTER
No care due was identified.  [facility] Embedded Care Due Messages. Reference number: [id number].   5/08/2023 8:44:31 AM CDT

## 2023-05-09 ENCOUNTER — PATIENT MESSAGE (OUTPATIENT)
Dept: CARDIOLOGY | Facility: CLINIC | Age: 68
End: 2023-05-09
Payer: COMMERCIAL

## 2023-05-09 DIAGNOSIS — I48.20 CHRONIC ATRIAL FIBRILLATION: ICD-10-CM

## 2023-05-09 RX ORDER — BUPROPION HYDROCHLORIDE 150 MG/1
150 TABLET ORAL DAILY
Qty: 90 TABLET | Refills: 1 | Status: SHIPPED | OUTPATIENT
Start: 2023-05-09 | End: 2023-10-27

## 2023-05-09 RX ORDER — FUROSEMIDE 40 MG/1
40 TABLET ORAL DAILY
Qty: 90 TABLET | Refills: 3 | Status: SHIPPED | OUTPATIENT
Start: 2023-05-09

## 2023-05-09 NOTE — TELEPHONE ENCOUNTER
No care due was identified.  [facility] Embedded Care Due Messages. Reference number: [id number].   5/09/2023 5:20:56 PM CDT

## 2023-05-11 RX ORDER — METOPROLOL SUCCINATE 100 MG/1
100 TABLET, EXTENDED RELEASE ORAL DAILY
Qty: 90 TABLET | Refills: 3 | Status: SHIPPED | OUTPATIENT
Start: 2023-05-11 | End: 2023-05-15 | Stop reason: SDUPTHER

## 2023-05-12 ENCOUNTER — LAB VISIT (OUTPATIENT)
Dept: LAB | Facility: HOSPITAL | Age: 68
End: 2023-05-12
Attending: FAMILY MEDICINE
Payer: COMMERCIAL

## 2023-05-12 DIAGNOSIS — N18.31 CHRONIC KIDNEY DISEASE, STAGE 3A: ICD-10-CM

## 2023-05-12 DIAGNOSIS — E11.36 TYPE 2 DIABETES MELLITUS WITH DIABETIC CATARACT, WITHOUT LONG-TERM CURRENT USE OF INSULIN: ICD-10-CM

## 2023-05-12 LAB
ALBUMIN SERPL BCP-MCNC: 3.4 G/DL (ref 3.5–5.2)
ALP SERPL-CCNC: 98 U/L (ref 55–135)
ALT SERPL W/O P-5'-P-CCNC: 19 U/L (ref 10–44)
ANION GAP SERPL CALC-SCNC: 11 MMOL/L (ref 8–16)
AST SERPL-CCNC: 19 U/L (ref 10–40)
BILIRUB SERPL-MCNC: 0.5 MG/DL (ref 0.1–1)
BUN SERPL-MCNC: 35 MG/DL (ref 8–23)
CALCIUM SERPL-MCNC: 9.7 MG/DL (ref 8.7–10.5)
CHLORIDE SERPL-SCNC: 99 MMOL/L (ref 95–110)
CO2 SERPL-SCNC: 31 MMOL/L (ref 23–29)
CREAT SERPL-MCNC: 1.3 MG/DL (ref 0.5–1.4)
EST. GFR  (NO RACE VARIABLE): 44.5 ML/MIN/1.73 M^2
ESTIMATED AVG GLUCOSE: 117 MG/DL (ref 68–131)
GLUCOSE SERPL-MCNC: 83 MG/DL (ref 70–110)
HBA1C MFR BLD: 5.7 % (ref 4–5.6)
POTASSIUM SERPL-SCNC: 4.8 MMOL/L (ref 3.5–5.1)
PROT SERPL-MCNC: 7.1 G/DL (ref 6–8.4)
SODIUM SERPL-SCNC: 141 MMOL/L (ref 136–145)

## 2023-05-12 PROCEDURE — 80053 COMPREHEN METABOLIC PANEL: CPT | Performed by: FAMILY MEDICINE

## 2023-05-12 PROCEDURE — 36415 COLL VENOUS BLD VENIPUNCTURE: CPT | Mod: PO | Performed by: FAMILY MEDICINE

## 2023-05-12 PROCEDURE — 83036 HEMOGLOBIN GLYCOSYLATED A1C: CPT | Performed by: FAMILY MEDICINE

## 2023-05-15 DIAGNOSIS — I48.20 CHRONIC ATRIAL FIBRILLATION: ICD-10-CM

## 2023-05-15 RX ORDER — METOPROLOL SUCCINATE 100 MG/1
100 TABLET, EXTENDED RELEASE ORAL DAILY
Qty: 90 TABLET | Refills: 3 | Status: SHIPPED | OUTPATIENT
Start: 2023-05-15 | End: 2023-07-03 | Stop reason: SDUPTHER

## 2023-05-16 ENCOUNTER — OFFICE VISIT (OUTPATIENT)
Dept: UROLOGY | Facility: CLINIC | Age: 68
End: 2023-05-16
Payer: COMMERCIAL

## 2023-05-16 ENCOUNTER — OFFICE VISIT (OUTPATIENT)
Dept: FAMILY MEDICINE | Facility: CLINIC | Age: 68
End: 2023-05-16
Payer: COMMERCIAL

## 2023-05-16 VITALS
OXYGEN SATURATION: 93 % | BODY MASS INDEX: 49.61 KG/M2 | WEIGHT: 280 LBS | HEIGHT: 63 IN | HEART RATE: 87 BPM | TEMPERATURE: 99 F | DIASTOLIC BLOOD PRESSURE: 82 MMHG | SYSTOLIC BLOOD PRESSURE: 130 MMHG | RESPIRATION RATE: 18 BRPM

## 2023-05-16 VITALS — BODY MASS INDEX: 49.52 KG/M2 | WEIGHT: 279.56 LBS

## 2023-05-16 DIAGNOSIS — R73.03 PRE-DIABETES: ICD-10-CM

## 2023-05-16 DIAGNOSIS — E78.2 MIXED HYPERLIPIDEMIA: ICD-10-CM

## 2023-05-16 DIAGNOSIS — Z12.31 ENCOUNTER FOR SCREENING MAMMOGRAM FOR BREAST CANCER: ICD-10-CM

## 2023-05-16 DIAGNOSIS — N30.20 CHRONIC CYSTITIS: Primary | ICD-10-CM

## 2023-05-16 DIAGNOSIS — J44.1 COPD EXACERBATION: Primary | ICD-10-CM

## 2023-05-16 DIAGNOSIS — I73.9 PVD (PERIPHERAL VASCULAR DISEASE): ICD-10-CM

## 2023-05-16 DIAGNOSIS — I10 ESSENTIAL HYPERTENSION: ICD-10-CM

## 2023-05-16 PROCEDURE — 3044F PR MOST RECENT HEMOGLOBIN A1C LEVEL <7.0%: ICD-10-PCS | Mod: CPTII,S$GLB,, | Performed by: UROLOGY

## 2023-05-16 PROCEDURE — 3288F FALL RISK ASSESSMENT DOCD: CPT | Mod: CPTII,S$GLB,, | Performed by: FAMILY MEDICINE

## 2023-05-16 PROCEDURE — 3066F NEPHROPATHY DOC TX: CPT | Mod: CPTII,S$GLB,, | Performed by: UROLOGY

## 2023-05-16 PROCEDURE — 99213 PR OFFICE/OUTPT VISIT, EST, LEVL III, 20-29 MIN: ICD-10-PCS | Mod: S$GLB,,, | Performed by: UROLOGY

## 2023-05-16 PROCEDURE — 3072F PR LOW RISK FOR RETINOPATHY: ICD-10-PCS | Mod: CPTII,S$GLB,, | Performed by: FAMILY MEDICINE

## 2023-05-16 PROCEDURE — 1101F PR PT FALLS ASSESS DOC 0-1 FALLS W/OUT INJ PAST YR: ICD-10-PCS | Mod: CPTII,S$GLB,, | Performed by: FAMILY MEDICINE

## 2023-05-16 PROCEDURE — 3075F PR MOST RECENT SYSTOLIC BLOOD PRESS GE 130-139MM HG: ICD-10-PCS | Mod: CPTII,S$GLB,, | Performed by: FAMILY MEDICINE

## 2023-05-16 PROCEDURE — 3288F FALL RISK ASSESSMENT DOCD: CPT | Mod: CPTII,S$GLB,, | Performed by: UROLOGY

## 2023-05-16 PROCEDURE — 3008F BODY MASS INDEX DOCD: CPT | Mod: CPTII,S$GLB,, | Performed by: UROLOGY

## 2023-05-16 PROCEDURE — 1160F RVW MEDS BY RX/DR IN RCRD: CPT | Mod: CPTII,S$GLB,, | Performed by: UROLOGY

## 2023-05-16 PROCEDURE — 3008F PR BODY MASS INDEX (BMI) DOCUMENTED: ICD-10-PCS | Mod: CPTII,S$GLB,, | Performed by: UROLOGY

## 2023-05-16 PROCEDURE — 99999 PR PBB SHADOW E&M-EST. PATIENT-LVL V: CPT | Mod: PBBFAC,,, | Performed by: FAMILY MEDICINE

## 2023-05-16 PROCEDURE — 3060F PR POS MICROALBUMINURIA RESULT DOCUMENTED/REVIEW: ICD-10-PCS | Mod: CPTII,S$GLB,, | Performed by: FAMILY MEDICINE

## 2023-05-16 PROCEDURE — 3075F SYST BP GE 130 - 139MM HG: CPT | Mod: CPTII,S$GLB,, | Performed by: FAMILY MEDICINE

## 2023-05-16 PROCEDURE — 3066F PR DOCUMENTATION OF TREATMENT FOR NEPHROPATHY: ICD-10-PCS | Mod: CPTII,S$GLB,, | Performed by: FAMILY MEDICINE

## 2023-05-16 PROCEDURE — 1101F PT FALLS ASSESS-DOCD LE1/YR: CPT | Mod: CPTII,S$GLB,, | Performed by: UROLOGY

## 2023-05-16 PROCEDURE — 3072F LOW RISK FOR RETINOPATHY: CPT | Mod: CPTII,S$GLB,, | Performed by: FAMILY MEDICINE

## 2023-05-16 PROCEDURE — 3060F PR POS MICROALBUMINURIA RESULT DOCUMENTED/REVIEW: ICD-10-PCS | Mod: CPTII,S$GLB,, | Performed by: UROLOGY

## 2023-05-16 PROCEDURE — 1101F PR PT FALLS ASSESS DOC 0-1 FALLS W/OUT INJ PAST YR: ICD-10-PCS | Mod: CPTII,S$GLB,, | Performed by: UROLOGY

## 2023-05-16 PROCEDURE — 4010F ACE/ARB THERAPY RXD/TAKEN: CPT | Mod: CPTII,S$GLB,, | Performed by: UROLOGY

## 2023-05-16 PROCEDURE — 4010F ACE/ARB THERAPY RXD/TAKEN: CPT | Mod: CPTII,S$GLB,, | Performed by: FAMILY MEDICINE

## 2023-05-16 PROCEDURE — 3044F HG A1C LEVEL LT 7.0%: CPT | Mod: CPTII,S$GLB,, | Performed by: FAMILY MEDICINE

## 2023-05-16 PROCEDURE — 3044F HG A1C LEVEL LT 7.0%: CPT | Mod: CPTII,S$GLB,, | Performed by: UROLOGY

## 2023-05-16 PROCEDURE — 1101F PT FALLS ASSESS-DOCD LE1/YR: CPT | Mod: CPTII,S$GLB,, | Performed by: FAMILY MEDICINE

## 2023-05-16 PROCEDURE — 1125F PR PAIN SEVERITY QUANTIFIED, PAIN PRESENT: ICD-10-PCS | Mod: CPTII,S$GLB,, | Performed by: FAMILY MEDICINE

## 2023-05-16 PROCEDURE — 1125F AMNT PAIN NOTED PAIN PRSNT: CPT | Mod: CPTII,S$GLB,, | Performed by: FAMILY MEDICINE

## 2023-05-16 PROCEDURE — 3288F PR FALLS RISK ASSESSMENT DOCUMENTED: ICD-10-PCS | Mod: CPTII,S$GLB,, | Performed by: FAMILY MEDICINE

## 2023-05-16 PROCEDURE — 1159F MED LIST DOCD IN RCRD: CPT | Mod: CPTII,S$GLB,, | Performed by: UROLOGY

## 2023-05-16 PROCEDURE — 3288F PR FALLS RISK ASSESSMENT DOCUMENTED: ICD-10-PCS | Mod: CPTII,S$GLB,, | Performed by: UROLOGY

## 2023-05-16 PROCEDURE — 3008F PR BODY MASS INDEX (BMI) DOCUMENTED: ICD-10-PCS | Mod: CPTII,S$GLB,, | Performed by: FAMILY MEDICINE

## 2023-05-16 PROCEDURE — 1160F PR REVIEW ALL MEDS BY PRESCRIBER/CLIN PHARMACIST DOCUMENTED: ICD-10-PCS | Mod: CPTII,S$GLB,, | Performed by: UROLOGY

## 2023-05-16 PROCEDURE — 1126F PR PAIN SEVERITY QUANTIFIED, NO PAIN PRESENT: ICD-10-PCS | Mod: CPTII,S$GLB,, | Performed by: UROLOGY

## 2023-05-16 PROCEDURE — 3060F POS MICROALBUMINURIA REV: CPT | Mod: CPTII,S$GLB,, | Performed by: UROLOGY

## 2023-05-16 PROCEDURE — 3072F PR LOW RISK FOR RETINOPATHY: ICD-10-PCS | Mod: CPTII,S$GLB,, | Performed by: UROLOGY

## 2023-05-16 PROCEDURE — 1126F AMNT PAIN NOTED NONE PRSNT: CPT | Mod: CPTII,S$GLB,, | Performed by: UROLOGY

## 2023-05-16 PROCEDURE — 3079F PR MOST RECENT DIASTOLIC BLOOD PRESSURE 80-89 MM HG: ICD-10-PCS | Mod: CPTII,S$GLB,, | Performed by: FAMILY MEDICINE

## 2023-05-16 PROCEDURE — 99214 PR OFFICE/OUTPT VISIT, EST, LEVL IV, 30-39 MIN: ICD-10-PCS | Mod: S$GLB,,, | Performed by: FAMILY MEDICINE

## 2023-05-16 PROCEDURE — 99214 OFFICE O/P EST MOD 30 MIN: CPT | Mod: S$GLB,,, | Performed by: FAMILY MEDICINE

## 2023-05-16 PROCEDURE — 3072F LOW RISK FOR RETINOPATHY: CPT | Mod: CPTII,S$GLB,, | Performed by: UROLOGY

## 2023-05-16 PROCEDURE — 3044F PR MOST RECENT HEMOGLOBIN A1C LEVEL <7.0%: ICD-10-PCS | Mod: CPTII,S$GLB,, | Performed by: FAMILY MEDICINE

## 2023-05-16 PROCEDURE — 4010F PR ACE/ARB THEARPY RXD/TAKEN: ICD-10-PCS | Mod: CPTII,S$GLB,, | Performed by: UROLOGY

## 2023-05-16 PROCEDURE — 3008F BODY MASS INDEX DOCD: CPT | Mod: CPTII,S$GLB,, | Performed by: FAMILY MEDICINE

## 2023-05-16 PROCEDURE — 3079F DIAST BP 80-89 MM HG: CPT | Mod: CPTII,S$GLB,, | Performed by: FAMILY MEDICINE

## 2023-05-16 PROCEDURE — 3060F POS MICROALBUMINURIA REV: CPT | Mod: CPTII,S$GLB,, | Performed by: FAMILY MEDICINE

## 2023-05-16 PROCEDURE — 99213 OFFICE O/P EST LOW 20 MIN: CPT | Mod: S$GLB,,, | Performed by: UROLOGY

## 2023-05-16 PROCEDURE — 1159F PR MEDICATION LIST DOCUMENTED IN MEDICAL RECORD: ICD-10-PCS | Mod: CPTII,S$GLB,, | Performed by: UROLOGY

## 2023-05-16 PROCEDURE — 99999 PR PBB SHADOW E&M-EST. PATIENT-LVL III: ICD-10-PCS | Mod: PBBFAC,,, | Performed by: UROLOGY

## 2023-05-16 PROCEDURE — 99999 PR PBB SHADOW E&M-EST. PATIENT-LVL V: ICD-10-PCS | Mod: PBBFAC,,, | Performed by: FAMILY MEDICINE

## 2023-05-16 PROCEDURE — 3066F NEPHROPATHY DOC TX: CPT | Mod: CPTII,S$GLB,, | Performed by: FAMILY MEDICINE

## 2023-05-16 PROCEDURE — 4010F PR ACE/ARB THEARPY RXD/TAKEN: ICD-10-PCS | Mod: CPTII,S$GLB,, | Performed by: FAMILY MEDICINE

## 2023-05-16 PROCEDURE — 99999 PR PBB SHADOW E&M-EST. PATIENT-LVL III: CPT | Mod: PBBFAC,,, | Performed by: UROLOGY

## 2023-05-16 PROCEDURE — 3066F PR DOCUMENTATION OF TREATMENT FOR NEPHROPATHY: ICD-10-PCS | Mod: CPTII,S$GLB,, | Performed by: UROLOGY

## 2023-05-16 NOTE — PROGRESS NOTES
Health Maintenance Due   Topic     Foot Exam  Consult pcp    COVID-19 Vaccine (5 - Booster for Pfizer series) Not offered at this office    Mammogram  Pending order

## 2023-05-16 NOTE — PROGRESS NOTES
Subjective:       Patient ID: Sandee Evans is a 67 y.o. female who was last seen in this office 3/21/2023    Chief Complaint:   Chief Complaint   Patient presents with    Follow-up     Recurrent UTI  She has had several UTIs in the past year.  At least three.  She describes pain with voiding and pressure.  She denies hematuria.  She denies kidney stones or  procedures.  She denies history of blood clots or breast cancer.    05/16/2023  She had a cystoscopy in March 2023, this showed one area of cystitis glandularis at the dome.  She denies any dysuria or hematuria.  She has some urgency at times.         ACTIVE MEDICAL ISSUES:  Patient Active Problem List   Diagnosis    Urge incontinence    DDD (degenerative disc disease), lumbar    DJD (degenerative joint disease) of knee    Hyperlipidemia    Depressed    Insomnia    Vitamin D deficiency disease    Lumbar radicular pain    Essential hypertension    Severe obesity (BMI >= 40)    Colon polyps    Diverticulosis    Chronic pain    Pre-diabetes    Longstanding persistent atrial fibrillation    History of pancreatitis    Osteoarthritis of right knee    Depression    Type 2 diabetes mellitus with diabetic cataract, without long-term current use of insulin    Dry eye syndrome, bilateral    Nuclear sclerosis, bilateral    Refractive error    Hidradenitis suppurativa of right axilla    PVD (peripheral vascular disease)    Screen for colon cancer    Chronic bilateral low back pain without sciatica    ESQUIVEL (dyspnea on exertion)    Tobacco dependence    HIEU treated with BiPAP    Pulmonary hypertension    Tobacco abuse    Hypothyroidism    Current mild episode of major depressive disorder without prior episode    COPD with chronic bronchitis    Atelectasis    Chronic kidney disease, stage 3a    Solitary pulmonary nodule       ALLERGIES AND MEDICATIONS: updated and reviewed.  Review of patient's allergies indicates:   Allergen Reactions    Ace inhibitors      Cough        Current Outpatient Medications   Medication Sig    albuterol (PROVENTIL/VENTOLIN HFA) 90 mcg/actuation inhaler 2 puffs every 6 (six) hours as needed.    albuterol-ipratropium (DUO-NEB) 2.5 mg-0.5 mg/3 mL nebulizer solution Take 3 mLs by nebulization every 6 (six) hours as needed for Wheezing. Rescue    atorvastatin (LIPITOR) 40 MG tablet TAKE 1 TABLET(40 MG) BY MOUTH EVERY DAY    azelastine (ASTELIN) 137 mcg (0.1 %) nasal spray 1 spray (137 mcg total) by Nasal route 2 (two) times daily.    buPROPion (WELLBUTRIN XL) 150 MG TB24 tablet Take 1 tablet (150 mg total) by mouth once daily.    cetirizine (ZYRTEC) 10 MG tablet Take 1 tablet (10 mg total) by mouth once daily.    fluticasone propionate (FLONASE) 50 mcg/actuation nasal spray SHAKE LIQUID AND USE 2 SPRAYS(100 MCG) IN EACH NOSTRIL EVERY DAY    furosemide (LASIX) 40 MG tablet Take 1 tablet (40 mg total) by mouth once daily.    gabapentin (NEURONTIN) 100 MG capsule Take 1 capsule (100 mg total) by mouth nightly as needed.    ketoconazole (NIZORAL) 2 % cream Apply topically once daily.    losartan (COZAAR) 25 MG tablet TAKE 1 TABLET(25 MG) BY MOUTH EVERY DAY    metoprolol succinate (TOPROL-XL) 100 MG 24 hr tablet Take 1 tablet (100 mg total) by mouth once daily.    sertraline (ZOLOFT) 100 MG tablet Take 1 tablet (100 mg total) by mouth once daily.    SPIRIVA RESPIMAT 2.5 mcg/actuation inhaler INHALE 2 PUFFS BY MOUTH INTO THE LUNGS DAILY( CONTROLLER)    triamcinolone acetonide 0.1% (KENALOG) 0.1 % ointment APPLY BETWEEN THE KNEES AND UPPER THIGHS TWICE DAILY FOR NO MORE THAN 7 TO 14 DAYS    XARELTO 20 mg Tab Take 1 tablet (20 mg total) by mouth once daily.     No current facility-administered medications for this visit.       Review of Systems   Constitutional:  Negative for chills, fatigue and fever.   Respiratory:  Negative for chest tightness and shortness of breath.    Cardiovascular:  Negative for chest pain.   Gastrointestinal:  Negative for abdominal  distention, constipation, nausea and vomiting.   Genitourinary:  Positive for urgency. Negative for difficulty urinating, dysuria, flank pain, frequency and hematuria.   Musculoskeletal:  Negative for arthralgias.   Neurological:  Negative for light-headedness.   Psychiatric/Behavioral:  Negative for confusion.      Objective:      Vitals:    05/16/23 1140   Weight: 126.8 kg (279 lb 8.7 oz)     Physical Exam  Vitals and nursing note reviewed.   Constitutional:       Appearance: She is well-developed.   HENT:      Head: Normocephalic.   Eyes:      Conjunctiva/sclera: Conjunctivae normal.   Neck:      Thyroid: No thyromegaly.      Trachea: No tracheal deviation.   Cardiovascular:      Rate and Rhythm: Normal rate.      Pulses: Normal pulses.      Heart sounds: Normal heart sounds.   Pulmonary:      Effort: Pulmonary effort is normal. No respiratory distress.      Breath sounds: Normal breath sounds. No wheezing.   Abdominal:      General: There is no distension.      Palpations: Abdomen is soft. There is no mass.      Tenderness: There is no abdominal tenderness. There is no guarding or rebound.      Hernia: No hernia is present.   Musculoskeletal:         General: No tenderness. Normal range of motion.      Cervical back: Normal range of motion.   Lymphadenopathy:      Cervical: No cervical adenopathy.   Skin:     General: Skin is warm and dry.      Findings: No erythema or rash.   Neurological:      Mental Status: She is alert and oriented to person, place, and time.   Psychiatric:         Behavior: Behavior normal.         Thought Content: Thought content normal.         Judgment: Judgment normal.       Urine dipstick shows not done.  Micro exam: negative for WBC's or RBC's.    Assessment:       1. Chronic cystitis          Plan:       1. Chronic cystitis  We talked about estrogen cream or daily antibiotic.   She wants to wait for now  Drink a lot of water, avoid constipation            Follow up in about 6 months  (around 11/16/2023).

## 2023-05-16 NOTE — PROGRESS NOTES
Subjective:       Patient ID: Sandee Evans is a 67 y.o. female.    Chief Complaint: Follow-up      Follow-up    67-year-old female presents for one-month follow-up.  Was treated for COPD exacerbation.  Patient had labs prior to exam.  Denies any issues at this time.        Review of Systems   Constitutional: Negative.    HENT: Negative.     Respiratory: Negative.     Cardiovascular: Negative.    Gastrointestinal: Negative.    Endocrine: Negative.    Genitourinary: Negative.    Musculoskeletal: Negative.    Neurological: Negative.    Psychiatric/Behavioral: Negative.          Past Medical History:   Diagnosis Date    Anticoagulant long-term use     Xarelto    Arthritis     Atrial fibrillation     Breast cyst     CHF (congestive heart failure)     Degenerative disc disease     Edema     HLD (hyperlipidemia)     Hx of psychiatric care     Hyperlipidemia     Hypertension     Hypothyroidism     Nuclear sclerosis, bilateral 12/18/2017    Obesity, morbid     HIEU (obstructive sleep apnea)     Other abnormal glucose     pre-diabetes    Pre-diabetes     Psychiatric problem     Requires assistance with activities of daily living (ADL)     Sleep apnea     Smoker     SOB (shortness of breath)     SOB (shortness of breath)     Thyroid disease     on meds 8-9 years ago. hypothyroidism.no malignancy    TMJ (temporomandibular joint disorder)     jaw clicking    Tobacco abuse     Unsteady gait     Urge incontinence     Weakness generalized     Wears glasses      Past Surgical History:   Procedure Laterality Date    BREAST BIOPSY Right     over 10 yrs ago/ benign    BREAST CYST EXCISION Right     BREAST LUMPECTOMY Right     over 10 yrs ago, ex bx/ benign    COLONOSCOPY N/A 6/25/2019    Procedure: COLONOSCOPY;  Surgeon: Micheline Flores MD;  Location: [facility];  Service: Endoscopy;  Laterality: N/A;  RX XARELTO ok to hold (2 days) per Dr. Naik see scan 3/20/19    ENDOSCOPIC ULTRASOUND OF UPPER GASTROINTESTINAL TRACT N/A  1/26/2021    Procedure: ULTRASOUND-ENDOSCOPIC-UPPER;  Surgeon: Stevenson Philippe MD;  Location: [facility] ENDO;  Service: Endoscopy;  Laterality: N/A;    HYSTERECTOMY      JOINT REPLACEMENT Bilateral     knees    OOPHORECTOMY      rt.knee surgery 2016      TOTAL KNEE ARTHROPLASTY Left 2/19/2019    Procedure: ARTHROPLASTY, KNEE, TOTAL;  Surgeon: Med Hanson MD;  Location: [facility] OR;  Service: Orthopedics;  Laterality: Left;  10AM START PER  PER JAYLIN TEXT @ 8:34AM ON 2-18-19  SUPINE  HARRIS LOYA [phone] TEXTED HIM ON 1-24-19 @ 7:57AM  RN PRE OP 2-13-19--BMI--48.9    underarm gland\ Bilateral      Family History   Problem Relation Age of Onset    Diabetes Mother     Hypertension Mother     Cancer Mother     No Known Problems Father     No Known Problems Sister     Diabetes Brother     Cancer Brother     No Known Problems Maternal Aunt     No Known Problems Maternal Uncle     No Known Problems Paternal Aunt     No Known Problems Paternal Uncle     No Known Problems Maternal Grandmother     No Known Problems Maternal Grandfather     No Known Problems Paternal Grandmother     No Known Problems Paternal Grandfather     No Known Problems Other     Amblyopia Neg Hx     Blindness Neg Hx     Cataracts Neg Hx     Glaucoma Neg Hx     Macular degeneration Neg Hx     Retinal detachment Neg Hx     Strabismus Neg Hx     Stroke Neg Hx     Thyroid disease Neg Hx      Social History     Socioeconomic History    Marital status:    Tobacco Use    Smoking status: Every Day     Packs/day: 0.50     Years: 47.00     Pack years: 23.50     Types: Cigarettes     Start date: 1973    Smokeless tobacco: Current   Substance and Sexual Activity    Alcohol use: No    Drug use: No    Sexual activity: Yes     Partners: Male     Social Determinants of Health     Financial Resource Strain: Unknown    Difficulty of Paying Living Expenses: Patient refused   Food Insecurity: Unknown    Worried About Running Out of Food in the Last Year:  Patient refused    Ran Out of Food in the Last Year: Patient refused   Transportation Needs: Unknown    Lack of Transportation (Medical): Patient refused    Lack of Transportation (Non-Medical): Patient refused   Physical Activity: Insufficiently Active    Days of Exercise per Week: 1 day    Minutes of Exercise per Session: 10 min   Stress: Stress Concern Present    Feeling of Stress : Rather much   Social Connections: Unknown    Frequency of Communication with Friends and Family: Twice a week    Frequency of Social Gatherings with Friends and Family: Once a week    Active Member of Clubs or Organizations: Yes    Attends Club or Organization Meetings: 1 to 4 times per year    Marital Status:    Housing Stability: Unknown    Unable to Pay for Housing in the Last Year: Patient refused    Number of Places Lived in the Last Year: 2    Unstable Housing in the Last Year: Patient refused       Current Outpatient Medications:     albuterol (PROVENTIL/VENTOLIN HFA) 90 mcg/actuation inhaler, 2 puffs every 6 (six) hours as needed., Disp: , Rfl:     albuterol-ipratropium (DUO-NEB) 2.5 mg-0.5 mg/3 mL nebulizer solution, Take 3 mLs by nebulization every 6 (six) hours as needed for Wheezing. Rescue, Disp: 75 mL, Rfl: 0    atorvastatin (LIPITOR) 40 MG tablet, TAKE 1 TABLET(40 MG) BY MOUTH EVERY DAY, Disp: 90 tablet, Rfl: 1    azelastine (ASTELIN) 137 mcg (0.1 %) nasal spray, 1 spray (137 mcg total) by Nasal route 2 (two) times daily., Disp: 30 mL, Rfl: 3    buPROPion (WELLBUTRIN XL) 150 MG TB24 tablet, Take 1 tablet (150 mg total) by mouth once daily., Disp: 90 tablet, Rfl: 1    cetirizine (ZYRTEC) 10 MG tablet, Take 1 tablet (10 mg total) by mouth once daily., Disp: 90 tablet, Rfl: 3    fluticasone propionate (FLONASE) 50 mcg/actuation nasal spray, SHAKE LIQUID AND USE 2 SPRAYS(100 MCG) IN EACH NOSTRIL EVERY DAY, Disp: 48 g, Rfl: 0    furosemide (LASIX) 40 MG tablet, Take 1 tablet (40 mg total) by mouth once daily., Disp:  "90 tablet, Rfl: 3    gabapentin (NEURONTIN) 100 MG capsule, Take 1 capsule (100 mg total) by mouth nightly as needed., Disp: 90 capsule, Rfl: 1    ketoconazole (NIZORAL) 2 % cream, Apply topically once daily., Disp: 60 g, Rfl: 2    losartan (COZAAR) 25 MG tablet, TAKE 1 TABLET(25 MG) BY MOUTH EVERY DAY, Disp: 90 tablet, Rfl: 3    metoprolol succinate (TOPROL-XL) 100 MG 24 hr tablet, Take 1 tablet (100 mg total) by mouth once daily., Disp: 90 tablet, Rfl: 3    sertraline (ZOLOFT) 100 MG tablet, Take 1 tablet (100 mg total) by mouth once daily., Disp: 90 tablet, Rfl: 3    SPIRIVA RESPIMAT 2.5 mcg/actuation inhaler, INHALE 2 PUFFS BY MOUTH INTO THE LUNGS DAILY( CONTROLLER), Disp: 4 g, Rfl: 5    triamcinolone acetonide 0.1% (KENALOG) 0.1 % ointment, APPLY BETWEEN THE KNEES AND UPPER THIGHS TWICE DAILY FOR NO MORE THAN 7 TO 14 DAYS, Disp: 454 g, Rfl: 1    XARELTO 20 mg Tab, Take 1 tablet (20 mg total) by mouth once daily., Disp: 90 tablet, Rfl: 1   Objective:      Vitals:    05/16/23 0913   BP: 130/82   BP Location: Right arm   Patient Position: Sitting   BP Method: Large (Manual)   Pulse: 87   Resp: 18   Temp: 98.5 °F (36.9 °C)   TempSrc: Oral   SpO2: (!) 93%   Weight: 127 kg (279 lb 15.8 oz)   Height: 5' 3" (1.6 m)       Physical Exam  Constitutional:       General: She is not in acute distress.  HENT:      Head: Normocephalic and atraumatic.   Eyes:      Conjunctiva/sclera: Conjunctivae normal.   Cardiovascular:      Rate and Rhythm: Normal rate and regular rhythm.      Heart sounds: Normal heart sounds. No murmur heard.    No friction rub. No gallop.   Pulmonary:      Effort: Pulmonary effort is normal.      Breath sounds: Normal breath sounds. No wheezing or rales.   Musculoskeletal:      Cervical back: Neck supple.   Skin:     General: Skin is warm and dry.   Neurological:      Mental Status: She is alert and oriented to person, place, and time.   Psychiatric:         Behavior: Behavior normal.         Thought " Content: Thought content normal.         Judgment: Judgment normal.          Assessment:       1. COPD exacerbation    2. Essential hypertension    3. Mixed hyperlipidemia    4. PVD (peripheral vascular disease)    5. Pre-diabetes    6. Encounter for screening mammogram for breast cancer        Plan:       COPD exacerbation    Essential hypertension    Mixed hyperlipidemia    PVD (peripheral vascular disease)    Pre-diabetes    Encounter for screening mammogram for breast cancer  -     Mammo Digital Screening Bilat; Future; Expected date: 05/16/2023    Continues to be in prediabetes range.  COPD improved greatly.  Continue treatment.  Continue other meds.          Future Appointments   Date Time Provider Department Center   7/26/2023  8:40 AM Kuldeep Miller MD [facility]G [facility] [place]   7/26/2023 10:20 AM [facility] MAMMO2 [facility] MAMMO [facility] [place]   8/2/2023 11:00 AM Sebastian Puente MD [facility] PULSM [facility] [place]   11/17/2023  1:15 PM Maynor Hartmann MD [facility] URO [facility] [place] [place] [facility]       Patient note was created using [email or web address].  Any errors in syntax or even information may not have been identified and edited on initial review prior to signing this note.

## 2023-05-22 ENCOUNTER — TELEPHONE (OUTPATIENT)
Dept: FAMILY MEDICINE | Facility: CLINIC | Age: 68
End: 2023-05-22
Payer: COMMERCIAL

## 2023-05-22 NOTE — TELEPHONE ENCOUNTER
Informed rep My[facility] has not received any fax for pt supplies; they did not have correct fax number on file; will resend to correct fax

## 2023-05-22 NOTE — TELEPHONE ENCOUNTER
----- Message from Khalida Santana sent at 5/22/2023 11:38 AM CDT -----  Type: Patient Call Back    Who called:mabel with [facility] [phone] ref [id number]    What is the request in detail:following up cpap supply prescription. Was faxed on 5/18/23. Call mabel    Can the clinic reply by [facility]?    Would the patient rather a call back or a response via [facility]? call    Best call back number:    Additional Information:

## 2023-05-25 ENCOUNTER — TELEPHONE (OUTPATIENT)
Dept: FAMILY MEDICINE | Facility: CLINIC | Age: 68
End: 2023-05-25
Payer: COMMERCIAL

## 2023-05-25 NOTE — TELEPHONE ENCOUNTER
----- Message from Korina Li sent at 5/25/2023  9:16 AM CDT -----  .Type: Patient Call Back    Who called:Iqra    What is the request in detail: checking on CPAP supplies prescription sent May 18 th , has not gotten a response    Best call back number: [phone]    Additional Information: Ref [id number]

## 2023-05-25 NOTE — TELEPHONE ENCOUNTER
----- Message from Korina Li sent at 5/25/2023  9:16 AM CDT -----  .Type: Patient Call Back    Who called:Iqra    What is the request in detail: checking on CPAP supplies prescription sent May 18 th , has not gotten a response    Best call back number: [phone]    Additional Information: Ref [id number]

## 2023-05-25 NOTE — TELEPHONE ENCOUNTER
----- Message from Korina Li sent at 5/25/2023  9:16 AM CDT -----  .Type: Patient Call Back    Who called:Iqra    What is the request in detail: checking on CPAP supplies prescription sent May 18 th , has not gotten a response    Best call back number: [phone]    Additional Information: Ref [id number]

## 2023-06-29 ENCOUNTER — PATIENT MESSAGE (OUTPATIENT)
Dept: CARDIOLOGY | Facility: CLINIC | Age: 68
End: 2023-06-29
Payer: COMMERCIAL

## 2023-07-03 DIAGNOSIS — I48.20 CHRONIC ATRIAL FIBRILLATION: ICD-10-CM

## 2023-07-05 RX ORDER — METOPROLOL SUCCINATE 100 MG/1
100 TABLET, EXTENDED RELEASE ORAL DAILY
Qty: 90 TABLET | Refills: 3 | Status: SHIPPED | OUTPATIENT
Start: 2023-07-05

## 2023-07-19 NOTE — PROGRESS NOTES
Progress Note  Orthopedics    Admit Date: 2/19/2019  Post-operative Day: 2 Days Post-Op  Hospital Day: 3    Follow-up For: Procedure(s) (LRB):  ARTHROPLASTY, KNEE, TOTAL (Left)    SUBJECTIVE:     Sandee Evans is 63 y.o. and is now 2 days post surgery. Pain is controlled with current analgesics.. She  She is ambulating.  Weight Bearing: WBAT    A&O. AFVSS. NVI L LE.  Dressing L knee C/D/I.  BP improved.  H&H stable.  Ambulating 80 feet with PT.    OBJECTIVE:     Vital Signs (Most Recent)  Temp: 98.1 °F (36.7 °C) (02/20/19 2322)  Pulse: 85 (02/20/19 2322)  Resp: 18 (02/20/19 2322)  BP: (!) 152/81 (02/20/19 2322)  SpO2: (!) 92 % (02/20/19 2322)      Physical Exam:  Dressing: clean, dry and intact  Incision: not examined  DVT Evaluation: No evidence of DVT seen on physical exam.  Neurovascular: 5/5 motor strength, sensation intact, palpable pulses    Laboratory:  Recent Results (from the past 24 hour(s))   POCT glucose    Collection Time: 02/20/19  8:39 AM   Result Value Ref Range    POCT Glucose 144 (H) 70 - 110 mg/dL   POCT glucose    Collection Time: 02/20/19 11:58 AM   Result Value Ref Range    POCT Glucose 120 (H) 70 - 110 mg/dL   POCT glucose    Collection Time: 02/20/19  5:15 PM   Result Value Ref Range    POCT Glucose 139 (H) 70 - 110 mg/dL   POCT glucose    Collection Time: 02/20/19  7:35 PM   Result Value Ref Range    POCT Glucose 118 (H) 70 - 110 mg/dL   POCT glucose    Collection Time: 02/20/19 11:24 PM   Result Value Ref Range    POCT Glucose 130 (H) 70 - 110 mg/dL   CBC auto differential    Collection Time: 02/21/19  3:44 AM   Result Value Ref Range    WBC 9.04 3.90 - 12.70 K/uL    RBC 3.58 (L) 4.00 - 5.40 M/uL    Hemoglobin 11.0 (L) 12.0 - 16.0 g/dL    Hematocrit 35.0 (L) 37.0 - 48.5 %    MCV 98 82 - 98 fL    MCH 30.7 27.0 - 31.0 pg    MCHC 31.4 (L) 32.0 - 36.0 g/dL    RDW 14.2 11.5 - 14.5 %    Platelets 275 150 - 350 K/uL    MPV 10.8 9.2 - 12.9 fL    Gran # (ANC) 5.7 1.8 - 7.7 K/uL    Lymph # 2.4 1.0  - 4.8 K/uL    Mono # 0.8 0.3 - 1.0 K/uL    Eos # 0.1 0.0 - 0.5 K/uL    Baso # 0.01 0.00 - 0.20 K/uL    Gran% 63.2 38.0 - 73.0 %    Lymph% 26.2 18.0 - 48.0 %    Mono% 9.3 4.0 - 15.0 %    Eosinophil% 1.2 0.0 - 8.0 %    Basophil% 0.1 0.0 - 1.9 %    Differential Method Automated          ASSESSMENT/PLAN:     Assessment: orthopedic stable  Status Post: L TKA    Plan: con't with PT and DVT prophy.  Likely discharge home tomorrow with HH PT.  Will con't Teds B/L for 3 weeks.  Will con/t Xarelto 10mg po qd for 3 weeks, then resume cardiology recommended dosage of 20mg po qd for AFib.  OK to shower once home.  WBAT.  DO NOT REMOVE AQUACEL DRESSING. Will f/u in clinic 2 weeks post-op for wound check and staple removal.   Dupixent Pregnancy And Lactation Text: This medication likely crosses the placenta but the risk for the fetus is uncertain. This medication is excreted in breast milk.

## 2023-07-28 ENCOUNTER — OFFICE VISIT (OUTPATIENT)
Dept: FAMILY MEDICINE | Facility: CLINIC | Age: 68
End: 2023-07-28
Payer: COMMERCIAL

## 2023-07-28 ENCOUNTER — HOSPITAL ENCOUNTER (OUTPATIENT)
Dept: RADIOLOGY | Facility: HOSPITAL | Age: 68
Discharge: HOME | End: 2023-07-28
Attending: FAMILY MEDICINE
Payer: COMMERCIAL

## 2023-07-28 VITALS
DIASTOLIC BLOOD PRESSURE: 84 MMHG | TEMPERATURE: 98 F | HEIGHT: 63 IN | BODY MASS INDEX: 49.61 KG/M2 | RESPIRATION RATE: 18 BRPM | HEART RATE: 93 BPM | SYSTOLIC BLOOD PRESSURE: 124 MMHG | OXYGEN SATURATION: 94 % | WEIGHT: 280 LBS

## 2023-07-28 DIAGNOSIS — E78.2 MIXED HYPERLIPIDEMIA: ICD-10-CM

## 2023-07-28 DIAGNOSIS — Z00.00 ANNUAL PHYSICAL EXAM: ICD-10-CM

## 2023-07-28 DIAGNOSIS — Z12.31 ENCOUNTER FOR SCREENING MAMMOGRAM FOR BREAST CANCER: ICD-10-CM

## 2023-07-28 DIAGNOSIS — M51.36 DDD (DEGENERATIVE DISC DISEASE), LUMBAR: Primary | ICD-10-CM

## 2023-07-28 DIAGNOSIS — J44.89 COPD WITH CHRONIC BRONCHITIS: ICD-10-CM

## 2023-07-28 DIAGNOSIS — L98.9 SKIN LESION: ICD-10-CM

## 2023-07-28 DIAGNOSIS — I48.20 CHRONIC ATRIAL FIBRILLATION: ICD-10-CM

## 2023-07-28 DIAGNOSIS — I10 ESSENTIAL HYPERTENSION: ICD-10-CM

## 2023-07-28 DIAGNOSIS — B36.9 FUNGAL RASH OF TORSO: ICD-10-CM

## 2023-07-28 PROCEDURE — 3074F PR MOST RECENT SYSTOLIC BLOOD PRESSURE < 130 MM HG: ICD-10-PCS | Mod: CPTII,S$GLB,, | Performed by: FAMILY MEDICINE

## 2023-07-28 PROCEDURE — 99999 PR PBB SHADOW E&M-EST. PATIENT-LVL V: CPT | Mod: PBBFAC,,, | Performed by: FAMILY MEDICINE

## 2023-07-28 PROCEDURE — 4010F ACE/ARB THERAPY RXD/TAKEN: CPT | Mod: CPTII,S$GLB,, | Performed by: FAMILY MEDICINE

## 2023-07-28 PROCEDURE — 3060F POS MICROALBUMINURIA REV: CPT | Mod: CPTII,S$GLB,, | Performed by: FAMILY MEDICINE

## 2023-07-28 PROCEDURE — 3288F FALL RISK ASSESSMENT DOCD: CPT | Mod: CPTII,S$GLB,, | Performed by: FAMILY MEDICINE

## 2023-07-28 PROCEDURE — 77067 SCR MAMMO BI INCL CAD: CPT | Mod: 26,,, | Performed by: RADIOLOGY

## 2023-07-28 PROCEDURE — 77067 SCR MAMMO BI INCL CAD: CPT | Mod: TC

## 2023-07-28 PROCEDURE — 3044F PR MOST RECENT HEMOGLOBIN A1C LEVEL <7.0%: ICD-10-PCS | Mod: CPTII,S$GLB,, | Performed by: FAMILY MEDICINE

## 2023-07-28 PROCEDURE — 99214 PR OFFICE/OUTPT VISIT, EST, LEVL IV, 30-39 MIN: ICD-10-PCS | Mod: S$GLB,,, | Performed by: FAMILY MEDICINE

## 2023-07-28 PROCEDURE — 1159F MED LIST DOCD IN RCRD: CPT | Mod: CPTII,S$GLB,, | Performed by: FAMILY MEDICINE

## 2023-07-28 PROCEDURE — 3066F PR DOCUMENTATION OF TREATMENT FOR NEPHROPATHY: ICD-10-PCS | Mod: CPTII,S$GLB,, | Performed by: FAMILY MEDICINE

## 2023-07-28 PROCEDURE — 1125F PR PAIN SEVERITY QUANTIFIED, PAIN PRESENT: ICD-10-PCS | Mod: CPTII,S$GLB,, | Performed by: FAMILY MEDICINE

## 2023-07-28 PROCEDURE — 3072F LOW RISK FOR RETINOPATHY: CPT | Mod: CPTII,S$GLB,, | Performed by: FAMILY MEDICINE

## 2023-07-28 PROCEDURE — 3072F PR LOW RISK FOR RETINOPATHY: ICD-10-PCS | Mod: CPTII,S$GLB,, | Performed by: FAMILY MEDICINE

## 2023-07-28 PROCEDURE — 1159F PR MEDICATION LIST DOCUMENTED IN MEDICAL RECORD: ICD-10-PCS | Mod: CPTII,S$GLB,, | Performed by: FAMILY MEDICINE

## 2023-07-28 PROCEDURE — 3288F PR FALLS RISK ASSESSMENT DOCUMENTED: ICD-10-PCS | Mod: CPTII,S$GLB,, | Performed by: FAMILY MEDICINE

## 2023-07-28 PROCEDURE — 99214 OFFICE O/P EST MOD 30 MIN: CPT | Mod: S$GLB,,, | Performed by: FAMILY MEDICINE

## 2023-07-28 PROCEDURE — 3074F SYST BP LT 130 MM HG: CPT | Mod: CPTII,S$GLB,, | Performed by: FAMILY MEDICINE

## 2023-07-28 PROCEDURE — 1125F AMNT PAIN NOTED PAIN PRSNT: CPT | Mod: CPTII,S$GLB,, | Performed by: FAMILY MEDICINE

## 2023-07-28 PROCEDURE — 3008F BODY MASS INDEX DOCD: CPT | Mod: CPTII,S$GLB,, | Performed by: FAMILY MEDICINE

## 2023-07-28 PROCEDURE — 77063 BREAST TOMOSYNTHESIS BI: CPT | Mod: 26,,, | Performed by: RADIOLOGY

## 2023-07-28 PROCEDURE — 99999 PR PBB SHADOW E&M-EST. PATIENT-LVL V: ICD-10-PCS | Mod: PBBFAC,,, | Performed by: FAMILY MEDICINE

## 2023-07-28 PROCEDURE — 1101F PR PT FALLS ASSESS DOC 0-1 FALLS W/OUT INJ PAST YR: ICD-10-PCS | Mod: CPTII,S$GLB,, | Performed by: FAMILY MEDICINE

## 2023-07-28 PROCEDURE — 77067 MAMMO DIGITAL SCREENING BILAT WITH TOMO: ICD-10-PCS | Mod: 26,,, | Performed by: RADIOLOGY

## 2023-07-28 PROCEDURE — 4010F PR ACE/ARB THEARPY RXD/TAKEN: ICD-10-PCS | Mod: CPTII,S$GLB,, | Performed by: FAMILY MEDICINE

## 2023-07-28 PROCEDURE — 1101F PT FALLS ASSESS-DOCD LE1/YR: CPT | Mod: CPTII,S$GLB,, | Performed by: FAMILY MEDICINE

## 2023-07-28 PROCEDURE — 77063 MAMMO DIGITAL SCREENING BILAT WITH TOMO: ICD-10-PCS | Mod: 26,,, | Performed by: RADIOLOGY

## 2023-07-28 PROCEDURE — 3066F NEPHROPATHY DOC TX: CPT | Mod: CPTII,S$GLB,, | Performed by: FAMILY MEDICINE

## 2023-07-28 PROCEDURE — 3044F HG A1C LEVEL LT 7.0%: CPT | Mod: CPTII,S$GLB,, | Performed by: FAMILY MEDICINE

## 2023-07-28 PROCEDURE — 3079F PR MOST RECENT DIASTOLIC BLOOD PRESSURE 80-89 MM HG: ICD-10-PCS | Mod: CPTII,S$GLB,, | Performed by: FAMILY MEDICINE

## 2023-07-28 PROCEDURE — 3008F PR BODY MASS INDEX (BMI) DOCUMENTED: ICD-10-PCS | Mod: CPTII,S$GLB,, | Performed by: FAMILY MEDICINE

## 2023-07-28 PROCEDURE — 3079F DIAST BP 80-89 MM HG: CPT | Mod: CPTII,S$GLB,, | Performed by: FAMILY MEDICINE

## 2023-07-28 PROCEDURE — 3060F PR POS MICROALBUMINURIA RESULT DOCUMENTED/REVIEW: ICD-10-PCS | Mod: CPTII,S$GLB,, | Performed by: FAMILY MEDICINE

## 2023-07-28 RX ORDER — KETOCONAZOLE 20 MG/G
CREAM TOPICAL DAILY
Qty: 60 G | Refills: 2 | Status: SHIPPED | OUTPATIENT
Start: 2023-07-28 | End: 2023-12-11 | Stop reason: SDUPTHER

## 2023-07-28 RX ORDER — TRIAMCINOLONE ACETONIDE 1 MG/G
OINTMENT TOPICAL
Qty: 454 G | Refills: 1 | Status: SHIPPED | OUTPATIENT
Start: 2023-07-28 | End: 2023-12-11 | Stop reason: SDUPTHER

## 2023-07-28 RX ORDER — RIVAROXABAN 20 MG/1
20 TABLET, FILM COATED ORAL DAILY
Qty: 90 TABLET | Refills: 3 | Status: SHIPPED | OUTPATIENT
Start: 2023-07-28 | End: 2023-09-28

## 2023-07-28 NOTE — PROGRESS NOTES
Health Maintenance Due   Topic     Foot Exam  Consult pcp    COVID-19 Vaccine (5 - Pfizer series) Not offered at this office    Mammogram  Pending order

## 2023-07-28 NOTE — PROGRESS NOTES
Subjective:       Patient ID: Sandee Evans is a 67 y.o. female.    Chief Complaint: Follow-up      Follow-up    67-year-old female presents for 2 month follow-up.  States back pain has increased recently.  Denies any trauma.  She also states she has shortness of breath.  States she has not been taking her Lasix as prescribed due to a conference that she is attending.  States she has decreased her cigarette use.  Felt she may have had wheezing few days ago.  Has been using her in inhaler which has helped.  Denies any fever chills.        Review of Systems   Constitutional: Negative.    HENT: Negative.     Respiratory: Negative.     Cardiovascular: Negative.    Gastrointestinal: Negative.    Endocrine: Negative.    Genitourinary: Negative.    Musculoskeletal: Negative.    Neurological: Negative.    Psychiatric/Behavioral: Negative.          Past Medical History:   Diagnosis Date    Anticoagulant long-term use     Xarelto    Arthritis     Atrial fibrillation     Breast cyst     CHF (congestive heart failure)     Degenerative disc disease     Edema     HLD (hyperlipidemia)     Hx of psychiatric care     Hyperlipidemia     Hypertension     Hypothyroidism     Nuclear sclerosis, bilateral 12/18/2017    Obesity, morbid     HIEU (obstructive sleep apnea)     Other abnormal glucose     pre-diabetes    Pre-diabetes     Psychiatric problem     Requires assistance with activities of daily living (ADL)     Sleep apnea     Smoker     SOB (shortness of breath)     SOB (shortness of breath)     Thyroid disease     on meds 8-9 years ago. hypothyroidism.no malignancy    TMJ (temporomandibular joint disorder)     jaw clicking    Tobacco abuse     Unsteady gait     Urge incontinence     Weakness generalized     Wears glasses      Past Surgical History:   Procedure Laterality Date    BREAST BIOPSY Right     over 10 yrs ago/ benign    BREAST CYST EXCISION Right     BREAST LUMPECTOMY Right     over 10 yrs ago, ex bx/ benign     COLONOSCOPY N/A 6/25/2019    Procedure: COLONOSCOPY;  Surgeon: Micheline Flores MD;  Location: [facility] ENDO;  Service: Endoscopy;  Laterality: N/A;  RX XARELTO ok to hold (2 days) per Dr. Naik see scan 3/20/19    ENDOSCOPIC ULTRASOUND OF UPPER GASTROINTESTINAL TRACT N/A 1/26/2021    Procedure: ULTRASOUND-ENDOSCOPIC-UPPER;  Surgeon: Stevenson Philippe MD;  Location: [facility] ENDO;  Service: Endoscopy;  Laterality: N/A;    HYSTERECTOMY      JOINT REPLACEMENT Bilateral     knees    OOPHORECTOMY      rt.knee surgery 2016      TOTAL KNEE ARTHROPLASTY Left 2/19/2019    Procedure: ARTHROPLASTY, KNEE, TOTAL;  Surgeon: Med Hanson MD;  Location: [facility] OR;  Service: Orthopedics;  Laterality: Left;  10AM START PER  PER JAYLIN TEXT @ 8:34AM ON 2-18-19  SUPINE  HARRIS LOYA [phone] TEXTED HIM ON 1-24-19 @ 7:57AM  RN PRE OP 2-13-19--BMI--48.9    underarm gland\ Bilateral      Family History   Problem Relation Age of Onset    Diabetes Mother     Hypertension Mother     Cancer Mother     No Known Problems Father     No Known Problems Sister     Diabetes Brother     Cancer Brother     No Known Problems Maternal Aunt     No Known Problems Maternal Uncle     No Known Problems Paternal Aunt     No Known Problems Paternal Uncle     No Known Problems Maternal Grandmother     No Known Problems Maternal Grandfather     No Known Problems Paternal Grandmother     No Known Problems Paternal Grandfather     No Known Problems Other     Amblyopia Neg Hx     Blindness Neg Hx     Cataracts Neg Hx     Glaucoma Neg Hx     Macular degeneration Neg Hx     Retinal detachment Neg Hx     Strabismus Neg Hx     Stroke Neg Hx     Thyroid disease Neg Hx      Social History     Socioeconomic History    Marital status:    Tobacco Use    Smoking status: Every Day     Packs/day: 0.50     Years: 47.00     Pack years: 23.50     Types: Cigarettes     Start date: 1973    Smokeless tobacco: Current   Substance and Sexual Activity    Alcohol use: No     Drug use: No    Sexual activity: Yes     Partners: Male     Social Determinants of Health     Financial Resource Strain: Unknown    Difficulty of Paying Living Expenses: Patient refused   Food Insecurity: Unknown    Worried About Running Out of Food in the Last Year: Patient refused    Ran Out of Food in the Last Year: Patient refused   Transportation Needs: Unknown    Lack of Transportation (Medical): Patient refused    Lack of Transportation (Non-Medical): Patient refused   Physical Activity: Insufficiently Active    Days of Exercise per Week: 1 day    Minutes of Exercise per Session: 10 min   Stress: Stress Concern Present    Feeling of Stress : Rather much   Social Connections: Unknown    Frequency of Communication with Friends and Family: Twice a week    Frequency of Social Gatherings with Friends and Family: Once a week    Active Member of Clubs or Organizations: Yes    Attends Club or Organization Meetings: 1 to 4 times per year    Marital Status:    Housing Stability: Unknown    Unable to Pay for Housing in the Last Year: Patient refused    Number of Places Lived in the Last Year: 2    Unstable Housing in the Last Year: Patient refused       Current Outpatient Medications:     albuterol (PROVENTIL/VENTOLIN HFA) 90 mcg/actuation inhaler, 2 puffs every 6 (six) hours as needed., Disp: , Rfl:     albuterol-ipratropium (DUO-NEB) 2.5 mg-0.5 mg/3 mL nebulizer solution, Take 3 mLs by nebulization every 6 (six) hours as needed for Wheezing. Rescue, Disp: 75 mL, Rfl: 0    atorvastatin (LIPITOR) 40 MG tablet, TAKE 1 TABLET(40 MG) BY MOUTH EVERY DAY, Disp: 90 tablet, Rfl: 1    azelastine (ASTELIN) 137 mcg (0.1 %) nasal spray, 1 spray (137 mcg total) by Nasal route 2 (two) times daily., Disp: 30 mL, Rfl: 3    buPROPion (WELLBUTRIN XL) 150 MG TB24 tablet, Take 1 tablet (150 mg total) by mouth once daily., Disp: 90 tablet, Rfl: 1    cetirizine (ZYRTEC) 10 MG tablet, Take 1 tablet (10 mg total) by mouth once  "daily., Disp: 90 tablet, Rfl: 3    fluticasone propionate (FLONASE) 50 mcg/actuation nasal spray, SHAKE LIQUID AND USE 2 SPRAYS(100 MCG) IN EACH NOSTRIL EVERY DAY, Disp: 48 g, Rfl: 0    furosemide (LASIX) 40 MG tablet, Take 1 tablet (40 mg total) by mouth once daily., Disp: 90 tablet, Rfl: 3    gabapentin (NEURONTIN) 100 MG capsule, Take 1 capsule (100 mg total) by mouth nightly as needed., Disp: 90 capsule, Rfl: 1    losartan (COZAAR) 25 MG tablet, TAKE 1 TABLET(25 MG) BY MOUTH EVERY DAY, Disp: 90 tablet, Rfl: 3    metoprolol succinate (TOPROL-XL) 100 MG 24 hr tablet, Take 1 tablet (100 mg total) by mouth once daily., Disp: 90 tablet, Rfl: 3    sertraline (ZOLOFT) 100 MG tablet, Take 1 tablet (100 mg total) by mouth once daily., Disp: 90 tablet, Rfl: 3    SPIRIVA RESPIMAT 2.5 mcg/actuation inhaler, INHALE 2 PUFFS BY MOUTH DAILY, Disp: 4 g, Rfl: 5    ketoconazole (NIZORAL) 2 % cream, Apply topically once daily., Disp: 60 g, Rfl: 2    triamcinolone acetonide 0.1% (KENALOG) 0.1 % ointment, APPLY BETWEEN THE KNEES AND UPPER THIGHS TWICE DAILY FOR NO MORE THAN 7 TO 14 DAYS, Disp: 454 g, Rfl: 1    XARELTO 20 mg Tab, Take 1 tablet (20 mg total) by mouth once daily., Disp: 90 tablet, Rfl: 3   Objective:      Vitals:    07/28/23 1030   BP: 124/84   BP Location: Left arm   Patient Position: Sitting   BP Method: Large (Manual)   Pulse: 93   Resp: 18   Temp: 98.1 °F (36.7 °C)   TempSrc: Oral   SpO2: (!) 94%   Weight: 127 kg (279 lb 15.8 oz)   Height: 5' 3" (1.6 m)       Physical Exam  Constitutional:       General: She is not in acute distress.  HENT:      Head: Normocephalic and atraumatic.   Eyes:      Conjunctiva/sclera: Conjunctivae normal.   Cardiovascular:      Rate and Rhythm: Normal rate and regular rhythm.      Heart sounds: Normal heart sounds. No murmur heard.    No friction rub. No gallop.   Pulmonary:      Effort: Pulmonary effort is normal.      Breath sounds: Normal breath sounds. No wheezing or rales. "   Musculoskeletal:      Cervical back: Neck supple.      Right lower leg: Edema present.      Left lower leg: Edema present.   Skin:     General: Skin is warm and dry.   Neurological:      Mental Status: She is alert and oriented to person, place, and time.   Psychiatric:         Behavior: Behavior normal.         Thought Content: Thought content normal.         Judgment: Judgment normal.          Assessment:       1. DDD (degenerative disc disease), lumbar    2. Encounter for screening mammogram for breast cancer    3. Chronic atrial fibrillation    4. COPD with chronic bronchitis    5. Mixed hyperlipidemia    6. Essential hypertension    7. Annual physical exam    8. Fungal rash of torso    9. Skin lesion        Plan:       DDD (degenerative disc disease), lumbar  -     Ambulatory referral/consult to [facility]; Future; Expected date: 08/04/2023    Encounter for screening mammogram for breast cancer  -     Mammo Digital Screening Bilat; Future; Expected date: 07/28/2023    Chronic atrial fibrillation  -     XARELTO 20 mg Tab; Take 1 tablet (20 mg total) by mouth once daily.  Dispense: 90 tablet; Refill: 3    COPD with chronic bronchitis    Mixed hyperlipidemia    Essential hypertension    Annual physical exam  -     CBC Auto Differential; Future; Expected date: 07/28/2023  -     Comprehensive Metabolic Panel; Future; Expected date: 07/28/2023  -     Hemoglobin A1C; Future; Expected date: 07/28/2023  -     TSH; Future; Expected date: 07/28/2023  -     Lipid Panel; Future; Expected date: 07/28/2023    Fungal rash of torso  -     ketoconazole (NIZORAL) 2 % cream; Apply topically once daily.  Dispense: 60 g; Refill: 2    Skin lesion  -     triamcinolone acetonide 0.1% (KENALOG) 0.1 % ointment; APPLY BETWEEN THE KNEES AND UPPER THIGHS TWICE DAILY FOR NO MORE THAN 7 TO 14 DAYS  Dispense: 454 g; Refill: 1    Place referral for healthy back program.  On PE patient denied wheezing or crackles.  Did advise patient  to take Lasix as prescribed.  Continue meds.  If symptoms worsen patient was advised to go to ED.          Future Appointments   Date Time Provider Department Center   8/2/2023 11:00 AM [facility]   10/27/2023  9:30 AM LAB, [facility] [place]   11/1/2023 10:00 AM [facility] [place]   11/17/2023  1:15 PM Maynor Hartmann MD [facility]       Patient note was created using [employer].  Any errors in syntax or even information may not have been identified and edited on initial review prior to signing this note.

## 2023-08-02 ENCOUNTER — OFFICE VISIT (OUTPATIENT)
Dept: PULMONOLOGY | Facility: CLINIC | Age: 68
End: 2023-08-02
Payer: COMMERCIAL

## 2023-08-02 ENCOUNTER — HOSPITAL ENCOUNTER (OUTPATIENT)
Dept: RADIOLOGY | Facility: HOSPITAL | Age: 68
Discharge: HOME | End: 2023-08-02
Attending: INTERNAL MEDICINE
Payer: COMMERCIAL

## 2023-08-02 VITALS
WEIGHT: 278.56 LBS | BODY MASS INDEX: 49.36 KG/M2 | SYSTOLIC BLOOD PRESSURE: 126 MMHG | DIASTOLIC BLOOD PRESSURE: 81 MMHG | HEART RATE: 89 BPM | HEIGHT: 63 IN

## 2023-08-02 DIAGNOSIS — G47.01 INSOMNIA DUE TO MEDICAL CONDITION: ICD-10-CM

## 2023-08-02 DIAGNOSIS — J44.89 COPD WITH CHRONIC BRONCHITIS: ICD-10-CM

## 2023-08-02 DIAGNOSIS — G47.33 OSA TREATED WITH BIPAP: ICD-10-CM

## 2023-08-02 DIAGNOSIS — Z72.0 TOBACCO ABUSE: ICD-10-CM

## 2023-08-02 PROCEDURE — 99214 PR OFFICE/OUTPT VISIT, EST, LEVL IV, 30-39 MIN: ICD-10-PCS | Mod: S$GLB,,, | Performed by: INTERNAL MEDICINE

## 2023-08-02 PROCEDURE — 1125F PR PAIN SEVERITY QUANTIFIED, PAIN PRESENT: ICD-10-PCS | Mod: CPTII,S$GLB,, | Performed by: INTERNAL MEDICINE

## 2023-08-02 PROCEDURE — 1159F PR MEDICATION LIST DOCUMENTED IN MEDICAL RECORD: ICD-10-PCS | Mod: CPTII,S$GLB,, | Performed by: INTERNAL MEDICINE

## 2023-08-02 PROCEDURE — 3008F PR BODY MASS INDEX (BMI) DOCUMENTED: ICD-10-PCS | Mod: CPTII,S$GLB,, | Performed by: INTERNAL MEDICINE

## 2023-08-02 PROCEDURE — 1159F MED LIST DOCD IN RCRD: CPT | Mod: CPTII,S$GLB,, | Performed by: INTERNAL MEDICINE

## 2023-08-02 PROCEDURE — 3066F PR DOCUMENTATION OF TREATMENT FOR NEPHROPATHY: ICD-10-PCS | Mod: CPTII,S$GLB,, | Performed by: INTERNAL MEDICINE

## 2023-08-02 PROCEDURE — 3288F FALL RISK ASSESSMENT DOCD: CPT | Mod: CPTII,S$GLB,, | Performed by: INTERNAL MEDICINE

## 2023-08-02 PROCEDURE — 3288F PR FALLS RISK ASSESSMENT DOCUMENTED: ICD-10-PCS | Mod: CPTII,S$GLB,, | Performed by: INTERNAL MEDICINE

## 2023-08-02 PROCEDURE — 3044F HG A1C LEVEL LT 7.0%: CPT | Mod: CPTII,S$GLB,, | Performed by: INTERNAL MEDICINE

## 2023-08-02 PROCEDURE — 3044F PR MOST RECENT HEMOGLOBIN A1C LEVEL <7.0%: ICD-10-PCS | Mod: CPTII,S$GLB,, | Performed by: INTERNAL MEDICINE

## 2023-08-02 PROCEDURE — 3008F BODY MASS INDEX DOCD: CPT | Mod: CPTII,S$GLB,, | Performed by: INTERNAL MEDICINE

## 2023-08-02 PROCEDURE — 3074F SYST BP LT 130 MM HG: CPT | Mod: CPTII,S$GLB,, | Performed by: INTERNAL MEDICINE

## 2023-08-02 PROCEDURE — 3066F NEPHROPATHY DOC TX: CPT | Mod: CPTII,S$GLB,, | Performed by: INTERNAL MEDICINE

## 2023-08-02 PROCEDURE — 3060F POS MICROALBUMINURIA REV: CPT | Mod: CPTII,S$GLB,, | Performed by: INTERNAL MEDICINE

## 2023-08-02 PROCEDURE — 1100F PTFALLS ASSESS-DOCD GE2>/YR: CPT | Mod: CPTII,S$GLB,, | Performed by: INTERNAL MEDICINE

## 2023-08-02 PROCEDURE — 99999 PR PBB SHADOW E&M-EST. PATIENT-LVL IV: CPT | Mod: PBBFAC,,, | Performed by: INTERNAL MEDICINE

## 2023-08-02 PROCEDURE — 99999 PR PBB SHADOW E&M-EST. PATIENT-LVL IV: ICD-10-PCS | Mod: PBBFAC,,, | Performed by: INTERNAL MEDICINE

## 2023-08-02 PROCEDURE — 1100F PR PT FALLS ASSESS DOC 2+ FALLS/FALL W/INJURY/YR: ICD-10-PCS | Mod: CPTII,S$GLB,, | Performed by: INTERNAL MEDICINE

## 2023-08-02 PROCEDURE — 3060F PR POS MICROALBUMINURIA RESULT DOCUMENTED/REVIEW: ICD-10-PCS | Mod: CPTII,S$GLB,, | Performed by: INTERNAL MEDICINE

## 2023-08-02 PROCEDURE — 3072F LOW RISK FOR RETINOPATHY: CPT | Mod: CPTII,S$GLB,, | Performed by: INTERNAL MEDICINE

## 2023-08-02 PROCEDURE — 71046 X-RAY EXAM CHEST 2 VIEWS: CPT | Mod: TC,FY

## 2023-08-02 PROCEDURE — 4010F PR ACE/ARB THEARPY RXD/TAKEN: ICD-10-PCS | Mod: CPTII,S$GLB,, | Performed by: INTERNAL MEDICINE

## 2023-08-02 PROCEDURE — 71046 X-RAY EXAM CHEST 2 VIEWS: CPT | Mod: 26,,, | Performed by: RADIOLOGY

## 2023-08-02 PROCEDURE — 3072F PR LOW RISK FOR RETINOPATHY: ICD-10-PCS | Mod: CPTII,S$GLB,, | Performed by: INTERNAL MEDICINE

## 2023-08-02 PROCEDURE — 3079F PR MOST RECENT DIASTOLIC BLOOD PRESSURE 80-89 MM HG: ICD-10-PCS | Mod: CPTII,S$GLB,, | Performed by: INTERNAL MEDICINE

## 2023-08-02 PROCEDURE — 99214 OFFICE O/P EST MOD 30 MIN: CPT | Mod: S$GLB,,, | Performed by: INTERNAL MEDICINE

## 2023-08-02 PROCEDURE — 3079F DIAST BP 80-89 MM HG: CPT | Mod: CPTII,S$GLB,, | Performed by: INTERNAL MEDICINE

## 2023-08-02 PROCEDURE — 3074F PR MOST RECENT SYSTOLIC BLOOD PRESSURE < 130 MM HG: ICD-10-PCS | Mod: CPTII,S$GLB,, | Performed by: INTERNAL MEDICINE

## 2023-08-02 PROCEDURE — 71046 XR CHEST PA AND LATERAL: ICD-10-PCS | Mod: 26,,, | Performed by: RADIOLOGY

## 2023-08-02 PROCEDURE — 4010F ACE/ARB THERAPY RXD/TAKEN: CPT | Mod: CPTII,S$GLB,, | Performed by: INTERNAL MEDICINE

## 2023-08-02 PROCEDURE — 1125F AMNT PAIN NOTED PAIN PRSNT: CPT | Mod: CPTII,S$GLB,, | Performed by: INTERNAL MEDICINE

## 2023-08-02 RX ORDER — FLUTICASONE PROPIONATE AND SALMETEROL 250; 50 UG/1; UG/1
1 POWDER RESPIRATORY (INHALATION) 2 TIMES DAILY
Qty: 60 EACH | Refills: 3 | Status: SHIPPED | OUTPATIENT
Start: 2023-08-02 | End: 2023-11-13 | Stop reason: CLARIF

## 2023-08-02 RX ORDER — PREDNISONE 20 MG/1
40 TABLET ORAL DAILY
Qty: 10 TABLET | Refills: 0 | Status: SHIPPED | OUTPATIENT
Start: 2023-08-02 | End: 2023-08-07

## 2023-08-16 ENCOUNTER — PATIENT MESSAGE (OUTPATIENT)
Dept: ADMINISTRATIVE | Facility: HOSPITAL | Age: 68
End: 2023-08-16
Payer: COMMERCIAL

## 2023-08-16 ENCOUNTER — PATIENT OUTREACH (OUTPATIENT)
Dept: ADMINISTRATIVE | Facility: HOSPITAL | Age: 68
End: 2023-08-16
Payer: COMMERCIAL

## 2023-08-16 DIAGNOSIS — E11.9 TYPE 2 DIABETES MELLITUS WITHOUT COMPLICATION, UNSPECIFIED WHETHER LONG TERM INSULIN USE: Primary | ICD-10-CM

## 2023-08-16 NOTE — PROGRESS NOTES
Population Health Chart Review & Patient Outreach Details:     Reason for Outreach Encounter:     [x]  Non-Compliant Report   []  Payor Report ([employer], PHN, BCBS, MSSP, MCIP, C, etc.)   []  Pre-Visit Chart Review     Updates Requested / Reviewed:     [x]  Care Everywhere    []     []  External Sources ([facility], [facility], DIS, etc.)   [x]  Care Team Updated    Patient Outreach Method:    [x]  Telephone Outreach Completed   [] Successful   [x] Left Voicemail   [] Unable to Contact (wrong number, no voicemail)  [x]  [facility] Outreach Sent  []  Letter Outreach Mailed  []  Fax Sent for External Records  []  External Records Upload    Health Maintenance Topics Addressed and Outreach Outcomes / Actions Taken:        []      Breast Cancer Screening []  Mammo Scheduled      []  External Records Requested     []  Added Reminder to Complete to Upcoming Primary Care Appt Notes     []  Patient Declined     []  Patient Will Call Back to Schedule     []  Patient Will Schedule with External Provider / Order Routed if Applicable             []       Cervical Cancer Screening []  Pap Scheduled      []  External Records Requested     []  Added Reminder to Complete to Upcoming Primary Care Appt Notes     []  Patient Declined     []  Patient Will Call Back to Schedule     []  Patient Will Schedule with External Provider               []          Colorectal Cancer Screening []  Colonoscopy Case Request or Referral Placed     []  External Records Requested     []  Added Reminder to Complete to Upcoming Primary Care Appt Notes     []  Patient Declined     []  Patient Will Call Back to Schedule     []  Patient Will Schedule with External Provider     []  Fit Kit Mailed (add the SmartPhrase under additional notes)     []  Reminded Patient to Complete Home Test             [x]      Diabetic Eye Exam [x]  Eye Camera Scheduled or Optometry Referral Placed     []  External Records Requested     []  Added Reminder to Complete  to Upcoming Primary Care Appt Notes     []  Patient Declined     []  Patient Will Call Back to Schedule     []  Patient Will Schedule with External Provider             []      Blood Pressure Control []  Primary Care Follow Up Visit Scheduled     []  Remote Blood Pressure Reading Captured     []  Added Reminder to Complete to Upcoming Primary Care Appt Notes     []  Patient Declined     []  Patient Will Call Back / Patient Will Send Portal Message with Reading     []  Patient Will Call Back to Schedule Provider Visit             []       HbA1c & Other Labs []  Lab Appt Scheduled for Due Labs     []  Primary Care Follow Up Visit Scheduled      []  Reminded Patient to Complete Home Test     []  Added Reminder to Complete to Upcoming Primary Care Appt Notes     []  Patient Declined     []  Patient Will Call Back to Schedule     []  Patient Will Schedule with External Provider / Order Routed if Applicable           []    Schedule Primary Care Appt []  Primary Care Appt Scheduled     []  Patient Declined     []  Patient Will Call Back to Schedule     []  Pt Established with External Provider & Updated Care Team             []      Medication Adherence []  Primary Care Appointment Scheduled     []  Added Reminder to Upcoming Primary Care Appt Notes     []  Patient Reminded to  Prescription     []  Patient Declined, Provider Notified if Needed     []  Sent Provider Message to Review and/or Add Exclusion to Problem List             []      Osteoporosis Screening []  DXA Appointment Scheduled     []  External Records Requested     []  Added Reminder to Complete to Upcoming Primary Care Appt Notes     []  Patient Declined     []  Patient Will Call Back to Schedule     []  Patient Will Schedule with External Provider / Order Routed if Applicable     Additional Care Coordinator Notes:         Further Action Needed If Patient Returns Outreach:    Need an appointment for eye exam

## 2023-09-22 DIAGNOSIS — F41.9 ANXIETY: ICD-10-CM

## 2023-09-22 DIAGNOSIS — I48.20 CHRONIC ATRIAL FIBRILLATION: ICD-10-CM

## 2023-09-22 RX ORDER — SERTRALINE HYDROCHLORIDE 100 MG/1
100 TABLET, FILM COATED ORAL
Qty: 90 TABLET | Refills: 3 | Status: SHIPPED | OUTPATIENT
Start: 2023-09-22

## 2023-09-22 NOTE — TELEPHONE ENCOUNTER
No care due was identified.  [facility] Embedded Care Due Messages. Reference number: [id number].   9/22/2023 5:29:14 AM CDT

## 2023-09-22 NOTE — TELEPHONE ENCOUNTER
Refill Routing Note   Medication(s) are not appropriate for processing by [facility] for the following reason(s):      Medication outside of protocol: XARELTO     OR action(s):  Route  Approve Care Due:  None identified              Appointments  past 12m or future 3m with PCP    Date Provider   Last Visit   7/28/2023 Kuldeep Miller MD   Next Visit   11/1/2023 Kuldeep Miller MD   ED visits in past 90 days: 0        Note composed:11:21 AM 09/22/2023

## 2023-09-28 RX ORDER — RIVAROXABAN 20 MG/1
20 TABLET, FILM COATED ORAL
Qty: 90 TABLET | Refills: 3 | Status: SHIPPED | OUTPATIENT
Start: 2023-09-28

## 2023-10-18 ENCOUNTER — OFFICE VISIT (OUTPATIENT)
Dept: FAMILY MEDICINE | Facility: CLINIC | Age: 68
End: 2023-10-18
Payer: COMMERCIAL

## 2023-10-18 VITALS
HEIGHT: 63 IN | DIASTOLIC BLOOD PRESSURE: 72 MMHG | OXYGEN SATURATION: 94 % | TEMPERATURE: 98 F | WEIGHT: 281.75 LBS | HEART RATE: 77 BPM | RESPIRATION RATE: 18 BRPM | SYSTOLIC BLOOD PRESSURE: 122 MMHG | BODY MASS INDEX: 49.92 KG/M2

## 2023-10-18 DIAGNOSIS — J44.1 COPD EXACERBATION: Primary | ICD-10-CM

## 2023-10-18 PROCEDURE — 1101F PR PT FALLS ASSESS DOC 0-1 FALLS W/OUT INJ PAST YR: ICD-10-PCS | Mod: CPTII,S$GLB,, | Performed by: PHYSICIAN ASSISTANT

## 2023-10-18 PROCEDURE — 99999 PR PBB SHADOW E&M-EST. PATIENT-LVL IV: CPT | Mod: PBBFAC,,, | Performed by: PHYSICIAN ASSISTANT

## 2023-10-18 PROCEDURE — 99214 PR OFFICE/OUTPT VISIT, EST, LEVL IV, 30-39 MIN: ICD-10-PCS | Mod: S$GLB,,, | Performed by: PHYSICIAN ASSISTANT

## 2023-10-18 PROCEDURE — 1126F PR PAIN SEVERITY QUANTIFIED, NO PAIN PRESENT: ICD-10-PCS | Mod: CPTII,S$GLB,, | Performed by: PHYSICIAN ASSISTANT

## 2023-10-18 PROCEDURE — 99999 PR PBB SHADOW E&M-EST. PATIENT-LVL IV: ICD-10-PCS | Mod: PBBFAC,,, | Performed by: PHYSICIAN ASSISTANT

## 2023-10-18 PROCEDURE — 3074F PR MOST RECENT SYSTOLIC BLOOD PRESSURE < 130 MM HG: ICD-10-PCS | Mod: CPTII,S$GLB,, | Performed by: PHYSICIAN ASSISTANT

## 2023-10-18 PROCEDURE — 1126F AMNT PAIN NOTED NONE PRSNT: CPT | Mod: CPTII,S$GLB,, | Performed by: PHYSICIAN ASSISTANT

## 2023-10-18 PROCEDURE — 3066F NEPHROPATHY DOC TX: CPT | Mod: CPTII,S$GLB,, | Performed by: PHYSICIAN ASSISTANT

## 2023-10-18 PROCEDURE — 3044F HG A1C LEVEL LT 7.0%: CPT | Mod: CPTII,S$GLB,, | Performed by: PHYSICIAN ASSISTANT

## 2023-10-18 PROCEDURE — 3008F BODY MASS INDEX DOCD: CPT | Mod: CPTII,S$GLB,, | Performed by: PHYSICIAN ASSISTANT

## 2023-10-18 PROCEDURE — 4010F ACE/ARB THERAPY RXD/TAKEN: CPT | Mod: CPTII,S$GLB,, | Performed by: PHYSICIAN ASSISTANT

## 2023-10-18 PROCEDURE — 1101F PT FALLS ASSESS-DOCD LE1/YR: CPT | Mod: CPTII,S$GLB,, | Performed by: PHYSICIAN ASSISTANT

## 2023-10-18 PROCEDURE — 1160F PR REVIEW ALL MEDS BY PRESCRIBER/CLIN PHARMACIST DOCUMENTED: ICD-10-PCS | Mod: CPTII,S$GLB,, | Performed by: PHYSICIAN ASSISTANT

## 2023-10-18 PROCEDURE — 3288F FALL RISK ASSESSMENT DOCD: CPT | Mod: CPTII,S$GLB,, | Performed by: PHYSICIAN ASSISTANT

## 2023-10-18 PROCEDURE — 3072F LOW RISK FOR RETINOPATHY: CPT | Mod: CPTII,S$GLB,, | Performed by: PHYSICIAN ASSISTANT

## 2023-10-18 PROCEDURE — 1160F RVW MEDS BY RX/DR IN RCRD: CPT | Mod: CPTII,S$GLB,, | Performed by: PHYSICIAN ASSISTANT

## 2023-10-18 PROCEDURE — 3078F PR MOST RECENT DIASTOLIC BLOOD PRESSURE < 80 MM HG: ICD-10-PCS | Mod: CPTII,S$GLB,, | Performed by: PHYSICIAN ASSISTANT

## 2023-10-18 PROCEDURE — 3060F PR POS MICROALBUMINURIA RESULT DOCUMENTED/REVIEW: ICD-10-PCS | Mod: CPTII,S$GLB,, | Performed by: PHYSICIAN ASSISTANT

## 2023-10-18 PROCEDURE — 3008F PR BODY MASS INDEX (BMI) DOCUMENTED: ICD-10-PCS | Mod: CPTII,S$GLB,, | Performed by: PHYSICIAN ASSISTANT

## 2023-10-18 PROCEDURE — 3288F PR FALLS RISK ASSESSMENT DOCUMENTED: ICD-10-PCS | Mod: CPTII,S$GLB,, | Performed by: PHYSICIAN ASSISTANT

## 2023-10-18 PROCEDURE — 3044F PR MOST RECENT HEMOGLOBIN A1C LEVEL <7.0%: ICD-10-PCS | Mod: CPTII,S$GLB,, | Performed by: PHYSICIAN ASSISTANT

## 2023-10-18 PROCEDURE — 3060F POS MICROALBUMINURIA REV: CPT | Mod: CPTII,S$GLB,, | Performed by: PHYSICIAN ASSISTANT

## 2023-10-18 PROCEDURE — 3066F PR DOCUMENTATION OF TREATMENT FOR NEPHROPATHY: ICD-10-PCS | Mod: CPTII,S$GLB,, | Performed by: PHYSICIAN ASSISTANT

## 2023-10-18 PROCEDURE — 1159F MED LIST DOCD IN RCRD: CPT | Mod: CPTII,S$GLB,, | Performed by: PHYSICIAN ASSISTANT

## 2023-10-18 PROCEDURE — 3074F SYST BP LT 130 MM HG: CPT | Mod: CPTII,S$GLB,, | Performed by: PHYSICIAN ASSISTANT

## 2023-10-18 PROCEDURE — 4010F PR ACE/ARB THEARPY RXD/TAKEN: ICD-10-PCS | Mod: CPTII,S$GLB,, | Performed by: PHYSICIAN ASSISTANT

## 2023-10-18 PROCEDURE — 3072F PR LOW RISK FOR RETINOPATHY: ICD-10-PCS | Mod: CPTII,S$GLB,, | Performed by: PHYSICIAN ASSISTANT

## 2023-10-18 PROCEDURE — 1159F PR MEDICATION LIST DOCUMENTED IN MEDICAL RECORD: ICD-10-PCS | Mod: CPTII,S$GLB,, | Performed by: PHYSICIAN ASSISTANT

## 2023-10-18 PROCEDURE — 99214 OFFICE O/P EST MOD 30 MIN: CPT | Mod: S$GLB,,, | Performed by: PHYSICIAN ASSISTANT

## 2023-10-18 PROCEDURE — 3078F DIAST BP <80 MM HG: CPT | Mod: CPTII,S$GLB,, | Performed by: PHYSICIAN ASSISTANT

## 2023-10-18 RX ORDER — DOXYCYCLINE 100 MG/1
100 CAPSULE ORAL 2 TIMES DAILY
Qty: 14 CAPSULE | Refills: 0 | Status: SHIPPED | OUTPATIENT
Start: 2023-10-18 | End: 2023-10-25

## 2023-10-18 RX ORDER — PREDNISONE 20 MG/1
40 TABLET ORAL DAILY
Qty: 10 TABLET | Refills: 0 | Status: SHIPPED | OUTPATIENT
Start: 2023-10-18 | End: 2023-10-23

## 2023-10-18 RX ORDER — IPRATROPIUM BROMIDE AND ALBUTEROL SULFATE 2.5; .5 MG/3ML; MG/3ML
3 SOLUTION RESPIRATORY (INHALATION) EVERY 6 HOURS PRN
Qty: 75 ML | Refills: 0 | Status: SHIPPED | OUTPATIENT
Start: 2023-10-18 | End: 2024-10-17

## 2023-10-18 RX ORDER — PROMETHAZINE HYDROCHLORIDE AND DEXTROMETHORPHAN HYDROBROMIDE 6.25; 15 MG/5ML; MG/5ML
5 SYRUP ORAL NIGHTLY PRN
Qty: 180 ML | Refills: 0 | Status: SHIPPED | OUTPATIENT
Start: 2023-10-18 | End: 2023-10-28

## 2023-10-18 NOTE — PROGRESS NOTES
Health Maintenance Due   Topic     Foot Exam  Consult pcp    Influenza Vaccine (1) Pt agree to get today    COVID-19 Vaccine (5 - 2023-24 season) Not offered at this Facility    LDCT Lung Screen  Consult pcp    Lipid Panel  Consult pcp    Eye Exam  Consult pcp    Hemoglobin A1c  Consult pcp

## 2023-10-18 NOTE — PROGRESS NOTES
Subjective     Patient ID: Sandee Evans is a 67 y.o. female.    Chief Complaint: Cough (Productive. ) and Wheezing    Cough  This is a new problem. The current episode started 1 to 4 weeks ago (3 weeks). The problem has been gradually worsening. The cough is Productive of sputum and productive of brown sputum. Associated symptoms include ear pain, nasal congestion, rhinorrhea, shortness of breath (off and on) and wheezing. Pertinent negatives include no chest pain, ear congestion, fever or hemoptysis.     Review of Systems   Constitutional:  Negative for fever.   HENT:  Positive for ear pain and rhinorrhea.    Respiratory:  Positive for cough, shortness of breath (off and on) and wheezing. Negative for hemoptysis.    Cardiovascular:  Negative for chest pain.          Objective     Physical Exam  Constitutional:       Appearance: Normal appearance.   HENT:      Head: Normocephalic and atraumatic.      Right Ear: Tympanic membrane normal.      Left Ear: Tympanic membrane normal.      Mouth/Throat:      Pharynx: Oropharynx is clear.   Cardiovascular:      Rate and Rhythm: Normal rate and regular rhythm.   Pulmonary:      Effort: Pulmonary effort is normal.      Breath sounds: Wheezing and rhonchi present.   Neurological:      General: No focal deficit present.      Mental Status: She is alert and oriented to person, place, and time.   Psychiatric:         Mood and Affect: Mood normal.            Assessment and Plan     1. COPD exacerbation  -     predniSONE (DELTASONE) 20 MG tablet; Take 2 tablets (40 mg total) by mouth once daily. for 5 days  Dispense: 10 tablet; Refill: 0  -     doxycycline (VIBRAMYCIN) 100 MG Cap; Take 1 capsule (100 mg total) by mouth 2 (two) times a day. for 7 days  Dispense: 14 capsule; Refill: 0  -     albuterol-ipratropium (DUO-NEB) 2.5 mg-0.5 mg/3 mL nebulizer solution; Take 3 mLs by nebulization every 6 (six) hours as needed for Wheezing. Rescue  Dispense: 75 mL; Refill: 0  -      promethazine-dextromethorphan (PROMETHAZINE-DM) 6.25-15 mg/5 mL Syrp; Take 5 mLs by mouth nightly as needed (cough).  Dispense: 180 mL; Refill: 0  -     hydrate, call if no improvement, ER if worse                No follow-ups on file.

## 2023-10-25 ENCOUNTER — CLINICAL SUPPORT (OUTPATIENT)
Dept: REHABILITATION | Facility: OTHER | Age: 68
End: 2023-10-25
Attending: FAMILY MEDICINE
Payer: COMMERCIAL

## 2023-10-25 DIAGNOSIS — M51.36 DDD (DEGENERATIVE DISC DISEASE), LUMBAR: ICD-10-CM

## 2023-10-25 PROCEDURE — 97161 PT EVAL LOW COMPLEX 20 MIN: CPT

## 2023-10-25 PROCEDURE — 97112 NEUROMUSCULAR REEDUCATION: CPT

## 2023-10-25 NOTE — PLAN OF CARE
Physical Therapy Lumbar Evaluation       Name: Sandee Evans  Clinic Number: [id number]     Therapy Diagnosis: No diagnosis found.  Physician: Kuldeep Miller MD     Physician Orders: PT Eval and Treat/ [facility]  Medical Diagnosis from Referral: M51.36 (ICD-10-CM) - DDD (degenerative disc disease), lumbar  Evaluation Date: 10/25/2023  Authorization Period Expiration: 12/31/2023  Plan of Care Expiration: 1/3/2024  Reassessment Due: 11/25/2023  Visit # / Visits authorized: 1/1 (pending)  MedX testing visit 1     Time In: 9:13 AM  Time Out: 10:33 PM  Total Billable Time: 80 minutes  INSURANCE and OUTCOMES: Fee for Service with FOTO Outcomes 1/3     Precautions: standard, HTN, and fall     Pattern of pain determined: Movement responder (flexion preference)     Subjective      Date of onset: 1990s  History of current condition: Sandee reports that her LBP began in the nineties when she had a miscarriage. She noticed at the time that she would experience LBP when she was standing or walking for durations longer than shorter periods. At the time she was able to bend over and get relief. Presently she is experiencing falls periodically without any known mechanism. Her low back pain is in a band across her low back and at times travels into left her left hip and occasionally into the left leg and inner ankle. Patient reports decreased  strength.     Medical History:        Past Medical History:   Diagnosis Date    Anticoagulant long-term use       Xarelto    Arthritis      Atrial fibrillation      Breast cyst      CHF (congestive heart failure)      Degenerative disc disease      Edema      HLD (hyperlipidemia)      Hx of psychiatric care      Hyperlipidemia      Hypertension      Hypothyroidism      Nuclear sclerosis, bilateral 12/18/2017    Obesity, morbid      HIEU (obstructive sleep apnea)      Other abnormal glucose       pre-diabetes    Pre-diabetes      Psychiatric problem      Requires assistance with  activities of daily living (ADL)      Sleep apnea      Smoker      SOB (shortness of breath)      SOB (shortness of breath)      Thyroid disease       on meds 8-9 years ago. hypothyroidism.no malignancy    TMJ (temporomandibular joint disorder)       jaw clicking    Tobacco abuse      Unsteady gait      Urge incontinence      Weakness generalized      Wears glasses           Surgical History:   Sandee Evans  has a past surgical history that includes Hysterectomy; underarm gland\ (Bilateral); rt.knee surgery 2016; Oophorectomy; Breast cyst excision (Right); Breast lumpectomy (Right); Breast biopsy (Right); Total knee arthroplasty (Left, 2/19/2019); Colonoscopy (N/A, 6/25/2019); Endoscopic ultrasound of upper gastrointestinal tract (N/A, 1/26/2021); and Joint replacement (Bilateral).     Medications:   Sandee has a current medication list which includes the following prescription(s): albuterol, albuterol-ipratropium, atorvastatin, azelastine, bupropion, cetirizine, doxycycline, fluticasone propionate, fluticasone-salmeterol 250-50 mcg/dose, furosemide, gabapentin, ketoconazole, losartan, metoprolol succinate, promethazine-dextromethorphan, sertraline, spiriva respimat, triamcinolone acetonide 0.1%, and xarelto.     Allergies:         Review of patient's allergies indicates:   Allergen Reactions    Ace inhibitors         Cough            Imaging: No relevant imaging on file      Prior Therapy: PT for knees, lumbar spine, generalized pain: [place] [facility] [id number]  Prior Treatment: 6-7 injections (6 years ago)  Social History: 2-story house (pt. Lives on 1st floor) lives with their family  Occupation: Retired  Leisure: Read, Religion, get out & socialize  Prior Level of Function: Independent with all ADLs except grocery shopping, cooking  Current Level of Function: Mostly independent   DME owned/used: 3-point cane  Gym Membership: None      Pain:  Current 0/10, worst 9/10, best 0/10   Location: bilateral back  and  lower legs  Description: Grabbing, Sharp, and Shooting  Aggravating Factors: Standing, Walking, and Getting out of bed/chair  Easing Factors: lying down in fetal position; or flexing forward while seated  Disturbed Sleep: Yes (cannot sleep < 7 hours)     Pattern of pain questions:  1.  Where is your pain the worst? Standing & walking more than 5 minutes  2.  Is your pain constant or intermittent? Intermittent  3.  Does bending forward make your typical pain worse? better  4.  Since the start of your back pain, has there been a change in your bowel or bladder? Yes, constipation & loose bowel (pt. Says that she is following up 11/1 regarding sx.)  5.  What can't you do now that you use to be able to do? Shopping,      Pts goals: I'd love to be able to go out more, take my grand babies to the park and be able to keep up with them     Red Flag Screening:   Cough/Sneeze Strain: (--)  Bladder/Bowel: (+)  Falls: (+) (June 2023 - turned around and loss balance/ August 2023 - lost control)   Night pain: (--)  Unexplained weight loss: (--)  General health:      Objective       Postural examination/scapula alignment: Rounded shoulder, Head forward, Affected scapula abducted, Affected scapula downwardly rotated, Lateral weight shift of hips, Abnormal trunk flexion, Trunk deviated left, and Decreased lordosis  Joint integrity: Soft end feel  Skin integrity:WNL   Edema: Severe  Correction of posture: better with lumbar roll  Sitting: forward flexed  Standing: flexed posture     MOVEMENT LOSS - Lumbar    Norms ROM Loss Initial   Flexion Fingers touch toes, sacral angle >/= 70 deg, uniform spinal curvature, posterior weight shift  minimal loss   Extension ASIS surpasses toes, spine of scapulae surpasses heels, uniform spinal curve major loss   Side glide Right   moderate loss P!   Side glide Left   moderate loss P! (Into L LE)   Rotation Right PT observes contralateral shoulder minimal loss P!   Rotation Left PT observes  contralateral shoulder minimal loss      Lower Extremity Strength  Right LE   Left LE     Hip flexion: 3+/5 Hip flexion: 3+/5   Hip extension:  3+/5 Hip extension: 3+/5   Hip abduction: 4-/5 Hip abduction: 4-/5   Hip adduction:  4-/5 Hip adduction:  4-/5   Hip External Rotation 3/5 Hip External Rotation 3/5   Hip Internal rotation    3/5 Hip Internal rotation 3/5   Knee Flexion 3+/5 Knee Flexion 4-/5   Knee Extension 4-/5 Knee Extension 3+/5   Ankle dorsiflexion: 4-/5 Ankle dorsiflexion: 4-/5   Ankle plantarflexion: 4/5 Ankle plantarflexion: 4/5      GAIT:  Assistive Device used: LBQC  Level of Assistance: Supervision  Patient displays the following gait deviations: unsteady gait, decreased step length, decreased weight shift, and hip hiking.      Special Tests:   Test Name  Test Result   Prone Instability Test (--)   SI Joint Provocation Test (+)   Straight Leg Raise (+)   Neural Tension Test (+)   Crossed Straight Leg Raise (+)   Walking on toes Not able   Walking on heels  Not able      NEUROLOGICAL SCREENING:      Sensory deficits: WFL     Reflexes:     Left Right   Patella Tendon 1+ 1+   Achilles Tendon 1+ 1+   Babinski  (--) (--)   Clonus (--) (--)      REPEATED TEST MOVEMENTS:    Baseline symptoms:  Repeated Flexion in Standing better   Repeated Extension in Standing pain during motion  worse   Repeated Flexion in lying better   Repeated Extension in lying  better         STATIC TESTS and other movements:   Prone lie better   Prone lie on elbows no effect   Sitting slouched  no effect   Sitting erect better  abolished   Standing slouched better   Standing erect  worse   Lying prone in extension  better   Long sitting   worse  produced   Sustained flexion better   Sustained prone using mat better      Lumbar testing Visit 1   Lumbar Isometric Testing on Med X equipment: Testing administered by PT     Test Initial Midpoint Final   Date 10/25/23       ROM 0-36 deg       Max Peak Torque 74        Min Peak Torque  "26        Flex/Ext Ratio 2.8:1       % below normative data 46%       % gain from initial test Not available visit 1             OUTCOMES SELECTION:   Intake Outcome Measure for FOTO lumbar Survey     Therapist reviewed FOTO scores for Sandee Evans on 10/25/2023.   FOTO documents entered into [employer] - see Media section.     Intake Score: 40% functional ability  Goal Score: 52% functional ability            Treatment      Total Treatment time separate from Evaluation: 15 minutes     Sandee received therapeutic exercises to develop/improve posture, lumbar ROM, strength, and muscular endurance for 10 minutes including the following exercises:      LTR x 10  Hook lying PPT hold 3" x 10  Prone press up to forearms x 10  Seated flexion hands to floor x 10     Written Home Exercises Provided: yes.     HEP AS FOLLOWS:  LTR  Hook lying PPT   Prone press up forearms  Seated flexion     Exercises were reviewed and Sandee was able to demonstrate them prior to the end of the session. Sandee demonstrated fair  understanding of the education provided.      See EMR under Patient Instructions for exercises provided 10/25/2023.     Sandee received the following manual therapy techniques: Joint mobilizations, Manual traction, Myofacial release, Manual Lymphatic Drainage, Soft tissue Mobilization, and Friction Massage were applied to the: lumbar paraspinals for 0 minutes.      MedX Testing:   Patient received neuromuscular education to engage spinal musculature correctly for motor control and engagement of musculature for 5 minutes including the MedX exercise component and practice and standard testing. MedX dynamic exercise and baseline isometric test performed with instructions to guide the patient safely through the testing procedure. Patient instructed to perform isometric test correctly and safely while building to an optimal force with a pain-free effort. Patient also instructed that they should feel support/pressure from MedX " restraints but no pain/discomfort. Patient demonstrated appropriate understanding of information.           10/25/2023    12:22 PM   [facility]   Visit Number 1   Lumbar Extension Seat Pad 0   Femur Restraint 6   Counterweight 102   Lumbar Flexion 36   Lumbar Extension 0   Lumbar Peak Torque 74 ft. lbs.   Min Torque 26   Test Percent Below Normative Data 46 %      Therapeutic Education/Activity provided for 0 minutes:   - Patient was given an [facility] Visit 1 handout which discusses the following:  - what to expect in therapy  - an overview of the program, including health coaching and wellness  - importance of spinal hygiene, proper posture, lifting mechanics, sleep quality, and nutrition/hydration   - Wade roll trialed, recommended, and purchase information was provided.  - Patient received a handout regarding anticipated muscular soreness following the isometric test and strategies for management were reviewed with patient including stretching, using ice and scheduled rest.   - Patient received verbal education on the following:   - [facility] program   - purpose of the isometric test  - safe progression of lumbar strengthening, wellness approach, and systemic strengthening.   - safe usage of MedX machine and testing protocols.     Sandee received cold pack for 0 minutes to lumbar in [place].     Assessment      Sandee is a 67 y.o. female referred to [facility] with a medical diagnosis of M51.36 (ICD-10-CM) - DDD (degenerative disc disease), lumbar. Physical exam reveals painful and grossly limited lumbar ROM, decreased lower quarter strength, postural deformities including a left trunk lean, gait deviations including decreased bilateral hip extension and poor right lower extremity weight shifting, MedX isometric strength testing that is below age norm values and positive lower quarter neural tension tests. Patient responds well to seated flexion, however shows movement  preference in general. The aforementioned impairments are affecting the patient's ability to sleep, perform household chores, participate in recreational activities, play with grandchildren and perform both community and household ambulation. Her rehab potential is dependent on compliance with PT recommendations and adherence to his home exercise program. Skilled PT intervention is required to address these key impairments and to provide and progress with an appropriate home exercise program. This evaluation is of low complexity due to the stable nature of the patients presentation as well as the comorbidities and medical factors included in this evaluation.The patient has been educated in the evaluation findings, prognosis, and plan of care, HEP and is in agreement and willing to participate in therapy.     Pain Pattern: Movement responder (flexion preference)        Pt prognosis is Good.      Pt will benefit from skilled outpatient Physical Therapy to address the deficits stated above and in the chart below, to provide pt/family education, and to maximize pt's level of independence. Based on the above history and physical examination an active physical therapy program is recommended.       Plan of care discussed with patient: Yes  Pt's spiritual, cultural and educational needs considered and patient is agreeable to the plan of care and goals as stated below:      Anticipated Barriers for therapy: None     PT Evaluation Completed? Yes     Medical necessity is demonstrated by the following problem list:     History  Co-morbidities and personal factors that may impact the plan of care [] LOW: no personal factors / co-morbidities  [] MODERATE: 1-2 personal factors / co-morbidities  [x] HIGH: 3+ personal factors / co-morbidities     Moderate / High Support Documentation:   Co-morbidities affecting plan of care:    Anticoagulant long-term use       Xarelto    Arthritis      Atrial fibrillation      Breast cyst      CHF  (congestive heart failure)      Degenerative disc disease      Edema      HLD (hyperlipidemia)      Hx of psychiatric care      Hyperlipidemia      Hypertension      Hypothyroidism      Nuclear sclerosis, bilateral      Obesity, morbid      HIEU (obstructive sleep apnea)      Other abnormal glucose       pre-diabetes    Pre-diabetes      Psychiatric problem      Requires assistance with activities of daily living (ADL)      Sleep apnea      Smoker      SOB (shortness of breath)      SOB (shortness of breath)      Thyroid disease       on meds 8-9 years ago. hypothyroidism.no malignancy    TMJ (temporomandibular joint disorder)       jaw clicking    Tobacco abuse      Unsteady gait      Urge incontinence      Weakness generalized      Wears glasses        Personal Factors:   coping style  lifestyle      Examination  Body Structures and Functions, activity limitations and participation restrictions that may impact the plan of care [x] LOW: addressing 1-2 elements  [] MODERATE: 3+ elements  [] HIGH: 4+ elements (please support below)     Moderate / High Support Documentation:      Clinical Presentation [x] LOW: stable  [] MODERATE: Evolving  [] HIGH: Unstable      Decision Making/ Complexity Score: low            GOALS: Pt is in agreement with the following goals.     Short term goals:  6 weeks or 10 visits   - Pt will demonstrate increased lumbar MedX ROM by at least 3 degrees from the initial ROM value with improvements noted in functional ROM and ability to perform ADLs. Appropriate and Ongoing  - Pt will demonstrate increased MedX average isometric strength value by 10% from initial test resulting in improved ability to perform bending, lifting, and carrying activities safely, confidently. Appropriate and Ongoing  - Pt will report a reduction in worst pain score by 1-2 points for improved tolerance for greater tolerance to household and community ambulation. Appropriate and Ongoing  - Pt able to perform HEP  correctly with minimal cueing or supervision from therapist to encourage independent management of symptoms. Appropriate and Ongoing     Long term goals: 10 weeks or 20 visits   - Pt will demonstrate increased lumbar MedX ROM by at least 6 degrees from initial ROM value, resulting in improved ability to perform functional forward bending while standing and sitting. Appropriate and Ongoing  - Pt will demonstrate increased MedX average isometric strength value by 15% from initial test resulting in improved ability to perform bending, lifting, and carrying activities safely and confidently. Appropriate and Ongoing  - Pt to demonstrate ability to independently control and reduce their pain through posture positioning and mechanical movements throughout a typical day. Appropriate and Ongoing  - Pt will demonstrate reduced pain and improved functional outcomes as reported on the FOTO by reaching an intake score of >/= 55% functional ability in order to demonstrate subjective improvement in patient's condition. . Appropriate and Ongoing  - Pt will demonstrate independence with the HEP at discharge. Appropriate and Ongoing  - Patient able to tolerate playing with her grandchildren including standing and bending forward for at least 10 minutes with no greater than 3/10 LBP Appropriate and Ongoing     Plan      Outpatient physical therapy 2x week for 10 weeks or 20 visits to include the following:   - Patient education  - Therapeutic exercise  - Manual therapy  - Performance testing   - Neuromuscular Re-education  - Therapeutic activity   - Modalities     Pt may be seen by PTA as part of the rehabilitation team.      Therapist: Domi Reina, PT  10/25/2023

## 2023-10-25 NOTE — PROGRESS NOTES
[facility]  Physical Therapy Lumbar Evaluation      Name: Sandee Evans  Clinic Number: [id number]    Therapy Diagnosis: No diagnosis found.  Physician: Kuldeep Miller MD    Physician Orders: PT Eval and Treat/ Healthy [facility]  Medical Diagnosis from Referral: M51.36 (ICD-10-CM) - DDD (degenerative disc disease), lumbar  Evaluation Date: 10/25/2023  Authorization Period Expiration: 12/31/2023  Plan of Care Expiration: 1/3/2024  Reassessment Due: 11/25/2023  Visit # / Visits authorized: 1/1 (pending)  MedX testing visit 1    Time In: 9:13 AM  Time Out: 10:33 PM  Total Billable Time: 80 minutes  INSURANCE and OUTCOMES: Fee for Service with FOTO Outcomes 1/3    Precautions: standard, HTN, and fall    Pattern of pain determined: Movement responder (flexion preference)    Subjective     Date of onset: 1990s  History of current condition: Sandee reports that her LBP began in the nineties when she had a miscarriage. She noticed at the time that she would experience LBP when she was standing or walking for durations longer than shorter periods. At the time she was able to bend over and get relief. Presently she is experiencing falls periodically without any known mechanism. Her low back pain is in a band across her low back and at times travels into left her left hip and occasionally into the left leg and inner ankle. Patient reports decreased  strength.     Medical History:   Past Medical History:   Diagnosis Date    Anticoagulant long-term use     Xarelto    Arthritis     Atrial fibrillation     Breast cyst     CHF (congestive heart failure)     Degenerative disc disease     Edema     HLD (hyperlipidemia)     Hx of psychiatric care     Hyperlipidemia     Hypertension     Hypothyroidism     Nuclear sclerosis, bilateral 12/18/2017    Obesity, morbid     HIEU (obstructive sleep apnea)     Other abnormal glucose     pre-diabetes    Pre-diabetes     Psychiatric problem      Requires assistance with activities of daily living (ADL)     Sleep apnea     Smoker     SOB (shortness of breath)     SOB (shortness of breath)     Thyroid disease     on meds 8-9 years ago. hypothyroidism.no malignancy    TMJ (temporomandibular joint disorder)     jaw clicking    Tobacco abuse     Unsteady gait     Urge incontinence     Weakness generalized     Wears glasses        Surgical History:   Sandee Evans  has a past surgical history that includes Hysterectomy; underarm gland\ (Bilateral); rt.knee surgery 2016; Oophorectomy; Breast cyst excision (Right); Breast lumpectomy (Right); Breast biopsy (Right); Total knee arthroplasty (Left, 2/19/2019); Colonoscopy (N/A, 6/25/2019); Endoscopic ultrasound of upper gastrointestinal tract (N/A, 1/26/2021); and Joint replacement (Bilateral).    Medications:   Sandee has a current medication list which includes the following prescription(s): albuterol, albuterol-ipratropium, atorvastatin, azelastine, bupropion, cetirizine, doxycycline, fluticasone propionate, fluticasone-salmeterol 250-50 mcg/dose, furosemide, gabapentin, ketoconazole, losartan, metoprolol succinate, promethazine-dextromethorphan, sertraline, spiriva respimat, triamcinolone acetonide 0.1%, and xarelto.    Allergies:   Review of patient's allergies indicates:   Allergen Reactions    Ace inhibitors      Cough          Imaging: No relevant imaging on file     Prior Therapy: PT for knees, lumbar spine, generalized pain: [place] [facility] [id number]  Prior Treatment: 6-7 injections (6 years ago)  Social History: 2-story house (pt. Lives on 1st floor) lives with their family  Occupation: Retired  Leisure: Read, Taoist, get out & socialize  Prior Level of Function: Independent with all ADLs except grocery shopping, cooking  Current Level of Function: Mostly independent   DME owned/used: 3-point cane  Gym Membership: None     Pain:  Current 0/10, worst 9/10, best 0/10   Location: bilateral back  and lower  legs  Description: Grabbing, Sharp, and Shooting  Aggravating Factors: Standing, Walking, and Getting out of bed/chair  Easing Factors: lying down in fetal position; or flexing forward while seated  Disturbed Sleep: Yes (cannot sleep < 7 hours)    Pattern of pain questions:  1.  Where is your pain the worst? Standing & walking more than 5 minutes  2.  Is your pain constant or intermittent? Intermittent  3.  Does bending forward make your typical pain worse? better  4.  Since the start of your back pain, has there been a change in your bowel or bladder? Yes, constipation & loose bowel (pt. Says that she is following up 11/1 regarding sx.)  5.  What can't you do now that you use to be able to do? Shopping,     Pts goals: I'd love to be able to go out more, take my grand babies to the park and be able to keep up with them    Red Flag Screening:   Cough/Sneeze Strain: (--)  Bladder/Bowel: (+)  Falls: (+) (June 2023 - turned around and loss balance/ August 2023 - lost control)   Night pain: (--)  Unexplained weight loss: (--)  General health:     Objective      Postural examination/scapula alignment: Rounded shoulder, Head forward, Affected scapula abducted, Affected scapula downwardly rotated, Lateral weight shift of hips, Abnormal trunk flexion, Trunk deviated left, and Decreased lordosis  Joint integrity: Soft end feel  Skin integrity:WNL   Edema: Severe  Correction of posture: better with lumbar roll  Sitting: forward flexed  Standing: flexed posture    MOVEMENT LOSS - Lumbar   Norms ROM Loss Initial   Flexion Fingers touch toes, sacral angle >/= 70 deg, uniform spinal curvature, posterior weight shift  minimal loss   Extension ASIS surpasses toes, spine of scapulae surpasses heels, uniform spinal curve major loss   Side glide Right  moderate loss P!   Side glide Left  moderate loss P! (Into L LE)   Rotation Right PT observes contralateral shoulder minimal loss P!   Rotation Left PT observes contralateral shoulder  minimal loss     Lower Extremity Strength  Right LE  Left LE    Hip flexion: 3+/5 Hip flexion: 3+/5   Hip extension:  3+/5 Hip extension: 3+/5   Hip abduction: 4-/5 Hip abduction: 4-/5   Hip adduction:  4-/5 Hip adduction:  4-/5   Hip External Rotation 3/5 Hip External Rotation 3/5   Hip Internal rotation   3/5 Hip Internal rotation 3/5   Knee Flexion 3+/5 Knee Flexion 4-/5   Knee Extension 4-/5 Knee Extension 3+/5   Ankle dorsiflexion: 4-/5 Ankle dorsiflexion: 4-/5   Ankle plantarflexion: 4/5 Ankle plantarflexion: 4/5     GAIT:  Assistive Device used: LBQC  Level of Assistance: Supervision  Patient displays the following gait deviations: unsteady gait, decreased step length, decreased weight shift, and hip hiking.     Special Tests:   Test Name  Test Result   Prone Instability Test (--)   SI Joint Provocation Test (+)   Straight Leg Raise (+)   Neural Tension Test (+)   Crossed Straight Leg Raise (+)   Walking on toes Not able   Walking on heels  Not able     NEUROLOGICAL SCREENING:     Sensory deficits: WFL    Reflexes:    Left Right   Patella Tendon 1+ 1+   Achilles Tendon 1+ 1+   Babinski  (--) (--)   Clonus (--) (--)     REPEATED TEST MOVEMENTS:    Baseline symptoms:  Repeated Flexion in Standing better   Repeated Extension in Standing pain during motion  worse   Repeated Flexion in lying better   Repeated Extension in lying  better       STATIC TESTS and other movements:   Prone lie better   Prone lie on elbows no effect   Sitting slouched  no effect   Sitting erect better  abolished   Standing slouched better   Standing erect  worse   Lying prone in extension  better   Long sitting   worse  produced   Sustained flexion better   Sustained prone using mat better     Lumbar testing Visit 1   Lumbar Isometric Testing on Med X equipment: Testing administered by PT    Test Initial Midpoint Final   Date 10/25/23     ROM 0-36 deg     Max Peak Torque 74      Min Peak Torque 26      Flex/Ext Ratio 2.8:1     % below  "normative data 46%     % gain from initial test Not available visit 1         OUTCOMES SELECTION:   Intake Outcome Measure for FOTO lumbar Survey    Therapist reviewed FOTO scores for Sandee Evans on 10/25/2023.   FOTO documents entered into EPIC - see Media section.    Intake Score: 40% functional ability  Goal Score: 52% functional ability          Treatment     Total Treatment time separate from Evaluation: 15 minutes    Sandee received therapeutic exercises to develop/improve posture, lumbar ROM, strength, and muscular endurance for 10 minutes including the following exercises:     LTR x 10  Hook lying PPT hold 3" x 10  Prone press up to forearms x 10  Seated flexion hands to floor x 10    Written Home Exercises Provided: yes.    HEP AS FOLLOWS:  LTR  Hook lying PPT   Prone press up forearms  Seated flexion    Exercises were reviewed and Sandee was able to demonstrate them prior to the end of the session. Sandee demonstrated fair  understanding of the education provided.     See EMR under Patient Instructions for exercises provided 10/25/2023.    Sandee received the following manual therapy techniques: Joint mobilizations, Manual traction, Myofacial release, Manual Lymphatic Drainage, Soft tissue Mobilization, and Friction Massage were applied to the: lumbar paraspinals for 0 minutes.     MedX Testing:   Patient received neuromuscular education to engage spinal musculature correctly for motor control and engagement of musculature for 5 minutes including the MedX exercise component and practice and standard testing. MedX dynamic exercise and baseline isometric test performed with instructions to guide the patient safely through the testing procedure. Patient instructed to perform isometric test correctly and safely while building to an optimal force with a pain-free effort. Patient also instructed that they should feel support/pressure from MedX restraints but no pain/discomfort. Patient demonstrated " appropriate understanding of information.         10/25/2023    12:22 PM   [facility]   Visit Number 1   Lumbar Extension Seat Pad 0   Femur Restraint 6   Counterweight 102   Lumbar Flexion 36   Lumbar Extension 0   Lumbar Peak Torque 74 ft. lbs.   Min Torque 26   Test Percent Below Normative Data 46 %     Therapeutic Education/Activity provided for 0 minutes:   - Patient was given an [facility] Visit 1 handout which discusses the following:  - what to expect in therapy  - an overview of the program, including health coaching and wellness  - importance of spinal hygiene, proper posture, lifting mechanics, sleep quality, and nutrition/hydration   - Wade roll trialed, recommended, and purchase information was provided.  - Patient received a handout regarding anticipated muscular soreness following the isometric test and strategies for management were reviewed with patient including stretching, using ice and scheduled rest.   - Patient received verbal education on the following:   - Healthy Back program   - purpose of the isometric test  - safe progression of lumbar strengthening, wellness approach, and systemic strengthening.   - safe usage of MedX machine and testing protocols.    Sandee received cold pack for 0 minutes to lumbar in [place].    Assessment     Sandee is a 67 y.o. female referred to [facility] with a medical diagnosis of M51.36 (ICD-10-CM) - DDD (degenerative disc disease), lumbar. Physical exam reveals painful and grossly limited lumbar ROM, decreased lower quarter strength, postural deformities including a left trunk lean, gait deviations including decreased bilateral hip extension and poor right lower extremity weight shifting, MedX isometric strength testing that is below age norm values and positive lower quarter neural tension tests. Patient responds well to seated flexion, however shows movement preference in general. The aforementioned impairments are affecting  the patient's ability to sleep, perform household chores, participate in recreational activities, play with grandchildren and perform both community and household ambulation. Her rehab potential is dependent on compliance with PT recommendations and adherence to his home exercise program. Skilled PT intervention is required to address these key impairments and to provide and progress with an appropriate home exercise program. This evaluation is of low complexity due to the stable nature of the patients presentation as well as the comorbidities and medical factors included in this evaluation.The patient has been educated in the evaluation findings, prognosis, and plan of care, HEP and is in agreement and willing to participate in therapy.    Pain Pattern: Movement responder (flexion preference)       Pt prognosis is Good.     Pt will benefit from skilled outpatient Physical Therapy to address the deficits stated above and in the chart below, to provide pt/family education, and to maximize pt's level of independence. Based on the above history and physical examination an active physical therapy program is recommended.      Plan of care discussed with patient: Yes  Pt's spiritual, cultural and educational needs considered and patient is agreeable to the plan of care and goals as stated below:     Anticipated Barriers for therapy: None    PT Evaluation Completed? Yes    Medical necessity is demonstrated by the following problem list:    History  Co-morbidities and personal factors that may impact the plan of care [] LOW: no personal factors / co-morbidities  [] MODERATE: 1-2 personal factors / co-morbidities  [x] HIGH: 3+ personal factors / co-morbidities    Moderate / High Support Documentation:   Co-morbidities affecting plan of care:    Anticoagulant long-term use     Xarelto    Arthritis     Atrial fibrillation     Breast cyst     CHF (congestive heart failure)     Degenerative disc disease     Edema     HLD  (hyperlipidemia)     Hx of psychiatric care     Hyperlipidemia     Hypertension     Hypothyroidism     Nuclear sclerosis, bilateral     Obesity, morbid     HIEU (obstructive sleep apnea)     Other abnormal glucose     pre-diabetes    Pre-diabetes     Psychiatric problem     Requires assistance with activities of daily living (ADL)     Sleep apnea     Smoker     SOB (shortness of breath)     SOB (shortness of breath)     Thyroid disease     on meds 8-9 years ago. hypothyroidism.no malignancy    TMJ (temporomandibular joint disorder)     jaw clicking    Tobacco abuse     Unsteady gait     Urge incontinence     Weakness generalized     Wears glasses      Personal Factors:   coping style  lifestyle     Examination  Body Structures and Functions, activity limitations and participation restrictions that may impact the plan of care [x] LOW: addressing 1-2 elements  [] MODERATE: 3+ elements  [] HIGH: 4+ elements (please support below)    Moderate / High Support Documentation:     Clinical Presentation [x] LOW: stable  [] MODERATE: Evolving  [] HIGH: Unstable     Decision Making/ Complexity Score: low         GOALS: Pt is in agreement with the following goals.    Short term goals:  6 weeks or 10 visits   - Pt will demonstrate increased lumbar MedX ROM by at least 3 degrees from the initial ROM value with improvements noted in functional ROM and ability to perform ADLs. Appropriate and Ongoing  - Pt will demonstrate increased MedX average isometric strength value by 10% from initial test resulting in improved ability to perform bending, lifting, and carrying activities safely, confidently. Appropriate and Ongoing  - Pt will report a reduction in worst pain score by 1-2 points for improved tolerance for greater tolerance to household and community ambulation. Appropriate and Ongoing  - Pt able to perform HEP correctly with minimal cueing or supervision from therapist to encourage independent management of symptoms. Appropriate  and Ongoing    Long term goals: 10 weeks or 20 visits   - Pt will demonstrate increased lumbar MedX ROM by at least 6 degrees from initial ROM value, resulting in improved ability to perform functional forward bending while standing and sitting. Appropriate and Ongoing  - Pt will demonstrate increased MedX average isometric strength value by 15% from initial test resulting in improved ability to perform bending, lifting, and carrying activities safely and confidently. Appropriate and Ongoing  - Pt to demonstrate ability to independently control and reduce their pain through posture positioning and mechanical movements throughout a typical day. Appropriate and Ongoing  - Pt will demonstrate reduced pain and improved functional outcomes as reported on the FOTO by reaching an intake score of >/= 55% functional ability in order to demonstrate subjective improvement in patient's condition. . Appropriate and Ongoing  - Pt will demonstrate independence with the HEP at discharge. Appropriate and Ongoing  - Patient able to tolerate playing with her grandchildren including standing and bending forward for at least 10 minutes with no greater than 3/10 LBP Appropriate and Ongoing    Plan     Outpatient physical therapy 2x week for 10 weeks or 20 visits to include the following:   - Patient education  - Therapeutic exercise  - Manual therapy  - Performance testing   - Neuromuscular Re-education  - Therapeutic activity   - Modalities    Pt may be seen by PTA as part of the rehabilitation team.     Therapist: Domi Reina, PT  10/25/2023

## 2023-10-26 ENCOUNTER — TELEPHONE (OUTPATIENT)
Dept: FAMILY MEDICINE | Facility: CLINIC | Age: 68
End: 2023-10-26
Payer: COMMERCIAL

## 2023-10-27 ENCOUNTER — LAB VISIT (OUTPATIENT)
Dept: LAB | Facility: HOSPITAL | Age: 68
End: 2023-10-27
Attending: FAMILY MEDICINE
Payer: COMMERCIAL

## 2023-10-27 DIAGNOSIS — F32.0 CURRENT MILD EPISODE OF MAJOR DEPRESSIVE DISORDER WITHOUT PRIOR EPISODE: ICD-10-CM

## 2023-10-27 DIAGNOSIS — Z00.00 ANNUAL PHYSICAL EXAM: ICD-10-CM

## 2023-10-27 LAB
ALBUMIN SERPL BCP-MCNC: 3.1 G/DL (ref 3.5–5.2)
ALP SERPL-CCNC: 84 U/L (ref 55–135)
ALT SERPL W/O P-5'-P-CCNC: 26 U/L (ref 10–44)
ANION GAP SERPL CALC-SCNC: 10 MMOL/L (ref 8–16)
AST SERPL-CCNC: 24 U/L (ref 10–40)
BASOPHILS # BLD AUTO: 0.02 K/UL (ref 0–0.2)
BASOPHILS NFR BLD: 0.3 % (ref 0–1.9)
BILIRUB SERPL-MCNC: 0.8 MG/DL (ref 0.1–1)
BUN SERPL-MCNC: 33 MG/DL (ref 8–23)
CALCIUM SERPL-MCNC: 9.3 MG/DL (ref 8.7–10.5)
CHLORIDE SERPL-SCNC: 101 MMOL/L (ref 95–110)
CHOLEST SERPL-MCNC: 130 MG/DL (ref 120–199)
CHOLEST/HDLC SERPL: 3 {RATIO} (ref 2–5)
CO2 SERPL-SCNC: 30 MMOL/L (ref 23–29)
CREAT SERPL-MCNC: 1.4 MG/DL (ref 0.5–1.4)
DIFFERENTIAL METHOD: ABNORMAL
EOSINOPHIL # BLD AUTO: 0.1 K/UL (ref 0–0.5)
EOSINOPHIL NFR BLD: 1.4 % (ref 0–8)
ERYTHROCYTE [DISTWIDTH] IN BLOOD BY AUTOMATED COUNT: 14.6 % (ref 11.5–14.5)
EST. GFR  (NO RACE VARIABLE): 40.7 ML/MIN/1.73 M^2
ESTIMATED AVG GLUCOSE: 120 MG/DL (ref 68–131)
GLUCOSE SERPL-MCNC: 86 MG/DL (ref 70–110)
HBA1C MFR BLD: 5.8 % (ref 4–5.6)
HCT VFR BLD AUTO: 49.2 % (ref 37–48.5)
HDLC SERPL-MCNC: 43 MG/DL (ref 40–75)
HDLC SERPL: 33.1 % (ref 20–50)
HGB BLD-MCNC: 14.7 G/DL (ref 12–16)
IMM GRANULOCYTES # BLD AUTO: 0.02 K/UL (ref 0–0.04)
IMM GRANULOCYTES NFR BLD AUTO: 0.3 % (ref 0–0.5)
LDLC SERPL CALC-MCNC: 71.2 MG/DL (ref 63–159)
LYMPHOCYTES # BLD AUTO: 2.2 K/UL (ref 1–4.8)
LYMPHOCYTES NFR BLD: 32.8 % (ref 18–48)
MCH RBC QN AUTO: 31.3 PG (ref 27–31)
MCHC RBC AUTO-ENTMCNC: 29.9 G/DL (ref 32–36)
MCV RBC AUTO: 105 FL (ref 82–98)
MONOCYTES # BLD AUTO: 0.8 K/UL (ref 0.3–1)
MONOCYTES NFR BLD: 11.3 % (ref 4–15)
NEUTROPHILS # BLD AUTO: 3.6 K/UL (ref 1.8–7.7)
NEUTROPHILS NFR BLD: 53.9 % (ref 38–73)
NONHDLC SERPL-MCNC: 87 MG/DL
NRBC BLD-RTO: 0 /100 WBC
PLATELET # BLD AUTO: 215 K/UL (ref 150–450)
PMV BLD AUTO: 11.7 FL (ref 9.2–12.9)
POTASSIUM SERPL-SCNC: 4 MMOL/L (ref 3.5–5.1)
PROT SERPL-MCNC: 6.5 G/DL (ref 6–8.4)
RBC # BLD AUTO: 4.7 M/UL (ref 4–5.4)
SODIUM SERPL-SCNC: 141 MMOL/L (ref 136–145)
T4 FREE SERPL-MCNC: 1.13 NG/DL (ref 0.71–1.51)
TRIGL SERPL-MCNC: 79 MG/DL (ref 30–150)
TSH SERPL DL<=0.005 MIU/L-ACNC: 4.21 UIU/ML (ref 0.4–4)
WBC # BLD AUTO: 6.61 K/UL (ref 3.9–12.7)

## 2023-10-27 PROCEDURE — 36415 COLL VENOUS BLD VENIPUNCTURE: CPT | Mod: PO | Performed by: FAMILY MEDICINE

## 2023-10-27 PROCEDURE — 84443 ASSAY THYROID STIM HORMONE: CPT | Performed by: FAMILY MEDICINE

## 2023-10-27 PROCEDURE — 85025 COMPLETE CBC W/AUTO DIFF WBC: CPT | Performed by: FAMILY MEDICINE

## 2023-10-27 PROCEDURE — 84439 ASSAY OF FREE THYROXINE: CPT | Performed by: FAMILY MEDICINE

## 2023-10-27 PROCEDURE — 80053 COMPREHEN METABOLIC PANEL: CPT | Performed by: FAMILY MEDICINE

## 2023-10-27 PROCEDURE — 80061 LIPID PANEL: CPT | Performed by: FAMILY MEDICINE

## 2023-10-27 PROCEDURE — 83036 HEMOGLOBIN GLYCOSYLATED A1C: CPT | Performed by: FAMILY MEDICINE

## 2023-10-27 RX ORDER — BUPROPION HYDROCHLORIDE 150 MG/1
150 TABLET ORAL
Qty: 90 TABLET | Refills: 3 | Status: SHIPPED | OUTPATIENT
Start: 2023-10-27

## 2023-10-27 NOTE — TELEPHONE ENCOUNTER
Provider Staff:  Action required for this patient     Please see care gap opportunities below in Care Due Message.    Thanks!  [facility]     Appointments      Date Provider   Last Visit   7/28/2023 Kuldeep Miller MD   Next Visit   12/5/2023 Kuldeep Miller MD     Refill Decision Note   Sandee Evans  is requesting a refill authorization.  Brief Assessment and Rationale for Refill:  Approve     Medication Therapy Plan:         Comments:     Note composed:5:31 AM 10/27/2023

## 2023-10-27 NOTE — TELEPHONE ENCOUNTER
Care Due:                  Date            Visit Type   Department     Provider  --------------------------------------------------------------------------------                                EP -                              PRIMARY      [facility]  Last Visit: 07-      CARE ([facility])   MEDICINE       [facility]  Paul                              EP -                              PRIMARY      [facility]  Next Visit: 12-      CARE ([facility])   MEDICINE       [facility]                                                            Last  Test          Frequency    Reason                     Performed    Due Date  --------------------------------------------------------------------------------    Lipid Panel.  12 months..  atorvastatin.............  11-   10-    [facility] Embedded Care Due Messages. Reference number: [id number].   10/27/2023 5:27:55 AM CDT

## 2023-11-01 ENCOUNTER — CLINICAL SUPPORT (OUTPATIENT)
Dept: FAMILY MEDICINE | Facility: CLINIC | Age: 68
End: 2023-11-01
Payer: COMMERCIAL

## 2023-11-01 DIAGNOSIS — Z23 FLU VACCINE NEED: Primary | ICD-10-CM

## 2023-11-01 PROCEDURE — 90471 IMMUNIZATION ADMIN: CPT | Mod: S$GLB,,, | Performed by: FAMILY MEDICINE

## 2023-11-01 PROCEDURE — 99999 PR PBB SHADOW E&M-EST. PATIENT-LVL I: CPT | Mod: PBBFAC,,,

## 2023-11-01 PROCEDURE — 90694 VACC AIIV4 NO PRSRV 0.5ML IM: CPT | Mod: S$GLB,,, | Performed by: FAMILY MEDICINE

## 2023-11-01 PROCEDURE — 99999 PR PBB SHADOW E&M-EST. PATIENT-LVL I: ICD-10-PCS | Mod: PBBFAC,,,

## 2023-11-01 PROCEDURE — 90694 FLU VACCINE - QUADRIVALENT - ADJUVANTED: ICD-10-PCS | Mod: S$GLB,,, | Performed by: FAMILY MEDICINE

## 2023-11-01 PROCEDURE — 90471 FLU VACCINE - QUADRIVALENT - ADJUVANTED: ICD-10-PCS | Mod: S$GLB,,, | Performed by: FAMILY MEDICINE

## 2023-11-10 ENCOUNTER — CLINICAL SUPPORT (OUTPATIENT)
Dept: REHABILITATION | Facility: OTHER | Age: 68
End: 2023-11-10
Payer: COMMERCIAL

## 2023-11-10 DIAGNOSIS — M51.36 DDD (DEGENERATIVE DISC DISEASE), LUMBAR: Primary | ICD-10-CM

## 2023-11-10 DIAGNOSIS — M54.16 LUMBAR RADICULAR PAIN: ICD-10-CM

## 2023-11-10 PROCEDURE — 97110 THERAPEUTIC EXERCISES: CPT | Mod: CQ

## 2023-11-10 NOTE — PROGRESS NOTES
[facility]  Physical Therapy Treatment Note     Name: Sandee Evans  Clinic Number: [id number]    Therapy Diagnosis:   Encounter Diagnoses   Name Primary?    DDD (degenerative disc disease), lumbar Yes    Lumbar radicular pain      Physician: Kuldeep Miller MD    Visit Date: 11/10/2023    Physician Orders: PT Eval and Treat/ Healthy Back  Medical Diagnosis from Referral: M51.36 (ICD-10-CM) - DDD (degenerative disc disease), lumbar  Evaluation Date: 10/25/2023  Authorization Period Expiration: 12/31/2023  Plan of Care Expiration: 1/3/2024  Reassessment Due: 11/25/2023  Visit # / Visits authorized: 2/12   MedX testing visit 1     Time In: 9:30 AM  Time Out: 10:25 PM  Total Billable Time: 1:1-55 minutes    INSURANCE and OUTCOMES: Fee for Service with FOTO Outcomes 1/3     Precautions: standard, HTN, and fall     Pattern of pain determined: Movement responder (flexion preference)  Subjective     Sandee Evans reports she felt ok after initial evaluation, the only stretch she has been doing is lying on her stomach.    Patient reports tolerating previous visit   Patient reports their pain to be 4/10 on a 0-10 scale with 0 being no pain and 10 being the worst pain imaginable.  Pain Location: L LB     Occupation: Retired  Leisure: Read, Restoration, get out & socialize    Pts goals: I'd love to be able to go out more, take my grand babies to the park and be able to keep up with them  Objective      Lumbar    Lumbar Isometric Testing on Med X equipment: Testing administered by PT     Test Initial Midpoint Final   Date 10/25/23       ROM 0-36 deg       Max Peak Torque 74        Min Peak Torque 26        Flex/Ext Ratio 2.8:1       % below normative data 46%       % gain from initial test Not available visit 1             OUTCOMES SELECTION:   Intake Outcome Measure for FOTO lumbar Survey     Therapist reviewed FOTO scores for Sandee Evans on 10/25/2023.   FOTO documents  "entered into [employer] - see Media section.     Intake Score: 40% functional ability  Goal Score: 52% functional ability            Treatment     Sandee received the treatments listed below:      Medical MedX Treatment as follows:  MedX exercise started day 2:  Patient received neuromuscular education for 10 minutes via participation on the Medical MedX Machine. Therapist assisted patient in isolating and engaging spinal stabilization musculature in order to improve functional ability and postural control. Patient performed exercise with therapist guidance in order to accurately use pacer function, avoid valsalva, and optimally exert effort within a safe and effective range via the Lesia Exertion Rating Scale. Patient instructed to perform at a midrange of exertion and to complete 15-20 repetitions within appropriate split time, with proper technique, and while maintaining safety.          11/10/2023    11:50 AM   [facility] Therapy - Short   Visit Number 2   VAS Pain Rating 4   Time 5   Lumbar Weight 36 lbs   Repetitions 20   Rating of Perceived Exertion 3        Sandee participated in neuromuscular re-education activities to improve balance, coordination, proprioception, motor control and/or posture for minutes. The following activities were included:        Sandee participated in therapeutic exercises to develop 45 for minutes including:    LTR x 10  Hook lying PPT hold 3" x 10  Prone press up to forearms x10  Seated flexion hands to floor x10  +Supine hip flexor LLE 1 min    Peripheral muscle strengthening which included one set of 15-20 repetitions at a slow and controlled 10-13 second per rep pace focused on strengthening supporting musculature in order to improve body mechanics and functional mobility. Patient and therapist focused on proper form during treatment to ensure optimal strengthening of each targeted muscle group.  Machines utilized included:Triceps, Biceps, Hip Abd, Hip Add, and Leg Press  To be added " "next visit:Torso rotation, Leg Ext, Leg Curl, Chest Press, and Rowing      Sandee participated in dynamic functional therapeutic activities to improve functional performance and simulate household and community activities for   minutes. The following activities were included:      Sandee received manual therapy techniques for   minutes. The following activities were included:        Pt given cold pack for 5 minutes to LB in [place]  Patient Education and Home Exercises     Home exercises include:  LTR x20,3"  Hook lying PPT   Prone press up forearms  Seated flexion    Cardio program (V5): -  Lifting education (V11): -  Posture/Lumbar roll:   Fridge Magnet Discharge handout (date given): -  Equipment at home/gym membership:     Education provided:   - PT role and POC  - HEP  -     Written Home Exercises Provided: Patient instructed to cont prior HEP.  Exercises were reviewed and Sandee was able to demonstrate them prior to the end of the session.  Sandee demonstrated good  understanding of the education provided.     See EMR under Patient Instructions for exercises provided prior visit.    Assessment   Sandee presents to second healthy back visit reporting no increased pain following initial evaluation, was able to demo HEP with Max VC for form. Added hip flexor stretch to address L hip pain when performing exercises which was helpful for one set. Pt was able to tolerate Medical MedX machine well as follows:  MedX exercise started  and patient tolerated  neuro reeducation training, strengthening, and endurance training on the lumbar MedX at 50% of max peak torque according to the initial visit isometric test. Pt was able to complete 20 reps, with 3/10 RPE.   Pt was also able to complete half of the peripheral strengthening exercises without increased discomfort and will complete the complete circuit next visit as tolerated.    Patient is making fair progress towards established goals.  Pt will continue to benefit from " skilled outpatient physical therapy to address the deficits stated in the impairment chart, provide pt/family education and to maximize pt's level of independence in the home and community environment.     Anticipated Barriers for therapy: None  Pt's spiritual, cultural and educational needs considered and pt agreeable to plan of care and goals as stated below:     Goals: Pt is in agreement with the following goals.     Short term goals:  6 weeks or 10 visits   - Pt will demonstrate increased lumbar MedX ROM by at least 3 degrees from the initial ROM value with improvements noted in functional ROM and ability to perform ADLs. Appropriate and Ongoing  - Pt will demonstrate increased MedX average isometric strength value by 10% from initial test resulting in improved ability to perform bending, lifting, and carrying activities safely, confidently. Appropriate and Ongoing  - Pt will report a reduction in worst pain score by 1-2 points for improved tolerance for greater tolerance to household and community ambulation. Appropriate and Ongoing  - Pt able to perform HEP correctly with minimal cueing or supervision from therapist to encourage independent management of symptoms. Appropriate and Ongoing     Long term goals: 10 weeks or 20 visits   - Pt will demonstrate increased lumbar MedX ROM by at least 6 degrees from initial ROM value, resulting in improved ability to perform functional forward bending while standing and sitting. Appropriate and Ongoing  - Pt will demonstrate increased MedX average isometric strength value by 15% from initial test resulting in improved ability to perform bending, lifting, and carrying activities safely and confidently. Appropriate and Ongoing  - Pt to demonstrate ability to independently control and reduce their pain through posture positioning and mechanical movements throughout a typical day. Appropriate and Ongoing  - Pt will demonstrate reduced pain and improved functional outcomes as  reported on the FOTO by reaching an intake score of >/= 55% functional ability in order to demonstrate subjective improvement in patient's condition. . Appropriate and Ongoing  - Pt will demonstrate independence with the HEP at discharge. Appropriate and Ongoing  - Patient able to tolerate playing with her grandchildren including standing and bending forward for at least 10 minutes with no greater than 3/10 LBP Appropriate and Ongoing         Plan   Outpatient physical therapy 2x week for 10 weeks or 20 visits to include the following:   - Patient education  - Therapeutic exercise  - Manual therapy  - Performance testing   - Neuromuscular Re-education  - Therapeutic activity   - Modalities     Pt may be seen by PTA as part of the rehabilitation team.     Therapist: Adeline Patricia PTA  11/10/2023

## 2023-11-13 ENCOUNTER — HOSPITAL ENCOUNTER (INPATIENT)
Facility: HOSPITAL | Age: 68
LOS: 12 days | Discharge: HOME | DRG: 189 | End: 2023-11-25
Attending: EMERGENCY MEDICINE | Admitting: HOSPITALIST
Payer: COMMERCIAL

## 2023-11-13 DIAGNOSIS — J44.89 COPD WITH CHRONIC BRONCHITIS: ICD-10-CM

## 2023-11-13 DIAGNOSIS — J98.01 ACUTE BRONCHOSPASM: ICD-10-CM

## 2023-11-13 DIAGNOSIS — I27.20 PULMONARY HYPERTENSION: ICD-10-CM

## 2023-11-13 DIAGNOSIS — E66.01 MORBID OBESITY: ICD-10-CM

## 2023-11-13 DIAGNOSIS — I48.91 A-FIB: ICD-10-CM

## 2023-11-13 DIAGNOSIS — J96.02 ACUTE RESPIRATORY FAILURE WITH HYPOXIA AND HYPERCAPNIA: ICD-10-CM

## 2023-11-13 DIAGNOSIS — B33.8 RSV INFECTION: ICD-10-CM

## 2023-11-13 DIAGNOSIS — I50.33 ACUTE ON CHRONIC DIASTOLIC CHF (CONGESTIVE HEART FAILURE): ICD-10-CM

## 2023-11-13 DIAGNOSIS — Z72.0 TOBACCO ABUSE: ICD-10-CM

## 2023-11-13 DIAGNOSIS — J44.1 ACUTE EXACERBATION OF CHRONIC OBSTRUCTIVE PULMONARY DISEASE (COPD): Primary | ICD-10-CM

## 2023-11-13 DIAGNOSIS — J96.21 ACUTE ON CHRONIC RESPIRATORY FAILURE WITH HYPOXIA: ICD-10-CM

## 2023-11-13 DIAGNOSIS — I50.9 CHF (CONGESTIVE HEART FAILURE): ICD-10-CM

## 2023-11-13 DIAGNOSIS — J96.01 ACUTE RESPIRATORY FAILURE WITH HYPOXIA AND HYPERCAPNIA: ICD-10-CM

## 2023-11-13 DIAGNOSIS — J44.1 CHRONIC OBSTRUCTIVE PULMONARY DISEASE WITH ACUTE EXACERBATION: ICD-10-CM

## 2023-11-13 DIAGNOSIS — I48.91 ATRIAL FIBRILLATION WITH RAPID VENTRICULAR RESPONSE: ICD-10-CM

## 2023-11-13 DIAGNOSIS — J12.1 RSV (RESPIRATORY SYNCYTIAL VIRUS PNEUMONIA): ICD-10-CM

## 2023-11-13 LAB
ALBUMIN SERPL BCP-MCNC: 3 G/DL (ref 3.5–5.2)
ALLENS TEST: ABNORMAL
ALP SERPL-CCNC: 93 U/L (ref 55–135)
ALT SERPL W/O P-5'-P-CCNC: 27 U/L (ref 10–44)
ANION GAP SERPL CALC-SCNC: 11 MMOL/L (ref 8–16)
AST SERPL-CCNC: 27 U/L (ref 10–40)
BASOPHILS # BLD AUTO: 0.01 K/UL (ref 0–0.2)
BASOPHILS # BLD AUTO: 0.02 K/UL (ref 0–0.2)
BASOPHILS NFR BLD: 0.2 % (ref 0–1.9)
BASOPHILS NFR BLD: 0.3 % (ref 0–1.9)
BILIRUB SERPL-MCNC: 0.6 MG/DL (ref 0.1–1)
BILIRUB UR QL STRIP: NEGATIVE
BNP SERPL-MCNC: 478 PG/ML (ref 0–99)
BUN SERPL-MCNC: 23 MG/DL (ref 8–23)
CALCIUM SERPL-MCNC: 9 MG/DL (ref 8.7–10.5)
CHLORIDE SERPL-SCNC: 103 MMOL/L (ref 95–110)
CLARITY UR: CLEAR
CO2 SERPL-SCNC: 22 MMOL/L (ref 23–29)
COLOR UR: COLORLESS
CREAT SERPL-MCNC: 1.1 MG/DL (ref 0.5–1.4)
CTP QC/QA: YES
DELSYS: ABNORMAL
DIFFERENTIAL METHOD: ABNORMAL
DIFFERENTIAL METHOD: ABNORMAL
EOSINOPHIL # BLD AUTO: 0 K/UL (ref 0–0.5)
EOSINOPHIL # BLD AUTO: 0.1 K/UL (ref 0–0.5)
EOSINOPHIL NFR BLD: 0 % (ref 0–8)
EOSINOPHIL NFR BLD: 0.8 % (ref 0–8)
ERYTHROCYTE [DISTWIDTH] IN BLOOD BY AUTOMATED COUNT: 14 % (ref 11.5–14.5)
ERYTHROCYTE [DISTWIDTH] IN BLOOD BY AUTOMATED COUNT: 14.2 % (ref 11.5–14.5)
EST. GFR  (NO RACE VARIABLE): 54 ML/MIN/1.73 M^2
FLOW: 4
GLUCOSE SERPL-MCNC: 110 MG/DL (ref 70–110)
GLUCOSE UR QL STRIP: NEGATIVE
HCO3 UR-SCNC: 33.2 MMOL/L (ref 24–28)
HCT VFR BLD AUTO: 42.1 % (ref 37–48.5)
HCT VFR BLD AUTO: 43.4 % (ref 37–48.5)
HGB BLD-MCNC: 13.2 G/DL (ref 12–16)
HGB BLD-MCNC: 13.3 G/DL (ref 12–16)
HGB UR QL STRIP: NEGATIVE
IMM GRANULOCYTES # BLD AUTO: 0.02 K/UL (ref 0–0.04)
IMM GRANULOCYTES # BLD AUTO: 0.02 K/UL (ref 0–0.04)
IMM GRANULOCYTES NFR BLD AUTO: 0.3 % (ref 0–0.5)
IMM GRANULOCYTES NFR BLD AUTO: 0.4 % (ref 0–0.5)
INR PPP: 1 (ref 0.8–1.2)
KETONES UR QL STRIP: NEGATIVE
LEUKOCYTE ESTERASE UR QL STRIP: NEGATIVE
LYMPHOCYTES # BLD AUTO: 0.5 K/UL (ref 1–4.8)
LYMPHOCYTES # BLD AUTO: 1 K/UL (ref 1–4.8)
LYMPHOCYTES NFR BLD: 10.2 % (ref 18–48)
LYMPHOCYTES NFR BLD: 16.5 % (ref 18–48)
MCH RBC QN AUTO: 30.6 PG (ref 27–31)
MCH RBC QN AUTO: 31.1 PG (ref 27–31)
MCHC RBC AUTO-ENTMCNC: 30.6 G/DL (ref 32–36)
MCHC RBC AUTO-ENTMCNC: 31.4 G/DL (ref 32–36)
MCV RBC AUTO: 100 FL (ref 82–98)
MCV RBC AUTO: 99 FL (ref 82–98)
MODE: ABNORMAL
MOLECULAR STREP A: NEGATIVE
MONOCYTES # BLD AUTO: 0.1 K/UL (ref 0.3–1)
MONOCYTES # BLD AUTO: 0.5 K/UL (ref 0.3–1)
MONOCYTES NFR BLD: 2.7 % (ref 4–15)
MONOCYTES NFR BLD: 8.6 % (ref 4–15)
NEUTROPHILS # BLD AUTO: 4.2 K/UL (ref 1.8–7.7)
NEUTROPHILS # BLD AUTO: 4.4 K/UL (ref 1.8–7.7)
NEUTROPHILS NFR BLD: 73.5 % (ref 38–73)
NEUTROPHILS NFR BLD: 86.5 % (ref 38–73)
NITRITE UR QL STRIP: NEGATIVE
NRBC BLD-RTO: 0 /100 WBC
NRBC BLD-RTO: 0 /100 WBC
PCO2 BLDA: 72.7 MMHG (ref 35–45)
PH SMN: 7.27 [PH] (ref 7.35–7.45)
PH UR STRIP: 5 [PH] (ref 5–8)
PLATELET # BLD AUTO: 189 K/UL (ref 150–450)
PLATELET # BLD AUTO: 205 K/UL (ref 150–450)
PMV BLD AUTO: 10.6 FL (ref 9.2–12.9)
PMV BLD AUTO: 10.8 FL (ref 9.2–12.9)
PO2 BLDA: 76 MMHG (ref 80–100)
POC BE: 4 MMOL/L
POC MOLECULAR INFLUENZA A AGN: NEGATIVE
POC MOLECULAR INFLUENZA B AGN: NEGATIVE
POC SATURATED O2: 92 % (ref 95–100)
POC TCO2: 35 MMOL/L (ref 23–27)
POTASSIUM SERPL-SCNC: 4.3 MMOL/L (ref 3.5–5.1)
PROCALCITONIN SERPL IA-MCNC: 0.04 NG/ML
PROT SERPL-MCNC: 6.7 G/DL (ref 6–8.4)
PROT UR QL STRIP: NEGATIVE
PROTHROMBIN TIME: 10.9 SEC (ref 9–12.5)
RBC # BLD AUTO: 4.24 M/UL (ref 4–5.4)
RBC # BLD AUTO: 4.35 M/UL (ref 4–5.4)
SAMPLE: ABNORMAL
SARS-COV-2 RDRP RESP QL NAA+PROBE: NEGATIVE
SITE: ABNORMAL
SODIUM SERPL-SCNC: 136 MMOL/L (ref 136–145)
SP GR UR STRIP: 1 (ref 1–1.03)
TROPONIN I SERPL DL<=0.01 NG/ML-MCNC: <0.006 NG/ML (ref 0–0.03)
TSH SERPL DL<=0.005 MIU/L-ACNC: 1.2 UIU/ML (ref 0.4–4)
URN SPEC COLLECT METH UR: ABNORMAL
UROBILINOGEN UR STRIP-ACNC: NEGATIVE EU/DL
WBC # BLD AUTO: 4.88 K/UL (ref 3.9–12.7)
WBC # BLD AUTO: 5.95 K/UL (ref 3.9–12.7)

## 2023-11-13 PROCEDURE — 96375 TX/PRO/DX INJ NEW DRUG ADDON: CPT

## 2023-11-13 PROCEDURE — 83880 ASSAY OF NATRIURETIC PEPTIDE: CPT | Performed by: EMERGENCY MEDICINE

## 2023-11-13 PROCEDURE — 85025 COMPLETE CBC W/AUTO DIFF WBC: CPT | Performed by: EMERGENCY MEDICINE

## 2023-11-13 PROCEDURE — 84484 ASSAY OF TROPONIN QUANT: CPT | Performed by: EMERGENCY MEDICINE

## 2023-11-13 PROCEDURE — 80053 COMPREHEN METABOLIC PANEL: CPT | Performed by: EMERGENCY MEDICINE

## 2023-11-13 PROCEDURE — 87651 STREP A DNA AMP PROBE: CPT

## 2023-11-13 PROCEDURE — 36600 WITHDRAWAL OF ARTERIAL BLOOD: CPT

## 2023-11-13 PROCEDURE — 25000242 PHARM REV CODE 250 ALT 637 W/ HCPCS: Performed by: HOSPITALIST

## 2023-11-13 PROCEDURE — 85025 COMPLETE CBC W/AUTO DIFF WBC: CPT | Mod: 91 | Performed by: HOSPITALIST

## 2023-11-13 PROCEDURE — 63600175 PHARM REV CODE 636 W HCPCS: Performed by: EMERGENCY MEDICINE

## 2023-11-13 PROCEDURE — 63600175 PHARM REV CODE 636 W HCPCS: Performed by: HOSPITALIST

## 2023-11-13 PROCEDURE — 27000221 HC OXYGEN, UP TO 24 HOURS

## 2023-11-13 PROCEDURE — 94761 N-INVAS EAR/PLS OXIMETRY MLT: CPT

## 2023-11-13 PROCEDURE — 85610 PROTHROMBIN TIME: CPT | Performed by: EMERGENCY MEDICINE

## 2023-11-13 PROCEDURE — 99900035 HC TECH TIME PER 15 MIN (STAT)

## 2023-11-13 PROCEDURE — 25000003 PHARM REV CODE 250: Performed by: HOSPITALIST

## 2023-11-13 PROCEDURE — 99291 CRITICAL CARE FIRST HOUR: CPT

## 2023-11-13 PROCEDURE — 84145 PROCALCITONIN (PCT): CPT | Performed by: EMERGENCY MEDICINE

## 2023-11-13 PROCEDURE — 93010 ELECTROCARDIOGRAM REPORT: CPT | Mod: ,,, | Performed by: INTERNAL MEDICINE

## 2023-11-13 PROCEDURE — 25000242 PHARM REV CODE 250 ALT 637 W/ HCPCS: Performed by: EMERGENCY MEDICINE

## 2023-11-13 PROCEDURE — 93010 EKG 12-LEAD: ICD-10-PCS | Mod: ,,, | Performed by: INTERNAL MEDICINE

## 2023-11-13 PROCEDURE — 94640 AIRWAY INHALATION TREATMENT: CPT

## 2023-11-13 PROCEDURE — 81003 URINALYSIS AUTO W/O SCOPE: CPT | Performed by: EMERGENCY MEDICINE

## 2023-11-13 PROCEDURE — 84443 ASSAY THYROID STIM HORMONE: CPT | Performed by: HOSPITALIST

## 2023-11-13 PROCEDURE — 20000000 HC ICU ROOM

## 2023-11-13 PROCEDURE — 27000190 HC CPAP FULL FACE MASK W/VALVE

## 2023-11-13 PROCEDURE — 94660 CPAP INITIATION&MGMT: CPT

## 2023-11-13 PROCEDURE — 87502 INFLUENZA DNA AMP PROBE: CPT

## 2023-11-13 PROCEDURE — 87635 SARS-COV-2 COVID-19 AMP PRB: CPT | Performed by: EMERGENCY MEDICINE

## 2023-11-13 PROCEDURE — 96374 THER/PROPH/DIAG INJ IV PUSH: CPT

## 2023-11-13 PROCEDURE — 93005 ELECTROCARDIOGRAM TRACING: CPT

## 2023-11-13 PROCEDURE — 82803 BLOOD GASES ANY COMBINATION: CPT

## 2023-11-13 RX ORDER — DEXAMETHASONE SODIUM PHOSPHATE 4 MG/ML
10 INJECTION, SOLUTION INTRA-ARTICULAR; INTRALESIONAL; INTRAMUSCULAR; INTRAVENOUS; SOFT TISSUE
Status: COMPLETED | OUTPATIENT
Start: 2023-11-13 | End: 2023-11-13

## 2023-11-13 RX ORDER — LEVALBUTEROL 1.25 MG/.5ML
1.25 SOLUTION, CONCENTRATE RESPIRATORY (INHALATION)
Status: DISCONTINUED | OUTPATIENT
Start: 2023-11-13 | End: 2023-11-14

## 2023-11-13 RX ORDER — ARFORMOTEROL TARTRATE 15 UG/2ML
15 SOLUTION RESPIRATORY (INHALATION) EVERY 12 HOURS
Status: DISCONTINUED | OUTPATIENT
Start: 2023-11-14 | End: 2023-11-14

## 2023-11-13 RX ORDER — DEXAMETHASONE SODIUM PHOSPHATE 4 MG/ML
4 INJECTION, SOLUTION INTRA-ARTICULAR; INTRALESIONAL; INTRAMUSCULAR; INTRAVENOUS; SOFT TISSUE EVERY 12 HOURS
Status: DISCONTINUED | OUTPATIENT
Start: 2023-11-13 | End: 2023-11-14

## 2023-11-13 RX ORDER — BUDESONIDE 0.5 MG/2ML
0.5 INHALANT ORAL EVERY 12 HOURS
Status: DISCONTINUED | OUTPATIENT
Start: 2023-11-14 | End: 2023-11-14

## 2023-11-13 RX ORDER — FUROSEMIDE 10 MG/ML
80 INJECTION INTRAMUSCULAR; INTRAVENOUS
Status: COMPLETED | OUTPATIENT
Start: 2023-11-13 | End: 2023-11-13

## 2023-11-13 RX ORDER — HYDRALAZINE HYDROCHLORIDE 20 MG/ML
10 INJECTION INTRAMUSCULAR; INTRAVENOUS EVERY 4 HOURS PRN
Status: DISCONTINUED | OUTPATIENT
Start: 2023-11-13 | End: 2023-11-25 | Stop reason: HOSPADM

## 2023-11-13 RX ORDER — IPRATROPIUM BROMIDE AND ALBUTEROL SULFATE 2.5; .5 MG/3ML; MG/3ML
3 SOLUTION RESPIRATORY (INHALATION)
Status: COMPLETED | OUTPATIENT
Start: 2023-11-13 | End: 2023-11-13

## 2023-11-13 RX ORDER — METOPROLOL SUCCINATE 50 MG/1
100 TABLET, EXTENDED RELEASE ORAL DAILY
Status: DISCONTINUED | OUTPATIENT
Start: 2023-11-14 | End: 2023-11-25 | Stop reason: HOSPADM

## 2023-11-13 RX ORDER — ATORVASTATIN CALCIUM 40 MG/1
40 TABLET, FILM COATED ORAL DAILY
Status: DISCONTINUED | OUTPATIENT
Start: 2023-11-14 | End: 2023-11-25 | Stop reason: HOSPADM

## 2023-11-13 RX ORDER — ALBUTEROL SULFATE 2.5 MG/.5ML
5 SOLUTION RESPIRATORY (INHALATION)
Status: COMPLETED | OUTPATIENT
Start: 2023-11-13 | End: 2023-11-13

## 2023-11-13 RX ORDER — FUROSEMIDE 10 MG/ML
40 INJECTION INTRAMUSCULAR; INTRAVENOUS
Status: DISCONTINUED | OUTPATIENT
Start: 2023-11-13 | End: 2023-11-14

## 2023-11-13 RX ORDER — FLUTICASONE FUROATE AND VILANTEROL 200; 25 UG/1; UG/1
1 POWDER RESPIRATORY (INHALATION) DAILY
Status: DISCONTINUED | OUTPATIENT
Start: 2023-11-14 | End: 2023-11-13

## 2023-11-13 RX ORDER — SERTRALINE HYDROCHLORIDE 50 MG/1
100 TABLET, FILM COATED ORAL DAILY
Status: DISCONTINUED | OUTPATIENT
Start: 2023-11-14 | End: 2023-11-25 | Stop reason: HOSPADM

## 2023-11-13 RX ADMIN — IPRATROPIUM BROMIDE AND ALBUTEROL SULFATE 3 ML: 2.5; .5 SOLUTION RESPIRATORY (INHALATION) at 10:11

## 2023-11-13 RX ADMIN — FUROSEMIDE 80 MG: 10 INJECTION, SOLUTION INTRAVENOUS at 10:11

## 2023-11-13 RX ADMIN — LEVALBUTEROL 1.25 MG: 1.25 SOLUTION, CONCENTRATE RESPIRATORY (INHALATION) at 05:11

## 2023-11-13 RX ADMIN — HYDRALAZINE HYDROCHLORIDE 10 MG: 20 INJECTION INTRAMUSCULAR; INTRAVENOUS at 10:11

## 2023-11-13 RX ADMIN — DEXAMETHASONE SODIUM PHOSPHATE 4 MG: 4 INJECTION, SOLUTION INTRA-ARTICULAR; INTRALESIONAL; INTRAMUSCULAR; INTRAVENOUS; SOFT TISSUE at 09:11

## 2023-11-13 RX ADMIN — FUROSEMIDE 40 MG: 10 INJECTION, SOLUTION INTRAVENOUS at 06:11

## 2023-11-13 RX ADMIN — DEXAMETHASONE SODIUM PHOSPHATE 10 MG: 4 INJECTION INTRA-ARTICULAR; INTRALESIONAL; INTRAMUSCULAR; INTRAVENOUS; SOFT TISSUE at 10:11

## 2023-11-13 RX ADMIN — ALBUTEROL SULFATE 5 MG: 2.5 SOLUTION RESPIRATORY (INHALATION) at 10:11

## 2023-11-13 RX ADMIN — RIVAROXABAN 20 MG: 20 TABLET, FILM COATED ORAL at 09:11

## 2023-11-13 NOTE — ASSESSMENT & PLAN NOTE
Patient is identified as having Diastolic (HFpEF) heart failure that is Acute on chronic. CHF is currently uncontrolled due to Dyspnea not returned to baseline after 1 doses of IV diuretic. Latest ECHO performed and demonstrates- Results for orders placed during the hospital encounter of 05/20/22    Echo    Interpretation Summary  · The left ventricle is normal in size with normal systolic function.  · The estimated ejection fraction is 60%.  · Normal right ventricular size with normal right ventricular systolic function.  · The estimated PA systolic pressure is 33 mmHg.  · Atrial fibrillation observed.  . Continue Beta Blocker and Furosemide and monitor clinical status closely. Monitor on telemetry. Patient is off CHF pathway.  Monitor strict Is&Os and daily weights.  Place on fluid restriction of 1.5 L. Cardiology has not been any consulted. Continue to stress to patient importance of self efficacy and  on diet for CHF. Last BNP reviewed- and noted below   Recent Labs   Lab 11/13/23  1016   *   .

## 2023-11-13 NOTE — PHARMACY MED REC
"Admission Medication History     The home medication history was taken by Yadira Nascimento.    You may go to "Admission" then "Reconcile Home Medications" tabs to review and/or act upon these items.     The home medication list has been updated by the Pharmacy department.   Please read ALL comments highlighted in yellow.   Please address this information as you see fit.    Feel free to contact us if you have any questions or require assistance.      Medications listed below were obtained from: Patient/family and Analytic software- [employer]  (Not in a hospital admission)          Yadira Nascimento  [phone]                 .          "

## 2023-11-13 NOTE — SUBJECTIVE & OBJECTIVE
Past Medical History:   Diagnosis Date    Anticoagulant long-term use     Xarelto    Arthritis     Atrial fibrillation     Breast cyst     CHF (congestive heart failure)     Degenerative disc disease     Edema     HLD (hyperlipidemia)     Hx of psychiatric care     Hyperlipidemia     Hypertension     Hypothyroidism     Nuclear sclerosis, bilateral 12/18/2017    Obesity, morbid     HIEU (obstructive sleep apnea)     Other abnormal glucose     pre-diabetes    Pre-diabetes     Psychiatric problem     Requires assistance with activities of daily living (ADL)     Sleep apnea     Smoker     SOB (shortness of breath)     SOB (shortness of breath)     Thyroid disease     on meds 8-9 years ago. hypothyroidism.no malignancy    TMJ (temporomandibular joint disorder)     jaw clicking    Tobacco abuse     Unsteady gait     Urge incontinence     Weakness generalized     Wears glasses        Past Surgical History:   Procedure Laterality Date    BREAST BIOPSY Right     over 10 yrs ago/ benign    BREAST CYST EXCISION Right     BREAST LUMPECTOMY Right     over 10 yrs ago, ex bx/ benign    COLONOSCOPY N/A 6/25/2019    Procedure: COLONOSCOPY;  Surgeon: Micheline Flores MD;  Location: [facility] ENDO;  Service: Endoscopy;  Laterality: N/A;  RX XARELTO ok to hold (2 days) per Dr. Naik see scan 3/20/19    ENDOSCOPIC ULTRASOUND OF UPPER GASTROINTESTINAL TRACT N/A 1/26/2021    Procedure: ULTRASOUND-ENDOSCOPIC-UPPER;  Surgeon: Stevenson Philippe MD;  Location: [facility] END[facility];  Service: Endoscopy;  Laterality: N/A;    HYSTERECTOMY      JOINT REPLACEMENT Bilateral     knees    OOPHORECTOMY      rt.knee surgery 2016      TOTAL KNEE ARTHROPLASTY Left 2/19/2019    Procedure: ARTHROPLASTY, KNEE, TOTAL;  Surgeon: Med Hanson MD;  Location: [facility] OR;  Service: Orthopedics;  Laterality: Left;  10AM START PER  PER JAYLIN TEXT @ 8:34AM ON 2-18-19  SUPINE  HARRIS LOYA [phone] TEXTED HIM ON 1-24-19 @ 7:57AM  RN PRE OP 2-13-19--BMI--48.9    underarm  gland\ Bilateral        Review of patient's allergies indicates:   Allergen Reactions    Ace inhibitors      Cough         No current facility-administered medications on file prior to encounter.     Current Outpatient Medications on File Prior to Encounter   Medication Sig    cetirizine (ZYRTEC) 10 MG tablet Take 1 tablet (10 mg total) by mouth once daily.    fluticasone propionate (FLONASE) 50 mcg/actuation nasal spray SHAKE LIQUID AND USE 2 SPRAYS(100 MCG) IN EACH NOSTRIL EVERY DAY    albuterol (PROVENTIL/VENTOLIN HFA) 90 mcg/actuation inhaler 2 puffs every 6 (six) hours as needed.    albuterol-ipratropium (DUO-NEB) 2.5 mg-0.5 mg/3 mL nebulizer solution Take 3 mLs by nebulization every 6 (six) hours as needed for Wheezing. Rescue    atorvastatin (LIPITOR) 40 MG tablet TAKE 1 TABLET(40 MG) BY MOUTH EVERY DAY    azelastine (ASTELIN) 137 mcg (0.1 %) nasal spray 1 spray (137 mcg total) by Nasal route 2 (two) times daily.    buPROPion (WELLBUTRIN XL) 150 MG TB24 tablet TAKE 1 TABLET(150 MG) BY MOUTH EVERY DAY    furosemide (LASIX) 40 MG tablet Take 1 tablet (40 mg total) by mouth once daily.    ketoconazole (NIZORAL) 2 % cream Apply topically once daily.    losartan (COZAAR) 25 MG tablet TAKE 1 TABLET(25 MG) BY MOUTH EVERY DAY    metoprolol succinate (TOPROL-XL) 100 MG 24 hr tablet Take 1 tablet (100 mg total) by mouth once daily.    sertraline (ZOLOFT) 100 MG tablet TAKE 1 TABLET(100 MG) BY MOUTH EVERY DAY    SPIRIVA RESPIMAT 2.5 mcg/actuation inhaler INHALE 2 PUFFS BY MOUTH DAILY (Patient not taking: Reported on 10/18/2023)    triamcinolone acetonide 0.1% (KENALOG) 0.1 % ointment APPLY BETWEEN THE KNEES AND UPPER THIGHS TWICE DAILY FOR NO MORE THAN 7 TO 14 DAYS    XARELTO 20 mg Tab TAKE 1 TABLET(20 MG) BY MOUTH EVERY DAY    [DISCONTINUED] fluticasone-salmeterol diskus inhaler 250-50 mcg Inhale 1 puff into the lungs 2 (two) times daily. (Patient not taking: Reported on 10/18/2023)    [DISCONTINUED] gabapentin  (NEURONTIN) 100 MG capsule Take 1 capsule (100 mg total) by mouth nightly as needed.     Family History       Problem Relation (Age of Onset)    Cancer Mother, Brother    Diabetes Mother, Brother    Hypertension Mother    No Known Problems Father, Sister, Maternal Aunt, Maternal Uncle, Paternal Aunt, Paternal Uncle, Maternal Grandmother, Maternal Grandfather, Paternal Grandmother, Paternal Grandfather, Other          Tobacco Use    Smoking status: Every Day     Current packs/day: 0.50     Average packs/day: 0.5 packs/day for 50.9 years (25.4 ttl pk-yrs)     Types: Cigarettes     Start date: 1973    Smokeless tobacco: Current   Substance and Sexual Activity    Alcohol use: No    Drug use: No    Sexual activity: Yes     Partners: Male     Review of Systems   Constitutional:  Positive for activity change and appetite change.   HENT:  Negative for congestion and dental problem.    Eyes:  Negative for discharge and itching.   Respiratory:  Positive for shortness of breath.    Cardiovascular:  Negative for chest pain and leg swelling.   Gastrointestinal:  Negative for abdominal distention and abdominal pain.   Endocrine: Negative for cold intolerance.   Genitourinary:  Negative for difficulty urinating and dyspareunia.   Musculoskeletal:  Negative for arthralgias and back pain.   Skin:  Negative for color change.   Neurological:  Positive for weakness.   Psychiatric/Behavioral:  Negative for agitation and behavioral problems.      Objective:     Vital Signs (Most Recent):  Temp: 99.9 °F (37.7 °C) (11/13/23 0935)  Pulse: 102 (11/13/23 1509)  Resp: (!) 38 (11/13/23 1509)  BP: 137/62 (11/13/23 1402)  SpO2: (!) 83 % (11/13/23 1509) Vital Signs (24h Range):  Temp:  [99.9 °F (37.7 °C)] 99.9 °F (37.7 °C)  Pulse:  [] 102  Resp:  [26-38] 38  SpO2:  [83 %-95 %] 83 %  BP: (137-138)/(62-92) 137/62     Weight: 122.9 kg (271 lb)  Body mass index is 48.01 kg/m².     Physical Exam  HENT:      Head: Normocephalic.      Right Ear:  There is no impacted cerumen.      Nose: No congestion.      Mouth/Throat:      Mouth: Mucous membranes are dry.   Eyes:      Extraocular Movements: Extraocular movements intact.      Pupils: Pupils are equal, round, and reactive to light.   Cardiovascular:      Rate and Rhythm: Normal rate. Rhythm irregular.      Heart sounds: No murmur heard.  Pulmonary:      Breath sounds: Rales present.   Abdominal:      General: There is no distension.      Tenderness: There is no abdominal tenderness.   Musculoskeletal:         General: Swelling present.   Skin:     Coloration: Skin is not jaundiced.      Findings: No bruising.   Neurological:      General: No focal deficit present.      Mental Status: She is alert.   Psychiatric:         Mood and Affect: Mood normal.         Behavior: Behavior normal.              CRANIAL NERVES     CN III, IV, VI   Pupils are equal, round, and reactive to light.       Significant Labs: All pertinent labs within the past 24 hours have been reviewed.  BMP:   Recent Labs   Lab 11/13/23  1016         K 4.3      CO2 22*   BUN 23   CREATININE 1.1   CALCIUM 9.0     CBC:   Recent Labs   Lab 11/13/23  1016   WBC 5.95   HGB 13.3   HCT 43.4        CMP:   Recent Labs   Lab 11/13/23  1016      K 4.3      CO2 22*      BUN 23   CREATININE 1.1   CALCIUM 9.0   PROT 6.7   ALBUMIN 3.0*   BILITOT 0.6   ALKPHOS 93   AST 27   ALT 27   ANIONGAP 11       Significant Imaging: I have reviewed all pertinent imaging results/findings within the past 24 hours.

## 2023-11-13 NOTE — H&P
"[facility] [place] [facility]  [facility] Medicine  History & Physical    Patient Name: Sandee Evans  MRN: [id number]  Patient Class: IP- Inpatient  Admission Date: 11/13/2023  Attending Physician: Luz Ware    Primary Care Provider: Kuldeep Miller MD         Patient information was obtained from patient, relative(s), and ER records.     Subjective:     Principal Problem:Acute respiratory failure with hypoxia and hypercapnia    Chief Complaint:   Chief Complaint   Patient presents with    Shortness of Breath     Pt reports to ED to SOB, productive cough for about 2 weeks. Pt daughter reports hallucinations. Hx of COPD.         HPI:  67 y.o. female, with a past medical history of A-fib on OAC,diastolic  CHF, COPD, HLD, HTN, hypothyroidism, morbid obesity, pre-diabetes, and psychiatric problems, who presents to the ED with SOB onset 3 weeks ago. She reports SOB has gotten progressively worse over 3 weeks. Patient notes she has trouble ambulating d/t SOB. Patient notes being on an at-home BiPAP but denies daily use d/t the machine "smothering" her. Additional history provided by independent historian, relative, notes an O2 saturation in the 80's at-home PTA. Patient denies O2 at home. Patient notes an associated cough, congestion, diarrhea, rhinorrhea, sore throat, and headache.ABG in ER show hypercapnic,hypoxic  respiratory failure with PH of 7.2 and P C O2 of 72,PO 2 of 76,chest x ray show fluid overload,BNP is over 478,patient has been  started on continues Bipap and IV lasix,,admitted to ICU for close monitoring.    Past Medical History:   Diagnosis Date    Anticoagulant long-term use     Xarelto    Arthritis     Atrial fibrillation     Breast cyst     CHF (congestive heart failure)     Degenerative disc disease     Edema     HLD (hyperlipidemia)     Hx of psychiatric care     Hyperlipidemia     Hypertension     Hypothyroidism     Nuclear sclerosis, bilateral 12/18/2017    Obesity, morbid     HIEU " (obstructive sleep apnea)     Other abnormal glucose     pre-diabetes    Pre-diabetes     Psychiatric problem     Requires assistance with activities of daily living (ADL)     Sleep apnea     Smoker     SOB (shortness of breath)     SOB (shortness of breath)     Thyroid disease     on meds 8-9 years ago. hypothyroidism.no malignancy    TMJ (temporomandibular joint disorder)     jaw clicking    Tobacco abuse     Unsteady gait     Urge incontinence     Weakness generalized     Wears glasses        Past Surgical History:   Procedure Laterality Date    BREAST BIOPSY Right     over 10 yrs ago/ benign    BREAST CYST EXCISION Right     BREAST LUMPECTOMY Right     over 10 yrs ago, ex bx/ benign    COLONOSCOPY N/A 6/25/2019    Procedure: COLONOSCOPY;  Surgeon: Micheline Flores MD;  Location: [facility] ENDO;  Service: Endoscopy;  Laterality: N/A;  RX XARELTO ok to hold (2 days) per Dr. Naik see scan 3/20/19    ENDOSCOPIC ULTRASOUND OF UPPER GASTROINTESTINAL TRACT N/A 1/26/2021    Procedure: ULTRASOUND-ENDOSCOPIC-UPPER;  Surgeon: Stevenson Philippe MD;  Location: [facility] ENDO;  Service: Endoscopy;  Laterality: N/A;    HYSTERECTOMY      JOINT REPLACEMENT Bilateral     knees    OOPHORECTOMY      rt.knee surgery 2016      TOTAL KNEE ARTHROPLASTY Left 2/19/2019    Procedure: ARTHROPLASTY, KNEE, TOTAL;  Surgeon: Med Hanson MD;  Location: [facility] OR;  Service: Orthopedics;  Laterality: Left;  10AM START PER  PER JAYLIN TEXT @ 8:34AM ON 2-18-19  SUPINE  HARRIS LOYA [phone] TEXTED HIM ON 1-24-19 @ 7:57AM  RN PRE OP 2-13-19--BMI--48.9    underarm gland\ Bilateral        Review of patient's allergies indicates:   Allergen Reactions    Ace inhibitors      Cough         No current facility-administered medications on file prior to encounter.     Current Outpatient Medications on File Prior to Encounter   Medication Sig    cetirizine (ZYRTEC) 10 MG tablet Take 1 tablet (10 mg total) by mouth once daily.    fluticasone propionate  (FLONASE) 50 mcg/actuation nasal spray SHAKE LIQUID AND USE 2 SPRAYS(100 MCG) IN EACH NOSTRIL EVERY DAY    albuterol (PROVENTIL/VENTOLIN HFA) 90 mcg/actuation inhaler 2 puffs every 6 (six) hours as needed.    albuterol-ipratropium (DUO-NEB) 2.5 mg-0.5 mg/3 mL nebulizer solution Take 3 mLs by nebulization every 6 (six) hours as needed for Wheezing. Rescue    atorvastatin (LIPITOR) 40 MG tablet TAKE 1 TABLET(40 MG) BY MOUTH EVERY DAY    azelastine (ASTELIN) 137 mcg (0.1 %) nasal spray 1 spray (137 mcg total) by Nasal route 2 (two) times daily.    buPROPion (WELLBUTRIN XL) 150 MG TB24 tablet TAKE 1 TABLET(150 MG) BY MOUTH EVERY DAY    furosemide (LASIX) 40 MG tablet Take 1 tablet (40 mg total) by mouth once daily.    ketoconazole (NIZORAL) 2 % cream Apply topically once daily.    losartan (COZAAR) 25 MG tablet TAKE 1 TABLET(25 MG) BY MOUTH EVERY DAY    metoprolol succinate (TOPROL-XL) 100 MG 24 hr tablet Take 1 tablet (100 mg total) by mouth once daily.    sertraline (ZOLOFT) 100 MG tablet TAKE 1 TABLET(100 MG) BY MOUTH EVERY DAY    SPIRIVA RESPIMAT 2.5 mcg/actuation inhaler INHALE 2 PUFFS BY MOUTH DAILY (Patient not taking: Reported on 10/18/2023)    triamcinolone acetonide 0.1% (KENALOG) 0.1 % ointment APPLY BETWEEN THE KNEES AND UPPER THIGHS TWICE DAILY FOR NO MORE THAN 7 TO 14 DAYS    XARELTO 20 mg Tab TAKE 1 TABLET(20 MG) BY MOUTH EVERY DAY    [DISCONTINUED] fluticasone-salmeterol diskus inhaler 250-50 mcg Inhale 1 puff into the lungs 2 (two) times daily. (Patient not taking: Reported on 10/18/2023)    [DISCONTINUED] gabapentin (NEURONTIN) 100 MG capsule Take 1 capsule (100 mg total) by mouth nightly as needed.     Family History       Problem Relation (Age of Onset)    Cancer Mother, Brother    Diabetes Mother, Brother    Hypertension Mother    No Known Problems Father, Sister, Maternal Aunt, Maternal Uncle, Paternal Aunt, Paternal Uncle, Maternal Grandmother, Maternal Grandfather, Paternal Grandmother, Paternal  Grandfather, Other          Tobacco Use    Smoking status: Every Day     Current packs/day: 0.50     Average packs/day: 0.5 packs/day for 50.9 years (25.4 ttl pk-yrs)     Types: Cigarettes     Start date: 1973    Smokeless tobacco: Current   Substance and Sexual Activity    Alcohol use: No    Drug use: No    Sexual activity: Yes     Partners: Male     Review of Systems   Constitutional:  Positive for activity change and appetite change.   HENT:  Negative for congestion and dental problem.    Eyes:  Negative for discharge and itching.   Respiratory:  Positive for shortness of breath.    Cardiovascular:  Negative for chest pain and leg swelling.   Gastrointestinal:  Negative for abdominal distention and abdominal pain.   Endocrine: Negative for cold intolerance.   Genitourinary:  Negative for difficulty urinating and dyspareunia.   Musculoskeletal:  Negative for arthralgias and back pain.   Skin:  Negative for color change.   Neurological:  Positive for weakness.   Psychiatric/Behavioral:  Negative for agitation and behavioral problems.      Objective:     Vital Signs (Most Recent):  Temp: 99.9 °F (37.7 °C) (11/13/23 0935)  Pulse: 102 (11/13/23 1509)  Resp: (!) 38 (11/13/23 1509)  BP: 137/62 (11/13/23 1402)  SpO2: (!) 83 % (11/13/23 1509) Vital Signs (24h Range):  Temp:  [99.9 °F (37.7 °C)] 99.9 °F (37.7 °C)  Pulse:  [] 102  Resp:  [26-38] 38  SpO2:  [83 %-95 %] 83 %  BP: (137-138)/(62-92) 137/62     Weight: 122.9 kg (271 lb)  Body mass index is 48.01 kg/m².     Physical Exam  HENT:      Head: Normocephalic.      Right Ear: There is no impacted cerumen.      Nose: No congestion.      Mouth/Throat:      Mouth: Mucous membranes are dry.   Eyes:      Extraocular Movements: Extraocular movements intact.      Pupils: Pupils are equal, round, and reactive to light.   Cardiovascular:      Rate and Rhythm: Normal rate. Rhythm irregular.      Heart sounds: No murmur heard.  Pulmonary:      Breath sounds: Rales present.    Abdominal:      General: There is no distension.      Tenderness: There is no abdominal tenderness.   Musculoskeletal:         General: Swelling present.   Skin:     Coloration: Skin is not jaundiced.      Findings: No bruising.   Neurological:      General: No focal deficit present.      Mental Status: She is alert.   Psychiatric:         Mood and Affect: Mood normal.         Behavior: Behavior normal.              CRANIAL NERVES     CN III, IV, VI   Pupils are equal, round, and reactive to light.       Significant Labs: All pertinent labs within the past 24 hours have been reviewed.  BMP:   Recent Labs   Lab 11/13/23  1016         K 4.3      CO2 22*   BUN 23   CREATININE 1.1   CALCIUM 9.0     CBC:   Recent Labs   Lab 11/13/23  1016   WBC 5.95   HGB 13.3   HCT 43.4        CMP:   Recent Labs   Lab 11/13/23  1016      K 4.3      CO2 22*      BUN 23   CREATININE 1.1   CALCIUM 9.0   PROT 6.7   ALBUMIN 3.0*   BILITOT 0.6   ALKPHOS 93   AST 27   ALT 27   ANIONGAP 11       Significant Imaging: I have reviewed all pertinent imaging results/findings within the past 24 hours.  Assessment/Plan:     * Acute respiratory failure with hypoxia and hypercapnia  Patient with Hypercapnic and Hypoxic Respiratory failure which is Acute on chronic.  she is not on home oxygen. Supplemental oxygen was provided and noted-      .   Signs/symptoms of respiratory failure include- tachypnea, increased work of breathing, and lethargy. Contributing diagnoses includes - COPD and Obesity Hypoventilation Labs and images were reviewed. Patient Has recent ABG, which has been reviewed. Will treat underlying causes and adjust management of respiratory failure as follows-   ER show hypercapnic,hypoxic  respiratory failure with PH of 7.2 and P C O2 of 72,PO 2 of 76,chest x ray show fluid overload,BNP is over 478,patient has been  started on continues Bipap and IV lasix,,admitted to ICU for close  monitoring.    Chronic obstructive pulmonary disease with acute exacerbation  Patient's COPD is with exacerbation noted by continued dyspnea and use of accessory muscles for breathing currently.  Patient is currently off COPD Pathway. Continue scheduled inhalers Steroids, Antibiotics, and Supplemental oxygen and monitor respiratory status closely.     Chronic kidney disease, stage 3a  Creatine stable for now. BMP reviewed- noted Estimated Creatinine Clearance: 61.4 mL/min (based on SCr of 1.1 mg/dL). according to latest data. Based on current GFR, CKD stage is stage 3 - GFR 30-59.  Monitor UOP and serial BMP and adjust therapy as needed. Renally dose meds. Avoid nephrotoxic medications and procedures.    Pulmonary hypertension  Will diurese.      Acute on chronic diastolic CHF (congestive heart failure)  Patient is identified as having Diastolic (HFpEF) heart failure that is Acute on chronic. CHF is currently uncontrolled due to Dyspnea not returned to baseline after 1 doses of IV diuretic. Latest ECHO performed and demonstrates- Results for orders placed during the hospital encounter of 05/20/22    Echo    Interpretation Summary  · The left ventricle is normal in size with normal systolic function.  · The estimated ejection fraction is 60%.  · Normal right ventricular size with normal right ventricular systolic function.  · The estimated PA systolic pressure is 33 mmHg.  · Atrial fibrillation observed.  . Continue Beta Blocker and Furosemide and monitor clinical status closely. Monitor on telemetry. Patient is off CHF pathway.  Monitor strict Is&Os and daily weights.  Place on fluid restriction of 1.5 L. Cardiology has not been any consulted. Continue to stress to patient importance of self efficacy and  on diet for CHF. Last BNP reviewed- and noted below   Recent Labs   Lab 11/13/23  1016   *   .    HIEU treated with BiPAP  Non complaint with home Bipap.she was consulted.      PVD (peripheral vascular  disease)  On medical treatments.      Paroxysmal atrial fibrillation  Patient with Persistent (7 days or more) atrial fibrillation which is controlled currently with Beta Blocker. Patient is currently in atrial fibrillation.[id number] Score: 4. HASBLED Score: 3. Anticoagulation indicated. Anticoagulation done with Eliquis .    Pre-diabetes  Will monitor.      Severe obesity (BMI >= 40)  Body mass index is 48.01 kg/m². Morbid obesity complicates all aspects of disease management from diagnostic modalities to treatment. Weight loss encouraged and health benefits explained to patient.         Essential hypertension  Chronic, controlled. Latest blood pressure and vitals reviewed-     Temp:  [99.9 °F (37.7 °C)]   Pulse:  []   Resp:  [26-38]   BP: (137-138)/(62-92)   SpO2:  [83 %-95 %] .   Home meds for hypertension were reviewed and noted below.   Hypertension Medications               furosemide (LASIX) 40 MG tablet Take 1 tablet (40 mg total) by mouth once daily.    losartan (COZAAR) 25 MG tablet TAKE 1 TABLET(25 MG) BY MOUTH EVERY DAY    metoprolol succinate (TOPROL-XL) 100 MG 24 hr tablet Take 1 tablet (100 mg total) by mouth once daily.            While in the hospital, will manage blood pressure as follows; Continue home antihypertensive regimen    Will utilize p.r.n. blood pressure medication only if patient's blood pressure greater than 160/100 and she develops symptoms such as worsening chest pain or shortness of breath.      VTE Risk Mitigation (From admission, onward)           Ordered     rivaroxaban tablet 20 mg  With dinner         11/13/23 8002                                   Luz Ware MD  Department of Hospital Medicine  [facility] - Emergency Dept    N/A  Family history is reviewed and has not changed   Pertinent information:

## 2023-11-13 NOTE — ASSESSMENT & PLAN NOTE
Patient's COPD is with exacerbation noted by continued dyspnea and use of accessory muscles for breathing currently.  Patient is currently off COPD Pathway. Continue scheduled inhalers Steroids, Antibiotics, and Supplemental oxygen and monitor respiratory status closely.

## 2023-11-13 NOTE — ASSESSMENT & PLAN NOTE
Chronic, controlled. Latest blood pressure and vitals reviewed-     Temp:  [99.9 °F (37.7 °C)]   Pulse:  []   Resp:  [26-38]   BP: (137-138)/(62-92)   SpO2:  [83 %-95 %] .   Home meds for hypertension were reviewed and noted below.   Hypertension Medications               furosemide (LASIX) 40 MG tablet Take 1 tablet (40 mg total) by mouth once daily.    losartan (COZAAR) 25 MG tablet TAKE 1 TABLET(25 MG) BY MOUTH EVERY DAY    metoprolol succinate (TOPROL-XL) 100 MG 24 hr tablet Take 1 tablet (100 mg total) by mouth once daily.            While in the hospital, will manage blood pressure as follows; Continue home antihypertensive regimen    Will utilize p.r.n. blood pressure medication only if patient's blood pressure greater than 160/100 and she develops symptoms such as worsening chest pain or shortness of breath.

## 2023-11-13 NOTE — Clinical Note
Diagnosis: Atrial fibrillation with rapid ventricular response [890117]   Admitting Provider:: GRISEL DYER [[id number]]   Reason for IP Medical Treatment  (Clinical interventions that can only be accomplished in the IP setting? ) :: Hypoxia, exacerbation of COPD   I certify that Inpatient services for greater than or equal to 2 midnights are medically necessary:: Yes   Plans for Post-Acute care--if anticipated (pick the single best option):: A. No post acute care anticipated at this time   Special Needs:: No Special Needs [1]

## 2023-11-13 NOTE — ASSESSMENT & PLAN NOTE
Body mass index is 48.01 kg/m². Morbid obesity complicates all aspects of disease management from diagnostic modalities to treatment. Weight loss encouraged and health benefits explained to patient.

## 2023-11-13 NOTE — ASSESSMENT & PLAN NOTE
Creatine stable for now. BMP reviewed- noted Estimated Creatinine Clearance: 61.4 mL/min (based on SCr of 1.1 mg/dL). according to latest data. Based on current GFR, CKD stage is stage 3 - GFR 30-59.  Monitor UOP and serial BMP and adjust therapy as needed. Renally dose meds. Avoid nephrotoxic medications and procedures.

## 2023-11-13 NOTE — ASSESSMENT & PLAN NOTE
Patient with Persistent (7 days or more) atrial fibrillation which is controlled currently with Beta Blocker. Patient is currently in atrial fibrillation.[id number] Score: 4. HASBLED Score: 3. Anticoagulation indicated. Anticoagulation done with Eliquis .

## 2023-11-13 NOTE — ED PROVIDER NOTES
"Encounter Date: 11/13/2023    SCRIBE #1 NOTE: I, Ethan Shoemaker Do, am scribing for, and in the presence of,  Stevenson Devlin MD. I have scribed the following portions of the note - Other sections scribed: HPI, ROS.       History     Chief Complaint   Patient presents with    Shortness of Breath     Pt reports to ED to SOB, productive cough for about 2 weeks. Pt daughter reports hallucinations. Hx of COPD.      Sandee Evans is a 67 y.o. female, with a past medical history of A-fib, CHF, COPD, HLD, HTN, hypothyroidism, morbid obesity, pre-diabetes, and psychiatric problems, who presents to the ED with SOB onset 3 weeks ago. She reports SOB has gotten progressively worse over 3 weeks. Patient notes she has trouble ambulating d/t SOB. Patient notes being on an at-home BiPAP but denies daily use d/t the machine "smothering" her. Additional history provided by independent historian, relative, notes an O2 saturation in the 80's at-home PTA. Patient denies O2 at home. Patient notes an associated cough, congestion, diarrhea, rhinorrhea, sore throat, and headache. Patient notes hearing voices and seeing people disappear. Relative endorses patient compliance with atorvastatin, bupropion, Sertraline, Furosemide, losartan, and xarelto. Patient endorses smoking half a pack every day. No other exacerbating or alleviating factors. Patient denies chest pain, fever, chills, abdominal pain, nausea, vomiting, dysuria, congestion, arm or leg trouble, eye pain, ear pain, rash, or other associated symptoms.          The history is provided by the patient and a relative. No  was used.     Review of patient's allergies indicates:   Allergen Reactions    Ace inhibitors      Cough       Past Medical History:   Diagnosis Date    Anticoagulant long-term use     Xarelto    Arthritis     Atrial fibrillation     Breast cyst     CHF (congestive heart failure)     Degenerative disc disease     Edema     HLD (hyperlipidemia)     " Hx of psychiatric care     Hyperlipidemia     Hypertension     Hypothyroidism     Nuclear sclerosis, bilateral 12/18/2017    Obesity, morbid     HIEU (obstructive sleep apnea)     Other abnormal glucose     pre-diabetes    Pre-diabetes     Psychiatric problem     Requires assistance with activities of daily living (ADL)     Sleep apnea     Smoker     SOB (shortness of breath)     SOB (shortness of breath)     Thyroid disease     on meds 8-9 years ago. hypothyroidism.no malignancy    TMJ (temporomandibular joint disorder)     jaw clicking    Tobacco abuse     Unsteady gait     Urge incontinence     Weakness generalized     Wears glasses      Past Surgical History:   Procedure Laterality Date    BREAST BIOPSY Right     over 10 yrs ago/ benign    BREAST CYST EXCISION Right     BREAST LUMPECTOMY Right     over 10 yrs ago, ex bx/ benign    COLONOSCOPY N/A 6/25/2019    Procedure: COLONOSCOPY;  Surgeon: Micheline Flores MD;  Location: [facility] ENDO;  Service: Endoscopy;  Laterality: N/A;  RX XARELTO ok to hold (2 days) per Dr. Naik see scan 3/20/19    ENDOSCOPIC ULTRASOUND OF UPPER GASTROINTESTINAL TRACT N/A 1/26/2021    Procedure: ULTRASOUND-ENDOSCOPIC-UPPER;  Surgeon: Stevenson Philippe MD;  Location: [facility] ENDO;  Service: Endoscopy;  Laterality: N/A;    HYSTERECTOMY      JOINT REPLACEMENT Bilateral     knees    OOPHORECTOMY      rt.knee surgery 2016      TOTAL KNEE ARTHROPLASTY Left 2/19/2019    Procedure: ARTHROPLASTY, KNEE, TOTAL;  Surgeon: Med Hanson MD;  Location: [facility] OR;  Service: Orthopedics;  Laterality: Left;  10AM START PER  PER JAYLIN TEXT @ 8:34AM ON 2-18-19  SUPINE  HARRIS TANMAY SHALINI [phone] TEXTED HIM ON 1-24-19 @ 7:57AM  RN PRE OP 2-13-19--BMI--48.9    underarm gland\ Bilateral      Family History   Problem Relation Age of Onset    Diabetes Mother     Hypertension Mother     Cancer Mother     No Known Problems Father     No Known Problems Sister     Diabetes Brother     Cancer Brother     No Known  Problems Maternal Aunt     No Known Problems Maternal Uncle     No Known Problems Paternal Aunt     No Known Problems Paternal Uncle     No Known Problems Maternal Grandmother     No Known Problems Maternal Grandfather     No Known Problems Paternal Grandmother     No Known Problems Paternal Grandfather     No Known Problems Other     Amblyopia Neg Hx     Blindness Neg Hx     Cataracts Neg Hx     Glaucoma Neg Hx     Macular degeneration Neg Hx     Retinal detachment Neg Hx     Strabismus Neg Hx     Stroke Neg Hx     Thyroid disease Neg Hx      Social History     Tobacco Use    Smoking status: Every Day     Current packs/day: 0.50     Average packs/day: 0.5 packs/day for 50.9 years (25.4 ttl pk-yrs)     Types: Cigarettes     Start date: 1973    Smokeless tobacco: Current   Substance Use Topics    Alcohol use: No    Drug use: No     Review of Systems   Constitutional:  Negative for chills, diaphoresis and fever.   HENT:  Positive for congestion, rhinorrhea and sore throat. Negative for ear pain.    Eyes:  Negative for pain.   Respiratory:  Positive for cough and shortness of breath.    Cardiovascular:  Negative for chest pain.   Gastrointestinal:  Positive for diarrhea. Negative for abdominal pain, nausea and vomiting.   Genitourinary:  Negative for dysuria.   Musculoskeletal:  Negative for back pain.        (-) Arm or leg trouble.    Skin:  Negative for rash.   Neurological:  Positive for headaches.   Psychiatric/Behavioral:  Positive for hallucinations (Auditory and Visual). Negative for confusion.        Physical Exam     Initial Vitals [11/13/23 0935]   BP Pulse Resp Temp SpO2   (!) 138/92 (!) 150 (!) 26 99.9 °F (37.7 °C) (!) 86 %      MAP       --         Physical Exam  The patient was examined specifically for the following:   General:No significant distress, Good color, Warm and dry. Head and neck:Scalp atraumatic, Neck supple. Neurological:Appropriate conversation, Gross motor deficits. Eyes:Conjugate gaze,  Clear corneas. ENT: No epistaxis. Cardiac: Regular rate and rhythm, Grossly normal heart tones. Pulmonary: Wheezing, Rales. Gastrointestinal: Abdominal tenderness, Abdominal distention. Musculoskeletal: Extremity deformity, Apparent pain with range of motion of the joints. Skin: Rash.   The findings on examination were normal except for the following:  The patient has oxygen saturations of 86% on arrival.  The respiratory rate is 26.  The heart rate is 150.  The patient has an irregularly irregular tachycardia.  The blood pressure is 130/92.  The patient has moderate respiratory distress.  She is wheezing in all lung fields.  Patient has moderate edema of both feet.  The abdomen is soft.  The patient has obvious facial congestion.   ED Course   Critical Care    Date/Time: 11/13/2023 4:25 PM    Performed by: Stevenson Devlin MD  Authorized by: Stevenson Devlin MD  Direct patient critical care time: 22 minutes  Additional history critical care time: 11 minutes  Ordering / reviewing critical care time: 11 minutes  Documentation critical care time: 11 minutes  Consulting other physicians critical care time: 11 minutes  Total critical care time (exclusive of procedural time) : 66 minutes  Critical care time was exclusive of separately billable procedures and treating other patients and teaching time.  Critical care was necessary to treat or prevent imminent or life-threatening deterioration of the following conditions: respiratory failure.  Critical care was time spent personally by me on the following activities: development of treatment plan with patient or surrogate, discussions with primary provider, evaluation of patient's response to treatment, examination of patient, obtaining history from patient or surrogate, ordering and performing treatments and interventions, ordering and review of laboratory studies, ordering and review of radiographic studies, pulse oximetry, re-evaluation of patient's condition and  review of old charts.        Labs Reviewed   COMPREHENSIVE METABOLIC PANEL - Abnormal; Notable for the following components:       Result Value    CO2 22 (*)     Albumin 3.0 (*)     eGFR 54 (*)     All other components within normal limits   CBC W/ AUTO DIFFERENTIAL - Abnormal; Notable for the following components:     (*)     MCHC 30.6 (*)     Gran % 73.5 (*)     Lymph % 16.5 (*)     All other components within normal limits   B-TYPE NATRIURETIC PEPTIDE - Abnormal; Notable for the following components:     (*)     All other components within normal limits   URINALYSIS, REFLEX TO URINE CULTURE - Abnormal; Notable for the following components:    Color, UA Colorless (*)     All other components within normal limits    Narrative:     Specimen Source->Urine   PROCALCITONIN   TROPONIN I   PROTIME-INR   POCT INFLUENZA A/B MOLECULAR   SARS-COV-2 RDRP GENE   POCT STREP A MOLECULAR          Imaging Results              X-Ray Chest AP Portable (Final result)  Result time 11/13/23 11:33:01      Final result by Vaibhav Gay Jr., MD (11/13/23 11:33:01)                   Impression:      No acute cardiopulmonary disease      Electronically signed by: Vaibhav Thompson Jr  Date:    11/13/2023  Time:    11:33               Narrative:    EXAMINATION:  XR CHEST AP PORTABLE    CLINICAL HISTORY:  chest pain;    TECHNIQUE:  Single frontal view of the chest was performed.    COMPARISON:  08/02/2023    FINDINGS:  Cardiomediastinal silhouetteremains borderline enlarged, particularly the left atrium.  There is aortic atherosclerosis.    Low lung volumes crowd the pulmonary vascular markings.  No focal airspace opacity seen.  Stable chronic elevation of the right hemidiaphragm likely with some associated right basilar atelectasis.                                       Medications   dexAMETHasone injection 10 mg (10 mg Intravenous Given 11/13/23 1020)   albuterol-ipratropium 2.5 mg-0.5 mg/3 mL nebulizer solution 3 mL (3  mLs Nebulization Given 11/13/23 1022)   albuterol sulfate nebulizer solution 5 mg (5 mg Nebulization Given 11/13/23 1022)   furosemide injection 80 mg (80 mg Intravenous Given 11/13/23 1020)     Medical Decision Making  Amount and/or Complexity of Data Reviewed  Labs: ordered.  Radiology: ordered.    Risk  Prescription drug management.  Decision regarding hospitalization.    Given the above, this patient presents emergency room with shortness of breath, very low oxygen saturations on arrival.  She has wheezing bilaterally.  She was treated with albuterol and ipratropium.  She was treated with IV steroids.  Her symptoms failed to improve.  Post treatment resting ABG on room air was 83%.  The patient has no oxygen at home.  She does use BiPAP.  This patient is in respiratory failure complicated by morbid obesity.  I believe this is COPD.  I doubt heart failure.  The patient will be admitted to hospital medicine.   accepts the patient onto his service.  The patient has a slightly elevated BNP at 500.  Chest x-ray does not suggest pulmonary edema the patient does have some mild leg swelling.  Her last ejection fraction was normal.  I doubt congestive heart failure pulmonary embolus pleural effusion.        Scribe Attestation:   Scribe #1: I performed the above scribed service and the documentation accurately describes the services I performed. I attest to the accuracy of the note.                      Please note that the documentation on this chart was provided by the scribe above on the date of service noted above, and that the documentation in the chart accurately reflects the work and decisions made by me alone.  Signed, Dr. Devlin  Clinical Impression:   Final diagnoses:  [I48.91] Atrial fibrillation with rapid ventricular response  [J44.1] Acute exacerbation of chronic obstructive pulmonary disease (COPD) (Primary)  [J96.21] Acute on chronic respiratory failure with hypoxia  [J98.01] Acute  bronchospasm  [E66.01] Morbid obesity        ED Disposition Condition    Admit Stable                Stevenson Devlin MD  11/13/23 1084

## 2023-11-13 NOTE — ED TRIAGE NOTES
Patient comes in with increased SOB for several weeks, increased leg swelling cough and congestion, oxygen was noted to be in high 80's in triage placed on 2LNC

## 2023-11-13 NOTE — ED NOTES
Pt trialed off of O2, pt desatted to 83% RA while lying in stretcher with worsening of sob.  Pt placed back onto 3L O2 via NC.  Dr. Devlin notified. Will continue to monitor.

## 2023-11-13 NOTE — ASSESSMENT & PLAN NOTE
Patient with Hypercapnic and Hypoxic Respiratory failure which is Acute on chronic.  she is not on home oxygen. Supplemental oxygen was provided and noted-      .   Signs/symptoms of respiratory failure include- tachypnea, increased work of breathing, and lethargy. Contributing diagnoses includes - COPD and Obesity Hypoventilation Labs and images were reviewed. Patient Has recent ABG, which has been reviewed. Will treat underlying causes and adjust management of respiratory failure as follows-   ER show hypercapnic,hypoxic  respiratory failure with PH of 7.2 and P C O2 of 72,PO 2 of 76,chest x ray show fluid overload,BNP is over 478,patient has been  started on continues Bipap and IV lasix,,admitted to ICU for close monitoring.

## 2023-11-13 NOTE — HPI
" 67 y.o. female, with a past medical history of A-fib on OAC,diastolic  CHF, COPD, HLD, HTN, hypothyroidism, morbid obesity, pre-diabetes, and psychiatric problems, who presents to the ED with SOB onset 3 weeks ago. She reports SOB has gotten progressively worse over 3 weeks. Patient notes she has trouble ambulating d/t SOB. Patient notes being on an at-home BiPAP but denies daily use d/t the machine "smothering" her. Additional history provided by independent historian, relative, notes an O2 saturation in the 80's at-home PTA. Patient denies O2 at home. Patient notes an associated cough, congestion, diarrhea, rhinorrhea, sore throat, and headache.ABG in ER show hypercapnic,hypoxic  respiratory failure with PH of 7.2 and P C O2 of 72,PO 2 of 76,chest x ray show fluid overload,BNP is over 478,patient has been  started on continues Bipap and IV lasix,,admitted to ICU for close monitoring.  "

## 2023-11-14 LAB
ALLENS TEST: ABNORMAL
ANION GAP SERPL CALC-SCNC: 10 MMOL/L (ref 8–16)
ANION GAP SERPL CALC-SCNC: 12 MMOL/L (ref 8–16)
AV INDEX (PROSTH): 0.71
AV MEAN GRADIENT: 2 MMHG
AV PEAK GRADIENT: 4 MMHG
AV VALVE AREA BY VELOCITY RATIO: 2.51 CM²
AV VALVE AREA: 2.18 CM²
AV VELOCITY RATIO: 0.82
BSA FOR ECHO PROCEDURE: 2.36 M2
BUN SERPL-MCNC: 24 MG/DL (ref 8–23)
BUN SERPL-MCNC: 31 MG/DL (ref 8–23)
CALCIUM SERPL-MCNC: 9.5 MG/DL (ref 8.7–10.5)
CALCIUM SERPL-MCNC: 9.5 MG/DL (ref 8.7–10.5)
CHLORIDE SERPL-SCNC: 94 MMOL/L (ref 95–110)
CHLORIDE SERPL-SCNC: 98 MMOL/L (ref 95–110)
CO2 SERPL-SCNC: 32 MMOL/L (ref 23–29)
CO2 SERPL-SCNC: 35 MMOL/L (ref 23–29)
CREAT SERPL-MCNC: 1.2 MG/DL (ref 0.5–1.4)
CREAT SERPL-MCNC: 1.2 MG/DL (ref 0.5–1.4)
CV ECHO LV RWT: 0.39 CM
DELSYS: ABNORMAL
DOP CALC AO PEAK VEL: 0.98 M/S
DOP CALC AO VTI: 21.5 CM
DOP CALC LVOT AREA: 3.1 CM2
DOP CALC LVOT DIAMETER: 1.98 CM
DOP CALC LVOT PEAK VEL: 0.8 M/S
DOP CALC LVOT STROKE VOLUME: 46.78 CM3
DOP CALCLVOT PEAK VEL VTI: 15.2 CM
ECHO LV POSTERIOR WALL: 1 CM (ref 0.6–1.1)
EP: 8
EST. GFR  (NO RACE VARIABLE): 49 ML/MIN/1.73 M^2
EST. GFR  (NO RACE VARIABLE): 49 ML/MIN/1.73 M^2
FIO2: 40
FRACTIONAL SHORTENING: 25 % (ref 28–44)
GLUCOSE SERPL-MCNC: 159 MG/DL (ref 70–110)
GLUCOSE SERPL-MCNC: 169 MG/DL (ref 70–110)
HCO3 UR-SCNC: 35.9 MMOL/L (ref 24–28)
INTERVENTRICULAR SEPTUM: 1.03 CM (ref 0.6–1.1)
IP: 15
IVC DIAMETER: 2.11 CM
LA MAJOR: 5.9 CM
LA MINOR: 6.26 CM
LA WIDTH: 5 CM
LEFT ATRIUM SIZE: 4.71 CM
LEFT ATRIUM VOLUME INDEX: 54.8 ML/M2
LEFT ATRIUM VOLUME: 121.6 CM3
LEFT INTERNAL DIMENSION IN SYSTOLE: 3.84 CM (ref 2.1–4)
LEFT VENTRICLE DIASTOLIC VOLUME INDEX: 56.08 ML/M2
LEFT VENTRICLE DIASTOLIC VOLUME: 124.49 ML
LEFT VENTRICLE MASS INDEX: 87 G/M2
LEFT VENTRICLE SYSTOLIC VOLUME INDEX: 28.6 ML/M2
LEFT VENTRICLE SYSTOLIC VOLUME: 63.46 ML
LEFT VENTRICULAR INTERNAL DIMENSION IN DIASTOLE: 5.11 CM (ref 3.5–6)
LEFT VENTRICULAR MASS: 192.43 G
LVOT MG: 1.28 MMHG
LVOT MV: 0.54 CM/S
MODE: ABNORMAL
PCO2 BLDA: 66 MMHG (ref 35–45)
PH SMN: 7.34 [PH] (ref 7.35–7.45)
PISA TR MAX VEL: 2.9 M/S
PO2 BLDA: 33 MMHG (ref 40–60)
POC BE: 8 MMOL/L
POC SATURATED O2: 57 % (ref 95–100)
POC TCO2: 38 MMOL/L (ref 24–29)
POTASSIUM SERPL-SCNC: 3.8 MMOL/L (ref 3.5–5.1)
POTASSIUM SERPL-SCNC: 4.4 MMOL/L (ref 3.5–5.1)
PV PEAK GRADIENT: 2 MMHG
PV PEAK VELOCITY: 0.76 M/S
RA MAJOR: 5.33 CM
RA PRESSURE ESTIMATED: 15 MMHG
RA WIDTH: 4 CM
RIGHT VENTRICULAR END-DIASTOLIC DIMENSION: 4.22 CM
RV TB RVSP: 18 MMHG
RV TISSUE DOPPLER FREE WALL SYSTOLIC VELOCITY 1 (APICAL 4 CHAMBER VIEW): 8.27 CM/S
SAMPLE: ABNORMAL
SINUS: 2.96 CM
SITE: ABNORMAL
SODIUM SERPL-SCNC: 140 MMOL/L (ref 136–145)
SODIUM SERPL-SCNC: 141 MMOL/L (ref 136–145)
STJ: 1.88 CM
TR MAX PG: 34 MMHG
TRICUSPID ANNULAR PLANE SYSTOLIC EXCURSION: 1.38 CM
TV REST PULMONARY ARTERY PRESSURE: 49 MMHG
Z-SCORE OF LEFT VENTRICULAR DIMENSION IN END DIASTOLE: -4.17
Z-SCORE OF LEFT VENTRICULAR DIMENSION IN END SYSTOLE: -1.61

## 2023-11-14 PROCEDURE — 63600175 PHARM REV CODE 636 W HCPCS: Performed by: HOSPITALIST

## 2023-11-14 PROCEDURE — 94761 N-INVAS EAR/PLS OXIMETRY MLT: CPT

## 2023-11-14 PROCEDURE — 63600175 PHARM REV CODE 636 W HCPCS: Performed by: NURSE PRACTITIONER

## 2023-11-14 PROCEDURE — 63600175 PHARM REV CODE 636 W HCPCS

## 2023-11-14 PROCEDURE — 27100171 HC OXYGEN HIGH FLOW UP TO 24 HOURS

## 2023-11-14 PROCEDURE — 94640 AIRWAY INHALATION TREATMENT: CPT

## 2023-11-14 PROCEDURE — 27100092 HC HIGH FLOW DELIVERY CANNULA

## 2023-11-14 PROCEDURE — 87798 DETECT AGENT NOS DNA AMP: CPT | Performed by: STUDENT IN AN ORGANIZED HEALTH CARE EDUCATION/TRAINING PROGRAM

## 2023-11-14 PROCEDURE — 94799 UNLISTED PULMONARY SVC/PX: CPT

## 2023-11-14 PROCEDURE — 94660 CPAP INITIATION&MGMT: CPT

## 2023-11-14 PROCEDURE — 36415 COLL VENOUS BLD VENIPUNCTURE: CPT | Performed by: HOSPITALIST

## 2023-11-14 PROCEDURE — 82803 BLOOD GASES ANY COMBINATION: CPT

## 2023-11-14 PROCEDURE — 99291 PR CRITICAL CARE, E/M 30-74 MINUTES: ICD-10-PCS | Mod: ,,, | Performed by: STUDENT IN AN ORGANIZED HEALTH CARE EDUCATION/TRAINING PROGRAM

## 2023-11-14 PROCEDURE — 25000003 PHARM REV CODE 250: Performed by: HOSPITALIST

## 2023-11-14 PROCEDURE — 99900035 HC TECH TIME PER 15 MIN (STAT)

## 2023-11-14 PROCEDURE — 25000242 PHARM REV CODE 250 ALT 637 W/ HCPCS: Performed by: HOSPITALIST

## 2023-11-14 PROCEDURE — 25000242 PHARM REV CODE 250 ALT 637 W/ HCPCS: Performed by: NURSE PRACTITIONER

## 2023-11-14 PROCEDURE — 25000003 PHARM REV CODE 250: Performed by: INTERNAL MEDICINE

## 2023-11-14 PROCEDURE — 20000000 HC ICU ROOM

## 2023-11-14 PROCEDURE — 80048 BASIC METABOLIC PNL TOTAL CA: CPT | Performed by: HOSPITALIST

## 2023-11-14 PROCEDURE — 80048 BASIC METABOLIC PNL TOTAL CA: CPT | Mod: 91 | Performed by: NURSE PRACTITIONER

## 2023-11-14 PROCEDURE — 36415 COLL VENOUS BLD VENIPUNCTURE: CPT | Performed by: NURSE PRACTITIONER

## 2023-11-14 PROCEDURE — 25000003 PHARM REV CODE 250

## 2023-11-14 PROCEDURE — 99291 CRITICAL CARE FIRST HOUR: CPT | Mod: ,,, | Performed by: STUDENT IN AN ORGANIZED HEALTH CARE EDUCATION/TRAINING PROGRAM

## 2023-11-14 PROCEDURE — 25000242 PHARM REV CODE 250 ALT 637 W/ HCPCS: Performed by: EMERGENCY MEDICINE

## 2023-11-14 PROCEDURE — 27000249 HC VAPOTHERM CIRCUIT

## 2023-11-14 RX ORDER — DEXMEDETOMIDINE HYDROCHLORIDE 4 UG/ML
INJECTION, SOLUTION INTRAVENOUS
Status: COMPLETED
Start: 2023-11-14 | End: 2023-11-14

## 2023-11-14 RX ORDER — DEXMEDETOMIDINE HYDROCHLORIDE 4 UG/ML
0-1.4 INJECTION, SOLUTION INTRAVENOUS CONTINUOUS
Status: DISCONTINUED | OUTPATIENT
Start: 2023-11-14 | End: 2023-11-15

## 2023-11-14 RX ORDER — FUROSEMIDE 10 MG/ML
20 INJECTION INTRAMUSCULAR; INTRAVENOUS
Status: DISCONTINUED | OUTPATIENT
Start: 2023-11-14 | End: 2023-11-14

## 2023-11-14 RX ORDER — GUAIFENESIN/DEXTROMETHORPHAN 100-10MG/5
10 SYRUP ORAL EVERY 4 HOURS PRN
Status: DISCONTINUED | OUTPATIENT
Start: 2023-11-14 | End: 2023-11-25 | Stop reason: HOSPADM

## 2023-11-14 RX ORDER — PREDNISONE 20 MG/1
40 TABLET ORAL DAILY
Status: DISCONTINUED | OUTPATIENT
Start: 2023-11-15 | End: 2023-11-18

## 2023-11-14 RX ORDER — FAMOTIDINE 20 MG/1
20 TABLET, FILM COATED ORAL 2 TIMES DAILY
Status: DISCONTINUED | OUTPATIENT
Start: 2023-11-14 | End: 2023-11-25 | Stop reason: HOSPADM

## 2023-11-14 RX ORDER — IPRATROPIUM BROMIDE AND ALBUTEROL SULFATE 2.5; .5 MG/3ML; MG/3ML
3 SOLUTION RESPIRATORY (INHALATION) EVERY 4 HOURS
Status: DISCONTINUED | OUTPATIENT
Start: 2023-11-14 | End: 2023-11-25 | Stop reason: HOSPADM

## 2023-11-14 RX ORDER — FUROSEMIDE 10 MG/ML
40 INJECTION INTRAMUSCULAR; INTRAVENOUS 2 TIMES DAILY WITH MEALS
Status: DISCONTINUED | OUTPATIENT
Start: 2023-11-14 | End: 2023-11-15

## 2023-11-14 RX ORDER — FUROSEMIDE 10 MG/ML
40 INJECTION INTRAMUSCULAR; INTRAVENOUS ONCE
Status: COMPLETED | OUTPATIENT
Start: 2023-11-14 | End: 2023-11-14

## 2023-11-14 RX ORDER — MUPIROCIN 20 MG/G
OINTMENT TOPICAL 2 TIMES DAILY
Status: COMPLETED | OUTPATIENT
Start: 2023-11-14 | End: 2023-11-18

## 2023-11-14 RX ORDER — IPRATROPIUM BROMIDE AND ALBUTEROL SULFATE 2.5; .5 MG/3ML; MG/3ML
3 SOLUTION RESPIRATORY (INHALATION) EVERY 6 HOURS PRN
Status: DISCONTINUED | OUTPATIENT
Start: 2023-11-14 | End: 2023-11-15

## 2023-11-14 RX ORDER — DEXAMETHASONE SODIUM PHOSPHATE 4 MG/ML
2 INJECTION, SOLUTION INTRA-ARTICULAR; INTRALESIONAL; INTRAMUSCULAR; INTRAVENOUS; SOFT TISSUE ONCE
Status: COMPLETED | OUTPATIENT
Start: 2023-11-14 | End: 2023-11-14

## 2023-11-14 RX ADMIN — ARFORMOTEROL TARTRATE 15 MCG: 15 SOLUTION RESPIRATORY (INHALATION) at 07:11

## 2023-11-14 RX ADMIN — IPRATROPIUM BROMIDE AND ALBUTEROL SULFATE 3 ML: 2.5; .5 SOLUTION RESPIRATORY (INHALATION) at 08:11

## 2023-11-14 RX ADMIN — DEXAMETHASONE SODIUM PHOSPHATE 2 MG: 4 INJECTION, SOLUTION INTRA-ARTICULAR; INTRALESIONAL; INTRAMUSCULAR; INTRAVENOUS; SOFT TISSUE at 09:11

## 2023-11-14 RX ADMIN — DEXMEDETOMIDINE HYDROCHLORIDE 0.4 MCG/KG/HR: 4 INJECTION, SOLUTION INTRAVENOUS at 01:11

## 2023-11-14 RX ADMIN — DEXMEDETOMIDINE HYDROCHLORIDE 0.2 MCG/KG/HR: 4 INJECTION, SOLUTION INTRAVENOUS at 09:11

## 2023-11-14 RX ADMIN — BUDESONIDE 0.5 MG: 0.5 INHALANT RESPIRATORY (INHALATION) at 07:11

## 2023-11-14 RX ADMIN — GUAIFENESIN AND DEXTROMETHORPHAN 10 ML: 100; 10 SYRUP ORAL at 12:11

## 2023-11-14 RX ADMIN — DEXMEDETOMIDINE HYDROCHLORIDE 0.3 MCG/KG/HR: 4 INJECTION, SOLUTION INTRAVENOUS at 09:11

## 2023-11-14 RX ADMIN — FUROSEMIDE 40 MG: 10 INJECTION, SOLUTION INTRAVENOUS at 06:11

## 2023-11-14 RX ADMIN — DEXMEDETOMIDINE HYDROCHLORIDE 0.2 MCG/KG/HR: 4 INJECTION, SOLUTION INTRAVENOUS at 01:11

## 2023-11-14 RX ADMIN — RIVAROXABAN 20 MG: 20 TABLET, FILM COATED ORAL at 04:11

## 2023-11-14 RX ADMIN — MUPIROCIN: 20 OINTMENT TOPICAL at 09:11

## 2023-11-14 RX ADMIN — IPRATROPIUM BROMIDE AND ALBUTEROL SULFATE 3 ML: 2.5; .5 SOLUTION RESPIRATORY (INHALATION) at 04:11

## 2023-11-14 RX ADMIN — FAMOTIDINE 20 MG: 20 TABLET ORAL at 09:11

## 2023-11-14 RX ADMIN — IPRATROPIUM BROMIDE AND ALBUTEROL SULFATE 3 ML: 2.5; .5 SOLUTION RESPIRATORY (INHALATION) at 11:11

## 2023-11-14 RX ADMIN — FUROSEMIDE 40 MG: 10 INJECTION, SOLUTION INTRAVENOUS at 04:11

## 2023-11-14 RX ADMIN — DEXAMETHASONE SODIUM PHOSPHATE 4 MG: 4 INJECTION, SOLUTION INTRA-ARTICULAR; INTRALESIONAL; INTRAMUSCULAR; INTRAVENOUS; SOFT TISSUE at 09:11

## 2023-11-14 RX ADMIN — LEVALBUTEROL 1.25 MG: 1.25 SOLUTION, CONCENTRATE RESPIRATORY (INHALATION) at 07:11

## 2023-11-14 RX ADMIN — MUPIROCIN: 20 OINTMENT TOPICAL at 11:11

## 2023-11-14 RX ADMIN — FUROSEMIDE 40 MG: 10 INJECTION, SOLUTION INTRAVENOUS at 12:11

## 2023-11-14 RX ADMIN — IPRATROPIUM BROMIDE AND ALBUTEROL SULFATE 3 ML: 2.5; .5 SOLUTION RESPIRATORY (INHALATION) at 12:11

## 2023-11-14 NOTE — PLAN OF CARE
Case Management Assessment     PCP: Kuldeep Miller MD    Pharmacy:   [id number]  [place], [place] - [address] AT [facility] [address]  [address] [place] [address]  Phone: [phone] Fax: [phone]        Patient Arrived From: Home  Existing Help at Home:  and daughter    Barriers to Discharge: No barriers to discharge. Patient uses a BIPAP at home.     Discharge Plan:    A. Home   B. Home       11/14/23 1051   Discharge Assessment   Assessment Type Discharge Planning Assessment   Confirmed/corrected address, phone number and insurance Yes   Confirmed Demographics Correct on Facesheet   Source of Information health record   When was your last doctors appointment? 10/18/23   Communicated JACQUELINE with patient/caregiver Date not available/Unable to determine   Reason For Admission Acute Respiratory Failure with Hypoxia and Hypercapnia   People in Home spouse   Do you expect to return to your current living situation? Yes   Do you have help at home or someone to help you manage your care at home? Yes   Who are your caregiver(s) and their phone number(s)? Corwin Evans (Spouse) [phone] (Mobile)   Prior to hospitilization cognitive status: Alert/Oriented   Current cognitive status: Alert/Oriented   Equipment Currently Used at Home walker, rolling;rollator;bedside commode;bath bench;cane, straight;BIPAP   Readmission within 30 days? No   Patient currently being followed by outpatient case management? No   Do you currently have service(s) that help you manage your care at home? No   Do you take prescription medications? Yes   Do you have prescription coverage? Yes   Coverage BCBS   Do you have any problems affording any of your prescribed medications? No   Is the patient taking medications as prescribed? yes   Who is going to help you get home at discharge? Corwin Evans (Spouse) [phone] (Mobile)   How do you get to doctors appointments? family or friend  will provide;car, drives self   Are you on dialysis? No   Do you take coumadin? No   DME Needed Upon Discharge  none   Transition of Care Barriers None   Discharge Plan A Home   Discharge Plan B Home   OTHER   Name(s) of People in Home Corwin Evans (Spouse) [phone] (Mobile)

## 2023-11-14 NOTE — EICU
EICU Physician Brief Note - Overnight Events    Patient more awake and asking insistently to have BIPAP removed. However, not tolerating being off BIPAP, coughs incessantly with desaturations.  Plan:  Resume BIPAP. Precedex gtt to help with compliance.  VBG ordered, PaCO2 improved from 72 to 66, which may be close to her baseline.  Eicu is available should acute issues arise.

## 2023-11-14 NOTE — SUBJECTIVE & OBJECTIVE
Past Medical History:   Diagnosis Date    Anticoagulant long-term use     Xarelto    Arthritis     Atrial fibrillation     Breast cyst     CHF (congestive heart failure)     Degenerative disc disease     Edema     HLD (hyperlipidemia)     Hx of psychiatric care     Hyperlipidemia     Hypertension     Hypothyroidism     Nuclear sclerosis, bilateral 12/18/2017    Obesity, morbid     HIEU (obstructive sleep apnea)     Other abnormal glucose     pre-diabetes    Pre-diabetes     Psychiatric problem     Requires assistance with activities of daily living (ADL)     Sleep apnea     Smoker     SOB (shortness of breath)     SOB (shortness of breath)     Thyroid disease     on meds 8-9 years ago. hypothyroidism.no malignancy    TMJ (temporomandibular joint disorder)     jaw clicking    Tobacco abuse     Unsteady gait     Urge incontinence     Weakness generalized     Wears glasses        Past Surgical History:   Procedure Laterality Date    BREAST BIOPSY Right     over 10 yrs ago/ benign    BREAST CYST EXCISION Right     BREAST LUMPECTOMY Right     over 10 yrs ago, ex bx/ benign    COLONOSCOPY N/A 6/25/2019    Procedure: COLONOSCOPY;  Surgeon: Micheline Flores MD;  Location: [facility] ENDO;  Service: Endoscopy;  Laterality: N/A;  RX XARELTO ok to hold (2 days) per Dr. Naik see scan 3/20/19    ENDOSCOPIC ULTRASOUND OF UPPER GASTROINTESTINAL TRACT N/A 1/26/2021    Procedure: ULTRASOUND-ENDOSCOPIC-UPPER;  Surgeon: Stevenson Philippe MD;  Location: [facility] ENDO;  Service: Endoscopy;  Laterality: N/A;    HYSTERECTOMY      JOINT REPLACEMENT Bilateral     knees    OOPHORECTOMY      rt.knee surgery 2016      TOTAL KNEE ARTHROPLASTY Left 2/19/2019    Procedure: ARTHROPLASTY, KNEE, TOTAL;  Surgeon: Med Hanson MD;  Location: [facility] OR;  Service: Orthopedics;  Laterality: Left;  10AM START PER  PER JAYLIN TEXT @ 8:34AM ON 2-18-19  SUPINE  HARRIS LOYA [phone] TEXTED HIM ON 1-24-19 @ 7:57AM  RN PRE OP 2-13-19--BMI--48.9    underarm  gland\ Bilateral        Review of patient's allergies indicates:   Allergen Reactions    Ace inhibitors      Cough         Family History       Problem Relation (Age of Onset)    Cancer Mother, Brother    Diabetes Mother, Brother    Hypertension Mother    No Known Problems Father, Sister, Maternal Aunt, Maternal Uncle, Paternal Aunt, Paternal Uncle, Maternal Grandmother, Maternal Grandfather, Paternal Grandmother, Paternal Grandfather, Other          Tobacco Use    Smoking status: Every Day     Current packs/day: 0.50     Average packs/day: 0.5 packs/day for 50.9 years (25.4 ttl pk-yrs)     Types: Cigarettes     Start date: 1973    Smokeless tobacco: Current   Substance and Sexual Activity    Alcohol use: No    Drug use: No    Sexual activity: Yes     Partners: Male         Review of Systems   Constitutional:  Positive for fatigue. Negative for chills and fever.   Respiratory:  Positive for cough, shortness of breath and wheezing.    Cardiovascular:  Positive for leg swelling. Negative for chest pain.     Objective:     Vital Signs (Most Recent):  Temp: 97.7 °F (36.5 °C) (11/14/23 0700)  Pulse: 83 (11/14/23 1218)  Resp: (!) 28 (11/14/23 1218)  BP: 132/78 (11/14/23 1204)  SpO2: (!) 92 % (11/14/23 1218) Vital Signs (24h Range):  Temp:  [97.7 °F (36.5 °C)-98 °F (36.7 °C)] 97.7 °F (36.5 °C)  Pulse:  [] 83  Resp:  [20-48] 28  SpO2:  [83 %-97 %] 92 %  BP: (109-169)/(53-78) 132/78     Weight: 125.2 kg (276 lb)  Body mass index is 48.89 kg/m².      Intake/Output Summary (Last 24 hours) at 11/14/2023 1247  Last data filed at 11/14/2023 1136  Gross per 24 hour   Intake 408.07 ml   Output 4800 ml   Net -4391.93 ml        Physical Exam  Constitutional:       Appearance: She is obese.      Comments: Talking in full sentences on BiPAP(Fi02 of 30% with saturations of 92%)) noted mild use of neck muscles in respiratory course.    HENT:      Head: Normocephalic and atraumatic.      Nose: No rhinorrhea.      Mouth/Throat:       Mouth: Mucous membranes are moist.   Eyes:      Extraocular Movements: Extraocular movements intact.   Cardiovascular:      Rate and Rhythm: Normal rate. Rhythm irregular.      Pulses: Normal pulses.      Heart sounds: Normal heart sounds. No murmur heard.     No gallop.   Pulmonary:      Effort: Respiratory distress present.      Breath sounds: Wheezing present.      Comments: Reduced air entry anteriorly bilaterally with mild inspiratory crackles and expiratory wheeze.    Chest:      Chest wall: No tenderness.   Abdominal:      General: Bowel sounds are normal.      Palpations: Abdomen is soft.      Tenderness: There is no abdominal tenderness.   Musculoskeletal:         General: Normal range of motion.      Right lower leg: Edema (2+pitting edema up to the shins) present.      Left lower leg: Edema (2+ pitting edema up to the shins.) present.   Skin:     General: Skin is warm.      Capillary Refill: Capillary refill takes less than 2 seconds.   Neurological:      General: No focal deficit present.      Mental Status: She is alert. Mental status is at baseline.          Vents:  Oxygen Concentration (%): 30 (11/14/23 1218)    Lines/Drains/Airways       Drain  Duration             Female External Urinary Catheter 11/13/23 1900 <1 day              Peripheral Intravenous Line  Duration                  Peripheral IV - Single Lumen 11/13/23 1725 20 G Left;Posterior Forearm <1 day         Peripheral IV - Single Lumen 11/14/23 0208 20 G Anterior;Right Forearm <1 day                    Significant Labs:    CBC/Anemia Profile:  Recent Labs   Lab 11/13/23  1016 11/13/23  1722   WBC 5.95 4.88   HGB 13.3 13.2   HCT 43.4 42.1    189   * 99*   RDW 14.0 14.2        Chemistries:  Recent Labs   Lab 11/13/23  1016 11/14/23  0016    140   K 4.3 4.4    98   CO2 22* 32*   BUN 23 24*   CREATININE 1.1 1.2   CALCIUM 9.0 9.5   ALBUMIN 3.0*  --    PROT 6.7  --    BILITOT 0.6  --    ALKPHOS 93  --    ALT 27  --   "  AST 27  --        ABGs:   Recent Labs   Lab 11/14/23  0209   PH 7.343*   PCO2 66.0*   HCO3 35.9*   POCSATURATED 57   BE 8*     BMP:   Recent Labs   Lab 11/14/23  0016   *      K 4.4   CL 98   CO2 32*   BUN 24*   CREATININE 1.2   CALCIUM 9.5     CMP:   Recent Labs   Lab 11/13/23  1016 11/14/23  0016    140   K 4.3 4.4    98   CO2 22* 32*    159*   BUN 23 24*   CREATININE 1.1 1.2   CALCIUM 9.0 9.5   PROT 6.7  --    ALBUMIN 3.0*  --    BILITOT 0.6  --    ALKPHOS 93  --    AST 27  --    ALT 27  --    ANIONGAP 11 10     Cardiac Markers: No results for input(s): "CKMB", "TROPONINT", "MYOGLOBIN" in the last 48 hours.  All pertinent labs within the past 24 hours have been reviewed.    Significant Imaging:   I have reviewed all pertinent imaging results/findings within the past 24 hours.  "

## 2023-11-14 NOTE — CONSULTS
[facility] - Intensive Care  Pulmonology  Consult Note    Patient Name: Sandee Evans  MRN: [id number]  Admission Date: 11/13/2023  Hospital Length of Stay: 1 days  Code Status: Full Code  Attending Physician: Luz Ware, *  Primary Care Provider: Kuldeep Miller MD   Principal Problem: Acute respiratory failure with hypoxia and hypercapnia    Consults  Subjective:     HPI:  Pt with PMHx of AfiB, HTN, COPD, HFpEF, HIEU on BiPAP, Morbid Obesity who initially presented with a three week history of dyspnea worse on exertion and a few days of cough, nasal congestion, loose stools, sore throat and headache, wheezing to auscultation bilaterally, hypoxic in the 80s with increased work of breathing requiring high flow of O2 and eventually BiPAP. Of note she had been placed on home BiPAP for over a year and non compliant.   Of note on labs, her initial blood gas revealed a pH: 7.26>7.34 pCO2:72>66 and PO2 of 76 with HCO3 of  33.2>35, BNP elevated in the 400s otherwise procalcitonin, flu , COVID unremarkable. CXR with trace pleural effusions and slightly obscured R cardiophrenic angle.         Past Medical History:   Diagnosis Date    Anticoagulant long-term use     Xarelto    Arthritis     Atrial fibrillation     Breast cyst     CHF (congestive heart failure)     Degenerative disc disease     Edema     HLD (hyperlipidemia)     Hx of psychiatric care     Hyperlipidemia     Hypertension     Hypothyroidism     Nuclear sclerosis, bilateral 12/18/2017    Obesity, morbid     HIEU (obstructive sleep apnea)     Other abnormal glucose     pre-diabetes    Pre-diabetes     Psychiatric problem     Requires assistance with activities of daily living (ADL)     Sleep apnea     Smoker     SOB (shortness of breath)     SOB (shortness of breath)     Thyroid disease     on meds 8-9 years ago. hypothyroidism.no malignancy    TMJ (temporomandibular joint disorder)     jaw clicking    Tobacco abuse     Unsteady gait     Urge  incontinence     Weakness generalized     Wears glasses        Past Surgical History:   Procedure Laterality Date    BREAST BIOPSY Right     over 10 yrs ago/ benign    BREAST CYST EXCISION Right     BREAST LUMPECTOMY Right     over 10 yrs ago, ex bx/ benign    COLONOSCOPY N/A 6/25/2019    Procedure: COLONOSCOPY;  Surgeon: Micheline Flores MD;  Location: [facility] ENDO;  Service: Endoscopy;  Laterality: N/A;  RX XARELTO ok to hold (2 days) per Dr. Naik see scan 3/20/19    ENDOSCOPIC ULTRASOUND OF UPPER GASTROINTESTINAL TRACT N/A 1/26/2021    Procedure: ULTRASOUND-ENDOSCOPIC-UPPER;  Surgeon: Stevenson Philippe MD;  Location: [facility] ENDO;  Service: Endoscopy;  Laterality: N/A;    HYSTERECTOMY      JOINT REPLACEMENT Bilateral     knees    OOPHORECTOMY      rt.knee surgery 2016      TOTAL KNEE ARTHROPLASTY Left 2/19/2019    Procedure: ARTHROPLASTY, KNEE, TOTAL;  Surgeon: Med Hanson MD;  Location: [facility] OR;  Service: Orthopedics;  Laterality: Left;  10AM START PER  PER JAYLIN TEXT @ 8:34AM ON 2-18-19  SUPINE  HARRIS TANMAY SHALINI [phone] TEXTED HIM ON 1-24-19 @ 7:57AM  RN PRE OP 2-13-19--BMI--48.9    underarm gland\ Bilateral        Review of patient's allergies indicates:   Allergen Reactions    Ace inhibitors      Cough         Family History       Problem Relation (Age of Onset)    Cancer Mother, Brother    Diabetes Mother, Brother    Hypertension Mother    No Known Problems Father, Sister, Maternal Aunt, Maternal Uncle, Paternal Aunt, Paternal Uncle, Maternal Grandmother, Maternal Grandfather, Paternal Grandmother, Paternal Grandfather, Other          Tobacco Use    Smoking status: Every Day     Current packs/day: 0.50     Average packs/day: 0.5 packs/day for 50.9 years (25.4 ttl pk-yrs)     Types: Cigarettes     Start date: 1973    Smokeless tobacco: Current   Substance and Sexual Activity    Alcohol use: No    Drug use: No    Sexual activity: Yes     Partners: Male         Review of Systems   Constitutional:   Positive for fatigue. Negative for chills and fever.   Respiratory:  Positive for cough, shortness of breath and wheezing.    Cardiovascular:  Positive for leg swelling. Negative for chest pain.     Objective:     Vital Signs (Most Recent):  Temp: 97.7 °F (36.5 °C) (11/14/23 0700)  Pulse: 83 (11/14/23 1218)  Resp: (!) 28 (11/14/23 1218)  BP: 132/78 (11/14/23 1204)  SpO2: (!) 92 % (11/14/23 1218) Vital Signs (24h Range):  Temp:  [97.7 °F (36.5 °C)-98 °F (36.7 °C)] 97.7 °F (36.5 °C)  Pulse:  [] 83  Resp:  [20-48] 28  SpO2:  [83 %-97 %] 92 %  BP: (109-169)/(53-78) 132/78     Weight: 125.2 kg (276 lb)  Body mass index is 48.89 kg/m².      Intake/Output Summary (Last 24 hours) at 11/14/2023 1247  Last data filed at 11/14/2023 1136  Gross per 24 hour   Intake 408.07 ml   Output 4800 ml   Net -4391.93 ml        Physical Exam  Constitutional:       Appearance: She is obese.      Comments: Talking in full sentences on BiPAP(Fi02 of 30% with saturations of 92%)) noted mild use of neck muscles in respiratory course.    HENT:      Head: Normocephalic and atraumatic.      Nose: No rhinorrhea.      Mouth/Throat:      Mouth: Mucous membranes are moist.   Eyes:      Extraocular Movements: Extraocular movements intact.   Cardiovascular:      Rate and Rhythm: Normal rate. Rhythm irregular.      Pulses: Normal pulses.      Heart sounds: Normal heart sounds. No murmur heard.     No gallop.   Pulmonary:      Effort: Respiratory distress present.      Breath sounds: Wheezing present.      Comments: Reduced air entry anteriorly bilaterally with mild inspiratory crackles and expiratory wheeze.    Chest:      Chest wall: No tenderness.   Abdominal:      General: Bowel sounds are normal.      Palpations: Abdomen is soft.      Tenderness: There is no abdominal tenderness.   Musculoskeletal:         General: Normal range of motion.      Right lower leg: Edema (2+pitting edema up to the shins) present.      Left lower leg: Edema (2+  "pitting edema up to the shins.) present.   Skin:     General: Skin is warm.      Capillary Refill: Capillary refill takes less than 2 seconds.   Neurological:      General: No focal deficit present.      Mental Status: She is alert. Mental status is at baseline.          Vents:  Oxygen Concentration (%): 30 (11/14/23 1218)    Lines/Drains/Airways       Drain  Duration             Female External Urinary Catheter 11/13/23 1900 <1 day              Peripheral Intravenous Line  Duration                  Peripheral IV - Single Lumen 11/13/23 1725 20 G Left;Posterior Forearm <1 day         Peripheral IV - Single Lumen 11/14/23 0208 20 G Anterior;Right Forearm <1 day                    Significant Labs:    CBC/Anemia Profile:  Recent Labs   Lab 11/13/23  1016 11/13/23  1722   WBC 5.95 4.88   HGB 13.3 13.2   HCT 43.4 42.1    189   * 99*   RDW 14.0 14.2        Chemistries:  Recent Labs   Lab 11/13/23  1016 11/14/23  0016    140   K 4.3 4.4    98   CO2 22* 32*   BUN 23 24*   CREATININE 1.1 1.2   CALCIUM 9.0 9.5   ALBUMIN 3.0*  --    PROT 6.7  --    BILITOT 0.6  --    ALKPHOS 93  --    ALT 27  --    AST 27  --        ABGs:   Recent Labs   Lab 11/14/23  0209   PH 7.343*   PCO2 66.0*   HCO3 35.9*   POCSATURATED 57   BE 8*     BMP:   Recent Labs   Lab 11/14/23  0016   *      K 4.4   CL 98   CO2 32*   BUN 24*   CREATININE 1.2   CALCIUM 9.5     CMP:   Recent Labs   Lab 11/13/23  1016 11/14/23  0016    140   K 4.3 4.4    98   CO2 22* 32*    159*   BUN 23 24*   CREATININE 1.1 1.2   CALCIUM 9.0 9.5   PROT 6.7  --    ALBUMIN 3.0*  --    BILITOT 0.6  --    ALKPHOS 93  --    AST 27  --    ALT 27  --    ANIONGAP 11 10     Cardiac Markers: No results for input(s): "CKMB", "TROPONINT", "MYOGLOBIN" in the last 48 hours.  All pertinent labs within the past 24 hours have been reviewed.    Significant Imaging:   I have reviewed all pertinent imaging results/findings within the past 24 " hours.    ABG  Recent Labs   Lab 11/14/23  0209   PH 7.343*   PO2 33*   PCO2 66.0*   HCO3 35.9*   BE 8*     Assessment/Plan:     Pulmonary  * Acute respiratory failure with hypoxia and hypercapnia  Pt with history of COPD  initially presented with dyspnea, cough and congestion, hypoxic in the 80s with blood gases: pH of 7.26>7.34, pCO2 72>66, pO2:76 and HCO3: 33.2>35 suggestive of acute on chronic hypoxic hypercapnic respiratory failure requiring NIV, varying degrees of nebs with steroids and albuterol.   Status post BiPAP Rx and treatment above with improvement noted with hypercapnia(pCO: 72>66) and hypoxia given reduced FiO2 to maintain current saturations.   Reasons for hypoxia currently multifactorial spanning from diastolic heart failure and history of HIEU with non compliance with NIV and hypercapnia from history of COPD with recent exacerbation from a  iral infection, Obesity hypoventilation syndrome etc.   - Suggest to continue NIV and management of the above with effective diuresis, nebs.  - Low dose steroids in order not to exacerbate fluid overload states.   - BiPAP can be worn at scheduled times of the day and patient can be placed on nasal canula during rest and meal periods.  - Assess respiratory function and monitor trajectory.               Chronic obstructive pulmonary disease with acute exacerbation  Pt with history of COPD  initially presented with dyspnea, cough and congestion, wheezing bilaterally hypoxic in the 80s requiring NIV.  Currently on BiPAP with Levalbuterol, budesonide and formoterol nebs with dexamethasone for steroid support, pt's work of breathing has improved with hypercapnia currently at baseline.   -Suggest to switch to scheduled Duonebs every 4 hourly, continue low dose steroids and re-assess respiratory status.  -Continue BiPAP as deemed appropriate and order ABG in 24 hours.         Cardiac/Vascular  Acute on chronic diastolic CHF (congestive heart failure)  Patient with  history of HFpEF(most recent Echo-2022 with LVEF>60%) presented with dyspnea and cough, inspiratory crackles on auscultation and 2+ bilateral peripheral edema, BNP in the 400s, radiographic evidence of trace pleural effusions with obscured R cardiophrenic angle.  Currently on 20 mg Lasix IV BID with clinical signs of fluid overload.  -Suggest to bump up Furosemide dose to 40 mg BID for more effective diuresis.  -Monitor renal function and electrolytes especially potasium on subsequent BMPs.  -Strict Is and Os.  -Daily weights.  -Fluid restriction to 1L daily.         .          Thank you for your consult. I will follow-up with patient. Please contact us if you have any additional questions.     Roxann Palacios MD  Pulmonology  [facility] - [place] - Intensive Care

## 2023-11-14 NOTE — NURSING
Pt awake and removing BiPAP, coughing very freq. Off BiPAP with increase in SOB and desaturation quickly. Pt c/o discomfort  and anxious feeling. HR elevated above 100. Afib since admit. Spoke with eICU to report. See orders.

## 2023-11-14 NOTE — NURSING
Nurses Note -- 4 Eyes      11/13/2023   07:00 PM      Skin assessed during: Q Shift Change      [x] No Altered Skin Integrity Present    [x]Prevention Measures Documented      [] Yes- Altered Skin Integrity Present or Discovered   [] LDA Added if Not in Epic (Describe Wound)   [] New Altered Skin Integrity was Present on Admit and Documented in LDA   [] Wound Image Taken    Wound Care Consulted? No    Attending Nurse:  Erik Corley RN/Staff Member:   Demetrio Engel

## 2023-11-14 NOTE — NURSING
Patient arrived from ED, on BIPAP, 15/8 and 30%FIO2. Patient placed on cardiac, bp and pulse ox monitoring.  Patient is lethargic but able to follow commands.  Family notified of arrival to room 266, patient belongings at bedside. VSS. Awaiting admit orders.

## 2023-11-14 NOTE — HOSPITAL COURSE
Since admission she has steadily improved but with ongoing cough (although improving), congestion, and intermittent oxygen needs. This am she is on room air and has been up out of bed without issue. There is concern for repeat COPD exacerbation with ongoing inflammation.

## 2023-11-14 NOTE — HOSPITAL COURSE
67 y.o. female, with a past medical history of A-fib on OAC,diastolic  CHF, COPD, HLD, HTN, hypothyroidism, morbid obesity admitted on 11/22/2023 for acute respiratory failure with hypoxia and hypercapnic (O2 sts 88% on RA,  PCO2 of 72) due to +RSV, COPD exacerbation, and acute on chronic diastolic heart failure. admitted to ICU on BIPAP. BNP > 400.  Chest imaging with bilateral effusions and peripheral edema. Hospital course complicated by agitation, required Precedex drip briefly.  Able to wean off Precedex drip. Pulmonology consulted-suspect chronic hypercapnic respiratory failure but not using nightly NIV.  RSV positive and on droplet precaution.  Patient completed treatment with DuoNebs and prednisone on 11/17/2023.  Continue IV diuresis and wean O2 as tolerated. Pt still wheezing, resumed steroids with plan for gradual taper. Given patient continues to have wheezing, SOB, and diminished breath sounds, will consult pulmonology again for input.

## 2023-11-14 NOTE — SUBJECTIVE & OBJECTIVE
Past Medical History:   Diagnosis Date    Anticoagulant long-term use     Xarelto    Arthritis     Atrial fibrillation     Breast cyst     CHF (congestive heart failure)     Degenerative disc disease     Edema     HLD (hyperlipidemia)     Hx of psychiatric care     Hyperlipidemia     Hypertension     Hypothyroidism     Nuclear sclerosis, bilateral 12/18/2017    Obesity, morbid     HIEU (obstructive sleep apnea)     Other abnormal glucose     pre-diabetes    Pre-diabetes     Psychiatric problem     Requires assistance with activities of daily living (ADL)     Sleep apnea     Smoker     SOB (shortness of breath)     SOB (shortness of breath)     Thyroid disease     on meds 8-9 years ago. hypothyroidism.no malignancy    TMJ (temporomandibular joint disorder)     jaw clicking    Tobacco abuse     Unsteady gait     Urge incontinence     Weakness generalized     Wears glasses        Past Surgical History:   Procedure Laterality Date    BREAST BIOPSY Right     over 10 yrs ago/ benign    BREAST CYST EXCISION Right     BREAST LUMPECTOMY Right     over 10 yrs ago, ex bx/ benign    COLONOSCOPY N/A 6/25/2019    Procedure: COLONOSCOPY;  Surgeon: Micheline Flores MD;  Location: [facility] ENDO;  Service: Endoscopy;  Laterality: N/A;  RX XARELTO ok to hold (2 days) per Dr. Naik see scan 3/20/19    ENDOSCOPIC ULTRASOUND OF UPPER GASTROINTESTINAL TRACT N/A 1/26/2021    Procedure: ULTRASOUND-ENDOSCOPIC-UPPER;  Surgeon: Stevenson Philippe MD;  Location: [facility] ENDO;  Service: Endoscopy;  Laterality: N/A;    HYSTERECTOMY      JOINT REPLACEMENT Bilateral     knees    OOPHORECTOMY      rt.knee surgery 2016      TOTAL KNEE ARTHROPLASTY Left 2/19/2019    Procedure: ARTHROPLASTY, KNEE, TOTAL;  Surgeon: Med Hanson MD;  Location: [facility] OR;  Service: Orthopedics;  Laterality: Left;  10AM START PER  PER JAYLIN TEXT @ 8:34AM ON 2-18-19  SUPINE  HARRIS LOYA [phone] TEXTED HIM ON 1-24-19 @ 7:57AM  RN PRE OP 2-13-19--BMI--48.9    underarm  gland\ Bilateral        Review of patient's allergies indicates:   Allergen Reactions    Ace inhibitors      Cough         No current facility-administered medications on file prior to encounter.     Current Outpatient Medications on File Prior to Encounter   Medication Sig    cetirizine (ZYRTEC) 10 MG tablet Take 1 tablet (10 mg total) by mouth once daily.    fluticasone propionate (FLONASE) 50 mcg/actuation nasal spray SHAKE LIQUID AND USE 2 SPRAYS(100 MCG) IN EACH NOSTRIL EVERY DAY    albuterol (PROVENTIL/VENTOLIN HFA) 90 mcg/actuation inhaler 2 puffs every 6 (six) hours as needed.    albuterol-ipratropium (DUO-NEB) 2.5 mg-0.5 mg/3 mL nebulizer solution Take 3 mLs by nebulization every 6 (six) hours as needed for Wheezing. Rescue    atorvastatin (LIPITOR) 40 MG tablet TAKE 1 TABLET(40 MG) BY MOUTH EVERY DAY    azelastine (ASTELIN) 137 mcg (0.1 %) nasal spray 1 spray (137 mcg total) by Nasal route 2 (two) times daily.    buPROPion (WELLBUTRIN XL) 150 MG TB24 tablet TAKE 1 TABLET(150 MG) BY MOUTH EVERY DAY    furosemide (LASIX) 40 MG tablet Take 1 tablet (40 mg total) by mouth once daily.    ketoconazole (NIZORAL) 2 % cream Apply topically once daily.    losartan (COZAAR) 25 MG tablet TAKE 1 TABLET(25 MG) BY MOUTH EVERY DAY    metoprolol succinate (TOPROL-XL) 100 MG 24 hr tablet Take 1 tablet (100 mg total) by mouth once daily.    sertraline (ZOLOFT) 100 MG tablet TAKE 1 TABLET(100 MG) BY MOUTH EVERY DAY    SPIRIVA RESPIMAT 2.5 mcg/actuation inhaler INHALE 2 PUFFS BY MOUTH DAILY (Patient not taking: Reported on 10/18/2023)    triamcinolone acetonide 0.1% (KENALOG) 0.1 % ointment APPLY BETWEEN THE KNEES AND UPPER THIGHS TWICE DAILY FOR NO MORE THAN 7 TO 14 DAYS    XARELTO 20 mg Tab TAKE 1 TABLET(20 MG) BY MOUTH EVERY DAY     Family History       Problem Relation (Age of Onset)    Cancer Mother, Brother    Diabetes Mother, Brother    Hypertension Mother    No Known Problems Father, Sister, Maternal Aunt, Maternal  Uncle, Paternal Aunt, Paternal Uncle, Maternal Grandmother, Maternal Grandfather, Paternal Grandmother, Paternal Grandfather, Other          Tobacco Use    Smoking status: Every Day     Current packs/day: 0.50     Average packs/day: 0.5 packs/day for 50.9 years (25.4 ttl pk-yrs)     Types: Cigarettes     Start date: 1973    Smokeless tobacco: Current   Substance and Sexual Activity    Alcohol use: No    Drug use: No    Sexual activity: Yes     Partners: Male     Review of Systems   Constitutional:  Positive for activity change and appetite change.   HENT:  Negative for congestion and dental problem.    Eyes:  Negative for discharge and itching.   Respiratory:  Positive for shortness of breath.    Cardiovascular:  Negative for chest pain and leg swelling.   Gastrointestinal:  Negative for abdominal distention and abdominal pain.   Endocrine: Negative for cold intolerance.   Genitourinary:  Negative for difficulty urinating and dyspareunia.   Musculoskeletal:  Negative for arthralgias and back pain.   Skin:  Negative for color change.   Neurological:  Positive for weakness.   Psychiatric/Behavioral:  Negative for agitation and behavioral problems.      Objective:     Vital Signs (Most Recent):  Temp: 97.7 °F (36.5 °C) (11/14/23 0700)  Pulse: 74 (11/14/23 0930)  Resp: (!) 23 (11/14/23 0930)  BP: 133/73 (11/14/23 0930)  SpO2: (!) 90 % (11/14/23 0930) Vital Signs (24h Range):  Temp:  [97.7 °F (36.5 °C)-98 °F (36.7 °C)] 97.7 °F (36.5 °C)  Pulse:  [] 74  Resp:  [20-48] 23  SpO2:  [83 %-97 %] 90 %  BP: (109-169)/(53-77) 133/73     Weight: 125.2 kg (276 lb)  Body mass index is 48.89 kg/m².     Physical Exam  HENT:      Head: Normocephalic.      Right Ear: There is no impacted cerumen.      Nose: No congestion.      Mouth/Throat:      Mouth: Mucous membranes are dry.   Eyes:      Extraocular Movements: Extraocular movements intact.      Pupils: Pupils are equal, round, and reactive to light.   Cardiovascular:      Rate  and Rhythm: Normal rate. Rhythm irregular.      Heart sounds: No murmur heard.  Pulmonary:      Breath sounds: Rales present.   Abdominal:      General: There is no distension.      Tenderness: There is no abdominal tenderness.   Musculoskeletal:         General: Swelling present.   Skin:     Coloration: Skin is not jaundiced.      Findings: No bruising.   Neurological:      General: No focal deficit present.      Mental Status: She is alert.   Psychiatric:         Mood and Affect: Mood normal.         Behavior: Behavior normal.              CRANIAL NERVES     CN III, IV, VI   Pupils are equal, round, and reactive to light.       Significant Labs: All pertinent labs within the past 24 hours have been reviewed.  BMP:   Recent Labs   Lab 11/14/23  0016   *      K 4.4   CL 98   CO2 32*   BUN 24*   CREATININE 1.2   CALCIUM 9.5       CBC:   Recent Labs   Lab 11/13/23  1016 11/13/23  1722   WBC 5.95 4.88   HGB 13.3 13.2   HCT 43.4 42.1    189       CMP:   Recent Labs   Lab 11/13/23  1016 11/14/23  0016    140   K 4.3 4.4    98   CO2 22* 32*    159*   BUN 23 24*   CREATININE 1.1 1.2   CALCIUM 9.0 9.5   PROT 6.7  --    ALBUMIN 3.0*  --    BILITOT 0.6  --    ALKPHOS 93  --    AST 27  --    ALT 27  --    ANIONGAP 11 10         Significant Imaging: I have reviewed all pertinent imaging results/findings within the past 24 hours.

## 2023-11-14 NOTE — ASSESSMENT & PLAN NOTE
Pt with history of COPD  initially presented with dyspnea, cough and congestion, wheezing bilaterally hypoxic in the 80s requiring NIV.  Currently on BiPAP with Levalbuterol, budesonide and formoterol nebs with dexamethasone for steroid support, pt's work of breathing has improved with hypercapnia currently at baseline.   -Suggest to switch to scheduled Duonebs every 4 hourly, continue low dose steroids and re-assess respiratory status.  -Continue BiPAP as deemed appropriate and order ABG in 24 hours.

## 2023-11-14 NOTE — ASSESSMENT & PLAN NOTE
Patient with Hypercapnic and Hypoxic Respiratory failure which is Acute on chronic.  she is not on home oxygen. Supplemental oxygen was provided and noted- Oxygen Concentration (%):  [30] 30    .   Signs/symptoms of respiratory failure include- tachypnea, increased work of breathing, and lethargy. Contributing diagnoses includes - COPD and Obesity Hypoventilation Labs and images were reviewed. Patient Has recent ABG, which has been reviewed. Will treat underlying causes and adjust management of respiratory failure as follows-   ER show hypercapnic,hypoxic  respiratory failure with PH of 7.2 and P C O2 of 72,PO 2 of 76,chest x ray show fluid overload,BNP is over 478,patient has been  started on continues Bipap and IV lasix,,admitted to ICU for close monitoring.  VBG today show improvement in CO2 retention,consulted pulmonology.  On precedex drip for agitation.

## 2023-11-14 NOTE — NURSING
Patient awake on and off throughout the shift - now on nasal cannula - saturation in the low 90's- can push herself up to the top of the bed - pure wick in place - diuresis continues - will follow up labs as per MD - call bell within reach - Precedex infusing - see flowsheet

## 2023-11-14 NOTE — ASSESSMENT & PLAN NOTE
Pt with history of COPD  initially presented with dyspnea, cough and congestion, hypoxic in the 80s with blood gases: pH of 7.26>7.34, pCO2 72>66, pO2:76 and HCO3: 33.2>35 suggestive of acute on chronic hypoxic hypercapnic respiratory failure requiring NIV, varying degrees of nebs with steroids and albuterol.   Status post BiPAP Rx and treatment above with improvement noted with hypercapnia(pCO: 72>66) and hypoxia given reduced FiO2 to maintain current saturations.   Reasons for hypoxia currently multifactorial spanning from diastolic heart failure and history of HIEU with non compliance with NIV and hypercapnia from history of COPD with recent exacerbation from a  iral infection, Obesity hypoventilation syndrome etc.   - Suggest to continue NIV and management of the above with effective diuresis, nebs.  - Low dose steroids in order not to exacerbate fluid overload states.   - BiPAP can be worn at scheduled times of the day and patient can be placed on nasal canula during rest and meal periods.  - Assess respiratory function and monitor trajectory.

## 2023-11-14 NOTE — NURSING
Patient awake and anxious - emotional support provided - pulmonary saw patient - removed bipap mask - placed on nasal cannula - saturation in the mid 90's - face and mouth care done - tolerating water and sips of ice - coughing - productive at times - wheezing heard - MD messaged for treatments and respiratory at the bedside - call bell within reach

## 2023-11-14 NOTE — NURSING
Patient oriented to room, call bell within reach, Side rails up x3, bed alarm on, bed at lowest position.

## 2023-11-14 NOTE — PROGRESS NOTES
"[facility]  Progress Note    Patient Name: Sandee Evans  MRN: [id number]  Patient Class: IP- Inpatient   Admission Date: 11/13/2023  Length of Stay: 1 days  Attending Physician: Luz Ware, *  Primary Care Provider: Kuldeep Miller MD        Subjective:     Principal Problem:Acute respiratory failure with hypoxia and hypercapnia        HPI:   67 y.o. female, with a past medical history of A-fib on OAC,diastolic  CHF, COPD, HLD, HTN, hypothyroidism, morbid obesity, pre-diabetes, and psychiatric problems, who presents to the ED with SOB onset 3 weeks ago. She reports SOB has gotten progressively worse over 3 weeks. Patient notes she has trouble ambulating d/t SOB. Patient notes being on an at-home BiPAP but denies daily use d/t the machine "smothering" her. Additional history provided by independent historian, relative, notes an O2 saturation in the 80's at-home PTA. Patient denies O2 at home. Patient notes an associated cough, congestion, diarrhea, rhinorrhea, sore throat, and headache.ABG in ER show hypercapnic,hypoxic  respiratory failure with PH of 7.2 and P C O2 of 72,PO 2 of 76,chest x ray show fluid overload,BNP is over 478,patient has been  started on continues Bipap and IV lasix,,admitted to ICU for close monitoring.    Overview/Hospital Course:   67 y.o. female, with a past medical history of A-fib on OAC,diastolic  CHF, COPD, HLD, HTN, hypothyroidism, morbid obesity, pre-diabetes, and psychiatric problems, who presents to the ED with SOB onset 3 weeks ago. She reports SOB has gotten progressively worse over 3 weeks. Patient notes she has trouble ambulating d/t SOB. Patient notes being on an at-home BiPAP but denies daily use d/t the machine "smothering" her. \ relative, notes an O2 saturation in the 80's at-home PTA. Patient denies O2 at home. .ABG in ER show hypercapnic,hypoxic  respiratory failure with PH of 7.2 and P C O2 of 72,PO 2 of 76,chest x ray " show fluid overload,BNP is over 478,patient has been  started on continues Bipap and IV lasix,,admitted to ICU for close monitoring.  VBG today show improvement in CO2 retention,consulted pulmonology.  On precedex drip for agitation.    Past Medical History:   Diagnosis Date    Anticoagulant long-term use     Xarelto    Arthritis     Atrial fibrillation     Breast cyst     CHF (congestive heart failure)     Degenerative disc disease     Edema     HLD (hyperlipidemia)     Hx of psychiatric care     Hyperlipidemia     Hypertension     Hypothyroidism     Nuclear sclerosis, bilateral 12/18/2017    Obesity, morbid     HIEU (obstructive sleep apnea)     Other abnormal glucose     pre-diabetes    Pre-diabetes     Psychiatric problem     Requires assistance with activities of daily living (ADL)     Sleep apnea     Smoker     SOB (shortness of breath)     SOB (shortness of breath)     Thyroid disease     on meds 8-9 years ago. hypothyroidism.no malignancy    TMJ (temporomandibular joint disorder)     jaw clicking    Tobacco abuse     Unsteady gait     Urge incontinence     Weakness generalized     Wears glasses        Past Surgical History:   Procedure Laterality Date    BREAST BIOPSY Right     over 10 yrs ago/ benign    BREAST CYST EXCISION Right     BREAST LUMPECTOMY Right     over 10 yrs ago, ex bx/ benign    COLONOSCOPY N/A 6/25/2019    Procedure: COLONOSCOPY;  Surgeon: Micheline Flores MD;  Location: [facility] ENDO;  Service: Endoscopy;  Laterality: N/A;  RX XARELTO ok to hold (2 days) per Dr. Naik see scan 3/20/19    ENDOSCOPIC ULTRASOUND OF UPPER GASTROINTESTINAL TRACT N/A 1/26/2021    Procedure: ULTRASOUND-ENDOSCOPIC-UPPER;  Surgeon: Stevenson Philippe MD;  Location: [facility] ENDO;  Service: Endoscopy;  Laterality: N/A;    HYSTERECTOMY      JOINT REPLACEMENT Bilateral     knees    OOPHORECTOMY      rt.knee surgery 2016      TOTAL KNEE ARTHROPLASTY Left 2/19/2019    Procedure: ARTHROPLASTY, KNEE, TOTAL;  Surgeon: Med FRANCO  MD Margie;  Location: [facility] OR;  Service: Orthopedics;  Laterality: Left;  10AM START PER  PER JAYLIN TEXT @ 8:34AM ON 2-18-19  SUPINE  HARRIS LOYA [phone] TEXTED HIM ON 1-24-19 @ 7:57AM  RN PRE OP 2-13-19--BMI--48.9    underarm gland\ Bilateral        Review of patient's allergies indicates:   Allergen Reactions    Ace inhibitors      Cough         No current facility-administered medications on file prior to encounter.     Current Outpatient Medications on File Prior to Encounter   Medication Sig    cetirizine (ZYRTEC) 10 MG tablet Take 1 tablet (10 mg total) by mouth once daily.    fluticasone propionate (FLONASE) 50 mcg/actuation nasal spray SHAKE LIQUID AND USE 2 SPRAYS(100 MCG) IN EACH NOSTRIL EVERY DAY    albuterol (PROVENTIL/VENTOLIN HFA) 90 mcg/actuation inhaler 2 puffs every 6 (six) hours as needed.    albuterol-ipratropium (DUO-NEB) 2.5 mg-0.5 mg/3 mL nebulizer solution Take 3 mLs by nebulization every 6 (six) hours as needed for Wheezing. Rescue    atorvastatin (LIPITOR) 40 MG tablet TAKE 1 TABLET(40 MG) BY MOUTH EVERY DAY    azelastine (ASTELIN) 137 mcg (0.1 %) nasal spray 1 spray (137 mcg total) by Nasal route 2 (two) times daily.    buPROPion (WELLBUTRIN XL) 150 MG TB24 tablet TAKE 1 TABLET(150 MG) BY MOUTH EVERY DAY    furosemide (LASIX) 40 MG tablet Take 1 tablet (40 mg total) by mouth once daily.    ketoconazole (NIZORAL) 2 % cream Apply topically once daily.    losartan (COZAAR) 25 MG tablet TAKE 1 TABLET(25 MG) BY MOUTH EVERY DAY    metoprolol succinate (TOPROL-XL) 100 MG 24 hr tablet Take 1 tablet (100 mg total) by mouth once daily.    sertraline (ZOLOFT) 100 MG tablet TAKE 1 TABLET(100 MG) BY MOUTH EVERY DAY    SPIRIVA RESPIMAT 2.5 mcg/actuation inhaler INHALE 2 PUFFS BY MOUTH DAILY (Patient not taking: Reported on 10/18/2023)    triamcinolone acetonide 0.1% (KENALOG) 0.1 % ointment APPLY BETWEEN THE KNEES AND UPPER THIGHS TWICE DAILY FOR NO MORE THAN 7 TO 14 DAYS    XARELTO 20 mg  Tab TAKE 1 TABLET(20 MG) BY MOUTH EVERY DAY     Family History       Problem Relation (Age of Onset)    Cancer Mother, Brother    Diabetes Mother, Brother    Hypertension Mother    No Known Problems Father, Sister, Maternal Aunt, Maternal Uncle, Paternal Aunt, Paternal Uncle, Maternal Grandmother, Maternal Grandfather, Paternal Grandmother, Paternal Grandfather, Other          Tobacco Use    Smoking status: Every Day     Current packs/day: 0.50     Average packs/day: 0.5 packs/day for 50.9 years (25.4 ttl pk-yrs)     Types: Cigarettes     Start date: 1973    Smokeless tobacco: Current   Substance and Sexual Activity    Alcohol use: No    Drug use: No    Sexual activity: Yes     Partners: Male     Review of Systems   Constitutional:  Positive for activity change and appetite change.   HENT:  Negative for congestion and dental problem.    Eyes:  Negative for discharge and itching.   Respiratory:  Positive for shortness of breath.    Cardiovascular:  Negative for chest pain and leg swelling.   Gastrointestinal:  Negative for abdominal distention and abdominal pain.   Endocrine: Negative for cold intolerance.   Genitourinary:  Negative for difficulty urinating and dyspareunia.   Musculoskeletal:  Negative for arthralgias and back pain.   Skin:  Negative for color change.   Neurological:  Positive for weakness.   Psychiatric/Behavioral:  Negative for agitation and behavioral problems.      Objective:     Vital Signs (Most Recent):  Temp: 97.7 °F (36.5 °C) (11/14/23 0700)  Pulse: 74 (11/14/23 0930)  Resp: (!) 23 (11/14/23 0930)  BP: 133/73 (11/14/23 0930)  SpO2: (!) 90 % (11/14/23 0930) Vital Signs (24h Range):  Temp:  [97.7 °F (36.5 °C)-98 °F (36.7 °C)] 97.7 °F (36.5 °C)  Pulse:  [] 74  Resp:  [20-48] 23  SpO2:  [83 %-97 %] 90 %  BP: (109-169)/(53-77) 133/73     Weight: 125.2 kg (276 lb)  Body mass index is 48.89 kg/m².     Physical Exam  HENT:      Head: Normocephalic.      Right Ear: There is no impacted cerumen.       Nose: No congestion.      Mouth/Throat:      Mouth: Mucous membranes are dry.   Eyes:      Extraocular Movements: Extraocular movements intact.      Pupils: Pupils are equal, round, and reactive to light.   Cardiovascular:      Rate and Rhythm: Normal rate. Rhythm irregular.      Heart sounds: No murmur heard.  Pulmonary:      Breath sounds: Rales present.   Abdominal:      General: There is no distension.      Tenderness: There is no abdominal tenderness.   Musculoskeletal:         General: Swelling present.   Skin:     Coloration: Skin is not jaundiced.      Findings: No bruising.   Neurological:      General: No focal deficit present.      Mental Status: She is alert.   Psychiatric:         Mood and Affect: Mood normal.         Behavior: Behavior normal.              CRANIAL NERVES     CN III, IV, VI   Pupils are equal, round, and reactive to light.       Significant Labs: All pertinent labs within the past 24 hours have been reviewed.  BMP:   Recent Labs   Lab 11/14/23  0016   *      K 4.4   CL 98   CO2 32*   BUN 24*   CREATININE 1.2   CALCIUM 9.5       CBC:   Recent Labs   Lab 11/13/23  1016 11/13/23  1722   WBC 5.95 4.88   HGB 13.3 13.2   HCT 43.4 42.1    189       CMP:   Recent Labs   Lab 11/13/23  1016 11/14/23  0016    140   K 4.3 4.4    98   CO2 22* 32*    159*   BUN 23 24*   CREATININE 1.1 1.2   CALCIUM 9.0 9.5   PROT 6.7  --    ALBUMIN 3.0*  --    BILITOT 0.6  --    ALKPHOS 93  --    AST 27  --    ALT 27  --    ANIONGAP 11 10         Significant Imaging: I have reviewed all pertinent imaging results/findings within the past 24 hours.    Assessment/Plan:      * Acute respiratory failure with hypoxia and hypercapnia  Patient with Hypercapnic and Hypoxic Respiratory failure which is Acute on chronic.  she is not on home oxygen. Supplemental oxygen was provided and noted- Oxygen Concentration (%):  [30] 30    .   Signs/symptoms of respiratory failure include-  tachypnea, increased work of breathing, and lethargy. Contributing diagnoses includes - COPD and Obesity Hypoventilation Labs and images were reviewed. Patient Has recent ABG, which has been reviewed. Will treat underlying causes and adjust management of respiratory failure as follows-   ER show hypercapnic,hypoxic  respiratory failure with PH of 7.2 and P C O2 of 72,PO 2 of 76,chest x ray show fluid overload,BNP is over 478,patient has been  started on continues Bipap and IV lasix,,admitted to ICU for close monitoring.  VBG today show improvement in CO2 retention,consulted pulmonology.  On precedex drip for agitation.    Chronic obstructive pulmonary disease with acute exacerbation  Patient's COPD is with exacerbation noted by continued dyspnea and use of accessory muscles for breathing currently.  Patient is currently off COPD Pathway. Continue scheduled inhalers Steroids, Antibiotics, and Supplemental oxygen and monitor respiratory status closely.     Chronic kidney disease, stage 3a  Creatine stable for now. BMP reviewed- noted Estimated Creatinine Clearance: 61.4 mL/min (based on SCr of 1.1 mg/dL). according to latest data. Based on current GFR, CKD stage is stage 3 - GFR 30-59.  Monitor UOP and serial BMP and adjust therapy as needed. Renally dose meds. Avoid nephrotoxic medications and procedures.    Pulmonary hypertension  Will diurese.      Acute on chronic diastolic CHF (congestive heart failure)  Patient is identified as having Diastolic (HFpEF) heart failure that is Acute on chronic. CHF is currently uncontrolled due to Dyspnea not returned to baseline after 1 doses of IV diuretic. Latest ECHO performed and demonstrates- Results for orders placed during the hospital encounter of 05/20/22    Echo    Interpretation Summary  · The left ventricle is normal in size with normal systolic function.  · The estimated ejection fraction is 60%.  · Normal right ventricular size with normal right ventricular systolic  function.  · The estimated PA systolic pressure is 33 mmHg.  · Atrial fibrillation observed.  . Continue Beta Blocker and Furosemide and monitor clinical status closely. Monitor on telemetry. Patient is off CHF pathway.  Monitor strict Is&Os and daily weights.  Place on fluid restriction of 1.5 L. Cardiology has not been any consulted. Continue to stress to patient importance of self efficacy and  on diet for CHF. Last BNP reviewed- and noted below   Recent Labs   Lab 11/13/23  1016   *   .    HIEU treated with BiPAP  Non complaint with home Bipap.she was consulted.      PVD (peripheral vascular disease)  On medical treatments.      Paroxysmal atrial fibrillation  Patient with Persistent (7 days or more) atrial fibrillation which is controlled currently with Beta Blocker. Patient is currently in atrial fibrillation.[id number] Score: 4. HASBLED Score: 3. Anticoagulation indicated. Anticoagulation done with Eliquis .    Pre-diabetes  Will monitor.      Severe obesity (BMI >= 40)  Body mass index is 48.01 kg/m². Morbid obesity complicates all aspects of disease management from diagnostic modalities to treatment. Weight loss encouraged and health benefits explained to patient.         Essential hypertension  Chronic, controlled. Latest blood pressure and vitals reviewed-     Temp:  [99.9 °F (37.7 °C)]   Pulse:  []   Resp:  [26-38]   BP: (137-138)/(62-92)   SpO2:  [83 %-95 %] .   Home meds for hypertension were reviewed and noted below.   Hypertension Medications               furosemide (LASIX) 40 MG tablet Take 1 tablet (40 mg total) by mouth once daily.    losartan (COZAAR) 25 MG tablet TAKE 1 TABLET(25 MG) BY MOUTH EVERY DAY    metoprolol succinate (TOPROL-XL) 100 MG 24 hr tablet Take 1 tablet (100 mg total) by mouth once daily.            While in the hospital, will manage blood pressure as follows; Continue home antihypertensive regimen    Will utilize p.r.n. blood pressure medication only if  patient's blood pressure greater than 160/100 and she develops symptoms such as worsening chest pain or shortness of breath.      VTE Risk Mitigation (From admission, onward)           Ordered     rivaroxaban tablet 20 mg  With dinner         11/13/23 1633                    Discharge Planning   JACQUELINE:      Code Status: Full Code   Is the patient medically ready for discharge?:     Reason for patient still in hospital (select all that apply): Patient trending condition               Critical care time spent on the evaluation and treatment of severe organ dysfunction, review of pertinent labs and imaging studies, discussions with consulting providers and discussions with patient/family:  over 45  minutes.      Luz Ware MD  Department of Hospital Medicine   [facility] [place] [facility]

## 2023-11-14 NOTE — NURSING
When patient awakes she appears startled and unsure of her surroundings once awake she is oriented - continues to diuresis -

## 2023-11-14 NOTE — HPI
Pt with PMHx of AfiB, HTN, COPD, HFpEF, HIEU on BiPAP, Morbid Obesity who initially presented with a three week history of dyspnea worse on exertion and a few days of cough, nasal congestion, loose stools, sore throat and headache, wheezing to auscultation bilaterally, hypoxic in the 80s with increased work of breathing requiring high flow of O2 and eventually BiPAP. Of note she had been placed on home BiPAP for over a year and non compliant.   Of note on labs, her initial blood gas revealed a pH: 7.26>7.34 pCO2:72>66 and PO2 of 76 with HCO3 of  33.2>35, BNP elevated in the 400s otherwise procalcitonin, flu , COVID unremarkable. CXR with trace pleural effusions and slightly obscured R cardiophrenic angle.

## 2023-11-14 NOTE — NURSING
Patient was able to stay off the bipap - but became short of breath - and very anxious - audible wheezing noted - placed back on the bipap - emotional support provided - precedex restarted at same rate - see flowsheet but increased per titration orders - sleeping on and off - appears much more comfortable -  at the bedside - extra dose of lasix given - saturation remain in the 88-89% on bipap - MD states saturation are good maintain above 88%

## 2023-11-14 NOTE — ASSESSMENT & PLAN NOTE
Patient with history of HFpEF(most recent Echo-2022 with LVEF>60%) presented with dyspnea and cough, inspiratory crackles on auscultation and 2+ bilateral peripheral edema, BNP in the 400s, radiographic evidence of trace pleural effusions with obscured R cardiophrenic angle.  Currently on 20 mg Lasix IV BID with clinical signs of fluid overload.  -Suggest to bump up Furosemide dose to 40 mg BID for more effective diuresis.  -Monitor renal function and electrolytes especially potasium on subsequent BMPs.  -Strict Is and Os.  -Daily weights.  -Fluid restriction to 1L daily.         .

## 2023-11-15 LAB
ADENOVIRUS: NOT DETECTED
ANION GAP SERPL CALC-SCNC: 11 MMOL/L (ref 8–16)
BASOPHILS # BLD AUTO: 0.02 K/UL (ref 0–0.2)
BASOPHILS NFR BLD: 0.3 % (ref 0–1.9)
BORDETELLA PARAPERTUSSIS (IS1001): NOT DETECTED
BORDETELLA PERTUSSIS (PTXP): NOT DETECTED
BUN SERPL-MCNC: 30 MG/DL (ref 8–23)
CALCIUM SERPL-MCNC: 9.8 MG/DL (ref 8.7–10.5)
CHLAMYDIA PNEUMONIAE: NOT DETECTED
CHLORIDE SERPL-SCNC: 95 MMOL/L (ref 95–110)
CO2 SERPL-SCNC: 36 MMOL/L (ref 23–29)
CORONAVIRUS 229E, COMMON COLD VIRUS: NOT DETECTED
CORONAVIRUS HKU1, COMMON COLD VIRUS: NOT DETECTED
CORONAVIRUS NL63, COMMON COLD VIRUS: NOT DETECTED
CORONAVIRUS OC43, COMMON COLD VIRUS: NOT DETECTED
CREAT SERPL-MCNC: 1.1 MG/DL (ref 0.5–1.4)
DIFFERENTIAL METHOD: ABNORMAL
EOSINOPHIL # BLD AUTO: 0 K/UL (ref 0–0.5)
EOSINOPHIL NFR BLD: 0 % (ref 0–8)
ERYTHROCYTE [DISTWIDTH] IN BLOOD BY AUTOMATED COUNT: 14.1 % (ref 11.5–14.5)
EST. GFR  (NO RACE VARIABLE): 54 ML/MIN/1.73 M^2
FLUBV RNA NPH QL NAA+NON-PROBE: NOT DETECTED
GLUCOSE SERPL-MCNC: 143 MG/DL (ref 70–110)
HCT VFR BLD AUTO: 46.2 % (ref 37–48.5)
HGB BLD-MCNC: 14.8 G/DL (ref 12–16)
HPIV1 RNA NPH QL NAA+NON-PROBE: NOT DETECTED
HPIV2 RNA NPH QL NAA+NON-PROBE: NOT DETECTED
HPIV3 RNA NPH QL NAA+NON-PROBE: NOT DETECTED
HPIV4 RNA NPH QL NAA+NON-PROBE: NOT DETECTED
HUMAN METAPNEUMOVIRUS: NOT DETECTED
IMM GRANULOCYTES # BLD AUTO: 0.04 K/UL (ref 0–0.04)
IMM GRANULOCYTES NFR BLD AUTO: 0.5 % (ref 0–0.5)
INFLUENZA A (SUBTYPES H1,H1-2009,H3): NOT DETECTED
LYMPHOCYTES # BLD AUTO: 0.8 K/UL (ref 1–4.8)
LYMPHOCYTES NFR BLD: 10.8 % (ref 18–48)
MCH RBC QN AUTO: 31.6 PG (ref 27–31)
MCHC RBC AUTO-ENTMCNC: 32 G/DL (ref 32–36)
MCV RBC AUTO: 99 FL (ref 82–98)
MONOCYTES # BLD AUTO: 0.5 K/UL (ref 0.3–1)
MONOCYTES NFR BLD: 7.1 % (ref 4–15)
MYCOPLASMA PNEUMONIAE: NOT DETECTED
NEUTROPHILS # BLD AUTO: 6.1 K/UL (ref 1.8–7.7)
NEUTROPHILS NFR BLD: 81.3 % (ref 38–73)
NRBC BLD-RTO: 0 /100 WBC
PLATELET # BLD AUTO: 215 K/UL (ref 150–450)
PMV BLD AUTO: 10.7 FL (ref 9.2–12.9)
POTASSIUM SERPL-SCNC: 3.9 MMOL/L (ref 3.5–5.1)
RBC # BLD AUTO: 4.68 M/UL (ref 4–5.4)
RESPIRATORY INFECTION PANEL SOURCE: ABNORMAL
RSV RNA NPH QL NAA+NON-PROBE: DETECTED
RV+EV RNA NPH QL NAA+NON-PROBE: NOT DETECTED
SARS-COV-2 RNA RESP QL NAA+PROBE: NOT DETECTED
SODIUM SERPL-SCNC: 142 MMOL/L (ref 136–145)
WBC # BLD AUTO: 7.44 K/UL (ref 3.9–12.7)

## 2023-11-15 PROCEDURE — 25000242 PHARM REV CODE 250 ALT 637 W/ HCPCS: Performed by: NURSE PRACTITIONER

## 2023-11-15 PROCEDURE — 93010 EKG 12-LEAD: ICD-10-PCS | Mod: ,,, | Performed by: INTERNAL MEDICINE

## 2023-11-15 PROCEDURE — 63600175 PHARM REV CODE 636 W HCPCS: Performed by: NURSE PRACTITIONER

## 2023-11-15 PROCEDURE — 25000003 PHARM REV CODE 250: Performed by: INTERNAL MEDICINE

## 2023-11-15 PROCEDURE — 25000003 PHARM REV CODE 250: Performed by: STUDENT IN AN ORGANIZED HEALTH CARE EDUCATION/TRAINING PROGRAM

## 2023-11-15 PROCEDURE — 27100171 HC OXYGEN HIGH FLOW UP TO 24 HOURS

## 2023-11-15 PROCEDURE — 99291 PR CRITICAL CARE, E/M 30-74 MINUTES: ICD-10-PCS | Mod: ,,, | Performed by: STUDENT IN AN ORGANIZED HEALTH CARE EDUCATION/TRAINING PROGRAM

## 2023-11-15 PROCEDURE — 99291 CRITICAL CARE FIRST HOUR: CPT | Mod: ,,, | Performed by: STUDENT IN AN ORGANIZED HEALTH CARE EDUCATION/TRAINING PROGRAM

## 2023-11-15 PROCEDURE — 93010 ELECTROCARDIOGRAM REPORT: CPT | Mod: ,,, | Performed by: INTERNAL MEDICINE

## 2023-11-15 PROCEDURE — 25000003 PHARM REV CODE 250

## 2023-11-15 PROCEDURE — 63600175 PHARM REV CODE 636 W HCPCS: Performed by: HOSPITALIST

## 2023-11-15 PROCEDURE — 93005 ELECTROCARDIOGRAM TRACING: CPT

## 2023-11-15 PROCEDURE — 25000242 PHARM REV CODE 250 ALT 637 W/ HCPCS

## 2023-11-15 PROCEDURE — 20000000 HC ICU ROOM

## 2023-11-15 PROCEDURE — 80048 BASIC METABOLIC PNL TOTAL CA: CPT | Performed by: HOSPITALIST

## 2023-11-15 PROCEDURE — 94640 AIRWAY INHALATION TREATMENT: CPT

## 2023-11-15 PROCEDURE — 36415 COLL VENOUS BLD VENIPUNCTURE: CPT | Performed by: HOSPITALIST

## 2023-11-15 PROCEDURE — 94799 UNLISTED PULMONARY SVC/PX: CPT

## 2023-11-15 PROCEDURE — 63600175 PHARM REV CODE 636 W HCPCS

## 2023-11-15 PROCEDURE — 94761 N-INVAS EAR/PLS OXIMETRY MLT: CPT

## 2023-11-15 PROCEDURE — 85025 COMPLETE CBC W/AUTO DIFF WBC: CPT | Performed by: HOSPITALIST

## 2023-11-15 PROCEDURE — 94660 CPAP INITIATION&MGMT: CPT

## 2023-11-15 PROCEDURE — 99900035 HC TECH TIME PER 15 MIN (STAT)

## 2023-11-15 PROCEDURE — 25000003 PHARM REV CODE 250: Performed by: HOSPITALIST

## 2023-11-15 RX ORDER — FUROSEMIDE 10 MG/ML
40 INJECTION INTRAMUSCULAR; INTRAVENOUS EVERY 8 HOURS
Status: COMPLETED | OUTPATIENT
Start: 2023-11-15 | End: 2023-11-16

## 2023-11-15 RX ORDER — SODIUM CHLORIDE FOR INHALATION 3 %
4 VIAL, NEBULIZER (ML) INHALATION EVERY 8 HOURS
Status: DISCONTINUED | OUTPATIENT
Start: 2023-11-15 | End: 2023-11-25 | Stop reason: HOSPADM

## 2023-11-15 RX ORDER — HYDROXYZINE PAMOATE 25 MG/1
25 CAPSULE ORAL EVERY 6 HOURS PRN
Status: DISCONTINUED | OUTPATIENT
Start: 2023-11-15 | End: 2023-11-25 | Stop reason: HOSPADM

## 2023-11-15 RX ORDER — QUETIAPINE FUMARATE 25 MG/1
25 TABLET, FILM COATED ORAL NIGHTLY
Status: DISCONTINUED | OUTPATIENT
Start: 2023-11-15 | End: 2023-11-25 | Stop reason: HOSPADM

## 2023-11-15 RX ORDER — BENZONATATE 100 MG/1
100 CAPSULE ORAL 3 TIMES DAILY PRN
Status: DISCONTINUED | OUTPATIENT
Start: 2023-11-15 | End: 2023-11-25 | Stop reason: HOSPADM

## 2023-11-15 RX ADMIN — FUROSEMIDE 40 MG: 10 INJECTION, SOLUTION INTRAVENOUS at 07:11

## 2023-11-15 RX ADMIN — PREDNISONE 40 MG: 20 TABLET ORAL at 08:11

## 2023-11-15 RX ADMIN — IPRATROPIUM BROMIDE AND ALBUTEROL SULFATE 3 ML: 2.5; .5 SOLUTION RESPIRATORY (INHALATION) at 03:11

## 2023-11-15 RX ADMIN — FAMOTIDINE 20 MG: 20 TABLET ORAL at 08:11

## 2023-11-15 RX ADMIN — MUPIROCIN: 20 OINTMENT TOPICAL at 08:11

## 2023-11-15 RX ADMIN — FUROSEMIDE 40 MG: 10 INJECTION, SOLUTION INTRAVENOUS at 09:11

## 2023-11-15 RX ADMIN — RIVAROXABAN 20 MG: 20 TABLET, FILM COATED ORAL at 05:11

## 2023-11-15 RX ADMIN — GUAIFENESIN AND DEXTROMETHORPHAN 10 ML: 100; 10 SYRUP ORAL at 04:11

## 2023-11-15 RX ADMIN — HYDRALAZINE HYDROCHLORIDE 10 MG: 20 INJECTION INTRAMUSCULAR; INTRAVENOUS at 04:11

## 2023-11-15 RX ADMIN — IPRATROPIUM BROMIDE AND ALBUTEROL SULFATE 3 ML: 2.5; .5 SOLUTION RESPIRATORY (INHALATION) at 04:11

## 2023-11-15 RX ADMIN — DEXMEDETOMIDINE HYDROCHLORIDE 0.6 MCG/KG/HR: 4 INJECTION, SOLUTION INTRAVENOUS at 02:11

## 2023-11-15 RX ADMIN — BENZONATATE 100 MG: 100 CAPSULE ORAL at 04:11

## 2023-11-15 RX ADMIN — IPRATROPIUM BROMIDE AND ALBUTEROL SULFATE 3 ML: 2.5; .5 SOLUTION RESPIRATORY (INHALATION) at 08:11

## 2023-11-15 RX ADMIN — SERTRALINE HYDROCHLORIDE 100 MG: 50 TABLET ORAL at 08:11

## 2023-11-15 RX ADMIN — HYDROXYZINE PAMOATE 25 MG: 25 CAPSULE ORAL at 04:11

## 2023-11-15 RX ADMIN — IPRATROPIUM BROMIDE AND ALBUTEROL SULFATE 3 ML: 2.5; .5 SOLUTION RESPIRATORY (INHALATION) at 11:11

## 2023-11-15 RX ADMIN — FUROSEMIDE 40 MG: 10 INJECTION, SOLUTION INTRAVENOUS at 03:11

## 2023-11-15 RX ADMIN — METOPROLOL SUCCINATE 100 MG: 50 TABLET, EXTENDED RELEASE ORAL at 08:11

## 2023-11-15 RX ADMIN — QUETIAPINE FUMARATE 25 MG: 25 TABLET ORAL at 08:11

## 2023-11-15 RX ADMIN — SODIUM CHLORIDE SOLN NEBU 3% 4 ML: 3 NEBU SOLN at 03:11

## 2023-11-15 RX ADMIN — ATORVASTATIN CALCIUM 40 MG: 40 TABLET, FILM COATED ORAL at 08:11

## 2023-11-15 NOTE — PLAN OF CARE
Problem: Adult Inpatient Plan of Care  Goal: Plan of Care Review  Outcome: Ongoing, Progressing  Goal: Patient-Specific Goal (Individualized)  Outcome: Ongoing, Progressing  Goal: Absence of Hospital-Acquired Illness or Injury  Outcome: Ongoing, Progressing  Goal: Optimal Comfort and Wellbeing  Outcome: Ongoing, Progressing  Goal: Readiness for Transition of Care  Outcome: Ongoing, Progressing     Problem: Bariatric Environmental Safety  Goal: Safety Maintained with Care  Outcome: Ongoing, Progressing     Problem: Diabetes Comorbidity  Goal: Blood Glucose Level Within Targeted Range  Outcome: Ongoing, Progressing     Problem: Fall Injury Risk  Goal: Absence of Fall and Fall-Related Injury  Outcome: Ongoing, Progressing     Problem: Skin Injury Risk Increased  Goal: Skin Health and Integrity  Outcome: Ongoing, Progress    Pt very anxious and hallucinating most the night. Switching back and forth from vapotherm to bipap. Attempted to educate pt re increased wob and o2 demands but pt difficult to redirect. Remains in afib. Hypertensive @ times. Purewick in place. Precedex stopped secondary to hallucinations. No injury, fall, or skin breakdown overnight

## 2023-11-15 NOTE — ASSESSMENT & PLAN NOTE
Pt with history of COPD  initially presented with dyspnea, cough and congestion, wheezing bilaterally hypoxic in the 80s requiring NIV.  Currently on BiPAP with Levalbuterol, budesonide and formoterol nebs with dexamethasone for steroid support, pt's work of breathing has improved with hypercapnia currently at baseline.   -Continue daily low dose steroids and nebs, NIV as deemed appropriate.   -Saline nebs every 8hourly for antisecretory support.

## 2023-11-15 NOTE — ASSESSMENT & PLAN NOTE
Mild obstruction on PFTs.  Wheezing on initial exam, now resolved.  Duonebs and Prednisone for 5d total

## 2023-11-15 NOTE — PROGRESS NOTES
"[facility] Medicine  Progress Note    Patient Name: Sandee Evans  MRN: [id number]  Patient Class: IP- Inpatient   Admission Date: 11/13/2023  Length of Stay: 2 days  Attending Physician: Luz Ware, *  Primary Care Provider: Kuldeep Miller MD        Subjective:     Principal Problem:Acute respiratory failure with hypoxia and hypercapnia        HPI:   67 y.o. female, with a past medical history of A-fib on OAC,diastolic  CHF, COPD, HLD, HTN, hypothyroidism, morbid obesity, pre-diabetes, and psychiatric problems, who presents to the ED with SOB onset 3 weeks ago. She reports SOB has gotten progressively worse over 3 weeks. Patient notes she has trouble ambulating d/t SOB. Patient notes being on an at-home BiPAP but denies daily use d/t the machine "smothering" her. Additional history provided by independent historian, relative, notes an O2 saturation in the 80's at-home PTA. Patient denies O2 at home. Patient notes an associated cough, congestion, diarrhea, rhinorrhea, sore throat, and headache.ABG in ER show hypercapnic,hypoxic  respiratory failure with PH of 7.2 and P C O2 of 72,PO 2 of 76,chest x ray show fluid overload,BNP is over 478,patient has been  started on continues Bipap and IV lasix,,admitted to ICU for close monitoring.    Overview/Hospital Course:   67 y.o. female, with a past medical history of A-fib on OAC,diastolic  CHF, COPD, HLD, HTN, hypothyroidism, morbid obesity, pre-diabetes, and psychiatric problems, who presents to the ED with SOB onset 3 weeks ago. She reports SOB has gotten progressively worse over 3 weeks. Patient notes she has trouble ambulating d/t SOB. Patient notes being on an at-home BiPAP but denies daily use d/t the machine "smothering" her. \ relative, notes an O2 saturation in the 80's at-home PTA. Patient denies O2 at home. .ABG in ER show hypercapnic,hypoxic  respiratory failure with PH of 7.2 and P C O2 of 72,PO 2 of 76,chest x ray " show fluid overload,BNP is over 478,patient has been  started on continues Bipap and IV lasix,,admitted to ICU for close monitoring.  VBG  show improvement in CO2 retention,consulted pulmonology.on HFO during day and nightly bipap.  Was on precedex drip for agitation.improved  off precedex,  Again was less complaint with bipap again,consulted again,communicated with nursing staff to fit better  mask,  Consulted PT,OT     Past Medical History:   Diagnosis Date    Anticoagulant long-term use     Xarelto    Arthritis     Atrial fibrillation     Breast cyst     CHF (congestive heart failure)     Degenerative disc disease     Edema     HLD (hyperlipidemia)     Hx of psychiatric care     Hyperlipidemia     Hypertension     Hypothyroidism     Nuclear sclerosis, bilateral 12/18/2017    Obesity, morbid     HIEU (obstructive sleep apnea)     Other abnormal glucose     pre-diabetes    Pre-diabetes     Psychiatric problem     Requires assistance with activities of daily living (ADL)     Sleep apnea     Smoker     SOB (shortness of breath)     SOB (shortness of breath)     Thyroid disease     on meds 8-9 years ago. hypothyroidism.no malignancy    TMJ (temporomandibular joint disorder)     jaw clicking    Tobacco abuse     Unsteady gait     Urge incontinence     Weakness generalized     Wears glasses        Past Surgical History:   Procedure Laterality Date    BREAST BIOPSY Right     over 10 yrs ago/ benign    BREAST CYST EXCISION Right     BREAST LUMPECTOMY Right     over 10 yrs ago, ex bx/ benign    COLONOSCOPY N/A 6/25/2019    Procedure: COLONOSCOPY;  Surgeon: Micheline Flores MD;  Location: [facility];  Service: Endoscopy;  Laterality: N/A;  RX XARELTO ok to hold (2 days) per Dr. Naik see scan 3/20/19    ENDOSCOPIC ULTRASOUND OF UPPER GASTROINTESTINAL TRACT N/A 1/26/2021    Procedure: ULTRASOUND-ENDOSCOPIC-UPPER;  Surgeon: Stevenson Philippe MD;  Location: [facility] ENDO;  Service: Endoscopy;  Laterality: N/A;    HYSTERECTOMY       JOINT REPLACEMENT Bilateral     knees    OOPHORECTOMY      rt.knee surgery 2016      TOTAL KNEE ARTHROPLASTY Left 2/19/2019    Procedure: ARTHROPLASTY, KNEE, TOTAL;  Surgeon: Mde Hanson MD;  Location: [facility] OR;  Service: Orthopedics;  Laterality: Left;  10AM START PER  PER JAYLIN TEXT @ 8:34AM ON 2-18-19  SUPINE  HARRIS LOYA [phone] TEXTED HIM ON 1-24-19 @ 7:57AM  RN PRE OP 2-13-19--BMI--48.9    underarm gland\ Bilateral        Review of patient's allergies indicates:   Allergen Reactions    Ace inhibitors      Cough         No current facility-administered medications on file prior to encounter.     Current Outpatient Medications on File Prior to Encounter   Medication Sig    cetirizine (ZYRTEC) 10 MG tablet Take 1 tablet (10 mg total) by mouth once daily.    fluticasone propionate (FLONASE) 50 mcg/actuation nasal spray SHAKE LIQUID AND USE 2 SPRAYS(100 MCG) IN EACH NOSTRIL EVERY DAY    albuterol (PROVENTIL/VENTOLIN HFA) 90 mcg/actuation inhaler 2 puffs every 6 (six) hours as needed.    albuterol-ipratropium (DUO-NEB) 2.5 mg-0.5 mg/3 mL nebulizer solution Take 3 mLs by nebulization every 6 (six) hours as needed for Wheezing. Rescue    atorvastatin (LIPITOR) 40 MG tablet TAKE 1 TABLET(40 MG) BY MOUTH EVERY DAY    azelastine (ASTELIN) 137 mcg (0.1 %) nasal spray 1 spray (137 mcg total) by Nasal route 2 (two) times daily.    buPROPion (WELLBUTRIN XL) 150 MG TB24 tablet TAKE 1 TABLET(150 MG) BY MOUTH EVERY DAY    furosemide (LASIX) 40 MG tablet Take 1 tablet (40 mg total) by mouth once daily.    ketoconazole (NIZORAL) 2 % cream Apply topically once daily.    losartan (COZAAR) 25 MG tablet TAKE 1 TABLET(25 MG) BY MOUTH EVERY DAY    metoprolol succinate (TOPROL-XL) 100 MG 24 hr tablet Take 1 tablet (100 mg total) by mouth once daily.    sertraline (ZOLOFT) 100 MG tablet TAKE 1 TABLET(100 MG) BY MOUTH EVERY DAY    SPIRIVA RESPIMAT 2.5 mcg/actuation inhaler INHALE 2 PUFFS BY MOUTH DAILY (Patient not  taking: Reported on 10/18/2023)    triamcinolone acetonide 0.1% (KENALOG) 0.1 % ointment APPLY BETWEEN THE KNEES AND UPPER THIGHS TWICE DAILY FOR NO MORE THAN 7 TO 14 DAYS    XARELTO 20 mg Tab TAKE 1 TABLET(20 MG) BY MOUTH EVERY DAY     Family History       Problem Relation (Age of Onset)    Cancer Mother, Brother    Diabetes Mother, Brother    Hypertension Mother    No Known Problems Father, Sister, Maternal Aunt, Maternal Uncle, Paternal Aunt, Paternal Uncle, Maternal Grandmother, Maternal Grandfather, Paternal Grandmother, Paternal Grandfather, Other          Tobacco Use    Smoking status: Every Day     Current packs/day: 0.50     Average packs/day: 0.5 packs/day for 50.9 years (25.4 ttl pk-yrs)     Types: Cigarettes     Start date: 1973    Smokeless tobacco: Current   Substance and Sexual Activity    Alcohol use: No    Drug use: No    Sexual activity: Yes     Partners: Male     Review of Systems   Constitutional:  Positive for activity change and appetite change.   HENT:  Negative for congestion and dental problem.    Eyes:  Negative for discharge and itching.   Respiratory:  Positive for shortness of breath.    Cardiovascular:  Negative for chest pain and leg swelling.   Gastrointestinal:  Negative for abdominal distention and abdominal pain.   Endocrine: Negative for cold intolerance.   Genitourinary:  Negative for difficulty urinating and dyspareunia.   Musculoskeletal:  Negative for arthralgias and back pain.   Skin:  Negative for color change.   Neurological:  Positive for weakness.   Psychiatric/Behavioral:  Negative for agitation and behavioral problems.      Objective:     Vital Signs (Most Recent):  Temp: 98.2 °F (36.8 °C) (11/15/23 1001)  Pulse: 86 (11/15/23 1001)  Resp: (!) 24 (11/15/23 1001)  BP: 124/60 (11/15/23 1001)  SpO2: 95 % (11/15/23 1001) Vital Signs (24h Range):  Temp:  [97.7 °F (36.5 °C)-98.2 °F (36.8 °C)] 98.2 °F (36.8 °C)  Pulse:  [] 86  Resp:  [18-33] 24  SpO2:  [86 %-98 %] 95  %  BP: (101-186)/() 124/60     Weight: 125.2 kg (276 lb)  Body mass index is 48.89 kg/m².     Physical Exam  HENT:      Head: Normocephalic.      Right Ear: There is no impacted cerumen.      Nose: No congestion.      Mouth/Throat:      Mouth: Mucous membranes are dry.   Eyes:      Extraocular Movements: Extraocular movements intact.      Pupils: Pupils are equal, round, and reactive to light.   Cardiovascular:      Rate and Rhythm: Normal rate. Rhythm irregular.      Heart sounds: No murmur heard.  Pulmonary:      Breath sounds: Rales present.   Abdominal:      General: There is no distension.      Tenderness: There is no abdominal tenderness.   Musculoskeletal:         General: Swelling present.   Skin:     Coloration: Skin is not jaundiced.      Findings: No bruising.   Neurological:      General: No focal deficit present.      Mental Status: She is alert.   Psychiatric:         Mood and Affect: Mood normal.         Behavior: Behavior normal.              CRANIAL NERVES     CN III, IV, VI   Pupils are equal, round, and reactive to light.       Significant Labs: All pertinent labs within the past 24 hours have been reviewed.  BMP:   Recent Labs   Lab 11/15/23  0006   *      K 3.9   CL 95   CO2 36*   BUN 30*   CREATININE 1.1   CALCIUM 9.8       CBC:   Recent Labs   Lab 11/13/23  1722 11/15/23  0006   WBC 4.88 7.44   HGB 13.2 14.8   HCT 42.1 46.2    215       CMP:   Recent Labs   Lab 11/14/23  0016 11/14/23  1959 11/15/23  0006    141 142   K 4.4 3.8 3.9   CL 98 94* 95   CO2 32* 35* 36*   * 169* 143*   BUN 24* 31* 30*   CREATININE 1.2 1.2 1.1   CALCIUM 9.5 9.5 9.8   ANIONGAP 10 12 11         Significant Imaging: I have reviewed all pertinent imaging results/findings within the past 24 hours.    Assessment/Plan:      * Acute respiratory failure with hypoxia and hypercapnia  Patient with Hypercapnic and Hypoxic Respiratory failure which is Acute on chronic.  she is not on home  oxygen. Supplemental oxygen was provided and noted- Oxygen Concentration (%):  [30-45] 45    .   Signs/symptoms of respiratory failure include- tachypnea, increased work of breathing, and lethargy. Contributing diagnoses includes - COPD and Obesity Hypoventilation Labs and images were reviewed. Patient Has recent ABG, which has been reviewed. Will treat underlying causes and adjust management of respiratory failure as follows-   ER show hypercapnic,hypoxic  respiratory failure with PH of 7.2 and P C O2 of 72,PO 2 of 76,chest x ray show fluid overload,BNP is over 478,patient has been  started on continues Bipap and IV lasix,,admitted to ICU for close monitoring.    Was on precedex drip for agitation  VBG  show improvement in CO2 retention,consulted pulmonology.on HFO during day and nightly bipap.  Was on precedex drip for agitation.improved  off precedex,  Again was less complaint with bipap again,consulted again,communicated with nursing staff to fit better  mask,    Chronic obstructive pulmonary disease with acute exacerbation  Patient's COPD is with exacerbation noted by continued dyspnea and use of accessory muscles for breathing currently.  Patient is currently off COPD Pathway. Continue scheduled inhalers Steroids, Antibiotics, and Supplemental oxygen and monitor respiratory status closely.     Chronic kidney disease, stage 3a  Creatine stable for now. BMP reviewed- noted Estimated Creatinine Clearance: 61.4 mL/min (based on SCr of 1.1 mg/dL). according to latest data. Based on current GFR, CKD stage is stage 3 - GFR 30-59.  Monitor UOP and serial BMP and adjust therapy as needed. Renally dose meds. Avoid nephrotoxic medications and procedures.    Pulmonary hypertension  Will diurese.      Acute on chronic diastolic CHF (congestive heart failure)  Patient is identified as having Diastolic (HFpEF) heart failure that is Acute on chronic. CHF is currently uncontrolled due to Dyspnea not returned to baseline after  1 doses of IV diuretic. Latest ECHO performed and demonstrates- Results for orders placed during the hospital encounter of 05/20/22    Echo    Interpretation Summary  · The left ventricle is normal in size with normal systolic function.  · The estimated ejection fraction is 60%.  · Normal right ventricular size with normal right ventricular systolic function.  · The estimated PA systolic pressure is 33 mmHg.  · Atrial fibrillation observed.  . Continue Beta Blocker and Furosemide and monitor clinical status closely. Monitor on telemetry. Patient is off CHF pathway.  Monitor strict Is&Os and daily weights.  Place on fluid restriction of 1.5 L. Cardiology has not been any consulted. Continue to stress to patient importance of self efficacy and  on diet for CHF. Last BNP reviewed- and noted below   Recent Labs   Lab 11/13/23  1016   *   .    HIEU treated with BiPAP  Non complaint with home Bipap.she was consulted.      PVD (peripheral vascular disease)  On medical treatments.      Paroxysmal atrial fibrillation  Patient with Persistent (7 days or more) atrial fibrillation which is controlled currently with Beta Blocker. Patient is currently in atrial fibrillation.[id number] Score: 4. HASBLED Score: 3. Anticoagulation indicated. Anticoagulation done with Eliquis .    Pre-diabetes  Will monitor.      Severe obesity (BMI >= 40)  Body mass index is 48.01 kg/m². Morbid obesity complicates all aspects of disease management from diagnostic modalities to treatment. Weight loss encouraged and health benefits explained to patient.         Essential hypertension  Chronic, controlled. Latest blood pressure and vitals reviewed-     Temp:  [99.9 °F (37.7 °C)]   Pulse:  []   Resp:  [26-38]   BP: (137-138)/(62-92)   SpO2:  [83 %-95 %] .   Home meds for hypertension were reviewed and noted below.   Hypertension Medications               furosemide (LASIX) 40 MG tablet Take 1 tablet (40 mg total) by mouth once daily.     losartan (COZAAR) 25 MG tablet TAKE 1 TABLET(25 MG) BY MOUTH EVERY DAY    metoprolol succinate (TOPROL-XL) 100 MG 24 hr tablet Take 1 tablet (100 mg total) by mouth once daily.            While in the hospital, will manage blood pressure as follows; Continue home antihypertensive regimen    Will utilize p.r.n. blood pressure medication only if patient's blood pressure greater than 160/100 and she develops symptoms such as worsening chest pain or shortness of breath.      VTE Risk Mitigation (From admission, onward)           Ordered     rivaroxaban tablet 20 mg  With dinner         11/13/23 1633                    Discharge Planning   JACQUELINE: 11/20/2023     Code Status: Full Code   Is the patient medically ready for discharge?:     Reason for patient still in hospital (select all that apply): Patient trending condition  Discharge Plan A: Home            Critical care time spent on the evaluation and treatment of severe organ dysfunction, review of pertinent labs and imaging studies, discussions with consulting providers and discussions with patient/family:  over 45  minutes.      Luz Ware MD  Department of Hospital Medicine   [facility] - Intensive Care

## 2023-11-15 NOTE — NURSING
Nurses Note -- 4 Eyes      11/14/2023   7:00 PM      Skin assessed during: Q Shift Change      [x] No Altered Skin Integrity Present    [x]Prevention Measures Documented      [] Yes- Altered Skin Integrity Present or Discovered   [] LDA Added if Not in Epic (Describe Wound)   [] New Altered Skin Integrity was Present on Admit and Documented in LDA   [] Wound Image Taken    Wound Care Consulted? No    Attending Nurse:  Erik Corley RN/Staff Member:   CLAUDIA Samayoa

## 2023-11-15 NOTE — SUBJECTIVE & OBJECTIVE
Interval History: Pt seen on HFNC Fio2 40% talking comfortably in no acute cardiopulmonary distress. She notes some improvement with her dyspnea. However complained about the feeling of having thick mucous secretions around her mid chest to throat region. Brief episodes of altered mental status and delirium noted after being placed on Dexmedetomidine which had resolved after being taken off from it.   According to the nursing staff patient had very good response to diuretics and put out about 7L as evident on the output chart. Pt usually gets reluctant to use BiPAP at night due to discomfort with the mask. Afebrile, no other remarkable events noted at this time.        Objective:     Vital Signs (Most Recent):  Temp: 98.2 °F (36.8 °C) (11/15/23 1001)  Pulse: 86 (11/15/23 1001)  Resp: (!) 24 (11/15/23 1001)  BP: 124/60 (11/15/23 1001)  SpO2: 95 % (11/15/23 1001) Vital Signs (24h Range):  Temp:  [97.7 °F (36.5 °C)-98.2 °F (36.8 °C)] 98.2 °F (36.8 °C)  Pulse:  [] 86  Resp:  [18-33] 24  SpO2:  [86 %-98 %] 95 %  BP: (101-186)/() 124/60     Weight: 125.2 kg (276 lb)  Body mass index is 48.89 kg/m².      Intake/Output Summary (Last 24 hours) at 11/15/2023 1053  Last data filed at 11/15/2023 0905  Gross per 24 hour   Intake 641.55 ml   Output 4100 ml   Net -3458.45 ml        Physical Exam  Constitutional:       General: She is not in acute distress.     Appearance: Normal appearance. She is obese.      Comments: Pt talking comfortably in full sentences on HF NC 40%FiO2 with acceptable saturations.     HENT:      Head: Normocephalic and atraumatic.      Nose: No congestion or rhinorrhea.      Mouth/Throat:      Mouth: Mucous membranes are moist.   Eyes:      Extraocular Movements: Extraocular movements intact.   Cardiovascular:      Rate and Rhythm: Normal rate. Rhythm irregular.      Pulses: Normal pulses.      Heart sounds: No murmur heard.     No gallop.   Pulmonary:      Breath sounds: Wheezing present.       Comments: Inspiratory crackles heard on auscultation and mild expiratory wheeze louder in posterior lung fields.   Abdominal:      General: Bowel sounds are normal.      Palpations: Abdomen is soft.      Tenderness: There is no abdominal tenderness.   Musculoskeletal:         General: Normal range of motion.      Cervical back: Normal range of motion.      Right lower leg: Edema (2+ pitting edema up to the shins) present.      Left lower leg: Edema (2+ pitting edema up to the shins.) present.   Skin:     General: Skin is warm.      Capillary Refill: Capillary refill takes less than 2 seconds.   Neurological:      General: No focal deficit present.      Mental Status: She is alert. Mental status is at baseline.           Review of Systems   Constitutional:  Positive for fatigue. Negative for chills and fever.   Respiratory:  Positive for shortness of breath.    Cardiovascular:  Positive for leg swelling. Negative for chest pain.   Gastrointestinal:  Negative for abdominal pain.       Vents:  Oxygen Concentration (%): 45 (11/15/23 1001)    Lines/Drains/Airways       Drain  Duration             Female External Urinary Catheter 11/13/23 1900 1 day              Peripheral Intravenous Line  Duration                  Peripheral IV - Single Lumen 11/13/23 1725 20 G Left;Posterior Forearm 1 day         Peripheral IV - Single Lumen 11/14/23 0208 20 G Anterior;Right Forearm 1 day                    Significant Labs:    CBC/Anemia Profile:  Recent Labs   Lab 11/13/23  1722 11/15/23  0006   WBC 4.88 7.44   HGB 13.2 14.8   HCT 42.1 46.2    215   MCV 99* 99*   RDW 14.2 14.1        Chemistries:  Recent Labs   Lab 11/14/23  0016 11/14/23  1959 11/15/23  0006    141 142   K 4.4 3.8 3.9   CL 98 94* 95   CO2 32* 35* 36*   BUN 24* 31* 30*   CREATININE 1.2 1.2 1.1   CALCIUM 9.5 9.5 9.8       All pertinent labs within the past 24 hours have been reviewed.    Significant Imaging:  I have reviewed all pertinent imaging  results/findings within the past 24 hours.

## 2023-11-15 NOTE — SUBJECTIVE & OBJECTIVE
Past Medical History:   Diagnosis Date    Anticoagulant long-term use     Xarelto    Arthritis     Atrial fibrillation     Breast cyst     CHF (congestive heart failure)     Degenerative disc disease     Edema     HLD (hyperlipidemia)     Hx of psychiatric care     Hyperlipidemia     Hypertension     Hypothyroidism     Nuclear sclerosis, bilateral 12/18/2017    Obesity, morbid     HIEU (obstructive sleep apnea)     Other abnormal glucose     pre-diabetes    Pre-diabetes     Psychiatric problem     Requires assistance with activities of daily living (ADL)     Sleep apnea     Smoker     SOB (shortness of breath)     SOB (shortness of breath)     Thyroid disease     on meds 8-9 years ago. hypothyroidism.no malignancy    TMJ (temporomandibular joint disorder)     jaw clicking    Tobacco abuse     Unsteady gait     Urge incontinence     Weakness generalized     Wears glasses        Past Surgical History:   Procedure Laterality Date    BREAST BIOPSY Right     over 10 yrs ago/ benign    BREAST CYST EXCISION Right     BREAST LUMPECTOMY Right     over 10 yrs ago, ex bx/ benign    COLONOSCOPY N/A 6/25/2019    Procedure: COLONOSCOPY;  Surgeon: Micheline Flores MD;  Location: [facility] ENDO;  Service: Endoscopy;  Laterality: N/A;  RX XARELTO ok to hold (2 days) per Dr. Naik see scan 3/20/19    ENDOSCOPIC ULTRASOUND OF UPPER GASTROINTESTINAL TRACT N/A 1/26/2021    Procedure: ULTRASOUND-ENDOSCOPIC-UPPER;  Surgeon: Stevenson Philippe MD;  Location: [facility] ENDO;  Service: Endoscopy;  Laterality: N/A;    HYSTERECTOMY      JOINT REPLACEMENT Bilateral     knees    OOPHORECTOMY      rt.knee surgery 2016      TOTAL KNEE ARTHROPLASTY Left 2/19/2019    Procedure: ARTHROPLASTY, KNEE, TOTAL;  Surgeon: Med Hanson MD;  Location: [facility] OR;  Service: Orthopedics;  Laterality: Left;  10AM START PER  PER JAYLIN TEXT @ 8:34AM ON 2-18-19  SUPINE  HARRIS LOYA [phone] TEXTED HIM ON 1-24-19 @ 7:57AM  RN PRE OP 2-13-19--BMI--48.9    underarm  gland\ Bilateral        Review of patient's allergies indicates:   Allergen Reactions    Ace inhibitors      Cough         No current facility-administered medications on file prior to encounter.     Current Outpatient Medications on File Prior to Encounter   Medication Sig    cetirizine (ZYRTEC) 10 MG tablet Take 1 tablet (10 mg total) by mouth once daily.    fluticasone propionate (FLONASE) 50 mcg/actuation nasal spray SHAKE LIQUID AND USE 2 SPRAYS(100 MCG) IN EACH NOSTRIL EVERY DAY    albuterol (PROVENTIL/VENTOLIN HFA) 90 mcg/actuation inhaler 2 puffs every 6 (six) hours as needed.    albuterol-ipratropium (DUO-NEB) 2.5 mg-0.5 mg/3 mL nebulizer solution Take 3 mLs by nebulization every 6 (six) hours as needed for Wheezing. Rescue    atorvastatin (LIPITOR) 40 MG tablet TAKE 1 TABLET(40 MG) BY MOUTH EVERY DAY    azelastine (ASTELIN) 137 mcg (0.1 %) nasal spray 1 spray (137 mcg total) by Nasal route 2 (two) times daily.    buPROPion (WELLBUTRIN XL) 150 MG TB24 tablet TAKE 1 TABLET(150 MG) BY MOUTH EVERY DAY    furosemide (LASIX) 40 MG tablet Take 1 tablet (40 mg total) by mouth once daily.    ketoconazole (NIZORAL) 2 % cream Apply topically once daily.    losartan (COZAAR) 25 MG tablet TAKE 1 TABLET(25 MG) BY MOUTH EVERY DAY    metoprolol succinate (TOPROL-XL) 100 MG 24 hr tablet Take 1 tablet (100 mg total) by mouth once daily.    sertraline (ZOLOFT) 100 MG tablet TAKE 1 TABLET(100 MG) BY MOUTH EVERY DAY    SPIRIVA RESPIMAT 2.5 mcg/actuation inhaler INHALE 2 PUFFS BY MOUTH DAILY (Patient not taking: Reported on 10/18/2023)    triamcinolone acetonide 0.1% (KENALOG) 0.1 % ointment APPLY BETWEEN THE KNEES AND UPPER THIGHS TWICE DAILY FOR NO MORE THAN 7 TO 14 DAYS    XARELTO 20 mg Tab TAKE 1 TABLET(20 MG) BY MOUTH EVERY DAY     Family History       Problem Relation (Age of Onset)    Cancer Mother, Brother    Diabetes Mother, Brother    Hypertension Mother    No Known Problems Father, Sister, Maternal Aunt, Maternal  Uncle, Paternal Aunt, Paternal Uncle, Maternal Grandmother, Maternal Grandfather, Paternal Grandmother, Paternal Grandfather, Other          Tobacco Use    Smoking status: Every Day     Current packs/day: 0.50     Average packs/day: 0.5 packs/day for 50.9 years (25.4 ttl pk-yrs)     Types: Cigarettes     Start date: 1973    Smokeless tobacco: Current   Substance and Sexual Activity    Alcohol use: No    Drug use: No    Sexual activity: Yes     Partners: Male     Review of Systems   Constitutional:  Positive for activity change and appetite change.   HENT:  Negative for congestion and dental problem.    Eyes:  Negative for discharge and itching.   Respiratory:  Positive for shortness of breath.    Cardiovascular:  Negative for chest pain and leg swelling.   Gastrointestinal:  Negative for abdominal distention and abdominal pain.   Endocrine: Negative for cold intolerance.   Genitourinary:  Negative for difficulty urinating and dyspareunia.   Musculoskeletal:  Negative for arthralgias and back pain.   Skin:  Negative for color change.   Neurological:  Positive for weakness.   Psychiatric/Behavioral:  Negative for agitation and behavioral problems.      Objective:     Vital Signs (Most Recent):  Temp: 98.2 °F (36.8 °C) (11/15/23 1001)  Pulse: 86 (11/15/23 1001)  Resp: (!) 24 (11/15/23 1001)  BP: 124/60 (11/15/23 1001)  SpO2: 95 % (11/15/23 1001) Vital Signs (24h Range):  Temp:  [97.7 °F (36.5 °C)-98.2 °F (36.8 °C)] 98.2 °F (36.8 °C)  Pulse:  [] 86  Resp:  [18-33] 24  SpO2:  [86 %-98 %] 95 %  BP: (101-186)/() 124/60     Weight: 125.2 kg (276 lb)  Body mass index is 48.89 kg/m².     Physical Exam  HENT:      Head: Normocephalic.      Right Ear: There is no impacted cerumen.      Nose: No congestion.      Mouth/Throat:      Mouth: Mucous membranes are dry.   Eyes:      Extraocular Movements: Extraocular movements intact.      Pupils: Pupils are equal, round, and reactive to light.   Cardiovascular:      Rate  and Rhythm: Normal rate. Rhythm irregular.      Heart sounds: No murmur heard.  Pulmonary:      Breath sounds: Rales present.   Abdominal:      General: There is no distension.      Tenderness: There is no abdominal tenderness.   Musculoskeletal:         General: Swelling present.   Skin:     Coloration: Skin is not jaundiced.      Findings: No bruising.   Neurological:      General: No focal deficit present.      Mental Status: She is alert.   Psychiatric:         Mood and Affect: Mood normal.         Behavior: Behavior normal.              CRANIAL NERVES     CN III, IV, VI   Pupils are equal, round, and reactive to light.       Significant Labs: All pertinent labs within the past 24 hours have been reviewed.  BMP:   Recent Labs   Lab 11/15/23  0006   *      K 3.9   CL 95   CO2 36*   BUN 30*   CREATININE 1.1   CALCIUM 9.8       CBC:   Recent Labs   Lab 11/13/23  1722 11/15/23  0006   WBC 4.88 7.44   HGB 13.2 14.8   HCT 42.1 46.2    215       CMP:   Recent Labs   Lab 11/14/23  0016 11/14/23  1959 11/15/23  0006    141 142   K 4.4 3.8 3.9   CL 98 94* 95   CO2 32* 35* 36*   * 169* 143*   BUN 24* 31* 30*   CREATININE 1.2 1.2 1.1   CALCIUM 9.5 9.5 9.8   ANIONGAP 10 12 11         Significant Imaging: I have reviewed all pertinent imaging results/findings within the past 24 hours.

## 2023-11-15 NOTE — PLAN OF CARE
Pt is aaox4. Pt on tele with afib rate controlled noted. Resp even and unlabored on vapotherm. With sob noted on exertion. Pt has #20 left FA piv and #20 right FA piv both flushing with no redness edema or pain noted. Pt has purewick with good urine output noted. Pt denies complaint other than sob on exertion pt ate and tolerated meals.  at bedside. Bed low position with sr up and call light in reach

## 2023-11-15 NOTE — NURSING
[facility] [place] 4 [place]      11/15/2023       Skin assessed on: Q Shift      [x] No Pressure Injuries Present    [x]Prevention Measures Documented    [] Yes LDA  for Pressure Injury Previously documented     [] Yes New Pressure Injury Discovered   [] LDA for New Pressure Injury Added      Attending RN:  Maricruz Pedroza RN     Second RN:  Erik TAYLOR

## 2023-11-15 NOTE — ASSESSMENT & PLAN NOTE
Patient with Hypercapnic and Hypoxic Respiratory failure which is Acute on chronic.  she is not on home oxygen. Supplemental oxygen was provided and noted- Oxygen Concentration (%):  [30-45] 45    .   Signs/symptoms of respiratory failure include- tachypnea, increased work of breathing, and lethargy. Contributing diagnoses includes - COPD and Obesity Hypoventilation Labs and images were reviewed. Patient Has recent ABG, which has been reviewed. Will treat underlying causes and adjust management of respiratory failure as follows-   ER show hypercapnic,hypoxic  respiratory failure with PH of 7.2 and P C O2 of 72,PO 2 of 76,chest x ray show fluid overload,BNP is over 478,patient has been  started on continues Bipap and IV lasix,,admitted to ICU for close monitoring.    Was on precedex drip for agitation  VBG  show improvement in CO2 retention,consulted pulmonology.on HFO during day and nightly bipap.  Was on precedex drip for agitation.improved  off precedex,  Again was less complaint with bipap again,consulted again,communicated with nursing staff to fit better  mask,

## 2023-11-15 NOTE — CONSULTS
[facility] [place] [facility]  Pulmonology  Progress Note    Patient Name: Sandee Evans  MRN: [id number]  Admission Date: 11/13/2023  Hospital Length of Stay: 2 days  Code Status: Full Code  Attending Provider: Luz Ware, *  Primary Care Provider: Kuldeep Miller MD   Principal Problem: Acute respiratory failure with hypoxia and hypercapnia    Subjective:     Interval History: Pt seen on HFNC Fio2 40% talking comfortably in no acute cardiopulmonary distress. She notes some improvement with her dyspnea. However complained about the feeling of having thick mucous secretions around her mid chest to throat region. Brief episodes of altered mental status and delirium noted after being placed on Dexmedetomidine which had resolved after being taken off from it.   According to the nursing staff patient had very good response to diuretics and put out about 7L as evident on the output chart. Pt usually gets reluctant to use BiPAP at night due to discomfort with the mask. Afebrile, no other remarkable events noted at this time.        Objective:     Vital Signs (Most Recent):  Temp: 98.2 °F (36.8 °C) (11/15/23 1001)  Pulse: 86 (11/15/23 1001)  Resp: (!) 24 (11/15/23 1001)  BP: 124/60 (11/15/23 1001)  SpO2: 95 % (11/15/23 1001) Vital Signs (24h Range):  Temp:  [97.7 °F (36.5 °C)-98.2 °F (36.8 °C)] 98.2 °F (36.8 °C)  Pulse:  [] 86  Resp:  [18-33] 24  SpO2:  [86 %-98 %] 95 %  BP: (101-186)/() 124/60     Weight: 125.2 kg (276 lb)  Body mass index is 48.89 kg/m².      Intake/Output Summary (Last 24 hours) at 11/15/2023 1053  Last data filed at 11/15/2023 0905  Gross per 24 hour   Intake 641.55 ml   Output 4100 ml   Net -3458.45 ml        Physical Exam  Constitutional:       General: She is not in acute distress.     Appearance: Normal appearance. She is obese.      Comments: Pt talking comfortably in full sentences on HF NC 40%FiO2 with acceptable saturations.     HENT:      Head: Normocephalic and  atraumatic.      Nose: No congestion or rhinorrhea.      Mouth/Throat:      Mouth: Mucous membranes are moist.   Eyes:      Extraocular Movements: Extraocular movements intact.   Cardiovascular:      Rate and Rhythm: Normal rate. Rhythm irregular.      Pulses: Normal pulses.      Heart sounds: No murmur heard.     No gallop.   Pulmonary:      Breath sounds: Wheezing present.      Comments: Inspiratory crackles heard on auscultation and mild expiratory wheeze louder in posterior lung fields.   Abdominal:      General: Bowel sounds are normal.      Palpations: Abdomen is soft.      Tenderness: There is no abdominal tenderness.   Musculoskeletal:         General: Normal range of motion.      Cervical back: Normal range of motion.      Right lower leg: Edema (2+ pitting edema up to the shins) present.      Left lower leg: Edema (2+ pitting edema up to the shins.) present.   Skin:     General: Skin is warm.      Capillary Refill: Capillary refill takes less than 2 seconds.   Neurological:      General: No focal deficit present.      Mental Status: She is alert. Mental status is at baseline.           Review of Systems   Constitutional:  Positive for fatigue. Negative for chills and fever.   Respiratory:  Positive for shortness of breath.    Cardiovascular:  Positive for leg swelling. Negative for chest pain.   Gastrointestinal:  Negative for abdominal pain.       Vents:  Oxygen Concentration (%): 45 (11/15/23 1001)    Lines/Drains/Airways       Drain  Duration             Female External Urinary Catheter 11/13/23 1900 1 day              Peripheral Intravenous Line  Duration                  Peripheral IV - Single Lumen 11/13/23 1725 20 G Left;Posterior Forearm 1 day         Peripheral IV - Single Lumen 11/14/23 0208 20 G Anterior;Right Forearm 1 day                    Significant Labs:    CBC/Anemia Profile:  Recent Labs   Lab 11/13/23  1722 11/15/23  0006   WBC 4.88 7.44   HGB 13.2 14.8   HCT 42.1 46.2    215    MCV 99* 99*   RDW 14.2 14.1        Chemistries:  Recent Labs   Lab 11/14/23  0016 11/14/23 1959 11/15/23  0006    141 142   K 4.4 3.8 3.9   CL 98 94* 95   CO2 32* 35* 36*   BUN 24* 31* 30*   CREATININE 1.2 1.2 1.1   CALCIUM 9.5 9.5 9.8       All pertinent labs within the past 24 hours have been reviewed.    Significant Imaging:  I have reviewed all pertinent imaging results/findings within the past 24 hours.    ABG  Recent Labs   Lab 11/14/23  0209   PH 7.343*   PO2 33*   PCO2 66.0*   HCO3 35.9*   BE 8*     Assessment/Plan:     Pulmonary  * Acute respiratory failure with hypoxia and hypercapnia  Pt with history of COPD  initially presented with dyspnea, cough and congestion, hypoxic in the 80s with blood gases: pH of 7.26>7.34, pCO2 72>66, pO2:76 and HCO3: 33.2>35 suggestive of acute on chronic hypoxic hypercapnic respiratory failure requiring NIV, varying degrees of nebs with steroids and albuterol.   Status post BiPAP Rx and treatment above with improvement noted with hypercapnia(pCO: 72>66) and hypoxia given reduced FiO2 to maintain current saturations.   Reasons for hypoxia currently multifactorial spanning from diastolic heart failure and history of HIEU with non compliance with NIV and hypercapnia from history of COPD with recent exacerbation from a  iral infection, Obesity hypoventilation syndrome etc.   (Improvement with respiratory status noted and currently at baseline in terms of blood gases)  - Suggest to continue NIV nightly and intermittently as deemed appropriate and rotate btw HFNC during rest periods.  -Continue management of the above with effective diuresis, nebs.  - Low dose steroids in order not to exacerbate fluid overload states.   -Adjust BiPAP settings to suit respiratory mechanics and patient comfort during the night.   -Trial of low dose Quetiapine for nightly delirium and agitation.  -Place on delirium precautions.               Chronic obstructive pulmonary disease with acute  exacerbation  Pt with history of COPD  initially presented with dyspnea, cough and congestion, wheezing bilaterally hypoxic in the 80s requiring NIV.  Currently on BiPAP with Levalbuterol, budesonide and formoterol nebs with dexamethasone for steroid support, pt's work of breathing has improved with hypercapnia currently at baseline.   -Continue daily low dose steroids and nebs, NIV as deemed appropriate.   -Saline nebs every 8hourly for antisecretory support.            Cardiac/Vascular  Acute on chronic diastolic CHF (congestive heart failure)  Patient with history of HFpEF(most recent Echo-2022 with LVEF>60%) presented with dyspnea and cough, inspiratory crackles on auscultation and 2+ bilateral peripheral edema, BNP in the 400s, radiographic evidence of trace pleural effusions with obscured R cardiophrenic angle.  Currently on 40 mg Lasix IV BID with good response so far- total urinary output of about 7L in 24hours.  -Increase frequency of diuresis to every 8hourly for a few more doses.  -Ensure nightly BiPAP to aid preload reduction and also improve respiratory mechanics.   -Monitor renal function and electrolytes especially potasium on subsequent BMPs.  -Strict Is and Os.  -Daily weights.  -Fluid restriction to 1L daily.           .           Roxann Palacios MD  Pulmonology  [facility] [place] - Intensive Care

## 2023-11-15 NOTE — ASSESSMENT & PLAN NOTE
Patient with history of HFpEF(most recent Echo-2022 with LVEF>60%) presented with dyspnea and cough, inspiratory crackles on auscultation and 2+ bilateral peripheral edema, BNP in the 400s, radiographic evidence of trace pleural effusions with obscured R cardiophrenic angle.  Currently on 40 mg Lasix IV BID with good response so far- total urinary output of about 7L in 24hours.  -Increase frequency of diuresis to every 8hourly for a few more doses.  -Ensure nightly BiPAP to aid preload reduction and also improve respiratory mechanics.   -Monitor renal function and electrolytes especially potasium on subsequent BMPs.  -Strict Is and Os.  -Daily weights.  -Fluid restriction to 1L daily.           .

## 2023-11-15 NOTE — ASSESSMENT & PLAN NOTE
Pt with history of COPD  initially presented with dyspnea, cough and congestion, hypoxic in the 80s with blood gases: pH of 7.26>7.34, pCO2 72>66, pO2:76 and HCO3: 33.2>35 suggestive of acute on chronic hypoxic hypercapnic respiratory failure requiring NIV, varying degrees of nebs with steroids and albuterol.   Status post BiPAP Rx and treatment above with improvement noted with hypercapnia(pCO: 72>66) and hypoxia given reduced FiO2 to maintain current saturations.   Reasons for hypoxia currently multifactorial spanning from diastolic heart failure and history of HIEU with non compliance with NIV and hypercapnia from history of COPD with recent exacerbation from a  iral infection, Obesity hypoventilation syndrome etc.   (Improvement with respiratory status noted and currently at baseline in terms of blood gases)  - Suggest to continue NIV nightly and intermittently as deemed appropriate and rotate btw HFNC during rest periods.  -Continue management of the above with effective diuresis, nebs.  - Low dose steroids in order not to exacerbate fluid overload states.   -Adjust BiPAP settings to suit respiratory mechanics and patient comfort during the night.   -Trial of low dose Quetiapine for nightly delirium and agitation.  -Place on delirium precautions.

## 2023-11-16 ENCOUNTER — TELEPHONE (OUTPATIENT)
Dept: UROLOGY | Facility: CLINIC | Age: 68
End: 2023-11-16
Payer: COMMERCIAL

## 2023-11-16 PROBLEM — Z71.89 GOALS OF CARE, COUNSELING/DISCUSSION: Status: ACTIVE | Noted: 2019-06-25

## 2023-11-16 LAB
ALLENS TEST: ABNORMAL
ANION GAP SERPL CALC-SCNC: 10 MMOL/L (ref 8–16)
BUN SERPL-MCNC: 34 MG/DL (ref 8–23)
CALCIUM SERPL-MCNC: 9.5 MG/DL (ref 8.7–10.5)
CHLORIDE SERPL-SCNC: 92 MMOL/L (ref 95–110)
CO2 SERPL-SCNC: 37 MMOL/L (ref 23–29)
CREAT SERPL-MCNC: 1.1 MG/DL (ref 0.5–1.4)
DELSYS: ABNORMAL
EST. GFR  (NO RACE VARIABLE): 54 ML/MIN/1.73 M^2
FIO2: 45
FLOW: 35
GLUCOSE SERPL-MCNC: 95 MG/DL (ref 70–110)
HCO3 UR-SCNC: 42.3 MMOL/L (ref 24–28)
MODE: ABNORMAL
PCO2 BLDA: 59.6 MMHG (ref 35–45)
PH SMN: 7.46 [PH] (ref 7.35–7.45)
PO2 BLDA: 76 MMHG (ref 80–100)
POC BE: 15 MMOL/L
POC SATURATED O2: 95 % (ref 95–100)
POC TCO2: 44 MMOL/L (ref 23–27)
POTASSIUM SERPL-SCNC: 4.1 MMOL/L (ref 3.5–5.1)
SAMPLE: ABNORMAL
SITE: ABNORMAL
SODIUM SERPL-SCNC: 139 MMOL/L (ref 136–145)
SP02: 97

## 2023-11-16 PROCEDURE — 94761 N-INVAS EAR/PLS OXIMETRY MLT: CPT | Mod: XB

## 2023-11-16 PROCEDURE — 27100171 HC OXYGEN HIGH FLOW UP TO 24 HOURS

## 2023-11-16 PROCEDURE — 36415 COLL VENOUS BLD VENIPUNCTURE: CPT | Performed by: HOSPITALIST

## 2023-11-16 PROCEDURE — 99497 PR ADVNCD CARE PLAN 30 MIN: ICD-10-PCS | Mod: 25,,, | Performed by: REGISTERED NURSE

## 2023-11-16 PROCEDURE — 25000003 PHARM REV CODE 250: Performed by: HOSPITALIST

## 2023-11-16 PROCEDURE — 27000207 HC ISOLATION

## 2023-11-16 PROCEDURE — 99497 ADVNCD CARE PLAN 30 MIN: CPT | Mod: 25,,, | Performed by: REGISTERED NURSE

## 2023-11-16 PROCEDURE — 80048 BASIC METABOLIC PNL TOTAL CA: CPT | Performed by: HOSPITALIST

## 2023-11-16 PROCEDURE — 99291 PR CRITICAL CARE, E/M 30-74 MINUTES: ICD-10-PCS | Mod: ,,, | Performed by: STUDENT IN AN ORGANIZED HEALTH CARE EDUCATION/TRAINING PROGRAM

## 2023-11-16 PROCEDURE — 27000221 HC OXYGEN, UP TO 24 HOURS

## 2023-11-16 PROCEDURE — 94640 AIRWAY INHALATION TREATMENT: CPT

## 2023-11-16 PROCEDURE — 99291 CRITICAL CARE FIRST HOUR: CPT | Mod: ,,, | Performed by: STUDENT IN AN ORGANIZED HEALTH CARE EDUCATION/TRAINING PROGRAM

## 2023-11-16 PROCEDURE — 25000242 PHARM REV CODE 250 ALT 637 W/ HCPCS

## 2023-11-16 PROCEDURE — 97165 OT EVAL LOW COMPLEX 30 MIN: CPT

## 2023-11-16 PROCEDURE — 20000000 HC ICU ROOM

## 2023-11-16 PROCEDURE — 99223 PR INITIAL HOSPITAL CARE,LEVL III: ICD-10-PCS | Mod: ,,, | Performed by: REGISTERED NURSE

## 2023-11-16 PROCEDURE — 82803 BLOOD GASES ANY COMBINATION: CPT

## 2023-11-16 PROCEDURE — 94660 CPAP INITIATION&MGMT: CPT

## 2023-11-16 PROCEDURE — 25000003 PHARM REV CODE 250

## 2023-11-16 PROCEDURE — 94799 UNLISTED PULMONARY SVC/PX: CPT

## 2023-11-16 PROCEDURE — 99900035 HC TECH TIME PER 15 MIN (STAT)

## 2023-11-16 PROCEDURE — 97161 PT EVAL LOW COMPLEX 20 MIN: CPT

## 2023-11-16 PROCEDURE — 25000242 PHARM REV CODE 250 ALT 637 W/ HCPCS: Performed by: NURSE PRACTITIONER

## 2023-11-16 PROCEDURE — 99223 1ST HOSP IP/OBS HIGH 75: CPT | Mod: ,,, | Performed by: REGISTERED NURSE

## 2023-11-16 PROCEDURE — 36600 WITHDRAWAL OF ARTERIAL BLOOD: CPT

## 2023-11-16 PROCEDURE — 97110 THERAPEUTIC EXERCISES: CPT

## 2023-11-16 PROCEDURE — 63600175 PHARM REV CODE 636 W HCPCS

## 2023-11-16 RX ADMIN — SODIUM CHLORIDE SOLN NEBU 3% 4 ML: 3 NEBU SOLN at 03:11

## 2023-11-16 RX ADMIN — RIVAROXABAN 20 MG: 20 TABLET, FILM COATED ORAL at 04:11

## 2023-11-16 RX ADMIN — MUPIROCIN: 20 OINTMENT TOPICAL at 09:11

## 2023-11-16 RX ADMIN — METOPROLOL SUCCINATE 100 MG: 50 TABLET, EXTENDED RELEASE ORAL at 08:11

## 2023-11-16 RX ADMIN — IPRATROPIUM BROMIDE AND ALBUTEROL SULFATE 3 ML: 2.5; .5 SOLUTION RESPIRATORY (INHALATION) at 11:11

## 2023-11-16 RX ADMIN — FUROSEMIDE 40 MG: 10 INJECTION, SOLUTION INTRAVENOUS at 02:11

## 2023-11-16 RX ADMIN — SODIUM CHLORIDE SOLN NEBU 3% 4 ML: 3 NEBU SOLN at 10:11

## 2023-11-16 RX ADMIN — FAMOTIDINE 20 MG: 20 TABLET ORAL at 08:11

## 2023-11-16 RX ADMIN — SERTRALINE HYDROCHLORIDE 100 MG: 50 TABLET ORAL at 08:11

## 2023-11-16 RX ADMIN — FUROSEMIDE 40 MG: 10 INJECTION, SOLUTION INTRAVENOUS at 09:11

## 2023-11-16 RX ADMIN — FAMOTIDINE 20 MG: 20 TABLET ORAL at 09:11

## 2023-11-16 RX ADMIN — IPRATROPIUM BROMIDE AND ALBUTEROL SULFATE 3 ML: 2.5; .5 SOLUTION RESPIRATORY (INHALATION) at 08:11

## 2023-11-16 RX ADMIN — IPRATROPIUM BROMIDE AND ALBUTEROL SULFATE 3 ML: 2.5; .5 SOLUTION RESPIRATORY (INHALATION) at 03:11

## 2023-11-16 RX ADMIN — QUETIAPINE FUMARATE 25 MG: 25 TABLET ORAL at 09:11

## 2023-11-16 RX ADMIN — FUROSEMIDE 40 MG: 10 INJECTION, SOLUTION INTRAVENOUS at 05:11

## 2023-11-16 RX ADMIN — IPRATROPIUM BROMIDE AND ALBUTEROL SULFATE 3 ML: 2.5; .5 SOLUTION RESPIRATORY (INHALATION) at 12:11

## 2023-11-16 RX ADMIN — SODIUM CHLORIDE SOLN NEBU 3% 4 ML: 3 NEBU SOLN at 12:11

## 2023-11-16 RX ADMIN — PREDNISONE 40 MG: 20 TABLET ORAL at 08:11

## 2023-11-16 RX ADMIN — MUPIROCIN: 20 OINTMENT TOPICAL at 08:11

## 2023-11-16 RX ADMIN — SODIUM CHLORIDE SOLN NEBU 3% 4 ML: 3 NEBU SOLN at 08:11

## 2023-11-16 RX ADMIN — IPRATROPIUM BROMIDE AND ALBUTEROL SULFATE 3 ML: 2.5; .5 SOLUTION RESPIRATORY (INHALATION) at 10:11

## 2023-11-16 RX ADMIN — ATORVASTATIN CALCIUM 40 MG: 40 TABLET, FILM COATED ORAL at 08:11

## 2023-11-16 RX ADMIN — IPRATROPIUM BROMIDE AND ALBUTEROL SULFATE 3 ML: 2.5; .5 SOLUTION RESPIRATORY (INHALATION) at 04:11

## 2023-11-16 NOTE — ASSESSMENT & PLAN NOTE
- EF 60%; also with Afib and Pulm HTN   - discussed trajectory of life-limiting condition   - noted; continued management per hospital primary

## 2023-11-16 NOTE — ASSESSMENT & PLAN NOTE
Complicates every aspect of patient care and places her at greater risk for complications related to her other chronic illnesses.  Is imperative that patient lose weight.

## 2023-11-16 NOTE — PLAN OF CARE
Problem: Occupational Therapy  Goal: Occupational Therapy Goal  Description: Goals to be met by: 11/30/23     Patient will increase functional independence with ADLs by performing:    UE Dressing with Modified [place].  LE Dressing with Set-up Assistance and Supervision.  Grooming while standing at sink with Modified [place] and Supervision and Assistive Devices as needed.  Toileting from toilet with Stand-by Assistance for hygiene and clothing management.   Sitting at edge of bed x30 minutes with Modified [place].  Rolling to Bilateral with Modified [place].   Supine to sit with Modified [place] and Supervision.  Step transfer with Modified [place] and Supervision  Toilet transfer to toilet with Modified [place] and Supervision.  Upper extremity exercise program x15 reps per handout, with independence.    Outcome: Ongoing, Progressing   The patient will benefit from Low Intensity OT upon D/C.

## 2023-11-16 NOTE — PROGRESS NOTES
[facility] Intensive Care  Pulmonology  Progress Note    Patient Name: Sandee Evans  MRN: [id number]  Admission Date: 11/13/2023  Hospital Length of Stay: 3 days  Code Status: Full Code  Attending Provider: Luz Ware, *  Primary Care Provider: Kuldeep Miller MD   Principal Problem: Acute respiratory failure with hypoxia and hypercapnia    Subjective:     Interval History: Feeling better today. Diuresing well    Objective:     Vital Signs (Most Recent):  Temp: 97.9 °F (36.6 °C) (11/16/23 1130)  Pulse: 96 (11/16/23 1400)  Resp: (!) 25 (11/16/23 1400)  BP: 118/76 (11/16/23 1400)  SpO2: 96 % (11/16/23 1400) Vital Signs (24h Range):  Temp:  [97.7 °F (36.5 °C)-98.3 °F (36.8 °C)] 97.9 °F (36.6 °C)  Pulse:  [] 96  Resp:  [17-46] 25  SpO2:  [83 %-99 %] 96 %  BP: ()/(6-89) 118/76     Weight: 125.2 kg (276 lb)  Body mass index is 48.89 kg/m².      Intake/Output Summary (Last 24 hours) at 11/16/2023 1419  Last data filed at 11/16/2023 0657  Gross per 24 hour   Intake 240 ml   Output 3451 ml   Net -3211 ml        Physical Exam  Vitals and nursing note reviewed.   Constitutional:       General: She is not in acute distress.     Appearance: She is obese. She is not toxic-appearing or diaphoretic.   HENT:      Head: Normocephalic and atraumatic.   Eyes:      General: No scleral icterus.     Extraocular Movements: Extraocular movements intact.   Cardiovascular:      Rate and Rhythm: Normal rate.   Pulmonary:      Effort: No tachypnea, accessory muscle usage, prolonged expiration, respiratory distress or retractions.   Abdominal:      General: There is no distension.   Musculoskeletal:      Right lower leg: Edema present.      Left lower leg: Edema present.   Skin:     General: Skin is warm and dry.      Coloration: Skin is not jaundiced.      Findings: No rash.   Neurological:      General: No focal deficit present.      Mental Status: Mental status is at baseline.               Vents:  Oxygen  Concentration (%): 45 (11/16/23 1200)    Lines/Drains/Airways       Drain  Duration             Female External Urinary Catheter 11/13/23 1900 2 days              Peripheral Intravenous Line  Duration                  Peripheral IV - Single Lumen 11/13/23 1725 20 G Left;Posterior Forearm 2 days         Peripheral IV - Single Lumen 11/14/23 0208 20 G Anterior;Right Forearm 2 days                    Significant Labs:    CBC/Anemia Profile:  Recent Labs   Lab 11/15/23  0006   WBC 7.44   HGB 14.8   HCT 46.2      MCV 99*   RDW 14.1        Chemistries:  Recent Labs   Lab 11/14/23 1959 11/15/23  0006 11/16/23  0259    142 139   K 3.8 3.9 4.1   CL 94* 95 92*   CO2 35* 36* 37*   BUN 31* 30* 34*   CREATININE 1.2 1.1 1.1   CALCIUM 9.5 9.8 9.5       All pertinent labs within the past 24 hours have been reviewed.    Significant Imaging:  I have reviewed all pertinent imaging results/findings within the past 24 hours.    ABG  Recent Labs   Lab 11/16/23  0939   PH 7.459*   PO2 76*   PCO2 59.6*   HCO3 42.3*   BE 15*     Assessment/Plan:     Pulmonary  * Acute respiratory failure with hypoxia and hypercapnia  Former smoker.  Chronic hypercapnic respiratory failure not using nightly NIV.  Chronic diastolic heart failure with significant Na indiscretion.  RSV positive and on droplet precaution.    Chronic obstructive pulmonary disease with acute exacerbation  Mild obstruction on PFTs.  Wheezing on initial exam, now resolved.  Duonebs and Prednisone for 5d total    Cardiac/Vascular  Pulmonary hypertension  Mild COPD on PFTs.  Almost certainly Group II from chronic diastolic heart failure    Acute on chronic diastolic CHF (congestive heart failure)  Longstanding history of diastolic heart failure.  Recent echo with PASP 49, CRISTI and mild RV dysfunction.  BNP > 400.  Chest imaging with bilateral effusions and peripheral edema.  Significant Na indiscretion.  Diuresing well.  Continue 40 mg IV TID and maintain strict I/O.       Endocrine  Severe obesity (BMI >= 40)  Complicates every aspect of patient care and places her at greater risk for complications related to her other chronic illnesses.  Is imperative that patient lose weight.    Other  HIEU treated with BiPAP  AHI 90.  Chronic hypercapnia.  NIV nightly while here.      Critical Care Time: 45 minutes  Critical care was time spent personally by me on the following activities: evaluating this patient's organ dysfunction, development of treatment plan, discussing treatment plan with patient or surrogate and bedside caregivers, discussions with consultants, evaluation of patient's response to treatment, examination of patient, ordering and performing treatments and interventions, ordering and review of laboratory studies, ordering and review of radiographic studies, re-evaluation of patient's condition. This critical care time did not overlap with that of any other provider or involve time for any procedures.       Antonio Zelaya MD  Pulmonology  [facility] - Intensive Care

## 2023-11-16 NOTE — NURSING
[facility]  Nurses Note -- 4 Eyes      11/15/2023       Skin assessed on: Q Shift      [x] No Pressure Injuries Present    [x]Prevention Measures Documented    [] Yes LDA  for Pressure Injury Previously documented     [] Yes New Pressure Injury Discovered   [] LDA for New Pressure Injury Added      Attending RN:  Roxane Doyle RN     Second RN: CLAUDIA Alcantara

## 2023-11-16 NOTE — NURSING
[facility] Intensive Care  ICU Shift Summary  Date: 11/15/2023      Prehospitalization: Home  Admit Date / LOS : 11/13/2023/ 2 days    Diagnosis: Acute respiratory failure with hypoxia and hypercapnia    Consults:        Active: OT, PT, and Pulm CC       Needed: N/A     Code Status: Full Code   Advanced Directive: <no information>    LDA:  Lines/Drains/Airways       Drain  Duration             Female External Urinary Catheter 11/13/23 1900 1 day              Peripheral Intravenous Line  Duration                  Peripheral IV - Single Lumen 11/13/23 1725 20 G Left;Posterior Forearm 2 days         Peripheral IV - Single Lumen 11/14/23 0208 20 G Anterior;Right Forearm 1 day                  Central Lines/Site/Justification:Patient Does Not Have Central Line  Urinary Cath/Order/Justification:Patient Does Not Have Urinary Catheter    Vasopressors/Infusions:        GOALS: Volume/ Hemodynamic: SBP < 160                     RASS: 0  alert and calm    Pain Management: none       Pain Controlled: yes     Rhythm: A-Fib    Respiratory Device: Vapotherm and Bipap    Oxygen Concentration (%):  [30-45] 45                 Most Recent SBT/ SAT: N/A       MOVE Screen: PT Consult  ICU Liberation: not applicable    VTE Prophylaxis: Mechanical  Mobility: Bedrest  Stress Ulcer Prophylaxis: No    Isolation: No active isolations    Dietary:   Current Diet Order   Procedures    Diet Cardiac      Tolerance: yes  Advancement: @ goal    I & O (24h):    Intake/Output Summary (Last 24 hours) at 11/15/2023 1827  Last data filed at 11/15/2023 1700  Gross per 24 hour   Intake 1823.72 ml   Output 3701 ml   Net -1877.28 ml        Restraints: No    Significant Dates:  Post Op Date: N/A  Rescue Date: N/A  Imaging/ Diagnostics: N/A    Noteworthy Labs:  see below     COVID Test: (--)  CBC/Anemia Labs: Coags:    Recent Labs   Lab 11/13/23  1722 11/15/23  0006   WBC 4.88 7.44   HGB 13.2 14.8   HCT 42.1 46.2    215   MCV 99* 99*   RDW 14.2 14.1     "Recent Labs   Lab 11/13/23  1016   INR 1.0        Chemistries:   Recent Labs   Lab 11/13/23  1016 11/14/23  0016 11/14/23  1959 11/15/23  0006      < > 141 142   K 4.3   < > 3.8 3.9      < > 94* 95   CO2 22*   < > 35* 36*   BUN 23   < > 31* 30*   CREATININE 1.1   < > 1.2 1.1   CALCIUM 9.0   < > 9.5 9.8   PROT 6.7  --   --   --    BILITOT 0.6  --   --   --    ALKPHOS 93  --   --   --    ALT 27  --   --   --    AST 27  --   --   --     < > = values in this interval not displayed.        Cardiac Enzymes: Ejection Fractions:    Recent Labs     11/13/23  1016   TROPONINI <0.006    EF   Date Value Ref Range Status   05/21/2022 60 % Final        POCT Glucose: HbA1c:    No results for input(s): "POCTGLUCOSE" in the last 168 hours. Hemoglobin A1C   Date Value Ref Range Status   10/27/2023 5.8 (H) 4.0 - 5.6 % Final     Comment:     ADA Screening Guidelines:  5.7-6.4%  Consistent with prediabetes  >or=6.5%  Consistent with diabetes    High levels of fetal hemoglobin interfere with the HbA1C  assay. Heterozygous hemoglobin variants (HbS, HgC, etc)do  not significantly interfere with this assay.   However, presence of multiple variants may affect accuracy.             ICU LOS 2d  Level of Care: Critical Care    Chart Check: 12 HR Done  Shift Summary/Plan for the shift: see poc    "

## 2023-11-16 NOTE — PLAN OF CARE
Pt remains in ICU on BIPAP Qhs, tolerated well. AAO x4. Purewick in place. POC updated with the patient.   Problem: Adult Inpatient Plan of Care  Goal: Plan of Care Review  Outcome: Ongoing, Progressing  Goal: Patient-Specific Goal (Individualized)  Outcome: Ongoing, Progressing  Goal: Absence of Hospital-Acquired Illness or Injury  Outcome: Ongoing, Progressing  Goal: Optimal Comfort and Wellbeing  Outcome: Ongoing, Progressing  Goal: Readiness for Transition of Care  Outcome: Ongoing, Progressing     Problem: Bariatric Environmental Safety  Goal: Safety Maintained with Care  Outcome: Ongoing, Progressing     Problem: Diabetes Comorbidity  Goal: Blood Glucose Level Within Targeted Range  Outcome: Ongoing, Progressing     Problem: Fall Injury Risk  Goal: Absence of Fall and Fall-Related Injury  Outcome: Ongoing, Progressing     Problem: Skin Injury Risk Increased  Goal: Skin Health and Integrity  Outcome: Ongoing, Progressing

## 2023-11-16 NOTE — CONSULTS
"[facility] - Intensive Care  Palliative Medicine  Consult Note    Patient Name: Sandee Evans  MRN: [id number]  Admission Date: 11/13/2023  Hospital Length of Stay: 3 days  Code Status: Full Code   Attending Provider: Luz Ware, *  Consulting Provider: Maren Pike NP  Primary Care Physician: Kuldeep Miller MD  Principal Problem:Acute respiratory failure with hypoxia and hypercapnia    Patient information was obtained from patient, spouse/SO, and primary team.      Inpatient consult to Palliative Care  Consult performed by: Maren Pike NP  Consult ordered by: Luz Ware MD  Reason for consult: goals of care and advanced care planning        Assessment/Plan:   Advance Care Planning     Pulmonary  Chronic obstructive pulmonary disease with acute exacerbation  - not on O2 PTA   - discussed trajectory of life-limiting condition   - continued management per hospital primary     Cardiac/Vascular  Pulmonary hypertension  - chronic history noted; likely to additionally complicate respiratory status in the future in pt also with CHF and COPD  - continued management per hospital primary     Acute on chronic diastolic CHF (congestive heart failure)  - EF 60%; also with Afib and Pulm HTN   - discussed trajectory of life-limiting condition   - noted; continued management per hospital primary     Paroxysmal atrial fibrillation  - persistent Afib since admission requiring ICU care   - noted; continued management per hospital primary     Endocrine  Severe obesity (BMI >= 40)  - pt expressed motivation to "do the things she needs to do" to better her health; shares this admission was very eye opening to aspects of her health she has been neglecting   - encouragement provided     Palliative Care  Goals of care, counseling/discussion  - consult received; interval chart reviewed in detail; discussed pt with hospital primary, Dr. Harris, and later with MDT during ICU rounds   - met with patient " at bedside and her  Corwin; introduction to palliative medicine team and role in current care and admission   - learned more about pt outside of current admission; she lives with her  Corwin and they have 2 children Marce and Sukhwinder; she typically is able to perform all ADLs independently; does not use assistive devices in her home, but uses a cane for walking distances   - GOC/ACP discussion; introduced topics of ACP discussion with plan for further discussion on 11/16 after she has had some time to think about things more   - Corwin or her daughter Marce is her preferred MPOA; she feels Marce may know her wishes a little more   - GOC is to improve her health with life style changes and to avoid hospital; expressed importance of quality of life and that she wants to live and do everything she can to make that happen, but if her time came she would be at peace as well; expressed concerns for her health placing burdens on her family and her GOC somewhat being affected by that which we plan to review further   - emotional support provided   - Allowed time for questions/concerns; all addressed; expressed availability of myself/palliative team for additional questions/concerns     Other  HIEU treated with BiPAP  - pt admits to lack of compliance with BiPap at home; did wear it last night in ICU   - discussion of trajectory of HIEU in setting of CHF and risk of non-compliance with BiPap; discussed GOC   - noted; continued management per hospital primary     Thank you for your consult. I will follow-up with patient. Please contact us if you have any additional questions.    Total visit time: 86 minutes    70 min visit time including: face to face time in discussion of symptom assessment, and exploring options and burdens of offered treatments.  This also includes non-face to face time preparing to see the patient including chart review, obtaining and/or reviewing separately obtained history,  "documenting clinical information in the electronic or other health record, independently interpreting results and communicating results to the patient/family/caregiver, family discussions by phone if not able to be present, coordination of care with other specialists, and discharge planning.     16 min ACP time spent: goals of care, advanced care planning, emotional support, formulating and communicating prognosis, exploring burden/ benefit of various approaches of treatment.     Maren Pike NP  Palliative Medicine  [facility]    Subjective:     HPI:   From H&P: " 67 y.o. female, with a past medical history of A-fib on OAC,diastolic  CHF, COPD, HLD, HTN, hypothyroidism, morbid obesity, pre-diabetes, and psychiatric problems, who presents to the ED with SOB onset 3 weeks ago. She reports SOB has gotten progressively worse over 3 weeks. Patient notes she has trouble ambulating d/t SOB. Patient notes being on an at-home BiPAP but denies daily use d/t the machine "smothering" her. Additional history provided by independent historian, relative, notes an O2 saturation in the 80's at-home PTA. Patient denies O2 at home. Patient notes an associated cough, congestion, diarrhea, rhinorrhea, sore throat, and headache.ABG in ER show hypercapnic,hypoxic  respiratory failure with PH of 7.2 and P C O2 of 72,PO 2 of 76,chest x ray show fluid overload,BNP is over 478,patient has been  started on continues Bipap and IV lasix,,admitted to ICU for close monitoring."     Palliative medicine consulted for goals of care discussion and advance care planning; for details of visit, see advance care planning section of plan.       Hospital Course:  No notes on file    Past Medical History:   Diagnosis Date    Anticoagulant long-term use     Xarelto    Arthritis     Atrial fibrillation     Breast cyst     CHF (congestive heart failure)     Degenerative disc disease     Edema     HLD (hyperlipidemia)     Hx of " psychiatric care     Hyperlipidemia     Hypertension     Hypothyroidism     Nuclear sclerosis, bilateral 12/18/2017    Obesity, morbid     HIEU (obstructive sleep apnea)     Other abnormal glucose     pre-diabetes    Pre-diabetes     Psychiatric problem     Requires assistance with activities of daily living (ADL)     Sleep apnea     Smoker     SOB (shortness of breath)     SOB (shortness of breath)     Thyroid disease     on meds 8-9 years ago. hypothyroidism.no malignancy    TMJ (temporomandibular joint disorder)     jaw clicking    Tobacco abuse     Unsteady gait     Urge incontinence     Weakness generalized     Wears glasses      Past Surgical History:   Procedure Laterality Date    BREAST BIOPSY Right     over 10 yrs ago/ benign    BREAST CYST EXCISION Right     BREAST LUMPECTOMY Right     over 10 yrs ago, ex bx/ benign    COLONOSCOPY N/A 6/25/2019    Procedure: COLONOSCOPY;  Surgeon: Micheline Flores MD;  Location: [facility] ENDO;  Service: Endoscopy;  Laterality: N/A;  RX XARELTO ok to hold (2 days) per Dr. Naik see scan 3/20/19    ENDOSCOPIC ULTRASOUND OF UPPER GASTROINTESTINAL TRACT N/A 1/26/2021    Procedure: ULTRASOUND-ENDOSCOPIC-UPPER;  Surgeon: Stevenson Philippe MD;  Location: [facility] ENDO;  Service: Endoscopy;  Laterality: N/A;    HYSTERECTOMY      JOINT REPLACEMENT Bilateral     knees    OOPHORECTOMY      rt.knee surgery 2016      TOTAL KNEE ARTHROPLASTY Left 2/19/2019    Procedure: ARTHROPLASTY, KNEE, TOTAL;  Surgeon: Med Hanson MD;  Location: [facility] OR;  Service: Orthopedics;  Laterality: Left;  10AM START PER  PER JAYLIN TEXT @ 8:34AM ON 2-18-19  SUPINE  HARRIS LOYA [phone] TEXTED HIM ON 1-24-19 @ 7:57AM  RN PRE OP 2-13-19--BMI--48.9    underarm gland\ Bilateral      Review of patient's allergies indicates:   Allergen Reactions    Ace inhibitors      Cough       Medications:  Continuous Infusions:  Scheduled Meds:   albuterol-ipratropium  3 mL Nebulization Q4H    atorvastatin  40 mg Oral  Daily    famotidine  20 mg Oral BID    furosemide (LASIX) injection  40 mg Intravenous Q8H    metoprolol succinate  100 mg Oral Daily    mupirocin   Nasal BID    predniSONE  40 mg Oral Daily    QUEtiapine  25 mg Oral QHS    rivaroxaban  20 mg Oral Daily with dinner    sertraline  100 mg Oral Daily    sodium chloride 3%  4 mL Nebulization Q8H     PRN Meds:benzonatate, dextromethorphan-guaiFENesin  mg/5 ml, hydrALAZINE, hydrOXYzine pamoate    Family History       Problem Relation (Age of Onset)    Cancer Mother, Brother    Diabetes Mother, Brother    Hypertension Mother    No Known Problems Father, Sister, Maternal Aunt, Maternal Uncle, Paternal Aunt, Paternal Uncle, Maternal Grandmother, Maternal Grandfather, Paternal Grandmother, Paternal Grandfather, Other          Tobacco Use    Smoking status: Every Day     Current packs/day: 0.50     Average packs/day: 0.5 packs/day for 50.9 years (25.4 ttl pk-yrs)     Types: Cigarettes     Start date: 1973    Smokeless tobacco: Current   Substance and Sexual Activity    Alcohol use: No    Drug use: No    Sexual activity: Yes     Partners: Male     Review of Systems   Constitutional:  Positive for activity change and fatigue.   Respiratory:  Positive for cough and shortness of breath.    Gastrointestinal:  Negative for nausea and vomiting.   Musculoskeletal:  Positive for gait problem.   Neurological:  Positive for weakness.     Objective:     Vital Signs (Most Recent):  Temp: 97.9 °F (36.6 °C) (11/16/23 1130)  Pulse: 90 (11/16/23 1130)  Resp: (!) 24 (11/16/23 1130)  BP: 121/71 (11/16/23 1100)  SpO2: 95 % (11/16/23 1130) Vital Signs (24h Range):  Temp:  [97.7 °F (36.5 °C)-98.3 °F (36.8 °C)] 97.9 °F (36.6 °C)  Pulse:  [] 90  Resp:  [17-46] 24  SpO2:  [83 %-99 %] 95 %  BP: ()/(6-89) 121/71     Weight: 125.2 kg (276 lb)  Body mass index is 48.89 kg/m².       Physical Exam  Vitals and nursing note reviewed.   Constitutional:       General: She is not in acute  distress.     Appearance: She is obese. She is ill-appearing.      Interventions: Nasal cannula in place.   HENT:      Head: Normocephalic and atraumatic.   Musculoskeletal:      Right lower leg: Edema present.      Left lower leg: Edema present.   Neurological:      Mental Status: She is alert and oriented to person, place, and time.          Advance Care Planning  Advance Directives:   Living Will: No    LaPOST: No    Do Not Resuscitate Status: No    Medical Power of : No (Spouse Corwin is legal surrogate decision maker)    Goals of Care: The patient endorses that what is most important right now is to focus on avoiding the hospital, remaining as independent as possible, and quality of life, even if it means sacrificing a little time    Accordingly, we have decided that the best plan to meet the patient's goals includes continuing with treatment     Significant Labs: All pertinent labs within the past 24 hours have been reviewed.  CBC:   Recent Labs   Lab 11/15/23  0006   WBC 7.44   HGB 14.8   HCT 46.2   MCV 99*        BMP:  Recent Labs   Lab 11/16/23  0259   GLU 95      K 4.1   CL 92*   CO2 37*   BUN 34*   CREATININE 1.1   CALCIUM 9.5     LFT:  Lab Results   Component Value Date    AST 27 11/13/2023    ALKPHOS 93 11/13/2023    BILITOT 0.6 11/13/2023     Albumin:   Albumin   Date Value Ref Range Status   11/13/2023 3.0 (L) 3.5 - 5.2 g/dL Final     Protein:   Total Protein   Date Value Ref Range Status   11/13/2023 6.7 6.0 - 8.4 g/dL Final     Lactic acid:   Lab Results   Component Value Date    LACTATE 1.2 05/20/2022     Significant Imaging: I have reviewed all pertinent imaging results/findings within the past 24 hours.    I spent a total of 86 minutes on the day of the visit. This includes face to face time in discussion of goals of care, symptom assessment, coordination of care and emotional support.  This also includes non-face to face time preparing to see the patient (eg, review of  tests/imaging), obtaining and/or reviewing separately obtained history, documenting clinical information in the electronic or other health record, independently interpreting results and communicating results to the patient/family/caregiver, or care coordinator.    Maren Pike NP  Palliative Medicine  [facility] - Intensive Care

## 2023-11-16 NOTE — PROGRESS NOTES
"[facility] Medicine  Progress Note    Patient Name: Sandee Evans  MRN: [id number]  Patient Class: IP- Inpatient   Admission Date: 11/13/2023  Length of Stay: 3 days  Attending Physician: Luz Ware, *  Primary Care Provider: Kuldeep Miller MD        Subjective:     Principal Problem:Acute respiratory failure with hypoxia and hypercapnia        HPI:   67 y.o. female, with a past medical history of A-fib on OAC,diastolic  CHF, COPD, HLD, HTN, hypothyroidism, morbid obesity, pre-diabetes, and psychiatric problems, who presents to the ED with SOB onset 3 weeks ago. She reports SOB has gotten progressively worse over 3 weeks. Patient notes she has trouble ambulating d/t SOB. Patient notes being on an at-home BiPAP but denies daily use d/t the machine "smothering" her. Additional history provided by independent historian, relative, notes an O2 saturation in the 80's at-home PTA. Patient denies O2 at home. Patient notes an associated cough, congestion, diarrhea, rhinorrhea, sore throat, and headache.ABG in ER show hypercapnic,hypoxic  respiratory failure with PH of 7.2 and P C O2 of 72,PO 2 of 76,chest x ray show fluid overload,BNP is over 478,patient has been  started on continues Bipap and IV lasix,,admitted to ICU for close monitoring.    Overview/Hospital Course:   67 y.o. female, with a past medical history of A-fib on OAC,diastolic  CHF, COPD, HLD, HTN, hypothyroidism, morbid obesity, pre-diabetes, and psychiatric problems, who presents to the ED with SOB onset 3 weeks ago. She reports SOB has gotten progressively worse over 3 weeks. Patient notes she has trouble ambulating d/t SOB. Patient notes being on an at-home BiPAP but denies daily use d/t the machine "smothering" her. \ relative, notes an O2 saturation in the 80's at-home PTA. Patient denies O2 at home. .ABG in ER show hypercapnic,hypoxic  respiratory failure with PH of 7.2 and P C O2 of 72,PO 2 of 76,chest x ray " show fluid overload,BNP is over 478,patient has been  started on continues Bipap and IV lasix,,admitted to ICU for close monitoring.  VBG  show improvement in CO2 retention,consulted pulmonology.on HFO during day and nightly bipap.  Was on precedex drip for agitation.improved  off precedex,  Again was less complaint with bipap again,consulted again,communicated with nursing staff to fit better  mask,did better last night with Bipap.  Consulted PT,OT     Past Medical History:   Diagnosis Date    Anticoagulant long-term use     Xarelto    Arthritis     Atrial fibrillation     Breast cyst     CHF (congestive heart failure)     Degenerative disc disease     Edema     HLD (hyperlipidemia)     Hx of psychiatric care     Hyperlipidemia     Hypertension     Hypothyroidism     Nuclear sclerosis, bilateral 12/18/2017    Obesity, morbid     HIEU (obstructive sleep apnea)     Other abnormal glucose     pre-diabetes    Pre-diabetes     Psychiatric problem     Requires assistance with activities of daily living (ADL)     Sleep apnea     Smoker     SOB (shortness of breath)     SOB (shortness of breath)     Thyroid disease     on meds 8-9 years ago. hypothyroidism.no malignancy    TMJ (temporomandibular joint disorder)     jaw clicking    Tobacco abuse     Unsteady gait     Urge incontinence     Weakness generalized     Wears glasses        Past Surgical History:   Procedure Laterality Date    BREAST BIOPSY Right     over 10 yrs ago/ benign    BREAST CYST EXCISION Right     BREAST LUMPECTOMY Right     over 10 yrs ago, ex bx/ benign    COLONOSCOPY N/A 6/25/2019    Procedure: COLONOSCOPY;  Surgeon: Micheline Flores MD;  Location: [facility];  Service: Endoscopy;  Laterality: N/A;  RX XARELTO ok to hold (2 days) per Dr. Naik see scan 3/20/19    ENDOSCOPIC ULTRASOUND OF UPPER GASTROINTESTINAL TRACT N/A 1/26/2021    Procedure: ULTRASOUND-ENDOSCOPIC-UPPER;  Surgeon: Stevenson Philippe MD;  Location: [facility] ENDO;  Service: Endoscopy;   Laterality: N/A;    HYSTERECTOMY      JOINT REPLACEMENT Bilateral     knees    OOPHORECTOMY      rt.knee surgery 2016      TOTAL KNEE ARTHROPLASTY Left 2/19/2019    Procedure: ARTHROPLASTY, KNEE, TOTAL;  Surgeon: Med Hanson MD;  Location: [facility] OR;  Service: Orthopedics;  Laterality: Left;  10AM START PER  PER JAYLIN TEXT @ 8:34AM ON 2-18-19  SUPINE  HARRIS LOYA [phone] TEXTED HIM ON 1-24-19 @ 7:57AM  RN PRE OP 2-13-19--BMI--48.9    underarm gland\ Bilateral        Review of patient's allergies indicates:   Allergen Reactions    Ace inhibitors      Cough         No current facility-administered medications on file prior to encounter.     Current Outpatient Medications on File Prior to Encounter   Medication Sig    cetirizine (ZYRTEC) 10 MG tablet Take 1 tablet (10 mg total) by mouth once daily.    fluticasone propionate (FLONASE) 50 mcg/actuation nasal spray SHAKE LIQUID AND USE 2 SPRAYS(100 MCG) IN EACH NOSTRIL EVERY DAY    albuterol (PROVENTIL/VENTOLIN HFA) 90 mcg/actuation inhaler 2 puffs every 6 (six) hours as needed.    albuterol-ipratropium (DUO-NEB) 2.5 mg-0.5 mg/3 mL nebulizer solution Take 3 mLs by nebulization every 6 (six) hours as needed for Wheezing. Rescue    atorvastatin (LIPITOR) 40 MG tablet TAKE 1 TABLET(40 MG) BY MOUTH EVERY DAY    azelastine (ASTELIN) 137 mcg (0.1 %) nasal spray 1 spray (137 mcg total) by Nasal route 2 (two) times daily.    buPROPion (WELLBUTRIN XL) 150 MG TB24 tablet TAKE 1 TABLET(150 MG) BY MOUTH EVERY DAY    furosemide (LASIX) 40 MG tablet Take 1 tablet (40 mg total) by mouth once daily.    ketoconazole (NIZORAL) 2 % cream Apply topically once daily.    losartan (COZAAR) 25 MG tablet TAKE 1 TABLET(25 MG) BY MOUTH EVERY DAY    metoprolol succinate (TOPROL-XL) 100 MG 24 hr tablet Take 1 tablet (100 mg total) by mouth once daily.    sertraline (ZOLOFT) 100 MG tablet TAKE 1 TABLET(100 MG) BY MOUTH EVERY DAY    SPIRIVA RESPIMAT 2.5 mcg/actuation inhaler INHALE 2  PUFFS BY MOUTH DAILY (Patient not taking: Reported on 10/18/2023)    triamcinolone acetonide 0.1% (KENALOG) 0.1 % ointment APPLY BETWEEN THE KNEES AND UPPER THIGHS TWICE DAILY FOR NO MORE THAN 7 TO 14 DAYS    XARELTO 20 mg Tab TAKE 1 TABLET(20 MG) BY MOUTH EVERY DAY     Family History       Problem Relation (Age of Onset)    Cancer Mother, Brother    Diabetes Mother, Brother    Hypertension Mother    No Known Problems Father, Sister, Maternal Aunt, Maternal Uncle, Paternal Aunt, Paternal Uncle, Maternal Grandmother, Maternal Grandfather, Paternal Grandmother, Paternal Grandfather, Other          Tobacco Use    Smoking status: Every Day     Current packs/day: 0.50     Average packs/day: 0.5 packs/day for 50.9 years (25.4 ttl pk-yrs)     Types: Cigarettes     Start date: 1973    Smokeless tobacco: Current   Substance and Sexual Activity    Alcohol use: No    Drug use: No    Sexual activity: Yes     Partners: Male     Review of Systems   Constitutional:  Positive for activity change and appetite change.   HENT:  Negative for congestion and dental problem.    Eyes:  Negative for discharge and itching.   Respiratory:  Positive for shortness of breath.    Cardiovascular:  Negative for chest pain and leg swelling.   Gastrointestinal:  Negative for abdominal distention and abdominal pain.   Endocrine: Negative for cold intolerance.   Genitourinary:  Negative for difficulty urinating and dyspareunia.   Musculoskeletal:  Negative for arthralgias and back pain.   Skin:  Negative for color change.   Neurological:  Positive for weakness.   Psychiatric/Behavioral:  Negative for agitation and behavioral problems.      Objective:     Vital Signs (Most Recent):  Temp: 97.7 °F (36.5 °C) (11/16/23 0800)  Pulse: 97 (11/16/23 0915)  Resp: 20 (11/16/23 0915)  BP: 130/89 (11/16/23 0900)  SpO2: 99 % (11/16/23 0915) Vital Signs (24h Range):  Temp:  [97.7 °F (36.5 °C)-98.3 °F (36.8 °C)] 97.7 °F (36.5 °C)  Pulse:  [] 97  Resp:  [17-46]  20  SpO2:  [83 %-99 %] 99 %  BP: ()/(6-89) 130/89     Weight: 125.2 kg (276 lb)  Body mass index is 48.89 kg/m².     Physical Exam  HENT:      Head: Normocephalic.      Right Ear: There is no impacted cerumen.      Nose: No congestion.      Mouth/Throat:      Mouth: Mucous membranes are dry.   Eyes:      Extraocular Movements: Extraocular movements intact.      Pupils: Pupils are equal, round, and reactive to light.   Cardiovascular:      Rate and Rhythm: Normal rate. Rhythm irregular.      Heart sounds: No murmur heard.  Pulmonary:      Breath sounds: Rales present.   Abdominal:      General: There is no distension.      Tenderness: There is no abdominal tenderness.   Musculoskeletal:         General: Swelling present.   Skin:     Coloration: Skin is not jaundiced.      Findings: No bruising.   Neurological:      General: No focal deficit present.      Mental Status: She is alert.   Psychiatric:         Mood and Affect: Mood normal.         Behavior: Behavior normal.              CRANIAL NERVES     CN III, IV, VI   Pupils are equal, round, and reactive to light.       Significant Labs: All pertinent labs within the past 24 hours have been reviewed.  BMP:   Recent Labs   Lab 11/16/23  0259   GLU 95      K 4.1   CL 92*   CO2 37*   BUN 34*   CREATININE 1.1   CALCIUM 9.5       CBC:   Recent Labs   Lab 11/15/23  0006   WBC 7.44   HGB 14.8   HCT 46.2          CMP:   Recent Labs   Lab 11/14/23  1959 11/15/23  0006 11/16/23  0259    142 139   K 3.8 3.9 4.1   CL 94* 95 92*   CO2 35* 36* 37*   * 143* 95   BUN 31* 30* 34*   CREATININE 1.2 1.1 1.1   CALCIUM 9.5 9.8 9.5   ANIONGAP 12 11 10         Significant Imaging: I have reviewed all pertinent imaging results/findings within the past 24 hours.    Assessment/Plan:      * Acute respiratory failure with hypoxia and hypercapnia  Patient with Hypercapnic and Hypoxic Respiratory failure which is Acute on chronic.  she is not on home oxygen.  Supplemental oxygen was provided and noted- Oxygen Concentration (%):  [30-45] 45    .   Signs/symptoms of respiratory failure include- tachypnea, increased work of breathing, and lethargy. Contributing diagnoses includes - COPD and Obesity Hypoventilation Labs and images were reviewed. Patient Has recent ABG, which has been reviewed. Will treat underlying causes and adjust management of respiratory failure as follows-   ER show hypercapnic,hypoxic  respiratory failure with PH of 7.2 and P C O2 of 72,PO 2 of 76,chest x ray show fluid overload,BNP is over 478,patient has been  started on continues Bipap and IV lasix,,admitted to ICU for close monitoring.    Was on precedex drip for agitation  VBG  show improvement in CO2 retention,consulted pulmonology.on HFO during day and nightly bipap.  Was on precedex drip for agitation.improved  off precedex,  Again was less complaint with bipap again,consulted again,communicated with nursing staff to fit better  mask,    Chronic obstructive pulmonary disease with acute exacerbation  Patient's COPD is with exacerbation noted by continued dyspnea and use of accessory muscles for breathing currently.  Patient is currently off COPD Pathway. Continue scheduled inhalers Steroids, Antibiotics, and Supplemental oxygen and monitor respiratory status closely.     Chronic kidney disease, stage 3a  Creatine stable for now. BMP reviewed- noted Estimated Creatinine Clearance: 61.4 mL/min (based on SCr of 1.1 mg/dL). according to latest data. Based on current GFR, CKD stage is stage 3 - GFR 30-59.  Monitor UOP and serial BMP and adjust therapy as needed. Renally dose meds. Avoid nephrotoxic medications and procedures.    Pulmonary hypertension  Will diurese.      Acute on chronic diastolic CHF (congestive heart failure)  Patient is identified as having Diastolic (HFpEF) heart failure that is Acute on chronic. CHF is currently uncontrolled due to Dyspnea not returned to baseline after 1 doses  of IV diuretic. Latest ECHO performed and demonstrates- Results for orders placed during the hospital encounter of 05/20/22    Echo    Interpretation Summary  · The left ventricle is normal in size with normal systolic function.  · The estimated ejection fraction is 60%.  · Normal right ventricular size with normal right ventricular systolic function.  · The estimated PA systolic pressure is 33 mmHg.  · Atrial fibrillation observed.  . Continue Beta Blocker and Furosemide and monitor clinical status closely. Monitor on telemetry. Patient is off CHF pathway.  Monitor strict Is&Os and daily weights.  Place on fluid restriction of 1.5 L. Cardiology has not been any consulted. Continue to stress to patient importance of self efficacy and  on diet for CHF. Last BNP reviewed- and noted below   Recent Labs   Lab 11/13/23  1016   *   .    HIEU treated with BiPAP  Non complaint with home Bipap.she was consulted.did better last night with Bipap.      PVD (peripheral vascular disease)  On medical treatments.      Paroxysmal atrial fibrillation  Patient with Persistent (7 days or more) atrial fibrillation which is controlled currently with Beta Blocker. Patient is currently in atrial fibrillation.[id number] Score: 4. HASBLED Score: 3. Anticoagulation indicated. Anticoagulation done with Eliquis .    Pre-diabetes  Will monitor.      Severe obesity (BMI >= 40)  Body mass index is 48.01 kg/m². Morbid obesity complicates all aspects of disease management from diagnostic modalities to treatment. Weight loss encouraged and health benefits explained to patient.         Essential hypertension  Chronic, controlled. Latest blood pressure and vitals reviewed-     Temp:  [99.9 °F (37.7 °C)]   Pulse:  []   Resp:  [26-38]   BP: (137-138)/(62-92)   SpO2:  [83 %-95 %] .   Home meds for hypertension were reviewed and noted below.   Hypertension Medications               furosemide (LASIX) 40 MG tablet Take 1 tablet (40 mg  total) by mouth once daily.    losartan (COZAAR) 25 MG tablet TAKE 1 TABLET(25 MG) BY MOUTH EVERY DAY    metoprolol succinate (TOPROL-XL) 100 MG 24 hr tablet Take 1 tablet (100 mg total) by mouth once daily.            While in the hospital, will manage blood pressure as follows; Continue home antihypertensive regimen    Will utilize p.r.n. blood pressure medication only if patient's blood pressure greater than 160/100 and she develops symptoms such as worsening chest pain or shortness of breath.      VTE Risk Mitigation (From admission, onward)           Ordered     rivaroxaban tablet 20 mg  With dinner         11/13/23 1633                    Discharge Planning   JACQUELINE: 11/20/2023     Code Status: Full Code   Is the patient medically ready for discharge?:     Reason for patient still in hospital (select all that apply): Patient trending condition  Discharge Plan A: Home            Critical care time spent on the evaluation and treatment of severe organ dysfunction, review of pertinent labs and imaging studies, discussions with consulting providers and discussions with patient/family:  over 45  minutes.      Luz Ware MD  Department of Hospital Medicine   [facility] [place] - Intensive Care

## 2023-11-16 NOTE — PT/OT/SLP EVAL
"Physical Therapy Evaluation and Treatment    Patient Name: Sandee Evans   MRN: [id number]  Recent Surgery: * No surgery found *      Recommendations:     Discharge Recommendations: Low Intensity Therapy   Discharge Equipment Recommendations: none     Assessment:     Sandee Evans is a 67 y.o. female admitted with a medical diagnosis of Acute respiratory failure with hypoxia and hypercapnia. She presents with the following impairments/functional limitations: impaired endurance, impaired functional mobility.     Rehab Prognosis: Good; patient would benefit from acute PT services to address these deficits and reach maximum level of function.    Plan:     During this hospitalization, patient to be seen 3 x/week to address the above listed problems via gait training, therapeutic activities, therapeutic exercises    Plan of Care Expires: 11/23/23    Subjective     Chief Complaint: None  Patient Comments/Goals: "I want to sit in the chair"  Pain/Comfort:  Pain Rating 1: 0/10    Social History:  Living Environment: Patient lives with their 2 grandkids, spouse, and daughter in a 2 story home with number of outside stair(s): 0 and bed/bath on 1st floor  Prior Level of Function: Prior to admission, patient was modified independent  Equipment Used at Home: rollator, walker, rolling, bath bench, bedside commode  DME owned (not currently used): single point cane  Assistance Upon Discharge: family    Objective:     Communicated with nurseJosiane prior to session. Patient found supine with blood pressure cuff, PureWick, telemetry, pulse ox (continuous) (vapotherm) upon PT entry to room.    General Precautions: Standard,     Orthopedic Precautions: N/A   Braces: N/A    Respiratory Status:  vapotherm; 35 L, 45%    Exams:  Cognition: Patient is oriented to Person, Place, Time, Situation  RLE ROM: WFL  RLE Strength: WFL  LLE ROM: WFL  LLE Strength: WFL  Sensation:    -       Intact  light/touch BLEs    Functional Mobility:  Gait " belt applied - No  Bed Mobility  Supine to Sit: stand by assistance for all mobility  Sit to Supine: stand by assistance for all mobility  Transfers  NT  Gait  NT  Balance  Sitting: supervision  Standing: NT      Therapeutic Activities and Exercises:   Patient educated on role of acute care PT and PT POC  Patient performed 1 set(s) of 10 repetitions of the following seated exercises: ankle pumps, long arc quads, and marches for bilateral LE. Patient required skilled PT for instruction of exercises and appropriate cues to perform exercises safely and appropriately.  Pt with bouts of A-fib during eval and treatment, nurse requested that pt not get OOB today due to breathing and A-fib with RVR.    AM-PAC 6 CLICK MOBILITY  Total Score:16    Patient left with bed in chair position with all lines intact, call button in reach, RN notified, and all needs in reach .    GOALS:   Multidisciplinary Problems       Physical Therapy Goals          Problem: Physical Therapy    Goal Priority Disciplines Outcome Goal Variances Interventions   Physical Therapy Goal     PT, PT/OT Ongoing, Progressing     Description: Goals to be met by: 23     Patient will increase functional independence with mobility by performin. Pt to be mod I with bed mobility.  2. Pt to transfer with (S).  3. Pt to ambulate 50' w/RW SBA.  4. Pt to be (I) with written HEP.                         History:     Past Medical History:   Diagnosis Date    Anticoagulant long-term use     Xarelto    Arthritis     Atrial fibrillation     Breast cyst     CHF (congestive heart failure)     Degenerative disc disease     Edema     HLD (hyperlipidemia)     Hx of psychiatric care     Hyperlipidemia     Hypertension     Hypothyroidism     Nuclear sclerosis, bilateral 2017    Obesity, morbid     HIEU (obstructive sleep apnea)     Other abnormal glucose     pre-diabetes    Pre-diabetes     Psychiatric problem     Requires assistance with activities of daily  living (ADL)     Sleep apnea     Smoker     SOB (shortness of breath)     SOB (shortness of breath)     Thyroid disease     on meds 8-9 years ago. hypothyroidism.no malignancy    TMJ (temporomandibular joint disorder)     jaw clicking    Tobacco abuse     Unsteady gait     Urge incontinence     Weakness generalized     Wears glasses        Past Surgical History:   Procedure Laterality Date    BREAST BIOPSY Right     over 10 yrs ago/ benign    BREAST CYST EXCISION Right     BREAST LUMPECTOMY Right     over 10 yrs ago, ex bx/ benign    COLONOSCOPY N/A 6/25/2019    Procedure: COLONOSCOPY;  Surgeon: Micheline Flores MD;  Location: [facility] ENDO;  Service: Endoscopy;  Laterality: N/A;  RX XARELTO ok to hold (2 days) per Dr. Naik see scan 3/20/19    ENDOSCOPIC ULTRASOUND OF UPPER GASTROINTESTINAL TRACT N/A 1/26/2021    Procedure: ULTRASOUND-ENDOSCOPIC-UPPER;  Surgeon: Stevenson Philippe MD;  [facility] ENDO;  Service: Endoscopy;  Laterality: N/A;    HYSTERECTOMY      JOINT REPLACEMENT Bilateral     knees    OOPHORECTOMY      rt.knee surgery 2016      TOTAL KNEE ARTHROPLASTY Left 2/19/2019    Procedure: ARTHROPLASTY, KNEE, TOTAL;  Surgeon: Med Hanson MD;  Location: [facility] OR;  Service: Orthopedics;  Laterality: Left;  10AM START PER  PER JAYLIN TEXT @ 8:34AM ON 2-18-19  SUPINE  HARRIS LOYA [phone] TEXTED HIM ON 1-24-19 @ 7:57AM  RN PRE OP 2-13-19--BMI--48.9    underarm gland\ Bilateral        Time Tracking:     PT Received On: 11/16/23  PT Start Time: 1032  PT Stop Time: 1059  PT Total Time (min): 27 min     Billable Minutes: Evaluation 15 and Therapeutic Exercise 12    11/16/2023

## 2023-11-16 NOTE — SUBJECTIVE & OBJECTIVE
Interval History: Feeling better today. Diuresing well    Objective:     Vital Signs (Most Recent):  Temp: 97.9 °F (36.6 °C) (11/16/23 1130)  Pulse: 96 (11/16/23 1400)  Resp: (!) 25 (11/16/23 1400)  BP: 118/76 (11/16/23 1400)  SpO2: 96 % (11/16/23 1400) Vital Signs (24h Range):  Temp:  [97.7 °F (36.5 °C)-98.3 °F (36.8 °C)] 97.9 °F (36.6 °C)  Pulse:  [] 96  Resp:  [17-46] 25  SpO2:  [83 %-99 %] 96 %  BP: ()/(6-89) 118/76     Weight: 125.2 kg (276 lb)  Body mass index is 48.89 kg/m².      Intake/Output Summary (Last 24 hours) at 11/16/2023 1419  Last data filed at 11/16/2023 0657  Gross per 24 hour   Intake 240 ml   Output 3451 ml   Net -3211 ml        Physical Exam  Vitals and nursing note reviewed.   Constitutional:       General: She is not in acute distress.     Appearance: She is obese. She is not toxic-appearing or diaphoretic.   HENT:      Head: Normocephalic and atraumatic.   Eyes:      General: No scleral icterus.     Extraocular Movements: Extraocular movements intact.   Cardiovascular:      Rate and Rhythm: Normal rate.   Pulmonary:      Effort: No tachypnea, accessory muscle usage, prolonged expiration, respiratory distress or retractions.   Abdominal:      General: There is no distension.   Musculoskeletal:      Right lower leg: Edema present.      Left lower leg: Edema present.   Skin:     General: Skin is warm and dry.      Coloration: Skin is not jaundiced.      Findings: No rash.   Neurological:      General: No focal deficit present.      Mental Status: Mental status is at baseline.               Vents:  Oxygen Concentration (%): 45 (11/16/23 1200)    Lines/Drains/Airways       Drain  Duration             Female External Urinary Catheter 11/13/23 1900 2 days              Peripheral Intravenous Line  Duration                  Peripheral IV - Single Lumen 11/13/23 1725 20 G Left;Posterior Forearm 2 days         Peripheral IV - Single Lumen 11/14/23 0208 20 G Anterior;Right Forearm 2 days                     Significant Labs:    CBC/Anemia Profile:  Recent Labs   Lab 11/15/23  0006   WBC 7.44   HGB 14.8   HCT 46.2      MCV 99*   RDW 14.1        Chemistries:  Recent Labs   Lab 11/14/23  1959 11/15/23  0006 11/16/23  0259    142 139   K 3.8 3.9 4.1   CL 94* 95 92*   CO2 35* 36* 37*   BUN 31* 30* 34*   CREATININE 1.2 1.1 1.1   CALCIUM 9.5 9.8 9.5       All pertinent labs within the past 24 hours have been reviewed.    Significant Imaging:  I have reviewed all pertinent imaging results/findings within the past 24 hours.

## 2023-11-16 NOTE — ASSESSMENT & PLAN NOTE
- consult received; interval chart reviewed in detail; discussed pt with hospital primary, Dr. Harris, and later with MDT during ICU rounds   - met with patient at bedside and her  Corwin; introduction to palliative medicine team and role in current care and admission   - learned more about pt outside of current admission; she lives with her  Corwin and they have 2 children Marce and Sukhwinder; she typically is able to perform all ADLs independently; does not use assistive devices in her home, but uses a cane for walking distances   - GOC/ACP discussion; introduced topics of ACP discussion with plan for further discussion on 11/16 after she has had some time to think about things more   - Corwin or her daughter Marce is her preferred MPOA; she feels Marce may know her wishes a little more   - GOC is to improve her health with life style changes and to avoid hospital; expressed importance of quality of life and that she wants to live and do everything she can to make that happen, but if her time came she would be at peace as well; expressed concerns for her health placing burdens on her family and her GOC somewhat being affected by that which we plan to review further   - emotional support provided   - Allowed time for questions/concerns; all addressed; expressed availability of myself/palliative team for additional questions/concerns

## 2023-11-16 NOTE — NURSING
[facility] [place]  Nurses Note -- 4 Eyes      11/16/2023       Skin assessed on: Q Shift      [x] No Pressure Injuries Present    [x]Prevention Measures Documented    [] Yes LDA  for Pressure Injury Previously documented     [] Yes New Pressure Injury Discovered   [] LDA for New Pressure Injury Added      Attending RN:  Josiane Sood RN     Second RN:  CLAUDIA Betts

## 2023-11-16 NOTE — ASSESSMENT & PLAN NOTE
"- pt expressed motivation to "do the things she needs to do" to better her health; shares this admission was very eye opening to aspects of her health she has been neglecting   - encouragement provided   "

## 2023-11-16 NOTE — SUBJECTIVE & OBJECTIVE
Past Medical History:   Diagnosis Date    Anticoagulant long-term use     Xarelto    Arthritis     Atrial fibrillation     Breast cyst     CHF (congestive heart failure)     Degenerative disc disease     Edema     HLD (hyperlipidemia)     Hx of psychiatric care     Hyperlipidemia     Hypertension     Hypothyroidism     Nuclear sclerosis, bilateral 12/18/2017    Obesity, morbid     HIEU (obstructive sleep apnea)     Other abnormal glucose     pre-diabetes    Pre-diabetes     Psychiatric problem     Requires assistance with activities of daily living (ADL)     Sleep apnea     Smoker     SOB (shortness of breath)     SOB (shortness of breath)     Thyroid disease     on meds 8-9 years ago. hypothyroidism.no malignancy    TMJ (temporomandibular joint disorder)     jaw clicking    Tobacco abuse     Unsteady gait     Urge incontinence     Weakness generalized     Wears glasses        Past Surgical History:   Procedure Laterality Date    BREAST BIOPSY Right     over 10 yrs ago/ benign    BREAST CYST EXCISION Right     BREAST LUMPECTOMY Right     over 10 yrs ago, ex bx/ benign    COLONOSCOPY N/A 6/25/2019    Procedure: COLONOSCOPY;  Surgeon: Micheline Flores MD;  Location: [facility] ENDO;  Service: Endoscopy;  Laterality: N/A;  RX XARELTO ok to hold (2 days) per Dr. Naik see scan 3/20/19    ENDOSCOPIC ULTRASOUND OF UPPER GASTROINTESTINAL TRACT N/A 1/26/2021    Procedure: ULTRASOUND-ENDOSCOPIC-UPPER;  Surgeon: Stevenson Philippe MD;  Location: [facility] ENDO;  Service: Endoscopy;  Laterality: N/A;    HYSTERECTOMY      JOINT REPLACEMENT Bilateral     knees    OOPHORECTOMY      rt.knee surgery 2016      TOTAL KNEE ARTHROPLASTY Left 2/19/2019    Procedure: ARTHROPLASTY, KNEE, TOTAL;  Surgeon: Med Hanson MD;  Location: [facility] OR;  Service: Orthopedics;  Laterality: Left;  10AM START PER  PER JAYLIN TEXT @ 8:34AM ON 2-18-19  SUPINE  HARRIS LOYA [phone] TEXTED HIM ON 1-24-19 @ 7:57AM  RN PRE OP 2-13-19--BMI--48.9    underarm  gland\ Bilateral        Review of patient's allergies indicates:   Allergen Reactions    Ace inhibitors      Cough         No current facility-administered medications on file prior to encounter.     Current Outpatient Medications on File Prior to Encounter   Medication Sig    cetirizine (ZYRTEC) 10 MG tablet Take 1 tablet (10 mg total) by mouth once daily.    fluticasone propionate (FLONASE) 50 mcg/actuation nasal spray SHAKE LIQUID AND USE 2 SPRAYS(100 MCG) IN EACH NOSTRIL EVERY DAY    albuterol (PROVENTIL/VENTOLIN HFA) 90 mcg/actuation inhaler 2 puffs every 6 (six) hours as needed.    albuterol-ipratropium (DUO-NEB) 2.5 mg-0.5 mg/3 mL nebulizer solution Take 3 mLs by nebulization every 6 (six) hours as needed for Wheezing. Rescue    atorvastatin (LIPITOR) 40 MG tablet TAKE 1 TABLET(40 MG) BY MOUTH EVERY DAY    azelastine (ASTELIN) 137 mcg (0.1 %) nasal spray 1 spray (137 mcg total) by Nasal route 2 (two) times daily.    buPROPion (WELLBUTRIN XL) 150 MG TB24 tablet TAKE 1 TABLET(150 MG) BY MOUTH EVERY DAY    furosemide (LASIX) 40 MG tablet Take 1 tablet (40 mg total) by mouth once daily.    ketoconazole (NIZORAL) 2 % cream Apply topically once daily.    losartan (COZAAR) 25 MG tablet TAKE 1 TABLET(25 MG) BY MOUTH EVERY DAY    metoprolol succinate (TOPROL-XL) 100 MG 24 hr tablet Take 1 tablet (100 mg total) by mouth once daily.    sertraline (ZOLOFT) 100 MG tablet TAKE 1 TABLET(100 MG) BY MOUTH EVERY DAY    SPIRIVA RESPIMAT 2.5 mcg/actuation inhaler INHALE 2 PUFFS BY MOUTH DAILY (Patient not taking: Reported on 10/18/2023)    triamcinolone acetonide 0.1% (KENALOG) 0.1 % ointment APPLY BETWEEN THE KNEES AND UPPER THIGHS TWICE DAILY FOR NO MORE THAN 7 TO 14 DAYS    XARELTO 20 mg Tab TAKE 1 TABLET(20 MG) BY MOUTH EVERY DAY     Family History       Problem Relation (Age of Onset)    Cancer Mother, Brother    Diabetes Mother, Brother    Hypertension Mother    No Known Problems Father, Sister, Maternal Aunt, Maternal  Uncle, Paternal Aunt, Paternal Uncle, Maternal Grandmother, Maternal Grandfather, Paternal Grandmother, Paternal Grandfather, Other          Tobacco Use    Smoking status: Every Day     Current packs/day: 0.50     Average packs/day: 0.5 packs/day for 50.9 years (25.4 ttl pk-yrs)     Types: Cigarettes     Start date: 1973    Smokeless tobacco: Current   Substance and Sexual Activity    Alcohol use: No    Drug use: No    Sexual activity: Yes     Partners: Male     Review of Systems   Constitutional:  Positive for activity change and appetite change.   HENT:  Negative for congestion and dental problem.    Eyes:  Negative for discharge and itching.   Respiratory:  Positive for shortness of breath.    Cardiovascular:  Negative for chest pain and leg swelling.   Gastrointestinal:  Negative for abdominal distention and abdominal pain.   Endocrine: Negative for cold intolerance.   Genitourinary:  Negative for difficulty urinating and dyspareunia.   Musculoskeletal:  Negative for arthralgias and back pain.   Skin:  Negative for color change.   Neurological:  Positive for weakness.   Psychiatric/Behavioral:  Negative for agitation and behavioral problems.      Objective:     Vital Signs (Most Recent):  Temp: 97.7 °F (36.5 °C) (11/16/23 0800)  Pulse: 97 (11/16/23 0915)  Resp: 20 (11/16/23 0915)  BP: 130/89 (11/16/23 0900)  SpO2: 99 % (11/16/23 0915) Vital Signs (24h Range):  Temp:  [97.7 °F (36.5 °C)-98.3 °F (36.8 °C)] 97.7 °F (36.5 °C)  Pulse:  [] 97  Resp:  [17-46] 20  SpO2:  [83 %-99 %] 99 %  BP: ()/(6-89) 130/89     Weight: 125.2 kg (276 lb)  Body mass index is 48.89 kg/m².     Physical Exam  HENT:      Head: Normocephalic.      Right Ear: There is no impacted cerumen.      Nose: No congestion.      Mouth/Throat:      Mouth: Mucous membranes are dry.   Eyes:      Extraocular Movements: Extraocular movements intact.      Pupils: Pupils are equal, round, and reactive to light.   Cardiovascular:      Rate and  Rhythm: Normal rate. Rhythm irregular.      Heart sounds: No murmur heard.  Pulmonary:      Breath sounds: Rales present.   Abdominal:      General: There is no distension.      Tenderness: There is no abdominal tenderness.   Musculoskeletal:         General: Swelling present.   Skin:     Coloration: Skin is not jaundiced.      Findings: No bruising.   Neurological:      General: No focal deficit present.      Mental Status: She is alert.   Psychiatric:         Mood and Affect: Mood normal.         Behavior: Behavior normal.              CRANIAL NERVES     CN III, IV, VI   Pupils are equal, round, and reactive to light.       Significant Labs: All pertinent labs within the past 24 hours have been reviewed.  BMP:   Recent Labs   Lab 11/16/23 0259   GLU 95      K 4.1   CL 92*   CO2 37*   BUN 34*   CREATININE 1.1   CALCIUM 9.5       CBC:   Recent Labs   Lab 11/15/23  0006   WBC 7.44   HGB 14.8   HCT 46.2          CMP:   Recent Labs   Lab 11/14/23  1959 11/15/23  0006 11/16/23  0259    142 139   K 3.8 3.9 4.1   CL 94* 95 92*   CO2 35* 36* 37*   * 143* 95   BUN 31* 30* 34*   CREATININE 1.2 1.1 1.1   CALCIUM 9.5 9.8 9.5   ANIONGAP 12 11 10         Significant Imaging: I have reviewed all pertinent imaging results/findings within the past 24 hours.

## 2023-11-16 NOTE — ASSESSMENT & PLAN NOTE
- not on O2 PTA   - discussed trajectory of life-limiting condition   - continued management per hospital primary

## 2023-11-16 NOTE — PLAN OF CARE
Problem: Physical Therapy  Goal: Physical Therapy Goal  Description: Goals to be met by: 23     Patient will increase functional independence with mobility by performin. Pt to be mod I with bed mobility.  2. Pt to transfer with (S).  3. Pt to ambulate 50' w/RW SBA.  4. Pt to be (I) with written HEP.    Outcome: Ongoing, Progressing   Initial eval completed, see in chart for details.

## 2023-11-16 NOTE — HPI
"From H&P: " 67 y.o. female, with a past medical history of A-fib on OAC,diastolic  CHF, COPD, HLD, HTN, hypothyroidism, morbid obesity, pre-diabetes, and psychiatric problems, who presents to the ED with SOB onset 3 weeks ago. She reports SOB has gotten progressively worse over 3 weeks. Patient notes she has trouble ambulating d/t SOB. Patient notes being on an at-home BiPAP but denies daily use d/t the machine "smothering" her. Additional history provided by independent historian, relative, notes an O2 saturation in the 80's at-home PTA. Patient denies O2 at home. Patient notes an associated cough, congestion, diarrhea, rhinorrhea, sore throat, and headache.ABG in ER show hypercapnic,hypoxic  respiratory failure with PH of 7.2 and P C O2 of 72,PO 2 of 76,chest x ray show fluid overload,BNP is over 478,patient has been  started on continues Bipap and IV lasix,,admitted to ICU for close monitoring."     Palliative medicine consulted for goals of care discussion and advance care planning; for details of visit, see advance care planning section of plan.     "

## 2023-11-16 NOTE — ASSESSMENT & PLAN NOTE
Former smoker.  Chronic hypercapnic respiratory failure not using nightly NIV.  Chronic diastolic heart failure with significant Na indiscretion.  RSV positive and on droplet precaution.

## 2023-11-16 NOTE — ASSESSMENT & PLAN NOTE
- chronic history noted; likely to additionally complicate respiratory status in the future in pt also with CHF and COPD  - continued management per hospital primary

## 2023-11-16 NOTE — ASSESSMENT & PLAN NOTE
Longstanding history of diastolic heart failure.  Recent echo with PASP 49, CRISTI and mild RV dysfunction.  BNP > 400.  Chest imaging with bilateral effusions and peripheral edema.  Significant Na indiscretion.  Diuresing well.  Continue 40 mg IV TID and maintain strict I/O.

## 2023-11-16 NOTE — NURSING
[facility] [place] [facility]ensive [facility]  ICU Shift Summary  Date: 11/16/2023      Prehospitalization: Home  Admit Date / LOS : 11/13/2023/ 3 days    Diagnosis: Acute respiratory failure with hypoxia and hypercapnia    Consults:        Active: Pulm CC       Needed:      Code Status: Full Code   Advanced Directive: <no information>    LDA:  Lines/Drains/Airways       Drain  Duration             Female External Urinary Catheter 11/13/23 1900 2 days              Peripheral Intravenous Line  Duration                  Peripheral IV - Single Lumen 11/13/23 1725 20 G Left;Posterior Forearm 2 days         Peripheral IV - Single Lumen 11/14/23 0208 20 G Anterior;Right Forearm 2 days                  Central Lines/Site/Justification:Patient Does Not Have Central Line  Urinary Cath/Order/Justification:Patient Does Not Have Urinary Catheter    Vasopressors/Infusions:        GOALS: Volume/ Hemodynamic: N/A                     RASS: N/A    Pain Management: none       Pain Controlled: not applicable     Rhythm: A-Fib    Respiratory Device: Bipap    Oxygen Concentration (%):  [40-45] 45                 Most Recent SBT/ SAT: Does not meet criteria       MOVE Screen: PASS  ICU Liberation: not applicable    VTE Prophylaxis: Pharm  Mobility: A: Assist  Stress Ulcer Prophylaxis: Yes    Isolation: No active isolations    Dietary:   Current Diet Order   Procedures    Diet Cardiac      Tolerance: not applicable  Advancement: no    I & O (24h):    Intake/Output Summary (Last 24 hours) at 11/16/2023 0354  Last data filed at 11/16/2023 0000  Gross per 24 hour   Intake 1604.92 ml   Output 3801 ml   Net -2196.08 ml        Restraints: No    Significant Dates:  Post Op Date: N/A  Rescue Date: N/A  Imaging/ Diagnostics: N/A    Noteworthy Labs:  none    COVID Test: (--)  CBC/Anemia Labs: Coags:    Recent Labs   Lab 11/13/23  1722 11/15/23  0006   WBC 4.88 7.44   HGB 13.2 14.8   HCT 42.1 46.2    215   MCV 99* 99*   RDW 14.2 14.1    Recent Labs   Lab  "11/13/23  1016   INR 1.0        Chemistries:   Recent Labs   Lab 11/13/23  1016 11/14/23  0016 11/15/23  0006 11/16/23  0259      < > 142 139   K 4.3   < > 3.9 4.1      < > 95 92*   CO2 22*   < > 36* 37*   BUN 23   < > 30* 34*   CREATININE 1.1   < > 1.1 1.1   CALCIUM 9.0   < > 9.8 9.5   PROT 6.7  --   --   --    BILITOT 0.6  --   --   --    ALKPHOS 93  --   --   --    ALT 27  --   --   --    AST 27  --   --   --     < > = values in this interval not displayed.        Cardiac Enzymes: Ejection Fractions:    Recent Labs     11/13/23  1016   TROPONINI <0.006    EF   Date Value Ref Range Status   05/21/2022 60 % Final        POCT Glucose: HbA1c:    No results for input(s): "POCTGLUCOSE" in the last 168 hours. Hemoglobin A1C   Date Value Ref Range Status   10/27/2023 5.8 (H) 4.0 - 5.6 % Final     Comment:     ADA Screening Guidelines:  5.7-6.4%  Consistent with prediabetes  >or=6.5%  Consistent with diabetes    High levels of fetal hemoglobin interfere with the HbA1C  assay. Heterozygous hemoglobin variants (HbS, HgC, etc)do  not significantly interfere with this assay.   However, presence of multiple variants may affect accuracy.             ICU LOS 2d 9h  Level of Care: Critical Care    Chart Check: 12 HR Done  Shift Summary/Plan for the shift: none  "

## 2023-11-16 NOTE — SUBJECTIVE & OBJECTIVE
Past Medical History:   Diagnosis Date    Anticoagulant long-term use     Xarelto    Arthritis     Atrial fibrillation     Breast cyst     CHF (congestive heart failure)     Degenerative disc disease     Edema     HLD (hyperlipidemia)     Hx of psychiatric care     Hyperlipidemia     Hypertension     Hypothyroidism     Nuclear sclerosis, bilateral 12/18/2017    Obesity, morbid     HIEU (obstructive sleep apnea)     Other abnormal glucose     pre-diabetes    Pre-diabetes     Psychiatric problem     Requires assistance with activities of daily living (ADL)     Sleep apnea     Smoker     SOB (shortness of breath)     SOB (shortness of breath)     Thyroid disease     on meds 8-9 years ago. hypothyroidism.no malignancy    TMJ (temporomandibular joint disorder)     jaw clicking    Tobacco abuse     Unsteady gait     Urge incontinence     Weakness generalized     Wears glasses      Past Surgical History:   Procedure Laterality Date    BREAST BIOPSY Right     over 10 yrs ago/ benign    BREAST CYST EXCISION Right     BREAST LUMPECTOMY Right     over 10 yrs ago, ex bx/ benign    COLONOSCOPY N/A 6/25/2019    Procedure: COLONOSCOPY;  Surgeon: Micheline Flores MD;  Location: [facility] ENDO;  Service: Endoscopy;  Laterality: N/A;  RX XARELTO ok to hold (2 days) per Dr. Naik see scan 3/20/19    ENDOSCOPIC ULTRASOUND OF UPPER GASTROINTESTINAL TRACT N/A 1/26/2021    Procedure: ULTRASOUND-ENDOSCOPIC-UPPER;  Surgeon: Stevenson Philippe MD;  Location: [facility] ENDO;  Service: Endoscopy;  Laterality: N/A;    HYSTERECTOMY      JOINT REPLACEMENT Bilateral     knees    OOPHORECTOMY      rt.knee surgery 2016      TOTAL KNEE ARTHROPLASTY Left 2/19/2019    Procedure: ARTHROPLASTY, KNEE, TOTAL;  Surgeon: Med Hanson MD;  Location: [facility] OR;  Service: Orthopedics;  Laterality: Left;  10AM START PER  PER JAYLIN TEXT @ 8:34AM ON 2-18-19  SUPINE  HARRIS LOYA [phone] TEXTED HIM ON 1-24-19 @ 7:57AM  RN PRE OP 2-13-19--BMI--48.9    underarm  gland\ Bilateral      Review of patient's allergies indicates:   Allergen Reactions    Ace inhibitors      Cough       Medications:  Continuous Infusions:  Scheduled Meds:   albuterol-ipratropium  3 mL Nebulization Q4H    atorvastatin  40 mg Oral Daily    famotidine  20 mg Oral BID    furosemide (LASIX) injection  40 mg Intravenous Q8H    metoprolol succinate  100 mg Oral Daily    mupirocin   Nasal BID    predniSONE  40 mg Oral Daily    QUEtiapine  25 mg Oral QHS    rivaroxaban  20 mg Oral Daily with dinner    sertraline  100 mg Oral Daily    sodium chloride 3%  4 mL Nebulization Q8H     PRN Meds:benzonatate, dextromethorphan-guaiFENesin  mg/5 ml, hydrALAZINE, hydrOXYzine pamoate    Family History       Problem Relation (Age of Onset)    Cancer Mother, Brother    Diabetes Mother, Brother    Hypertension Mother    No Known Problems Father, Sister, Maternal Aunt, Maternal Uncle, Paternal Aunt, Paternal Uncle, Maternal Grandmother, Maternal Grandfather, Paternal Grandmother, Paternal Grandfather, Other          Tobacco Use    Smoking status: Every Day     Current packs/day: 0.50     Average packs/day: 0.5 packs/day for 50.9 years (25.4 ttl pk-yrs)     Types: Cigarettes     Start date: 1973    Smokeless tobacco: Current   Substance and Sexual Activity    Alcohol use: No    Drug use: No    Sexual activity: Yes     Partners: Male     Review of Systems   Constitutional:  Positive for activity change and fatigue.   Respiratory:  Positive for cough and shortness of breath.    Gastrointestinal:  Negative for nausea and vomiting.   Musculoskeletal:  Positive for gait problem.   Neurological:  Positive for weakness.     Objective:     Vital Signs (Most Recent):  Temp: 97.9 °F (36.6 °C) (11/16/23 1130)  Pulse: 90 (11/16/23 1130)  Resp: (!) 24 (11/16/23 1130)  BP: 121/71 (11/16/23 1100)  SpO2: 95 % (11/16/23 1130) Vital Signs (24h Range):  Temp:  [97.7 °F (36.5 °C)-98.3 °F (36.8 °C)] 97.9 °F (36.6 °C)  Pulse:  []  90  Resp:  [17-46] 24  SpO2:  [83 %-99 %] 95 %  BP: ()/(6-89) 121/71     Weight: 125.2 kg (276 lb)  Body mass index is 48.89 kg/m².       Physical Exam  Vitals and nursing note reviewed.   Constitutional:       General: She is not in acute distress.     Appearance: She is obese. She is ill-appearing.      Interventions: Nasal cannula in place.   HENT:      Head: Normocephalic and atraumatic.   Musculoskeletal:      Right lower leg: Edema present.      Left lower leg: Edema present.   Neurological:      Mental Status: She is alert and oriented to person, place, and time.          Advance Care Planning   Advance Directives:   Living Will: No    LaPOST: No    Do Not Resuscitate Status: No    Medical Power of : No (Spouse Corwin is legal surrogate decision maker)    Goals of Care: The patient endorses that what is most important right now is to focus on avoiding the hospital, remaining as independent as possible, and quality of life, even if it means sacrificing a little time    Accordingly, we have decided that the best plan to meet the patient's goals includes continuing with treatment     Significant Labs: All pertinent labs within the past 24 hours have been reviewed.  CBC:   Recent Labs   Lab 11/15/23  0006   WBC 7.44   HGB 14.8   HCT 46.2   MCV 99*        BMP:  Recent Labs   Lab 11/16/23  0259   GLU 95      K 4.1   CL 92*   CO2 37*   BUN 34*   CREATININE 1.1   CALCIUM 9.5     LFT:  Lab Results   Component Value Date    AST 27 11/13/2023    ALKPHOS 93 11/13/2023    BILITOT 0.6 11/13/2023     Albumin:   Albumin   Date Value Ref Range Status   11/13/2023 3.0 (L) 3.5 - 5.2 g/dL Final     Protein:   Total Protein   Date Value Ref Range Status   11/13/2023 6.7 6.0 - 8.4 g/dL Final     Lactic acid:   Lab Results   Component Value Date    LACTATE 1.2 05/20/2022     Significant Imaging: I have reviewed all pertinent imaging results/findings within the past 24 hours.

## 2023-11-16 NOTE — ASSESSMENT & PLAN NOTE
- pt admits to lack of compliance with BiPap at home; did wear it last night in ICU   - discussion of trajectory of HIEU in setting of CHF and risk of non-compliance with BiPap; discussed GOC   - noted; continued management per hospital primary

## 2023-11-16 NOTE — NURSING
[facility] [place] [facility]ensive [facility]  ICU Shift Summary  Date: 11/16/2023      Prehospitalization: Home  Admit Date / LOS : 11/13/2023/ 3 days    Diagnosis: Acute respiratory failure with hypoxia and hypercapnia    Consults:        Active: OT, Palliative, PT, Pulm CC, and Spiritual Care       Needed: N/A     Code Status: Full Code   Advanced Directive: <no information>    LDA:  Lines/Drains/Airways       Drain  Duration             Female External Urinary Catheter 11/13/23 1900 2 days              Peripheral Intravenous Line  Duration                  Peripheral IV - Single Lumen 11/13/23 1725 20 G Left;Posterior Forearm 2 days         Peripheral IV - Single Lumen 11/14/23 0208 20 G Anterior;Right Forearm 2 days                  Central Lines/Site/Justification:Patient Does Not Have Central Line  Urinary Cath/Order/Justification:Patient Does Not Have Urinary Catheter    Vasopressors/Infusions:        GOALS: Volume/ Hemodynamic: N/A                     RASS: N/A    Pain Management: none       Pain Controlled: not applicable     Rhythm: A-Fib    Respiratory Device: Vapotherm    Oxygen Concentration (%):  [40-45] 45                 Most Recent SBT/ SAT: N/A       MOVE Screen: PT Consult  ICU Liberation: not applicable    VTE Prophylaxis: Mechanical  Mobility: Bedrest  Stress Ulcer Prophylaxis: Yes    Isolation: Droplet    Dietary:   Current Diet Order   Procedures    Diet Cardiac Isolation Tray - Regular [place]     Order Specific Question:   Tray type:     Answer:   Isolation Tray - Regular [place]      Tolerance: yes  Advancement: @ goal    I & O (24h):    Intake/Output Summary (Last 24 hours) at 11/16/2023 1719  Last data filed at 11/16/2023 1643  Gross per 24 hour   Intake --   Output 2650 ml   Net -2650 ml        Restraints: No    Significant Dates:  Post Op Date: N/A  Rescue Date: N/A  Imaging/ Diagnostics: N/A    Noteworthy Labs:  see labs    COVID Test: (--)  CBC/Anemia Labs: Coags:    Recent Labs   Lab 11/13/23  1722  "11/15/23  0006   WBC 4.88 7.44   HGB 13.2 14.8   HCT 42.1 46.2    215   MCV 99* 99*   RDW 14.2 14.1    Recent Labs   Lab 11/13/23  1016   INR 1.0        Chemistries:   Recent Labs   Lab 11/13/23  1016 11/14/23  0016 11/15/23  0006 11/16/23  0259      < > 142 139   K 4.3   < > 3.9 4.1      < > 95 92*   CO2 22*   < > 36* 37*   BUN 23   < > 30* 34*   CREATININE 1.1   < > 1.1 1.1   CALCIUM 9.0   < > 9.8 9.5   PROT 6.7  --   --   --    BILITOT 0.6  --   --   --    ALKPHOS 93  --   --   --    ALT 27  --   --   --    AST 27  --   --   --     < > = values in this interval not displayed.        Cardiac Enzymes: Ejection Fractions:    No results for input(s): "CPK", "CPKMB", "MB", "TROPONINI" in the last 72 hours. EF   Date Value Ref Range Status   05/21/2022 60 % Final        POCT Glucose: HbA1c:    No results for input(s): "POCTGLUCOSE" in the last 168 hours. Hemoglobin A1C   Date Value Ref Range Status   10/27/2023 5.8 (H) 4.0 - 5.6 % Final     Comment:     ADA Screening Guidelines:  5.7-6.4%  Consistent with prediabetes  >or=6.5%  Consistent with diabetes    High levels of fetal hemoglobin interfere with the HbA1C  assay. Heterozygous hemoglobin variants (HbS, HgC, etc)do  not significantly interfere with this assay.   However, presence of multiple variants may affect accuracy.             ICU LOS 2d 23h  Level of Care: Critical Care    Chart Check: 12 HR Done  Shift Summary/Plan for the shift: see care plan note     "

## 2023-11-16 NOTE — PLAN OF CARE
Pt remains in the ICU on vapotherm at 35L/45%.  Afib on the monitor.  Afebrile.  Aox4.  Purewick in place with good urine output noted.  Droplet precautions maintained.  Plan of care reviewed. No injuries, falls or skin breakdown occurred this shift.

## 2023-11-16 NOTE — PT/OT/SLP EVAL
Occupational Therapy Evaluation     Name: Sandee Evans  MRN: [id number]  Admitting Diagnosis: Acute respiratory failure with hypoxia and hypercapnia  Recent Surgery: * No surgery found *      Recommendations:     Discharge Recommendations: Low Intensity Therapy  Level of Assistance Recommended: Intermittent assistance  Discharge Equipment Recommendations: none  Barriers to discharge:  (currently in ICU with Vapotherm)    Assessment:     Sandee Evans is a 67 y.o. female with a medical diagnosis of Acute respiratory failure with hypoxia and hypercapnia. She presents with performance deficits affecting function including impaired endurance, impaired self care skills, impaired functional mobility, impaired balance, decreased upper extremity function, decreased safety awareness, impaired cardiopulmonary response to activity.   The patient is motivated to participate in OT and regain her functional independence. The patient is currently limited by her oxygen need ( VT 35L, 45%) and was appropriate to sit in the chair today 2* intermittent Afib with RVR.    Rehab Prognosis: Good; patient would benefit from acute OT services to address these deficits and reach maximum level of function.    Plan:     Patient to be seen  (3-5x/week) to address the above listed problems via therapeutic activities, therapeutic exercises  Plan of Care Expires: 11/30/23  Plan of Care Reviewed with: patient    Subjective     Chief Complaint: anxious to be able to sit in the chair  Patient Comments/Goals: agreeable to OT eval  Pain/Comfort:  Pain Rating 1: 0/10    Patients cultural, spiritual, Muslim conflicts given the current situation: no    Social History:  Living Environment: Patient lives with their 2 grandchildren (18 mo and 4 yo), spouse, and daughter in a 2 story home with number of inside stair(s): 0 and can live on 1st floor  Prior Level of Function: Prior to admission, patient was modified independent with ADLs using rollator  for mobility  Roles and Routines: Patient was driving prior to admission. The patient states she is active and assists with the care of her grandchildren.  Equipment Used at Home: walker, rolling, rollator, bath bench, bedside commode  DME owned (not currently used): none  Assistance Upon Discharge: family    Objective:     Communicated with nurseJosiane prior to session. Patient found HOB elevated with blood pressure cuff, oxygen, peripheral IV, pulse ox (continuous), telemetry, PureWick upon OT entry to room.    General Precautions: Standard, droplet, fall (RSV)   Orthopedic Precautions: N/A   Braces: N/A    Respiratory Status:  Vapotherm 35L, 45%    Occupational Performance    Gait belt applied - No    Bed Mobility:   Scooting to HOB in supine: stand by assistance  Scooting anteriorly to EOB to have both feet planted on floor: stand by assistance  Supine to sit from right side of bed with stand by assistance  Sit to Supine with stand by assistance on right side of bed    Functional Mobility/Transfers:  Functional Mobility: The patient did not stand but scooted along the EOB with SBA/CGA    Activities of Daily Living:  Grooming: declined to perform grooming tasks with OT  Upper Body Dressing: maximal assistance (limited by ICU lines)  Lower Body Dressing: dependence  Toileting: PureWick in use    Cognitive/Visual Perceptual:  Cognitive/Psychosocial Skills:    -     Oriented to: Person, Place, Time, Situation  -     Follows Commands/attention: Follows two-step commands  -     Communication: clear/fluent  -     Memory: No Deficits noted  -     Safety awareness/insight to disability: intact  -     Mood/Affect/Coping skills/emotional control: Appropriate to situation  Visual/Perceptual:    -     Wears glasses    Physical Exam:  Balance:    -     Sitting: supervision and stand by assistance  -     Standing: did not stand  Postural examination/scapula alignment:    -       Rounded shoulders  -       Forward head  Skin  integrity: Visible skin intact  Edema:  BLE  Sensation:    -       Intact  Dominant hand: Right  Upper Extremity Range of Motion:     -       Right Upper Extremity: WFL  -       Left Upper Extremity: WFL  Upper Extremity Strength:    -       Right Upper Extremity: WFL  -       Left Upper Extremity: WFL   Strength:    -       Right Upper Extremity: WFL  -       Left Upper Extremity: WFL    AMPAC 6 Click ADL:  AMPA Total Score: 18    Treatment & Education:  Patient educated on role of OT, POC, and goals for therapy  The patient performed AROM to B shoulder 5 reps/x2, B elbow flex/ext x10 reps.   HR 's with activity, /69, SpO2 91%. Per nurse, patient with episodes of Afib w/ RVR    Patient left HOB elevated with all lines intact, call button in reach, and RN notified.    GOALS:   Multidisciplinary Problems       Occupational Therapy Goals          Problem: Occupational Therapy    Goal Priority Disciplines Outcome Interventions   Occupational Therapy Goal     OT, PT/OT Ongoing, Progressing    Description: Goals to be met by: 11/30/23     Patient will increase functional independence with ADLs by performing:    UE Dressing with Modified [place].  LE Dressing with Set-up Assistance and Supervision.  Grooming while standing at sink with Modified [place] and Supervision and Assistive Devices as needed.  Toileting from toilet with Stand-by Assistance for hygiene and clothing management.   Sitting at edge of bed x30 minutes with Modified [place].  Rolling to Bilateral with Modified [place].   Supine to sit with Modified [place] and Supervision.  Step transfer with Modified [place] and Supervision  Toilet transfer to toilet with Modified [place] and Supervision.  Upper extremity exercise program x15 reps per handout, with independence.                         History:     Past Medical History:   Diagnosis Date    Anticoagulant long-term use     Xarelto    Arthritis     Atrial  fibrillation     Breast cyst     CHF (congestive heart failure)     Degenerative disc disease     Edema     HLD (hyperlipidemia)     Hx of psychiatric care     Hyperlipidemia     Hypertension     Hypothyroidism     Nuclear sclerosis, bilateral 12/18/2017    Obesity, morbid     HIEU (obstructive sleep apnea)     Other abnormal glucose     pre-diabetes    Pre-diabetes     Psychiatric problem     Requires assistance with activities of daily living (ADL)     Sleep apnea     Smoker     SOB (shortness of breath)     SOB (shortness of breath)     Thyroid disease     on meds 8-9 years ago. hypothyroidism.no malignancy    TMJ (temporomandibular joint disorder)     jaw clicking    Tobacco abuse     Unsteady gait     Urge incontinence     Weakness generalized     Wears glasses          Past Surgical History:   Procedure Laterality Date    BREAST BIOPSY Right     over 10 yrs ago/ benign    BREAST CYST EXCISION Right     BREAST LUMPECTOMY Right     over 10 yrs ago, ex bx/ benign    COLONOSCOPY N/A 6/25/2019    Procedure: COLONOSCOPY;  Surgeon: Micheline Flores MD;  Location: [facility] ENDO;  Service: Endoscopy;  Laterality: N/A;  RX XARELTO ok to hold (2 days) per Dr. Naik see scan 3/20/19    ENDOSCOPIC ULTRASOUND OF UPPER GASTROINTESTINAL TRACT N/A 1/26/2021    Procedure: ULTRASOUND-ENDOSCOPIC-UPPER;  Surgeon: Stevenson Philippe MD;  Location: [facility] ENDO;  Service: Endoscopy;  Laterality: N/A;    HYSTERECTOMY      JOINT REPLACEMENT Bilateral     knees    OOPHORECTOMY      rt.knee surgery 2016      TOTAL KNEE ARTHROPLASTY Left 2/19/2019    Procedure: ARTHROPLASTY, KNEE, TOTAL;  Surgeon: Med Hanson MD;  Location: [facility] OR;  Service: Orthopedics;  Laterality: Left;  10AM START PER  PER JAYLIN TEXT @ 8:34AM ON 2-18-19  SUPINE  HARRIS LOYA [phone] TEXTED HIM ON 1-24-19 @ 7:57AM  RN PRE OP 2-13-19--BMI--48.9    underarm gland\ Bilateral        Time Tracking:     OT Date of Treatment: 11/16/23  OT Start Time: 1032  OT Stop  Time: 1050  OT Total Time (min): 18 min    Billable Minutes: Evaluation 18 (with PT)    11/16/2023

## 2023-11-16 NOTE — ASSESSMENT & PLAN NOTE
- persistent Afib since admission requiring ICU care   - noted; continued management per hospital primary

## 2023-11-17 LAB
ANION GAP SERPL CALC-SCNC: 10 MMOL/L (ref 8–16)
BUN SERPL-MCNC: 45 MG/DL (ref 8–23)
CALCIUM SERPL-MCNC: 9.4 MG/DL (ref 8.7–10.5)
CHLORIDE SERPL-SCNC: 91 MMOL/L (ref 95–110)
CO2 SERPL-SCNC: 43 MMOL/L (ref 23–29)
CREAT SERPL-MCNC: 1.3 MG/DL (ref 0.5–1.4)
EST. GFR  (NO RACE VARIABLE): 45 ML/MIN/1.73 M^2
GLUCOSE SERPL-MCNC: 87 MG/DL (ref 70–110)
POTASSIUM SERPL-SCNC: 3.8 MMOL/L (ref 3.5–5.1)
SODIUM SERPL-SCNC: 144 MMOL/L (ref 136–145)

## 2023-11-17 PROCEDURE — 99498 PR ADVNCD CARE PLAN ADDL 30 MIN: ICD-10-PCS | Mod: ,,, | Performed by: REGISTERED NURSE

## 2023-11-17 PROCEDURE — 94761 N-INVAS EAR/PLS OXIMETRY MLT: CPT

## 2023-11-17 PROCEDURE — 97535 SELF CARE MNGMENT TRAINING: CPT

## 2023-11-17 PROCEDURE — 97110 THERAPEUTIC EXERCISES: CPT

## 2023-11-17 PROCEDURE — 99233 SBSQ HOSP IP/OBS HIGH 50: CPT | Mod: ,,, | Performed by: REGISTERED NURSE

## 2023-11-17 PROCEDURE — 25000242 PHARM REV CODE 250 ALT 637 W/ HCPCS

## 2023-11-17 PROCEDURE — 25000003 PHARM REV CODE 250: Performed by: HOSPITALIST

## 2023-11-17 PROCEDURE — 25000003 PHARM REV CODE 250

## 2023-11-17 PROCEDURE — 80048 BASIC METABOLIC PNL TOTAL CA: CPT | Performed by: HOSPITALIST

## 2023-11-17 PROCEDURE — 99291 CRITICAL CARE FIRST HOUR: CPT | Mod: ,,, | Performed by: STUDENT IN AN ORGANIZED HEALTH CARE EDUCATION/TRAINING PROGRAM

## 2023-11-17 PROCEDURE — 27100171 HC OXYGEN HIGH FLOW UP TO 24 HOURS

## 2023-11-17 PROCEDURE — 99497 PR ADVNCD CARE PLAN 30 MIN: ICD-10-PCS | Mod: ,,, | Performed by: REGISTERED NURSE

## 2023-11-17 PROCEDURE — 97530 THERAPEUTIC ACTIVITIES: CPT

## 2023-11-17 PROCEDURE — 63600175 PHARM REV CODE 636 W HCPCS

## 2023-11-17 PROCEDURE — 94660 CPAP INITIATION&MGMT: CPT

## 2023-11-17 PROCEDURE — 99233 PR SUBSEQUENT HOSPITAL CARE,LEVL III: ICD-10-PCS | Mod: ,,, | Performed by: REGISTERED NURSE

## 2023-11-17 PROCEDURE — 99498 ADVNCD CARE PLAN ADDL 30 MIN: CPT | Mod: ,,, | Performed by: REGISTERED NURSE

## 2023-11-17 PROCEDURE — 27000207 HC ISOLATION

## 2023-11-17 PROCEDURE — 21400001 HC TELEMETRY ROOM

## 2023-11-17 PROCEDURE — 94640 AIRWAY INHALATION TREATMENT: CPT

## 2023-11-17 PROCEDURE — 99497 ADVNCD CARE PLAN 30 MIN: CPT | Mod: ,,, | Performed by: REGISTERED NURSE

## 2023-11-17 PROCEDURE — 25000242 PHARM REV CODE 250 ALT 637 W/ HCPCS: Performed by: NURSE PRACTITIONER

## 2023-11-17 PROCEDURE — 99900035 HC TECH TIME PER 15 MIN (STAT)

## 2023-11-17 PROCEDURE — 99291 PR CRITICAL CARE, E/M 30-74 MINUTES: ICD-10-PCS | Mod: ,,, | Performed by: STUDENT IN AN ORGANIZED HEALTH CARE EDUCATION/TRAINING PROGRAM

## 2023-11-17 PROCEDURE — 36415 COLL VENOUS BLD VENIPUNCTURE: CPT | Performed by: HOSPITALIST

## 2023-11-17 PROCEDURE — 94799 UNLISTED PULMONARY SVC/PX: CPT

## 2023-11-17 RX ADMIN — MUPIROCIN: 20 OINTMENT TOPICAL at 10:11

## 2023-11-17 RX ADMIN — MUPIROCIN: 20 OINTMENT TOPICAL at 08:11

## 2023-11-17 RX ADMIN — FAMOTIDINE 20 MG: 20 TABLET ORAL at 10:11

## 2023-11-17 RX ADMIN — IPRATROPIUM BROMIDE AND ALBUTEROL SULFATE 3 ML: 2.5; .5 SOLUTION RESPIRATORY (INHALATION) at 07:11

## 2023-11-17 RX ADMIN — FAMOTIDINE 20 MG: 20 TABLET ORAL at 08:11

## 2023-11-17 RX ADMIN — IPRATROPIUM BROMIDE AND ALBUTEROL SULFATE 3 ML: 2.5; .5 SOLUTION RESPIRATORY (INHALATION) at 03:11

## 2023-11-17 RX ADMIN — SERTRALINE HYDROCHLORIDE 100 MG: 50 TABLET ORAL at 08:11

## 2023-11-17 RX ADMIN — PREDNISONE 40 MG: 20 TABLET ORAL at 08:11

## 2023-11-17 RX ADMIN — QUETIAPINE FUMARATE 25 MG: 25 TABLET ORAL at 10:11

## 2023-11-17 RX ADMIN — METOPROLOL SUCCINATE 100 MG: 50 TABLET, EXTENDED RELEASE ORAL at 08:11

## 2023-11-17 RX ADMIN — RIVAROXABAN 20 MG: 20 TABLET, FILM COATED ORAL at 04:11

## 2023-11-17 RX ADMIN — IPRATROPIUM BROMIDE AND ALBUTEROL SULFATE 3 ML: 2.5; .5 SOLUTION RESPIRATORY (INHALATION) at 04:11

## 2023-11-17 RX ADMIN — ATORVASTATIN CALCIUM 40 MG: 40 TABLET, FILM COATED ORAL at 08:11

## 2023-11-17 RX ADMIN — SODIUM CHLORIDE SOLN NEBU 3% 4 ML: 3 NEBU SOLN at 04:11

## 2023-11-17 RX ADMIN — SODIUM CHLORIDE SOLN NEBU 3% 4 ML: 3 NEBU SOLN at 07:11

## 2023-11-17 RX ADMIN — IPRATROPIUM BROMIDE AND ALBUTEROL SULFATE 3 ML: 2.5; .5 SOLUTION RESPIRATORY (INHALATION) at 11:11

## 2023-11-17 NOTE — TELEPHONE ENCOUNTER
Patient is aware of results he states he did have some drainage he thinks from his belly button yesterday. No drainage today.  He has no pain and states his breathing is normal.    He will monitor and is advised per Bailee to be evaluated in ER if he continues to have drainage.    He is agreeable   Please see previous encounter

## 2023-11-17 NOTE — PT/OT/SLP PROGRESS
Occupational Therapy   Treatment    Name: Sandee Evans  MRN: [id number]  Admitting Diagnosis:  Acute respiratory failure with hypoxia and hypercapnia       Recommendations:     Discharge Recommendations: Low Intensity Therapy  Discharge Equipment Recommendations:  none  Barriers to discharge:   (currently in ICU with Vapotherm)    Assessment:     Sandee Evans is a 67 y.o. female with a medical diagnosis of Acute respiratory failure with hypoxia and hypercapnia.   Performance deficits affecting function are impaired endurance, impaired self care skills, impaired functional mobility, gait instability, decreased upper extremity function, decreased lower extremity function, decreased safety awareness, impaired cardiopulmonary response to activity, edema.       Rehab Prognosis:  Good; patient would benefit from acute skilled OT services to address these deficits and reach maximum level of function.       Plan:     Patient to be seen  (3-5x/week) to address the above listed problems via self-care/home management, therapeutic activities, therapeutic exercises  Plan of Care Expires: 11/30/23  Plan of Care Reviewed with: patient    Subjective     Chief Complaint: wants to get OOB  Patient/Family Comments/goals: sit in the chair  Pain/Comfort:  Pain Rating 1: 0/10    Objective:     Communicated with: nurseJosiane prior to session.  Patient found HOB elevated with blood pressure cuff, PureWick, peripheral IV, pulse ox (continuous), oxygen, telemetry upon OT entry to room.    General Precautions: Standard, droplet, fall (RSV)    Orthopedic Precautions:N/A  Braces: N/A  Respiratory Status:  Vapotherm 30L 30%     Occupational Performance:     Bed Mobility:    Patient completed Scooting/Bridging with stand by assistance  Patient completed Supine to Sit with stand by assistance     Functional Mobility/Transfers:  Patient completed Sit <> Stand Transfer with contact guard assistance  with  hand-held assist   Patient completed  Bed <> Chair Transfer using Step Transfer technique with contact guard assistance with hand-held assist  Functional Mobility: bed to chair only    Activities of Daily Living:  Upper Body Dressing: maximal assistance (limited by ICU lines)  Lower Body Dressing: dependence        AMPAC 6 Click ADL: 19    Treatment & Education:  The patient performed self care and functional mobility as noted above  The patient performed B shoulder flex/ext, shoulder shrugs, B elbow flex/ext x10 reps with rest between. The patient's HR was  with UE therex, SpO2 86-92%   The patient was educated re: PLB between tasks to keep O2 sats between 88-92%  The patient was educated re: the need to request nursing assist to return to bed    Patient left up in chair with all lines intact, call button in reach, and nurse notified    GOALS:   Multidisciplinary Problems       Occupational Therapy Goals          Problem: Occupational Therapy    Goal Priority Disciplines Outcome Interventions   Occupational Therapy Goal     OT, PT/OT Ongoing, Progressing    Description: Goals to be met by: 11/30/23     Patient will increase functional independence with ADLs by performing:    UE Dressing with Modified [place].  LE Dressing with Set-up Assistance and Supervision.  Grooming while standing at sink with Modified [place] and Supervision and Assistive Devices as needed.  Toileting from toilet with Stand-by Assistance for hygiene and clothing management.   Sitting at edge of bed x30 minutes with Modified [place].  Rolling to Bilateral with Modified [place].   Supine to sit with Modified [place] and Supervision.  Step transfer with Modified [place] and Supervision  Toilet transfer to toilet with Modified [place] and Supervision.  Upper extremity exercise program x15 reps per handout, with independence.                         Time Tracking:     OT Date of Treatment: 11/17/23  OT Start Time: 1128  OT Stop Time: 1156  OT  Total Time (min): 28 min    Billable Minutes:Self Care/Home Management 15  Therapeutic Exercise 13  Total Time 28 (with PT)    OT/CHANTELLE: OT          11/17/2023

## 2023-11-17 NOTE — NURSING
[facility] [place] 4 [place]      11/17/2023       Skin assessed on: Q Shift      [x] No Pressure Injuries Present    [x]Prevention Measures Documented    [] Yes LDA  for Pressure Injury Previously documented     [] Yes New Pressure Injury Discovered   [] LDA for New Pressure Injury Added      Attending RN:  Josiane Sood RN     Second RN:  CLAUDIA Cuellar

## 2023-11-17 NOTE — PT/OT/SLP PROGRESS
Physical Therapy Treatment    Patient Name:  Sandee Evans   MRN:  [id number]    Recommendations:     Discharge Recommendations: Low Intensity Therapy  Discharge Equipment Recommendations: none    Assessment:     Sandee Evans is a 67 y.o. female admitted with a medical diagnosis of Acute respiratory failure with hypoxia and hypercapnia.  She presents with the following impairments/functional limitations: impaired endurance, impaired functional mobility.    Rehab Prognosis: Good; patient would benefit from acute skilled PT services to address these deficits and reach maximum level of function.    Recent Surgery: * No surgery found *      Plan:     During this hospitalization, patient to be seen 3 x/week to address the identified rehab impairments via gait training, therapeutic activities, therapeutic exercises and progress toward the following goals:    Plan of Care Expires:  11/23/23    Subjective     Chief Complaint: None  Patient/Family Comments/goals: Pt wanting to get OOB to chair.  Pain/Comfort:  Pain Rating 1: 0/10      Objective:     Communicated with nurseJosiane prior to session.  Patient found  HOB elevated and receiving breathing treatment  with blood pressure cuff, PureWick, pulse ox (continuous), telemetry, Other (comments) (vapotherm) upon PT entry to room.     General Precautions: Standard,    Orthopedic Precautions: N/A  Braces: N/A  Respiratory Status: High flow, flow 30 L/min, concentration 30%     Functional Mobility:  Bed Mobility:     Supine to Sit: supervision  Transfers:     Sit to Stand:  contact guard assistance with no AD  Bed to Chair: contact guard assistance with  no AD  using  Stand Pivot      AM-PAC 6 CLICK MOBILITY  Turning over in bed (including adjusting bedclothes, sheets and blankets)?: 4  Sitting down on and standing up from a chair with arms (e.g., wheelchair, bedside commode, etc.): 3  Moving from lying on back to sitting on the side of the bed?: 4  Moving to and from a bed  to a chair (including a wheelchair)?: 3  Need to walk in hospital room?: 3  Climbing 3-5 steps with a railing?: 3  Basic Mobility Total Score: 20       Treatment & Education:  Pt transferred supine> sit EOB while receiving breathing treatment; performed 1x15 reps BLE LAQ while seated EOB.  Transferred EOB to chair with CGA and performed 1x15 reps BLE marching, and APs.  Pt with O2 sats falling to 87% however, recovered fairly quickly.  Nurse reports Dr. Harris ok'd O2 sats to be between 88% and 92% therefore pt educated on importance of pacing herself and educated on pursed lip breathing.    Patient left  reclined in chair  with all lines intact, call button in reach, nurse notified, and all needs in reach .    GOALS:   Multidisciplinary Problems       Physical Therapy Goals          Problem: Physical Therapy    Goal Priority Disciplines Outcome Goal Variances Interventions   Physical Therapy Goal     PT, PT/OT Ongoing, Progressing     Description: Goals to be met by: 23     Patient will increase functional independence with mobility by performin. Pt to be mod I with bed mobility.  2. Pt to transfer with (S).  3. Pt to ambulate 50' w/RW SBA.  4. Pt to be (I) with written HEP.                         Time Tracking:     PT Received On: 23  PT Start Time: 1128     PT Stop Time: 1156  PT Total Time (min): 28 min     Billable Minutes: Therapeutic Activity 13 and Therapeutic Exercise 15    Treatment Type: Treatment  PT/PTA: PT           2023

## 2023-11-17 NOTE — SUBJECTIVE & OBJECTIVE
Medications:  Continuous Infusions:  Scheduled Meds:   albuterol-ipratropium  3 mL Nebulization Q4H    atorvastatin  40 mg Oral Daily    famotidine  20 mg Oral BID    metoprolol succinate  100 mg Oral Daily    mupirocin   Nasal BID    predniSONE  40 mg Oral Daily    QUEtiapine  25 mg Oral QHS    rivaroxaban  20 mg Oral Daily with dinner    sertraline  100 mg Oral Daily    sodium chloride 3%  4 mL Nebulization Q8H     PRN Meds:benzonatate, dextromethorphan-guaiFENesin  mg/5 ml, hydrALAZINE, hydrOXYzine pamoate    Objective:     Vital Signs (Most Recent):  Temp: 97.7 °F (36.5 °C) (11/17/23 0800)  Pulse: 91 (11/17/23 1127)  Resp: (!) 29 (11/17/23 1127)  BP: (!) 182/74 (11/17/23 1000)  SpO2: (!) 91 % (11/17/23 1127) Vital Signs (24h Range):  Temp:  [96.5 °F (35.8 °C)-98.3 °F (36.8 °C)] 97.7 °F (36.5 °C)  Pulse:  [] 91  Resp:  [15-36] 29  SpO2:  [88 %-100 %] 91 %  BP: (116-182)/(64-87) 182/74     Weight: 125.2 kg (276 lb)  Body mass index is 48.89 kg/m².       Physical Exam  Vitals and nursing note reviewed.   Constitutional:       General: She is not in acute distress.     Appearance: She is obese. She is ill-appearing.      Interventions: Nasal cannula in place.      Comments: Sitting in bedside chair, cheerful    HENT:      Head: Normocephalic and atraumatic.   Musculoskeletal:      Right lower leg: Edema present.      Left lower leg: Edema present.   Neurological:      Mental Status: She is alert and oriented to person, place, and time.   Psychiatric:         Mood and Affect: Mood normal.         Thought Content: Thought content normal.         Judgment: Judgment normal.       Advance Care Planning   Advance Directives:   Living Will: No    LaPOST: No    Do Not Resuscitate Status: No    Medical Power of : No (Spouse Corwin is legal surrogate decision maker)    Goals of Care: What is most important right now is to focus on avoiding the hospital, remaining as independent as possible, quality of  life, even if it means sacrificing a little time. Accordingly, we have decided that the best plan to meet the patient's goals includes continuing with treatment.     Significant Labs: All pertinent labs within the past 24 hours have been reviewed.  CBC:   Recent Labs   Lab 11/15/23  0006   WBC 7.44   HGB 14.8   HCT 46.2   MCV 99*        BMP:  Recent Labs   Lab 11/17/23  0449   GLU 87      K 3.8   CL 91*   CO2 43*   BUN 45*   CREATININE 1.3   CALCIUM 9.4     LFT:  Lab Results   Component Value Date    AST 27 11/13/2023    ALKPHOS 93 11/13/2023    BILITOT 0.6 11/13/2023     Albumin:   Albumin   Date Value Ref Range Status   11/13/2023 3.0 (L) 3.5 - 5.2 g/dL Final     Protein:   Total Protein   Date Value Ref Range Status   11/13/2023 6.7 6.0 - 8.4 g/dL Final     Lactic acid:   Lab Results   Component Value Date    LACTATE 1.2 05/20/2022     Significant Imaging: I have reviewed all pertinent imaging results/findings within the past 24 hours.

## 2023-11-17 NOTE — PLAN OF CARE
Problem: Adult Inpatient Plan of Care  Goal: Absence of Hospital-Acquired Illness or Injury  Outcome: Ongoing, Progressing  Goal: Optimal Comfort and Wellbeing  Outcome: Ongoing, Progressing     Problem: Bariatric Environmental Safety  Goal: Safety Maintained with Care  Outcome: Ongoing, Progressing     Problem: Diabetes Comorbidity  Goal: Blood Glucose Level Within Targeted Range  Outcome: Ongoing, Progressing     Problem: Fall Injury Risk  Goal: Absence of Fall and Fall-Related Injury  Outcome: Ongoing, Progressing     Problem: Skin Injury Risk Increased  Goal: Skin Health and Integrity  Outcome: Ongoing, Progressing     Problem: Infection  Goal: Absence of Infection Signs and Symptoms  Outcome: Ongoing, Progressing     Problem: Coping Ineffective  Goal: Effective Coping  Outcome: Ongoing, Progressing

## 2023-11-17 NOTE — PROGRESS NOTES
"[facility] Medicine  Progress Note    Patient Name: Sandee Evans  MRN: [id number]  Patient Class: IP- Inpatient   Admission Date: 11/13/2023  Length of Stay: 4 days  Attending Physician: uLz Ware, *  Primary Care Provider: Kuldeep Miller MD        Subjective:     Principal Problem:Acute respiratory failure with hypoxia and hypercapnia        HPI:   67 y.o. female, with a past medical history of A-fib on OAC,diastolic  CHF, COPD, HLD, HTN, hypothyroidism, morbid obesity, pre-diabetes, and psychiatric problems, who presents to the ED with SOB onset 3 weeks ago. She reports SOB has gotten progressively worse over 3 weeks. Patient notes she has trouble ambulating d/t SOB. Patient notes being on an at-home BiPAP but denies daily use d/t the machine "smothering" her. Additional history provided by independent historian, relative, notes an O2 saturation in the 80's at-home PTA. Patient denies O2 at home. Patient notes an associated cough, congestion, diarrhea, rhinorrhea, sore throat, and headache.ABG in ER show hypercapnic,hypoxic  respiratory failure with PH of 7.2 and P C O2 of 72,PO 2 of 76,chest x ray show fluid overload,BNP is over 478,patient has been  started on continues Bipap and IV lasix,,admitted to ICU for close monitoring.    Overview/Hospital Course:   67 y.o. female, with a past medical history of A-fib on OAC,diastolic  CHF, COPD, HLD, HTN, hypothyroidism, morbid obesity, pre-diabetes, and psychiatric problems, who presents to the ED with SOB onset 3 weeks ago. She reports SOB has gotten progressively worse over 3 weeks. Patient notes she has trouble ambulating d/t SOB. Patient notes being on an at-home BiPAP but denies daily use d/t the machine "smothering" her. \ relative, notes an O2 saturation in the 80's at-home PTA. Patient denies O2 at home. .ABG in ER show hypercapnic,hypoxic  respiratory failure with PH of 7.2 and P C O2 of 72,PO 2 of 76,chest x ray " show fluid overload,BNP is over 478,patient has been  started on continues Bipap and IV lasix,,admitted to ICU for close monitoring.  VBG  show improvement in CO2 retention,consulted pulmonology.on HFO during day and nightly bipap.  Was on precedex drip for agitation.improved  off precedex,  Again was less complaint with bipap again,consulted again,communicated with nursing staff to fit better  mask,did better last night with Bipap.  Consulted PT,OT   Added IS,continue wean of oxygen.    Past Medical History:   Diagnosis Date    Anticoagulant long-term use     Xarelto    Arthritis     Atrial fibrillation     Breast cyst     CHF (congestive heart failure)     Degenerative disc disease     Edema     HLD (hyperlipidemia)     Hx of psychiatric care     Hyperlipidemia     Hypertension     Hypothyroidism     Nuclear sclerosis, bilateral 12/18/2017    Obesity, morbid     HIEU (obstructive sleep apnea)     Other abnormal glucose     pre-diabetes    Pre-diabetes     Psychiatric problem     Requires assistance with activities of daily living (ADL)     Sleep apnea     Smoker     SOB (shortness of breath)     SOB (shortness of breath)     Thyroid disease     on meds 8-9 years ago. hypothyroidism.no malignancy    TMJ (temporomandibular joint disorder)     jaw clicking    Tobacco abuse     Unsteady gait     Urge incontinence     Weakness generalized     Wears glasses        Past Surgical History:   Procedure Laterality Date    BREAST BIOPSY Right     over 10 yrs ago/ benign    BREAST CYST EXCISION Right     BREAST LUMPECTOMY Right     over 10 yrs ago, ex bx/ benign    COLONOSCOPY N/A 6/25/2019    Procedure: COLONOSCOPY;  Surgeon: Micheline Flores MD;  Location: [facility];  Service: Endoscopy;  Laterality: N/A;  RX XARELTO ok to hold (2 days) per Dr. Naik see scan 3/20/19    ENDOSCOPIC ULTRASOUND OF UPPER GASTROINTESTINAL TRACT N/A 1/26/2021    Procedure: ULTRASOUND-ENDOSCOPIC-UPPER;  Surgeon: Stevenson Philippe MD;  Location: [facility]  ENDO;  Service: Endoscopy;  Laterality: N/A;    HYSTERECTOMY      JOINT REPLACEMENT Bilateral     knees    OOPHORECTOMY      rt.knee surgery 2016      TOTAL KNEE ARTHROPLASTY Left 2/19/2019    Procedure: ARTHROPLASTY, KNEE, TOTAL;  Surgeon: Med Hanson MD;  Location: [facility] OR;  Service: Orthopedics;  Laterality: Left;  10AM START PER  PER JAYLIN TEXT @ 8:34AM ON 2-18-19  SUPINE  HARRIS LOYA [phone] TEXTED HIM ON 1-24-19 @ 7:57AM  RN PRE OP 2-13-19--BMI--48.9    underarm gland\ Bilateral        Review of patient's allergies indicates:   Allergen Reactions    Ace inhibitors      Cough         No current facility-administered medications on file prior to encounter.     Current Outpatient Medications on File Prior to Encounter   Medication Sig    cetirizine (ZYRTEC) 10 MG tablet Take 1 tablet (10 mg total) by mouth once daily.    fluticasone propionate (FLONASE) 50 mcg/actuation nasal spray SHAKE LIQUID AND USE 2 SPRAYS(100 MCG) IN EACH NOSTRIL EVERY DAY    albuterol (PROVENTIL/VENTOLIN HFA) 90 mcg/actuation inhaler 2 puffs every 6 (six) hours as needed.    albuterol-ipratropium (DUO-NEB) 2.5 mg-0.5 mg/3 mL nebulizer solution Take 3 mLs by nebulization every 6 (six) hours as needed for Wheezing. Rescue    atorvastatin (LIPITOR) 40 MG tablet TAKE 1 TABLET(40 MG) BY MOUTH EVERY DAY    azelastine (ASTELIN) 137 mcg (0.1 %) nasal spray 1 spray (137 mcg total) by Nasal route 2 (two) times daily.    buPROPion (WELLBUTRIN XL) 150 MG TB24 tablet TAKE 1 TABLET(150 MG) BY MOUTH EVERY DAY    furosemide (LASIX) 40 MG tablet Take 1 tablet (40 mg total) by mouth once daily.    ketoconazole (NIZORAL) 2 % cream Apply topically once daily.    losartan (COZAAR) 25 MG tablet TAKE 1 TABLET(25 MG) BY MOUTH EVERY DAY    metoprolol succinate (TOPROL-XL) 100 MG 24 hr tablet Take 1 tablet (100 mg total) by mouth once daily.    sertraline (ZOLOFT) 100 MG tablet TAKE 1 TABLET(100 MG) BY MOUTH EVERY DAY    SPIRIVA RESPIMAT 2.5  mcg/actuation inhaler INHALE 2 PUFFS BY MOUTH DAILY (Patient not taking: Reported on 10/18/2023)    triamcinolone acetonide 0.1% (KENALOG) 0.1 % ointment APPLY BETWEEN THE KNEES AND UPPER THIGHS TWICE DAILY FOR NO MORE THAN 7 TO 14 DAYS    XARELTO 20 mg Tab TAKE 1 TABLET(20 MG) BY MOUTH EVERY DAY     Family History       Problem Relation (Age of Onset)    Cancer Mother, Brother    Diabetes Mother, Brother    Hypertension Mother    No Known Problems Father, Sister, Maternal Aunt, Maternal Uncle, Paternal Aunt, Paternal Uncle, Maternal Grandmother, Maternal Grandfather, Paternal Grandmother, Paternal Grandfather, Other          Tobacco Use    Smoking status: Every Day     Current packs/day: 0.50     Average packs/day: 0.5 packs/day for 50.9 years (25.4 ttl pk-yrs)     Types: Cigarettes     Start date: 1973    Smokeless tobacco: Current   Substance and Sexual Activity    Alcohol use: No    Drug use: No    Sexual activity: Yes     Partners: Male     Review of Systems   Constitutional:  Positive for activity change and appetite change.   HENT:  Negative for congestion and dental problem.    Eyes:  Negative for discharge and itching.   Respiratory:  Positive for shortness of breath.    Cardiovascular:  Negative for chest pain and leg swelling.   Gastrointestinal:  Negative for abdominal distention and abdominal pain.   Endocrine: Negative for cold intolerance.   Genitourinary:  Negative for difficulty urinating and dyspareunia.   Musculoskeletal:  Negative for arthralgias and back pain.   Skin:  Negative for color change.   Neurological:  Positive for weakness.   Psychiatric/Behavioral:  Negative for agitation and behavioral problems.      Objective:     Vital Signs (Most Recent):  Temp: 97.7 °F (36.5 °C) (11/17/23 0800)  Pulse: 89 (11/17/23 0900)  Resp: (!) 26 (11/17/23 0900)  BP: 135/66 (11/17/23 0900)  SpO2: (!) 93 % (11/17/23 0900) Vital Signs (24h Range):  Temp:  [96.5 °F (35.8 °C)-98.3 °F (36.8 °C)] 97.7 °F (36.5  "°C)  Pulse:  [] 89  Resp:  [15-36] 26  SpO2:  [88 %-100 %] 93 %  BP: (116-153)/(64-87) 135/66     Weight: 125.2 kg (276 lb)  Body mass index is 48.89 kg/m².     Physical Exam  HENT:      Head: Normocephalic.      Right Ear: There is no impacted cerumen.      Nose: No congestion.      Mouth/Throat:      Mouth: Mucous membranes are dry.   Eyes:      Extraocular Movements: Extraocular movements intact.      Pupils: Pupils are equal, round, and reactive to light.   Cardiovascular:      Rate and Rhythm: Normal rate. Rhythm irregular.      Heart sounds: No murmur heard.  Pulmonary:      Breath sounds: Rales present.   Abdominal:      General: There is no distension.      Tenderness: There is no abdominal tenderness.   Musculoskeletal:         General: Swelling present.   Skin:     Coloration: Skin is not jaundiced.      Findings: No bruising.   Neurological:      General: No focal deficit present.      Mental Status: She is alert.   Psychiatric:         Mood and Affect: Mood normal.         Behavior: Behavior normal.              CRANIAL NERVES     CN III, IV, VI   Pupils are equal, round, and reactive to light.       Significant Labs: All pertinent labs within the past 24 hours have been reviewed.  BMP:   Recent Labs   Lab 11/17/23  0449   GLU 87      K 3.8   CL 91*   CO2 43*   BUN 45*   CREATININE 1.3   CALCIUM 9.4       CBC:   No results for input(s): "WBC", "HGB", "HCT", "PLT" in the last 48 hours.    CMP:   Recent Labs   Lab 11/16/23  0259 11/17/23  0449    144   K 4.1 3.8   CL 92* 91*   CO2 37* 43*   GLU 95 87   BUN 34* 45*   CREATININE 1.1 1.3   CALCIUM 9.5 9.4   ANIONGAP 10 10         Significant Imaging: I have reviewed all pertinent imaging results/findings within the past 24 hours.    Assessment/Plan:      * Acute respiratory failure with hypoxia and hypercapnia  Patient with Hypercapnic and Hypoxic Respiratory failure which is Acute on chronic.  she is not on home oxygen. Supplemental oxygen " was provided and noted- Oxygen Concentration (%):  [40-45] 40    .   Signs/symptoms of respiratory failure include- tachypnea, increased work of breathing, and lethargy. Contributing diagnoses includes - COPD and Obesity Hypoventilation Labs and images were reviewed. Patient Has recent ABG, which has been reviewed. Will treat underlying causes and adjust management of respiratory failure as follows-   ER show hypercapnic,hypoxic  respiratory failure with PH of 7.2 and P C O2 of 72,PO 2 of 76,chest x ray show fluid overload,BNP is over 478,patient has been  started on continues Bipap and IV lasix,,admitted to ICU for close monitoring.    Was on precedex drip for agitation  VBG  show improvement in CO2 retention,consulted pulmonology.on HFO during day and nightly bipap.  Was on precedex drip for agitation.improved  off precedex,  Again was less complaint with bipap again,consulted again,communicated with nursing staff to fit better  mask,  Added IS,continue wean of oxygen.  Has RSV on viral sputum culture,as well, on droplet precaution     Chronic obstructive pulmonary disease with acute exacerbation  Patient's COPD is with exacerbation noted by continued dyspnea and use of accessory muscles for breathing currently.  Patient is currently off COPD Pathway. Continue scheduled inhalers Steroids, Antibiotics, and Supplemental oxygen and monitor respiratory status closely.     Chronic kidney disease, stage 3a  Creatine stable for now. BMP reviewed- noted Estimated Creatinine Clearance: 61.4 mL/min (based on SCr of 1.1 mg/dL). according to latest data. Based on current GFR, CKD stage is stage 3 - GFR 30-59.  Monitor UOP and serial BMP and adjust therapy as needed. Renally dose meds. Avoid nephrotoxic medications and procedures.    Pulmonary hypertension  Will diurese.      Acute on chronic diastolic CHF (congestive heart failure)  Patient is identified as having Diastolic (HFpEF) heart failure that is Acute on chronic. CHF  is currently uncontrolled due to Dyspnea not returned to baseline after 1 doses of IV diuretic. Latest ECHO performed and demonstrates- Results for orders placed during the hospital encounter of 05/20/22    Echo    Interpretation Summary  · The left ventricle is normal in size with normal systolic function.  · The estimated ejection fraction is 60%.  · Normal right ventricular size with normal right ventricular systolic function.  · The estimated PA systolic pressure is 33 mmHg.  · Atrial fibrillation observed.  . Continue Beta Blocker and Furosemide and monitor clinical status closely. Monitor on telemetry. Patient is off CHF pathway.  Monitor strict Is&Os and daily weights.  Place on fluid restriction of 1.5 L. Cardiology has not been any consulted. Continue to stress to patient importance of self efficacy and  on diet for CHF. Last BNP reviewed- and noted below   Recent Labs   Lab 11/13/23  1016   *   .    HIEU treated with BiPAP  Non complaint with home Bipap.she was consulted.did better last night with Bipap.      PVD (peripheral vascular disease)  On medical treatments.      Paroxysmal atrial fibrillation  Patient with Persistent (7 days or more) atrial fibrillation which is controlled currently with Beta Blocker. Patient is currently in atrial fibrillation.[id number] Score: 4. HASBLED Score: 3. Anticoagulation indicated. Anticoagulation done with Eliquis .    Pre-diabetes  Will monitor.      Severe obesity (BMI >= 40)  Body mass index is 48.01 kg/m². Morbid obesity complicates all aspects of disease management from diagnostic modalities to treatment. Weight loss encouraged and health benefits explained to patient.         Essential hypertension  Chronic, controlled. Latest blood pressure and vitals reviewed-     Temp:  [99.9 °F (37.7 °C)]   Pulse:  []   Resp:  [26-38]   BP: (137-138)/(62-92)   SpO2:  [83 %-95 %] .   Home meds for hypertension were reviewed and noted below.   Hypertension  Medications               furosemide (LASIX) 40 MG tablet Take 1 tablet (40 mg total) by mouth once daily.    losartan (COZAAR) 25 MG tablet TAKE 1 TABLET(25 MG) BY MOUTH EVERY DAY    metoprolol succinate (TOPROL-XL) 100 MG 24 hr tablet Take 1 tablet (100 mg total) by mouth once daily.            While in the hospital, will manage blood pressure as follows; Continue home antihypertensive regimen    Will utilize p.r.n. blood pressure medication only if patient's blood pressure greater than 160/100 and she develops symptoms such as worsening chest pain or shortness of breath.      VTE Risk Mitigation (From admission, onward)           Ordered     rivaroxaban tablet 20 mg  With dinner         11/13/23 1633                    Discharge Planning   JACQUELINE: 11/20/2023     Code Status: Full Code   Is the patient medically ready for discharge?:     Reason for patient still in hospital (select all that apply): Patient trending condition  Discharge Plan A: Home            Critical care time spent on the evaluation and treatment of severe organ dysfunction, review of pertinent labs and imaging studies, discussions with consulting providers and discussions with patient/family:  over 45  minutes.      Luz Ware MD  Department of Hospital Medicine   [facility] - Intensive Care

## 2023-11-17 NOTE — NURSING
[facility]  ICU Shift Summary  Date: 11/17/2023      Prehospitalization: Home  Admit Date / LOS : 11/13/2023/ 4 days    Diagnosis: Acute respiratory failure with hypoxia and hypercapnia    Consults:        Active: OT, Palliative, PT, and Pulm CC       Needed: N/A     Code Status: Full Code   Advanced Directive: <no information>    LDA:  Lines/Drains/Airways       Drain  Duration             Female External Urinary Catheter 11/13/23 1900 3 days              Peripheral Intravenous Line  Duration                  Peripheral IV - Single Lumen 11/13/23 1725 20 G Left;Posterior Forearm 3 days                  Central Lines/Site/Justification:Patient Does Not Have Central Line  Urinary Cath/Order/Justification:Patient Does Not Have Urinary Catheter    Vasopressors/Infusions:        GOALS: Volume/ Hemodynamic: N/A                     RASS: N/A    Pain Management: none       Pain Controlled: not applicable     Rhythm: A-Fib    Respiratory Device: Nasal Cannula    Oxygen Concentration (%):  [30-45] 30                 Most Recent SBT/ SAT: N/A       MOVE Screen: PT Consult  ICU Liberation: not applicable    VTE Prophylaxis: Ambulation  Mobility: OOB to Chair  Stress Ulcer Prophylaxis: No    Isolation: Droplet    Dietary:   Current Diet Order   Procedures    Diet Cardiac Isolation Tray - Regular [place]     Order Specific Question:   Tray type:     Answer:   Isolation Tray - Regular [place]      Tolerance: yes  Advancement: @ goal    I & O (24h):    Intake/Output Summary (Last 24 hours) at 11/17/2023 1704  Last data filed at 11/17/2023 1702  Gross per 24 hour   Intake --   Output 1850 ml   Net -1850 ml        Restraints: No    Significant Dates:  Post Op Date: N/A  Rescue Date: N/A  Imaging/ Diagnostics: N/A    Noteworthy Labs:  see labs    COVID Test: (--)  CBC/Anemia Labs: Coags:    Recent Labs   Lab 11/13/23  1722 11/15/23  0006   WBC 4.88 7.44   HGB 13.2 14.8   HCT 42.1 46.2    215   MCV 99* 99*   RDW  "14.2 14.1    Recent Labs   Lab 11/13/23  1016   INR 1.0        Chemistries:   Recent Labs   Lab 11/13/23  1016 11/14/23  0016 11/16/23  0259 11/17/23  0449      < > 139 144   K 4.3   < > 4.1 3.8      < > 92* 91*   CO2 22*   < > 37* 43*   BUN 23   < > 34* 45*   CREATININE 1.1   < > 1.1 1.3   CALCIUM 9.0   < > 9.5 9.4   PROT 6.7  --   --   --    BILITOT 0.6  --   --   --    ALKPHOS 93  --   --   --    ALT 27  --   --   --    AST 27  --   --   --     < > = values in this interval not displayed.        Cardiac Enzymes: Ejection Fractions:    No results for input(s): "CPK", "CPKMB", "MB", "TROPONINI" in the last 72 hours. EF   Date Value Ref Range Status   05/21/2022 60 % Final        POCT Glucose: HbA1c:    No results for input(s): "POCTGLUCOSE" in the last 168 hours. Hemoglobin A1C   Date Value Ref Range Status   10/27/2023 5.8 (H) 4.0 - 5.6 % Final     Comment:     ADA Screening Guidelines:  5.7-6.4%  Consistent with prediabetes  >or=6.5%  Consistent with diabetes    High levels of fetal hemoglobin interfere with the HbA1C  assay. Heterozygous hemoglobin variants (HbS, HgC, etc)do  not significantly interfere with this assay.   However, presence of multiple variants may affect accuracy.             ICU LOS 3d 23h  Level of Care: OK to Transfer    Chart Check: 12 HR Done  Shift Summary/Plan for the shift: see care plan note     "

## 2023-11-17 NOTE — ASSESSMENT & PLAN NOTE
Longstanding history of diastolic heart failure.  Recent echo with PASP 49, CRISTI and mild RV dysfunction.  BNP > 400.  Chest imaging with bilateral effusions and peripheral edema.  Significant Na indiscretion.  Diuresing well.    Would not use Creatinine as surrogate for diuresis limits.  She will equilibrate and her alkalosis will improve with time.  Still feel like she can tolerate more diuresis

## 2023-11-17 NOTE — PLAN OF CARE
Patient from home. Patient lives with . Patient is not on home oxygen but is being weaned down in the ICU. Patient uses a Bipap at home. PT/OT recommended home health upon discharge.   Patient is still in need of care in ICU. SW to continue to follow for assistance with discharge planning.        11/17/23 115   Discharge Reassessment   Assessment Type Discharge Planning Reassessment   Did the patient's condition or plan change since previous assessment? No   Communicated JACQUELINE with patient/caregiver Date not available/Unable to determine   Discharge Plan A Home Health   Discharge Plan B Home with family   DME Needed Upon Discharge  none   Transition of Care Barriers None   Why the patient remains in the hospital Requires continued medical care   Post-Acute Status   Post-Acute Authorization Home Health   Home Health Status Pending medical clearance/testing   Coverage [employer]   Discharge Delays (!) Post-Acute Set-up

## 2023-11-17 NOTE — ASSESSMENT & PLAN NOTE
Patient with Hypercapnic and Hypoxic Respiratory failure which is Acute on chronic.  she is not on home oxygen. Supplemental oxygen was provided and noted- Oxygen Concentration (%):  [40-45] 40    .   Signs/symptoms of respiratory failure include- tachypnea, increased work of breathing, and lethargy. Contributing diagnoses includes - COPD and Obesity Hypoventilation Labs and images were reviewed. Patient Has recent ABG, which has been reviewed. Will treat underlying causes and adjust management of respiratory failure as follows-   ER show hypercapnic,hypoxic  respiratory failure with PH of 7.2 and P C O2 of 72,PO 2 of 76,chest x ray show fluid overload,BNP is over 478,patient has been  started on continues Bipap and IV lasix,,admitted to ICU for close monitoring.    Was on precedex drip for agitation  VBG  show improvement in CO2 retention,consulted pulmonology.on HFO during day and nightly bipap.  Was on precedex drip for agitation.improved  off precedex,  Again was less complaint with bipap again,consulted again,communicated with nursing staff to fit better  mask,  Added IS,continue wean of oxygen.  Has RSV on viral sputum culture,as well, on droplet precaution

## 2023-11-17 NOTE — PROGRESS NOTES
[facility] - Intensive Care  Palliative Medicine  Progress Note    Patient Name: Sandee Evans  MRN: [id number]  Admission Date: 11/13/2023  Hospital Length of Stay: 4 days  Code Status: Full Code   Attending Provider: Luz Ware, *  Consulting Provider: Maren Pike NP  Primary Care Physician: Kuldeep Miller MD  Principal Problem:Acute respiratory failure with hypoxia and hypercapnia    Patient information was obtained from patient and primary team.      Assessment/Plan:   Advance Care Planning     Palliative Care  Goals of care, counseling/discussion  11/17/2023  - interval chart reviewed; discussed pt with MDT   - met with pt at bedside; she is sitting in a chair today and cheerful  - initially joined Dr. Mann ([facility]) to hear his update to pt about ongoing respiratory progress, to help guide discussion and assess pt's understanding   - continued GOC discussion; pt brought up code status and GOC discussion herself with very clear wishes regarding not wishing to aggressively prolong her life; would not wish to be bed bound or care dependent, that is not an acceptable QOL for her; she would not wish for a trach, long term artifical feeding or PEG, or any form of prolonged life support  - wishes to remain full code for now and would want to try to be resuscitated as she has a good QOL of life currently, wishes may change if condition changes; she also shares that outside acute hospital seeing wishes may also be more towards DNR; with this discussed recommendation for home palliative care to allow for further discussion outside acute IP setting and as trajectory of life limiting conditions progress which pt is agreeable to   - pt seemed to talk much more freely about wishes without family present; encouraged her to share these wishes with her family, especially her  and children  - in regards to GOC at discharge pt does not wish for SNF or other extended facility placement post admission;  she wishes to continue PT as OP at PT clinic with /family able to continue to provide transportation for that; she currently goes to PT at [facility] but would like to see if options on [facility] for her service needs and insurance coverage   - 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 Harris, and later with MDT during ICU rounds   - met with patient at bedside and her  Corwin; introduction to palliative medicine team and role in current care and admission   - learned more about pt outside of current admission; she lives with her  Corwin and they have 2 children Marce and Sukhwinder; she typically is able to perform all ADLs independently; does not use assistive devices in her home, but uses a cane for walking distances   - GOC/ACP discussion; introduced topics of ACP discussion with plan for further discussion on 11/16 after she has had some time to think about things more   - Corwin or her daughter Marce is her preferred MPOA; she feels Marce may know her wishes a little more   - GOC is to improve her health with life style changes and to avoid hospital; expressed importance of quality of life and that she wants to live and do everything she can to make that happen, but if her time came she would be at peace as well; expressed concerns for her health placing burdens on her family and her GOC somewhat being affected by that which we plan to review further   - emotional support provided   - Allowed time for questions/concerns; all addressed; expressed availability of  "myself/palliative team for additional questions/concerns     Pulmonary  Chronic obstructive pulmonary disease with acute exacerbation  - not on O2 PTA   - discussed trajectory of life-limiting condition   - continued management per hospital primary     Cardiac/Vascular  Pulmonary hypertension  - chronic history noted; likely to additionally complicate respiratory status in the future in pt also with CHF and COPD  - continued management per hospital primary     Acute on chronic diastolic CHF (congestive heart failure)  - EF 60%; also with Afib and Pulm HTN   - discussed trajectory of life-limiting condition   - noted; continued management per hospital primary     Paroxysmal atrial fibrillation  - persistent Afib since admission requiring ICU care   - noted; continued management per hospital primary     Endocrine  Severe obesity (BMI >= 40)  - pt expressed motivation to "do the things she needs to do" to better her health; shares this admission was very eye opening to aspects of her health she has been neglecting   - encouragement provided     Other  HIEU treated with BiPAP  - pt admits to lack of compliance with BiPap at home; did wear it last night in ICU   - discussion of trajectory of HIEU in setting of CHF and risk of non-compliance with BiPap; discussed GOC   - noted; continued management per hospital primary     I will follow-up with patient. Please contact us if you have any additional questions.    Total visit time: 85 minutes    35 min visit time including: face to face time in discussion of symptom assessment, and exploring options and burdens of offered treatments.  This also includes non-face to face time preparing to see the patient including chart review, obtaining and/or reviewing separately obtained history, documenting clinical information in the electronic or other health record, independently interpreting results and communicating results to the patient/family/caregiver, family discussions by phone " "if not able to be present, coordination of care with other specialists, and discharge planning.     50 min ACP time spent: goals of care, advanced care planning, emotional support, formulating and communicating prognosis, exploring burden/ benefit of various approaches of treatment.     Maren Pike NP  Palliative Medicine  [facility] [place]    Subjective:     Chief Complaint:   Chief Complaint   Patient presents with    Shortness of Breath     Pt reports to ED to SOB, productive cough for about 2 weeks. Pt daughter reports hallucinations. Hx of COPD.        HPI:   From H&P: " 67 y.o. female, with a past medical history of A-fib on OAC,diastolic  CHF, COPD, HLD, HTN, hypothyroidism, morbid obesity, pre-diabetes, and psychiatric problems, who presents to the ED with SOB onset 3 weeks ago. She reports SOB has gotten progressively worse over 3 weeks. Patient notes she has trouble ambulating d/t SOB. Patient notes being on an at-home BiPAP but denies daily use d/t the machine "smothering" her. Additional history provided by independent historian, relative, notes an O2 saturation in the 80's at-home PTA. Patient denies O2 at home. Patient notes an associated cough, congestion, diarrhea, rhinorrhea, sore throat, and headache.ABG in ER show hypercapnic,hypoxic  respiratory failure with PH of 7.2 and P C O2 of 72,PO 2 of 76,chest x ray show fluid overload,BNP is over 478,patient has been  started on continues Bipap and IV lasix,,admitted to ICU for close monitoring."     Palliative medicine consulted for goals of care discussion and advance care planning; for details of visit, see advance care planning section of plan.       Hospital Course:  No notes on file    Medications:  Continuous Infusions:  Scheduled Meds:   albuterol-ipratropium  3 mL Nebulization Q4H    atorvastatin  40 mg Oral Daily    famotidine  20 mg Oral BID    metoprolol succinate  100 mg Oral Daily    mupirocin   Nasal BID    " predniSONE  40 mg Oral Daily    QUEtiapine  25 mg Oral QHS    rivaroxaban  20 mg Oral Daily with dinner    sertraline  100 mg Oral Daily    sodium chloride 3%  4 mL Nebulization Q8H     PRN Meds:benzonatate, dextromethorphan-guaiFENesin  mg/5 ml, hydrALAZINE, hydrOXYzine pamoate    Objective:     Vital Signs (Most Recent):  Temp: 97.7 °F (36.5 °C) (11/17/23 0800)  Pulse: 91 (11/17/23 1127)  Resp: (!) 29 (11/17/23 1127)  BP: (!) 182/74 (11/17/23 1000)  SpO2: (!) 91 % (11/17/23 1127) Vital Signs (24h Range):  Temp:  [96.5 °F (35.8 °C)-98.3 °F (36.8 °C)] 97.7 °F (36.5 °C)  Pulse:  [] 91  Resp:  [15-36] 29  SpO2:  [88 %-100 %] 91 %  BP: (116-182)/(64-87) 182/74     Weight: 125.2 kg (276 lb)  Body mass index is 48.89 kg/m².       Physical Exam  Vitals and nursing note reviewed.   Constitutional:       General: She is not in acute distress.     Appearance: She is obese. She is ill-appearing.      Interventions: Nasal cannula in place.      Comments: Sitting in bedside chair, cheerful    HENT:      Head: Normocephalic and atraumatic.   Musculoskeletal:      Right lower leg: Edema present.      Left lower leg: Edema present.   Neurological:      Mental Status: She is alert and oriented to person, place, and time.   Psychiatric:         Mood and Affect: Mood normal.         Thought Content: Thought content normal.         Judgment: Judgment normal.       Advance Care Planning  Advance Directives:   Living Will: No    LaPOST: No    Do Not Resuscitate Status: No    Medical Power of : No (Spouse Corwin is legal surrogate decision maker)    Goals of Care: What is most important right now is to focus on avoiding the hospital, remaining as independent as possible, quality of life, even if it means sacrificing a little time. Accordingly, we have decided that the best plan to meet the patient's goals includes continuing with treatment.     Significant Labs: All pertinent labs within the past 24 hours have been  reviewed.  CBC:   Recent Labs   Lab 11/15/23  0006   WBC 7.44   HGB 14.8   HCT 46.2   MCV 99*        BMP:  Recent Labs   Lab 11/17/23  0449   GLU 87      K 3.8   CL 91*   CO2 43*   BUN 45*   CREATININE 1.3   CALCIUM 9.4     LFT:  Lab Results   Component Value Date    AST 27 11/13/2023    ALKPHOS 93 11/13/2023    BILITOT 0.6 11/13/2023     Albumin:   Albumin   Date Value Ref Range Status   11/13/2023 3.0 (L) 3.5 - 5.2 g/dL Final     Protein:   Total Protein   Date Value Ref Range Status   11/13/2023 6.7 6.0 - 8.4 g/dL Final     Lactic acid:   Lab Results   Component Value Date    LACTATE 1.2 05/20/2022     Significant Imaging: I have reviewed all pertinent imaging results/findings within the past 24 hours.    Maren Pike NP  Palliative Medicine  [facility] - Intensive Care

## 2023-11-17 NOTE — PLAN OF CARE
Problem: Occupational Therapy  Goal: Occupational Therapy Goal  Description: Goals to be met by: 11/30/23     Patient will increase functional independence with ADLs by performing:    UE Dressing with Modified [place].  LE Dressing with Set-up Assistance and Supervision.  Grooming while standing at sink with Modified [place] and Supervision and Assistive Devices as needed.  Toileting from toilet with Stand-by Assistance for hygiene and clothing management.   Sitting at edge of bed x30 minutes with Modified [place].  Rolling to Bilateral with Modified [place].   Supine to sit with Modified [place] and Supervision.  Step transfer with Modified [place] and Supervision  Toilet transfer to toilet with Modified [place] and Supervision.  Upper extremity exercise program x15 reps per handout, with independence.    Outcome: Ongoing, Progressing     The patient was able to transfer to the chair with CGA. The patient's SpO2 was 86-92% with activity.

## 2023-11-17 NOTE — NURSING
[facility] Intensive Care  ICU Shift Summary  Date: 11/17/2023      Prehospitalization: Home  Admit Date / LOS : 11/13/2023/ 4 days    Diagnosis: Acute respiratory failure with hypoxia and hypercapnia    Consults:        Active: OT, Palliative, PT, and Pulm CC       Needed: N/A     Code Status: Full Code   Advanced Directive: <no information>    LDA:  Lines/Drains/Airways       Drain  Duration             Female External Urinary Catheter 11/13/23 1900 3 days              Peripheral Intravenous Line  Duration                  Peripheral IV - Single Lumen 11/13/23 1725 20 G Left;Posterior Forearm 3 days                  Central Lines/Site/Justification:Patient Does Not Have Central Line  Urinary Cath/Order/Justification:Patient Does Not Have Urinary Catheter    Vasopressors/Infusions:        GOALS: Volume/ Hemodynamic: N/A                     RASS: 0  alert and calm    Pain Management: none       Pain Controlled: yes     Rhythm: NSR    Respiratory Device: Vapotherm    Oxygen Concentration (%):  [40-45] 40                 Most Recent SBT/ SAT: Did not perform       MOVE Screen: PASS  ICU Liberation: no    VTE Prophylaxis: Pharm  Mobility: Bedrest  Stress Ulcer Prophylaxis: No    Isolation: Droplet    Dietary:   Current Diet Order   Procedures    Diet Cardiac Isolation Tray - Regular [place]     Order Specific Question:   Tray type:     Answer:   Isolation Tray - Regular [place]      Tolerance: yes  Advancement: yes    I & O (24h):    Intake/Output Summary (Last 24 hours) at 11/17/2023 0625  Last data filed at 11/17/2023 0610  Gross per 24 hour   Intake --   Output 2450 ml   Net -2450 ml        Restraints: No    Significant Dates:  Post Op Date: N/A  Rescue Date: N/A  Imaging/ Diagnostics: N/A    Noteworthy Labs:  CO2 43, BUN 45    COVID Test: (--)  CBC/Anemia Labs: Coags:    Recent Labs   Lab 11/13/23  1722 11/15/23  0006   WBC 4.88 7.44   HGB 13.2 14.8   HCT 42.1 46.2    215   MCV 99* 99*   RDW 14.2 14.1    Recent  "Labs   Lab 11/13/23  1016   INR 1.0        Chemistries:   Recent Labs   Lab 11/13/23  1016 11/14/23  0016 11/16/23  0259 11/17/23  0449      < > 139 144   K 4.3   < > 4.1 3.8      < > 92* 91*   CO2 22*   < > 37* 43*   BUN 23   < > 34* 45*   CREATININE 1.1   < > 1.1 1.3   CALCIUM 9.0   < > 9.5 9.4   PROT 6.7  --   --   --    BILITOT 0.6  --   --   --    ALKPHOS 93  --   --   --    ALT 27  --   --   --    AST 27  --   --   --     < > = values in this interval not displayed.        Cardiac Enzymes: Ejection Fractions:    No results for input(s): "CPK", "CPKMB", "MB", "TROPONINI" in the last 72 hours. EF   Date Value Ref Range Status   05/21/2022 60 % Final        POCT Glucose: HbA1c:    No results for input(s): "POCTGLUCOSE" in the last 168 hours. Hemoglobin A1C   Date Value Ref Range Status   10/27/2023 5.8 (H) 4.0 - 5.6 % Final     Comment:     ADA Screening Guidelines:  5.7-6.4%  Consistent with prediabetes  >or=6.5%  Consistent with diabetes    High levels of fetal hemoglobin interfere with the HbA1C  assay. Heterozygous hemoglobin variants (HbS, HgC, etc)do  not significantly interfere with this assay.   However, presence of multiple variants may affect accuracy.             ICU LOS 3d 12h  Level of Care: Critical Care    Chart Check: 24 HR Done  Shift Summary/Plan for the shift: Plan is to wean O2 as tolerated.No acute distress noted, safety maintained. Resting in bed peacefully, side rails up x3 with call light within reach.   "

## 2023-11-17 NOTE — PLAN OF CARE
Problem: Physical Therapy  Goal: Physical Therapy Goal  Description: Goals to be met by: 23     Patient will increase functional independence with mobility by performin. Pt to be mod I with bed mobility.  2. Pt to transfer with (S).  3. Pt to ambulate 50' w/RW SBA.  4. Pt to be (I) with written HEP.    Outcome: Ongoing, Progressing   Pt able to tolerate transfer to chair today.

## 2023-11-17 NOTE — ASSESSMENT & PLAN NOTE
11/17/2023  - interval chart reviewed; discussed pt with MDT   - met with pt at bedside; she is sitting in a chair today and cheerful  - initially joined Dr. Mann ([facility]) to hear his update to pt about ongoing respiratory progress, to help guide discussion and assess pt's understanding   - continued GOC discussion; pt brought up code status and GOC discussion herself with very clear wishes regarding not wishing to aggressively prolong her life; would not wish to be bed bound or care dependent, that is not an acceptable QOL for her; she would not wish for a trach, long term artifical feeding or PEG, or any form of prolonged life support  - wishes to remain full code for now and would want to try to be resuscitated as she has a good QOL of life currently, wishes may change if condition changes; she also shares that outside acute hospital seeing wishes may also be more towards DNR; with this discussed recommendation for home palliative care to allow for further discussion outside acute IP setting and as trajectory of life limiting conditions progress which pt is agreeable to   - pt seemed to talk much more freely about wishes without family present; encouraged her to share these wishes with her family, especially her  and children  - 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 Harris, and later with MDT during ICU rounds   - met with patient at bedside and her  Corwin; introduction to palliative medicine team and role in current care and admission   - learned more about pt outside of  current admission; she lives with her  Corwin and they have 2 children Marce and Sukhwinder; she typically is able to perform all ADLs independently; does not use assistive devices in her home, but uses a cane for walking distances   - GOC/ACP discussion; introduced topics of ACP discussion with plan for further discussion on 11/16 after she has had some time to think about things more   - Corwin or her daughter Marce is her preferred MPOA; she feels Marce may know her wishes a little more   - GOC is to improve her health with life style changes and to avoid hospital; expressed importance of quality of life and that she wants to live and do everything she can to make that happen, but if her time came she would be at peace as well; expressed concerns for her health placing burdens on her family and her GOC somewhat being affected by that which we plan to review further   - emotional support provided   - Allowed time for questions/concerns; all addressed; expressed availability of myself/palliative team for additional questions/concerns

## 2023-11-17 NOTE — PROGRESS NOTES
[facility] Intensive Care  Critical Care  Progress Note    Patient Name: Sandee Evans  MRN: [id number]  Admission Date: 11/13/2023  Hospital Length of Stay: 4 days  Code Status: Full Code  Attending Provider: Luz Ware, *  Primary Care Provider: Kuldeep Miller MD   Principal Problem: Acute respiratory failure with hypoxia and hypercapnia    Subjective:     Interval History:   Looks good today. Able to be transitioned to NC today    Objective:     Vital Signs (Most Recent):  Temp: 98.1 °F (36.7 °C) (11/17/23 1200)  Pulse: 90 (11/17/23 1430)  Resp: (!) 28 (11/17/23 1430)  BP: 139/66 (11/17/23 1400)  SpO2: (!) 94 % (11/17/23 1430) Vital Signs (24h Range):  Temp:  [96.5 °F (35.8 °C)-98.3 °F (36.8 °C)] 98.1 °F (36.7 °C)  Pulse:  [] 90  Resp:  [15-38] 28  SpO2:  [88 %-100 %] 94 %  BP: (116-182)/(64-87) 139/66     Weight: 125.2 kg (276 lb)  Body mass index is 48.89 kg/m².      Intake/Output Summary (Last 24 hours) at 11/17/2023 1452  Last data filed at 11/17/2023 0610  Gross per 24 hour   Intake --   Output 1650 ml   Net -1650 ml          Physical Exam  Vitals and nursing note reviewed.   Constitutional:       General: She is not in acute distress.     Appearance: She is obese. She is not toxic-appearing or diaphoretic.   HENT:      Head: Normocephalic and atraumatic.   Eyes:      General: No scleral icterus.     Extraocular Movements: Extraocular movements intact.   Cardiovascular:      Rate and Rhythm: Normal rate.   Pulmonary:      Effort: No tachypnea, accessory muscle usage, prolonged expiration, respiratory distress or retractions.   Abdominal:      General: There is no distension.   Musculoskeletal:      Right lower leg: Edema present.      Left lower leg: Edema present.   Skin:     General: Skin is warm and dry.      Coloration: Skin is not jaundiced.      Findings: No rash.   Neurological:      General: No focal deficit present.      Mental Status: Mental status is at baseline.          "      Vents:  Oxygen Concentration (%): 30 (11/17/23 1200)    Lines/Drains/Airways       Drain  Duration             Female External Urinary Catheter 11/13/23 1900 3 days              Peripheral Intravenous Line  Duration                  Peripheral IV - Single Lumen 11/13/23 1725 20 G Left;Posterior Forearm 3 days                    Significant Labs:    CBC/Anemia Profile:  No results for input(s): "WBC", "HGB", "HCT", "PLT", "MCV", "RDW", "IRON", "FERRITIN", "RETIC", "FOLATE", "[id number]", "OCCULTBLOOD" in the last 48 hours.       Chemistries:  Recent Labs   Lab 11/16/23  0259 11/17/23  0449    144   K 4.1 3.8   CL 92* 91*   CO2 37* 43*   BUN 34* 45*   CREATININE 1.1 1.3   CALCIUM 9.5 9.4         All pertinent labs within the past 24 hours have been reviewed.    Significant Imaging:  I have reviewed all pertinent imaging results/findings within the past 24 hours.      ABG  Recent Labs   Lab 11/16/23  0939   PH 7.459*   PO2 76*   PCO2 59.6*   HCO3 42.3*   BE 15*     Assessment/Plan:     Pulmonary  * Acute respiratory failure with hypoxia and hypercapnia  Former smoker.  Chronic hypercapnic respiratory failure not using nightly NIV.  Chronic diastolic heart failure with significant Na indiscretion.  RSV positive and on droplet precaution.    Chronic obstructive pulmonary disease with acute exacerbation  Mild obstruction on PFTs.  Wheezing on initial exam, now resolved.  Duonebs and Prednisone to be complete today. Stop prednisone after today    Cardiac/Vascular  Pulmonary hypertension  Mild COPD on PFTs.  Almost certainly Group II from chronic diastolic heart failure    Acute on chronic diastolic CHF (congestive heart failure)  Longstanding history of diastolic heart failure.  Recent echo with PASP 49, CRISTI and mild RV dysfunction.  BNP > 400.  Chest imaging with bilateral effusions and peripheral edema.  Significant Na indiscretion.  Diuresing well.    Would not use Creatinine as surrogate for diuresis " limits.  She will equilibrate and her alkalosis will improve with time.  Still feel like she can tolerate more diuresis    Endocrine  Severe obesity (BMI >= 40)  Complicates every aspect of patient care and places her at greater risk for complications related to her other chronic illnesses.  Is imperative that patient lose weight.    Other  HIEU treated with BiPAP  AHI 90.  Chronic hypercapnia.  NIV nightly while here.      Thank you for the consult. No further recommendations. Signing off. Please feel free to contact us with any question or concerns regarding the care of this patient.      Critical Care Time: 40 minutes  Critical care was time spent personally by me on the following activities: evaluating this patient's organ dysfunction, development of treatment plan, discussing treatment plan with patient or surrogate and bedside caregivers, discussions with consultants, evaluation of patient's response to treatment, examination of patient, ordering and performing treatments and interventions, ordering and review of laboratory studies, ordering and review of radiographic studies, re-evaluation of patient's condition. This critical care time did not overlap with that of any other provider or involve time for any procedures.       Antonio Zelaya MD  Pulmonology  [facility] [place] - Intensive Care

## 2023-11-17 NOTE — ASSESSMENT & PLAN NOTE
Mild obstruction on PFTs.  Wheezing on initial exam, now resolved.  Duonebs and Prednisone to be complete today. Stop prednisone after today

## 2023-11-17 NOTE — SUBJECTIVE & OBJECTIVE
Interval History:   Looks good today. Able to be transitioned to NC today    Objective:     Vital Signs (Most Recent):  Temp: 98.1 °F (36.7 °C) (11/17/23 1200)  Pulse: 90 (11/17/23 1430)  Resp: (!) 28 (11/17/23 1430)  BP: 139/66 (11/17/23 1400)  SpO2: (!) 94 % (11/17/23 1430) Vital Signs (24h Range):  Temp:  [96.5 °F (35.8 °C)-98.3 °F (36.8 °C)] 98.1 °F (36.7 °C)  Pulse:  [] 90  Resp:  [15-38] 28  SpO2:  [88 %-100 %] 94 %  BP: (116-182)/(64-87) 139/66     Weight: 125.2 kg (276 lb)  Body mass index is 48.89 kg/m².      Intake/Output Summary (Last 24 hours) at 11/17/2023 1452  Last data filed at 11/17/2023 0610  Gross per 24 hour   Intake --   Output 1650 ml   Net -1650 ml          Physical Exam  Vitals and nursing note reviewed.   Constitutional:       General: She is not in acute distress.     Appearance: She is obese. She is not toxic-appearing or diaphoretic.   HENT:      Head: Normocephalic and atraumatic.   Eyes:      General: No scleral icterus.     Extraocular Movements: Extraocular movements intact.   Cardiovascular:      Rate and Rhythm: Normal rate.   Pulmonary:      Effort: No tachypnea, accessory muscle usage, prolonged expiration, respiratory distress or retractions.   Abdominal:      General: There is no distension.   Musculoskeletal:      Right lower leg: Edema present.      Left lower leg: Edema present.   Skin:     General: Skin is warm and dry.      Coloration: Skin is not jaundiced.      Findings: No rash.   Neurological:      General: No focal deficit present.      Mental Status: Mental status is at baseline.               Vents:  Oxygen Concentration (%): 30 (11/17/23 1200)    Lines/Drains/Airways       Drain  Duration             Female External Urinary Catheter 11/13/23 1900 3 days              Peripheral Intravenous Line  Duration                  Peripheral IV - Single Lumen 11/13/23 1725 20 G Left;Posterior Forearm 3 days                    Significant Labs:    CBC/Anemia Profile:  No  "results for input(s): "WBC", "HGB", "HCT", "PLT", "MCV", "RDW", "IRON", "FERRITIN", "RETIC", "FOLATE", "[id number]", "OCCULTBLOOD" in the last 48 hours.       Chemistries:  Recent Labs   Lab 11/16/23  0259 11/17/23  0449    144   K 4.1 3.8   CL 92* 91*   CO2 37* 43*   BUN 34* 45*   CREATININE 1.1 1.3   CALCIUM 9.5 9.4         All pertinent labs within the past 24 hours have been reviewed.    Significant Imaging:  I have reviewed all pertinent imaging results/findings within the past 24 hours.    "

## 2023-11-17 NOTE — PLAN OF CARE
Pt remains in the ICU on 1L NC.  Afib on the monitor.  Aox4.  Afebrile.  Pt up to bedside chair, tolerated well.  Purewick in place with good urine output noted.  Transfer orders received. Plan of care reviewed.  No injuries, falls or skin breakdown occurred this shift.

## 2023-11-17 NOTE — SUBJECTIVE & OBJECTIVE
Past Medical History:   Diagnosis Date    Anticoagulant long-term use     Xarelto    Arthritis     Atrial fibrillation     Breast cyst     CHF (congestive heart failure)     Degenerative disc disease     Edema     HLD (hyperlipidemia)     Hx of psychiatric care     Hyperlipidemia     Hypertension     Hypothyroidism     Nuclear sclerosis, bilateral 12/18/2017    Obesity, morbid     HIEU (obstructive sleep apnea)     Other abnormal glucose     pre-diabetes    Pre-diabetes     Psychiatric problem     Requires assistance with activities of daily living (ADL)     Sleep apnea     Smoker     SOB (shortness of breath)     SOB (shortness of breath)     Thyroid disease     on meds 8-9 years ago. hypothyroidism.no malignancy    TMJ (temporomandibular joint disorder)     jaw clicking    Tobacco abuse     Unsteady gait     Urge incontinence     Weakness generalized     Wears glasses        Past Surgical History:   Procedure Laterality Date    BREAST BIOPSY Right     over 10 yrs ago/ benign    BREAST CYST EXCISION Right     BREAST LUMPECTOMY Right     over 10 yrs ago, ex bx/ benign    COLONOSCOPY N/A 6/25/2019    Procedure: COLONOSCOPY;  Surgeon: Micheline Flores MD;  Location: [facility] ENDO;  Service: Endoscopy;  Laterality: N/A;  RX XARELTO ok to hold (2 days) per Dr. Naik see scan 3/20/19    ENDOSCOPIC ULTRASOUND OF UPPER GASTROINTESTINAL TRACT N/A 1/26/2021    Procedure: ULTRASOUND-ENDOSCOPIC-UPPER;  Surgeon: Stevenson Philippe MD;  Location: [facility] ENDO;  Service: Endoscopy;  Laterality: N/A;    HYSTERECTOMY      JOINT REPLACEMENT Bilateral     knees    OOPHORECTOMY      rt.knee surgery 2016      TOTAL KNEE ARTHROPLASTY Left 2/19/2019    Procedure: ARTHROPLASTY, KNEE, TOTAL;  Surgeon: Med Hanson MD;  Location: [facility] OR;  Service: Orthopedics;  Laterality: Left;  10AM START PER  PER JAYLIN TEXT @ 8:34AM ON 2-18-19  SUPINE  HARRIS LOYA [phone] TEXTED HIM ON 1-24-19 @ 7:57AM  RN PRE OP 2-13-19--BMI--48.9    underarm  gland\ Bilateral        Review of patient's allergies indicates:   Allergen Reactions    Ace inhibitors      Cough         No current facility-administered medications on file prior to encounter.     Current Outpatient Medications on File Prior to Encounter   Medication Sig    cetirizine (ZYRTEC) 10 MG tablet Take 1 tablet (10 mg total) by mouth once daily.    fluticasone propionate (FLONASE) 50 mcg/actuation nasal spray SHAKE LIQUID AND USE 2 SPRAYS(100 MCG) IN EACH NOSTRIL EVERY DAY    albuterol (PROVENTIL/VENTOLIN HFA) 90 mcg/actuation inhaler 2 puffs every 6 (six) hours as needed.    albuterol-ipratropium (DUO-NEB) 2.5 mg-0.5 mg/3 mL nebulizer solution Take 3 mLs by nebulization every 6 (six) hours as needed for Wheezing. Rescue    atorvastatin (LIPITOR) 40 MG tablet TAKE 1 TABLET(40 MG) BY MOUTH EVERY DAY    azelastine (ASTELIN) 137 mcg (0.1 %) nasal spray 1 spray (137 mcg total) by Nasal route 2 (two) times daily.    buPROPion (WELLBUTRIN XL) 150 MG TB24 tablet TAKE 1 TABLET(150 MG) BY MOUTH EVERY DAY    furosemide (LASIX) 40 MG tablet Take 1 tablet (40 mg total) by mouth once daily.    ketoconazole (NIZORAL) 2 % cream Apply topically once daily.    losartan (COZAAR) 25 MG tablet TAKE 1 TABLET(25 MG) BY MOUTH EVERY DAY    metoprolol succinate (TOPROL-XL) 100 MG 24 hr tablet Take 1 tablet (100 mg total) by mouth once daily.    sertraline (ZOLOFT) 100 MG tablet TAKE 1 TABLET(100 MG) BY MOUTH EVERY DAY    SPIRIVA RESPIMAT 2.5 mcg/actuation inhaler INHALE 2 PUFFS BY MOUTH DAILY (Patient not taking: Reported on 10/18/2023)    triamcinolone acetonide 0.1% (KENALOG) 0.1 % ointment APPLY BETWEEN THE KNEES AND UPPER THIGHS TWICE DAILY FOR NO MORE THAN 7 TO 14 DAYS    XARELTO 20 mg Tab TAKE 1 TABLET(20 MG) BY MOUTH EVERY DAY     Family History       Problem Relation (Age of Onset)    Cancer Mother, Brother    Diabetes Mother, Brother    Hypertension Mother    No Known Problems Father, Sister, Maternal Aunt, Maternal  Uncle, Paternal Aunt, Paternal Uncle, Maternal Grandmother, Maternal Grandfather, Paternal Grandmother, Paternal Grandfather, Other          Tobacco Use    Smoking status: Every Day     Current packs/day: 0.50     Average packs/day: 0.5 packs/day for 50.9 years (25.4 ttl pk-yrs)     Types: Cigarettes     Start date: 1973    Smokeless tobacco: Current   Substance and Sexual Activity    Alcohol use: No    Drug use: No    Sexual activity: Yes     Partners: Male     Review of Systems   Constitutional:  Positive for activity change and appetite change.   HENT:  Negative for congestion and dental problem.    Eyes:  Negative for discharge and itching.   Respiratory:  Positive for shortness of breath.    Cardiovascular:  Negative for chest pain and leg swelling.   Gastrointestinal:  Negative for abdominal distention and abdominal pain.   Endocrine: Negative for cold intolerance.   Genitourinary:  Negative for difficulty urinating and dyspareunia.   Musculoskeletal:  Negative for arthralgias and back pain.   Skin:  Negative for color change.   Neurological:  Positive for weakness.   Psychiatric/Behavioral:  Negative for agitation and behavioral problems.      Objective:     Vital Signs (Most Recent):  Temp: 97.7 °F (36.5 °C) (11/17/23 0800)  Pulse: 89 (11/17/23 0900)  Resp: (!) 26 (11/17/23 0900)  BP: 135/66 (11/17/23 0900)  SpO2: (!) 93 % (11/17/23 0900) Vital Signs (24h Range):  Temp:  [96.5 °F (35.8 °C)-98.3 °F (36.8 °C)] 97.7 °F (36.5 °C)  Pulse:  [] 89  Resp:  [15-36] 26  SpO2:  [88 %-100 %] 93 %  BP: (116-153)/(64-87) 135/66     Weight: 125.2 kg (276 lb)  Body mass index is 48.89 kg/m².     Physical Exam  HENT:      Head: Normocephalic.      Right Ear: There is no impacted cerumen.      Nose: No congestion.      Mouth/Throat:      Mouth: Mucous membranes are dry.   Eyes:      Extraocular Movements: Extraocular movements intact.      Pupils: Pupils are equal, round, and reactive to light.   Cardiovascular:       "Rate and Rhythm: Normal rate. Rhythm irregular.      Heart sounds: No murmur heard.  Pulmonary:      Breath sounds: Rales present.   Abdominal:      General: There is no distension.      Tenderness: There is no abdominal tenderness.   Musculoskeletal:         General: Swelling present.   Skin:     Coloration: Skin is not jaundiced.      Findings: No bruising.   Neurological:      General: No focal deficit present.      Mental Status: She is alert.   Psychiatric:         Mood and Affect: Mood normal.         Behavior: Behavior normal.              CRANIAL NERVES     CN III, IV, VI   Pupils are equal, round, and reactive to light.       Significant Labs: All pertinent labs within the past 24 hours have been reviewed.  BMP:   Recent Labs   Lab 11/17/23  0449   GLU 87      K 3.8   CL 91*   CO2 43*   BUN 45*   CREATININE 1.3   CALCIUM 9.4       CBC:   No results for input(s): "WBC", "HGB", "HCT", "PLT" in the last 48 hours.    CMP:   Recent Labs   Lab 11/16/23  0259 11/17/23  0449    144   K 4.1 3.8   CL 92* 91*   CO2 37* 43*   GLU 95 87   BUN 34* 45*   CREATININE 1.1 1.3   CALCIUM 9.5 9.4   ANIONGAP 10 10         Significant Imaging: I have reviewed all pertinent imaging results/findings within the past 24 hours.  "

## 2023-11-18 PROCEDURE — 99900035 HC TECH TIME PER 15 MIN (STAT)

## 2023-11-18 PROCEDURE — 25000242 PHARM REV CODE 250 ALT 637 W/ HCPCS: Performed by: HOSPITALIST

## 2023-11-18 PROCEDURE — 63600175 PHARM REV CODE 636 W HCPCS: Performed by: HOSPITALIST

## 2023-11-18 PROCEDURE — 94640 AIRWAY INHALATION TREATMENT: CPT

## 2023-11-18 PROCEDURE — 21400001 HC TELEMETRY ROOM

## 2023-11-18 PROCEDURE — 25000003 PHARM REV CODE 250: Performed by: HOSPITALIST

## 2023-11-18 PROCEDURE — 94799 UNLISTED PULMONARY SVC/PX: CPT

## 2023-11-18 PROCEDURE — 27000221 HC OXYGEN, UP TO 24 HOURS

## 2023-11-18 PROCEDURE — 27000207 HC ISOLATION

## 2023-11-18 PROCEDURE — 94761 N-INVAS EAR/PLS OXIMETRY MLT: CPT

## 2023-11-18 PROCEDURE — 25000242 PHARM REV CODE 250 ALT 637 W/ HCPCS

## 2023-11-18 PROCEDURE — 25000242 PHARM REV CODE 250 ALT 637 W/ HCPCS: Performed by: NURSE PRACTITIONER

## 2023-11-18 PROCEDURE — 94660 CPAP INITIATION&MGMT: CPT

## 2023-11-18 RX ORDER — FUROSEMIDE 40 MG/1
40 TABLET ORAL 2 TIMES DAILY
Status: DISCONTINUED | OUTPATIENT
Start: 2023-11-18 | End: 2023-11-25 | Stop reason: HOSPADM

## 2023-11-18 RX ADMIN — IPRATROPIUM BROMIDE AND ALBUTEROL SULFATE 3 ML: 2.5; .5 SOLUTION RESPIRATORY (INHALATION) at 11:11

## 2023-11-18 RX ADMIN — FUROSEMIDE 40 MG: 40 TABLET ORAL at 09:11

## 2023-11-18 RX ADMIN — QUETIAPINE FUMARATE 25 MG: 25 TABLET ORAL at 08:11

## 2023-11-18 RX ADMIN — FAMOTIDINE 20 MG: 20 TABLET ORAL at 08:11

## 2023-11-18 RX ADMIN — FAMOTIDINE 20 MG: 20 TABLET ORAL at 09:11

## 2023-11-18 RX ADMIN — IPRATROPIUM BROMIDE AND ALBUTEROL SULFATE 3 ML: 2.5; .5 SOLUTION RESPIRATORY (INHALATION) at 08:11

## 2023-11-18 RX ADMIN — FUROSEMIDE 40 MG: 40 TABLET ORAL at 05:11

## 2023-11-18 RX ADMIN — IPRATROPIUM BROMIDE AND ALBUTEROL SULFATE 3 ML: 2.5; .5 SOLUTION RESPIRATORY (INHALATION) at 02:11

## 2023-11-18 RX ADMIN — METHYLPREDNISOLONE SODIUM SUCCINATE 60 MG: 40 INJECTION, POWDER, FOR SOLUTION INTRAMUSCULAR; INTRAVENOUS at 05:11

## 2023-11-18 RX ADMIN — PREDNISONE 40 MG: 20 TABLET ORAL at 09:11

## 2023-11-18 RX ADMIN — METHYLPREDNISOLONE SODIUM SUCCINATE 60 MG: 40 INJECTION, POWDER, FOR SOLUTION INTRAMUSCULAR; INTRAVENOUS at 10:11

## 2023-11-18 RX ADMIN — MUPIROCIN: 20 OINTMENT TOPICAL at 09:11

## 2023-11-18 RX ADMIN — SERTRALINE HYDROCHLORIDE 100 MG: 50 TABLET ORAL at 09:11

## 2023-11-18 RX ADMIN — SODIUM CHLORIDE SOLN NEBU 3% 4 ML: 3 NEBU SOLN at 02:11

## 2023-11-18 RX ADMIN — RIVAROXABAN 20 MG: 20 TABLET, FILM COATED ORAL at 05:11

## 2023-11-18 RX ADMIN — IPRATROPIUM BROMIDE AND ALBUTEROL SULFATE 3 ML: 2.5; .5 SOLUTION RESPIRATORY (INHALATION) at 03:11

## 2023-11-18 RX ADMIN — MUPIROCIN: 20 OINTMENT TOPICAL at 08:11

## 2023-11-18 RX ADMIN — SODIUM CHLORIDE SOLN NEBU 3% 4 ML: 3 NEBU SOLN at 08:11

## 2023-11-18 RX ADMIN — IPRATROPIUM BROMIDE AND ALBUTEROL SULFATE 3 ML: 2.5; .5 SOLUTION RESPIRATORY (INHALATION) at 12:11

## 2023-11-18 RX ADMIN — SODIUM CHLORIDE SOLN NEBU 3% 4 ML: 3 NEBU SOLN at 12:11

## 2023-11-18 RX ADMIN — IPRATROPIUM BROMIDE AND ALBUTEROL SULFATE 3 ML: 2.5; .5 SOLUTION RESPIRATORY (INHALATION) at 07:11

## 2023-11-18 RX ADMIN — METOPROLOL SUCCINATE 100 MG: 50 TABLET, EXTENDED RELEASE ORAL at 09:11

## 2023-11-18 RX ADMIN — ATORVASTATIN CALCIUM 40 MG: 40 TABLET, FILM COATED ORAL at 09:11

## 2023-11-18 RX ADMIN — SODIUM CHLORIDE SOLN NEBU 3% 4 ML: 3 NEBU SOLN at 11:11

## 2023-11-18 RX ADMIN — GUAIFENESIN AND DEXTROMETHORPHAN 10 ML: 100; 10 SYRUP ORAL at 01:11

## 2023-11-18 NOTE — ASSESSMENT & PLAN NOTE
Patient's COPD is with exacerbation noted by continued dyspnea and use of accessory muscles for breathing currently.  Patient is currently off COPD Pathway. Continue scheduled inhalers Steroids, Antibiotics, and Supplemental oxygen and monitor respiratory status closely.   Started on IV solumedrol for mild wheezing.

## 2023-11-18 NOTE — PROGRESS NOTES
"[facility] Medicine  Progress Note    Patient Name: Sandee Evans  MRN: [id number]  Patient Class: IP- Inpatient   Admission Date: 11/13/2023  Length of Stay: 5 days  Attending Physician: Luz Ware, *  Primary Care Provider: Kuldeep Miller MD        Subjective:     Principal Problem:Acute respiratory failure with hypoxia and hypercapnia        HPI:   67 y.o. female, with a past medical history of A-fib on OAC,diastolic  CHF, COPD, HLD, HTN, hypothyroidism, morbid obesity, pre-diabetes, and psychiatric problems, who presents to the ED with SOB onset 3 weeks ago. She reports SOB has gotten progressively worse over 3 weeks. Patient notes she has trouble ambulating d/t SOB. Patient notes being on an at-home BiPAP but denies daily use d/t the machine "smothering" her. Additional history provided by independent historian, relative, notes an O2 saturation in the 80's at-home PTA. Patient denies O2 at home. Patient notes an associated cough, congestion, diarrhea, rhinorrhea, sore throat, and headache.ABG in ER show hypercapnic,hypoxic  respiratory failure with PH of 7.2 and P C O2 of 72,PO 2 of 76,chest x ray show fluid overload,BNP is over 478,patient has been  started on continues Bipap and IV lasix,,admitted to ICU for close monitoring.    Overview/Hospital Course:   67 y.o. female, with a past medical history of A-fib on OAC,diastolic  CHF, COPD, HLD, HTN, hypothyroidism, morbid obesity, pre-diabetes, and psychiatric problems, who presents to the ED with SOB onset 3 weeks ago. She reports SOB has gotten progressively worse over 3 weeks. Patient notes she has trouble ambulating d/t SOB. Patient notes being on an at-home BiPAP but denies daily use d/t the machine "smothering" her. \ relative, notes an O2 saturation in the 80's at-home PTA. Patient denies O2 at home. .ABG in ER show hypercapnic,hypoxic  respiratory failure with PH of 7.2 and P C O2 of 72,PO 2 of 76,chest x ray show " fluid overload,BNP is over 478,patient has been  started on continues Bipap and IV lasix,,admitted to ICU for close monitoring.  VBG  show improvement in CO2 retention,consulted pulmonology.on HFO during day and nightly bipap.  Was on precedex drip for agitation.improved  off precedex,  Again was less complaint with bipap again,consulted again,communicated with nursing staff to fit better  mask,did better last night with Bipap.  Consulted PT,OT   Added IS,continue wean of oxygen.  Started on IV solumedrol for mild wheezing.    Past Medical History:   Diagnosis Date    Anticoagulant long-term use     Xarelto    Arthritis     Atrial fibrillation     Breast cyst     CHF (congestive heart failure)     Degenerative disc disease     Edema     HLD (hyperlipidemia)     Hx of psychiatric care     Hyperlipidemia     Hypertension     Hypothyroidism     Nuclear sclerosis, bilateral 12/18/2017    Obesity, morbid     HIEU (obstructive sleep apnea)     Other abnormal glucose     pre-diabetes    Pre-diabetes     Psychiatric problem     Requires assistance with activities of daily living (ADL)     Sleep apnea     Smoker     SOB (shortness of breath)     SOB (shortness of breath)     Thyroid disease     on meds 8-9 years ago. hypothyroidism.no malignancy    TMJ (temporomandibular joint disorder)     jaw clicking    Tobacco abuse     Unsteady gait     Urge incontinence     Weakness generalized     Wears glasses        Past Surgical History:   Procedure Laterality Date    BREAST BIOPSY Right     over 10 yrs ago/ benign    BREAST CYST EXCISION Right     BREAST LUMPECTOMY Right     over 10 yrs ago, ex bx/ benign    COLONOSCOPY N/A 6/25/2019    Procedure: COLONOSCOPY;  Surgeon: Micheline Flores MD;  Location: [facility];  Service: Endoscopy;  Laterality: N/A;  RX XARELTO ok to hold (2 days) per Dr. Naik see scan 3/20/19    ENDOSCOPIC ULTRASOUND OF UPPER GASTROINTESTINAL TRACT N/A 1/26/2021    Procedure: ULTRASOUND-ENDOSCOPIC-UPPER;   Surgeon: Stevenson Philippe MD;  Location: [facility] ENDO;  Service: Endoscopy;  Laterality: N/A;    HYSTERECTOMY      JOINT REPLACEMENT Bilateral     knees    OOPHORECTOMY      rt.knee surgery 2016      TOTAL KNEE ARTHROPLASTY Left 2/19/2019    Procedure: ARTHROPLASTY, KNEE, TOTAL;  Surgeon: Med Hanson MD;  Location: [facility] OR;  Service: Orthopedics;  Laterality: Left;  10AM START PER  PER JAYLIN TEXT @ 8:34AM ON 2-18-19  SUPINE  HARRIS LOYA [phone] TEXTED HIM ON 1-24-19 @ 7:57AM  RN PRE OP 2-13-19--BMI--48.9    underarm gland\ Bilateral        Review of patient's allergies indicates:   Allergen Reactions    Ace inhibitors      Cough         No current facility-administered medications on file prior to encounter.     Current Outpatient Medications on File Prior to Encounter   Medication Sig    cetirizine (ZYRTEC) 10 MG tablet Take 1 tablet (10 mg total) by mouth once daily.    fluticasone propionate (FLONASE) 50 mcg/actuation nasal spray SHAKE LIQUID AND USE 2 SPRAYS(100 MCG) IN EACH NOSTRIL EVERY DAY    albuterol (PROVENTIL/VENTOLIN HFA) 90 mcg/actuation inhaler 2 puffs every 6 (six) hours as needed.    albuterol-ipratropium (DUO-NEB) 2.5 mg-0.5 mg/3 mL nebulizer solution Take 3 mLs by nebulization every 6 (six) hours as needed for Wheezing. Rescue    atorvastatin (LIPITOR) 40 MG tablet TAKE 1 TABLET(40 MG) BY MOUTH EVERY DAY    azelastine (ASTELIN) 137 mcg (0.1 %) nasal spray 1 spray (137 mcg total) by Nasal route 2 (two) times daily.    buPROPion (WELLBUTRIN XL) 150 MG TB24 tablet TAKE 1 TABLET(150 MG) BY MOUTH EVERY DAY    furosemide (LASIX) 40 MG tablet Take 1 tablet (40 mg total) by mouth once daily.    ketoconazole (NIZORAL) 2 % cream Apply topically once daily.    losartan (COZAAR) 25 MG tablet TAKE 1 TABLET(25 MG) BY MOUTH EVERY DAY    metoprolol succinate (TOPROL-XL) 100 MG 24 hr tablet Take 1 tablet (100 mg total) by mouth once daily.    sertraline (ZOLOFT) 100 MG tablet TAKE 1 TABLET(100 MG) BY  MOUTH EVERY DAY    SPIRIVA RESPIMAT 2.5 mcg/actuation inhaler INHALE 2 PUFFS BY MOUTH DAILY (Patient not taking: Reported on 10/18/2023)    triamcinolone acetonide 0.1% (KENALOG) 0.1 % ointment APPLY BETWEEN THE KNEES AND UPPER THIGHS TWICE DAILY FOR NO MORE THAN 7 TO 14 DAYS    XARELTO 20 mg Tab TAKE 1 TABLET(20 MG) BY MOUTH EVERY DAY     Family History       Problem Relation (Age of Onset)    Cancer Mother, Brother    Diabetes Mother, Brother    Hypertension Mother    No Known Problems Father, Sister, Maternal Aunt, Maternal Uncle, Paternal Aunt, Paternal Uncle, Maternal Grandmother, Maternal Grandfather, Paternal Grandmother, Paternal Grandfather, Other          Tobacco Use    Smoking status: Every Day     Current packs/day: 0.50     Average packs/day: 0.5 packs/day for 50.9 years (25.4 ttl pk-yrs)     Types: Cigarettes     Start date: 1973    Smokeless tobacco: Current   Substance and Sexual Activity    Alcohol use: No    Drug use: No    Sexual activity: Yes     Partners: Male     Review of Systems   Constitutional:  Positive for activity change and appetite change.   HENT:  Negative for congestion and dental problem.    Eyes:  Negative for discharge and itching.   Respiratory:  Positive for shortness of breath.    Cardiovascular:  Negative for chest pain and leg swelling.   Gastrointestinal:  Negative for abdominal distention and abdominal pain.   Endocrine: Negative for cold intolerance.   Genitourinary:  Negative for difficulty urinating and dyspareunia.   Musculoskeletal:  Negative for arthralgias and back pain.   Skin:  Negative for color change.   Neurological:  Positive for weakness.   Psychiatric/Behavioral:  Negative for agitation and behavioral problems.      Objective:     Vital Signs (Most Recent):  Temp: 98.3 °F (36.8 °C) (11/18/23 0741)  Pulse: 81 (11/18/23 0801)  Resp: 18 (11/18/23 0801)  BP: 135/84 (11/18/23 0741)  SpO2: (!) 92 % (11/18/23 0801) Vital Signs (24h Range):  Temp:  [97.8 °F (36.6  "°C)-98.4 °F (36.9 °C)] 98.3 °F (36.8 °C)  Pulse:  [69-97] 81  Resp:  [17-38] 18  SpO2:  [89 %-100 %] 92 %  BP: (123-153)/(66-90) 135/84     Weight: 125.2 kg (276 lb)  Body mass index is 48.89 kg/m².     Physical Exam  HENT:      Head: Normocephalic.      Right Ear: There is no impacted cerumen.      Nose: No congestion.      Mouth/Throat:      Mouth: Mucous membranes are dry.   Eyes:      Extraocular Movements: Extraocular movements intact.      Pupils: Pupils are equal, round, and reactive to light.   Cardiovascular:      Rate and Rhythm: Normal rate. Rhythm irregular.      Heart sounds: No murmur heard.  Pulmonary:      Breath sounds: Rales present.   Abdominal:      General: There is no distension.      Tenderness: There is no abdominal tenderness.   Musculoskeletal:         General: Swelling present.   Skin:     Coloration: Skin is not jaundiced.      Findings: No bruising.   Neurological:      General: No focal deficit present.      Mental Status: She is alert.   Psychiatric:         Mood and Affect: Mood normal.         Behavior: Behavior normal.              CRANIAL NERVES     CN III, IV, VI   Pupils are equal, round, and reactive to light.       Significant Labs: All pertinent labs within the past 24 hours have been reviewed.  BMP:   Recent Labs   Lab 11/17/23  0449   GLU 87      K 3.8   CL 91*   CO2 43*   BUN 45*   CREATININE 1.3   CALCIUM 9.4       CBC:   No results for input(s): "WBC", "HGB", "HCT", "PLT" in the last 48 hours.    CMP:   Recent Labs   Lab 11/17/23  0449      K 3.8   CL 91*   CO2 43*   GLU 87   BUN 45*   CREATININE 1.3   CALCIUM 9.4   ANIONGAP 10         Significant Imaging: I have reviewed all pertinent imaging results/findings within the past 24 hours.    Assessment/Plan:      * Acute respiratory failure with hypoxia and hypercapnia  Patient with Hypercapnic and Hypoxic Respiratory failure which is Acute on chronic.  she is not on home oxygen. Supplemental oxygen was provided " and noted- Oxygen Concentration (%):  [40-45] 40    .   Signs/symptoms of respiratory failure include- tachypnea, increased work of breathing, and lethargy. Contributing diagnoses includes - COPD and Obesity Hypoventilation Labs and images were reviewed. Patient Has recent ABG, which has been reviewed. Will treat underlying causes and adjust management of respiratory failure as follows-   ER show hypercapnic,hypoxic  respiratory failure with PH of 7.2 and P C O2 of 72,PO 2 of 76,chest x ray show fluid overload,BNP is over 478,patient has been  started on continues Bipap and IV lasix,,admitted to ICU for close monitoring.    Was on precedex drip for agitation  VBG  show improvement in CO2 retention,consulted pulmonology.on HFO during day and nightly bipap.  Was on precedex drip for agitation.improved  off precedex,  Again was less complaint with bipap again,consulted again,communicated with nursing staff to fit better  mask,  Added IS,continue wean of oxygen.  Has RSV on viral sputum culture,as well, on droplet precaution     Chronic obstructive pulmonary disease with acute exacerbation  Patient's COPD is with exacerbation noted by continued dyspnea and use of accessory muscles for breathing currently.  Patient is currently off COPD Pathway. Continue scheduled inhalers Steroids, Antibiotics, and Supplemental oxygen and monitor respiratory status closely.   Started on IV solumedrol for mild wheezing.    Chronic kidney disease, stage 3a  Creatine stable for now. BMP reviewed- noted Estimated Creatinine Clearance: 61.4 mL/min (based on SCr of 1.1 mg/dL). according to latest data. Based on current GFR, CKD stage is stage 3 - GFR 30-59.  Monitor UOP and serial BMP and adjust therapy as needed. Renally dose meds. Avoid nephrotoxic medications and procedures.    Pulmonary hypertension  Will diurese.      Acute on chronic diastolic CHF (congestive heart failure)  Patient is identified as having Diastolic (HFpEF) heart  failure that is Acute on chronic. CHF is currently uncontrolled due to Dyspnea not returned to baseline after 1 doses of IV diuretic. Latest ECHO performed and demonstrates- Results for orders placed during the hospital encounter of 05/20/22    Echo    Interpretation Summary  · The left ventricle is normal in size with normal systolic function.  · The estimated ejection fraction is 60%.  · Normal right ventricular size with normal right ventricular systolic function.  · The estimated PA systolic pressure is 33 mmHg.  · Atrial fibrillation observed.  . Continue Beta Blocker and Furosemide and monitor clinical status closely. Monitor on telemetry. Patient is off CHF pathway.  Monitor strict Is&Os and daily weights.  Place on fluid restriction of 1.5 L. Cardiology has not been any consulted. Continue to stress to patient importance of self efficacy and  on diet for CHF. Last BNP reviewed- and noted below   Recent Labs   Lab 11/13/23  1016   *   .    HIEU treated with BiPAP  Non complaint with home Bipap.she was consulted.did better last night with Bipap.      PVD (peripheral vascular disease)  On medical treatments.      Paroxysmal atrial fibrillation  Patient with Persistent (7 days or more) atrial fibrillation which is controlled currently with Beta Blocker. Patient is currently in atrial fibrillation.[id number] Score: 4. HASBLED Score: 3. Anticoagulation indicated. Anticoagulation done with Eliquis .    Pre-diabetes  Will monitor.      Severe obesity (BMI >= 40)  Body mass index is 48.01 kg/m². Morbid obesity complicates all aspects of disease management from diagnostic modalities to treatment. Weight loss encouraged and health benefits explained to patient.         Essential hypertension  Chronic, controlled. Latest blood pressure and vitals reviewed-     Temp:  [99.9 °F (37.7 °C)]   Pulse:  []   Resp:  [26-38]   BP: (137-138)/(62-92)   SpO2:  [83 %-95 %] .   Home meds for hypertension were  reviewed and noted below.   Hypertension Medications               furosemide (LASIX) 40 MG tablet Take 1 tablet (40 mg total) by mouth once daily.    losartan (COZAAR) 25 MG tablet TAKE 1 TABLET(25 MG) BY MOUTH EVERY DAY    metoprolol succinate (TOPROL-XL) 100 MG 24 hr tablet Take 1 tablet (100 mg total) by mouth once daily.            While in the hospital, will manage blood pressure as follows; Continue home antihypertensive regimen    Will utilize p.r.n. blood pressure medication only if patient's blood pressure greater than 160/100 and she develops symptoms such as worsening chest pain or shortness of breath.      VTE Risk Mitigation (From admission, onward)           Ordered     rivaroxaban tablet 20 mg  With dinner         11/13/23 1633                    Discharge Planning   JACQUELINE: 11/20/2023     Code Status: Full Code   Is the patient medically ready for discharge?:     Reason for patient still in hospital (select all that apply): Patient trending condition  Discharge Plan A: Home Health   Discharge Delays: (!) Post-Acute Set-up              Luz Ware MD  Department of Hospital Medicine   [facility]

## 2023-11-18 NOTE — NURSING TRANSFER
Nursing Transfer Note      11/17/2023   9:09 PM    Nurse giving handoff:CLAUDIA Yepez   Nurse receiving handoff:CLAUDIA Moe    Reason patient is being transferred: Patient requiring less critical care.    Transfer To: 328    Transfer via wheelchair    Transfer with 1L of nasal cannula of O2    Transported by ICU RN    Transfer Vital Signs:  Blood Pressure:143/95  Heart Rate:92  O2:99  Temperature:97.8  Respirations:15    Telemetry: Rhythm controlled rate AFib  Order for Tele Monitor? Yes    Additional Lines: Oxygen      Medicines sent: N/A    Any special needs or follow-up needed: None    Patient belongings transferred with patient: Yes    Chart send with patient: Yes    Notified: spouse    Patient reassessed at: 11/17/2023, 2050     Upon arrival to floor: cardiac monitor applied, patient oriented to room, call bell in reach, and bed in lowest position with personal belongings at the bedside including cell phone and glasses.

## 2023-11-18 NOTE — SUBJECTIVE & OBJECTIVE
Past Medical History:   Diagnosis Date    Anticoagulant long-term use     Xarelto    Arthritis     Atrial fibrillation     Breast cyst     CHF (congestive heart failure)     Degenerative disc disease     Edema     HLD (hyperlipidemia)     Hx of psychiatric care     Hyperlipidemia     Hypertension     Hypothyroidism     Nuclear sclerosis, bilateral 12/18/2017    Obesity, morbid     HIEU (obstructive sleep apnea)     Other abnormal glucose     pre-diabetes    Pre-diabetes     Psychiatric problem     Requires assistance with activities of daily living (ADL)     Sleep apnea     Smoker     SOB (shortness of breath)     SOB (shortness of breath)     Thyroid disease     on meds 8-9 years ago. hypothyroidism.no malignancy    TMJ (temporomandibular joint disorder)     jaw clicking    Tobacco abuse     Unsteady gait     Urge incontinence     Weakness generalized     Wears glasses        Past Surgical History:   Procedure Laterality Date    BREAST BIOPSY Right     over 10 yrs ago/ benign    BREAST CYST EXCISION Right     BREAST LUMPECTOMY Right     over 10 yrs ago, ex bx/ benign    COLONOSCOPY N/A 6/25/2019    Procedure: COLONOSCOPY;  Surgeon: Micheline Flores MD;  Location: [facility] ENDO;  Service: Endoscopy;  Laterality: N/A;  RX XARELTO ok to hold (2 days) per Dr. Naik see scan 3/20/19    ENDOSCOPIC ULTRASOUND OF UPPER GASTROINTESTINAL TRACT N/A 1/26/2021    Procedure: ULTRASOUND-ENDOSCOPIC-UPPER;  Surgeon: Stevenson Philippe MD;  Location: [facility] ENDO;  Service: Endoscopy;  Laterality: N/A;    HYSTERECTOMY      JOINT REPLACEMENT Bilateral     knees    OOPHORECTOMY      rt.knee surgery 2016      TOTAL KNEE ARTHROPLASTY Left 2/19/2019    Procedure: ARTHROPLASTY, KNEE, TOTAL;  Surgeon: Med Hanson MD;  Location: [facility] OR;  Service: Orthopedics;  Laterality: Left;  10AM START PER  PER JAYLIN TEXT @ 8:34AM ON 2-18-19  SUPINE  HARRIS LOYA [phone] TEXTED HIM ON 1-24-19 @ 7:57AM  RN PRE OP 2-13-19--BMI--48.9    underarm  gland\ Bilateral        Review of patient's allergies indicates:   Allergen Reactions    Ace inhibitors      Cough         No current facility-administered medications on file prior to encounter.     Current Outpatient Medications on File Prior to Encounter   Medication Sig    cetirizine (ZYRTEC) 10 MG tablet Take 1 tablet (10 mg total) by mouth once daily.    fluticasone propionate (FLONASE) 50 mcg/actuation nasal spray SHAKE LIQUID AND USE 2 SPRAYS(100 MCG) IN EACH NOSTRIL EVERY DAY    albuterol (PROVENTIL/VENTOLIN HFA) 90 mcg/actuation inhaler 2 puffs every 6 (six) hours as needed.    albuterol-ipratropium (DUO-NEB) 2.5 mg-0.5 mg/3 mL nebulizer solution Take 3 mLs by nebulization every 6 (six) hours as needed for Wheezing. Rescue    atorvastatin (LIPITOR) 40 MG tablet TAKE 1 TABLET(40 MG) BY MOUTH EVERY DAY    azelastine (ASTELIN) 137 mcg (0.1 %) nasal spray 1 spray (137 mcg total) by Nasal route 2 (two) times daily.    buPROPion (WELLBUTRIN XL) 150 MG TB24 tablet TAKE 1 TABLET(150 MG) BY MOUTH EVERY DAY    furosemide (LASIX) 40 MG tablet Take 1 tablet (40 mg total) by mouth once daily.    ketoconazole (NIZORAL) 2 % cream Apply topically once daily.    losartan (COZAAR) 25 MG tablet TAKE 1 TABLET(25 MG) BY MOUTH EVERY DAY    metoprolol succinate (TOPROL-XL) 100 MG 24 hr tablet Take 1 tablet (100 mg total) by mouth once daily.    sertraline (ZOLOFT) 100 MG tablet TAKE 1 TABLET(100 MG) BY MOUTH EVERY DAY    SPIRIVA RESPIMAT 2.5 mcg/actuation inhaler INHALE 2 PUFFS BY MOUTH DAILY (Patient not taking: Reported on 10/18/2023)    triamcinolone acetonide 0.1% (KENALOG) 0.1 % ointment APPLY BETWEEN THE KNEES AND UPPER THIGHS TWICE DAILY FOR NO MORE THAN 7 TO 14 DAYS    XARELTO 20 mg Tab TAKE 1 TABLET(20 MG) BY MOUTH EVERY DAY     Family History       Problem Relation (Age of Onset)    Cancer Mother, Brother    Diabetes Mother, Brother    Hypertension Mother    No Known Problems Father, Sister, Maternal Aunt, Maternal  Uncle, Paternal Aunt, Paternal Uncle, Maternal Grandmother, Maternal Grandfather, Paternal Grandmother, Paternal Grandfather, Other          Tobacco Use    Smoking status: Every Day     Current packs/day: 0.50     Average packs/day: 0.5 packs/day for 50.9 years (25.4 ttl pk-yrs)     Types: Cigarettes     Start date: 1973    Smokeless tobacco: Current   Substance and Sexual Activity    Alcohol use: No    Drug use: No    Sexual activity: Yes     Partners: Male     Review of Systems   Constitutional:  Positive for activity change and appetite change.   HENT:  Negative for congestion and dental problem.    Eyes:  Negative for discharge and itching.   Respiratory:  Positive for shortness of breath.    Cardiovascular:  Negative for chest pain and leg swelling.   Gastrointestinal:  Negative for abdominal distention and abdominal pain.   Endocrine: Negative for cold intolerance.   Genitourinary:  Negative for difficulty urinating and dyspareunia.   Musculoskeletal:  Negative for arthralgias and back pain.   Skin:  Negative for color change.   Neurological:  Positive for weakness.   Psychiatric/Behavioral:  Negative for agitation and behavioral problems.      Objective:     Vital Signs (Most Recent):  Temp: 98.3 °F (36.8 °C) (11/18/23 0741)  Pulse: 81 (11/18/23 0801)  Resp: 18 (11/18/23 0801)  BP: 135/84 (11/18/23 0741)  SpO2: (!) 92 % (11/18/23 0801) Vital Signs (24h Range):  Temp:  [97.8 °F (36.6 °C)-98.4 °F (36.9 °C)] 98.3 °F (36.8 °C)  Pulse:  [69-97] 81  Resp:  [17-38] 18  SpO2:  [89 %-100 %] 92 %  BP: (123-153)/(66-90) 135/84     Weight: 125.2 kg (276 lb)  Body mass index is 48.89 kg/m².     Physical Exam  HENT:      Head: Normocephalic.      Right Ear: There is no impacted cerumen.      Nose: No congestion.      Mouth/Throat:      Mouth: Mucous membranes are dry.   Eyes:      Extraocular Movements: Extraocular movements intact.      Pupils: Pupils are equal, round, and reactive to light.   Cardiovascular:      Rate  "and Rhythm: Normal rate. Rhythm irregular.      Heart sounds: No murmur heard.  Pulmonary:      Breath sounds: Rales present.   Abdominal:      General: There is no distension.      Tenderness: There is no abdominal tenderness.   Musculoskeletal:         General: Swelling present.   Skin:     Coloration: Skin is not jaundiced.      Findings: No bruising.   Neurological:      General: No focal deficit present.      Mental Status: She is alert.   Psychiatric:         Mood and Affect: Mood normal.         Behavior: Behavior normal.              CRANIAL NERVES     CN III, IV, VI   Pupils are equal, round, and reactive to light.       Significant Labs: All pertinent labs within the past 24 hours have been reviewed.  BMP:   Recent Labs   Lab 11/17/23  0449   GLU 87      K 3.8   CL 91*   CO2 43*   BUN 45*   CREATININE 1.3   CALCIUM 9.4       CBC:   No results for input(s): "WBC", "HGB", "HCT", "PLT" in the last 48 hours.    CMP:   Recent Labs   Lab 11/17/23  0449      K 3.8   CL 91*   CO2 43*   GLU 87   BUN 45*   CREATININE 1.3   CALCIUM 9.4   ANIONGAP 10         Significant Imaging: I have reviewed all pertinent imaging results/findings within the past 24 hours.  "

## 2023-11-18 NOTE — NURSING
[facility]  Nurses Note -- 4 Eyes      11/17/2023       Skin assessed on: Q Shift      [x] No Pressure Injuries Present    [x]Prevention Measures Documented    [] Yes LDA  for Pressure Injury Previously documented     [] Yes New Pressure Injury Discovered   [] LDA for New Pressure Injury Added      Attending RN:  Marleni Erazo RN     Second RN:  Josiane Mohan RN

## 2023-11-18 NOTE — PLAN OF CARE
Problem: Adult Inpatient Plan of Care  Goal: Plan of Care Review  Flowsheets (Taken 11/18/2023 0418)  Plan of Care Reviewed With: patient  Goal: Absence of Hospital-Acquired Illness or Injury  Intervention: Identify and Manage Fall Risk  Flowsheets (Taken 11/18/2023 0418)  Safety Promotion/Fall Prevention:   assistive device/personal item within reach   side rails raised x 2  Intervention: Prevent Skin Injury  Flowsheets (Taken 11/18/2023 0418)  Body Position: position changed independently  Intervention: Prevent and Manage VTE (Venous Thromboembolism) Risk  Flowsheets (Taken 11/18/2023 0418)  Activity Management: Rolling - L1  Intervention: Prevent Infection  Flowsheets (Taken 11/18/2023 0418)  Infection Prevention: hand hygiene promoted  Goal: Optimal Comfort and Wellbeing  Intervention: Monitor Pain and Promote Comfort  Flowsheets (Taken 11/18/2023 0418)  Pain Management Interventions: care clustered  Intervention: Provide Person-Centered Care  Flowsheets (Taken 11/18/2023 0418)  Trust Relationship/Rapport: care explained     Problem: Diabetes Comorbidity  Goal: Blood Glucose Level Within Targeted Range  Intervention: Monitor and Manage Glycemia  Flowsheets (Taken 11/18/2023 0418)  Glycemic Management: blood glucose monitored     Problem: Fall Injury Risk  Goal: Absence of Fall and Fall-Related Injury  Intervention: Identify and Manage Contributors  Flowsheets (Taken 11/18/2023 0418)  Medication Review/Management: medications reviewed  Intervention: Promote Injury-Free Environment  Flowsheets (Taken 11/18/2023 0418)  Safety Promotion/Fall Prevention:   assistive device/personal item within reach   side rails raised x 2     Problem: Skin Injury Risk Increased  Goal: Skin Health and Integrity  Intervention: Optimize Skin Protection  Flowsheets (Taken 11/18/2023 0418)  Head of Bed (HOB) Positioning: HOB elevated     Problem: Infection  Goal: Absence of Infection Signs and Symptoms  Intervention: Prevent or Manage  Infection  Flowsheets (Taken 11/18/2023 0418)  Isolation Precautions: droplet     Problem: Coping Ineffective  Goal: Effective Coping  Intervention: Support and Enhance Coping Strategies  Flowsheets (Taken 11/18/2023 0418)  Supportive Measures: active listening utilized

## 2023-11-19 LAB
ANION GAP SERPL CALC-SCNC: 12 MMOL/L (ref 8–16)
BASOPHILS # BLD AUTO: 0.02 K/UL (ref 0–0.2)
BASOPHILS NFR BLD: 0.2 % (ref 0–1.9)
BUN SERPL-MCNC: 49 MG/DL (ref 8–23)
CALCIUM SERPL-MCNC: 9.2 MG/DL (ref 8.7–10.5)
CHLORIDE SERPL-SCNC: 93 MMOL/L (ref 95–110)
CO2 SERPL-SCNC: 39 MMOL/L (ref 23–29)
CREAT SERPL-MCNC: 1.3 MG/DL (ref 0.5–1.4)
DIFFERENTIAL METHOD: ABNORMAL
EOSINOPHIL # BLD AUTO: 0 K/UL (ref 0–0.5)
EOSINOPHIL NFR BLD: 0 % (ref 0–8)
ERYTHROCYTE [DISTWIDTH] IN BLOOD BY AUTOMATED COUNT: 13.6 % (ref 11.5–14.5)
EST. GFR  (NO RACE VARIABLE): 45 ML/MIN/1.73 M^2
GLUCOSE SERPL-MCNC: 229 MG/DL (ref 70–110)
HCT VFR BLD AUTO: 49.4 % (ref 37–48.5)
HGB BLD-MCNC: 15.4 G/DL (ref 12–16)
IMM GRANULOCYTES # BLD AUTO: 0.09 K/UL (ref 0–0.04)
IMM GRANULOCYTES NFR BLD AUTO: 0.8 % (ref 0–0.5)
LYMPHOCYTES # BLD AUTO: 0.9 K/UL (ref 1–4.8)
LYMPHOCYTES NFR BLD: 8.1 % (ref 18–48)
MCH RBC QN AUTO: 31 PG (ref 27–31)
MCHC RBC AUTO-ENTMCNC: 31.2 G/DL (ref 32–36)
MCV RBC AUTO: 100 FL (ref 82–98)
MONOCYTES # BLD AUTO: 0.3 K/UL (ref 0.3–1)
MONOCYTES NFR BLD: 2.8 % (ref 4–15)
NEUTROPHILS # BLD AUTO: 10.2 K/UL (ref 1.8–7.7)
NEUTROPHILS NFR BLD: 88.1 % (ref 38–73)
NRBC BLD-RTO: 0 /100 WBC
PLATELET # BLD AUTO: 278 K/UL (ref 150–450)
PMV BLD AUTO: 10.4 FL (ref 9.2–12.9)
POTASSIUM SERPL-SCNC: 3.8 MMOL/L (ref 3.5–5.1)
RBC # BLD AUTO: 4.96 M/UL (ref 4–5.4)
SODIUM SERPL-SCNC: 144 MMOL/L (ref 136–145)
WBC # BLD AUTO: 11.55 K/UL (ref 3.9–12.7)

## 2023-11-19 PROCEDURE — 36415 COLL VENOUS BLD VENIPUNCTURE: CPT | Performed by: HOSPITALIST

## 2023-11-19 PROCEDURE — 36410 VNPNXR 3YR/> PHY/QHP DX/THER: CPT

## 2023-11-19 PROCEDURE — 25000003 PHARM REV CODE 250: Performed by: HOSPITALIST

## 2023-11-19 PROCEDURE — 80048 BASIC METABOLIC PNL TOTAL CA: CPT | Performed by: HOSPITALIST

## 2023-11-19 PROCEDURE — 94761 N-INVAS EAR/PLS OXIMETRY MLT: CPT

## 2023-11-19 PROCEDURE — 27000221 HC OXYGEN, UP TO 24 HOURS

## 2023-11-19 PROCEDURE — 85025 COMPLETE CBC W/AUTO DIFF WBC: CPT | Performed by: HOSPITALIST

## 2023-11-19 PROCEDURE — 99900035 HC TECH TIME PER 15 MIN (STAT)

## 2023-11-19 PROCEDURE — C1751 CATH, INF, PER/CENT/MIDLINE: HCPCS

## 2023-11-19 PROCEDURE — 94640 AIRWAY INHALATION TREATMENT: CPT

## 2023-11-19 PROCEDURE — 25000242 PHARM REV CODE 250 ALT 637 W/ HCPCS: Performed by: HOSPITALIST

## 2023-11-19 PROCEDURE — 21400001 HC TELEMETRY ROOM

## 2023-11-19 PROCEDURE — 94660 CPAP INITIATION&MGMT: CPT

## 2023-11-19 PROCEDURE — 27000207 HC ISOLATION

## 2023-11-19 PROCEDURE — 63600175 PHARM REV CODE 636 W HCPCS: Performed by: HOSPITALIST

## 2023-11-19 RX ADMIN — METOPROLOL SUCCINATE 100 MG: 50 TABLET, EXTENDED RELEASE ORAL at 09:11

## 2023-11-19 RX ADMIN — METHYLPREDNISOLONE SODIUM SUCCINATE 80 MG: 40 INJECTION, POWDER, FOR SOLUTION INTRAMUSCULAR; INTRAVENOUS at 05:11

## 2023-11-19 RX ADMIN — GUAIFENESIN AND DEXTROMETHORPHAN 10 ML: 100; 10 SYRUP ORAL at 08:11

## 2023-11-19 RX ADMIN — IPRATROPIUM BROMIDE AND ALBUTEROL SULFATE 3 ML: 2.5; .5 SOLUTION RESPIRATORY (INHALATION) at 08:11

## 2023-11-19 RX ADMIN — IPRATROPIUM BROMIDE AND ALBUTEROL SULFATE 3 ML: 2.5; .5 SOLUTION RESPIRATORY (INHALATION) at 11:11

## 2023-11-19 RX ADMIN — IPRATROPIUM BROMIDE AND ALBUTEROL SULFATE 3 ML: 2.5; .5 SOLUTION RESPIRATORY (INHALATION) at 07:11

## 2023-11-19 RX ADMIN — ATORVASTATIN CALCIUM 40 MG: 40 TABLET, FILM COATED ORAL at 09:11

## 2023-11-19 RX ADMIN — SODIUM CHLORIDE SOLN NEBU 3% 4 ML: 3 NEBU SOLN at 04:11

## 2023-11-19 RX ADMIN — METHYLPREDNISOLONE SODIUM SUCCINATE 60 MG: 40 INJECTION, POWDER, FOR SOLUTION INTRAMUSCULAR; INTRAVENOUS at 12:11

## 2023-11-19 RX ADMIN — IPRATROPIUM BROMIDE AND ALBUTEROL SULFATE 3 ML: 2.5; .5 SOLUTION RESPIRATORY (INHALATION) at 04:11

## 2023-11-19 RX ADMIN — BENZONATATE 100 MG: 100 CAPSULE ORAL at 06:11

## 2023-11-19 RX ADMIN — FAMOTIDINE 20 MG: 20 TABLET ORAL at 09:11

## 2023-11-19 RX ADMIN — IPRATROPIUM BROMIDE AND ALBUTEROL SULFATE 3 ML: 2.5; .5 SOLUTION RESPIRATORY (INHALATION) at 03:11

## 2023-11-19 RX ADMIN — FUROSEMIDE 40 MG: 40 TABLET ORAL at 05:11

## 2023-11-19 RX ADMIN — METHYLPREDNISOLONE SODIUM SUCCINATE 80 MG: 40 INJECTION, POWDER, FOR SOLUTION INTRAMUSCULAR; INTRAVENOUS at 11:11

## 2023-11-19 RX ADMIN — QUETIAPINE FUMARATE 25 MG: 25 TABLET ORAL at 09:11

## 2023-11-19 RX ADMIN — RIVAROXABAN 20 MG: 20 TABLET, FILM COATED ORAL at 05:11

## 2023-11-19 RX ADMIN — METHYLPREDNISOLONE SODIUM SUCCINATE 60 MG: 40 INJECTION, POWDER, FOR SOLUTION INTRAMUSCULAR; INTRAVENOUS at 05:11

## 2023-11-19 RX ADMIN — GUAIFENESIN AND DEXTROMETHORPHAN HYDROBROMIDE 1 TABLET: 600; 30 TABLET, EXTENDED RELEASE ORAL at 11:11

## 2023-11-19 RX ADMIN — GUAIFENESIN AND DEXTROMETHORPHAN 10 ML: 100; 10 SYRUP ORAL at 09:11

## 2023-11-19 RX ADMIN — GUAIFENESIN AND DEXTROMETHORPHAN 10 ML: 100; 10 SYRUP ORAL at 03:11

## 2023-11-19 RX ADMIN — SODIUM CHLORIDE SOLN NEBU 3% 4 ML: 3 NEBU SOLN at 08:11

## 2023-11-19 RX ADMIN — SODIUM CHLORIDE SOLN NEBU 3% 4 ML: 3 NEBU SOLN at 11:11

## 2023-11-19 RX ADMIN — GUAIFENESIN AND DEXTROMETHORPHAN HYDROBROMIDE 1 TABLET: 600; 30 TABLET, EXTENDED RELEASE ORAL at 09:11

## 2023-11-19 RX ADMIN — SERTRALINE HYDROCHLORIDE 100 MG: 50 TABLET ORAL at 09:11

## 2023-11-19 NOTE — NURSING
[facility] [place]  Nurses Note -- 4 Eyes      11/19/2023       Skin assessed on: Q Shift      [x] No Pressure Injuries Present    [x]Prevention Measures Documented    [] Yes LDA  for Pressure Injury Previously documented     [] Yes New Pressure Injury Discovered   [] LDA for New Pressure Injury Added      Attending RN:  Shanna Nelson RN     Second RN:  CLAUDIA Moe

## 2023-11-19 NOTE — PLAN OF CARE
Problem: Adult Inpatient Plan of Care  Goal: Absence of Hospital-Acquired Illness or Injury  Intervention: Identify and Manage Fall Risk  Flowsheets (Taken 11/19/2023 0416)  Safety Promotion/Fall Prevention:   assistive device/personal item within reach   side rails raised x 2  Intervention: Prevent Skin Injury  Flowsheets (Taken 11/19/2023 0416)  Body Position: position changed independently  Intervention: Prevent and Manage VTE (Venous Thromboembolism) Risk  Flowsheets (Taken 11/19/2023 0416)  Activity Management:   Rolling - L1   Sitting at edge of bed - L2   Up in chair - L3  Intervention: Prevent Infection  Flowsheets (Taken 11/19/2023 0416)  Infection Prevention:   hand hygiene promoted   environmental surveillance performed  Goal: Optimal Comfort and Wellbeing  Intervention: Monitor Pain and Promote Comfort  Flowsheets (Taken 11/19/2023 0416)  Pain Management Interventions: care clustered  Intervention: Provide Person-Centered Care  Flowsheets (Taken 11/19/2023 0416)  Trust Relationship/Rapport: care explained     Problem: Fall Injury Risk  Goal: Absence of Fall and Fall-Related Injury  Intervention: Identify and Manage Contributors  Flowsheets (Taken 11/19/2023 0416)  Medication Review/Management: medications reviewed  Intervention: Promote Injury-Free Environment  Flowsheets (Taken 11/19/2023 0416)  Safety Promotion/Fall Prevention:   assistive device/personal item within reach   side rails raised x 2     Problem: Skin Injury Risk Increased  Goal: Skin Health and Integrity  Intervention: Optimize Skin Protection  Flowsheets (Taken 11/19/2023 0416)  Head of Bed (HOB) Positioning: HOB elevated     Problem: Infection  Goal: Absence of Infection Signs and Symptoms  Intervention: Prevent or Manage Infection  Flowsheets (Taken 11/19/2023 0416)  Infection Management: aseptic technique maintained  Isolation Precautions:   droplet   precautions maintained     Problem: Coping Ineffective  Goal: Effective  Coping  Intervention: Support and Enhance Coping Strategies  Flowsheets (Taken 11/19/2023 2449)  Supportive Measures: active listening utilized  Environmental Support: calm environment promoted

## 2023-11-19 NOTE — ASSESSMENT & PLAN NOTE
Patient with Hypercapnic and Hypoxic Respiratory failure which is Acute on chronic.  she is not on home oxygen. Supplemental oxygen was provided and noted- Oxygen Concentration (%):  [40] 40    .   Signs/symptoms of respiratory failure include- tachypnea, increased work of breathing, and lethargy. Contributing diagnoses includes - COPD and Obesity Hypoventilation Labs and images were reviewed. Patient Has recent ABG, which has been reviewed. Will treat underlying causes and adjust management of respiratory failure as follows-   ER show hypercapnic,hypoxic  respiratory failure with PH of 7.2 and P C O2 of 72,PO 2 of 76,chest x ray show fluid overload,BNP is over 478,patient has been  started on continues Bipap and IV lasix,,admitted to ICU for close monitoring.    Was on precedex drip for agitation  VBG  show improvement in CO2 retention,consulted pulmonology.on HFO during day and nightly bipap.  Was on precedex drip for agitation.improved  off precedex,  Again was less complaint with bipap again,consulted again,communicated with nursing staff to fit better  mask,  Added IS,continue wean of oxygen.  Has RSV on viral sputum culture,as well, on droplet precaution .  On IV Solumedrol for wheezing.

## 2023-11-19 NOTE — SUBJECTIVE & OBJECTIVE
Past Medical History:   Diagnosis Date    Anticoagulant long-term use     Xarelto    Arthritis     Atrial fibrillation     Breast cyst     CHF (congestive heart failure)     Degenerative disc disease     Edema     HLD (hyperlipidemia)     Hx of psychiatric care     Hyperlipidemia     Hypertension     Hypothyroidism     Nuclear sclerosis, bilateral 12/18/2017    Obesity, morbid     HIEU (obstructive sleep apnea)     Other abnormal glucose     pre-diabetes    Pre-diabetes     Psychiatric problem     Requires assistance with activities of daily living (ADL)     Sleep apnea     Smoker     SOB (shortness of breath)     SOB (shortness of breath)     Thyroid disease     on meds 8-9 years ago. hypothyroidism.no malignancy    TMJ (temporomandibular joint disorder)     jaw clicking    Tobacco abuse     Unsteady gait     Urge incontinence     Weakness generalized     Wears glasses        Past Surgical History:   Procedure Laterality Date    BREAST BIOPSY Right     over 10 yrs ago/ benign    BREAST CYST EXCISION Right     BREAST LUMPECTOMY Right     over 10 yrs ago, ex bx/ benign    COLONOSCOPY N/A 6/25/2019    Procedure: COLONOSCOPY;  Surgeon: Micheline Flores MD;  Location: [facility] ENDO;  Service: Endoscopy;  Laterality: N/A;  RX XARELTO ok to hold (2 days) per Dr. Naik see scan 3/20/19    ENDOSCOPIC ULTRASOUND OF UPPER GASTROINTESTINAL TRACT N/A 1/26/2021    Procedure: ULTRASOUND-ENDOSCOPIC-UPPER;  Surgeon: Stevenson Philippe MD;  Location: [facility] ENDO;  Service: Endoscopy;  Laterality: N/A;    HYSTERECTOMY      JOINT REPLACEMENT Bilateral     knees    OOPHORECTOMY      rt.knee surgery 2016      TOTAL KNEE ARTHROPLASTY Left 2/19/2019    Procedure: ARTHROPLASTY, KNEE, TOTAL;  Surgeon: Med Hanson MD;  Location: [facility] OR;  Service: Orthopedics;  Laterality: Left;  10AM START PER  PER JAYLIN TEXT @ 8:34AM ON 2-18-19  SUPINE  HARRIS LOYA [phone] TEXTED HIM ON 1-24-19 @ 7:57AM  RN PRE OP 2-13-19--BMI--48.9    underarm  gland\ Bilateral        Review of patient's allergies indicates:   Allergen Reactions    Ace inhibitors      Cough         No current facility-administered medications on file prior to encounter.     Current Outpatient Medications on File Prior to Encounter   Medication Sig    cetirizine (ZYRTEC) 10 MG tablet Take 1 tablet (10 mg total) by mouth once daily.    fluticasone propionate (FLONASE) 50 mcg/actuation nasal spray SHAKE LIQUID AND USE 2 SPRAYS(100 MCG) IN EACH NOSTRIL EVERY DAY    albuterol (PROVENTIL/VENTOLIN HFA) 90 mcg/actuation inhaler 2 puffs every 6 (six) hours as needed.    albuterol-ipratropium (DUO-NEB) 2.5 mg-0.5 mg/3 mL nebulizer solution Take 3 mLs by nebulization every 6 (six) hours as needed for Wheezing. Rescue    atorvastatin (LIPITOR) 40 MG tablet TAKE 1 TABLET(40 MG) BY MOUTH EVERY DAY    azelastine (ASTELIN) 137 mcg (0.1 %) nasal spray 1 spray (137 mcg total) by Nasal route 2 (two) times daily.    buPROPion (WELLBUTRIN XL) 150 MG TB24 tablet TAKE 1 TABLET(150 MG) BY MOUTH EVERY DAY    furosemide (LASIX) 40 MG tablet Take 1 tablet (40 mg total) by mouth once daily.    ketoconazole (NIZORAL) 2 % cream Apply topically once daily.    losartan (COZAAR) 25 MG tablet TAKE 1 TABLET(25 MG) BY MOUTH EVERY DAY    metoprolol succinate (TOPROL-XL) 100 MG 24 hr tablet Take 1 tablet (100 mg total) by mouth once daily.    sertraline (ZOLOFT) 100 MG tablet TAKE 1 TABLET(100 MG) BY MOUTH EVERY DAY    SPIRIVA RESPIMAT 2.5 mcg/actuation inhaler INHALE 2 PUFFS BY MOUTH DAILY (Patient not taking: Reported on 10/18/2023)    triamcinolone acetonide 0.1% (KENALOG) 0.1 % ointment APPLY BETWEEN THE KNEES AND UPPER THIGHS TWICE DAILY FOR NO MORE THAN 7 TO 14 DAYS    XARELTO 20 mg Tab TAKE 1 TABLET(20 MG) BY MOUTH EVERY DAY     Family History       Problem Relation (Age of Onset)    Cancer Mother, Brother    Diabetes Mother, Brother    Hypertension Mother    No Known Problems Father, Sister, Maternal Aunt, Maternal  Uncle, Paternal Aunt, Paternal Uncle, Maternal Grandmother, Maternal Grandfather, Paternal Grandmother, Paternal Grandfather, Other          Tobacco Use    Smoking status: Every Day     Current packs/day: 0.50     Average packs/day: 0.5 packs/day for 50.9 years (25.4 ttl pk-yrs)     Types: Cigarettes     Start date: 1973    Smokeless tobacco: Current   Substance and Sexual Activity    Alcohol use: No    Drug use: No    Sexual activity: Yes     Partners: Male     Review of Systems   Constitutional:  Positive for activity change and appetite change.   HENT:  Negative for congestion and dental problem.    Eyes:  Negative for discharge and itching.   Respiratory:  Positive for shortness of breath.    Cardiovascular:  Negative for chest pain and leg swelling.   Gastrointestinal:  Negative for abdominal distention and abdominal pain.   Endocrine: Negative for cold intolerance.   Genitourinary:  Negative for difficulty urinating and dyspareunia.   Musculoskeletal:  Negative for arthralgias and back pain.   Skin:  Negative for color change.   Neurological:  Positive for weakness.   Psychiatric/Behavioral:  Negative for agitation and behavioral problems.      Objective:     Vital Signs (Most Recent):  Temp: 98.2 °F (36.8 °C) (11/19/23 0827)  Pulse: 90 (11/19/23 0827)  Resp: 14 (11/19/23 0827)  BP: 118/78 (11/19/23 0827)  SpO2: (!) 92 % (11/19/23 0827) Vital Signs (24h Range):  Temp:  [97.8 °F (36.6 °C)-98.6 °F (37 °C)] 98.2 °F (36.8 °C)  Pulse:  [] 90  Resp:  [14-21] 14  SpO2:  [89 %-97 %] 92 %  BP: (118-159)/(69-92) 118/78     Weight: 125.2 kg (276 lb)  Body mass index is 48.89 kg/m².     Physical Exam  HENT:      Head: Normocephalic.      Right Ear: There is no impacted cerumen.      Nose: No congestion.      Mouth/Throat:      Mouth: Mucous membranes are dry.   Eyes:      Extraocular Movements: Extraocular movements intact.      Pupils: Pupils are equal, round, and reactive to light.   Cardiovascular:      Rate and  "Rhythm: Normal rate. Rhythm irregular.      Heart sounds: No murmur heard.  Pulmonary:      Breath sounds: Rales present.   Abdominal:      General: There is no distension.      Tenderness: There is no abdominal tenderness.   Musculoskeletal:         General: Swelling present.   Skin:     Coloration: Skin is not jaundiced.      Findings: No bruising.   Neurological:      General: No focal deficit present.      Mental Status: She is alert.   Psychiatric:         Mood and Affect: Mood normal.         Behavior: Behavior normal.              CRANIAL NERVES     CN III, IV, VI   Pupils are equal, round, and reactive to light.       Significant Labs: All pertinent labs within the past 24 hours have been reviewed.  BMP:   No results for input(s): "GLU", "NA", "K", "CL", "CO2", "BUN", "CREATININE", "CALCIUM", "MG" in the last 48 hours.    CBC:   No results for input(s): "WBC", "HGB", "HCT", "PLT" in the last 48 hours.    CMP:   No results for input(s): "NA", "K", "CL", "CO2", "GLU", "BUN", "CREATININE", "CALCIUM", "PROT", "ALBUMIN", "BILITOT", "ALKPHOS", "AST", "ALT", "ANIONGAP", "EGFRNONAA" in the last 48 hours.    Invalid input(s): "ESTGFAFRICA"      Significant Imaging: I have reviewed all pertinent imaging results/findings within the past 24 hours.  "

## 2023-11-19 NOTE — NURSING
Patient easily aroused to voice this am.  Alert and fully oriented. Denies pain. Removed CPAP, and applied O2@2L NC. RT gave breathing tx after which patient had intermittent coughing productive of clear/pink sputum.   When O2 removed, patient's SPO2@83%.   O2 reapplied and SPO2@92%.     Dr. Harris ordered walk test to determine need for home O2.   Breakfast tray warmed.  Given PRN cough liquid.    Will continue to f/u.

## 2023-11-19 NOTE — PROGRESS NOTES
"[facility] Medicine  Progress Note    Patient Name: Sandee Evans  MRN: [id number]  Patient Class: IP- Inpatient   Admission Date: 11/13/2023  Length of Stay: 6 days  Attending Physician: Luz Ware, *  Primary Care Provider: Kuldeep Miller MD        Subjective:     Principal Problem:Acute respiratory failure with hypoxia and hypercapnia    CC : short of breath     HPI:   67 y.o. female, with a past medical history of A-fib on OAC,diastolic  CHF, COPD, HLD, HTN, hypothyroidism, morbid obesity, pre-diabetes, and psychiatric problems, who presents to the ED with SOB onset 3 weeks ago. She reports SOB has gotten progressively worse over 3 weeks. Patient notes she has trouble ambulating d/t SOB. Patient notes being on an at-home BiPAP but denies daily use d/t the machine "smothering" her. Additional history provided by independent historian, relative, notes an O2 saturation in the 80's at-home PTA. Patient denies O2 at home. Patient notes an associated cough, congestion, diarrhea, rhinorrhea, sore throat, and headache.ABG in ER show hypercapnic,hypoxic  respiratory failure with PH of 7.2 and P C O2 of 72,PO 2 of 76,chest x ray show fluid overload,BNP is over 478,patient has been  started on continues Bipap and IV lasix,,admitted to ICU for close monitoring.    Overview/Hospital Course:   67 y.o. female, with a past medical history of A-fib on OAC,diastolic  CHF, COPD, HLD, HTN, hypothyroidism, morbid obesity, pre-diabetes, and psychiatric problems, who presents to the ED with SOB onset 3 weeks ago. She reports SOB has gotten progressively worse over 3 weeks. Patient notes she has trouble ambulating d/t SOB. Patient notes being on an at-home BiPAP but denies daily use d/t the machine "smothering" her. \ relative, notes an O2 saturation in the 80's at-home PTA. Patient denies O2 at home. .ABG in ER show hypercapnic,hypoxic  respiratory failure with PH of 7.2 and P C O2 of 72,PO 2 of " 76,chest x ray show fluid overload,BNP is over 478,patient has been  started on continues Bipap and IV lasix,,admitted to ICU for close monitoring.  VBG  show improvement in CO2 retention,consulted pulmonology.on HFO during day and nightly bipap.  Was on precedex drip for agitation.improved  off precedex,  Again was less complaint with bipap again,consulted again,communicated with nursing staff to fit better  mask,did better last night with Bipap.  Consulted PT,OT   Added IS,continue wean of oxygen.  Started on IV solumedrol for wheezing.    Past Medical History:   Diagnosis Date    Anticoagulant long-term use     Xarelto    Arthritis     Atrial fibrillation     Breast cyst     CHF (congestive heart failure)     Degenerative disc disease     Edema     HLD (hyperlipidemia)     Hx of psychiatric care     Hyperlipidemia     Hypertension     Hypothyroidism     Nuclear sclerosis, bilateral 12/18/2017    Obesity, morbid     HIEU (obstructive sleep apnea)     Other abnormal glucose     pre-diabetes    Pre-diabetes     Psychiatric problem     Requires assistance with activities of daily living (ADL)     Sleep apnea     Smoker     SOB (shortness of breath)     SOB (shortness of breath)     Thyroid disease     on meds 8-9 years ago. hypothyroidism.no malignancy    TMJ (temporomandibular joint disorder)     jaw clicking    Tobacco abuse     Unsteady gait     Urge incontinence     Weakness generalized     Wears glasses        Past Surgical History:   Procedure Laterality Date    BREAST BIOPSY Right     over 10 yrs ago/ benign    BREAST CYST EXCISION Right     BREAST LUMPECTOMY Right     over 10 yrs ago, ex bx/ benign    COLONOSCOPY N/A 6/25/2019    Procedure: COLONOSCOPY;  Surgeon: Micheline Flores MD;  Location: [facility];  Service: Endoscopy;  Laterality: N/A;  RX XARELTO ok to hold (2 days) per Dr. Naik see scan 3/20/19    ENDOSCOPIC ULTRASOUND OF UPPER GASTROINTESTINAL TRACT N/A 1/26/2021    Procedure:  ULTRASOUND-ENDOSCOPIC-UPPER;  Surgeon: Stevenson Philippe MD;  Location: [facility] ENDO;  Service: Endoscopy;  Laterality: N/A;    HYSTERECTOMY      JOINT REPLACEMENT Bilateral     knees    OOPHORECTOMY      rt.knee surgery 2016      TOTAL KNEE ARTHROPLASTY Left 2/19/2019    Procedure: ARTHROPLASTY, KNEE, TOTAL;  Surgeon: Med Hanson MD;  Location: [facility] OR;  Service: Orthopedics;  Laterality: Left;  10AM START PER  PER JAYLIN TEXT @ 8:34AM ON 2-18-19  SUPINE  HARRIS LOYA [phone] TEXTED HIM ON 1-24-19 @ 7:57AM  RN PRE OP 2-13-19--BMI--48.9    underarm gland\ Bilateral        Review of patient's allergies indicates:   Allergen Reactions    Ace inhibitors      Cough         No current facility-administered medications on file prior to encounter.     Current Outpatient Medications on File Prior to Encounter   Medication Sig    cetirizine (ZYRTEC) 10 MG tablet Take 1 tablet (10 mg total) by mouth once daily.    fluticasone propionate (FLONASE) 50 mcg/actuation nasal spray SHAKE LIQUID AND USE 2 SPRAYS(100 MCG) IN EACH NOSTRIL EVERY DAY    albuterol (PROVENTIL/VENTOLIN HFA) 90 mcg/actuation inhaler 2 puffs every 6 (six) hours as needed.    albuterol-ipratropium (DUO-NEB) 2.5 mg-0.5 mg/3 mL nebulizer solution Take 3 mLs by nebulization every 6 (six) hours as needed for Wheezing. Rescue    atorvastatin (LIPITOR) 40 MG tablet TAKE 1 TABLET(40 MG) BY MOUTH EVERY DAY    azelastine (ASTELIN) 137 mcg (0.1 %) nasal spray 1 spray (137 mcg total) by Nasal route 2 (two) times daily.    buPROPion (WELLBUTRIN XL) 150 MG TB24 tablet TAKE 1 TABLET(150 MG) BY MOUTH EVERY DAY    furosemide (LASIX) 40 MG tablet Take 1 tablet (40 mg total) by mouth once daily.    ketoconazole (NIZORAL) 2 % cream Apply topically once daily.    losartan (COZAAR) 25 MG tablet TAKE 1 TABLET(25 MG) BY MOUTH EVERY DAY    metoprolol succinate (TOPROL-XL) 100 MG 24 hr tablet Take 1 tablet (100 mg total) by mouth once daily.    sertraline (ZOLOFT) 100 MG  tablet TAKE 1 TABLET(100 MG) BY MOUTH EVERY DAY    SPIRIVA RESPIMAT 2.5 mcg/actuation inhaler INHALE 2 PUFFS BY MOUTH DAILY (Patient not taking: Reported on 10/18/2023)    triamcinolone acetonide 0.1% (KENALOG) 0.1 % ointment APPLY BETWEEN THE KNEES AND UPPER THIGHS TWICE DAILY FOR NO MORE THAN 7 TO 14 DAYS    XARELTO 20 mg Tab TAKE 1 TABLET(20 MG) BY MOUTH EVERY DAY     Family History       Problem Relation (Age of Onset)    Cancer Mother, Brother    Diabetes Mother, Brother    Hypertension Mother    No Known Problems Father, Sister, Maternal Aunt, Maternal Uncle, Paternal Aunt, Paternal Uncle, Maternal Grandmother, Maternal Grandfather, Paternal Grandmother, Paternal Grandfather, Other          Tobacco Use    Smoking status: Every Day     Current packs/day: 0.50     Average packs/day: 0.5 packs/day for 50.9 years (25.4 ttl pk-yrs)     Types: Cigarettes     Start date: 1973    Smokeless tobacco: Current   Substance and Sexual Activity    Alcohol use: No    Drug use: No    Sexual activity: Yes     Partners: Male     Review of Systems   Constitutional:  Positive for activity change and appetite change.   HENT:  Negative for congestion and dental problem.    Eyes:  Negative for discharge and itching.   Respiratory:  Positive for shortness of breath.    Cardiovascular:  Negative for chest pain and leg swelling.   Gastrointestinal:  Negative for abdominal distention and abdominal pain.   Endocrine: Negative for cold intolerance.   Genitourinary:  Negative for difficulty urinating and dyspareunia.   Musculoskeletal:  Negative for arthralgias and back pain.   Skin:  Negative for color change.   Neurological:  Positive for weakness.   Psychiatric/Behavioral:  Negative for agitation and behavioral problems.      Objective:     Vital Signs (Most Recent):  Temp: 98.2 °F (36.8 °C) (11/19/23 0827)  Pulse: 90 (11/19/23 0827)  Resp: 14 (11/19/23 0827)  BP: 118/78 (11/19/23 0827)  SpO2: (!) 92 % (11/19/23 0827) Vital Signs (24h  "Range):  Temp:  [97.8 °F (36.6 °C)-98.6 °F (37 °C)] 98.2 °F (36.8 °C)  Pulse:  [] 90  Resp:  [14-21] 14  SpO2:  [89 %-97 %] 92 %  BP: (118-159)/(69-92) 118/78     Weight: 125.2 kg (276 lb)  Body mass index is 48.89 kg/m².     Physical Exam  HENT:      Head: Normocephalic.      Right Ear: There is no impacted cerumen.      Nose: No congestion.      Mouth/Throat:      Mouth: Mucous membranes are dry.   Eyes:      Extraocular Movements: Extraocular movements intact.      Pupils: Pupils are equal, round, and reactive to light.   Cardiovascular:      Rate and Rhythm: Normal rate. Rhythm irregular.      Heart sounds: No murmur heard.  Pulmonary:      Breath sounds: Rales present.   Abdominal:      General: There is no distension.      Tenderness: There is no abdominal tenderness.   Musculoskeletal:         General: Swelling present.   Skin:     Coloration: Skin is not jaundiced.      Findings: No bruising.   Neurological:      General: No focal deficit present.      Mental Status: She is alert.   Psychiatric:         Mood and Affect: Mood normal.         Behavior: Behavior normal.              CRANIAL NERVES     CN III, IV, VI   Pupils are equal, round, and reactive to light.       Significant Labs: All pertinent labs within the past 24 hours have been reviewed.  BMP:   No results for input(s): "GLU", "NA", "K", "CL", "CO2", "BUN", "CREATININE", "CALCIUM", "MG" in the last 48 hours.    CBC:   No results for input(s): "WBC", "HGB", "HCT", "PLT" in the last 48 hours.    CMP:   No results for input(s): "NA", "K", "CL", "CO2", "GLU", "BUN", "CREATININE", "CALCIUM", "PROT", "ALBUMIN", "BILITOT", "ALKPHOS", "AST", "ALT", "ANIONGAP", "EGFRNONAA" in the last 48 hours.    Invalid input(s): "ESTGFAFRICA"      Significant Imaging: I have reviewed all pertinent imaging results/findings within the past 24 hours.    Assessment/Plan:      * Acute respiratory failure with hypoxia and hypercapnia  Patient with Hypercapnic and " Hypoxic Respiratory failure which is Acute on chronic.  she is not on home oxygen. Supplemental oxygen was provided and noted- Oxygen Concentration (%):  [40] 40    .   Signs/symptoms of respiratory failure include- tachypnea, increased work of breathing, and lethargy. Contributing diagnoses includes - COPD and Obesity Hypoventilation Labs and images were reviewed. Patient Has recent ABG, which has been reviewed. Will treat underlying causes and adjust management of respiratory failure as follows-   ER show hypercapnic,hypoxic  respiratory failure with PH of 7.2 and P C O2 of 72,PO 2 of 76,chest x ray show fluid overload,BNP is over 478,patient has been  started on continues Bipap and IV lasix,,admitted to ICU for close monitoring.    Was on precedex drip for agitation  VBG  show improvement in CO2 retention,consulted pulmonology.on HFO during day and nightly bipap.  Was on precedex drip for agitation.improved  off precedex,  Again was less complaint with bipap again,consulted again,communicated with nursing staff to fit better  mask,  Added IS,continue wean of oxygen.  Has RSV on viral sputum culture,as well, on droplet precaution .  On IV Solumedrol for wheezing.    Chronic obstructive pulmonary disease with acute exacerbation  Patient's COPD is with exacerbation noted by continued dyspnea and use of accessory muscles for breathing currently.  Patient is currently off COPD Pathway. Continue scheduled inhalers Steroids, Antibiotics, and Supplemental oxygen and monitor respiratory status closely.   Started on IV solumedrol for mild wheezing.    Chronic kidney disease, stage 3a  Creatine stable for now. BMP reviewed- noted Estimated Creatinine Clearance: 61.4 mL/min (based on SCr of 1.1 mg/dL). according to latest data. Based on current GFR, CKD stage is stage 3 - GFR 30-59.  Monitor UOP and serial BMP and adjust therapy as needed. Renally dose meds. Avoid nephrotoxic medications and procedures.    Pulmonary  hypertension  Will diurese.      Acute on chronic diastolic CHF (congestive heart failure)  Patient is identified as having Diastolic (HFpEF) heart failure that is Acute on chronic. CHF is currently uncontrolled due to Dyspnea not returned to baseline after 1 doses of IV diuretic. Latest ECHO performed and demonstrates- Results for orders placed during the hospital encounter of 05/20/22    Echo    Interpretation Summary  · The left ventricle is normal in size with normal systolic function.  · The estimated ejection fraction is 60%.  · Normal right ventricular size with normal right ventricular systolic function.  · The estimated PA systolic pressure is 33 mmHg.  · Atrial fibrillation observed.  . Continue Beta Blocker and Furosemide and monitor clinical status closely. Monitor on telemetry. Patient is off CHF pathway.  Monitor strict Is&Os and daily weights.  Place on fluid restriction of 1.5 L. Cardiology has not been any consulted. Continue to stress to patient importance of self efficacy and  on diet for CHF. Last BNP reviewed- and noted below   Recent Labs   Lab 11/13/23  1016   *   .    HIEU treated with BiPAP  Non complaint with home Bipap.she was consulted.did better last night with Bipap.      PVD (peripheral vascular disease)  On medical treatments.      Paroxysmal atrial fibrillation  Patient with Persistent (7 days or more) atrial fibrillation which is controlled currently with Beta Blocker. Patient is currently in atrial fibrillation.[id number] Score: 4. HASBLED Score: 3. Anticoagulation indicated. Anticoagulation done with Eliquis .    Pre-diabetes  Will monitor.      Severe obesity (BMI >= 40)  Body mass index is 48.01 kg/m². Morbid obesity complicates all aspects of disease management from diagnostic modalities to treatment. Weight loss encouraged and health benefits explained to patient.         Essential hypertension  Chronic, controlled. Latest blood pressure and vitals reviewed-      Temp:  [99.9 °F (37.7 °C)]   Pulse:  []   Resp:  [26-38]   BP: (137-138)/(62-92)   SpO2:  [83 %-95 %] .   Home meds for hypertension were reviewed and noted below.   Hypertension Medications               furosemide (LASIX) 40 MG tablet Take 1 tablet (40 mg total) by mouth once daily.    losartan (COZAAR) 25 MG tablet TAKE 1 TABLET(25 MG) BY MOUTH EVERY DAY    metoprolol succinate (TOPROL-XL) 100 MG 24 hr tablet Take 1 tablet (100 mg total) by mouth once daily.            While in the hospital, will manage blood pressure as follows; Continue home antihypertensive regimen    Will utilize p.r.n. blood pressure medication only if patient's blood pressure greater than 160/100 and she develops symptoms such as worsening chest pain or shortness of breath.      VTE Risk Mitigation (From admission, onward)           Ordered     rivaroxaban tablet 20 mg  With dinner         11/13/23 1633                    Discharge Planning   JACQUELINE: 11/20/2023     Code Status: Full Code   Is the patient medically ready for discharge?:     Reason for patient still in hospital (select all that apply): Patient trending condition  Discharge Plan A: Home Health   Discharge Delays: (!) Post-Acute Set-up              Luz Ware MD  Department of Hospital Medicine   [facility]

## 2023-11-19 NOTE — PLAN OF CARE
Problem: Bariatric Environmental Safety  Goal: Safety Maintained with Care  11/18/2023 1804 by Roberto Hernández RN  Outcome: Ongoing, Progressing  11/18/2023 1803 by Roberto Hernández RN  Outcome: Ongoing, Progressing     Problem: Diabetes Comorbidity  Goal: Blood Glucose Level Within Targeted Range  11/18/2023 1804 by Roberto Hernández RN  Outcome: Ongoing, Progressing  11/18/2023 1803 by Roberto Hernández RN  Outcome: Ongoing, Progressing     Problem: Infection  Goal: Absence of Infection Signs and Symptoms  Outcome: Ongoing, Progressing     Problem: Coping Ineffective  Goal: Effective Coping  Outcome: Ongoing, Progressing   Pt and family reported mobility issues and requested purewick. Bedside commode within reach. EDGAR

## 2023-11-20 PROCEDURE — 99900035 HC TECH TIME PER 15 MIN (STAT)

## 2023-11-20 PROCEDURE — 97116 GAIT TRAINING THERAPY: CPT | Mod: CQ

## 2023-11-20 PROCEDURE — 97110 THERAPEUTIC EXERCISES: CPT | Mod: CQ

## 2023-11-20 PROCEDURE — 25000003 PHARM REV CODE 250: Performed by: HOSPITALIST

## 2023-11-20 PROCEDURE — 99497 PR ADVNCD CARE PLAN 30 MIN: ICD-10-PCS | Mod: ,,, | Performed by: REGISTERED NURSE

## 2023-11-20 PROCEDURE — 99497 ADVNCD CARE PLAN 30 MIN: CPT | Mod: ,,, | Performed by: REGISTERED NURSE

## 2023-11-20 PROCEDURE — 94799 UNLISTED PULMONARY SVC/PX: CPT

## 2023-11-20 PROCEDURE — 97530 THERAPEUTIC ACTIVITIES: CPT

## 2023-11-20 PROCEDURE — 27000221 HC OXYGEN, UP TO 24 HOURS

## 2023-11-20 PROCEDURE — 25000242 PHARM REV CODE 250 ALT 637 W/ HCPCS: Performed by: STUDENT IN AN ORGANIZED HEALTH CARE EDUCATION/TRAINING PROGRAM

## 2023-11-20 PROCEDURE — 97530 THERAPEUTIC ACTIVITIES: CPT | Mod: CQ

## 2023-11-20 PROCEDURE — 94761 N-INVAS EAR/PLS OXIMETRY MLT: CPT

## 2023-11-20 PROCEDURE — 99233 SBSQ HOSP IP/OBS HIGH 50: CPT | Mod: ,,, | Performed by: REGISTERED NURSE

## 2023-11-20 PROCEDURE — 21400001 HC TELEMETRY ROOM

## 2023-11-20 PROCEDURE — 97110 THERAPEUTIC EXERCISES: CPT

## 2023-11-20 PROCEDURE — 27000207 HC ISOLATION

## 2023-11-20 PROCEDURE — 25000242 PHARM REV CODE 250 ALT 637 W/ HCPCS: Performed by: HOSPITALIST

## 2023-11-20 PROCEDURE — 94640 AIRWAY INHALATION TREATMENT: CPT

## 2023-11-20 PROCEDURE — 99233 PR SUBSEQUENT HOSPITAL CARE,LEVL III: ICD-10-PCS | Mod: ,,, | Performed by: REGISTERED NURSE

## 2023-11-20 PROCEDURE — 94660 CPAP INITIATION&MGMT: CPT

## 2023-11-20 PROCEDURE — 63600175 PHARM REV CODE 636 W HCPCS: Performed by: HOSPITALIST

## 2023-11-20 RX ORDER — METHYLPREDNISOLONE SOD SUCC 125 MG
125 VIAL (EA) INJECTION EVERY 6 HOURS
Status: DISCONTINUED | OUTPATIENT
Start: 2023-11-20 | End: 2023-11-21

## 2023-11-20 RX ORDER — IPRATROPIUM BROMIDE AND ALBUTEROL SULFATE 2.5; .5 MG/3ML; MG/3ML
3 SOLUTION RESPIRATORY (INHALATION) EVERY 4 HOURS PRN
Status: DISCONTINUED | OUTPATIENT
Start: 2023-11-20 | End: 2023-11-25 | Stop reason: HOSPADM

## 2023-11-20 RX ADMIN — FAMOTIDINE 20 MG: 20 TABLET ORAL at 09:11

## 2023-11-20 RX ADMIN — IPRATROPIUM BROMIDE AND ALBUTEROL SULFATE 3 ML: 2.5; .5 SOLUTION RESPIRATORY (INHALATION) at 08:11

## 2023-11-20 RX ADMIN — FUROSEMIDE 40 MG: 40 TABLET ORAL at 05:11

## 2023-11-20 RX ADMIN — GUAIFENESIN AND DEXTROMETHORPHAN 10 ML: 100; 10 SYRUP ORAL at 05:11

## 2023-11-20 RX ADMIN — SODIUM CHLORIDE SOLN NEBU 3% 4 ML: 3 NEBU SOLN at 11:11

## 2023-11-20 RX ADMIN — RIVAROXABAN 20 MG: 20 TABLET, FILM COATED ORAL at 05:11

## 2023-11-20 RX ADMIN — GUAIFENESIN AND DEXTROMETHORPHAN HYDROBROMIDE 1 TABLET: 600; 30 TABLET, EXTENDED RELEASE ORAL at 09:11

## 2023-11-20 RX ADMIN — SODIUM CHLORIDE SOLN NEBU 3% 4 ML: 3 NEBU SOLN at 08:11

## 2023-11-20 RX ADMIN — METHYLPREDNISOLONE SODIUM SUCCINATE 80 MG: 40 INJECTION, POWDER, FOR SOLUTION INTRAMUSCULAR; INTRAVENOUS at 05:11

## 2023-11-20 RX ADMIN — IPRATROPIUM BROMIDE AND ALBUTEROL SULFATE 3 ML: 2.5; .5 SOLUTION RESPIRATORY (INHALATION) at 07:11

## 2023-11-20 RX ADMIN — METHYLPREDNISOLONE SODIUM SUCCINATE 125 MG: 125 INJECTION, POWDER, FOR SOLUTION INTRAMUSCULAR; INTRAVENOUS at 11:11

## 2023-11-20 RX ADMIN — IPRATROPIUM BROMIDE AND ALBUTEROL SULFATE 3 ML: 2.5; .5 SOLUTION RESPIRATORY (INHALATION) at 06:11

## 2023-11-20 RX ADMIN — QUETIAPINE FUMARATE 25 MG: 25 TABLET ORAL at 09:11

## 2023-11-20 RX ADMIN — SERTRALINE HYDROCHLORIDE 100 MG: 50 TABLET ORAL at 08:11

## 2023-11-20 RX ADMIN — METHYLPREDNISOLONE SODIUM SUCCINATE 80 MG: 40 INJECTION, POWDER, FOR SOLUTION INTRAMUSCULAR; INTRAVENOUS at 01:11

## 2023-11-20 RX ADMIN — IPRATROPIUM BROMIDE AND ALBUTEROL SULFATE 3 ML: 2.5; .5 SOLUTION RESPIRATORY (INHALATION) at 03:11

## 2023-11-20 RX ADMIN — IPRATROPIUM BROMIDE AND ALBUTEROL SULFATE 3 ML: 2.5; .5 SOLUTION RESPIRATORY (INHALATION) at 11:11

## 2023-11-20 RX ADMIN — ATORVASTATIN CALCIUM 40 MG: 40 TABLET, FILM COATED ORAL at 08:11

## 2023-11-20 RX ADMIN — GUAIFENESIN AND DEXTROMETHORPHAN HYDROBROMIDE 1 TABLET: 600; 30 TABLET, EXTENDED RELEASE ORAL at 08:11

## 2023-11-20 RX ADMIN — FAMOTIDINE 20 MG: 20 TABLET ORAL at 08:11

## 2023-11-20 RX ADMIN — METHYLPREDNISOLONE SODIUM SUCCINATE 125 MG: 125 INJECTION, POWDER, FOR SOLUTION INTRAMUSCULAR; INTRAVENOUS at 05:11

## 2023-11-20 RX ADMIN — METOPROLOL SUCCINATE 100 MG: 50 TABLET, EXTENDED RELEASE ORAL at 08:11

## 2023-11-20 RX ADMIN — SODIUM CHLORIDE SOLN NEBU 3% 4 ML: 3 NEBU SOLN at 03:11

## 2023-11-20 RX ADMIN — FUROSEMIDE 40 MG: 40 TABLET ORAL at 08:11

## 2023-11-20 NOTE — NURSING
Nurses Note -- 4 Eyes      11/19/2023   9:56 PM      Skin assessed during: Q Shift Change      [x] No Altered Skin Integrity Present    [x]Prevention Measures Documented      [] Yes- Altered Skin Integrity Present or Discovered   [] LDA Added if Not in Epic (Describe Wound)   [] New Altered Skin Integrity was Present on Admit and Documented in LDA   [] Wound Image Taken    Attending Nurse:  Payal Corley RN/Staff Member:  Shanna

## 2023-11-20 NOTE — PLAN OF CARE
POC reviewed with patient and she verbalized understanding. VSS on 3L NC. Denies pain/discomfort. Continues to receive breathing treatments and IV steroids. Shifts weight independently. Free from falls; safety precautions in place. Denies needs at this time. Care ongoing.

## 2023-11-20 NOTE — PLAN OF CARE
Problem: Adult Inpatient Plan of Care  Goal: Absence of Hospital-Acquired Illness or Injury  Intervention: Identify and Manage Fall Risk  Flowsheets (Taken 11/20/2023 0408)  Safety Promotion/Fall Prevention:   assistive device/personal item within reach   side rails raised x 3  Intervention: Prevent Skin Injury  Flowsheets (Taken 11/20/2023 0408)  Body Position: position changed independently  Skin Protection: adhesive use limited  Intervention: Prevent Infection  Flowsheets (Taken 11/20/2023 0408)  Infection Prevention:   environmental surveillance performed   hand hygiene promoted  Goal: Optimal Comfort and Wellbeing  Intervention: Monitor Pain and Promote Comfort  Flowsheets (Taken 11/20/2023 0408)  Pain Management Interventions: care clustered  Intervention: Provide Person-Centered Care  Flowsheets (Taken 11/20/2023 0408)  Trust Relationship/Rapport: care explained     Problem: Fall Injury Risk  Goal: Absence of Fall and Fall-Related Injury  Intervention: Identify and Manage Contributors  Flowsheets (Taken 11/20/2023 0408)  Medication Review/Management: medications reviewed  Intervention: Promote Injury-Free Environment  Flowsheets (Taken 11/20/2023 0408)  Safety Promotion/Fall Prevention:   assistive device/personal item within reach   side rails raised x 3     Problem: Skin Injury Risk Increased  Goal: Skin Health and Integrity  Intervention: Optimize Skin Protection  Flowsheets (Taken 11/20/2023 0408)  Skin Protection: adhesive use limited     Problem: Infection  Goal: Absence of Infection Signs and Symptoms  Intervention: Prevent or Manage Infection  Flowsheets (Taken 11/20/2023 0408)  Infection Management: aseptic technique maintained     Problem: Coping Ineffective  Goal: Effective Coping  Intervention: Support and Enhance Coping Strategies  Flowsheets (Taken 11/20/2023 0408)  Environmental Support: calm environment promoted

## 2023-11-20 NOTE — PROGRESS NOTES
"[facility]  Progress Note    Patient Name: Sandee Evans  MRN: [id number]  Patient Class: IP- Inpatient   Admission Date: 11/13/2023  Length of Stay: 7 days  Attending Physician: Luz Ware, *  Primary Care Provider: Kuldeep Miller MD        Subjective:     Principal Problem:Acute respiratory failure with hypoxia and hypercapnia    CC: short of breath     HPI:   67 y.o. female, with a past medical history of A-fib on OAC,diastolic  CHF, COPD, HLD, HTN, hypothyroidism, morbid obesity, pre-diabetes, and psychiatric problems, who presents to the ED with SOB onset 3 weeks ago. She reports SOB has gotten progressively worse over 3 weeks. Patient notes she has trouble ambulating d/t SOB. Patient notes being on an at-home BiPAP but denies daily use d/t the machine "smothering" her. Additional history provided by independent historian, relative, notes an O2 saturation in the 80's at-home PTA. Patient denies O2 at home. Patient notes an associated cough, congestion, diarrhea, rhinorrhea, sore throat, and headache.ABG in ER show hypercapnic,hypoxic  respiratory failure with PH of 7.2 and P C O2 of 72,PO 2 of 76,chest x ray show fluid overload,BNP is over 478,patient has been  started on continues Bipap and IV lasix,,admitted to ICU for close monitoring.    Overview/Hospital Course:   67 y.o. female, with a past medical history of A-fib on OAC,diastolic  CHF, COPD, HLD, HTN, hypothyroidism, morbid obesity, pre-diabetes, and psychiatric problems, who presents to the ED with SOB onset 3 weeks ago. She reports SOB has gotten progressively worse over 3 weeks. Patient notes she has trouble ambulating d/t SOB. Patient notes being on an at-home BiPAP but denies daily use d/t the machine "smothering" her. \ relative, notes an O2 saturation in the 80's at-home PTA. Patient denies O2 at home. .ABG in ER show hypercapnic,hypoxic  respiratory failure with PH of 7.2 and P C O2 of 72,PO 2 of " 76,chest x ray show fluid overload,BNP is over 478,patient has been  started on continues Bipap and IV lasix,,admitted to ICU for close monitoring.  VBG  show improvement in CO2 retention,consulted pulmonology.on HFO during day and nightly bipap.  Was on precedex drip for agitation.improved  off precedex,  Again was less complaint with bipap again,consulted again,communicated with nursing staff to fit better  mask,did better last night with Bipap.  Consulted PT,OT   Added IS,continue wean of oxygen.  Started on IV solumedrol for wheezing.    Past Medical History:   Diagnosis Date    Anticoagulant long-term use     Xarelto    Arthritis     Atrial fibrillation     Breast cyst     CHF (congestive heart failure)     Degenerative disc disease     Edema     HLD (hyperlipidemia)     Hx of psychiatric care     Hyperlipidemia     Hypertension     Hypothyroidism     Nuclear sclerosis, bilateral 12/18/2017    Obesity, morbid     HIEU (obstructive sleep apnea)     Other abnormal glucose     pre-diabetes    Pre-diabetes     Psychiatric problem     Requires assistance with activities of daily living (ADL)     Sleep apnea     Smoker     SOB (shortness of breath)     SOB (shortness of breath)     Thyroid disease     on meds 8-9 years ago. hypothyroidism.no malignancy    TMJ (temporomandibular joint disorder)     jaw clicking    Tobacco abuse     Unsteady gait     Urge incontinence     Weakness generalized     Wears glasses        Past Surgical History:   Procedure Laterality Date    BREAST BIOPSY Right     over 10 yrs ago/ benign    BREAST CYST EXCISION Right     BREAST LUMPECTOMY Right     over 10 yrs ago, ex bx/ benign    COLONOSCOPY N/A 6/25/2019    Procedure: COLONOSCOPY;  Surgeon: Micheline Flores MD;  Location: [facility];  Service: Endoscopy;  Laterality: N/A;  RX XARELTO ok to hold (2 days) per Dr. Naik see scan 3/20/19    ENDOSCOPIC ULTRASOUND OF UPPER GASTROINTESTINAL TRACT N/A 1/26/2021    Procedure:  ULTRASOUND-ENDOSCOPIC-UPPER;  Surgeon: Stevenson Philippe MD;  Location: [facility] ENDO;  Service: Endoscopy;  Laterality: N/A;    HYSTERECTOMY      JOINT REPLACEMENT Bilateral     knees    OOPHORECTOMY      rt.knee surgery 2016      TOTAL KNEE ARTHROPLASTY Left 2/19/2019    Procedure: ARTHROPLASTY, KNEE, TOTAL;  Surgeon: Med Hanson MD;  Location: [facility] OR;  Service: Orthopedics;  Laterality: Left;  10AM START PER  PER JAYLIN TEXT @ 8:34AM ON 2-18-19  SUPINE  HARRIS LOYA [phone] TEXTED HIM ON 1-24-19 @ 7:57AM  RN PRE OP 2-13-19--BMI--48.9    underarm gland\ Bilateral        Review of patient's allergies indicates:   Allergen Reactions    Ace inhibitors      Cough         No current facility-administered medications on file prior to encounter.     Current Outpatient Medications on File Prior to Encounter   Medication Sig    cetirizine (ZYRTEC) 10 MG tablet Take 1 tablet (10 mg total) by mouth once daily.    fluticasone propionate (FLONASE) 50 mcg/actuation nasal spray SHAKE LIQUID AND USE 2 SPRAYS(100 MCG) IN EACH NOSTRIL EVERY DAY    albuterol (PROVENTIL/VENTOLIN HFA) 90 mcg/actuation inhaler 2 puffs every 6 (six) hours as needed.    albuterol-ipratropium (DUO-NEB) 2.5 mg-0.5 mg/3 mL nebulizer solution Take 3 mLs by nebulization every 6 (six) hours as needed for Wheezing. Rescue    atorvastatin (LIPITOR) 40 MG tablet TAKE 1 TABLET(40 MG) BY MOUTH EVERY DAY    azelastine (ASTELIN) 137 mcg (0.1 %) nasal spray 1 spray (137 mcg total) by Nasal route 2 (two) times daily.    buPROPion (WELLBUTRIN XL) 150 MG TB24 tablet TAKE 1 TABLET(150 MG) BY MOUTH EVERY DAY    furosemide (LASIX) 40 MG tablet Take 1 tablet (40 mg total) by mouth once daily.    ketoconazole (NIZORAL) 2 % cream Apply topically once daily.    losartan (COZAAR) 25 MG tablet TAKE 1 TABLET(25 MG) BY MOUTH EVERY DAY    metoprolol succinate (TOPROL-XL) 100 MG 24 hr tablet Take 1 tablet (100 mg total) by mouth once daily.    sertraline (ZOLOFT) 100 MG  tablet TAKE 1 TABLET(100 MG) BY MOUTH EVERY DAY    SPIRIVA RESPIMAT 2.5 mcg/actuation inhaler INHALE 2 PUFFS BY MOUTH DAILY (Patient not taking: Reported on 10/18/2023)    triamcinolone acetonide 0.1% (KENALOG) 0.1 % ointment APPLY BETWEEN THE KNEES AND UPPER THIGHS TWICE DAILY FOR NO MORE THAN 7 TO 14 DAYS    XARELTO 20 mg Tab TAKE 1 TABLET(20 MG) BY MOUTH EVERY DAY     Family History       Problem Relation (Age of Onset)    Cancer Mother, Brother    Diabetes Mother, Brother    Hypertension Mother    No Known Problems Father, Sister, Maternal Aunt, Maternal Uncle, Paternal Aunt, Paternal Uncle, Maternal Grandmother, Maternal Grandfather, Paternal Grandmother, Paternal Grandfather, Other          Tobacco Use    Smoking status: Every Day     Current packs/day: 0.50     Average packs/day: 0.5 packs/day for 50.9 years (25.4 ttl pk-yrs)     Types: Cigarettes     Start date: 1973    Smokeless tobacco: Current   Substance and Sexual Activity    Alcohol use: No    Drug use: No    Sexual activity: Yes     Partners: Male     Review of Systems   Constitutional:  Positive for activity change and appetite change.   HENT:  Negative for congestion and dental problem.    Eyes:  Negative for discharge and itching.   Respiratory:  Positive for shortness of breath.    Cardiovascular:  Negative for chest pain and leg swelling.   Gastrointestinal:  Negative for abdominal distention and abdominal pain.   Endocrine: Negative for cold intolerance.   Genitourinary:  Negative for difficulty urinating and dyspareunia.   Musculoskeletal:  Negative for arthralgias and back pain.   Skin:  Negative for color change.   Neurological:  Positive for weakness.   Psychiatric/Behavioral:  Negative for agitation and behavioral problems.      Objective:     Vital Signs (Most Recent):  Temp: 98.1 °F (36.7 °C) (11/20/23 0744)  Pulse: 88 (11/20/23 0842)  Resp: 18 (11/20/23 0842)  BP: (!) 159/99 (11/20/23 0744)  SpO2: (!) 92 % (11/20/23 0842) Vital Signs  (24h Range):  Temp:  [97.3 °F (36.3 °C)-98.1 °F (36.7 °C)] 98.1 °F (36.7 °C)  Pulse:  [65-92] 88  Resp:  [14-20] 18  SpO2:  [88 %-100 %] 92 %  BP: (127-182)/() 159/99     Weight: 125.2 kg (276 lb)  Body mass index is 48.89 kg/m².     Physical Exam  HENT:      Head: Normocephalic.      Right Ear: There is no impacted cerumen.      Nose: No congestion.      Mouth/Throat:      Mouth: Mucous membranes are dry.   Eyes:      Extraocular Movements: Extraocular movements intact.      Pupils: Pupils are equal, round, and reactive to light.   Cardiovascular:      Rate and Rhythm: Normal rate. Rhythm irregular.      Heart sounds: No murmur heard.  Pulmonary:      Breath sounds: Rales present.   Abdominal:      General: There is no distension.      Tenderness: There is no abdominal tenderness.   Musculoskeletal:         General: Swelling present.   Skin:     Coloration: Skin is not jaundiced.      Findings: No bruising.   Neurological:      General: No focal deficit present.      Mental Status: She is alert.   Psychiatric:         Mood and Affect: Mood normal.         Behavior: Behavior normal.              CRANIAL NERVES     CN III, IV, VI   Pupils are equal, round, and reactive to light.       Significant Labs: All pertinent labs within the past 24 hours have been reviewed.  BMP:   Recent Labs   Lab 11/19/23  1040   *      K 3.8   CL 93*   CO2 39*   BUN 49*   CREATININE 1.3   CALCIUM 9.2       CBC:   Recent Labs   Lab 11/19/23  1040   WBC 11.55   HGB 15.4   HCT 49.4*          CMP:   Recent Labs   Lab 11/19/23  1040      K 3.8   CL 93*   CO2 39*   *   BUN 49*   CREATININE 1.3   CALCIUM 9.2   ANIONGAP 12         Significant Imaging: I have reviewed all pertinent imaging results/findings within the past 24 hours.    Assessment/Plan:      * Acute respiratory failure with hypoxia and hypercapnia  Patient with Hypercapnic and Hypoxic Respiratory failure which is Acute on chronic.  she is not  on home oxygen. Supplemental oxygen was provided and noted- Oxygen Concentration (%):  [40] 40    .   Signs/symptoms of respiratory failure include- tachypnea, increased work of breathing, and lethargy. Contributing diagnoses includes - COPD and Obesity Hypoventilation Labs and images were reviewed. Patient Has recent ABG, which has been reviewed. Will treat underlying causes and adjust management of respiratory failure as follows-   ER show hypercapnic,hypoxic  respiratory failure with PH of 7.2 and P C O2 of 72,PO 2 of 76,chest x ray show fluid overload,BNP is over 478,patient has been  started on continues Bipap and IV lasix,,admitted to ICU for close monitoring.    Was on precedex drip for agitation  VBG  show improvement in CO2 retention,consulted pulmonology.on HFO during day and nightly bipap.  Was on precedex drip for agitation.improved  off precedex,  Again was less complaint with bipap again,consulted again,communicated with nursing staff to fit better  mask,  Added IS,continue wean of oxygen.  Has RSV on viral sputum culture,as well, on droplet precaution .  On IV Solumedrol for wheezing.    Chronic obstructive pulmonary disease with acute exacerbation  Patient's COPD is with exacerbation noted by continued dyspnea and use of accessory muscles for breathing currently.  Patient is currently off COPD Pathway. Continue scheduled inhalers Steroids, Antibiotics, and Supplemental oxygen and monitor respiratory status closely.   Started on IV solumedrol for mild wheezing.    Chronic kidney disease, stage 3a  Creatine stable for now. BMP reviewed- noted Estimated Creatinine Clearance: 61.4 mL/min (based on SCr of 1.1 mg/dL). according to latest data. Based on current GFR, CKD stage is stage 3 - GFR 30-59.  Monitor UOP and serial BMP and adjust therapy as needed. Renally dose meds. Avoid nephrotoxic medications and procedures.    Pulmonary hypertension  Will diurese.      Acute on chronic diastolic CHF (congestive  heart failure)  Patient is identified as having Diastolic (HFpEF) heart failure that is Acute on chronic. CHF is currently uncontrolled due to Dyspnea not returned to baseline after 1 doses of IV diuretic. Latest ECHO performed and demonstrates- Results for orders placed during the hospital encounter of 05/20/22    Echo    Interpretation Summary  · The left ventricle is normal in size with normal systolic function.  · The estimated ejection fraction is 60%.  · Normal right ventricular size with normal right ventricular systolic function.  · The estimated PA systolic pressure is 33 mmHg.  · Atrial fibrillation observed.  . Continue Beta Blocker and Furosemide and monitor clinical status closely. Monitor on telemetry. Patient is off CHF pathway.  Monitor strict Is&Os and daily weights.  Place on fluid restriction of 1.5 L. Cardiology has not been any consulted. Continue to stress to patient importance of self efficacy and  on diet for CHF. Last BNP reviewed- and noted below   Recent Labs   Lab 11/13/23  1016   *   .    HIEU treated with BiPAP  Non complaint with home Bipap.she was consulted.did better last night with Bipap.      PVD (peripheral vascular disease)  On medical treatments.      Paroxysmal atrial fibrillation  Patient with Persistent (7 days or more) atrial fibrillation which is controlled currently with Beta Blocker. Patient is currently in atrial fibrillation.[id number] Score: 4. HASBLED Score: 3. Anticoagulation indicated. Anticoagulation done with Eliquis .    Pre-diabetes  Will monitor.      Severe obesity (BMI >= 40)  Body mass index is 48.01 kg/m². Morbid obesity complicates all aspects of disease management from diagnostic modalities to treatment. Weight loss encouraged and health benefits explained to patient.         Essential hypertension  Chronic, controlled. Latest blood pressure and vitals reviewed-     Temp:  [99.9 °F (37.7 °C)]   Pulse:  []   Resp:  [26-38]   BP:  (137-138)/(62-92)   SpO2:  [83 %-95 %] .   Home meds for hypertension were reviewed and noted below.   Hypertension Medications               furosemide (LASIX) 40 MG tablet Take 1 tablet (40 mg total) by mouth once daily.    losartan (COZAAR) 25 MG tablet TAKE 1 TABLET(25 MG) BY MOUTH EVERY DAY    metoprolol succinate (TOPROL-XL) 100 MG 24 hr tablet Take 1 tablet (100 mg total) by mouth once daily.            While in the hospital, will manage blood pressure as follows; Continue home antihypertensive regimen    Will utilize p.r.n. blood pressure medication only if patient's blood pressure greater than 160/100 and she develops symptoms such as worsening chest pain or shortness of breath.      VTE Risk Mitigation (From admission, onward)           Ordered     rivaroxaban tablet 20 mg  With dinner         11/13/23 1633                    Discharge Planning   JACQUELINE: 11/20/2023     Code Status: Full Code   Is the patient medically ready for discharge?:     Reason for patient still in hospital (select all that apply): Patient trending condition  Discharge Plan A: Home Health   Discharge Delays: None known at this time              Luz Ware MD  Department of Hospital Medicine   [facility] [place] [facility]

## 2023-11-20 NOTE — PLAN OF CARE
No new needs identified. May need O2. CM will follow up as  needed.     11/20/23 1031   Discharge Reassessment   Assessment Type Discharge Planning Reassessment   Did the patient's condition or plan change since previous assessment? No   Discharge Plan discussed with: Patient   Discharge Plan A Home Health   Discharge Plan B Home with family   DME Needed Upon Discharge  other (see comments)  (tbd)   Transition of Care Barriers None   Why the patient remains in the hospital Requires continued medical care   Post-Acute Status   Coverage [employer]   Discharge Delays None known at this time

## 2023-11-20 NOTE — ASSESSMENT & PLAN NOTE
11/18/2023  - interval chart reviewed; discussed pt with MDT   - met with pt at bedside; sitting in chair finishing lunch; reports continued improvement in symptoms   - confirms continued wishes regarding GOC (see below), but does not wish to alter code status or complete any ACP documents  - wishes to discuss wishes with /children prior to completing documentation  - continued GOC/ACP discussion   - continues to be agreeable to home palliative care; discussed further    - discussed pt's plan to improve health when she gets home and seeing her grandchildren grow up and helping them build their addi in God before its her time  (2 grand kids, 6 and 2 years old)   - emotional support provided; allowed time for questions/concerns, all addressed   - updated CM/SW regarding home palliative     11/17/2023  - interval chart reviewed; discussed pt with MDT   - met with pt at bedside; she is sitting in a chair today and cheerful  - initially joined Dr. Mann ([facility]) to hear his update to pt about ongoing respiratory progress, to help guide discussion and assess pt's understanding   - continued GOC discussion; pt brought up code status and GOC discussion herself with very clear wishes regarding not wishing to aggressively prolong her life; would not wish to be bed bound or care dependent, that is not an acceptable QOL for her; she would not wish for a trach, long term artifical feeding or PEG, or any form of prolonged life support  - wishes to remain full code for now and would want to try to be resuscitated as she has a good QOL of life currently, wishes may change if condition changes; she also shares that outside acute hospital seeing wishes may also be more towards DNR; with this discussed recommendation for home palliative care to allow for further discussion outside acute IP setting and as trajectory of life limiting conditions progress which pt is agreeable to   - pt seemed to talk much more freely about wishes  without family present; encouraged her to share these wishes with her family, especially her  and children  - 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 Harris, and later with MDT during ICU rounds   - met with patient at bedside and her  Corwin; introduction to palliative medicine team and role in current care and admission   - learned more about pt outside of current admission; she lives with her  Corwin and they have 2 children Marce and Sukhwinder; she typically is able to perform all ADLs independently; does not use assistive devices in her home, but uses a cane for walking distances   - GOC/ACP discussion; introduced topics of ACP discussion with plan for further discussion on 11/16 after she has had some time to think about things more   - Corwin or her daughter Marce is her preferred MPOA; she feels Marce may know her wishes a little more   - GOC is to improve her health with life style changes and to avoid hospital; expressed importance of quality of life and that she wants to live and do everything she can to make that happen, but if her time came she would be at peace as well; expressed concerns for her health placing burdens on her family and her GOC somewhat being affected by that which we plan to review further   - emotional support provided   - Allowed time for questions/concerns; all addressed; expressed availability of myself/palliative team for additional questions/concerns

## 2023-11-20 NOTE — SUBJECTIVE & OBJECTIVE
Past Medical History:   Diagnosis Date    Anticoagulant long-term use     Xarelto    Arthritis     Atrial fibrillation     Breast cyst     CHF (congestive heart failure)     Degenerative disc disease     Edema     HLD (hyperlipidemia)     Hx of psychiatric care     Hyperlipidemia     Hypertension     Hypothyroidism     Nuclear sclerosis, bilateral 12/18/2017    Obesity, morbid     HIEU (obstructive sleep apnea)     Other abnormal glucose     pre-diabetes    Pre-diabetes     Psychiatric problem     Requires assistance with activities of daily living (ADL)     Sleep apnea     Smoker     SOB (shortness of breath)     SOB (shortness of breath)     Thyroid disease     on meds 8-9 years ago. hypothyroidism.no malignancy    TMJ (temporomandibular joint disorder)     jaw clicking    Tobacco abuse     Unsteady gait     Urge incontinence     Weakness generalized     Wears glasses        Past Surgical History:   Procedure Laterality Date    BREAST BIOPSY Right     over 10 yrs ago/ benign    BREAST CYST EXCISION Right     BREAST LUMPECTOMY Right     over 10 yrs ago, ex bx/ benign    COLONOSCOPY N/A 6/25/2019    Procedure: COLONOSCOPY;  Surgeon: Micheline Flores MD;  Location: [facility] ENDO;  Service: Endoscopy;  Laterality: N/A;  RX XARELTO ok to hold (2 days) per Dr. Naik see scan 3/20/19    ENDOSCOPIC ULTRASOUND OF UPPER GASTROINTESTINAL TRACT N/A 1/26/2021    Procedure: ULTRASOUND-ENDOSCOPIC-UPPER;  Surgeon: Stevenson Philippe MD;  Location: [facility] ENDO;  Service: Endoscopy;  Laterality: N/A;    HYSTERECTOMY      JOINT REPLACEMENT Bilateral     knees    OOPHORECTOMY      rt.knee surgery 2016      TOTAL KNEE ARTHROPLASTY Left 2/19/2019    Procedure: ARTHROPLASTY, KNEE, TOTAL;  Surgeon: Med Hanson MD;  Location: [facility] OR;  Service: Orthopedics;  Laterality: Left;  10AM START PER  PER JAYLIN TEXT @ 8:34AM ON 2-18-19  SUPINE  HARRIS LOYA [phone] TEXTED HIM ON 1-24-19 @ 7:57AM  RN PRE OP 2-13-19--BMI--48.9    underarm  gland\ Bilateral        Review of patient's allergies indicates:   Allergen Reactions    Ace inhibitors      Cough         No current facility-administered medications on file prior to encounter.     Current Outpatient Medications on File Prior to Encounter   Medication Sig    cetirizine (ZYRTEC) 10 MG tablet Take 1 tablet (10 mg total) by mouth once daily.    fluticasone propionate (FLONASE) 50 mcg/actuation nasal spray SHAKE LIQUID AND USE 2 SPRAYS(100 MCG) IN EACH NOSTRIL EVERY DAY    albuterol (PROVENTIL/VENTOLIN HFA) 90 mcg/actuation inhaler 2 puffs every 6 (six) hours as needed.    albuterol-ipratropium (DUO-NEB) 2.5 mg-0.5 mg/3 mL nebulizer solution Take 3 mLs by nebulization every 6 (six) hours as needed for Wheezing. Rescue    atorvastatin (LIPITOR) 40 MG tablet TAKE 1 TABLET(40 MG) BY MOUTH EVERY DAY    azelastine (ASTELIN) 137 mcg (0.1 %) nasal spray 1 spray (137 mcg total) by Nasal route 2 (two) times daily.    buPROPion (WELLBUTRIN XL) 150 MG TB24 tablet TAKE 1 TABLET(150 MG) BY MOUTH EVERY DAY    furosemide (LASIX) 40 MG tablet Take 1 tablet (40 mg total) by mouth once daily.    ketoconazole (NIZORAL) 2 % cream Apply topically once daily.    losartan (COZAAR) 25 MG tablet TAKE 1 TABLET(25 MG) BY MOUTH EVERY DAY    metoprolol succinate (TOPROL-XL) 100 MG 24 hr tablet Take 1 tablet (100 mg total) by mouth once daily.    sertraline (ZOLOFT) 100 MG tablet TAKE 1 TABLET(100 MG) BY MOUTH EVERY DAY    SPIRIVA RESPIMAT 2.5 mcg/actuation inhaler INHALE 2 PUFFS BY MOUTH DAILY (Patient not taking: Reported on 10/18/2023)    triamcinolone acetonide 0.1% (KENALOG) 0.1 % ointment APPLY BETWEEN THE KNEES AND UPPER THIGHS TWICE DAILY FOR NO MORE THAN 7 TO 14 DAYS    XARELTO 20 mg Tab TAKE 1 TABLET(20 MG) BY MOUTH EVERY DAY     Family History       Problem Relation (Age of Onset)    Cancer Mother, Brother    Diabetes Mother, Brother    Hypertension Mother    No Known Problems Father, Sister, Maternal Aunt, Maternal  Uncle, Paternal Aunt, Paternal Uncle, Maternal Grandmother, Maternal Grandfather, Paternal Grandmother, Paternal Grandfather, Other          Tobacco Use    Smoking status: Every Day     Current packs/day: 0.50     Average packs/day: 0.5 packs/day for 50.9 years (25.4 ttl pk-yrs)     Types: Cigarettes     Start date: 1973    Smokeless tobacco: Current   Substance and Sexual Activity    Alcohol use: No    Drug use: No    Sexual activity: Yes     Partners: Male     Review of Systems   Constitutional:  Positive for activity change and appetite change.   HENT:  Negative for congestion and dental problem.    Eyes:  Negative for discharge and itching.   Respiratory:  Positive for shortness of breath.    Cardiovascular:  Negative for chest pain and leg swelling.   Gastrointestinal:  Negative for abdominal distention and abdominal pain.   Endocrine: Negative for cold intolerance.   Genitourinary:  Negative for difficulty urinating and dyspareunia.   Musculoskeletal:  Negative for arthralgias and back pain.   Skin:  Negative for color change.   Neurological:  Positive for weakness.   Psychiatric/Behavioral:  Negative for agitation and behavioral problems.      Objective:     Vital Signs (Most Recent):  Temp: 98.1 °F (36.7 °C) (11/20/23 0744)  Pulse: 88 (11/20/23 0842)  Resp: 18 (11/20/23 0842)  BP: (!) 159/99 (11/20/23 0744)  SpO2: (!) 92 % (11/20/23 0842) Vital Signs (24h Range):  Temp:  [97.3 °F (36.3 °C)-98.1 °F (36.7 °C)] 98.1 °F (36.7 °C)  Pulse:  [65-92] 88  Resp:  [14-20] 18  SpO2:  [88 %-100 %] 92 %  BP: (127-182)/() 159/99     Weight: 125.2 kg (276 lb)  Body mass index is 48.89 kg/m².     Physical Exam  HENT:      Head: Normocephalic.      Right Ear: There is no impacted cerumen.      Nose: No congestion.      Mouth/Throat:      Mouth: Mucous membranes are dry.   Eyes:      Extraocular Movements: Extraocular movements intact.      Pupils: Pupils are equal, round, and reactive to light.   Cardiovascular:       Rate and Rhythm: Normal rate. Rhythm irregular.      Heart sounds: No murmur heard.  Pulmonary:      Breath sounds: Rales present.   Abdominal:      General: There is no distension.      Tenderness: There is no abdominal tenderness.   Musculoskeletal:         General: Swelling present.   Skin:     Coloration: Skin is not jaundiced.      Findings: No bruising.   Neurological:      General: No focal deficit present.      Mental Status: She is alert.   Psychiatric:         Mood and Affect: Mood normal.         Behavior: Behavior normal.              CRANIAL NERVES     CN III, IV, VI   Pupils are equal, round, and reactive to light.       Significant Labs: All pertinent labs within the past 24 hours have been reviewed.  BMP:   Recent Labs   Lab 11/19/23  1040   *      K 3.8   CL 93*   CO2 39*   BUN 49*   CREATININE 1.3   CALCIUM 9.2       CBC:   Recent Labs   Lab 11/19/23  1040   WBC 11.55   HGB 15.4   HCT 49.4*          CMP:   Recent Labs   Lab 11/19/23  1040      K 3.8   CL 93*   CO2 39*   *   BUN 49*   CREATININE 1.3   CALCIUM 9.2   ANIONGAP 12         Significant Imaging: I have reviewed all pertinent imaging results/findings within the past 24 hours.

## 2023-11-20 NOTE — PT/OT/SLP PROGRESS
Physical Therapy Treatment    Patient Name:  Sandee Evans   MRN:  [id number]    Recommendations:     Discharge Recommendations: Low Intensity Therapy  Discharge Equipment Recommendations: none  Barriers to discharge: None    Assessment:     Sandee Evans is a 67 y.o. female admitted with a medical diagnosis of Acute respiratory failure with hypoxia and hypercapnia.  She presents with the following impairments/functional limitations: weakness, impaired endurance, impaired self care skills, gait instability, impaired functional mobility, impaired balance, decreased coordination, decreased upper extremity function, decreased lower extremity function, pain, decreased safety awareness, decreased ROM, impaired cardiopulmonary response to activity .    Rehab Prognosis: Good; patient would benefit from acute skilled PT services to address these deficits and reach maximum level of function.    Recent Surgery: * No surgery found *      Plan:     During this hospitalization, patient to be seen 3 x/week to address the identified rehab impairments via gait training, therapeutic activities, therapeutic exercises and progress toward the following goals:    Plan of Care Expires:  11/23/23    Subjective     Chief Complaint: coughing   Patient/Family Comments/goals: pt is pleasant and agreeable to therapy, supportive  daughter present   Pain/Comfort:  Pain Rating 1: 0/10  Pain Rating Post-Intervention 1: 0/10      Objective:     Communicated with nurse prior to session.  Patient found HOB elevated with telemetry, bed alarm, oxygen, peripheral IV, PureWick upon PT entry to room.     General Precautions: Standard, fall, respiratory, contact, droplet  Orthopedic Precautions: N/A  Braces: N/A  Respiratory Status: Nasal cannula, flow 3 L/min   SpO2 : 94 - 98% on 3L   SpO2: 93% on RA at rest and decreased to 87% with minimal exertions.   Pt required rest breaks and pursed lip breathing technique throughout therapy treatment.    Functional Mobility:  Bed Mobility:     Rolling Left:  supervision  Scooting: stand by assistance  Supine to Sit: stand by assistance  Transfers:     Sit to Stand: 2 trials , 5 trials from bed and 1 trial from chair  stand by assistance and contact guard assistance with rolling walker.V/T cues for safety , proper technique and walker management.   Bed to Chair: stand by assistance and contact guard assistance with  rolling walker  using  Step Transfer  Gait: pt ambulated 6 steps, 10 reps standing marching in placed and  6 ft  with RW, CGA/SBA . Noted with decreased dmitriy,decreased step length, no LOB. VC's for safety technique and walker management.     Balance:  good in sitting, fair in standing.       AM-PAC 6 CLICK MOBILITY  Turning over in bed (including adjusting bedclothes, sheets and blankets)?: 4  Sitting down on and standing up from a chair with arms (e.g., wheelchair, bedside commode, etc.): 3  Moving from lying on back to sitting on the side of the bed?: 3  Moving to and from a bed to a chair (including a wheelchair)?: 3  Need to walk in hospital room?: 3  Climbing 3-5 steps with a railing?: 3  Basic Mobility Total Score: 19       Treatment & Education:  Lower Extremity Exercises.    Patient educated on: Purpose and Benefits of Therapeutic Exercise(s).    Patient verbalized acceptance/understanding of instruction(s), expectation(s), and limitation(s) (for safety).  Patient performed: 2 sets of 10 reps (each) of B LE Ther Ex: AP, LAQ, Hip abd/add, Hip flexion while sitting up on EOB.       Patient required rest breaks , verbal cues/tactile cues to ensure correct sequence, to maintain proper form, and to allow for self-correction.  Pt reported no complaints or problems with exercise activity.     Patient left up in chair with all lines intact, call button in reach, nurse notified, and daughter  present..    GOALS:   Multidisciplinary Problems       Physical Therapy Goals          Problem: Physical  Therapy    Goal Priority Disciplines Outcome Goal Variances Interventions   Physical Therapy Goal     PT, PT/OT Ongoing, Progressing     Description: Goals to be met by: 23     Patient will increase functional independence with mobility by performin. Pt to be mod I with bed mobility.  2. Pt to transfer with (S).  3. Pt to ambulate 50' w/RW SBA.  4. Pt to be (I) with written HEP.                         Time Tracking:     PT Received On: 23  PT Start Time: 0950     PT Stop Time: 1029  PT Total Time (min): 39 min     Billable Minutes: Gait Training 9, Therapeutic Activity 15, and Therapeutic Exercise 15    Treatment Type: Treatment  PT/PTA: PTA     Number of PTA visits since last PT visit: 2023

## 2023-11-20 NOTE — SUBJECTIVE & OBJECTIVE
Medications:  Continuous Infusions:  Scheduled Meds:   albuterol-ipratropium  3 mL Nebulization Q4H    atorvastatin  40 mg Oral Daily    dextromethorphan-guaiFENesin  mg  1 tablet Oral BID    famotidine  20 mg Oral BID    furosemide  40 mg Oral BID    methylPREDNISolone sodium succinate injection  80 mg Intravenous Q6H    metoprolol succinate  100 mg Oral Daily    QUEtiapine  25 mg Oral QHS    rivaroxaban  20 mg Oral Daily with dinner    sertraline  100 mg Oral Daily    sodium chloride 3%  4 mL Nebulization Q8H     PRN Meds:benzonatate, dextromethorphan-guaiFENesin  mg/5 ml, hydrALAZINE, hydrOXYzine pamoate    Objective:     Vital Signs (Most Recent):  Temp: 98.1 °F (36.7 °C) (11/20/23 0744)  Pulse: 88 (11/20/23 0842)  Resp: 18 (11/20/23 0842)  BP: (!) 159/99 (11/20/23 0744)  SpO2: (!) 92 % (11/20/23 0842) Vital Signs (24h Range):  Temp:  [97.3 °F (36.3 °C)-98.1 °F (36.7 °C)] 98.1 °F (36.7 °C)  Pulse:  [65-92] 88  Resp:  [14-20] 18  SpO2:  [88 %-100 %] 92 %  BP: (127-182)/() 159/99     Weight: 125.2 kg (276 lb)  Body mass index is 48.89 kg/m².       Physical Exam  Vitals and nursing note reviewed.   Constitutional:       General: She is not in acute distress.     Appearance: She is obese. She is ill-appearing.      Interventions: Nasal cannula in place.   HENT:      Head: Normocephalic and atraumatic.   Musculoskeletal:      Right lower leg: Edema present.      Left lower leg: Edema present.   Neurological:      Mental Status: She is alert and oriented to person, place, and time.   Psychiatric:         Mood and Affect: Mood normal.         Thought Content: Thought content normal.         Judgment: Judgment normal.            Advance Care Planning   Advance Directives:   Living Will: No    LaPOST: No    Do Not Resuscitate Status: No    Medical Power of : No ( is legal surrogate decision maker and pt's preferred MPOA, along with her daughter Marec)      Decision Making:  Patient  answered questions  Goals of Care: What is most important right now is to focus on avoiding the hospital, remaining as independent as possible, quality of life, even if it means sacrificing a little time. Accordingly, we have decided that the best plan to meet the patient's goals includes continuing with treatment.     Significant Labs: All pertinent labs within the past 24 hours have been reviewed.  CBC:   Recent Labs   Lab 11/19/23  1040   WBC 11.55   HGB 15.4   HCT 49.4*   *        BMP:  Recent Labs   Lab 11/19/23  1040   *      K 3.8   CL 93*   CO2 39*   BUN 49*   CREATININE 1.3   CALCIUM 9.2     LFT:  Lab Results   Component Value Date    AST 27 11/13/2023    ALKPHOS 93 11/13/2023    BILITOT 0.6 11/13/2023     Albumin:   Albumin   Date Value Ref Range Status   11/13/2023 3.0 (L) 3.5 - 5.2 g/dL Final     Protein:   Total Protein   Date Value Ref Range Status   11/13/2023 6.7 6.0 - 8.4 g/dL Final     Lactic acid:   Lab Results   Component Value Date    LACTATE 1.2 05/20/2022     Significant Imaging: I have reviewed all pertinent imaging results/findings within the past 24 hours.

## 2023-11-20 NOTE — PT/OT/SLP PROGRESS
Occupational Therapy   Treatment    Name: Sandee Evans  MRN: [id number]  Admitting Diagnosis:  Acute respiratory failure with hypoxia and hypercapnia       Recommendations:     Discharge Recommendations: Low Intensity Therapy  Discharge Equipment Recommendations:  none  Barriers to discharge:   (decreased endurance with new O2 use)    Assessment:     Sandee Evans is a 67 y.o. female with a medical diagnosis of Acute respiratory failure with hypoxia and hypercapnia.  Performance deficits affecting function are weakness, impaired endurance, impaired self care skills, impaired functional mobility, gait instability, impaired balance, decreased upper extremity function, decreased safety awareness, impaired cardiopulmonary response to activity.   The patient is motivated to improve her activity tolerance and ability to perform self care. The patient was able to perform yellow theraband x10 reps while seated in the chair. The patient's O2 sats remained in the 90's during UE therex.      Rehab Prognosis:  Good; patient would benefit from acute skilled OT services to address these deficits and reach maximum level of function.       Plan:     Patient to be seen 3 x/week to address the above listed problems via self-care/home management, therapeutic activities, therapeutic exercises  Plan of Care Expires: 11/30/23  Plan of Care Reviewed with: patient    Subjective     Chief Complaint: none  Patient/Family Comments/goals: agreeable to OT  Pain/Comfort:  Pain Rating 1: 0/10    Objective:     Communicated with: Lorraine erickson prior to session.  Patient found up in chair with telemetry, oxygen, peripheral IV, PureWick upon OT entry to room.    General Precautions: Standard, droplet, contact, respiratory, fall    Orthopedic Precautions:N/A  Braces: N/A  Respiratory Status: Nasal cannula, flow 3 L/min     Occupational Performance:     Bed Mobility:    N/T     Functional Mobility/Transfers:  Patient completed Sit <> Stand Transfer  with stand by assistance  with  rolling walker   Functional Mobility: The patient stood using a RW and SBA/CGA for pressure relief from sitting in the chair all day. The patient was able to stand and perform standing marches and raising up on her toes. The patient requested to amb around the bed without O2. O2 was removed and O2 sats monitored. The patient amb with SBA with O2 sats dropping briefly into the 80's but returned quickly to the 90's when seated in the chair and PLB.    Activities of Daily Living:  Upper Body Dressing: contact guard assistance to don back gown      [facility] 6 Click ADL: 20    Treatment & Education:  Performed self care and functional mobility as noted above  Pt completed  x 10 BUE HEP with yellow theraband in seated upright in chair:  Shoulder horizontal ab/dduction  External rotation  Shoulder flexion/extension with ab/dduction  Elbow extension  Elbow flexion   Handout placed in blue folder with theraband placed within reach of pt   Pt encouraged to complete x10 reps/1-2x a day     Patient left up in chair , reclined with all lines intact, call button in reach, and nurse notified    GOALS:   Multidisciplinary Problems       Occupational Therapy Goals          Problem: Occupational Therapy    Goal Priority Disciplines Outcome Interventions   Occupational Therapy Goal     OT, PT/OT Ongoing, Progressing    Description: Goals to be met by: 11/30/23     Patient will increase functional independence with ADLs by performing:    UE Dressing with Modified [place].  LE Dressing with Set-up Assistance and Supervision.  Grooming while standing at sink with Modified [place] and Supervision and Assistive Devices as needed.  Toileting from toilet with Stand-by Assistance for hygiene and clothing management.   Sitting at edge of bed x30 minutes with Modified [place].  Rolling to Bilateral with Modified [place].   Supine to sit with Modified [place] and Supervision.  Step transfer  with Modified [place] and Supervision  Toilet transfer to toilet with Modified [place] and Supervision.  Upper extremity exercise program x15 reps per handout, with independence.                         Time Tracking:     OT Date of Treatment: 11/20/23  OT Start Time: 1622  OT Stop Time: 1652  OT Total Time (min): 30 min    Billable Minutes:Therapeutic Activity 10  Therapeutic Exercise 20  Total Time 30    OT/CHANTELLE: OT          11/20/2023

## 2023-11-20 NOTE — NURSING
Note that patient continues with an intermittent dry cough.   Some cough improvement with PRN meds.  SPO2 continues at 88 - 89% seated on room air.   Continues on O2@2L NC.     Will continue to f/u.

## 2023-11-20 NOTE — PROGRESS NOTES
[facility]  [facility]alliative Medicine  Progress Note    Patient Name: Sandee Evans  MRN: [id number]  Admission Date: 11/13/2023  Hospital Length of Stay: 7 days  Code Status: Full Code   Attending Provider: Luz Ware, *  Consulting Provider: Maren Pike NP  Primary Care Physician: Kuldeep Miller MD  Principal Problem:Acute respiratory failure with hypoxia and hypercapnia    Patient information was obtained from patient and primary team.      Assessment/Plan:   Advance Care Planning     Palliative Care  Goals of care, counseling/discussion  11/18/2023  - interval chart reviewed; discussed pt with MDT   - met with pt at bedside; sitting in chair finishing lunch; reports continued improvement in symptoms   - confirms continued wishes regarding GOC (see below), but does not wish to alter code status or complete any ACP documents  - wishes to discuss wishes with /children prior to completing documentation  - continued GOC/ACP discussion   - continues to be agreeable to home palliative care; discussed further    - discussed pt's plan to improve health when she gets home and seeing her grandchildren grow up and helping them build their addi in God before its her time  (2 grand kids, 6 and 2 years old)   - emotional support provided; allowed time for questions/concerns, all addressed   - updated CM/SW regarding home palliative     11/17/2023  - interval chart reviewed; discussed pt with MDT   - met with pt at bedside; she is sitting in a chair today and cheerful  - initially joined Dr. Mann ([facility]) to hear his update to pt about ongoing respiratory progress, to help guide discussion and assess pt's understanding   - continued GOC discussion; pt brought up code status and GOC discussion herself with very clear wishes regarding not wishing to aggressively prolong her life; would not wish to be bed bound or care dependent, that is not an acceptable QOL for her; she would not wish for a  trach, long term artifical feeding or PEG, or any form of prolonged life support  - wishes to remain full code for now and would want to try to be resuscitated as she has a good QOL of life currently, wishes may change if condition changes; she also shares that outside acute hospital seeing wishes may also be more towards DNR; with this discussed recommendation for home palliative care to allow for further discussion outside acute IP setting and as trajectory of life limiting conditions progress which pt is agreeable to   - pt seemed to talk much more freely about wishes without family present; encouraged her to share these wishes with her family, especially her  and children  - 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 Harris, and later with MDT during ICU rounds   - met with patient at bedside and her  Corwin; introduction to palliative medicine team and role in current care and admission   - learned more about pt outside of current admission; she lives with her  Corwin and they have 2 children Marce and Sukhwinder; she typically is able to perform all ADLs independently; does not use assistive devices in her home, but uses a cane for walking distances   - GOC/ACP discussion; introduced topics of ACP discussion with plan for further discussion on 11/16 after she has had some time to think about things more   - Corwin or her daughter Marce is her preferred MPOA; she feels Marce may know her wishes a little more   - GOC is to improve her health with life style changes and  to avoid hospital; expressed importance of quality of life and that she wants to live and do everything she can to make that happen, but if her time came she would be at peace as well; expressed concerns for her health placing burdens on her family and her GOC somewhat being affected by that which we plan to review further   - emotional support provided   - Allowed time for questions/concerns; all addressed; expressed availability of myself/palliative team for additional questions/concerns   Pulmonary  Chronic obstructive pulmonary disease with acute exacerbation  - not on O2 PTA   - discussed trajectory of life-limiting condition   - continued management per hospital primary     Cardiac/Vascular  Pulmonary hypertension  - chronic history noted; likely to additionally complicate respiratory status in the future in pt also with CHF and COPD  - continued management per hospital primary     Acute on chronic diastolic CHF (congestive heart failure)  - EF 60%; also with Afib and Pulm HTN   - discussed trajectory of life-limiting condition   - noted; continued management per hospital primary     Paroxysmal atrial fibrillation  - persistent Afib since admission requiring ICU care   - noted; continued management per hospital primary     Renal/  Chronic kidney disease, stage 3a  - chronic history noted; continued management per hospital primary     Other  HIEU treated with BiPAP  - pt admits to lack of compliance with BiPap at home; did wear it last night in ICU   - discussion of trajectory of HIEU in setting of CHF and risk of non-compliance with BiPap; discussed GOC   - noted; continued management per hospital primary       I will follow-up with patient. Please contact us if you have any additional questions.    Total visit time: 55 minutes    35 min visit time including: face to face time in discussion of symptom assessment, and exploring options and burdens of offered treatments.  This also includes non-face to face time  "preparing to see the patient including chart review, obtaining and/or reviewing separately obtained history, documenting clinical information in the electronic or other health record, independently interpreting results and communicating results to the patient/family/caregiver, family discussions by phone if not able to be present, coordination of care with other specialists, and discharge planning.     20 min ACP time spent: goals of care, advanced care planning, emotional support, formulating and communicating prognosis, exploring burden/ benefit of various approaches of treatment.     Maren Pike NP  Palliative Medicine  [facility] [place]    Subjective:     Chief Complaint:   Chief Complaint   Patient presents with    Shortness of Breath     Pt reports to ED to SOB, productive cough for about 2 weeks. Pt daughter reports hallucinations. Hx of COPD.        HPI:   From H&P: " 67 y.o. female, with a past medical history of A-fib on OAC,diastolic  CHF, COPD, HLD, HTN, hypothyroidism, morbid obesity, pre-diabetes, and psychiatric problems, who presents to the ED with SOB onset 3 weeks ago. She reports SOB has gotten progressively worse over 3 weeks. Patient notes she has trouble ambulating d/t SOB. Patient notes being on an at-home BiPAP but denies daily use d/t the machine "smothering" her. Additional history provided by independent historian, relative, notes an O2 saturation in the 80's at-home PTA. Patient denies O2 at home. Patient notes an associated cough, congestion, diarrhea, rhinorrhea, sore throat, and headache.ABG in ER show hypercapnic,hypoxic  respiratory failure with PH of 7.2 and P C O2 of 72,PO 2 of 76,chest x ray show fluid overload,BNP is over 478,patient has been  started on continues Bipap and IV lasix,,admitted to ICU for close monitoring."     Palliative medicine consulted for goals of care discussion and advance care planning; for details of visit, see advance care " planning section of plan.       Hospital Course:  No notes on file    Medications:  Continuous Infusions:  Scheduled Meds:   albuterol-ipratropium  3 mL Nebulization Q4H    atorvastatin  40 mg Oral Daily    dextromethorphan-guaiFENesin  mg  1 tablet Oral BID    famotidine  20 mg Oral BID    furosemide  40 mg Oral BID    methylPREDNISolone sodium succinate injection  80 mg Intravenous Q6H    metoprolol succinate  100 mg Oral Daily    QUEtiapine  25 mg Oral QHS    rivaroxaban  20 mg Oral Daily with dinner    sertraline  100 mg Oral Daily    sodium chloride 3%  4 mL Nebulization Q8H     PRN Meds:benzonatate, dextromethorphan-guaiFENesin  mg/5 ml, hydrALAZINE, hydrOXYzine pamoate    Objective:     Vital Signs (Most Recent):  Temp: 98.1 °F (36.7 °C) (11/20/23 0744)  Pulse: 88 (11/20/23 0842)  Resp: 18 (11/20/23 0842)  BP: (!) 159/99 (11/20/23 0744)  SpO2: (!) 92 % (11/20/23 0842) Vital Signs (24h Range):  Temp:  [97.3 °F (36.3 °C)-98.1 °F (36.7 °C)] 98.1 °F (36.7 °C)  Pulse:  [65-92] 88  Resp:  [14-20] 18  SpO2:  [88 %-100 %] 92 %  BP: (127-182)/() 159/99     Weight: 125.2 kg (276 lb)  Body mass index is 48.89 kg/m².       Physical Exam  Vitals and nursing note reviewed.   Constitutional:       General: She is not in acute distress.     Appearance: She is obese. She is ill-appearing.      Interventions: Nasal cannula in place.   HENT:      Head: Normocephalic and atraumatic.   Musculoskeletal:      Right lower leg: Edema present.      Left lower leg: Edema present.   Neurological:      Mental Status: She is alert and oriented to person, place, and time.   Psychiatric:         Mood and Affect: Mood normal.         Thought Content: Thought content normal.         Judgment: Judgment normal.            Advance Care Planning  Advance Directives:   Living Will: No    [place]: No    Do Not Resuscitate Status: No    Medical Power of : No ( is legal surrogate decision maker and pt's preferred MPOA,  along with her daughter Marce)      Decision Making:  Patient answered questions  Goals of Care: What is most important right now is to focus on avoiding the hospital, remaining as independent as possible, quality of life, even if it means sacrificing a little time. Accordingly, we have decided that the best plan to meet the patient's goals includes continuing with treatment.     Significant Labs: All pertinent labs within the past 24 hours have been reviewed.  CBC:   Recent Labs   Lab 11/19/23  1040   WBC 11.55   HGB 15.4   HCT 49.4*   *        BMP:  Recent Labs   Lab 11/19/23  1040   *      K 3.8   CL 93*   CO2 39*   BUN 49*   CREATININE 1.3   CALCIUM 9.2     LFT:  Lab Results   Component Value Date    AST 27 11/13/2023    ALKPHOS 93 11/13/2023    BILITOT 0.6 11/13/2023     Albumin:   Albumin   Date Value Ref Range Status   11/13/2023 3.0 (L) 3.5 - 5.2 g/dL Final     Protein:   Total Protein   Date Value Ref Range Status   11/13/2023 6.7 6.0 - 8.4 g/dL Final     Lactic acid:   Lab Results   Component Value Date    LACTATE 1.2 05/20/2022     Significant Imaging: I have reviewed all pertinent imaging results/findings within the past 24 hours.    Maren Pike NP  Palliative Medicine  [facility]

## 2023-11-21 PROCEDURE — 99900035 HC TECH TIME PER 15 MIN (STAT)

## 2023-11-21 PROCEDURE — 97110 THERAPEUTIC EXERCISES: CPT

## 2023-11-21 PROCEDURE — 94761 N-INVAS EAR/PLS OXIMETRY MLT: CPT

## 2023-11-21 PROCEDURE — 97535 SELF CARE MNGMENT TRAINING: CPT

## 2023-11-21 PROCEDURE — 27000207 HC ISOLATION

## 2023-11-21 PROCEDURE — 94660 CPAP INITIATION&MGMT: CPT

## 2023-11-21 PROCEDURE — 97530 THERAPEUTIC ACTIVITIES: CPT | Mod: CQ

## 2023-11-21 PROCEDURE — 25000242 PHARM REV CODE 250 ALT 637 W/ HCPCS: Performed by: HOSPITALIST

## 2023-11-21 PROCEDURE — 21400001 HC TELEMETRY ROOM

## 2023-11-21 PROCEDURE — 94760 N-INVAS EAR/PLS OXIMETRY 1: CPT

## 2023-11-21 PROCEDURE — 94640 AIRWAY INHALATION TREATMENT: CPT

## 2023-11-21 PROCEDURE — 97116 GAIT TRAINING THERAPY: CPT | Mod: CQ

## 2023-11-21 PROCEDURE — 97110 THERAPEUTIC EXERCISES: CPT | Mod: CQ

## 2023-11-21 PROCEDURE — 25000003 PHARM REV CODE 250: Performed by: HOSPITALIST

## 2023-11-21 PROCEDURE — 27000221 HC OXYGEN, UP TO 24 HOURS

## 2023-11-21 PROCEDURE — 63600175 PHARM REV CODE 636 W HCPCS: Performed by: HOSPITALIST

## 2023-11-21 RX ORDER — SODIUM CHLORIDE 0.9 % (FLUSH) 0.9 %
10 SYRINGE (ML) INJECTION
Status: DISCONTINUED | OUTPATIENT
Start: 2023-11-21 | End: 2023-11-25 | Stop reason: HOSPADM

## 2023-11-21 RX ADMIN — METHYLPREDNISOLONE SODIUM SUCCINATE 125 MG: 125 INJECTION, POWDER, FOR SOLUTION INTRAMUSCULAR; INTRAVENOUS at 12:11

## 2023-11-21 RX ADMIN — FUROSEMIDE 40 MG: 40 TABLET ORAL at 08:11

## 2023-11-21 RX ADMIN — GUAIFENESIN AND DEXTROMETHORPHAN HYDROBROMIDE 1 TABLET: 600; 30 TABLET, EXTENDED RELEASE ORAL at 08:11

## 2023-11-21 RX ADMIN — FAMOTIDINE 20 MG: 20 TABLET ORAL at 08:11

## 2023-11-21 RX ADMIN — IPRATROPIUM BROMIDE AND ALBUTEROL SULFATE 3 ML: 2.5; .5 SOLUTION RESPIRATORY (INHALATION) at 12:11

## 2023-11-21 RX ADMIN — IPRATROPIUM BROMIDE AND ALBUTEROL SULFATE 3 ML: 2.5; .5 SOLUTION RESPIRATORY (INHALATION) at 04:11

## 2023-11-21 RX ADMIN — QUETIAPINE FUMARATE 25 MG: 25 TABLET ORAL at 08:11

## 2023-11-21 RX ADMIN — IPRATROPIUM BROMIDE AND ALBUTEROL SULFATE 3 ML: 2.5; .5 SOLUTION RESPIRATORY (INHALATION) at 07:11

## 2023-11-21 RX ADMIN — METHYLPREDNISOLONE SODIUM SUCCINATE 125 MG: 125 INJECTION, POWDER, FOR SOLUTION INTRAMUSCULAR; INTRAVENOUS at 05:11

## 2023-11-21 RX ADMIN — RIVAROXABAN 20 MG: 20 TABLET, FILM COATED ORAL at 05:11

## 2023-11-21 RX ADMIN — METOPROLOL SUCCINATE 100 MG: 50 TABLET, EXTENDED RELEASE ORAL at 08:11

## 2023-11-21 RX ADMIN — SODIUM CHLORIDE SOLN NEBU 3% 4 ML: 3 NEBU SOLN at 11:11

## 2023-11-21 RX ADMIN — FUROSEMIDE 40 MG: 40 TABLET ORAL at 05:11

## 2023-11-21 RX ADMIN — SODIUM CHLORIDE SOLN NEBU 3% 4 ML: 3 NEBU SOLN at 07:11

## 2023-11-21 RX ADMIN — SERTRALINE HYDROCHLORIDE 100 MG: 50 TABLET ORAL at 08:11

## 2023-11-21 RX ADMIN — ATORVASTATIN CALCIUM 40 MG: 40 TABLET, FILM COATED ORAL at 08:11

## 2023-11-21 RX ADMIN — IPRATROPIUM BROMIDE AND ALBUTEROL SULFATE 3 ML: 2.5; .5 SOLUTION RESPIRATORY (INHALATION) at 11:11

## 2023-11-21 RX ADMIN — SODIUM CHLORIDE SOLN NEBU 3% 4 ML: 3 NEBU SOLN at 04:11

## 2023-11-21 NOTE — ASSESSMENT & PLAN NOTE
Advance Care Planning     Code Status  I engaged the the patient in a voluntary conversation about the patient's preferences for care  at the very end of life. The patient wishes to have CPR and other invasive treatments performed when her heart and/or breathing stops. I communicated to the patient that her wishes align with full code status. She agrees. I spent a total of 16 minutes engaging the patient in this advance care planning discussion.

## 2023-11-21 NOTE — PLAN OF CARE
Problem: Adult Inpatient Plan of Care  Goal: Plan of Care Review  Outcome: Ongoing, Progressing  Goal: Patient-Specific Goal (Individualized)  Outcome: Ongoing, Progressing  Goal: Absence of Hospital-Acquired Illness or Injury  Outcome: Ongoing, Progressing  Intervention: Identify and Manage Fall Risk  Flowsheets (Taken 11/21/2023 0432)  Safety Promotion/Fall Prevention:   assistive device/personal item within reach   side rails raised x 2  Intervention: Prevent Skin Injury  Flowsheets (Taken 11/21/2023 0432)  Body Position:   position changed independently   position maintained  Skin Protection:   adhesive use limited   incontinence pads utilized  Intervention: Prevent and Manage VTE (Venous Thromboembolism) Risk  Flowsheets (Taken 11/21/2023 0432)  Activity Management: Rolling - L1  Range of Motion: active ROM (range of motion) encouraged  Intervention: Prevent Infection  Flowsheets (Taken 11/21/2023 0432)  Infection Prevention: single patient room provided  Goal: Optimal Comfort and Wellbeing  Outcome: Ongoing, Progressing  Intervention: Monitor Pain and Promote Comfort  Flowsheets (Taken 11/21/2023 0432)  Pain Management Interventions:   care clustered   relaxation techniques promoted   quiet environment facilitated  Intervention: Provide Person-Centered Care  Flowsheets (Taken 11/21/2023 0432)  Trust Relationship/Rapport:   care explained   choices provided   emotional support provided   empathic listening provided  Goal: Readiness for Transition of Care  Outcome: Ongoing, Progressing     Problem: Bariatric Environmental Safety  Goal: Safety Maintained with Care  Outcome: Ongoing, Progressing     Problem: Diabetes Comorbidity  Goal: Blood Glucose Level Within Targeted Range  Outcome: Ongoing, Progressing     Problem: Fall Injury Risk  Goal: Absence of Fall and Fall-Related Injury  Outcome: Ongoing, Progressing  Intervention: Identify and Manage Contributors  Flowsheets (Taken 11/21/2023 0432)  Self-Care  Promotion:   independence encouraged   BADL personal objects within reach   BADL personal routines maintained  Medication Review/Management: medications reviewed  Intervention: Promote Injury-Free Environment  Flowsheets (Taken 11/21/2023 0432)  Safety Promotion/Fall Prevention:   assistive device/personal item within reach   side rails raised x 2     Problem: Skin Injury Risk Increased  Goal: Skin Health and Integrity  Outcome: Ongoing, Progressing  Intervention: Optimize Skin Protection  Flowsheets (Taken 11/21/2023 0432)  Pressure Reduction Techniques: frequent weight shift encouraged  Skin Protection:   adhesive use limited   incontinence pads utilized  Head of Bed (HOB) Positioning: HOB at 30-45 degrees     Problem: Infection  Goal: Absence of Infection Signs and Symptoms  Outcome: Ongoing, Progressing  Intervention: Prevent or Manage Infection  Flowsheets (Taken 11/21/2023 0432)  Infection Management: aseptic technique maintained  Isolation Precautions:   contact   droplet   precautions maintained     Problem: Coping Ineffective  Goal: Effective Coping  Outcome: Ongoing, Progressing  Intervention: Support and Enhance Coping Strategies  Flowsheets (Taken 11/21/2023 0432)  Supportive Measures: active listening utilized  Family/Support System Care: support provided  Environmental Support: calm environment promoted     Problem: Airway Clearance Ineffective  Goal: Effective Airway Clearance  Outcome: Ongoing, Progressing  Intervention: Promote Airway Secretion Clearance  Flowsheets (Taken 11/21/2023 0432)  Cough And Deep Breathing: done independently per patient  Activity Management: Rolling - L1     Problem: Adjustment to Illness COPD (Chronic Obstructive Pulmonary Disease)  Goal: Optimal Chronic Illness Coping  Outcome: Ongoing, Progressing  Intervention: Support and Optimize Psychosocial Response  Flowsheets (Taken 11/21/2023 0432)  Supportive Measures: active listening utilized  Family/Support System Care:  support provided     Problem: Functional Ability Impaired COPD (Chronic Obstructive Pulmonary Disease)  Goal: Optimal Level of Functional [place]  Outcome: Ongoing, Progressing  Intervention: Optimize Functional Ability  Flowsheets (Taken 11/21/2023 0432)  Self-Care Promotion:   independence encouraged   BADL personal objects within reach   BADL personal routines maintained  Activity Management: Rolling - L1  Environmental Support: calm environment promoted     Problem: Infection COPD (Chronic Obstructive Pulmonary Disease)  Goal: Absence of Infection Signs and Symptoms  Outcome: Ongoing, Progressing  Intervention: Prevent or Manage Infection  Flowsheets (Taken 11/21/2023 0432)  Infection Management: aseptic technique maintained  Isolation Precautions:   contact   droplet   precautions maintained     Problem: Oral Intake Inadequate COPD (Chronic Obstructive Pulmonary Disease)  Goal: Improved Nutrition Intake  Outcome: Ongoing, Progressing     Problem: Respiratory Compromise COPD (Chronic Obstructive Pulmonary Disease)  Goal: Effective Oxygenation and Ventilation  Outcome: Ongoing, Progressing  Intervention: Promote Airway Secretion Clearance  Flowsheets (Taken 11/21/2023 0432)  Cough And Deep Breathing: done independently per patient  Activity Management: Rolling - L1  Intervention: Optimize Oxygenation and Ventilation  Flowsheets (Taken 11/21/2023 0432)  Head of Bed (HOB) Positioning: HOB at 30-45 degrees

## 2023-11-21 NOTE — PLAN OF CARE
Problem: Occupational Therapy  Goal: Occupational Therapy Goal  Description: Goals to be met by: 11/30/23     Patient will increase functional independence with ADLs by performing:    UE Dressing with Modified [place].  LE Dressing with Set-up Assistance and Supervision.  Grooming while standing at sink with Modified [place] and Supervision and Assistive Devices as needed.  Toileting from toilet with Stand-by Assistance for hygiene and clothing management.   Sitting at edge of bed x30 minutes with Modified [place].  Rolling to Bilateral with Modified [place].   Supine to sit with Modified [place] and Supervision.  Step transfer with Modified [place] and Supervision  Toilet transfer to toilet with Modified [place] and Supervision.  Upper extremity exercise program x15 reps per handout, with independence.    Outcome: Ongoing, Progressing    Patient seated in chair performing resistance exercises with yellow theraband with no oxygen desaturation noted on 3L of oxygen with NC.

## 2023-11-21 NOTE — SUBJECTIVE & OBJECTIVE
Interval History:  No acute overnight events.  Patient remained afebrile.  Feeling better overall.  Shortness of breath still present, but improving.  Feeling fatigued.  Tolerating diet without any difficulty.  Ate 100% of her breakfast this morning.  Denies any wheezing    Review of Systems   Constitutional:  Positive for activity change, appetite change (improving) and fatigue. Negative for chills and fever.   Respiratory:  Positive for shortness of breath (improving).    Cardiovascular:  Negative for chest pain and leg swelling.   Gastrointestinal:  Negative for abdominal pain, nausea and vomiting.   Skin:  Negative for color change.   Neurological:  Positive for weakness. Negative for dizziness and headaches.   Psychiatric/Behavioral:  Negative for agitation and behavioral problems.      Objective:     Vital Signs (Most Recent):  Temp: 98.4 °F (36.9 °C) (11/21/23 1126)  Pulse: 78 (11/21/23 1214)  Resp: 18 (11/21/23 1214)  BP: 134/84 (11/21/23 1126)  SpO2: 97 % (11/21/23 1214) Vital Signs (24h Range):  Temp:  [97.4 °F (36.3 °C)-98.6 °F (37 °C)] 98.4 °F (36.9 °C)  Pulse:  [] 78  Resp:  [16-21] 18  SpO2:  [93 %-99 %] 97 %  BP: (126-190)/(75-96) 134/84     Weight: 121 kg (266 lb 12.1 oz)  Body mass index is 47.25 kg/m².    Intake/Output Summary (Last 24 hours) at 11/21/2023 1543  Last data filed at 11/21/2023 1218  Gross per 24 hour   Intake 720 ml   Output 2700 ml   Net -1980 ml         Physical Exam  Vitals and nursing note reviewed.   Constitutional:       General: She is not in acute distress.     Appearance: She is morbidly obese. She is ill-appearing.   HENT:      Mouth/Throat:      Mouth: Mucous membranes are moist.   Eyes:      Extraocular Movements: Extraocular movements intact.   Cardiovascular:      Rate and Rhythm: Normal rate. Rhythm irregular.   Pulmonary:      Effort: Pulmonary effort is normal. No respiratory distress.      Breath sounds: Rhonchi present. No wheezing.      Comments: On 3L NC,  no wheezing  Abdominal:      Palpations: Abdomen is soft.   Musculoskeletal:      Right lower leg: Edema present.      Left lower leg: Edema present.      Comments: +1 pitting edema in bilateral lower extremity, no calf tenderness   Skin:     General: Skin is warm and dry.   Neurological:      General: No focal deficit present.      Mental Status: She is alert and oriented to person, place, and time.   Psychiatric:         Mood and Affect: Mood normal.             Significant Labs: All pertinent labs within the past 24 hours have been reviewed.    Significant Imaging: I have reviewed all pertinent imaging results/findings within the past 24 hours.

## 2023-11-21 NOTE — ASSESSMENT & PLAN NOTE
"Patient is identified as having Diastolic (HFpEF) heart failure that is Acute on chronic. CHF is currently uncontrolled due to Dyspnea not returned to baseline after 1 doses of IV diuretic. Latest ECHO performed and demonstrates- Results for orders placed during the hospital encounter of 05/20/22    Echo    Interpretation Summary  · The left ventricle is normal in size with normal systolic function.  · The estimated ejection fraction is 60%.  · Normal right ventricular size with normal right ventricular systolic function.  · The estimated PA systolic pressure is 33 mmHg.  · Atrial fibrillation observed.  . Continue Beta Blocker and Furosemide and monitor clinical status closely. Monitor on telemetry. Patient is off CHF pathway.  Monitor strict Is&Os and daily weights.  Place on fluid restriction of 1.5 L. Cardiology has not been any consulted. Continue to stress to patient importance of self efficacy and  on diet for CHF. Last BNP reviewed- and noted below   No results for input(s): "BNP", "BNPTRIAGEBLO" in the last 168 hours.  .  "

## 2023-11-21 NOTE — PLAN OF CARE
POC reviewed with patient and she verbalized understanding. Patient weaned to 1L NC - sats WDL. Denies pain/discomfort. OOB to chair throughout day. Free from falls; safety precautions in place. Denies needs at this time. Care ongoing.

## 2023-11-21 NOTE — NURSING
[facility] [place]  Nurses Note -- 4 Eyes    11/20/2023      Skin assessed on: Q Shift      [x] No Pressure Injuries Present    [x]Prevention Measures Documented    [] Yes LDA  for Pressure Injury Previously documented     [] Yes New Pressure Injury Discovered   [] LDA for New Pressure Injury Added      Attending RN:  Kimi Hickman RN     Second RN:  Lorraine ORONA RN

## 2023-11-21 NOTE — ASSESSMENT & PLAN NOTE
Creatine stable for now. BMP reviewed- noted Estimated Creatinine Clearance: 51.5 mL/min (based on SCr of 1.3 mg/dL). according to latest data. Based on current GFR, CKD stage is stage 3 - GFR 30-59.  Monitor UOP and serial BMP and adjust therapy as needed. Renally dose meds. Avoid nephrotoxic medications and procedures.

## 2023-11-21 NOTE — PT/OT/SLP PROGRESS
Occupational Therapy   Treatment    Name: Sandee Evans  MRN: [id number]  Admitting Diagnosis:  Acute respiratory failure with hypoxia and hypercapnia       Recommendations:     Discharge Recommendations: Low Intensity Therapy  Discharge Equipment Recommendations:  none  Barriers to discharge:  Other (Comment)    Assessment:     Sandee Evans is a 67 y.o. female with a medical diagnosis of Acute respiratory failure with hypoxia and hypercapnia.  She presents up in chair with 3L NC oxygen and nebulizer and RRT present. She says she is not hurting, but is still having a productive cough with little sputum coming out. Performance deficits affecting function are weakness, impaired endurance, impaired self care skills, impaired functional mobility, gait instability, impaired cardiopulmonary response to activity, decreased lower extremity function, decreased safety awareness.     Rehab Prognosis:  Good; patient would benefit from acute skilled OT services to address these deficits and reach maximum level of function.       Plan:     Patient to be seen 3 x/week to address the above listed problems via self-care/home management, therapeutic activities, therapeutic exercises  Plan of Care Expires: 11/30/23  Plan of Care Reviewed with: patient    Subjective     Chief Complaint: coughing   Patient/Family Comments/goals: to return home   Pain/Comfort:  Pain Rating 1: 0/10    Objective:     Communicated with: RNLorraine,  prior to session.  Patient found up in chair with telemetry, oxygen, peripheral IV, PureWick upon OT entry to room.    General Precautions: Standard, fall, droplet, respiratory    Orthopedic Precautions:N/A  Braces: N/A  Respiratory Status: Nasal cannula, flow 3 L/min she was at 98% when seated before exercises and at 95% on 3L after exercises.      Occupational Performance:     Bed Mobility:    NT today, up in chair and wanted to stay in chair      Functional Mobility/Transfers:  Patient did not walk  around room due to having nebulizer treatment and completing exercises seated in chair.     Activities of Daily Living:  Grooming: stand by assistance seated in chair       [facility] 6 Click ADL: 18    Treatment & Education:  Occupational therapy educated and patient demonstrated yellow theraband (low) resistance exercises for the following muscle groups with no desaturation noted throughout session:   Overhead shoulder pulls  Chest pulls  Triceps/biceps   X 10 reps each x 2 sets each seated in chair.     Patient left up in chair with all lines intact, call button in reach, and RN  notified    GOALS:   Multidisciplinary Problems       Occupational Therapy Goals          Problem: Occupational Therapy    Goal Priority Disciplines Outcome Interventions   Occupational Therapy Goal     OT, PT/OT Ongoing, Progressing    Description: Goals to be met by: 11/30/23     Patient will increase functional independence with ADLs by performing:    UE Dressing with Modified [place].  LE Dressing with Set-up Assistance and Supervision.  Grooming while standing at sink with Modified [place] and Supervision and Assistive Devices as needed.  Toileting from toilet with Stand-by Assistance for hygiene and clothing management.   Sitting at edge of bed x30 minutes with Modified [place].  Rolling to Bilateral with Modified [place].   Supine to sit with Modified [place] and Supervision.  Step transfer with Modified [place] and Supervision  Toilet transfer to toilet with Modified [place] and Supervision.  Upper extremity exercise program x15 reps per handout, with independence.                         Time Tracking:     OT Date of Treatment: 11/21/23  OT Start Time: 1130  OT Stop Time: 1200  OT Total Time (min): 30 min    Billable Minutes:Self Care/Home Management 15   Therapeutic Exercise 15     OT/CHANTELLE: OT          11/21/2023

## 2023-11-21 NOTE — ASSESSMENT & PLAN NOTE
Body mass index is 47.25 kg/m². Morbid obesity complicates all aspects of disease management from diagnostic modalities to treatment. Weight loss encouraged and health benefits explained to patient.

## 2023-11-21 NOTE — ASSESSMENT & PLAN NOTE
Patient with Persistent (7 days or more) atrial fibrillation which is controlled currently with Beta Blocker. Patient is currently in atrial fibrillation.[id number] Score: 4. HASBLED Score: 3. Anticoagulation indicated. Anticoagulation done with Xalreto .    -continue home Xarelto and metoprolol succinate   -continue to monitor on telemetry

## 2023-11-21 NOTE — ASSESSMENT & PLAN NOTE
Chronic, controlled.   Continue home metoprolol    Latest blood pressure and vitals reviewed-     Temp:  [97.4 °F (36.3 °C)-98.6 °F (37 °C)]   Pulse:  []   Resp:  [16-21]   BP: (126-190)/(75-96)   SpO2:  [93 %-99 %] .   Home meds for hypertension were reviewed and noted below.   Hypertension Medications               furosemide (LASIX) 40 MG tablet Take 1 tablet (40 mg total) by mouth once daily.    losartan (COZAAR) 25 MG tablet TAKE 1 TABLET(25 MG) BY MOUTH EVERY DAY    metoprolol succinate (TOPROL-XL) 100 MG 24 hr tablet Take 1 tablet (100 mg total) by mouth once daily.            While in the hospital, will manage blood pressure as follows; Continue home antihypertensive regimen    Will utilize p.r.n. blood pressure medication only if patient's blood pressure greater than 160/100 and she develops symptoms such as worsening chest pain or shortness of breath.

## 2023-11-21 NOTE — ASSESSMENT & PLAN NOTE
Non complaint with home Bipap.s  he was consulted on this.  She did better last night with Bipap.

## 2023-11-21 NOTE — PT/OT/SLP PROGRESS
Physical Therapy Treatment    Patient Name:  Sandee Evans   MRN:  [id number]    Recommendations:     Discharge Recommendations: Low Intensity Therapy  Discharge Equipment Recommendations: none  Barriers to discharge: None    Assessment:     Sandee Evans is a 67 y.o. female admitted with a medical diagnosis of Acute respiratory failure with hypoxia and hypercapnia.  She presents with the following impairments/functional limitations: weakness, impaired endurance, impaired self care skills, impaired functional mobility, gait instability, decreased lower extremity function, decreased upper extremity function, impaired balance, decreased safety awareness, impaired cardiopulmonary response to activity .    Rehab Prognosis: Good; patient would benefit from acute skilled PT services to address these deficits and reach maximum level of function.    Recent Surgery: * No surgery found *      Plan:     During this hospitalization, patient to be seen 3 x/week to address the identified rehab impairments via gait training, therapeutic activities, therapeutic exercises and progress toward the following goals:    Plan of Care Expires:  11/23/23    Subjective     Chief Complaint: none   Patient/Family Comments/goals: pt is pleasant and agreeable to therapy  Pain/Comfort:  Pain Rating 1: 0/10  Pain Rating Post-Intervention 1: 0/10      Objective:     Communicated with nurse prior to session.  Patient found HOB elevated with telemetry, bed alarm, oxygen, peripheral IV, PureWick upon PT entry to room.     General Precautions: Standard, fall, respiratory, droplet   Orthopedic Precautions: N/A  Braces: N/A  Respiratory Status: Nasal cannula, flow 3 L/min   SpO2 decreased to 86% on RA with minimal exertions, recovered to 91% with rest and pursed lip breathing technique. SpO2 : 93 - 98% on 3L   Functional Mobility:  Bed Mobility:     Rolling Left:  supervision  Scooting: stand by assistance  Supine to Sit: stand by assistance,HOB  elevated   Transfers:     Sit < > Stand: 5 trials from bed   stand by assistance with rolling walker.V/T cues for safety , proper technique and walker management.   Bed to Chair: stand by assistance / contact guard assistance with  rolling walker  using  Step Transfer  Gait: pt ambulated 20 ft x 2 trials and 8 ft  with RW, CGA/SBA (3L O2NC) .  Pt required seated rest breaks 2* to fatigue. Noted with decreased dmitriy,decreased step length, no LOB. VC's for safety technique and walker management. VC's for pursed lip breathing technique .    Balance:  good in sitting, fair in standing.       AM-PAC 6 CLICK MOBILITY  Turning over in bed (including adjusting bedclothes, sheets and blankets)?: 4  Sitting down on and standing up from a chair with arms (e.g., wheelchair, bedside commode, etc.): 4  Moving from lying on back to sitting on the side of the bed?: 4  Moving to and from a bed to a chair (including a wheelchair)?: 4  Need to walk in hospital room?: 3  Climbing 3-5 steps with a railing?: 3  Basic Mobility Total Score: 22       Treatment & Education:  Lower Extremity Exercises.    Patient educated on: Purpose and Benefits of Therapeutic Exercise(s).    Patient verbalized acceptance/understanding of instruction(s), expectation(s), and limitation(s) (for safety).  Patient performed : 15 reps  (each) of B LE Ther Ex: AP, LAQ, HCS,  Hip abd/add,  while sitting up on EOB.       Patient required verbal cues/tactile cues to ensure correct sequence, to maintain proper form, and to allow for self-correction.  Pt reported no complaints or problems with exercise activity.   BLE HEP given with instructions and demonstrations (pt  verbalized understanding). Encouraged pt to perform BLE ex's per handout throughout the day.   Educated on pursed lip breathing technique and energy conservation techniques, pt verbalized understanding (handouts provided).   Patient left up in reclined chair with BLE elevated, heels offloading  all  lines intact, call button in reach, and nurse notified..    GOALS:   Multidisciplinary Problems       Physical Therapy Goals          Problem: Physical Therapy    Goal Priority Disciplines Outcome Goal Variances Interventions   Physical Therapy Goal     PT, PT/OT Ongoing, Progressing     Description: Goals to be met by: 23     Patient will increase functional independence with mobility by performin. Pt to be mod I with bed mobility.  2. Pt to transfer with (S).  3. Pt to ambulate 50' w/RW SBA.  4. Pt to be (I) with written HEP.                         Time Tracking:     PT Received On: 23  PT Start Time: 1027     PT Stop Time: 1105  PT Total Time (min): 38 min     Billable Minutes: Gait Training 14, Therapeutic Activity 12, and Therapeutic Exercise 12    Treatment Type: Treatment  PT/PTA: PTA     Number of PTA visits since last PT visit: 2     2023

## 2023-11-21 NOTE — CONSULTS
2022:    Consult was conducted with the patient's medical provider, who reported that the patient is doing better. In addition, she stated that the patient did receive a female visitor today. The Medical Provider further stated that she did not know the relationship the visitor shared with the patient.  Due to the isolation restrictions, I requested the medical provider to inform the patient that prayers were offered on her behalf.  Prayers were offered for the patient.  The Spiritual Care Team will continue to provide spiritual support to the patient and her family,        Edwieg Sharma. [place]OR[place]   [phone]

## 2023-11-21 NOTE — ASSESSMENT & PLAN NOTE
Patient with Hypercapnic and Hypoxic Respiratory failure which is Acute on chronic.  she is not on home oxygen. Supplemental oxygen was provided and noted- Oxygen Concentration (%):  [40] 40    .   Signs/symptoms of respiratory failure include- tachypnea, increased work of breathing, and lethargy. Contributing diagnoses includes - COPD and Obesity Hypoventilation Labs and images were reviewed. Patient Has recent ABG, which has been reviewed. Will treat underlying causes and adjust management of respiratory failure as follows-   ER show hypercapnic,hypoxic  respiratory failure with PH of 7.2 and P C O2 of 72,PO 2 of 76,chest x ray show fluid overload,BNP is over 478,patient has been  started on continues Bipap and IV lasix,,admitted to ICU for close monitoring.    -presented w/acute respiratory failure with hypoxia and hypercapnic (O2 sts 88% on RA,  PCO2 of 72)  -Due to heart failure exacerbation, COPD exacerbation, and positive for RSV  -Required BIPAP, and was admitted to ICU  -Was on precedex drip for agitation  -VBG  show improvement in CO2 retention after BIPAP  -consulted pulmonology: suspect chronic hypercapnic respiratory failure but not using nightly NIV. Recommend nightly BIPAP while inpatient   -Wean O2 as tolerated  -continue on droplet precaution due to +RSV  -completed treatment course for COPD exacerbation with duonebs and prednisone

## 2023-11-21 NOTE — ASSESSMENT & PLAN NOTE
Patient's COPD is with exacerbation noted by continued dyspnea and use of accessory muscles for breathing currently.  Patient is currently off COPD Pathway. Continue scheduled inhalers Steroids and Supplemental oxygen and monitor respiratory status closely.       -pt not on home oxygen  -completed prednisone course   -continue duonebs as needed  -Wean O2 as tolerated

## 2023-11-22 PROBLEM — B33.8 RSV INFECTION: Status: ACTIVE | Noted: 2023-11-22

## 2023-11-22 LAB
ANION GAP SERPL CALC-SCNC: 7 MMOL/L (ref 8–16)
BASOPHILS # BLD AUTO: 0.01 K/UL (ref 0–0.2)
BASOPHILS NFR BLD: 0.1 % (ref 0–1.9)
BUN SERPL-MCNC: 57 MG/DL (ref 8–23)
CALCIUM SERPL-MCNC: 9.2 MG/DL (ref 8.7–10.5)
CHLORIDE SERPL-SCNC: 96 MMOL/L (ref 95–110)
CO2 SERPL-SCNC: 40 MMOL/L (ref 23–29)
CREAT SERPL-MCNC: 1.3 MG/DL (ref 0.5–1.4)
DIFFERENTIAL METHOD: ABNORMAL
EOSINOPHIL # BLD AUTO: 0 K/UL (ref 0–0.5)
EOSINOPHIL NFR BLD: 0 % (ref 0–8)
ERYTHROCYTE [DISTWIDTH] IN BLOOD BY AUTOMATED COUNT: 13.6 % (ref 11.5–14.5)
EST. GFR  (NO RACE VARIABLE): 45 ML/MIN/1.73 M^2
GLUCOSE SERPL-MCNC: 143 MG/DL (ref 70–110)
HCT VFR BLD AUTO: 49.2 % (ref 37–48.5)
HGB BLD-MCNC: 15.5 G/DL (ref 12–16)
IMM GRANULOCYTES # BLD AUTO: 0.1 K/UL (ref 0–0.04)
IMM GRANULOCYTES NFR BLD AUTO: 0.8 % (ref 0–0.5)
LYMPHOCYTES # BLD AUTO: 1.2 K/UL (ref 1–4.8)
LYMPHOCYTES NFR BLD: 9.9 % (ref 18–48)
MCH RBC QN AUTO: 30.9 PG (ref 27–31)
MCHC RBC AUTO-ENTMCNC: 31.5 G/DL (ref 32–36)
MCV RBC AUTO: 98 FL (ref 82–98)
MONOCYTES # BLD AUTO: 1.2 K/UL (ref 0.3–1)
MONOCYTES NFR BLD: 10.1 % (ref 4–15)
NEUTROPHILS # BLD AUTO: 9.5 K/UL (ref 1.8–7.7)
NEUTROPHILS NFR BLD: 79.1 % (ref 38–73)
NRBC BLD-RTO: 0 /100 WBC
PLATELET # BLD AUTO: 266 K/UL (ref 150–450)
PMV BLD AUTO: 10.6 FL (ref 9.2–12.9)
POTASSIUM SERPL-SCNC: 3.5 MMOL/L (ref 3.5–5.1)
RBC # BLD AUTO: 5.02 M/UL (ref 4–5.4)
SODIUM SERPL-SCNC: 143 MMOL/L (ref 136–145)
WBC # BLD AUTO: 12.07 K/UL (ref 3.9–12.7)

## 2023-11-22 PROCEDURE — 80048 BASIC METABOLIC PNL TOTAL CA: CPT | Performed by: STUDENT IN AN ORGANIZED HEALTH CARE EDUCATION/TRAINING PROGRAM

## 2023-11-22 PROCEDURE — 97110 THERAPEUTIC EXERCISES: CPT | Mod: CQ

## 2023-11-22 PROCEDURE — 36415 COLL VENOUS BLD VENIPUNCTURE: CPT | Performed by: STUDENT IN AN ORGANIZED HEALTH CARE EDUCATION/TRAINING PROGRAM

## 2023-11-22 PROCEDURE — 94640 AIRWAY INHALATION TREATMENT: CPT

## 2023-11-22 PROCEDURE — 94761 N-INVAS EAR/PLS OXIMETRY MLT: CPT

## 2023-11-22 PROCEDURE — 25000003 PHARM REV CODE 250: Performed by: HOSPITALIST

## 2023-11-22 PROCEDURE — 97116 GAIT TRAINING THERAPY: CPT | Mod: CQ

## 2023-11-22 PROCEDURE — 27000207 HC ISOLATION

## 2023-11-22 PROCEDURE — 85025 COMPLETE CBC W/AUTO DIFF WBC: CPT | Performed by: STUDENT IN AN ORGANIZED HEALTH CARE EDUCATION/TRAINING PROGRAM

## 2023-11-22 PROCEDURE — 21400001 HC TELEMETRY ROOM

## 2023-11-22 PROCEDURE — 25000242 PHARM REV CODE 250 ALT 637 W/ HCPCS: Performed by: HOSPITALIST

## 2023-11-22 PROCEDURE — 94660 CPAP INITIATION&MGMT: CPT

## 2023-11-22 PROCEDURE — 27000221 HC OXYGEN, UP TO 24 HOURS

## 2023-11-22 PROCEDURE — 99900035 HC TECH TIME PER 15 MIN (STAT)

## 2023-11-22 PROCEDURE — 63600175 PHARM REV CODE 636 W HCPCS: Performed by: STUDENT IN AN ORGANIZED HEALTH CARE EDUCATION/TRAINING PROGRAM

## 2023-11-22 RX ORDER — PREDNISONE 20 MG/1
40 TABLET ORAL DAILY
Status: DISCONTINUED | OUTPATIENT
Start: 2023-11-22 | End: 2023-11-25 | Stop reason: HOSPADM

## 2023-11-22 RX ADMIN — PREDNISONE 40 MG: 20 TABLET ORAL at 11:11

## 2023-11-22 RX ADMIN — RIVAROXABAN 20 MG: 20 TABLET, FILM COATED ORAL at 05:11

## 2023-11-22 RX ADMIN — IPRATROPIUM BROMIDE AND ALBUTEROL SULFATE 3 ML: 2.5; .5 SOLUTION RESPIRATORY (INHALATION) at 08:11

## 2023-11-22 RX ADMIN — SODIUM CHLORIDE SOLN NEBU 3% 4 ML: 3 NEBU SOLN at 08:11

## 2023-11-22 RX ADMIN — IPRATROPIUM BROMIDE AND ALBUTEROL SULFATE 3 ML: 2.5; .5 SOLUTION RESPIRATORY (INHALATION) at 04:11

## 2023-11-22 RX ADMIN — SODIUM CHLORIDE SOLN NEBU 3% 4 ML: 3 NEBU SOLN at 11:11

## 2023-11-22 RX ADMIN — QUETIAPINE FUMARATE 25 MG: 25 TABLET ORAL at 08:11

## 2023-11-22 RX ADMIN — FUROSEMIDE 40 MG: 40 TABLET ORAL at 09:11

## 2023-11-22 RX ADMIN — IPRATROPIUM BROMIDE AND ALBUTEROL SULFATE 3 ML: 2.5; .5 SOLUTION RESPIRATORY (INHALATION) at 11:11

## 2023-11-22 RX ADMIN — SERTRALINE HYDROCHLORIDE 100 MG: 50 TABLET ORAL at 09:11

## 2023-11-22 RX ADMIN — FAMOTIDINE 20 MG: 20 TABLET ORAL at 08:11

## 2023-11-22 RX ADMIN — IPRATROPIUM BROMIDE AND ALBUTEROL SULFATE 3 ML: 2.5; .5 SOLUTION RESPIRATORY (INHALATION) at 03:11

## 2023-11-22 RX ADMIN — SODIUM CHLORIDE SOLN NEBU 3% 4 ML: 3 NEBU SOLN at 04:11

## 2023-11-22 RX ADMIN — FUROSEMIDE 40 MG: 40 TABLET ORAL at 05:11

## 2023-11-22 RX ADMIN — GUAIFENESIN AND DEXTROMETHORPHAN 10 ML: 100; 10 SYRUP ORAL at 05:11

## 2023-11-22 RX ADMIN — BENZONATATE 100 MG: 100 CAPSULE ORAL at 08:11

## 2023-11-22 RX ADMIN — GUAIFENESIN AND DEXTROMETHORPHAN HYDROBROMIDE 1 TABLET: 600; 30 TABLET, EXTENDED RELEASE ORAL at 09:11

## 2023-11-22 RX ADMIN — ATORVASTATIN CALCIUM 40 MG: 40 TABLET, FILM COATED ORAL at 09:11

## 2023-11-22 RX ADMIN — IPRATROPIUM BROMIDE AND ALBUTEROL SULFATE 3 ML: 2.5; .5 SOLUTION RESPIRATORY (INHALATION) at 07:11

## 2023-11-22 RX ADMIN — FAMOTIDINE 20 MG: 20 TABLET ORAL at 09:11

## 2023-11-22 RX ADMIN — GUAIFENESIN AND DEXTROMETHORPHAN HYDROBROMIDE 1 TABLET: 600; 30 TABLET, EXTENDED RELEASE ORAL at 08:11

## 2023-11-22 RX ADMIN — METOPROLOL SUCCINATE 100 MG: 50 TABLET, EXTENDED RELEASE ORAL at 09:11

## 2023-11-22 NOTE — ASSESSMENT & PLAN NOTE
Patient with Persistent (7 days or more) atrial fibrillation which is controlled currently with Beta Blocker. Patient is currently in atrial fibrillation.[id number] Score: 4. HASBLED Score: 3. Anticoagulation indicated. Anticoagulation done with Xalreto .    -continue home Xarelto and metoprolol succinate   -continue to monitor on telemetry

## 2023-11-22 NOTE — PLAN OF CARE
Problem: Physical Therapy  Goal: Physical Therapy Goal  Description: Goals to be met by: 23     Patient will increase functional independence with mobility by performin. Pt to be mod I with bed mobility.  2. Pt to transfer with (S).  3. Pt to ambulate 50' w/RW SBA.  4. Pt to be (I) with written HEP.    Outcome: Ongoing, Progressing       Pt ambulated ~55ft using RW and SBA with no seated rest breaks or LOB.

## 2023-11-22 NOTE — SUBJECTIVE & OBJECTIVE
Interval History:  No acute overnight events.  Patient remained afebrile.  Feeling better today. Shortness of breath improving.  Been using the IS. Tolerating diet without any difficulty.  Ate 100% of her breakfast this morning.  Denies any wheezing    Review of Systems   Constitutional:  Positive for activity change, appetite change (improving) and fatigue. Negative for chills and fever.   Respiratory:  Positive for shortness of breath (improving).    Cardiovascular:  Negative for chest pain and leg swelling.   Gastrointestinal:  Negative for abdominal pain, nausea and vomiting.   Skin:  Negative for color change.   Neurological:  Positive for weakness. Negative for dizziness and headaches.   Psychiatric/Behavioral:  Negative for agitation and behavioral problems.      Objective:     Vital Signs (Most Recent):  Temp: 99 °F (37.2 °C) (11/22/23 1352)  Pulse: 96 (11/22/23 1352)  Resp: 17 (11/22/23 1352)  BP: (!) 169/78 (11/22/23 1352)  SpO2: (!) 91 % (11/22/23 1352) Vital Signs (24h Range):  Temp:  [96.2 °F (35.7 °C)-99 °F (37.2 °C)] 99 °F (37.2 °C)  Pulse:  [59-98] 96  Resp:  [17-19] 17  SpO2:  [88 %-99 %] 91 %  BP: (141-172)/() 169/78     Weight: 120 kg (264 lb 8.8 oz)  Body mass index is 46.86 kg/m².    Intake/Output Summary (Last 24 hours) at 11/22/2023 1613  Last data filed at 11/22/2023 0751  Gross per 24 hour   Intake 900 ml   Output 1550 ml   Net -650 ml           Physical Exam  Vitals and nursing note reviewed.   Constitutional:       General: She is not in acute distress.     Appearance: She is morbidly obese. She is ill-appearing.   HENT:      Mouth/Throat:      Mouth: Mucous membranes are moist.   Eyes:      Extraocular Movements: Extraocular movements intact.   Cardiovascular:      Rate and Rhythm: Normal rate. Rhythm irregular.   Pulmonary:      Effort: Pulmonary effort is normal. No respiratory distress.      Breath sounds: Rhonchi present.      Comments: On RA this AM with no wheezing but later  desatted with Walk test and was wheezy on RT treatment  Abdominal:      Palpations: Abdomen is soft.   Musculoskeletal:      Right lower leg: Edema present.      Left lower leg: Edema present.      Comments: +1 pitting edema in bilateral lower extremity, no calf tenderness   Skin:     General: Skin is warm and dry.   Neurological:      General: No focal deficit present.      Mental Status: She is alert and oriented to person, place, and time.   Psychiatric:         Mood and Affect: Mood normal.             Significant Labs: All pertinent labs within the past 24 hours have been reviewed.    Significant Imaging: I have reviewed all pertinent imaging results/findings within the past 24 hours.

## 2023-11-22 NOTE — PLAN OF CARE
Pt is AAOx4. 1L NC. Tele maintained.  No falls or injuries reported during shift, safety precautions maintained.    Problem: Adult Inpatient Plan of Care  Goal: Plan of Care Review  Outcome: Ongoing, Progressing     Problem: Adult Inpatient Plan of Care  Goal: Patient-Specific Goal (Individualized)  Outcome: Ongoing, Progressing

## 2023-11-22 NOTE — PT/OT/SLP PROGRESS
Physical Therapy Treatment    Patient Name:  Sandee Evans   MRN:  [id number]    Recommendations:     Discharge Recommendations: Low Intensity Therapy  Discharge Equipment Recommendations: none  Barriers to discharge: None    Assessment:     Sandee Evans is a 67 y.o. female admitted with a medical diagnosis of Acute respiratory failure with hypoxia and hypercapnia.  She presents with the following impairments/functional limitations: weakness, impaired endurance, impaired self care skills, impaired functional mobility, gait instability, decreased lower extremity function, decreased upper extremity function, impaired balance, decreased safety awareness, impaired cardiopulmonary response to activity.    Pt ambulated ~55ft using RW and SBA with no seated rest breaks or LOB.    Rehab Prognosis: Good; patient would benefit from acute skilled PT services to address these deficits and reach maximum level of function.    Recent Surgery: * No surgery found *      Plan:     During this hospitalization, patient to be seen 3 x/week to address the identified rehab impairments via gait training, therapeutic activities, therapeutic exercises and progress toward the following goals:    Plan of Care Expires:  11/23/23    Subjective     Chief Complaint: Headache from coughing  Patient/Family Comments/goals: Pt agreed to therapy session  Pain/Comfort:  Pain Rating 1: 0/10      Objective:   Patient found HOB elevated with PureWick, peripheral IV, oxygen, telemetry upon PT entry to room.     General Precautions: Standard, fall, respiratory  Orthopedic Precautions: N/A  Braces: N/A  Respiratory Status: Nasal cannula, flow 1 L/min     Functional Mobility:  Bed Mobility:     Rolling Left:  supervision  Scooting: stand by assistance to scoot anteriorly towards EOB  Supine to Sit: stand by assistance  Transfers: Gait belt donned  Sit to Stand:  stand by assistance with rolling walker  Gait: Pt ambulated ~55ft using RW and SBA. Pt demo  decreased dmitriy, step length and weight shifting. Pt required verbal cueing when making turns for RW management and safety awareness. Pt declined further ambulation 2* fatigue.  Balance: good in sitting, fair in standing       AM-PAC 6 CLICK MOBILITY  Turning over in bed (including adjusting bedclothes, sheets and blankets)?: 4  Sitting down on and standing up from a chair with arms (e.g., wheelchair, bedside commode, etc.): 4  Moving from lying on back to sitting on the side of the bed?: 3  Moving to and from a bed to a chair (including a wheelchair)?: 3  Need to walk in hospital room?: 3  Climbing 3-5 steps with a railing?: 2  Basic Mobility Total Score: 19       Treatment & Education:  Pt instructed to perform seated LAQ, HR, hip flexion, and hip abd/add 2x10 BLE. Pt required extended seated rest breaks to perform purse lip breathing technique.    Patient left with bed in chair position with all lines intact, tray with lunch in front of patient, call button in reach, and Keli notified..    GOALS:   Multidisciplinary Problems       Physical Therapy Goals          Problem: Physical Therapy    Goal Priority Disciplines Outcome Goal Variances Interventions   Physical Therapy Goal     PT, PT/OT Ongoing, Progressing     Description: Goals to be met by: 23     Patient will increase functional independence with mobility by performin. Pt to be mod I with bed mobility.  2. Pt to transfer with (S).  3. Pt to ambulate 50' w/RW SBA.  4. Pt to be (I) with written HEP.                         Time Tracking:     PT Received On: 23  PT Start Time: 1250     PT Stop Time: 1315  PT Total Time (min): 25 min     Billable Minutes: Gait Training 15 and Therapeutic Exercise 10    Treatment Type: Treatment  PT/PTA: PTA     Number of PTA visits since last PT visit: 3     2023

## 2023-11-22 NOTE — ASSESSMENT & PLAN NOTE
Creatine stable for now. BMP reviewed- noted Estimated Creatinine Clearance: 51.2 mL/min (based on SCr of 1.3 mg/dL). according to latest data. Based on current GFR, CKD stage is stage 3 - GFR 30-59.  Monitor UOP and serial BMP and adjust therapy as needed. Renally dose meds. Avoid nephrotoxic medications and procedures.

## 2023-11-22 NOTE — PROGRESS NOTES
"[facility] Medicine  Progress Note    Patient Name: Sandee Evans  MRN: [id number]  Patient Class: IP- Inpatient   Admission Date: 11/13/2023  Length of Stay: 9 days  Attending Physician: Leroy Hua DO  Primary Care Provider: Kuldeep Miller MD        Subjective:     Principal Problem:Acute respiratory failure with hypoxia and hypercapnia        HPI:   67 y.o. female, with a past medical history of A-fib on OAC,diastolic  CHF, COPD, HLD, HTN, hypothyroidism, morbid obesity, pre-diabetes, and psychiatric problems, who presents to the ED with SOB onset 3 weeks ago. She reports SOB has gotten progressively worse over 3 weeks. Patient notes she has trouble ambulating d/t SOB. Patient notes being on an at-home BiPAP but denies daily use d/t the machine "smothering" her. Additional history provided by independent historian, relative, notes an O2 saturation in the 80's at-home PTA. Patient denies O2 at home. Patient notes an associated cough, congestion, diarrhea, rhinorrhea, sore throat, and headache.ABG in ER show hypercapnic,hypoxic  respiratory failure with PH of 7.2 and P C O2 of 72,PO 2 of 76,chest x ray show fluid overload,BNP is over 478,patient has been  started on continues Bipap and IV lasix,,admitted to ICU for close monitoring.    Overview/Hospital Course:  67 y.o. female, with a past medical history of A-fib on OAC,diastolic  CHF, COPD, HLD, HTN, hypothyroidism, morbid obesity admitted on 11/22/2023 for acute respiratory failure with hypoxia and hypercapnic (O2 sts 88% on RA,  PCO2 of 72) due to +RSV, COPD exacerbation, and acute on chronic diastolic heart failure. admitted to ICU on BIPAP. BNP > 400.  Chest imaging with bilateral effusions and peripheral edema. Hospital course complicated by agitation, required Precedex drip briefly.  Able to wean off Precedex drip. Pulmonology consulted-suspect chronic hypercapnic respiratory failure but not using nightly NIV.  RSV positive " and on droplet precaution.  Patient completed treatment with DuoNebs and prednisone on 11/17/2023.  Continue IV diuresis and wean O2 as tolerated.    Interval History:  No acute overnight events.  Patient remained afebrile.  Feeling better today. Shortness of breath improving.  Been using the IS. Tolerating diet without any difficulty.  Ate 100% of her breakfast this morning.  Denies any wheezing    Review of Systems   Constitutional:  Positive for activity change, appetite change (improving) and fatigue. Negative for chills and fever.   Respiratory:  Positive for shortness of breath (improving).    Cardiovascular:  Negative for chest pain and leg swelling.   Gastrointestinal:  Negative for abdominal pain, nausea and vomiting.   Skin:  Negative for color change.   Neurological:  Positive for weakness. Negative for dizziness and headaches.   Psychiatric/Behavioral:  Negative for agitation and behavioral problems.      Objective:     Vital Signs (Most Recent):  Temp: 99 °F (37.2 °C) (11/22/23 1352)  Pulse: 96 (11/22/23 1352)  Resp: 17 (11/22/23 1352)  BP: (!) 169/78 (11/22/23 1352)  SpO2: (!) 91 % (11/22/23 1352) Vital Signs (24h Range):  Temp:  [96.2 °F (35.7 °C)-99 °F (37.2 °C)] 99 °F (37.2 °C)  Pulse:  [59-98] 96  Resp:  [17-19] 17  SpO2:  [88 %-99 %] 91 %  BP: (141-172)/() 169/78     Weight: 120 kg (264 lb 8.8 oz)  Body mass index is 46.86 kg/m².    Intake/Output Summary (Last 24 hours) at 11/22/2023 1613  Last data filed at 11/22/2023 0751  Gross per 24 hour   Intake 900 ml   Output 1550 ml   Net -650 ml           Physical Exam  Vitals and nursing note reviewed.   Constitutional:       General: She is not in acute distress.     Appearance: She is morbidly obese. She is ill-appearing.   HENT:      Mouth/Throat:      Mouth: Mucous membranes are moist.   Eyes:      Extraocular Movements: Extraocular movements intact.   Cardiovascular:      Rate and Rhythm: Normal rate. Rhythm irregular.   Pulmonary:       Effort: Pulmonary effort is normal. No respiratory distress.      Breath sounds: Rhonchi present.      Comments: On RA this AM with no wheezing but later desatted with Walk test and was wheezy on RT treatment  Abdominal:      Palpations: Abdomen is soft.   Musculoskeletal:      Right lower leg: Edema present.      Left lower leg: Edema present.      Comments: +1 pitting edema in bilateral lower extremity, no calf tenderness   Skin:     General: Skin is warm and dry.   Neurological:      General: No focal deficit present.      Mental Status: She is alert and oriented to person, place, and time.   Psychiatric:         Mood and Affect: Mood normal.             Significant Labs: All pertinent labs within the past 24 hours have been reviewed.    Significant Imaging: I have reviewed all pertinent imaging results/findings within the past 24 hours.    Assessment/Plan:      * Acute respiratory failure with hypoxia and hypercapnia  Patient with Hypercapnic and Hypoxic Respiratory failure which is Acute on chronic.  she is not on home oxygen. Supplemental oxygen was provided and noted- Oxygen Concentration (%):  [40] 40    .   Signs/symptoms of respiratory failure include- tachypnea, increased work of breathing, and lethargy. Contributing diagnoses includes - COPD and Obesity Hypoventilation Labs and images were reviewed. Patient Has recent ABG, which has been reviewed. Will treat underlying causes and adjust management of respiratory failure as follows-   ER show hypercapnic,hypoxic  respiratory failure with PH of 7.2 and P C O2 of 72,PO 2 of 76,chest x ray show fluid overload,BNP is over 478,patient has been  started on continues Bipap and IV lasix,,admitted to ICU for close monitoring.    -presented w/acute respiratory failure with hypoxia and hypercapnic (O2 sts 88% on RA,  PCO2 of 72)  -Due to heart failure exacerbation, COPD exacerbation, and positive for RSV  -Required BIPAP, and was admitted to ICU  -Was on precedex  "drip for agitation  -VBG  show improvement in CO2 retention after BIPAP  -consulted pulmonology: suspect chronic hypercapnic respiratory failure but not using nightly NIV. Recommend nightly BIPAP while inpatient   -Wean O2 as tolerated  -continue on droplet precaution due to +RSV  -completed treatment course for COPD exacerbation with duonebs and prednisone     Acute on chronic diastolic CHF (congestive heart failure)  Patient is identified as having Diastolic (HFpEF) heart failure that is Acute on chronic. CHF is currently uncontrolled due to Dyspnea not returned to baseline after 1 doses of IV diuretic. Latest ECHO performed and demonstrates- Results for orders placed during the hospital encounter of 05/20/22    Echo    Interpretation Summary  · The left ventricle is normal in size with normal systolic function.  · The estimated ejection fraction is 60%.  · Normal right ventricular size with normal right ventricular systolic function.  · The estimated PA systolic pressure is 33 mmHg.  · Atrial fibrillation observed.  . Continue Beta Blocker and Furosemide and monitor clinical status closely. Monitor on telemetry. Patient is off CHF pathway.  Monitor strict Is&Os and daily weights.  Place on fluid restriction of 1.5 L. Cardiology has not been any consulted. Continue to stress to patient importance of self efficacy and  on diet for CHF. Last BNP reviewed- and noted below   No results for input(s): "BNP", "BNPTRIAGEBLO" in the last 168 hours.  .    Chronic obstructive pulmonary disease with acute exacerbation  Patient's COPD is with exacerbation noted by continued dyspnea and use of accessory muscles for breathing currently.  Patient is currently off COPD Pathway. Continue scheduled inhalers Steroids and Supplemental oxygen and monitor respiratory status closely.       -pt not on home oxygen  -completed prednisone course but resumed on 11/22 give wheezing and hypoxia  -Plan for gradual taper   -continue " duonebs as needed  -Wean O2 as tolerated    Pulmonary hypertension  -Continue IV diuresis   -Mild COPD on PFTs.  Likely Group II from chronic diastolic heart failure    HIEU treated with BiPAP  Non complaint with home Bipap.s  he was consulted on this.  She did better last night with Bipap.      Essential hypertension  Chronic, controlled.   Continue home metoprolol    Latest blood pressure and vitals reviewed-     Temp:  [96.2 °F (35.7 °C)-99 °F (37.2 °C)]   Pulse:  [59-98]   Resp:  [17-19]   BP: (141-172)/()   SpO2:  [88 %-99 %] .   Home meds for hypertension were reviewed and noted below.   Hypertension Medications               furosemide (LASIX) 40 MG tablet Take 1 tablet (40 mg total) by mouth once daily.    losartan (COZAAR) 25 MG tablet TAKE 1 TABLET(25 MG) BY MOUTH EVERY DAY    metoprolol succinate (TOPROL-XL) 100 MG 24 hr tablet Take 1 tablet (100 mg total) by mouth once daily.            While in the hospital, will manage blood pressure as follows; Continue home antihypertensive regimen    Will utilize p.r.n. blood pressure medication only if patient's blood pressure greater than 160/100 and she develops symptoms such as worsening chest pain or shortness of breath.    Paroxysmal atrial fibrillation  Patient with Persistent (7 days or more) atrial fibrillation which is controlled currently with Beta Blocker. Patient is currently in atrial fibrillation.[id number] Score: 4. HASBLED Score: 3. Anticoagulation indicated. Anticoagulation done with Xalreto .    -continue home Xarelto and metoprolol succinate   -continue to monitor on telemetry    Chronic kidney disease, stage 3a  Creatine stable for now. BMP reviewed- noted Estimated Creatinine Clearance: 51.2 mL/min (based on SCr of 1.3 mg/dL). according to latest data. Based on current GFR, CKD stage is stage 3 - GFR 30-59.  Monitor UOP and serial BMP and adjust therapy as needed. Renally dose meds. Avoid nephrotoxic medications and procedures.    PVD  (peripheral vascular disease)  On medical treatments.      Pre-diabetes  Will monitor.  A1C 5.8 on 10/27/23    Severe obesity (BMI >= 40)  Body mass index is 46.86 kg/m². Morbid obesity complicates all aspects of disease management from diagnostic modalities to treatment. Weight loss encouraged and health benefits explained to patient.         Goals of care, counseling/discussion  Advance Care Planning    Code Status  I engaged the the patient in a voluntary conversation about the patient's preferences for care  at the very end of life. The patient wishes to have CPR and other invasive treatments performed when her heart and/or breathing stops. I communicated to the patient that her wishes align with full code status. She agrees. I spent a total of 16 minutes engaging the patient in this advance care planning discussion.             VTE Risk Mitigation (From admission, onward)           Ordered     rivaroxaban tablet 20 mg  With dinner         11/13/23 1633                    Discharge Planning   JACQUELINE: 11/20/2023     Code Status: Full Code   Is the patient medically ready for discharge?:     Reason for patient still in hospital (select all that apply): Patient trending condition, Treatment, and PT / OT recommendations  Discharge Plan A: Home Health   Discharge Delays: None known at this time              Leroy Hua DO  Department of Hospital Medicine   [facility]

## 2023-11-22 NOTE — CONSULTS
Food & Nutrition  Education    Diet Education: Low Salt Diet - Fluid Restriction    Learners:       Nutrition Education provided with handouts: Low Salt/ Fluid Restrictive Diet      Comments: RD consult for education fluid restriction low salt diet. Pt on isolation precuations. Educations attached to pt chart for discharge.        Thanks!     Elaine Cruz, Registration Eligible, Provisional LDN

## 2023-11-22 NOTE — ASSESSMENT & PLAN NOTE
Body mass index is 46.86 kg/m². Morbid obesity complicates all aspects of disease management from diagnostic modalities to treatment. Weight loss encouraged and health benefits explained to patient.

## 2023-11-22 NOTE — ASSESSMENT & PLAN NOTE
Patient's COPD is with exacerbation noted by continued dyspnea and use of accessory muscles for breathing currently.  Patient is currently off COPD Pathway. Continue scheduled inhalers Steroids and Supplemental oxygen and monitor respiratory status closely.       -pt not on home oxygen  -completed prednisone course but resumed on 11/22 give wheezing and hypoxia  -Plan for gradual taper   -continue duonebs as needed  -Wean O2 as tolerated

## 2023-11-22 NOTE — ASSESSMENT & PLAN NOTE
Chronic, controlled.   Continue home metoprolol    Latest blood pressure and vitals reviewed-     Temp:  [96.2 °F (35.7 °C)-99 °F (37.2 °C)]   Pulse:  [59-98]   Resp:  [17-19]   BP: (141-172)/()   SpO2:  [88 %-99 %] .   Home meds for hypertension were reviewed and noted below.   Hypertension Medications               furosemide (LASIX) 40 MG tablet Take 1 tablet (40 mg total) by mouth once daily.    losartan (COZAAR) 25 MG tablet TAKE 1 TABLET(25 MG) BY MOUTH EVERY DAY    metoprolol succinate (TOPROL-XL) 100 MG 24 hr tablet Take 1 tablet (100 mg total) by mouth once daily.            While in the hospital, will manage blood pressure as follows; Continue home antihypertensive regimen    Will utilize p.r.n. blood pressure medication only if patient's blood pressure greater than 160/100 and she develops symptoms such as worsening chest pain or shortness of breath.

## 2023-11-22 NOTE — NURSING
Testing for Home O2    O2 Sats on RA at rest= 92%    O2 sats on RA with exercise= 86%    O2 sats on 2L O2 exercise = 97%

## 2023-11-22 NOTE — NURSING
[facility] - [place]  Nurses Note -- 4 Eyes      11/21/2023       Skin assessed on: Q Shift      [x] No Pressure Injuries Present    [x]Prevention Measures Documented    [] Yes LDA  for Pressure Injury Previously documented     [] Yes New Pressure Injury Discovered   [] LDA for New Pressure Injury Added      Attending RN:  VALENTINA STONE RN     Second RN:  Lorraine TAYLOR

## 2023-11-23 PROCEDURE — 25000003 PHARM REV CODE 250: Performed by: HOSPITALIST

## 2023-11-23 PROCEDURE — 27000207 HC ISOLATION

## 2023-11-23 PROCEDURE — 94640 AIRWAY INHALATION TREATMENT: CPT

## 2023-11-23 PROCEDURE — 27000221 HC OXYGEN, UP TO 24 HOURS

## 2023-11-23 PROCEDURE — 25000003 PHARM REV CODE 250: Performed by: STUDENT IN AN ORGANIZED HEALTH CARE EDUCATION/TRAINING PROGRAM

## 2023-11-23 PROCEDURE — 36406 VNPNXR<3YRS PHY/QHP OTHER VN: CPT

## 2023-11-23 PROCEDURE — 63600175 PHARM REV CODE 636 W HCPCS: Performed by: STUDENT IN AN ORGANIZED HEALTH CARE EDUCATION/TRAINING PROGRAM

## 2023-11-23 PROCEDURE — 25000242 PHARM REV CODE 250 ALT 637 W/ HCPCS: Performed by: HOSPITALIST

## 2023-11-23 PROCEDURE — 94761 N-INVAS EAR/PLS OXIMETRY MLT: CPT

## 2023-11-23 PROCEDURE — 21400001 HC TELEMETRY ROOM

## 2023-11-23 PROCEDURE — 99900035 HC TECH TIME PER 15 MIN (STAT)

## 2023-11-23 PROCEDURE — 25000242 PHARM REV CODE 250 ALT 637 W/ HCPCS: Performed by: STUDENT IN AN ORGANIZED HEALTH CARE EDUCATION/TRAINING PROGRAM

## 2023-11-23 RX ORDER — ACETAMINOPHEN 325 MG/1
650 TABLET ORAL EVERY 6 HOURS PRN
Status: DISCONTINUED | OUTPATIENT
Start: 2023-11-23 | End: 2023-11-25 | Stop reason: HOSPADM

## 2023-11-23 RX ORDER — FLUTICASONE PROPIONATE 50 MCG
2 SPRAY, SUSPENSION (ML) NASAL DAILY
Status: DISCONTINUED | OUTPATIENT
Start: 2023-11-23 | End: 2023-11-25 | Stop reason: HOSPADM

## 2023-11-23 RX ORDER — BUDESONIDE 0.5 MG/2ML
0.5 INHALANT ORAL EVERY 12 HOURS
Status: DISCONTINUED | OUTPATIENT
Start: 2023-11-23 | End: 2023-11-23

## 2023-11-23 RX ADMIN — IPRATROPIUM BROMIDE AND ALBUTEROL SULFATE 3 ML: 2.5; .5 SOLUTION RESPIRATORY (INHALATION) at 04:11

## 2023-11-23 RX ADMIN — FLUTICASONE PROPIONATE 100 MCG: 50 SPRAY, METERED NASAL at 09:11

## 2023-11-23 RX ADMIN — GUAIFENESIN AND DEXTROMETHORPHAN HYDROBROMIDE 1 TABLET: 600; 30 TABLET, EXTENDED RELEASE ORAL at 08:11

## 2023-11-23 RX ADMIN — BUDESONIDE 0.5 MG: 0.5 INHALANT RESPIRATORY (INHALATION) at 08:11

## 2023-11-23 RX ADMIN — ATORVASTATIN CALCIUM 40 MG: 40 TABLET, FILM COATED ORAL at 09:11

## 2023-11-23 RX ADMIN — SODIUM CHLORIDE SOLN NEBU 3% 4 ML: 3 NEBU SOLN at 04:11

## 2023-11-23 RX ADMIN — FAMOTIDINE 20 MG: 20 TABLET ORAL at 08:11

## 2023-11-23 RX ADMIN — SERTRALINE HYDROCHLORIDE 100 MG: 50 TABLET ORAL at 09:11

## 2023-11-23 RX ADMIN — GUAIFENESIN AND DEXTROMETHORPHAN HYDROBROMIDE 1 TABLET: 600; 30 TABLET, EXTENDED RELEASE ORAL at 09:11

## 2023-11-23 RX ADMIN — BUDESONIDE 0.5 MG: 0.5 INHALANT RESPIRATORY (INHALATION) at 01:11

## 2023-11-23 RX ADMIN — BENZONATATE 100 MG: 100 CAPSULE ORAL at 05:11

## 2023-11-23 RX ADMIN — BENZONATATE 100 MG: 100 CAPSULE ORAL at 08:11

## 2023-11-23 RX ADMIN — IPRATROPIUM BROMIDE AND ALBUTEROL SULFATE 3 ML: 2.5; .5 SOLUTION RESPIRATORY (INHALATION) at 08:11

## 2023-11-23 RX ADMIN — SODIUM CHLORIDE SOLN NEBU 3% 4 ML: 3 NEBU SOLN at 08:11

## 2023-11-23 RX ADMIN — IPRATROPIUM BROMIDE AND ALBUTEROL SULFATE 3 ML: 2.5; .5 SOLUTION RESPIRATORY (INHALATION) at 01:11

## 2023-11-23 RX ADMIN — FUROSEMIDE 40 MG: 40 TABLET ORAL at 09:11

## 2023-11-23 RX ADMIN — PREDNISONE 40 MG: 20 TABLET ORAL at 09:11

## 2023-11-23 RX ADMIN — IPRATROPIUM BROMIDE AND ALBUTEROL SULFATE 3 ML: 2.5; .5 SOLUTION RESPIRATORY (INHALATION) at 03:11

## 2023-11-23 RX ADMIN — FUROSEMIDE 40 MG: 40 TABLET ORAL at 05:11

## 2023-11-23 RX ADMIN — QUETIAPINE FUMARATE 25 MG: 25 TABLET ORAL at 08:11

## 2023-11-23 RX ADMIN — BENZONATATE 100 MG: 100 CAPSULE ORAL at 07:11

## 2023-11-23 RX ADMIN — METOPROLOL SUCCINATE 100 MG: 50 TABLET, EXTENDED RELEASE ORAL at 09:11

## 2023-11-23 RX ADMIN — RIVAROXABAN 20 MG: 20 TABLET, FILM COATED ORAL at 05:11

## 2023-11-23 RX ADMIN — FAMOTIDINE 20 MG: 20 TABLET ORAL at 09:11

## 2023-11-23 RX ADMIN — ACETAMINOPHEN 650 MG: 325 TABLET ORAL at 12:11

## 2023-11-23 NOTE — ASSESSMENT & PLAN NOTE
+RSV on admission  Was in contact with her Grandchild, who also tested positive  Continue droplet precautions

## 2023-11-23 NOTE — SUBJECTIVE & OBJECTIVE
Interval History:  No acute overnight events.  Patient remained afebrile.  Feeling better today but feel like her sinuses are acting up. Shortness of breath improving.  Been using the IS. Tolerating diet without any difficulty.  Admits wheezing.     Review of Systems   Constitutional:  Positive for activity change, appetite change (improving) and fatigue. Negative for chills and fever.   Respiratory:  Positive for shortness of breath (improving) and wheezing.    Cardiovascular:  Negative for chest pain and leg swelling.   Gastrointestinal:  Negative for abdominal pain, nausea and vomiting.   Skin:  Negative for color change.   Neurological:  Positive for weakness. Negative for dizziness and headaches.   Psychiatric/Behavioral:  Negative for agitation and behavioral problems.      Objective:     Vital Signs (Most Recent):  Temp: 97.9 °F (36.6 °C) (11/23/23 1121)  Pulse: 89 (11/23/23 1205)  Resp: 18 (11/23/23 1205)  BP: 122/69 (11/23/23 1121)  SpO2: (!) 90 % (11/23/23 1205) Vital Signs (24h Range):  Temp:  [97.3 °F (36.3 °C)-99 °F (37.2 °C)] 97.9 °F (36.6 °C)  Pulse:  [] 89  Resp:  [12-20] 18  SpO2:  [85 %-98 %] 90 %  BP: (122-169)/(69-96) 122/69     Weight: 119.7 kg (264 lb)  Body mass index is 46.77 kg/m².    Intake/Output Summary (Last 24 hours) at 11/23/2023 1249  Last data filed at 11/23/2023 0450  Gross per 24 hour   Intake 775 ml   Output 2600 ml   Net -1825 ml           Physical Exam  Vitals and nursing note reviewed.   Constitutional:       General: She is not in acute distress.     Appearance: She is morbidly obese. She is ill-appearing.   HENT:      Mouth/Throat:      Mouth: Mucous membranes are moist.   Eyes:      Extraocular Movements: Extraocular movements intact.   Cardiovascular:      Rate and Rhythm: Normal rate. Rhythm irregular.   Pulmonary:      Effort: Pulmonary effort is normal. No respiratory distress.      Breath sounds: Wheezing and rhonchi present.      Comments: On RA this AM and  completed walk test without hypoxia. However, pt still wheezing on exam and sounds tight.  Abdominal:      Palpations: Abdomen is soft.   Musculoskeletal:      Right lower leg: Edema present.      Left lower leg: Edema present.      Comments: +1 pitting edema in bilateral lower extremity, no calf tenderness   Skin:     General: Skin is warm and dry.   Neurological:      General: No focal deficit present.      Mental Status: She is alert and oriented to person, place, and time.   Psychiatric:         Mood and Affect: Mood normal.             Significant Labs: All pertinent labs within the past 24 hours have been reviewed.    Significant Imaging: I have reviewed all pertinent imaging results/findings within the past 24 hours.

## 2023-11-23 NOTE — ASSESSMENT & PLAN NOTE
Chronic, controlled.   Continue home metoprolol    Latest blood pressure and vitals reviewed-     Temp:  [97.3 °F (36.3 °C)-99 °F (37.2 °C)]   Pulse:  []   Resp:  [12-20]   BP: (122-169)/(69-96)   SpO2:  [85 %-98 %] .   Home meds for hypertension were reviewed and noted below.   Hypertension Medications               furosemide (LASIX) 40 MG tablet Take 1 tablet (40 mg total) by mouth once daily.    losartan (COZAAR) 25 MG tablet TAKE 1 TABLET(25 MG) BY MOUTH EVERY DAY    metoprolol succinate (TOPROL-XL) 100 MG 24 hr tablet Take 1 tablet (100 mg total) by mouth once daily.            While in the hospital, will manage blood pressure as follows; Continue home antihypertensive regimen    Will utilize p.r.n. blood pressure medication only if patient's blood pressure greater than 160/100 and she develops symptoms such as worsening chest pain or shortness of breath.

## 2023-11-23 NOTE — ASSESSMENT & PLAN NOTE
Body mass index is 46.77 kg/m². Morbid obesity complicates all aspects of disease management from diagnostic modalities to treatment. Weight loss encouraged and health benefits explained to patient.

## 2023-11-23 NOTE — NURSING
[facility] - [place]  Nurses Note -- 4 Eyes      11/22/2023       Skin assessed on: Q Shift      [x] No Pressure Injuries Present    [x]Prevention Measures Documented    [] Yes LDA  for Pressure Injury Previously documented     [] Yes New Pressure Injury Discovered   [] LDA for New Pressure Injury Added      Attending RN:  VALENTINA STONE, RN     Second RN:  Keli MADERA

## 2023-11-23 NOTE — ASSESSMENT & PLAN NOTE
Patient with Hypercapnic and Hypoxic Respiratory failure which is Acute on chronic.  she is not on home oxygen. Supplemental oxygen was provided and noted- Oxygen Concentration (%):  [40] 40    .   Signs/symptoms of respiratory failure include- tachypnea, increased work of breathing, and lethargy. Contributing diagnoses includes - COPD and Obesity Hypoventilation Labs and images were reviewed. Patient Has recent ABG, which has been reviewed. Will treat underlying causes and adjust management of respiratory failure as follows-   ER show hypercapnic,hypoxic  respiratory failure with PH of 7.2 and P C O2 of 72,PO 2 of 76,chest x ray show fluid overload,BNP is over 478,patient has been  started on continues Bipap and IV lasix,,admitted to ICU for close monitoring.    -presented w/acute respiratory failure with hypoxia and hypercapnic (O2 sts 88% on RA,  PCO2 of 72)  -Due to heart failure exacerbation, COPD exacerbation, and positive for RSV  -Required BIPAP, and was admitted to ICU  -Was on precedex drip for agitation  -VBG  show improvement in CO2 retention after BIPAP  -consulted pulmonology: suspect chronic hypercapnic respiratory failure but not using nightly NIV. Recommend nightly BIPAP while inpatient   -Wean O2 as tolerated  -continue on droplet precaution due to +RSV  -completed treatment course for COPD exacerbation with duonebs and prednisone   -Pt with continued wheezing and sounds tight, Resumed prednisone on 11/22, plan for gradual taper  -Failed walk test on 11/22 but passed on 11/23  -Trial of budesonide inhalation for 1 day on 11/23  -continue to monitor and duo nebs every 4 hours

## 2023-11-23 NOTE — ASSESSMENT & PLAN NOTE
Patient with Persistent (7 days or more) atrial fibrillation which is controlled currently with Beta Blocker. Patient is currently in atrial fibrillation.[id number] Score: 4. HASBLED Score: 3. Anticoagulation indicated. Anticoagulation done with Xalreto .    -continue home Xarelto and metoprolol succinate   -continue to monitor on telemetry

## 2023-11-23 NOTE — PLAN OF CARE
Problem: Fall Injury Risk  Goal: Absence of Fall and Fall-Related Injury  Outcome: Met  Intervention: Identify and Manage Contributors  Flowsheets (Taken 11/23/2023 1648)  Self-Care Promotion:   BADL personal objects within reach   BADL personal routines maintained   meal set-up provided  Medication Review/Management:   medications reviewed   high-risk medications identified     Problem: Coping Ineffective  Goal: Effective Coping  Outcome: Met  Intervention: Support and Enhance Coping Strategies  Flowsheets (Taken 11/23/2023 1650)  Supportive Measures:   active listening utilized   decision-making supported   goal-setting facilitated  Environmental Support: calm environment promoted

## 2023-11-23 NOTE — PLAN OF CARE
Pt is AAOx4. 2L NC. Tele maintained.  No falls or injuries reported during shift, safety precautions maintained.    Problem: Adult Inpatient Plan of Care  Goal: Plan of Care Review  Outcome: Ongoing, Progressing     Problem: Adult Inpatient Plan of Care  Goal: Optimal Comfort and Wellbeing  Outcome: Ongoing, Progressing

## 2023-11-23 NOTE — NURSING
[facility]  Nurses Note -- 4 Eyes      11/22/2023       Skin assessed on: Q Shift      [] No Pressure Injuries Present    []Prevention Measures Documented    [x] Yes LDA  for Pressure Injury Previously documented     [] Yes New Pressure Injury Discovered   [] LDA for New Pressure Injury Added      Attending RN:  Keli Carmona LPN     Second RN:  Diamond TAYLOR

## 2023-11-23 NOTE — NURSING
Note that patient awake, alert and fully oriented this am. Changed from BiPAP to O2@2L NC. SPO2@97%. Continues with non-productive cough and expiratory wheezing.   Dr. Hua ordered removal of O2 (test) after breathing tx.    Will give PRN antitussive,and oral care.    Will continue to f/u.

## 2023-11-23 NOTE — PLAN OF CARE
Problem: Fall Injury Risk  Goal: Absence of Fall and Fall-Related Injury  Outcome: Met  Intervention: Identify and Manage Contributors  Flowsheets (Taken 11/23/2023 2731)  Self-Care Promotion:   BADL personal objects within reach   BADL personal routines maintained   meal set-up provided  Medication Review/Management:   medications reviewed   high-risk medications identified

## 2023-11-23 NOTE — PROGRESS NOTES
"[facility] Medicine  Progress Note    Patient Name: Sandee Evans  MRN: [id number]  Patient Class: IP- Inpatient   Admission Date: 11/13/2023  Length of Stay: 10 days  Attending Physician: Leroy Hua DO  Primary Care Provider: Kuldeep Miller MD        Subjective:     Principal Problem:Acute respiratory failure with hypoxia and hypercapnia        HPI:   67 y.o. female, with a past medical history of A-fib on OAC,diastolic  CHF, COPD, HLD, HTN, hypothyroidism, morbid obesity, pre-diabetes, and psychiatric problems, who presents to the ED with SOB onset 3 weeks ago. She reports SOB has gotten progressively worse over 3 weeks. Patient notes she has trouble ambulating d/t SOB. Patient notes being on an at-home BiPAP but denies daily use d/t the machine "smothering" her. Additional history provided by independent historian, relative, notes an O2 saturation in the 80's at-home PTA. Patient denies O2 at home. Patient notes an associated cough, congestion, diarrhea, rhinorrhea, sore throat, and headache.ABG in ER show hypercapnic,hypoxic  respiratory failure with PH of 7.2 and P C O2 of 72,PO 2 of 76,chest x ray show fluid overload,BNP is over 478,patient has been  started on continues Bipap and IV lasix,,admitted to ICU for close monitoring.    Overview/Hospital Course:  67 y.o. female, with a past medical history of A-fib on OAC,diastolic  CHF, COPD, HLD, HTN, hypothyroidism, morbid obesity admitted on 11/22/2023 for acute respiratory failure with hypoxia and hypercapnic (O2 sts 88% on RA,  PCO2 of 72) due to +RSV, COPD exacerbation, and acute on chronic diastolic heart failure. admitted to ICU on BIPAP. BNP > 400.  Chest imaging with bilateral effusions and peripheral edema. Hospital course complicated by agitation, required Precedex drip briefly.  Able to wean off Precedex drip. Pulmonology consulted-suspect chronic hypercapnic respiratory failure but not using nightly NIV.  RSV positive " and on droplet precaution.  Patient completed treatment with DuoNebs and prednisone on 11/17/2023.  Continue IV diuresis and wean O2 as tolerated. Pt still wheezing, will resume steroids with  gradual taper.     Interval History:  No acute overnight events.  Patient remained afebrile.  Feeling better today but feel like her sinuses are acting up. Shortness of breath improving.  Been using the IS. Tolerating diet without any difficulty.  Admits wheezing.     Review of Systems   Constitutional:  Positive for activity change, appetite change (improving) and fatigue. Negative for chills and fever.   Respiratory:  Positive for shortness of breath (improving) and wheezing.    Cardiovascular:  Negative for chest pain and leg swelling.   Gastrointestinal:  Negative for abdominal pain, nausea and vomiting.   Skin:  Negative for color change.   Neurological:  Positive for weakness. Negative for dizziness and headaches.   Psychiatric/Behavioral:  Negative for agitation and behavioral problems.      Objective:     Vital Signs (Most Recent):  Temp: 97.9 °F (36.6 °C) (11/23/23 1121)  Pulse: 89 (11/23/23 1205)  Resp: 18 (11/23/23 1205)  BP: 122/69 (11/23/23 1121)  SpO2: (!) 90 % (11/23/23 1205) Vital Signs (24h Range):  Temp:  [97.3 °F (36.3 °C)-99 °F (37.2 °C)] 97.9 °F (36.6 °C)  Pulse:  [] 89  Resp:  [12-20] 18  SpO2:  [85 %-98 %] 90 %  BP: (122-169)/(69-96) 122/69     Weight: 119.7 kg (264 lb)  Body mass index is 46.77 kg/m².    Intake/Output Summary (Last 24 hours) at 11/23/2023 1249  Last data filed at 11/23/2023 0450  Gross per 24 hour   Intake 775 ml   Output 2600 ml   Net -1825 ml           Physical Exam  Vitals and nursing note reviewed.   Constitutional:       General: She is not in acute distress.     Appearance: She is morbidly obese. She is ill-appearing.   HENT:      Mouth/Throat:      Mouth: Mucous membranes are moist.   Eyes:      Extraocular Movements: Extraocular movements intact.   Cardiovascular:       Rate and Rhythm: Normal rate. Rhythm irregular.   Pulmonary:      Effort: Pulmonary effort is normal. No respiratory distress.      Breath sounds: Wheezing and rhonchi present.      Comments: On RA this AM and completed walk test without hypoxia. However, pt still wheezing on exam and sounds tight.  Abdominal:      Palpations: Abdomen is soft.   Musculoskeletal:      Right lower leg: Edema present.      Left lower leg: Edema present.      Comments: +1 pitting edema in bilateral lower extremity, no calf tenderness   Skin:     General: Skin is warm and dry.   Neurological:      General: No focal deficit present.      Mental Status: She is alert and oriented to person, place, and time.   Psychiatric:         Mood and Affect: Mood normal.             Significant Labs: All pertinent labs within the past 24 hours have been reviewed.    Significant Imaging: I have reviewed all pertinent imaging results/findings within the past 24 hours.    Assessment/Plan:      * Acute respiratory failure with hypoxia and hypercapnia  Patient with Hypercapnic and Hypoxic Respiratory failure which is Acute on chronic.  she is not on home oxygen. Supplemental oxygen was provided and noted- Oxygen Concentration (%):  [40] 40    .   Signs/symptoms of respiratory failure include- tachypnea, increased work of breathing, and lethargy. Contributing diagnoses includes - COPD and Obesity Hypoventilation Labs and images were reviewed. Patient Has recent ABG, which has been reviewed. Will treat underlying causes and adjust management of respiratory failure as follows-   ER show hypercapnic,hypoxic  respiratory failure with PH of 7.2 and P C O2 of 72,PO 2 of 76,chest x ray show fluid overload,BNP is over 478,patient has been  started on continues Bipap and IV lasix,,admitted to ICU for close monitoring.    -presented w/acute respiratory failure with hypoxia and hypercapnic (O2 sts 88% on RA,  PCO2 of 72)  -Due to heart failure exacerbation, COPD  "exacerbation, and positive for RSV  -Required BIPAP, and was admitted to ICU  -Was on precedex drip for agitation  -VBG  show improvement in CO2 retention after BIPAP  -consulted pulmonology: suspect chronic hypercapnic respiratory failure but not using nightly NIV. Recommend nightly BIPAP while inpatient   -Wean O2 as tolerated  -continue on droplet precaution due to +RSV  -completed treatment course for COPD exacerbation with duonebs and prednisone   -Pt with continued wheezing and sounds tight, Resumed prednisone on 11/22, plan for gradual taper  -Failed walk test on 11/22 but passed on 11/23  -Trial of budesonide inhalation for 1 day on 11/23  -continue to monitor and duo nebs every 4 hours    Acute on chronic diastolic CHF (congestive heart failure)  Patient is identified as having Diastolic (HFpEF) heart failure that is Acute on chronic. CHF is currently uncontrolled due to Dyspnea not returned to baseline after 1 doses of IV diuretic. Latest ECHO performed and demonstrates- Results for orders placed during the hospital encounter of 05/20/22    Echo    Interpretation Summary  · The left ventricle is normal in size with normal systolic function.  · The estimated ejection fraction is 60%.  · Normal right ventricular size with normal right ventricular systolic function.  · The estimated PA systolic pressure is 33 mmHg.  · Atrial fibrillation observed.  . Continue Beta Blocker and Furosemide and monitor clinical status closely. Monitor on telemetry. Patient is off CHF pathway.  Monitor strict Is&Os and daily weights.  Place on fluid restriction of 1.5 L. Cardiology has not been any consulted. Continue to stress to patient importance of self efficacy and  on diet for CHF. Last BNP reviewed- and noted below   No results for input(s): "BNP", "BNPTRIAGEBLO" in the last 168 hours.  .    Chronic obstructive pulmonary disease with acute exacerbation  Patient's COPD is with exacerbation noted by continued dyspnea " and use of accessory muscles for breathing currently.  Patient is currently off COPD Pathway. Continue scheduled inhalers Steroids and Supplemental oxygen and monitor respiratory status closely.       -pt not on home oxygen  -completed prednisone course but resumed on 11/22 given wheezing and hypoxia. Sounds tight on exam  -Plan for gradual taper   -continue duonebs as needed  -Wean O2 as tolerated    Pulmonary hypertension  -Continue IV diuresis   -Mild COPD on PFTs.  Likely Group II from chronic diastolic heart failure    HIEU treated with BiPAP  Non complaint with home Bipap.s  he was consulted on this.  She did better last night with Bipap.      Essential hypertension  Chronic, controlled.   Continue home metoprolol    Latest blood pressure and vitals reviewed-     Temp:  [97.3 °F (36.3 °C)-99 °F (37.2 °C)]   Pulse:  []   Resp:  [12-20]   BP: (122-169)/(69-96)   SpO2:  [85 %-98 %] .   Home meds for hypertension were reviewed and noted below.   Hypertension Medications               furosemide (LASIX) 40 MG tablet Take 1 tablet (40 mg total) by mouth once daily.    losartan (COZAAR) 25 MG tablet TAKE 1 TABLET(25 MG) BY MOUTH EVERY DAY    metoprolol succinate (TOPROL-XL) 100 MG 24 hr tablet Take 1 tablet (100 mg total) by mouth once daily.            While in the hospital, will manage blood pressure as follows; Continue home antihypertensive regimen    Will utilize p.r.n. blood pressure medication only if patient's blood pressure greater than 160/100 and she develops symptoms such as worsening chest pain or shortness of breath.    Paroxysmal atrial fibrillation  Patient with Persistent (7 days or more) atrial fibrillation which is controlled currently with Beta Blocker. Patient is currently in atrial fibrillation.[id number] Score: 4. HASBLED Score: 3. Anticoagulation indicated. Anticoagulation done with Xalreto .    -continue home Xarelto and metoprolol succinate   -continue to monitor on telemetry    Chronic  kidney disease, stage 3a  Creatine stable for now. BMP reviewed- noted Estimated Creatinine Clearance: 51.2 mL/min (based on SCr of 1.3 mg/dL). according to latest data. Based on current GFR, CKD stage is stage 3 - GFR 30-59.  Monitor UOP and serial BMP and adjust therapy as needed. Renally dose meds. Avoid nephrotoxic medications and procedures.    PVD (peripheral vascular disease)  On medical treatments.      Pre-diabetes  Will monitor.  A1C 5.8 on 10/27/23    Severe obesity (BMI >= 40)  Body mass index is 46.77 kg/m². Morbid obesity complicates all aspects of disease management from diagnostic modalities to treatment. Weight loss encouraged and health benefits explained to patient.         RSV infection  +RSV on admission  Was in contact with her Grandchild, who also tested positive  Continue droplet precautions      Goals of care, counseling/discussion  Advance Care Planning    Code Status  I engaged the the patient in a voluntary conversation about the patient's preferences for care  at the very end of life. The patient wishes to have CPR and other invasive treatments performed when her heart and/or breathing stops. I communicated to the patient that her wishes align with full code status. She agrees. I spent a total of 16 minutes engaging the patient in this advance care planning discussion.             VTE Risk Mitigation (From admission, onward)           Ordered     rivaroxaban tablet 20 mg  With dinner         11/13/23 1633                    Discharge Planning   JACQUELINE: 11/20/2023     Code Status: Full Code   Is the patient medically ready for discharge?:     Reason for patient still in hospital (select all that apply): Patient trending condition and Treatment  Discharge Plan A: Home Health   Discharge Delays: None known at this time              Leroy Hua DO  Department of Hospital Medicine   [facility] [place] [facility]

## 2023-11-23 NOTE — ASSESSMENT & PLAN NOTE
Patient's COPD is with exacerbation noted by continued dyspnea and use of accessory muscles for breathing currently.  Patient is currently off COPD Pathway. Continue scheduled inhalers Steroids and Supplemental oxygen and monitor respiratory status closely.       -pt not on home oxygen  -completed prednisone course but resumed on 11/22 given wheezing and hypoxia. Sounds tight on exam  -Plan for gradual taper   -continue duonebs as needed  -Wean O2 as tolerated

## 2023-11-23 NOTE — NURSING
[facility] [place]  Nurses Note -- 4 Eyes      11/23/2023       Skin assessed on: Q Shift      [x] No Pressure Injuries Present    [x]Prevention Measures Documented    [] Yes LDA  for Pressure Injury Previously documented     [] Yes New Pressure Injury Discovered   [] LDA for New Pressure Injury Added      Attending RN:  Shanna Nelson RN     Second RN:  CLAUDIA Renae

## 2023-11-24 LAB
ANION GAP SERPL CALC-SCNC: 14 MMOL/L (ref 8–16)
BUN SERPL-MCNC: 46 MG/DL (ref 8–23)
CALCIUM SERPL-MCNC: 9.2 MG/DL (ref 8.7–10.5)
CHLORIDE SERPL-SCNC: 92 MMOL/L (ref 95–110)
CO2 SERPL-SCNC: 38 MMOL/L (ref 23–29)
CREAT SERPL-MCNC: 1.1 MG/DL (ref 0.5–1.4)
EST. GFR  (NO RACE VARIABLE): 54 ML/MIN/1.73 M^2
GLUCOSE SERPL-MCNC: 132 MG/DL (ref 70–110)
MAGNESIUM SERPL-MCNC: 1.8 MG/DL (ref 1.6–2.6)
POTASSIUM SERPL-SCNC: 3.3 MMOL/L (ref 3.5–5.1)
SODIUM SERPL-SCNC: 144 MMOL/L (ref 136–145)

## 2023-11-24 PROCEDURE — 25000242 PHARM REV CODE 250 ALT 637 W/ HCPCS: Performed by: HOSPITALIST

## 2023-11-24 PROCEDURE — 99900035 HC TECH TIME PER 15 MIN (STAT)

## 2023-11-24 PROCEDURE — 36415 COLL VENOUS BLD VENIPUNCTURE: CPT | Performed by: STUDENT IN AN ORGANIZED HEALTH CARE EDUCATION/TRAINING PROGRAM

## 2023-11-24 PROCEDURE — 97530 THERAPEUTIC ACTIVITIES: CPT

## 2023-11-24 PROCEDURE — 21400001 HC TELEMETRY ROOM

## 2023-11-24 PROCEDURE — 97116 GAIT TRAINING THERAPY: CPT | Mod: CQ

## 2023-11-24 PROCEDURE — 25000003 PHARM REV CODE 250: Performed by: STUDENT IN AN ORGANIZED HEALTH CARE EDUCATION/TRAINING PROGRAM

## 2023-11-24 PROCEDURE — 83735 ASSAY OF MAGNESIUM: CPT | Performed by: STUDENT IN AN ORGANIZED HEALTH CARE EDUCATION/TRAINING PROGRAM

## 2023-11-24 PROCEDURE — 27000207 HC ISOLATION

## 2023-11-24 PROCEDURE — 25000003 PHARM REV CODE 250: Performed by: HOSPITALIST

## 2023-11-24 PROCEDURE — 94640 AIRWAY INHALATION TREATMENT: CPT

## 2023-11-24 PROCEDURE — 80048 BASIC METABOLIC PNL TOTAL CA: CPT | Performed by: STUDENT IN AN ORGANIZED HEALTH CARE EDUCATION/TRAINING PROGRAM

## 2023-11-24 PROCEDURE — 99223 PR INITIAL HOSPITAL CARE,LEVL III: ICD-10-PCS | Mod: ,,, | Performed by: INTERNAL MEDICINE

## 2023-11-24 PROCEDURE — 99223 1ST HOSP IP/OBS HIGH 75: CPT | Mod: ,,, | Performed by: INTERNAL MEDICINE

## 2023-11-24 PROCEDURE — 94761 N-INVAS EAR/PLS OXIMETRY MLT: CPT

## 2023-11-24 PROCEDURE — 94660 CPAP INITIATION&MGMT: CPT

## 2023-11-24 PROCEDURE — 63600175 PHARM REV CODE 636 W HCPCS: Performed by: STUDENT IN AN ORGANIZED HEALTH CARE EDUCATION/TRAINING PROGRAM

## 2023-11-24 RX ADMIN — FUROSEMIDE 40 MG: 40 TABLET ORAL at 09:11

## 2023-11-24 RX ADMIN — ACETAMINOPHEN 650 MG: 325 TABLET ORAL at 07:11

## 2023-11-24 RX ADMIN — PREDNISONE 40 MG: 20 TABLET ORAL at 09:11

## 2023-11-24 RX ADMIN — IPRATROPIUM BROMIDE AND ALBUTEROL SULFATE 3 ML: 2.5; .5 SOLUTION RESPIRATORY (INHALATION) at 05:11

## 2023-11-24 RX ADMIN — FUROSEMIDE 40 MG: 40 TABLET ORAL at 05:11

## 2023-11-24 RX ADMIN — GUAIFENESIN AND DEXTROMETHORPHAN HYDROBROMIDE 1 TABLET: 600; 30 TABLET, EXTENDED RELEASE ORAL at 10:11

## 2023-11-24 RX ADMIN — BENZONATATE 100 MG: 100 CAPSULE ORAL at 07:11

## 2023-11-24 RX ADMIN — FAMOTIDINE 20 MG: 20 TABLET ORAL at 10:11

## 2023-11-24 RX ADMIN — SODIUM CHLORIDE SOLN NEBU 3% 4 ML: 3 NEBU SOLN at 01:11

## 2023-11-24 RX ADMIN — IPRATROPIUM BROMIDE AND ALBUTEROL SULFATE 3 ML: 2.5; .5 SOLUTION RESPIRATORY (INHALATION) at 01:11

## 2023-11-24 RX ADMIN — IPRATROPIUM BROMIDE AND ALBUTEROL SULFATE 3 ML: 2.5; .5 SOLUTION RESPIRATORY (INHALATION) at 08:11

## 2023-11-24 RX ADMIN — FAMOTIDINE 20 MG: 20 TABLET ORAL at 09:11

## 2023-11-24 RX ADMIN — FLUTICASONE PROPIONATE 100 MCG: 50 SPRAY, METERED NASAL at 09:11

## 2023-11-24 RX ADMIN — GUAIFENESIN AND DEXTROMETHORPHAN 10 ML: 100; 10 SYRUP ORAL at 07:11

## 2023-11-24 RX ADMIN — RIVAROXABAN 20 MG: 20 TABLET, FILM COATED ORAL at 05:11

## 2023-11-24 RX ADMIN — SERTRALINE HYDROCHLORIDE 100 MG: 50 TABLET ORAL at 09:11

## 2023-11-24 RX ADMIN — GUAIFENESIN AND DEXTROMETHORPHAN 10 ML: 100; 10 SYRUP ORAL at 01:11

## 2023-11-24 RX ADMIN — QUETIAPINE FUMARATE 25 MG: 25 TABLET ORAL at 10:11

## 2023-11-24 RX ADMIN — SODIUM CHLORIDE SOLN NEBU 3% 4 ML: 3 NEBU SOLN at 08:11

## 2023-11-24 RX ADMIN — GUAIFENESIN AND DEXTROMETHORPHAN HYDROBROMIDE 1 TABLET: 600; 30 TABLET, EXTENDED RELEASE ORAL at 09:11

## 2023-11-24 RX ADMIN — METOPROLOL SUCCINATE 100 MG: 50 TABLET, EXTENDED RELEASE ORAL at 09:11

## 2023-11-24 RX ADMIN — ATORVASTATIN CALCIUM 40 MG: 40 TABLET, FILM COATED ORAL at 09:11

## 2023-11-24 RX ADMIN — IPRATROPIUM BROMIDE AND ALBUTEROL SULFATE 3 ML: 2.5; .5 SOLUTION RESPIRATORY (INHALATION) at 12:11

## 2023-11-24 RX ADMIN — SODIUM CHLORIDE SOLN NEBU 3% 4 ML: 3 NEBU SOLN at 06:11

## 2023-11-24 NOTE — SUBJECTIVE & OBJECTIVE
Interval History:  No acute overnight events.  Patient remained afebrile.  Feeling better overall, but complains of some wheezing. Shortness of breath improving.     Review of Systems   Constitutional:  Positive for activity change, appetite change (improving) and fatigue. Negative for chills and fever.   Respiratory:  Positive for shortness of breath (improving) and wheezing.    Cardiovascular:  Negative for chest pain and leg swelling.   Gastrointestinal:  Negative for abdominal pain, nausea and vomiting.   Skin:  Negative for color change.   Neurological:  Positive for weakness. Negative for dizziness and headaches.   Psychiatric/Behavioral:  Negative for agitation and behavioral problems.      Objective:     Vital Signs (Most Recent):  Temp: 97.9 °F (36.6 °C) (11/24/23 1119)  Pulse: 75 (11/24/23 1119)  Resp: 14 (11/24/23 1119)  BP: 132/81 (11/24/23 1119)  SpO2: (!) 90 % (11/24/23 1119) Vital Signs (24h Range):  Temp:  [97.5 °F (36.4 °C)-99 °F (37.2 °C)] 97.9 °F (36.6 °C)  Pulse:  [] 75  Resp:  [12-20] 14  SpO2:  [87 %-95 %] 90 %  BP: (121-139)/(77-88) 132/81     Weight: 119.7 kg (264 lb)  Body mass index is 46.77 kg/m².    Intake/Output Summary (Last 24 hours) at 11/24/2023 1138  Last data filed at 11/24/2023 0625  Gross per 24 hour   Intake 250 ml   Output 850 ml   Net -600 ml           Physical Exam  Vitals and nursing note reviewed.   Constitutional:       General: She is not in acute distress.     Appearance: She is morbidly obese. She is ill-appearing.   HENT:      Mouth/Throat:      Mouth: Mucous membranes are moist.   Eyes:      Extraocular Movements: Extraocular movements intact.   Cardiovascular:      Rate and Rhythm: Normal rate. Rhythm irregular.   Pulmonary:      Effort: Pulmonary effort is normal. No respiratory distress.      Breath sounds: Wheezing and rhonchi present.      Comments: Was on NC overnight, but placed on RA this AM. pt still wheezing throughout on exam and had diminished breath  sounds  Abdominal:      Palpations: Abdomen is soft.   Musculoskeletal:      Right lower leg: Edema present.      Left lower leg: Edema present.      Comments: LE edema improving   Skin:     General: Skin is warm and dry.   Neurological:      General: No focal deficit present.      Mental Status: She is alert and oriented to person, place, and time.   Psychiatric:         Mood and Affect: Mood normal.             Significant Labs: All pertinent labs within the past 24 hours have been reviewed.    Significant Imaging: I have reviewed all pertinent imaging results/findings within the past 24 hours.

## 2023-11-24 NOTE — ASSESSMENT & PLAN NOTE
Patient's COPD is with exacerbation noted by continued dyspnea and use of accessory muscles for breathing currently.  Patient is currently off COPD Pathway. Continue scheduled inhalers Steroids and Supplemental oxygen and monitor respiratory status closely.       -pt not on home oxygen  -completed prednisone course but resumed on 11/22 given wheezing and hypoxia. Sounds diminsihed  on exam  -Plan for gradual taper   -continue duonebs as needed  -Wean O2 as tolerated

## 2023-11-24 NOTE — ASSESSMENT & PLAN NOTE
Longstanding history of diastolic heart failure.  Recent echo with PASP 49, CRISTI and mild RV dysfunction.    Has had ongoing diuresis, continue to monitor

## 2023-11-24 NOTE — ASSESSMENT & PLAN NOTE
Patient with Persistent (7 days or more) atrial fibrillation which is controlled currently with Beta Blocker. Patient is currently in atrial fibrillation.[id number] Score: 4. HASBLED Score: 3. Anticoagulation indicated. Anticoagulation done with Xalreto .    -continue home Xarelto and metoprolol succinate   -continue to monitor on telemetry

## 2023-11-24 NOTE — PT/OT/SLP PROGRESS
Occupational Therapy   Treatment    Name: Sandee Evans  MRN: [id number]  Admitting Diagnosis:  Acute respiratory failure with hypoxia and hypercapnia       Recommendations:     Discharge Recommendations: Low Intensity Therapy  Discharge Equipment Recommendations:  none  Barriers to discharge:  Other (Comment) (patient is desatting on 3L oxygen NC with exercises and walking)    Assessment:     Sandee Evans is a 67 y.o. female with a medical diagnosis of Acute respiratory failure with hypoxia and hypercapnia.  She presents with HOB elevated and asleep and lethargic throughout session. Performance deficits affecting function are weakness, impaired endurance, impaired self care skills, impaired functional mobility, gait instability, decreased safety awareness, impaired cardiopulmonary response to activity.     Rehab Prognosis:  Good; patient would benefit from acute skilled OT services to address these deficits and reach maximum level of function.       Plan:     Patient to be seen 3 x/week (increase to 3-5x/ week) to address the above listed problems via self-care/home management, therapeutic activities, therapeutic exercises  Plan of Care Expires: 11/30/23  Plan of Care Reviewed with: patient    Subjective     Chief Complaint: fatigue, SOB   Patient/Family Comments/goals: return home   Pain/Comfort:  Pain Rating 1: 0/10    Objective:     Communicated with: RNShanna, prior to session.  Patient found HOB elevated with PureWick, peripheral IV, telemetry, oxygen, pulse ox (continuous) upon OT entry to room.    General Precautions: Standard, droplet, contact, fall, respiratory    Orthopedic Precautions:N/A  Braces: N/A  Respiratory Status: Nasal cannula, flow 3  L/min; patient desatted to 86-90% after UB therex lying with HOB elevated and after scooting to HOB, so notified RN      Occupational Performance:     Bed Mobility:    Patient completed Scooting/Bridging with minimum assistance     Functional  Mobility/Transfers:  Patient did not t/f due to desatting after bed mobility and exercises     Activities of Daily Living:  Not done       [facility] 6 Click ADL: 6    Treatment & Education:  Patient educated re: overhead shoulder pulls and biceps/triceps, but she could only complete 5 reps each and desatted while lying with Hob elevated.     Patient left HOB elevated with all lines intact, call button in reach, and RN  notified    GOALS:   Multidisciplinary Problems       Occupational Therapy Goals          Problem: Occupational Therapy    Goal Priority Disciplines Outcome Interventions   Occupational Therapy Goal     OT, PT/OT Ongoing, Progressing    Description: Goals to be met by: 11/30/23     Patient will increase functional independence with ADLs by performing:    UE Dressing with Modified Coffee.  LE Dressing with Set-up Assistance and Supervision.  Grooming while standing at sink with Modified Coffee and Supervision and Assistive Devices as needed.  Toileting from toilet with Stand-by Assistance for hygiene and clothing management.   Sitting at edge of bed x30 minutes with Modified Coffee.  Rolling to Bilateral with Modified Coffee.   Supine to sit with Modified Coffee and Supervision.  Step transfer with Modified Coffee and Supervision  Toilet transfer to toilet with Modified Coffee and Supervision.  Upper extremity exercise program x15 reps per handout, with independence.                         Time Tracking:     OT Date of Treatment: 11/24/23  OT Start Time: 1513  OT Stop Time: 1523  OT Total Time (min): 10 min    Billable Minutes:Therapeutic Activity 10     OT/CHANTELLE: OT          11/24/2023

## 2023-11-24 NOTE — NURSING
Note that patient up out of bed in chair on RA with SPO2@90%.  Denies s/s.   Given PRN acetaminophen for c/o 7-1/2 / 10 HA.  Patient to chair with one-person stand-by.  Eating breakfast tray.     All safety measures maintained while up in chair.    Will continue to f/u.

## 2023-11-24 NOTE — ASSESSMENT & PLAN NOTE
Creatine stable for now. BMP reviewed- noted Estimated Creatinine Clearance: 60.5 mL/min (based on SCr of 1.1 mg/dL). according to latest data. Based on current GFR, CKD stage is stage 3 - GFR 30-59.  Monitor UOP and serial BMP and adjust therapy as needed. Renally dose meds. Avoid nephrotoxic medications and procedures.

## 2023-11-24 NOTE — ASSESSMENT & PLAN NOTE
Former smoker.  Chronic hypercapnic respiratory failure not using nightly NIV.  Chronic diastolic heart failure with significant Na indiscretion.  RSV positive and on droplet precaution.  Now on room air

## 2023-11-24 NOTE — NURSING
[facility] [place]  Nurses Note -- 4 Eyes      11/24/2023       Skin assessed on: Q Shift      [x] No Pressure Injuries Present    [x]Prevention Measures Documented    [] Yes LDA  for Pressure Injury Previously documented     [] Yes New Pressure Injury Discovered   [] LDA for New Pressure Injury Added      Attending RN:  Tiki Dupree RN     Second RN:  CLAUDIA Otero

## 2023-11-24 NOTE — SUBJECTIVE & OBJECTIVE
Past Medical History:   Diagnosis Date    Anticoagulant long-term use     Xarelto    Arthritis     Atrial fibrillation     Breast cyst     CHF (congestive heart failure)     Degenerative disc disease     Edema     HLD (hyperlipidemia)     Hx of psychiatric care     Hyperlipidemia     Hypertension     Hypothyroidism     Nuclear sclerosis, bilateral 12/18/2017    Obesity, morbid     HIEU (obstructive sleep apnea)     Other abnormal glucose     pre-diabetes    Pre-diabetes     Psychiatric problem     Requires assistance with activities of daily living (ADL)     Sleep apnea     Smoker     SOB (shortness of breath)     SOB (shortness of breath)     Thyroid disease     on meds 8-9 years ago. hypothyroidism.no malignancy    TMJ (temporomandibular joint disorder)     jaw clicking    Tobacco abuse     Unsteady gait     Urge incontinence     Weakness generalized     Wears glasses        Past Surgical History:   Procedure Laterality Date    BREAST BIOPSY Right     over 10 yrs ago/ benign    BREAST CYST EXCISION Right     BREAST LUMPECTOMY Right     over 10 yrs ago, ex bx/ benign    COLONOSCOPY N/A 6/25/2019    Procedure: COLONOSCOPY;  Surgeon: Micheline Flores MD;  Location: [facility] ENDO;  Service: Endoscopy;  Laterality: N/A;  RX XARELTO ok to hold (2 days) per Dr. Naik see scan 3/20/19    ENDOSCOPIC ULTRASOUND OF UPPER GASTROINTESTINAL TRACT N/A 1/26/2021    Procedure: ULTRASOUND-ENDOSCOPIC-UPPER;  Surgeon: Stevenson Philippe MD;  Location: [facility] ENDO;  Service: Endoscopy;  Laterality: N/A;    HYSTERECTOMY      JOINT REPLACEMENT Bilateral     knees    OOPHORECTOMY      rt.knee surgery 2016      TOTAL KNEE ARTHROPLASTY Left 2/19/2019    Procedure: ARTHROPLASTY, KNEE, TOTAL;  Surgeon: Med Hanson MD;  Location: [facility] OR;  Service: Orthopedics;  Laterality: Left;  10AM START PER  PER JAYLIN TEXT @ 8:34AM ON 2-18-19  SUPINE  HARRIS LOYA [phone] TEXTED HIM ON 1-24-19 @ 7:57AM  RN PRE OP 2-13-19--BMI--48.9    underarm  gland\ Bilateral        Review of patient's allergies indicates:   Allergen Reactions    Ace inhibitors      Cough         Family History       Problem Relation (Age of Onset)    Cancer Mother, Brother    Diabetes Mother, Brother    Hypertension Mother    No Known Problems Father, Sister, Maternal Aunt, Maternal Uncle, Paternal Aunt, Paternal Uncle, Maternal Grandmother, Maternal Grandfather, Paternal Grandmother, Paternal Grandfather, Other          Tobacco Use    Smoking status: Every Day     Current packs/day: 0.50     Average packs/day: 0.5 packs/day for 50.9 years (25.4 ttl pk-yrs)     Types: Cigarettes     Start date: 1973    Smokeless tobacco: Current   Substance and Sexual Activity    Alcohol use: No    Drug use: No    Sexual activity: Yes     Partners: Male         Review of Systems   Constitutional:  Positive for fatigue. Negative for chills and fever.   HENT:  Positive for congestion. Negative for facial swelling, trouble swallowing and voice change.    Eyes: Negative.    Respiratory:  Positive for cough and shortness of breath.    Cardiovascular:  Negative for chest pain, palpitations and leg swelling.   Gastrointestinal: Negative.    Endocrine: Negative.    Genitourinary: Negative.    Musculoskeletal: Negative.    Allergic/Immunologic: Negative.    Neurological: Negative.    Hematological: Negative.    Psychiatric/Behavioral: Negative.       Objective:     Vital Signs (Most Recent):  Temp: 97.9 °F (36.6 °C) (11/24/23 1119)  Pulse: 87 (11/24/23 1238)  Resp: 16 (11/24/23 1238)  BP: 132/81 (11/24/23 1119)  SpO2: (!) 89 % (11/24/23 1238) Vital Signs (24h Range):  Temp:  [97.5 °F (36.4 °C)-99 °F (37.2 °C)] 97.9 °F (36.6 °C)  Pulse:  [] 87  Resp:  [12-20] 16  SpO2:  [87 %-95 %] 89 %  BP: (121-139)/(77-88) 132/81     Weight: 119.7 kg (264 lb)  Body mass index is 46.77 kg/m².      Intake/Output Summary (Last 24 hours) at 11/24/2023 1301  Last data filed at 11/24/2023 0625  Gross per 24 hour   Intake 250  "ml   Output 850 ml   Net -600 ml        Physical Exam  Vitals and nursing note reviewed.   Constitutional:       Appearance: Normal appearance. She is obese.   HENT:      Head: Normocephalic and atraumatic.      Nose: Nose normal. No congestion.      Mouth/Throat:      Mouth: Mucous membranes are moist.      Pharynx: Oropharynx is clear.   Eyes:      Conjunctiva/sclera: Conjunctivae normal.      Pupils: Pupils are equal, round, and reactive to light.   Cardiovascular:      Rate and Rhythm: Normal rate and regular rhythm.      Pulses: Normal pulses.      Heart sounds: No murmur heard.  Pulmonary:      Effort: Pulmonary effort is normal. No respiratory distress.      Breath sounds: Rhonchi present. No wheezing.   Abdominal:      General: Abdomen is flat. Bowel sounds are normal.      Palpations: Abdomen is soft.   Neurological:      General: No focal deficit present.      Mental Status: She is alert and oriented to person, place, and time.          Vents:  Oxygen Concentration (%): 40 (11/24/23 0131)    Lines/Drains/Airways       Drain  Duration             Female External Urinary Catheter 11/13/23 1900 10 days              Peripheral Intravenous Line  Duration                  Midline Catheter Insertion/Assessment  - Single Lumen 11/19/23 1245 Left basilic vein (medial side of arm) other (see comments) 5 days                    Significant Labs:    CBC/Anemia Profile:  No results for input(s): "WBC", "HGB", "HCT", "PLT", "MCV", "RDW", "IRON", "FERRITIN", "RETIC", "FOLATE", "[id number]", "OCCULTBLOOD" in the last 48 hours.     Chemistries:  Recent Labs   Lab 11/24/23  0516      K 3.3*   CL 92*   CO2 38*   BUN 46*   CREATININE 1.1   CALCIUM 9.2   MG 1.8       All pertinent labs within the past 24 hours have been reviewed.    Significant Imaging:   I have reviewed all pertinent imaging results/findings within the past 24 hours.  "

## 2023-11-24 NOTE — CONSULTS
[facility] - Telemetry  Pulmonology  Consult Note    Patient Name: Sandee Evans  MRN: [id number]  Admission Date: 11/13/2023  Hospital Length of Stay: 11 days  Code Status: Full Code  Attending Physician: Leroy Hua DO  Primary Care Provider: Kuldeep Miller MD   Principal Problem: Acute respiratory failure with hypoxia and hypercapnia    Inpatient consult to Pulmonology  Consult performed by: Yoanna Rhodes MD  Consult ordered by: Leroy Hua DO        Subjective:     HPI:  Pt with PMHx of AfiB, HTN, COPD, HFpEF, HIEU on BiPAP, Morbid Obesity who initially presented with a three week history of dyspnea worse on exertion and a few days of cough, nasal congestion, loose stools, sore throat and headache, wheezing to auscultation bilaterally, hypoxic in the 80s with increased work of breathing requiring high flow of O2 and eventually BiPAP. Of note she had been placed on home BiPAP for over a year and non compliant.   Of note on labs, her initial blood gas revealed a pH: 7.26>7.34 pCO2:72>66 and PO2 of 76 with HCO3 of  33.2>35, BNP elevated in the 400s otherwise procalcitonin, flu , COVID unremarkable. CXR with trace pleural effusions and slightly obscured R cardiophrenic angle.         Past Medical History:   Diagnosis Date    Anticoagulant long-term use     Xarelto    Arthritis     Atrial fibrillation     Breast cyst     CHF (congestive heart failure)     Degenerative disc disease     Edema     HLD (hyperlipidemia)     Hx of psychiatric care     Hyperlipidemia     Hypertension     Hypothyroidism     Nuclear sclerosis, bilateral 12/18/2017    Obesity, morbid     HIEU (obstructive sleep apnea)     Other abnormal glucose     pre-diabetes    Pre-diabetes     Psychiatric problem     Requires assistance with activities of daily living (ADL)     Sleep apnea     Smoker     SOB (shortness of breath)     SOB (shortness of breath)     Thyroid disease     on meds 8-9 years ago. hypothyroidism.no malignancy    TMJ  (temporomandibular joint disorder)     jaw clicking    Tobacco abuse     Unsteady gait     Urge incontinence     Weakness generalized     Wears glasses        Past Surgical History:   Procedure Laterality Date    BREAST BIOPSY Right     over 10 yrs ago/ benign    BREAST CYST EXCISION Right     BREAST LUMPECTOMY Right     over 10 yrs ago, ex bx/ benign    COLONOSCOPY N/A 6/25/2019    Procedure: COLONOSCOPY;  Surgeon: Micheline Flores MD;  Location: [facility] ENDO;  Service: Endoscopy;  Laterality: N/A;  RX XARELTO ok to hold (2 days) per Dr. Naik see scan 3/20/19    ENDOSCOPIC ULTRASOUND OF UPPER GASTROINTESTINAL TRACT N/A 1/26/2021    Procedure: ULTRASOUND-ENDOSCOPIC-UPPER;  Surgeon: Stevenson Philippe MD;  Location: [facility] ENDO;  Service: Endoscopy;  Laterality: N/A;    HYSTERECTOMY      JOINT REPLACEMENT Bilateral     knees    OOPHORECTOMY      rt.knee surgery 2016      TOTAL KNEE ARTHROPLASTY Left 2/19/2019    Procedure: ARTHROPLASTY, KNEE, TOTAL;  Surgeon: Med Hanson MD;  Location: [facility] OR;  Service: Orthopedics;  Laterality: Left;  10AM START PER  PER JAYLIN TEXT @ 8:34AM ON 2-18-19  SUPINE  HARRIS LOYA [phone] TEXTED HIM ON 1-24-19 @ 7:57AM  RN PRE OP 2-13-19--BMI--48.9    underarm gland\ Bilateral        Review of patient's allergies indicates:   Allergen Reactions    Ace inhibitors      Cough         Family History       Problem Relation (Age of Onset)    Cancer Mother, Brother    Diabetes Mother, Brother    Hypertension Mother    No Known Problems Father, Sister, Maternal Aunt, Maternal Uncle, Paternal Aunt, Paternal Uncle, Maternal Grandmother, Maternal Grandfather, Paternal Grandmother, Paternal Grandfather, Other          Tobacco Use    Smoking status: Every Day     Current packs/day: 0.50     Average packs/day: 0.5 packs/day for 50.9 years (25.4 ttl pk-yrs)     Types: Cigarettes     Start date: 1973    Smokeless tobacco: Current   Substance and Sexual Activity    Alcohol use: No    Drug  use: No    Sexual activity: Yes     Partners: Male         Review of Systems   Constitutional:  Positive for fatigue. Negative for chills and fever.   HENT:  Positive for congestion. Negative for facial swelling, trouble swallowing and voice change.    Eyes: Negative.    Respiratory:  Positive for cough and shortness of breath.    Cardiovascular:  Negative for chest pain, palpitations and leg swelling.   Gastrointestinal: Negative.    Endocrine: Negative.    Genitourinary: Negative.    Musculoskeletal: Negative.    Allergic/Immunologic: Negative.    Neurological: Negative.    Hematological: Negative.    Psychiatric/Behavioral: Negative.       Objective:     Vital Signs (Most Recent):  Temp: 97.9 °F (36.6 °C) (11/24/23 1119)  Pulse: 87 (11/24/23 1238)  Resp: 16 (11/24/23 1238)  BP: 132/81 (11/24/23 1119)  SpO2: (!) 89 % (11/24/23 1238) Vital Signs (24h Range):  Temp:  [97.5 °F (36.4 °C)-99 °F (37.2 °C)] 97.9 °F (36.6 °C)  Pulse:  [] 87  Resp:  [12-20] 16  SpO2:  [87 %-95 %] 89 %  BP: (121-139)/(77-88) 132/81     Weight: 119.7 kg (264 lb)  Body mass index is 46.77 kg/m².      Intake/Output Summary (Last 24 hours) at 11/24/2023 1301  Last data filed at 11/24/2023 0625  Gross per 24 hour   Intake 250 ml   Output 850 ml   Net -600 ml        Physical Exam  Vitals and nursing note reviewed.   Constitutional:       Appearance: Normal appearance. She is obese.   HENT:      Head: Normocephalic and atraumatic.      Nose: Nose normal. No congestion.      Mouth/Throat:      Mouth: Mucous membranes are moist.      Pharynx: Oropharynx is clear.   Eyes:      Conjunctiva/sclera: Conjunctivae normal.      Pupils: Pupils are equal, round, and reactive to light.   Cardiovascular:      Rate and Rhythm: Normal rate and regular rhythm.      Pulses: Normal pulses.      Heart sounds: No murmur heard.  Pulmonary:      Effort: Pulmonary effort is normal. No respiratory distress.      Breath sounds: Rhonchi present. No wheezing.  "  Abdominal:      General: Abdomen is flat. Bowel sounds are normal.      Palpations: Abdomen is soft.   Neurological:      General: No focal deficit present.      Mental Status: She is alert and oriented to person, place, and time.          Vents:  Oxygen Concentration (%): 40 (11/24/23 0131)    Lines/Drains/Airways       Drain  Duration             Female External Urinary Catheter 11/13/23 1900 10 days              Peripheral Intravenous Line  Duration                  Midline Catheter Insertion/Assessment  - Single Lumen 11/19/23 1245 Left basilic vein (medial side of arm) other (see comments) 5 days                    Significant Labs:    CBC/Anemia Profile:  No results for input(s): "WBC", "HGB", "HCT", "PLT", "MCV", "RDW", "IRON", "FERRITIN", "RETIC", "FOLATE", "[id number]", "OCCULTBLOOD" in the last 48 hours.     Chemistries:  Recent Labs   Lab 11/24/23  0516      K 3.3*   CL 92*   CO2 38*   BUN 46*   CREATININE 1.1   CALCIUM 9.2   MG 1.8       All pertinent labs within the past 24 hours have been reviewed.    Significant Imaging:   I have reviewed all pertinent imaging results/findings within the past 24 hours.    ABG  No results for input(s): "PH", "PO2", "PCO2", "HCO3", "BE" in the last 168 hours.  Assessment/Plan:     Pulmonary  * Acute respiratory failure with hypoxia and hypercapnia  Former smoker.  Chronic hypercapnic respiratory failure not using nightly NIV.  Chronic diastolic heart failure with significant Na indiscretion.  RSV positive and on droplet precaution.  Now on room air     Chronic obstructive pulmonary disease with acute exacerbation  Mild obstruction on PFTs.  Wheezing on initial exam, now resolved.  Duonebs and Prednisone restarted can complete a 5 day course.   Will need follow up with Dr. Puente outpatient who she has seen before.     Cardiac/Vascular  Pulmonary hypertension  Mild COPD on PFTs.  Almost certainly Group II from chronic diastolic heart failure    Acute on chronic " diastolic CHF (congestive heart failure)  Longstanding history of diastolic heart failure.  Recent echo with PASP 49, CRISTI and mild RV dysfunction.    Has had ongoing diuresis, continue to monitor     Endocrine  Severe obesity (BMI >= 40)  Complicates every aspect of patient care and places her at greater risk for complications related to her other chronic illnesses.  Is imperative that patient lose weight.    Other  HIEU treated with BiPAP  AHI 90.  Chronic hypercapnia.  NIV nightly while here.          Thank you for your consult. I will sign off. Please contact us if you have any additional questions.     Yoanna Rhodes MD  Pulmonology  [facility] - [place], [place] - Telemetry

## 2023-11-24 NOTE — ASSESSMENT & PLAN NOTE
Patient with Hypercapnic and Hypoxic Respiratory failure which is Acute on chronic.  she is not on home oxygen. Supplemental oxygen was provided and noted- Oxygen Concentration (%):  [40] 40    .   Signs/symptoms of respiratory failure include- tachypnea, increased work of breathing, and lethargy. Contributing diagnoses includes - COPD and Obesity Hypoventilation Labs and images were reviewed. Patient Has recent ABG, which has been reviewed. Will treat underlying causes and adjust management of respiratory failure as follows-   ER show hypercapnic,hypoxic  respiratory failure with PH of 7.2 and P C O2 of 72,PO 2 of 76,chest x ray show fluid overload,BNP is over 478,patient has been  started on continues Bipap and IV lasix,,admitted to ICU for close monitoring.    -presented w/acute respiratory failure with hypoxia and hypercapnic (O2 sts 88% on RA,  PCO2 of 72)  -Due to heart failure exacerbation, COPD exacerbation, and positive for RSV  -Required BIPAP, and was admitted to ICU  -Was on precedex drip for agitation  -VBG  show improvement in CO2 retention after BIPAP  -consulted pulmonology: suspect chronic hypercapnic respiratory failure but not using nightly NIV. Recommend nightly BIPAP while inpatient   -Wean O2 as tolerated  -continue on droplet precaution due to +RSV  -completed treatment course for COPD exacerbation with duonebs and prednisone   -Pt with continued wheezing and sounds tight, Resumed prednisone on 11/22, plan for gradual taper  -Failed walk test on 11/22 but passed on 11/23  -Trial of budesonide inhalation for 1 day on 11/23  -continue to monitor and duo nebs every 4 hours  -Still with very diminished breath sounds and wheezing, consult pulmonary for input

## 2023-11-24 NOTE — PROGRESS NOTES
"[facility] Medicine  Progress Note    Patient Name: Sandee Evans  MRN: [id number]  Patient Class: IP- Inpatient   Admission Date: 11/13/2023  Length of Stay: 11 days  Attending Physician: Leroy Hua DO  Primary Care Provider: Kuldeep Miller MD        Subjective:     Principal Problem:Acute respiratory failure with hypoxia and hypercapnia        HPI:   67 y.o. female, with a past medical history of A-fib on OAC,diastolic  CHF, COPD, HLD, HTN, hypothyroidism, morbid obesity, pre-diabetes, and psychiatric problems, who presents to the ED with SOB onset 3 weeks ago. She reports SOB has gotten progressively worse over 3 weeks. Patient notes she has trouble ambulating d/t SOB. Patient notes being on an at-home BiPAP but denies daily use d/t the machine "smothering" her. Additional history provided by independent historian, relative, notes an O2 saturation in the 80's at-home PTA. Patient denies O2 at home. Patient notes an associated cough, congestion, diarrhea, rhinorrhea, sore throat, and headache.ABG in ER show hypercapnic,hypoxic  respiratory failure with PH of 7.2 and P C O2 of 72,PO 2 of 76,chest x ray show fluid overload,BNP is over 478,patient has been  started on continues Bipap and IV lasix,,admitted to ICU for close monitoring.    Overview/Hospital Course:  67 y.o. female, with a past medical history of A-fib on OAC,diastolic  CHF, COPD, HLD, HTN, hypothyroidism, morbid obesity admitted on 11/22/2023 for acute respiratory failure with hypoxia and hypercapnic (O2 sts 88% on RA,  PCO2 of 72) due to +RSV, COPD exacerbation, and acute on chronic diastolic heart failure. admitted to ICU on BIPAP. BNP > 400.  Chest imaging with bilateral effusions and peripheral edema. Hospital course complicated by agitation, required Precedex drip briefly.  Able to wean off Precedex drip. Pulmonology consulted-suspect chronic hypercapnic respiratory failure but not using nightly NIV.  RSV positive " and on droplet precaution.  Patient completed treatment with DuoNebs and prednisone on 11/17/2023.  Continue IV diuresis and wean O2 as tolerated. Pt still wheezing, resumed steroids with plan for gradual taper. Given patient continues to have wheezing, SOB, and diminished breath sounds, will consult pulmonology again for input.     Interval History:  No acute overnight events.  Patient remained afebrile.  Feeling better overall, but complains of some wheezing. Shortness of breath improving.     Review of Systems   Constitutional:  Positive for activity change, appetite change (improving) and fatigue. Negative for chills and fever.   Respiratory:  Positive for shortness of breath (improving) and wheezing.    Cardiovascular:  Negative for chest pain and leg swelling.   Gastrointestinal:  Negative for abdominal pain, nausea and vomiting.   Skin:  Negative for color change.   Neurological:  Positive for weakness. Negative for dizziness and headaches.   Psychiatric/Behavioral:  Negative for agitation and behavioral problems.      Objective:     Vital Signs (Most Recent):  Temp: 97.9 °F (36.6 °C) (11/24/23 1119)  Pulse: 75 (11/24/23 1119)  Resp: 14 (11/24/23 1119)  BP: 132/81 (11/24/23 1119)  SpO2: (!) 90 % (11/24/23 1119) Vital Signs (24h Range):  Temp:  [97.5 °F (36.4 °C)-99 °F (37.2 °C)] 97.9 °F (36.6 °C)  Pulse:  [] 75  Resp:  [12-20] 14  SpO2:  [87 %-95 %] 90 %  BP: (121-139)/(77-88) 132/81     Weight: 119.7 kg (264 lb)  Body mass index is 46.77 kg/m².    Intake/Output Summary (Last 24 hours) at 11/24/2023 1138  Last data filed at 11/24/2023 0625  Gross per 24 hour   Intake 250 ml   Output 850 ml   Net -600 ml           Physical Exam  Vitals and nursing note reviewed.   Constitutional:       General: She is not in acute distress.     Appearance: She is morbidly obese. She is ill-appearing.   HENT:      Mouth/Throat:      Mouth: Mucous membranes are moist.   Eyes:      Extraocular Movements: Extraocular  movements intact.   Cardiovascular:      Rate and Rhythm: Normal rate. Rhythm irregular.   Pulmonary:      Effort: Pulmonary effort is normal. No respiratory distress.      Breath sounds: Wheezing and rhonchi present.      Comments: Was on NC overnight, but placed on RA this AM. pt still wheezing throughout on exam and had diminished breath sounds  Abdominal:      Palpations: Abdomen is soft.   Musculoskeletal:      Right lower leg: Edema present.      Left lower leg: Edema present.      Comments: LE edema improving   Skin:     General: Skin is warm and dry.   Neurological:      General: No focal deficit present.      Mental Status: She is alert and oriented to person, place, and time.   Psychiatric:         Mood and Affect: Mood normal.             Significant Labs: All pertinent labs within the past 24 hours have been reviewed.    Significant Imaging: I have reviewed all pertinent imaging results/findings within the past 24 hours.    Assessment/Plan:      * Acute respiratory failure with hypoxia and hypercapnia  Patient with Hypercapnic and Hypoxic Respiratory failure which is Acute on chronic.  she is not on home oxygen. Supplemental oxygen was provided and noted- Oxygen Concentration (%):  [40] 40    .   Signs/symptoms of respiratory failure include- tachypnea, increased work of breathing, and lethargy. Contributing diagnoses includes - COPD and Obesity Hypoventilation Labs and images were reviewed. Patient Has recent ABG, which has been reviewed. Will treat underlying causes and adjust management of respiratory failure as follows-   ER show hypercapnic,hypoxic  respiratory failure with PH of 7.2 and P C O2 of 72,PO 2 of 76,chest x ray show fluid overload,BNP is over 478,patient has been  started on continues Bipap and IV lasix,,admitted to ICU for close monitoring.    -presented w/acute respiratory failure with hypoxia and hypercapnic (O2 sts 88% on RA,  PCO2 of 72)  -Due to heart failure exacerbation, COPD  "exacerbation, and positive for RSV  -Required BIPAP, and was admitted to ICU  -Was on precedex drip for agitation  -VBG  show improvement in CO2 retention after BIPAP  -consulted pulmonology: suspect chronic hypercapnic respiratory failure but not using nightly NIV. Recommend nightly BIPAP while inpatient   -Wean O2 as tolerated  -continue on droplet precaution due to +RSV  -completed treatment course for COPD exacerbation with duonebs and prednisone   -Pt with continued wheezing and sounds tight, Resumed prednisone on 11/22, plan for gradual taper  -Failed walk test on 11/22 but passed on 11/23  -Trial of budesonide inhalation for 1 day on 11/23  -continue to monitor and duo nebs every 4 hours  -Still with very diminished breath sounds and wheezing, consult pulmonary for input     Acute on chronic diastolic CHF (congestive heart failure)  Patient is identified as having Diastolic (HFpEF) heart failure that is Acute on chronic. CHF is currently uncontrolled due to Dyspnea not returned to baseline after 1 doses of IV diuretic. Latest ECHO performed and demonstrates- Results for orders placed during the hospital encounter of 05/20/22    Echo    Interpretation Summary  · The left ventricle is normal in size with normal systolic function.  · The estimated ejection fraction is 60%.  · Normal right ventricular size with normal right ventricular systolic function.  · The estimated PA systolic pressure is 33 mmHg.  · Atrial fibrillation observed.  . Continue Beta Blocker and Furosemide and monitor clinical status closely. Monitor on telemetry. Patient is off CHF pathway.  Monitor strict Is&Os and daily weights.  Place on fluid restriction of 1.5 L. Cardiology has not been any consulted. Continue to stress to patient importance of self efficacy and  on diet for CHF. Last BNP reviewed- and noted below   No results for input(s): "BNP", "BNPTRIAGEBLO" in the last 168 hours.  .    Chronic obstructive pulmonary disease " with acute exacerbation  Patient's COPD is with exacerbation noted by continued dyspnea and use of accessory muscles for breathing currently.  Patient is currently off COPD Pathway. Continue scheduled inhalers Steroids and Supplemental oxygen and monitor respiratory status closely.       -pt not on home oxygen  -completed prednisone course but resumed on 11/22 given wheezing and hypoxia. Sounds diminsihed  on exam  -Plan for gradual taper   -continue duonebs as needed  -Wean O2 as tolerated    Pulmonary hypertension  -Continue IV diuresis   -Mild COPD on PFTs.  Likely Group II from chronic diastolic heart failure    HIEU treated with BiPAP  Non complaint with home Bipap.s  he was consulted on this.  She doing better with Bipap compliance      Essential hypertension  Chronic, controlled.   Continue home metoprolol    Latest blood pressure and vitals reviewed-     Temp:  [97.5 °F (36.4 °C)-99 °F (37.2 °C)]   Pulse:  []   Resp:  [12-20]   BP: (121-139)/(77-88)   SpO2:  [87 %-95 %] .   Home meds for hypertension were reviewed and noted below.   Hypertension Medications               furosemide (LASIX) 40 MG tablet Take 1 tablet (40 mg total) by mouth once daily.    losartan (COZAAR) 25 MG tablet TAKE 1 TABLET(25 MG) BY MOUTH EVERY DAY    metoprolol succinate (TOPROL-XL) 100 MG 24 hr tablet Take 1 tablet (100 mg total) by mouth once daily.            While in the hospital, will manage blood pressure as follows; Continue home antihypertensive regimen    Will utilize p.r.n. blood pressure medication only if patient's blood pressure greater than 160/100 and she develops symptoms such as worsening chest pain or shortness of breath.    Paroxysmal atrial fibrillation  Patient with Persistent (7 days or more) atrial fibrillation which is controlled currently with Beta Blocker. Patient is currently in atrial fibrillation.[id number] Score: 4. HASBLED Score: 3. Anticoagulation indicated. Anticoagulation done with Xalreto  .    -continue home Xarelto and metoprolol succinate   -continue to monitor on telemetry    Chronic kidney disease, stage 3a  Creatine stable for now. BMP reviewed- noted Estimated Creatinine Clearance: 60.5 mL/min (based on SCr of 1.1 mg/dL). according to latest data. Based on current GFR, CKD stage is stage 3 - GFR 30-59.  Monitor UOP and serial BMP and adjust therapy as needed. Renally dose meds. Avoid nephrotoxic medications and procedures.    PVD (peripheral vascular disease)  On medical treatments.      Pre-diabetes  Will monitor.  A1C 5.8 on 10/27/23    Severe obesity (BMI >= 40)  Body mass index is 46.77 kg/m². Morbid obesity complicates all aspects of disease management from diagnostic modalities to treatment. Weight loss encouraged and health benefits explained to patient.         RSV infection  +RSV on admission  Was in contact with her Grandchild, who also tested positive  Continue droplet precautions      Goals of care, counseling/discussion  Advance Care Planning    Code Status  I engaged the the patient in a voluntary conversation about the patient's preferences for care  at the very end of life. The patient wishes to have CPR and other invasive treatments performed when her heart and/or breathing stops. I communicated to the patient that her wishes align with full code status. She agrees. I spent a total of 16 minutes engaging the patient in this advance care planning discussion.             VTE Risk Mitigation (From admission, onward)           Ordered     rivaroxaban tablet 20 mg  With dinner         11/13/23 1633                    Discharge Planning   JACQUELINE: 11/20/2023     Code Status: Full Code   Is the patient medically ready for discharge?:     Reason for patient still in hospital (select all that apply): Patient trending condition, Treatment, and Consult recommendations  Discharge Plan A: Home Health   Discharge Delays: None known at this time              Leroy Hua DO  Department of  [facility] - Telemetry

## 2023-11-24 NOTE — ASSESSMENT & PLAN NOTE
Chronic, controlled.   Continue home metoprolol    Latest blood pressure and vitals reviewed-     Temp:  [97.5 °F (36.4 °C)-99 °F (37.2 °C)]   Pulse:  []   Resp:  [12-20]   BP: (121-139)/(77-88)   SpO2:  [87 %-95 %] .   Home meds for hypertension were reviewed and noted below.   Hypertension Medications               furosemide (LASIX) 40 MG tablet Take 1 tablet (40 mg total) by mouth once daily.    losartan (COZAAR) 25 MG tablet TAKE 1 TABLET(25 MG) BY MOUTH EVERY DAY    metoprolol succinate (TOPROL-XL) 100 MG 24 hr tablet Take 1 tablet (100 mg total) by mouth once daily.            While in the hospital, will manage blood pressure as follows; Continue home antihypertensive regimen    Will utilize p.r.n. blood pressure medication only if patient's blood pressure greater than 160/100 and she develops symptoms such as worsening chest pain or shortness of breath.

## 2023-11-24 NOTE — PLAN OF CARE
[facility] has accepted pt.   11/24/23 1400   Post-Acute Status   Post-Acute Authorization Home Health   Home Health Status Set-up Complete/Auth obtained   Coverage BCBS   Patient choice form signed by patient/caregiver List from System Post-Acute Care   Discharge Delays (!) Post-Acute Set-up   Discharge Plan   Discharge Plan A Home Health   Discharge Plan B Home with family

## 2023-11-24 NOTE — ASSESSMENT & PLAN NOTE
Non complaint with home Bipap.s  he was consulted on this.  She doing better with Bipap compliance

## 2023-11-24 NOTE — PT/OT/SLP PROGRESS
Physical Therapy Treatment    Patient Name:  Sandee Evans   MRN:  [id number]    Recommendations:     Discharge Recommendations: Low Intensity Therapy  Discharge Equipment Recommendations: none  Barriers to discharge: None    Assessment:     Sandee Evans is a 67 y.o. female admitted with a medical diagnosis of Acute respiratory failure with hypoxia and hypercapnia.  She presents with the following impairments/functional limitations: weakness, impaired endurance, impaired functional mobility, impaired cardiopulmonary response to activity.    Pt tolerated treatment good today. Pt ambulated ~36ft with no AD, supervision on RA with O2 sats ~92%. Pt continues to make progress towards goals.     O2 sats on 2LO2 NC at rest: 97%  O2 sats on RA at rest: 95%  O2 sats on RA with activity: 92%     Rehab Prognosis: Good; patient would benefit from acute skilled PT services to address these deficits and reach maximum level of function.    Recent Surgery: * No surgery found *      Plan:     During this hospitalization, patient to be seen 3 x/week to address the identified rehab impairments via gait training, therapeutic activities, therapeutic exercises and progress toward the following goals:    Plan of Care Expires:  11/23/23    Subjective     Chief Complaint: wearing oxygen if she doesn't need it  Patient/Family Comments/goals: Pt inquiring about BIPAP vs CPAP. Pt states she just got back in bed, but agreeable to ambulate with therapist to assess O2 sats. Pt reports her sinuses are acting up today.   Pain/Comfort:  Pain Rating 1: 0/10      Objective:     Communicated with pt's nurse, Shanna prior to session.  Patient found HOB elevated with PureWick, peripheral IV, telemetry, oxygen upon PT entry to room.     General Precautions: Standard, droplet, fall, respiratory  Orthopedic Precautions: N/A  Braces: N/A  Respiratory Status: Nasal cannula, flow 2 L/min     Functional Mobility:  Bed Mobility:     Scooting: modified  independence  Supine to Sit: modified independence  Transfers:  x1 from EOB   Sit to Stand:  modified independence with no AD  Gait: Pt ambulated ~36ft no AD, Supervision while managing purewick line. Pt on RA with no signs of SOB or distress. O2 sats 92% on RA. Decreased dmitriy, step length and velocity of limb.   Balance: good sitting and standing      AM-PAC 6 CLICK MOBILITY  Turning over in bed (including adjusting bedclothes, sheets and blankets)?: 4  Sitting down on and standing up from a chair with arms (e.g., wheelchair, bedside commode, etc.): 4  Moving from lying on back to sitting on the side of the bed?: 4  Moving to and from a bed to a chair (including a wheelchair)?: 4  Need to walk in hospital room?: 4  Climbing 3-5 steps with a railing?: 4  Basic Mobility Total Score: 24       Treatment & Education:    Pt educated on PTA role/POC.   Importance of OOB activity with staff assistance.  Importance of sitting up in the chair throughout the day as tolerated, especially for meals   Safety during functional t/f and mobility   White board updated   Multiple self-care tasks/functional mobility completed- assistance level noted above   All questions/concerns answered within scope of practice      Patient left HOB elevated with tray table and all needs within reach with all lines intact, call button in reach, and pt's nurse, Shanna, notified..    GOALS:   Multidisciplinary Problems       Physical Therapy Goals          Problem: Physical Therapy    Goal Priority Disciplines Outcome Goal Variances Interventions   Physical Therapy Goal     PT, PT/OT Ongoing, Progressing     Description: Goals to be met by: 23     Patient will increase functional independence with mobility by performin. Pt to be mod I with bed mobility.  2. Pt to transfer with (S).  3. Pt to ambulate 50' w/RW SBA.  4. Pt to be (I) with written HEP.                         Time Tracking:     PT Received On: 23  PT Start Time: 3664      PT Stop Time: 1101  PT Total Time (min): 17 min     Billable Minutes: Gait Training 17    Treatment Type: Treatment  PT/PTA: PTA     Number of PTA visits since last PT visit: 4     11/24/2023

## 2023-11-24 NOTE — PLAN OF CARE
No new needs identified. Per MD, pt may discharge tomorrow. CM will assist as needed.     11/24/23 1204   Discharge Reassessment   Assessment Type Discharge Planning Reassessment   Did the patient's condition or plan change since previous assessment? No   Discharge Plan discussed with: Patient   Discharge Plan A Home Health   Discharge Plan B Home with family   DME Needed Upon Discharge  other (see comments)  (tbd)   Transition of Care Barriers None   Why the patient remains in the hospital Requires continued medical care   Post-Acute Status   Discharge Delays None known at this time

## 2023-11-24 NOTE — PLAN OF CARE
Problem: Physical Therapy  Goal: Physical Therapy Goal  Description: Goals to be met by: 23     Patient will increase functional independence with mobility by performin. Pt to be mod I with bed mobility.  2. Pt to transfer with (S).  3. Pt to ambulate 50' w/RW SBA.  4. Pt to be (I) with written HEP.    Outcome: Ongoing, Progressing     Pt tolerated treatment good today. Pt ambulated ~36ft with no AD, supervision on RA with O2 sats ~92%. Pt continues to make progress towards goals.     O2 sats on 2LO2 NC at rest: 97%  O2 sats on RA at rest: 95%  O2 sats on RA with activity: 92%

## 2023-11-24 NOTE — ASSESSMENT & PLAN NOTE
Mild obstruction on PFTs.  Wheezing on initial exam, now resolved.  Duonebs and Prednisone restarted can complete a 5 day course.   Will need follow up with Dr. Puente outpatient who she has seen before.

## 2023-11-25 VITALS
RESPIRATION RATE: 19 BRPM | SYSTOLIC BLOOD PRESSURE: 132 MMHG | HEIGHT: 63 IN | BODY MASS INDEX: 46.78 KG/M2 | TEMPERATURE: 98 F | WEIGHT: 264 LBS | HEART RATE: 85 BPM | OXYGEN SATURATION: 94 % | DIASTOLIC BLOOD PRESSURE: 72 MMHG

## 2023-11-25 LAB
ANION GAP SERPL CALC-SCNC: 15 MMOL/L (ref 8–16)
BUN SERPL-MCNC: 39 MG/DL (ref 8–23)
CALCIUM SERPL-MCNC: 9.2 MG/DL (ref 8.7–10.5)
CHLORIDE SERPL-SCNC: 91 MMOL/L (ref 95–110)
CO2 SERPL-SCNC: 36 MMOL/L (ref 23–29)
CREAT SERPL-MCNC: 0.9 MG/DL (ref 0.5–1.4)
EST. GFR  (NO RACE VARIABLE): >60 ML/MIN/1.73 M^2
GLUCOSE SERPL-MCNC: 122 MG/DL (ref 70–110)
MAGNESIUM SERPL-MCNC: 1.7 MG/DL (ref 1.6–2.6)
POTASSIUM SERPL-SCNC: 3.5 MMOL/L (ref 3.5–5.1)
SODIUM SERPL-SCNC: 142 MMOL/L (ref 136–145)

## 2023-11-25 PROCEDURE — 27000221 HC OXYGEN, UP TO 24 HOURS

## 2023-11-25 PROCEDURE — 94761 N-INVAS EAR/PLS OXIMETRY MLT: CPT

## 2023-11-25 PROCEDURE — 25000003 PHARM REV CODE 250: Performed by: HOSPITALIST

## 2023-11-25 PROCEDURE — 25000242 PHARM REV CODE 250 ALT 637 W/ HCPCS: Performed by: HOSPITALIST

## 2023-11-25 PROCEDURE — 80048 BASIC METABOLIC PNL TOTAL CA: CPT | Performed by: STUDENT IN AN ORGANIZED HEALTH CARE EDUCATION/TRAINING PROGRAM

## 2023-11-25 PROCEDURE — 94640 AIRWAY INHALATION TREATMENT: CPT

## 2023-11-25 PROCEDURE — 83735 ASSAY OF MAGNESIUM: CPT | Performed by: STUDENT IN AN ORGANIZED HEALTH CARE EDUCATION/TRAINING PROGRAM

## 2023-11-25 PROCEDURE — 36415 COLL VENOUS BLD VENIPUNCTURE: CPT | Performed by: STUDENT IN AN ORGANIZED HEALTH CARE EDUCATION/TRAINING PROGRAM

## 2023-11-25 PROCEDURE — 25000003 PHARM REV CODE 250: Performed by: STUDENT IN AN ORGANIZED HEALTH CARE EDUCATION/TRAINING PROGRAM

## 2023-11-25 PROCEDURE — 94660 CPAP INITIATION&MGMT: CPT

## 2023-11-25 PROCEDURE — 63600175 PHARM REV CODE 636 W HCPCS: Performed by: STUDENT IN AN ORGANIZED HEALTH CARE EDUCATION/TRAINING PROGRAM

## 2023-11-25 PROCEDURE — 99900035 HC TECH TIME PER 15 MIN (STAT)

## 2023-11-25 RX ORDER — METHYLPREDNISOLONE 4 MG/1
TABLET ORAL
Qty: 21 EACH | Refills: 0 | Status: SHIPPED | OUTPATIENT
Start: 2023-11-25

## 2023-11-25 RX ORDER — ACETAMINOPHEN 500 MG
5 TABLET ORAL NIGHTLY
Qty: 90 TABLET | Refills: 3 | Status: SHIPPED | OUTPATIENT
Start: 2023-11-25

## 2023-11-25 RX ADMIN — SODIUM CHLORIDE SOLN NEBU 3% 4 ML: 3 NEBU SOLN at 04:11

## 2023-11-25 RX ADMIN — ACETAMINOPHEN 650 MG: 325 TABLET ORAL at 10:11

## 2023-11-25 RX ADMIN — FUROSEMIDE 40 MG: 40 TABLET ORAL at 08:11

## 2023-11-25 RX ADMIN — IPRATROPIUM BROMIDE AND ALBUTEROL SULFATE 3 ML: 2.5; .5 SOLUTION RESPIRATORY (INHALATION) at 07:11

## 2023-11-25 RX ADMIN — FAMOTIDINE 20 MG: 20 TABLET ORAL at 08:11

## 2023-11-25 RX ADMIN — IPRATROPIUM BROMIDE AND ALBUTEROL SULFATE 3 ML: 2.5; .5 SOLUTION RESPIRATORY (INHALATION) at 12:11

## 2023-11-25 RX ADMIN — SODIUM CHLORIDE SOLN NEBU 3% 4 ML: 3 NEBU SOLN at 12:11

## 2023-11-25 RX ADMIN — METOPROLOL SUCCINATE 100 MG: 50 TABLET, EXTENDED RELEASE ORAL at 08:11

## 2023-11-25 RX ADMIN — SODIUM CHLORIDE SOLN NEBU 3% 4 ML: 3 NEBU SOLN at 07:11

## 2023-11-25 RX ADMIN — RIVAROXABAN 20 MG: 20 TABLET, FILM COATED ORAL at 05:11

## 2023-11-25 RX ADMIN — SERTRALINE HYDROCHLORIDE 100 MG: 50 TABLET ORAL at 08:11

## 2023-11-25 RX ADMIN — IPRATROPIUM BROMIDE AND ALBUTEROL SULFATE 3 ML: 2.5; .5 SOLUTION RESPIRATORY (INHALATION) at 04:11

## 2023-11-25 RX ADMIN — ATORVASTATIN CALCIUM 40 MG: 40 TABLET, FILM COATED ORAL at 08:11

## 2023-11-25 RX ADMIN — IPRATROPIUM BROMIDE AND ALBUTEROL SULFATE 3 ML: 2.5; .5 SOLUTION RESPIRATORY (INHALATION) at 03:11

## 2023-11-25 RX ADMIN — PREDNISONE 40 MG: 20 TABLET ORAL at 08:11

## 2023-11-25 RX ADMIN — FUROSEMIDE 40 MG: 40 TABLET ORAL at 05:11

## 2023-11-25 RX ADMIN — GUAIFENESIN AND DEXTROMETHORPHAN HYDROBROMIDE 1 TABLET: 600; 30 TABLET, EXTENDED RELEASE ORAL at 08:11

## 2023-11-25 RX ADMIN — FLUTICASONE PROPIONATE 100 MCG: 50 SPRAY, METERED NASAL at 08:11

## 2023-11-25 NOTE — PLAN OF CARE
Problem: Adult Inpatient Plan of Care  Goal: Plan of Care Review  Outcome: Met  Goal: Patient-Specific Goal (Individualized)  Outcome: Met  Goal: Absence of Hospital-Acquired Illness or Injury  Outcome: Met  Goal: Optimal Comfort and Wellbeing  Outcome: Met  Goal: Readiness for Transition of Care  Outcome: Met     Problem: Bariatric Environmental Safety  Goal: Safety Maintained with Care  Outcome: Met     Problem: Diabetes Comorbidity  Goal: Blood Glucose Level Within Targeted Range  Outcome: Met     Problem: Airway Clearance Ineffective  Goal: Effective Airway Clearance  Outcome: Met     Problem: Adjustment to Illness COPD (Chronic Obstructive Pulmonary Disease)  Goal: Optimal Chronic Illness Coping  Outcome: Met     Problem: Functional Ability Impaired COPD (Chronic Obstructive Pulmonary Disease)  Goal: Optimal Level of Functional [place]  Outcome: Met     Problem: Infection COPD (Chronic Obstructive Pulmonary Disease)  Goal: Absence of Infection Signs and Symptoms  Outcome: Met     Problem: Oral Intake Inadequate COPD (Chronic Obstructive Pulmonary Disease)  Goal: Improved Nutrition Intake  Outcome: Met     Problem: Respiratory Compromise COPD (Chronic Obstructive Pulmonary Disease)  Goal: Effective Oxygenation and Ventilation  Outcome: Met

## 2023-11-25 NOTE — NURSING
Walk Test for Home Oxygen:    SPO2@92% on RA, sitting with HR@84 bpm. Denies S/S.    SPO2@87% on RA, with exercise with HR@92 bpm. Reports lightheadedness.     SPO2@96% with O2@2L NC while ambulating.

## 2023-11-25 NOTE — PLAN OF CARE
Problem: Adult Inpatient Plan of Care  Goal: Plan of Care Review  Outcome: Ongoing, Progressing     Problem: Bariatric Environmental Safety  Goal: Safety Maintained with Care  Outcome: Ongoing, Progressing     Problem: Diabetes Comorbidity  Goal: Blood Glucose Level Within Targeted Range  Outcome: Ongoing, Progressing     Problem: Adjustment to Illness COPD (Chronic Obstructive Pulmonary Disease)  Goal: Optimal Chronic Illness Coping  Outcome: Ongoing, Progressing     Problem: Functional Ability Impaired COPD (Chronic Obstructive Pulmonary Disease)  Goal: Optimal Level of Functional [place]  Outcome: Ongoing, Progressing     Problem: Infection COPD (Chronic Obstructive Pulmonary Disease)  Goal: Absence of Infection Signs and Symptoms  Outcome: Ongoing, Progressing     Problem: Oral Intake Inadequate COPD (Chronic Obstructive Pulmonary Disease)  Goal: Improved Nutrition Intake  Outcome: Ongoing, Progressing     Problem: Respiratory Compromise COPD (Chronic Obstructive Pulmonary Disease)  Goal: Effective Oxygenation and Ventilation  Outcome: Ongoing, Progressing

## 2023-11-25 NOTE — PLAN OF CARE
Problem: Skin Injury Risk Increased  Goal: Skin Health and Integrity  Outcome: Met  Intervention: Optimize Skin Protection  Flowsheets (Taken 11/24/2023 1824)  Pressure Reduction Devices: pressure-redistributing mattress utilized  Head of Bed (HOB) Positioning: HOB elevated     Problem: Infection  Goal: Absence of Infection Signs and Symptoms  Outcome: Met  Intervention: Prevent or Manage Infection  Flowsheets (Taken 11/24/2023 1824)  Fever Reduction/Comfort Measures:   lightweight bedding   lightweight clothing  Isolation Precautions: precautions maintained

## 2023-11-25 NOTE — PLAN OF CARE
NurseShanna notified that all CM needs are met       11/25/23 5015   Final Note   Assessment Type Final Discharge Note   Anticipated Discharge Disposition Home   Hospital Resources/Appts/Education Provided Appointments scheduled and added to AVS   Post-Acute Status   Post-Acute Authorization Other   Discharge Delays None known at this time

## 2023-11-25 NOTE — NURSING
[facility]  Nurses Note -- 4 Eyes      11/24/2023       Skin assessed on: Q Shift      [x] No Pressure Injuries Present    [x]Prevention Measures Documented    [] Yes LDA  for Pressure Injury Previously documented     [] Yes New Pressure Injury Discovered   [] LDA for New Pressure Injury Added      Attending RN:  Maren Regalado RN     Second RN:  CLAUDIA Otero

## 2023-11-25 NOTE — PROGRESS NOTES
Dr. Jha  told the pt. He wanted her to have a walk test went I was at the pt bedside. And I told the Nurse I took her off to get a room air sat. Before she had the test.  That's when the nurse  to me she had already gave her a walk test.  I did not request for another one to be done.  I also told the nurse that may be the  Was not award that a walk test was already done.  The nurse told be when I gave the pt. Another aerosol tx. That she did another walk test with the pt.,  but the pt. Did not qualify for home 02.

## 2023-11-25 NOTE — DISCHARGE SUMMARY
"[facility] Medicine  Discharge Summary      Patient Name: Sandee Evans  MRN: [id number]  PATRICE: [id number]  Patient Class: IP- Inpatient  Admission Date: 11/13/2023  Hospital Length of Stay: 12 days  Discharge Date and Time:  11/25/2023 10:19 AM  Attending Physician: Steven Jha MD   Discharging Provider: Steven Jha MD  Primary Care Provider: Kuldeep Miller MD    Primary Care Team: Networked reference to record PCT     HPI:    67 y.o. female, with a past medical history of A-fib on OAC,diastolic  CHF, COPD, HLD, HTN, hypothyroidism, morbid obesity, pre-diabetes, and psychiatric problems, who presents to the ED with SOB onset 3 weeks ago. She reports SOB has gotten progressively worse over 3 weeks. Patient notes she has trouble ambulating d/t SOB. Patient notes being on an at-home BiPAP but denies daily use d/t the machine "smothering" her. Additional history provided by independent historian, relative, notes an O2 saturation in the 80's at-home PTA. Patient denies O2 at home. Patient notes an associated cough, congestion, diarrhea, rhinorrhea, sore throat, and headache.ABG in ER show hypercapnic,hypoxic  respiratory failure with PH of 7.2 and P C O2 of 72,PO 2 of 76,chest x ray show fluid overload,BNP is over 478,patient has been  started on continues Bipap and IV lasix,,admitted to ICU for close monitoring.    * No surgery found *      Hospital Course:   67 y.o. female, with a past medical history of A-fib on OAC,diastolic  CHF, COPD, HLD, HTN, hypothyroidism, morbid obesity admitted on 11/22/2023 for acute respiratory failure with hypoxia and hypercapnic (O2 sts 88% on RA,  PCO2 of 72) due to +RSV, COPD exacerbation, and acute on chronic diastolic heart failure. admitted to ICU on BIPAP. BNP > 400.  Chest imaging with bilateral effusions and peripheral edema. Hospital course complicated by agitation, required Precedex drip briefly.  Able to wean off Precedex drip. Pulmonology " consulted-suspect chronic hypercapnic respiratory failure but not using nightly NIV.  RSV positive and on droplet precaution.  Patient completed treatment with DuoNebs and prednisone on 11/17/2023.  Continue IV diuresis and wean O2 as tolerated. Pt still wheezing, resumed steroids with plan for gradual taper. Given patient continues to have wheezing, SOB, and diminished breath sounds, will consult pulmonology again for input.      Steroid dose pack sent to your pharmacy  Will do walk test before you go  If you need O2 this will just be for a short time due to RSV infection.  Your goal oxygen saturation is 88-92% with COPD (not higher).   Follow up with PCP as needed    Thank you for trusting [facility] and me with your care.  We are honored that you entrusted us with your healthcare needs. Your satisfaction is very important to us and we hope you have been very pleased with your experience at [facility]. After your discharge you may receive a survey asking you to rate your hospital experience. We encourage you to take the time to complete the survey as your feedback allows us to identify areas for improvement as well as recognize our staff for their care. We hope that you have received the very best care possible during your hospitalization at [facility], as your satisfaction is our top priority.    Let me know if there is anything more I can do!!        Steven Jha MD  Board-Certified Internal Medicine Attending  Section Head of Hospital Medicine    ROS:  General: Negative for fevers or chills.  Cardiac: Negative for chest pain or orthopnea   Pulmonary: Negative for dyspnea or wheezing.  GI: Negative for abdominal distention or pain     Vitals:    11/25/23 0312 11/25/23 0515 11/25/23 0733 11/25/23 0808   BP:  133/80  139/69   BP Location:  Right arm  Right arm   Patient Position:  Lying  Sitting   Pulse: 83 91 83 86   Resp: 18 19 18 20   Temp:  98.9 °F (37.2 °C)  98.6 °F (37 °C)    TempSrc:  Axillary  Oral   SpO2:  98% 99% 96%   Weight:       Height:              Body mass index is 46.77 kg/m².      PHYSICAL EXAM:  GENERAL APPEARANCE: alert and cooperative, and appears to be in no acute distress.  HEENT:     HEAD: NC/AT     EYES: PERRL, EOMI.  Vision is grossly intact.  NECK: Neck supple, non-tender without LAD, masses or thyromegaly.  CARDIAC: There is no peripheral edema, cyanosis or pallor.   LUNGS: Clear to auscultation and percussion without rales or wheezing  ABDOMEN: Non-distended. No guarding.  MSK: No joint erythema or tenderness.   EXTREMITIES: No significant deformity or joint abnormality. No edema.   NEUROLOGICAL: CN II-XII grossly intact.   SKIN: Skin normal color, texture and turgor with no lesions or eruptions.  PSYCHIATRIC: Oriented. No tangential speech. No Hyperactive features.    Goals of Care Treatment Preferences:  Code Status: Full Code          What is most important right now is to focus on avoiding the hospital, remaining as independent as possible, quality of life, even if it means sacrificing a little time.  Accordingly, we have decided that the best plan to meet the patient's goals includes continuing with treatment.      Consults:   Consults (From admission, onward)          Status Ordering Provider     Inpatient consult to Pulmonology  Once        Provider:  Yoanna Rhodes MD    Completed CHALINO PECK     Inpatient consult to Social Work/Case Management  Once        Provider:  (Not yet assigned)    Completed CHALINO PECK     Inpatient consult to Registered Dietitian/Nutritionist  Once        Provider:  (Not yet assigned)    Completed CHALINO PECK     Inpatient consult to Midline team  Once        Provider:  (Not yet assigned)    Acknowledged GRISEL DYER     Inpatient consult to Palliative Care  Once        Provider:  Kaylin Spence MD    Completed GRISEL DYER     Inpatient consult to Spiritual Care  Once        Provider:   (Not yet assigned)    Completed GRISEL DYER consult to case management  Once        Provider:  (Not yet assigned)    Completed FAWN GALE            No new Assessment & Plan notes have been filed under this hospital service since the last note was generated.  Service: Hospital Medicine    Final Active Diagnoses:    Diagnosis Date Noted POA    PRINCIPAL PROBLEM:  Acute respiratory failure with hypoxia and hypercapnia [J96.01, J96.02] 11/13/2023 Yes    RSV infection [B33.8] 11/22/2023 Yes    Chronic obstructive pulmonary disease with acute exacerbation [J44.1] 11/13/2023 Yes    Chronic kidney disease, stage 3a [N18.31] 06/13/2022 Yes    Pulmonary hypertension [I27.20] 07/20/2021 Yes    Acute on chronic diastolic CHF (congestive heart failure) [I50.33] 07/16/2021 Yes    HIEU treated with BiPAP [G47.33] 09/03/2020 Yes    Goals of care, counseling/discussion [Z71.89] 06/25/2019 Not Applicable    PVD (peripheral vascular disease) [I73.9] 05/28/2018 Yes    Paroxysmal atrial fibrillation [I48.0] 03/20/2015 Yes    Pre-diabetes [R73.03] 08/09/2013 Yes    Essential hypertension [I10] 01/11/2013 Yes    Severe obesity (BMI >= 40) [E66.01]  Yes      Problems Resolved During this Admission:       Discharged Condition: good    Disposition: Home or Self Care    Follow Up:   Follow-up Information       [place], [facility] - Follow up.    Specialty: Home Health Services  Why: Office will call patient with a date and time.  Contact information:  [address] [place] [address].  [address]  [place] [address] [phone]                           Patient Instructions:      Ambulatory referral/consult to HOME Palliative Care   Standing Status: Future   Referral Priority: Routine Referral Type: Consultation   Referral Reason: Other   Requested Specialty: Hospice and Palliative Medicine   Number of Visits Requested: 1     Ambulatory referral/consult to Internal Medicine   Standing Status: Future   Referral Priority: Routine  Referral Type: Consultation   Referral Reason: Specialty Services Required   Requested Specialty: Internal Medicine   Number of Visits Requested: 1       Significant Diagnostic Studies:   Recent Results (from the past 100 hour(s))   CBC Auto Differential    Collection Time: 11/22/23  5:13 AM   Result Value Ref Range    WBC 12.07 3.90 - 12.70 K/uL    RBC 5.02 4.00 - 5.40 M/uL    Hemoglobin 15.5 12.0 - 16.0 g/dL    Hematocrit 49.2 (H) 37.0 - 48.5 %    MCV 98 82 - 98 fL    MCH 30.9 27.0 - 31.0 pg    MCHC 31.5 (L) 32.0 - 36.0 g/dL    RDW 13.6 11.5 - 14.5 %    Platelets 266 150 - 450 K/uL    MPV 10.6 9.2 - 12.9 fL    Immature Granulocytes 0.8 (H) 0.0 - 0.5 %    Gran # (ANC) 9.5 (H) 1.8 - 7.7 K/uL    Immature Grans (Abs) 0.10 (H) 0.00 - 0.04 K/uL    Lymph # 1.2 1.0 - 4.8 K/uL    Mono # 1.2 (H) 0.3 - 1.0 K/uL    Eos # 0.0 0.0 - 0.5 K/uL    Baso # 0.01 0.00 - 0.20 K/uL    nRBC 0 0 /100 WBC    Gran % 79.1 (H) 38.0 - 73.0 %    Lymph % 9.9 (L) 18.0 - 48.0 %    Mono % 10.1 4.0 - 15.0 %    Eosinophil % 0.0 0.0 - 8.0 %    Basophil % 0.1 0.0 - 1.9 %    Differential Method Automated    Basic Metabolic Panel    Collection Time: 11/22/23  5:13 AM   Result Value Ref Range    Sodium 143 136 - 145 mmol/L    Potassium 3.5 3.5 - 5.1 mmol/L    Chloride 96 95 - 110 mmol/L    CO2 40 (H) 23 - 29 mmol/L    Glucose 143 (H) 70 - 110 mg/dL    BUN 57 (H) 8 - 23 mg/dL    Creatinine 1.3 0.5 - 1.4 mg/dL    Calcium 9.2 8.7 - 10.5 mg/dL    Anion Gap 7 (L) 8 - 16 mmol/L    eGFR 45 (A) >60 mL/min/1.73 m^2   Basic Metabolic Panel    Collection Time: 11/24/23  5:16 AM   Result Value Ref Range    Sodium 144 136 - 145 mmol/L    Potassium 3.3 (L) 3.5 - 5.1 mmol/L    Chloride 92 (L) 95 - 110 mmol/L    CO2 38 (H) 23 - 29 mmol/L    Glucose 132 (H) 70 - 110 mg/dL    BUN 46 (H) 8 - 23 mg/dL    Creatinine 1.1 0.5 - 1.4 mg/dL    Calcium 9.2 8.7 - 10.5 mg/dL    Anion Gap 14 8 - 16 mmol/L    eGFR 54 (A) >60 mL/min/1.73 m^2   Magnesium    Collection Time: 11/24/23   5:16 AM   Result Value Ref Range    Magnesium 1.8 1.6 - 2.6 mg/dL   Basic Metabolic Panel    Collection Time: 11/25/23  5:32 AM   Result Value Ref Range    Sodium 142 136 - 145 mmol/L    Potassium 3.5 3.5 - 5.1 mmol/L    Chloride 91 (L) 95 - 110 mmol/L    CO2 36 (H) 23 - 29 mmol/L    Glucose 122 (H) 70 - 110 mg/dL    BUN 39 (H) 8 - 23 mg/dL    Creatinine 0.9 0.5 - 1.4 mg/dL    Calcium 9.2 8.7 - 10.5 mg/dL    Anion Gap 15 8 - 16 mmol/L    eGFR >60 >60 mL/min/1.73 m^2   Magnesium    Collection Time: 11/25/23  5:32 AM   Result Value Ref Range    Magnesium 1.7 1.6 - 2.6 mg/dL       Microbiology Results (last 7 days)       ** No results found for the last 168 hours. **            Imaging Results              X-Ray Chest AP Portable (Final result)  Result time 11/13/23 11:33:01      Final result by Vaibhav Gay Jr., MD (11/13/23 11:33:01)                   Impression:      No acute cardiopulmonary disease      Electronically signed by: Vaibhav Thompson Jr  Date:    11/13/2023  Time:    11:33               Narrative:    EXAMINATION:  XR CHEST AP PORTABLE    CLINICAL HISTORY:  chest pain;    TECHNIQUE:  Single frontal view of the chest was performed.    COMPARISON:  08/02/2023    FINDINGS:  Cardiomediastinal silhouetteremains borderline enlarged, particularly the left atrium.  There is aortic atherosclerosis.    Low lung volumes crowd the pulmonary vascular markings.  No focal airspace opacity seen.  Stable chronic elevation of the right hemidiaphragm likely with some associated right basilar atelectasis.                                          Pending Diagnostic Studies:       None           Medications:  Reconciled Home Medications:      Medication List        START taking these medications      methylPREDNISolone 4 mg tablet  Commonly known as: MEDROL DOSEPACK  use as directed            CONTINUE taking these medications      albuterol 90 mcg/actuation inhaler  Commonly known as: PROVENTIL/VENTOLIN HFA  2 puffs  every 6 (six) hours as needed.     albuterol-ipratropium 2.5 mg-0.5 mg/3 mL nebulizer solution  Commonly known as: DUO-NEB  Take 3 mLs by nebulization every 6 (six) hours as needed for Wheezing. Rescue     atorvastatin 40 MG tablet  Commonly known as: LIPITOR  TAKE 1 TABLET(40 MG) BY MOUTH EVERY DAY     azelastine 137 mcg (0.1 %) nasal spray  Commonly known as: ASTELIN  1 spray (137 mcg total) by Nasal route 2 (two) times daily.     buPROPion 150 MG TB24 tablet  Commonly known as: WELLBUTRIN XL  TAKE 1 TABLET(150 MG) BY MOUTH EVERY DAY     cetirizine 10 MG tablet  Commonly known as: ZYRTEC  Take 1 tablet (10 mg total) by mouth once daily.     fluticasone propionate 50 mcg/actuation nasal spray  Commonly known as: FLONASE  SHAKE LIQUID AND USE 2 SPRAYS(100 MCG) IN EACH NOSTRIL EVERY DAY     furosemide 40 MG tablet  Commonly known as: LASIX  Take 1 tablet (40 mg total) by mouth once daily.     ketoconazole 2 % cream  Commonly known as: NIZORAL  Apply topically once daily.     losartan 25 MG tablet  Commonly known as: COZAAR  TAKE 1 TABLET(25 MG) BY MOUTH EVERY DAY     metoprolol succinate 100 MG 24 hr tablet  Commonly known as: TOPROL-XL  Take 1 tablet (100 mg total) by mouth once daily.     sertraline 100 MG tablet  Commonly known as: ZOLOFT  TAKE 1 TABLET(100 MG) BY MOUTH EVERY DAY     triamcinolone acetonide 0.1% 0.1 % ointment  Commonly known as: KENALOG  APPLY BETWEEN THE KNEES AND UPPER THIGHS TWICE DAILY FOR NO MORE THAN 7 TO 14 DAYS     XARELTO 20 mg Tab  Generic drug: rivaroxaban  TAKE 1 TABLET(20 MG) BY MOUTH EVERY DAY            ASK your doctor about these medications      SPIRIVA RESPIMAT 2.5 mcg/actuation inhaler  Generic drug: tiotropium bromide  INHALE 2 PUFFS BY MOUTH DAILY              Indwelling Lines/Drains at time of discharge:   Lines/Drains/Airways       Drain  Duration             Female External Urinary Catheter 11/13/23 1900 11 days                    Time spent on the discharge of patient: 35  minutes         Steven Jha MD  Department of Hospital Medicine  [facility] - Telemetry

## 2023-11-25 NOTE — NURSING
Discharge Preparation:  Note that patient awake, alert and fully oriented with spouse at bedside.   Patient aware of discharge.  Case management coordination complete.    Left midline and telemetry monitor removed with no bleeding to IV site.     Reviewed discharge plan with family and they voiced understanding of same.

## 2023-11-25 NOTE — DISCHARGE INSTRUCTIONS
Steroid dose pack sent to your pharmacy  Will do walk test before you go  If you need O2 this will just be for a short time due to RSV infection.  Your goal oxygen saturation is 88-92% with COPD (not higher).   Follow up with PCP as needed    Thank you for trusting [facility] and me with your care.  We are honored that you entrusted us with your healthcare needs. Your satisfaction is very important to us and we hope you have been very pleased with your experience at [facility]. After your discharge you may receive a survey asking you to rate your hospital experience. We encourage you to take the time to complete the survey as your feedback allows us to identify areas for improvement as well as recognize our staff for their care. We hope that you have received the very best care possible during your hospitalization at [facility], as your satisfaction is our top priority.    Let me know if there is anything more I can do!!        Steven Jha MD  Board-Certified Internal Medicine Attending  Section Head of Hospital Medicine      PATIENT GENERAL DISCHARGE INFORMATION   Things that YOU are responsible for to Manage Your Care At Home:  1. Getting your prescriptions filled.  2. Taking you medications as directed. (DO NOT MISS ANY DOSES!)  3. Going to your follow-up doctor appointments.                 *This is important because it allows the doctor to monitor your progress and make changes.      If you are unable to make your follow up appointments, please call the number listed and reschedule this appointment.   After discharge, if you need assistance, you can call [facility] for 24/7 assistance at [phone]  If you are experience any signs or symptoms, Call your doctor or Call [phone] and come to your nearest Emergency Room.    You should receive a call from [facility] within 48-72 hours to help manage your care after discharge.   Please try to make  sure that you answer your phone for this important phone call.

## 2023-11-25 NOTE — NURSING
RT Villatoro asked for a subsequent walk test for home oxygen:     SPO2@ 93% on RA at rest.    SPO2@90% on RA with ambulation.

## 2023-11-28 NOTE — PLAN OF CARE
OSCAR received a call from [place] with [facility] requesting home health order. OSCAR notified MD and order was placed. OSCAR notified Kathy.

## 2023-11-28 NOTE — PLAN OF CARE
[facility] - [place] - Telemetry      HOME HEALTH ORDERS  FACE TO FACE ENCOUNTER    Patient Name: Sandee Evans  YOB: 1955    PCP: Kuldeep Miller MD   PCP Address: [address] / [place] [address]  PCP Phone Number: [phone]  PCP Fax: [phone]    Encounter Date: 11/13/23    Admit to Home Health    Diagnoses:  Active Hospital Problems    Diagnosis  POA    *Acute respiratory failure with hypoxia and hypercapnia [J96.01, J96.02]  Yes    RSV infection [B33.8]  Yes    Chronic obstructive pulmonary disease with acute exacerbation [J44.1]  Yes    Chronic kidney disease, stage 3a [N18.31]  Yes    Pulmonary hypertension [I27.20]  Yes     ECHO 7/20/2021: EF 60% pasp 49 mmHg, +AF, Cardiopulmonary and HIEU      Acute on chronic diastolic CHF (congestive heart failure) [I50.33]  Yes    HIEU treated with BiPAP [G47.33]  Yes     AHI 90  Currently with bipap st 18/7  with rate of 12  Poor compliance due to nasal congestion and lack of supplies.        Goals of care, counseling/discussion [Z71.89]  Not Applicable    PVD (peripheral vascular disease) [I73.9]  Yes    Paroxysmal atrial fibrillation [I48.0]  Yes    Pre-diabetes [R73.03]  Yes    Essential hypertension [I10]  Yes    Severe obesity (BMI >= 40) [E66.01]  Yes     Patient is aware of need for weight loss  and has been asked to make an effort to improve diet and interested in bariatric medicine          Resolved Hospital Problems   No resolved problems to display.       Follow Up Appointments:  Future Appointments   Date Time Provider Department Center   12/27/2023 10:30 AM [facility]ydia Hung [facility] [place]   3/4/2024  8:00 AM Sebastian Puente MD [facility] PULSM [facility] - [place], [place]   7/30/2024 10:00 AM [facility] MAMMO2 [facility] MAMMO [facility] [place], [place]       Allergies:  Review of patient's allergies indicates:   Allergen Reactions    Ace inhibitors      Cough         Medications: Review discharge medications with patient and family and provide education.    No current  facility-administered medications for this encounter.     Current Outpatient Medications   Medication Sig Dispense Refill    cetirizine (ZYRTEC) 10 MG tablet Take 1 tablet (10 mg total) by mouth once daily. 90 tablet 3    fluticasone propionate (FLONASE) 50 mcg/actuation nasal spray SHAKE LIQUID AND USE 2 SPRAYS(100 MCG) IN EACH NOSTRIL EVERY DAY 48 g 0    albuterol (PROVENTIL/VENTOLIN HFA) 90 mcg/actuation inhaler 2 puffs every 6 (six) hours as needed.      albuterol-ipratropium (DUO-NEB) 2.5 mg-0.5 mg/3 mL nebulizer solution Take 3 mLs by nebulization every 6 (six) hours as needed for Wheezing. Rescue 75 mL 0    atorvastatin (LIPITOR) 40 MG tablet TAKE 1 TABLET(40 MG) BY MOUTH EVERY DAY 90 tablet 0    azelastine (ASTELIN) 137 mcg (0.1 %) nasal spray 1 spray (137 mcg total) by Nasal route 2 (two) times daily. 30 mL 3    buPROPion (WELLBUTRIN XL) 150 MG TB24 tablet TAKE 1 TABLET(150 MG) BY MOUTH EVERY DAY 90 tablet 3    furosemide (LASIX) 40 MG tablet Take 1 tablet (40 mg total) by mouth once daily. 90 tablet 3    ketoconazole (NIZORAL) 2 % cream Apply topically once daily. 60 g 2    losartan (COZAAR) 25 MG tablet TAKE 1 TABLET(25 MG) BY MOUTH EVERY DAY 90 tablet 3    melatonin (MELATIN) 5 mg Take 1 tablet (5 mg total) by mouth nightly. 90 tablet 3    methylPREDNISolone (MEDROL DOSEPACK) 4 mg tablet use as directed 21 each 0    metoprolol succinate (TOPROL-XL) 100 MG 24 hr tablet Take 1 tablet (100 mg total) by mouth once daily. 90 tablet 3    sertraline (ZOLOFT) 100 MG tablet TAKE 1 TABLET(100 MG) BY MOUTH EVERY DAY 90 tablet 3    SPIRIVA RESPIMAT 2.5 mcg/actuation inhaler INHALE 2 PUFFS BY MOUTH DAILY (Patient not taking: Reported on 10/18/2023) 4 g 5    triamcinolone acetonide 0.1% (KENALOG) 0.1 % ointment APPLY BETWEEN THE KNEES AND UPPER THIGHS TWICE DAILY FOR NO MORE THAN 7 TO 14 DAYS 454 g 1    XARELTO 20 mg Tab TAKE 1 TABLET(20 MG) BY MOUTH EVERY DAY 90 tablet 3     Discharge Medication List as of 11/25/2023   5:23 PM        START taking these medications    Details   melatonin (MELATIN) 5 mg Take 1 tablet (5 mg total) by mouth nightly., Starting Sat 11/25/2023, Normal      methylPREDNISolone (MEDROL DOSEPACK) 4 mg tablet use as directed, Normal           CONTINUE these medications which have NOT CHANGED    Details   cetirizine (ZYRTEC) 10 MG tablet Take 1 tablet (10 mg total) by mouth once daily., Starting Mon 8/15/2022, Until Mon 11/13/2023, Normal      fluticasone propionate (FLONASE) 50 mcg/actuation nasal spray SHAKE LIQUID AND USE 2 SPRAYS(100 MCG) IN EACH NOSTRIL EVERY DAY, Normal      albuterol (PROVENTIL/VENTOLIN HFA) 90 mcg/actuation inhaler 2 puffs every 6 (six) hours as needed., Starting Sat 3/18/2023, Historical Med      albuterol-ipratropium (DUO-NEB) 2.5 mg-0.5 mg/3 mL nebulizer solution Take 3 mLs by nebulization every 6 (six) hours as needed for Wheezing. Rescue, Starting Wed 10/18/2023, Until Thu 10/17/2024 at 2359, Normal      atorvastatin (LIPITOR) 40 MG tablet TAKE 1 TABLET(40 MG) BY MOUTH EVERY DAY, Normal      azelastine (ASTELIN) 137 mcg (0.1 %) nasal spray 1 spray (137 mcg total) by Nasal route 2 (two) times daily., Starting Thu 4/13/2023, Normal      buPROPion (WELLBUTRIN XL) 150 MG TB24 tablet TAKE 1 TABLET(150 MG) BY MOUTH EVERY DAY, Starting Fri 10/27/2023, Normal      furosemide (LASIX) 40 MG tablet Take 1 tablet (40 mg total) by mouth once daily., Starting Tue 5/9/2023, Normal      ketoconazole (NIZORAL) 2 % cream Apply topically once daily., Starting Fri 7/28/2023, Normal      losartan (COZAAR) 25 MG tablet TAKE 1 TABLET(25 MG) BY MOUTH EVERY DAY, Normal      metoprolol succinate (TOPROL-XL) 100 MG 24 hr tablet Take 1 tablet (100 mg total) by mouth once daily., Starting Wed 7/5/2023, Normal      sertraline (ZOLOFT) 100 MG tablet TAKE 1 TABLET(100 MG) BY MOUTH EVERY DAY, Starting Fri 9/22/2023, Normal      SPIRIVA RESPIMAT 2.5 mcg/actuation inhaler INHALE 2 PUFFS BY MOUTH DAILY, Normal       triamcinolone acetonide 0.1% (KENALOG) 0.1 % ointment APPLY BETWEEN THE KNEES AND UPPER THIGHS TWICE DAILY FOR NO MORE THAN 7 TO 14 DAYS, Normal      XARELTO 20 mg Tab TAKE 1 TABLET(20 MG) BY MOUTH EVERY DAY, Starting u 9/28/2023, Normal               I have seen and examined this patient within the last 30 days. My clinical findings that support the need for the home health skilled services and home bound status are the following:no   Weakness/numbness causing balance and gait disturbance due to COPD Exacerbation and Weakness/Debility making it taxing to leave home.  Requiring assistive device to leave home due to unsteady gait caused by  COPD Exacerbation and Weakness/Debility.     Diet:   cardiac diet and diabetic diet 2000 calorie    Labs:  N/a    Referrals/ Consults  Physical Therapy to evaluate and treat. Evaluate for home safety and equipment needs; Establish/upgrade home exercise program. Perform / instruct on therapeutic exercises, gait training, transfer training, and Range of Motion.  Occupational Therapy to evaluate and treat. Evaluate home environment for safety and equipment needs. Perform/Instruct on transfers, ADL training, ROM, and therapeutic exercises.   to evaluate for community resources/long-range planning.  Aide to provide assistance with personal care, ADLs, and vital signs.    Activities:   activity as tolerated    Nursing:   Agency to admit patient within 24 hours of hospital discharge unless specified on physician order or at patient request    SN to complete comprehensive assessment including routine vital signs. Instruct on disease process and s/s of complications to report to MD. Review/verify medication list sent home with the patient at time of discharge  and instruct patient/caregiver as needed. Frequency may be adjusted depending on start of care date.     Skilled nurse to perform up to 3 visits PRN for symptoms related to diagnosis    Notify MD if SBP > 160 or <  90; DBP > 90 or < 50; HR > 120 or < 50; Temp > 101; O2 < 88%; Other:      Ok to schedule additional visits based on staff availability and patient request on consecutive days within the home health episode.    When multiple disciplines ordered:    Start of Care occurs on Sunday - Wednesday schedule remaining discipline evaluations as ordered on separate consecutive days following the start of care.    Thursday SOC -schedule subsequent evaluations Friday and Monday the following week.     Friday - Saturday SOC - schedule subsequent discipline evaluations on consecutive days starting Monday of the following week.    For all post-discharge communication, lab results, vitals, and subsequent orders- please contact patient's PCP.  If unable to get ahold of PCP, and question is about blood pressure, diuresis, or arrhythmia attempt to contact cardiologist.  If unable to get ahold of PCP, and question is about dialysis, please attempt to contact nephrologist.  If unable to get ahold of PCP, and question is about antibiotics or wound care, please contact infectious disease doctor.  If unable to get ahold of PCP, and question is about oxygen titration, CPAP or BIPAP, please contact pulmonologist.   If unable to get ahold of PCP or the above physicians in a reasonable time, please contact  on-call for clarification.    Home Health Aide:  Nursing Twice weekly  Physical Therapy Three times weekly  Occupational Therapy Three times weekly  Medical Social Work Weekly  Home Health Aide Twice weekly    Wound Care Orders  no     I certify that this patient is confined to her home and needs intermittent skilled nursing care, physical therapy, and occupational therapy.        Steven Jha MD  Internal Medicine Staff

## 2023-11-29 ENCOUNTER — TELEPHONE (OUTPATIENT)
Dept: FAMILY MEDICINE | Facility: CLINIC | Age: 68
End: 2023-11-29
Payer: COMMERCIAL

## 2023-11-29 NOTE — TELEPHONE ENCOUNTER
----- Message from Eloy Lynch sent at 11/29/2023 12:47 PM CST -----  Regarding: Kathy with [facility]  Type:Callback     Who called: Kathy     What is the request in detail: Stated that she has admitted her to [facility] and has discuss the Care Plan. If there are any questions please contact her.     Can the clinic reply by [facility]? no    Would the patient rather a call back or a response via [facility]? Callback     Best call back number: [phone]    Additional Information:

## 2023-11-30 ENCOUNTER — TELEPHONE (OUTPATIENT)
Dept: FAMILY MEDICINE | Facility: CLINIC | Age: 68
End: 2023-11-30

## 2023-11-30 ENCOUNTER — TELEPHONE (OUTPATIENT)
Dept: CARDIOLOGY | Facility: HOSPITAL | Age: 68
End: 2023-11-30
Payer: COMMERCIAL

## 2023-11-30 NOTE — TELEPHONE ENCOUNTER
Patient scheduled appointment for 12/12/2023.    Requested to have appointment for 12/29/2023 cancelled.

## 2023-11-30 NOTE — TELEPHONE ENCOUNTER
----- Message from Rebeca Sullivan MA sent at 11/30/2023 11:20 AM CST -----  Regarding: Pt needs hospital follow up  Pt is requesting a hospital follow up in the next couple weeks. You're booked until January. Okay to double book? Thanks.

## 2023-12-05 ENCOUNTER — OFFICE VISIT (OUTPATIENT)
Dept: PULMONOLOGY | Facility: CLINIC | Age: 68
End: 2023-12-05
Payer: COMMERCIAL

## 2023-12-05 VITALS
DIASTOLIC BLOOD PRESSURE: 60 MMHG | BODY MASS INDEX: 46.57 KG/M2 | SYSTOLIC BLOOD PRESSURE: 110 MMHG | HEIGHT: 63 IN | WEIGHT: 262.81 LBS | OXYGEN SATURATION: 95 %

## 2023-12-05 DIAGNOSIS — G47.01 INSOMNIA DUE TO MEDICAL CONDITION: ICD-10-CM

## 2023-12-05 DIAGNOSIS — G47.33 OSA TREATED WITH BIPAP: Primary | ICD-10-CM

## 2023-12-05 DIAGNOSIS — F17.200 TOBACCO DEPENDENCE: ICD-10-CM

## 2023-12-05 DIAGNOSIS — E66.01 SEVERE OBESITY (BMI >= 40): ICD-10-CM

## 2023-12-05 DIAGNOSIS — I27.20 PULMONARY HTN: ICD-10-CM

## 2023-12-05 DIAGNOSIS — Z71.89 GOALS OF CARE, COUNSELING/DISCUSSION: ICD-10-CM

## 2023-12-05 DIAGNOSIS — R91.1 SOLITARY PULMONARY NODULE: ICD-10-CM

## 2023-12-05 DIAGNOSIS — J96.12 CHRONIC RESPIRATORY FAILURE WITH HYPERCAPNIA: ICD-10-CM

## 2023-12-05 PROBLEM — J96.01 ACUTE RESPIRATORY FAILURE WITH HYPOXIA AND HYPERCAPNIA: Status: RESOLVED | Noted: 2023-11-13 | Resolved: 2023-12-05

## 2023-12-05 PROBLEM — J96.02 ACUTE RESPIRATORY FAILURE WITH HYPOXIA AND HYPERCAPNIA: Status: RESOLVED | Noted: 2023-11-13 | Resolved: 2023-12-05

## 2023-12-05 PROCEDURE — 1126F PR PAIN SEVERITY QUANTIFIED, NO PAIN PRESENT: ICD-10-PCS | Mod: CPTII,S$GLB,, | Performed by: INTERNAL MEDICINE

## 2023-12-05 PROCEDURE — 1101F PR PT FALLS ASSESS DOC 0-1 FALLS W/OUT INJ PAST YR: ICD-10-PCS | Mod: CPTII,S$GLB,, | Performed by: INTERNAL MEDICINE

## 2023-12-05 PROCEDURE — 3078F PR MOST RECENT DIASTOLIC BLOOD PRESSURE < 80 MM HG: ICD-10-PCS | Mod: CPTII,S$GLB,, | Performed by: INTERNAL MEDICINE

## 2023-12-05 PROCEDURE — 99497 ADVNCD CARE PLAN 30 MIN: CPT | Mod: S$GLB,,, | Performed by: INTERNAL MEDICINE

## 2023-12-05 PROCEDURE — 3066F PR DOCUMENTATION OF TREATMENT FOR NEPHROPATHY: ICD-10-PCS | Mod: CPTII,S$GLB,, | Performed by: INTERNAL MEDICINE

## 2023-12-05 PROCEDURE — 3044F PR MOST RECENT HEMOGLOBIN A1C LEVEL <7.0%: ICD-10-PCS | Mod: CPTII,S$GLB,, | Performed by: INTERNAL MEDICINE

## 2023-12-05 PROCEDURE — 1111F DSCHRG MED/CURRENT MED MERGE: CPT | Mod: CPTII,S$GLB,, | Performed by: INTERNAL MEDICINE

## 2023-12-05 PROCEDURE — 3044F HG A1C LEVEL LT 7.0%: CPT | Mod: CPTII,S$GLB,, | Performed by: INTERNAL MEDICINE

## 2023-12-05 PROCEDURE — 4010F PR ACE/ARB THEARPY RXD/TAKEN: ICD-10-PCS | Mod: CPTII,S$GLB,, | Performed by: INTERNAL MEDICINE

## 2023-12-05 PROCEDURE — 3074F PR MOST RECENT SYSTOLIC BLOOD PRESSURE < 130 MM HG: ICD-10-PCS | Mod: CPTII,S$GLB,, | Performed by: INTERNAL MEDICINE

## 2023-12-05 PROCEDURE — 3072F PR LOW RISK FOR RETINOPATHY: ICD-10-PCS | Mod: CPTII,S$GLB,, | Performed by: INTERNAL MEDICINE

## 2023-12-05 PROCEDURE — 3078F DIAST BP <80 MM HG: CPT | Mod: CPTII,S$GLB,, | Performed by: INTERNAL MEDICINE

## 2023-12-05 PROCEDURE — 3072F LOW RISK FOR RETINOPATHY: CPT | Mod: CPTII,S$GLB,, | Performed by: INTERNAL MEDICINE

## 2023-12-05 PROCEDURE — 99214 OFFICE O/P EST MOD 30 MIN: CPT | Mod: S$GLB,,, | Performed by: INTERNAL MEDICINE

## 2023-12-05 PROCEDURE — 3008F PR BODY MASS INDEX (BMI) DOCUMENTED: ICD-10-PCS | Mod: CPTII,S$GLB,, | Performed by: INTERNAL MEDICINE

## 2023-12-05 PROCEDURE — 3288F PR FALLS RISK ASSESSMENT DOCUMENTED: ICD-10-PCS | Mod: CPTII,S$GLB,, | Performed by: INTERNAL MEDICINE

## 2023-12-05 PROCEDURE — 1126F AMNT PAIN NOTED NONE PRSNT: CPT | Mod: CPTII,S$GLB,, | Performed by: INTERNAL MEDICINE

## 2023-12-05 PROCEDURE — 3060F POS MICROALBUMINURIA REV: CPT | Mod: CPTII,S$GLB,, | Performed by: INTERNAL MEDICINE

## 2023-12-05 PROCEDURE — 3008F BODY MASS INDEX DOCD: CPT | Mod: CPTII,S$GLB,, | Performed by: INTERNAL MEDICINE

## 2023-12-05 PROCEDURE — 4010F ACE/ARB THERAPY RXD/TAKEN: CPT | Mod: CPTII,S$GLB,, | Performed by: INTERNAL MEDICINE

## 2023-12-05 PROCEDURE — 99214 PR OFFICE/OUTPT VISIT, EST, LEVL IV, 30-39 MIN: ICD-10-PCS | Mod: S$GLB,,, | Performed by: INTERNAL MEDICINE

## 2023-12-05 PROCEDURE — 1101F PT FALLS ASSESS-DOCD LE1/YR: CPT | Mod: CPTII,S$GLB,, | Performed by: INTERNAL MEDICINE

## 2023-12-05 PROCEDURE — 3060F PR POS MICROALBUMINURIA RESULT DOCUMENTED/REVIEW: ICD-10-PCS | Mod: CPTII,S$GLB,, | Performed by: INTERNAL MEDICINE

## 2023-12-05 PROCEDURE — 99497 PR ADVNCD CARE PLAN 30 MIN: ICD-10-PCS | Mod: S$GLB,,, | Performed by: INTERNAL MEDICINE

## 2023-12-05 PROCEDURE — 1159F PR MEDICATION LIST DOCUMENTED IN MEDICAL RECORD: ICD-10-PCS | Mod: CPTII,S$GLB,, | Performed by: INTERNAL MEDICINE

## 2023-12-05 PROCEDURE — 3288F FALL RISK ASSESSMENT DOCD: CPT | Mod: CPTII,S$GLB,, | Performed by: INTERNAL MEDICINE

## 2023-12-05 PROCEDURE — 99999 PR PBB SHADOW E&M-EST. PATIENT-LVL IV: CPT | Mod: PBBFAC,,, | Performed by: INTERNAL MEDICINE

## 2023-12-05 PROCEDURE — 3074F SYST BP LT 130 MM HG: CPT | Mod: CPTII,S$GLB,, | Performed by: INTERNAL MEDICINE

## 2023-12-05 PROCEDURE — 3066F NEPHROPATHY DOC TX: CPT | Mod: CPTII,S$GLB,, | Performed by: INTERNAL MEDICINE

## 2023-12-05 PROCEDURE — 99999 PR PBB SHADOW E&M-EST. PATIENT-LVL IV: ICD-10-PCS | Mod: PBBFAC,,, | Performed by: INTERNAL MEDICINE

## 2023-12-05 PROCEDURE — 1159F MED LIST DOCD IN RCRD: CPT | Mod: CPTII,S$GLB,, | Performed by: INTERNAL MEDICINE

## 2023-12-05 PROCEDURE — 1111F PR DISCHARGE MEDS RECONCILED W/ CURRENT OUTPATIENT MED LIST: ICD-10-PCS | Mod: CPTII,S$GLB,, | Performed by: INTERNAL MEDICINE

## 2023-12-05 NOTE — PROGRESS NOTES
"Sandee Evans  was seen as a follow up.    CHIEF COMPLAINT:    Chief Complaint   Patient presents with    Hospital Follow Up       HISTORY OF PRESENT ILLNESS: Sandee Evans is a 67 y.o. female is here for sleep evaluation.   Patient with h/o chronic atrial fibrillation and was referred by cardiologist for jennie evaluation.  Our first encounter was 10/12/15.  At that time, patient with loud snoring.  No witnessed sleep apnea.  +fatigue upon awake most days (3-4 times per week).  No sleepiness a/w driving.  Occasional sleep talking.  No sleep walking.  Per , sleep with frequent toss and turn.  No cataplexy.        +recurring "jumping" sensation in legs at night.  Worse when laying down at night.  Does not occur during the day.  Improve with stretching and moving.      During our initial evaluation, patient was recommended sleep study.  Patient did not get sleep study until 2020.    S/p hsat 10/13/20 with ahi of 90 by SURJIT Bray.  Patient was set up with bpap during hospitalization 7/20.  Patient was set up with bipap ST 18/7 with rate of 12.  Patient was using nightly but stopped due to nasal congestion.  Another reason for poor compliance was due to lack of supplies.  +EDS without bipap.  New supplies was ordered during last appointment.  Still have not used bipap.      Recent hospitalization 11/13 to 11/25 for copd exacerbation and volume overload.  Patient was started on continues Bipap and IV lasix,admitted to ICU for close monitoring.   Cough and congestion for past several weeks.  Congestion improve with steroid but recurred after steroid cessation.  +still smoke 3-4 cigarette per day.  Currently on spiriva and albuterol.  +nasal congestion.      Dawson Sleepiness Scale score during initial sleep evaluation was 18.    SLEEP ROUTINE:  Activity the hour prior to sleep: watch tv    Bed partner:    Time to bed:  10 pm   Lights off:  usually  Sleep onset latency:  30 minutes       Disruptions or " awakenings:    2-3 times per night   Wakeup time:     around 4 am   Perceived sleep quality:  Tire on most days     Daytime naps:      Occasional 2 hours nap (drowsy)    Weekend sleep routine:      Same schedule  Caffeine use: none  exercise habit:   none      PAST MEDICAL HISTORY:    Active Ambulatory Problems     Diagnosis Date Noted    Urge incontinence 01/11/2013    DDD (degenerative disc disease), lumbar 01/11/2013    DJD (degenerative joint disease) of knee 01/11/2013    Hyperlipidemia 01/11/2013    Depressed 01/11/2013    Insomnia 01/11/2013    Vitamin D deficiency disease 01/11/2013    Lumbar radicular pain 01/11/2013    Essential hypertension 01/11/2013    Severe obesity (BMI >= 40)     Colon polyps 08/09/2013    Diverticulosis 08/09/2013    Chronic pain 08/09/2013    Pre-diabetes 08/09/2013    Paroxysmal atrial fibrillation 03/20/2015    Longstanding persistent atrial fibrillation 07/20/2015    History of pancreatitis 09/22/2015    Osteoarthritis of right knee 05/23/2016    Depression 05/30/2016    Type 2 diabetes mellitus with diabetic cataract, without long-term current use of insulin     Dry eye syndrome, bilateral 12/18/2017    Nuclear sclerosis, bilateral 12/18/2017    Refractive error 12/18/2017    Hidradenitis suppurativa of right axilla 12/31/2017    PVD (peripheral vascular disease) 05/28/2018    Goals of care, counseling/discussion 06/25/2019    Chronic bilateral low back pain without sciatica 09/14/2019    ESQUIVEL (dyspnea on exertion) 09/03/2020    Tobacco dependence 09/03/2020    HIEU treated with BiPAP 09/03/2020    Acute on chronic diastolic CHF (congestive heart failure) 07/16/2021    Chronic respiratory failure with hypercapnia 07/20/2021    Pulmonary HTN 07/20/2021    Tobacco abuse 07/20/2021    Hypothyroidism 07/20/2021    Current mild episode of major depressive disorder without prior episode 07/29/2021    COPD with chronic bronchitis 08/02/2021    Atelectasis 10/26/2021    Chronic kidney  disease, stage 3a 06/13/2022    Solitary pulmonary nodule 09/02/2022    Chronic obstructive pulmonary disease with acute exacerbation 11/13/2023    RSV infection 11/22/2023     Resolved Ambulatory Problems     Diagnosis Date Noted    Encounter for screening colonoscopy 08/02/2013    Dysphagia 06/22/2015    Nausea 09/22/2015    Gross hematuria 05/24/2016    Acute viral syndrome 12/31/2017    Other constipation 02/22/2019    Debility 07/20/2021    Acute metabolic encephalopathy 07/20/2021    ANNA MARIE (acute kidney injury) 07/22/2021    Acute bronchitis 08/02/2021    Acute respiratory failure with hypoxia and hypercarbia 05/20/2022    Dysuria 05/20/2022    Acute respiratory failure with hypoxia and hypercapnia 11/13/2023     Past Medical History:   Diagnosis Date    Anticoagulant long-term use     Arthritis     Atrial fibrillation     Breast cyst     CHF (congestive heart failure)     Degenerative disc disease     Edema     HLD (hyperlipidemia)     Hx of psychiatric care     Hypertension     Obesity, morbid     HIEU (obstructive sleep apnea)     Other abnormal glucose     Psychiatric problem     Requires assistance with activities of daily living (ADL)     Sleep apnea     Smoker     SOB (shortness of breath)     SOB (shortness of breath)     Thyroid disease     TMJ (temporomandibular joint disorder)     Unsteady gait     Weakness generalized     Wears glasses                 PAST SURGICAL HISTORY:    Past Surgical History:   Procedure Laterality Date    BREAST BIOPSY Right     over 10 yrs ago/ benign    BREAST CYST EXCISION Right     BREAST LUMPECTOMY Right     over 10 yrs ago, ex bx/ benign    COLONOSCOPY N/A 6/25/2019    Procedure: COLONOSCOPY;  Surgeon: Micheline Flores MD;  Location: [facility];  Service: Endoscopy;  Laterality: N/A;  RX XARELTO ok to hold (2 days) per Dr. Naik see scan 3/20/19    ENDOSCOPIC ULTRASOUND OF UPPER GASTROINTESTINAL TRACT N/A 1/26/2021    Procedure: ULTRASOUND-ENDOSCOPIC-UPPER;  Surgeon:  Stevenson Philippe MD;  Location: [facility] ENDO;  Service: Endoscopy;  Laterality: N/A;    HYSTERECTOMY      JOINT REPLACEMENT Bilateral     knees    OOPHORECTOMY      rt.knee surgery 2016      TOTAL KNEE ARTHROPLASTY Left 2/19/2019    Procedure: ARTHROPLASTY, KNEE, TOTAL;  Surgeon: Med Hanson MD;  Location: [facility] OR;  Service: Orthopedics;  Laterality: Left;  10AM START PER  PER JAYLIN TEXT @ 8:34AM ON 2-18-19  SUPINE  HARRIS LOYA [phone] TEXTED HIM ON 1-24-19 @ 7:57AM  RN PRE OP 2-13-19--BMI--48.9    underarm gland\ Bilateral          FAMILY HISTORY:                Family History   Problem Relation Age of Onset    Diabetes Mother     Hypertension Mother     Cancer Mother     No Known Problems Father     No Known Problems Sister     Diabetes Brother     Cancer Brother     No Known Problems Maternal Aunt     No Known Problems Maternal Uncle     No Known Problems Paternal Aunt     No Known Problems Paternal Uncle     No Known Problems Maternal Grandmother     No Known Problems Maternal Grandfather     No Known Problems Paternal Grandmother     No Known Problems Paternal Grandfather     No Known Problems Other     Amblyopia Neg Hx     Blindness Neg Hx     Cataracts Neg Hx     Glaucoma Neg Hx     Macular degeneration Neg Hx     Retinal detachment Neg Hx     Strabismus Neg Hx     Stroke Neg Hx     Thyroid disease Neg Hx        SOCIAL HISTORY:          Tobacco:   Social History     Tobacco Use   Smoking Status Every Day    Current packs/day: 0.50    Average packs/day: 0.5 packs/day for 50.9 years (25.5 ttl pk-yrs)    Types: Cigarettes    Start date: 1973   Smokeless Tobacco Current       alcohol use:    Social History     Substance and Sexual Activity   Alcohol Use No                 Occupation:  Retire     ALLERGIES:    Review of patient's allergies indicates:   Allergen Reactions    Ace inhibitors      Cough         CURRENT MEDICATIONS:    Current Outpatient Medications   Medication Sig  Dispense Refill    albuterol (PROVENTIL/VENTOLIN HFA) 90 mcg/actuation inhaler 2 puffs every 6 (six) hours as needed.      albuterol-ipratropium (DUO-NEB) 2.5 mg-0.5 mg/3 mL nebulizer solution Take 3 mLs by nebulization every 6 (six) hours as needed for Wheezing. Rescue 75 mL 0    atorvastatin (LIPITOR) 40 MG tablet TAKE 1 TABLET(40 MG) BY MOUTH EVERY DAY 90 tablet 0    azelastine (ASTELIN) 137 mcg (0.1 %) nasal spray 1 spray (137 mcg total) by Nasal route 2 (two) times daily. 30 mL 3    buPROPion (WELLBUTRIN XL) 150 MG TB24 tablet TAKE 1 TABLET(150 MG) BY MOUTH EVERY DAY 90 tablet 3    fluticasone propionate (FLONASE) 50 mcg/actuation nasal spray SHAKE LIQUID AND USE 2 SPRAYS(100 MCG) IN EACH NOSTRIL EVERY DAY 48 g 0    furosemide (LASIX) 40 MG tablet Take 1 tablet (40 mg total) by mouth once daily. 90 tablet 3    ketoconazole (NIZORAL) 2 % cream Apply topically once daily. 60 g 2    losartan (COZAAR) 25 MG tablet TAKE 1 TABLET(25 MG) BY MOUTH EVERY DAY 90 tablet 3    melatonin (MELATIN) 5 mg Take 1 tablet (5 mg total) by mouth nightly. 90 tablet 3    methylPREDNISolone (MEDROL DOSEPACK) 4 mg tablet use as directed 21 each 0    metoprolol succinate (TOPROL-XL) 100 MG 24 hr tablet Take 1 tablet (100 mg total) by mouth once daily. 90 tablet 3    sertraline (ZOLOFT) 100 MG tablet TAKE 1 TABLET(100 MG) BY MOUTH EVERY DAY 90 tablet 3    SPIRIVA RESPIMAT 2.5 mcg/actuation inhaler INHALE 2 PUFFS BY MOUTH DAILY 4 g 5    triamcinolone acetonide 0.1% (KENALOG) 0.1 % ointment APPLY BETWEEN THE KNEES AND UPPER THIGHS TWICE DAILY FOR NO MORE THAN 7 TO 14 DAYS 454 g 1    XARELTO 20 mg Tab TAKE 1 TABLET(20 MG) BY MOUTH EVERY DAY 90 tablet 3    cetirizine (ZYRTEC) 10 MG tablet Take 1 tablet (10 mg total) by mouth once daily. 90 tablet 3     No current facility-administered medications for this visit.                  REVIEW OF SYSTEMS:     Sleep related symptoms as per HPI.  CONST:Denies weight gain ; lost 40# since 2014   HEENT:  "Denies sinus congestion  PULM: Denies dyspnea  CARD:  +intermittent palpitations   GI:  Denies acid reflux; +recurring vomitting due to pancreatitis  : Denies polyuria  NEURO: Denies headaches  PSYCH: +anxious  HEME: Denies anemia   Otherwise, a balance of systems reviewed is negative.          PHYSICAL EXAM:  Vitals:    12/05/23 1325   BP: 110/60   SpO2: 95%   Weight: 119.2 kg (262 lb 12.6 oz)   Height: 5' 3" (1.6 m)   PainSc: 0-No pain       Body mass index is 46.55 kg/m².     GENERAL: Normal development, well groomed  HEENT:  Conjunctivae are non-erythematous; Pupils equal, round, and reactive to light; Nose is symmetrical; Nasal mucosa is pink and moist; Septum is midline; Inferior turbinates are normal; Nasal airflow is normal; Posterior pharynx is pink; Modified Mallampati: 2; Posterior palate is normal; Tonsils +1; Uvula is normal and pink;Tongue is normal; Dentition is fair; No TMJ tenderness; Jaw opening and protrusion without click and without discomfort.  NECK: Supple. Neck circumference is 15.5 inches. No thyromegaly. No palpable nodes.     SKIN: On face and neck: No abrasions, no rashes, no lesions.  No subcutaneous nodules are palpable.  RESPIRATORY: Chest is clear to auscultation.  Normal chest expansion and non-labored breathing at rest.  CARDIOVASCULAR: Normal S1, S2.  Irregular rhythm.  No murmurs, gallops or rubs. No carotid bruits bilaterally.  EXTREMITIES: + edema. No clubbing. No cyanosis. Station normal. Gait normal.        NEURO/PSYCH: Oriented to time, place and person. Normal attention span and concentration. Affect is full. Mood is normal.                                              DATA   HSAT: 10-13/2020: AHI 90    PFT 8/8/22 Ratio of 64%; FVC 2.37 L (99%); FEV1 1.52 L (81%); TLC 3.25 L (68%); dlco 18 (88%)   ABG 9/21/21 pH 7.3 pCO2 55.6 pO2 72 SpO2 94 on RA    CT scan (personally reviewed) 8/15/22 no septal reticulation.  No honeycombing.      Echo 11/13/23    AFib noted throughout " exam.    Left Ventricle: The left ventricle is normal in size. Normal wall thickness. Normal wall motion. There is normal systolic function with a visually estimated ejection fraction of 55 - 60%. Unable to assess diastolic function due to atrial fibrillation.    Right Ventricle: Mild right ventricular enlargement. Systolic function is mildly reduced.    Mitral Valve: There is mild regurgitation.    Pulmonary Artery: The estimated pulmonary artery systolic pressure is 49 mmHg.  Lab Results   Component Value Date    TSH 1.202 11/13/2023     ASSESSMENT  Problem List Items Addressed This Visit       Chronic respiratory failure with hypercapnia    Overview     PFT 9/21/2021: ratio 64 fev1 1.30 (66) fvc 80 tlc 62 dlco 73  ABG 9/21/21 pH 7.3 pCO2 55.6 pO2 72 SpO2 94 on RA  Combination of copd + obesity hypoventilation + pulmonary htn  Will resume bipap + diuretiec           Goals of care, counseling/discussion    Overview     Advance Care Planning     12/5/23 During this visit, I engaged the patient in the advance care planning process.  The patient and I reviewed the role for advance directives and their purpose in directing future healthcare if the patient's unable to speak for him/herself.  At this point in time, the patient does have full decision-making capacity.  We discussed different extreme health states that patient could experience, and reviewed what kind of medical care patient would want in those situations.  The patient communicated that if she was comatose and had little chance of a meaningful recovery, she would NOT want machines/life-sustaining treatments used.  In addition to the above preference, patient  HAS NOT completed a living will to reflect these preferences.  The patient HAS designated her  as healthcare power of  to make decisions on her behalf.  In the case of cardiopulmonary arrest, patient does wish for CPR, intubation or cardioversion.  Advance care brochure given to patient.          16 minutes spent discussing goc.           Insomnia    Overview     difficulty with sleep initiation and maintenance.  May related to hyperarousal from untreated jennie + RLS + conditioned.    Will monitor with resumption of bipap.         JENNIE treated with BiPAP - Primary    Overview     AHI 90  Currently with bipap st 18/7  with rate of 12.    Currently with [employer] Airview.  Patient is reassured that she has bipap.  Recommend resumption.    Due to poor compliance, will bring back for retitration.  Will adjust setting via airview after sleep study.  New supplies after study as well.    Patient will bring machine to next appointment.           Relevant Orders    BiPAP/CPAP Titration ((Must have dx of JENNIE from previous sleep study)    Pulmonary HTN    Overview     ECHO 7/20/2021: EF 60% pasp 49 mmHg,   Groupt 5 with +AF, diastolic dysfunction, and copd         Severe obesity (BMI >= 40)    Overview     Patient is aware of need for weight loss    Not interested in bariatric medicine           Solitary pulmonary nodule    Overview     < 6 mm, annual LDCT         Tobacco dependence    Overview     Still smoke   Declined smoking cessation.              RLS - 4/4 criteria.  Patient deferred medical therapy.  Will monitor for now.      Education: During our discussion today, we talked about the etiology of obstructive sleep apnea as well as the potential ramifications of untreated sleep apnea, which could include daytime sleepiness, hypertension, heart disease and/or stroke.     Precautions: The patient was advised to abstain from driving should they feel sleepy or drowsy.       Patient will No follow-ups on file.    31 minutes of total time spent on the encounter, which includes face to face time and non-face to face time preparing to see the patient (eg, review of tests), Obtaining and/or reviewing separately obtained history, documenting clinical information in the electronic or other health record, independently  interpreting results (not separately reported) and communicating results to the patient/family/caregiver, or Care coordination (not separately reported).

## 2023-12-08 ENCOUNTER — TELEPHONE (OUTPATIENT)
Dept: SLEEP MEDICINE | Facility: HOSPITAL | Age: 68
End: 2023-12-08
Payer: COMMERCIAL

## 2023-12-09 ENCOUNTER — HOSPITAL ENCOUNTER (OUTPATIENT)
Dept: SLEEP MEDICINE | Facility: HOSPITAL | Age: 68
Discharge: HOME | End: 2023-12-09
Attending: INTERNAL MEDICINE
Payer: COMMERCIAL

## 2023-12-09 DIAGNOSIS — G47.33 OSA TREATED WITH BIPAP: ICD-10-CM

## 2023-12-09 PROCEDURE — 95811 POLYSOM 6/>YRS CPAP 4/> PARM: CPT

## 2023-12-10 NOTE — PROGRESS NOTES
End of the night summary   Type of study performed on (Sandee Evans-) Bipap  Patient education/cpap information prior to study/setup   Pt was informed, of emergency cord in bathroom and given spectra link phone#   EKG Appears to be- Irregular  ekg with Pacs  Low spo2 -   80%  Any difficulties recording:  tir to change and secure pulse ox  Optimal pressure#  21/16  MASK:  Nasal eson Small,  Pt reaction to CPAP:  pt reports she uses cpap at home but doesnt like using it, pt feels more restless using cpap machine due to her cough, pt reports one side her nose does not work well.  Tech summary Comments:   pt observed titrated on bipap, pt tolerated high pressures well, pt slept supine most of the night, REM was observed , some mouth leaks observed, pt has frequent coughing and spitting at night, pt on high bipap pressure still with events and arousals

## 2023-12-11 ENCOUNTER — OFFICE VISIT (OUTPATIENT)
Dept: UROLOGY | Facility: CLINIC | Age: 68
End: 2023-12-11
Payer: COMMERCIAL

## 2023-12-11 DIAGNOSIS — J30.9 ALLERGIC RHINITIS, UNSPECIFIED SEASONALITY, UNSPECIFIED TRIGGER: ICD-10-CM

## 2023-12-11 DIAGNOSIS — L98.9 SKIN LESION: ICD-10-CM

## 2023-12-11 DIAGNOSIS — R30.0 DYSURIA: ICD-10-CM

## 2023-12-11 DIAGNOSIS — N30.20 CHRONIC CYSTITIS: Primary | ICD-10-CM

## 2023-12-11 DIAGNOSIS — B36.9 FUNGAL RASH OF TORSO: ICD-10-CM

## 2023-12-11 PROCEDURE — 3288F FALL RISK ASSESSMENT DOCD: CPT | Mod: CPTII,S$GLB,, | Performed by: UROLOGY

## 2023-12-11 PROCEDURE — 3072F PR LOW RISK FOR RETINOPATHY: ICD-10-PCS | Mod: CPTII,S$GLB,, | Performed by: UROLOGY

## 2023-12-11 PROCEDURE — 3066F PR DOCUMENTATION OF TREATMENT FOR NEPHROPATHY: ICD-10-PCS | Mod: CPTII,S$GLB,, | Performed by: UROLOGY

## 2023-12-11 PROCEDURE — 99213 PR OFFICE/OUTPT VISIT, EST, LEVL III, 20-29 MIN: ICD-10-PCS | Mod: S$GLB,,, | Performed by: UROLOGY

## 2023-12-11 PROCEDURE — 1159F PR MEDICATION LIST DOCUMENTED IN MEDICAL RECORD: ICD-10-PCS | Mod: CPTII,S$GLB,, | Performed by: UROLOGY

## 2023-12-11 PROCEDURE — 99999 PR PBB SHADOW E&M-EST. PATIENT-LVL III: ICD-10-PCS | Mod: PBBFAC,,, | Performed by: UROLOGY

## 2023-12-11 PROCEDURE — 99999 PR PBB SHADOW E&M-EST. PATIENT-LVL III: CPT | Mod: PBBFAC,,, | Performed by: UROLOGY

## 2023-12-11 PROCEDURE — 3044F PR MOST RECENT HEMOGLOBIN A1C LEVEL <7.0%: ICD-10-PCS | Mod: CPTII,S$GLB,, | Performed by: UROLOGY

## 2023-12-11 PROCEDURE — 1101F PT FALLS ASSESS-DOCD LE1/YR: CPT | Mod: CPTII,S$GLB,, | Performed by: UROLOGY

## 2023-12-11 PROCEDURE — 1159F MED LIST DOCD IN RCRD: CPT | Mod: CPTII,S$GLB,, | Performed by: UROLOGY

## 2023-12-11 PROCEDURE — 3288F PR FALLS RISK ASSESSMENT DOCUMENTED: ICD-10-PCS | Mod: CPTII,S$GLB,, | Performed by: UROLOGY

## 2023-12-11 PROCEDURE — 4010F PR ACE/ARB THEARPY RXD/TAKEN: ICD-10-PCS | Mod: CPTII,S$GLB,, | Performed by: UROLOGY

## 2023-12-11 PROCEDURE — 3066F NEPHROPATHY DOC TX: CPT | Mod: CPTII,S$GLB,, | Performed by: UROLOGY

## 2023-12-11 PROCEDURE — 1111F PR DISCHARGE MEDS RECONCILED W/ CURRENT OUTPATIENT MED LIST: ICD-10-PCS | Mod: CPTII,S$GLB,, | Performed by: UROLOGY

## 2023-12-11 PROCEDURE — 3072F LOW RISK FOR RETINOPATHY: CPT | Mod: CPTII,S$GLB,, | Performed by: UROLOGY

## 2023-12-11 PROCEDURE — 4010F ACE/ARB THERAPY RXD/TAKEN: CPT | Mod: CPTII,S$GLB,, | Performed by: UROLOGY

## 2023-12-11 PROCEDURE — 1160F RVW MEDS BY RX/DR IN RCRD: CPT | Mod: CPTII,S$GLB,, | Performed by: UROLOGY

## 2023-12-11 PROCEDURE — 1111F DSCHRG MED/CURRENT MED MERGE: CPT | Mod: CPTII,S$GLB,, | Performed by: UROLOGY

## 2023-12-11 PROCEDURE — 1160F PR REVIEW ALL MEDS BY PRESCRIBER/CLIN PHARMACIST DOCUMENTED: ICD-10-PCS | Mod: CPTII,S$GLB,, | Performed by: UROLOGY

## 2023-12-11 PROCEDURE — 3060F POS MICROALBUMINURIA REV: CPT | Mod: CPTII,S$GLB,, | Performed by: UROLOGY

## 2023-12-11 PROCEDURE — 99213 OFFICE O/P EST LOW 20 MIN: CPT | Mod: S$GLB,,, | Performed by: UROLOGY

## 2023-12-11 PROCEDURE — 3060F PR POS MICROALBUMINURIA RESULT DOCUMENTED/REVIEW: ICD-10-PCS | Mod: CPTII,S$GLB,, | Performed by: UROLOGY

## 2023-12-11 PROCEDURE — 1101F PR PT FALLS ASSESS DOC 0-1 FALLS W/OUT INJ PAST YR: ICD-10-PCS | Mod: CPTII,S$GLB,, | Performed by: UROLOGY

## 2023-12-11 PROCEDURE — 3044F HG A1C LEVEL LT 7.0%: CPT | Mod: CPTII,S$GLB,, | Performed by: UROLOGY

## 2023-12-11 NOTE — TELEPHONE ENCOUNTER
No care due was identified.  [facility] Embedded Care Due Messages. Reference number: [id number].   12/11/2023 10:24:08 AM CST

## 2023-12-11 NOTE — PROGRESS NOTES
Subjective:       Patient ID: Sandee Evans is a 67 y.o. female The patient's last visit with me was on 5/16/2023.     Chief Complaint:   No chief complaint on file.    Recurrent UTI  She has had several UTIs in the past year.  At least three.  She describes pain with voiding and pressure.  She denies hematuria.  She denies kidney stones or  procedures.  She denies history of blood clots or breast cancer.    5/16/2023  She had a cystoscopy in March 2023, this showed one area of cystitis glandularis at the dome.  She denies any dysuria or hematuria.  She has some urgency at times.      12/11/2023  She was recently in the hospital with COPD and fluid build up.  She feels better today         ACTIVE MEDICAL ISSUES:  Patient Active Problem List   Diagnosis    Urge incontinence    DDD (degenerative disc disease), lumbar    DJD (degenerative joint disease) of knee    Hyperlipidemia    Depressed    Insomnia    Vitamin D deficiency disease    Lumbar radicular pain    Essential hypertension    Severe obesity (BMI >= 40)    Colon polyps    Diverticulosis    Chronic pain    Pre-diabetes    Paroxysmal atrial fibrillation    Longstanding persistent atrial fibrillation    History of pancreatitis    Osteoarthritis of right knee    Depression    Type 2 diabetes mellitus with diabetic cataract, without long-term current use of insulin    Dry eye syndrome, bilateral    Nuclear sclerosis, bilateral    Refractive error    Hidradenitis suppurativa of right axilla    PVD (peripheral vascular disease)    Goals of care, counseling/discussion    Chronic bilateral low back pain without sciatica    ESQUIVEL (dyspnea on exertion)    Tobacco dependence    HIEU treated with BiPAP    Acute on chronic diastolic CHF (congestive heart failure)    Chronic respiratory failure with hypercapnia    Pulmonary HTN    Tobacco abuse    Hypothyroidism    Current mild episode of major depressive disorder without prior episode    COPD with chronic bronchitis     Atelectasis    Chronic kidney disease, stage 3a    Solitary pulmonary nodule    Chronic obstructive pulmonary disease with acute exacerbation    RSV infection       ALLERGIES AND MEDICATIONS: updated and reviewed.  Review of patient's allergies indicates:   Allergen Reactions    Ace inhibitors      Cough       Current Outpatient Medications   Medication Sig    albuterol (PROVENTIL/VENTOLIN HFA) 90 mcg/actuation inhaler 2 puffs every 6 (six) hours as needed.    albuterol-ipratropium (DUO-NEB) 2.5 mg-0.5 mg/3 mL nebulizer solution Take 3 mLs by nebulization every 6 (six) hours as needed for Wheezing. Rescue    atorvastatin (LIPITOR) 40 MG tablet TAKE 1 TABLET(40 MG) BY MOUTH EVERY DAY    azelastine (ASTELIN) 137 mcg (0.1 %) nasal spray 1 spray (137 mcg total) by Nasal route 2 (two) times daily.    buPROPion (WELLBUTRIN XL) 150 MG TB24 tablet TAKE 1 TABLET(150 MG) BY MOUTH EVERY DAY    cetirizine (ZYRTEC) 10 MG tablet Take 1 tablet (10 mg total) by mouth once daily.    fluticasone propionate (FLONASE) 50 mcg/actuation nasal spray SHAKE LIQUID AND USE 2 SPRAYS(100 MCG) IN EACH NOSTRIL EVERY DAY    furosemide (LASIX) 40 MG tablet Take 1 tablet (40 mg total) by mouth once daily.    ketoconazole (NIZORAL) 2 % cream Apply topically once daily.    losartan (COZAAR) 25 MG tablet TAKE 1 TABLET(25 MG) BY MOUTH EVERY DAY    melatonin (MELATIN) 5 mg Take 1 tablet (5 mg total) by mouth nightly.    methylPREDNISolone (MEDROL DOSEPACK) 4 mg tablet use as directed    metoprolol succinate (TOPROL-XL) 100 MG 24 hr tablet Take 1 tablet (100 mg total) by mouth once daily.    sertraline (ZOLOFT) 100 MG tablet TAKE 1 TABLET(100 MG) BY MOUTH EVERY DAY    SPIRIVA RESPIMAT 2.5 mcg/actuation inhaler INHALE 2 PUFFS BY MOUTH DAILY    triamcinolone acetonide 0.1% (KENALOG) 0.1 % ointment APPLY BETWEEN THE KNEES AND UPPER THIGHS TWICE DAILY FOR NO MORE THAN 7 TO 14 DAYS    XARELTO 20 mg Tab TAKE 1 TABLET(20 MG) BY MOUTH EVERY DAY     No current  facility-administered medications for this visit.       Review of Systems   Constitutional:  Negative for chills, fatigue and fever.   Respiratory:  Negative for chest tightness and shortness of breath.    Cardiovascular:  Negative for chest pain.   Gastrointestinal:  Negative for abdominal distention, constipation, nausea and vomiting.   Genitourinary:  Positive for urgency. Negative for difficulty urinating, dysuria, flank pain, frequency and hematuria.   Musculoskeletal:  Negative for arthralgias.   Neurological:  Negative for light-headedness.   Psychiatric/Behavioral:  Negative for confusion.        Objective:      There were no vitals filed for this visit.    Physical Exam  Vitals and nursing note reviewed.   Constitutional:       Appearance: She is well-developed.   HENT:      Head: Normocephalic.   Eyes:      Conjunctiva/sclera: Conjunctivae normal.   Neck:      Thyroid: No thyromegaly.      Trachea: No tracheal deviation.   Cardiovascular:      Rate and Rhythm: Normal rate.      Pulses: Normal pulses.      Heart sounds: Normal heart sounds.   Pulmonary:      Effort: Pulmonary effort is normal. No respiratory distress.      Breath sounds: Normal breath sounds. No wheezing.   Abdominal:      General: There is no distension.      Palpations: Abdomen is soft. There is no mass.      Tenderness: There is no abdominal tenderness. There is no guarding or rebound.      Hernia: No hernia is present.   Musculoskeletal:         General: No tenderness. Normal range of motion.      Cervical back: Normal range of motion.   Lymphadenopathy:      Cervical: No cervical adenopathy.   Skin:     General: Skin is warm and dry.      Findings: No erythema or rash.   Neurological:      Mental Status: She is alert and oriented to person, place, and time.   Psychiatric:         Behavior: Behavior normal.         Thought Content: Thought content normal.         Judgment: Judgment normal.         Urine dipstick shows not done.  Micro  exam: negative for WBC's or RBC's.        Assessment:       1. Chronic cystitis    2. Dysuria            Plan:       1. Chronic cystitis  Doing well    2. Dysuria  resolved             Follow up in about 6 months (around 6/11/2024) for Follow up Established.

## 2023-12-12 ENCOUNTER — OFFICE VISIT (OUTPATIENT)
Dept: FAMILY MEDICINE | Facility: CLINIC | Age: 68
End: 2023-12-12
Payer: COMMERCIAL

## 2023-12-12 ENCOUNTER — LAB VISIT (OUTPATIENT)
Dept: LAB | Facility: HOSPITAL | Age: 68
End: 2023-12-12
Attending: FAMILY MEDICINE
Payer: COMMERCIAL

## 2023-12-12 VITALS
RESPIRATION RATE: 16 BRPM | OXYGEN SATURATION: 95 % | TEMPERATURE: 97 F | WEIGHT: 269.81 LBS | BODY MASS INDEX: 47.8 KG/M2 | DIASTOLIC BLOOD PRESSURE: 78 MMHG | HEART RATE: 92 BPM | HEIGHT: 63 IN | SYSTOLIC BLOOD PRESSURE: 130 MMHG

## 2023-12-12 DIAGNOSIS — B33.8 RSV INFECTION: Primary | ICD-10-CM

## 2023-12-12 DIAGNOSIS — I50.33 ACUTE ON CHRONIC DIASTOLIC CHF (CONGESTIVE HEART FAILURE): ICD-10-CM

## 2023-12-12 DIAGNOSIS — H91.90 HEARING LOSS, UNSPECIFIED HEARING LOSS TYPE, UNSPECIFIED LATERALITY: ICD-10-CM

## 2023-12-12 DIAGNOSIS — N18.31 CHRONIC KIDNEY DISEASE, STAGE 3A: ICD-10-CM

## 2023-12-12 DIAGNOSIS — I48.0 PAROXYSMAL ATRIAL FIBRILLATION: ICD-10-CM

## 2023-12-12 PROCEDURE — 3072F PR LOW RISK FOR RETINOPATHY: ICD-10-PCS | Mod: CPTII,S$GLB,, | Performed by: FAMILY MEDICINE

## 2023-12-12 PROCEDURE — 3072F LOW RISK FOR RETINOPATHY: CPT | Mod: CPTII,S$GLB,, | Performed by: FAMILY MEDICINE

## 2023-12-12 PROCEDURE — 3078F PR MOST RECENT DIASTOLIC BLOOD PRESSURE < 80 MM HG: ICD-10-PCS | Mod: CPTII,S$GLB,, | Performed by: FAMILY MEDICINE

## 2023-12-12 PROCEDURE — 83880 ASSAY OF NATRIURETIC PEPTIDE: CPT | Performed by: FAMILY MEDICINE

## 2023-12-12 PROCEDURE — 99214 OFFICE O/P EST MOD 30 MIN: CPT | Mod: S$GLB,,, | Performed by: FAMILY MEDICINE

## 2023-12-12 PROCEDURE — 3060F POS MICROALBUMINURIA REV: CPT | Mod: CPTII,S$GLB,, | Performed by: FAMILY MEDICINE

## 2023-12-12 PROCEDURE — 3008F PR BODY MASS INDEX (BMI) DOCUMENTED: ICD-10-PCS | Mod: CPTII,S$GLB,, | Performed by: FAMILY MEDICINE

## 2023-12-12 PROCEDURE — 3075F SYST BP GE 130 - 139MM HG: CPT | Mod: CPTII,S$GLB,, | Performed by: FAMILY MEDICINE

## 2023-12-12 PROCEDURE — 99999 PR PBB SHADOW E&M-EST. PATIENT-LVL IV: CPT | Mod: PBBFAC,,, | Performed by: FAMILY MEDICINE

## 2023-12-12 PROCEDURE — 3288F FALL RISK ASSESSMENT DOCD: CPT | Mod: CPTII,S$GLB,, | Performed by: FAMILY MEDICINE

## 2023-12-12 PROCEDURE — 1159F PR MEDICATION LIST DOCUMENTED IN MEDICAL RECORD: ICD-10-PCS | Mod: CPTII,S$GLB,, | Performed by: FAMILY MEDICINE

## 2023-12-12 PROCEDURE — 36415 COLL VENOUS BLD VENIPUNCTURE: CPT | Mod: PO | Performed by: FAMILY MEDICINE

## 2023-12-12 PROCEDURE — 99999 PR PBB SHADOW E&M-EST. PATIENT-LVL IV: ICD-10-PCS | Mod: PBBFAC,,, | Performed by: FAMILY MEDICINE

## 2023-12-12 PROCEDURE — 3044F PR MOST RECENT HEMOGLOBIN A1C LEVEL <7.0%: ICD-10-PCS | Mod: CPTII,S$GLB,, | Performed by: FAMILY MEDICINE

## 2023-12-12 PROCEDURE — 3066F NEPHROPATHY DOC TX: CPT | Mod: CPTII,S$GLB,, | Performed by: FAMILY MEDICINE

## 2023-12-12 PROCEDURE — 1111F PR DISCHARGE MEDS RECONCILED W/ CURRENT OUTPATIENT MED LIST: ICD-10-PCS | Mod: CPTII,S$GLB,, | Performed by: FAMILY MEDICINE

## 2023-12-12 PROCEDURE — 3075F PR MOST RECENT SYSTOLIC BLOOD PRESS GE 130-139MM HG: ICD-10-PCS | Mod: CPTII,S$GLB,, | Performed by: FAMILY MEDICINE

## 2023-12-12 PROCEDURE — 4010F ACE/ARB THERAPY RXD/TAKEN: CPT | Mod: CPTII,S$GLB,, | Performed by: FAMILY MEDICINE

## 2023-12-12 PROCEDURE — 1111F DSCHRG MED/CURRENT MED MERGE: CPT | Mod: CPTII,S$GLB,, | Performed by: FAMILY MEDICINE

## 2023-12-12 PROCEDURE — 3066F PR DOCUMENTATION OF TREATMENT FOR NEPHROPATHY: ICD-10-PCS | Mod: CPTII,S$GLB,, | Performed by: FAMILY MEDICINE

## 2023-12-12 PROCEDURE — 1100F PR PT FALLS ASSESS DOC 2+ FALLS/FALL W/INJURY/YR: ICD-10-PCS | Mod: CPTII,S$GLB,, | Performed by: FAMILY MEDICINE

## 2023-12-12 PROCEDURE — 1159F MED LIST DOCD IN RCRD: CPT | Mod: CPTII,S$GLB,, | Performed by: FAMILY MEDICINE

## 2023-12-12 PROCEDURE — 99214 PR OFFICE/OUTPT VISIT, EST, LEVL IV, 30-39 MIN: ICD-10-PCS | Mod: S$GLB,,, | Performed by: FAMILY MEDICINE

## 2023-12-12 PROCEDURE — 3078F DIAST BP <80 MM HG: CPT | Mod: CPTII,S$GLB,, | Performed by: FAMILY MEDICINE

## 2023-12-12 PROCEDURE — 3044F HG A1C LEVEL LT 7.0%: CPT | Mod: CPTII,S$GLB,, | Performed by: FAMILY MEDICINE

## 2023-12-12 PROCEDURE — 80048 BASIC METABOLIC PNL TOTAL CA: CPT | Performed by: FAMILY MEDICINE

## 2023-12-12 PROCEDURE — 1100F PTFALLS ASSESS-DOCD GE2>/YR: CPT | Mod: CPTII,S$GLB,, | Performed by: FAMILY MEDICINE

## 2023-12-12 PROCEDURE — 3288F PR FALLS RISK ASSESSMENT DOCUMENTED: ICD-10-PCS | Mod: CPTII,S$GLB,, | Performed by: FAMILY MEDICINE

## 2023-12-12 PROCEDURE — 3060F PR POS MICROALBUMINURIA RESULT DOCUMENTED/REVIEW: ICD-10-PCS | Mod: CPTII,S$GLB,, | Performed by: FAMILY MEDICINE

## 2023-12-12 PROCEDURE — 3008F BODY MASS INDEX DOCD: CPT | Mod: CPTII,S$GLB,, | Performed by: FAMILY MEDICINE

## 2023-12-12 PROCEDURE — 4010F PR ACE/ARB THEARPY RXD/TAKEN: ICD-10-PCS | Mod: CPTII,S$GLB,, | Performed by: FAMILY MEDICINE

## 2023-12-12 RX ORDER — FLUTICASONE PROPIONATE AND SALMETEROL 250; 50 UG/1; UG/1
1 POWDER RESPIRATORY (INHALATION) 2 TIMES DAILY
COMMUNITY
Start: 2023-11-27

## 2023-12-12 NOTE — PROGRESS NOTES
Subjective:       Patient ID: Sandee Evans is a 67 y.o. female.    Chief Complaint: Follow-up      Cough  The current episode started 1 to 4 weeks ago. The problem has been gradually worsening. The problem occurs every few minutes. The cough is Productive of brown sputum. Associated symptoms include chills, ear congestion, a fever, headaches, heartburn, myalgias, nasal congestion, postnasal drip, rhinorrhea, a sore throat and shortness of breath. Pertinent negatives include no chest pain, ear pain, hemoptysis, rash, sweats, weight loss or wheezing. The symptoms are aggravated by cold air and pollens. Risk factors for lung disease include animal exposure and smoking/tobacco exposure. Her past medical history is significant for bronchiectasis, bronchitis, COPD and pneumonia. There is no history of emphysema or environmental allergies.      67-year-old female presents for hospital follow-up.  Patient went to hospital for shortness of breath.  Was found to be positive for RSV.  Patient was diuresed along with given BiPAP.  States her breathing has improved greatly.  States she still has nagging cough but feels all her symptoms have improved.  Continues to follow with her specialists.  States she has difficulty with hearing recently.    Review of Systems   Constitutional:  Positive for chills and fever. Negative for weight loss.   HENT:  Positive for postnasal drip, rhinorrhea and sore throat. Negative for ear pain.    Respiratory:  Positive for cough and shortness of breath. Negative for hemoptysis and wheezing.    Cardiovascular: Negative.  Negative for chest pain.   Gastrointestinal:  Positive for heartburn.   Endocrine: Negative.    Genitourinary: Negative.    Musculoskeletal:  Positive for myalgias.   Skin:  Negative for rash.   Allergic/Immunologic: Negative for environmental allergies.   Neurological:  Positive for headaches.   Psychiatric/Behavioral: Negative.            Past Medical History:   Diagnosis Date     Anticoagulant long-term use     Xarelto    Arthritis     Atrial fibrillation     Breast cyst     CHF (congestive heart failure)     Degenerative disc disease     Edema     HLD (hyperlipidemia)     Hx of psychiatric care     Hyperlipidemia     Hypertension     Hypothyroidism     Nuclear sclerosis, bilateral 12/18/2017    Obesity, morbid     HIEU (obstructive sleep apnea)     Other abnormal glucose     pre-diabetes    Pre-diabetes     Psychiatric problem     Requires assistance with activities of daily living (ADL)     Sleep apnea     Smoker     SOB (shortness of breath)     SOB (shortness of breath)     Thyroid disease     on meds 8-9 years ago. hypothyroidism.no malignancy    TMJ (temporomandibular joint disorder)     jaw clicking    Tobacco abuse     Unsteady gait     Urge incontinence     Weakness generalized     Wears glasses      Past Surgical History:   Procedure Laterality Date    BREAST BIOPSY Right     over 10 yrs ago/ benign    BREAST CYST EXCISION Right     BREAST LUMPECTOMY Right     over 10 yrs ago, ex bx/ benign    COLONOSCOPY N/A 6/25/2019    Procedure: COLONOSCOPY;  Surgeon: Micheline Flores MD;  Location: [facility] ENDO;  Service: Endoscopy;  Laterality: N/A;  RX XARELTO ok to hold (2 days) per Dr. Naik see scan 3/20/19    ENDOSCOPIC ULTRASOUND OF UPPER GASTROINTESTINAL TRACT N/A 1/26/2021    Procedure: ULTRASOUND-ENDOSCOPIC-UPPER;  Surgeon: Stevenson Philippe MD;  Location: [facility] ENDO;  Service: Endoscopy;  Laterality: N/A;    HYSTERECTOMY      JOINT REPLACEMENT Bilateral     knees    OOPHORECTOMY      rt.knee surgery 2016      TOTAL KNEE ARTHROPLASTY Left 2/19/2019    Procedure: ARTHROPLASTY, KNEE, TOTAL;  Surgeon: Med Hanson MD;  Location: [facility] OR;  Service: Orthopedics;  Laterality: Left;  10AM START PER  PER JAYLIN TEXT @ 8:34AM ON 2-18-19  SUPINE  HARRIS LOYA [phone] TEXTED HIM ON 1-24-19 @ 7:57AM  RN PRE OP 2-13-19--BMI--48.9    underarm gland\ Bilateral      Family History    Problem Relation Age of Onset    Diabetes Mother     Hypertension Mother     Cancer Mother     No Known Problems Father     No Known Problems Sister     Diabetes Brother     Cancer Brother     No Known Problems Maternal Aunt     No Known Problems Maternal Uncle     No Known Problems Paternal Aunt     No Known Problems Paternal Uncle     No Known Problems Maternal Grandmother     No Known Problems Maternal Grandfather     No Known Problems Paternal Grandmother     No Known Problems Paternal Grandfather     No Known Problems Other     Amblyopia Neg Hx     Blindness Neg Hx     Cataracts Neg Hx     Glaucoma Neg Hx     Macular degeneration Neg Hx     Retinal detachment Neg Hx     Strabismus Neg Hx     Stroke Neg Hx     Thyroid disease Neg Hx      Social History     Socioeconomic History    Marital status:    Tobacco Use    Smoking status: Every Day     Current packs/day: 0.50     Average packs/day: 0.5 packs/day for 51.0 years (25.5 ttl pk-yrs)     Types: Cigarettes     Start date: 1973    Smokeless tobacco: Current   Substance and Sexual Activity    Alcohol use: No    Drug use: No    Sexual activity: Yes     Partners: Male     Social Determinants of Health     Financial Resource Strain: Low Risk  (11/30/2023)    Overall Financial Resource Strain (CARDIA)     Difficulty of Paying Living Expenses: Not very hard   Food Insecurity: Unknown (11/30/2023)    Hunger Vital Sign     Worried About Running Out of Food in the Last Year: Patient refused     Ran Out of Food in the Last Year: Patient refused   Transportation Needs: No Transportation Needs (11/30/2023)    PRAPARE - Transportation     Lack of Transportation (Medical): No     Lack of Transportation (Non-Medical): No   Physical Activity: Insufficiently Active (11/30/2023)    Exercise Vital Sign     Days of Exercise per Week: 1 day     Minutes of Exercise per Session: 10 min   Stress: Stress Concern Present (5/14/2023)    [employer] [place] [employer] -  Occupational Stress Questionnaire     Feeling of Stress : Rather much   Social Connections: Unknown (11/30/2023)    Social Connection and Isolation Panel [NHANES]     Frequency of Communication with Friends and Family: Patient refused     Frequency of Social Gatherings with Friends and Family: Patient refused     Active Member of Clubs or Organizations: No     Attends Club or Organization Meetings: 1 to 4 times per year     Marital Status: Patient refused   Housing Stability: Unknown (11/30/2023)    Housing Stability Vital Sign     Unable to Pay for Housing in the Last Year: Patient refused     Number of Places Lived in the Last Year: 2     Unstable Housing in the Last Year: Patient refused       Current Outpatient Medications:     albuterol (PROVENTIL/VENTOLIN HFA) 90 mcg/actuation inhaler, 2 puffs every 6 (six) hours as needed., Disp: , Rfl:     albuterol-ipratropium (DUO-NEB) 2.5 mg-0.5 mg/3 mL nebulizer solution, Take 3 mLs by nebulization every 6 (six) hours as needed for Wheezing. Rescue, Disp: 75 mL, Rfl: 0    atorvastatin (LIPITOR) 40 MG tablet, TAKE 1 TABLET(40 MG) BY MOUTH EVERY DAY, Disp: 90 tablet, Rfl: 0    buPROPion (WELLBUTRIN XL) 150 MG TB24 tablet, TAKE 1 TABLET(150 MG) BY MOUTH EVERY DAY, Disp: 90 tablet, Rfl: 3    cetirizine (ZYRTEC) 10 MG tablet, Take 1 tablet (10 mg total) by mouth once daily., Disp: 90 tablet, Rfl: 3    fluticasone propionate (FLONASE) 50 mcg/actuation nasal spray, SHAKE LIQUID AND USE 2 SPRAYS(100 MCG) IN EACH NOSTRIL EVERY DAY, Disp: 48 g, Rfl: 0    fluticasone-salmeterol diskus inhaler 250-50 mcg, Inhale 1 puff into the lungs 2 (two) times daily., Disp: , Rfl:     furosemide (LASIX) 40 MG tablet, Take 1 tablet (40 mg total) by mouth once daily., Disp: 90 tablet, Rfl: 3    losartan (COZAAR) 25 MG tablet, TAKE 1 TABLET(25 MG) BY MOUTH EVERY DAY, Disp: 90 tablet, Rfl: 3    melatonin (MELATIN) 5 mg, Take 1 tablet (5 mg total) by mouth nightly., Disp: 90 tablet, Rfl: 3     "methylPREDNISolone (MEDROL DOSEPACK) 4 mg tablet, use as directed, Disp: 21 each, Rfl: 0    metoprolol succinate (TOPROL-XL) 100 MG 24 hr tablet, Take 1 tablet (100 mg total) by mouth once daily., Disp: 90 tablet, Rfl: 3    sertraline (ZOLOFT) 100 MG tablet, TAKE 1 TABLET(100 MG) BY MOUTH EVERY DAY, Disp: 90 tablet, Rfl: 3    SPIRIVA RESPIMAT 2.5 mcg/actuation inhaler, INHALE 2 PUFFS BY MOUTH DAILY, Disp: 4 g, Rfl: 5    XARELTO 20 mg Tab, TAKE 1 TABLET(20 MG) BY MOUTH EVERY DAY, Disp: 90 tablet, Rfl: 3    azelastine (ASTELIN) 137 mcg (0.1 %) nasal spray, 1 spray (137 mcg total) by Nasal route 2 (two) times daily., Disp: 30 mL, Rfl: 3    ketoconazole (NIZORAL) 2 % cream, Apply topically once daily., Disp: 60 g, Rfl: 2    triamcinolone acetonide 0.1% (KENALOG) 0.1 % ointment, APPLY BETWEEN THE KNEES AND UPPER THIGHS TWICE DAILY FOR NO MORE THAN 7 TO 14 DAYS, Disp: 454 g, Rfl: 1   Objective:      Vitals:    12/12/23 1027   BP: 130/78   BP Location: Left arm   Patient Position: Sitting   BP Method: Large (Manual)   Pulse: 92   Resp: 16   Temp: 97.2 °F (36.2 °C)   TempSrc: Oral   SpO2: 95%   Weight: 122.4 kg (269 lb 13.5 oz)   Height: 5' 3" (1.6 m)       Physical Exam  Constitutional:       General: She is not in acute distress.  HENT:      Head: Normocephalic and atraumatic.   Eyes:      Conjunctiva/sclera: Conjunctivae normal.   Cardiovascular:      Rate and Rhythm: Normal rate and regular rhythm.      Heart sounds: Normal heart sounds. No murmur heard.     No friction rub. No gallop.   Pulmonary:      Effort: Pulmonary effort is normal.      Breath sounds: Normal breath sounds. No wheezing or rales.      Comments: B/l crackles LEs  Musculoskeletal:      Cervical back: Neck supple.   Skin:     General: Skin is warm and dry.   Neurological:      Mental Status: She is alert and oriented to person, place, and time.   Psychiatric:         Behavior: Behavior normal.         Thought Content: Thought content normal.         " Judgment: Judgment normal.            Assessment:       1. RSV infection    2. Paroxysmal atrial fibrillation    3. Acute on chronic diastolic CHF (congestive heart failure)    4. Chronic kidney disease, stage 3a    5. Hearing loss, unspecified hearing loss type, unspecified laterality        Plan:       RSV infection    Paroxysmal atrial fibrillation    Acute on chronic diastolic CHF (congestive heart failure)  -     BASIC METABOLIC PANEL; Future; Expected date: 12/12/2023  -     B-TYPE NATRIURETIC PEPTIDE; Future; Expected date: 12/12/2023    Chronic kidney disease, stage 3a    Hearing loss, unspecified hearing loss type, unspecified laterality  -     Ambulatory referral/consult to Audiology; Future; Expected date: 12/21/2023        Follow up labs.  Continue follow-up   With specialists and treatment. Follow up in one-month        Future Appointments   Date Time Provider Department Center   12/19/2023  1:15 PM Cristino Grissom MD [facility] CARDIO [facility]   12/27/2023 10:30 AM Nydia Hung, OD [facility] OPTOMTY [place]   1/12/2024  1:00 PM Kuldeep Miller MD [facility] FAM MED [place]   3/4/2024  8:00 AM Sebastian Puente MD [facility] PULSM [facility]   6/12/2024  2:30 PM Maynor Hartmann MD [facility] URO [facility]   7/30/2024 10:00 AM [facility] MAMMO2 [facility] MAMMO [facility]       Patient note was created using [employer].  Any errors in syntax or even information may not have been identified and edited on initial review prior to signing this note.

## 2023-12-12 NOTE — PROGRESS NOTES
Health Maintenance Due   Topic     RSV Vaccine (Age 60+ and Pregnant patients) (1 - 1-dose 60+ series)     Foot Exam      COVID-19 Vaccine (5 - 2023-24 season)     LDCT Lung Screen      Eye Exam

## 2023-12-13 LAB
ANION GAP SERPL CALC-SCNC: 9 MMOL/L (ref 8–16)
BNP SERPL-MCNC: 236 PG/ML (ref 0–99)
BUN SERPL-MCNC: 33 MG/DL (ref 8–23)
CALCIUM SERPL-MCNC: 9.4 MG/DL (ref 8.7–10.5)
CHLORIDE SERPL-SCNC: 100 MMOL/L (ref 95–110)
CO2 SERPL-SCNC: 32 MMOL/L (ref 23–29)
CREAT SERPL-MCNC: 1.2 MG/DL (ref 0.5–1.4)
EST. GFR  (NO RACE VARIABLE): 49 ML/MIN/1.73 M^2
GLUCOSE SERPL-MCNC: 74 MG/DL (ref 70–110)
POTASSIUM SERPL-SCNC: 4.5 MMOL/L (ref 3.5–5.1)
SODIUM SERPL-SCNC: 141 MMOL/L (ref 136–145)

## 2023-12-13 RX ORDER — AZELASTINE 1 MG/ML
1 SPRAY, METERED NASAL 2 TIMES DAILY
Qty: 30 ML | Refills: 3 | Status: SHIPPED | OUTPATIENT
Start: 2023-12-13

## 2023-12-13 RX ORDER — KETOCONAZOLE 20 MG/G
CREAM TOPICAL DAILY
Qty: 60 G | Refills: 2 | Status: SHIPPED | OUTPATIENT
Start: 2023-12-13

## 2023-12-13 RX ORDER — TRIAMCINOLONE ACETONIDE 1 MG/G
OINTMENT TOPICAL
Qty: 454 G | Refills: 1 | Status: SHIPPED | OUTPATIENT
Start: 2023-12-13

## 2023-12-14 PROCEDURE — 95811 POLYSOM 6/>YRS CPAP 4/> PARM: CPT | Mod: 26,,, | Performed by: INTERNAL MEDICINE

## 2023-12-14 PROCEDURE — 95811 PR POLYSOMNOGRAPHY W/CPAP: ICD-10-PCS | Mod: 26,,, | Performed by: INTERNAL MEDICINE

## 2023-12-15 ENCOUNTER — PATIENT MESSAGE (OUTPATIENT)
Dept: FAMILY MEDICINE | Facility: CLINIC | Age: 68
End: 2023-12-15
Payer: COMMERCIAL

## 2023-12-15 NOTE — PROCEDURES
"Dear Provider,     You have ordered sleep LAB services to perform the sleep study for Sandee Evans.  The sleep study that you ordered is complete.      Please find Sleep Study result in "Chart Review" under the "Media tab."      As the ordering provider, you are responsible for reviewing the results and implementing a treatment plan with your patient.    If you need a Sleep Medicine provider to explain the sleep study findings and arrange treatment for the patient, please refer patient for consultation to our Sleep Clinic via [facility] with Ambulatory Consult Sleep.    To do that please place an order for an  "Ambulatory Consult Sleep" - it will go to our clinic work queue for our Medical Assistant to contact the patient for an appointment.     For any questions, please contact our clinic staff at [phone] to talk to clinical staff.    "

## 2023-12-17 ENCOUNTER — PATIENT MESSAGE (OUTPATIENT)
Dept: FAMILY MEDICINE | Facility: CLINIC | Age: 68
End: 2023-12-17
Payer: COMMERCIAL

## 2023-12-18 ENCOUNTER — TELEPHONE (OUTPATIENT)
Dept: PULMONOLOGY | Facility: CLINIC | Age: 68
End: 2023-12-18
Payer: COMMERCIAL

## 2023-12-18 NOTE — TELEPHONE ENCOUNTER
Spoken with patient over the phone in regard to sleep study result.  Currently with aircurve ST 10 which does not have an option for auto BPAP.   Patient is hesitant with getting new pap unit with additional copay.  Agreeable to bipap with new setting 21/16.  Desensitization protocol d/w patient.  Advise patient to keep 3/2024 appointment with new settings.  May consider autobipap if unable to tolerate bipap

## 2023-12-19 ENCOUNTER — OFFICE VISIT (OUTPATIENT)
Dept: CARDIOLOGY | Facility: CLINIC | Age: 68
End: 2023-12-19
Payer: COMMERCIAL

## 2023-12-19 VITALS
SYSTOLIC BLOOD PRESSURE: 96 MMHG | DIASTOLIC BLOOD PRESSURE: 54 MMHG | WEIGHT: 269.81 LBS | HEART RATE: 54 BPM | HEIGHT: 63 IN | OXYGEN SATURATION: 96 % | BODY MASS INDEX: 47.8 KG/M2

## 2023-12-19 DIAGNOSIS — E78.2 MIXED HYPERLIPIDEMIA: ICD-10-CM

## 2023-12-19 DIAGNOSIS — I50.33 ACUTE ON CHRONIC DIASTOLIC CHF (CONGESTIVE HEART FAILURE): ICD-10-CM

## 2023-12-19 DIAGNOSIS — I73.9 PVD (PERIPHERAL VASCULAR DISEASE): ICD-10-CM

## 2023-12-19 DIAGNOSIS — I27.20 PULMONARY HTN: ICD-10-CM

## 2023-12-19 DIAGNOSIS — I48.0 PAROXYSMAL ATRIAL FIBRILLATION: Primary | ICD-10-CM

## 2023-12-19 DIAGNOSIS — I10 ESSENTIAL HYPERTENSION: ICD-10-CM

## 2023-12-19 DIAGNOSIS — I48.11 LONGSTANDING PERSISTENT ATRIAL FIBRILLATION: ICD-10-CM

## 2023-12-19 DIAGNOSIS — R06.09 DOE (DYSPNEA ON EXERTION): ICD-10-CM

## 2023-12-19 PROCEDURE — 3078F PR MOST RECENT DIASTOLIC BLOOD PRESSURE < 80 MM HG: ICD-10-PCS | Mod: CPTII,S$GLB,, | Performed by: INTERNAL MEDICINE

## 2023-12-19 PROCEDURE — 3066F PR DOCUMENTATION OF TREATMENT FOR NEPHROPATHY: ICD-10-PCS | Mod: CPTII,S$GLB,, | Performed by: INTERNAL MEDICINE

## 2023-12-19 PROCEDURE — 1125F PR PAIN SEVERITY QUANTIFIED, PAIN PRESENT: ICD-10-PCS | Mod: CPTII,S$GLB,, | Performed by: INTERNAL MEDICINE

## 2023-12-19 PROCEDURE — 1159F PR MEDICATION LIST DOCUMENTED IN MEDICAL RECORD: ICD-10-PCS | Mod: CPTII,S$GLB,, | Performed by: INTERNAL MEDICINE

## 2023-12-19 PROCEDURE — 3008F PR BODY MASS INDEX (BMI) DOCUMENTED: ICD-10-PCS | Mod: CPTII,S$GLB,, | Performed by: INTERNAL MEDICINE

## 2023-12-19 PROCEDURE — 3044F PR MOST RECENT HEMOGLOBIN A1C LEVEL <7.0%: ICD-10-PCS | Mod: CPTII,S$GLB,, | Performed by: INTERNAL MEDICINE

## 2023-12-19 PROCEDURE — 3060F POS MICROALBUMINURIA REV: CPT | Mod: CPTII,S$GLB,, | Performed by: INTERNAL MEDICINE

## 2023-12-19 PROCEDURE — 4010F ACE/ARB THERAPY RXD/TAKEN: CPT | Mod: CPTII,S$GLB,, | Performed by: INTERNAL MEDICINE

## 2023-12-19 PROCEDURE — 99999 PR PBB SHADOW E&M-EST. PATIENT-LVL IV: CPT | Mod: PBBFAC,,, | Performed by: INTERNAL MEDICINE

## 2023-12-19 PROCEDURE — 3066F NEPHROPATHY DOC TX: CPT | Mod: CPTII,S$GLB,, | Performed by: INTERNAL MEDICINE

## 2023-12-19 PROCEDURE — 3072F LOW RISK FOR RETINOPATHY: CPT | Mod: CPTII,S$GLB,, | Performed by: INTERNAL MEDICINE

## 2023-12-19 PROCEDURE — 3074F SYST BP LT 130 MM HG: CPT | Mod: CPTII,S$GLB,, | Performed by: INTERNAL MEDICINE

## 2023-12-19 PROCEDURE — 1159F MED LIST DOCD IN RCRD: CPT | Mod: CPTII,S$GLB,, | Performed by: INTERNAL MEDICINE

## 2023-12-19 PROCEDURE — 3074F PR MOST RECENT SYSTOLIC BLOOD PRESSURE < 130 MM HG: ICD-10-PCS | Mod: CPTII,S$GLB,, | Performed by: INTERNAL MEDICINE

## 2023-12-19 PROCEDURE — 3288F FALL RISK ASSESSMENT DOCD: CPT | Mod: CPTII,S$GLB,, | Performed by: INTERNAL MEDICINE

## 2023-12-19 PROCEDURE — 1101F PR PT FALLS ASSESS DOC 0-1 FALLS W/OUT INJ PAST YR: ICD-10-PCS | Mod: CPTII,S$GLB,, | Performed by: INTERNAL MEDICINE

## 2023-12-19 PROCEDURE — 3288F PR FALLS RISK ASSESSMENT DOCUMENTED: ICD-10-PCS | Mod: CPTII,S$GLB,, | Performed by: INTERNAL MEDICINE

## 2023-12-19 PROCEDURE — 1101F PT FALLS ASSESS-DOCD LE1/YR: CPT | Mod: CPTII,S$GLB,, | Performed by: INTERNAL MEDICINE

## 2023-12-19 PROCEDURE — 99214 PR OFFICE/OUTPT VISIT, EST, LEVL IV, 30-39 MIN: ICD-10-PCS | Mod: S$GLB,,, | Performed by: INTERNAL MEDICINE

## 2023-12-19 PROCEDURE — 3044F HG A1C LEVEL LT 7.0%: CPT | Mod: CPTII,S$GLB,, | Performed by: INTERNAL MEDICINE

## 2023-12-19 PROCEDURE — 1111F PR DISCHARGE MEDS RECONCILED W/ CURRENT OUTPATIENT MED LIST: ICD-10-PCS | Mod: CPTII,S$GLB,, | Performed by: INTERNAL MEDICINE

## 2023-12-19 PROCEDURE — 3078F DIAST BP <80 MM HG: CPT | Mod: CPTII,S$GLB,, | Performed by: INTERNAL MEDICINE

## 2023-12-19 PROCEDURE — 1125F AMNT PAIN NOTED PAIN PRSNT: CPT | Mod: CPTII,S$GLB,, | Performed by: INTERNAL MEDICINE

## 2023-12-19 PROCEDURE — 4010F PR ACE/ARB THEARPY RXD/TAKEN: ICD-10-PCS | Mod: CPTII,S$GLB,, | Performed by: INTERNAL MEDICINE

## 2023-12-19 PROCEDURE — 1111F DSCHRG MED/CURRENT MED MERGE: CPT | Mod: CPTII,S$GLB,, | Performed by: INTERNAL MEDICINE

## 2023-12-19 PROCEDURE — 3060F PR POS MICROALBUMINURIA RESULT DOCUMENTED/REVIEW: ICD-10-PCS | Mod: CPTII,S$GLB,, | Performed by: INTERNAL MEDICINE

## 2023-12-19 PROCEDURE — 99999 PR PBB SHADOW E&M-EST. PATIENT-LVL IV: ICD-10-PCS | Mod: PBBFAC,,, | Performed by: INTERNAL MEDICINE

## 2023-12-19 PROCEDURE — 3008F BODY MASS INDEX DOCD: CPT | Mod: CPTII,S$GLB,, | Performed by: INTERNAL MEDICINE

## 2023-12-19 PROCEDURE — 99214 OFFICE O/P EST MOD 30 MIN: CPT | Mod: S$GLB,,, | Performed by: INTERNAL MEDICINE

## 2023-12-19 PROCEDURE — 3072F PR LOW RISK FOR RETINOPATHY: ICD-10-PCS | Mod: CPTII,S$GLB,, | Performed by: INTERNAL MEDICINE

## 2023-12-19 NOTE — PROGRESS NOTES
Ms. Sandee Evans was seen in the clinic today for an audiological evaluation.    Audiological testing revealed normal hearing sloping to a mild to moderate high frequency sensorineural hearing loss for the right ear and normal hearing sloping to a mild to moderate high frequency sensorineural hearing loss for the left ear.  A speech reception threshold was obtained at 5 dBHL for the right ear and at 10 dBHL for the left ear.  Speech discrimination was 100% for the right ear and 100% for the left ear.      Tympanometry testing revealed a Type A tympanogram for the right ear and a Type A tympanogram for the left ear.      Recommendations:  1. Otologic evaluation  2. Annual audiological evaluation  3. Hearing protection when in noise   4. Hearing aid consultation if Ms. Evans feels as though her hearing loss is negatively impacting her quality of life (patient declined)

## 2023-12-19 NOTE — PROGRESS NOTES
Subjective:   Patient ID:  Sandee Evans is a 67 y.o. female who presents for follow-up of Hypertension      HPI         Chronic A-fib on xarelto, Diastolic CHF, HTN, DM, HLD, PAD, tobacco abuse     Previously followed by Dr Naik  Patient is here for follow-up of paroxysmal atrial fibrillation.  She is labeled as chronic and has been in normal sinus rhythm and was last seen in 2016.  EKG today confirms this.  She was lost to follow-up and thought she was discharged because she felt everything was okay.  She has not taken her blood thinner but is only been on aspirin.  She gets shortness of breath on heavy activity but relieved with rest.  She does have some associated substernal heaviness.  She has had no breakthrough tachycardia palpitations.  She denies any PND, orthopnea has stable lower edema relieved with elevation.  She is preop for knee replacement and is unable to go up a flight of stairs due to her degenerative joint condition     Here for follow-up of paroxysmal atrial fibrillation.  She underwent testing as below for preop clearance.  We if cleared at low to intermediate risk and she is aware that anything can happen with the stress of surgery and in particular with her tobacco use disorder.  We discussed that this is the main things she needs to work on postoperatively as well as rehab.  She denies any further cardiopulmonary complaints.  We went over her medicines as well and discussed that she has to hold her blood thinner for at least 2 days prior to the procedure but continue her beta-blocker perioperatively.    Echo 11/14/23    AFib noted throughout exam.    Left Ventricle: The left ventricle is normal in size. Normal wall thickness. Normal wall motion. There is normal systolic function with a visually estimated ejection fraction of 55 - 60%. Unable to assess diastolic function due to atrial fibrillation.    Right Ventricle: Mild right ventricular enlargement. Systolic function is mildly  reduced.    Mitral Valve: There is mild regurgitation.    Pulmonary Artery: The estimated pulmonary artery systolic pressure is 49 mmHg.     NST: 2/8/19  Normal [facility] MPI  The perfusion scan is free of evidence from myocardial ischemia or injury.  An ejection fraction of 55 % at rest  LV cavity size is normal at rest.  Resting wall motion is physiologic.     Holter: 3/1/21  Atrial fibrillation with ventricular rates varying between 61 and 188 bpm with an average of 104 bpm. Total time in atrial fibrillation was approximately 24 hours.  There were frequent PVCs totalling 2296 and averaging 95.75 per hour. There were 92 monomorphic couplets. There were 3 bigeminal cycles. There were 7 monomorphic triplets.  The diary was returned incomplete.     2/18/21 Denies CP. Mild stable ESQUIVEL  EKG A-fib 94 NSSTT changes - unaware of being back in A-fib - last EKG 2/8/19 NSR  Back in A-fib - duration unknown. Discussed LAURA/CV versus rate control - given lack of symptoms we are both in agreement for rate control.   Echo and holter to evaluate A-fib control  Continue Rx for PAD, HTN, DM, A-fib      3/8/21 Denies CP or SOB. Reports some issues with nausea and feels that she has a mass in her abdomen that moves around   Increase metoprolol 50 bid for better A-fib rate control  Continue Rx for HTN, HLD, PAD, DM  OV 6 months     7/16/21 Reports SOB and LE edema for 2 weeks. Was started on lasix 3 days ago without much improvement  EKG A-fib NSSTT changes  Echo, BNP, BMP for CHF  Continue current diuretic regimen - needs to go to the ER if symptoms worsen  Continue Rx for HTN, HLD, PAD, DM, A-fib, CHF     7/29/21 Feels better since discharge. Still with mild LE edema and ESQUIVEL  On lasix 40 bid     Continue Rx for HTN, HLD, PAD, DM, A-fib, CHF  OV 1 month with BNP, BMP        9/28/21 Denies CP. Less SOB and LE edema improved  BP controlled      Continue Rx for HTN, HLD, PAD, DM, A-fib, CHF  OV 3 months with BNP, BMP     Admitted 5/20/22  Ms  Sandee Evans is a 66 y.o. woman admitted with acute hypoxic/hypercapnic respiratory failure due to acute on chronic diastolic CHF, exacerbation of COPD, and untreated HIEU. She initially required BiPAP but was quickly weaned to O2 by NC. She states that she sometimes skips lasix doses due to urinary incontinence. She does not have BiPAP at home. She is still using tobacco. Started IV lasix for CHF exacerbation, steroids/nebs/levofloxacin for COPD exacerbation. Stepped down to floor on 5/21/22. Patient grew out E-coli on blood cultures- (S) to Cipro. Patient clinically improved and was discharged to home with out patient PT/OT on 5/24/22. Bi-pap will be arranged. Activity as tolerated. Diet- low NA/ADA 2000 yang diet    Already has pulmonary follow up.      6/28/22 Denies CP, SOB improved from discharge  BP controlled. Lasix has been held since discharge   Restart lasix 40 qd  OV 3 months with BNP, BMP      Continue Rx for HTN, HLD, PAD, DM, A-fib, CHF     9/28/22 Denies CP, ESQUIVEL improved after restarting lasix  Some dry cough that she thinks are allergies  BP controlled  OV 6 months with BNP, BMP      Continue Rx for HTN, HLD, PAD, DM, A-fib, CHF      Went to the ER 1/26/23  This 67-year-old female presents to the emergency room complaining of nausea that started yesterday.  Patient developed some chest tightness and shortness of breath that also started this morning at 7:00 a.m..  The patient denies dysuria.  She had 4 episodes of diarrhea in the last 24 hours.  The diarrhea started yesterday.  The patient also has weakness and pain in her left arm this started 3 weeks ago.  The patient has a history of arthritis.  She has been slightly lightheaded when she 1st stands up for about a week.  He had some left upper quadrant abdominal pain yesterday that has since resolved.  She has had chills for 2 weeks and a cough for 2 weeks and headache that occurs only with coughing as well as some rhinorrhea but no sore throat.   She has a history of atrial fibrillation.  She has a history of congestive heart failure.  She smokes and has a history of morbid obesity.  She does not report a history of chronic obstructive pulmonary disease but she is on Spiriva inhaler.  She has a history thyroid disease.  She is maintained on Xarelto.  , troponin negative  EKG A-fib 108     3/7/23 CP has resolved. Still with cough and some abdominal discomfort  BP controlled  Discussed repeat stress test given recent CP - she feels like this is from URI and would like to hold off      Continue Rx for HTN, HLD, PAD, DM, A-fib, CHF  OV 3 months    Admitted 11/13/23  67 y.o. female, with a past medical history of A-fib on OAC,diastolic  CHF, COPD, HLD, HTN, hypothyroidism, morbid obesity admitted on 11/22/2023 for acute respiratory failure with hypoxia and hypercapnic (O2 sts 88% on RA,  PCO2 of 72) due to +RSV, COPD exacerbation, and acute on chronic diastolic heart failure. admitted to ICU on BIPAP. BNP > 400.  Chest imaging with bilateral effusions and peripheral edema. Hospital course complicated by agitation, required Precedex drip briefly.  Able to wean off Precedex drip. Pulmonology consulted-suspect chronic hypercapnic respiratory failure but not using nightly NIV.  RSV positive and on droplet precaution.  Patient completed treatment with DuoNebs and prednisone on 11/17/2023.  Continue IV diuresis and wean O2 as tolerated. Pt still wheezing, resumed steroids with plan for gradual taper. Given patient continues to have wheezing, SOB, and diminished breath sounds, will consult pulmonology again for input.      Labs 12/12/23  K 4.5  Cr 1.2         12/19/23 Still with cough since discharge but less SOB  SBP 90s but tolerated    Review of Systems   Constitutional: Negative for decreased appetite.   HENT:  Negative for ear discharge.    Eyes:  Negative for blurred vision.   Respiratory:  Negative for hemoptysis.    Endocrine: Negative for  polyphagia.   Hematologic/Lymphatic: Negative for adenopathy.   Skin:  Negative for color change.   Musculoskeletal:  Negative for joint swelling.   Genitourinary:  Negative for bladder incontinence.   Neurological:  Negative for brief paralysis.   Psychiatric/Behavioral:  Negative for hallucinations.    Allergic/Immunologic: Negative for hives.       Objective:   Physical Exam  Constitutional:       Appearance: She is well-developed.   HENT:      Head: Normocephalic and atraumatic.   Eyes:      Conjunctiva/sclera: Conjunctivae normal.      Pupils: Pupils are equal, round, and reactive to light.   Cardiovascular:      Rate and Rhythm: Normal rate. Rhythm irregular.      Pulses: Intact distal pulses.      Heart sounds: Normal heart sounds.   Pulmonary:      Effort: Pulmonary effort is normal.      Breath sounds: Normal breath sounds.   Abdominal:      General: Bowel sounds are normal.      Palpations: Abdomen is soft.   Musculoskeletal:         General: Normal range of motion.      Cervical back: Normal range of motion and neck supple.   Skin:     General: Skin is warm and dry.   Neurological:      Mental Status: She is alert and oriented to person, place, and time.         Assessment:      1. Paroxysmal atrial fibrillation    2. Essential hypertension    3. Mixed hyperlipidemia    4. Longstanding persistent atrial fibrillation    5. PVD (peripheral vascular disease)    6. ESQUIVEL (dyspnea on exertion)    7. Acute on chronic diastolic CHF (congestive heart failure)    8. Pulmonary HTN        Plan:     Stop losartan with hypotension - needs to monitor home BP      Continue Rx for HTN, HLD, PAD, DM, A-fib, CHF  OV 3 months

## 2023-12-20 ENCOUNTER — CLINICAL SUPPORT (OUTPATIENT)
Dept: AUDIOLOGY | Facility: CLINIC | Age: 68
End: 2023-12-20
Payer: COMMERCIAL

## 2023-12-20 DIAGNOSIS — H90.3 BILATERAL HIGH FREQUENCY SENSORINEURAL HEARING LOSS: ICD-10-CM

## 2023-12-20 PROCEDURE — 92557 PR COMPREHENSIVE HEARING TEST: ICD-10-PCS | Mod: S$GLB,,, | Performed by: AUDIOLOGIST

## 2023-12-20 PROCEDURE — 92557 COMPREHENSIVE HEARING TEST: CPT | Mod: S$GLB,,, | Performed by: AUDIOLOGIST

## 2023-12-20 PROCEDURE — 92567 PR TYMPA2METRY: ICD-10-PCS | Mod: S$GLB,,, | Performed by: AUDIOLOGIST

## 2023-12-20 PROCEDURE — 92567 TYMPANOMETRY: CPT | Mod: S$GLB,,, | Performed by: AUDIOLOGIST

## 2023-12-20 NOTE — Clinical Note
Hi Dr. Miller,  Please see the results of Ms. Evans's audiogram from today. If you have any questions, please let me know.   Thank you! Max Morillo, DANA-A

## 2023-12-26 ENCOUNTER — EXTERNAL HOME HEALTH (OUTPATIENT)
Dept: HOME HEALTH SERVICES | Facility: HOSPITAL | Age: 68
End: 2023-12-26
Payer: COMMERCIAL

## 2023-12-27 ENCOUNTER — OFFICE VISIT (OUTPATIENT)
Dept: OPTOMETRY | Facility: CLINIC | Age: 68
End: 2023-12-27
Payer: COMMERCIAL

## 2023-12-27 DIAGNOSIS — H52.7 REFRACTIVE ERROR: ICD-10-CM

## 2023-12-27 DIAGNOSIS — H25.13 NUCLEAR SCLEROSIS, BILATERAL: ICD-10-CM

## 2023-12-27 DIAGNOSIS — E11.9 TYPE 2 DIABETES MELLITUS WITHOUT COMPLICATION, UNSPECIFIED WHETHER LONG TERM INSULIN USE: Primary | ICD-10-CM

## 2023-12-27 PROCEDURE — 3066F NEPHROPATHY DOC TX: CPT | Mod: CPTII,S$GLB,, | Performed by: OPTOMETRIST

## 2023-12-27 PROCEDURE — 3288F FALL RISK ASSESSMENT DOCD: CPT | Mod: CPTII,S$GLB,, | Performed by: OPTOMETRIST

## 2023-12-27 PROCEDURE — 3060F PR POS MICROALBUMINURIA RESULT DOCUMENTED/REVIEW: ICD-10-PCS | Mod: CPTII,S$GLB,, | Performed by: OPTOMETRIST

## 2023-12-27 PROCEDURE — 92015 PR REFRACTION: ICD-10-PCS | Mod: S$GLB,,, | Performed by: OPTOMETRIST

## 2023-12-27 PROCEDURE — 1126F AMNT PAIN NOTED NONE PRSNT: CPT | Mod: CPTII,S$GLB,, | Performed by: OPTOMETRIST

## 2023-12-27 PROCEDURE — 4010F ACE/ARB THERAPY RXD/TAKEN: CPT | Mod: CPTII,S$GLB,, | Performed by: OPTOMETRIST

## 2023-12-27 PROCEDURE — 3060F POS MICROALBUMINURIA REV: CPT | Mod: CPTII,S$GLB,, | Performed by: OPTOMETRIST

## 2023-12-27 PROCEDURE — 92014 COMPRE OPH EXAM EST PT 1/>: CPT | Mod: S$GLB,,, | Performed by: OPTOMETRIST

## 2023-12-27 PROCEDURE — 3288F PR FALLS RISK ASSESSMENT DOCUMENTED: ICD-10-PCS | Mod: CPTII,S$GLB,, | Performed by: OPTOMETRIST

## 2023-12-27 PROCEDURE — 1159F MED LIST DOCD IN RCRD: CPT | Mod: CPTII,S$GLB,, | Performed by: OPTOMETRIST

## 2023-12-27 PROCEDURE — 1100F PTFALLS ASSESS-DOCD GE2>/YR: CPT | Mod: CPTII,S$GLB,, | Performed by: OPTOMETRIST

## 2023-12-27 PROCEDURE — 4010F PR ACE/ARB THEARPY RXD/TAKEN: ICD-10-PCS | Mod: CPTII,S$GLB,, | Performed by: OPTOMETRIST

## 2023-12-27 PROCEDURE — 3044F HG A1C LEVEL LT 7.0%: CPT | Mod: CPTII,S$GLB,, | Performed by: OPTOMETRIST

## 2023-12-27 PROCEDURE — 1126F PR PAIN SEVERITY QUANTIFIED, NO PAIN PRESENT: ICD-10-PCS | Mod: CPTII,S$GLB,, | Performed by: OPTOMETRIST

## 2023-12-27 PROCEDURE — 1160F PR REVIEW ALL MEDS BY PRESCRIBER/CLIN PHARMACIST DOCUMENTED: ICD-10-PCS | Mod: CPTII,S$GLB,, | Performed by: OPTOMETRIST

## 2023-12-27 PROCEDURE — 1159F PR MEDICATION LIST DOCUMENTED IN MEDICAL RECORD: ICD-10-PCS | Mod: CPTII,S$GLB,, | Performed by: OPTOMETRIST

## 2023-12-27 PROCEDURE — 92014 PR EYE EXAM, EST PATIENT,COMPREHESV: ICD-10-PCS | Mod: S$GLB,,, | Performed by: OPTOMETRIST

## 2023-12-27 PROCEDURE — 99999 PR PBB SHADOW E&M-EST. PATIENT-LVL III: CPT | Mod: PBBFAC,,, | Performed by: OPTOMETRIST

## 2023-12-27 PROCEDURE — 92015 DETERMINE REFRACTIVE STATE: CPT | Mod: S$GLB,,, | Performed by: OPTOMETRIST

## 2023-12-27 PROCEDURE — 2023F DILAT RTA XM W/O RTNOPTHY: CPT | Mod: CPTII,S$GLB,, | Performed by: OPTOMETRIST

## 2023-12-27 PROCEDURE — 3044F PR MOST RECENT HEMOGLOBIN A1C LEVEL <7.0%: ICD-10-PCS | Mod: CPTII,S$GLB,, | Performed by: OPTOMETRIST

## 2023-12-27 PROCEDURE — 2023F PR DILATED RETINAL EXAM W/O EVID OF RETINOPATHY: ICD-10-PCS | Mod: CPTII,S$GLB,, | Performed by: OPTOMETRIST

## 2023-12-27 PROCEDURE — 1100F PR PT FALLS ASSESS DOC 2+ FALLS/FALL W/INJURY/YR: ICD-10-PCS | Mod: CPTII,S$GLB,, | Performed by: OPTOMETRIST

## 2023-12-27 PROCEDURE — 3066F PR DOCUMENTATION OF TREATMENT FOR NEPHROPATHY: ICD-10-PCS | Mod: CPTII,S$GLB,, | Performed by: OPTOMETRIST

## 2023-12-27 PROCEDURE — 99999 PR PBB SHADOW E&M-EST. PATIENT-LVL III: ICD-10-PCS | Mod: PBBFAC,,, | Performed by: OPTOMETRIST

## 2023-12-27 PROCEDURE — 1160F RVW MEDS BY RX/DR IN RCRD: CPT | Mod: CPTII,S$GLB,, | Performed by: OPTOMETRIST

## 2024-01-02 ENCOUNTER — LAB VISIT (OUTPATIENT)
Dept: LAB | Facility: HOSPITAL | Age: 69
End: 2024-01-02
Attending: FAMILY MEDICINE
Payer: COMMERCIAL

## 2024-01-02 ENCOUNTER — TELEPHONE (OUTPATIENT)
Dept: FAMILY MEDICINE | Facility: CLINIC | Age: 69
End: 2024-01-02
Payer: COMMERCIAL

## 2024-01-02 DIAGNOSIS — N18.31 CHRONIC KIDNEY DISEASE (CKD) STAGE G3A/A1, MODERATELY DECREASED GLOMERULAR FILTRATION RATE (GFR) BETWEEN 45-59 ML/MIN/1.73 SQUARE METER AND ALBUMINURIA CREATININE RATIO LESS THAN 30 MG/G: Primary | ICD-10-CM

## 2024-01-02 DIAGNOSIS — J96.02 ACUTE RESPIRATORY ACIDOSIS: ICD-10-CM

## 2024-01-02 LAB
BILIRUB UR QL STRIP: NEGATIVE
CLARITY UR: CLEAR
COLOR UR: YELLOW
GLUCOSE UR QL STRIP: NEGATIVE
HGB UR QL STRIP: NEGATIVE
KETONES UR QL STRIP: NEGATIVE
LEUKOCYTE ESTERASE UR QL STRIP: NEGATIVE
NITRITE UR QL STRIP: NEGATIVE
PH UR STRIP: 6 [PH] (ref 5–8)
PROT UR QL STRIP: NEGATIVE
SP GR UR STRIP: 1.01 (ref 1–1.03)
URN SPEC COLLECT METH UR: NORMAL
UROBILINOGEN UR STRIP-ACNC: NEGATIVE EU/DL

## 2024-01-02 PROCEDURE — 87086 URINE CULTURE/COLONY COUNT: CPT | Performed by: FAMILY MEDICINE

## 2024-01-02 PROCEDURE — 87077 CULTURE AEROBIC IDENTIFY: CPT | Performed by: FAMILY MEDICINE

## 2024-01-02 PROCEDURE — 81003 URINALYSIS AUTO W/O SCOPE: CPT | Performed by: FAMILY MEDICINE

## 2024-01-02 PROCEDURE — 87088 URINE BACTERIA CULTURE: CPT | Performed by: FAMILY MEDICINE

## 2024-01-02 PROCEDURE — 87186 SC STD MICRODIL/AGAR DIL: CPT | Performed by: FAMILY MEDICINE

## 2024-01-02 NOTE — TELEPHONE ENCOUNTER
----- Message from Jackie Lynch sent at 1/2/2024 11:15 AM CST -----  Regarding: [id number]  Type: Patient Call Back    Who called:[facility] - Nurse Tierra    What is the request in detail: a verbal order to collect a urine specimen, pt oxygen is also low.     Can the clinic reply by [facility]?no     Would the patient rather a call back or a response via [facility]? Call back     Best call back number:[phone]    Additional Information:call the  Tan regarding to the message

## 2024-01-04 ENCOUNTER — TELEPHONE (OUTPATIENT)
Dept: FAMILY MEDICINE | Facility: CLINIC | Age: 69
End: 2024-01-04
Payer: COMMERCIAL

## 2024-01-04 DIAGNOSIS — N30.00 ACUTE CYSTITIS WITHOUT HEMATURIA: Primary | ICD-10-CM

## 2024-01-04 LAB — BACTERIA UR CULT: ABNORMAL

## 2024-01-04 RX ORDER — SULFAMETHOXAZOLE AND TRIMETHOPRIM 800; 160 MG/1; MG/1
1 TABLET ORAL 2 TIMES DAILY
Qty: 10 TABLET | Refills: 0 | Status: SHIPPED | OUTPATIENT
Start: 2024-01-04 | End: 2024-01-09

## 2024-01-12 ENCOUNTER — OFFICE VISIT (OUTPATIENT)
Dept: FAMILY MEDICINE | Facility: CLINIC | Age: 69
End: 2024-01-12
Payer: COMMERCIAL

## 2024-01-12 VITALS
TEMPERATURE: 98 F | OXYGEN SATURATION: 97 % | WEIGHT: 277.56 LBS | DIASTOLIC BLOOD PRESSURE: 72 MMHG | RESPIRATION RATE: 16 BRPM | HEART RATE: 78 BPM | HEIGHT: 63 IN | SYSTOLIC BLOOD PRESSURE: 114 MMHG | BODY MASS INDEX: 49.18 KG/M2

## 2024-01-12 DIAGNOSIS — R44.3 HALLUCINATIONS: ICD-10-CM

## 2024-01-12 DIAGNOSIS — H00.014 HORDEOLUM EXTERNUM OF LEFT UPPER EYELID: Primary | ICD-10-CM

## 2024-01-12 PROCEDURE — 1100F PTFALLS ASSESS-DOCD GE2>/YR: CPT | Mod: CPTII,S$GLB,, | Performed by: FAMILY MEDICINE

## 2024-01-12 PROCEDURE — 3072F LOW RISK FOR RETINOPATHY: CPT | Mod: CPTII,S$GLB,, | Performed by: FAMILY MEDICINE

## 2024-01-12 PROCEDURE — 4010F ACE/ARB THERAPY RXD/TAKEN: CPT | Mod: CPTII,S$GLB,, | Performed by: FAMILY MEDICINE

## 2024-01-12 PROCEDURE — 3078F DIAST BP <80 MM HG: CPT | Mod: CPTII,S$GLB,, | Performed by: FAMILY MEDICINE

## 2024-01-12 PROCEDURE — 3074F SYST BP LT 130 MM HG: CPT | Mod: CPTII,S$GLB,, | Performed by: FAMILY MEDICINE

## 2024-01-12 PROCEDURE — 3288F FALL RISK ASSESSMENT DOCD: CPT | Mod: CPTII,S$GLB,, | Performed by: FAMILY MEDICINE

## 2024-01-12 PROCEDURE — 3008F BODY MASS INDEX DOCD: CPT | Mod: CPTII,S$GLB,, | Performed by: FAMILY MEDICINE

## 2024-01-12 PROCEDURE — 1159F MED LIST DOCD IN RCRD: CPT | Mod: CPTII,S$GLB,, | Performed by: FAMILY MEDICINE

## 2024-01-12 PROCEDURE — 99999 PR PBB SHADOW E&M-EST. PATIENT-LVL IV: CPT | Mod: PBBFAC,,, | Performed by: FAMILY MEDICINE

## 2024-01-12 PROCEDURE — 99214 OFFICE O/P EST MOD 30 MIN: CPT | Mod: S$GLB,,, | Performed by: FAMILY MEDICINE

## 2024-01-12 RX ORDER — LOSARTAN POTASSIUM 25 MG/1
25 TABLET ORAL
COMMUNITY
Start: 2023-12-27 | End: 2024-04-03 | Stop reason: SDUPTHER

## 2024-01-12 RX ORDER — ERYTHROMYCIN 5 MG/G
OINTMENT OPHTHALMIC NIGHTLY
Qty: 3.5 G | Refills: 0 | Status: SHIPPED | OUTPATIENT
Start: 2024-01-12

## 2024-01-12 NOTE — PROGRESS NOTES
Health Maintenance Due   Topic     RSV Vaccine (Age 60+ and Pregnant patients) (1 - 1-dose 60+ series)     Foot Exam      COVID-19 Vaccine (5 - 2023-24 season)     LDCT Lung Screen      Pneumococcal Vaccines (Age 65+) (3 - PPSV23 or PCV20)

## 2024-01-29 ENCOUNTER — DOCUMENT SCAN (OUTPATIENT)
Dept: HOME HEALTH SERVICES | Facility: HOSPITAL | Age: 69
End: 2024-01-29
Payer: COMMERCIAL

## 2024-01-29 DIAGNOSIS — I73.9 PVD (PERIPHERAL VASCULAR DISEASE): ICD-10-CM

## 2024-01-29 DIAGNOSIS — I25.10 CORONARY ARTERY DISEASE: ICD-10-CM

## 2024-01-29 DIAGNOSIS — I70.0 AORTIC ATHEROSCLEROSIS: ICD-10-CM

## 2024-01-29 RX ORDER — ATORVASTATIN CALCIUM 40 MG/1
TABLET, FILM COATED ORAL
Qty: 90 TABLET | Refills: 2 | Status: SHIPPED | OUTPATIENT
Start: 2024-01-29

## 2024-01-29 NOTE — TELEPHONE ENCOUNTER
No care due was identified.  [facility] Embedded Care Due Messages. Reference number: [id number].   1/29/2024 5:50:38 PM CST

## 2024-01-30 NOTE — TELEPHONE ENCOUNTER
Refill Decision Note   Sandee Cristina  is requesting a refill authorization.  Brief Assessment and Rationale for Refill:  Approve     Medication Therapy Plan:         Comments:     Note composed:7:30 PM 01/29/2024

## 2024-02-08 NOTE — PROGRESS NOTES
Subjective:       Patient ID: Sandee Evans is a 68 y.o. female.    Chief Complaint: Follow-up      Follow-up      68-year-old female presents for one-month follow-up.  States her breathing has improved.  She feels she has hallucinations where she has people in the room talking to her.  Patient states she is having an event during interview.  States that has decreased since her symptoms have improved after RSV.  Feels she has a stye in her eye as well.  Review of Systems   Constitutional: Negative.    HENT: Negative.     Respiratory: Negative.     Cardiovascular: Negative.    Gastrointestinal: Negative.    Endocrine: Negative.    Genitourinary: Negative.    Musculoskeletal: Negative.    Neurological: Negative.    Psychiatric/Behavioral: Negative.            Past Medical History:   Diagnosis Date    Anticoagulant long-term use     Xarelto    Arthritis     Atrial fibrillation     Breast cyst     CHF (congestive heart failure)     Degenerative disc disease     Edema     HLD (hyperlipidemia)     Hx of psychiatric care     Hyperlipidemia     Hypertension     Hypothyroidism     Nuclear sclerosis, bilateral 12/18/2017    Obesity, morbid     HIEU (obstructive sleep apnea)     Other abnormal glucose     pre-diabetes    Pre-diabetes     Psychiatric problem     Requires assistance with activities of daily living (ADL)     Sleep apnea     Smoker     SOB (shortness of breath)     SOB (shortness of breath)     Thyroid disease     on meds 8-9 years ago. hypothyroidism.no malignancy    TMJ (temporomandibular joint disorder)     jaw clicking    Tobacco abuse     Unsteady gait     Urge incontinence     Weakness generalized     Wears glasses      Past Surgical History:   Procedure Laterality Date    BREAST BIOPSY Right     over 10 yrs ago/ benign    BREAST CYST EXCISION Right     BREAST LUMPECTOMY Right     over 10 yrs ago, ex bx/ benign    COLONOSCOPY N/A 6/25/2019    Procedure: COLONOSCOPY;  Surgeon: Micheline Flores MD;   Location: [facility] ENDO;  Service: Endoscopy;  Laterality: N/A;  RX XARELTO ok to hold (2 days) per Dr. Naik see scan 3/20/19    ENDOSCOPIC ULTRASOUND OF UPPER GASTROINTESTINAL TRACT N/A 1/26/2021    Procedure: ULTRASOUND-ENDOSCOPIC-UPPER;  Surgeon: Stevenson Philippe MD;  Location: [facility] ENDO;  Service: Endoscopy;  Laterality: N/A;    HYSTERECTOMY      JOINT REPLACEMENT Bilateral     knees    OOPHORECTOMY      rt.knee surgery 2016      TOTAL KNEE ARTHROPLASTY Left 2/19/2019    Procedure: ARTHROPLASTY, KNEE, TOTAL;  Surgeon: Med Hanson MD;  Location: [facility] OR;  Service: Orthopedics;  Laterality: Left;  10AM START PER  PER JAYLIN TEXT @ 8:34AM ON 2-18-19  SUPINE  HARRIS LOYA [phone] TEXTED HIM ON 1-24-19 @ 7:57AM  RN PRE OP 2-13-19--BMI--48.9    underarm gland\ Bilateral      Family History   Problem Relation Age of Onset    Diabetes Mother     Hypertension Mother     Cancer Mother     No Known Problems Father     No Known Problems Sister     Diabetes Brother     Cancer Brother     No Known Problems Maternal Aunt     No Known Problems Maternal Uncle     No Known Problems Paternal Aunt     No Known Problems Paternal Uncle     No Known Problems Maternal Grandmother     No Known Problems Maternal Grandfather     No Known Problems Paternal Grandmother     No Known Problems Paternal Grandfather     No Known Problems Other     Amblyopia Neg Hx     Blindness Neg Hx     Cataracts Neg Hx     Glaucoma Neg Hx     Macular degeneration Neg Hx     Retinal detachment Neg Hx     Strabismus Neg Hx     Stroke Neg Hx     Thyroid disease Neg Hx      Social History     Socioeconomic History    Marital status:    Tobacco Use    Smoking status: Every Day     Current packs/day: 0.50     Average packs/day: 0.5 packs/day for 51.1 years (25.6 ttl pk-yrs)     Types: Cigarettes     Start date: 1973    Smokeless tobacco: Current   Substance and Sexual Activity    Alcohol use: No    Drug use: No    Sexual activity: Yes      Partners: Male     Social Determinants of Health     Financial Resource Strain: Low Risk  (11/30/2023)    Overall Financial Resource Strain (CARDIA)     Difficulty of Paying Living Expenses: Not very hard   Food Insecurity: Patient Declined (11/30/2023)    Hunger Vital Sign     Worried About Running Out of Food in the Last Year: Patient declined     Ran Out of Food in the Last Year: Patient declined   Transportation Needs: No Transportation Needs (11/30/2023)    PRAPARE - Transportation     Lack of Transportation (Medical): No     Lack of Transportation (Non-Medical): No   Physical Activity: Insufficiently Active (11/30/2023)    Exercise Vital Sign     Days of Exercise per Week: 1 day     Minutes of Exercise per Session: 10 min   Stress: Stress Concern Present (5/14/2023)    [employer] - Occupational Stress Questionnaire     Feeling of Stress : Rather much   Social Connections: Unknown (11/30/2023)    Social Connection and Isolation Panel [NHANES]     Frequency of Communication with Friends and Family: Patient declined     Frequency of Social Gatherings with Friends and Family: Patient declined     Active Member of Clubs or Organizations: No     Attends Club or Organization Meetings: 1 to 4 times per year     Marital Status: Patient declined   Housing Stability: Unknown (11/30/2023)    Housing Stability Vital Sign     Unable to Pay for Housing in the Last Year: Patient refused     Number of Places Lived in the Last Year: 2     Unstable Housing in the Last Year: Patient refused       Current Outpatient Medications:     albuterol (PROVENTIL/VENTOLIN HFA) 90 mcg/actuation inhaler, 2 puffs every 6 (six) hours as needed., Disp: , Rfl:     albuterol-ipratropium (DUO-NEB) 2.5 mg-0.5 mg/3 mL nebulizer solution, Take 3 mLs by nebulization every 6 (six) hours as needed for Wheezing. Rescue, Disp: 75 mL, Rfl: 0    azelastine (ASTELIN) 137 mcg (0.1 %) nasal spray, 1 spray (137 mcg total) by Nasal  route 2 (two) times daily., Disp: 30 mL, Rfl: 3    buPROPion (WELLBUTRIN XL) 150 MG TB24 tablet, TAKE 1 TABLET(150 MG) BY MOUTH EVERY DAY, Disp: 90 tablet, Rfl: 3    cetirizine (ZYRTEC) 10 MG tablet, Take 1 tablet (10 mg total) by mouth once daily., Disp: 90 tablet, Rfl: 3    fluticasone propionate (FLONASE) 50 mcg/actuation nasal spray, SHAKE LIQUID AND USE 2 SPRAYS(100 MCG) IN EACH NOSTRIL EVERY DAY, Disp: 48 g, Rfl: 0    fluticasone-salmeterol diskus inhaler 250-50 mcg, Inhale 1 puff into the lungs 2 (two) times daily., Disp: , Rfl:     furosemide (LASIX) 40 MG tablet, Take 1 tablet (40 mg total) by mouth once daily., Disp: 90 tablet, Rfl: 3    ketoconazole (NIZORAL) 2 % cream, Apply topically once daily., Disp: 60 g, Rfl: 2    melatonin (MELATIN) 5 mg, Take 1 tablet (5 mg total) by mouth nightly., Disp: 90 tablet, Rfl: 3    methylPREDNISolone (MEDROL DOSEPACK) 4 mg tablet, use as directed, Disp: 21 each, Rfl: 0    metoprolol succinate (TOPROL-XL) 100 MG 24 hr tablet, Take 1 tablet (100 mg total) by mouth once daily., Disp: 90 tablet, Rfl: 3    sertraline (ZOLOFT) 100 MG tablet, TAKE 1 TABLET(100 MG) BY MOUTH EVERY DAY, Disp: 90 tablet, Rfl: 3    SPIRIVA RESPIMAT 2.5 mcg/actuation inhaler, INHALE 2 PUFFS BY MOUTH DAILY, Disp: 4 g, Rfl: 5    triamcinolone acetonide 0.1% (KENALOG) 0.1 % ointment, APPLY BETWEEN THE KNEES AND UPPER THIGHS TWICE DAILY FOR NO MORE THAN 7 TO 14 DAYS, Disp: 454 g, Rfl: 1    XARELTO 20 mg Tab, TAKE 1 TABLET(20 MG) BY MOUTH EVERY DAY, Disp: 90 tablet, Rfl: 3    atorvastatin (LIPITOR) 40 MG tablet, TAKE 1 TABLET(40 MG) BY MOUTH EVERY DAY, Disp: 90 tablet, Rfl: 2    erythromycin (ROMYCIN) ophthalmic ointment, Place into the left eye every evening., Disp: 3.5 g, Rfl: 0    losartan (COZAAR) 25 MG tablet, Take 25 mg by mouth., Disp: , Rfl:    Objective:      Vitals:    01/12/24 1317   BP: 114/72   BP Location: Left arm   Patient Position: Sitting   BP Method: Large (Manual)   Pulse: 78   Resp:  "16   Temp: 98.4 °F (36.9 °C)   TempSrc: Oral   SpO2: 97%   Weight: 125.9 kg (277 lb 9 oz)   Height: 5' 3" (1.6 m)       Physical Exam  Constitutional:       General: She is not in acute distress.  HENT:      Head: Normocephalic and atraumatic.      Comments: L upper eyelid lesion  Eyes:      Conjunctiva/sclera: Conjunctivae normal.   Cardiovascular:      Rate and Rhythm: Normal rate and regular rhythm.      Heart sounds: Normal heart sounds. No murmur heard.     No friction rub. No gallop.   Pulmonary:      Effort: Pulmonary effort is normal.      Breath sounds: Normal breath sounds. No wheezing or rales.   Musculoskeletal:      Cervical back: Neck supple.   Skin:     General: Skin is warm and dry.   Neurological:      Mental Status: She is alert and oriented to person, place, and time.   Psychiatric:         Behavior: Behavior normal.         Thought Content: Thought content normal.         Judgment: Judgment normal.            Assessment:       1. Hordeolum externum of left upper eyelid    2. Hallucinations        Plan:       Hordeolum externum of left upper eyelid  -     erythromycin (ROMYCIN) ophthalmic ointment; Place into the left eye every evening.  Dispense: 3.5 g; Refill: 0    Hallucinations  -     Ambulatory referral/consult to Psychiatry; Future; Expected date: 01/19/2024    Unsure the cause of sudden hallucinations.  Place referral to Psychiatry.          Future Appointments   Date Time Provider Department Center   3/4/2024  8:00 AM Sebastian Puente MD [facility] PULSM [facility] [place]   3/6/2024  9:15 AM Cristino Grissom MD [facility] CARDIO [facility] [place]   4/12/2024  9:20 AM Kuldeep Miller MD [facility] MED [place]   6/12/2024  2:30 PM Maynor Hartmann MD [facility]   7/30/2024 10:00 AM [facility] MAMMO2 [facility] MAMMO [facility] [place]       Patient note was created using [employer].  Any errors in syntax or even information may not have been identified and edited on initial review prior to signing this note.  "

## 2024-03-04 ENCOUNTER — OFFICE VISIT (OUTPATIENT)
Dept: PULMONOLOGY | Facility: CLINIC | Age: 69
End: 2024-03-04
Payer: COMMERCIAL

## 2024-03-04 VITALS
WEIGHT: 276.13 LBS | HEIGHT: 63 IN | SYSTOLIC BLOOD PRESSURE: 137 MMHG | HEART RATE: 80 BPM | OXYGEN SATURATION: 92 % | DIASTOLIC BLOOD PRESSURE: 87 MMHG | BODY MASS INDEX: 48.93 KG/M2

## 2024-03-04 DIAGNOSIS — J96.12 CHRONIC RESPIRATORY FAILURE WITH HYPERCAPNIA: ICD-10-CM

## 2024-03-04 DIAGNOSIS — Z71.89 GOALS OF CARE, COUNSELING/DISCUSSION: ICD-10-CM

## 2024-03-04 DIAGNOSIS — G47.33 OSA TREATED WITH BIPAP: Primary | ICD-10-CM

## 2024-03-04 PROCEDURE — 3008F BODY MASS INDEX DOCD: CPT | Mod: CPTII,S$GLB,, | Performed by: INTERNAL MEDICINE

## 2024-03-04 PROCEDURE — 3075F SYST BP GE 130 - 139MM HG: CPT | Mod: CPTII,S$GLB,, | Performed by: INTERNAL MEDICINE

## 2024-03-04 PROCEDURE — 3079F DIAST BP 80-89 MM HG: CPT | Mod: CPTII,S$GLB,, | Performed by: INTERNAL MEDICINE

## 2024-03-04 PROCEDURE — 3072F LOW RISK FOR RETINOPATHY: CPT | Mod: CPTII,S$GLB,, | Performed by: INTERNAL MEDICINE

## 2024-03-04 PROCEDURE — 1125F AMNT PAIN NOTED PAIN PRSNT: CPT | Mod: CPTII,S$GLB,, | Performed by: INTERNAL MEDICINE

## 2024-03-04 PROCEDURE — 99999 PR PBB SHADOW E&M-EST. PATIENT-LVL IV: CPT | Mod: PBBFAC,,, | Performed by: INTERNAL MEDICINE

## 2024-03-04 PROCEDURE — 1159F MED LIST DOCD IN RCRD: CPT | Mod: CPTII,S$GLB,, | Performed by: INTERNAL MEDICINE

## 2024-03-04 PROCEDURE — 4010F ACE/ARB THERAPY RXD/TAKEN: CPT | Mod: CPTII,S$GLB,, | Performed by: INTERNAL MEDICINE

## 2024-03-04 PROCEDURE — 99214 OFFICE O/P EST MOD 30 MIN: CPT | Mod: S$GLB,,, | Performed by: INTERNAL MEDICINE

## 2024-03-04 NOTE — PROGRESS NOTES
"Sandee Evans  was seen as a follow up.    CHIEF COMPLAINT:    Chief Complaint   Patient presents with    Apnea       HISTORY OF PRESENT ILLNESS: Sandee Evans is a 68 y.o. female is here for sleep evaluation.   Patient with h/o chronic atrial fibrillation and was referred by cardiologist for jennie evaluation.  Our first encounter was 10/12/15.  At that time, patient with loud snoring.  No witnessed sleep apnea.  +fatigue upon awake most days (3-4 times per week).  No sleepiness a/w driving.  Occasional sleep talking.  No sleep walking.  Per , sleep with frequent toss and turn.  No cataplexy.        +recurring "jumping" sensation in legs at night.  Worse when laying down at night.  Does not occur during the day.  Improve with stretching and moving.      During our initial evaluation, patient was recommended sleep study.  Patient did not get sleep study until 2020.    S/p hsat 10/13/20 with ahi of 90 by SURJIT Bray.  Patient was set up with bpap during hospitalization 7/20.  Patient was set up with bipap ST 18/7 with rate of 12.  Patient was using nightly but stopped due to nasal congestion.  Another reason for poor compliance was due to lack of supplies.  +EDS without bipap.  New supplies was ordered during last appointment.  Still have not used bipap.      Hospitalization 11/13/23 to 11/25/23 for copd exacerbation and volume overload.  Patient was started on continues bipap and IV lasix,admitted to ICU for close monitoring.   Due to poor compliance, patient was brought back for retitration.  Patient bipap was adjusted to 21/16.      Cough and congestion for past several weeks.  Congestion improve with steroid but recurred after steroid cessation.  +still smoke 3-4 cigarette per day.  Currently on spiriva and albuterol.  +nasal congestion.      Echo Sleepiness Scale score during initial sleep evaluation was 18.    SLEEP ROUTINE:  Activity the hour prior to sleep: watch tv    Bed partner:    Time to " bed:  10 pm   Lights off:  usually  Sleep onset latency:  30 minutes       Disruptions or awakenings:    2-3 times per night   Wakeup time:     around 4 am   Perceived sleep quality:  Tire on most days     Daytime naps:      Occasional 2 hours nap (drowsy)    Weekend sleep routine:      Same schedule  Caffeine use: none  exercise habit:   none      PAST MEDICAL HISTORY:    Active Ambulatory Problems     Diagnosis Date Noted    Urge incontinence 01/11/2013    DDD (degenerative disc disease), lumbar 01/11/2013    DJD (degenerative joint disease) of knee 01/11/2013    Hyperlipidemia 01/11/2013    Depressed 01/11/2013    Insomnia 01/11/2013    Vitamin D deficiency disease 01/11/2013    Lumbar radicular pain 01/11/2013    Essential hypertension 01/11/2013    Severe obesity (BMI >= 40)     Colon polyps 08/09/2013    Diverticulosis 08/09/2013    Chronic pain 08/09/2013    Pre-diabetes 08/09/2013    Paroxysmal atrial fibrillation 03/20/2015    Longstanding persistent atrial fibrillation 07/20/2015    History of pancreatitis 09/22/2015    Osteoarthritis of right knee 05/23/2016    Depression 05/30/2016    Type 2 diabetes mellitus with diabetic cataract, without long-term current use of insulin     Dry eye syndrome, bilateral 12/18/2017    Nuclear sclerosis, bilateral 12/18/2017    Refractive error 12/18/2017    Hidradenitis suppurativa of right axilla 12/31/2017    PVD (peripheral vascular disease) 05/28/2018    Goals of care, counseling/discussion 06/25/2019    Chronic bilateral low back pain without sciatica 09/14/2019    ESQUIVEL (dyspnea on exertion) 09/03/2020    Tobacco dependence 09/03/2020    HIEU treated with BiPAP 09/03/2020    Acute on chronic diastolic CHF (congestive heart failure) 07/16/2021    Chronic respiratory failure with hypercapnia 07/20/2021    Pulmonary HTN 07/20/2021    Tobacco abuse 07/20/2021    Hypothyroidism 07/20/2021    Current mild episode of major depressive disorder without prior episode 07/29/2021     COPD with chronic bronchitis 08/02/2021    Atelectasis 10/26/2021    Chronic kidney disease, stage 3a 06/13/2022    Solitary pulmonary nodule 09/02/2022    Chronic obstructive pulmonary disease with acute exacerbation 11/13/2023    RSV infection 11/22/2023     Resolved Ambulatory Problems     Diagnosis Date Noted    Encounter for screening colonoscopy 08/02/2013    Dysphagia 06/22/2015    Nausea 09/22/2015    Gross hematuria 05/24/2016    Acute viral syndrome 12/31/2017    Other constipation 02/22/2019    Debility 07/20/2021    Acute metabolic encephalopathy 07/20/2021    ANNA MARIE (acute kidney injury) 07/22/2021    Acute bronchitis 08/02/2021    Acute respiratory failure with hypoxia and hypercarbia 05/20/2022    Dysuria 05/20/2022    Acute respiratory failure with hypoxia and hypercapnia 11/13/2023     Past Medical History:   Diagnosis Date    Anticoagulant long-term use     Arthritis     Atrial fibrillation     Breast cyst     CHF (congestive heart failure)     Degenerative disc disease     Edema     HLD (hyperlipidemia)     Hx of psychiatric care     Hypertension     Obesity, morbid     HIEU (obstructive sleep apnea)     Other abnormal glucose     Psychiatric problem     Requires assistance with activities of daily living (ADL)     Sleep apnea     Smoker     SOB (shortness of breath)     SOB (shortness of breath)     Thyroid disease     TMJ (temporomandibular joint disorder)     Unsteady gait     Weakness generalized     Wears glasses                 PAST SURGICAL HISTORY:    Past Surgical History:   Procedure Laterality Date    BREAST BIOPSY Right     over 10 yrs ago/ benign    BREAST CYST EXCISION Right     BREAST LUMPECTOMY Right     over 10 yrs ago, ex bx/ benign    COLONOSCOPY N/A 6/25/2019    Procedure: COLONOSCOPY;  Surgeon: Micheline Flores MD;  Location: [facility];  Service: Endoscopy;  Laterality: N/A;  RX XARELTO ok to hold (2 days) per Dr. Naik see scan 3/20/19    ENDOSCOPIC ULTRASOUND OF UPPER  GASTROINTESTINAL TRACT N/A 1/26/2021    Procedure: ULTRASOUND-ENDOSCOPIC-UPPER;  Surgeon: Stevenson Philippe MD;  Location: [facility] ENDO;  Service: Endoscopy;  Laterality: N/A;    HYSTERECTOMY      JOINT REPLACEMENT Bilateral     knees    OOPHORECTOMY      rt.knee surgery 2016      TOTAL KNEE ARTHROPLASTY Left 2/19/2019    Procedure: ARTHROPLASTY, KNEE, TOTAL;  Surgeon: Med Hanson MD;  Location: [facility] OR;  Service: Orthopedics;  Laterality: Left;  10AM START PER  PER JAYLIN TEXT @ 8:34AM ON 2-18-19  SUPINE  HARRIS LOYA [phone] TEXTED HIM ON 1-24-19 @ 7:57AM  RN PRE OP 2-13-19--BMI--48.9    underarm gland\ Bilateral          FAMILY HISTORY:                Family History   Problem Relation Age of Onset    Diabetes Mother     Hypertension Mother     Cancer Mother     No Known Problems Father     No Known Problems Sister     Diabetes Brother     Cancer Brother     No Known Problems Maternal Aunt     No Known Problems Maternal Uncle     No Known Problems Paternal Aunt     No Known Problems Paternal Uncle     No Known Problems Maternal Grandmother     No Known Problems Maternal Grandfather     No Known Problems Paternal Grandmother     No Known Problems Paternal Grandfather     No Known Problems Other     Amblyopia Neg Hx     Blindness Neg Hx     Cataracts Neg Hx     Glaucoma Neg Hx     Macular degeneration Neg Hx     Retinal detachment Neg Hx     Strabismus Neg Hx     Stroke Neg Hx     Thyroid disease Neg Hx        SOCIAL HISTORY:          Tobacco:   Social History     Tobacco Use   Smoking Status Every Day    Current packs/day: 0.50    Average packs/day: 0.5 packs/day for 51.2 years (25.6 ttl pk-yrs)    Types: Cigarettes    Start date: 1973   Smokeless Tobacco Current       alcohol use:    Social History     Substance and Sexual Activity   Alcohol Use No                 Occupation:  Ret[employer]     ALLERGIES:    Review of patient's allergies indicates:   Allergen Reactions    Ace inhibitors       Cough         CURRENT MEDICATIONS:    Current Outpatient Medications   Medication Sig Dispense Refill    albuterol (PROVENTIL/VENTOLIN HFA) 90 mcg/actuation inhaler 2 puffs every 6 (six) hours as needed.      albuterol-ipratropium (DUO-NEB) 2.5 mg-0.5 mg/3 mL nebulizer solution Take 3 mLs by nebulization every 6 (six) hours as needed for Wheezing. Rescue 75 mL 0    atorvastatin (LIPITOR) 40 MG tablet TAKE 1 TABLET(40 MG) BY MOUTH EVERY DAY 90 tablet 2    azelastine (ASTELIN) 137 mcg (0.1 %) nasal spray 1 spray (137 mcg total) by Nasal route 2 (two) times daily. 30 mL 3    buPROPion (WELLBUTRIN XL) 150 MG TB24 tablet TAKE 1 TABLET(150 MG) BY MOUTH EVERY DAY 90 tablet 3    erythromycin (ROMYCIN) ophthalmic ointment Place into the left eye every evening. 3.5 g 0    fluticasone propionate (FLONASE) 50 mcg/actuation nasal spray SHAKE LIQUID AND USE 2 SPRAYS(100 MCG) IN EACH NOSTRIL EVERY DAY 48 g 0    fluticasone-salmeterol diskus inhaler 250-50 mcg Inhale 1 puff into the lungs 2 (two) times daily.      furosemide (LASIX) 40 MG tablet Take 1 tablet (40 mg total) by mouth once daily. 90 tablet 3    ketoconazole (NIZORAL) 2 % cream Apply topically once daily. 60 g 2    losartan (COZAAR) 25 MG tablet Take 25 mg by mouth.      melatonin (MELATIN) 5 mg Take 1 tablet (5 mg total) by mouth nightly. 90 tablet 3    methylPREDNISolone (MEDROL DOSEPACK) 4 mg tablet use as directed 21 each 0    metoprolol succinate (TOPROL-XL) 100 MG 24 hr tablet Take 1 tablet (100 mg total) by mouth once daily. 90 tablet 3    sertraline (ZOLOFT) 100 MG tablet TAKE 1 TABLET(100 MG) BY MOUTH EVERY DAY 90 tablet 3    SPIRIVA RESPIMAT 2.5 mcg/actuation inhaler INHALE 2 PUFFS BY MOUTH DAILY 4 g 5    triamcinolone acetonide 0.1% (KENALOG) 0.1 % ointment APPLY BETWEEN THE KNEES AND UPPER THIGHS TWICE DAILY FOR NO MORE THAN 7 TO 14 DAYS 454 g 1    XARELTO 20 mg Tab TAKE 1 TABLET(20 MG) BY MOUTH EVERY DAY 90 tablet 3    cetirizine (ZYRTEC) 10 MG tablet Take  "1 tablet (10 mg total) by mouth once daily. 90 tablet 3     No current facility-administered medications for this visit.                  REVIEW OF SYSTEMS:     Sleep related symptoms as per HPI.  CONST:Denies weight gain ; lost 40# since 2014   HEENT: Denies sinus congestion  PULM: Denies dyspnea  CARD:  +intermittent palpitations   GI:  Denies acid reflux; +recurring vomitting due to pancreatitis  : Denies polyuria  NEURO: Denies headaches  PSYCH: +anxious  HEME: Denies anemia   Otherwise, a balance of systems reviewed is negative.          PHYSICAL EXAM:  Vitals:    03/04/24 0831   BP: 137/87   Pulse: 80   SpO2: (!) 92%   Weight: 125.3 kg (276 lb 2 oz)   Height: 5' 3" (1.6 m)   PainSc:   4   PainLoc: Knee       Body mass index is 48.91 kg/m².     GENERAL: Normal development, well groomed  HEENT:  Conjunctivae are non-erythematous; Pupils equal, round, and reactive to light; Nose is symmetrical; Nasal mucosa is pink and moist; Septum is midline; Inferior turbinates are normal; Nasal airflow is normal; Posterior pharynx is pink; Modified Mallampati: 2; Posterior palate is normal; Tonsils +1; Uvula is normal and pink;Tongue is normal; Dentition is fair; No TMJ tenderness; Jaw opening and protrusion without click and without discomfort.  NECK: Supple. Neck circumference is 15.5 inches. No thyromegaly. No palpable nodes.     SKIN: On face and neck: No abrasions, no rashes, no lesions.  No subcutaneous nodules are palpable.  RESPIRATORY: Chest is clear to auscultation.  Normal chest expansion and non-labored breathing at rest.  CARDIOVASCULAR: Normal S1, S2.  Irregular rhythm.  No murmurs, gallops or rubs. No carotid bruits bilaterally.  EXTREMITIES: + edema. No clubbing. No cyanosis. Station normal. Gait normal.        NEURO/PSYCH: Oriented to time, place and person. Normal attention span and concentration. Affect is full. Mood is normal.                                              DATA   HSAT: 10-13/2020: AHI " 90  Titration study 12/9/23 adequate control with bipap 21/16.      PFT 8/8/22 Ratio of 64%; FVC 2.37 L (99%); FEV1 1.52 L (81%); TLC 3.25 L (68%); dlco 18 (88%)   ABG 9/21/21 pH 7.3 pCO2 55.6 pO2 72 SpO2 94 on RA    CT scan (personally reviewed) 8/15/22 no septal reticulation.  No honeycombing.      Echo 11/13/23    AFib noted throughout exam.    Left Ventricle: The left ventricle is normal in size. Normal wall thickness. Normal wall motion. There is normal systolic function with a visually estimated ejection fraction of 55 - 60%. Unable to assess diastolic function due to atrial fibrillation.    Right Ventricle: Mild right ventricular enlargement. Systolic function is mildly reduced.    Mitral Valve: There is mild regurgitation.    Pulmonary Artery: The estimated pulmonary artery systolic pressure is 49 mmHg.  Lab Results   Component Value Date    TSH 1.202 11/13/2023     ASSESSMENT  Problem List Items Addressed This Visit       Chronic respiratory failure with hypercapnia    Overview     PFT 9/21/2021: ratio 64 fev1 1.30 (66) fvc 80 tlc 62 dlco 73  ABG 9/21/21 pH 7.3 pCO2 55.6 pO2 72 SpO2 94 on RA  Combination of copd + obesity hypoventilation + pulmonary htn  Bipap + diuretic.             Goals of care, counseling/discussion    Overview     Advance Care Planning     12/5/23 During this visit, I engaged the patient in the advance care planning process.  The patient and I reviewed the role for advance directives and their purpose in directing future healthcare if the patient's unable to speak for him/herself.  At this point in time, the patient does have full decision-making capacity.  We discussed different extreme health states that patient could experience, and reviewed what kind of medical care patient would want in those situations.  The patient communicated that if she was comatose and had little chance of a meaningful recovery, she would NOT want machines/life-sustaining treatments used.  In addition to the  above preference, patient  HAS NOT completed a living will to reflect these preferences.  The patient HAS designated her  as healthcare power of  to make decisions on her behalf.  In the case of cardiopulmonary arrest, patient does wish for CPR, intubation or cardioversion.  Advance care brochure given to patient.                  HIEU treated with BiPAP - Primary    Overview     AHI 90  Currently with bipap st 18/7  with rate of 12.    Currently with [employer] Airview.  Patient is reassured that she has bipap.  Recommend resumption.    Due to poor compliance, will bring back for retitration.  Will adjust setting via airview after sleep study.  New supplies after study as well.    Proper mask placement d/w patient.  Per patient, sleep is better with bpap.  Residual ahi of 7.5.  patient is benefiting from bpap.    Advise patient on technique to disengage hose without taking mask off.           Relevant Orders    CPAP/BIPAP SUPPLIES         RLS - 4/4 criteria.  Patient deferred medical therapy.  Will monitor for now.      Education: During our discussion today, we talked about the etiology of obstructive sleep apnea as well as the potential ramifications of untreated sleep apnea, which could include daytime sleepiness, hypertension, heart disease and/or stroke.     Precautions: The patient was advised to abstain from driving should they feel sleepy or drowsy.       Patient will No follow-ups on file.    31 minutes of total time spent on the encounter, which includes face to face time and non-face to face time preparing to see the patient (eg, review of tests), Obtaining and/or reviewing separately obtained history, documenting clinical information in the electronic or other health record, independently interpreting results (not separately reported) and communicating results to the patient/family/caregiver, or Care coordination (not separately reported).

## 2024-03-06 ENCOUNTER — OFFICE VISIT (OUTPATIENT)
Dept: CARDIOLOGY | Facility: CLINIC | Age: 69
End: 2024-03-06
Payer: COMMERCIAL

## 2024-03-06 VITALS
SYSTOLIC BLOOD PRESSURE: 120 MMHG | HEIGHT: 63 IN | RESPIRATION RATE: 15 BRPM | WEIGHT: 276.13 LBS | DIASTOLIC BLOOD PRESSURE: 72 MMHG | HEART RATE: 77 BPM | OXYGEN SATURATION: 96 % | BODY MASS INDEX: 48.93 KG/M2

## 2024-03-06 DIAGNOSIS — R06.09 DOE (DYSPNEA ON EXERTION): ICD-10-CM

## 2024-03-06 DIAGNOSIS — I10 ESSENTIAL HYPERTENSION: Primary | ICD-10-CM

## 2024-03-06 DIAGNOSIS — E78.2 MIXED HYPERLIPIDEMIA: ICD-10-CM

## 2024-03-06 DIAGNOSIS — I48.11 LONGSTANDING PERSISTENT ATRIAL FIBRILLATION: ICD-10-CM

## 2024-03-06 DIAGNOSIS — I27.20 PULMONARY HTN: ICD-10-CM

## 2024-03-06 DIAGNOSIS — I50.33 ACUTE ON CHRONIC DIASTOLIC CHF (CONGESTIVE HEART FAILURE): ICD-10-CM

## 2024-03-06 PROCEDURE — 1159F MED LIST DOCD IN RCRD: CPT | Mod: CPTII,S$GLB,, | Performed by: INTERNAL MEDICINE

## 2024-03-06 PROCEDURE — 99214 OFFICE O/P EST MOD 30 MIN: CPT | Mod: S$GLB,,, | Performed by: INTERNAL MEDICINE

## 2024-03-06 PROCEDURE — 99999 PR PBB SHADOW E&M-EST. PATIENT-LVL V: CPT | Mod: PBBFAC,,, | Performed by: INTERNAL MEDICINE

## 2024-03-06 PROCEDURE — 1100F PTFALLS ASSESS-DOCD GE2>/YR: CPT | Mod: CPTII,S$GLB,, | Performed by: INTERNAL MEDICINE

## 2024-03-06 PROCEDURE — 4010F ACE/ARB THERAPY RXD/TAKEN: CPT | Mod: CPTII,S$GLB,, | Performed by: INTERNAL MEDICINE

## 2024-03-06 PROCEDURE — 3008F BODY MASS INDEX DOCD: CPT | Mod: CPTII,S$GLB,, | Performed by: INTERNAL MEDICINE

## 2024-03-06 PROCEDURE — 93000 ELECTROCARDIOGRAM COMPLETE: CPT | Mod: S$GLB,,, | Performed by: INTERNAL MEDICINE

## 2024-03-06 PROCEDURE — 3078F DIAST BP <80 MM HG: CPT | Mod: CPTII,S$GLB,, | Performed by: INTERNAL MEDICINE

## 2024-03-06 PROCEDURE — 1126F AMNT PAIN NOTED NONE PRSNT: CPT | Mod: CPTII,S$GLB,, | Performed by: INTERNAL MEDICINE

## 2024-03-06 PROCEDURE — 1124F ACP DISCUSS-NO DSCNMKR DOCD: CPT | Mod: CPTII,S$GLB,, | Performed by: INTERNAL MEDICINE

## 2024-03-06 PROCEDURE — 3074F SYST BP LT 130 MM HG: CPT | Mod: CPTII,S$GLB,, | Performed by: INTERNAL MEDICINE

## 2024-03-06 PROCEDURE — 3288F FALL RISK ASSESSMENT DOCD: CPT | Mod: CPTII,S$GLB,, | Performed by: INTERNAL MEDICINE

## 2024-03-06 PROCEDURE — 3072F LOW RISK FOR RETINOPATHY: CPT | Mod: CPTII,S$GLB,, | Performed by: INTERNAL MEDICINE

## 2024-03-06 NOTE — PROGRESS NOTES
Subjective:   Patient ID:  Sandee Evans is a 68 y.o. female who presents for follow-up of Follow-up      HPI      Chronic A-fib on xarelto, Diastolic CHF, HTN, DM, HLD, PAD, tobacco abuse     Previously followed by Dr Naik  Patient is here for follow-up of paroxysmal atrial fibrillation.  She is labeled as chronic and has been in normal sinus rhythm and was last seen in 2016.  EKG today confirms this.  She was lost to follow-up and thought she was discharged because she felt everything was okay.  She has not taken her blood thinner but is only been on aspirin.  She gets shortness of breath on heavy activity but relieved with rest.  She does have some associated substernal heaviness.  She has had no breakthrough tachycardia palpitations.  She denies any PND, orthopnea has stable lower edema relieved with elevation.  She is preop for knee replacement and is unable to go up a flight of stairs due to her degenerative joint condition     Here for follow-up of paroxysmal atrial fibrillation.  She underwent testing as below for preop clearance.  We if cleared at low to intermediate risk and she is aware that anything can happen with the stress of surgery and in particular with her tobacco use disorder.  We discussed that this is the main things she needs to work on postoperatively as well as rehab.  She denies any further cardiopulmonary complaints.  We went over her medicines as well and discussed that she has to hold her blood thinner for at least 2 days prior to the procedure but continue her beta-blocker perioperatively.     Echo 11/14/23    AFib noted throughout exam.    Left Ventricle: The left ventricle is normal in size. Normal wall thickness. Normal wall motion. There is normal systolic function with a visually estimated ejection fraction of 55 - 60%. Unable to assess diastolic function due to atrial fibrillation.    Right Ventricle: Mild right ventricular enlargement. Systolic function is mildly reduced.     Mitral Valve: There is mild regurgitation.    Pulmonary Artery: The estimated pulmonary artery systolic pressure is 49 mmHg.     NST: 2/8/19  Normal [facility] MPI  The perfusion scan is free of evidence from myocardial ischemia or injury.  An ejection fraction of 55 % at rest  LV cavity size is normal at rest.  Resting wall motion is physiologic.     Holter: 3/1/21  Atrial fibrillation with ventricular rates varying between 61 and 188 bpm with an average of 104 bpm. Total time in atrial fibrillation was approximately 24 hours.  There were frequent PVCs totalling 2296 and averaging 95.75 per hour. There were 92 monomorphic couplets. There were 3 bigeminal cycles. There were 7 monomorphic triplets.  The diary was returned incomplete.     2/18/21 Denies CP. Mild stable ESQUIVEL  EKG A-fib 94 NSSTT changes - unaware of being back in A-fib - last EKG 2/8/19 NSR  Back in A-fib - duration unknown. Discussed LAURA/CV versus rate control - given lack of symptoms we are both in agreement for rate control.   Echo and holter to evaluate A-fib control  Continue Rx for PAD, HTN, DM, A-fib      3/8/21 Denies CP or SOB. Reports some issues with nausea and feels that she has a mass in her abdomen that moves around   Increase metoprolol 50 bid for better A-fib rate control  Continue Rx for HTN, HLD, PAD, DM  OV 6 months     7/16/21 Reports SOB and LE edema for 2 weeks. Was started on lasix 3 days ago without much improvement  EKG A-fib NSSTT changes  Echo, BNP, BMP for CHF  Continue current diuretic regimen - needs to go to the ER if symptoms worsen  Continue Rx for HTN, HLD, PAD, DM, A-fib, CHF     7/29/21 Feels better since discharge. Still with mild LE edema and ESQUIVEL  On lasix 40 bid     Continue Rx for HTN, HLD, PAD, DM, A-fib, CHF  OV 1 month with BNP, BMP        9/28/21 Denies CP. Less SOB and LE edema improved  BP controlled      Continue Rx for HTN, HLD, PAD, DM, A-fib, CHF  OV 3 months with BNP, BMP     Admitted 5/20/22  Ms Sandee TEJADA  Cristina is a 66 y.o. woman admitted with acute hypoxic/hypercapnic respiratory failure due to acute on chronic diastolic CHF, exacerbation of COPD, and untreated HIEU. She initially required BiPAP but was quickly weaned to O2 by NC. She states that she sometimes skips lasix doses due to urinary incontinence. She does not have BiPAP at home. She is still using tobacco. Started IV lasix for CHF exacerbation, steroids/nebs/levofloxacin for COPD exacerbation. Stepped down to floor on 5/21/22. Patient grew out E-coli on blood cultures- (S) to Cipro. Patient clinically improved and was discharged to home with out patient PT/OT on 5/24/22. Bi-pap will be arranged. Activity as tolerated. Diet- low NA/ADA 2000 yang diet    Already has pulmonary follow up.      6/28/22 Denies CP, SOB improved from discharge  BP controlled. Lasix has been held since discharge   Restart lasix 40 qd  OV 3 months with BNP, BMP      Continue Rx for HTN, HLD, PAD, DM, A-fib, CHF     9/28/22 Denies CP, ESQUIVEL improved after restarting lasix  Some dry cough that she thinks are allergies  BP controlled  OV 6 months with BNP, BMP      Continue Rx for HTN, HLD, PAD, DM, A-fib, CHF      Went to the ER 1/26/23  This 67-year-old female presents to the emergency room complaining of nausea that started yesterday.  Patient developed some chest tightness and shortness of breath that also started this morning at 7:00 a.m..  The patient denies dysuria.  She had 4 episodes of diarrhea in the last 24 hours.  The diarrhea started yesterday.  The patient also has weakness and pain in her left arm this started 3 weeks ago.  The patient has a history of arthritis.  She has been slightly lightheaded when she 1st stands up for about a week.  He had some left upper quadrant abdominal pain yesterday that has since resolved.  She has had chills for 2 weeks and a cough for 2 weeks and headache that occurs only with coughing as well as some rhinorrhea but no sore throat.  She has  a history of atrial fibrillation.  She has a history of congestive heart failure.  She smokes and has a history of morbid obesity.  She does not report a history of chronic obstructive pulmonary disease but she is on Spiriva inhaler.  She has a history thyroid disease.  She is maintained on Xarelto.  , troponin negative  EKG A-fib 108     3/7/23 CP has resolved. Still with cough and some abdominal discomfort  BP controlled  Discussed repeat stress test given recent CP - she feels like this is from URI and would like to hold off      Continue Rx for HTN, HLD, PAD, DM, A-fib, CHF  OV 3 months     Admitted 11/13/23  67 y.o. female, with a past medical history of A-fib on OAC,diastolic  CHF, COPD, HLD, HTN, hypothyroidism, morbid obesity admitted on 11/22/2023 for acute respiratory failure with hypoxia and hypercapnic (O2 sts 88% on RA,  PCO2 of 72) due to +RSV, COPD exacerbation, and acute on chronic diastolic heart failure. admitted to ICU on BIPAP. BNP > 400.  Chest imaging with bilateral effusions and peripheral edema. Hospital course complicated by agitation, required Precedex drip briefly.  Able to wean off Precedex drip. Pulmonology consulted-suspect chronic hypercapnic respiratory failure but not using nightly NIV.  RSV positive and on droplet precaution.  Patient completed treatment with DuoNebs and prednisone on 11/17/2023.  Continue IV diuresis and wean O2 as tolerated. Pt still wheezing, resumed steroids with plan for gradual taper. Given patient continues to have wheezing, SOB, and diminished breath sounds, will consult pulmonology again for input.       Labs 12/12/23  K 4.5  Cr 1.2         12/19/23 Still with cough since discharge but less SOB  SBP 90s but tolerated  Stop losartan with hypotension - needs to monitor home BP      Continue Rx for HTN, HLD, PAD, DM, A-fib, CHF  OV 3 months     3/6/24 Denies CP or SOB  BP improved  EKG A-fib otherwise ok       Review of Systems   Constitutional:  Negative for decreased appetite.   HENT:  Negative for ear discharge.    Eyes:  Negative for blurred vision.   Respiratory:  Negative for hemoptysis.    Endocrine: Negative for polyphagia.   Hematologic/Lymphatic: Negative for adenopathy.   Skin:  Negative for color change.   Musculoskeletal:  Negative for joint swelling.   Genitourinary:  Negative for bladder incontinence.   Neurological:  Negative for brief paralysis.   Psychiatric/Behavioral:  Negative for hallucinations.    Allergic/Immunologic: Negative for hives.       Objective:   Physical Exam  Constitutional:       Appearance: She is well-developed.   HENT:      Head: Normocephalic and atraumatic.   Eyes:      Conjunctiva/sclera: Conjunctivae normal.      Pupils: Pupils are equal, round, and reactive to light.   Cardiovascular:      Rate and Rhythm: Normal rate. Rhythm irregular.      Pulses: Intact distal pulses.      Heart sounds: Normal heart sounds.   Pulmonary:      Effort: Pulmonary effort is normal.      Breath sounds: Normal breath sounds.   Abdominal:      General: Bowel sounds are normal.      Palpations: Abdomen is soft.   Musculoskeletal:         General: Normal range of motion.      Cervical back: Normal range of motion and neck supple.   Skin:     General: Skin is warm and dry.   Neurological:      Mental Status: She is alert and oriented to person, place, and time.         Assessment:      1. Essential hypertension    2. Mixed hyperlipidemia    3. Pulmonary HTN    4. Acute on chronic diastolic CHF (congestive heart failure)    5. ESQUIVEL (dyspnea on exertion)    6. Longstanding persistent atrial fibrillation        Plan:         Continue Rx for HTN, HLD, PAD, DM, A-fib, CHF  OV 6 months with BNP, BMP         Advance Care Planning     Date: 03/06/2024  Patient did not wish or was not able to name a surrogate decision maker or provide an Advance Care Plan.

## 2024-03-07 LAB
OHS QRS DURATION: 88 MS
OHS QTC CALCULATION: 474 MS

## 2024-03-21 ENCOUNTER — TELEPHONE (OUTPATIENT)
Dept: FAMILY MEDICINE | Facility: CLINIC | Age: 69
End: 2024-03-21
Payer: COMMERCIAL

## 2024-03-21 NOTE — TELEPHONE ENCOUNTER
Patient stated she will wait until appointment scheduled for 4/18/2024 to discuss rash and medication - Wellbutrin -that she feels is causing hallucinations.  Would like to know if Dr. Miller recommends she stop taking medication until she sees him?  Please advise.

## 2024-04-01 NOTE — TELEPHONE ENCOUNTER
No care due was identified.  [facility] Embedded Care Due Messages. Reference number: [id number].   4/01/2024 5:27:56 AM CDT

## 2024-04-02 RX ORDER — LOSARTAN POTASSIUM 25 MG/1
25 TABLET ORAL
Qty: 90 TABLET | Refills: 2 | OUTPATIENT
Start: 2024-04-02

## 2024-04-02 NOTE — TELEPHONE ENCOUNTER
Refill Decision Note  Rita DC. Inappropriate Request     Sandee Evans  is requesting a refill authorization.  Brief Assessment and Rationale for Refill:  Quick Discontinue     Medication Therapy Plan:  THIS WAS DISCONTINUED ON 03/06/2024    Medication Reconciliation Completed: No   Comments:           Note composed:10:27 AM 04/02/2024

## 2024-04-03 NOTE — TELEPHONE ENCOUNTER
No care due was identified.  [facility] Embedded Care Due Messages. Reference number: [id number].   4/03/2024 6:50:16 AM CDT

## 2024-04-04 RX ORDER — LOSARTAN POTASSIUM 25 MG/1
25 TABLET ORAL DAILY
Qty: 90 TABLET | Refills: 3 | Status: SHIPPED | OUTPATIENT
Start: 2024-04-04 | End: 2024-04-18

## 2024-04-18 ENCOUNTER — PATIENT MESSAGE (OUTPATIENT)
Dept: FAMILY MEDICINE | Facility: CLINIC | Age: 69
End: 2024-04-18

## 2024-04-18 ENCOUNTER — OFFICE VISIT (OUTPATIENT)
Dept: FAMILY MEDICINE | Facility: CLINIC | Age: 69
End: 2024-04-18
Payer: COMMERCIAL

## 2024-04-18 VITALS
OXYGEN SATURATION: 95 % | TEMPERATURE: 97 F | HEIGHT: 63 IN | BODY MASS INDEX: 49.41 KG/M2 | WEIGHT: 278.88 LBS | HEART RATE: 92 BPM | RESPIRATION RATE: 16 BRPM | DIASTOLIC BLOOD PRESSURE: 74 MMHG | SYSTOLIC BLOOD PRESSURE: 128 MMHG

## 2024-04-18 DIAGNOSIS — L98.9 SKIN LESION: ICD-10-CM

## 2024-04-18 DIAGNOSIS — E78.2 MIXED HYPERLIPIDEMIA: ICD-10-CM

## 2024-04-18 DIAGNOSIS — B36.9 FUNGAL RASH OF TORSO: ICD-10-CM

## 2024-04-18 DIAGNOSIS — R44.3 HALLUCINATIONS: Primary | ICD-10-CM

## 2024-04-18 DIAGNOSIS — I10 ESSENTIAL HYPERTENSION: ICD-10-CM

## 2024-04-18 DIAGNOSIS — R21 SKIN RASH: ICD-10-CM

## 2024-04-18 PROCEDURE — 3078F DIAST BP <80 MM HG: CPT | Mod: CPTII,S$GLB,, | Performed by: FAMILY MEDICINE

## 2024-04-18 PROCEDURE — 3072F LOW RISK FOR RETINOPATHY: CPT | Mod: CPTII,S$GLB,, | Performed by: FAMILY MEDICINE

## 2024-04-18 PROCEDURE — 99214 OFFICE O/P EST MOD 30 MIN: CPT | Mod: S$GLB,,, | Performed by: FAMILY MEDICINE

## 2024-04-18 PROCEDURE — 3074F SYST BP LT 130 MM HG: CPT | Mod: CPTII,S$GLB,, | Performed by: FAMILY MEDICINE

## 2024-04-18 PROCEDURE — 1101F PT FALLS ASSESS-DOCD LE1/YR: CPT | Mod: CPTII,S$GLB,, | Performed by: FAMILY MEDICINE

## 2024-04-18 PROCEDURE — 3288F FALL RISK ASSESSMENT DOCD: CPT | Mod: CPTII,S$GLB,, | Performed by: FAMILY MEDICINE

## 2024-04-18 PROCEDURE — 3008F BODY MASS INDEX DOCD: CPT | Mod: CPTII,S$GLB,, | Performed by: FAMILY MEDICINE

## 2024-04-18 PROCEDURE — 99999 PR PBB SHADOW E&M-EST. PATIENT-LVL V: CPT | Mod: PBBFAC,,, | Performed by: FAMILY MEDICINE

## 2024-04-18 PROCEDURE — 4010F ACE/ARB THERAPY RXD/TAKEN: CPT | Mod: CPTII,S$GLB,, | Performed by: FAMILY MEDICINE

## 2024-04-18 PROCEDURE — 1159F MED LIST DOCD IN RCRD: CPT | Mod: CPTII,S$GLB,, | Performed by: FAMILY MEDICINE

## 2024-04-18 RX ORDER — TRAMADOL HYDROCHLORIDE 50 MG/1
50 TABLET ORAL EVERY 12 HOURS PRN
COMMUNITY
Start: 2024-02-16

## 2024-04-18 RX ORDER — TRIAMCINOLONE ACETONIDE 1 MG/G
OINTMENT TOPICAL
Qty: 454 G | Refills: 1 | Status: SHIPPED | OUTPATIENT
Start: 2024-04-18

## 2024-04-18 RX ORDER — KETOCONAZOLE 20 MG/G
CREAM TOPICAL DAILY
Qty: 60 G | Refills: 2 | Status: SHIPPED | OUTPATIENT
Start: 2024-04-18

## 2024-04-18 NOTE — PROGRESS NOTES
Subjective:       Patient ID: Sandee Evans is a 68 y.o. female.    Chief Complaint: Sinus Problem and Rash      Sinus Problem    Rash      68-year-old female presents for three-month follow-up.  Patient did not follow-up with psych for hallucinations.  Feels she continues to have them.  States she has rash and would like refills on her medications.  Patient has stopped her atorvastatin but is unsure why.  Was asked to stop losartan by Cardiology per patient    Review of Systems   Constitutional: Negative.    HENT: Negative.     Respiratory: Negative.     Cardiovascular: Negative.    Gastrointestinal: Negative.    Endocrine: Negative.    Genitourinary: Negative.    Musculoskeletal: Negative.    Skin:  Positive for rash.   Neurological: Negative.    Psychiatric/Behavioral: Negative.            Past Medical History:   Diagnosis Date    Anticoagulant long-term use     Xarelto    Arthritis     Atrial fibrillation     Breast cyst     CHF (congestive heart failure)     Degenerative disc disease     Edema     HLD (hyperlipidemia)     Hx of psychiatric care     Hyperlipidemia     Hypertension     Hypothyroidism     Nuclear sclerosis, bilateral 12/18/2017    Obesity, morbid     HIEU (obstructive sleep apnea)     Other abnormal glucose     pre-diabetes    Pre-diabetes     Psychiatric problem     Requires assistance with activities of daily living (ADL)     Sleep apnea     Smoker     SOB (shortness of breath)     SOB (shortness of breath)     Thyroid disease     on meds 8-9 years ago. hypothyroidism.no malignancy    TMJ (temporomandibular joint disorder)     jaw clicking    Tobacco abuse     Unsteady gait     Urge incontinence     Weakness generalized     Wears glasses      Past Surgical History:   Procedure Laterality Date    BREAST BIOPSY Right     over 10 yrs ago/ benign    BREAST CYST EXCISION Right     BREAST LUMPECTOMY Right     over 10 yrs ago, ex bx/ benign    COLONOSCOPY N/A 6/25/2019    Procedure: COLONOSCOPY;   Surgeon: Micheline Flores MD;  Location: [facility] ENDO;  Service: Endoscopy;  Laterality: N/A;  RX XARELTO ok to hold (2 days) per Dr. Naik see scan 3/20/19    ENDOSCOPIC ULTRASOUND OF UPPER GASTROINTESTINAL TRACT N/A 1/26/2021    Procedure: ULTRASOUND-ENDOSCOPIC-UPPER;  Surgeon: Stevenson Philippe MD;  Location: [facility] ENDO;  Service: Endoscopy;  Laterality: N/A;    HYSTERECTOMY      JOINT REPLACEMENT Bilateral     knees    OOPHORECTOMY      rt.knee surgery 2016      TOTAL KNEE ARTHROPLASTY Left 2/19/2019    Procedure: ARTHROPLASTY, KNEE, TOTAL;  Surgeon: Med Hanson MD;  Location: [facility] OR;  Service: Orthopedics;  Laterality: Left;  10AM START PER  PER JAYLIN TEXT @ 8:34AM ON 2-18-19  SUPINE  HARRIS LOYA [phone] TEXTED HIM ON 1-24-19 @ 7:57AM  RN PRE OP 2-13-19--BMI--48.9    underarm gland\ Bilateral      Family History   Problem Relation Name Age of Onset    Diabetes Mother      Hypertension Mother      Cancer Mother      No Known Problems Father      No Known Problems Sister      Diabetes Brother      Cancer Brother      No Known Problems Maternal Aunt      No Known Problems Maternal Uncle      No Known Problems Paternal Aunt      No Known Problems Paternal Uncle      No Known Problems Maternal Grandmother      No Known Problems Maternal Grandfather      No Known Problems Paternal Grandmother      No Known Problems Paternal Grandfather      No Known Problems Other      Amblyopia Neg Hx      Blindness Neg Hx      Cataracts Neg Hx      Glaucoma Neg Hx      Macular degeneration Neg Hx      Retinal detachment Neg Hx      Strabismus Neg Hx      Stroke Neg Hx      Thyroid disease Neg Hx       Social History     Socioeconomic History    Marital status:    Tobacco Use    Smoking status: Every Day     Current packs/day: 0.50     Average packs/day: 0.5 packs/day for 51.3 years (25.6 ttl pk-yrs)     Types: Cigarettes     Start date: 1973    Smokeless tobacco: Current   Substance and Sexual Activity     Alcohol use: No    Drug use: No    Sexual activity: Yes     Partners: Male     Social Determinants of Health     Financial Resource Strain: Low Risk  (11/30/2023)    Overall Financial Resource Strain (CARDIA)     Difficulty of Paying Living Expenses: Not very hard   Food Insecurity: Patient Declined (11/30/2023)    Hunger Vital Sign     Worried About Running Out of Food in the Last Year: Patient declined     Ran Out of Food in the Last Year: Patient declined   Transportation Needs: No Transportation Needs (11/30/2023)    PRAPARE - Transportation     Lack of Transportation (Medical): No     Lack of Transportation (Non-Medical): No   Physical Activity: Insufficiently Active (11/30/2023)    Exercise Vital Sign     Days of Exercise per Week: 1 day     Minutes of Exercise per Session: 10 min   Stress: Stress Concern Present (5/14/2023)    [employer] - Occupational Stress Questionnaire     Feeling of Stress : Rather much   Social Connections: Unknown (11/30/2023)    Social Connection and Isolation Panel [NHANES]     Frequency of Communication with Friends and Family: Patient declined     Frequency of Social Gatherings with Friends and Family: Patient declined     Active Member of Clubs or Organizations: No     Attends Club or Organization Meetings: 1 to 4 times per year     Marital Status: Patient declined   Housing Stability: Unknown (11/30/2023)    Housing Stability Vital Sign     Unable to Pay for Housing in the Last Year: Patient refused     Number of Places Lived in the Last Year: 2     Unstable Housing in the Last Year: Patient refused       Current Outpatient Medications:     albuterol (PROVENTIL/VENTOLIN HFA) 90 mcg/actuation inhaler, 2 puffs every 6 (six) hours as needed., Disp: , Rfl:     albuterol-ipratropium (DUO-NEB) 2.5 mg-0.5 mg/3 mL nebulizer solution, Take 3 mLs by nebulization every 6 (six) hours as needed for Wheezing. Rescue, Disp: 75 mL, Rfl: 0    azelastine (ASTELIN) 137  mcg (0.1 %) nasal spray, 1 spray (137 mcg total) by Nasal route 2 (two) times daily., Disp: 30 mL, Rfl: 3    erythromycin (ROMYCIN) ophthalmic ointment, Place into the left eye every evening., Disp: 3.5 g, Rfl: 0    fluticasone propionate (FLONASE) 50 mcg/actuation nasal spray, SHAKE LIQUID AND USE 2 SPRAYS(100 MCG) IN EACH NOSTRIL EVERY DAY, Disp: 48 g, Rfl: 0    fluticasone-salmeterol diskus inhaler 250-50 mcg, Inhale 1 puff into the lungs 2 (two) times daily., Disp: , Rfl:     furosemide (LASIX) 40 MG tablet, Take 1 tablet (40 mg total) by mouth once daily., Disp: 90 tablet, Rfl: 3    melatonin (MELATIN) 5 mg, Take 1 tablet (5 mg total) by mouth nightly., Disp: 90 tablet, Rfl: 3    methylPREDNISolone (MEDROL DOSEPACK) 4 mg tablet, use as directed, Disp: 21 each, Rfl: 0    metoprolol succinate (TOPROL-XL) 100 MG 24 hr tablet, Take 1 tablet (100 mg total) by mouth once daily., Disp: 90 tablet, Rfl: 3    sertraline (ZOLOFT) 100 MG tablet, TAKE 1 TABLET(100 MG) BY MOUTH EVERY DAY, Disp: 90 tablet, Rfl: 3    SPIRIVA RESPIMAT 2.5 mcg/actuation inhaler, INHALE 2 PUFFS BY MOUTH DAILY, Disp: 4 g, Rfl: 5    traMADoL (ULTRAM) 50 mg tablet, Take 50 mg by mouth every 12 (twelve) hours as needed., Disp: , Rfl:     XARELTO 20 mg Tab, TAKE 1 TABLET(20 MG) BY MOUTH EVERY DAY, Disp: 90 tablet, Rfl: 3    atorvastatin (LIPITOR) 40 MG tablet, TAKE 1 TABLET(40 MG) BY MOUTH EVERY DAY (Patient not taking: Reported on 3/6/2024), Disp: 90 tablet, Rfl: 2    cetirizine (ZYRTEC) 10 MG tablet, Take 1 tablet (10 mg total) by mouth once daily., Disp: 90 tablet, Rfl: 3    ketoconazole (NIZORAL) 2 % cream, Apply topically once daily., Disp: 60 g, Rfl: 2    triamcinolone acetonide 0.1% (KENALOG) 0.1 % ointment, APPLY BETWEEN THE KNEES AND UPPER THIGHS TWICE DAILY FOR NO MORE THAN 7 TO 14 DAYS, Disp: 454 g, Rfl: 1   Objective:      Vitals:    04/18/24 1021   BP: 128/74   BP Location: Left arm   Patient Position: Sitting   BP Method: Large (Manual)  "  Pulse: 92   Resp: 16   Temp: 97 °F (36.1 °C)   TempSrc: Oral   SpO2: 95%   Weight: 126.5 kg (278 lb 14.1 oz)   Height: 5' 3" (1.6 m)       Physical Exam  Constitutional:       General: She is not in acute distress.     Appearance: She is not diaphoretic.   HENT:      Head: Normocephalic and atraumatic.   Eyes:      Conjunctiva/sclera: Conjunctivae normal.   Pulmonary:      Effort: Pulmonary effort is normal.   Musculoskeletal:         General: Normal range of motion.      Cervical back: Neck supple.   Skin:     Findings: Rash present.   Neurological:      Mental Status: She is alert and oriented to person, place, and time.   Psychiatric:         Behavior: Behavior normal.         Thought Content: Thought content normal.         Judgment: Judgment normal.            Assessment:       1. Hallucinations    2. Skin lesion    3. Mixed hyperlipidemia    4. Essential hypertension    5. Skin rash    6. Fungal rash of torso        Plan:       Hallucinations  -     Ambulatory referral/consult to Psychiatry; Future; Expected date: 04/25/2024    Skin lesion  -     triamcinolone acetonide 0.1% (KENALOG) 0.1 % ointment; APPLY BETWEEN THE KNEES AND UPPER THIGHS TWICE DAILY FOR NO MORE THAN 7 TO 14 DAYS  Dispense: 454 g; Refill: 1    Mixed hyperlipidemia    Essential hypertension    Skin rash  -     Ambulatory referral/consult to Dermatology; Future; Expected date: 04/25/2024    Fungal rash of torso  -     ketoconazole (NIZORAL) 2 % cream; Apply topically once daily.  Dispense: 60 g; Refill: 2    Recent referral to psych.  Will try steroid cream for rash.  Place referral to Dermatology.  Continue other medications.  Advised patient to restart atorvastatin.          Future Appointments   Date Time Provider Department Center   4/23/2024 11:30 AM Maynor Hartmann MD [facility]   7/30/2024 10:00 AM [facility]       Patient note was created using [employer].  Any errors in syntax or even " information may not have been identified and edited on initial review prior to signing this note.

## 2024-04-18 NOTE — PROGRESS NOTES
Health Maintenance Due   Topic     RSV Vaccine (Age 60+ and Pregnant patients) (1 - 1-dose 60+ series)     Foot Exam  CONSULT WITH PCP    COVID-19 Vaccine (5 - 2023-24 season)     LDCT Lung Screen      Pneumococcal Vaccines (Age 65+) (3 of 3 - PPSV23 or PCV20)     Hemoglobin A1c  Scheduled     Diabetes Urine Screening      Colorectal Cancer Screening  CONSULT WITH PCP

## 2024-04-23 ENCOUNTER — OFFICE VISIT (OUTPATIENT)
Dept: UROLOGY | Facility: CLINIC | Age: 69
End: 2024-04-23
Payer: COMMERCIAL

## 2024-04-23 VITALS — WEIGHT: 276.56 LBS | BODY MASS INDEX: 48.99 KG/M2

## 2024-04-23 DIAGNOSIS — R30.0 DYSURIA: ICD-10-CM

## 2024-04-23 DIAGNOSIS — N30.20 CHRONIC CYSTITIS: Primary | ICD-10-CM

## 2024-04-23 PROCEDURE — 3072F LOW RISK FOR RETINOPATHY: CPT | Mod: CPTII,S$GLB,, | Performed by: UROLOGY

## 2024-04-23 PROCEDURE — 1100F PTFALLS ASSESS-DOCD GE2>/YR: CPT | Mod: CPTII,S$GLB,, | Performed by: UROLOGY

## 2024-04-23 PROCEDURE — 99214 OFFICE O/P EST MOD 30 MIN: CPT | Mod: S$GLB,,, | Performed by: UROLOGY

## 2024-04-23 PROCEDURE — 3008F BODY MASS INDEX DOCD: CPT | Mod: CPTII,S$GLB,, | Performed by: UROLOGY

## 2024-04-23 PROCEDURE — 99999 PR PBB SHADOW E&M-EST. PATIENT-LVL III: CPT | Mod: PBBFAC,,, | Performed by: UROLOGY

## 2024-04-23 PROCEDURE — 1126F AMNT PAIN NOTED NONE PRSNT: CPT | Mod: CPTII,S$GLB,, | Performed by: UROLOGY

## 2024-04-23 PROCEDURE — 1159F MED LIST DOCD IN RCRD: CPT | Mod: CPTII,S$GLB,, | Performed by: UROLOGY

## 2024-04-23 PROCEDURE — 1160F RVW MEDS BY RX/DR IN RCRD: CPT | Mod: CPTII,S$GLB,, | Performed by: UROLOGY

## 2024-04-23 PROCEDURE — 4010F ACE/ARB THERAPY RXD/TAKEN: CPT | Mod: CPTII,S$GLB,, | Performed by: UROLOGY

## 2024-04-23 PROCEDURE — 3288F FALL RISK ASSESSMENT DOCD: CPT | Mod: CPTII,S$GLB,, | Performed by: UROLOGY

## 2024-04-23 RX ORDER — ESTRADIOL 0.1 MG/G
1 CREAM VAGINAL
Qty: 42.5 G | Refills: 3 | Status: SHIPPED | OUTPATIENT
Start: 2024-04-24 | End: 2025-04-24

## 2024-04-23 NOTE — PROGRESS NOTES
Subjective:       Patient ID: Sandee Evans is a 68 y.o. female The patient's last visit with me was on 12/11/2023.     Chief Complaint:   Chief Complaint   Patient presents with    Follow-up     Recurrent UTI  She has had several UTIs in the past year.  At least three.  She describes pain with voiding and pressure.  She denies hematuria.  She denies kidney stones or  procedures.  She denies history of blood clots or breast cancer.    5/16/2023  She had a cystoscopy in March 2023, this showed one area of cystitis glandularis at the dome.  She denies any dysuria or hematuria.  She has some urgency at times.      12/11/2023  She was recently in the hospital with COPD and fluid build up.  She feels better today.    04/23/2024  She has had about 3 UTIs in the past year.  The last one was in January.           ACTIVE MEDICAL ISSUES:  Patient Active Problem List   Diagnosis    Urge incontinence    DDD (degenerative disc disease), lumbar    DJD (degenerative joint disease) of knee    Hyperlipidemia    Depressed    Insomnia    Vitamin D deficiency disease    Lumbar radicular pain    Essential hypertension    Severe obesity (BMI >= 40)    Colon polyps    Diverticulosis    Chronic pain    Pre-diabetes    Paroxysmal atrial fibrillation    Longstanding persistent atrial fibrillation    History of pancreatitis    Osteoarthritis of right knee    Depression    Type 2 diabetes mellitus with diabetic cataract, without long-term current use of insulin    Dry eye syndrome, bilateral    Nuclear sclerosis, bilateral    Refractive error    Hidradenitis suppurativa of right axilla    PVD (peripheral vascular disease)    Goals of care, counseling/discussion    Chronic bilateral low back pain without sciatica    ESQUIVEL (dyspnea on exertion)    Tobacco dependence    HIEU treated with BiPAP    Acute on chronic diastolic CHF (congestive heart failure)    Chronic respiratory failure with hypercapnia    Pulmonary HTN    Tobacco abuse     Hypothyroidism    Current mild episode of major depressive disorder without prior episode    COPD with chronic bronchitis    Atelectasis    Chronic kidney disease, stage 3a    Solitary pulmonary nodule    Chronic obstructive pulmonary disease with acute exacerbation    RSV infection       ALLERGIES AND MEDICATIONS: updated and reviewed.  Review of patient's allergies indicates:   Allergen Reactions    Ace inhibitors      Cough       Current Outpatient Medications   Medication Sig Dispense Refill    albuterol (PROVENTIL/VENTOLIN HFA) 90 mcg/actuation inhaler 2 puffs every 6 (six) hours as needed.      albuterol-ipratropium (DUO-NEB) 2.5 mg-0.5 mg/3 mL nebulizer solution Take 3 mLs by nebulization every 6 (six) hours as needed for Wheezing. Rescue 75 mL 0    azelastine (ASTELIN) 137 mcg (0.1 %) nasal spray 1 spray (137 mcg total) by Nasal route 2 (two) times daily. 30 mL 3    fluticasone propionate (FLONASE) 50 mcg/actuation nasal spray SHAKE LIQUID AND USE 2 SPRAYS(100 MCG) IN EACH NOSTRIL EVERY DAY 48 g 0    fluticasone-salmeterol diskus inhaler 250-50 mcg Inhale 1 puff into the lungs 2 (two) times daily.      furosemide (LASIX) 40 MG tablet Take 1 tablet (40 mg total) by mouth once daily. 90 tablet 3    ketoconazole (NIZORAL) 2 % cream Apply topically once daily. 60 g 2    melatonin (MELATIN) 5 mg Take 1 tablet (5 mg total) by mouth nightly. 90 tablet 3    methylPREDNISolone (MEDROL DOSEPACK) 4 mg tablet use as directed 21 each 0    metoprolol succinate (TOPROL-XL) 100 MG 24 hr tablet Take 1 tablet (100 mg total) by mouth once daily. 90 tablet 3    sertraline (ZOLOFT) 100 MG tablet TAKE 1 TABLET(100 MG) BY MOUTH EVERY DAY 90 tablet 3    SPIRIVA RESPIMAT 2.5 mcg/actuation inhaler INHALE 2 PUFFS BY MOUTH DAILY 4 g 5    traMADoL (ULTRAM) 50 mg tablet Take 50 mg by mouth every 12 (twelve) hours as needed.      triamcinolone acetonide 0.1% (KENALOG) 0.1 % ointment APPLY BETWEEN THE KNEES AND UPPER THIGHS TWICE DAILY FOR  NO MORE THAN 7 TO 14 DAYS 454 g 1    XARELTO 20 mg Tab TAKE 1 TABLET(20 MG) BY MOUTH EVERY DAY 90 tablet 3    atorvastatin (LIPITOR) 40 MG tablet TAKE 1 TABLET(40 MG) BY MOUTH EVERY DAY (Patient not taking: Reported on 3/6/2024) 90 tablet 2    cetirizine (ZYRTEC) 10 MG tablet Take 1 tablet (10 mg total) by mouth once daily. 90 tablet 3    erythromycin (ROMYCIN) ophthalmic ointment Place into the left eye every evening. 3.5 g 0    [START ON 4/24/2024] estradioL (ESTRACE) 0.01 % (0.1 mg/gram) vaginal cream Place 1 g vaginally 3 (three) times a week. 42.5 g 3     No current facility-administered medications for this visit.       Review of Systems   Constitutional:  Negative for chills, fatigue and fever.   Respiratory:  Negative for chest tightness and shortness of breath.    Cardiovascular:  Negative for chest pain.   Gastrointestinal:  Negative for abdominal distention, constipation, nausea and vomiting.   Genitourinary:  Positive for urgency. Negative for difficulty urinating, dysuria, flank pain, frequency and hematuria.   Musculoskeletal:  Negative for arthralgias.   Neurological:  Negative for light-headedness.   Psychiatric/Behavioral:  Negative for confusion.        Objective:      Vitals:    04/23/24 1123   Weight: 125.4 kg (276 lb 9.1 oz)       Physical Exam  Vitals and nursing note reviewed.   Constitutional:       Appearance: She is well-developed.   HENT:      Head: Normocephalic.   Eyes:      Conjunctiva/sclera: Conjunctivae normal.   Neck:      Thyroid: No thyromegaly.      Trachea: No tracheal deviation.   Cardiovascular:      Rate and Rhythm: Normal rate.      Pulses: Normal pulses.      Heart sounds: Normal heart sounds.   Pulmonary:      Effort: Pulmonary effort is normal. No respiratory distress.      Breath sounds: Normal breath sounds. No wheezing.   Abdominal:      General: There is no distension.      Palpations: Abdomen is soft. There is no mass.      Tenderness: There is no abdominal  tenderness. There is no guarding or rebound.      Hernia: No hernia is present.   Musculoskeletal:         General: No tenderness. Normal range of motion.      Cervical back: Normal range of motion.   Lymphadenopathy:      Cervical: No cervical adenopathy.   Skin:     General: Skin is warm and dry.      Findings: No erythema or rash.   Neurological:      Mental Status: She is alert and oriented to person, place, and time.   Psychiatric:         Behavior: Behavior normal.         Thought Content: Thought content normal.         Judgment: Judgment normal.         Urine dipstick shows not done.  Micro exam: negative for WBC's or RBC's.  CULTURE, URINE  Order: [id number]  Status: Final result       Visible to patient: Yes (seen)       Next appt: 07/30/2024 at 10:00 AM in Radiology ([id number])       Dx: Chronic kidney disease (CKD) stage G3...    Specimen Information: Urine   0 Result Notes       1 Follow-up Encounter      Component 3 mo ago   Urine Culture, Routine  Abnormal   PROTEUS MIRABILIS  10,000 - 49,999 cfu/ml    Resulting Agency [facility]        Susceptibility     Proteus mirabilis     CULTURE, URINE     Amox/K Clav'ate <=8/4 mcg/mL Sensitive     Amp/Sulbactam <=8/4 mcg/mL Sensitive     Ampicillin <=8 mcg/mL Sensitive     Cefazolin <=2 mcg/mL Sensitive     Cefepime <=2 mcg/mL Sensitive     Ceftriaxone <=1 mcg/mL Sensitive     Ciprofloxacin <=1 mcg/mL Sensitive     Ertapenem <=0.5 mcg/mL Sensitive     Gentamicin <=4 mcg/mL Sensitive     Levofloxacin <=2 mcg/mL Sensitive     Meropenem <=1 mcg/mL Sensitive     Minocycline >8 mcg/mL Resistant     Piperacillin/Tazo <=16 mcg/mL Sensitive     Tobramycin <=4 mcg/mL Sensitive     Trimeth/Sulfa <=2/38 mcg/mL Sensitive                           Assessment:       1. Chronic cystitis    2. Dysuria              Plan:       1. Chronic cystitis    - estradioL (ESTRACE) 0.01 % (0.1 mg/gram) vaginal cream; Place 1 g vaginally 3 (three) times a week.  Dispense: 42.5 g; Refill:  3    2. Dysuria    - US Retroperitoneal Complete; Future             Follow up in about 6 months (around 10/23/2024).

## 2024-04-24 ENCOUNTER — PATIENT OUTREACH (OUTPATIENT)
Dept: ADMINISTRATIVE | Facility: HOSPITAL | Age: 69
End: 2024-04-24
Payer: COMMERCIAL

## 2024-04-24 ENCOUNTER — PATIENT MESSAGE (OUTPATIENT)
Dept: ADMINISTRATIVE | Facility: HOSPITAL | Age: 69
End: 2024-04-24
Payer: COMMERCIAL

## 2024-04-24 DIAGNOSIS — Z12.12 ENCOUNTER FOR COLORECTAL CANCER SCREENING: ICD-10-CM

## 2024-04-24 DIAGNOSIS — E11.9 TYPE 2 DIABETES MELLITUS WITHOUT COMPLICATION, UNSPECIFIED WHETHER LONG TERM INSULIN USE: Primary | ICD-10-CM

## 2024-04-24 DIAGNOSIS — Z12.11 ENCOUNTER FOR COLORECTAL CANCER SCREENING: ICD-10-CM

## 2024-04-24 NOTE — PROGRESS NOTES
Population Health Chart Review & Patient Outreach Details      Additional [facility] Notes:    DUE FOR COLONOSCOPY, EYE DOCTOR, A1C AND URINE. IF ALREADY DONE, NEED TO GET RECORDS INFORMATION.  A1C DUE AFTER 4/27 AND URINE DUE AFTER 5/12/2024  Campaign message -- requesting to schedule ABOVE - ORDERS PLACE AWAITING FOR RESPONSE TO SCHEDULE.           Updates Requested / Reviewed:      Updated Care Coordination Note and Care Everywhere         Health Maintenance Topics Overdue:      VBHM Score: 2     Foot Exam  LDCT Lung Screen    Pneumonia Vaccine  RSV Vaccine                  Health Maintenance Topic(s) Outreach Outcomes & Actions Taken:    Colorectal Cancer Screening - Outreach Outcomes & Actions Taken  : Colonoscopy Case Request / Referral / Home Test Order Placed and AWAITING RESPONSE FROM [facility]    Eye Exam - Outreach Outcomes & Actions Taken  : Eye Camera Scheduled or Optometry/Ophthalmology Referral Placed/Appt Scheduled and AWAITING RESPONSE FROM [facility]    Lab(s) - Outreach Outcomes & Actions Taken  : Overdue Lab(s) Ordered and AWAITING RESPONSE THROUGH [facility]

## 2024-04-29 ENCOUNTER — PATIENT OUTREACH (OUTPATIENT)
Dept: ADMINISTRATIVE | Facility: HOSPITAL | Age: 69
End: 2024-04-29
Payer: COMMERCIAL

## 2024-04-29 ENCOUNTER — HOSPITAL ENCOUNTER (OUTPATIENT)
Dept: RADIOLOGY | Facility: HOSPITAL | Age: 69
Discharge: HOME | End: 2024-04-29
Attending: PODIATRIST
Payer: COMMERCIAL

## 2024-04-29 ENCOUNTER — OFFICE VISIT (OUTPATIENT)
Dept: PODIATRY | Facility: CLINIC | Age: 69
End: 2024-04-29
Payer: COMMERCIAL

## 2024-04-29 ENCOUNTER — TELEPHONE (OUTPATIENT)
Dept: PODIATRY | Facility: CLINIC | Age: 69
End: 2024-04-29

## 2024-04-29 VITALS
BODY MASS INDEX: 48.98 KG/M2 | SYSTOLIC BLOOD PRESSURE: 134 MMHG | WEIGHT: 276.44 LBS | HEART RATE: 115 BPM | HEIGHT: 63 IN | DIASTOLIC BLOOD PRESSURE: 100 MMHG

## 2024-04-29 DIAGNOSIS — M10.071 ACUTE IDIOPATHIC GOUT INVOLVING TOE OF RIGHT FOOT: ICD-10-CM

## 2024-04-29 DIAGNOSIS — M79.671 FOOT PAIN, RIGHT: ICD-10-CM

## 2024-04-29 DIAGNOSIS — E11.42 TYPE 2 DIABETES MELLITUS WITH DIABETIC POLYNEUROPATHY, UNSPECIFIED WHETHER LONG TERM INSULIN USE: ICD-10-CM

## 2024-04-29 DIAGNOSIS — M10.071 ACUTE IDIOPATHIC GOUT INVOLVING TOE OF RIGHT FOOT: Primary | ICD-10-CM

## 2024-04-29 PROCEDURE — 73630 X-RAY EXAM OF FOOT: CPT | Mod: 26,RT,, | Performed by: RADIOLOGY

## 2024-04-29 PROCEDURE — 3075F SYST BP GE 130 - 139MM HG: CPT | Mod: CPTII,S$GLB,, | Performed by: PODIATRIST

## 2024-04-29 PROCEDURE — 3008F BODY MASS INDEX DOCD: CPT | Mod: CPTII,S$GLB,, | Performed by: PODIATRIST

## 2024-04-29 PROCEDURE — 1100F PTFALLS ASSESS-DOCD GE2>/YR: CPT | Mod: CPTII,S$GLB,, | Performed by: PODIATRIST

## 2024-04-29 PROCEDURE — 73630 X-RAY EXAM OF FOOT: CPT | Mod: TC,FY,PO,RT

## 2024-04-29 PROCEDURE — 99999 PR PBB SHADOW E&M-EST. PATIENT-LVL V: CPT | Mod: PBBFAC,,, | Performed by: PODIATRIST

## 2024-04-29 PROCEDURE — 4010F ACE/ARB THERAPY RXD/TAKEN: CPT | Mod: CPTII,S$GLB,, | Performed by: PODIATRIST

## 2024-04-29 PROCEDURE — 99204 OFFICE O/P NEW MOD 45 MIN: CPT | Mod: S$GLB,,, | Performed by: PODIATRIST

## 2024-04-29 PROCEDURE — 1159F MED LIST DOCD IN RCRD: CPT | Mod: CPTII,S$GLB,, | Performed by: PODIATRIST

## 2024-04-29 PROCEDURE — 3288F FALL RISK ASSESSMENT DOCD: CPT | Mod: CPTII,S$GLB,, | Performed by: PODIATRIST

## 2024-04-29 PROCEDURE — 3080F DIAST BP >= 90 MM HG: CPT | Mod: CPTII,S$GLB,, | Performed by: PODIATRIST

## 2024-04-29 PROCEDURE — 3072F LOW RISK FOR RETINOPATHY: CPT | Mod: CPTII,S$GLB,, | Performed by: PODIATRIST

## 2024-04-29 PROCEDURE — 1126F AMNT PAIN NOTED NONE PRSNT: CPT | Mod: CPTII,S$GLB,, | Performed by: PODIATRIST

## 2024-04-29 PROCEDURE — 1160F RVW MEDS BY RX/DR IN RCRD: CPT | Mod: CPTII,S$GLB,, | Performed by: PODIATRIST

## 2024-04-29 RX ORDER — LIDOCAINE AND PRILOCAINE 25; 25 MG/G; MG/G
CREAM TOPICAL
Qty: 30 G | Refills: 3 | Status: SHIPPED | OUTPATIENT
Start: 2024-04-29

## 2024-04-29 NOTE — PROGRESS NOTES
Population Health Chart Review & Patient Outreach Details      Additional [facility] Notes:      A1C DUE AFTER 4/27 AND URINE DUE AFTER 5/12/2024 -- scheduled         Updates Requested / Reviewed:      Updated Care Coordination Note and Care Everywhere         Health Maintenance Topics Overdue:      VB Score: 3     Foot Exam  Uncontrolled BP  LDCT Lung Screen    Pneumonia Vaccine  RSV Vaccine                  Health Maintenance Topic(s) Outreach Outcomes & Actions Taken:    Lab(s) - Outreach Outcomes & Actions Taken  : Overdue Lab(s) Scheduled

## 2024-04-29 NOTE — PROGRESS NOTES
Subjective:      Patient ID: Sandee Evans is a 68 y.o. female.    Chief Complaint: Diabetic Foot Exam (PCP Kuldeep Miller MD 4/18/2024) and Foot Problem (Sore on bottom R foot/R great toe swollen)    Diabetes, increased risk amputation needing evaluation/management/optomization of foot care.  History ulcer plantar right forefoot under 2nd mtpj.    Cc2 pain redness swelling right 1st mtpj.  Rapid onset 3 days ago resolved since, aggravated by increased weight bearing, shoe gear, pressure.  No previous medical treatment.  No self treatment.    Chief Complaint   Patient presents with    Diabetic Foot Exam     PCP Kuldeep Miller MD 4/18/2024    Foot Problem     Sore on bottom R foot  R great toe swollen       Casual shoes      Review of Systems   Constitutional: Negative for chills, diaphoresis, fever, malaise/fatigue and night sweats.   Cardiovascular:  Positive for leg swelling. Negative for claudication, cyanosis and syncope.   Skin:  Negative for color change, dry skin, nail changes, rash, suspicious lesions and unusual hair distribution.   Musculoskeletal:  Positive for joint pain and joint swelling. Negative for falls, muscle cramps, muscle weakness and stiffness.   Gastrointestinal:  Negative for constipation, diarrhea, nausea and vomiting.   Neurological:  Negative for brief paralysis, disturbances in coordination, focal weakness, numbness, paresthesias, sensory change and tremors.         Objective:      Physical Exam  Constitutional:       General: She is not in acute distress.     Appearance: She is well-developed. She is not diaphoretic.   Cardiovascular:      Pulses:           Popliteal pulses are 2+ on the right side and 2+ on the left side.        Dorsalis pedis pulses are 2+ on the right side and 2+ on the left side.        Posterior tibial pulses are 2+ on the right side and 2+ on the left side.      Comments: Capillary refill 3 seconds all toes/distal feet, all toes/both feet warm to  touch.      Negative lymphadenopathy bilateral popliteal fossa and tarsal tunnel.     <2+ pitting lower extremity edema bilateral.    Musculoskeletal:      Right ankle: No swelling, deformity, ecchymosis or lacerations. Normal range of motion. Normal pulse.      Right Achilles Tendon: Normal. No defects. Lynch's test negative.      Comments: No current symptom palpation, motion, loading right 1st mtpj.    Otherwise, Normal angle, base, station of gait. All ten toes without clubbing, cyanosis, or signs of ischemia.  No pain to palpation bilateral lower extremities.  Range of motion, stability, muscle strength, and muscle tone normal bilateral feet and legs.    Lymphadenopathy:      Lower Body: No right inguinal adenopathy. No left inguinal adenopathy.      Comments: Negative lymphadenopathy bilateral popliteal fossa and tarsal tunnel.    Negative lymphangitic streaking bilateral feet/ankles/legs.   Skin:     General: Skin is warm and dry.      Capillary Refill: Capillary refill takes 2 to 3 seconds.      Coloration: Skin is not pale.      Findings: No abrasion, bruising, burn, ecchymosis, erythema, laceration, lesion or rash.      Nails: There is no clubbing.      Comments:   Skin is normal age and health appropriate color, turgor, texture, and temperature bilateral lower extremities without ulceration, hyperpigmentation, discoloration, masses nodules or cords palpated.  No ecchymosis, erythema, edema, or cardinal signs of infection bilateral lower extremities.    Neurological:      Mental Status: She is alert and oriented to person, place, and time.      Sensory: No sensory deficit.      Motor: No tremor, atrophy or abnormal muscle tone.      Gait: Gait normal.      Comments: Negative tinel sign to percussion sural, superficial peroneal, deep peroneal, saphenous, and posterior tibial nerves right and left ankles and feet.       Psychiatric:         Behavior: Behavior is cooperative.           Assessment:        Encounter Diagnoses   Name Primary?    Acute idiopathic gout involving toe of right foot Yes    Foot pain, right     Type 2 diabetes mellitus with diabetic polyneuropathy, unspecified whether long term insulin use          Plan:       Sandee was seen today for diabetic foot exam and foot problem.    Diagnoses and all orders for this visit:    Acute idiopathic gout involving toe of right foot  -     X-Ray Foot Complete Right; Future  -     DIABETIC SHOES FOR HOME USE    Foot pain, right  -     X-Ray Foot Complete Right; Future  -     DIABETIC SHOES FOR HOME USE    Type 2 diabetes mellitus with diabetic polyneuropathy, unspecified whether long term insulin use    Other orders  -     LIDOcaine-prilocaine (EMLA) cream; Apply topically as needed.      I counseled the patient on her conditions, their implications and medical management.        The patient has received literature on basic diabetic foot care.  Patient will inspect feet daily, wear protective shoe gear when ambulatory, and apply moisturizer to skin as needed to maintain elasticity and help prevent ulceration.    Xray right foot    Emla topically once nightly for pain relief right foot prn.    Rx DM Shoes/inserts              No follow-ups on file.

## 2024-04-29 NOTE — TELEPHONE ENCOUNTER
Staff informed Criss patient will use Lidocaine nightly.    Criss verbalized understanding.      ----- Message from Alyx Martin sent at 4/29/2024 10:41 AM CDT -----  Type:  Pharmacy Calling to Clarify an RX    Name of Caller:  Criss  Pharmacy Name:    [id number] - [place] - [address] AT [facility] [address]  [address]  [place] [address]  Phone: [phone] Fax: [phone]  Prescription Name:  LIDOcaine-prilocaine (EMLA) cream  What do they need to clarify?:  needs more specific frequency, can't do as needed   Best Call Back Number:  [phone]  Additional Information:

## 2024-05-01 DIAGNOSIS — R60.0 BILATERAL LOWER EXTREMITY EDEMA: ICD-10-CM

## 2024-05-01 DIAGNOSIS — I48.20 CHRONIC ATRIAL FIBRILLATION: ICD-10-CM

## 2024-05-01 RX ORDER — FUROSEMIDE 40 MG/1
40 TABLET ORAL
Qty: 90 TABLET | Refills: 3 | Status: SHIPPED | OUTPATIENT
Start: 2024-05-01

## 2024-05-01 NOTE — TELEPHONE ENCOUNTER
No care due was identified.  [facility] Embedded Care Due Messages. Reference number: [id number].   5/01/2024 5:30:14 AM CDT

## 2024-05-01 NOTE — TELEPHONE ENCOUNTER
Refill Routing Note   Medication(s) are not appropriate for processing by [facility] for the following reason(s):        Required vitals abnormal    [facility] action(s):  Defer               Appointments  past 12m or future 3m with PCP    Date Provider   Last Visit   4/18/2024 Kuldeep Miller MD   Next Visit   Visit date not found Kuldeep Miller MD   ED visits in past 90 days: 0        Note composed:7:48 AM 05/01/2024

## 2024-05-03 RX ORDER — METOPROLOL SUCCINATE 100 MG/1
100 TABLET, EXTENDED RELEASE ORAL DAILY
Qty: 90 TABLET | Refills: 3 | Status: SHIPPED | OUTPATIENT
Start: 2024-05-03

## 2024-05-04 DIAGNOSIS — J44.1 COPD EXACERBATION: ICD-10-CM

## 2024-05-06 RX ORDER — TIOTROPIUM BROMIDE INHALATION SPRAY 3.12 UG/1
SPRAY, METERED RESPIRATORY (INHALATION)
Qty: 4 G | Refills: 5 | Status: SHIPPED | OUTPATIENT
Start: 2024-05-06

## 2024-05-15 ENCOUNTER — CLINICAL SUPPORT (OUTPATIENT)
Dept: ENDOSCOPY | Facility: HOSPITAL | Age: 69
End: 2024-05-15
Attending: FAMILY MEDICINE
Payer: COMMERCIAL

## 2024-05-15 ENCOUNTER — TELEPHONE (OUTPATIENT)
Dept: ENDOSCOPY | Facility: HOSPITAL | Age: 69
End: 2024-05-15

## 2024-05-15 DIAGNOSIS — Z12.12 ENCOUNTER FOR COLORECTAL CANCER SCREENING: ICD-10-CM

## 2024-05-15 DIAGNOSIS — Z12.11 SCREEN FOR COLON CANCER: Primary | ICD-10-CM

## 2024-05-15 DIAGNOSIS — Z12.11 ENCOUNTER FOR COLORECTAL CANCER SCREENING: ICD-10-CM

## 2024-05-15 NOTE — PLAN OF CARE
PAT appt to schedule colonoscopy.  Pt requests [facility] or [facility] in Aug or mid-Sept.  New PAT made.

## 2024-05-15 NOTE — TELEPHONE ENCOUNTER
Telephoned pt for PAT appt to schedule colonoscopy.  Offered appts at [facility], however pt refused.  Pt requests to schedule at [facility] or [facility] in Aug/mid-Sept.  New PAT appt made.

## 2024-05-16 ENCOUNTER — LAB VISIT (OUTPATIENT)
Dept: LAB | Facility: HOSPITAL | Age: 69
End: 2024-05-16
Attending: FAMILY MEDICINE
Payer: COMMERCIAL

## 2024-05-16 DIAGNOSIS — E11.9 TYPE 2 DIABETES MELLITUS WITHOUT COMPLICATION, UNSPECIFIED WHETHER LONG TERM INSULIN USE: ICD-10-CM

## 2024-05-16 LAB
ESTIMATED AVG GLUCOSE: 126 MG/DL (ref 68–131)
HBA1C MFR BLD: 6 % (ref 4–5.6)

## 2024-05-16 PROCEDURE — 83036 HEMOGLOBIN GLYCOSYLATED A1C: CPT | Performed by: FAMILY MEDICINE

## 2024-05-16 PROCEDURE — 36415 COLL VENOUS BLD VENIPUNCTURE: CPT | Mod: PO | Performed by: FAMILY MEDICINE

## 2024-05-30 ENCOUNTER — TELEPHONE (OUTPATIENT)
Dept: ENDOSCOPY | Facility: HOSPITAL | Age: 69
End: 2024-05-30
Payer: COMMERCIAL

## 2024-05-30 NOTE — TELEPHONE ENCOUNTER
Contacted patient to schedule a colonoscopy. Patient would like to schedule in September at [facility] or [facility]. Will contact patient when September schedule is available.

## 2024-06-03 PROBLEM — J96.12 CHRONIC RESPIRATORY FAILURE WITH HYPERCAPNIA: Status: RESOLVED | Noted: 2021-07-20 | Resolved: 2024-06-03

## 2024-07-10 ENCOUNTER — TELEPHONE (OUTPATIENT)
Dept: ENDOSCOPY | Facility: HOSPITAL | Age: 69
End: 2024-07-10

## 2024-07-10 ENCOUNTER — CLINICAL SUPPORT (OUTPATIENT)
Dept: ENDOSCOPY | Facility: HOSPITAL | Age: 69
End: 2024-07-10
Attending: FAMILY MEDICINE
Payer: COMMERCIAL

## 2024-07-10 VITALS — WEIGHT: 265 LBS | BODY MASS INDEX: 46.95 KG/M2 | HEIGHT: 63 IN

## 2024-07-10 DIAGNOSIS — Z12.11 SCREEN FOR COLON CANCER: ICD-10-CM

## 2024-07-10 DIAGNOSIS — Z86.010 HISTORY OF COLON POLYPS: Primary | ICD-10-CM

## 2024-07-10 RX ORDER — SODIUM, POTASSIUM,MAG SULFATES 17.5-3.13G
1 SOLUTION, RECONSTITUTED, ORAL ORAL DAILY
Qty: 1 KIT | Refills: 0 | Status: SHIPPED | OUTPATIENT
Start: 2024-07-10 | End: 2024-07-12

## 2024-07-10 NOTE — TELEPHONE ENCOUNTER
Spoke to patient to schedule procedure(s) Colonoscopy       Physician to perform procedure(s) Dr. HEMANT Powell  Date of Procedure (s) 9/6/24  Arrival Time 9:00 AM  Time of Procedure(s) 10:00 AM   Location of Procedure(s) [facility]  Type of Rx Prep sent to patient: Suprep  Instructions provided to patient via [facility]    Patient was informed on the following information and verbalized understanding. Screening questionnaire reviewed with patient and complete. If procedure requires anesthesia, a responsible adult needs to be present to accompany the patient home, patient cannot drive after receiving anesthesia. Appointment details are tentative, especially check-in time. Patient will receive a prep-op call 7 days prior to confirm check-in time for procedure. If applicable the patient should contact their pharmacy to verify Rx for procedure prep is ready for pick-up. Patient was advised to call the scheduling department at [phone] if pharmacy states no Rx is available. Patient was advised to call the endoscopy scheduling department if any questions or concerns arise.      [facility]oscopy Scheduling Department

## 2024-07-24 ENCOUNTER — TELEPHONE (OUTPATIENT)
Dept: ENDOSCOPY | Facility: HOSPITAL | Age: 69
End: 2024-07-24
Payer: COMMERCIAL

## 2024-07-24 NOTE — TELEPHONE ENCOUNTER
Dear Dr Grissom,    Patient has a scheduled procedure Colonoscopy on 9/6/24 and is currently taking a blood thinner prescribed by your office. In order to ensure patient safety, we would like to confirm that the patient can place their blood thinner medication on hold for the procedure. Can he/she discontinue Xarelto (rivaroxaban) for a minimum of 2 days prior to the procedure?     Thank you for your prompt reply.    [facility] Endoscopy Scheduling

## 2024-07-24 NOTE — TELEPHONE ENCOUNTER
----- Message from Afshan Nunes RN sent at 7/10/2024 10:03 AM CDT -----  [email or web address]   BT  The patient is currently under an internal cardiologist Dr KAYA Grissom care and requires a blood thinner Xarelto (rivaroxaban) for their upcoming scheduled Colonoscopy on 24.

## 2024-07-30 ENCOUNTER — HOSPITAL ENCOUNTER (OUTPATIENT)
Dept: RADIOLOGY | Facility: HOSPITAL | Age: 69
Discharge: HOME | End: 2024-07-30
Attending: FAMILY MEDICINE
Payer: COMMERCIAL

## 2024-07-30 DIAGNOSIS — Z12.31 ENCOUNTER FOR SCREENING MAMMOGRAM FOR BREAST CANCER: ICD-10-CM

## 2024-07-30 PROCEDURE — 77063 BREAST TOMOSYNTHESIS BI: CPT | Mod: TC

## 2024-07-30 PROCEDURE — 77063 BREAST TOMOSYNTHESIS BI: CPT | Mod: 26,,, | Performed by: RADIOLOGY

## 2024-07-30 PROCEDURE — 77067 SCR MAMMO BI INCL CAD: CPT | Mod: 26,,, | Performed by: RADIOLOGY

## 2024-08-06 ENCOUNTER — HOSPITAL ENCOUNTER (INPATIENT)
Facility: HOSPITAL | Age: 69
LOS: 8 days | Discharge: HOME | DRG: 189 | End: 2024-08-14
Attending: EMERGENCY MEDICINE | Admitting: EMERGENCY MEDICINE
Payer: COMMERCIAL

## 2024-08-06 DIAGNOSIS — I50.9 ACUTE ON CHRONIC CONGESTIVE HEART FAILURE, UNSPECIFIED HEART FAILURE TYPE: ICD-10-CM

## 2024-08-06 DIAGNOSIS — R06.02 SHORTNESS OF BREATH: ICD-10-CM

## 2024-08-06 DIAGNOSIS — J96.02 ACUTE RESPIRATORY FAILURE WITH HYPERCAPNIA: Primary | ICD-10-CM

## 2024-08-06 DIAGNOSIS — I48.91 ATRIAL FIBRILLATION WITH RVR: ICD-10-CM

## 2024-08-06 DIAGNOSIS — J44.1 COPD EXACERBATION: ICD-10-CM

## 2024-08-06 PROBLEM — J96.22 ACUTE ON CHRONIC RESPIRATORY FAILURE WITH HYPERCAPNIA: Status: ACTIVE | Noted: 2024-08-06

## 2024-08-06 LAB
ALBUMIN SERPL BCP-MCNC: 3.4 G/DL (ref 3.5–5.2)
ALLENS TEST: ABNORMAL
ALLENS TEST: ABNORMAL
ALP SERPL-CCNC: 91 U/L (ref 55–135)
ALT SERPL W/O P-5'-P-CCNC: 18 U/L (ref 10–44)
ANION GAP SERPL CALC-SCNC: 11 MMOL/L (ref 8–16)
AST SERPL-CCNC: 28 U/L (ref 10–40)
BASOPHILS # BLD AUTO: 0.01 K/UL (ref 0–0.2)
BASOPHILS NFR BLD: 0.2 % (ref 0–1.9)
BILIRUB SERPL-MCNC: 1.1 MG/DL (ref 0.1–1)
BNP SERPL-MCNC: 588 PG/ML (ref 0–99)
BUN SERPL-MCNC: 20 MG/DL (ref 8–23)
CALCIUM SERPL-MCNC: 9.5 MG/DL (ref 8.7–10.5)
CHLORIDE SERPL-SCNC: 100 MMOL/L (ref 95–110)
CO2 SERPL-SCNC: 29 MMOL/L (ref 23–29)
CREAT SERPL-MCNC: 1 MG/DL (ref 0.5–1.4)
CTP QC/QA: YES
CTP QC/QA: YES
DELSYS: ABNORMAL
DELSYS: ABNORMAL
DIFFERENTIAL METHOD BLD: ABNORMAL
EOSINOPHIL # BLD AUTO: 0 K/UL (ref 0–0.5)
EOSINOPHIL NFR BLD: 0.2 % (ref 0–8)
EP: 6
ERYTHROCYTE [DISTWIDTH] IN BLOOD BY AUTOMATED COUNT: 14.5 % (ref 11.5–14.5)
ERYTHROCYTE [SEDIMENTATION RATE] IN BLOOD BY WESTERGREN METHOD: 16 MM/H
EST. GFR  (NO RACE VARIABLE): >60 ML/MIN/1.73 M^2
FIO2: 35
GLUCOSE SERPL-MCNC: 97 MG/DL (ref 70–110)
HCO3 UR-SCNC: 36.2 MMOL/L (ref 24–28)
HCO3 UR-SCNC: 37.6 MMOL/L (ref 24–28)
HCT VFR BLD AUTO: 44.3 % (ref 37–48.5)
HGB BLD-MCNC: 13.7 G/DL (ref 12–16)
IMM GRANULOCYTES # BLD AUTO: 0.03 K/UL (ref 0–0.04)
IMM GRANULOCYTES NFR BLD AUTO: 0.7 % (ref 0–0.5)
IP: 12
LYMPHOCYTES # BLD AUTO: 1 K/UL (ref 1–4.8)
LYMPHOCYTES NFR BLD: 23 % (ref 18–48)
MAGNESIUM SERPL-MCNC: 1.6 MG/DL (ref 1.6–2.6)
MCH RBC QN AUTO: 31.1 PG (ref 27–31)
MCHC RBC AUTO-ENTMCNC: 30.9 G/DL (ref 32–36)
MCV RBC AUTO: 101 FL (ref 82–98)
MIN VOL: 7
MODE: ABNORMAL
MONOCYTES # BLD AUTO: 0.6 K/UL (ref 0.3–1)
MONOCYTES NFR BLD: 13.2 % (ref 4–15)
NEUTROPHILS # BLD AUTO: 2.8 K/UL (ref 1.8–7.7)
NEUTROPHILS NFR BLD: 62.7 % (ref 38–73)
NRBC BLD-RTO: 0 /100 WBC
PCO2 BLDA: 67.2 MMHG (ref 35–45)
PCO2 BLDA: 80.4 MMHG (ref 35–45)
PH SMN: 7.28 [PH] (ref 7.35–7.45)
PH SMN: 7.34 [PH] (ref 7.35–7.45)
PLATELET # BLD AUTO: 162 K/UL (ref 150–450)
PMV BLD AUTO: 10.7 FL (ref 9.2–12.9)
PO2 BLDA: 49 MMHG (ref 40–60)
PO2 BLDA: 51 MMHG (ref 40–60)
POC BE: 8 MMOL/L
POC BE: 8 MMOL/L
POC MOLECULAR INFLUENZA A AGN: NEGATIVE
POC MOLECULAR INFLUENZA B AGN: NEGATIVE
POC SATURATED O2: 76 % (ref 95–100)
POC SATURATED O2: 81 % (ref 95–100)
POC TCO2: 38 MMOL/L (ref 24–29)
POC TCO2: 40 MMOL/L (ref 24–29)
POCT GLUCOSE: 153 MG/DL (ref 70–110)
POTASSIUM SERPL-SCNC: 3.9 MMOL/L (ref 3.5–5.1)
PROT SERPL-MCNC: 6.8 G/DL (ref 6–8.4)
RBC # BLD AUTO: 4.4 M/UL (ref 4–5.4)
SAMPLE: ABNORMAL
SAMPLE: ABNORMAL
SARS-COV-2 RDRP RESP QL NAA+PROBE: NEGATIVE
SITE: ABNORMAL
SITE: ABNORMAL
SODIUM SERPL-SCNC: 140 MMOL/L (ref 136–145)
SP02: 95
SPONT RATE: 20
TROPONIN I SERPL DL<=0.01 NG/ML-MCNC: <0.006 NG/ML (ref 0–0.03)
TSH SERPL DL<=0.005 MIU/L-ACNC: 3.46 UIU/ML (ref 0.4–4)
WBC # BLD AUTO: 4.48 K/UL (ref 3.9–12.7)

## 2024-08-06 PROCEDURE — 96375 TX/PRO/DX INJ NEW DRUG ADDON: CPT

## 2024-08-06 PROCEDURE — 99900035 HC TECH TIME PER 15 MIN (STAT)

## 2024-08-06 PROCEDURE — 63600175 PHARM REV CODE 636 W HCPCS: Performed by: EMERGENCY MEDICINE

## 2024-08-06 PROCEDURE — 80053 COMPREHEN METABOLIC PANEL: CPT | Performed by: EMERGENCY MEDICINE

## 2024-08-06 PROCEDURE — 99223 1ST HOSP IP/OBS HIGH 75: CPT | Mod: 25,,, | Performed by: INTERNAL MEDICINE

## 2024-08-06 PROCEDURE — 96374 THER/PROPH/DIAG INJ IV PUSH: CPT

## 2024-08-06 PROCEDURE — 83735 ASSAY OF MAGNESIUM: CPT | Performed by: STUDENT IN AN ORGANIZED HEALTH CARE EDUCATION/TRAINING PROGRAM

## 2024-08-06 PROCEDURE — 94640 AIRWAY INHALATION TREATMENT: CPT | Mod: XB

## 2024-08-06 PROCEDURE — 93010 ELECTROCARDIOGRAM REPORT: CPT | Mod: ,,, | Performed by: INTERNAL MEDICINE

## 2024-08-06 PROCEDURE — 82803 BLOOD GASES ANY COMBINATION: CPT

## 2024-08-06 PROCEDURE — 87502 INFLUENZA DNA AMP PROBE: CPT

## 2024-08-06 PROCEDURE — 84443 ASSAY THYROID STIM HORMONE: CPT | Performed by: STUDENT IN AN ORGANIZED HEALTH CARE EDUCATION/TRAINING PROGRAM

## 2024-08-06 PROCEDURE — 83880 ASSAY OF NATRIURETIC PEPTIDE: CPT | Performed by: EMERGENCY MEDICINE

## 2024-08-06 PROCEDURE — 84484 ASSAY OF TROPONIN QUANT: CPT | Performed by: EMERGENCY MEDICINE

## 2024-08-06 PROCEDURE — 82330 ASSAY OF CALCIUM: CPT

## 2024-08-06 PROCEDURE — 25000003 PHARM REV CODE 250: Performed by: EMERGENCY MEDICINE

## 2024-08-06 PROCEDURE — 25000242 PHARM REV CODE 250 ALT 637 W/ HCPCS: Performed by: STUDENT IN AN ORGANIZED HEALTH CARE EDUCATION/TRAINING PROGRAM

## 2024-08-06 PROCEDURE — 87635 SARS-COV-2 COVID-19 AMP PRB: CPT | Performed by: EMERGENCY MEDICINE

## 2024-08-06 PROCEDURE — 94640 AIRWAY INHALATION TREATMENT: CPT

## 2024-08-06 PROCEDURE — 94660 CPAP INITIATION&MGMT: CPT

## 2024-08-06 PROCEDURE — 20000000 HC ICU ROOM

## 2024-08-06 PROCEDURE — 63600175 PHARM REV CODE 636 W HCPCS: Performed by: STUDENT IN AN ORGANIZED HEALTH CARE EDUCATION/TRAINING PROGRAM

## 2024-08-06 PROCEDURE — 27000190 HC CPAP FULL FACE MASK W/VALVE

## 2024-08-06 PROCEDURE — 83036 HEMOGLOBIN GLYCOSYLATED A1C: CPT | Performed by: STUDENT IN AN ORGANIZED HEALTH CARE EDUCATION/TRAINING PROGRAM

## 2024-08-06 PROCEDURE — 94761 N-INVAS EAR/PLS OXIMETRY MLT: CPT | Mod: XB

## 2024-08-06 PROCEDURE — 25000242 PHARM REV CODE 250 ALT 637 W/ HCPCS: Performed by: EMERGENCY MEDICINE

## 2024-08-06 PROCEDURE — 27000221 HC OXYGEN, UP TO 24 HOURS

## 2024-08-06 PROCEDURE — 5A09357 ASSISTANCE WITH RESPIRATORY VENTILATION, LESS THAN 24 CONSECUTIVE HOURS, CONTINUOUS POSITIVE AIRWAY PRESSURE: ICD-10-PCS | Performed by: STUDENT IN AN ORGANIZED HEALTH CARE EDUCATION/TRAINING PROGRAM

## 2024-08-06 PROCEDURE — 99291 CRITICAL CARE FIRST HOUR: CPT

## 2024-08-06 PROCEDURE — 25000003 PHARM REV CODE 250: Performed by: STUDENT IN AN ORGANIZED HEALTH CARE EDUCATION/TRAINING PROGRAM

## 2024-08-06 PROCEDURE — 99291 CRITICAL CARE FIRST HOUR: CPT | Mod: ,,, | Performed by: STUDENT IN AN ORGANIZED HEALTH CARE EDUCATION/TRAINING PROGRAM

## 2024-08-06 PROCEDURE — 85025 COMPLETE CBC W/AUTO DIFF WBC: CPT | Performed by: EMERGENCY MEDICINE

## 2024-08-06 PROCEDURE — 93005 ELECTROCARDIOGRAM TRACING: CPT

## 2024-08-06 RX ORDER — IPRATROPIUM BROMIDE AND ALBUTEROL SULFATE 2.5; .5 MG/3ML; MG/3ML
3 SOLUTION RESPIRATORY (INHALATION)
Status: COMPLETED | OUTPATIENT
Start: 2024-08-06 | End: 2024-08-06

## 2024-08-06 RX ORDER — BUPROPION HYDROCHLORIDE 150 MG/1
150 TABLET ORAL
COMMUNITY
Start: 2024-07-30

## 2024-08-06 RX ORDER — SODIUM CHLORIDE 0.9 % (FLUSH) 0.9 %
10 SYRINGE (ML) INJECTION
Status: DISCONTINUED | OUTPATIENT
Start: 2024-08-06 | End: 2024-08-14 | Stop reason: HOSPADM

## 2024-08-06 RX ORDER — IPRATROPIUM BROMIDE AND ALBUTEROL SULFATE 2.5; .5 MG/3ML; MG/3ML
3 SOLUTION RESPIRATORY (INHALATION) EVERY 6 HOURS
Status: DISCONTINUED | OUTPATIENT
Start: 2024-08-06 | End: 2024-08-14 | Stop reason: HOSPADM

## 2024-08-06 RX ORDER — METOPROLOL TARTRATE 50 MG/1
50 TABLET ORAL EVERY 6 HOURS
Status: DISCONTINUED | OUTPATIENT
Start: 2024-08-06 | End: 2024-08-14 | Stop reason: HOSPADM

## 2024-08-06 RX ORDER — FUROSEMIDE 10 MG/ML
40 INJECTION INTRAMUSCULAR; INTRAVENOUS
Status: COMPLETED | OUTPATIENT
Start: 2024-08-06 | End: 2024-08-06

## 2024-08-06 RX ORDER — IBUPROFEN 200 MG
16 TABLET ORAL
Status: DISCONTINUED | OUTPATIENT
Start: 2024-08-06 | End: 2024-08-14 | Stop reason: HOSPADM

## 2024-08-06 RX ORDER — FUROSEMIDE 10 MG/ML
60 INJECTION INTRAMUSCULAR; INTRAVENOUS 3 TIMES DAILY
Status: DISCONTINUED | OUTPATIENT
Start: 2024-08-06 | End: 2024-08-09

## 2024-08-06 RX ORDER — PHENYLEPHRINE HCL IN 0.9% NACL 20MG/250ML
0-3 PLASTIC BAG, INJECTION (ML) INTRAVENOUS CONTINUOUS
Status: DISCONTINUED | OUTPATIENT
Start: 2024-08-06 | End: 2024-08-07

## 2024-08-06 RX ORDER — FUROSEMIDE 10 MG/ML
60 INJECTION INTRAMUSCULAR; INTRAVENOUS 3 TIMES DAILY
Status: DISCONTINUED | OUTPATIENT
Start: 2024-08-06 | End: 2024-08-06

## 2024-08-06 RX ORDER — ONDANSETRON HYDROCHLORIDE 2 MG/ML
4 INJECTION, SOLUTION INTRAVENOUS
Status: COMPLETED | OUTPATIENT
Start: 2024-08-06 | End: 2024-08-06

## 2024-08-06 RX ORDER — DILTIAZEM HYDROCHLORIDE 5 MG/ML
10 INJECTION INTRAVENOUS
Status: COMPLETED | OUTPATIENT
Start: 2024-08-06 | End: 2024-08-06

## 2024-08-06 RX ORDER — AZELASTINE 1 MG/ML
1 SPRAY, METERED NASAL 2 TIMES DAILY
Status: DISCONTINUED | OUTPATIENT
Start: 2024-08-06 | End: 2024-08-14 | Stop reason: HOSPADM

## 2024-08-06 RX ORDER — METHYLPREDNISOLONE SOD SUCC 125 MG
125 VIAL (EA) INJECTION
Status: COMPLETED | OUTPATIENT
Start: 2024-08-06 | End: 2024-08-06

## 2024-08-06 RX ORDER — IBUPROFEN 200 MG
24 TABLET ORAL
Status: DISCONTINUED | OUTPATIENT
Start: 2024-08-06 | End: 2024-08-14 | Stop reason: HOSPADM

## 2024-08-06 RX ORDER — ATORVASTATIN CALCIUM 40 MG/1
40 TABLET, FILM COATED ORAL DAILY
Status: DISCONTINUED | OUTPATIENT
Start: 2024-08-07 | End: 2024-08-14 | Stop reason: HOSPADM

## 2024-08-06 RX ORDER — GLUCAGON 1 MG
1 KIT INJECTION
Status: DISCONTINUED | OUTPATIENT
Start: 2024-08-06 | End: 2024-08-14 | Stop reason: HOSPADM

## 2024-08-06 RX ORDER — ASPIRIN 325 MG
325 TABLET ORAL
Status: COMPLETED | OUTPATIENT
Start: 2024-08-06 | End: 2024-08-06

## 2024-08-06 RX ORDER — LOSARTAN POTASSIUM 25 MG/1
25 TABLET ORAL
COMMUNITY
Start: 2024-07-30

## 2024-08-06 RX ORDER — METHYLPREDNISOLONE SODIUM SUCCINATE 40 MG/ML
40 INJECTION INTRAMUSCULAR; INTRAVENOUS
Status: DISCONTINUED | OUTPATIENT
Start: 2024-08-07 | End: 2024-08-07 | Stop reason: DRUGHIGH

## 2024-08-06 RX ORDER — INSULIN ASPART 100 [IU]/ML
1-10 INJECTION, SOLUTION INTRAVENOUS; SUBCUTANEOUS
Status: DISCONTINUED | OUTPATIENT
Start: 2024-08-06 | End: 2024-08-14 | Stop reason: HOSPADM

## 2024-08-06 RX ORDER — LEVALBUTEROL 1.25 MG/.5ML
1.25 SOLUTION, CONCENTRATE RESPIRATORY (INHALATION)
Status: COMPLETED | OUTPATIENT
Start: 2024-08-06 | End: 2024-08-06

## 2024-08-06 RX ADMIN — FUROSEMIDE 60 MG: 10 INJECTION, SOLUTION INTRAVENOUS at 09:08

## 2024-08-06 RX ADMIN — IPRATROPIUM BROMIDE AND ALBUTEROL SULFATE 3 ML: 2.5; .5 SOLUTION RESPIRATORY (INHALATION) at 11:08

## 2024-08-06 RX ADMIN — ASPIRIN 325 MG ORAL TABLET 325 MG: 325 PILL ORAL at 12:08

## 2024-08-06 RX ADMIN — LEVALBUTEROL 1.25 MG: 1.25 SOLUTION, CONCENTRATE RESPIRATORY (INHALATION) at 11:08

## 2024-08-06 RX ADMIN — METHYLPREDNISOLONE SODIUM SUCCINATE 125 MG: 125 INJECTION, POWDER, FOR SOLUTION INTRAMUSCULAR; INTRAVENOUS at 11:08

## 2024-08-06 RX ADMIN — CEFTRIAXONE 2 G: 2 INJECTION, POWDER, FOR SOLUTION INTRAMUSCULAR; INTRAVENOUS at 04:08

## 2024-08-06 RX ADMIN — IPRATROPIUM BROMIDE AND ALBUTEROL SULFATE 3 ML: 2.5; .5 SOLUTION RESPIRATORY (INHALATION) at 10:08

## 2024-08-06 RX ADMIN — FUROSEMIDE 60 MG: 10 INJECTION, SOLUTION INTRAVENOUS at 02:08

## 2024-08-06 RX ADMIN — AZITHROMYCIN MONOHYDRATE 500 MG: 500 INJECTION, POWDER, LYOPHILIZED, FOR SOLUTION INTRAVENOUS at 05:08

## 2024-08-06 RX ADMIN — INSULIN ASPART 2 UNITS: 100 INJECTION, SOLUTION INTRAVENOUS; SUBCUTANEOUS at 05:08

## 2024-08-06 RX ADMIN — AZELASTINE HYDROCHLORIDE 137 MCG: 137 SPRAY, METERED NASAL at 09:08

## 2024-08-06 RX ADMIN — FUROSEMIDE 40 MG: 10 INJECTION, SOLUTION INTRAVENOUS at 12:08

## 2024-08-06 RX ADMIN — DILTIAZEM HYDROCHLORIDE 10 MG: 5 INJECTION, SOLUTION INTRAVENOUS at 01:08

## 2024-08-06 RX ADMIN — ONDANSETRON 4 MG: 2 INJECTION INTRAMUSCULAR; INTRAVENOUS at 11:08

## 2024-08-06 RX ADMIN — IPRATROPIUM BROMIDE AND ALBUTEROL SULFATE 3 ML: 2.5; .5 SOLUTION RESPIRATORY (INHALATION) at 07:08

## 2024-08-06 NOTE — ED PROVIDER NOTES
"Encounter Date: 8/6/2024    SCRIBE #1 NOTE: I, Uli Ludmila, am scribing for, and in the presence of,  Keli Murray MD.       History     Chief Complaint   Patient presents with    Cough    Shortness of Breath     Cough and SOB since last week worsening. Patient states this morning she couldn't lay down bc she became too short of breath. "Felt like I was drowning." Patient POX 97% on room air. Denies any sick contacts at home. Patient also reports n/v x 1 day.      68 y.o. female with PMHx of CHF (with EF 55-60% per last TTE 11/2024), afib on Xarelto, COPD, HIEU on CPAP, presents to the ED for evaluation of SOB x 1 week. Patient reports of associated brown productive cough, chills, congestion, wheezing, orthopnea, LE swelling, nausea, and vomiting x 1 episode. Attempted treatment for symptoms with nebulizer treatments. Denies O2 use at home. Denies fever, worsening LE swelling, abdominal pain, palpitations or any associated symptoms. Patient is a former smoker. She is allergic to ace inhibitors.     The history is provided by the patient. No  was used.     Review of patient's allergies indicates:   Allergen Reactions    Ace inhibitors      Cough       Past Medical History:   Diagnosis Date    Anticoagulant long-term use     Xarelto    Arthritis     Atrial fibrillation     Breast cyst     CHF (congestive heart failure)     Degenerative disc disease     Edema     HLD (hyperlipidemia)     Hx of psychiatric care     Hyperlipidemia     Hypertension     Hypothyroidism     Nuclear sclerosis, bilateral 12/18/2017    Obesity, morbid     HIEU (obstructive sleep apnea)     Other abnormal glucose     pre-diabetes    Pre-diabetes     Psychiatric problem     Requires assistance with activities of daily living (ADL)     Sleep apnea     Smoker     SOB (shortness of breath)     SOB (shortness of breath)     Thyroid disease     on meds 8-9 years ago. hypothyroidism.no malignancy    TMJ (temporomandibular " joint disorder)     jaw clicking    Tobacco abuse     Unsteady gait     Urge incontinence     Weakness generalized     Wears glasses      Past Surgical History:   Procedure Laterality Date    BREAST BIOPSY Right     over 10 yrs ago/ benign    BREAST CYST EXCISION Right     COLONOSCOPY N/A 06/25/2019    Procedure: COLONOSCOPY;  Surgeon: Micheline Flores MD;  Location: [facility] ENDO;  Service: Endoscopy;  Laterality: N/A;  RX XARELTO ok to hold (2 days) per Dr. Naik see scan 3/20/19    ENDOSCOPIC ULTRASOUND OF UPPER GASTROINTESTINAL TRACT N/A 01/26/2021    Procedure: ULTRASOUND-ENDOSCOPIC-UPPER;  Surgeon: Stevenson Philippe MD;  Location: [facility] ENDO;  Service: Endoscopy;  Laterality: N/A;    HYSTERECTOMY      JOINT REPLACEMENT Bilateral     knees    OOPHORECTOMY      rt.knee surgery 2016      TOTAL KNEE ARTHROPLASTY Left 02/19/2019    Procedure: ARTHROPLASTY, KNEE, TOTAL;  Surgeon: Med Hanson MD;  Location: [facility] OR;  Service: Orthopedics;  Laterality: Left;  10AM START PER  MILES MOSQUEDA TEXT @ 8:34AM ON 2-18-19  SUPINE  HARRIS LOYA [phone] TEXTED HIM ON 1-24-19 @ 7:57AM  RN PRE OP 2-13-19--BMI--48.9    underarm gland\ Bilateral      Family History   Problem Relation Name Age of Onset    Diabetes Mother      Hypertension Mother      Cancer Mother      No Known Problems Father      No Known Problems Sister      Diabetes Brother      Cancer Brother      No Known Problems Maternal Aunt      No Known Problems Maternal Uncle      No Known Problems Paternal Aunt      No Known Problems Paternal Uncle      No Known Problems Maternal Grandmother      No Known Problems Maternal Grandfather      No Known Problems Paternal Grandmother      No Known Problems Paternal Grandfather      No Known Problems Other      Amblyopia Neg Hx      Blindness Neg Hx      Cataracts Neg Hx      Glaucoma Neg Hx      Macular degeneration Neg Hx      Retinal detachment Neg Hx      Strabismus Neg Hx      Stroke Neg Hx      Thyroid disease  Neg Hx       Social History     Tobacco Use    Smoking status: Every Day     Current packs/day: 0.50     Average packs/day: 0.5 packs/day for 51.6 years (25.8 ttl pk-yrs)     Types: Cigarettes     Start date: 1973    Smokeless tobacco: Current   Substance Use Topics    Alcohol use: No    Drug use: No     Review of Systems   Constitutional:  Positive for chills. Negative for fever.   HENT:  Positive for congestion. Negative for sore throat.    Eyes:  Negative for visual disturbance.   Respiratory:  Positive for cough, shortness of breath and wheezing.    Cardiovascular:  Positive for leg swelling. Negative for chest pain and palpitations.   Gastrointestinal:  Positive for nausea and vomiting. Negative for abdominal pain.   Genitourinary:  Negative for dysuria and vaginal discharge.   Skin:  Negative for rash.   Neurological:  Negative for headaches.   Psychiatric/Behavioral:  Negative for decreased concentration.        Physical Exam     Initial Vitals   BP Pulse Resp Temp SpO2   08/06/24 1100 08/06/24 1059 08/06/24 1059 08/06/24 1100 08/06/24 1059   (!) 179/82 (!) 112 (!) 26 98.1 °F (36.7 °C) 100 %      MAP       --                Physical Exam    Nursing note and vitals reviewed.  Constitutional: She appears well-developed and well-nourished. She is not diaphoretic. No distress.   HENT:   Mouth/Throat: Oropharynx is clear and moist.   Eyes: Pupils are equal, round, and reactive to light.   Neck: Neck supple.   Cardiovascular:  Regular rhythm.   Tachycardia present.         Pulmonary/Chest: She has wheezes (Diffuse).   Abdominal: Abdomen is soft. There is no abdominal tenderness.   Musculoskeletal:         General: Edema (2+ pitting edema to BLE.) present.      Cervical back: Neck supple.     Neurological: She is alert and oriented to person, place, and time.   Skin: Skin is warm and dry.   Psychiatric: She has a normal mood and affect.         ED Course   Critical Care    Date/Time: 8/6/2024 1:33 PM    Performed  by: Keli Murray MD  Authorized by: Keli Murray MD  Total critical care time (exclusive of procedural time) : 0 minutes  Comments: Please put in 45 minutes of critical care due to patient having a high risk of respiratory failure.   Separate from teaching and exclusive of procedure and ekg time  Includes:  Time at bedside  Time reviewing test results  Time discussing case with staff  Time documenting the medical record  Time spent with family members  Time spent with consults  Management            Labs Reviewed   CBC W/ AUTO DIFFERENTIAL - Abnormal       Result Value    WBC 4.48      RBC 4.40      Hemoglobin 13.7      Hematocrit 44.3       (*)     MCH 31.1 (*)     MCHC 30.9 (*)     RDW 14.5      Platelets 162      MPV 10.7      Immature Granulocytes 0.7 (*)     Gran # (ANC) 2.8      Immature Grans (Abs) 0.03      Lymph # 1.0      Mono # 0.6      Eos # 0.0      Baso # 0.01      nRBC 0      Gran % 62.7      Lymph % 23.0      Mono % 13.2      Eosinophil % 0.2      Basophil % 0.2      Differential Method Automated     COMPREHENSIVE METABOLIC PANEL - Abnormal    Sodium 140      Potassium 3.9      Chloride 100      CO2 29      Glucose 97      BUN 20      Creatinine 1.0      Calcium 9.5      Total Protein 6.8      Albumin 3.4 (*)     Total Bilirubin 1.1 (*)     Alkaline Phosphatase 91      AST 28      ALT 18      eGFR >60      Anion Gap 11     B-TYPE NATRIURETIC PEPTIDE - Abnormal     (*)    ISTAT PROCEDURE - Abnormal    POC PH 7.278 (*)     POC PCO2 80.4 (*)     POC PO2 49      POC HCO3 37.6 (*)     POC BE 8 (*)     POC SATURATED O2 76      POC TCO2 40 (*)     Sample VENOUS      Site Other      Allens Test N/A      DelSys Nasal Can     TROPONIN I   TROPONIN I    Troponin I <0.006     MAGNESIUM    Magnesium 1.6      Narrative:     If not completed within last 6 months   HEMOGLOBIN A1C   SARS-COV-2 RDRP GENE    POC Rapid COVID Negative       Acceptable Yes     POCT INFLUENZA A/B  MOLECULAR    POC Molecular Influenza A Ag Negative      POC Molecular Influenza B Ag Negative       Acceptable Yes     POCT GLUCOSE MONITORING CONTINUOUS        ECG Results              EKG 12-lead (Preliminary result)  Result time 08/06/24 13:21:26      Wet Read by Keli Murray MD (08/06/24 13:21:26, [facility], Emergency Medicine)    Atrial fibrillation with RVR, rate 130 beats per minute,  milliseconds, no STEMI.                                  Imaging Results              X-Ray Chest AP Portable (Final result)  Result time 08/06/24 12:49:13      Final result by Tino Parkinson MD (08/06/24 12:49:13)                   Impression:      Mildly prominent interstitial markings could relate to pulmonary vascular congestion/edema.      Electronically signed by: Tino Parkinson  Date:    08/06/2024  Time:    12:49               Narrative:    EXAMINATION:  XR CHEST AP PORTABLE    CLINICAL HISTORY:  shortness of breath;    TECHNIQUE:  Single frontal view of the chest was performed.    COMPARISON:  Chest radiograph performed 11/13/2023, 11:10 hours    FINDINGS:  Monitoring leads overlie the chest.  Grossly unchanged cardiomediastinal contours, again noting enlargement the cardiac silhouette atherosclerosis of the aorta, as before.    Mildly prominent tissue markings.    No definite focal airspace consolidation.    No definite pneumothorax or large volume pleural effusion.    No acute findings in the visualized abdomen.    Osseous and soft tissue structures appear without definite acute abnormality.                                       Medications   empagliflozin (Jardiance) tablet 10 mg (10 mg Oral Not Given 8/6/24 1515)   furosemide injection 60 mg (60 mg Intravenous Given 8/6/24 1457)   albuterol-ipratropium 2.5 mg-0.5 mg/3 mL nebulizer solution 3 mL (has no administration in time range)   atorvastatin tablet 40 mg (has no administration in time range)   azelastine 137  mcg (0.1 %) nasal spray 137 mcg (has no administration in time range)   rivaroxaban tablet 20 mg (20 mg Oral Not Given 8/6/24 1645)   sodium chloride 0.9% flush 10 mL (has no administration in time range)   metoprolol tartrate (LOPRESSOR) tablet 50 mg (has no administration in time range)   methylPREDNISolone sodium succinate injection 40 mg (has no administration in time range)   insulin aspart U-100 pen 1-10 Units (has no administration in time range)   glucose chewable tablet 16 g (has no administration in time range)   glucose chewable tablet 24 g (has no administration in time range)   glucagon (human recombinant) injection 1 mg (has no administration in time range)   dextrose 10% bolus 125 mL 125 mL (has no administration in time range)   dextrose 10% bolus 250 mL 250 mL (has no administration in time range)   PHENYLephrine (GEORGE-SYNEPHRINE) 20 mg/250 mL infusion (PERIPHERAL access) (has no administration in time range)   albuterol-ipratropium 2.5 mg-0.5 mg/3 mL nebulizer solution 3 mL (3 mLs Nebulization Given 8/6/24 1108)   ondansetron injection 4 mg (4 mg Intravenous Given 8/6/24 1119)   levalbuterol nebulizer solution 1.25 mg (1.25 mg Nebulization Given 8/6/24 1155)   methylPREDNISolone sodium succinate injection 125 mg (125 mg Intravenous Given 8/6/24 1151)   furosemide injection 40 mg (40 mg Intravenous Given 8/6/24 1204)   aspirin tablet 325 mg (325 mg Oral Given 8/6/24 1204)   diltiaZEM injection 10 mg (10 mg Intravenous Given 8/6/24 1342)     Medical Decision Making  68-year-old female with history of HFpEF, atrial fibrillation (on Xarelto), ?COPD, HIUE presents to the emergency department for shortness of breath x1 week.  Patient reports productive cough, congestion, wheezing, orthopnea, swelling, nausea.  She has been using nebulizers at home with only temporary relief.  She denies oxygen use at home.  Denies any palpitations.  On exam, the patient with audible wheezing.  She has 2+ pitting edema in  the lower extremities.  No hypoxia.  She has tachycardia rate in the low 100s.  Differential includes not limited to COPD exacerbation, viral URI, pneumonia, CHF exacerbation.  Workup initiated with labs, COVID and flu testing, chest x-ray.  Treating with Rocio.    Amount and/or Complexity of Data Reviewed  Labs: ordered.     Details: COVID and flu negative.  CBC without leukocytosis, normal H&H, platelet count normal.  CMP shows normal sodium, potassium, chloride, glucose, creatinine 1, albumin 3.4, bilirubin 1.1, remaining LFTs within acceptable limits.  BNP is elevated at 588.    Radiology: ordered.     Details: Chest x-ray shows interstitial markings, suspect pulmonary edema.    Risk  OTC drugs.  Prescription drug management.  Decision regarding hospitalization.            Scribe Attestation:   Scribe #1: I performed the above scribed service and the documentation accurately describes the services I performed. I attest to the accuracy of the note.        ED Course as of 08/06/24 1553   Tue Aug 06, 2024   1304 Patient's HR in the 130s, this could be related to albuterol treatments, afib with RVR. Will give dose of diltiazem for rate control, avoiding BB in acute CHF and wheezing.  [LH]   1331 VBG with pCO2 80s, pH 7.2, concern for acute hypercapneic respiratory failure. Patient started on bipap. Case reviewed with Dr. Pedraza for admission to ICU.  [LH]      ED Course User Index  [LH] Keli Murray MD                           Clinical Impression:  Final diagnoses:  [R06.02] Shortness of breath  [I50.9] Acute on chronic congestive heart failure, unspecified heart failure type  [I48.91] Atrial fibrillation with RVR  [J96.02] Acute respiratory failure with hypercapnia (Primary)  [J44.1] COPD exacerbation          ED Disposition Condition    Admit Stable            I, Keli Murray MD, personally performed the services described in this documentation. All medical record entries made by the scribe were  at my direction and in my presence. I have reviewed the chart and agree that the record reflects my personal performance and is accurate and complete.      DISCLAIMER: This note was prepared with [employer] voice recognition transcription software. Garbled syntax, mangled pronouns, and other bizarre constructions may be attributed to that software system.          Keli Murray MD  08/06/24 8628

## 2024-08-06 NOTE — CONSULTS
[facility] - Intensive Care  Pulmonology  Consult Note    Patient Name: Sandee Evans  MRN: [id number]  Admission Date: 8/6/2024  Hospital Length of Stay: 0 days  Code Status: Full Code  Attending Physician: No att. providers found  Primary Care Provider: Kuldeep Miller MD   Principal Problem: Acute on chronic respiratory failure with hypercapnia    Inpatient consult to Pulmonology  Consult performed by: Antonio Zelaya MD  Consult ordered by: Erma Pedraza MD        Subjective:     HPI:  68-year-old morbidly obese female smoker with COPD, chronic AFib on Xarelto, chronic diastolic heart failure, HTN, DM, hyperlipidemia, peripheral vascular, HIEU nonadherent to CPAP, hypothyroidism presenting with 1 week of worsening shortness of breath and cough.  Associated with orthopnea worsening LE edema and several episodes of emesis.  No infectious symptoms including fever or chest pain.  Cough is chronic she has a longstanding tobacco use history.  On arrival, in respiratory distress with normal saturation on room air.  CXR with CHF appearance and VBG showing acute on chronic hypercapnic respiratory failure.  Per chart, significant amounts of wheezing which all improved after being placed on NIV.  Patient was transferred to ICU for ongoing management    Past Medical History:   Diagnosis Date    Anticoagulant long-term use     Xarelto    Arthritis     Atrial fibrillation     Breast cyst     CHF (congestive heart failure)     Degenerative disc disease     Edema     HLD (hyperlipidemia)     Hx of psychiatric care     Hyperlipidemia     Hypertension     Hypothyroidism     Nuclear sclerosis, bilateral 12/18/2017    Obesity, morbid     HIEU (obstructive sleep apnea)     Other abnormal glucose     pre-diabetes    Pre-diabetes     Psychiatric problem     Requires assistance with activities of daily living (ADL)     Sleep apnea     Smoker     SOB (shortness of breath)     SOB (shortness of breath)     Thyroid disease      on meds 8-9 years ago. hypothyroidism.no malignancy    TMJ (temporomandibular joint disorder)     jaw clicking    Tobacco abuse     Unsteady gait     Urge incontinence     Weakness generalized     Wears glasses        Past Surgical History:   Procedure Laterality Date    BREAST BIOPSY Right     over 10 yrs ago/ benign    BREAST CYST EXCISION Right     COLONOSCOPY N/A 06/25/2019    Procedure: COLONOSCOPY;  Surgeon: Micheline Flores MD;  Location: [facility] ENDO;  Service: Endoscopy;  Laterality: N/A;  RX XARELTO ok to hold (2 days) per Dr. Naik see scan 3/20/19    ENDOSCOPIC ULTRASOUND OF UPPER GASTROINTESTINAL TRACT N/A 01/26/2021    Procedure: ULTRASOUND-ENDOSCOPIC-UPPER;  Surgeon: Stevenson Philippe MD;  Location: [facility] ENDO;  Service: Endoscopy;  Laterality: N/A;    HYSTERECTOMY      JOINT REPLACEMENT Bilateral     knees    OOPHORECTOMY      rt.knee surgery 2016      TOTAL KNEE ARTHROPLASTY Left 02/19/2019    Procedure: ARTHROPLASTY, KNEE, TOTAL;  Surgeon: Med Hanson MD;  Location: [facility] OR;  Service: Orthopedics;  Laterality: Left;  10AM START PER  PER JAYLIN TEXT @ 8:34AM ON 2-18-19  SUPINE  HARRIS LOYA [phone] TEXTED HIM ON 1-24-19 @ 7:57AM  RN PRE OP 2-13-19--BMI--48.9    underarm gland\ Bilateral        Review of patient's allergies indicates:   Allergen Reactions    Ace inhibitors      Cough         Family History       Problem Relation (Age of Onset)    Cancer Mother, Brother    Diabetes Mother, Brother    Hypertension Mother    No Known Problems Father, Sister, Maternal Aunt, Maternal Uncle, Paternal Aunt, Paternal Uncle, Maternal Grandmother, Maternal Grandfather, Paternal Grandmother, Paternal Grandfather, Other          Tobacco Use    Smoking status: Every Day     Current packs/day: 0.50     Average packs/day: 0.5 packs/day for 51.6 years (25.8 ttl pk-yrs)     Types: Cigarettes     Start date: 1973    Smokeless tobacco: Current   Substance and Sexual Activity    Alcohol use: No    Drug  use: No    Sexual activity: Yes     Partners: Male           Objective:     Vital Signs (Most Recent):  Temp: 98.1 °F (36.7 °C) (08/06/24 1100)  Pulse: 85 (08/06/24 1400)  Resp: (!) 23 (08/06/24 1325)  BP: (!) 100/53 (08/06/24 1400)  SpO2: (!) 94 % (08/06/24 1325) Vital Signs (24h Range):  Temp:  [98.1 °F (36.7 °C)] 98.1 °F (36.7 °C)  Pulse:  [] 85  Resp:  [23-54] 23  SpO2:  [89 %-100 %] 94 %  BP: ()/(53-84) 100/53     Weight: 125.2 kg (276 lb)  Body mass index is 48.89 kg/m².      Intake/Output Summary (Last 24 hours) at 8/6/2024 1530  Last data filed at 8/6/2024 1342  Gross per 24 hour   Intake --   Output 300 ml   Net -300 ml        Physical Exam  Vitals and nursing note reviewed.   Constitutional:       General: She is not in acute distress.     Appearance: She is morbidly obese. She is ill-appearing. She is not toxic-appearing or diaphoretic.   HENT:      Head: Atraumatic.   Eyes:      General: No scleral icterus.     Extraocular Movements: Extraocular movements intact.   Cardiovascular:      Rate and Rhythm: Normal rate. Rhythm irregular.   Pulmonary:      Effort: No tachypnea, accessory muscle usage, respiratory distress or retractions.      Breath sounds: Wheezing and rales present.   Abdominal:      General: There is no distension.      Palpations: Abdomen is soft.   Musculoskeletal:      Right lower leg: Edema present.      Left lower leg: Edema present.   Skin:     General: Skin is warm and dry.      Coloration: Skin is not jaundiced.      Findings: No rash.   Neurological:      General: No focal deficit present.      Mental Status: Mental status is at baseline.          Vents:  Oxygen Concentration (%): 35 (08/06/24 1325)    Lines/Drains/Airways       Drain  Duration             Female External Urinary Catheter w/ Suction 08/06/24 1205 <1 day              Peripheral Intravenous Line  Duration                  Peripheral IV - Single Lumen 08/06/24 1058 20 G 1 in Anterior;Right Forearm <1 day          Peripheral IV - Single Lumen 08/06/24 1353 20 G Left Antecubital <1 day                    Significant Labs:    CBC/Anemia Profile:  Recent Labs   Lab 08/06/24  1108   WBC 4.48   HGB 13.7   HCT 44.3      *   RDW 14.5        Chemistries:  Recent Labs   Lab 08/06/24  1108      K 3.9      CO2 29   BUN 20   CREATININE 1.0   CALCIUM 9.5   ALBUMIN 3.4*   PROT 6.8   BILITOT 1.1*   ALKPHOS 91   ALT 18   AST 28   MG 1.6       All pertinent labs within the past 24 hours have been reviewed.    Significant Imaging:   I have reviewed all pertinent imaging results/findings within the past 24 hours.    ABG  Recent Labs   Lab 08/06/24  1549   PH 7.339*   PO2 51   PCO2 67.2*   HCO3 36.2*   BE 8*     Assessment/Plan:     Pulmonary  * Acute on chronic respiratory failure with hypercapnia  Morbidly obese with HIEU nonadherent to CPAP.  Baseline pCO2 seems to be around 60.  PCO2 > 80 on admission.   with CHF exacerbation.  Chronic tobacco use with history of COPD.      - currently decently compensated on ABG.  Doing well on NIV.  Follow-up ABG ordered  - recommend empiric cap coverage obtain Resp Cx if possible  - SpO2 goal 88-92%.  Avoid hypoxia.  - NIV nightly once liberated from continuous    COPD exacerbation  Longstanding tobacco use history.  2022 FEV1 81 with no bronchodilator response.    - prednisone 40-60 mg for 5 days.  Duo nebs q.4 scheduled and q.4 PRN  - needs outpatient follow-up with Pulmonary and repeat PFTs    Cardiac/Vascular  Acute on chronic diastolic CHF (congestive heart failure)  Most recent echo with EF 55-60%, LAE (TERRI 55), moderate RV enlargement with mildly reduced RV function; PASP 49.   on admission.  CXR with CHF appearance.    - repeat formal echo pending.  - recommend aggressive diuresis.  Strict I/O.    Longstanding persistent atrial fibrillation  RVR in ED which broke with Dilt.  ON xarelto at home    - Kamlesh for hypotension to help with this  - continue  home Xarelto    Endocrine  Type 2 diabetes mellitus with diabetic cataract, without long-term current use of insulin  A1c 6.  Blood glucose goal 140-180.  Add and titrate insulin as needed    Severe obesity (BMI >= 40)  Body mass index is 48.89 kg/m². Morbid obesity complicates all aspects of disease management from diagnostic modalities to treatment. Weight loss encouraged and health benefits explained to patient.     - certainly contributing to her current respiratory failure.      Critical Care Time: 50 minutes  Critical care was time spent personally by me on the following activities: evaluating this patient's organ dysfunction, development of treatment plan, discussing treatment plan with patient or surrogate and bedside caregivers, discussions with consultants, evaluation of patient's response to treatment, examination of patient, ordering and performing treatments and interventions, ordering and review of laboratory studies, ordering and review of radiographic studies, re-evaluation of patient's condition. This critical care time did not overlap with that of any other provider or involve time for any procedures.        Thank you for your consult. I will follow-up with patient. Please contact us if you have any additional questions.     Antonio Zelaya MD  Pulmonology  [facility] [place] - Intensive Care

## 2024-08-06 NOTE — Clinical Note
Diagnosis: Acute respiratory failure with hypercapnia [282668]   Reason for IP Medical Treatment  (Clinical interventions that can only be accomplished in the IP setting? ) :: respiratory support, heart rate control

## 2024-08-06 NOTE — HPI
Patient is a 68-year-old morbidly obese female with past medical history of chronic AFib on Xarelto, CHF EF 55-60% 11/23, hypertension, diabetes, hyperlipidemia, PVD, tobacco abuse , COPD, HIEU, hypothyroidism, tobacco use who presented to [facility] ER on 08/06/2024 for further evaluation of progressively worsening shortness of breath x 1 week.  Reports associated  orthopnea/pedal edema..  Patient stated that she had 3 episodes of nonbilious nonbloody vomiting.   reports patient is noncompliant with CPAP in diuretics at times at home.  No fever/abdominal pain/diarrhea/constipation/chest pain.   reports chronic cough.  Continues to smoke cigarettes, 1-2 per day for 50 years.  No recent travel/sick contacts    During evaluation in the ER, patient was found to be in respiratory distress.  Oxygen saturations at 93% on room air.  EKG revealed  AFib with RVR (heart rate).  Labs revealed WBC of 4.48, hemoglobin 13.7, sodium 140, creatinine 1, , COVID/flu negative.  Chest x-ray revealed mildly prominent interstitial markings could relate to pulmonary vascular congestion/edema VBG revealed 7.278/80.4/49/37.6.  Patient was given duo neb x1, 325 mg aspirin, 10 mg IV diltiazem, 40 mg IV Lasix, 1.25 mg Xopenex, 125 mg Solu-Medrol, 4 mg Zofran.  Cardiology was consulted in ED. patient was placed on BiPAP.  Admitted to ICU for further management

## 2024-08-06 NOTE — NURSING
[facility] [place]  Nurses Note -- 4 Eyes      8/6/2024       Skin assessed on: Admit      [x] No Pressure Injuries Present    [x]Prevention Measures Documented    [] Yes LDA  for Pressure Injury Previously documented     [] Yes New Pressure Injury Discovered   [] LDA for New Pressure Injury Added      Attending RN:  Josiane Sood RN     Second RN:  CLAUDIA Cedeno

## 2024-08-06 NOTE — H&P
"  [facility] [place] Emergency Dept  [facility] Medicine  History & Physical    Patient Name: Sandee Evans  MRN: [id number]  Patient Class: IP- Inpatient  Admission Date: 8/6/2024  Attending Physician: Keli Murray MD   Primary Care Provider: Kuldeep Miller MD         Patient information was obtained from patient, spouse/SO, past medical records, and ER records.     Subjective:     Principal Problem:Acute respiratory failure with hypercapnia    Chief Complaint:   Chief Complaint   Patient presents with    Cough    Shortness of Breath     Cough and SOB since last week worsening. Patient states this morning she couldn't lay down bc she became too short of breath. "Felt like I was drowning." Patient POX 97% on room air. Denies any sick contacts at home. Patient also reports n/v x 1 day.         HPI:   Patient is a 68-year-old morbidly obese female with past medical history of chronic AFib on Xarelto, CHF EF 55-60% 11/23, hypertension, diabetes, hyperlipidemia, PVD, tobacco abuse , COPD, HIEU, hypothyroidism, tobacco use who presented to [facility] ER on 08/06/2024 for further evaluation of progressively worsening shortness of breath x 1 week.  Reports associated  orthopnea/pedal edema..  Patient stated that she had 3 episodes of nonbilious nonbloody vomiting.   reports patient is noncompliant with CPAP in diuretics at times at home.  No fever/abdominal pain/diarrhea/constipation/chest pain.   reports chronic cough.  Continues to smoke cigarettes, 1-2 per day for 50 years.  No recent travel/sick contacts    During evaluation in the ER, patient was found to be in respiratory distress.  Oxygen saturations at 93% on room air.  EKG revealed  AFib with RVR (heart rate).  Labs revealed WBC of 4.48, hemoglobin 13.7, sodium 140, creatinine 1, , COVID/flu negative.  Chest x-ray revealed mildly prominent interstitial markings could relate to pulmonary vascular congestion/edema VBG revealed " 7.278/80.4/49/37.6.  Patient was given duo neb x1, 325 mg aspirin, 10 mg IV diltiazem, 40 mg IV Lasix, 1.25 mg Xopenex, 125 mg Solu-Medrol, 4 mg Zofran.  Cardiology was consulted in ED. patient was placed on BiPAP.  Admitted to ICU for further management    Past Medical History:   Diagnosis Date    Anticoagulant long-term use     Xarelto    Arthritis     Atrial fibrillation     Breast cyst     CHF (congestive heart failure)     Degenerative disc disease     Edema     HLD (hyperlipidemia)     Hx of psychiatric care     Hyperlipidemia     Hypertension     Hypothyroidism     Nuclear sclerosis, bilateral 12/18/2017    Obesity, morbid     HIEU (obstructive sleep apnea)     Other abnormal glucose     pre-diabetes    Pre-diabetes     Psychiatric problem     Requires assistance with activities of daily living (ADL)     Sleep apnea     Smoker     SOB (shortness of breath)     SOB (shortness of breath)     Thyroid disease     on meds 8-9 years ago. hypothyroidism.no malignancy    TMJ (temporomandibular joint disorder)     jaw clicking    Tobacco abuse     Unsteady gait     Urge incontinence     Weakness generalized     Wears glasses        Past Surgical History:   Procedure Laterality Date    BREAST BIOPSY Right     over 10 yrs ago/ benign    BREAST CYST EXCISION Right     COLONOSCOPY N/A 06/25/2019    Procedure: COLONOSCOPY;  Surgeon: Micheline Flores MD;  Location: [facility] ENDO;  Service: Endoscopy;  Laterality: N/A;  RX XARELTO ok to hold (2 days) per Dr. Naik see scan 3/20/19    ENDOSCOPIC ULTRASOUND OF UPPER GASTROINTESTINAL TRACT N/A 01/26/2021    Procedure: ULTRASOUND-ENDOSCOPIC-UPPER;  Surgeon: Stevenson Philippe MD;  Location: [facility] ENDO;  Service: Endoscopy;  Laterality: N/A;    HYSTERECTOMY      JOINT REPLACEMENT Bilateral     knees    OOPHORECTOMY      rt.knee surgery 2016      TOTAL KNEE ARTHROPLASTY Left 02/19/2019    Procedure: ARTHROPLASTY, KNEE, TOTAL;  Surgeon: Med Hanson MD;  Location: [facility] OR;   Service: Orthopedics;  Laterality: Left;  10AM START PER  PER JAYLIN TEXT @ 8:34AM ON 2-18-19  SUPINE  HARRIS LOYA [phone] TEXTED HIM ON 1-24-19 @ 7:57AM  RN PRE OP 2-13-19--BMI--48.9    underarm gland\ Bilateral        Review of patient's allergies indicates:   Allergen Reactions    Ace inhibitors      Cough         No current facility-administered medications on file prior to encounter.     Current Outpatient Medications on File Prior to Encounter   Medication Sig    albuterol-ipratropium (DUO-NEB) 2.5 mg-0.5 mg/3 mL nebulizer solution Take 3 mLs by nebulization every 6 (six) hours as needed for Wheezing. Rescue    atorvastatin (LIPITOR) 40 MG tablet TAKE 1 TABLET(40 MG) BY MOUTH EVERY DAY    azelastine (ASTELIN) 137 mcg (0.1 %) nasal spray 1 spray (137 mcg total) by Nasal route 2 (two) times daily.    fluticasone propionate (FLONASE) 50 mcg/actuation nasal spray SHAKE LIQUID AND USE 2 SPRAYS(100 MCG) IN EACH NOSTRIL EVERY DAY    furosemide (LASIX) 40 MG tablet TAKE 1 TABLET(40 MG) BY MOUTH EVERY DAY    melatonin (MELATIN) 5 mg Take 1 tablet (5 mg total) by mouth nightly.    metoprolol succinate (TOPROL-XL) 100 MG 24 hr tablet Take 1 tablet (100 mg total) by mouth once daily.    sertraline (ZOLOFT) 100 MG tablet TAKE 1 TABLET(100 MG) BY MOUTH EVERY DAY    SPIRIVA RESPIMAT 2.5 mcg/actuation inhaler INHALE 2 PUFFS BY MOUTH DAILY    traMADoL (ULTRAM) 50 mg tablet Take 50 mg by mouth every 12 (twelve) hours as needed.    XARELTO 20 mg Tab TAKE 1 TABLET(20 MG) BY MOUTH EVERY DAY    albuterol (PROVENTIL/VENTOLIN HFA) 90 mcg/actuation inhaler 2 puffs every 6 (six) hours as needed.    cetirizine (ZYRTEC) 10 MG tablet Take 1 tablet (10 mg total) by mouth once daily.    erythromycin (ROMYCIN) ophthalmic ointment Place into the left eye every evening.    estradioL (ESTRACE) 0.01 % (0.1 mg/gram) vaginal cream Place 1 g vaginally 3 (three) times a week.    fluticasone-salmeterol diskus inhaler 250-50 mcg Inhale 1  puff into the lungs 2 (two) times daily.    ketoconazole (NIZORAL) 2 % cream Apply topically once daily.    LIDOcaine-prilocaine (EMLA) cream Apply topically as needed.    triamcinolone acetonide 0.1% (KENALOG) 0.1 % ointment APPLY BETWEEN THE KNEES AND UPPER THIGHS TWICE DAILY FOR NO MORE THAN 7 TO 14 DAYS    [DISCONTINUED] methylPREDNISolone (MEDROL DOSEPACK) 4 mg tablet use as directed     Family History       Problem Relation (Age of Onset)    Cancer Mother, Brother    Diabetes Mother, Brother    Hypertension Mother    No Known Problems Father, Sister, Maternal Aunt, Maternal Uncle, Paternal Aunt, Paternal Uncle, Maternal Grandmother, Maternal Grandfather, Paternal Grandmother, Paternal Grandfather, Other          Tobacco Use    Smoking status: Every Day     Current packs/day: 0.50     Average packs/day: 0.5 packs/day for 51.6 years (25.8 ttl pk-yrs)     Types: Cigarettes     Start date: 1973    Smokeless tobacco: Current   Substance and Sexual Activity    Alcohol use: No    Drug use: No    Sexual activity: Yes     Partners: Male     Review of Systems   Constitutional: Negative.    Respiratory:  Positive for cough, shortness of breath and wheezing.    Cardiovascular:  Positive for leg swelling.   Gastrointestinal:  Positive for nausea and vomiting. Negative for abdominal pain, constipation and diarrhea.   Genitourinary: Negative.    Musculoskeletal: Negative.    Skin: Negative.    Neurological: Negative.      Objective:     Vital Signs (Most Recent):  Temp: 98.1 °F (36.7 °C) (08/06/24 1100)  Pulse: 106 (08/06/24 1325)  Resp: (!) 23 (08/06/24 1325)  BP: (!) 107/53 (08/06/24 1342)  SpO2: (!) 94 % (08/06/24 1325) Vital Signs (24h Range):  Temp:  [98.1 °F (36.7 °C)] 98.1 °F (36.7 °C)  Pulse:  [103-124] 106  Resp:  [23-54] 23  SpO2:  [89 %-100 %] 94 %  BP: (104-179)/(53-84) 107/53     Weight: 125.2 kg (276 lb)  Body mass index is 48.89 kg/m².     Physical Exam  Constitutional:       General: She is not in acute  distress.     Appearance: She is obese.   Cardiovascular:      Rate and Rhythm: Normal rate.      Pulses: Normal pulses.   Pulmonary:      Effort: Respiratory distress present.      Breath sounds: Wheezing and rales (on BiPAP) present.   Abdominal:      General: Bowel sounds are normal. There is no distension.      Palpations: Abdomen is soft.      Tenderness: There is no abdominal tenderness.   Musculoskeletal:      Right lower leg: Edema present.      Left lower leg: Edema present.   Skin:     General: Skin is warm.   Neurological:      Mental Status: She is alert and oriented to person, place, and time. Mental status is at baseline.                Significant Labs: All pertinent labs within the past 24 hours have been reviewed.    Significant Imaging: I have reviewed all pertinent imaging results/findings within the past 24 hours.    Assessment/Plan:     * Acute respiratory failure with hypercapnia  Patient with Hypercapnic and Hypoxic Respiratory failure which is Acute.  she is not on home oxygen. Supplemental oxygen was provided and noted- Oxygen Concentration (%):  [35] 35    .   Signs/symptoms of respiratory failure include- increased work of breathing and respiratory distress. Contributing diagnoses includes - CHF and COPD Labs and images were reviewed. Patient Has not had a recent ABG. Will treat underlying causes and adjust management of respiratory failure as follows-BiPAP support/ diuretics/supplemental oxygen    Acute on chronic diastolic CHF (congestive heart failure)  Patient is identified as having Diastolic (HFpEF) heart failure that is Acute on chronic. CHF is currently uncontrolled due to Dyspnea not returned to baseline after 1 doses of IV diuretic, Rales/crackles on pulmonary exam, and Pulmonary edema/pleural effusion on CXR. Latest ECHO performed and demonstrates- Results for orders placed during the hospital encounter of 11/13/23    Echo    Interpretation Summary    AFib noted throughout  "exam.    Left Ventricle: The left ventricle is normal in size. Normal wall thickness. Normal wall motion. There is normal systolic function with a visually estimated ejection fraction of 55 - 60%. Unable to assess diastolic function due to atrial fibrillation.    Right Ventricle: Mild right ventricular enlargement. Systolic function is mildly reduced.    Mitral Valve: There is mild regurgitation.    Pulmonary Artery: The estimated pulmonary artery systolic pressure is 49 mmHg.  . Continue Beta Blocker and Furosemide and monitor clinical status closely. Monitor on telemetry. Patient is on CHF pathway.  Monitor strict Is&Os and daily weights.  Place on fluid restriction of 1.5 L. Cardiology has been consulted. Continue to stress to patient importance of self efficacy and  on diet for CHF. Last BNP reviewed- and noted below   Recent Labs   Lab 08/06/24  1108   *       Paroxysmal atrial fibrillation  Patient with Paroxysmal (<7 days) atrial fibrillation which is uncontrolled currently with Beta Blocker. Patient is currently in atrial fibrillation.[id number] Score: 4.  On Xarelto for anticoagulation.    COPD exacerbation  Patient's COPD is with exacerbation noted by continued dyspnea currently.  Patient is currently off COPD Pathway. Continue scheduled inhalers Steroids and Supplemental oxygen and monitor respiratory status closely.     PVD (peripheral vascular disease)        Type 2 diabetes mellitus with diabetic cataract, without long-term current use of insulin  Patient's FSGs are controlled on current medication regimen.  Last A1c reviewed-   Lab Results   Component Value Date    HGBA1C 6.0 (H) 05/16/2024     Most recent fingerstick glucose reviewed- No results for input(s): "POCTGLUCOSE" in the last 24 hours.  Current correctional scale  stable  Maintain anti-hyperglycemic dose as follows-   Antihyperglycemics (From admission, onward)      Start     Stop Route Frequency Ordered    08/06/24 1515  " empagliflozin (Jardiance) tablet 10 mg        Question Answer Comment   Does this patient have a diagnosis of heart failure? Yes    Does this patient have type 1 diabetes or diabetic ketoacidosis? No    Does this patient have symptomatic hypotension? No    Is the patient NPO or pending major surgery in next 3 days or less? No        -- Oral Daily 08/06/24 1404          Hold Oral hypoglycemics while patient is in the hospital.    Severe obesity (BMI >= 40)  Body mass index is 48.89 kg/m². Morbid obesity complicates all aspects of disease management from diagnostic modalities to treatment. Weight loss encouraged and health benefits explained to patient.         Essential hypertension  Chronic, controlled. Latest blood pressure and vitals reviewed-     Temp:  [98.1 °F (36.7 °C)]   Pulse:  []   Resp:  [23-54]   BP: ()/(53-84)   SpO2:  [89 %-100 %] .   Home meds for hypertension were reviewed and noted below.   Hypertension Medications               furosemide (LASIX) 40 MG tablet TAKE 1 TABLET(40 MG) BY MOUTH EVERY DAY    metoprolol succinate (TOPROL-XL) 100 MG 24 hr tablet Take 1 tablet (100 mg total) by mouth once daily.            While in the hospital, will manage blood pressure as follows; Adjust home antihypertensive regimen as follows- continue Lasix and metoprolol    Will utilize p.r.n. blood pressure medication only if patient's blood pressure greater than 180/110 and she develops symptoms such as worsening chest pain or shortness of breath.    Hyperlipidemia    Continue home statin      VTE Risk Mitigation (From admission, onward)           Ordered     rivaroxaban tablet 20 mg  with dinner         08/06/24 1436     IP VTE HIGH RISK PATIENT  Once         08/06/24 1436     Place sequential compression device  Until discontinued         08/06/24 1436                                    Erma Pedraza MD  Department of Hospital Medicine  [facility] [place] - Emergency Dept

## 2024-08-06 NOTE — ASSESSMENT & PLAN NOTE
Patient with Paroxysmal (<7 days) atrial fibrillation which is uncontrolled currently with Beta Blocker. Patient is currently in atrial fibrillation.[id number] Score: 4.  On Xarelto for anticoagulation.

## 2024-08-06 NOTE — ASSESSMENT & PLAN NOTE
Body mass index is 48.89 kg/m². Morbid obesity complicates all aspects of disease management from diagnostic modalities to treatment. Weight loss encouraged and health benefits explained to patient.     - certainly contributing to her current respiratory failure.

## 2024-08-06 NOTE — ASSESSMENT & PLAN NOTE
Initially elevated - now controlled    Temp:  [98.1 °F (36.7 °C)]   Pulse:  []   Resp:  [23-54]   BP: ()/(53-84)   SpO2:  [89 %-100 %] .   Home meds for hypertension were reviewed and noted below.   Hypertension Medications               furosemide (LASIX) 40 MG tablet TAKE 1 TABLET(40 MG) BY MOUTH EVERY DAY    metoprolol succinate (TOPROL-XL) 100 MG 24 hr tablet Take 1 tablet (100 mg total) by mouth once daily.

## 2024-08-06 NOTE — ASSESSMENT & PLAN NOTE
Diuresis and afterload reduction as tolerated. Repeat echo - if stable will f/u PRN. Add jardiance    Echo    Interpretation Summary    AFib noted throughout exam.    Left Ventricle: The left ventricle is normal in size. Normal wall thickness. Normal wall motion. There is normal systolic function with a visually estimated ejection fraction of 55 - 60%. Unable to assess diastolic function due to atrial fibrillation.    Right Ventricle: Mild right ventricular enlargement. Systolic function is mildly reduced.    Mitral Valve: There is mild regurgitation.    Pulmonary Artery: The estimated pulmonary artery systolic pressure is 49 mmHg.    Recent Labs   Lab 08/06/24  1108   *

## 2024-08-06 NOTE — SUBJECTIVE & OBJECTIVE
Past Medical History:   Diagnosis Date    Anticoagulant long-term use     Xarelto    Arthritis     Atrial fibrillation     Breast cyst     CHF (congestive heart failure)     Degenerative disc disease     Edema     HLD (hyperlipidemia)     Hx of psychiatric care     Hyperlipidemia     Hypertension     Hypothyroidism     Nuclear sclerosis, bilateral 12/18/2017    Obesity, morbid     HIEU (obstructive sleep apnea)     Other abnormal glucose     pre-diabetes    Pre-diabetes     Psychiatric problem     Requires assistance with activities of daily living (ADL)     Sleep apnea     Smoker     SOB (shortness of breath)     SOB (shortness of breath)     Thyroid disease     on meds 8-9 years ago. hypothyroidism.no malignancy    TMJ (temporomandibular joint disorder)     jaw clicking    Tobacco abuse     Unsteady gait     Urge incontinence     Weakness generalized     Wears glasses        Past Surgical History:   Procedure Laterality Date    BREAST BIOPSY Right     over 10 yrs ago/ benign    BREAST CYST EXCISION Right     COLONOSCOPY N/A 06/25/2019    Procedure: COLONOSCOPY;  Surgeon: Micheline Flores MD;  Location: [facility] ENDO;  Service: Endoscopy;  Laterality: N/A;  RX XARELTO ok to hold (2 days) per Dr. Naik see scan 3/20/19    ENDOSCOPIC ULTRASOUND OF UPPER GASTROINTESTINAL TRACT N/A 01/26/2021    Procedure: ULTRASOUND-ENDOSCOPIC-UPPER;  Surgeon: Stevenson Philippe MD;  Location: [facility] ENDO;  Service: Endoscopy;  Laterality: N/A;    HYSTERECTOMY      JOINT REPLACEMENT Bilateral     knees    OOPHORECTOMY      rt.knee surgery 2016      TOTAL KNEE ARTHROPLASTY Left 02/19/2019    Procedure: ARTHROPLASTY, KNEE, TOTAL;  Surgeon: Med Hanson MD;  Location: [facility] OR;  Service: Orthopedics;  Laterality: Left;  10AM START PER  PER JAYLIN TEXT @ 8:34AM ON 2-18-19  SUPINE  HARRIS LOYA [phone] TEXTED HIM ON 1-24-19 @ 7:57AM  RN PRE OP 2-13-19--BMI--48.9    underarm gland\ Bilateral        Review of patient's allergies  indicates:   Allergen Reactions    Ace inhibitors      Cough         Family History       Problem Relation (Age of Onset)    Cancer Mother, Brother    Diabetes Mother, Brother    Hypertension Mother    No Known Problems Father, Sister, Maternal Aunt, Maternal Uncle, Paternal Aunt, Paternal Uncle, Maternal Grandmother, Maternal Grandfather, Paternal Grandmother, Paternal Grandfather, Other          Tobacco Use    Smoking status: Every Day     Current packs/day: 0.50     Average packs/day: 0.5 packs/day for 51.6 years (25.8 ttl pk-yrs)     Types: Cigarettes     Start date: 1973    Smokeless tobacco: Current   Substance and Sexual Activity    Alcohol use: No    Drug use: No    Sexual activity: Yes     Partners: Male           Objective:     Vital Signs (Most Recent):  Temp: 98.1 °F (36.7 °C) (08/06/24 1100)  Pulse: 85 (08/06/24 1400)  Resp: (!) 23 (08/06/24 1325)  BP: (!) 100/53 (08/06/24 1400)  SpO2: (!) 94 % (08/06/24 1325) Vital Signs (24h Range):  Temp:  [98.1 °F (36.7 °C)] 98.1 °F (36.7 °C)  Pulse:  [] 85  Resp:  [23-54] 23  SpO2:  [89 %-100 %] 94 %  BP: ()/(53-84) 100/53     Weight: 125.2 kg (276 lb)  Body mass index is 48.89 kg/m².      Intake/Output Summary (Last 24 hours) at 8/6/2024 1530  Last data filed at 8/6/2024 1342  Gross per 24 hour   Intake --   Output 300 ml   Net -300 ml        Physical Exam  Vitals and nursing note reviewed.   Constitutional:       General: She is not in acute distress.     Appearance: She is morbidly obese. She is ill-appearing. She is not toxic-appearing or diaphoretic.   HENT:      Head: Atraumatic.   Eyes:      General: No scleral icterus.     Extraocular Movements: Extraocular movements intact.   Cardiovascular:      Rate and Rhythm: Normal rate. Rhythm irregular.   Pulmonary:      Effort: No tachypnea, accessory muscle usage, respiratory distress or retractions.      Breath sounds: Wheezing and rales present.   Abdominal:      General: There is no distension.       Palpations: Abdomen is soft.   Musculoskeletal:      Right lower leg: Edema present.      Left lower leg: Edema present.   Skin:     General: Skin is warm and dry.      Coloration: Skin is not jaundiced.      Findings: No rash.   Neurological:      General: No focal deficit present.      Mental Status: Mental status is at baseline.          Vents:  Oxygen Concentration (%): 35 (08/06/24 1325)    Lines/Drains/Airways       Drain  Duration             Female External Urinary Catheter w/ Suction 08/06/24 1205 <1 day              Peripheral Intravenous Line  Duration                  Peripheral IV - Single Lumen 08/06/24 1058 20 G 1 in Anterior;Right Forearm <1 day         Peripheral IV - Single Lumen 08/06/24 1353 20 G Left Antecubital <1 day                    Significant Labs:    CBC/Anemia Profile:  Recent Labs   Lab 08/06/24  1108   WBC 4.48   HGB 13.7   HCT 44.3      *   RDW 14.5        Chemistries:  Recent Labs   Lab 08/06/24  1108      K 3.9      CO2 29   BUN 20   CREATININE 1.0   CALCIUM 9.5   ALBUMIN 3.4*   PROT 6.8   BILITOT 1.1*   ALKPHOS 91   ALT 18   AST 28   MG 1.6       All pertinent labs within the past 24 hours have been reviewed.    Significant Imaging:   I have reviewed all pertinent imaging results/findings within the past 24 hours.

## 2024-08-06 NOTE — ASSESSMENT & PLAN NOTE
Patient is identified as having Diastolic (HFpEF) heart failure that is Acute on chronic. CHF is currently uncontrolled due to Dyspnea not returned to baseline after 1 doses of IV diuretic, Rales/crackles on pulmonary exam, and Pulmonary edema/pleural effusion on CXR. Latest ECHO performed and demonstrates- Results for orders placed during the hospital encounter of 11/13/23    Echo    Interpretation Summary    AFib noted throughout exam.    Left Ventricle: The left ventricle is normal in size. Normal wall thickness. Normal wall motion. There is normal systolic function with a visually estimated ejection fraction of 55 - 60%. Unable to assess diastolic function due to atrial fibrillation.    Right Ventricle: Mild right ventricular enlargement. Systolic function is mildly reduced.    Mitral Valve: There is mild regurgitation.    Pulmonary Artery: The estimated pulmonary artery systolic pressure is 49 mmHg.  . Continue Beta Blocker and Furosemide and monitor clinical status closely. Monitor on telemetry. Patient is on CHF pathway.  Monitor strict Is&Os and daily weights.  Place on fluid restriction of 1.5 L. Cardiology has been consulted. Continue to stress to patient importance of self efficacy and  on diet for CHF. Last BNP reviewed- and noted below   Recent Labs   Lab 08/06/24  1108   *

## 2024-08-06 NOTE — PLAN OF CARE
Pt admitted to ICU on bipap.  Afib on the monitor.  Afebrile.  Pt opens eyes to voice.  Purewick in place with 75mL claimed.  Pt became hypotensive with MAP<65, MD made aware and Neosynephrine ordered.  Vasopressor not started due to MAP>65, MD aware.  Plan of care reviewed with  at bedside.  No injuries, falls or skin breakdown occurred this shift.

## 2024-08-06 NOTE — ASSESSMENT & PLAN NOTE
Chronic, controlled. Latest blood pressure and vitals reviewed-     Temp:  [98.1 °F (36.7 °C)]   Pulse:  []   Resp:  [23-54]   BP: ()/(53-84)   SpO2:  [89 %-100 %] .   Home meds for hypertension were reviewed and noted below.   Hypertension Medications               furosemide (LASIX) 40 MG tablet TAKE 1 TABLET(40 MG) BY MOUTH EVERY DAY    metoprolol succinate (TOPROL-XL) 100 MG 24 hr tablet Take 1 tablet (100 mg total) by mouth once daily.            While in the hospital, will manage blood pressure as follows; Adjust home antihypertensive regimen as follows- continue Lasix and metoprolol    Will utilize p.r.n. blood pressure medication only if patient's blood pressure greater than 180/110 and she develops symptoms such as worsening chest pain or shortness of breath.

## 2024-08-06 NOTE — HPI
68 y.o. female with PMHx of CHF (with EF 55-60% per last TTE 11/2024), afib on Xarelto, COPD, HIEU on CPAP, presents to the ED for evaluation of SOB x 1 week. Patient reports of associated brown productive cough, chills, congestion, wheezing, orthopnea, LE swelling, nausea, and vomiting x 1 episode. Attempted treatment for symptoms with nebulizer treatments. Denies O2 use at home. Denies fever, worsening LE swelling, abdominal pain, palpitations or any associated symptoms. Patient is a former smoker. She is allergic to ace inhibitors.     Comfortable on CPAP - Hx mainly from  - syed been SOB for several days  Initially A-fib 130s now HR 90s after IV diltiazem  EKG A-fib NSSTT changes   - baseline 200-300    Known to me  Chronic A-fib on xarelto, Diastolic CHF, HTN, DM, HLD, PAD, tobacco abuse     Previously followed by Dr Naik  Patient is here for follow-up of paroxysmal atrial fibrillation.  She is labeled as chronic and has been in normal sinus rhythm and was last seen in 2016.  EKG today confirms this.  She was lost to follow-up and thought she was discharged because she felt everything was okay.  She has not taken her blood thinner but is only been on aspirin.  She gets shortness of breath on heavy activity but relieved with rest.  She does have some associated substernal heaviness.  She has had no breakthrough tachycardia palpitations.  She denies any PND, orthopnea has stable lower edema relieved with elevation.  She is preop for knee replacement and is unable to go up a flight of stairs due to her degenerative joint condition     Here for follow-up of paroxysmal atrial fibrillation.  She underwent testing as below for preop clearance.  We if cleared at low to intermediate risk and she is aware that anything can happen with the stress of surgery and in particular with her tobacco use disorder.  We discussed that this is the main things she needs to work on postoperatively as well as rehab.   She denies any further cardiopulmonary complaints.  We went over her medicines as well and discussed that she has to hold her blood thinner for at least 2 days prior to the procedure but continue her beta-blocker perioperatively.     Echo 11/14/23    AFib noted throughout exam.    Left Ventricle: The left ventricle is normal in size. Normal wall thickness. Normal wall motion. There is normal systolic function with a visually estimated ejection fraction of 55 - 60%. Unable to assess diastolic function due to atrial fibrillation.    Right Ventricle: Mild right ventricular enlargement. Systolic function is mildly reduced.    Mitral Valve: There is mild regurgitation.    Pulmonary Artery: The estimated pulmonary artery systolic pressure is 49 mmHg.     NST: 2/8/19  Normal [facility] MPI  The perfusion scan is free of evidence from myocardial ischemia or injury.  An ejection fraction of 55 % at rest  LV cavity size is normal at rest.  Resting wall motion is physiologic.     Holter: 3/1/21  Atrial fibrillation with ventricular rates varying between 61 and 188 bpm with an average of 104 bpm. Total time in atrial fibrillation was approximately 24 hours.  There were frequent PVCs totalling 2296 and averaging 95.75 per hour. There were 92 monomorphic couplets. There were 3 bigeminal cycles. There were 7 monomorphic triplets.  The diary was returned incomplete.     2/18/21 Denies CP. Mild stable ESQUIVEL  EKG A-fib 94 NSSTT changes - unaware of being back in A-fib - last EKG 2/8/19 NSR  Back in A-fib - duration unknown. Discussed LAURA/CV versus rate control - given lack of symptoms we are both in agreement for rate control.   Echo and holter to evaluate A-fib control  Continue Rx for PAD, HTN, DM, A-fib      3/8/21 Denies CP or SOB. Reports some issues with nausea and feels that she has a mass in her abdomen that moves around   Increase metoprolol 50 bid for better A-fib rate control  Continue Rx for HTN, HLD, PAD, DM  OV 6 months      7/16/21 Reports SOB and LE edema for 2 weeks. Was started on lasix 3 days ago without much improvement  EKG A-fib NSSTT changes  Echo, BNP, BMP for CHF  Continue current diuretic regimen - needs to go to the ER if symptoms worsen  Continue Rx for HTN, HLD, PAD, DM, A-fib, CHF     7/29/21 Feels better since discharge. Still with mild LE edema and ESQUIVEL  On lasix 40 bid     Continue Rx for HTN, HLD, PAD, DM, A-fib, CHF  OV 1 month with BNP, BMP        9/28/21 Denies CP. Less SOB and LE edema improved  BP controlled      Continue Rx for HTN, HLD, PAD, DM, A-fib, CHF  OV 3 months with BNP, BMP     Admitted 5/20/22  Ms Sandee Evans is a 66 y.o. woman admitted with acute hypoxic/hypercapnic respiratory failure due to acute on chronic diastolic CHF, exacerbation of COPD, and untreated HIEU. She initially required BiPAP but was quickly weaned to O2 by NC. She states that she sometimes skips lasix doses due to urinary incontinence. She does not have BiPAP at home. She is still using tobacco. Started IV lasix for CHF exacerbation, steroids/nebs/levofloxacin for COPD exacerbation. Stepped down to floor on 5/21/22. Patient grew out E-coli on blood cultures- (S) to Cipro. Patient clinically improved and was discharged to home with out patient PT/OT on 5/24/22. Bi-pap will be arranged. Activity as tolerated. Diet- low NA/ADA 2000 yang diet    Already has pulmonary follow up.      6/28/22 Denies CP, SOB improved from discharge  BP controlled. Lasix has been held since discharge   Restart lasix 40 qd  OV 3 months with BNP, BMP      Continue Rx for HTN, HLD, PAD, DM, A-fib, CHF     9/28/22 Denies CP, ESQUIVEL improved after restarting lasix  Some dry cough that she thinks are allergies  BP controlled  OV 6 months with BNP, BMP      Continue Rx for HTN, HLD, PAD, DM, A-fib, CHF      Went to the ER 1/26/23  This 67-year-old female presents to the emergency room complaining of nausea that started yesterday.  Patient developed some chest  tightness and shortness of breath that also started this morning at 7:00 a.m..  The patient denies dysuria.  She had 4 episodes of diarrhea in the last 24 hours.  The diarrhea started yesterday.  The patient also has weakness and pain in her left arm this started 3 weeks ago.  The patient has a history of arthritis.  She has been slightly lightheaded when she 1st stands up for about a week.  He had some left upper quadrant abdominal pain yesterday that has since resolved.  She has had chills for 2 weeks and a cough for 2 weeks and headache that occurs only with coughing as well as some rhinorrhea but no sore throat.  She has a history of atrial fibrillation.  She has a history of congestive heart failure.  She smokes and has a history of morbid obesity.  She does not report a history of chronic obstructive pulmonary disease but she is on Spiriva inhaler.  She has a history thyroid disease.  She is maintained on Xarelto.  , troponin negative  EKG A-fib 108     3/7/23 CP has resolved. Still with cough and some abdominal discomfort  BP controlled  Discussed repeat stress test given recent CP - she feels like this is from URI and would like to hold off      Continue Rx for HTN, HLD, PAD, DM, A-fib, CHF  OV 3 months     Admitted 11/13/23  67 y.o. female, with a past medical history of A-fib on OAC,diastolic  CHF, COPD, HLD, HTN, hypothyroidism, morbid obesity admitted on 11/22/2023 for acute respiratory failure with hypoxia and hypercapnic (O2 sts 88% on RA,  PCO2 of 72) due to +RSV, COPD exacerbation, and acute on chronic diastolic heart failure. admitted to ICU on BIPAP. BNP > 400.  Chest imaging with bilateral effusions and peripheral edema. Hospital course complicated by agitation, required Precedex drip briefly.  Able to wean off Precedex drip. Pulmonology consulted-suspect chronic hypercapnic respiratory failure but not using nightly NIV.  RSV positive and on droplet precaution.  Patient completed treatment  with DuoNebs and prednisone on 11/17/2023.  Continue IV diuresis and wean O2 as tolerated. Pt still wheezing, resumed steroids with plan for gradual taper. Given patient continues to have wheezing, SOB, and diminished breath sounds, will consult pulmonology again for input.       Labs 12/12/23  K 4.5  Cr 1.2         12/19/23 Still with cough since discharge but less SOB  SBP 90s but tolerated  Stop losartan with hypotension - needs to monitor home BP      Continue Rx for HTN, HLD, PAD, DM, A-fib, CHF  OV 3 months     3/6/24 Denies CP or SOB  BP improved  EKG A-fib otherwise ok      Continue Rx for HTN, HLD, PAD, DM, A-fib, CHF  OV 6 months with BNP, BMP

## 2024-08-06 NOTE — SUBJECTIVE & OBJECTIVE
Past Medical History:   Diagnosis Date    Anticoagulant long-term use     Xarelto    Arthritis     Atrial fibrillation     Breast cyst     CHF (congestive heart failure)     Degenerative disc disease     Edema     HLD (hyperlipidemia)     Hx of psychiatric care     Hyperlipidemia     Hypertension     Hypothyroidism     Nuclear sclerosis, bilateral 12/18/2017    Obesity, morbid     HIEU (obstructive sleep apnea)     Other abnormal glucose     pre-diabetes    Pre-diabetes     Psychiatric problem     Requires assistance with activities of daily living (ADL)     Sleep apnea     Smoker     SOB (shortness of breath)     SOB (shortness of breath)     Thyroid disease     on meds 8-9 years ago. hypothyroidism.no malignancy    TMJ (temporomandibular joint disorder)     jaw clicking    Tobacco abuse     Unsteady gait     Urge incontinence     Weakness generalized     Wears glasses        Past Surgical History:   Procedure Laterality Date    BREAST BIOPSY Right     over 10 yrs ago/ benign    BREAST CYST EXCISION Right     COLONOSCOPY N/A 06/25/2019    Procedure: COLONOSCOPY;  Surgeon: Micheline Flores MD;  Location: [facility] ENDO;  Service: Endoscopy;  Laterality: N/A;  RX XARELTO ok to hold (2 days) per Dr. Naik see scan 3/20/19    ENDOSCOPIC ULTRASOUND OF UPPER GASTROINTESTINAL TRACT N/A 01/26/2021    Procedure: ULTRASOUND-ENDOSCOPIC-UPPER;  Surgeon: Setvenson Philippe MD;  Location: [facility] ENDO;  Service: Endoscopy;  Laterality: N/A;    HYSTERECTOMY      JOINT REPLACEMENT Bilateral     knees    OOPHORECTOMY      rt.knee surgery 2016      TOTAL KNEE ARTHROPLASTY Left 02/19/2019    Procedure: ARTHROPLASTY, KNEE, TOTAL;  Surgeon: Med Hanson MD;  Location: [facility] OR;  Service: Orthopedics;  Laterality: Left;  10AM START PER  PER JAYLIN TEXT @ 8:34AM ON 2-18-19  SUPINE  HARRIS LOYA [phone] TEXTED HIM ON 1-24-19 @ 7:57AM  RN PRE OP 2-13-19--BMI--48.9    underarm gland\ Bilateral        Review of patient's allergies  indicates:   Allergen Reactions    Ace inhibitors      Cough         No current facility-administered medications on file prior to encounter.     Current Outpatient Medications on File Prior to Encounter   Medication Sig    albuterol-ipratropium (DUO-NEB) 2.5 mg-0.5 mg/3 mL nebulizer solution Take 3 mLs by nebulization every 6 (six) hours as needed for Wheezing. Rescue    atorvastatin (LIPITOR) 40 MG tablet TAKE 1 TABLET(40 MG) BY MOUTH EVERY DAY    azelastine (ASTELIN) 137 mcg (0.1 %) nasal spray 1 spray (137 mcg total) by Nasal route 2 (two) times daily.    fluticasone propionate (FLONASE) 50 mcg/actuation nasal spray SHAKE LIQUID AND USE 2 SPRAYS(100 MCG) IN EACH NOSTRIL EVERY DAY    furosemide (LASIX) 40 MG tablet TAKE 1 TABLET(40 MG) BY MOUTH EVERY DAY    melatonin (MELATIN) 5 mg Take 1 tablet (5 mg total) by mouth nightly.    metoprolol succinate (TOPROL-XL) 100 MG 24 hr tablet Take 1 tablet (100 mg total) by mouth once daily.    sertraline (ZOLOFT) 100 MG tablet TAKE 1 TABLET(100 MG) BY MOUTH EVERY DAY    SPIRIVA RESPIMAT 2.5 mcg/actuation inhaler INHALE 2 PUFFS BY MOUTH DAILY    traMADoL (ULTRAM) 50 mg tablet Take 50 mg by mouth every 12 (twelve) hours as needed.    XARELTO 20 mg Tab TAKE 1 TABLET(20 MG) BY MOUTH EVERY DAY    albuterol (PROVENTIL/VENTOLIN HFA) 90 mcg/actuation inhaler 2 puffs every 6 (six) hours as needed.    cetirizine (ZYRTEC) 10 MG tablet Take 1 tablet (10 mg total) by mouth once daily.    erythromycin (ROMYCIN) ophthalmic ointment Place into the left eye every evening.    estradioL (ESTRACE) 0.01 % (0.1 mg/gram) vaginal cream Place 1 g vaginally 3 (three) times a week.    fluticasone-salmeterol diskus inhaler 250-50 mcg Inhale 1 puff into the lungs 2 (two) times daily.    ketoconazole (NIZORAL) 2 % cream Apply topically once daily.    LIDOcaine-prilocaine (EMLA) cream Apply topically as needed.    triamcinolone acetonide 0.1% (KENALOG) 0.1 % ointment APPLY BETWEEN THE KNEES AND UPPER  THIGHS TWICE DAILY FOR NO MORE THAN 7 TO 14 DAYS    [DISCONTINUED] methylPREDNISolone (MEDROL DOSEPACK) 4 mg tablet use as directed     Family History       Problem Relation (Age of Onset)    Cancer Mother, Brother    Diabetes Mother, Brother    Hypertension Mother    No Known Problems Father, Sister, Maternal Aunt, Maternal Uncle, Paternal Aunt, Paternal Uncle, Maternal Grandmother, Maternal Grandfather, Paternal Grandmother, Paternal Grandfather, Other          Tobacco Use    Smoking status: Every Day     Current packs/day: 0.50     Average packs/day: 0.5 packs/day for 51.6 years (25.8 ttl pk-yrs)     Types: Cigarettes     Start date: 1973    Smokeless tobacco: Current   Substance and Sexual Activity    Alcohol use: No    Drug use: No    Sexual activity: Yes     Partners: Male     Review of Systems   Constitutional: Negative.    Respiratory:  Positive for cough, shortness of breath and wheezing.    Cardiovascular:  Positive for leg swelling.   Gastrointestinal:  Positive for nausea and vomiting. Negative for abdominal pain, constipation and diarrhea.   Genitourinary: Negative.    Musculoskeletal: Negative.    Skin: Negative.    Neurological: Negative.      Objective:     Vital Signs (Most Recent):  Temp: 98.1 °F (36.7 °C) (08/06/24 1100)  Pulse: 106 (08/06/24 1325)  Resp: (!) 23 (08/06/24 1325)  BP: (!) 107/53 (08/06/24 1342)  SpO2: (!) 94 % (08/06/24 1325) Vital Signs (24h Range):  Temp:  [98.1 °F (36.7 °C)] 98.1 °F (36.7 °C)  Pulse:  [103-124] 106  Resp:  [23-54] 23  SpO2:  [89 %-100 %] 94 %  BP: (104-179)/(53-84) 107/53     Weight: 125.2 kg (276 lb)  Body mass index is 48.89 kg/m².     Physical Exam  Constitutional:       General: She is not in acute distress.     Appearance: She is obese.   Cardiovascular:      Rate and Rhythm: Normal rate.      Pulses: Normal pulses.   Pulmonary:      Effort: Respiratory distress present.      Breath sounds: Wheezing and rales (on BiPAP) present.   Abdominal:      General:  Bowel sounds are normal. There is no distension.      Palpations: Abdomen is soft.      Tenderness: There is no abdominal tenderness.   Musculoskeletal:      Right lower leg: Edema present.      Left lower leg: Edema present.   Skin:     General: Skin is warm.   Neurological:      Mental Status: She is alert and oriented to person, place, and time. Mental status is at baseline.                Significant Labs: All pertinent labs within the past 24 hours have been reviewed.    Significant Imaging: I have reviewed all pertinent imaging results/findings within the past 24 hours.

## 2024-08-06 NOTE — ASSESSMENT & PLAN NOTE
Longstanding tobacco use history.  2022 FEV1 81 with no bronchodilator response.    - prednisone 40-60 mg for 5 days.  Duo nebs q.4 scheduled and q.4 PRN  - needs outpatient follow-up with Pulmonary and repeat PFTs

## 2024-08-06 NOTE — CONSULTS
[facility] Emergency Dept  Cardiology  Consult Note    Patient Name: Sandee Evans  MRN: [id number]  Admission Date: 8/6/2024  Hospital Length of Stay: 0 days  Code Status: Prior   Attending Provider: Keli Murray MD   Consulting Provider: Cristino Grissom MD  Primary Care Physician: Kuldeep Miller MD  Principal Problem:<principal problem not specified>    Patient information was obtained from patient and ER records.     Inpatient consult to Cardiology  Consult performed by: Cristino Grissom MD  Consult ordered by: Keli Murray MD        Subjective:     Chief Complaint:  A-fib, CHF     HPI:     68 y.o. female with PMHx of CHF (with EF 55-60% per last TTE 11/2024), afib on Xarelto, COPD, HIEU on CPAP, presents to the ED for evaluation of SOB x 1 week. Patient reports of associated brown productive cough, chills, congestion, wheezing, orthopnea, LE swelling, nausea, and vomiting x 1 episode. Attempted treatment for symptoms with nebulizer treatments. Denies O2 use at home. Denies fever, worsening LE swelling, abdominal pain, palpitations or any associated symptoms. Patient is a former smoker. She is allergic to ace inhibitors.     Comfortable on CPAP - Hx mainly from  - syed been SOB for several days  Initially A-fib 130s now HR 90s after IV diltiazem  EKG A-fib NSSTT changes   - baseline 200-300    Known to me  Chronic A-fib on xarelto, Diastolic CHF, HTN, DM, HLD, PAD, tobacco abuse     Previously followed by Dr aNik  Patient is here for follow-up of paroxysmal atrial fibrillation.  She is labeled as chronic and has been in normal sinus rhythm and was last seen in 2016.  EKG today confirms this.  She was lost to follow-up and thought she was discharged because she felt everything was okay.  She has not taken her blood thinner but is only been on aspirin.  She gets shortness of breath on heavy activity but relieved with rest.  She does have some associated substernal heaviness.  She  has had no breakthrough tachycardia palpitations.  She denies any PND, orthopnea has stable lower edema relieved with elevation.  She is preop for knee replacement and is unable to go up a flight of stairs due to her degenerative joint condition     Here for follow-up of paroxysmal atrial fibrillation.  She underwent testing as below for preop clearance.  We if cleared at low to intermediate risk and she is aware that anything can happen with the stress of surgery and in particular with her tobacco use disorder.  We discussed that this is the main things she needs to work on postoperatively as well as rehab.  She denies any further cardiopulmonary complaints.  We went over her medicines as well and discussed that she has to hold her blood thinner for at least 2 days prior to the procedure but continue her beta-blocker perioperatively.     Echo 11/14/23    AFib noted throughout exam.    Left Ventricle: The left ventricle is normal in size. Normal wall thickness. Normal wall motion. There is normal systolic function with a visually estimated ejection fraction of 55 - 60%. Unable to assess diastolic function due to atrial fibrillation.    Right Ventricle: Mild right ventricular enlargement. Systolic function is mildly reduced.    Mitral Valve: There is mild regurgitation.    Pulmonary Artery: The estimated pulmonary artery systolic pressure is 49 mmHg.     NST: 2/8/19  Normal Marian MPI  The perfusion scan is free of evidence from myocardial ischemia or injury.  An ejection fraction of 55 % at rest  LV cavity size is normal at rest.  Resting wall motion is physiologic.     Holter: 3/1/21  Atrial fibrillation with ventricular rates varying between 61 and 188 bpm with an average of 104 bpm. Total time in atrial fibrillation was approximately 24 hours.  There were frequent PVCs totalling 2296 and averaging 95.75 per hour. There were 92 monomorphic couplets. There were 3 bigeminal cycles. There were 7 monomorphic  triplets.  The diary was returned incomplete.     2/18/21 Denies CP. Mild stable ESQUIVEL  EKG A-fib 94 NSSTT changes - unaware of being back in A-fib - last EKG 2/8/19 NSR  Back in A-fib - duration unknown. Discussed LAURA/CV versus rate control - given lack of symptoms we are both in agreement for rate control.   Echo and holter to evaluate A-fib control  Continue Rx for PAD, HTN, DM, A-fib      3/8/21 Denies CP or SOB. Reports some issues with nausea and feels that she has a mass in her abdomen that moves around   Increase metoprolol 50 bid for better A-fib rate control  Continue Rx for HTN, HLD, PAD, DM  OV 6 months     7/16/21 Reports SOB and LE edema for 2 weeks. Was started on lasix 3 days ago without much improvement  EKG A-fib NSSTT changes  Echo, BNP, BMP for CHF  Continue current diuretic regimen - needs to go to the ER if symptoms worsen  Continue Rx for HTN, HLD, PAD, DM, A-fib, CHF     7/29/21 Feels better since discharge. Still with mild LE edema and ESQUIVEL  On lasix 40 bid     Continue Rx for HTN, HLD, PAD, DM, A-fib, CHF  OV 1 month with BNP, BMP        9/28/21 Denies CP. Less SOB and LE edema improved  BP controlled      Continue Rx for HTN, HLD, PAD, DM, A-fib, CHF  OV 3 months with BNP, BMP     Admitted 5/20/22  Ms Sandee Evans is a 66 y.o. woman admitted with acute hypoxic/hypercapnic respiratory failure due to acute on chronic diastolic CHF, exacerbation of COPD, and untreated HIEU. She initially required BiPAP but was quickly weaned to O2 by NC. She states that she sometimes skips lasix doses due to urinary incontinence. She does not have BiPAP at home. She is still using tobacco. Started IV lasix for CHF exacerbation, steroids/nebs/levofloxacin for COPD exacerbation. Stepped down to floor on 5/21/22. Patient grew out E-coli on blood cultures- (S) to Cipro. Patient clinically improved and was discharged to home with out patient PT/OT on 5/24/22. Bi-pap will be arranged. Activity as tolerated. Diet-  low NA/ADA 2000 yang diet    Already has pulmonary follow up.      6/28/22 Denies CP, SOB improved from discharge  BP controlled. Lasix has been held since discharge   Restart lasix 40 qd  OV 3 months with BNP, BMP      Continue Rx for HTN, HLD, PAD, DM, A-fib, CHF     9/28/22 Denies CP, ESQUIVEL improved after restarting lasix  Some dry cough that she thinks are allergies  BP controlled  OV 6 months with BNP, BMP      Continue Rx for HTN, HLD, PAD, DM, A-fib, CHF      Went to the ER 1/26/23  This 67-year-old female presents to the emergency room complaining of nausea that started yesterday.  Patient developed some chest tightness and shortness of breath that also started this morning at 7:00 a.m..  The patient denies dysuria.  She had 4 episodes of diarrhea in the last 24 hours.  The diarrhea started yesterday.  The patient also has weakness and pain in her left arm this started 3 weeks ago.  The patient has a history of arthritis.  She has been slightly lightheaded when she 1st stands up for about a week.  He had some left upper quadrant abdominal pain yesterday that has since resolved.  She has had chills for 2 weeks and a cough for 2 weeks and headache that occurs only with coughing as well as some rhinorrhea but no sore throat.  She has a history of atrial fibrillation.  She has a history of congestive heart failure.  She smokes and has a history of morbid obesity.  She does not report a history of chronic obstructive pulmonary disease but she is on Spiriva inhaler.  She has a history thyroid disease.  She is maintained on Xarelto.  , troponin negative  EKG A-fib 108     3/7/23 CP has resolved. Still with cough and some abdominal discomfort  BP controlled  Discussed repeat stress test given recent CP - she feels like this is from URI and would like to hold off      Continue Rx for HTN, HLD, PAD, DM, A-fib, CHF  OV 3 months     Admitted 11/13/23  67 y.o. female, with a past medical history of A-fib on  OAC,diastolic  CHF, COPD, HLD, HTN, hypothyroidism, morbid obesity admitted on 11/22/2023 for acute respiratory failure with hypoxia and hypercapnic (O2 sts 88% on RA,  PCO2 of 72) due to +RSV, COPD exacerbation, and acute on chronic diastolic heart failure. admitted to ICU on BIPAP. BNP > 400.  Chest imaging with bilateral effusions and peripheral edema. Hospital course complicated by agitation, required Precedex drip briefly.  Able to wean off Precedex drip. Pulmonology consulted-suspect chronic hypercapnic respiratory failure but not using nightly NIV.  RSV positive and on droplet precaution.  Patient completed treatment with DuoNebs and prednisone on 11/17/2023.  Continue IV diuresis and wean O2 as tolerated. Pt still wheezing, resumed steroids with plan for gradual taper. Given patient continues to have wheezing, SOB, and diminished breath sounds, will consult pulmonology again for input.       Labs 12/12/23  K 4.5  Cr 1.2         12/19/23 Still with cough since discharge but less SOB  SBP 90s but tolerated  Stop losartan with hypotension - needs to monitor home BP      Continue Rx for HTN, HLD, PAD, DM, A-fib, CHF  OV 3 months     3/6/24 Denies CP or SOB  BP improved  EKG A-fib otherwise ok      Continue Rx for HTN, HLD, PAD, DM, A-fib, CHF  OV 6 months with BNP, BMP    Past Medical History:   Diagnosis Date    Anticoagulant long-term use     Xarelto    Arthritis     Atrial fibrillation     Breast cyst     CHF (congestive heart failure)     Degenerative disc disease     Edema     HLD (hyperlipidemia)     Hx of psychiatric care     Hyperlipidemia     Hypertension     Hypothyroidism     Nuclear sclerosis, bilateral 12/18/2017    Obesity, morbid     HIEU (obstructive sleep apnea)     Other abnormal glucose     pre-diabetes    Pre-diabetes     Psychiatric problem     Requires assistance with activities of daily living (ADL)     Sleep apnea     Smoker     SOB (shortness of breath)     SOB (shortness of  breath)     Thyroid disease     on meds 8-9 years ago. hypothyroidism.no malignancy    TMJ (temporomandibular joint disorder)     jaw clicking    Tobacco abuse     Unsteady gait     Urge incontinence     Weakness generalized     Wears glasses        Past Surgical History:   Procedure Laterality Date    BREAST BIOPSY Right     over 10 yrs ago/ benign    BREAST CYST EXCISION Right     COLONOSCOPY N/A 06/25/2019    Procedure: COLONOSCOPY;  Surgeon: Micheline Flores MD;  Location: [facility] ENDO;  Service: Endoscopy;  Laterality: N/A;  RX XARELTO ok to hold (2 days) per Dr. Naik see scan 3/20/19    ENDOSCOPIC ULTRASOUND OF UPPER GASTROINTESTINAL TRACT N/A 01/26/2021    Procedure: ULTRASOUND-ENDOSCOPIC-UPPER;  Surgeon: Stevenson Philippe MD;  Location: [facility] ENDO;  Service: Endoscopy;  Laterality: N/A;    HYSTERECTOMY      JOINT REPLACEMENT Bilateral     knees    OOPHORECTOMY      rt.knee surgery 2016      TOTAL KNEE ARTHROPLASTY Left 02/19/2019    Procedure: ARTHROPLASTY, KNEE, TOTAL;  Surgeon: Med Hanson MD;  Location: [facility] OR;  Service: Orthopedics;  Laterality: Left;  10AM START PER  PER JAYLIN TEXT @ 8:34AM ON 2-18-19  SUPINE  HARRIS LOYA [phone] TEXTED HIM ON 1-24-19 @ 7:57AM  RN PRE OP 2-13-19--BMI--48.9    underarm gland\ Bilateral        Review of patient's allergies indicates:   Allergen Reactions    Ace inhibitors      Cough         No current facility-administered medications on file prior to encounter.     Current Outpatient Medications on File Prior to Encounter   Medication Sig    albuterol-ipratropium (DUO-NEB) 2.5 mg-0.5 mg/3 mL nebulizer solution Take 3 mLs by nebulization every 6 (six) hours as needed for Wheezing. Rescue    atorvastatin (LIPITOR) 40 MG tablet TAKE 1 TABLET(40 MG) BY MOUTH EVERY DAY    azelastine (ASTELIN) 137 mcg (0.1 %) nasal spray 1 spray (137 mcg total) by Nasal route 2 (two) times daily.    fluticasone propionate (FLONASE) 50 mcg/actuation nasal spray SHAKE LIQUID  AND USE 2 SPRAYS(100 MCG) IN EACH NOSTRIL EVERY DAY    furosemide (LASIX) 40 MG tablet TAKE 1 TABLET(40 MG) BY MOUTH EVERY DAY    melatonin (MELATIN) 5 mg Take 1 tablet (5 mg total) by mouth nightly.    metoprolol succinate (TOPROL-XL) 100 MG 24 hr tablet Take 1 tablet (100 mg total) by mouth once daily.    sertraline (ZOLOFT) 100 MG tablet TAKE 1 TABLET(100 MG) BY MOUTH EVERY DAY    SPIRIVA RESPIMAT 2.5 mcg/actuation inhaler INHALE 2 PUFFS BY MOUTH DAILY    traMADoL (ULTRAM) 50 mg tablet Take 50 mg by mouth every 12 (twelve) hours as needed.    XARELTO 20 mg Tab TAKE 1 TABLET(20 MG) BY MOUTH EVERY DAY    albuterol (PROVENTIL/VENTOLIN HFA) 90 mcg/actuation inhaler 2 puffs every 6 (six) hours as needed.    cetirizine (ZYRTEC) 10 MG tablet Take 1 tablet (10 mg total) by mouth once daily.    erythromycin (ROMYCIN) ophthalmic ointment Place into the left eye every evening.    estradioL (ESTRACE) 0.01 % (0.1 mg/gram) vaginal cream Place 1 g vaginally 3 (three) times a week.    fluticasone-salmeterol diskus inhaler 250-50 mcg Inhale 1 puff into the lungs 2 (two) times daily.    ketoconazole (NIZORAL) 2 % cream Apply topically once daily.    LIDOcaine-prilocaine (EMLA) cream Apply topically as needed.    triamcinolone acetonide 0.1% (KENALOG) 0.1 % ointment APPLY BETWEEN THE KNEES AND UPPER THIGHS TWICE DAILY FOR NO MORE THAN 7 TO 14 DAYS    [DISCONTINUED] methylPREDNISolone (MEDROL DOSEPACK) 4 mg tablet use as directed     Family History       Problem Relation (Age of Onset)    Cancer Mother, Brother    Diabetes Mother, Brother    Hypertension Mother    No Known Problems Father, Sister, Maternal Aunt, Maternal Uncle, Paternal Aunt, Paternal Uncle, Maternal Grandmother, Maternal Grandfather, Paternal Grandmother, Paternal Grandfather, Other          Tobacco Use    Smoking status: Every Day     Current packs/day: 0.50     Average packs/day: 0.5 packs/day for 51.6 years (25.8 ttl pk-yrs)     Types: Cigarettes     Start date:  1973    Smokeless tobacco: Current   Substance and Sexual Activity    Alcohol use: No    Drug use: No    Sexual activity: Yes     Partners: Male     Review of Systems   Constitutional: Negative for decreased appetite.   HENT:  Negative for ear discharge.    Eyes:  Negative for blurred vision.   Respiratory:  Negative for hemoptysis.    Endocrine: Negative for polyphagia.   Hematologic/Lymphatic: Negative for adenopathy.   Skin:  Negative for color change.   Musculoskeletal:  Negative for joint swelling.   Genitourinary:  Negative for bladder incontinence.   Neurological:  Negative for brief paralysis.   Psychiatric/Behavioral:  Negative for hallucinations.    Allergic/Immunologic: Negative for hives.     Objective:     Vital Signs (Most Recent):  Temp: 98.1 °F (36.7 °C) (08/06/24 1100)  Pulse: 99 (08/06/24 1345)  Resp: (!) 23 (08/06/24 1325)  BP: (!) 96/54 (08/06/24 1345)  SpO2: (!) 94 % (08/06/24 1325) Vital Signs (24h Range):  Temp:  [98.1 °F (36.7 °C)] 98.1 °F (36.7 °C)  Pulse:  [] 99  Resp:  [23-54] 23  SpO2:  [89 %-100 %] 94 %  BP: ()/(53-84) 96/54     Weight: 125.2 kg (276 lb)  Body mass index is 48.89 kg/m².    SpO2: (!) 94 %         Intake/Output Summary (Last 24 hours) at 8/6/2024 1357  Last data filed at 8/6/2024 1342  Gross per 24 hour   Intake --   Output 300 ml   Net -300 ml       Lines/Drains/Airways       Drain  Duration             Female External Urinary Catheter w/ Suction 08/06/24 1205 <1 day              Peripheral Intravenous Line  Duration                  Peripheral IV - Single Lumen 08/06/24 1058 20 G 1 in Anterior;Right Forearm <1 day         Peripheral IV - Single Lumen 08/06/24 1353 20 G Left Antecubital <1 day                     Physical Exam  Constitutional:       Appearance: She is well-developed.   HENT:      Head: Normocephalic and atraumatic.   Eyes:      Conjunctiva/sclera: Conjunctivae normal.      Pupils: Pupils are equal, round, and reactive to light.    Cardiovascular:      Rate and Rhythm: Normal rate. Rhythm irregularly irregular.      Pulses: Intact distal pulses.      Heart sounds: Normal heart sounds.   Pulmonary:      Effort: Pulmonary effort is normal.      Breath sounds: Normal breath sounds.   Abdominal:      General: Bowel sounds are normal.      Palpations: Abdomen is soft.   Musculoskeletal:         General: Normal range of motion.      Cervical back: Normal range of motion and neck supple.      Right lower leg: Edema present.      Left lower leg: Edema present.   Skin:     General: Skin is warm and dry.   Neurological:      Mental Status: She is alert and oriented to person, place, and time.          Significant Labs: All pertinent lab results from the last 24 hours have been reviewed.    Significant Imaging: Echocardiogram: 2D echo with color flow doppler:   Results for orders placed or performed during the hospital encounter of 05/18/15   2D echo with color flow doppler   Result Value Ref Range    EF + QEF 55 55 - 65    Mitral Valve Regurgitation MILD     Diastolic Dysfunction Yes (A)     Est. PA Systolic Pressure 32.59     Tricuspid Valve Regurgitation MILD     Narrative    TEST DESCRIPTION       Aorta: The aortic root is normal in size, measuring 1.8 cm at sinotubular junction and 2.4 cm at Sinuses of Valsalva.     Left Atrium: The left atrial volume index is severely enlarged, measuring 78.15 cc/m2.     Left Ventricle: The left ventricle is normal in size, with an end-diastolic diameter of 5.6 cm, and an end-systolic diameter of 3.8 cm. LV wall thickness is normal, with the septum measuring 1.1 cm and the posterior wall measuring 0.8 cm across. Relative   wall thickness was normal at 0.29, and the LV mass index was increased at 109.2 g/m2 consistent with eccentric left ventricular hypertrophy. Global left ventricular systolic function appears normal. Visually estimated ejection fraction is 55-60%. The LV   Doppler derived stroke volume equals  64.0 ccs.   The E/e'(lat) is 12, consistent with significant diastolic dysfunction.     Right Atrium: The right atrium is normal in size, measuring 5.6 cm in length and 4.1 cm in width in the apical view.     Right Ventricle: The right ventricle is normal in size measuring 3.4 cm at the base in the apical right ventricle-focused view. Global right ventricular systolic function appears normal. Tricuspid annular plane systolic excursion (TAPSE) is 2.2 cm. The   estimated PA systolic pressure is 33 mmHg.     Aortic Valve:  The aortic valve is mildly sclerotic.     Mitral Valve:  There is mild mitral regurgitation.     Tricuspid Valve:  There is mild tricuspid regurgitation.     Pulmonary Valve:  There is trivial pulmonic regurgitation.     IVC: IVC is normal in size and collapses > 50% with a sniff, suggesting normal right atrial pressure of 3 mmHg.     Intracavitary: There is no evidence of pericardial effusion, intracavity mass, thrombi, or vegetation.         CONCLUSIONS     1 - Normal left ventricular systolic function (EF 55-60%).     2 - Eccentric hypertrophy.     3 - Left ventricular diastolic dysfunction.     4 - Left atrial enlargement.     5 - Mild mitral regurgitation.     6 - Mild tricuspid regurgitation.     7 - Trivial pulmonic regurgitation.         This document has been electronically    SIGNED BY: Cristino Grissom MD On: 05/18/2015 16:21     Assessment and Plan:     Acute respiratory failure with hypercapnia  Likely related to HIEU/COPD with a component oc diastolic CHF    Acute on chronic diastolic CHF (congestive heart failure)  Diuresis and afterload reduction as tolerated. Repeat echo - if stable will f/u PRN. Add jardiance    Echo    Interpretation Summary    AFib noted throughout exam.    Left Ventricle: The left ventricle is normal in size. Normal wall thickness. Normal wall motion. There is normal systolic function with a visually estimated ejection fraction of 55 - 60%. Unable to assess  diastolic function due to atrial fibrillation.    Right Ventricle: Mild right ventricular enlargement. Systolic function is mildly reduced.    Mitral Valve: There is mild regurgitation.    Pulmonary Artery: The estimated pulmonary artery systolic pressure is 49 mmHg.    Recent Labs   Lab 08/06/24  1108   *       Longstanding persistent atrial fibrillation  Chronic A-fib - initial rates 130 - now improved. Continue rate control and xarelto    Severe obesity (BMI >= 40)  counseled        Essential hypertension  Initially elevated - now controlled    Temp:  [98.1 °F (36.7 °C)]   Pulse:  []   Resp:  [23-54]   BP: ()/(53-84)   SpO2:  [89 %-100 %] .   Home meds for hypertension were reviewed and noted below.   Hypertension Medications               furosemide (LASIX) 40 MG tablet TAKE 1 TABLET(40 MG) BY MOUTH EVERY DAY    metoprolol succinate (TOPROL-XL) 100 MG 24 hr tablet Take 1 tablet (100 mg total) by mouth once daily.              Hyperlipidemia    On statin        VTE Risk Mitigation (From admission, onward)      None            Thank you for your consult. I will follow-up with patient. Please contact us if you have any additional questions.    Cristino Grissom MD  Cardiology   [facility] [place] - Emergency Dept

## 2024-08-06 NOTE — SUBJECTIVE & OBJECTIVE
Past Medical History:   Diagnosis Date    Anticoagulant long-term use     Xarelto    Arthritis     Atrial fibrillation     Breast cyst     CHF (congestive heart failure)     Degenerative disc disease     Edema     HLD (hyperlipidemia)     Hx of psychiatric care     Hyperlipidemia     Hypertension     Hypothyroidism     Nuclear sclerosis, bilateral 12/18/2017    Obesity, morbid     HIEU (obstructive sleep apnea)     Other abnormal glucose     pre-diabetes    Pre-diabetes     Psychiatric problem     Requires assistance with activities of daily living (ADL)     Sleep apnea     Smoker     SOB (shortness of breath)     SOB (shortness of breath)     Thyroid disease     on meds 8-9 years ago. hypothyroidism.no malignancy    TMJ (temporomandibular joint disorder)     jaw clicking    Tobacco abuse     Unsteady gait     Urge incontinence     Weakness generalized     Wears glasses        Past Surgical History:   Procedure Laterality Date    BREAST BIOPSY Right     over 10 yrs ago/ benign    BREAST CYST EXCISION Right     COLONOSCOPY N/A 06/25/2019    Procedure: COLONOSCOPY;  Surgeon: Micheline Flores MD;  Location: [facility] ENDO;  Service: Endoscopy;  Laterality: N/A;  RX XARELTO ok to hold (2 days) per Dr. Naik see scan 3/20/19    ENDOSCOPIC ULTRASOUND OF UPPER GASTROINTESTINAL TRACT N/A 01/26/2021    Procedure: ULTRASOUND-ENDOSCOPIC-UPPER;  Surgeon: Stevenson Philippe MD;  Location: [facility] ENDO;  Service: Endoscopy;  Laterality: N/A;    HYSTERECTOMY      JOINT REPLACEMENT Bilateral     knees    OOPHORECTOMY      rt.knee surgery 2016      TOTAL KNEE ARTHROPLASTY Left 02/19/2019    Procedure: ARTHROPLASTY, KNEE, TOTAL;  Surgeon: Med Hanson MD;  Location: [facility] OR;  Service: Orthopedics;  Laterality: Left;  10AM START PER  PER JAYLIN TEXT @ 8:34AM ON 2-18-19  SUPINE  HARRIS LOYA [phone] TEXTED HIM ON 1-24-19 @ 7:57AM  RN PRE OP 2-13-19--BMI--48.9    underarm gland\ Bilateral        Review of patient's allergies  indicates:   Allergen Reactions    Ace inhibitors      Cough         No current facility-administered medications on file prior to encounter.     Current Outpatient Medications on File Prior to Encounter   Medication Sig    albuterol-ipratropium (DUO-NEB) 2.5 mg-0.5 mg/3 mL nebulizer solution Take 3 mLs by nebulization every 6 (six) hours as needed for Wheezing. Rescue    atorvastatin (LIPITOR) 40 MG tablet TAKE 1 TABLET(40 MG) BY MOUTH EVERY DAY    azelastine (ASTELIN) 137 mcg (0.1 %) nasal spray 1 spray (137 mcg total) by Nasal route 2 (two) times daily.    fluticasone propionate (FLONASE) 50 mcg/actuation nasal spray SHAKE LIQUID AND USE 2 SPRAYS(100 MCG) IN EACH NOSTRIL EVERY DAY    furosemide (LASIX) 40 MG tablet TAKE 1 TABLET(40 MG) BY MOUTH EVERY DAY    melatonin (MELATIN) 5 mg Take 1 tablet (5 mg total) by mouth nightly.    metoprolol succinate (TOPROL-XL) 100 MG 24 hr tablet Take 1 tablet (100 mg total) by mouth once daily.    sertraline (ZOLOFT) 100 MG tablet TAKE 1 TABLET(100 MG) BY MOUTH EVERY DAY    SPIRIVA RESPIMAT 2.5 mcg/actuation inhaler INHALE 2 PUFFS BY MOUTH DAILY    traMADoL (ULTRAM) 50 mg tablet Take 50 mg by mouth every 12 (twelve) hours as needed.    XARELTO 20 mg Tab TAKE 1 TABLET(20 MG) BY MOUTH EVERY DAY    albuterol (PROVENTIL/VENTOLIN HFA) 90 mcg/actuation inhaler 2 puffs every 6 (six) hours as needed.    cetirizine (ZYRTEC) 10 MG tablet Take 1 tablet (10 mg total) by mouth once daily.    erythromycin (ROMYCIN) ophthalmic ointment Place into the left eye every evening.    estradioL (ESTRACE) 0.01 % (0.1 mg/gram) vaginal cream Place 1 g vaginally 3 (three) times a week.    fluticasone-salmeterol diskus inhaler 250-50 mcg Inhale 1 puff into the lungs 2 (two) times daily.    ketoconazole (NIZORAL) 2 % cream Apply topically once daily.    LIDOcaine-prilocaine (EMLA) cream Apply topically as needed.    triamcinolone acetonide 0.1% (KENALOG) 0.1 % ointment APPLY BETWEEN THE KNEES AND UPPER  THIGHS TWICE DAILY FOR NO MORE THAN 7 TO 14 DAYS    [DISCONTINUED] methylPREDNISolone (MEDROL DOSEPACK) 4 mg tablet use as directed     Family History       Problem Relation (Age of Onset)    Cancer Mother, Brother    Diabetes Mother, Brother    Hypertension Mother    No Known Problems Father, Sister, Maternal Aunt, Maternal Uncle, Paternal Aunt, Paternal Uncle, Maternal Grandmother, Maternal Grandfather, Paternal Grandmother, Paternal Grandfather, Other          Tobacco Use    Smoking status: Every Day     Current packs/day: 0.50     Average packs/day: 0.5 packs/day for 51.6 years (25.8 ttl pk-yrs)     Types: Cigarettes     Start date: 1973    Smokeless tobacco: Current   Substance and Sexual Activity    Alcohol use: No    Drug use: No    Sexual activity: Yes     Partners: Male     Review of Systems   Constitutional: Negative for decreased appetite.   HENT:  Negative for ear discharge.    Eyes:  Negative for blurred vision.   Respiratory:  Negative for hemoptysis.    Endocrine: Negative for polyphagia.   Hematologic/Lymphatic: Negative for adenopathy.   Skin:  Negative for color change.   Musculoskeletal:  Negative for joint swelling.   Genitourinary:  Negative for bladder incontinence.   Neurological:  Negative for brief paralysis.   Psychiatric/Behavioral:  Negative for hallucinations.    Allergic/Immunologic: Negative for hives.     Objective:     Vital Signs (Most Recent):  Temp: 98.1 °F (36.7 °C) (08/06/24 1100)  Pulse: 99 (08/06/24 1345)  Resp: (!) 23 (08/06/24 1325)  BP: (!) 96/54 (08/06/24 1345)  SpO2: (!) 94 % (08/06/24 1325) Vital Signs (24h Range):  Temp:  [98.1 °F (36.7 °C)] 98.1 °F (36.7 °C)  Pulse:  [] 99  Resp:  [23-54] 23  SpO2:  [89 %-100 %] 94 %  BP: ()/(53-84) 96/54     Weight: 125.2 kg (276 lb)  Body mass index is 48.89 kg/m².    SpO2: (!) 94 %         Intake/Output Summary (Last 24 hours) at 8/6/2024 1357  Last data filed at 8/6/2024 1342  Gross per 24 hour   Intake --   Output 300  ml   Net -300 ml       Lines/Drains/Airways       Drain  Duration             Female External Urinary Catheter w/ Suction 08/06/24 1205 <1 day              Peripheral Intravenous Line  Duration                  Peripheral IV - Single Lumen 08/06/24 1058 20 G 1 in Anterior;Right Forearm <1 day         Peripheral IV - Single Lumen 08/06/24 1353 20 G Left Antecubital <1 day                     Physical Exam  Constitutional:       Appearance: She is well-developed.   HENT:      Head: Normocephalic and atraumatic.   Eyes:      Conjunctiva/sclera: Conjunctivae normal.      Pupils: Pupils are equal, round, and reactive to light.   Cardiovascular:      Rate and Rhythm: Normal rate. Rhythm irregularly irregular.      Pulses: Intact distal pulses.      Heart sounds: Normal heart sounds.   Pulmonary:      Effort: Pulmonary effort is normal.      Breath sounds: Normal breath sounds.   Abdominal:      General: Bowel sounds are normal.      Palpations: Abdomen is soft.   Musculoskeletal:         General: Normal range of motion.      Cervical back: Normal range of motion and neck supple.      Right lower leg: Edema present.      Left lower leg: Edema present.   Skin:     General: Skin is warm and dry.   Neurological:      Mental Status: She is alert and oriented to person, place, and time.          Significant Labs: All pertinent lab results from the last 24 hours have been reviewed.    Significant Imaging: Echocardiogram: 2D echo with color flow doppler:   Results for orders placed or performed during the hospital encounter of 05/18/15   2D echo with color flow doppler   Result Value Ref Range    EF + QEF 55 55 - 65    Mitral Valve Regurgitation MILD     Diastolic Dysfunction Yes (A)     Est. PA Systolic Pressure 32.59     Tricuspid Valve Regurgitation MILD     Narrative    TEST DESCRIPTION       Aorta: The aortic root is normal in size, measuring 1.8 cm at sinotubular junction and 2.4 cm at Sinuses of Valsalva.     Left Atrium:  The left atrial volume index is severely enlarged, measuring 78.15 cc/m2.     Left Ventricle: The left ventricle is normal in size, with an end-diastolic diameter of 5.6 cm, and an end-systolic diameter of 3.8 cm. LV wall thickness is normal, with the septum measuring 1.1 cm and the posterior wall measuring 0.8 cm across. Relative   wall thickness was normal at 0.29, and the LV mass index was increased at 109.2 g/m2 consistent with eccentric left ventricular hypertrophy. Global left ventricular systolic function appears normal. Visually estimated ejection fraction is 55-60%. The LV   Doppler derived stroke volume equals 64.0 ccs.   The E/e'(lat) is 12, consistent with significant diastolic dysfunction.     Right Atrium: The right atrium is normal in size, measuring 5.6 cm in length and 4.1 cm in width in the apical view.     Right Ventricle: The right ventricle is normal in size measuring 3.4 cm at the base in the apical right ventricle-focused view. Global right ventricular systolic function appears normal. Tricuspid annular plane systolic excursion (TAPSE) is 2.2 cm. The   estimated PA systolic pressure is 33 mmHg.     Aortic Valve:  The aortic valve is mildly sclerotic.     Mitral Valve:  There is mild mitral regurgitation.     Tricuspid Valve:  There is mild tricuspid regurgitation.     Pulmonary Valve:  There is trivial pulmonic regurgitation.     IVC: IVC is normal in size and collapses > 50% with a sniff, suggesting normal right atrial pressure of 3 mmHg.     Intracavitary: There is no evidence of pericardial effusion, intracavity mass, thrombi, or vegetation.         CONCLUSIONS     1 - Normal left ventricular systolic function (EF 55-60%).     2 - Eccentric hypertrophy.     3 - Left ventricular diastolic dysfunction.     4 - Left atrial enlargement.     5 - Mild mitral regurgitation.     6 - Mild tricuspid regurgitation.     7 - Trivial pulmonic regurgitation.         This document has been electronically     SIGNED BY: Cristino Grissom MD On: 05/18/2015 16:21

## 2024-08-06 NOTE — ASSESSMENT & PLAN NOTE
Patient with Hypercapnic and Hypoxic Respiratory failure which is Acute.  she is not on home oxygen. Supplemental oxygen was provided and noted- Oxygen Concentration (%):  [35] 35    .   Signs/symptoms of respiratory failure include- increased work of breathing and respiratory distress. Contributing diagnoses includes - CHF and COPD Labs and images were reviewed. Patient Has not had a recent ABG. Will treat underlying causes and adjust management of respiratory failure as follows-BiPAP support/ diuretics/supplemental oxygen

## 2024-08-06 NOTE — ASSESSMENT & PLAN NOTE
Body mass index is 48.89 kg/m². Morbid obesity complicates all aspects of disease management from diagnostic modalities to treatment. Weight loss encouraged and health benefits explained to patient.

## 2024-08-06 NOTE — ASSESSMENT & PLAN NOTE
"Patient's FSGs are controlled on current medication regimen.  Last A1c reviewed-   Lab Results   Component Value Date    HGBA1C 6.0 (H) 05/16/2024     Most recent fingerstick glucose reviewed- No results for input(s): "POCTGLUCOSE" in the last 24 hours.  Current correctional scale  stable  Maintain anti-hyperglycemic dose as follows-   Antihyperglycemics (From admission, onward)      Start     Stop Route Frequency Ordered    08/06/24 1515  empagliflozin (Jardiance) tablet 10 mg        Question Answer Comment   Does this patient have a diagnosis of heart failure? Yes    Does this patient have type 1 diabetes or diabetic ketoacidosis? No    Does this patient have symptomatic hypotension? No    Is the patient NPO or pending major surgery in next 3 days or less? No        -- Oral Daily 08/06/24 1404          Hold Oral hypoglycemics while patient is in the hospital.  "

## 2024-08-06 NOTE — PHARMACY MED REC
"Admission Medication History     The home medication history was taken by Lindsey Gilman.    You may go to "Admission" then "Reconcile Home Medications" tabs to review and/or act upon these items.     The home medication list has been updated by the Pharmacy department.   Please read ALL comments highlighted in yellow.   Please address this information as you see fit.    Feel free to contact us if you have any questions or require assistance.      The medications listed below were removed from the home medication list. Please reorder if appropriate:  Patient reports no longer taking the following medication(s):          {Medications listed below were obtained from:67402::"Patient/family"}  (Not in a hospital admission)      Potential issues to be addressed PRIOR TO DISCHARGE  {Potential issues to be addressed PRIOR TO DISCHARGE:33100:p}    Unable to assess this patient at this time.    Spouse at the bedside and was not able to provide medication history on patient.        Lindsey Gilman [facility].  [phone]                .        "

## 2024-08-06 NOTE — ASSESSMENT & PLAN NOTE
Most recent echo with EF 55-60%, LAE (TERRI 55), moderate RV enlargement with mildly reduced RV function; PASP 49.   on admission.  CXR with CHF appearance.    - repeat formal echo pending.  - recommend aggressive diuresis.  Strict I/O.

## 2024-08-06 NOTE — ASSESSMENT & PLAN NOTE
RVR in ED which broke with Dilt.  ON xarelto at home    - Kamlesh for hypotension to help with this  - continue home Xarelto

## 2024-08-06 NOTE — HPI
68-year-old morbidly obese female smoker with COPD, chronic AFib on Xarelto, chronic diastolic heart failure, HTN, DM, hyperlipidemia, peripheral vascular, HIEU nonadherent to CPAP, hypothyroidism presenting with 1 week of worsening shortness of breath and cough.  Associated with orthopnea worsening LE edema and several episodes of emesis.  No infectious symptoms including fever or chest pain.  Cough is chronic she has a longstanding tobacco use history.  On arrival, in respiratory distress with normal saturation on room air.  CXR with CHF appearance and VBG showing acute on chronic hypercapnic respiratory failure.  Per chart, significant amounts of wheezing which all improved after being placed on NIV.  Patient was transferred to ICU for ongoing management

## 2024-08-06 NOTE — ASSESSMENT & PLAN NOTE
Morbidly obese with HIEU nonadherent to CPAP.  Baseline pCO2 seems to be around 60.  PCO2 > 80 on admission.   with CHF exacerbation.  Chronic tobacco use with history of COPD.      - currently decently compensated on ABG.  Doing well on NIV.  Follow-up ABG ordered  - recommend empiric cap coverage obtain Resp Cx if possible  - SpO2 goal 88-92%.  Avoid hypoxia.  - NIV nightly once liberated from continuous

## 2024-08-07 PROBLEM — J18.9 COMMUNITY ACQUIRED PNEUMONIA: Status: ACTIVE | Noted: 2024-08-07

## 2024-08-07 LAB
ADENOVIRUS: NOT DETECTED
ANION GAP SERPL CALC-SCNC: 11 MMOL/L (ref 8–16)
AORTIC ROOT ANNULUS: 3.08 CM
AORTIC VALVE CUSP SEPERATION: 2.24 CM
ASCENDING AORTA: 2.46 CM
AV INDEX (PROSTH): 0.59
AV MEAN GRADIENT: 4 MMHG
AV PEAK GRADIENT: 8 MMHG
AV VALVE AREA BY VELOCITY RATIO: 1.96 CM²
AV VALVE AREA: 1.77 CM²
AV VELOCITY RATIO: 0.66
BORDETELLA PARAPERTUSSIS (IS1001): NOT DETECTED
BORDETELLA PERTUSSIS (PTXP): NOT DETECTED
BSA FOR ECHO PROCEDURE: 2.36 M2
BUN SERPL-MCNC: 20 MG/DL (ref 8–23)
CALCIUM SERPL-MCNC: 8.7 MG/DL (ref 8.7–10.5)
CHLAMYDIA PNEUMONIAE: NOT DETECTED
CHLORIDE SERPL-SCNC: 95 MMOL/L (ref 95–110)
CO2 SERPL-SCNC: 35 MMOL/L (ref 23–29)
CORONAVIRUS 229E, COMMON COLD VIRUS: NOT DETECTED
CORONAVIRUS HKU1, COMMON COLD VIRUS: NOT DETECTED
CORONAVIRUS NL63, COMMON COLD VIRUS: NOT DETECTED
CORONAVIRUS OC43, COMMON COLD VIRUS: NOT DETECTED
CREAT SERPL-MCNC: 0.9 MG/DL (ref 0.5–1.4)
CV ECHO LV RWT: 0.63 CM
DOP CALC AO PEAK VEL: 1.45 M/S
DOP CALC AO VTI: 26.7 CM
DOP CALC LVOT AREA: 3 CM2
DOP CALC LVOT DIAMETER: 1.95 CM
DOP CALC LVOT PEAK VEL: 0.95 M/S
DOP CALC LVOT STROKE VOLUME: 47.16 CM3
DOP CALCLVOT PEAK VEL VTI: 15.8 CM
ECHO LV POSTERIOR WALL: 1.44 CM (ref 0.6–1.1)
EST. GFR  (NO RACE VARIABLE): >60 ML/MIN/1.73 M^2
ESTIMATED AVG GLUCOSE: 114 MG/DL (ref 68–131)
FLUBV RNA NPH QL NAA+NON-PROBE: NOT DETECTED
FRACTIONAL SHORTENING: 40 % (ref 28–44)
GLUCOSE SERPL-MCNC: 124 MG/DL (ref 70–110)
HBA1C MFR BLD: 5.6 % (ref 4–5.6)
HPIV1 RNA NPH QL NAA+NON-PROBE: DETECTED
HPIV2 RNA NPH QL NAA+NON-PROBE: NOT DETECTED
HPIV3 RNA NPH QL NAA+NON-PROBE: NOT DETECTED
HPIV4 RNA NPH QL NAA+NON-PROBE: NOT DETECTED
HUMAN METAPNEUMOVIRUS: NOT DETECTED
INFLUENZA A (SUBTYPES H1,H1-2009,H3): NOT DETECTED
INTERVENTRICULAR SEPTUM: 1.38 CM (ref 0.6–1.1)
IVC DIAMETER: 2.18 CM
LA MAJOR: 8.23 CM
LA MINOR: 7.75 CM
LA WIDTH: 6.7 CM
LEFT ATRIUM SIZE: 4.84 CM
LEFT ATRIUM VOLUME INDEX: 99.1 ML/M2
LEFT ATRIUM VOLUME: 220.04 CM3
LEFT INTERNAL DIMENSION IN SYSTOLE: 2.74 CM (ref 2.1–4)
LEFT VENTRICLE DIASTOLIC VOLUME INDEX: 43.5 ML/M2
LEFT VENTRICLE DIASTOLIC VOLUME: 96.57 ML
LEFT VENTRICLE MASS INDEX: 116 G/M2
LEFT VENTRICLE SYSTOLIC VOLUME INDEX: 12.6 ML/M2
LEFT VENTRICLE SYSTOLIC VOLUME: 27.93 ML
LEFT VENTRICULAR INTERNAL DIMENSION IN DIASTOLE: 4.58 CM (ref 3.5–6)
LEFT VENTRICULAR MASS: 257.81 G
LVED V (TEICH): 96.57 ML
LVES V (TEICH): 27.93 ML
LVOT MG: 1.89 MMHG
LVOT MV: 0.64 CM/S
MAGNESIUM SERPL-MCNC: 1.6 MG/DL (ref 1.6–2.6)
MYCOPLASMA PNEUMONIAE: NOT DETECTED
OHS CV RV/LV RATIO: 1.09 CM
OHS QRS DURATION: 82 MS
OHS QTC CALCULATION: 512 MS
PISA TR MAX VEL: 3.55 M/S
POCT GLUCOSE: 105 MG/DL (ref 70–110)
POCT GLUCOSE: 133 MG/DL (ref 70–110)
POCT GLUCOSE: 144 MG/DL (ref 70–110)
POCT GLUCOSE: 147 MG/DL (ref 70–110)
POTASSIUM SERPL-SCNC: 4 MMOL/L (ref 3.5–5.1)
PULM VEIN S/D RATIO: 0.75
PV PEAK D VEL: 0.4 M/S
PV PEAK GRADIENT: 4 MMHG
PV PEAK S VEL: 0.3 M/S
PV PEAK VELOCITY: 0.98 M/S
RA MAJOR: 6.14 CM
RA PRESSURE ESTIMATED: 15 MMHG
RA WIDTH: 6.2 CM
RESPIRATORY INFECTION PANEL SOURCE: ABNORMAL
RIGHT VENTRICULAR END-DIASTOLIC DIMENSION: 5 CM
RSV RNA NPH QL NAA+NON-PROBE: NOT DETECTED
RV TB RVSP: 19 MMHG
RV TISSUE DOPPLER FREE WALL SYSTOLIC VELOCITY 1 (APICAL 4 CHAMBER VIEW): 15.1 CM/S
RV+EV RNA NPH QL NAA+NON-PROBE: NOT DETECTED
SARS-COV-2 RNA RESP QL NAA+PROBE: NOT DETECTED
SINUS: 3.04 CM
SODIUM SERPL-SCNC: 141 MMOL/L (ref 136–145)
STJ: 2.05 CM
TR MAX PG: 50 MMHG
TRICUSPID ANNULAR PLANE SYSTOLIC EXCURSION: 2.16 CM
TV REST PULMONARY ARTERY PRESSURE: 65 MMHG
Z-SCORE OF LEFT VENTRICULAR DIMENSION IN END DIASTOLE: -5.26
Z-SCORE OF LEFT VENTRICULAR DIMENSION IN END SYSTOLE: -4.27

## 2024-08-07 PROCEDURE — 99291 CRITICAL CARE FIRST HOUR: CPT | Mod: ,,, | Performed by: STUDENT IN AN ORGANIZED HEALTH CARE EDUCATION/TRAINING PROGRAM

## 2024-08-07 PROCEDURE — 80048 BASIC METABOLIC PNL TOTAL CA: CPT | Performed by: STUDENT IN AN ORGANIZED HEALTH CARE EDUCATION/TRAINING PROGRAM

## 2024-08-07 PROCEDURE — 25000242 PHARM REV CODE 250 ALT 637 W/ HCPCS: Performed by: STUDENT IN AN ORGANIZED HEALTH CARE EDUCATION/TRAINING PROGRAM

## 2024-08-07 PROCEDURE — 94761 N-INVAS EAR/PLS OXIMETRY MLT: CPT

## 2024-08-07 PROCEDURE — 36415 COLL VENOUS BLD VENIPUNCTURE: CPT | Performed by: STUDENT IN AN ORGANIZED HEALTH CARE EDUCATION/TRAINING PROGRAM

## 2024-08-07 PROCEDURE — 63600175 PHARM REV CODE 636 W HCPCS: Performed by: STUDENT IN AN ORGANIZED HEALTH CARE EDUCATION/TRAINING PROGRAM

## 2024-08-07 PROCEDURE — 94660 CPAP INITIATION&MGMT: CPT

## 2024-08-07 PROCEDURE — 27000221 HC OXYGEN, UP TO 24 HOURS

## 2024-08-07 PROCEDURE — 87798 DETECT AGENT NOS DNA AMP: CPT | Mod: 59 | Performed by: STUDENT IN AN ORGANIZED HEALTH CARE EDUCATION/TRAINING PROGRAM

## 2024-08-07 PROCEDURE — 25000003 PHARM REV CODE 250: Performed by: STUDENT IN AN ORGANIZED HEALTH CARE EDUCATION/TRAINING PROGRAM

## 2024-08-07 PROCEDURE — 87070 CULTURE OTHR SPECIMN AEROBIC: CPT | Performed by: STUDENT IN AN ORGANIZED HEALTH CARE EDUCATION/TRAINING PROGRAM

## 2024-08-07 PROCEDURE — 99900035 HC TECH TIME PER 15 MIN (STAT)

## 2024-08-07 PROCEDURE — 11000001 HC ACUTE MED/SURG PRIVATE ROOM

## 2024-08-07 PROCEDURE — 83735 ASSAY OF MAGNESIUM: CPT | Performed by: STUDENT IN AN ORGANIZED HEALTH CARE EDUCATION/TRAINING PROGRAM

## 2024-08-07 PROCEDURE — 87205 SMEAR GRAM STAIN: CPT | Performed by: STUDENT IN AN ORGANIZED HEALTH CARE EDUCATION/TRAINING PROGRAM

## 2024-08-07 PROCEDURE — 87581 M.PNEUMON DNA AMP PROBE: CPT | Performed by: STUDENT IN AN ORGANIZED HEALTH CARE EDUCATION/TRAINING PROGRAM

## 2024-08-07 PROCEDURE — 94640 AIRWAY INHALATION TREATMENT: CPT

## 2024-08-07 RX ORDER — SERTRALINE HYDROCHLORIDE 50 MG/1
100 TABLET, FILM COATED ORAL DAILY
Status: DISCONTINUED | OUTPATIENT
Start: 2024-08-07 | End: 2024-08-14 | Stop reason: HOSPADM

## 2024-08-07 RX ORDER — MUPIROCIN 20 MG/G
OINTMENT TOPICAL 2 TIMES DAILY
Status: COMPLETED | OUTPATIENT
Start: 2024-08-07 | End: 2024-08-11

## 2024-08-07 RX ORDER — MAGNESIUM SULFATE HEPTAHYDRATE 40 MG/ML
2 INJECTION, SOLUTION INTRAVENOUS ONCE
Status: COMPLETED | OUTPATIENT
Start: 2024-08-07 | End: 2024-08-07

## 2024-08-07 RX ORDER — SODIUM CHLORIDE 9 MG/ML
INJECTION, SOLUTION INTRAVENOUS
Status: DISCONTINUED | OUTPATIENT
Start: 2024-08-07 | End: 2024-08-14 | Stop reason: HOSPADM

## 2024-08-07 RX ADMIN — SERTRALINE HYDROCHLORIDE 100 MG: 50 TABLET ORAL at 01:08

## 2024-08-07 RX ADMIN — IPRATROPIUM BROMIDE AND ALBUTEROL SULFATE 3 ML: 2.5; .5 SOLUTION RESPIRATORY (INHALATION) at 01:08

## 2024-08-07 RX ADMIN — METHYLPREDNISOLONE SODIUM SUCCINATE 40 MG: 40 INJECTION, POWDER, FOR SOLUTION INTRAMUSCULAR; INTRAVENOUS at 12:08

## 2024-08-07 RX ADMIN — FUROSEMIDE 60 MG: 10 INJECTION, SOLUTION INTRAVENOUS at 08:08

## 2024-08-07 RX ADMIN — METOPROLOL TARTRATE 50 MG: 50 TABLET, FILM COATED ORAL at 07:08

## 2024-08-07 RX ADMIN — MUPIROCIN: 20 OINTMENT TOPICAL at 10:08

## 2024-08-07 RX ADMIN — MAGNESIUM SULFATE HEPTAHYDRATE 2 G: 40 INJECTION, SOLUTION INTRAVENOUS at 06:08

## 2024-08-07 RX ADMIN — METOPROLOL TARTRATE 50 MG: 50 TABLET, FILM COATED ORAL at 05:08

## 2024-08-07 RX ADMIN — FUROSEMIDE 60 MG: 10 INJECTION, SOLUTION INTRAVENOUS at 09:08

## 2024-08-07 RX ADMIN — RIVAROXABAN 20 MG: 20 TABLET, FILM COATED ORAL at 04:08

## 2024-08-07 RX ADMIN — IPRATROPIUM BROMIDE AND ALBUTEROL SULFATE 3 ML: 2.5; .5 SOLUTION RESPIRATORY (INHALATION) at 08:08

## 2024-08-07 RX ADMIN — CEFTRIAXONE 2 G: 2 INJECTION, POWDER, FOR SOLUTION INTRAMUSCULAR; INTRAVENOUS at 04:08

## 2024-08-07 RX ADMIN — AZELASTINE HYDROCHLORIDE 137 MCG: 137 SPRAY, METERED NASAL at 09:08

## 2024-08-07 RX ADMIN — IPRATROPIUM BROMIDE AND ALBUTEROL SULFATE 3 ML: 2.5; .5 SOLUTION RESPIRATORY (INHALATION) at 07:08

## 2024-08-07 RX ADMIN — EMPAGLIFLOZIN 10 MG: 10 TABLET, FILM COATED ORAL at 08:08

## 2024-08-07 RX ADMIN — SODIUM CHLORIDE: 9 INJECTION, SOLUTION INTRAVENOUS at 11:08

## 2024-08-07 RX ADMIN — METOPROLOL TARTRATE 50 MG: 50 TABLET, FILM COATED ORAL at 11:08

## 2024-08-07 RX ADMIN — MUPIROCIN: 20 OINTMENT TOPICAL at 09:08

## 2024-08-07 RX ADMIN — FUROSEMIDE 60 MG: 10 INJECTION, SOLUTION INTRAVENOUS at 03:08

## 2024-08-07 RX ADMIN — ATORVASTATIN CALCIUM 40 MG: 40 TABLET, FILM COATED ORAL at 08:08

## 2024-08-07 RX ADMIN — AZELASTINE HYDROCHLORIDE 137 MCG: 137 SPRAY, METERED NASAL at 08:08

## 2024-08-07 RX ADMIN — MAGNESIUM SULFATE HEPTAHYDRATE 2 G: 40 INJECTION, SOLUTION INTRAVENOUS at 11:08

## 2024-08-07 RX ADMIN — METOPROLOL TARTRATE 50 MG: 50 TABLET, FILM COATED ORAL at 12:08

## 2024-08-07 RX ADMIN — AZITHROMYCIN MONOHYDRATE 500 MG: 500 INJECTION, POWDER, LYOPHILIZED, FOR SOLUTION INTRAVENOUS at 05:08

## 2024-08-07 NOTE — CONSULTS
Food & Nutrition  Education    Diet Education: Fluid and salt education   Time Spent: 15 minutes   Learners: pt       Nutrition Education provided with handouts: Low salt and fluid restricted diet       Comments: Unable to educate pt at this time, pt on BIPAP and appears very tired. Left handout at bedside. Will attempt to follow up with pt at a later date.       All questions and concerns answered. Dietitian's contact information provided.       Follow-Up: 1x/weekly    Please Re-consult as needed        Thanks!

## 2024-08-07 NOTE — ASSESSMENT & PLAN NOTE
Morbidly obese with HIEU nonadherent to CPAP.  Baseline pCO2 seems to be around 60.  PCO2 > 80 on admission.   with CHF exacerbation.  Chronic tobacco use with history of COPD.      - recommend empiric cap coverage.  Resp Cx pending  - SpO2 goal 88-92%.  Avoid hypoxia.  - NIV nightly and with naps

## 2024-08-07 NOTE — ASSESSMENT & PLAN NOTE
Most recent echo with EF 55-60%, LAE (TERRI 55), moderate RV enlargement with mildly reduced RV function; PASP 49.   on admission.  CXR with CHF appearance.    - repeat formal echo with severe biatrial enlargement, mild RV enlargement and PASP >65.  Continue aggressive diuresis.  Strict I/O.  -  reports non-adherence to diuretic, low sodium diet and home NIV

## 2024-08-07 NOTE — PLAN OF CARE
Patient remains in ICU on BIPAP with Fio2 30%, was just arousable to speech and immediately  goes back to sleep at the beginning of shift eventhough was oriented to time, place and person, but became more alert at the end of shift. External catheter in place, puts 3025ml of urine throughout the shift. POC updated with the patient.  Problem: Adult Inpatient Plan of Care  Goal: Plan of Care Review  Outcome: Progressing  Goal: Patient-Specific Goal (Individualized)  Outcome: Progressing  Goal: Absence of Hospital-Acquired Illness or Injury  Outcome: Progressing  Goal: Optimal Comfort and Wellbeing  Outcome: Progressing  Goal: Readiness for Transition of Care  Outcome: Progressing     Problem: Bariatric Environmental Safety  Goal: Safety Maintained with Care  Outcome: Progressing     Problem: Diabetes Comorbidity  Goal: Blood Glucose Level Within Targeted Range  Outcome: Progressing     Problem: Skin Injury Risk Increased  Goal: Skin Health and Integrity  Outcome: Progressing

## 2024-08-07 NOTE — NURSING
[facility] [place]es [place] 4 Eyes      8/6/2024       Skin assessed on: Q Shift      [x] No Pressure Injuries Present    [x]Prevention Measures Documented    [] Yes LDA  for Pressure Injury Previously documented     [] Yes New Pressure Injury Discovered   [] LDA for New Pressure Injury Added      Attending RN:  Roxane Doyle RN     Second RN:  CLAUDIA Joshua

## 2024-08-07 NOTE — ASSESSMENT & PLAN NOTE
Patient with Hypercapnic and Hypoxic Respiratory failure which is Acute.  she is not on home oxygen. Supplemental oxygen was provided and noted- Oxygen Concentration (%):  [30-35] 30    .   Signs/symptoms of respiratory failure include- increased work of breathing and respiratory distress. Contributing diagnoses includes - CHF and COPD Labs and images were reviewed. Patient Has not had a recent ABG. Will treat underlying causes and adjust management of respiratory failure as follows-BiPAP support/ diuretics/supplemental oxygen  Weaned off BiPAP support, transitioned to 3 L of oxygen with nasal cannula.  Continue BiPAP p.r.n. naps and q.h.s.

## 2024-08-07 NOTE — SUBJECTIVE & OBJECTIVE
Interval History: Much more alert and awake today. Says that she is feeling a lot better      Objective:     Vital Signs (Most Recent):  Temp: 98.1 °F (36.7 °C) (08/07/24 0730)  Pulse: 80 (08/07/24 0930)  Resp: (!) 25 (08/07/24 0930)  BP: (!) 103/54 (08/07/24 0930)  SpO2: (!) 92 % (08/07/24 0930) Vital Signs (24h Range):  Temp:  [97.3 °F (36.3 °C)-98.3 °F (36.8 °C)] 98.1 °F (36.7 °C)  Pulse:  [] 80  Resp:  [18-54] 25  SpO2:  [89 %-100 %] 92 %  BP: ()/(44-90) 103/54     Weight: 124.1 kg (273 lb 9.5 oz)  Body mass index is 48.46 kg/m².      Intake/Output Summary (Last 24 hours) at 8/7/2024 1049  Last data filed at 8/7/2024 0615  Gross per 24 hour   Intake 347.59 ml   Output 3400 ml   Net -3052.41 ml        Physical Exam  Vitals and nursing note reviewed.   Constitutional:       General: She is not in acute distress.     Appearance: She is morbidly obese. She is ill-appearing. She is not toxic-appearing or diaphoretic.   HENT:      Head: Atraumatic.   Eyes:      General: No scleral icterus.     Extraocular Movements: Extraocular movements intact.   Cardiovascular:      Rate and Rhythm: Normal rate. Rhythm irregular.   Pulmonary:      Effort: No tachypnea, accessory muscle usage, respiratory distress or retractions.      Breath sounds: Rales present. No wheezing.   Abdominal:      General: There is no distension.      Palpations: Abdomen is soft.   Musculoskeletal:      Right lower leg: Edema present.      Left lower leg: Edema present.   Skin:     General: Skin is warm and dry.      Coloration: Skin is not jaundiced.      Findings: No rash.   Neurological:      General: No focal deficit present.      Mental Status: She is alert. Mental status is at baseline.               Vents:  Oxygen Concentration (%): 30 (08/07/24 0400)    Lines/Drains/Airways       Drain  Duration             Female External Urinary Catheter w/ Suction 08/06/24 1205 <1 day              Peripheral Intravenous Line  Duration                   Peripheral IV - Single Lumen 08/06/24 1058 20 G 1 in Anterior;Right Forearm <1 day         Peripheral IV - Single Lumen 08/06/24 1353 20 G Left Antecubital <1 day                    Significant Labs:    CBC/Anemia Profile:  Recent Labs   Lab 08/06/24  1108   WBC 4.48   HGB 13.7   HCT 44.3      *   RDW 14.5        Chemistries:  Recent Labs   Lab 08/06/24  1108 08/07/24  0550    141   K 3.9 4.0    95   CO2 29 35*   BUN 20 20   CREATININE 1.0 0.9   CALCIUM 9.5 8.7   ALBUMIN 3.4*  --    PROT 6.8  --    BILITOT 1.1*  --    ALKPHOS 91  --    ALT 18  --    AST 28  --    MG 1.6 1.6       All pertinent labs within the past 24 hours have been reviewed.    Significant Imaging:  I have reviewed all pertinent imaging results/findings within the past 24 hours.

## 2024-08-07 NOTE — ASSESSMENT & PLAN NOTE
Body mass index is 48.46 kg/m². Morbid obesity complicates all aspects of disease management from diagnostic modalities to treatment. Weight loss encouraged and health benefits explained to patient.     - certainly contributing to her current respiratory failure.

## 2024-08-07 NOTE — PROVIDER TRANSFER
68-year-old morbidly obese female with past medical history of chronic AFib on Xarelto, CHF EF 55-60% 11/23, hypertension, diabetes, hyperlipidemia, PVD, tobacco abuse , COPD, HIEU, hypothyroidism, tobacco use who was admitted for Acute hypoxic hyper capneic respiratory failure secondary to CHF/COPD exacerbation.  Noncompliance with medications/ CPAP at home.  Noted to have AFib with RVR on presentation in ED, rate controlled with 1 dose of diltiazem.  weaned off BiPAP support transitioned to 3 L of oxygen with nasal cannula  .   On Lasix/Solu-Medrol /nebs .  on ceftriaxone/azithromycin for cap coverage.      Plan   Follow up on respiratory cultures/respiratory infectious panel  Continue diuresis /can switch to oral prednisone in a.m.  PT/OT consult   Continue BiPAP q.h.s. and p.r.n. naps

## 2024-08-07 NOTE — CARE UPDATE
[facility] [place]  Nurses Note -- 4 Eyes      8/7/2024       Skin assessed on: Q Shift      [x] No Pressure Injuries Present    [x]Prevention Measures Documented    [] Yes LDA  for Pressure Injury Previously documented     [] Yes New Pressure Injury Discovered   [] LDA for New Pressure Injury Added      Attending RN:  KURT FRANZ RN     Second RN:  Roxane TAYLOR

## 2024-08-07 NOTE — SUBJECTIVE & OBJECTIVE
Interval History:  Mentation improved.  Transitioned to 3 L of oxygen with nasal cannula continue BiPAP p.r.n. nebs and q.h.s..  Okay to transfer to floor.  Continue diuresis.  Net-3 L over 24 hours      Review of Systems   Constitutional: Negative.    Respiratory:  Positive for shortness of breath and wheezing.    Cardiovascular:  Positive for leg swelling.   Gastrointestinal: Negative.    Genitourinary: Negative.    Musculoskeletal: Negative.    Neurological:  Positive for weakness.     Objective:     Vital Signs (Most Recent):  Temp: 98.1 °F (36.7 °C) (08/07/24 0730)  Pulse: 87 (08/07/24 0800)  Resp: (!) 26 (08/07/24 0800)  BP: 129/68 (08/07/24 0800)  SpO2: (!) 94 % (08/07/24 0800) Vital Signs (24h Range):  Temp:  [97.3 °F (36.3 °C)-98.3 °F (36.8 °C)] 98.1 °F (36.7 °C)  Pulse:  [] 87  Resp:  [18-54] 26  SpO2:  [89 %-100 %] 94 %  BP: ()/(44-90) 129/68     Weight: 124.1 kg (273 lb 9.5 oz)  Body mass index is 48.46 kg/m².    Intake/Output Summary (Last 24 hours) at 8/7/2024 0948  Last data filed at 8/7/2024 0615  Gross per 24 hour   Intake 347.59 ml   Output 3400 ml   Net -3052.41 ml         Physical Exam  Constitutional:       Appearance: She is obese. She is ill-appearing.   Cardiovascular:      Rate and Rhythm: Normal rate. Rhythm irregular.      Pulses: Normal pulses.   Pulmonary:      Breath sounds: Wheezing (On BiPAP support) and rales present.   Abdominal:      General: Bowel sounds are normal. There is no distension.      Palpations: Abdomen is soft.      Tenderness: There is no abdominal tenderness.   Musculoskeletal:      Right lower leg: Edema present.      Left lower leg: Edema present.   Skin:     General: Skin is warm.   Neurological:      Mental Status: She is alert and oriented to person, place, and time. Mental status is at baseline.   Psychiatric:         Mood and Affect: Mood normal.             Significant Labs: All pertinent labs within the past 24 hours have been  reviewed.    Significant Imaging: I have reviewed all pertinent imaging results/findings within the past 24 hours.

## 2024-08-07 NOTE — HOSPITAL COURSE
68-year-old morbidly obese female with past medical history of chronic AFib on Xarelto, CHF EF 55-60% 11/23, hypertension, diabetes, hyperlipidemia, PVD, tobacco abuse , COPD, HIEU, hypothyroidism, tobacco use who was admitted for Acute hypoxic hyper capneic respiratory failure secondary to CHF/COPD exacerbation/parainfluenza pneumonia. Noted to have AFib with RVR on presentation in ED, rate controlled with 1 dose of diltiazem weaned off BiPAP support transitioned to 3 L of oxygen with nasal cannula  continue BiPAP p.r.n. neb/Q HS. On Lasix/Solu-Medrol /nebs .  Received ceftriaxone/azithromycin for cap coverage x 3 days.  Respiratory cultures with normal respiratory elli.Net -8.3L since admit. On oral  Prednisone.  Decrease Lasix to 40 b.i.d..  PT/OT on board wean oxygen as tolerated

## 2024-08-07 NOTE — PROGRESS NOTES
[facility]  [facility] Medicine  Progress Note    Patient Name: Sandee Evans  MRN: [id number]  Patient Class: IP- Inpatient   Admission Date: 8/6/2024  Length of Stay: 1 days  Attending Physician: Erma Pedraza,*  Primary Care Provider: Kuldeep Miller MD        Subjective:     Principal Problem:Acute on chronic respiratory failure with hypercapnia        HPI:    Patient is a 68-year-old morbidly obese female with past medical history of chronic AFib on Xarelto, CHF EF 55-60% 11/23, hypertension, diabetes, hyperlipidemia, PVD, tobacco abuse , COPD, HIEU, hypothyroidism, tobacco use who presented to [facility] ER on 08/06/2024 for further evaluation of progressively worsening shortness of breath x 1 week.  Reports associated  orthopnea/pedal edema..  Patient stated that she had 3 episodes of nonbilious nonbloody vomiting.   reports patient is noncompliant with CPAP in diuretics at times at home.  No fever/abdominal pain/diarrhea/constipation/chest pain.   reports chronic cough.  Continues to smoke cigarettes, 1-2 per day for 50 years.  No recent travel/sick contacts    During evaluation in the ER, patient was found to be in respiratory distress.  Oxygen saturations at 93% on room air.  EKG revealed  AFib with RVR (heart rate).  Labs revealed WBC of 4.48, hemoglobin 13.7, sodium 140, creatinine 1, , COVID/flu negative.  Chest x-ray revealed mildly prominent interstitial markings could relate to pulmonary vascular congestion/edema VBG revealed 7.278/80.4/49/37.6.  Patient was given duo neb x1, 325 mg aspirin, 10 mg IV diltiazem, 40 mg IV Lasix, 1.25 mg Xopenex, 125 mg Solu-Medrol, 4 mg Zofran.  Cardiology was consulted in ED. patient was placed on BiPAP.  Admitted to ICU for further management    Overview/Hospital Course:  68-year-old morbidly obese female with past medical history of chronic AFib on Xarelto, CHF EF 55-60% 11/23, hypertension, diabetes,  hyperlipidemia, PVD, tobacco abuse , COPD, HIEU, hypothyroidism, tobacco use who was admitted for Acute hypoxic hyper capneic respiratory failure secondary to CHF/COPD exacerbation. Noted to have AFib with RVR on presentation in ED, rate controlled with 1 dose of diltiazem weaned off BiPAP support transitioned to 3 L of oxygen with nasal cannula  continue BiPAP p.r.n. neb/Q HS.  Hypercapnia improving on VBG (baseline pCO2 at 60)  On Lasix/Solu-Medrol /nebs .  on ceftriaxone/azithromycin for cap coverage.  Respiratory cultures pending.  Net -3 L since admit.  Continue diuresis.    Interval History:  Mentation improved.  Transitioned to 3 L of oxygen with nasal cannula continue BiPAP p.r.n. nebs and q.h.s..  Okay to transfer to floor.  Continue diuresis.  Net-3 L over 24 hours      Review of Systems   Constitutional: Negative.    Respiratory:  Positive for shortness of breath and wheezing.    Cardiovascular:  Positive for leg swelling.   Gastrointestinal: Negative.    Genitourinary: Negative.    Musculoskeletal: Negative.    Neurological:  Positive for weakness.     Objective:     Vital Signs (Most Recent):  Temp: 98.1 °F (36.7 °C) (08/07/24 0730)  Pulse: 87 (08/07/24 0800)  Resp: (!) 26 (08/07/24 0800)  BP: 129/68 (08/07/24 0800)  SpO2: (!) 94 % (08/07/24 0800) Vital Signs (24h Range):  Temp:  [97.3 °F (36.3 °C)-98.3 °F (36.8 °C)] 98.1 °F (36.7 °C)  Pulse:  [] 87  Resp:  [18-54] 26  SpO2:  [89 %-100 %] 94 %  BP: ()/(44-90) 129/68     Weight: 124.1 kg (273 lb 9.5 oz)  Body mass index is 48.46 kg/m².    Intake/Output Summary (Last 24 hours) at 8/7/2024 0948  Last data filed at 8/7/2024 0615  Gross per 24 hour   Intake 347.59 ml   Output 3400 ml   Net -3052.41 ml         Physical Exam  Constitutional:       Appearance: She is obese. She is ill-appearing.   Cardiovascular:      Rate and Rhythm: Normal rate. Rhythm irregular.      Pulses: Normal pulses.   Pulmonary:      Breath sounds: Wheezing (On BiPAP  support) and rales present.   Abdominal:      General: Bowel sounds are normal. There is no distension.      Palpations: Abdomen is soft.      Tenderness: There is no abdominal tenderness.   Musculoskeletal:      Right lower leg: Edema present.      Left lower leg: Edema present.   Skin:     General: Skin is warm.   Neurological:      Mental Status: She is alert and oriented to person, place, and time. Mental status is at baseline.   Psychiatric:         Mood and Affect: Mood normal.             Significant Labs: All pertinent labs within the past 24 hours have been reviewed.    Significant Imaging: I have reviewed all pertinent imaging results/findings within the past 24 hours.    Assessment/Plan:      * Acute on chronic respiratory failure with hypercapnia  Patient with Hypercapnic and Hypoxic Respiratory failure which is Acute.  she is not on home oxygen. Supplemental oxygen was provided and noted- Oxygen Concentration (%):  [30-35] 30    .   Signs/symptoms of respiratory failure include- increased work of breathing and respiratory distress. Contributing diagnoses includes - CHF and COPD Labs and images were reviewed. Patient Has not had a recent ABG. Will treat underlying causes and adjust management of respiratory failure as follows-BiPAP support/ diuretics/supplemental oxygen  Weaned off BiPAP support, transitioned to 3 L of oxygen with nasal cannula.  Continue BiPAP p.r.n. naps and q.h.s.    Acute on chronic diastolic CHF (congestive heart failure)  Patient is identified as having Diastolic (HFpEF) heart failure that is Acute on chronic. CHF is currently uncontrolled due to Dyspnea not returned to baseline after 1 doses of IV diuretic, Rales/crackles on pulmonary exam, and Pulmonary edema/pleural effusion on CXR. Latest ECHO performed and demonstrates- Results for orders placed during the hospital encounter of 11/13/23    Echo    Interpretation Summary    AFib noted throughout exam.    Left Ventricle: The  left ventricle is normal in size. Normal wall thickness. Normal wall motion. There is normal systolic function with a visually estimated ejection fraction of 55 - 60%. Unable to assess diastolic function due to atrial fibrillation.    Right Ventricle: Mild right ventricular enlargement. Systolic function is mildly reduced.    Mitral Valve: There is mild regurgitation.    Pulmonary Artery: The estimated pulmonary artery systolic pressure is 49 mmHg.  . Continue Beta Blocker and Furosemide and monitor clinical status closely. Monitor on telemetry. Patient is on CHF pathway.  Monitor strict Is&Os and daily weights.  Place on fluid restriction of 1.5 L. Cardiology has been consulted. Continue to stress to patient importance of self efficacy and  on diet for CHF. Last BNP reviewed- and noted below   Recent Labs   Lab 08/06/24  1108   *       Paroxysmal atrial fibrillation  Patient with Paroxysmal (<7 days) atrial fibrillation which is uncontrolled currently with Beta Blocker. Patient is currently in atrial fibrillation.[id number] Score: 4.  On Xarelto for anticoagulation.    Community acquired pneumonia  On empiric ceftriaxone/azithromycin.  Pending respiratory infectious panel/respiratory culture    Antibiotics (From admission, onward)      Start     Stop Route Frequency Ordered    08/07/24 0900  mupirocin 2 % ointment         08/12/24 0859 Nasl 2 times daily 08/07/24 0632    08/06/24 1715  cefTRIAXone (ROCEPHIN) 2 g in D5W 100 mL IVPB (MB+)         -- IV Every 24 hours (non-standard times) 08/06/24 1612    08/06/24 1715  azithromycin (ZITHROMAX) 500 mg in D5W 250 mL IVPB (Vial-Mate)         -- IV Every 24 hours (non-standard times) 08/06/24 1612            Microbiology Results (last 7 days)       Procedure Component Value Units Date/Time    Respiratory Infection Panel (PCR), Nasopharyngeal [[id number]]     Order Status: No result Specimen: Nasopharyngeal Swab     Culture, Respiratory with Gram Stain  "[[id number]] Collected: 08/07/24 0830    Order Status: Sent Specimen: Respiratory from Sputum Updated: 08/07/24 0840            COPD exacerbation  Patient's COPD is with exacerbation noted by continued dyspnea currently.  Patient is currently off COPD Pathway. Continue scheduled inhalers Steroids and Supplemental oxygen and monitor respiratory status closely.     PVD (peripheral vascular disease)        Type 2 diabetes mellitus with diabetic cataract, without long-term current use of insulin  Patient's FSGs are controlled on current medication regimen.  Last A1c reviewed-   Lab Results   Component Value Date    HGBA1C 6.0 (H) 05/16/2024     Most recent fingerstick glucose reviewed- No results for input(s): "POCTGLUCOSE" in the last 24 hours.  Current correctional scale  stable  Maintain anti-hyperglycemic dose as follows-   Antihyperglycemics (From admission, onward)      Start     Stop Route Frequency Ordered    08/06/24 1515  empagliflozin (Jardiance) tablet 10 mg        Question Answer Comment   Does this patient have a diagnosis of heart failure? Yes    Does this patient have type 1 diabetes or diabetic ketoacidosis? No    Does this patient have symptomatic hypotension? No    Is the patient NPO or pending major surgery in next 3 days or less? No        -- Oral Daily 08/06/24 1404          Hold Oral hypoglycemics while patient is in the hospital.    Severe obesity (BMI >= 40)  Body mass index is 48.89 kg/m². Morbid obesity complicates all aspects of disease management from diagnostic modalities to treatment. Weight loss encouraged and health benefits explained to patient.         Essential hypertension  Chronic, controlled. Latest blood pressure and vitals reviewed-     Temp:  [98.1 °F (36.7 °C)]   Pulse:  []   Resp:  [23-54]   BP: ()/(53-84)   SpO2:  [89 %-100 %] .   Home meds for hypertension were reviewed and noted below.   Hypertension Medications               furosemide (LASIX) 40 MG tablet TAKE " 1 TABLET(40 MG) BY MOUTH EVERY DAY    metoprolol succinate (TOPROL-XL) 100 MG 24 hr tablet Take 1 tablet (100 mg total) by mouth once daily.            While in the hospital, will manage blood pressure as follows; Adjust home antihypertensive regimen as follows- continue Lasix and metoprolol    Will utilize p.r.n. blood pressure medication only if patient's blood pressure greater than 180/110 and she develops symptoms such as worsening chest pain or shortness of breath.    Hyperlipidemia    Continue home statin      VTE Risk Mitigation (From admission, onward)           Ordered     rivaroxaban tablet 20 mg  with dinner         08/06/24 1436     IP VTE HIGH RISK PATIENT  Once         08/06/24 1436     Place sequential compression device  Until discontinued         08/06/24 1436                    Discharge Planning   JACQUELINE:      Code Status: Full Code   Is the patient medically ready for discharge?:     Reason for patient still in hospital (select all that apply): Patient trending condition and Treatment               Critical care time spent on the evaluation and treatment of severe organ dysfunction, review of pertinent labs and imaging studies, discussions with consulting providers and discussions with patient/family: 35 minutes.      Erma Pedraza MD  Department of Hospital Medicine   [facility] - Intensive Care

## 2024-08-07 NOTE — PLAN OF CARE
Problem: Adult Inpatient Plan of Care  Goal: Plan of Care Review  Outcome: Progressing    Patient remains in ICU alternating BiPAP and O2-NC,  VS and assessments per flowsheets, lasix 60 mg iv given X 2 and pt respond well,  up to bedside comm X 2 and SOB noted with minimal exertions, plan of care reviewed with pt and verbalized understanding, safety maintained and pt remains free off injury, no acute distress noted at this time will continue to monitor.       Goal: Patient-Specific Goal (Individualized)  Outcome: Progressing  Goal: Absence of Hospital-Acquired Illness or Injury  Outcome: Progressing  Goal: Optimal Comfort and Wellbeing  Outcome: Progressing  Goal: Readiness for Transition of Care  Outcome: Progressing     Problem: Bariatric Environmental Safety  Goal: Safety Maintained with Care  Outcome: Progressing     Problem: Diabetes Comorbidity  Goal: Blood Glucose Level Within Targeted Range  Outcome: Progressing     Problem: Skin Injury Risk Increased  Goal: Skin Health and Integrity  Outcome: Progressing     Problem: Pneumonia  Goal: Fluid Balance  Outcome: Progressing  Goal: Resolution of Infection Signs and Symptoms  Outcome: Progressing  Goal: Effective Oxygenation and Ventilation  Outcome: Progressing

## 2024-08-07 NOTE — PROGRESS NOTES
[facility] Intensive Care  Pulmonology  Progress Note    Patient Name: Sandee Evans  MRN: [id number]  Admission Date: 8/6/2024  Hospital Length of Stay: 1 days  Code Status: Full Code  Attending Provider: Erma Pedraza,*  Primary Care Provider: Kuldeep Miller MD   Principal Problem: Acute on chronic respiratory failure with hypercapnia    Subjective:     Interval History: Much more alert and awake today. Says that she is feeling a lot better      Objective:     Vital Signs (Most Recent):  Temp: 98.1 °F (36.7 °C) (08/07/24 0730)  Pulse: 80 (08/07/24 0930)  Resp: (!) 25 (08/07/24 0930)  BP: (!) 103/54 (08/07/24 0930)  SpO2: (!) 92 % (08/07/24 0930) Vital Signs (24h Range):  Temp:  [97.3 °F (36.3 °C)-98.3 °F (36.8 °C)] 98.1 °F (36.7 °C)  Pulse:  [] 80  Resp:  [18-54] 25  SpO2:  [89 %-100 %] 92 %  BP: ()/(44-90) 103/54     Weight: 124.1 kg (273 lb 9.5 oz)  Body mass index is 48.46 kg/m².      Intake/Output Summary (Last 24 hours) at 8/7/2024 1049  Last data filed at 8/7/2024 0615  Gross per 24 hour   Intake 347.59 ml   Output 3400 ml   Net -3052.41 ml        Physical Exam  Vitals and nursing note reviewed.   Constitutional:       General: She is not in acute distress.     Appearance: She is morbidly obese. She is ill-appearing. She is not toxic-appearing or diaphoretic.   HENT:      Head: Atraumatic.   Eyes:      General: No scleral icterus.     Extraocular Movements: Extraocular movements intact.   Cardiovascular:      Rate and Rhythm: Normal rate. Rhythm irregular.   Pulmonary:      Effort: No tachypnea, accessory muscle usage, respiratory distress or retractions.      Breath sounds: Rales present. No wheezing.   Abdominal:      General: There is no distension.      Palpations: Abdomen is soft.   Musculoskeletal:      Right lower leg: Edema present.      Left lower leg: Edema present.   Skin:     General: Skin is warm and dry.      Coloration: Skin is not jaundiced.      Findings: No rash.    Neurological:      General: No focal deficit present.      Mental Status: She is alert. Mental status is at baseline.               Vents:  Oxygen Concentration (%): 30 (08/07/24 0400)    Lines/Drains/Airways       Drain  Duration             Female External Urinary Catheter w/ Suction 08/06/24 1205 <1 day              Peripheral Intravenous Line  Duration                  Peripheral IV - Single Lumen 08/06/24 1058 20 G 1 in Anterior;Right Forearm <1 day         Peripheral IV - Single Lumen 08/06/24 1353 20 G Left Antecubital <1 day                    Significant Labs:    CBC/Anemia Profile:  Recent Labs   Lab 08/06/24  1108   WBC 4.48   HGB 13.7   HCT 44.3      *   RDW 14.5        Chemistries:  Recent Labs   Lab 08/06/24  1108 08/07/24  0550    141   K 3.9 4.0    95   CO2 29 35*   BUN 20 20   CREATININE 1.0 0.9   CALCIUM 9.5 8.7   ALBUMIN 3.4*  --    PROT 6.8  --    BILITOT 1.1*  --    ALKPHOS 91  --    ALT 18  --    AST 28  --    MG 1.6 1.6       All pertinent labs within the past 24 hours have been reviewed.    Significant Imaging:  I have reviewed all pertinent imaging results/findings within the past 24 hours.    ABG  Recent Labs   Lab 08/06/24  1549   PH 7.339*   PO2 51   PCO2 67.2*   HCO3 36.2*   BE 8*     Assessment/Plan:     Pulmonary  * Acute on chronic respiratory failure with hypercapnia  Morbidly obese with HIEU nonadherent to CPAP.  Baseline pCO2 seems to be around 60.  PCO2 > 80 on admission.   with CHF exacerbation.  Chronic tobacco use with history of COPD.      - recommend empiric cap coverage.  Resp Cx pending  - SpO2 goal 88-92%.  Avoid hypoxia.  - NIV nightly and with naps    COPD exacerbation  Longstanding tobacco use history.  2022 FEV1 81 with no bronchodilator response.    - prednisone 40-60 mg for 5 days.  Duo nebs q.4 scheduled and q.4 PRN  - needs outpatient follow-up with Pulmonary and repeat PFTs    Cardiac/Vascular  Acute on chronic diastolic CHF  (congestive heart failure)  Most recent echo with EF 55-60%, LAE (TERRI 55), moderate RV enlargement with mildly reduced RV function; PASP 49.   on admission.  CXR with CHF appearance.    - repeat formal echo with severe biatrial enlargement, mild RV enlargement and PASP >65.  Continue aggressive diuresis.  Strict I/O.  -  reports non-adherence to diuretic, low sodium diet and home NIV    Longstanding persistent atrial fibrillation  RVR in ED which broke with Dilt.  On xarelto at home    - continue home Xarelto    Endocrine  Type 2 diabetes mellitus with diabetic cataract, without long-term current use of insulin  A1c 6.  Blood glucose goal 140-180.  Add and titrate insulin as needed    Severe obesity (BMI >= 40)  Body mass index is 48.46 kg/m². Morbid obesity complicates all aspects of disease management from diagnostic modalities to treatment. Weight loss encouraged and health benefits explained to patient.     - certainly contributing to her current respiratory failure.        Critical Care Time: 50 minutes  Critical care was time spent personally by me on the following activities: evaluating this patient's organ dysfunction, development of treatment plan, discussing treatment plan with patient or surrogate and bedside caregivers, discussions with consultants, evaluation of patient's response to treatment, examination of patient, ordering and performing treatments and interventions, ordering and review of laboratory studies, ordering and review of radiographic studies, re-evaluation of patient's condition. This critical care time did not overlap with that of any other provider or involve time for any procedures.       Antonio Zelaya MD  Pulmonology  [facility] [place] - Intensive Care

## 2024-08-07 NOTE — ASSESSMENT & PLAN NOTE
On empiric ceftriaxone/azithromycin.  Pending respiratory infectious panel/respiratory culture    Antibiotics (From admission, onward)      Start     Stop Route Frequency Ordered    08/07/24 0900  mupirocin 2 % ointment         08/12/24 0859 Nasl 2 times daily 08/07/24 0632    08/06/24 1715  cefTRIAXone (ROCEPHIN) 2 g in D5W 100 mL IVPB (MB+)         -- IV Every 24 hours (non-standard times) 08/06/24 1612    08/06/24 1715  azithromycin (ZITHROMAX) 500 mg in D5W 250 mL IVPB (Vial-Mate)         -- IV Every 24 hours (non-standard times) 08/06/24 1612            Microbiology Results (last 7 days)       Procedure Component Value Units Date/Time    Respiratory Infection Panel (PCR), Nasopharyngeal [[id number]]     Order Status: No result Specimen: Nasopharyngeal Swab     Culture, Respiratory with Gram Stain [[id number]] Collected: 08/07/24 0830    Order Status: Sent Specimen: Respiratory from Sputum Updated: 08/07/24 0840

## 2024-08-08 PROBLEM — J18.9 COMMUNITY ACQUIRED PNEUMONIA: Status: RESOLVED | Noted: 2024-08-07 | Resolved: 2024-08-08

## 2024-08-08 PROBLEM — J12.2 PARAINFLUENZAL PNEUMONIA: Status: ACTIVE | Noted: 2024-08-08

## 2024-08-08 LAB
ANION GAP SERPL CALC-SCNC: 11 MMOL/L (ref 8–16)
BASOPHILS # BLD AUTO: 0.02 K/UL (ref 0–0.2)
BASOPHILS NFR BLD: 0.4 % (ref 0–1.9)
BUN SERPL-MCNC: 35 MG/DL (ref 8–23)
CALCIUM SERPL-MCNC: 8.8 MG/DL (ref 8.7–10.5)
CHLORIDE SERPL-SCNC: 92 MMOL/L (ref 95–110)
CO2 SERPL-SCNC: 39 MMOL/L (ref 23–29)
CREAT SERPL-MCNC: 1.3 MG/DL (ref 0.5–1.4)
DIFFERENTIAL METHOD BLD: ABNORMAL
EOSINOPHIL # BLD AUTO: 0 K/UL (ref 0–0.5)
EOSINOPHIL NFR BLD: 0 % (ref 0–8)
ERYTHROCYTE [DISTWIDTH] IN BLOOD BY AUTOMATED COUNT: 14.2 % (ref 11.5–14.5)
EST. GFR  (NO RACE VARIABLE): 44 ML/MIN/1.73 M^2
GLUCOSE SERPL-MCNC: 131 MG/DL (ref 70–110)
HCT VFR BLD AUTO: 47.2 % (ref 37–48.5)
HGB BLD-MCNC: 14.6 G/DL (ref 12–16)
IMM GRANULOCYTES # BLD AUTO: 0.02 K/UL (ref 0–0.04)
IMM GRANULOCYTES NFR BLD AUTO: 0.4 % (ref 0–0.5)
LYMPHOCYTES # BLD AUTO: 0.8 K/UL (ref 1–4.8)
LYMPHOCYTES NFR BLD: 16.5 % (ref 18–48)
MAGNESIUM SERPL-MCNC: 2.1 MG/DL (ref 1.6–2.6)
MCH RBC QN AUTO: 31.5 PG (ref 27–31)
MCHC RBC AUTO-ENTMCNC: 30.9 G/DL (ref 32–36)
MCV RBC AUTO: 102 FL (ref 82–98)
MONOCYTES # BLD AUTO: 0.3 K/UL (ref 0.3–1)
MONOCYTES NFR BLD: 6.7 % (ref 4–15)
NEUTROPHILS # BLD AUTO: 3.9 K/UL (ref 1.8–7.7)
NEUTROPHILS NFR BLD: 76 % (ref 38–73)
NRBC BLD-RTO: 0 /100 WBC
PLATELET # BLD AUTO: 157 K/UL (ref 150–450)
PMV BLD AUTO: 10.8 FL (ref 9.2–12.9)
POCT GLUCOSE: 108 MG/DL (ref 70–110)
POCT GLUCOSE: 135 MG/DL (ref 70–110)
POCT GLUCOSE: 137 MG/DL (ref 70–110)
POCT GLUCOSE: 141 MG/DL (ref 70–110)
POTASSIUM SERPL-SCNC: 3.7 MMOL/L (ref 3.5–5.1)
RBC # BLD AUTO: 4.63 M/UL (ref 4–5.4)
SODIUM SERPL-SCNC: 142 MMOL/L (ref 136–145)
WBC # BLD AUTO: 5.08 K/UL (ref 3.9–12.7)

## 2024-08-08 PROCEDURE — 97161 PT EVAL LOW COMPLEX 20 MIN: CPT

## 2024-08-08 PROCEDURE — 80048 BASIC METABOLIC PNL TOTAL CA: CPT | Performed by: STUDENT IN AN ORGANIZED HEALTH CARE EDUCATION/TRAINING PROGRAM

## 2024-08-08 PROCEDURE — 83735 ASSAY OF MAGNESIUM: CPT | Performed by: STUDENT IN AN ORGANIZED HEALTH CARE EDUCATION/TRAINING PROGRAM

## 2024-08-08 PROCEDURE — 27000207 HC ISOLATION

## 2024-08-08 PROCEDURE — 97110 THERAPEUTIC EXERCISES: CPT

## 2024-08-08 PROCEDURE — 85025 COMPLETE CBC W/AUTO DIFF WBC: CPT | Performed by: STUDENT IN AN ORGANIZED HEALTH CARE EDUCATION/TRAINING PROGRAM

## 2024-08-08 PROCEDURE — 99900035 HC TECH TIME PER 15 MIN (STAT)

## 2024-08-08 PROCEDURE — 94640 AIRWAY INHALATION TREATMENT: CPT

## 2024-08-08 PROCEDURE — 94761 N-INVAS EAR/PLS OXIMETRY MLT: CPT

## 2024-08-08 PROCEDURE — 25000003 PHARM REV CODE 250: Performed by: CLINIC/CENTER

## 2024-08-08 PROCEDURE — 36415 COLL VENOUS BLD VENIPUNCTURE: CPT | Performed by: STUDENT IN AN ORGANIZED HEALTH CARE EDUCATION/TRAINING PROGRAM

## 2024-08-08 PROCEDURE — 21400001 HC TELEMETRY ROOM

## 2024-08-08 PROCEDURE — 99900031 HC PATIENT EDUCATION (STAT)

## 2024-08-08 PROCEDURE — 25000242 PHARM REV CODE 250 ALT 637 W/ HCPCS: Performed by: STUDENT IN AN ORGANIZED HEALTH CARE EDUCATION/TRAINING PROGRAM

## 2024-08-08 PROCEDURE — 99232 SBSQ HOSP IP/OBS MODERATE 35: CPT | Mod: ,,, | Performed by: STUDENT IN AN ORGANIZED HEALTH CARE EDUCATION/TRAINING PROGRAM

## 2024-08-08 PROCEDURE — 63600175 PHARM REV CODE 636 W HCPCS: Performed by: STUDENT IN AN ORGANIZED HEALTH CARE EDUCATION/TRAINING PROGRAM

## 2024-08-08 PROCEDURE — 25000003 PHARM REV CODE 250: Performed by: STUDENT IN AN ORGANIZED HEALTH CARE EDUCATION/TRAINING PROGRAM

## 2024-08-08 PROCEDURE — 27000221 HC OXYGEN, UP TO 24 HOURS

## 2024-08-08 PROCEDURE — 94660 CPAP INITIATION&MGMT: CPT

## 2024-08-08 RX ORDER — POTASSIUM CHLORIDE 20 MEQ/1
20 TABLET, EXTENDED RELEASE ORAL ONCE
Status: COMPLETED | OUTPATIENT
Start: 2024-08-08 | End: 2024-08-08

## 2024-08-08 RX ORDER — PREDNISONE 50 MG/1
50 TABLET ORAL DAILY
Status: DISCONTINUED | OUTPATIENT
Start: 2024-08-08 | End: 2024-08-14 | Stop reason: HOSPADM

## 2024-08-08 RX ADMIN — METOPROLOL TARTRATE 50 MG: 50 TABLET, FILM COATED ORAL at 05:08

## 2024-08-08 RX ADMIN — CEFTRIAXONE 2 G: 2 INJECTION, POWDER, FOR SOLUTION INTRAMUSCULAR; INTRAVENOUS at 04:08

## 2024-08-08 RX ADMIN — ATORVASTATIN CALCIUM 40 MG: 40 TABLET, FILM COATED ORAL at 08:08

## 2024-08-08 RX ADMIN — FUROSEMIDE 60 MG: 10 INJECTION, SOLUTION INTRAVENOUS at 09:08

## 2024-08-08 RX ADMIN — MUPIROCIN: 20 OINTMENT TOPICAL at 09:08

## 2024-08-08 RX ADMIN — IPRATROPIUM BROMIDE AND ALBUTEROL SULFATE 3 ML: 2.5; .5 SOLUTION RESPIRATORY (INHALATION) at 08:08

## 2024-08-08 RX ADMIN — METOPROLOL TARTRATE 50 MG: 50 TABLET, FILM COATED ORAL at 12:08

## 2024-08-08 RX ADMIN — POTASSIUM CHLORIDE 20 MEQ: 1500 TABLET, EXTENDED RELEASE ORAL at 06:08

## 2024-08-08 RX ADMIN — AZELASTINE HYDROCHLORIDE 137 MCG: 137 SPRAY, METERED NASAL at 09:08

## 2024-08-08 RX ADMIN — EMPAGLIFLOZIN 10 MG: 10 TABLET, FILM COATED ORAL at 08:08

## 2024-08-08 RX ADMIN — FUROSEMIDE 60 MG: 10 INJECTION, SOLUTION INTRAVENOUS at 08:08

## 2024-08-08 RX ADMIN — IPRATROPIUM BROMIDE AND ALBUTEROL SULFATE 3 ML: 2.5; .5 SOLUTION RESPIRATORY (INHALATION) at 12:08

## 2024-08-08 RX ADMIN — AZITHROMYCIN MONOHYDRATE 500 MG: 500 INJECTION, POWDER, LYOPHILIZED, FOR SOLUTION INTRAVENOUS at 04:08

## 2024-08-08 RX ADMIN — PREDNISONE 50 MG: 50 TABLET ORAL at 01:08

## 2024-08-08 RX ADMIN — SERTRALINE HYDROCHLORIDE 100 MG: 50 TABLET ORAL at 08:08

## 2024-08-08 RX ADMIN — METOPROLOL TARTRATE 50 MG: 50 TABLET, FILM COATED ORAL at 11:08

## 2024-08-08 RX ADMIN — METHYLPREDNISOLONE SODIUM SUCCINATE 40 MG: 40 INJECTION, POWDER, FOR SOLUTION INTRAMUSCULAR; INTRAVENOUS at 12:08

## 2024-08-08 RX ADMIN — IPRATROPIUM BROMIDE AND ALBUTEROL SULFATE 3 ML: 2.5; .5 SOLUTION RESPIRATORY (INHALATION) at 01:08

## 2024-08-08 RX ADMIN — AZELASTINE HYDROCHLORIDE 137 MCG: 137 SPRAY, METERED NASAL at 08:08

## 2024-08-08 RX ADMIN — FUROSEMIDE 60 MG: 10 INJECTION, SOLUTION INTRAVENOUS at 03:08

## 2024-08-08 RX ADMIN — RIVAROXABAN 20 MG: 20 TABLET, FILM COATED ORAL at 03:08

## 2024-08-08 RX ADMIN — MUPIROCIN: 20 OINTMENT TOPICAL at 08:08

## 2024-08-08 RX ADMIN — IPRATROPIUM BROMIDE AND ALBUTEROL SULFATE 3 ML: 2.5; .5 SOLUTION RESPIRATORY (INHALATION) at 07:08

## 2024-08-08 NOTE — PLAN OF CARE
Problem: Adult Inpatient Plan of Care  Goal: Plan of Care Review  Outcome: Progressing  Goal: Patient-Specific Goal (Individualized)  Outcome: Progressing  Goal: Absence of Hospital-Acquired Illness or Injury  Outcome: Progressing  Goal: Optimal Comfort and Wellbeing  Outcome: Progressing  Goal: Readiness for Transition of Care  Outcome: Progressing     Problem: Pneumonia  Goal: Fluid Balance  Outcome: Progressing     Pt OOB to chair with standby assist. Pulmonology consulted. Oxygen maintained @3L nasal cannula. IV antibiotics continued. Safety maintained.

## 2024-08-08 NOTE — PT/OT/SLP EVAL
Physical Therapy Evaluation    Patient Name:  Sandee Evans   MRN:  [id number]    Recommendations:     Discharge Recommendations: Low Intensity Therapy with caregiver support  Discharge Equipment Recommendations: oxygen (?)   Barriers to discharge: None    Assessment:     Snadee Evans is a 68 y.o. female admitted with a medical diagnosis of Acute on chronic respiratory failure with hypercapnia.  She presents with the following impairments/functional limitations: weakness, impaired endurance, impaired functional mobility, gait instability, impaired balance, decreased upper extremity function, decreased lower extremity function, decreased ROM, impaired cardiopulmonary response to activity.    Rehab Prognosis: Good; patient would benefit from acute skilled PT services to address these deficits and reach maximum level of function.    Recent Surgery: * No surgery found *      Plan:     During this hospitalization, patient to be seen 3 x/week to address the identified rehab impairments via gait training, therapeutic activities, therapeutic exercises and progress toward the following goals:    Plan of Care Expires:  08/22/24    Subjective     Chief Complaint: weakness  Patient/Family Comments/goals: Pt agreeable to therapy, wants to have a good recovery.  Pt hopeful to not be O2 dependent.   Pain/Comfort:  Pain Rating 1: 0/10      Living Environment:  Pt lives with spouse, dtr, and 2 grandchildren in a 2SH with no concerns at entry.  Pt stays on the 1st floor.   Prior to admission, patients level of function was mod I with ambulation using SPC.  Pt stopped driving, reported that she would get confused.  Pt mostly sedentary at home.  Equipment at home: cane, straight, walker, rolling, rollator, wheelchair, bedside commode, bath bench, CPAP.  Upon discharge, patient will have assistance from family.    Objective:     Patient found HOB elevated with oxygen, peripheral IV, telemetry, PureWick upon PT entry to  room.    General Precautions: Standard, fall, diabetic, respiratory, droplet  Orthopedic Precautions:N/A   Braces: N/A  Respiratory Status: Nasal cannula, flow 2.5 L/min    Exams:  Cognitive Exam:  Patient was able to follow multiple commands.   Gross Motor Coordination:  WFL  Postural Exam:  Patient presented with the following abnormalities:    -       Rounded shoulders  -       Obesity  Sensation:    -       Intact  light/touch BUE/BLE  Skin Integrity/Edema:      -       Skin integrity: Visible skin intact  -       Edema: None noted BUE/BLE  BUE ROM: WFL  BUE Strength: WFL  BLE ROM: WFL  BLE Strength: WFL    Functional Mobility:  Bed Mobility:     Scooting: stand by assistance  Supine to Sit: stand by assistance  Transfers:     Sit to Stand: contact guard assistance with no AD x2 trials   Bed to Chair: contact guard assistance with  no AD  using  Step Transfer  Gait: Pt ambulated ~6 steps from bed>chair with CGA using no AD and on 2.5L O2 NC.  Pt currently on droplet precautions.  Pt with decreased weight shifting, decreased foot clearance, decreased step length, and decreased dmitriy.   Balance: Pt with fair dynamic standing balance.     V/S: spO2 on 2.5 L ~96% and on RA ~80%, HR ~85 bpm      AM-PAC 6 CLICK MOBILITY  Total Score:19       Treatment & Education:  BLE seated therex x10 reps: hip flex, LAQ, and AP    BLE standing therex x10 reps: marches and calf raises with B HHA    BUE standing therex x10 reps: shoulder flex, forward punches, and butterfly with SBA    Pursed lip breathing in sitting and standing between activities.    Pt provided warm wash clothes for face and hands prior to eating breakfast.      Pt educated on acute skilled PT services and goals.  Pt to call for nursing assistance with OOB activities while in the hospital.  Pt verbalized good understanding.    Patient left up in chair with all lines intact, call button in reach, and nurse notified.  Pt on 2.5L O2 NC.  Breakfast tray set-up for  pt.     GOALS:   Multidisciplinary Problems       Physical Therapy Goals          Problem: Physical Therapy    Goal Priority Disciplines Outcome Goal Variances Interventions   Physical Therapy Goal     PT, PT/OT Progressing     Description: Goals to be met by: 24     Patient will increase functional independence with mobility by performin. Supine to sit with Modified [place]  2. Rolling to Left and Right with Modified [place]  3. Sit to stand transfer with Modified [place] using RW  4. Bed to chair transfer with Modified [place] using Rolling Walker  5. Gait x200 feet with Modified [place] using Rolling Walker   6. Upper/Lower extremity exercise program 2 sets x15 reps per handout, with independence                         History:     Past Medical History:   Diagnosis Date    Anticoagulant long-term use     Xarelto    Arthritis     Atrial fibrillation     Breast cyst     CHF (congestive heart failure)     Degenerative disc disease     Edema     HLD (hyperlipidemia)     Hx of psychiatric care     Hyperlipidemia     Hypertension     Hypothyroidism     Nuclear sclerosis, bilateral 2017    Obesity, morbid     HIEU (obstructive sleep apnea)     Other abnormal glucose     pre-diabetes    Pre-diabetes     Psychiatric problem     Requires assistance with activities of daily living (ADL)     Sleep apnea     Smoker     SOB (shortness of breath)     SOB (shortness of breath)     Thyroid disease     on meds 8-9 years ago. hypothyroidism.no malignancy    TMJ (temporomandibular joint disorder)     jaw clicking    Tobacco abuse     Unsteady gait     Urge incontinence     Weakness generalized     Wears glasses        Past Surgical History:   Procedure Laterality Date    BREAST BIOPSY Right     over 10 yrs ago/ benign    BREAST CYST EXCISION Right     COLONOSCOPY N/A 2019    Procedure: COLONOSCOPY;  Surgeon: Micheline Flores MD;  Location: [facility];  Service: Endoscopy;   Laterality: N/A;  RX XARELTO ok to hold (2 days) per Dr. Naik see scan 3/20/19    ENDOSCOPIC ULTRASOUND OF UPPER GASTROINTESTINAL TRACT N/A 01/26/2021    Procedure: ULTRASOUND-ENDOSCOPIC-UPPER;  Surgeon: Stevenson Philippe MD;  Location: [facility];  Service: Endoscopy;  Laterality: N/A;    HYSTERECTOMY      JOINT REPLACEMENT Bilateral     knees    OOPHORECTOMY      rt.knee surgery 2016      TOTAL KNEE ARTHROPLASTY Left 02/19/2019    Procedure: ARTHROPLASTY, KNEE, TOTAL;  Surgeon: Med Hanson MD;  Location: [facility] OR;  Service: Orthopedics;  Laterality: Left;  10AM START PER  PER JAYLIN TEXT @ 8:34AM ON 2-18-19  SUPINE  HARRIS LOYA [phone] TEXTED HIM ON 1-24-19 @ 7:57AM  RN PRE OP 2-13-19--BMI--48.9    underarm gland\ Bilateral        Time Tracking:     PT Received On: 08/08/24  PT Start Time: 0936     PT Stop Time: 1001  PT Total Time (min): 25 min     Billable Minutes: Evaluation 15 min and Therapeutic Exercise 10 min      08/08/2024

## 2024-08-08 NOTE — PLAN OF CARE
Problem: Physical Therapy  Goal: Physical Therapy Goal  Description: Goals to be met by: 24     Patient will increase functional independence with mobility by performin. Supine to sit with Modified [place]  2. Rolling to Left and Right with Modified [place]  3. Sit to stand transfer with Modified [place] using RW  4. Bed to chair transfer with Modified [place] using Rolling Walker  5. Gait x200 feet with Modified [place] using Rolling Walker   6. Upper/Lower extremity exercise program 2 sets x15 reps per handout, with independence    Outcome: Progressing     Pt OOB>chair with CGA using no AD and on 2.5L O2 NC.

## 2024-08-08 NOTE — PLAN OF CARE
Problem: Adult Inpatient Plan of Care  Goal: Plan of Care Review  Outcome: Progressing  Goal: Patient-Specific Goal (Individualized)  Outcome: Progressing  Goal: Absence of Hospital-Acquired Illness or Injury  Outcome: Progressing  Goal: Optimal Comfort and Wellbeing  Outcome: Progressing  Goal: Readiness for Transition of Care  Outcome: Progressing     Problem: Bariatric Environmental Safety  Goal: Safety Maintained with Care  Outcome: Progressing     Problem: Diabetes Comorbidity  Goal: Blood Glucose Level Within Targeted Range  Outcome: Progressing     Problem: Skin Injury Risk Increased  Goal: Skin Health and Integrity  Outcome: Progressing     Problem: Pneumonia  Goal: Fluid Balance  Outcome: Progressing  Goal: Resolution of Infection Signs and Symptoms  Outcome: Progressing  Goal: Effective Oxygenation and Ventilation  Outcome: Progressing

## 2024-08-08 NOTE — NURSING TRANSFER
Nursing Transfer Note      8/8/2024   6:38 AM    Nurse giving handoff:CLAUDIA Betts  Nurse receiving handoff:    Reason patient is being transferred: No longer requireed critical care    Transfer To: Med Surg 416    Transfer via stretcher    Transfer with  3L O2    Transported by Transporter x1, Rapid Response Nurse x1    Transfer Vital Signs:  Blood Pressure: 152/83  Heart Rate:76  O2:96%  Temperature: 97.8  Respirations:22    Telemetry: Rhythm    Order for Tele Monitor? Yes    Additional Lines: Oxygen    4eyes on Skin: yes    Medicines sent: Insulin Aspart, Mupirocin ointment    Any special needs or follow-up needed: No    Patient belongings transferred with patient: Yes    Chart send with patient: Yes    Notified: Spouse (Corwin Evans)    Patient reassessed at:  (8/8/24 @6:15am)

## 2024-08-08 NOTE — ASSESSMENT & PLAN NOTE
Tested positive for parainfluenza virus.  Received empiric ceftriaxone/azithromycin for 3 days.  Respiratory cultures negative    Antibiotics (From admission, onward)      Start     Stop Route Frequency Ordered    08/07/24 0900  mupirocin 2 % ointment         08/12/24 0859 Nasl 2 times daily 08/07/24 0632    08/06/24 1715  cefTRIAXone (ROCEPHIN) 2 g in D5W 100 mL IVPB (MB+)         08/08/24 2359 IV Every 24 hours (non-standard times) 08/06/24 1612    08/06/24 1715  azithromycin (ZITHROMAX) 500 mg in D5W 250 mL IVPB (Vial-Mate)         08/08/24 2359 IV Every 24 hours (non-standard times) 08/06/24 1612            Microbiology Results (last 7 days)       Procedure Component Value Units Date/Time    Culture, Respiratory with Gram Stain [[id number]] Collected: 08/07/24 0830    Order Status: Completed Specimen: Respiratory from Sputum Updated: 08/08/24 1051     Respiratory Culture Normal respiratory elli     Gram Stain (Respiratory) <10 epithelial cells per low power field.     Gram Stain (Respiratory) Moderate WBC's     Gram Stain (Respiratory) Many Gram positive cocci in pairs and chains     Gram Stain (Respiratory) Moderate Gram positive cocci in clusters     Gram Stain (Respiratory) Rare Gram negative rods     Gram Stain (Respiratory) Rare Gram positive rods    Respiratory Infection Panel (PCR), Nasopharyngeal [[id number]]  (Abnormal) Collected: 08/07/24 1030    Order Status: Completed Specimen: Nasopharyngeal Swab Updated: 08/07/24 1753     Respiratory Infection Panel Source NP Swab     Adenovirus Not Detected     Coronavirus 229E, Common Cold Virus Not Detected     Coronavirus HKU1, Common Cold Virus Not Detected     Coronavirus NL63, Common Cold Virus Not Detected     Coronavirus OC43, Common Cold Virus Not Detected     Comment: The Coronavirus strains detected in this test cause the common cold.  These strains are not the COVID-19 (novel Coronavirus)strain   associated with the respiratory disease outbreak.           SARS-CoV2 (COVID-19) Qualitative PCR Not Detected     Human Metapneumovirus Not Detected     Human Rhinovirus/Enterovirus Not Detected     Influenza A (subtypes H1, H1-2009,H3) Not Detected     Influenza B Not Detected     Parainfluenza Virus 1 Detected     Parainfluenza Virus 2 Not Detected     Parainfluenza Virus 3 Not Detected     Parainfluenza Virus 4 Not Detected     Respiratory Syncytial Virus Not Detected     Bordetella Parapertussis (RW7807) Not Detected     Bordetella pertussis (ptxP) Not Detected     Chlamydia pneumoniae Not Detected     Mycoplasma pneumoniae Not Detected    Narrative:      Assay not valid for lower respiratory specimens, alternate  testing required.

## 2024-08-08 NOTE — ASSESSMENT & PLAN NOTE
On empiric ceftriaxone/azithromycin.  Pending respiratory infectious panel/respiratory culture    Antibiotics (From admission, onward)      Start     Stop Route Frequency Ordered    08/07/24 0900  mupirocin 2 % ointment         08/12/24 0859 Nasl 2 times daily 08/07/24 0632    08/06/24 1715  cefTRIAXone (ROCEPHIN) 2 g in D5W 100 mL IVPB (MB+)         08/08/24 2359 IV Every 24 hours (non-standard times) 08/06/24 1612    08/06/24 1715  azithromycin (ZITHROMAX) 500 mg in D5W 250 mL IVPB (Vial-Mate)         08/08/24 2359 IV Every 24 hours (non-standard times) 08/06/24 1612            Microbiology Results (last 7 days)       Procedure Component Value Units Date/Time    Culture, Respiratory with Gram Stain [[id number]] Collected: 08/07/24 0830    Order Status: Completed Specimen: Respiratory from Sputum Updated: 08/08/24 1051     Respiratory Culture Normal respiratory elli     Gram Stain (Respiratory) <10 epithelial cells per low power field.     Gram Stain (Respiratory) Moderate WBC's     Gram Stain (Respiratory) Many Gram positive cocci in pairs and chains     Gram Stain (Respiratory) Moderate Gram positive cocci in clusters     Gram Stain (Respiratory) Rare Gram negative rods     Gram Stain (Respiratory) Rare Gram positive rods    Respiratory Infection Panel (PCR), Nasopharyngeal [[id number]]  (Abnormal) Collected: 08/07/24 1030    Order Status: Completed Specimen: Nasopharyngeal Swab Updated: 08/07/24 1753     Respiratory Infection Panel Source NP Swab     Adenovirus Not Detected     Coronavirus 229E, Common Cold Virus Not Detected     Coronavirus HKU1, Common Cold Virus Not Detected     Coronavirus NL63, Common Cold Virus Not Detected     Coronavirus OC43, Common Cold Virus Not Detected     Comment: The Coronavirus strains detected in this test cause the common cold.  These strains are not the COVID-19 (novel Coronavirus)strain   associated with the respiratory disease outbreak.          SARS-CoV2 (COVID-19)  Qualitative PCR Not Detected     Human Metapneumovirus Not Detected     Human Rhinovirus/Enterovirus Not Detected     Influenza A (subtypes H1, H1-2009,H3) Not Detected     Influenza B Not Detected     Parainfluenza Virus 1 Detected     Parainfluenza Virus 2 Not Detected     Parainfluenza Virus 3 Not Detected     Parainfluenza Virus 4 Not Detected     Respiratory Syncytial Virus Not Detected     Bordetella Parapertussis ([id number]) Not Detected     Bordetella pertussis (ptxP) Not Detected     Chlamydia pneumoniae Not Detected     Mycoplasma pneumoniae Not Detected    Narrative:      Assay not valid for lower respiratory specimens, alternate  testing required.

## 2024-08-08 NOTE — ASSESSMENT & PLAN NOTE
Patient's COPD is with exacerbation noted by continued dyspnea currently.  Patient is currently off COPD Pathway. Continue scheduled inhalers Steroids and Supplemental oxygen and monitor respiratory status closely.  Switch to oral prednisone

## 2024-08-08 NOTE — NURSING
[facility]  Nurses Note -- 4 Eyes      8/7/2024       Skin assessed on: Q Shift      [x] No Pressure Injuries Present    [x]Prevention Measures Documented    [] Yes LDA  for Pressure Injury Previously documented     [] Yes New Pressure Injury Discovered   [] LDA for New Pressure Injury Added      Attending RN:  Roxane Doyle RN     Second RN:  CLAUDIA Red

## 2024-08-08 NOTE — PROGRESS NOTES
[facility] Medicine  Progress Note    Patient Name: Sandee Evans  MRN: [id number]  Patient Class: IP- Inpatient   Admission Date: 8/6/2024  Length of Stay: 2 days  Attending Physician: Erma Pedraza,*  Primary Care Provider: Kuldeep Miller MD        Subjective:     Principal Problem:Acute on chronic respiratory failure with hypercapnia        HPI:    Patient is a 68-year-old morbidly obese female with past medical history of chronic AFib on Xarelto, CHF EF 55-60% 11/23, hypertension, diabetes, hyperlipidemia, PVD, tobacco abuse , COPD, HIEU, hypothyroidism, tobacco use who presented to [facility] ER on 08/06/2024 for further evaluation of progressively worsening shortness of breath x 1 week.  Reports associated  orthopnea/pedal edema..  Patient stated that she had 3 episodes of nonbilious nonbloody vomiting.   reports patient is noncompliant with CPAP in diuretics at times at home.  No fever/abdominal pain/diarrhea/constipation/chest pain.   reports chronic cough.  Continues to smoke cigarettes, 1-2 per day for 50 years.  No recent travel/sick contacts    During evaluation in the ER, patient was found to be in respiratory distress.  Oxygen saturations at 93% on room air.  EKG revealed  AFib with RVR (heart rate).  Labs revealed WBC of 4.48, hemoglobin 13.7, sodium 140, creatinine 1, , COVID/flu negative.  Chest x-ray revealed mildly prominent interstitial markings could relate to pulmonary vascular congestion/edema VBG revealed 7.278/80.4/49/37.6.  Patient was given duo neb x1, 325 mg aspirin, 10 mg IV diltiazem, 40 mg IV Lasix, 1.25 mg Xopenex, 125 mg Solu-Medrol, 4 mg Zofran.  Cardiology was consulted in ED. patient was placed on BiPAP.  Admitted to ICU for further management    Overview/Hospital Course:  68-year-old morbidly obese female with past medical history of chronic AFib on Xarelto, CHF EF 55-60% 11/23, hypertension, diabetes, hyperlipidemia,  PVD, tobacco abuse , COPD, HIEU, hypothyroidism, tobacco use who was admitted for Acute hypoxic hyper capneic respiratory failure secondary to CHF/COPD exacerbation/parainfluenza pneumonia. Noted to have AFib with RVR on presentation in ED, rate controlled with 1 dose of diltiazem weaned off BiPAP support transitioned to 3 L of oxygen with nasal cannula  continue BiPAP p.r.n. neb/Q HS. On Lasix/Solu-Medrol /nebs .  Echo with EF 60-65%, PAP 65  Received ceftriaxone/azithromycin for cap coverage x 3 days.  Respiratory cultures with normal respiratory elli.Net -6.8 L since admit.  Continue diuresis.  Switch to oral prednisone.  PT/OT on board wean oxygen as tolerated    Interval History:  Patient is in sitting up in the chair.  Mentation at baseline.  Reports shortness of breath is improving    Review of Systems   Constitutional: Negative.    Respiratory:  Positive for shortness of breath and wheezing.    Cardiovascular:  Positive for leg swelling.   Gastrointestinal: Negative.    Genitourinary: Negative.    Musculoskeletal: Negative.    Neurological:  Positive for weakness.     Objective:     Vital Signs (Most Recent):  Temp: 98.2 °F (36.8 °C) (08/08/24 1146)  Pulse: 69 (08/08/24 1146)  Resp: 18 (08/08/24 1146)  BP: 108/61 (08/08/24 1205)  SpO2: (!) 93 % (08/08/24 1146) Vital Signs (24h Range):  Temp:  [97.8 °F (36.6 °C)-98.3 °F (36.8 °C)] 98.2 °F (36.8 °C)  Pulse:  [64-95] 69  Resp:  [16-28] 18  SpO2:  [90 %-100 %] 93 %  BP: ()/(52-88) 108/61     Weight: 124.1 kg (273 lb 9.5 oz)  Body mass index is 48.46 kg/m².    Intake/Output Summary (Last 24 hours) at 8/8/2024 1254  Last data filed at 8/8/2024 1000  Gross per 24 hour   Intake 1299 ml   Output 5575 ml   Net -4276 ml         Physical Exam  Constitutional:       Appearance: She is obese. She is ill-appearing.   Cardiovascular:      Rate and Rhythm: Normal rate. Rhythm irregular.      Pulses: Normal pulses.   Pulmonary:      Effort: No respiratory distress.       Breath sounds: Wheezing (On 3 L of oxygen with nasal cannula) and rales present.   Abdominal:      General: Bowel sounds are normal. There is no distension.      Palpations: Abdomen is soft.      Tenderness: There is no abdominal tenderness.   Musculoskeletal:      Right lower leg: Edema present.      Left lower leg: Edema present.   Skin:     General: Skin is warm.   Neurological:      Mental Status: She is alert and oriented to person, place, and time. Mental status is at baseline.   Psychiatric:         Mood and Affect: Mood normal.             Significant Labs: All pertinent labs within the past 24 hours have been reviewed.    Significant Imaging: I have reviewed all pertinent imaging results/findings within the past 24 hours.    Assessment/Plan:      * Acute on chronic respiratory failure with hypercapnia  Patient with Hypercapnic and Hypoxic Respiratory failure which is Acute.  she is not on home oxygen. Supplemental oxygen was provided and noted- Oxygen Concentration (%):  [30-35] 30    .   Signs/symptoms of respiratory failure include- increased work of breathing and respiratory distress. Contributing diagnoses includes - CHF and COPD Labs and images were reviewed. Patient Has not had a recent ABG. Will treat underlying causes and adjust management of respiratory failure as follows-BiPAP support/ diuretics/supplemental oxygen  Weaned off BiPAP support, transitioned to 3 L of oxygen with nasal cannula.  Continue BiPAP p.r.n. naps and q.h.s.    Acute on chronic diastolic CHF (congestive heart failure)  Patient is identified as having Diastolic (HFpEF) heart failure that is Acute on chronic. CHF is currently uncontrolled due to Dyspnea not returned to baseline after 1 doses of IV diuretic, Rales/crackles on pulmonary exam, and Pulmonary edema/pleural effusion on CXR. Latest ECHO performed and demonstrates- Results for orders placed during the hospital encounter of 11/13/23    Echo    Interpretation Summary     AFib noted throughout exam.    Left Ventricle: The left ventricle is normal in size. Normal wall thickness. Normal wall motion. There is normal systolic function with a visually estimated ejection fraction of 55 - 60%. Unable to assess diastolic function due to atrial fibrillation.    Right Ventricle: Mild right ventricular enlargement. Systolic function is mildly reduced.    Mitral Valve: There is mild regurgitation.    Pulmonary Artery: The estimated pulmonary artery systolic pressure is 49 mmHg.  . Continue Beta Blocker and Furosemide and monitor clinical status closely. Monitor on telemetry. Patient is on CHF pathway.  Monitor strict Is&Os and daily weights.  Place on fluid restriction of 1.5 L. Cardiology has been consulted. Continue to stress to patient importance of self efficacy and  on diet for CHF. Last BNP reviewed- and noted below   Recent Labs   Lab 08/06/24  1108   *       Paroxysmal atrial fibrillation  Patient with Paroxysmal (<7 days) atrial fibrillation which is uncontrolled currently with Beta Blocker. Patient is currently in atrial fibrillation.[id number] Score: 4.  On Xarelto for anticoagulation.    Parainfluenzal pneumonia  Tested positive for parainfluenza virus.  Received empiric ceftriaxone/azithromycin for 3 days.  Respiratory cultures negative    Antibiotics (From admission, onward)      Start     Stop Route Frequency Ordered    08/07/24 0900  mupirocin 2 % ointment         08/12/24 0859 Nasl 2 times daily 08/07/24 0632    08/06/24 1715  cefTRIAXone (ROCEPHIN) 2 g in D5W 100 mL IVPB (MB+)         08/08/24 2359 IV Every 24 hours (non-standard times) 08/06/24 1612    08/06/24 1715  azithromycin (ZITHROMAX) 500 mg in D5W 250 mL IVPB (Vial-Mate)         08/08/24 2359 IV Every 24 hours (non-standard times) 08/06/24 1612            Microbiology Results (last 7 days)       Procedure Component Value Units Date/Time    Culture, Respiratory with Gram Stain [[id number]] Collected:  08/07/24 0830    Order Status: Completed Specimen: Respiratory from Sputum Updated: 08/08/24 1051     Respiratory Culture Normal respiratory elli     Gram Stain (Respiratory) <10 epithelial cells per low power field.     Gram Stain (Respiratory) Moderate WBC's     Gram Stain (Respiratory) Many Gram positive cocci in pairs and chains     Gram Stain (Respiratory) Moderate Gram positive cocci in clusters     Gram Stain (Respiratory) Rare Gram negative rods     Gram Stain (Respiratory) Rare Gram positive rods    Respiratory Infection Panel (PCR), Nasopharyngeal [[id number]]  (Abnormal) Collected: 08/07/24 1030    Order Status: Completed Specimen: Nasopharyngeal Swab Updated: 08/07/24 1753     Respiratory Infection Panel Source NP Swab     Adenovirus Not Detected     Coronavirus 229E, Common Cold Virus Not Detected     Coronavirus HKU1, Common Cold Virus Not Detected     Coronavirus NL63, Common Cold Virus Not Detected     Coronavirus OC43, Common Cold Virus Not Detected     Comment: The Coronavirus strains detected in this test cause the common cold.  These strains are not the COVID-19 (novel Coronavirus)strain   associated with the respiratory disease outbreak.          SARS-CoV2 (COVID-19) Qualitative PCR Not Detected     Human Metapneumovirus Not Detected     Human Rhinovirus/Enterovirus Not Detected     Influenza A (subtypes H1, H1-2009,H3) Not Detected     Influenza B Not Detected     Parainfluenza Virus 1 Detected     Parainfluenza Virus 2 Not Detected     Parainfluenza Virus 3 Not Detected     Parainfluenza Virus 4 Not Detected     Respiratory Syncytial Virus Not Detected     Bordetella Parapertussis (FX9030) Not Detected     Bordetella pertussis (ptxP) Not Detected     Chlamydia pneumoniae Not Detected     Mycoplasma pneumoniae Not Detected    Narrative:      Assay not valid for lower respiratory specimens, alternate  testing required.            COPD exacerbation  Patient's COPD is with exacerbation noted by  "continued dyspnea currently.  Patient is currently off COPD Pathway. Continue scheduled inhalers Steroids and Supplemental oxygen and monitor respiratory status closely.  Switch to oral prednisone    PVD (peripheral vascular disease)        Type 2 diabetes mellitus with diabetic cataract, without long-term current use of insulin  Patient's FSGs are controlled on current medication regimen.  Last A1c reviewed-   Lab Results   Component Value Date    HGBA1C 6.0 (H) 05/16/2024     Most recent fingerstick glucose reviewed- No results for input(s): "POCTGLUCOSE" in the last 24 hours.  Current correctional scale  stable  Maintain anti-hyperglycemic dose as follows-   Antihyperglycemics (From admission, onward)      Start     Stop Route Frequency Ordered    08/06/24 1515  empagliflozin (Jardiance) tablet 10 mg        Question Answer Comment   Does this patient have a diagnosis of heart failure? Yes    Does this patient have type 1 diabetes or diabetic ketoacidosis? No    Does this patient have symptomatic hypotension? No    Is the patient NPO or pending major surgery in next 3 days or less? No        -- Oral Daily 08/06/24 1404          Hold Oral hypoglycemics while patient is in the hospital.    Severe obesity (BMI >= 40)  Body mass index is 48.89 kg/m². Morbid obesity complicates all aspects of disease management from diagnostic modalities to treatment. Weight loss encouraged and health benefits explained to patient.         Essential hypertension  Chronic, controlled. Latest blood pressure and vitals reviewed-     Temp:  [98.1 °F (36.7 °C)]   Pulse:  []   Resp:  [23-54]   BP: ()/(53-84)   SpO2:  [89 %-100 %] .   Home meds for hypertension were reviewed and noted below.   Hypertension Medications               furosemide (LASIX) 40 MG tablet TAKE 1 TABLET(40 MG) BY MOUTH EVERY DAY    metoprolol succinate (TOPROL-XL) 100 MG 24 hr tablet Take 1 tablet (100 mg total) by mouth once daily.            While in the " hospital, will manage blood pressure as follows; Adjust home antihypertensive regimen as follows- continue Lasix and metoprolol    Will utilize p.r.n. blood pressure medication only if patient's blood pressure greater than 180/110 and she develops symptoms such as worsening chest pain or shortness of breath.    Hyperlipidemia    Continue home statin      VTE Risk Mitigation (From admission, onward)           Ordered     rivaroxaban tablet 20 mg  with dinner         08/06/24 1436     IP VTE HIGH RISK PATIENT  Once         08/06/24 1436     Place sequential compression device  Until discontinued         08/06/24 1436                    Discharge Planning   JACQUELINE:      Code Status: Full Code   Is the patient medically ready for discharge?:     Reason for patient still in hospital (select all that apply): Patient trending condition and Treatment  Discharge Plan A: Home with family                  Erma Pedraza MD  Department of Hospital Medicine   [facility]

## 2024-08-08 NOTE — ASSESSMENT & PLAN NOTE
Most recent echo with EF 55-60%, LAE (TERRI 55), moderate RV enlargement with mildly reduced RV function; PASP 49.   on admission.  CXR with CHF appearance.    - repeat formal echo with severe biatrial enlargement, mild RV enlargement and PASP >65.  Continue aggressive diuresis.  Strict I/O.  -  reports non-adherence to diuretic, low sodium diet and home NIV  - counseled extensively on the need to make better life choices and take control of her own health. We discussed sodium avoidance, daily morning weights, and the likelihood of her death with recurrent admissions

## 2024-08-08 NOTE — PLAN OF CARE
Case Management Assessment     PCP: Kuldeep Miller MD    Pharmacy:   [id number] - [place], [place] - [address] AT [facility] [address]  [address] [place] [address]  Phone: [phone] Fax: [phone]        Patient Arrived From: Home  Existing Help at Home: Corwin Evans (Spouse) [phone]; Marce Evans (Daughter) [phone]      Barriers to Discharge: None    Discharge Plan:    A. Home with family   B. Home with family  Spoke with patient via telephone; she stated she lives with her spouse and children who are able to assist her as needed.  Pt sated she is independent but gets assistance from her family with driving. Pt reported her daughter or  will assist her home at discharge.    08/08/24 1213   Discharge Assessment   Assessment Type Discharge Planning Assessment   Confirmed/corrected address, phone number and insurance Yes   Confirmed Demographics Correct on Facesheet   Source of Information patient   Does patient/caregiver understand observation status Yes   Communicated JACQUELINE with patient/caregiver Date not available/Unable to determine   Reason For Admission Acute on chronic respiratory failure with hypercapnia   People in Home spouse;child(beena), adult   Facility Arrived From: Home   Do you expect to return to your current living situation? Yes   Do you have help at home or someone to help you manage your care at home? Yes   Who are your caregiver(s) and their phone number(s)? Corwin Evans (Spouse)  [phone];    Marce Evans Daughter   [phone]   Prior to hospitilization cognitive status: Unable to Assess   Current cognitive status: Alert/Oriented   Equipment Currently Used at Home cane, straight;BIPAP;nebulizer   Readmission within 30 days? No   Patient currently being followed by outpatient case management? No   Do you currently have service(s) that help you manage your care at home? No   Do you take prescription medications?  Yes   Do you have prescription coverage? Yes   Coverage BLUE CROSS BLUE SHIELD - BCBS ALL OUT OF STATE   Do you have any problems affording any of your prescribed medications? No   Is the patient taking medications as prescribed? yes   Who is going to help you get home at discharge? Raúl Evansinald (Spouse) [phone]; Marce Evans Daughter [phone]   How do you get to doctors appointments? family or friend will provide   Are you on dialysis? No   Do you take coumadin? No   Discharge Plan A Home with family   Discharge Plan B Home with family   DME Needed Upon Discharge  other (see comments)  (TBD)   Discharge Plan discussed with: Patient   Transition of Care Barriers None

## 2024-08-08 NOTE — SUBJECTIVE & OBJECTIVE
Interval History:  Looks good today and is much more alert and awake    Objective:     Vital Signs (Most Recent):  Temp: 98.2 °F (36.8 °C) (08/08/24 1146)  Pulse: 77 (08/08/24 1501)  Resp: 16 (08/08/24 1339)  BP: 108/61 (08/08/24 1205)  SpO2: 95 % (08/08/24 1339) Vital Signs (24h Range):  Temp:  [97.8 °F (36.6 °C)-98.3 °F (36.8 °C)] 98.2 °F (36.8 °C)  Pulse:  [64-95] 77  Resp:  [16-28] 16  SpO2:  [92 %-100 %] 95 %  BP: ()/(52-88) 108/61     Weight: 124.1 kg (273 lb 9.5 oz)  Body mass index is 48.46 kg/m².      Intake/Output Summary (Last 24 hours) at 8/8/2024 1508  Last data filed at 8/8/2024 1431  Gross per 24 hour   Intake 1148.47 ml   Output 5250 ml   Net -4101.53 ml        Physical Exam  Vitals and nursing note reviewed.   Constitutional:       General: She is not in acute distress.     Appearance: She is morbidly obese. She is ill-appearing. She is not toxic-appearing or diaphoretic.   HENT:      Head: Atraumatic.   Eyes:      General: No scleral icterus.     Extraocular Movements: Extraocular movements intact.   Cardiovascular:      Rate and Rhythm: Normal rate. Rhythm irregular.   Pulmonary:      Effort: No tachypnea, accessory muscle usage or retractions.   Abdominal:      General: There is no distension.   Musculoskeletal:      Right lower leg: Edema present.      Left lower leg: Edema present.   Skin:     General: Skin is warm and dry.      Coloration: Skin is not jaundiced.      Findings: No rash.   Neurological:      General: No focal deficit present.      Mental Status: She is alert. Mental status is at baseline.               Vents:  Oxygen Concentration (%): 3 (08/08/24 0709)    Lines/Drains/Airways       Drain  Duration             Female External Urinary Catheter w/ Suction 08/06/24 1205 2 days              Peripheral Intravenous Line  Duration                  Peripheral IV - Single Lumen 08/06/24 1058 20 G 1 in Anterior;Right Forearm 2 days         Peripheral IV - Single Lumen 08/06/24 2361  20 G Left Antecubital 2 days                    Significant Labs:    CBC/Anemia Profile:  Recent Labs   Lab 08/08/24  0414   WBC 5.08   HGB 14.6   HCT 47.2      *   RDW 14.2        Chemistries:  Recent Labs   Lab 08/07/24  0550 08/08/24  0414    142   K 4.0 3.7   CL 95 92*   CO2 35* 39*   BUN 20 35*   CREATININE 0.9 1.3   CALCIUM 8.7 8.8   MG 1.6 2.1       All pertinent labs within the past 24 hours have been reviewed.    Significant Imaging:  I have reviewed all pertinent imaging results/findings within the past 24 hours.

## 2024-08-08 NOTE — ASSESSMENT & PLAN NOTE
Morbidly obese with HIEU nonadherent to CPAP.  Baseline pCO2 seems to be around 60.  PCO2 > 80 on admission.   with CHF exacerbation.  Chronic tobacco use with history of COPD.      - recommend empiric cap coverage.  Resp Cx with parainfluenza, now on droplet  - SpO2 goal 88-92%.  Avoid hypoxia.  - NIV nightly and with naps

## 2024-08-08 NOTE — SUBJECTIVE & OBJECTIVE
Interval History:  Patient is in sitting up in the chair.  Mentation at baseline.  Reports shortness of breath is improving    Review of Systems   Constitutional: Negative.    Respiratory:  Positive for shortness of breath and wheezing.    Cardiovascular:  Positive for leg swelling.   Gastrointestinal: Negative.    Genitourinary: Negative.    Musculoskeletal: Negative.    Neurological:  Positive for weakness.     Objective:     Vital Signs (Most Recent):  Temp: 98.2 °F (36.8 °C) (08/08/24 1146)  Pulse: 69 (08/08/24 1146)  Resp: 18 (08/08/24 1146)  BP: 108/61 (08/08/24 1205)  SpO2: (!) 93 % (08/08/24 1146) Vital Signs (24h Range):  Temp:  [97.8 °F (36.6 °C)-98.3 °F (36.8 °C)] 98.2 °F (36.8 °C)  Pulse:  [64-95] 69  Resp:  [16-28] 18  SpO2:  [90 %-100 %] 93 %  BP: ()/(52-88) 108/61     Weight: 124.1 kg (273 lb 9.5 oz)  Body mass index is 48.46 kg/m².    Intake/Output Summary (Last 24 hours) at 8/8/2024 1254  Last data filed at 8/8/2024 1000  Gross per 24 hour   Intake 1299 ml   Output 5575 ml   Net -4276 ml         Physical Exam  Constitutional:       Appearance: She is obese. She is ill-appearing.   Cardiovascular:      Rate and Rhythm: Normal rate. Rhythm irregular.      Pulses: Normal pulses.   Pulmonary:      Effort: No respiratory distress.      Breath sounds: Wheezing (On 3 L of oxygen with nasal cannula) and rales present.   Abdominal:      General: Bowel sounds are normal. There is no distension.      Palpations: Abdomen is soft.      Tenderness: There is no abdominal tenderness.   Musculoskeletal:      Right lower leg: Edema present.      Left lower leg: Edema present.   Skin:     General: Skin is warm.   Neurological:      Mental Status: She is alert and oriented to person, place, and time. Mental status is at baseline.   Psychiatric:         Mood and Affect: Mood normal.             Significant Labs: All pertinent labs within the past 24 hours have been reviewed.    Significant Imaging: I have reviewed  all pertinent imaging results/findings within the past 24 hours.

## 2024-08-08 NOTE — PROGRESS NOTES
[facility] Surg  Pulmonology  Progress Note    Patient Name: Sandee Evans  MRN: [id number]  Admission Date: 8/6/2024  Hospital Length of Stay: 2 days  Code Status: Full Code  Attending Provider: Erma Pedraza,*  Primary Care Provider: Kuldeep Miller MD   Principal Problem: Acute on chronic respiratory failure with hypercapnia    Subjective:     Interval History:  Looks good today and is much more alert and awake    Objective:     Vital Signs (Most Recent):  Temp: 98.2 °F (36.8 °C) (08/08/24 1146)  Pulse: 77 (08/08/24 1501)  Resp: 16 (08/08/24 1339)  BP: 108/61 (08/08/24 1205)  SpO2: 95 % (08/08/24 1339) Vital Signs (24h Range):  Temp:  [97.8 °F (36.6 °C)-98.3 °F (36.8 °C)] 98.2 °F (36.8 °C)  Pulse:  [64-95] 77  Resp:  [16-28] 16  SpO2:  [92 %-100 %] 95 %  BP: ()/(52-88) 108/61     Weight: 124.1 kg (273 lb 9.5 oz)  Body mass index is 48.46 kg/m².      Intake/Output Summary (Last 24 hours) at 8/8/2024 1508  Last data filed at 8/8/2024 1431  Gross per 24 hour   Intake 1148.47 ml   Output 5250 ml   Net -4101.53 ml        Physical Exam  Vitals and nursing note reviewed.   Constitutional:       General: She is not in acute distress.     Appearance: She is morbidly obese. She is ill-appearing. She is not toxic-appearing or diaphoretic.   HENT:      Head: Atraumatic.   Eyes:      General: No scleral icterus.     Extraocular Movements: Extraocular movements intact.   Cardiovascular:      Rate and Rhythm: Normal rate. Rhythm irregular.   Pulmonary:      Effort: No tachypnea, accessory muscle usage or retractions.   Abdominal:      General: There is no distension.   Musculoskeletal:      Right lower leg: Edema present.      Left lower leg: Edema present.   Skin:     General: Skin is warm and dry.      Coloration: Skin is not jaundiced.      Findings: No rash.   Neurological:      General: No focal deficit present.      Mental Status: She is alert. Mental status is at baseline.                Vents:  Oxygen Concentration (%): 3 (08/08/24 0709)    Lines/Drains/Airways       Drain  Duration             Female External Urinary Catheter w/ Suction 08/06/24 1205 2 days              Peripheral Intravenous Line  Duration                  Peripheral IV - Single Lumen 08/06/24 1058 20 G 1 in Anterior;Right Forearm 2 days         Peripheral IV - Single Lumen 08/06/24 1353 20 G Left Antecubital 2 days                    Significant Labs:    CBC/Anemia Profile:  Recent Labs   Lab 08/08/24  0414   WBC 5.08   HGB 14.6   HCT 47.2      *   RDW 14.2        Chemistries:  Recent Labs   Lab 08/07/24  0550 08/08/24  0414    142   K 4.0 3.7   CL 95 92*   CO2 35* 39*   BUN 20 35*   CREATININE 0.9 1.3   CALCIUM 8.7 8.8   MG 1.6 2.1       All pertinent labs within the past 24 hours have been reviewed.    Significant Imaging:  I have reviewed all pertinent imaging results/findings within the past 24 hours.      ABG  Recent Labs   Lab 08/06/24  1549   PH 7.339*   PO2 51   PCO2 67.2*   HCO3 36.2*   BE 8*     Assessment/Plan:     Pulmonary  * Acute on chronic respiratory failure with hypercapnia  Morbidly obese with HIEU nonadherent to CPAP.  Baseline pCO2 seems to be around 60.  PCO2 > 80 on admission.   with CHF exacerbation.  Chronic tobacco use with history of COPD.      - recommend empiric cap coverage.  Resp Cx with parainfluenza, now on droplet  - SpO2 goal 88-92%.  Avoid hypoxia.  - NIV nightly and with naps    COPD exacerbation  Longstanding tobacco use history.  2022 FEV1 81 with no bronchodilator response.    - prednisone 40-60 mg for 5 days.  Duo nebs q.4 scheduled and q.4 PRN  - needs outpatient follow-up with Pulmonary and repeat PFTs    Cardiac/Vascular  Acute on chronic diastolic CHF (congestive heart failure)  Most recent echo with EF 55-60%, LAE (TERRI 55), moderate RV enlargement with mildly reduced RV function; PASP 49.   on admission.  CXR with CHF appearance.    - repeat  formal echo with severe biatrial enlargement, mild RV enlargement and PASP >65.  Continue aggressive diuresis.  Strict I/O.  -  reports non-adherence to diuretic, low sodium diet and home NIV  - counseled extensively on the need to make better life choices and take control of her own health. We discussed sodium avoidance, daily morning weights, and the likelihood of her death with recurrent admissions    Longstanding persistent atrial fibrillation  RVR in ED which broke with Dilt.  On xarelto at home    - continue home Xarelto    Endocrine  Type 2 diabetes mellitus with diabetic cataract, without long-term current use of insulin  A1c 6.  Blood glucose goal 140-180.  Add and titrate insulin as needed    Severe obesity (BMI >= 40)  Body mass index is 48.46 kg/m². Morbid obesity complicates all aspects of disease management from diagnostic modalities to treatment. Weight loss encouraged and health benefits explained to patient.     - certainly contributing to her current respiratory failure.        Thank you for the consult. No further recommendations. Signing off. Please feel free to contact us with any question or concerns regarding the care of this patient.       Antonio Zelaya MD  Pulmonology  [facility] [place] - Med Surg

## 2024-08-09 LAB
ANION GAP SERPL CALC-SCNC: 13 MMOL/L (ref 8–16)
BACTERIA SPEC AEROBE CULT: NORMAL
BUN SERPL-MCNC: 43 MG/DL (ref 8–23)
CALCIUM SERPL-MCNC: 8.8 MG/DL (ref 8.7–10.5)
CHLORIDE SERPL-SCNC: 89 MMOL/L (ref 95–110)
CO2 SERPL-SCNC: 40 MMOL/L (ref 23–29)
CREAT SERPL-MCNC: 1.2 MG/DL (ref 0.5–1.4)
EST. GFR  (NO RACE VARIABLE): 49 ML/MIN/1.73 M^2
GLUCOSE SERPL-MCNC: 118 MG/DL (ref 70–110)
GRAM STN SPEC: NORMAL
MAGNESIUM SERPL-MCNC: 2 MG/DL (ref 1.6–2.6)
POCT GLUCOSE: 138 MG/DL (ref 70–110)
POCT GLUCOSE: 166 MG/DL (ref 70–110)
POCT GLUCOSE: 93 MG/DL (ref 70–110)
POTASSIUM SERPL-SCNC: 3.4 MMOL/L (ref 3.5–5.1)
SODIUM SERPL-SCNC: 142 MMOL/L (ref 136–145)

## 2024-08-09 PROCEDURE — 94761 N-INVAS EAR/PLS OXIMETRY MLT: CPT

## 2024-08-09 PROCEDURE — 25000003 PHARM REV CODE 250: Performed by: STUDENT IN AN ORGANIZED HEALTH CARE EDUCATION/TRAINING PROGRAM

## 2024-08-09 PROCEDURE — 97110 THERAPEUTIC EXERCISES: CPT | Mod: CQ

## 2024-08-09 PROCEDURE — 99900035 HC TECH TIME PER 15 MIN (STAT)

## 2024-08-09 PROCEDURE — 97530 THERAPEUTIC ACTIVITIES: CPT | Mod: CQ

## 2024-08-09 PROCEDURE — 94640 AIRWAY INHALATION TREATMENT: CPT

## 2024-08-09 PROCEDURE — 83735 ASSAY OF MAGNESIUM: CPT | Performed by: STUDENT IN AN ORGANIZED HEALTH CARE EDUCATION/TRAINING PROGRAM

## 2024-08-09 PROCEDURE — 27000207 HC ISOLATION

## 2024-08-09 PROCEDURE — 63600175 PHARM REV CODE 636 W HCPCS: Performed by: STUDENT IN AN ORGANIZED HEALTH CARE EDUCATION/TRAINING PROGRAM

## 2024-08-09 PROCEDURE — 80048 BASIC METABOLIC PNL TOTAL CA: CPT | Performed by: STUDENT IN AN ORGANIZED HEALTH CARE EDUCATION/TRAINING PROGRAM

## 2024-08-09 PROCEDURE — 36415 COLL VENOUS BLD VENIPUNCTURE: CPT | Performed by: STUDENT IN AN ORGANIZED HEALTH CARE EDUCATION/TRAINING PROGRAM

## 2024-08-09 PROCEDURE — 94660 CPAP INITIATION&MGMT: CPT

## 2024-08-09 PROCEDURE — 21400001 HC TELEMETRY ROOM

## 2024-08-09 PROCEDURE — 27000221 HC OXYGEN, UP TO 24 HOURS

## 2024-08-09 PROCEDURE — 97116 GAIT TRAINING THERAPY: CPT | Mod: CQ

## 2024-08-09 PROCEDURE — 25000242 PHARM REV CODE 250 ALT 637 W/ HCPCS: Performed by: STUDENT IN AN ORGANIZED HEALTH CARE EDUCATION/TRAINING PROGRAM

## 2024-08-09 RX ORDER — POTASSIUM CHLORIDE 20 MEQ/1
40 TABLET, EXTENDED RELEASE ORAL ONCE
Status: COMPLETED | OUTPATIENT
Start: 2024-08-09 | End: 2024-08-09

## 2024-08-09 RX ORDER — FUROSEMIDE 10 MG/ML
40 INJECTION INTRAMUSCULAR; INTRAVENOUS EVERY 12 HOURS
Status: DISCONTINUED | OUTPATIENT
Start: 2024-08-09 | End: 2024-08-11

## 2024-08-09 RX ADMIN — MUPIROCIN: 20 OINTMENT TOPICAL at 09:08

## 2024-08-09 RX ADMIN — AZELASTINE HYDROCHLORIDE 137 MCG: 137 SPRAY, METERED NASAL at 09:08

## 2024-08-09 RX ADMIN — AZELASTINE HYDROCHLORIDE 137 MCG: 137 SPRAY, METERED NASAL at 08:08

## 2024-08-09 RX ADMIN — IPRATROPIUM BROMIDE AND ALBUTEROL SULFATE 3 ML: 2.5; .5 SOLUTION RESPIRATORY (INHALATION) at 09:08

## 2024-08-09 RX ADMIN — IPRATROPIUM BROMIDE AND ALBUTEROL SULFATE 3 ML: 2.5; .5 SOLUTION RESPIRATORY (INHALATION) at 07:08

## 2024-08-09 RX ADMIN — METOPROLOL TARTRATE 50 MG: 50 TABLET, FILM COATED ORAL at 06:08

## 2024-08-09 RX ADMIN — MUPIROCIN: 20 OINTMENT TOPICAL at 08:08

## 2024-08-09 RX ADMIN — FUROSEMIDE 60 MG: 10 INJECTION, SOLUTION INTRAVENOUS at 08:08

## 2024-08-09 RX ADMIN — FUROSEMIDE 40 MG: 10 INJECTION, SOLUTION INTRAVENOUS at 10:08

## 2024-08-09 RX ADMIN — IPRATROPIUM BROMIDE AND ALBUTEROL SULFATE 3 ML: 2.5; .5 SOLUTION RESPIRATORY (INHALATION) at 01:08

## 2024-08-09 RX ADMIN — METOPROLOL TARTRATE 50 MG: 50 TABLET, FILM COATED ORAL at 05:08

## 2024-08-09 RX ADMIN — METOPROLOL TARTRATE 50 MG: 50 TABLET, FILM COATED ORAL at 12:08

## 2024-08-09 RX ADMIN — POTASSIUM CHLORIDE 40 MEQ: 1500 TABLET, EXTENDED RELEASE ORAL at 10:08

## 2024-08-09 RX ADMIN — PREDNISONE 50 MG: 50 TABLET ORAL at 08:08

## 2024-08-09 RX ADMIN — SERTRALINE HYDROCHLORIDE 100 MG: 50 TABLET ORAL at 08:08

## 2024-08-09 RX ADMIN — IPRATROPIUM BROMIDE AND ALBUTEROL SULFATE 3 ML: 2.5; .5 SOLUTION RESPIRATORY (INHALATION) at 12:08

## 2024-08-09 RX ADMIN — RIVAROXABAN 20 MG: 20 TABLET, FILM COATED ORAL at 05:08

## 2024-08-09 RX ADMIN — FUROSEMIDE 40 MG: 10 INJECTION, SOLUTION INTRAVENOUS at 09:08

## 2024-08-09 RX ADMIN — ATORVASTATIN CALCIUM 40 MG: 40 TABLET, FILM COATED ORAL at 08:08

## 2024-08-09 RX ADMIN — EMPAGLIFLOZIN 10 MG: 10 TABLET, FILM COATED ORAL at 08:08

## 2024-08-09 NOTE — SUBJECTIVE & OBJECTIVE
Interval History:  Shortness of breath/pedal edema is improving.  Wean oxygen down to 2 L with nasal cannula.  Wean as tolerated.  Net negative 800 cc over 24 hours.  Cut down Lasix to 40 IV b.i.d.    Review of Systems   Constitutional: Negative.    Respiratory:  Positive for shortness of breath and wheezing.    Cardiovascular:  Positive for leg swelling.   Gastrointestinal: Negative.    Genitourinary: Negative.    Musculoskeletal: Negative.    Neurological:  Positive for weakness.     Objective:     Vital Signs (Most Recent):  Temp: 98.8 °F (37.1 °C) (08/09/24 1116)  Pulse: 78 (08/09/24 1314)  Resp: 20 (08/09/24 1314)  BP: 119/76 (08/09/24 1116)  SpO2: 95 % (08/09/24 1314) Vital Signs (24h Range):  Temp:  [98 °F (36.7 °C)-98.8 °F (37.1 °C)] 98.8 °F (37.1 °C)  Pulse:  [64-85] 78  Resp:  [16-20] 20  SpO2:  [93 %-96 %] 95 %  BP: (119-183)/(67-85) 119/76     Weight: 124.1 kg (273 lb 9.5 oz)  Body mass index is 48.46 kg/m².    Intake/Output Summary (Last 24 hours) at 8/9/2024 1322  Last data filed at 8/9/2024 0823  Gross per 24 hour   Intake 685.04 ml   Output 1500 ml   Net -814.96 ml         Physical Exam  Constitutional:       General: She is not in acute distress.     Appearance: She is obese.   Cardiovascular:      Rate and Rhythm: Normal rate.      Pulses: Normal pulses.   Pulmonary:      Effort: No respiratory distress.      Breath sounds: Wheezing and rales (On 2 L of oxygen with nasal cannula) present.   Abdominal:      General: Bowel sounds are normal. There is no distension.      Palpations: Abdomen is soft.      Tenderness: There is no abdominal tenderness.   Musculoskeletal:      Right lower leg: Edema present.      Left lower leg: Edema present.   Skin:     General: Skin is warm.   Neurological:      Mental Status: She is alert and oriented to person, place, and time. Mental status is at baseline.   Psychiatric:         Mood and Affect: Mood normal.             Significant Labs: All pertinent labs within  the past 24 hours have been reviewed.    Significant Imaging: I have reviewed all pertinent imaging results/findings within the past 24 hours.

## 2024-08-09 NOTE — PROGRESS NOTES
[facility] Medicine  Progress Note    Patient Name: Sandee Evans  MRN: [id number]  Patient Class: IP- Inpatient   Admission Date: 8/6/2024  Length of Stay: 3 days  Attending Physician: Erma Pedraza,*  Primary Care Provider: Kuldeep Miller MD        Subjective:     Principal Problem:Acute on chronic respiratory failure with hypercapnia        HPI:    Patient is a 68-year-old morbidly obese female with past medical history of chronic AFib on Xarelto, CHF EF 55-60% 11/23, hypertension, diabetes, hyperlipidemia, PVD, tobacco abuse , COPD, HIEU, hypothyroidism, tobacco use who presented to [facility] ER on 08/06/2024 for further evaluation of progressively worsening shortness of breath x 1 week.  Reports associated  orthopnea/pedal edema..  Patient stated that she had 3 episodes of nonbilious nonbloody vomiting.   reports patient is noncompliant with CPAP in diuretics at times at home.  No fever/abdominal pain/diarrhea/constipation/chest pain.   reports chronic cough.  Continues to smoke cigarettes, 1-2 per day for 50 years.  No recent travel/sick contacts    During evaluation in the ER, patient was found to be in respiratory distress.  Oxygen saturations at 93% on room air.  EKG revealed  AFib with RVR (heart rate).  Labs revealed WBC of 4.48, hemoglobin 13.7, sodium 140, creatinine 1, , COVID/flu negative.  Chest x-ray revealed mildly prominent interstitial markings could relate to pulmonary vascular congestion/edema VBG revealed 7.278/80.4/49/37.6.  Patient was given duo neb x1, 325 mg aspirin, 10 mg IV diltiazem, 40 mg IV Lasix, 1.25 mg Xopenex, 125 mg Solu-Medrol, 4 mg Zofran.  Cardiology was consulted in ED. patient was placed on BiPAP.  Admitted to ICU for further management    Overview/Hospital Course:  68-year-old morbidly obese female with past medical history of chronic AFib on Xarelto, CHF EF 55-60% 11/23, hypertension, diabetes, hyperlipidemia,  PVD, tobacco abuse , COPD, HIEU, hypothyroidism, tobacco use who was admitted for Acute hypoxic hyper capneic respiratory failure secondary to CHF/COPD exacerbation/parainfluenza pneumonia. Noted to have AFib with RVR on presentation in ED, rate controlled with 1 dose of diltiazem weaned off BiPAP support transitioned to 3 L of oxygen with nasal cannula  continue BiPAP p.r.n. neb/Q HS. On Lasix/Solu-Medrol /nebs .  Received ceftriaxone/azithromycin for cap coverage x 3 days.  Respiratory cultures with normal respiratory elli.Net -7.6 L since admit.  Prednisone.  Decrease Lasix to 40 b.i.d..  PT/OT on board wean oxygen as tolerated    Interval History:  Shortness of breath/pedal edema is improving.  Wean oxygen down to 2 L with nasal cannula.  Wean as tolerated.  Net negative 800 cc over 24 hours.  Cut down Lasix to 40 IV b.i.d.    Review of Systems   Constitutional: Negative.    Respiratory:  Positive for shortness of breath and wheezing.    Cardiovascular:  Positive for leg swelling.   Gastrointestinal: Negative.    Genitourinary: Negative.    Musculoskeletal: Negative.    Neurological:  Positive for weakness.     Objective:     Vital Signs (Most Recent):  Temp: 98.8 °F (37.1 °C) (08/09/24 1116)  Pulse: 78 (08/09/24 1314)  Resp: 20 (08/09/24 1314)  BP: 119/76 (08/09/24 1116)  SpO2: 95 % (08/09/24 1314) Vital Signs (24h Range):  Temp:  [98 °F (36.7 °C)-98.8 °F (37.1 °C)] 98.8 °F (37.1 °C)  Pulse:  [64-85] 78  Resp:  [16-20] 20  SpO2:  [93 %-96 %] 95 %  BP: (119-183)/(67-85) 119/76     Weight: 124.1 kg (273 lb 9.5 oz)  Body mass index is 48.46 kg/m².    Intake/Output Summary (Last 24 hours) at 8/9/2024 1322  Last data filed at 8/9/2024 0823  Gross per 24 hour   Intake 685.04 ml   Output 1500 ml   Net -814.96 ml         Physical Exam  Constitutional:       General: She is not in acute distress.     Appearance: She is obese.   Cardiovascular:      Rate and Rhythm: Normal rate.      Pulses: Normal pulses.   Pulmonary:       Effort: No respiratory distress.      Breath sounds: Wheezing and rales (On 2 L of oxygen with nasal cannula) present.   Abdominal:      General: Bowel sounds are normal. There is no distension.      Palpations: Abdomen is soft.      Tenderness: There is no abdominal tenderness.   Musculoskeletal:      Right lower leg: Edema present.      Left lower leg: Edema present.   Skin:     General: Skin is warm.   Neurological:      Mental Status: She is alert and oriented to person, place, and time. Mental status is at baseline.   Psychiatric:         Mood and Affect: Mood normal.             Significant Labs: All pertinent labs within the past 24 hours have been reviewed.    Significant Imaging: I have reviewed all pertinent imaging results/findings within the past 24 hours.    Assessment/Plan:      * Acute on chronic respiratory failure with hypercapnia  Patient with Hypercapnic and Hypoxic Respiratory failure which is Acute.  she is not on home oxygen. Supplemental oxygen was provided and noted- Oxygen Concentration (%):  [28-30] 28    .   Signs/symptoms of respiratory failure include- increased work of breathing and respiratory distress. Contributing diagnoses includes - CHF and COPD Labs and images were reviewed. Patient Has not had a recent ABG. Will treat underlying causes and adjust management of respiratory failure as follows-BiPAP support/ diuretics/supplemental oxygen  Weaned off BiPAP support.  On 2 L of oxygen with nasal cannula  Continue BiPAP p.r.n. naps and q.h.s.    Acute on chronic diastolic CHF (congestive heart failure)  Patient is identified as having Diastolic (HFpEF) heart failure that is Acute on chronic. CHF is currently uncontrolled due to Dyspnea not returned to baseline after 1 doses of IV diuretic, Rales/crackles on pulmonary exam, and Pulmonary edema/pleural effusion on CXR. Latest ECHO performed and demonstrates- Results for orders placed during the hospital encounter of  11/13/23    Echo    Interpretation Summary    AFib noted throughout exam.    Left Ventricle: The left ventricle is normal in size. Normal wall thickness. Normal wall motion. There is normal systolic function with a visually estimated ejection fraction of 55 - 60%. Unable to assess diastolic function due to atrial fibrillation.    Right Ventricle: Mild right ventricular enlargement. Systolic function is mildly reduced.    Mitral Valve: There is mild regurgitation.    Pulmonary Artery: The estimated pulmonary artery systolic pressure is 49 mmHg.  . Continue Beta Blocker and Furosemide and monitor clinical status closely. Monitor on telemetry. Patient is on CHF pathway.  Monitor strict Is&Os and daily weights.  Place on fluid restriction of 1.5 L. Cardiology has been consulted. Continue to stress to patient importance of self efficacy and  on diet for CHF. Last BNP reviewed- and noted below   Recent Labs   Lab 08/06/24  1108   *     Cut down Lasix to 40 IV b.i.d.    Paroxysmal atrial fibrillation  Patient with Paroxysmal (<7 days) atrial fibrillation which is uncontrolled currently with Beta Blocker. Patient is currently in atrial fibrillation.[id number] Score: 4.  On Xarelto for anticoagulation.    Parainfluenzal pneumonia  Tested positive for parainfluenza virus.  Received empiric ceftriaxone/azithromycin for 3 days.  Respiratory cultures negative    Antibiotics (From admission, onward)      Start     Stop Route Frequency Ordered    08/07/24 0900  mupirocin 2 % ointment         08/12/24 0859 Nasl 2 times daily 08/07/24 0632    08/06/24 1715  cefTRIAXone (ROCEPHIN) 2 g in D5W 100 mL IVPB (MB+)         08/08/24 2359 IV Every 24 hours (non-standard times) 08/06/24 1612    08/06/24 1715  azithromycin (ZITHROMAX) 500 mg in D5W 250 mL IVPB (Vial-Mate)         08/08/24 2359 IV Every 24 hours (non-standard times) 08/06/24 1612            Microbiology Results (last 7 days)       Procedure Component Value Units  Date/Time    Culture, Respiratory with Gram Stain [[id number]] Collected: 08/07/24 0830    Order Status: Completed Specimen: Respiratory from Sputum Updated: 08/08/24 1051     Respiratory Culture Normal respiratory elli     Gram Stain (Respiratory) <10 epithelial cells per low power field.     Gram Stain (Respiratory) Moderate WBC's     Gram Stain (Respiratory) Many Gram positive cocci in pairs and chains     Gram Stain (Respiratory) Moderate Gram positive cocci in clusters     Gram Stain (Respiratory) Rare Gram negative rods     Gram Stain (Respiratory) Rare Gram positive rods    Respiratory Infection Panel (PCR), Nasopharyngeal [[id number]]  (Abnormal) Collected: 08/07/24 1030    Order Status: Completed Specimen: Nasopharyngeal Swab Updated: 08/07/24 1753     Respiratory Infection Panel Source NP Swab     Adenovirus Not Detected     Coronavirus 229E, Common Cold Virus Not Detected     Coronavirus HKU1, Common Cold Virus Not Detected     Coronavirus NL63, Common Cold Virus Not Detected     Coronavirus OC43, Common Cold Virus Not Detected     Comment: The Coronavirus strains detected in this test cause the common cold.  These strains are not the COVID-19 (novel Coronavirus)strain   associated with the respiratory disease outbreak.          SARS-CoV2 (COVID-19) Qualitative PCR Not Detected     Human Metapneumovirus Not Detected     Human Rhinovirus/Enterovirus Not Detected     Influenza A (subtypes H1, H1-2009,H3) Not Detected     Influenza B Not Detected     Parainfluenza Virus 1 Detected     Parainfluenza Virus 2 Not Detected     Parainfluenza Virus 3 Not Detected     Parainfluenza Virus 4 Not Detected     Respiratory Syncytial Virus Not Detected     Bordetella Parapertussis (NY2152) Not Detected     Bordetella pertussis (ptxP) Not Detected     Chlamydia pneumoniae Not Detected     Mycoplasma pneumoniae Not Detected    Narrative:      Assay not valid for lower respiratory specimens, alternate  testing required.  "           COPD exacerbation  Patient's COPD is with exacerbation noted by continued dyspnea currently.  Patient is currently off COPD Pathway. Continue scheduled inhalers Steroids and Supplemental oxygen and monitor respiratory status closely.  Switch to oral prednisone    PVD (peripheral vascular disease)        Type 2 diabetes mellitus with diabetic cataract, without long-term current use of insulin  Patient's FSGs are controlled on current medication regimen.  Last A1c reviewed-   Lab Results   Component Value Date    HGBA1C 6.0 (H) 05/16/2024     Most recent fingerstick glucose reviewed- No results for input(s): "POCTGLUCOSE" in the last 24 hours.  Current correctional scale  stable  Maintain anti-hyperglycemic dose as follows-   Antihyperglycemics (From admission, onward)      Start     Stop Route Frequency Ordered    08/06/24 1515  empagliflozin (Jardiance) tablet 10 mg        Question Answer Comment   Does this patient have a diagnosis of heart failure? Yes    Does this patient have type 1 diabetes or diabetic ketoacidosis? No    Does this patient have symptomatic hypotension? No    Is the patient NPO or pending major surgery in next 3 days or less? No        -- Oral Daily 08/06/24 1404          Hold Oral hypoglycemics while patient is in the hospital.    Severe obesity (BMI >= 40)  Body mass index is 48.89 kg/m². Morbid obesity complicates all aspects of disease management from diagnostic modalities to treatment. Weight loss encouraged and health benefits explained to patient.         Essential hypertension  Chronic, controlled. Latest blood pressure and vitals reviewed-     Temp:  [98.1 °F (36.7 °C)]   Pulse:  []   Resp:  [23-54]   BP: ()/(53-84)   SpO2:  [89 %-100 %] .   Home meds for hypertension were reviewed and noted below.   Hypertension Medications               furosemide (LASIX) 40 MG tablet TAKE 1 TABLET(40 MG) BY MOUTH EVERY DAY    metoprolol succinate (TOPROL-XL) 100 MG 24 hr tablet " Take 1 tablet (100 mg total) by mouth once daily.            While in the hospital, will manage blood pressure as follows; Adjust home antihypertensive regimen as follows- continue Lasix and metoprolol    Will utilize p.r.n. blood pressure medication only if patient's blood pressure greater than 180/110 and she develops symptoms such as worsening chest pain or shortness of breath.    Hyperlipidemia    Continue home statin      VTE Risk Mitigation (From admission, onward)           Ordered     rivaroxaban tablet 20 mg  with dinner         08/06/24 1436     IP VTE HIGH RISK PATIENT  Once         08/06/24 1436     Place sequential compression device  Until discontinued         08/06/24 1436                    Discharge Planning   JACQUELINE:      Code Status: Full Code   Is the patient medically ready for discharge?:     Reason for patient still in hospital (select all that apply): Patient trending condition and Treatment  Discharge Plan A: Home with family                  Erma Pedraza MD  Department of Hospital Medicine   [facility] S[facility]

## 2024-08-09 NOTE — PT/OT/SLP PROGRESS
Physical Therapy Treatment    Patient Name:  Sandee Evans   MRN:  [id number]    Recommendations:     Discharge Recommendations: Low Intensity Therapy  Discharge Equipment Recommendations: oxygen (?)  Barriers to discharge: None    Assessment:     Sandee Evans is a 68 y.o. female admitted with a medical diagnosis of Acute on chronic respiratory failure with hypercapnia.  She presents with the following impairments/functional limitations: weakness, gait instability, pain, impaired balance, decreased lower extremity function, decreased ROM, impaired cardiopulmonary response to activity .    Rehab Prognosis: Good; patient would benefit from acute skilled PT services to address these deficits and reach maximum level of function.    Recent Surgery: * No surgery found *      Plan:     During this hospitalization, patient to be seen 3 x/week to address the identified rehab impairments via gait training, therapeutic activities, therapeutic exercises and progress toward the following goals:    Plan of Care Expires:  08/22/24    Subjective     Chief Complaint: weakness   Patient/Family Comments/goals: pt is pleasant and agreeable to therapy   Pain/Comfort:  Pain Rating 1: 0/10  Pain Rating Post-Intervention 1: 0/10      Objective:     Communicated with nurse prior to session.  Patient found HOB elevated with telemetry, peripheral IV, oxygen upon PT entry to room.     General Precautions: Standard, fall, droplet, respiratory  Orthopedic Precautions: N/A  Braces: N/A  Respiratory Status: Nasal cannula, flow 1.5 L/min   SpO2 : 86% on RA at rest while sitting EOB.   SpO2: 92%-96% on 2L on exertions. Pt required rest breaks and VC's for pursed lip breathing technique throughout treatment.   Functional Mobility:  Bed Mobility:     Rolling Left:  supervision  Scooting: supervision  Supine to Sit: supervision, HOB elevated, bedside rail   Transfers:     Sit to Stand: X 4 trials from bed  supervision with no AD  Bed to Chair: stand  by assistance with  no AD  using  Step Transfer  Gait: pt ambulated 40 ft with no AD, CGA/SBA ( 2L O2NC , portable tank with no 1.5 L option ) . Noted with decreased dmitriy,decreased step length, lateral swaying however no LOB. VC's for safety and pursed lip breathing technique.    Balance:  good in sitting, fair + in standing.       AM-PAC 6 CLICK MOBILITY  Turning over in bed (including adjusting bedclothes, sheets and blankets)?: 4  Sitting down on and standing up from a chair with arms (e.g., wheelchair, bedside commode, etc.): 3  Moving from lying on back to sitting on the side of the bed?: 4  Moving to and from a bed to a chair (including a wheelchair)?: 4  Need to walk in hospital room?: 3  Climbing 3-5 steps with a railing?: 3  Basic Mobility Total Score: 21       Treatment & Education:  Pt tolerated static standing for diaper placement with no AD, MIN A (attempted to use hospital maternity pant and pads however unable to fit dues to body habitus . Pt reported that she has to wear brief all the time at home 2* to incontinence.   Lower Extremity Exercises.    Patient educated on: Purpose and Benefits of Therapeutic Exercise(s).    Patient verbalized acceptance/understanding of instruction(s), expectation(s), and limitation(s) (for safety).  Patient performed: 2 sets of 10 reps (each) of B LE Ther Ex: AP, LAQ, Hip flexion while sitting up on EOB.       Patient required  verbal cues/tactile cues to ensure correct sequence, to maintain proper form, and to allow for self-correction.  Pt reported no complaints or problems with exercise activity.     Patient required increase Reaction Time to initiate movements and a Sitting Rest Break between set(s) to recover.    Educated pt on safety awareness with all OOB mobility , transfer / gait training .   Educated pt on pursed lip breathing technique and energy conservation techniques, handouts provided.   BLE HEP given with instructions and demonstrations (pt verbalized  understanding). Encouraged pt to perform BLE ex's per handout throughout the day.   Patient left up in chair with lunch tray table set up  all lines intact and call button in reach..    GOALS:   Multidisciplinary Problems       Physical Therapy Goals          Problem: Physical Therapy    Goal Priority Disciplines Outcome Goal Variances Interventions   Physical Therapy Goal     PT, PT/OT Progressing     Description: Goals to be met by: 24     Patient will increase functional independence with mobility by performin. Supine to sit with Modified [place]  2. Rolling to Left and Right with Modified [place]  3. Sit to stand transfer with Modified [place] using RW  4. Bed to chair transfer with Modified [place] using Rolling Walker  5. Gait x200 feet with Modified [place] using Rolling Walker   6. Upper/Lower extremity exercise program 2 sets x15 reps per handout, with independence                         Time Tracking:     PT Received On: 24  PT Start Time: 1133     PT Stop Time: 1211  PT Total Time (min): 38 min     Billable Minutes: Gait Training 11, Therapeutic Activity 15, and Therapeutic Exercise 12    Treatment Type: Treatment  PT/PTA: PTA     Number of PTA visits since last PT visit: 2024

## 2024-08-09 NOTE — NURSING
Received report from off going nurse, Khalida. Patient received AAO X 3. Resp even and labored 24 on 3l/nc. Family member at the bedside educated about droplet precautions and provided a mask to wear while in the pts room. Tele box # [id number] is in use and is visible on the screen.  Afib 70's. Bed locked in the lowest position with SR up X 2. Call light within reach. 20G SL note to the pts LAC/RFA. Bipap noted at the bedside.   [facility], [place]  Nurses Note -- 4 Eyes      8/8/2024       Skin assessed on: Q Shift      [x] No Pressure Injuries Present    [x]Prevention Measures Documented    [] Yes LDA  for Pressure Injury Previously documented     [] Yes New Pressure Injury Discovered   [] LDA for New Pressure Injury Added      Attending RN:  Raza Wong, RN     Second RN:  Khalida

## 2024-08-09 NOTE — ASSESSMENT & PLAN NOTE
Patient with Hypercapnic and Hypoxic Respiratory failure which is Acute.  she is not on home oxygen. Supplemental oxygen was provided and noted- Oxygen Concentration (%):  [28-30] 28    .   Signs/symptoms of respiratory failure include- increased work of breathing and respiratory distress. Contributing diagnoses includes - CHF and COPD Labs and images were reviewed. Patient Has not had a recent ABG. Will treat underlying causes and adjust management of respiratory failure as follows-BiPAP support/ diuretics/supplemental oxygen  Weaned off BiPAP support.  On 2 L of oxygen with nasal cannula  Continue BiPAP p.r.n. naps and q.h.s.

## 2024-08-09 NOTE — NURSING
[facility]  Nurses Note -- 4 Eyes        Date: 08/09/2024        Skin assessed on: Q shift        [x] No Pressure Injuries Present                 []Prevention Measures Documented     [] Yes LDA Previously documented      [] Yes New Pressure Injury Discovered              [] LDA Added        Attending RN:  CLAUDIA Spence     Second RN:  CLAUDIA Person

## 2024-08-09 NOTE — ASSESSMENT & PLAN NOTE
Patient is identified as having Diastolic (HFpEF) heart failure that is Acute on chronic. CHF is currently uncontrolled due to Dyspnea not returned to baseline after 1 doses of IV diuretic, Rales/crackles on pulmonary exam, and Pulmonary edema/pleural effusion on CXR. Latest ECHO performed and demonstrates- Results for orders placed during the hospital encounter of 11/13/23    Echo    Interpretation Summary    AFib noted throughout exam.    Left Ventricle: The left ventricle is normal in size. Normal wall thickness. Normal wall motion. There is normal systolic function with a visually estimated ejection fraction of 55 - 60%. Unable to assess diastolic function due to atrial fibrillation.    Right Ventricle: Mild right ventricular enlargement. Systolic function is mildly reduced.    Mitral Valve: There is mild regurgitation.    Pulmonary Artery: The estimated pulmonary artery systolic pressure is 49 mmHg.  . Continue Beta Blocker and Furosemide and monitor clinical status closely. Monitor on telemetry. Patient is on CHF pathway.  Monitor strict Is&Os and daily weights.  Place on fluid restriction of 1.5 L. Cardiology has been consulted. Continue to stress to patient importance of self efficacy and  on diet for CHF. Last BNP reviewed- and noted below   Recent Labs   Lab 08/06/24  1108   *     Cut down Lasix to 40 IV b.i.d.

## 2024-08-10 LAB
ANION GAP SERPL CALC-SCNC: 17 MMOL/L (ref 8–16)
BUN SERPL-MCNC: 52 MG/DL (ref 8–23)
CALCIUM SERPL-MCNC: 9 MG/DL (ref 8.7–10.5)
CHLORIDE SERPL-SCNC: 89 MMOL/L (ref 95–110)
CO2 SERPL-SCNC: 35 MMOL/L (ref 23–29)
CREAT SERPL-MCNC: 1.3 MG/DL (ref 0.5–1.4)
EST. GFR  (NO RACE VARIABLE): 44 ML/MIN/1.73 M^2
GLUCOSE SERPL-MCNC: 93 MG/DL (ref 70–110)
POCT GLUCOSE: 127 MG/DL (ref 70–110)
POCT GLUCOSE: 207 MG/DL (ref 70–110)
POTASSIUM SERPL-SCNC: 4.4 MMOL/L (ref 3.5–5.1)
SODIUM SERPL-SCNC: 141 MMOL/L (ref 136–145)

## 2024-08-10 PROCEDURE — 94640 AIRWAY INHALATION TREATMENT: CPT

## 2024-08-10 PROCEDURE — 99900035 HC TECH TIME PER 15 MIN (STAT)

## 2024-08-10 PROCEDURE — 97530 THERAPEUTIC ACTIVITIES: CPT | Mod: CQ

## 2024-08-10 PROCEDURE — 27000207 HC ISOLATION

## 2024-08-10 PROCEDURE — 25000003 PHARM REV CODE 250: Performed by: STUDENT IN AN ORGANIZED HEALTH CARE EDUCATION/TRAINING PROGRAM

## 2024-08-10 PROCEDURE — 25000242 PHARM REV CODE 250 ALT 637 W/ HCPCS: Performed by: STUDENT IN AN ORGANIZED HEALTH CARE EDUCATION/TRAINING PROGRAM

## 2024-08-10 PROCEDURE — 94761 N-INVAS EAR/PLS OXIMETRY MLT: CPT

## 2024-08-10 PROCEDURE — 21400001 HC TELEMETRY ROOM

## 2024-08-10 PROCEDURE — 97116 GAIT TRAINING THERAPY: CPT | Mod: CQ

## 2024-08-10 PROCEDURE — 36415 COLL VENOUS BLD VENIPUNCTURE: CPT | Performed by: STUDENT IN AN ORGANIZED HEALTH CARE EDUCATION/TRAINING PROGRAM

## 2024-08-10 PROCEDURE — 63600175 PHARM REV CODE 636 W HCPCS: Performed by: STUDENT IN AN ORGANIZED HEALTH CARE EDUCATION/TRAINING PROGRAM

## 2024-08-10 PROCEDURE — 80048 BASIC METABOLIC PNL TOTAL CA: CPT | Performed by: STUDENT IN AN ORGANIZED HEALTH CARE EDUCATION/TRAINING PROGRAM

## 2024-08-10 PROCEDURE — 94660 CPAP INITIATION&MGMT: CPT

## 2024-08-10 PROCEDURE — 27000221 HC OXYGEN, UP TO 24 HOURS

## 2024-08-10 PROCEDURE — 27000190 HC CPAP FULL FACE MASK W/VALVE

## 2024-08-10 RX ADMIN — INSULIN ASPART 2 UNITS: 100 INJECTION, SOLUTION INTRAVENOUS; SUBCUTANEOUS at 05:08

## 2024-08-10 RX ADMIN — MUPIROCIN: 20 OINTMENT TOPICAL at 09:08

## 2024-08-10 RX ADMIN — IPRATROPIUM BROMIDE AND ALBUTEROL SULFATE 3 ML: 2.5; .5 SOLUTION RESPIRATORY (INHALATION) at 01:08

## 2024-08-10 RX ADMIN — FUROSEMIDE 40 MG: 10 INJECTION, SOLUTION INTRAVENOUS at 09:08

## 2024-08-10 RX ADMIN — IPRATROPIUM BROMIDE AND ALBUTEROL SULFATE 3 ML: 2.5; .5 SOLUTION RESPIRATORY (INHALATION) at 07:08

## 2024-08-10 RX ADMIN — RIVAROXABAN 20 MG: 20 TABLET, FILM COATED ORAL at 05:08

## 2024-08-10 RX ADMIN — SERTRALINE HYDROCHLORIDE 100 MG: 50 TABLET ORAL at 09:08

## 2024-08-10 RX ADMIN — METOPROLOL TARTRATE 50 MG: 50 TABLET, FILM COATED ORAL at 12:08

## 2024-08-10 RX ADMIN — AZELASTINE HYDROCHLORIDE 137 MCG: 137 SPRAY, METERED NASAL at 09:08

## 2024-08-10 RX ADMIN — METOPROLOL TARTRATE 50 MG: 50 TABLET, FILM COATED ORAL at 05:08

## 2024-08-10 RX ADMIN — IPRATROPIUM BROMIDE AND ALBUTEROL SULFATE 3 ML: 2.5; .5 SOLUTION RESPIRATORY (INHALATION) at 08:08

## 2024-08-10 RX ADMIN — GUAIFENESIN AND DEXTROMETHORPHAN HYDROBROMIDE 1 TABLET: 600; 30 TABLET, EXTENDED RELEASE ORAL at 09:08

## 2024-08-10 RX ADMIN — PREDNISONE 50 MG: 50 TABLET ORAL at 09:08

## 2024-08-10 RX ADMIN — EMPAGLIFLOZIN 10 MG: 10 TABLET, FILM COATED ORAL at 09:08

## 2024-08-10 RX ADMIN — ATORVASTATIN CALCIUM 40 MG: 40 TABLET, FILM COATED ORAL at 09:08

## 2024-08-10 RX ADMIN — METOPROLOL TARTRATE 50 MG: 50 TABLET, FILM COATED ORAL at 06:08

## 2024-08-10 NOTE — NURSING
[facility]  Nurses Note -- 4 Eyes        Date: 08/10/2024        Skin assessed on: Q shift        [x] No Pressure Injuries Present                 []Prevention Measures Documented     [] Yes LDA Previously documented      [] Yes New Pressure Injury Discovered              [] LDA Added        Attending RN: CLAUDIA Spence     Second RN:  CLAUDIA Person

## 2024-08-10 NOTE — NURSING
Oxygen Evaluation     Date Performed:  08/10/2024     1) Patient's Home O2 Sat on room air, while at rest: 84%                               If O2 sats on room air at rest are 88% or below, patient qualifies. No additional testing needed. Document N/A in steps 2 and 3. If 89% or above, complete steps 2.        2) Patient's O2 Sat on room air while exercising:                               If O2 sats on room air while exercising remain 89% or above patient does not qualify, no further testing needed Document N/A in step 3. If O2 sats on room air while exercising are 88% or below, continue to step 3.        3) (Must show improvement from #2 for patients to qualify)     If O2 sats improve on oxygen, patient qualifies for portable oxygen. If not, the patient does not qualify.     93% on 2L via NC

## 2024-08-10 NOTE — PLAN OF CARE
Patient sitting up in recliner resp even and unlabored, O2 at 1 L NC, nonproductive cough upon standing, denies pain/discomfort/SOB, safely walked to restroom and back into recliner with standby assistance, recliner wheels locked, call light within reach    Problem: Skin Injury Risk Increased  Goal: Skin Health and Integrity  Outcome: Progressing  Intervention: Optimize Skin Protection  Flowsheets (Taken 8/10/2024 1559)  Pressure Reduction Devices: positioning supports utilized  Skin Protection: incontinence pads utilized  Activity Management:   Up to bedside commode - L3   Up in chair - L3   Walk with assistive devise and /or staff member - L3   Ambulated to bathroom - L4  Head of Bed (HOB) Positioning: HOB elevated     Problem: Fall Injury Risk  Goal: Absence of Fall and Fall-Related Injury  Outcome: Progressing  Intervention: Identify and Manage Contributors  Flowsheets (Taken 8/10/2024 1559)  Self-Care Promotion:   independence encouraged   BADL personal objects within reach  Medication Review/Management: medications reviewed  Intervention: Promote Injury-Free Environment  Flowsheets (Taken 8/10/2024 1559)  Safety Promotion/Fall Prevention:   assistive device/personal item within reach   bed alarm set   bedside commode chair   instructed to call staff for mobility   lighting adjusted   medications reviewed   nonskid shoes/socks when out of bed   room near unit station   side rails raised x 2

## 2024-08-10 NOTE — PT/OT/SLP PROGRESS
Physical Therapy Treatment    Patient Name:  Sandee Evans   MRN:  [id number]    Recommendations:     Discharge Recommendations: Low Intensity Therapy  Discharge Equipment Recommendations: oxygen   Barriers to discharge: None    Assessment:     Sandee Evans is a 68 y.o. female admitted with a medical diagnosis of Acute on chronic respiratory failure with hypercapnia.  She presents with the following impairments/functional limitations: weakness, impaired endurance, decreased lower extremity function, gait instability, pain, decreased safety awareness, impaired cardiopulmonary response to activity, decreased ROM .    Rehab Prognosis: Good; patient would benefit from acute skilled PT services to address these deficits and reach maximum level of function.    Recent Surgery: * No surgery found *      Plan:     During this hospitalization, patient to be seen 3 x/week to address the identified rehab impairments via gait training, therapeutic activities, therapeutic exercises and progress toward the following goals:    Plan of Care Expires:  08/22/24    Subjective     Chief Complaint: coughing   Patient/Family Comments/goals: pt is pleasant and agreeable to therapy   Pain/Comfort:  Pain Rating 1: 0/10  Pain Rating Post-Intervention 1: 0/10      Objective:     Communicated with nurse prior to session.  Patient found HOB elevated with telemetry, oxygen, bed alarm, peripheral IV, PureWick ( pt sound soiled with urine dues to purewick malfunction, stated she is unaware of being wet )  upon PT entry to room.     General Precautions: Standard, fall, respiratory, droplet  Orthopedic Precautions: N/A  Braces: N/A  Respiratory Status: Nasal cannula, flow 1  L/min, at rest on 1L : 93%     SpO2 : 84% on RA at rest while sitting EOB.   SpO2:  88% - 93 % on 1 L and 90%-96% on 2L on exertions. Pt required rest breaks and VC's for pursed lip breathing technique throughout treatment .   Functional Mobility:  Bed Mobility:     Rolling  Left:  supervision  Scooting: supervision  Supine to Sit: supervision, HOB elevated, bedside rail   Transfers:     Sit to Stand: X 3 trials from bed and 1 trial from chair  supervision with no AD  Bed to Chair: stand by assistance with  no AD  using  Step Transfer  Gait: pt ambulated ~ 80 ft and 20 ft  with no AD, SBA ( 1L then  2L O2NC ). Pt required 1 standing and 1 seated rest break 2* to fatigue . Noted with decreased dmitriy,decreased step length, lateral swaying however no LOB. VC's for safety and pursed lip breathing technique.    Balance:  good in sitting, fair + in standing.        AM-PAC 6 CLICK MOBILITY  Turning over in bed (including adjusting bedclothes, sheets and blankets)?: 4  Sitting down on and standing up from a chair with arms (e.g., wheelchair, bedside commode, etc.): 4  Moving from lying on back to sitting on the side of the bed?: 4  Moving to and from a bed to a chair (including a wheelchair)?: 4  Need to walk in hospital room?: 3  Climbing 3-5 steps with a railing?: 3  Basic Mobility Total Score: 22       Treatment & Education:  Pt tolerated static standing for diaper changed  with no AD, CGA  Changed front/back gown in sitting with set up assistance.    Re-educated pt on safety awareness with all OOB mobility , transfer / gait training .   Re-educated pt on pursed lip breathing technique and energy conservation techniques   Encouraged pt to perform BLE ex's per handout throughout the day. Pt performed seated BLE 20 reps : AP.   Patient left up in chair with tray table set up  all lines intact, call button in reach, and nurse  notified..    GOALS:   Multidisciplinary Problems       Physical Therapy Goals          Problem: Physical Therapy    Goal Priority Disciplines Outcome Goal Variances Interventions   Physical Therapy Goal     PT, PT/OT Progressing     Description: Goals to be met by: 24     Patient will increase functional independence with mobility by performin. Supine to  sit with Modified [place]  2. Rolling to Left and Right with Modified [place]  3. Sit to stand transfer with Modified [place] using RW  4. Bed to chair transfer with Modified [place] using Rolling Walker  5. Gait x200 feet with Modified [place] using Rolling Walker   6. Upper/Lower extremity exercise program 2 sets x15 reps per handout, with independence                         Time Tracking:     PT Received On: 08/10/24  PT Start Time: 1212     PT Stop Time: 1250  PT Total Time (min): 38 min     Billable Minutes: Gait Training 15 and Therapeutic Activity 23    Treatment Type: Treatment  PT/PTA: PTA     Number of PTA visits since last PT visit: 2     08/10/2024

## 2024-08-10 NOTE — PROGRESS NOTES
[facility] Medicine  Progress Note    Patient Name: Sandee Evans  MRN: [id number]  Patient Class: IP- Inpatient   Admission Date: 8/6/2024  Length of Stay: 4 days  Attending Physician: Erma Pedraza,*  Primary Care Provider: Kuldeep Miller MD        Subjective:     Principal Problem:Acute on chronic respiratory failure with hypercapnia        HPI:    Patient is a 68-year-old morbidly obese female with past medical history of chronic AFib on Xarelto, CHF EF 55-60% 11/23, hypertension, diabetes, hyperlipidemia, PVD, tobacco abuse , COPD, HIEU, hypothyroidism, tobacco use who presented to [facility] ER on 08/06/2024 for further evaluation of progressively worsening shortness of breath x 1 week.  Reports associated  orthopnea/pedal edema..  Patient stated that she had 3 episodes of nonbilious nonbloody vomiting.   reports patient is noncompliant with CPAP in diuretics at times at home.  No fever/abdominal pain/diarrhea/constipation/chest pain.   reports chronic cough.  Continues to smoke cigarettes, 1-2 per day for 50 years.  No recent travel/sick contacts    During evaluation in the ER, patient was found to be in respiratory distress.  Oxygen saturations at 93% on room air.  EKG revealed  AFib with RVR (heart rate).  Labs revealed WBC of 4.48, hemoglobin 13.7, sodium 140, creatinine 1, , COVID/flu negative.  Chest x-ray revealed mildly prominent interstitial markings could relate to pulmonary vascular congestion/edema VBG revealed 7.278/80.4/49/37.6.  Patient was given duo neb x1, 325 mg aspirin, 10 mg IV diltiazem, 40 mg IV Lasix, 1.25 mg Xopenex, 125 mg Solu-Medrol, 4 mg Zofran.  Cardiology was consulted in ED. patient was placed on BiPAP.  Admitted to ICU for further management    Overview/Hospital Course:  68-year-old morbidly obese female with past medical history of chronic AFib on Xarelto, CHF EF 55-60% 11/23, hypertension, diabetes, hyperlipidemia,  PVD, tobacco abuse , COPD, HIEU, hypothyroidism, tobacco use who was admitted for Acute hypoxic hyper capneic respiratory failure secondary to CHF/COPD exacerbation/parainfluenza pneumonia. Noted to have AFib with RVR on presentation in ED, rate controlled with 1 dose of diltiazem weaned off BiPAP support transitioned to 3 L of oxygen with nasal cannula  continue BiPAP p.r.n. neb/Q HS. On Lasix/Solu-Medrol /nebs .  Received ceftriaxone/azithromycin for cap coverage x 3 days.  Respiratory cultures with normal respiratory elli.Net -8.3L since admit. On oral  Prednisone.  Decrease Lasix to 40 b.i.d..  PT/OT on board wean oxygen as tolerated    Interval History:         Review of Systems   Constitutional: Negative.    Respiratory:  Positive for shortness of breath.    Cardiovascular:  Positive for leg swelling.   Gastrointestinal: Negative.    Genitourinary: Negative.    Musculoskeletal: Negative.    Neurological:  Positive for weakness.     Objective:     Vital Signs (Most Recent):  Temp: 98.4 °F (36.9 °C) (08/10/24 1204)  Pulse: 78 (08/10/24 1328)  Resp: 20 (08/10/24 1328)  BP: 122/86 (08/10/24 1204)  SpO2: (!) 94 % (08/10/24 1328) Vital Signs (24h Range):  Temp:  [97.7 °F (36.5 °C)-98.7 °F (37.1 °C)] 98.4 °F (36.9 °C)  Pulse:  [55-87] 78  Resp:  [18-22] 20  SpO2:  [93 %-100 %] 94 %  BP: (122-164)/() 122/86     Weight: 124.1 kg (273 lb 9.5 oz)  Body mass index is 48.46 kg/m².    Intake/Output Summary (Last 24 hours) at 8/10/2024 1448  Last data filed at 8/10/2024 1328  Gross per 24 hour   Intake 600 ml   Output 1600 ml   Net -1000 ml         Physical Exam  Constitutional:       General: She is not in acute distress.     Appearance: She is obese.   Cardiovascular:      Rate and Rhythm: Normal rate.      Pulses: Normal pulses.   Pulmonary:      Effort: No respiratory distress.      Breath sounds: Rales (On 2 L of oxygen with nasal cannula) present. No wheezing.   Abdominal:      General: Bowel sounds are normal.  There is no distension.      Palpations: Abdomen is soft.      Tenderness: There is no abdominal tenderness.   Musculoskeletal:      Right lower leg: Edema present.      Left lower leg: Edema present.   Skin:     General: Skin is warm.   Neurological:      Mental Status: She is alert and oriented to person, place, and time. Mental status is at baseline.   Psychiatric:         Mood and Affect: Mood normal.             Significant Labs: All pertinent labs within the past 24 hours have been reviewed.    Significant Imaging: I have reviewed all pertinent imaging results/findings within the past 24 hours.    Assessment/Plan:      * Acute on chronic respiratory failure with hypercapnia  Patient with Hypercapnic and Hypoxic Respiratory failure which is Acute.  she is not on home oxygen. Supplemental oxygen was provided and noted- Oxygen Concentration (%):  [28-30] 28    .   Signs/symptoms of respiratory failure include- increased work of breathing and respiratory distress. Contributing diagnoses includes - CHF and COPD Labs and images were reviewed. Patient Has not had a recent ABG. Will treat underlying causes and adjust management of respiratory failure as follows-BiPAP support/ diuretics/supplemental oxygen  Weaned off BiPAP support.  On 2 L of oxygen with nasal cannula  Continue BiPAP p.r.n. naps and q.h.s.    Acute on chronic diastolic CHF (congestive heart failure)  Patient is identified as having Diastolic (HFpEF) heart failure that is Acute on chronic. CHF is currently uncontrolled due to Dyspnea not returned to baseline after 1 doses of IV diuretic, Rales/crackles on pulmonary exam, and Pulmonary edema/pleural effusion on CXR. Latest ECHO performed and demonstrates- Results for orders placed during the hospital encounter of 11/13/23    Echo    Interpretation Summary    AFib noted throughout exam.    Left Ventricle: The left ventricle is normal in size. Normal wall thickness. Normal wall motion. There is normal  systolic function with a visually estimated ejection fraction of 55 - 60%. Unable to assess diastolic function due to atrial fibrillation.    Right Ventricle: Mild right ventricular enlargement. Systolic function is mildly reduced.    Mitral Valve: There is mild regurgitation.    Pulmonary Artery: The estimated pulmonary artery systolic pressure is 49 mmHg.  . Continue Beta Blocker and Furosemide and monitor clinical status closely. Monitor on telemetry. Patient is on CHF pathway.  Monitor strict Is&Os and daily weights.  Place on fluid restriction of 1.5 L. Cardiology has been consulted. Continue to stress to patient importance of self efficacy and  on diet for CHF. Last BNP reviewed- and noted below   Recent Labs   Lab 08/06/24  1108   *     Cut down Lasix to 40 IV b.i.d.    Paroxysmal atrial fibrillation  Patient with Paroxysmal (<7 days) atrial fibrillation which is uncontrolled currently with Beta Blocker. Patient is currently in atrial fibrillation.[id number] Score: 4.  On Xarelto for anticoagulation.    Parainfluenzal pneumonia  Tested positive for parainfluenza virus.  Received empiric ceftriaxone/azithromycin for 3 days.  Respiratory cultures negative    Antibiotics (From admission, onward)      Start     Stop Route Frequency Ordered    08/07/24 0900  mupirocin 2 % ointment         08/12/24 0859 Nasl 2 times daily 08/07/24 0632    08/06/24 1715  cefTRIAXone (ROCEPHIN) 2 g in D5W 100 mL IVPB (MB+)         08/08/24 2359 IV Every 24 hours (non-standard times) 08/06/24 1612    08/06/24 1715  azithromycin (ZITHROMAX) 500 mg in D5W 250 mL IVPB (Vial-Mate)         08/08/24 2359 IV Every 24 hours (non-standard times) 08/06/24 1612            Microbiology Results (last 7 days)       Procedure Component Value Units Date/Time    Culture, Respiratory with Gram Stain [[id number]] Collected: 08/07/24 0830    Order Status: Completed Specimen: Respiratory from Sputum Updated: 08/08/24 1051     Respiratory  Culture Normal respiratory elli     Gram Stain (Respiratory) <10 epithelial cells per low power field.     Gram Stain (Respiratory) Moderate WBC's     Gram Stain (Respiratory) Many Gram positive cocci in pairs and chains     Gram Stain (Respiratory) Moderate Gram positive cocci in clusters     Gram Stain (Respiratory) Rare Gram negative rods     Gram Stain (Respiratory) Rare Gram positive rods    Respiratory Infection Panel (PCR), Nasopharyngeal [[id number]]  (Abnormal) Collected: 08/07/24 1030    Order Status: Completed Specimen: Nasopharyngeal Swab Updated: 08/07/24 7681     Respiratory Infection Panel Source NP Swab     Adenovirus Not Detected     Coronavirus 229E, Common Cold Virus Not Detected     Coronavirus HKU1, Common Cold Virus Not Detected     Coronavirus NL63, Common Cold Virus Not Detected     Coronavirus OC43, Common Cold Virus Not Detected     Comment: The Coronavirus strains detected in this test cause the common cold.  These strains are not the COVID-19 (novel Coronavirus)strain   associated with the respiratory disease outbreak.          SARS-CoV2 (COVID-19) Qualitative PCR Not Detected     Human Metapneumovirus Not Detected     Human Rhinovirus/Enterovirus Not Detected     Influenza A (subtypes H1, H1-2009,H3) Not Detected     Influenza B Not Detected     Parainfluenza Virus 1 Detected     Parainfluenza Virus 2 Not Detected     Parainfluenza Virus 3 Not Detected     Parainfluenza Virus 4 Not Detected     Respiratory Syncytial Virus Not Detected     Bordetella Parapertussis ([id number]) Not Detected     Bordetella pertussis (ptxP) Not Detected     Chlamydia pneumoniae Not Detected     Mycoplasma pneumoniae Not Detected    Narrative:      Assay not valid for lower respiratory specimens, alternate  testing required.            COPD exacerbation  Patient's COPD is with exacerbation noted by continued dyspnea currently.  Patient is currently off COPD Pathway. Continue scheduled inhalers Steroids and  "Supplemental oxygen and monitor respiratory status closely.  Switch to oral prednisone    PVD (peripheral vascular disease)        Type 2 diabetes mellitus with diabetic cataract, without long-term current use of insulin  Patient's FSGs are controlled on current medication regimen.  Last A1c reviewed-   Lab Results   Component Value Date    HGBA1C 6.0 (H) 05/16/2024     Most recent fingerstick glucose reviewed- No results for input(s): "POCTGLUCOSE" in the last 24 hours.  Current correctional scale  stable  Maintain anti-hyperglycemic dose as follows-   Antihyperglycemics (From admission, onward)      Start     Stop Route Frequency Ordered    08/06/24 1515  empagliflozin (Jardiance) tablet 10 mg        Question Answer Comment   Does this patient have a diagnosis of heart failure? Yes    Does this patient have type 1 diabetes or diabetic ketoacidosis? No    Does this patient have symptomatic hypotension? No    Is the patient NPO or pending major surgery in next 3 days or less? No        -- Oral Daily 08/06/24 1404          Hold Oral hypoglycemics while patient is in the hospital.    Severe obesity (BMI >= 40)  Body mass index is 48.89 kg/m². Morbid obesity complicates all aspects of disease management from diagnostic modalities to treatment. Weight loss encouraged and health benefits explained to patient.         Essential hypertension  Chronic, controlled. Latest blood pressure and vitals reviewed-     Temp:  [98.1 °F (36.7 °C)]   Pulse:  []   Resp:  [23-54]   BP: ()/(53-84)   SpO2:  [89 %-100 %] .   Home meds for hypertension were reviewed and noted below.   Hypertension Medications               furosemide (LASIX) 40 MG tablet TAKE 1 TABLET(40 MG) BY MOUTH EVERY DAY    metoprolol succinate (TOPROL-XL) 100 MG 24 hr tablet Take 1 tablet (100 mg total) by mouth once daily.            While in the hospital, will manage blood pressure as follows; Adjust home antihypertensive regimen as follows- continue " Lasix and metoprolol    Will utilize p.r.n. blood pressure medication only if patient's blood pressure greater than 180/110 and she develops symptoms such as worsening chest pain or shortness of breath.    Hyperlipidemia    Continue home statin      VTE Risk Mitigation (From admission, onward)           Ordered     rivaroxaban tablet 20 mg  with dinner         08/06/24 1436     IP VTE HIGH RISK PATIENT  Once         08/06/24 1436     Place sequential compression device  Until discontinued         08/06/24 1436                    Discharge Planning   JACQUELINE:      Code Status: Full Code   Is the patient medically ready for discharge?:     Reason for patient still in hospital (select all that apply): Patient trending condition and Treatment  Discharge Plan A: Home with family                  Erma Pedraza MD  Department of Hospital Medicine   [facility]

## 2024-08-10 NOTE — SUBJECTIVE & OBJECTIVE
Interval History:         Review of Systems   Constitutional: Negative.    Respiratory:  Positive for shortness of breath.    Cardiovascular:  Positive for leg swelling.   Gastrointestinal: Negative.    Genitourinary: Negative.    Musculoskeletal: Negative.    Neurological:  Positive for weakness.     Objective:     Vital Signs (Most Recent):  Temp: 98.4 °F (36.9 °C) (08/10/24 1204)  Pulse: 78 (08/10/24 1328)  Resp: 20 (08/10/24 1328)  BP: 122/86 (08/10/24 1204)  SpO2: (!) 94 % (08/10/24 1328) Vital Signs (24h Range):  Temp:  [97.7 °F (36.5 °C)-98.7 °F (37.1 °C)] 98.4 °F (36.9 °C)  Pulse:  [55-87] 78  Resp:  [18-22] 20  SpO2:  [93 %-100 %] 94 %  BP: (122-164)/() 122/86     Weight: 124.1 kg (273 lb 9.5 oz)  Body mass index is 48.46 kg/m².    Intake/Output Summary (Last 24 hours) at 8/10/2024 1448  Last data filed at 8/10/2024 1328  Gross per 24 hour   Intake 600 ml   Output 1600 ml   Net -1000 ml         Physical Exam  Constitutional:       General: She is not in acute distress.     Appearance: She is obese.   Cardiovascular:      Rate and Rhythm: Normal rate.      Pulses: Normal pulses.   Pulmonary:      Effort: No respiratory distress.      Breath sounds: Rales (On 2 L of oxygen with nasal cannula) present. No wheezing.   Abdominal:      General: Bowel sounds are normal. There is no distension.      Palpations: Abdomen is soft.      Tenderness: There is no abdominal tenderness.   Musculoskeletal:      Right lower leg: Edema present.      Left lower leg: Edema present.   Skin:     General: Skin is warm.   Neurological:      Mental Status: She is alert and oriented to person, place, and time. Mental status is at baseline.   Psychiatric:         Mood and Affect: Mood normal.             Significant Labs: All pertinent labs within the past 24 hours have been reviewed.    Significant Imaging: I have reviewed all pertinent imaging results/findings within the past 24 hours.

## 2024-08-11 LAB
ANION GAP SERPL CALC-SCNC: 11 MMOL/L (ref 8–16)
BNP SERPL-MCNC: 155 PG/ML (ref 0–99)
BUN SERPL-MCNC: 49 MG/DL (ref 8–23)
CALCIUM SERPL-MCNC: 9 MG/DL (ref 8.7–10.5)
CHLORIDE SERPL-SCNC: 87 MMOL/L (ref 95–110)
CO2 SERPL-SCNC: 42 MMOL/L (ref 23–29)
CREAT SERPL-MCNC: 1.3 MG/DL (ref 0.5–1.4)
EST. GFR  (NO RACE VARIABLE): 44 ML/MIN/1.73 M^2
GLUCOSE SERPL-MCNC: 137 MG/DL (ref 70–110)
MAGNESIUM SERPL-MCNC: 2.2 MG/DL (ref 1.6–2.6)
POCT GLUCOSE: 129 MG/DL (ref 70–110)
POCT GLUCOSE: 186 MG/DL (ref 70–110)
POCT GLUCOSE: 189 MG/DL (ref 70–110)
POCT GLUCOSE: 204 MG/DL (ref 70–110)
POTASSIUM SERPL-SCNC: 3.7 MMOL/L (ref 3.5–5.1)
SODIUM SERPL-SCNC: 140 MMOL/L (ref 136–145)

## 2024-08-11 PROCEDURE — 94799 UNLISTED PULMONARY SVC/PX: CPT

## 2024-08-11 PROCEDURE — 25000242 PHARM REV CODE 250 ALT 637 W/ HCPCS: Performed by: STUDENT IN AN ORGANIZED HEALTH CARE EDUCATION/TRAINING PROGRAM

## 2024-08-11 PROCEDURE — 83735 ASSAY OF MAGNESIUM: CPT | Performed by: STUDENT IN AN ORGANIZED HEALTH CARE EDUCATION/TRAINING PROGRAM

## 2024-08-11 PROCEDURE — 25000003 PHARM REV CODE 250: Performed by: HOSPITALIST

## 2024-08-11 PROCEDURE — 94761 N-INVAS EAR/PLS OXIMETRY MLT: CPT

## 2024-08-11 PROCEDURE — 63600175 PHARM REV CODE 636 W HCPCS: Performed by: STUDENT IN AN ORGANIZED HEALTH CARE EDUCATION/TRAINING PROGRAM

## 2024-08-11 PROCEDURE — 21400001 HC TELEMETRY ROOM

## 2024-08-11 PROCEDURE — 94640 AIRWAY INHALATION TREATMENT: CPT

## 2024-08-11 PROCEDURE — 80048 BASIC METABOLIC PNL TOTAL CA: CPT | Performed by: STUDENT IN AN ORGANIZED HEALTH CARE EDUCATION/TRAINING PROGRAM

## 2024-08-11 PROCEDURE — 99900035 HC TECH TIME PER 15 MIN (STAT)

## 2024-08-11 PROCEDURE — 83880 ASSAY OF NATRIURETIC PEPTIDE: CPT | Performed by: STUDENT IN AN ORGANIZED HEALTH CARE EDUCATION/TRAINING PROGRAM

## 2024-08-11 PROCEDURE — 25000003 PHARM REV CODE 250: Performed by: STUDENT IN AN ORGANIZED HEALTH CARE EDUCATION/TRAINING PROGRAM

## 2024-08-11 PROCEDURE — 27000207 HC ISOLATION

## 2024-08-11 PROCEDURE — 36415 COLL VENOUS BLD VENIPUNCTURE: CPT | Performed by: STUDENT IN AN ORGANIZED HEALTH CARE EDUCATION/TRAINING PROGRAM

## 2024-08-11 PROCEDURE — 25000242 PHARM REV CODE 250 ALT 637 W/ HCPCS: Performed by: HOSPITALIST

## 2024-08-11 PROCEDURE — 94660 CPAP INITIATION&MGMT: CPT

## 2024-08-11 PROCEDURE — 27000221 HC OXYGEN, UP TO 24 HOURS

## 2024-08-11 RX ORDER — FLUTICASONE PROPIONATE 50 MCG
2 SPRAY, SUSPENSION (ML) NASAL DAILY
Status: DISCONTINUED | OUTPATIENT
Start: 2024-08-11 | End: 2024-08-14 | Stop reason: HOSPADM

## 2024-08-11 RX ORDER — ADHESIVE BANDAGE
30 BANDAGE TOPICAL DAILY PRN
Status: DISCONTINUED | OUTPATIENT
Start: 2024-08-11 | End: 2024-08-14 | Stop reason: HOSPADM

## 2024-08-11 RX ORDER — POLYETHYLENE GLYCOL 3350 17 G/17G
17 POWDER, FOR SOLUTION ORAL DAILY
Status: DISCONTINUED | OUTPATIENT
Start: 2024-08-11 | End: 2024-08-12

## 2024-08-11 RX ORDER — POTASSIUM CHLORIDE 20 MEQ/1
40 TABLET, EXTENDED RELEASE ORAL ONCE
Status: COMPLETED | OUTPATIENT
Start: 2024-08-11 | End: 2024-08-11

## 2024-08-11 RX ORDER — AMOXICILLIN 250 MG
1 CAPSULE ORAL 2 TIMES DAILY
Status: DISCONTINUED | OUTPATIENT
Start: 2024-08-11 | End: 2024-08-12

## 2024-08-11 RX ADMIN — METOPROLOL TARTRATE 50 MG: 50 TABLET, FILM COATED ORAL at 12:08

## 2024-08-11 RX ADMIN — SERTRALINE HYDROCHLORIDE 100 MG: 50 TABLET ORAL at 09:08

## 2024-08-11 RX ADMIN — MUPIROCIN: 20 OINTMENT TOPICAL at 10:08

## 2024-08-11 RX ADMIN — SENNOSIDES AND DOCUSATE SODIUM 1 TABLET: 50; 8.6 TABLET ORAL at 12:08

## 2024-08-11 RX ADMIN — ATORVASTATIN CALCIUM 40 MG: 40 TABLET, FILM COATED ORAL at 09:08

## 2024-08-11 RX ADMIN — EMPAGLIFLOZIN 10 MG: 10 TABLET, FILM COATED ORAL at 10:08

## 2024-08-11 RX ADMIN — PREDNISONE 50 MG: 50 TABLET ORAL at 10:08

## 2024-08-11 RX ADMIN — RIVAROXABAN 20 MG: 20 TABLET, FILM COATED ORAL at 04:08

## 2024-08-11 RX ADMIN — METOPROLOL TARTRATE 50 MG: 50 TABLET, FILM COATED ORAL at 06:08

## 2024-08-11 RX ADMIN — AZELASTINE HYDROCHLORIDE 137 MCG: 137 SPRAY, METERED NASAL at 10:08

## 2024-08-11 RX ADMIN — GUAIFENESIN AND DEXTROMETHORPHAN HYDROBROMIDE 1 TABLET: 600; 30 TABLET, EXTENDED RELEASE ORAL at 08:08

## 2024-08-11 RX ADMIN — FUROSEMIDE 40 MG: 10 INJECTION, SOLUTION INTRAVENOUS at 10:08

## 2024-08-11 RX ADMIN — SENNOSIDES AND DOCUSATE SODIUM 1 TABLET: 50; 8.6 TABLET ORAL at 08:08

## 2024-08-11 RX ADMIN — POTASSIUM CHLORIDE 40 MEQ: 1500 TABLET, EXTENDED RELEASE ORAL at 12:08

## 2024-08-11 RX ADMIN — INSULIN ASPART 4 UNITS: 100 INJECTION, SOLUTION INTRAVENOUS; SUBCUTANEOUS at 04:08

## 2024-08-11 RX ADMIN — IPRATROPIUM BROMIDE AND ALBUTEROL SULFATE 3 ML: 2.5; .5 SOLUTION RESPIRATORY (INHALATION) at 01:08

## 2024-08-11 RX ADMIN — MUPIROCIN: 20 OINTMENT TOPICAL at 08:08

## 2024-08-11 RX ADMIN — IPRATROPIUM BROMIDE AND ALBUTEROL SULFATE 3 ML: 2.5; .5 SOLUTION RESPIRATORY (INHALATION) at 02:08

## 2024-08-11 RX ADMIN — POLYETHYLENE GLYCOL 3350 17 G: 17 POWDER, FOR SOLUTION ORAL at 12:08

## 2024-08-11 RX ADMIN — IPRATROPIUM BROMIDE AND ALBUTEROL SULFATE 3 ML: 2.5; .5 SOLUTION RESPIRATORY (INHALATION) at 07:08

## 2024-08-11 RX ADMIN — FLUTICASONE PROPIONATE 100 MCG: 50 SPRAY, METERED NASAL at 12:08

## 2024-08-11 RX ADMIN — AZELASTINE HYDROCHLORIDE 137 MCG: 137 SPRAY, METERED NASAL at 08:08

## 2024-08-11 RX ADMIN — METOPROLOL TARTRATE 50 MG: 50 TABLET, FILM COATED ORAL at 11:08

## 2024-08-11 RX ADMIN — IPRATROPIUM BROMIDE AND ALBUTEROL SULFATE 3 ML: 2.5; .5 SOLUTION RESPIRATORY (INHALATION) at 08:08

## 2024-08-11 RX ADMIN — GUAIFENESIN AND DEXTROMETHORPHAN HYDROBROMIDE 1 TABLET: 600; 30 TABLET, EXTENDED RELEASE ORAL at 10:08

## 2024-08-11 NOTE — CONSULTS
[facility] Hospital Medicine  Telemedicine Consult Note     Patient has been accepted for transfer to [facility] and will be followed through telemedicine services beginning at 7 AM.        Margarita Romero MD  [facility] Medicine Staff

## 2024-08-11 NOTE — NURSING
[facility], [facility]  Nurses Note -- 4 Eyes      8/11/2024       Skin assessed on: Q Shift      [x] No Pressure Injuries Present    [x]Prevention Measures Documented    [] Yes LDA  for Pressure Injury Previously documented     [] Yes New Pressure Injury Discovered   [] LDA for New Pressure Injury Added      Attending RN:  Kimberly Blackwell RN     Second RN:  CLAUDIA Person

## 2024-08-11 NOTE — NURSING
Received report from off going nurse, Bebe. Patient received walking around the room. AAO X 3. Resp even and labored 24 on 1.5/nc. Tele box # 9625 is in use and is visible on the screen. Bed locked in the lowest position with SR up X 2. Call light within reach. 20G SL note to the pts RFA. Bipap noted at the bedside.   [facility], [facility]  Nurses Note -- 4 Eyes      8/10/2024       Skin assessed on: Q Shift      [x] No Pressure Injuries Present    []Prevention Measures Documented    [] Yes LDA  for Pressure Injury Previously documented     [] Yes New Pressure Injury Discovered   [] LDA for New Pressure Injury Added      Attending RN:  Raza Wong, RN     Second RN:  Bebe

## 2024-08-11 NOTE — PLAN OF CARE
Pt A&Ox4, free from falls/injury, and able to make needs known during shift. VSS on 1L per NC. Pt had no c/o pain during shift. Droplet isolation precautions continued per orders. Telemetry monitoring continued on box #[id number], visible and audible on monitor at nurses' station, no acute distress noted. Bed locked and in lowest position. Bedside table and call light in reach. Will cont to monitor.  Problem: Adult Inpatient Plan of Care  Goal: Plan of Care Review  Outcome: Progressing  Goal: Patient-Specific Goal (Individualized)  Outcome: Progressing  Goal: Absence of Hospital-Acquired Illness or Injury  Outcome: Progressing  Goal: Optimal Comfort and Wellbeing  Outcome: Progressing  Goal: Readiness for Transition of Care  Outcome: Progressing     Problem: Diabetes Comorbidity  Goal: Blood Glucose Level Within Targeted Range  Outcome: Progressing     Problem: Pneumonia  Goal: Fluid Balance  Outcome: Progressing  Goal: Effective Oxygenation and Ventilation  Outcome: Progressing     Problem: Infection  Goal: Absence of Infection Signs and Symptoms  Outcome: Progressing

## 2024-08-11 NOTE — NURSING
Home Oxygen Evaluation    Date Performed: 2024    1) Patient's Home O2 Sat on room air, while at rest: 82%      2) Patient's O2 Sat on room air while exercisin%       3) Patient's O2 Sat while exercising on O2: 95% at 2LPM

## 2024-08-12 PROBLEM — K59.01 SLOW TRANSIT CONSTIPATION: Status: ACTIVE | Noted: 2019-02-22

## 2024-08-12 LAB
ANION GAP SERPL CALC-SCNC: 10 MMOL/L (ref 8–16)
BASOPHILS # BLD AUTO: 0.01 K/UL (ref 0–0.2)
BASOPHILS NFR BLD: 0.1 % (ref 0–1.9)
BUN SERPL-MCNC: 51 MG/DL (ref 8–23)
CALCIUM SERPL-MCNC: 9.2 MG/DL (ref 8.7–10.5)
CHLORIDE SERPL-SCNC: 90 MMOL/L (ref 95–110)
CO2 SERPL-SCNC: 37 MMOL/L (ref 23–29)
CREAT SERPL-MCNC: 1.1 MG/DL (ref 0.5–1.4)
DIFFERENTIAL METHOD BLD: ABNORMAL
EOSINOPHIL # BLD AUTO: 0 K/UL (ref 0–0.5)
EOSINOPHIL NFR BLD: 0.5 % (ref 0–8)
ERYTHROCYTE [DISTWIDTH] IN BLOOD BY AUTOMATED COUNT: 13.2 % (ref 11.5–14.5)
EST. GFR  (NO RACE VARIABLE): 54 ML/MIN/1.73 M^2
GLUCOSE SERPL-MCNC: 149 MG/DL (ref 70–110)
HCT VFR BLD AUTO: 49.4 % (ref 37–48.5)
HGB BLD-MCNC: 15.7 G/DL (ref 12–16)
IMM GRANULOCYTES # BLD AUTO: 0.04 K/UL (ref 0–0.04)
IMM GRANULOCYTES NFR BLD AUTO: 0.5 % (ref 0–0.5)
LYMPHOCYTES # BLD AUTO: 1.8 K/UL (ref 1–4.8)
LYMPHOCYTES NFR BLD: 23.8 % (ref 18–48)
MAGNESIUM SERPL-MCNC: 2.2 MG/DL (ref 1.6–2.6)
MCH RBC QN AUTO: 31.2 PG (ref 27–31)
MCHC RBC AUTO-ENTMCNC: 31.8 G/DL (ref 32–36)
MCV RBC AUTO: 98 FL (ref 82–98)
MONOCYTES # BLD AUTO: 0.5 K/UL (ref 0.3–1)
MONOCYTES NFR BLD: 7.2 % (ref 4–15)
NEUTROPHILS # BLD AUTO: 5.1 K/UL (ref 1.8–7.7)
NEUTROPHILS NFR BLD: 67.9 % (ref 38–73)
NRBC BLD-RTO: 0 /100 WBC
PHOSPHATE SERPL-MCNC: 2.9 MG/DL (ref 2.7–4.5)
PLATELET # BLD AUTO: 212 K/UL (ref 150–450)
PMV BLD AUTO: 10.6 FL (ref 9.2–12.9)
POCT GLUCOSE: 125 MG/DL (ref 70–110)
POCT GLUCOSE: 142 MG/DL (ref 70–110)
POCT GLUCOSE: 176 MG/DL (ref 70–110)
POCT GLUCOSE: 190 MG/DL (ref 70–110)
POCT GLUCOSE: 71 MG/DL (ref 70–110)
POCT GLUCOSE: 96 MG/DL (ref 70–110)
POTASSIUM SERPL-SCNC: 4.3 MMOL/L (ref 3.5–5.1)
RBC # BLD AUTO: 5.03 M/UL (ref 4–5.4)
SODIUM SERPL-SCNC: 137 MMOL/L (ref 136–145)
WBC # BLD AUTO: 7.49 K/UL (ref 3.9–12.7)

## 2024-08-12 PROCEDURE — 94799 UNLISTED PULMONARY SVC/PX: CPT

## 2024-08-12 PROCEDURE — 25000003 PHARM REV CODE 250: Performed by: STUDENT IN AN ORGANIZED HEALTH CARE EDUCATION/TRAINING PROGRAM

## 2024-08-12 PROCEDURE — 97530 THERAPEUTIC ACTIVITIES: CPT | Mod: CQ

## 2024-08-12 PROCEDURE — 63600175 PHARM REV CODE 636 W HCPCS: Performed by: STUDENT IN AN ORGANIZED HEALTH CARE EDUCATION/TRAINING PROGRAM

## 2024-08-12 PROCEDURE — 36415 COLL VENOUS BLD VENIPUNCTURE: CPT | Performed by: STUDENT IN AN ORGANIZED HEALTH CARE EDUCATION/TRAINING PROGRAM

## 2024-08-12 PROCEDURE — 85025 COMPLETE CBC W/AUTO DIFF WBC: CPT | Performed by: HOSPITALIST

## 2024-08-12 PROCEDURE — 94640 AIRWAY INHALATION TREATMENT: CPT

## 2024-08-12 PROCEDURE — 21400001 HC TELEMETRY ROOM

## 2024-08-12 PROCEDURE — 25000003 PHARM REV CODE 250: Performed by: HOSPITALIST

## 2024-08-12 PROCEDURE — 97116 GAIT TRAINING THERAPY: CPT | Mod: CQ

## 2024-08-12 PROCEDURE — 84100 ASSAY OF PHOSPHORUS: CPT | Performed by: HOSPITALIST

## 2024-08-12 PROCEDURE — 27000221 HC OXYGEN, UP TO 24 HOURS

## 2024-08-12 PROCEDURE — 27000646 HC AEROBIKA DEVICE

## 2024-08-12 PROCEDURE — 25000242 PHARM REV CODE 250 ALT 637 W/ HCPCS: Performed by: HOSPITALIST

## 2024-08-12 PROCEDURE — 99900035 HC TECH TIME PER 15 MIN (STAT)

## 2024-08-12 PROCEDURE — 94761 N-INVAS EAR/PLS OXIMETRY MLT: CPT

## 2024-08-12 PROCEDURE — 27000207 HC ISOLATION

## 2024-08-12 PROCEDURE — 83735 ASSAY OF MAGNESIUM: CPT | Performed by: HOSPITALIST

## 2024-08-12 PROCEDURE — 80048 BASIC METABOLIC PNL TOTAL CA: CPT | Performed by: STUDENT IN AN ORGANIZED HEALTH CARE EDUCATION/TRAINING PROGRAM

## 2024-08-12 PROCEDURE — 25000242 PHARM REV CODE 250 ALT 637 W/ HCPCS: Performed by: STUDENT IN AN ORGANIZED HEALTH CARE EDUCATION/TRAINING PROGRAM

## 2024-08-12 PROCEDURE — 97110 THERAPEUTIC EXERCISES: CPT | Mod: CQ

## 2024-08-12 PROCEDURE — 94664 DEMO&/EVAL PT USE INHALER: CPT

## 2024-08-12 RX ORDER — FLUTICASONE FUROATE AND VILANTEROL 200; 25 UG/1; UG/1
1 POWDER RESPIRATORY (INHALATION) DAILY
Status: DISCONTINUED | OUTPATIENT
Start: 2024-08-12 | End: 2024-08-12

## 2024-08-12 RX ORDER — POLYETHYLENE GLYCOL 3350 17 G/17G
17 POWDER, FOR SOLUTION ORAL 2 TIMES DAILY PRN
Status: DISCONTINUED | OUTPATIENT
Start: 2024-08-12 | End: 2024-08-14 | Stop reason: HOSPADM

## 2024-08-12 RX ORDER — AMOXICILLIN 250 MG
1 CAPSULE ORAL DAILY PRN
Status: DISCONTINUED | OUTPATIENT
Start: 2024-08-12 | End: 2024-08-14 | Stop reason: HOSPADM

## 2024-08-12 RX ORDER — ARFORMOTEROL TARTRATE 15 UG/2ML
15 SOLUTION RESPIRATORY (INHALATION) 2 TIMES DAILY
Status: DISCONTINUED | OUTPATIENT
Start: 2024-08-12 | End: 2024-08-14 | Stop reason: HOSPADM

## 2024-08-12 RX ORDER — BUDESONIDE 0.5 MG/2ML
1 INHALANT ORAL 2 TIMES DAILY
Status: DISCONTINUED | OUTPATIENT
Start: 2024-08-12 | End: 2024-08-14 | Stop reason: HOSPADM

## 2024-08-12 RX ADMIN — IPRATROPIUM BROMIDE AND ALBUTEROL SULFATE 3 ML: 2.5; .5 SOLUTION RESPIRATORY (INHALATION) at 01:08

## 2024-08-12 RX ADMIN — SERTRALINE HYDROCHLORIDE 100 MG: 50 TABLET ORAL at 08:08

## 2024-08-12 RX ADMIN — INSULIN ASPART 2 UNITS: 100 INJECTION, SOLUTION INTRAVENOUS; SUBCUTANEOUS at 04:08

## 2024-08-12 RX ADMIN — PREDNISONE 50 MG: 50 TABLET ORAL at 08:08

## 2024-08-12 RX ADMIN — METOPROLOL TARTRATE 50 MG: 50 TABLET, FILM COATED ORAL at 05:08

## 2024-08-12 RX ADMIN — ARFORMOTEROL TARTRATE 15 MCG: 15 SOLUTION RESPIRATORY (INHALATION) at 07:08

## 2024-08-12 RX ADMIN — GUAIFENESIN AND DEXTROMETHORPHAN HYDROBROMIDE 1 TABLET: 600; 30 TABLET, EXTENDED RELEASE ORAL at 09:08

## 2024-08-12 RX ADMIN — RIVAROXABAN 20 MG: 20 TABLET, FILM COATED ORAL at 04:08

## 2024-08-12 RX ADMIN — FLUTICASONE PROPIONATE 100 MCG: 50 SPRAY, METERED NASAL at 08:08

## 2024-08-12 RX ADMIN — AZELASTINE HYDROCHLORIDE 137 MCG: 137 SPRAY, METERED NASAL at 08:08

## 2024-08-12 RX ADMIN — IPRATROPIUM BROMIDE AND ALBUTEROL SULFATE 3 ML: 2.5; .5 SOLUTION RESPIRATORY (INHALATION) at 07:08

## 2024-08-12 RX ADMIN — METOPROLOL TARTRATE 50 MG: 50 TABLET, FILM COATED ORAL at 12:08

## 2024-08-12 RX ADMIN — ATORVASTATIN CALCIUM 40 MG: 40 TABLET, FILM COATED ORAL at 08:08

## 2024-08-12 RX ADMIN — POLYETHYLENE GLYCOL 3350 17 G: 17 POWDER, FOR SOLUTION ORAL at 08:08

## 2024-08-12 RX ADMIN — METOPROLOL TARTRATE 50 MG: 50 TABLET, FILM COATED ORAL at 06:08

## 2024-08-12 RX ADMIN — BUDESONIDE 1 MG: 0.5 INHALANT RESPIRATORY (INHALATION) at 07:08

## 2024-08-12 RX ADMIN — GUAIFENESIN AND DEXTROMETHORPHAN HYDROBROMIDE 1 TABLET: 600; 30 TABLET, EXTENDED RELEASE ORAL at 08:08

## 2024-08-12 RX ADMIN — SENNOSIDES AND DOCUSATE SODIUM 1 TABLET: 50; 8.6 TABLET ORAL at 08:08

## 2024-08-12 RX ADMIN — EMPAGLIFLOZIN 10 MG: 10 TABLET, FILM COATED ORAL at 08:08

## 2024-08-12 RX ADMIN — AZELASTINE HYDROCHLORIDE 137 MCG: 137 SPRAY, METERED NASAL at 09:08

## 2024-08-12 NOTE — PLAN OF CARE
Problem: Adult Inpatient Plan of Care  Goal: Plan of Care Review  Flowsheets (Taken 8/12/2024 0728)  Plan of Care Reviewed With: patient  Goal: Absence of Hospital-Acquired Illness or Injury  Intervention: Identify and Manage Fall Risk  Flowsheets (Taken 8/12/2024 0728)  Safety Promotion/Fall Prevention:   assistive device/personal item within reach   bed alarm set   bedside commode chair   diversional activities provided   Fall Risk reviewed with patient/family   instructed to call staff for mobility   lighting adjusted   medications reviewed   nonskid shoes/socks when out of bed   patient expresses understanding of fall risk and prevention   side rails raised x 2  Intervention: Prevent Skin Injury  Flowsheets (Taken 8/12/2024 0728)  Body Position: position changed independently  Intervention: Prevent Infection  Flowsheets (Taken 8/12/2024 0728)  Infection Prevention:   environmental surveillance performed   hand hygiene promoted   single patient room provided  Goal: Optimal Comfort and Wellbeing  Intervention: Monitor Pain and Promote Comfort  Flowsheets (Taken 8/12/2024 0728)  Pain Management Interventions: care clustered  Intervention: Provide Person-Centered Care  Flowsheets (Taken 8/12/2024 0728)  Trust Relationship/Rapport:   care explained   choices provided     Problem: Diabetes Comorbidity  Goal: Blood Glucose Level Within Targeted Range  Intervention: Monitor and Manage Glycemia  Flowsheets (Taken 8/12/2024 0728)  Glycemic Management: blood glucose monitored     Problem: Skin Injury Risk Increased  Goal: Skin Health and Integrity  Intervention: Optimize Skin Protection  Flowsheets (Taken 8/12/2024 0728)  Activity Management:   Rolling - L1   Up to bedside commode - L3   Up in chair - L3     Problem: Pneumonia  Goal: Resolution of Infection Signs and Symptoms  Intervention: Prevent Infection Progression  Flowsheets (Taken 8/12/2024 0728)  Infection Management: aseptic technique maintained  Isolation  Precautions: droplet     Problem: Infection  Goal: Absence of Infection Signs and Symptoms  Intervention: Prevent or Manage Infection  Flowsheets (Taken 8/12/2024 0728)  Infection Management: aseptic technique maintained  Isolation Precautions: droplet     Problem: Fall Injury Risk  Goal: Absence of Fall and Fall-Related Injury  Intervention: Identify and Manage Contributors  Flowsheets (Taken 8/12/2024 0728)  Self-Care Promotion: independence encouraged  Intervention: Promote Injury-Free Environment  Flowsheets (Taken 8/12/2024 0728)  Safety Promotion/Fall Prevention:   assistive device/personal item within reach   bed alarm set   bedside commode chair   diversional activities provided   Fall Risk reviewed with patient/family   instructed to call staff for mobility   lighting adjusted   medications reviewed   nonskid shoes/socks when out of bed   patient expresses understanding of fall risk and prevention   side rails raised x 2

## 2024-08-12 NOTE — PT/OT/SLP PROGRESS
Physical Therapy Treatment    Patient Name:  Sandee Evans   MRN:  [id number]    Recommendations:     Discharge Recommendations: Low Intensity Therapy  Discharge Equipment Recommendations: oxygen   Barriers to discharge: None    Assessment:     Sandee Evans is a 68 y.o. female admitted with a medical diagnosis of Acute on chronic respiratory failure with hypercapnia.  She presents with the following impairments/functional limitations: weakness, gait instability, decreased lower extremity function, decreased safety awareness, impaired balance, impaired cardiopulmonary response to activity, impaired endurance .    Rehab Prognosis: Good; patient would benefit from acute skilled PT services to address these deficits and reach maximum level of function.    Recent Surgery: * No surgery found *      Plan:     During this hospitalization, patient to be seen 3 x/week to address the identified rehab impairments via gait training, therapeutic activities, therapeutic exercises and progress toward the following goals:    Plan of Care Expires:  08/22/24    Subjective     Chief Complaint: may have to go home on oxygen   Patient/Family Comments/goals: pt is agreeable to therapy   Pain/Comfort:  Pain Rating 1: 0/10  Pain Rating Post-Intervention 1: 0/10      Objective:     Communicated with nurse  prior to session.  Patient found up in chair with telemetry, oxygen, PureWick, peripheral IV upon PT entry to room.     General Precautions: Standard, fall, droplet  Orthopedic Precautions: N/A  Braces: N/A  Respiratory Status: Nasal cannula, flow 1 L/min   SpO2 : 84% on RA with ambulation .   SpO2:  88% - 93 % on 1 L and 93%-96% on 2L on exertions. Pt required rest breaks and VC's for pursed lip breathing technique throughout treatment .   Functional Mobility:  Bed Mobility:     Scooting: mod I   Supine to Sit: mod I , HOB elevated, bedside rail   Transfers:     Sit to Stand: 2 trials from bed and 2 trials from chair  supervision with  no AD  Chair to bed : stand by assistance/ S with  no AD  using  Step Transfer  Gait: pt ambulated 140 ft  with no AD, SBA ( 1L then  2L O2NC ). Pt required 1 standing  rest break 2* to fatigue . Noted with decreased dmitriy,decreased step length, lateral swaying however no LOB. VC's for safety and pursed lip breathing technique.    Balance:  good in sitting, fair + in standing.         AM-PAC 6 CLICK MOBILITY  Turning over in bed (including adjusting bedclothes, sheets and blankets)?: 4  Sitting down on and standing up from a chair with arms (e.g., wheelchair, bedside commode, etc.): 4  Moving from lying on back to sitting on the side of the bed?: 4  Moving to and from a bed to a chair (including a wheelchair)?: 4  Need to walk in hospital room?: 3  Climbing 3-5 steps with a railing?: 3  Basic Mobility Total Score: 22       Treatment & Education:  Lower Extremity Exercises.    Patient educated on: Purpose and Benefits of Therapeutic Exercise(s).    Patient verbalized acceptance/understanding of instruction(s), expectation(s), and limitation(s) (for safety).  Patient performed: 20 reps (each) of B LE Ther Ex: AP, Hip flexion while sitting up on chair.   Performed standing BLE x 10 reps x 2 : heels /toes raised, marching in placed with BUE supported , S , no LOB.       Patient required rest breaks, pursed lip breathing technique, verbal cues/tactile cues to ensure correct sequence, to maintain proper form, and to allow for self-correction.       Patient required increase Reaction Time to initiate movements and a Sitting Rest Break between set(s) to recover.      Patient left with bed in chair position with all lines intact, call button in reach, bed alarm on, nurse notified, and spouse  present..    GOALS:   Multidisciplinary Problems       Physical Therapy Goals          Problem: Physical Therapy    Goal Priority Disciplines Outcome Goal Variances Interventions   Physical Therapy Goal     PT, PT/OT Progressing      Description: Goals to be met by: 24     Patient will increase functional independence with mobility by performin. Supine to sit with Modified [place]  2. Rolling to Left and Right with Modified [place]  3. Sit to stand transfer with Modified [place] using RW  4. Bed to chair transfer with Modified [place] using Rolling Walker  5. Gait x200 feet with Modified [place] using Rolling Walker   6. Upper/Lower extremity exercise program 2 sets x15 reps per handout, with independence                         Time Tracking:     PT Received On: 24  PT Start Time: 1516     PT Stop Time: 1554  PT Total Time (min): 38 min     Billable Minutes: Gait Training 14, Therapeutic Activity 12, and Therapeutic Exercise 12    Treatment Type: Treatment  PT/PTA: PTA     Number of PTA visits since last PT visit: 3     2024

## 2024-08-12 NOTE — NURSING
Nurses Note -- 4 Eyes      8/11/2024   10:55 PM      Skin assessed during: Q Shift Change      [x] No Pressure Injuries Present    [x]Prevention Measures Documented      [] Yes- Altered Skin Integrity Present or Discovered   [] LDA Added if Not in Epic (Describe Wound)   [] New Altered Skin Integrity was Present on Admit and Documented in LDA   [] Wound Image Taken      Attending Nurse:  Payal Corley RN/Staff Member:  Kimberly

## 2024-08-12 NOTE — ASSESSMENT & PLAN NOTE
Patient with Hypercapnic and Hypoxic Respiratory failure which is Acute.  she is not on home oxygen. Supplemental oxygen was provided and noted- Oxygen Concentration (%):  [28] 28    .   Signs/symptoms of respiratory failure include- increased work of breathing and respiratory distress. Contributing diagnoses includes - CHF and COPD Labs and images were reviewed. Patient Has not had a recent ABG. Will treat underlying causes and adjust management of respiratory failure as follows-BiPAP support/ diuretics/supplemental oxygen  Weaned off BiPAP support.  On 2 L of oxygen with nasal cannula  Continue BiPAP p.r.n. naps and q.h.s.    Wean off O2 as tolerated. IS ordered  6 min walk test on 8/11, still needed oxygen.   Continue steroids as stated above.   Repeat walk test ordered on 8/12. Cxr ordered. Start breo

## 2024-08-12 NOTE — ASSESSMENT & PLAN NOTE
Patient with Hypercapnic and Hypoxic Respiratory failure which is Acute.  she is not on home oxygen. Supplemental oxygen was provided and noted- Oxygen Concentration (%):  [28] 28    .   Signs/symptoms of respiratory failure include- increased work of breathing and respiratory distress. Contributing diagnoses includes - CHF and COPD Labs and images were reviewed. Patient Has not had a recent ABG. Will treat underlying causes and adjust management of respiratory failure as follows-BiPAP support/ diuretics/supplemental oxygen  Weaned off BiPAP support.  On 2 L of oxygen with nasal cannula  Continue BiPAP p.r.n. naps and q.h.s.    Wean off O2 as tolerated. IS ordered  6 min walk test on 8/11, still needed oxygen.   Continue steroids as stated above.

## 2024-08-12 NOTE — NURSING
Report received from ivan Moe RN. Patient resting quietly, no apparent distress noted at this time. Wheels locked in lowest position, call light within reach, Telemetry monitoring in place.    [facility]  Nurses Note -- 4 Eyes      8/12/2024       Skin assessed on: Q Shift      [x] No Pressure Injuries Present    [x]Prevention Measures Documented    [] Yes LDA  for Pressure Injury Previously documented     [] Yes New Pressure Injury Discovered   [] LDA for New Pressure Injury Added      Attending RN:  Maddie Wang LPN     Second RN:  CLAUDIA Moe

## 2024-08-12 NOTE — PROGRESS NOTES
[facility] Medicine  Telemedicine Progress Note    Patient Name: Sandee Evans  MRN: [id number]  Patient Class: IP- Inpatient   Admission Date: 8/6/2024  Length of Stay: 6 days  Attending Physician: Margarita Romero MD  Primary Care Provider: Kuldeep Miller MD          Subjective:     Principal Problem:Acute on chronic respiratory failure with hypercapnia        HPI:    Patient is a 68-year-old morbidly obese female with past medical history of chronic AFib on Xarelto, CHF EF 55-60% 11/23, hypertension, diabetes, hyperlipidemia, PVD, tobacco abuse , COPD, HIEU, hypothyroidism, tobacco use who presented to [facility] ER on 08/06/2024 for further evaluation of progressively worsening shortness of breath x 1 week.  Reports associated  orthopnea/pedal edema..  Patient stated that she had 3 episodes of nonbilious nonbloody vomiting.   reports patient is noncompliant with CPAP in diuretics at times at home.  No fever/abdominal pain/diarrhea/constipation/chest pain.   reports chronic cough.  Continues to smoke cigarettes, 1-2 per day for 50 years.  No recent travel/sick contacts    During evaluation in the ER, patient was found to be in respiratory distress.  Oxygen saturations at 93% on room air.  EKG revealed  AFib with RVR (heart rate).  Labs revealed WBC of 4.48, hemoglobin 13.7, sodium 140, creatinine 1, , COVID/flu negative.  Chest x-ray revealed mildly prominent interstitial markings could relate to pulmonary vascular congestion/edema VBG revealed 7.278/80.4/49/37.6.  Patient was given duo neb x1, 325 mg aspirin, 10 mg IV diltiazem, 40 mg IV Lasix, 1.25 mg Xopenex, 125 mg Solu-Medrol, 4 mg Zofran.  Cardiology was consulted in ED. patient was placed on BiPAP.  Admitted to ICU for further management    Overview/Hospital Course:  68-year-old morbidly obese female with past medical history of chronic AFib on Xarelto, CHF EF 55-60% 11/23, hypertension, diabetes,  hyperlipidemia, PVD, tobacco abuse , COPD, HIEU, hypothyroidism, tobacco use who was admitted for Acute hypoxic hyper capneic respiratory failure secondary to CHF/COPD exacerbation/parainfluenza pneumonia. Noted to have AFib with RVR on presentation in ED, rate controlled with 1 dose of diltiazem weaned off BiPAP support transitioned to 3 L of oxygen with nasal cannula  continue BiPAP p.r.n. neb/Q HS. On Lasix/Solu-Medrol /nebs .  Received ceftriaxone/azithromycin for cap coverage x 3 days.  Respiratory cultures with normal respiratory elli.Net -8.3L since admit. On oral  Prednisone.  Decrease Lasix to 40 b.i.d..  PT/OT on board wean oxygen as tolerated    Interval History: Patient doing well today and overall feels better. No acute complaints. SOB is improving. Pt still needing 2L O2. Failed walk test yesterday. Will repeat CXR, continue steroids, CPT, IS. Wean off O2. Repeat walk test again today.     Review of Systems   Constitutional:  Positive for activity change and fatigue. Negative for fever.   Respiratory:  Negative for shortness of breath.    Cardiovascular:  Negative for chest pain.   Gastrointestinal:  Positive for constipation. Negative for abdominal pain.   Neurological:  Negative for dizziness and headaches.   All other systems reviewed and are negative.    Objective:     Vital Signs (Most Recent):  Temp: 98.7 °F (37.1 °C) (08/12/24 1138)  Pulse: 62 (08/12/24 1138)  Resp: 18 (08/12/24 1138)  BP: (!) 149/71 (08/12/24 1138)  SpO2: (!) 86 % (08/12/24 1138) Vital Signs (24h Range):  Temp:  [97.7 °F (36.5 °C)-98.7 °F (37.1 °C)] 98.7 °F (37.1 °C)  Pulse:  [52-88] 62  Resp:  [17-20] 18  SpO2:  [86 %-100 %] 86 %  BP: (127-149)/(69-84) 149/71     Weight: 124.1 kg (273 lb 9.5 oz)  Body mass index is 48.46 kg/m².    Intake/Output Summary (Last 24 hours) at 8/12/2024 1200  Last data filed at 8/12/2024 0639  Gross per 24 hour   Intake 720 ml   Output 750 ml   Net -30 ml         Physical Exam  Vitals and nursing  note reviewed.   Constitutional:       Appearance: Normal appearance.   HENT:      Head: Normocephalic and atraumatic.   Eyes:      Extraocular Movements: Extraocular movements intact.      Pupils: Pupils are equal, round, and reactive to light.   Cardiovascular:      Rate and Rhythm: Normal rate and regular rhythm.   Pulmonary:      Effort: Pulmonary effort is normal. No respiratory distress.   Abdominal:      General: There is no distension.   Musculoskeletal:         General: Normal range of motion.      Cervical back: Normal range of motion.   Neurological:      General: No focal deficit present.      Mental Status: She is alert and oriented to person, place, and time.   Psychiatric:         Mood and Affect: Mood normal.         Behavior: Behavior normal.             Significant Labs: All pertinent labs within the past 24 hours have been reviewed.  CBC:   Recent Labs   Lab 08/12/24  1038   WBC 7.49   HGB 15.7   HCT 49.4*        CMP:   Recent Labs   Lab 08/11/24  1007 08/12/24  1038    137   K 3.7 4.3   CL 87* 90*   CO2 42* 37*   * 149*   BUN 49* 51*   CREATININE 1.3 1.1   CALCIUM 9.0 9.2   ANIONGAP 11 10       Significant Imaging: I have reviewed all pertinent imaging results/findings within the past 24 hours.    Assessment/Plan:      * Acute on chronic respiratory failure with hypercapnia  Patient with Hypercapnic and Hypoxic Respiratory failure which is Acute.  she is not on home oxygen. Supplemental oxygen was provided and noted- Oxygen Concentration (%):  [28] 28    .   Signs/symptoms of respiratory failure include- increased work of breathing and respiratory distress. Contributing diagnoses includes - CHF and COPD Labs and images were reviewed. Patient Has not had a recent ABG. Will treat underlying causes and adjust management of respiratory failure as follows-BiPAP support/ diuretics/supplemental oxygen  Weaned off BiPAP support.  On 2 L of oxygen with nasal cannula  Continue BiPAP  p.r.n. naps and q.h.s.    Wean off O2 as tolerated. IS ordered  6 min walk test on 8/11, still needed oxygen.   Continue steroids as stated above.   Repeat walk test ordered on 8/12. Cxr ordered. Start breo     Parainfluenzal pneumonia  Tested positive for parainfluenza virus.  Received empiric ceftriaxone/azithromycin for 3 days.  Respiratory cultures negative    Antibiotics (From admission, onward)      Start     Stop Route Frequency Ordered    08/07/24 0900  mupirocin 2 % ointment         08/12/24 0859 Nasl 2 times daily 08/07/24 0632    08/06/24 1715  cefTRIAXone (ROCEPHIN) 2 g in D5W 100 mL IVPB (MB+)         08/08/24 2359 IV Every 24 hours (non-standard times) 08/06/24 1612    08/06/24 1715  azithromycin (ZITHROMAX) 500 mg in D5W 250 mL IVPB (Vial-Mate)         08/08/24 2359 IV Every 24 hours (non-standard times) 08/06/24 1612            Microbiology Results (last 7 days)       Procedure Component Value Units Date/Time    Culture, Respiratory with Gram Stain [[id number]] Collected: 08/07/24 0830    Order Status: Completed Specimen: Respiratory from Sputum Updated: 08/08/24 1051     Respiratory Culture Normal respiratory elli     Gram Stain (Respiratory) <10 epithelial cells per low power field.     Gram Stain (Respiratory) Moderate WBC's     Gram Stain (Respiratory) Many Gram positive cocci in pairs and chains     Gram Stain (Respiratory) Moderate Gram positive cocci in clusters     Gram Stain (Respiratory) Rare Gram negative rods     Gram Stain (Respiratory) Rare Gram positive rods    Respiratory Infection Panel (PCR), Nasopharyngeal [[id number]]  (Abnormal) Collected: 08/07/24 1030    Order Status: Completed Specimen: Nasopharyngeal Swab Updated: 08/07/24 1753     Respiratory Infection Panel Source NP Swab     Adenovirus Not Detected     Coronavirus 229E, Common Cold Virus Not Detected     Coronavirus HKU1, Common Cold Virus Not Detected     Coronavirus NL63, Common Cold Virus Not Detected     Coronavirus  OC43, Common Cold Virus Not Detected     Comment: The Coronavirus strains detected in this test cause the common cold.  These strains are not the COVID-19 (novel Coronavirus)strain   associated with the respiratory disease outbreak.          SARS-CoV2 (COVID-19) Qualitative PCR Not Detected     Human Metapneumovirus Not Detected     Human Rhinovirus/Enterovirus Not Detected     Influenza A (subtypes H1, H1-2009,H3) Not Detected     Influenza B Not Detected     Parainfluenza Virus 1 Detected     Parainfluenza Virus 2 Not Detected     Parainfluenza Virus 3 Not Detected     Parainfluenza Virus 4 Not Detected     Respiratory Syncytial Virus Not Detected     Bordetella Parapertussis (EL1758) Not Detected     Bordetella pertussis (ptxP) Not Detected     Chlamydia pneumoniae Not Detected     Mycoplasma pneumoniae Not Detected    Narrative:      Assay not valid for lower respiratory specimens, alternate  testing required.            COPD exacerbation  Patient's COPD is with exacerbation noted by continued dyspnea currently.  Patient is currently off COPD Pathway. Continue scheduled inhalers Steroids and Supplemental oxygen and monitor respiratory status closely.  Switch to oral prednisone    Acute on chronic diastolic CHF (congestive heart failure)  Patient is identified as having Diastolic (HFpEF) heart failure that is Acute on chronic. CHF is currently uncontrolled due to Dyspnea not returned to baseline after 1 doses of IV diuretic, Rales/crackles on pulmonary exam, and Pulmonary edema/pleural effusion on CXR. Latest ECHO performed and demonstrates- Results for orders placed during the hospital encounter of 11/13/23    Echo    Interpretation Summary    AFib noted throughout exam.    Left Ventricle: The left ventricle is normal in size. Normal wall thickness. Normal wall motion. There is normal systolic function with a visually estimated ejection fraction of 55 - 60%. Unable to assess diastolic function due to atrial  "fibrillation.    Right Ventricle: Mild right ventricular enlargement. Systolic function is mildly reduced.    Mitral Valve: There is mild regurgitation.    Pulmonary Artery: The estimated pulmonary artery systolic pressure is 49 mmHg.  . Continue Beta Blocker and Furosemide and monitor clinical status closely. Monitor on telemetry. Patient is on CHF pathway.  Monitor strict Is&Os and daily weights.  Place on fluid restriction of 1.5 L. Cardiology has been consulted. Continue to stress to patient importance of self efficacy and  on diet for CHF. Last BNP reviewed- and noted below   Recent Labs   Lab 08/11/24  0446   *       Stop IV lasix on 8/11. Has contraction alkalosis     Slow transit constipation  -resolved      PVD (peripheral vascular disease)        Type 2 diabetes mellitus with diabetic cataract, without long-term current use of insulin  Patient's FSGs are controlled on current medication regimen.  Last A1c reviewed-   Lab Results   Component Value Date    HGBA1C 6.0 (H) 05/16/2024     Most recent fingerstick glucose reviewed- No results for input(s): "POCTGLUCOSE" in the last 24 hours.  Current correctional scale  stable  Maintain anti-hyperglycemic dose as follows-   Antihyperglycemics (From admission, onward)      Start     Stop Route Frequency Ordered    08/06/24 1515  empagliflozin (Jardiance) tablet 10 mg        Question Answer Comment   Does this patient have a diagnosis of heart failure? Yes    Does this patient have type 1 diabetes or diabetic ketoacidosis? No    Does this patient have symptomatic hypotension? No    Is the patient NPO or pending major surgery in next 3 days or less? No        -- Oral Daily 08/06/24 1404          Hold Oral hypoglycemics while patient is in the hospital.    Paroxysmal atrial fibrillation  Patient with Paroxysmal (<7 days) atrial fibrillation which is uncontrolled currently with Beta Blocker. Patient is currently in atrial fibrillation.[id number] Score: " 4.  On Xarelto for anticoagulation.    Severe obesity (BMI >= 40)  Body mass index is 48.89 kg/m². Morbid obesity complicates all aspects of disease management from diagnostic modalities to treatment. Weight loss encouraged and health benefits explained to patient.         Essential hypertension  Chronic, controlled. Latest blood pressure and vitals reviewed-     Temp:  [98.1 °F (36.7 °C)]   Pulse:  []   Resp:  [23-54]   BP: ()/(53-84)   SpO2:  [89 %-100 %] .   Home meds for hypertension were reviewed and noted below.   Hypertension Medications               furosemide (LASIX) 40 MG tablet TAKE 1 TABLET(40 MG) BY MOUTH EVERY DAY    metoprolol succinate (TOPROL-XL) 100 MG 24 hr tablet Take 1 tablet (100 mg total) by mouth once daily.            While in the hospital, will manage blood pressure as follows; Adjust home antihypertensive regimen as follows- continue Lasix and metoprolol    Will utilize p.r.n. blood pressure medication only if patient's blood pressure greater than 180/110 and she develops symptoms such as worsening chest pain or shortness of breath.    Hyperlipidemia    Continue home statin      VTE Risk Mitigation (From admission, onward)           Ordered     rivaroxaban tablet 20 mg  with dinner         08/06/24 1436     IP VTE HIGH RISK PATIENT  Once         08/06/24 1436     Place sequential compression device  Until discontinued         08/06/24 1436                          I have completed this tele-visit without the assistance of a telepresenter.    The attending portion of this evaluation, treatment, and documentation was performed per Margarita Romero MD via Telemedicine AudioVisual using the secure [employer] software platform with 2 way audio/video. The provider was located off-site and the patient is located in the hospital. The aforementioned video software was utilized to document the relevant history and physical exam    Margarita Romero MD  Department of Hospital Medicine   [place]  [facility] - Med S[facility]

## 2024-08-12 NOTE — PLAN OF CARE
Problem: Adult Inpatient Plan of Care  Goal: Absence of Hospital-Acquired Illness or Injury  Intervention: Identify and Manage Fall Risk  Flowsheets (Taken 8/12/2024 1816)  Safety Promotion/Fall Prevention:   assistive device/personal item within reach   bed alarm set   chair alarm set   Fall Risk reviewed with patient/family  Intervention: Prevent Skin Injury  Flowsheets (Taken 8/12/2024 1816)  Body Position: turned  Device Skin Pressure Protection: absorbent pad utilized/changed     Problem: Diabetes Comorbidity  Goal: Blood Glucose Level Within Targeted Range  Intervention: Monitor and Manage Glycemia  Flowsheets (Taken 8/12/2024 1816)  Glycemic Management: blood glucose monitored     Problem: Pneumonia  Goal: Resolution of Infection Signs and Symptoms  Intervention: Prevent Infection Progression  Flowsheets (Taken 8/12/2024 1816)  Infection Management: aseptic technique maintained  Isolation Precautions:   precautions maintained   droplet

## 2024-08-12 NOTE — SUBJECTIVE & OBJECTIVE
Interval History: Patient doing well today and overall feels better. No acute complaints. SOB is improving. Pt still needing 2L O2. Failed walk test yesterday. Will repeat CXR, continue steroids, CPT, IS. Wean off O2. Repeat walk test again today.     Review of Systems   Constitutional:  Positive for activity change and fatigue. Negative for fever.   Respiratory:  Negative for shortness of breath.    Cardiovascular:  Negative for chest pain.   Gastrointestinal:  Positive for constipation. Negative for abdominal pain.   Neurological:  Negative for dizziness and headaches.   All other systems reviewed and are negative.    Objective:     Vital Signs (Most Recent):  Temp: 98.7 °F (37.1 °C) (08/12/24 1138)  Pulse: 62 (08/12/24 1138)  Resp: 18 (08/12/24 1138)  BP: (!) 149/71 (08/12/24 1138)  SpO2: (!) 86 % (08/12/24 1138) Vital Signs (24h Range):  Temp:  [97.7 °F (36.5 °C)-98.7 °F (37.1 °C)] 98.7 °F (37.1 °C)  Pulse:  [52-88] 62  Resp:  [17-20] 18  SpO2:  [86 %-100 %] 86 %  BP: (127-149)/(69-84) 149/71     Weight: 124.1 kg (273 lb 9.5 oz)  Body mass index is 48.46 kg/m².    Intake/Output Summary (Last 24 hours) at 8/12/2024 1200  Last data filed at 8/12/2024 0639  Gross per 24 hour   Intake 720 ml   Output 750 ml   Net -30 ml         Physical Exam  Vitals and nursing note reviewed.   Constitutional:       Appearance: Normal appearance.   HENT:      Head: Normocephalic and atraumatic.   Eyes:      Extraocular Movements: Extraocular movements intact.      Pupils: Pupils are equal, round, and reactive to light.   Cardiovascular:      Rate and Rhythm: Normal rate and regular rhythm.   Pulmonary:      Effort: Pulmonary effort is normal. No respiratory distress.   Abdominal:      General: There is no distension.   Musculoskeletal:         General: Normal range of motion.      Cervical back: Normal range of motion.   Neurological:      General: No focal deficit present.      Mental Status: She is alert and oriented to person,  place, and time.   Psychiatric:         Mood and Affect: Mood normal.         Behavior: Behavior normal.             Significant Labs: All pertinent labs within the past 24 hours have been reviewed.  CBC:   Recent Labs   Lab 08/12/24  1038   WBC 7.49   HGB 15.7   HCT 49.4*        CMP:   Recent Labs   Lab 08/11/24  1007 08/12/24  1038    137   K 3.7 4.3   CL 87* 90*   CO2 42* 37*   * 149*   BUN 49* 51*   CREATININE 1.3 1.1   CALCIUM 9.0 9.2   ANIONGAP 11 10       Significant Imaging: I have reviewed all pertinent imaging results/findings within the past 24 hours.

## 2024-08-12 NOTE — ASSESSMENT & PLAN NOTE
Patient is identified as having Diastolic (HFpEF) heart failure that is Acute on chronic. CHF is currently uncontrolled due to Dyspnea not returned to baseline after 1 doses of IV diuretic, Rales/crackles on pulmonary exam, and Pulmonary edema/pleural effusion on CXR. Latest ECHO performed and demonstrates- Results for orders placed during the hospital encounter of 11/13/23    Echo    Interpretation Summary    AFib noted throughout exam.    Left Ventricle: The left ventricle is normal in size. Normal wall thickness. Normal wall motion. There is normal systolic function with a visually estimated ejection fraction of 55 - 60%. Unable to assess diastolic function due to atrial fibrillation.    Right Ventricle: Mild right ventricular enlargement. Systolic function is mildly reduced.    Mitral Valve: There is mild regurgitation.    Pulmonary Artery: The estimated pulmonary artery systolic pressure is 49 mmHg.  . Continue Beta Blocker and Furosemide and monitor clinical status closely. Monitor on telemetry. Patient is on CHF pathway.  Monitor strict Is&Os and daily weights.  Place on fluid restriction of 1.5 L. Cardiology has been consulted. Continue to stress to patient importance of self efficacy and  on diet for CHF. Last BNP reviewed- and noted below   Recent Labs   Lab 08/11/24  0446   *       Stop IV lasix on 8/11. Has contraction alkalosis

## 2024-08-12 NOTE — PROGRESS NOTES
[facility]  Telemedicine Progress Note    Patient Name: Sandee Evans  MRN: [id number]  Patient Class: IP- Inpatient   Admission Date: 8/6/2024  Length of Stay: 6 days  Attending Physician: Margarita Romero MD  Primary Care Provider: Kuldeep Miller MD          Subjective:     Principal Problem:Acute on chronic respiratory failure with hypercapnia        HPI:    Patient is a 68-year-old morbidly obese female with past medical history of chronic AFib on Xarelto, CHF EF 55-60% 11/23, hypertension, diabetes, hyperlipidemia, PVD, tobacco abuse , COPD, HIEU, hypothyroidism, tobacco use who presented to [facility] ER on 08/06/2024 for further evaluation of progressively worsening shortness of breath x 1 week.  Reports associated  orthopnea/pedal edema..  Patient stated that she had 3 episodes of nonbilious nonbloody vomiting.   reports patient is noncompliant with CPAP in diuretics at times at home.  No fever/abdominal pain/diarrhea/constipation/chest pain.   reports chronic cough.  Continues to smoke cigarettes, 1-2 per day for 50 years.  No recent travel/sick contacts    During evaluation in the ER, patient was found to be in respiratory distress.  Oxygen saturations at 93% on room air.  EKG revealed  AFib with RVR (heart rate).  Labs revealed WBC of 4.48, hemoglobin 13.7, sodium 140, creatinine 1, , COVID/flu negative.  Chest x-ray revealed mildly prominent interstitial markings could relate to pulmonary vascular congestion/edema VBG revealed 7.278/80.4/49/37.6.  Patient was given duo neb x1, 325 mg aspirin, 10 mg IV diltiazem, 40 mg IV Lasix, 1.25 mg Xopenex, 125 mg Solu-Medrol, 4 mg Zofran.  Cardiology was consulted in ED. patient was placed on BiPAP.  Admitted to ICU for further management    Overview/Hospital Course:  68-year-old morbidly obese female with past medical history of chronic AFib on Xarelto, CHF EF 55-60% 11/23, hypertension, diabetes,  hyperlipidemia, PVD, tobacco abuse , COPD, HIEU, hypothyroidism, tobacco use who was admitted for Acute hypoxic hyper capneic respiratory failure secondary to CHF/COPD exacerbation/parainfluenza pneumonia. Noted to have AFib with RVR on presentation in ED, rate controlled with 1 dose of diltiazem weaned off BiPAP support transitioned to 3 L of oxygen with nasal cannula  continue BiPAP p.r.n. neb/Q HS. On Lasix/Solu-Medrol /nebs .  Received ceftriaxone/azithromycin for cap coverage x 3 days.  Respiratory cultures with normal respiratory elli.Net -8.3L since admit. On oral  Prednisone.  Decrease Lasix to 40 b.i.d..  PT/OT on board wean oxygen as tolerated    Interval History: Patient doing well today and overall feels better. No acute complaints. SOB is improving. Still on 1L O2 via NC. Wean as tolerated. 6 min walk test ordered. Has been eating and drinking well. Has been constipated, will order bowel reg. Pt denies any nausea, vomiting, diarrhea, constipation, fever, chills, malaise, chest pain.       Review of Systems   Constitutional:  Positive for activity change and fatigue. Negative for fever.   Respiratory:  Negative for shortness of breath.    Cardiovascular:  Negative for chest pain.   Gastrointestinal:  Positive for constipation. Negative for abdominal pain.   Neurological:  Negative for dizziness and headaches.   All other systems reviewed and are negative.    Objective:     Vital Signs (Most Recent):  Temp: 98.3 °F (36.8 °C) (08/11/24 1949)  Pulse: 69 (08/11/24 2355)  Resp: 18 (08/11/24 2310)  BP: 128/81 (08/11/24 2355)  SpO2: 97 % (08/11/24 2310) Vital Signs (24h Range):  Temp:  [97.4 °F (36.3 °C)-98.3 °F (36.8 °C)] 98.3 °F (36.8 °C)  Pulse:  [61-98] 69  Resp:  [17-21] 18  SpO2:  [94 %-98 %] 97 %  BP: (127-172)/() 128/81     Weight: 124.1 kg (273 lb 9.5 oz)  Body mass index is 48.46 kg/m².    Intake/Output Summary (Last 24 hours) at 8/12/2024 0021  Last data filed at 8/11/2024 2141  Gross per 24  "hour   Intake 720 ml   Output 1300 ml   Net -580 ml         Physical Exam  Vitals and nursing note reviewed.   Constitutional:       Appearance: Normal appearance.   HENT:      Head: Normocephalic and atraumatic.   Eyes:      Extraocular Movements: Extraocular movements intact.      Pupils: Pupils are equal, round, and reactive to light.   Cardiovascular:      Rate and Rhythm: Normal rate and regular rhythm.   Pulmonary:      Effort: Pulmonary effort is normal. No respiratory distress.   Abdominal:      General: There is no distension.   Musculoskeletal:         General: Normal range of motion.      Cervical back: Normal range of motion.   Neurological:      General: No focal deficit present.      Mental Status: She is alert and oriented to person, place, and time.   Psychiatric:         Mood and Affect: Mood normal.         Behavior: Behavior normal.             Significant Labs: All pertinent labs within the past 24 hours have been reviewed.  CBC: No results for input(s): "WBC", "HGB", "HCT", "PLT" in the last 48 hours.  CMP:   Recent Labs   Lab 08/10/24  0537 08/11/24  1007    140   K 4.4 3.7   CL 89* 87*   CO2 35* 42*   GLU 93 137*   BUN 52* 49*   CREATININE 1.3 1.3   CALCIUM 9.0 9.0   ANIONGAP 17* 11       Significant Imaging: I have reviewed all pertinent imaging results/findings within the past 24 hours.    Assessment/Plan:      * Acute on chronic respiratory failure with hypercapnia  Patient with Hypercapnic and Hypoxic Respiratory failure which is Acute.  she is not on home oxygen. Supplemental oxygen was provided and noted- Oxygen Concentration (%):  [28] 28    .   Signs/symptoms of respiratory failure include- increased work of breathing and respiratory distress. Contributing diagnoses includes - CHF and COPD Labs and images were reviewed. Patient Has not had a recent ABG. Will treat underlying causes and adjust management of respiratory failure as follows-BiPAP support/ diuretics/supplemental " oxygen  Weaned off BiPAP support.  On 2 L of oxygen with nasal cannula  Continue BiPAP p.r.n. naps and q.h.s.    Wean off O2 as tolerated. IS ordered  6 min walk test on 8/11, still needed oxygen.   Continue steroids as stated above.     Parainfluenzal pneumonia  Tested positive for parainfluenza virus.  Received empiric ceftriaxone/azithromycin for 3 days.  Respiratory cultures negative    Antibiotics (From admission, onward)      Start     Stop Route Frequency Ordered    08/07/24 0900  mupirocin 2 % ointment         08/12/24 0859 Nasl 2 times daily 08/07/24 0632    08/06/24 1715  cefTRIAXone (ROCEPHIN) 2 g in D5W 100 mL IVPB (MB+)         08/08/24 2359 IV Every 24 hours (non-standard times) 08/06/24 1612    08/06/24 1715  azithromycin (ZITHROMAX) 500 mg in D5W 250 mL IVPB (Vial-Mate)         08/08/24 2359 IV Every 24 hours (non-standard times) 08/06/24 1612            Microbiology Results (last 7 days)       Procedure Component Value Units Date/Time    Culture, Respiratory with Gram Stain [[id number]] Collected: 08/07/24 0830    Order Status: Completed Specimen: Respiratory from Sputum Updated: 08/08/24 1051     Respiratory Culture Normal respiratory elli     Gram Stain (Respiratory) <10 epithelial cells per low power field.     Gram Stain (Respiratory) Moderate WBC's     Gram Stain (Respiratory) Many Gram positive cocci in pairs and chains     Gram Stain (Respiratory) Moderate Gram positive cocci in clusters     Gram Stain (Respiratory) Rare Gram negative rods     Gram Stain (Respiratory) Rare Gram positive rods    Respiratory Infection Panel (PCR), Nasopharyngeal [[id number]]  (Abnormal) Collected: 08/07/24 1030    Order Status: Completed Specimen: Nasopharyngeal Swab Updated: 08/07/24 1753     Respiratory Infection Panel Source NP Swab     Adenovirus Not Detected     Coronavirus 229E, Common Cold Virus Not Detected     Coronavirus HKU1, Common Cold Virus Not Detected     Coronavirus NL63, Common Cold Virus Not  Detected     Coronavirus OC43, Common Cold Virus Not Detected     Comment: The Coronavirus strains detected in this test cause the common cold.  These strains are not the COVID-19 (novel Coronavirus)strain   associated with the respiratory disease outbreak.          SARS-CoV2 (COVID-19) Qualitative PCR Not Detected     Human Metapneumovirus Not Detected     Human Rhinovirus/Enterovirus Not Detected     Influenza A (subtypes H1, H1-2009,H3) Not Detected     Influenza B Not Detected     Parainfluenza Virus 1 Detected     Parainfluenza Virus 2 Not Detected     Parainfluenza Virus 3 Not Detected     Parainfluenza Virus 4 Not Detected     Respiratory Syncytial Virus Not Detected     Bordetella Parapertussis (MG0757) Not Detected     Bordetella pertussis (ptxP) Not Detected     Chlamydia pneumoniae Not Detected     Mycoplasma pneumoniae Not Detected    Narrative:      Assay not valid for lower respiratory specimens, alternate  testing required.            COPD exacerbation  Patient's COPD is with exacerbation noted by continued dyspnea currently.  Patient is currently off COPD Pathway. Continue scheduled inhalers Steroids and Supplemental oxygen and monitor respiratory status closely.  Switch to oral prednisone    Acute on chronic diastolic CHF (congestive heart failure)  Patient is identified as having Diastolic (HFpEF) heart failure that is Acute on chronic. CHF is currently uncontrolled due to Dyspnea not returned to baseline after 1 doses of IV diuretic, Rales/crackles on pulmonary exam, and Pulmonary edema/pleural effusion on CXR. Latest ECHO performed and demonstrates- Results for orders placed during the hospital encounter of 11/13/23    Echo    Interpretation Summary    AFib noted throughout exam.    Left Ventricle: The left ventricle is normal in size. Normal wall thickness. Normal wall motion. There is normal systolic function with a visually estimated ejection fraction of 55 - 60%. Unable to assess diastolic  "function due to atrial fibrillation.    Right Ventricle: Mild right ventricular enlargement. Systolic function is mildly reduced.    Mitral Valve: There is mild regurgitation.    Pulmonary Artery: The estimated pulmonary artery systolic pressure is 49 mmHg.  . Continue Beta Blocker and Furosemide and monitor clinical status closely. Monitor on telemetry. Patient is on CHF pathway.  Monitor strict Is&Os and daily weights.  Place on fluid restriction of 1.5 L. Cardiology has been consulted. Continue to stress to patient importance of self efficacy and  on diet for CHF. Last BNP reviewed- and noted below   Recent Labs   Lab 08/11/24  0446   *       Stop IV lasix on 8/11. Has contraction alkalosis     PVD (peripheral vascular disease)        Type 2 diabetes mellitus with diabetic cataract, without long-term current use of insulin  Patient's FSGs are controlled on current medication regimen.  Last A1c reviewed-   Lab Results   Component Value Date    HGBA1C 6.0 (H) 05/16/2024     Most recent fingerstick glucose reviewed- No results for input(s): "POCTGLUCOSE" in the last 24 hours.  Current correctional scale  stable  Maintain anti-hyperglycemic dose as follows-   Antihyperglycemics (From admission, onward)      Start     Stop Route Frequency Ordered    08/06/24 1515  empagliflozin (Jardiance) tablet 10 mg        Question Answer Comment   Does this patient have a diagnosis of heart failure? Yes    Does this patient have type 1 diabetes or diabetic ketoacidosis? No    Does this patient have symptomatic hypotension? No    Is the patient NPO or pending major surgery in next 3 days or less? No        -- Oral Daily 08/06/24 1404          Hold Oral hypoglycemics while patient is in the hospital.    Paroxysmal atrial fibrillation  Patient with Paroxysmal (<7 days) atrial fibrillation which is uncontrolled currently with Beta Blocker. Patient is currently in atrial fibrillation.[id number] Score: 4.  On Xarelto for " anticoagulation.    Severe obesity (BMI >= 40)  Body mass index is 48.89 kg/m². Morbid obesity complicates all aspects of disease management from diagnostic modalities to treatment. Weight loss encouraged and health benefits explained to patient.         Essential hypertension  Chronic, controlled. Latest blood pressure and vitals reviewed-     Temp:  [98.1 °F (36.7 °C)]   Pulse:  []   Resp:  [23-54]   BP: ()/(53-84)   SpO2:  [89 %-100 %] .   Home meds for hypertension were reviewed and noted below.   Hypertension Medications               furosemide (LASIX) 40 MG tablet TAKE 1 TABLET(40 MG) BY MOUTH EVERY DAY    metoprolol succinate (TOPROL-XL) 100 MG 24 hr tablet Take 1 tablet (100 mg total) by mouth once daily.            While in the hospital, will manage blood pressure as follows; Adjust home antihypertensive regimen as follows- continue Lasix and metoprolol    Will utilize p.r.n. blood pressure medication only if patient's blood pressure greater than 180/110 and she develops symptoms such as worsening chest pain or shortness of breath.    Hyperlipidemia    Continue home statin      VTE Risk Mitigation (From admission, onward)           Ordered     rivaroxaban tablet 20 mg  with dinner         08/06/24 1436     IP VTE HIGH RISK PATIENT  Once         08/06/24 1436     Place sequential compression device  Until discontinued         08/06/24 1436                          I have completed this tele-visit without the assistance of a telepresenter.    The attending portion of this evaluation, treatment, and documentation was performed per Margarita Romero MD via Telemedicine AudioVisual using the secure [employer] software platform with 2 way audio/video. The provider was located off-site and the patient is located in the hospital. The aforementioned video software was utilized to document the relevant history and physical exam    Margarita Romero MD  Department of Hospital Medicine   [facility] [place] [facility]

## 2024-08-12 NOTE — PLAN OF CARE
Per attending, patient not medically ready for discharge.  Pt still on 2L of Oxygen and weaning down; walk test repeated due to failing yesterday. CM to continue to assess for and until discharge.   08/12/24 1622   Discharge Reassessment   Assessment Type Discharge Planning Reassessment   Did the patient's condition or plan change since previous assessment? No   Discharge Plan A Home with family   Discharge Plan B Home with family   Transition of Care Barriers None   Why the patient remains in the hospital Requires continued medical care   Post-Acute Status   Coverage [facility] BLUE SHIELD - [facility] ALL OUT OF STATE   Discharge Delays None known at this time

## 2024-08-12 NOTE — ASSESSMENT & PLAN NOTE
-resolved     Assessment/Plan:    1. Hypoglycemia  Prior episodes of hypoglycemia question dumping syndrome versus postprandial hypoglycemia.  Doing better now that she is using Precose 25 mg twice daily as well as decreasing atenolol from 50 mg on 25 mg daily.  Morning blood sugars range between 73 and 103 otherwise postprandial blood sugars typically between 109 156 other than one episode that was 268 2 hours after eating apparently.  Check basic metabolic panel and A1c today.  Has scheduled follow-up with Dr. Albert endocrinologist 2018 otherwise will reassess at annual wellness visit in 6 months.    2. Hypertension  Blood pressure 120/70.  Continues atenolol 25 mg after cutting back from 50 mg daily associate with prior hypoglycemia.  Doing well.  Check basic metabolic panel today.    3. Osteoporosis, senile  History of osteoporosis.  Receives Prolia 60 mg every 6 months.    4. Vitamin D deficiency  History of vitamin D deficiency.  Continue current vitamin D supplementation.          Subjective:    Bibiana Mcrae is seen today for follow-up evaluation.  Prior hypoglycemia noted.  Question postprandial hypoglycemia versus dumping syndrome associated prior gastric surgery.  Seen by Dr. Albert who started Precose 25 mg twice daily.  Had cut back on atenolol from 50 mg down to 25 mg daily.  Blood pressures appear to have continued to remain well controlled.  Does feel blood sugars being slightly low in the morning however fasting sugars are always between 73 and 103 and postprandial blood sugars remaining between 109 and 156 typically other than one episode that was as high as 268.  Prior A1c of 5.8% 2018.  History of osteoporosis and vitamin D deficiency.  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   No siblings   Surgery: emergency RLQ incarcerated strangulated femoral hernia repair 08; [place] 1985 due to endometriosis; ulcer surgery ; [id number] hernia repair (Dr. JULY Wen); lap choly 12 with Dr. Hayder Montgomery [phone]    Past Surgical History:   Procedure Laterality Date     BREAST CYST ASPIRATION Bilateral     Rt      Llt  ??     HYSTERECTOMY      age 38     OOPHORECTOMY Bilateral     age 38     OH REMOVAL OF OVARY(S)      Description: Oophorectomy;  Recorded: 2009;     OH TOTAL ABDOM HYSTERECTOMY      Description: Total Abdominal Hysterectomy;  Recorded: 2009;        Family History   Problem Relation Age of Onset     Breast cancer Mother 52     Cancer Maternal Grandfather 71     prostate     Prostate cancer Maternal Grandfather         Past Medical History:   Diagnosis Date     Anxiety      Breast cyst         Social History   Substance Use Topics     Smoking status: Never Smoker     Smokeless tobacco: Never Used     Alcohol use No        Current Outpatient Prescriptions   Medication Sig Dispense Refill     acarbose (PRECOSE) 25 MG tablet Take 1 tablet (25 mg total) by mouth 2 (two) times a day. With breakfast 180 tablet 1     atenolol (TENORMIN) 25 MG tablet Take 1 tablet (25 mg total) by mouth daily. 90 tablet 3     BIOTIN ORAL Take 5,000 mcg by mouth 3 (three) times a day.       blood glucose control high,low (ACCU-CHEK JB CONTROL SOLN) Soln Meter check once ever 3 months 1 each 0     blood glucose test (ACCU-CHEK JB PLUS TEST STRP) strips 1 test strip 3x day 300 strip 3     calcium-vitamin D (CALCIUM-VITAMIN D) 500 mg(1,250mg) -200 unit per tablet Take 2 tablets by mouth 2 (two) times a day with meals.       celecoxib (CELEBREX) 200 MG capsule Take 1 capsule (200 mg total) by mouth daily. 90 capsule 3     conjugated estrogens (PREMARIN) vaginal cream Insert 2 g into the vagina 3 (three)  times a week.        denosumab 60 mg/mL Syrg Inject 60 mg under the skin once.       diphenhydrAMINE (BENADRYL) 25 mg tablet Take 50 mg by mouth at bedtime.       generic lancets (ACCU-CHEK FASTCLIX) Use 1 each As Directed 3 (three) times a day. Dispense brand per patient's insurance at pharmacy discretion. 300 each 3     generic lancets (ACCU-CHEK MULTICLIX LANCET) Dispense brand per patient's insurance at pharmacy discretion. 300 each 3     ketoconazole (NIZORAL) 2 % shampoo Apply 1 application topically as needed. Apply to damp skin, lather, leave on 5 minutes, and rinse        LANCETS (ACCU-CHEK MULTICLIX LANCET MISC) Use As Directed. Testing 3x a day       LORazepam (ATIVAN) 0.5 MG tablet Take 0.25-0.5 mg by mouth every 8 (eight) hours as needed for anxiety.       multivitamin with minerals (THERA-M) 9 mg iron-400 mcg Tab tablet Take 1 tablet by mouth daily.       nortriptyline (PAMELOR) 50 MG capsule Take 1 capsule (50 mg total) by mouth at bedtime. 90 capsule 3     No current facility-administered medications for this visit.           Objective:    Vitals:    06/14/18 1128   BP: 120/70   Pulse: 68   Weight: 105 lb (47.6 kg)      Body mass index is 22.72 kg/(m^2).    Alert.  No apparent distress blood pressure 120/70.  Chest clear.  Cardiac exam regular.  Extremities warm and dry.  Pulse 68.      This note has been dictated using voice recognition software and as a result may contain minor grammatical errors and unintended word substitutions.

## 2024-08-12 NOTE — SUBJECTIVE & OBJECTIVE
Interval History: Patient doing well today and overall feels better. No acute complaints. SOB is improving. Still on 1L O2 via NC. Wean as tolerated. 6 min walk test ordered. Has been eating and drinking well. Has been constipated, will order bowel reg. Pt denies any nausea, vomiting, diarrhea, constipation, fever, chills, malaise, chest pain.       Review of Systems   Constitutional:  Positive for activity change and fatigue. Negative for fever.   Respiratory:  Negative for shortness of breath.    Cardiovascular:  Negative for chest pain.   Gastrointestinal:  Positive for constipation. Negative for abdominal pain.   Neurological:  Negative for dizziness and headaches.   All other systems reviewed and are negative.    Objective:     Vital Signs (Most Recent):  Temp: 98.3 °F (36.8 °C) (08/11/24 1949)  Pulse: 69 (08/11/24 2355)  Resp: 18 (08/11/24 2310)  BP: 128/81 (08/11/24 2355)  SpO2: 97 % (08/11/24 2310) Vital Signs (24h Range):  Temp:  [97.4 °F (36.3 °C)-98.3 °F (36.8 °C)] 98.3 °F (36.8 °C)  Pulse:  [61-98] 69  Resp:  [17-21] 18  SpO2:  [94 %-98 %] 97 %  BP: (127-172)/() 128/81     Weight: 124.1 kg (273 lb 9.5 oz)  Body mass index is 48.46 kg/m².    Intake/Output Summary (Last 24 hours) at 8/12/2024 0021  Last data filed at 8/11/2024 2141  Gross per 24 hour   Intake 720 ml   Output 1300 ml   Net -580 ml         Physical Exam  Vitals and nursing note reviewed.   Constitutional:       Appearance: Normal appearance.   HENT:      Head: Normocephalic and atraumatic.   Eyes:      Extraocular Movements: Extraocular movements intact.      Pupils: Pupils are equal, round, and reactive to light.   Cardiovascular:      Rate and Rhythm: Normal rate and regular rhythm.   Pulmonary:      Effort: Pulmonary effort is normal. No respiratory distress.   Abdominal:      General: There is no distension.   Musculoskeletal:         General: Normal range of motion.      Cervical back: Normal range of motion.   Neurological:       "General: No focal deficit present.      Mental Status: She is alert and oriented to person, place, and time.   Psychiatric:         Mood and Affect: Mood normal.         Behavior: Behavior normal.             Significant Labs: All pertinent labs within the past 24 hours have been reviewed.  CBC: No results for input(s): "WBC", "HGB", "HCT", "PLT" in the last 48 hours.  CMP:   Recent Labs   Lab 08/10/24  0537 08/11/24  1007    140   K 4.4 3.7   CL 89* 87*   CO2 35* 42*   GLU 93 137*   BUN 52* 49*   CREATININE 1.3 1.3   CALCIUM 9.0 9.0   ANIONGAP 17* 11       Significant Imaging: I have reviewed all pertinent imaging results/findings within the past 24 hours.  "

## 2024-08-13 LAB
ANION GAP SERPL CALC-SCNC: 10 MMOL/L (ref 8–16)
BUN SERPL-MCNC: 48 MG/DL (ref 8–23)
CALCIUM SERPL-MCNC: 9.1 MG/DL (ref 8.7–10.5)
CHLORIDE SERPL-SCNC: 94 MMOL/L (ref 95–110)
CO2 SERPL-SCNC: 34 MMOL/L (ref 23–29)
CREAT SERPL-MCNC: 1.1 MG/DL (ref 0.5–1.4)
EST. GFR  (NO RACE VARIABLE): 54 ML/MIN/1.73 M^2
GLUCOSE SERPL-MCNC: 101 MG/DL (ref 70–110)
POCT GLUCOSE: 116 MG/DL (ref 70–110)
POCT GLUCOSE: 124 MG/DL (ref 70–110)
POCT GLUCOSE: 157 MG/DL (ref 70–110)
POCT GLUCOSE: 194 MG/DL (ref 70–110)
POCT GLUCOSE: 95 MG/DL (ref 70–110)
POTASSIUM SERPL-SCNC: 4 MMOL/L (ref 3.5–5.1)
SODIUM SERPL-SCNC: 138 MMOL/L (ref 136–145)

## 2024-08-13 PROCEDURE — 25000003 PHARM REV CODE 250: Performed by: STUDENT IN AN ORGANIZED HEALTH CARE EDUCATION/TRAINING PROGRAM

## 2024-08-13 PROCEDURE — 97530 THERAPEUTIC ACTIVITIES: CPT | Mod: CQ

## 2024-08-13 PROCEDURE — 94660 CPAP INITIATION&MGMT: CPT

## 2024-08-13 PROCEDURE — 25000242 PHARM REV CODE 250 ALT 637 W/ HCPCS: Performed by: HOSPITALIST

## 2024-08-13 PROCEDURE — 94761 N-INVAS EAR/PLS OXIMETRY MLT: CPT

## 2024-08-13 PROCEDURE — 25000242 PHARM REV CODE 250 ALT 637 W/ HCPCS: Performed by: STUDENT IN AN ORGANIZED HEALTH CARE EDUCATION/TRAINING PROGRAM

## 2024-08-13 PROCEDURE — 27000221 HC OXYGEN, UP TO 24 HOURS

## 2024-08-13 PROCEDURE — 80048 BASIC METABOLIC PNL TOTAL CA: CPT | Performed by: STUDENT IN AN ORGANIZED HEALTH CARE EDUCATION/TRAINING PROGRAM

## 2024-08-13 PROCEDURE — 36415 COLL VENOUS BLD VENIPUNCTURE: CPT | Performed by: STUDENT IN AN ORGANIZED HEALTH CARE EDUCATION/TRAINING PROGRAM

## 2024-08-13 PROCEDURE — 94799 UNLISTED PULMONARY SVC/PX: CPT

## 2024-08-13 PROCEDURE — 99900035 HC TECH TIME PER 15 MIN (STAT)

## 2024-08-13 PROCEDURE — 63600175 PHARM REV CODE 636 W HCPCS: Performed by: STUDENT IN AN ORGANIZED HEALTH CARE EDUCATION/TRAINING PROGRAM

## 2024-08-13 PROCEDURE — 27000207 HC ISOLATION

## 2024-08-13 PROCEDURE — 21400001 HC TELEMETRY ROOM

## 2024-08-13 PROCEDURE — 94664 DEMO&/EVAL PT USE INHALER: CPT

## 2024-08-13 PROCEDURE — 94640 AIRWAY INHALATION TREATMENT: CPT

## 2024-08-13 PROCEDURE — 97116 GAIT TRAINING THERAPY: CPT | Mod: CQ

## 2024-08-13 RX ADMIN — ARFORMOTEROL TARTRATE 15 MCG: 15 SOLUTION RESPIRATORY (INHALATION) at 08:08

## 2024-08-13 RX ADMIN — AZELASTINE HYDROCHLORIDE 137 MCG: 137 SPRAY, METERED NASAL at 10:08

## 2024-08-13 RX ADMIN — METOPROLOL TARTRATE 50 MG: 50 TABLET, FILM COATED ORAL at 11:08

## 2024-08-13 RX ADMIN — INSULIN ASPART 2 UNITS: 100 INJECTION, SOLUTION INTRAVENOUS; SUBCUTANEOUS at 04:08

## 2024-08-13 RX ADMIN — GUAIFENESIN AND DEXTROMETHORPHAN HYDROBROMIDE 1 TABLET: 600; 30 TABLET, EXTENDED RELEASE ORAL at 10:08

## 2024-08-13 RX ADMIN — IPRATROPIUM BROMIDE AND ALBUTEROL SULFATE 3 ML: 2.5; .5 SOLUTION RESPIRATORY (INHALATION) at 01:08

## 2024-08-13 RX ADMIN — IPRATROPIUM BROMIDE AND ALBUTEROL SULFATE 3 ML: 2.5; .5 SOLUTION RESPIRATORY (INHALATION) at 12:08

## 2024-08-13 RX ADMIN — EMPAGLIFLOZIN 10 MG: 10 TABLET, FILM COATED ORAL at 09:08

## 2024-08-13 RX ADMIN — BUDESONIDE 1 MG: 0.5 INHALANT RESPIRATORY (INHALATION) at 07:08

## 2024-08-13 RX ADMIN — INSULIN ASPART 1 UNITS: 100 INJECTION, SOLUTION INTRAVENOUS; SUBCUTANEOUS at 09:08

## 2024-08-13 RX ADMIN — METOPROLOL TARTRATE 50 MG: 50 TABLET, FILM COATED ORAL at 12:08

## 2024-08-13 RX ADMIN — GUAIFENESIN AND DEXTROMETHORPHAN HYDROBROMIDE 1 TABLET: 600; 30 TABLET, EXTENDED RELEASE ORAL at 09:08

## 2024-08-13 RX ADMIN — IPRATROPIUM BROMIDE AND ALBUTEROL SULFATE 3 ML: 2.5; .5 SOLUTION RESPIRATORY (INHALATION) at 08:08

## 2024-08-13 RX ADMIN — SERTRALINE HYDROCHLORIDE 100 MG: 50 TABLET ORAL at 09:08

## 2024-08-13 RX ADMIN — RIVAROXABAN 20 MG: 20 TABLET, FILM COATED ORAL at 04:08

## 2024-08-13 RX ADMIN — AZELASTINE HYDROCHLORIDE 137 MCG: 137 SPRAY, METERED NASAL at 09:08

## 2024-08-13 RX ADMIN — IPRATROPIUM BROMIDE AND ALBUTEROL SULFATE 3 ML: 2.5; .5 SOLUTION RESPIRATORY (INHALATION) at 07:08

## 2024-08-13 RX ADMIN — FLUTICASONE PROPIONATE 100 MCG: 50 SPRAY, METERED NASAL at 09:08

## 2024-08-13 RX ADMIN — ARFORMOTEROL TARTRATE 15 MCG: 15 SOLUTION RESPIRATORY (INHALATION) at 07:08

## 2024-08-13 RX ADMIN — PREDNISONE 50 MG: 50 TABLET ORAL at 09:08

## 2024-08-13 RX ADMIN — ATORVASTATIN CALCIUM 40 MG: 40 TABLET, FILM COATED ORAL at 09:08

## 2024-08-13 RX ADMIN — BUDESONIDE 1 MG: 0.5 INHALANT RESPIRATORY (INHALATION) at 08:08

## 2024-08-13 RX ADMIN — METOPROLOL TARTRATE 50 MG: 50 TABLET, FILM COATED ORAL at 05:08

## 2024-08-13 NOTE — SUBJECTIVE & OBJECTIVE
Interval History: Patient doing well today and overall feels better. No acute complaints. SOB is improving. Feels back to baseline. Pt needing 1L O2 today. Feels that she may be able to wean off. CXR reviewed, non acute. Wean off O2. Repeat walk test again today.     Review of Systems   Constitutional:  Positive for activity change and fatigue. Negative for fever.   Respiratory:  Negative for shortness of breath.    Cardiovascular:  Negative for chest pain.   Gastrointestinal:  Positive for constipation. Negative for abdominal pain.   Neurological:  Negative for dizziness and headaches.   All other systems reviewed and are negative.    Objective:     Vital Signs (Most Recent):  Temp: 98.3 °F (36.8 °C) (08/13/24 1128)  Pulse: 70 (08/13/24 1128)  Resp: 18 (08/13/24 1128)  BP: (!) 111/59 (08/13/24 1157)  SpO2: (!) 93 % (08/13/24 1128) Vital Signs (24h Range):  Temp:  [97.8 °F (36.6 °C)-98.7 °F (37.1 °C)] 98.3 °F (36.8 °C)  Pulse:  [51-90] 70  Resp:  [18-20] 18  SpO2:  [93 %-98 %] 93 %  BP: (111-159)/(59-93) 111/59     Weight: 124.1 kg (273 lb 9.5 oz)  Body mass index is 48.46 kg/m².    Intake/Output Summary (Last 24 hours) at 8/13/2024 1230  Last data filed at 8/13/2024 0630  Gross per 24 hour   Intake 120 ml   Output 1400 ml   Net -1280 ml         Physical Exam  Vitals and nursing note reviewed.   Constitutional:       Appearance: Normal appearance.   HENT:      Head: Normocephalic and atraumatic.   Eyes:      Extraocular Movements: Extraocular movements intact.      Pupils: Pupils are equal, round, and reactive to light.   Cardiovascular:      Rate and Rhythm: Normal rate and regular rhythm.   Pulmonary:      Effort: Pulmonary effort is normal. No respiratory distress.   Abdominal:      General: There is no distension.   Musculoskeletal:         General: Normal range of motion.      Cervical back: Normal range of motion.   Neurological:      General: No focal deficit present.      Mental Status: She is alert and  oriented to person, place, and time.   Psychiatric:         Mood and Affect: Mood normal.         Behavior: Behavior normal.             Significant Labs: All pertinent labs within the past 24 hours have been reviewed.  CBC:   Recent Labs   Lab 08/12/24  1038   WBC 7.49   HGB 15.7   HCT 49.4*        CMP:   Recent Labs   Lab 08/12/24  1038 08/13/24  0611    138   K 4.3 4.0   CL 90* 94*   CO2 37* 34*   * 101   BUN 51* 48*   CREATININE 1.1 1.1   CALCIUM 9.2 9.1   ANIONGAP 10 10       Significant Imaging: I have reviewed all pertinent imaging results/findings within the past 24 hours.

## 2024-08-13 NOTE — PLAN OF CARE
Pt remained free of falls during current shift.  Pt appeared to be in no distress and did not receive any prn pain medications. IV antibiotic was given per MD order. Pt remains on 1L O2.  Plan of care and fall precautions reviewed with pt and verbalized understanding. Bed locked, lowered, SR up x2 and call light placed within reach.          Problem: Adult Inpatient Plan of Care  Goal: Plan of Care Review  Outcome: Progressing     Problem: Diabetes Comorbidity  Goal: Blood Glucose Level Within Targeted Range  Outcome: Progressing     Problem: Skin Injury Risk Increased  Goal: Skin Health and Integrity  Outcome: Progressing     Problem: Pneumonia  Goal: Fluid Balance  Outcome: Progressing     Problem: Infection  Goal: Absence of Infection Signs and Symptoms  Outcome: Progressing     Problem: Fall Injury Risk  Goal: Absence of Fall and Fall-Related Injury  Outcome: Progressing

## 2024-08-13 NOTE — NURSING
Testing for Home O2    O2 sats on RA @ rest = 95%    O2 sats on RA with exercise =89%    O2 sats on 1L of oxygen= 96%

## 2024-08-13 NOTE — PT/OT/SLP PROGRESS
Physical Therapy Treatment    Patient Name:  Sandee Evans   MRN:  [id number]    Recommendations:     Discharge Recommendations: Low Intensity Therapy  Discharge Equipment Recommendations: none  Barriers to discharge: None    Assessment:     Sandee Evans is a 68 y.o. female admitted with a medical diagnosis of Acute on chronic respiratory failure with hypercapnia.  She presents with the following impairments/functional limitations: weakness, impaired endurance, gait instability, decreased lower extremity function, impaired balance, pain, decreased safety awareness, decreased ROM, impaired cardiopulmonary response to activity .    Rehab Prognosis: Good; patient would benefit from acute skilled PT services to address these deficits and reach maximum level of function.    Recent Surgery: * No surgery found *      Plan:     During this hospitalization, patient to be seen 3 x/week to address the identified rehab impairments via gait training, therapeutic activities, therapeutic exercises and progress toward the following goals:    Plan of Care Expires:  08/22/24    Subjective     Chief Complaint: none  Patient/Family Comments/goals: pt is pleasant and agreeable to therapy   Pain/Comfort:  Pain Rating 1: 0/10  Pain Rating Post-Intervention 1: 0/10      Objective:     Communicated with nurse prior to session.  Patient found up in chair with telemetry, oxygen, PureWick, peripheral IV upon PT entry to room.     General Precautions: Standard, fall, droplet, respiratory  Orthopedic Precautions: N/A  Braces: N/A  Respiratory Status: Nasal cannula, flow 1 L/min   SpO2 : 95% on RA at rest, 89%-95% with exertions. Pt required VC's for pursed lip breathing technique.   Functional Mobility:  Transfers:     Sit to Stand:  supervision with no AD  Gait:  pt ambulated 150 ft x 2 trials  with no AD, SBA. Pt required 1 standing  rest break 2* to fatigue . Noted with decreased dmitriy,decreased step length, lateral swaying however no  LOB. VC's for safety and pursed lip breathing technique.    Balance:  good in sitting, fair + in standing.         AM-PAC 6 CLICK MOBILITY  Turning over in bed (including adjusting bedclothes, sheets and blankets)?: 4  Sitting down on and standing up from a chair with arms (e.g., wheelchair, bedside commode, etc.): 4  Moving from lying on back to sitting on the side of the bed?: 4  Moving to and from a bed to a chair (including a wheelchair)?: 4  Need to walk in hospital room?: 3  Climbing 3-5 steps with a railing?: 3  Basic Mobility Total Score: 22       Treatment & Education:  Educated pt on safety awareness with all OOB mobility , transfer / gait training .   Re-educated pt on pursed lip breathing technique and energy conservation techniques .   Encouraged pt to perform BLE ex's per handout throughout the day. Pt verbalized understanding.     Patient left up in reclined chair with all lines intact, call button in reach, nurse notified .    GOALS:   Multidisciplinary Problems       Physical Therapy Goals          Problem: Physical Therapy    Goal Priority Disciplines Outcome Goal Variances Interventions   Physical Therapy Goal     PT, PT/OT Progressing     Description: Goals to be met by: 24     Patient will increase functional independence with mobility by performin. Supine to sit with Modified [place]  2. Rolling to Left and Right with Modified [place]  3. Sit to stand transfer with Modified [place] using RW  4. Bed to chair transfer with Modified [place] using Rolling Walker  5. Gait x200 feet with Modified [place] using Rolling Walker   6. Upper/Lower extremity exercise program 2 sets x15 reps per handout, with independence                         Time Tracking:     PT Received On: 24  PT Start Time: 1405     PT Stop Time: 1428  PT Total Time (min): 23 min     Billable Minutes: Gait Training 13 and Therapeutic Activity 10    Treatment Type: Treatment  PT/PTA: PTA      Number of PTA visits since last PT visit: 4     08/13/2024

## 2024-08-13 NOTE — PROGRESS NOTES
[facility] Medicine  Telemedicine Progress Note    Patient Name: Sandee Evans  MRN: [id number]  Patient Class: IP- Inpatient   Admission Date: 8/6/2024  Length of Stay: 7 days  Attending Physician: Margarita Romero MD  Primary Care Provider: Kuldeep Miller MD          Subjective:     Principal Problem:Acute on chronic respiratory failure with hypercapnia        HPI:    Patient is a 68-year-old morbidly obese female with past medical history of chronic AFib on Xarelto, CHF EF 55-60% 11/23, hypertension, diabetes, hyperlipidemia, PVD, tobacco abuse , COPD, HIEU, hypothyroidism, tobacco use who presented to [facility] ER on 08/06/2024 for further evaluation of progressively worsening shortness of breath x 1 week.  Reports associated  orthopnea/pedal edema..  Patient stated that she had 3 episodes of nonbilious nonbloody vomiting.   reports patient is noncompliant with CPAP in diuretics at times at home.  No fever/abdominal pain/diarrhea/constipation/chest pain.   reports chronic cough.  Continues to smoke cigarettes, 1-2 per day for 50 years.  No recent travel/sick contacts    During evaluation in the ER, patient was found to be in respiratory distress.  Oxygen saturations at 93% on room air.  EKG revealed  AFib with RVR (heart rate).  Labs revealed WBC of 4.48, hemoglobin 13.7, sodium 140, creatinine 1, , COVID/flu negative.  Chest x-ray revealed mildly prominent interstitial markings could relate to pulmonary vascular congestion/edema VBG revealed 7.278/80.4/49/37.6.  Patient was given duo neb x1, 325 mg aspirin, 10 mg IV diltiazem, 40 mg IV Lasix, 1.25 mg Xopenex, 125 mg Solu-Medrol, 4 mg Zofran.  Cardiology was consulted in ED. patient was placed on BiPAP.  Admitted to ICU for further management    Overview/Hospital Course:  68-year-old morbidly obese female with past medical history of chronic AFib on Xarelto, CHF EF 55-60% 11/23, hypertension, diabetes,  hyperlipidemia, PVD, tobacco abuse , COPD, HIEU, hypothyroidism, tobacco use who was admitted for Acute hypoxic hyper capneic respiratory failure secondary to CHF/COPD exacerbation/parainfluenza pneumonia. Noted to have AFib with RVR on presentation in ED, rate controlled with 1 dose of diltiazem weaned off BiPAP support transitioned to 3 L of oxygen with nasal cannula  continue BiPAP p.r.n. neb/Q HS. On Lasix/Solu-Medrol /nebs .  Received ceftriaxone/azithromycin for cap coverage x 3 days.  Respiratory cultures with normal respiratory elli.Net -8.3L since admit. On oral  Prednisone.  Decrease Lasix to 40 b.i.d..  PT/OT on board wean oxygen as tolerated    Interval History: Patient doing well today and overall feels better. No acute complaints. SOB is improving. Feels back to baseline. Pt needing 1L O2 today. Feels that she may be able to wean off. CXR reviewed, non acute. Wean off O2. Repeat walk test again today.     Review of Systems   Constitutional:  Positive for activity change and fatigue. Negative for fever.   Respiratory:  Negative for shortness of breath.    Cardiovascular:  Negative for chest pain.   Gastrointestinal:  Positive for constipation. Negative for abdominal pain.   Neurological:  Negative for dizziness and headaches.   All other systems reviewed and are negative.    Objective:     Vital Signs (Most Recent):  Temp: 98.3 °F (36.8 °C) (08/13/24 1128)  Pulse: 70 (08/13/24 1128)  Resp: 18 (08/13/24 1128)  BP: (!) 111/59 (08/13/24 1157)  SpO2: (!) 93 % (08/13/24 1128) Vital Signs (24h Range):  Temp:  [97.8 °F (36.6 °C)-98.7 °F (37.1 °C)] 98.3 °F (36.8 °C)  Pulse:  [51-90] 70  Resp:  [18-20] 18  SpO2:  [93 %-98 %] 93 %  BP: (111-159)/(59-93) 111/59     Weight: 124.1 kg (273 lb 9.5 oz)  Body mass index is 48.46 kg/m².    Intake/Output Summary (Last 24 hours) at 8/13/2024 1230  Last data filed at 8/13/2024 0630  Gross per 24 hour   Intake 120 ml   Output 1400 ml   Net -1280 ml         Physical  Exam  Vitals and nursing note reviewed.   Constitutional:       Appearance: Normal appearance.   HENT:      Head: Normocephalic and atraumatic.   Eyes:      Extraocular Movements: Extraocular movements intact.      Pupils: Pupils are equal, round, and reactive to light.   Cardiovascular:      Rate and Rhythm: Normal rate and regular rhythm.   Pulmonary:      Effort: Pulmonary effort is normal. No respiratory distress.   Abdominal:      General: There is no distension.   Musculoskeletal:         General: Normal range of motion.      Cervical back: Normal range of motion.   Neurological:      General: No focal deficit present.      Mental Status: She is alert and oriented to person, place, and time.   Psychiatric:         Mood and Affect: Mood normal.         Behavior: Behavior normal.             Significant Labs: All pertinent labs within the past 24 hours have been reviewed.  CBC:   Recent Labs   Lab 08/12/24  1038   WBC 7.49   HGB 15.7   HCT 49.4*        CMP:   Recent Labs   Lab 08/12/24  1038 08/13/24  0611    138   K 4.3 4.0   CL 90* 94*   CO2 37* 34*   * 101   BUN 51* 48*   CREATININE 1.1 1.1   CALCIUM 9.2 9.1   ANIONGAP 10 10       Significant Imaging: I have reviewed all pertinent imaging results/findings within the past 24 hours.    Assessment/Plan:      * Acute on chronic respiratory failure with hypercapnia  Patient with Hypercapnic and Hypoxic Respiratory failure which is Acute.  she is not on home oxygen. Supplemental oxygen was provided and noted- Oxygen Concentration (%):  [28] 28    .   Signs/symptoms of respiratory failure include- increased work of breathing and respiratory distress. Contributing diagnoses includes - CHF and COPD Labs and images were reviewed. Patient Has not had a recent ABG. Will treat underlying causes and adjust management of respiratory failure as follows-BiPAP support/ diuretics/supplemental oxygen  Weaned off BiPAP support.  On 2 L of oxygen with nasal  cannula  Continue BiPAP p.r.n. naps and q.h.s.    Wean off O2 as tolerated. IS ordered  6 min walk test on 8/11, still needed oxygen.   Continue steroids as stated above.   Repeat walk test ordered on 8/12 and again on 8/13. Cxr from 8/12 reviewed, is non acute. Started breo     Parainfluenzal pneumonia  Tested positive for parainfluenza virus.  Received empiric ceftriaxone/azithromycin for 3 days.  Respiratory cultures negative    Antibiotics (From admission, onward)      Start     Stop Route Frequency Ordered    08/07/24 0900  mupirocin 2 % ointment         08/12/24 0859 Nasl 2 times daily 08/07/24 0632    08/06/24 1715  cefTRIAXone (ROCEPHIN) 2 g in D5W 100 mL IVPB (MB+)         08/08/24 2359 IV Every 24 hours (non-standard times) 08/06/24 1612    08/06/24 1715  azithromycin (ZITHROMAX) 500 mg in D5W 250 mL IVPB (Vial-Mate)         08/08/24 2359 IV Every 24 hours (non-standard times) 08/06/24 1612            Microbiology Results (last 7 days)       Procedure Component Value Units Date/Time    Culture, Respiratory with Gram Stain [[id number]] Collected: 08/07/24 0830    Order Status: Completed Specimen: Respiratory from Sputum Updated: 08/08/24 1051     Respiratory Culture Normal respiratory elli     Gram Stain (Respiratory) <10 epithelial cells per low power field.     Gram Stain (Respiratory) Moderate WBC's     Gram Stain (Respiratory) Many Gram positive cocci in pairs and chains     Gram Stain (Respiratory) Moderate Gram positive cocci in clusters     Gram Stain (Respiratory) Rare Gram negative rods     Gram Stain (Respiratory) Rare Gram positive rods    Respiratory Infection Panel (PCR), Nasopharyngeal [[id number]]  (Abnormal) Collected: 08/07/24 1030    Order Status: Completed Specimen: Nasopharyngeal Swab Updated: 08/07/24 1753     Respiratory Infection Panel Source NP Swab     Adenovirus Not Detected     Coronavirus 229E, Common Cold Virus Not Detected     Coronavirus HKU1, Common Cold Virus Not Detected      Coronavirus NL63, Common Cold Virus Not Detected     Coronavirus OC43, Common Cold Virus Not Detected     Comment: The Coronavirus strains detected in this test cause the common cold.  These strains are not the COVID-19 (novel Coronavirus)strain   associated with the respiratory disease outbreak.          SARS-CoV2 (COVID-19) Qualitative PCR Not Detected     Human Metapneumovirus Not Detected     Human Rhinovirus/Enterovirus Not Detected     Influenza A (subtypes H1, H1-2009,H3) Not Detected     Influenza B Not Detected     Parainfluenza Virus 1 Detected     Parainfluenza Virus 2 Not Detected     Parainfluenza Virus 3 Not Detected     Parainfluenza Virus 4 Not Detected     Respiratory Syncytial Virus Not Detected     Bordetella Parapertussis (BQ4145) Not Detected     Bordetella pertussis (ptxP) Not Detected     Chlamydia pneumoniae Not Detected     Mycoplasma pneumoniae Not Detected    Narrative:      Assay not valid for lower respiratory specimens, alternate  testing required.            COPD exacerbation  Patient's COPD is with exacerbation noted by continued dyspnea currently.  Patient is currently off COPD Pathway. Continue scheduled inhalers Steroids and Supplemental oxygen and monitor respiratory status closely.  Switch to oral prednisone    Acute on chronic diastolic CHF (congestive heart failure)  Patient is identified as having Diastolic (HFpEF) heart failure that is Acute on chronic. CHF is currently uncontrolled due to Dyspnea not returned to baseline after 1 doses of IV diuretic, Rales/crackles on pulmonary exam, and Pulmonary edema/pleural effusion on CXR. Latest ECHO performed and demonstrates- Results for orders placed during the hospital encounter of 11/13/23    Echo    Interpretation Summary    AFib noted throughout exam.    Left Ventricle: The left ventricle is normal in size. Normal wall thickness. Normal wall motion. There is normal systolic function with a visually estimated ejection  "fraction of 55 - 60%. Unable to assess diastolic function due to atrial fibrillation.    Right Ventricle: Mild right ventricular enlargement. Systolic function is mildly reduced.    Mitral Valve: There is mild regurgitation.    Pulmonary Artery: The estimated pulmonary artery systolic pressure is 49 mmHg.  . Continue Beta Blocker and Furosemide and monitor clinical status closely. Monitor on telemetry. Patient is on CHF pathway.  Monitor strict Is&Os and daily weights.  Place on fluid restriction of 1.5 L. Cardiology has been consulted. Continue to stress to patient importance of self efficacy and  on diet for CHF. Last BNP reviewed- and noted below   Recent Labs   Lab 08/11/24  0446   *       Stop IV lasix on 8/11. Has contraction alkalosis     Slow transit constipation  -resolved      PVD (peripheral vascular disease)        Type 2 diabetes mellitus with diabetic cataract, without long-term current use of insulin  Patient's FSGs are controlled on current medication regimen.  Last A1c reviewed-   Lab Results   Component Value Date    HGBA1C 6.0 (H) 05/16/2024     Most recent fingerstick glucose reviewed- No results for input(s): "POCTGLUCOSE" in the last 24 hours.  Current correctional scale  stable  Maintain anti-hyperglycemic dose as follows-   Antihyperglycemics (From admission, onward)      Start     Stop Route Frequency Ordered    08/06/24 1515  empagliflozin (Jardiance) tablet 10 mg        Question Answer Comment   Does this patient have a diagnosis of heart failure? Yes    Does this patient have type 1 diabetes or diabetic ketoacidosis? No    Does this patient have symptomatic hypotension? No    Is the patient NPO or pending major surgery in next 3 days or less? No        -- Oral Daily 08/06/24 1404          Hold Oral hypoglycemics while patient is in the hospital.    Paroxysmal atrial fibrillation  Patient with Paroxysmal (<7 days) atrial fibrillation which is uncontrolled currently with Beta " Blocker. Patient is currently in atrial fibrillation.[id number] Score: 4.  On Xarelto for anticoagulation.    Severe obesity (BMI >= 40)  Body mass index is 48.89 kg/m². Morbid obesity complicates all aspects of disease management from diagnostic modalities to treatment. Weight loss encouraged and health benefits explained to patient.         Essential hypertension  Chronic, controlled. Latest blood pressure and vitals reviewed-     Temp:  [98.1 °F (36.7 °C)]   Pulse:  []   Resp:  [23-54]   BP: ()/(53-84)   SpO2:  [89 %-100 %] .   Home meds for hypertension were reviewed and noted below.   Hypertension Medications               furosemide (LASIX) 40 MG tablet TAKE 1 TABLET(40 MG) BY MOUTH EVERY DAY    metoprolol succinate (TOPROL-XL) 100 MG 24 hr tablet Take 1 tablet (100 mg total) by mouth once daily.            While in the hospital, will manage blood pressure as follows; Adjust home antihypertensive regimen as follows- continue Lasix and metoprolol    Will utilize p.r.n. blood pressure medication only if patient's blood pressure greater than 180/110 and she develops symptoms such as worsening chest pain or shortness of breath.    Hyperlipidemia    Continue home statin      VTE Risk Mitigation (From admission, onward)           Ordered     rivaroxaban tablet 20 mg  with dinner         08/06/24 1436     IP VTE HIGH RISK PATIENT  Once         08/06/24 1436     Place sequential compression device  Until discontinued         08/06/24 1436                          I have completed this tele-visit without the assistance of a telepresenter.    The attending portion of this evaluation, treatment, and documentation was performed per Margarita Romero MD via Telemedicine AudioVisual using the secure [employer] software platform with 2 way audio/video. The provider was located off-site and the patient is located in the hospital. The aforementioned video software was utilized to document the relevant history and physical  exam    Margarita Romero MD  Department of Hospital Medicine   [facility] - Med Surg

## 2024-08-13 NOTE — NURSING
Pt sitting up in recliner without nasal cannula and denies any SOB. Standby assist as pt move back into bed. Attached external urinary catheter to suction. Bed alarm set, call light in reach, instructed pt to call for assistance.       [facility] - [place]  Nurses Note -- 4 Eyes      8/12/2024       Skin assessed on: Q Shift      [x] No Pressure Injuries Present    [x]Prevention Measures Documented    [] Yes LDA  for Pressure Injury Previously documented     [] Yes New Pressure Injury Discovered   [] LDA for New Pressure Injury Added      Attending RN:  Abbey Keys RN     Second RN:  SANTY Perkins

## 2024-08-13 NOTE — ASSESSMENT & PLAN NOTE
Patient with Hypercapnic and Hypoxic Respiratory failure which is Acute.  she is not on home oxygen. Supplemental oxygen was provided and noted- Oxygen Concentration (%):  [28] 28    .   Signs/symptoms of respiratory failure include- increased work of breathing and respiratory distress. Contributing diagnoses includes - CHF and COPD Labs and images were reviewed. Patient Has not had a recent ABG. Will treat underlying causes and adjust management of respiratory failure as follows-BiPAP support/ diuretics/supplemental oxygen  Weaned off BiPAP support.  On 2 L of oxygen with nasal cannula  Continue BiPAP p.r.n. naps and q.h.s.    Wean off O2 as tolerated. IS ordered  6 min walk test on 8/11, still needed oxygen.   Continue steroids as stated above.   Repeat walk test ordered on 8/12 and again on 8/13. Cxr from 8/12 reviewed, is non acute. Started breo

## 2024-08-13 NOTE — PLAN OF CARE
Problem: Adult Inpatient Plan of Care  Goal: Plan of Care Review  Outcome: Progressing     Problem: Bariatric Environmental Safety  Goal: Safety Maintained with Care  Outcome: Progressing     Problem: Diabetes Comorbidity  Goal: Blood Glucose Level Within Targeted Range  Outcome: Progressing     Problem: Skin Injury Risk Increased  Goal: Skin Health and Integrity  Outcome: Progressing     Problem: Pneumonia  Goal: Fluid Balance  Outcome: Progressing     Problem: Infection  Goal: Absence of Infection Signs and Symptoms  Outcome: Progressing     Problem: Fall Injury Risk  Goal: Absence of Fall and Fall-Related Injury  Outcome: Progressing

## 2024-08-13 NOTE — NURSING
Report received from night nurse CLAUDIA Potter. Visualized patient and assessed patient's overall condition and appearance. No acute distress noted. Will continue to monitor    [facility] [place]  Nurses Note -- 4 Eyes      8/13/2024       Skin assessed on: Q Shift      [x] No Pressure Injuries Present    [x]Prevention Measures Documented    [] Yes LDA  for Pressure Injury Previously documented     [] Yes New Pressure Injury Discovered   [] LDA for New Pressure Injury Added      Attending RN:  Olya Shay LPN     Second RN:  Abbey KeysRN

## 2024-08-14 ENCOUNTER — TELEPHONE (OUTPATIENT)
Dept: FAMILY MEDICINE | Facility: CLINIC | Age: 69
End: 2024-08-14
Payer: COMMERCIAL

## 2024-08-14 VITALS
HEIGHT: 63 IN | SYSTOLIC BLOOD PRESSURE: 117 MMHG | RESPIRATION RATE: 19 BRPM | OXYGEN SATURATION: 93 % | DIASTOLIC BLOOD PRESSURE: 73 MMHG | WEIGHT: 273.56 LBS | BODY MASS INDEX: 48.47 KG/M2 | TEMPERATURE: 99 F | HEART RATE: 76 BPM

## 2024-08-14 LAB
ANION GAP SERPL CALC-SCNC: 13 MMOL/L (ref 8–16)
ANION GAP SERPL CALC-SCNC: 14 MMOL/L (ref 8–16)
BUN SERPL-MCNC: 42 MG/DL (ref 8–23)
BUN SERPL-MCNC: 50 MG/DL (ref 8–23)
CALCIUM SERPL-MCNC: 9.6 MG/DL (ref 8.7–10.5)
CALCIUM SERPL-MCNC: 9.7 MG/DL (ref 8.7–10.5)
CHLORIDE SERPL-SCNC: 97 MMOL/L (ref 95–110)
CHLORIDE SERPL-SCNC: 99 MMOL/L (ref 95–110)
CO2 SERPL-SCNC: 27 MMOL/L (ref 23–29)
CO2 SERPL-SCNC: 28 MMOL/L (ref 23–29)
CREAT SERPL-MCNC: 1.4 MG/DL (ref 0.5–1.4)
CREAT SERPL-MCNC: 1.5 MG/DL (ref 0.5–1.4)
EST. GFR  (NO RACE VARIABLE): 37 ML/MIN/1.73 M^2
EST. GFR  (NO RACE VARIABLE): 40 ML/MIN/1.73 M^2
GLUCOSE SERPL-MCNC: 100 MG/DL (ref 70–110)
GLUCOSE SERPL-MCNC: 183 MG/DL (ref 70–110)
POCT GLUCOSE: 111 MG/DL (ref 70–110)
POCT GLUCOSE: 221 MG/DL (ref 70–110)
POCT GLUCOSE: 93 MG/DL (ref 70–110)
POTASSIUM SERPL-SCNC: 4.6 MMOL/L (ref 3.5–5.1)
POTASSIUM SERPL-SCNC: 5.8 MMOL/L (ref 3.5–5.1)
SODIUM SERPL-SCNC: 138 MMOL/L (ref 136–145)
SODIUM SERPL-SCNC: 140 MMOL/L (ref 136–145)

## 2024-08-14 PROCEDURE — 25000242 PHARM REV CODE 250 ALT 637 W/ HCPCS: Performed by: STUDENT IN AN ORGANIZED HEALTH CARE EDUCATION/TRAINING PROGRAM

## 2024-08-14 PROCEDURE — 94664 DEMO&/EVAL PT USE INHALER: CPT

## 2024-08-14 PROCEDURE — 99900035 HC TECH TIME PER 15 MIN (STAT)

## 2024-08-14 PROCEDURE — 99900031 HC PATIENT EDUCATION (STAT)

## 2024-08-14 PROCEDURE — 80048 BASIC METABOLIC PNL TOTAL CA: CPT | Mod: 91 | Performed by: HOSPITALIST

## 2024-08-14 PROCEDURE — 27000221 HC OXYGEN, UP TO 24 HOURS

## 2024-08-14 PROCEDURE — 36415 COLL VENOUS BLD VENIPUNCTURE: CPT | Performed by: STUDENT IN AN ORGANIZED HEALTH CARE EDUCATION/TRAINING PROGRAM

## 2024-08-14 PROCEDURE — 36415 COLL VENOUS BLD VENIPUNCTURE: CPT | Performed by: HOSPITALIST

## 2024-08-14 PROCEDURE — 63600175 PHARM REV CODE 636 W HCPCS: Performed by: STUDENT IN AN ORGANIZED HEALTH CARE EDUCATION/TRAINING PROGRAM

## 2024-08-14 PROCEDURE — 97110 THERAPEUTIC EXERCISES: CPT | Mod: CQ

## 2024-08-14 PROCEDURE — 94761 N-INVAS EAR/PLS OXIMETRY MLT: CPT

## 2024-08-14 PROCEDURE — 97530 THERAPEUTIC ACTIVITIES: CPT | Mod: CQ

## 2024-08-14 PROCEDURE — 94640 AIRWAY INHALATION TREATMENT: CPT

## 2024-08-14 PROCEDURE — 97116 GAIT TRAINING THERAPY: CPT | Mod: CQ

## 2024-08-14 PROCEDURE — 94660 CPAP INITIATION&MGMT: CPT

## 2024-08-14 PROCEDURE — 25000242 PHARM REV CODE 250 ALT 637 W/ HCPCS: Performed by: HOSPITALIST

## 2024-08-14 PROCEDURE — 25000003 PHARM REV CODE 250: Performed by: STUDENT IN AN ORGANIZED HEALTH CARE EDUCATION/TRAINING PROGRAM

## 2024-08-14 PROCEDURE — 80048 BASIC METABOLIC PNL TOTAL CA: CPT | Performed by: STUDENT IN AN ORGANIZED HEALTH CARE EDUCATION/TRAINING PROGRAM

## 2024-08-14 RX ORDER — PREDNISONE 10 MG/1
TABLET ORAL
Qty: 30 TABLET | Refills: 0 | Status: SHIPPED | OUTPATIENT
Start: 2024-08-14 | End: 2024-08-29

## 2024-08-14 RX ORDER — FLUTICASONE FUROATE AND VILANTEROL 100; 25 UG/1; UG/1
1 POWDER RESPIRATORY (INHALATION) DAILY
Qty: 14 EACH | Refills: 3 | Status: SHIPPED | OUTPATIENT
Start: 2024-08-14

## 2024-08-14 RX ADMIN — ATORVASTATIN CALCIUM 40 MG: 40 TABLET, FILM COATED ORAL at 10:08

## 2024-08-14 RX ADMIN — IPRATROPIUM BROMIDE AND ALBUTEROL SULFATE 3 ML: 2.5; .5 SOLUTION RESPIRATORY (INHALATION) at 07:08

## 2024-08-14 RX ADMIN — ARFORMOTEROL TARTRATE 15 MCG: 15 SOLUTION RESPIRATORY (INHALATION) at 07:08

## 2024-08-14 RX ADMIN — RIVAROXABAN 20 MG: 20 TABLET, FILM COATED ORAL at 05:08

## 2024-08-14 RX ADMIN — SERTRALINE HYDROCHLORIDE 100 MG: 50 TABLET ORAL at 10:08

## 2024-08-14 RX ADMIN — IPRATROPIUM BROMIDE AND ALBUTEROL SULFATE 3 ML: 2.5; .5 SOLUTION RESPIRATORY (INHALATION) at 02:08

## 2024-08-14 RX ADMIN — INSULIN ASPART 4 UNITS: 100 INJECTION, SOLUTION INTRAVENOUS; SUBCUTANEOUS at 05:08

## 2024-08-14 RX ADMIN — PREDNISONE 50 MG: 50 TABLET ORAL at 10:08

## 2024-08-14 RX ADMIN — IPRATROPIUM BROMIDE AND ALBUTEROL SULFATE 3 ML: 2.5; .5 SOLUTION RESPIRATORY (INHALATION) at 12:08

## 2024-08-14 RX ADMIN — FLUTICASONE PROPIONATE 100 MCG: 50 SPRAY, METERED NASAL at 10:08

## 2024-08-14 RX ADMIN — BUDESONIDE 1 MG: 0.5 INHALANT RESPIRATORY (INHALATION) at 07:08

## 2024-08-14 RX ADMIN — EMPAGLIFLOZIN 10 MG: 10 TABLET, FILM COATED ORAL at 10:08

## 2024-08-14 RX ADMIN — METOPROLOL TARTRATE 50 MG: 50 TABLET, FILM COATED ORAL at 10:08

## 2024-08-14 RX ADMIN — METOPROLOL TARTRATE 50 MG: 50 TABLET, FILM COATED ORAL at 05:08

## 2024-08-14 RX ADMIN — GUAIFENESIN AND DEXTROMETHORPHAN HYDROBROMIDE 1 TABLET: 600; 30 TABLET, EXTENDED RELEASE ORAL at 10:08

## 2024-08-14 RX ADMIN — METOPROLOL TARTRATE 50 MG: 50 TABLET, FILM COATED ORAL at 12:08

## 2024-08-14 NOTE — TELEPHONE ENCOUNTER
Spoke to pt and informed her Dr Miller no longer seeing pts in office; she does not want to go to another facility; scheduled with beverly for 8/28

## 2024-08-14 NOTE — PLAN OF CARE
Case Management Final Discharge Note      Discharge Disposition: Home    New DME ordered / company name: [facility] [employer] Oxygen tank to be delivered to patient bedside    Relevant SDOH / Transition of Care Barriers:  None    Primary Caretaker and contact information: Corwin Evans (Spouse) [phone]        Scheduled followup appointment: Patient instructed to follow up with PCP; clinic to contact patient to schedule appointment    Referrals placed: Referrals placed to Cardiology and Pulmonology    Transportation: Private vehicle/family taking patient home        Patient and family educated on discharge services and updated on DC plan.  Patient to discharge home; she is mostly independent, but lives with her spouse and children who are able to assist her at home as needed.  CM unable to schedule PCP follow up due to provider not having any appointments available within suggested timeframe; clinic's  sent message to patient's primary care clinic to contact her to schedule appointment.  Bedside RN notified, patient clear to discharge from Case Management Perspective.    08/14/24 0857   Final Note   Assessment Type Final Discharge Note   Anticipated Discharge Disposition Home   Hospital Resources/Appts/Education Provided Provided patient/caregiver with written discharge plan information;Appointments scheduled and added to AVS   Post-Acute Status   Coverage [facility] ALL OUT OF STATE   Discharge Delays None known at this time

## 2024-08-14 NOTE — PT/OT/SLP PROGRESS
Physical Therapy Treatment    Patient Name:  Sandee Evans   MRN:  [id number]    Recommendations:     Discharge Recommendations: Low Intensity Therapy  Discharge Equipment Recommendations: none  Barriers to discharge: None    Assessment:     Sandee Evans is a 68 y.o. female admitted with a medical diagnosis of Acute on chronic respiratory failure with hypercapnia.  She presents with the following impairments/functional limitations: weakness, impaired endurance, gait instability, decreased lower extremity function, decreased safety awareness, impaired cardiopulmonary response to activity .    Rehab Prognosis: Good; patient would benefit from acute skilled PT services to address these deficits and reach maximum level of function.    Recent Surgery: * No surgery found *      Plan:     During this hospitalization, patient to be seen 3 x/week to address the identified rehab impairments via gait training, therapeutic activities, therapeutic exercises and progress toward the following goals:    Plan of Care Expires:  08/22/24    Subjective     Chief Complaint: didn't have a good night   Patient/Family Comments/goals: pt is pleasant and agreeable to therapy   Pain/Comfort:  Pain Rating 1: 0/10  Pain Rating Post-Intervention 1: 0/10      Objective:     Communicated with nurse prior to session.  Patient found HOB elevated with telemetry, oxygen, peripheral IV, PureWick upon PT entry to room.     General Precautions: Standard, fall, droplet, respiratory  Orthopedic Precautions: N/A  Braces: N/A  Respiratory Status: Room air   SpO2 : 96% on RA at rest, 88%-93% with exertions. Pt required rest breaks and VC's for pursed lip breathing technique   Functional Mobility:  Bed Mobility:     Scooting: supervision  Supine to Sit: supervision, HOB elevated, bedside rail   Transfers:     Sit to Stand: X 2 trials from bed and chair   supervision with no AD  Bed to Chair: stand by assistance with  no AD  using  Step Transfer  Gait: pt  ambulated 300  ft with no AD, SBA/S . Noted with decreased dmitriy,decreased step length, lateral swaying however no LOB.  Pt required 1 standing rest break 2* to fatigue . VC's for safety and pursed lip breathing technique.    Balance:  good in sitting, fair + in standing.          AM-PAC 6 CLICK MOBILITY  Turning over in bed (including adjusting bedclothes, sheets and blankets)?: 4  Sitting down on and standing up from a chair with arms (e.g., wheelchair, bedside commode, etc.): 4  Moving from lying on back to sitting on the side of the bed?: 4  Moving to and from a bed to a chair (including a wheelchair)?: 4  Need to walk in hospital room?: 3  Climbing 3-5 steps with a railing?: 3  Basic Mobility Total Score: 22       Treatment & Education:      Patient left Lower Extremity Exercises.    Patient educated on: Purpose and Benefits of Therapeutic Exercise(s).    Patient verbalized acceptance/understanding of instruction(s), expectation(s), and limitation(s) (for safety).  Patient performed: 2 sets of 10 reps (each) of B LE Ther Ex: AP, LAQ, Hip abd/add, Hip flexion while sitting up on chair .       Patient required rest breaks, pursed breathing technique,   verbal cues/tactile cues to ensure correct sequence, to maintain proper form, and to allow for self-correction.  Pt reported no complaints or problems with exercise activity.     Patient required increase Reaction Time to initiate movements and a Sitting Rest Break between set(s) to recover.  Pt left  up in chair with all lines intact, call button in reach, nurse  notified, and RT present..    GOALS:   Multidisciplinary Problems       Physical Therapy Goals          Problem: Physical Therapy    Goal Priority Disciplines Outcome Goal Variances Interventions   Physical Therapy Goal     PT, PT/OT Progressing     Description: Goals to be met by: 24     Patient will increase functional independence with mobility by performin. Supine to sit with Modified  [place]  2. Rolling to Left and Right with Modified [place]  3. Sit to stand transfer with Modified [place] using RW  4. Bed to chair transfer with Modified [place] using Rolling Walker  5. Gait x200 feet with Modified [place] using Rolling Walker   6. Upper/Lower extremity exercise program 2 sets x15 reps per handout, with independence                         Time Tracking:     PT Received On: 08/14/24  PT Start Time: 1351     PT Stop Time: 1430  PT Total Time (min): 39 min     Billable Minutes: Gait Training 18, Therapeutic Activity 8, and Therapeutic Exercise 12    Treatment Type: Treatment  PT/PTA: PTA     Number of PTA visits since last PT visit: 5     08/14/2024

## 2024-08-14 NOTE — PLAN OF CARE
Recommendations  Recommendation:   1. Continue Diabetic Diet 1500ml fluid restriction   2. Consider adding cardiac restrictions   3. Monitor need for ONS   4. Monitor weight and labs    Goals: Pt will consume 50-75% of meals by RD follow up    Nutrition Goal Status: new  Communication of RD Recs:  (POC)

## 2024-08-14 NOTE — TELEPHONE ENCOUNTER
----- Message from Re Gonzalez sent at 8/14/2024  4:52 PM CDT -----  Regarding: hospital follow up  Type:  Patient Returning Call      Name of who is calling:patient        What is request in detail:patient is requesting a call back to set up appt for hospital follow up        Can clinic reply by [facility]:no        What number to [facility] back [facility]: [phone] or [phone]

## 2024-08-14 NOTE — NURSING
Pt sitting up in recliner, currently  on room air, denies any SOB. External urinary catheter in place to suction. Assisted pt back to bed with minimal assistance. Bed alarm set, Call light in reach, instructed pt to call for assistance.     [facility] - [place]  Nurses Note -- 4 Eyes      8/13/2024       Skin assessed on: Q Shift      [] No Pressure Injuries Present    []Prevention Measures Documented    [] Yes LDA  for Pressure Injury Previously documented     [] Yes New Pressure Injury Discovered   [] LDA for New Pressure Injury Added      Attending RN:  Abbey Keys RN     Second RN:  SANTY Dobson

## 2024-08-14 NOTE — NURSING
Bedside Report given to night nurse CLAUDIA Potter. Walking rounds completed. Visualized and assessed patient NAD noted. Safety precautions maintained and call light within reach.     Chart check completed.

## 2024-08-14 NOTE — NURSING
Testing for Home O2    O2 sats on RA @ rest = 95%    O2 sats on RA with exercise = 88%    O2 sats on 1L of oxygen=98%

## 2024-08-14 NOTE — PROGRESS NOTES
"[facility] - Med Surg  Adult Nutrition  Progress Note    SUMMARY     Recommendations  Recommendation:   1. Continue Diabetic Diet 1500ml fluid restriction   2. Consider adding cardiac restrictions   3. Monitor need for ONS   4. Monitor weight and labs    Goals: Pt will consume 50-75% of meals by RD follow up    Nutrition Goal Status: new  Communication of RD Recs:  (POC)    Assessment and Plan  No nutrition diagnosis at this time     Malnutrition Assessment  Unable to assess NFPE at this time     Reason for Assessment  Reason For Assessment: RD follow-up  Diagnosis: pulmonary disease  Relevant Medical History: HTN, HLD, arthritis, DDD, thyroid disease, sleep apnea, smoker, breast cyst, CHF, HIEU, hypothyroidism, edema, psychiatric problems  Interdisciplinary Rounds: did not attend  General Information Comments: Pt is currently on a diabetic diet with 1500ml fluid restriction. Thomas-19/skin intact. Noted 100% meal intake. Noted constipation. Noted 4 lb weight loss in 7 months. Unable to conduct NFPE at this time, pt on droplet precautions.  Nutrition Discharge Planning: d/c on cardiac/diabetic diet    Nutrition Risk Screen  Nutrition Risk Screen: no indicators present    Nutrition/Diet History  Patient Reported Diet/Restrictions/Preferences: diabetic diet, heart healthy  Food Preferences: MARYJANE  Factors Affecting Nutritional Intake: constipation    Nutrition Related Social Determinants of Health: SDOH: Unable to assess at this time.     Anthropometrics  Temp: 98.4 °F (36.9 °C)  Height Method: Stated  Height: 5' 3" (160 cm)  Height (inches): 63 in  Weight Method: Bed Scale  Weight: 124.1 kg (273 lb 9.5 oz)  Weight (lb): 273.59 lb  Ideal Body Weight (IBW), Female: 115 lb  % Ideal Body Weight, Female (lb): 237.9 %  BMI (Calculated): 48.5  BMI Grade: greater than 40 - morbid obesity  Usual Body Weight (UBW), k.9 kg (24)  % Usual Body Weight: 98.78  % Weight Change From Usual Weight: -1.43 %   "   Lab/Procedures/Meds  Pertinent Labs Reviewed: reviewed  Pertinent Labs Comments: K 5.8, BUN 50, Creatinine 1.5, GFR 37  Pertinent Medications Reviewed: reviewed  Pertinent Medications Comments: atorvastatin, budesonide, empagliflozin, prednisone, sertraline    Estimated/Assessed Needs  Weight Used For Calorie Calculations: 124.1 kg (273 lb 9.5 oz)  Energy Calorie Requirements (kcal): 2076 kcals (AF 1.2)  Energy Need Method: [place]-St Jeor  Protein Requirements: 124g (1g/kg)  Weight Used For Protein Calculations: 124.1 kg (273 lb 9.5 oz)     Estimated Fluid Requirement Method: RDA Method  RDA Method (mL): 2076  CHO Requirement: 260g    Nutrition Prescription Ordered  Current Diet Order: Diabetic 1500 ml fluid restriction    Evaluation of Received Nutrient/Fluid Intake  I/O: 120/500  Energy Calories Required: meeting needs  Protein Required: meeting needs  Fluid Required: meeting needs  Comments: LBM-8/13/24  Tolerance: tolerating  % Intake of Estimated Energy Needs: 75 - 100 %  % Meal Intake: 75 - 100 %    Nutrition Risk  Level of Risk/Frequency of Follow-up:  (1x/weekly)     Monitor and Evaluation  Food and Nutrient Intake: energy intake, food and beverage intake  Food and Nutrient Adminstration: diet order  Anthropometric Measurements: weight change, weight, body mass index  Biochemical Data, Medical Tests and Procedures: electrolyte and renal panel, gastrointestinal profile, glucose/endocrine profile, inflammatory profile, lipid profile     Nutrition Follow-Up  RD Follow-up?: Yes

## 2024-08-14 NOTE — NURSING
Report received from night nurse CLAUDIA Potter. Visualized patient and assessed patient's overall condition and appearance. No acute distress noted. Plan of care ongoing.    [facility] - [place]  Nurses Note -- 4 Eyes      8/14/2024       Skin assessed on: Q Shift      [x] No Pressure Injuries Present    [x]Prevention Measures Documented    [] Yes LDA  for Pressure Injury Previously documented     [] Yes New Pressure Injury Discovered   [] LDA for New Pressure Injury Added      Attending RN:  Olya Shay LPN     Second RN:  Abbey KeysRN

## 2024-08-14 NOTE — PLAN OF CARE
Problem: Adult Inpatient Plan of Care  Goal: Plan of Care Review  Outcome: Met  Goal: Patient-Specific Goal (Individualized)  Outcome: Met  Goal: Absence of Hospital-Acquired Illness or Injury  Outcome: Met  Goal: Optimal Comfort and Wellbeing  Outcome: Met  Goal: Readiness for Transition of Care  Outcome: Met     Problem: Bariatric Environmental Safety  Goal: Safety Maintained with Care  Outcome: Met     Problem: Diabetes Comorbidity  Goal: Blood Glucose Level Within Targeted Range  Outcome: Met     Problem: Skin Injury Risk Increased  Goal: Skin Health and Integrity  Outcome: Met     Problem: Pneumonia  Goal: Fluid Balance  Outcome: Met  Goal: Resolution of Infection Signs and Symptoms  Outcome: Met  Goal: Effective Oxygenation and Ventilation  Outcome: Met     Problem: Infection  Goal: Absence of Infection Signs and Symptoms  Outcome: Met     Problem: Fall Injury Risk  Goal: Absence of Fall and Fall-Related Injury  Outcome: Met

## 2024-08-26 ENCOUNTER — PATIENT MESSAGE (OUTPATIENT)
Dept: ADMINISTRATIVE | Facility: HOSPITAL | Age: 69
End: 2024-08-26
Payer: COMMERCIAL

## 2024-08-27 ENCOUNTER — PATIENT OUTREACH (OUTPATIENT)
Dept: ADMINISTRATIVE | Facility: HOSPITAL | Age: 69
End: 2024-08-27
Payer: COMMERCIAL

## 2024-08-27 DIAGNOSIS — M81.0 POSTMENOPAUSAL OSTEOPOROSIS OF MULTIPLE SITES: Primary | ICD-10-CM

## 2024-08-27 NOTE — PROGRESS NOTES
Population Health Chart Review & Patient Outreach Details      Additional [facility] Health Notes:    Campaign message -- requesting to schedule dexa scan. Order place awaiting for response. Will need to be schedule after 9/14/24.            Updates Requested / Reviewed:      Updated Care Coordination Note and Care Everywhere         Health Maintenance Topics Overdue:      VBHM Score: 3     Foot Exam  Uncontrolled BP  LDCT Lung Screen    Pneumonia Vaccine  RSV Vaccine                  Health Maintenance Topic(s) Outreach Outcomes & Actions Taken:    Osteoporosis Screening - Outreach Outcomes & Actions Taken  : Dexa Order Placed and Campaign message -- requesting to schedule dexa scan. Order place awaiting for response. Will need to be schedule after 9/14/24.

## 2024-08-28 ENCOUNTER — OFFICE VISIT (OUTPATIENT)
Dept: FAMILY MEDICINE | Facility: CLINIC | Age: 69
End: 2024-08-28
Payer: COMMERCIAL

## 2024-08-28 VITALS
SYSTOLIC BLOOD PRESSURE: 118 MMHG | BODY MASS INDEX: 46.57 KG/M2 | WEIGHT: 262.81 LBS | TEMPERATURE: 98 F | DIASTOLIC BLOOD PRESSURE: 60 MMHG | HEIGHT: 63 IN | OXYGEN SATURATION: 86 % | HEART RATE: 95 BPM | RESPIRATION RATE: 16 BRPM

## 2024-08-28 DIAGNOSIS — J44.89 COPD WITH CHRONIC BRONCHITIS: ICD-10-CM

## 2024-08-28 DIAGNOSIS — J44.1 COPD EXACERBATION: ICD-10-CM

## 2024-08-28 DIAGNOSIS — Z09 FOLLOW-UP EXAM: Primary | ICD-10-CM

## 2024-08-28 DIAGNOSIS — I10 ESSENTIAL HYPERTENSION: Chronic | ICD-10-CM

## 2024-08-28 PROCEDURE — 1111F DSCHRG MED/CURRENT MED MERGE: CPT | Mod: CPTII,S$GLB,,

## 2024-08-28 PROCEDURE — 1160F RVW MEDS BY RX/DR IN RCRD: CPT | Mod: CPTII,S$GLB,,

## 2024-08-28 PROCEDURE — 3062F POS MACROALBUMINURIA REV: CPT | Mod: CPTII,S$GLB,,

## 2024-08-28 PROCEDURE — 4010F ACE/ARB THERAPY RXD/TAKEN: CPT | Mod: CPTII,S$GLB,,

## 2024-08-28 PROCEDURE — 3074F SYST BP LT 130 MM HG: CPT | Mod: CPTII,S$GLB,,

## 2024-08-28 PROCEDURE — 99999 PR PBB SHADOW E&M-EST. PATIENT-LVL V: CPT | Mod: PBBFAC,,,

## 2024-08-28 PROCEDURE — 1125F AMNT PAIN NOTED PAIN PRSNT: CPT | Mod: CPTII,S$GLB,,

## 2024-08-28 PROCEDURE — 1159F MED LIST DOCD IN RCRD: CPT | Mod: CPTII,S$GLB,,

## 2024-08-28 PROCEDURE — 3288F FALL RISK ASSESSMENT DOCD: CPT | Mod: CPTII,S$GLB,,

## 2024-08-28 PROCEDURE — 3078F DIAST BP <80 MM HG: CPT | Mod: CPTII,S$GLB,,

## 2024-08-28 PROCEDURE — 3072F LOW RISK FOR RETINOPATHY: CPT | Mod: CPTII,S$GLB,,

## 2024-08-28 PROCEDURE — 3008F BODY MASS INDEX DOCD: CPT | Mod: CPTII,S$GLB,,

## 2024-08-28 PROCEDURE — 99214 OFFICE O/P EST MOD 30 MIN: CPT | Mod: S$GLB,,,

## 2024-08-28 PROCEDURE — 3066F NEPHROPATHY DOC TX: CPT | Mod: CPTII,S$GLB,,

## 2024-08-28 PROCEDURE — 3044F HG A1C LEVEL LT 7.0%: CPT | Mod: CPTII,S$GLB,,

## 2024-08-28 PROCEDURE — 1101F PT FALLS ASSESS-DOCD LE1/YR: CPT | Mod: CPTII,S$GLB,,

## 2024-08-28 RX ORDER — CLOBETASOL PROPIONATE 0.5 MG/G
OINTMENT TOPICAL
COMMUNITY
Start: 2024-05-13

## 2024-08-28 RX ORDER — TERBINAFINE HYDROCHLORIDE 250 MG/1
250 TABLET ORAL
COMMUNITY
Start: 2024-05-13

## 2024-08-28 RX ORDER — IPRATROPIUM BROMIDE AND ALBUTEROL SULFATE 2.5; .5 MG/3ML; MG/3ML
3 SOLUTION RESPIRATORY (INHALATION) EVERY 6 HOURS PRN
Qty: 75 ML | Refills: 0 | Status: SHIPPED | OUTPATIENT
Start: 2024-08-28 | End: 2024-09-17

## 2024-08-28 NOTE — PROGRESS NOTES
Health Maintenance Due   Topic     RSV Vaccine (Age 60+ and Pregnant patients) (1 - 1-dose 60+ series) Not given at this facility       Foot Exam  Not given at this facility       COVID-19 Vaccine (5 - 2023-24 season) Not given at this facility       LDCT Lung Screen      Pneumococcal Vaccines (Age 65+) (3 of 3 - PPSV23 or PCV20)     Colorectal Cancer Screening  CONSULT WITH PCP    DEXA Scan

## 2024-08-28 NOTE — PROGRESS NOTES
"  HPI     Chief Complaint:  Chief Complaint   Patient presents with    Follow-up     O2 going up and down       Sandee Evans is a 68 y.o. female with multiple medical diagnoses as listed in the medical history and problem list that presents for  hospital follow up oxygen up and down    Follow-up  The problem has been gradually improving. Associated symptoms include coughing and nausea. Pertinent negatives include no abdominal pain, chest pain, fever, headaches, numbness or weakness. Nothing aggravates the symptoms. She has tried nothing for the symptoms.    Hospital follow-up   Wellbutrin- feels some side effects   Has shortness of breath  Needs portable O2  Has home O2  Message Dr. Puente home O2    Did not take  some of her medications today        8/6/2024   ED       [facility] - Emergency Dept  [facility] Medicine  History & Physical     Patient Name: Sandee Evans  MRN: [id number]  Patient Class: IP- Inpatient  Admission Date: 8/6/2024  Attending Physician: Keli Murray MD   Primary Care Provider: Kuldeep Miller MD           Patient information was obtained from patient, spouse/SO, past medical records, and ER records.      Subjective:      Principal Problem:Acute respiratory failure with hypercapnia     Chief Complaint:        Chief Complaint   Patient presents with    Cough    Shortness of Breath       Cough and SOB since last week worsening. Patient states this morning she couldn't lay down bc she became too short of breath. "Felt like I was drowning." Patient POX 97% on room air. Denies any sick contacts at home. Patient also reports n/v x 1 day.          HPI:   Patient is a 68-year-old morbidly obese female with past medical history of chronic AFib on Xarelto, CHF EF 55-60% 11/23, hypertension, diabetes, hyperlipidemia, PVD, tobacco abuse , COPD, HIEU, hypothyroidism, tobacco use who presented to [facility] ER on 08/06/2024 for further evaluation of progressively worsening shortness of " breath x 1 week.  Reports associated  orthopnea/pedal edema..  Patient stated that she had 3 episodes of nonbilious nonbloody vomiting.   reports patient is noncompliant with CPAP in diuretics at times at home.  No fever/abdominal pain/diarrhea/constipation/chest pain.   reports chronic cough.  Continues to smoke cigarettes, 1-2 per day for 50 years.  No recent travel/sick contacts     During evaluation in the ER, patient was found to be in respiratory distress.  Oxygen saturations at 93% on room air.  EKG revealed  AFib with RVR (heart rate).  Labs revealed WBC of 4.48, hemoglobin 13.7, sodium 140, creatinine 1, , COVID/flu negative.  Chest x-ray revealed mildly prominent interstitial markings could relate to pulmonary vascular congestion/edema VBG revealed 7.278/80.4/49/37.6.  Patient was given duo neb x1, 325 mg aspirin, 10 mg IV diltiazem, 40 mg IV Lasix, 1.25 mg Xopenex, 125 mg Solu-Medrol, 4 mg Zofran.  Cardiology was consulted in ED. patient was placed on BiPAP.  Admitted to ICU for further management        Assessment/Plan:      * Acute respiratory failure with hypercapnia  Patient with Hypercapnic and Hypoxic Respiratory failure which is Acute.  she is not on home oxygen. Supplemental oxygen was provided and noted- Oxygen Concentration (%):  [35] 35     .   Signs/symptoms of respiratory failure include- increased work of breathing and respiratory distress. Contributing diagnoses includes - CHF and COPD Labs and images were reviewed. Patient Has not had a recent ABG. Will treat underlying causes and adjust management of respiratory failure as follows-BiPAP support/ diuretics/supplemental oxygen     Acute on chronic diastolic CHF (congestive heart failure)  Patient is identified as having Diastolic (HFpEF) heart failure that is Acute on chronic. CHF is currently uncontrolled due to Dyspnea not returned to baseline after 1 doses of IV diuretic, Rales/crackles on pulmonary exam, and  Pulmonary edema/pleural effusion on CXR. Latest ECHO performed and demonstrates- Results for orders placed during the hospital encounter of 11/13/23     Echo     Interpretation Summary    AFib noted throughout exam.    Left Ventricle: The left ventricle is normal in size. Normal wall thickness. Normal wall motion. There is normal systolic function with a visually estimated ejection fraction of 55 - 60%. Unable to assess diastolic function due to atrial fibrillation.    Right Ventricle: Mild right ventricular enlargement. Systolic function is mildly reduced.    Mitral Valve: There is mild regurgitation.    Pulmonary Artery: The estimated pulmonary artery systolic pressure is 49 mmHg.  . Continue Beta Blocker and Furosemide and monitor clinical status closely. Monitor on telemetry. Patient is on CHF pathway.  Monitor strict Is&Os and daily weights.  Place on fluid restriction of 1.5 L. Cardiology has been consulted. Continue to stress to patient importance of self efficacy and  on diet for CHF. Last BNP reviewed- and noted below       Recent Labs   Lab 08/06/24  1108   *         Paroxysmal atrial fibrillation  Patient with Paroxysmal (<7 days) atrial fibrillation which is uncontrolled currently with Beta Blocker. Patient is currently in atrial fibrillation.[id number] Score: 4.  On Xarelto for anticoagulation.     COPD exacerbation  Patient's COPD is with exacerbation noted by continued dyspnea currently.  Patient is currently off COPD Pathway. Continue scheduled inhalers Steroids and Supplemental oxygen and monitor respiratory status closely.      PVD (peripheral vascular disease)           Type 2 diabetes mellitus with diabetic cataract, without long-term current use of insulin  Patient's FSGs are controlled on current medication regimen.  Last A1c reviewed-         Lab Results   Component Value Date     HGBA1C 6.0 (H) 05/16/2024      Most recent fingerstick glucose reviewed- No results for input(s):  ""POCTGLUCOSE" in the last 24 hours.  Current correctional scale  stable  Maintain anti-hyperglycemic dose as follows-   Antihyperglycemics (From admission, onward)       Assessment & Plan        Encouraged patient to follow up with pulmonologist; exercise her lungs     Place order for portable oxygen due to patient's COPD in oxygen level of 86%     Place refill for DuoNeb     Information provided on AVS    Problem List Items Addressed This Visit          Pulmonary    COPD with chronic bronchitis    Overview     PFT 9/21/2021: ratio 64 fev1 1.30 (66) fvc 80 tlc 62 dlco 73  Currently on spiriva  Will add laba/ics  Empiric steroid due to worsening of cough   90% oxygen sat on RA.  Declined 6 min walk.  Per patient, oxygen sat 96% at home.  Will send for cxr.                Relevant Medications    albuterol-ipratropium (DUO-NEB) 2.5 mg-0.5 mg/3 mL nebulizer solution    Other Relevant Orders    OXYGEN FOR HOME USE    COPD exacerbation    Relevant Medications    albuterol-ipratropium (DUO-NEB) 2.5 mg-0.5 mg/3 mL nebulizer solution    Other Relevant Orders    OXYGEN FOR HOME USE       Cardiac/Vascular    Essential hypertension (Chronic)       Other    Follow-up exam - Primary         --------------------------------------------      Health Maintenance:  Health Maintenance         Date Due Completion Date    RSV Vaccine (Age 60+ and Pregnant patients) (1 - 1-dose 60+ series) Never done ---    Foot Exam 10/24/2020 10/24/2019    LDCT Lung Screen 09/29/2023 9/29/2022    Pneumococcal Vaccines (Age 65+) (3 of 3 - PPSV23 or PCV20) 02/10/2024 2/10/2023    Colorectal Cancer Screening 06/25/2024 6/25/2019    Influenza Vaccine (1) 09/01/2024 11/1/2023    COVID-19 Vaccine (5 - 2023-24 season) 09/01/2024 6/28/2022    DEXA Scan 09/14/2024 9/14/2022    Lipid Panel 10/27/2024 10/27/2023    Eye Exam 12/27/2024 12/27/2023    Hemoglobin A1c 02/06/2025 8/6/2024    Override on 7/22/2014: Not Clinically Appropriate    Diabetes Urine Screening " "05/16/2025 5/16/2024    Mammogram 07/30/2025 7/30/2024    TETANUS VACCINE 05/28/2028 5/28/2018            Health maintenance reviewed and DEXA ordered  patient will return in 1 month do a annual and get her vaccines  completed and screenings scheduled    Follow Up:  Follow up if symptoms worsen or fail to improve.    Exam     Review of Systems:  (as noted above)  Review of Systems   Constitutional:  Negative for fever.   Eyes:  Negative for visual disturbance.   Respiratory:  Positive for cough, shortness of breath and wheezing.    Cardiovascular:  Negative for chest pain and leg swelling.   Gastrointestinal:  Positive for nausea. Negative for abdominal pain.   Genitourinary:  Negative for difficulty urinating.   Musculoskeletal:  Positive for back pain (from standing).   Neurological:  Negative for weakness, numbness and headaches.       Physical Exam:   Physical Exam  Constitutional:       General: She is not in acute distress.     Appearance: Normal appearance. She is not ill-appearing, toxic-appearing or diaphoretic.   HENT:      Head: Normocephalic and atraumatic.   Cardiovascular:      Rate and Rhythm: Normal rate and regular rhythm.      Pulses: Normal pulses.   Pulmonary:      Effort: Pulmonary effort is normal. No respiratory distress.      Breath sounds: Wheezing present.   Abdominal:      Palpations: Abdomen is soft.   Musculoskeletal:      Right lower leg: Edema present.      Left lower leg: Edema present.   Neurological:      Mental Status: She is alert and oriented to person, place, and time.   Psychiatric:         Mood and Affect: Mood normal.       Vitals:    08/28/24 1508   BP: 118/60   BP Location: Right arm   Patient Position: Sitting   BP Method: Large (Manual)   Pulse: 95   Resp: 16   Temp: 98 °F (36.7 °C)   TempSrc: Oral   SpO2: (!) 86%   Weight: 119.2 kg (262 lb 12.6 oz)   Height: 5' 3" (1.6 m)      Body mass index is 46.55 kg/m².        History     Past Medical History:  Past Medical History: "   Diagnosis Date    Anticoagulant long-term use     Xarelto    Arthritis     Atrial fibrillation     Breast cyst     CHF (congestive heart failure)     Degenerative disc disease     Edema     HLD (hyperlipidemia)     Hx of psychiatric care     Hyperlipidemia     Hypertension     Hypothyroidism     Nuclear sclerosis, bilateral 12/18/2017    Obesity, morbid     HIEU (obstructive sleep apnea)     Other abnormal glucose     pre-diabetes    Pre-diabetes     Psychiatric problem     Requires assistance with activities of daily living (ADL)     Sleep apnea     Smoker     SOB (shortness of breath)     SOB (shortness of breath)     Thyroid disease     on meds 8-9 years ago. hypothyroidism.no malignancy    TMJ (temporomandibular joint disorder)     jaw clicking    Tobacco abuse     Unsteady gait     Urge incontinence     Weakness generalized     Wears glasses        Past Surgical History:  Past Surgical History:   Procedure Laterality Date    BREAST BIOPSY Right     over 10 yrs ago/ benign    BREAST CYST EXCISION Right     COLONOSCOPY N/A 06/25/2019    Procedure: COLONOSCOPY;  Surgeon: Micheline Flores MD;  Location: [facility] ENDO;  Service: Endoscopy;  Laterality: N/A;  RX XARELTO ok to hold (2 days) per Dr. Naik see scan 3/20/19    ENDOSCOPIC ULTRASOUND OF UPPER GASTROINTESTINAL TRACT N/A 01/26/2021    Procedure: ULTRASOUND-ENDOSCOPIC-UPPER;  Surgeon: Stevenson Philippe MD;  Location: [facility] ENDO;  Service: Endoscopy;  Laterality: N/A;    HYSTERECTOMY      JOINT REPLACEMENT Bilateral     knees    OOPHORECTOMY      rt.knee surgery 2016      TOTAL KNEE ARTHROPLASTY Left 02/19/2019    Procedure: ARTHROPLASTY, KNEE, TOTAL;  Surgeon: Med Hanson MD;  Location: [facility] OR;  Service: Orthopedics;  Laterality: Left;  10AM START PER  PER JAYLIN TEXT @ 8:34AM ON 2-18-19  SUPINE  HARRIS LOYA [phone] TEXTED HIM ON 1-24-19 @ 7:57AM  RN PRE OP 2-13-19--BMI--48.9    underarm gland\ Bilateral        Social History:  Social History      Socioeconomic History    Marital status:    Tobacco Use    Smoking status: Every Day     Current packs/day: 0.50     Average packs/day: 0.5 packs/day for 51.7 years (25.9 ttl pk-yrs)     Types: Cigarettes     Start date: 1973    Smokeless tobacco: Current   Substance and Sexual Activity    Alcohol use: No    Drug use: No    Sexual activity: Yes     Partners: Male     Social Determinants of Health     Financial Resource Strain: Low Risk  (11/30/2023)    Overall Financial Resource Strain (CARDIA)     Difficulty of Paying Living Expenses: Not very hard   Food Insecurity: Unknown (11/30/2023)    Hunger Vital Sign     Worried About Running Out of Food in the Last Year: Patient declined   Transportation Needs: No Transportation Needs (11/30/2023)    PRAPARE - Transportation     Lack of Transportation (Medical): No     Lack of Transportation (Non-Medical): No   Physical Activity: Unknown (11/30/2023)    Exercise Vital Sign     Days of Exercise per Week: 1 day   Recent Concern: Physical Activity - Insufficiently Active (11/30/2023)    Exercise Vital Sign     Days of Exercise per Week: 1 day     Minutes of Exercise per Session: 10 min   Stress: Stress Concern Present (5/14/2023)    [employer] - Occupational Stress Questionnaire     Feeling of Stress : Rather much   Housing Stability: Unknown (11/30/2023)    Housing Stability Vital Sign     Unable to Pay for Housing in the Last Year: Patient refused     Number of Places Lived in the Last Year: 2     Unstable Housing in the Last Year: Patient refused       Family History:  Family History   Problem Relation Name Age of Onset    Diabetes Mother      Hypertension Mother      Cancer Mother      No Known Problems Father      No Known Problems Sister      Diabetes Brother      Cancer Brother      No Known Problems Maternal Aunt      No Known Problems Maternal Uncle      No Known Problems Paternal Aunt      No Known Problems Paternal Uncle       No Known Problems Maternal Grandmother      No Known Problems Maternal Grandfather      No Known Problems Paternal Grandmother      No Known Problems Paternal Grandfather      No Known Problems Other      Amblyopia Neg Hx      Blindness Neg Hx      Cataracts Neg Hx      Glaucoma Neg Hx      Macular degeneration Neg Hx      Retinal detachment Neg Hx      Strabismus Neg Hx      Stroke Neg Hx      Thyroid disease Neg Hx         Allergies and Medications: (updated and reviewed)  Review of patient's allergies indicates:   Allergen Reactions    Ace inhibitors      Cough       Current Outpatient Medications   Medication Sig Dispense Refill    albuterol (PROVENTIL/VENTOLIN HFA) 90 mcg/actuation inhaler 2 puffs every 6 (six) hours as needed.      azelastine (ASTELIN) 137 mcg (0.1 %) nasal spray 1 spray (137 mcg total) by Nasal route 2 (two) times daily. 30 mL 3    buPROPion (WELLBUTRIN XL) 150 MG TB24 tablet Take 150 mg by mouth.      cetirizine (ZYRTEC) 10 MG tablet Take 1 tablet (10 mg total) by mouth once daily. 90 tablet 3    clobetasol 0.05% (TEMOVATE) 0.05 % Oint APPLY TOPICALLY TO THE AFFECTED AREA OF RASH ON BACK TWICE DAILY      estradioL (ESTRACE) 0.01 % (0.1 mg/gram) vaginal cream Place 1 g vaginally 3 (three) times a week. 42.5 g 3    fluticasone furoate-vilanteroL (BREO ELLIPTA) 100-25 mcg/dose diskus inhaler Inhale 1 puff into the lungs once daily. Controller 14 each 3    fluticasone propionate (FLONASE) 50 mcg/actuation nasal spray SHAKE LIQUID AND USE 2 SPRAYS(100 MCG) IN EACH NOSTRIL EVERY DAY 48 g 0    fluticasone-salmeterol diskus inhaler 250-50 mcg Inhale 1 puff into the lungs 2 (two) times daily.      furosemide (LASIX) 40 MG tablet TAKE 1 TABLET(40 MG) BY MOUTH EVERY DAY 90 tablet 3    ketoconazole (NIZORAL) 2 % cream Apply topically once daily. 60 g 2    LIDOcaine-prilocaine (EMLA) cream Apply topically as needed. 30 g 3    losartan (COZAAR) 25 MG tablet Take 25 mg by mouth.      melatonin (MELATIN)  5 mg Take 1 tablet (5 mg total) by mouth nightly. 90 tablet 3    metoprolol succinate (TOPROL-XL) 100 MG 24 hr tablet Take 1 tablet (100 mg total) by mouth once daily. 90 tablet 3    SPIRIVA RESPIMAT 2.5 mcg/actuation inhaler INHALE 2 PUFFS BY MOUTH DAILY 4 g 5    terbinafine HCL (LAMISIL) 250 mg tablet Take 250 mg by mouth.      traMADoL (ULTRAM) 50 mg tablet Take 50 mg by mouth every 12 (twelve) hours as needed.      triamcinolone acetonide 0.1% (KENALOG) 0.1 % ointment APPLY BETWEEN THE KNEES AND UPPER THIGHS TWICE DAILY FOR NO MORE THAN 7 TO 14 DAYS 454 g 1    XARELTO 20 mg Tab TAKE 1 TABLET(20 MG) BY MOUTH EVERY DAY 90 tablet 3    albuterol-ipratropium (DUO-NEB) 2.5 mg-0.5 mg/3 mL nebulizer solution Take 3 mLs by nebulization every 6 (six) hours as needed for Wheezing. Rescue 75 mL 0    atorvastatin (LIPITOR) 40 MG tablet TAKE 1 TABLET(40 MG) BY MOUTH EVERY DAY 90 tablet 2    sertraline (ZOLOFT) 100 MG tablet Take 1 tablet (100 mg total) by mouth once daily. 90 tablet 0     No current facility-administered medications for this visit.       Patient Care Team:  Kuldeep Miller MD as PCP - General (Family Medicine)  Warren Hung MA as Care Coordinator         - The patient is given an After Visit Summary that lists all medications with directions, allergies, education, orders placed during this encounter and follow-up instructions.      - I have reviewed the patient's medical information including past medical, family, and social history sections including the medications and allergies.      - We discussed the patient's current medications.     This note was created by combination of typed  and MModal dictation.  Transcription errors may be present.  If there are any questions, please contact me.       Melanie Mojica PA-C

## 2024-08-29 DIAGNOSIS — F41.9 ANXIETY: ICD-10-CM

## 2024-08-29 DIAGNOSIS — I70.0 AORTIC ATHEROSCLEROSIS: ICD-10-CM

## 2024-08-29 DIAGNOSIS — I73.9 PVD (PERIPHERAL VASCULAR DISEASE): ICD-10-CM

## 2024-08-29 DIAGNOSIS — I25.10 CORONARY ARTERY DISEASE: ICD-10-CM

## 2024-08-29 RX ORDER — SERTRALINE HYDROCHLORIDE 100 MG/1
100 TABLET, FILM COATED ORAL DAILY
Qty: 90 TABLET | Refills: 0 | Status: SHIPPED | OUTPATIENT
Start: 2024-08-29

## 2024-08-29 RX ORDER — ATORVASTATIN CALCIUM 40 MG/1
TABLET, FILM COATED ORAL
Qty: 90 TABLET | Refills: 2 | Status: SHIPPED | OUTPATIENT
Start: 2024-08-29

## 2024-08-29 NOTE — TELEPHONE ENCOUNTER
Last Office Visit Info:   The patient's last visit with Kuldeep Miller MD was on 4/18/2024.    The patient's last visit in current department was on 8/28/2024.        Last CBC Results:   Lab Results   Component Value Date    WBC 7.49 08/12/2024    HGB 15.7 08/12/2024    HCT 49.4 (H) 08/12/2024     08/12/2024       Last CMP Results  Lab Results   Component Value Date     08/14/2024    K 4.6 08/14/2024    CL 97 08/14/2024    CO2 28 08/14/2024    BUN 42 (H) 08/14/2024    CREATININE 1.4 08/14/2024    CALCIUM 9.7 08/14/2024    ALBUMIN 3.4 (L) 08/06/2024    AST 28 08/06/2024    ALT 18 08/06/2024       Last Lipids  Lab Results   Component Value Date    CHOL 130 10/27/2023    TRIG 79 10/27/2023    HDL 43 10/27/2023    LDLCALC 71.2 10/27/2023       Last A1C  Lab Results   Component Value Date    HGBA1C 5.6 08/06/2024       Last TSH  Lab Results   Component Value Date    TSH 3.459 08/06/2024             Current Med Refills  Medication List with Changes/Refills   Current Medications    ALBUTEROL (PROVENTIL/VENTOLIN HFA) 90 MCG/ACTUATION INHALER    2 puffs every 6 (six) hours as needed.       Start Date: 3/18/2023 End Date: --    ALBUTEROL-IPRATROPIUM (DUO-NEB) 2.5 MG-0.5 MG/3 ML NEBULIZER SOLUTION    Take 3 mLs by nebulization every 6 (six) hours as needed for Wheezing. Rescue       Start Date: 8/28/2024 End Date: 8/28/2025    ATORVASTATIN (LIPITOR) 40 MG TABLET    TAKE 1 TABLET(40 MG) BY MOUTH EVERY DAY       Start Date: 1/29/2024 End Date: --    AZELASTINE (ASTELIN) 137 MCG (0.1 %) NASAL SPRAY    1 spray (137 mcg total) by Nasal route 2 (two) times daily.       Start Date: 12/13/2023End Date: --    BUPROPION (WELLBUTRIN XL) 150 MG TB24 TABLET    Take 150 mg by mouth.       Start Date: 7/30/2024 End Date: --    CETIRIZINE (ZYRTEC) 10 MG TABLET    Take 1 tablet (10 mg total) by mouth once daily.       Start Date: 8/15/2022 End Date: 8/28/2024    CLOBETASOL 0.05% (TEMOVATE) 0.05 % OINT    APPLY TOPICALLY TO  THE AFFECTED AREA OF RASH ON BACK TWICE DAILY       Start Date: 5/13/2024 End Date: --    ESTRADIOL (ESTRACE) 0.01 % (0.1 MG/GRAM) VAGINAL CREAM    Place 1 g vaginally 3 (three) times a week.       Start Date: 4/24/2024 End Date: 4/24/2025    FLUTICASONE FUROATE-VILANTEROL (BREO ELLIPTA) 100-25 MCG/DOSE DISKUS INHALER    Inhale 1 puff into the lungs once daily. Controller       Start Date: 8/14/2024 End Date: --    FLUTICASONE PROPIONATE (FLONASE) 50 MCG/ACTUATION NASAL SPRAY    SHAKE LIQUID AND USE 2 SPRAYS(100 MCG) IN EACH NOSTRIL EVERY DAY       Start Date: 8/3/2020  End Date: --    FLUTICASONE-SALMETEROL DISKUS INHALER 250-50 MCG    Inhale 1 puff into the lungs 2 (two) times daily.       Start Date: 11/27/2023End Date: --    FUROSEMIDE (LASIX) 40 MG TABLET    TAKE 1 TABLET(40 MG) BY MOUTH EVERY DAY       Start Date: 5/1/2024  End Date: --    KETOCONAZOLE (NIZORAL) 2 % CREAM    Apply topically once daily.       Start Date: 4/18/2024 End Date: --    LIDOCAINE-PRILOCAINE (EMLA) CREAM    Apply topically as needed.       Start Date: 4/29/2024 End Date: --    LOSARTAN (COZAAR) 25 MG TABLET    Take 25 mg by mouth.       Start Date: 7/30/2024 End Date: --    MELATONIN (MELATIN) 5 MG    Take 1 tablet (5 mg total) by mouth nightly.       Start Date: 11/25/2023End Date: --    METOPROLOL SUCCINATE (TOPROL-XL) 100 MG 24 HR TABLET    Take 1 tablet (100 mg total) by mouth once daily.       Start Date: 5/3/2024  End Date: --    SERTRALINE (ZOLOFT) 100 MG TABLET    TAKE 1 TABLET(100 MG) BY MOUTH EVERY DAY       Start Date: 9/22/2023 End Date: --    SPIRIVA RESPIMAT 2.5 MCG/ACTUATION INHALER    INHALE 2 PUFFS BY MOUTH DAILY       Start Date: 5/6/2024  End Date: --    TERBINAFINE HCL (LAMISIL) 250 MG TABLET    Take 250 mg by mouth.       Start Date: 5/13/2024 End Date: --    TRAMADOL (ULTRAM) 50 MG TABLET    Take 50 mg by mouth every 12 (twelve) hours as needed.       Start Date: 2/16/2024 End Date: --    TRIAMCINOLONE ACETONIDE  0.1% (KENALOG) 0.1 % OINTMENT    APPLY BETWEEN THE KNEES AND UPPER THIGHS TWICE DAILY FOR NO MORE THAN 7 TO 14 DAYS       Start Date: 4/18/2024 End Date: --    XARELTO 20 MG TAB    TAKE 1 TABLET(20 MG) BY MOUTH EVERY DAY       Start Date: 9/28/2023 End Date: --       Order(s) placed per written order guidelines: none    Please advise.

## 2024-08-29 NOTE — TELEPHONE ENCOUNTER
Care Due:                  Date            Visit Type   Department     Provider  --------------------------------------------------------------------------------                                EP -                              PRIMARY      [facility]  Last Visit: 04-      CARE ([facility])   MEDICINE       Kuldeep Miller  Next Visit: None Scheduled  None         None Found                                                            Last  Test          Frequency    Reason                     Performed    Due Date  --------------------------------------------------------------------------------    Lipid Panel.  12 months..  atorvastatin.............  10-   10-    Health [facility] Embedded Care Due Messages. Reference number: [id number].   8/29/2024 6:52:40 AM CDT

## 2024-08-29 NOTE — TELEPHONE ENCOUNTER
No care due was identified.  Health [facility] Embedded Care Due Messages. Reference number: [id number].   8/29/2024 8:40:26 AM CDT

## 2024-09-03 ENCOUNTER — OFFICE VISIT (OUTPATIENT)
Dept: PULMONOLOGY | Facility: CLINIC | Age: 69
End: 2024-09-03
Payer: COMMERCIAL

## 2024-09-03 VITALS
HEIGHT: 63 IN | WEIGHT: 268.06 LBS | SYSTOLIC BLOOD PRESSURE: 106 MMHG | DIASTOLIC BLOOD PRESSURE: 69 MMHG | BODY MASS INDEX: 47.5 KG/M2 | OXYGEN SATURATION: 91 % | HEART RATE: 79 BPM

## 2024-09-03 DIAGNOSIS — J96.02 ACUTE RESPIRATORY FAILURE WITH HYPERCAPNIA: ICD-10-CM

## 2024-09-03 DIAGNOSIS — J96.12 CHRONIC RESPIRATORY FAILURE WITH HYPOXIA AND HYPERCAPNIA: ICD-10-CM

## 2024-09-03 DIAGNOSIS — J44.1 COPD EXACERBATION: ICD-10-CM

## 2024-09-03 DIAGNOSIS — G47.33 OSA TREATED WITH BIPAP: Primary | ICD-10-CM

## 2024-09-03 DIAGNOSIS — J96.11 CHRONIC RESPIRATORY FAILURE WITH HYPOXIA AND HYPERCAPNIA: ICD-10-CM

## 2024-09-03 DIAGNOSIS — J44.1 CHRONIC OBSTRUCTIVE PULMONARY DISEASE WITH ACUTE EXACERBATION: ICD-10-CM

## 2024-09-03 PROCEDURE — 1159F MED LIST DOCD IN RCRD: CPT | Mod: CPTII,S$GLB,, | Performed by: INTERNAL MEDICINE

## 2024-09-03 PROCEDURE — 3288F FALL RISK ASSESSMENT DOCD: CPT | Mod: CPTII,S$GLB,, | Performed by: INTERNAL MEDICINE

## 2024-09-03 PROCEDURE — 1111F DSCHRG MED/CURRENT MED MERGE: CPT | Mod: CPTII,S$GLB,, | Performed by: INTERNAL MEDICINE

## 2024-09-03 PROCEDURE — 3062F POS MACROALBUMINURIA REV: CPT | Mod: CPTII,S$GLB,, | Performed by: INTERNAL MEDICINE

## 2024-09-03 PROCEDURE — 4010F ACE/ARB THERAPY RXD/TAKEN: CPT | Mod: CPTII,S$GLB,, | Performed by: INTERNAL MEDICINE

## 2024-09-03 PROCEDURE — 99999 PR PBB SHADOW E&M-EST. PATIENT-LVL V: CPT | Mod: PBBFAC,,, | Performed by: INTERNAL MEDICINE

## 2024-09-03 PROCEDURE — 99214 OFFICE O/P EST MOD 30 MIN: CPT | Mod: S$GLB,,, | Performed by: INTERNAL MEDICINE

## 2024-09-03 PROCEDURE — 3072F LOW RISK FOR RETINOPATHY: CPT | Mod: CPTII,S$GLB,, | Performed by: INTERNAL MEDICINE

## 2024-09-03 PROCEDURE — 1125F AMNT PAIN NOTED PAIN PRSNT: CPT | Mod: CPTII,S$GLB,, | Performed by: INTERNAL MEDICINE

## 2024-09-03 PROCEDURE — 1100F PTFALLS ASSESS-DOCD GE2>/YR: CPT | Mod: CPTII,S$GLB,, | Performed by: INTERNAL MEDICINE

## 2024-09-03 PROCEDURE — 3074F SYST BP LT 130 MM HG: CPT | Mod: CPTII,S$GLB,, | Performed by: INTERNAL MEDICINE

## 2024-09-03 PROCEDURE — 3066F NEPHROPATHY DOC TX: CPT | Mod: CPTII,S$GLB,, | Performed by: INTERNAL MEDICINE

## 2024-09-03 PROCEDURE — 3008F BODY MASS INDEX DOCD: CPT | Mod: CPTII,S$GLB,, | Performed by: INTERNAL MEDICINE

## 2024-09-03 PROCEDURE — 3044F HG A1C LEVEL LT 7.0%: CPT | Mod: CPTII,S$GLB,, | Performed by: INTERNAL MEDICINE

## 2024-09-03 PROCEDURE — 3078F DIAST BP <80 MM HG: CPT | Mod: CPTII,S$GLB,, | Performed by: INTERNAL MEDICINE

## 2024-09-03 NOTE — PROGRESS NOTES
"Sandee Evans  was seen as a follow up.    CHIEF COMPLAINT:    No chief complaint on file.      HISTORY OF PRESENT ILLNESS: Sandee Evans is a 68 y.o. female is here for sleep evaluation.   Patient with h/o chronic atrial fibrillation and was referred by cardiologist for jennie evaluation.      Our first encounter was 10/12/15.  At that time, patient with loud snoring.  No witnessed sleep apnea.  +fatigue upon awake most days (3-4 times per week).  No sleepiness a/w driving.  Occasional sleep talking.  No sleep walking.  Per , sleep with frequent toss and turn.  No cataplexy.      +recurring "jumping" sensation in legs at night.  Worse when laying down at night.  Does not occur during the day.  Improve with stretching and moving.      During our initial evaluation, patient was recommended sleep study.  Patient did not get sleep study until 2020.  S/p hsat 10/13/20 with ahi of 90 by SURJIT Bray.  Patient was set up with bpap during hospitalization 7/20.  Patient was set up with bipap ST 18/7 with rate of 12.  Patient was using nightly but stopped due to nasal congestion.  Another reason for poor compliance was due to lack of supplies.  +EDS without bipap.  New supplies was ordered during last appointment.  Still have not used bipap.      Recent hospitalization 8/6/24-8/14/24 for respiratory distress.  Patient also with afib and RVR.  Patient was started on continues bipap and IV lasix,admitted.  Breathing has improved since hospitalization.      Due to poor compliance, patient was brought back for retitration.  Patient bipap was adjusted to 21/16.    Using bipap nightly since hospital bipap.  Sleep is better with bpap      [place] Sleepiness Scale score during initial sleep evaluation was 18.    SLEEP ROUTINE:  Activity the hour prior to sleep: watch tv    Bed partner:    Time to bed:  10 pm   Lights off:  usually  Sleep onset latency:  30 minutes       Disruptions or awakenings:    2-3 times per night "   Wakeup time:     around 4 am   Perceived sleep quality:  Tire on most days     Daytime naps:      Occasional 2 hours nap (drowsy)    Weekend sleep routine:      Same schedule  Caffeine use: none  exercise habit:   none      PAST MEDICAL HISTORY:    Active Ambulatory Problems     Diagnosis Date Noted    Urge incontinence 01/11/2013    DDD (degenerative disc disease), lumbar 01/11/2013    DJD (degenerative joint disease) of knee 01/11/2013    Hyperlipidemia 01/11/2013    Depressed 01/11/2013    Insomnia 01/11/2013    Vitamin D deficiency disease 01/11/2013    Lumbar radicular pain 01/11/2013    Essential hypertension 01/11/2013    Severe obesity (BMI >= 40)     Colon polyps 08/09/2013    Diverticulosis 08/09/2013    Chronic pain 08/09/2013    Pre-diabetes 08/09/2013    Paroxysmal atrial fibrillation 03/20/2015    Longstanding persistent atrial fibrillation 07/20/2015    History of pancreatitis 09/22/2015    Osteoarthritis of right knee 05/23/2016    Depression 05/30/2016    Type 2 diabetes mellitus with diabetic cataract, without long-term current use of insulin     Dry eye syndrome, bilateral 12/18/2017    Nuclear sclerosis, bilateral 12/18/2017    Refractive error 12/18/2017    Hidradenitis suppurativa of right axilla 12/31/2017    PVD (peripheral vascular disease) 05/28/2018    Slow transit constipation 02/22/2019    Goals of care, counseling/discussion 06/25/2019    Chronic bilateral low back pain without sciatica 09/14/2019    ESQUIVEL (dyspnea on exertion) 09/03/2020    Tobacco dependence 09/03/2020    HIEU treated with BiPAP 09/03/2020    Acute on chronic diastolic CHF (congestive heart failure) 07/16/2021    Pulmonary HTN 07/20/2021    Tobacco abuse 07/20/2021    Hypothyroidism 07/20/2021    Current mild episode of major depressive disorder without prior episode 07/29/2021    COPD with chronic bronchitis 08/02/2021    Atelectasis 10/26/2021    Chronic kidney disease, stage 3a 06/13/2022    Solitary pulmonary  nodule 09/02/2022    Chronic obstructive pulmonary disease with acute exacerbation 11/13/2023    RSV infection 11/22/2023    Chronic respiratory failure with hypoxia and hypercapnia 08/06/2024    COPD exacerbation 08/06/2024    Parainfluenzal pneumonia 08/08/2024    Follow-up exam 08/28/2024     Resolved Ambulatory Problems     Diagnosis Date Noted    Encounter for screening colonoscopy 08/02/2013    Dysphagia 06/22/2015    Nausea 09/22/2015    Gross hematuria 05/24/2016    Acute viral syndrome 12/31/2017    Chronic respiratory failure with hypercapnia 07/20/2021    Debility 07/20/2021    Acute metabolic encephalopathy 07/20/2021    ANNA MARIE (acute kidney injury) 07/22/2021    Acute bronchitis 08/02/2021    Acute respiratory failure with hypoxia and hypercarbia 05/20/2022    Dysuria 05/20/2022    Acute respiratory failure with hypoxia and hypercapnia 11/13/2023    CHF exacerbation 08/06/2024    Community acquired pneumonia 08/07/2024     Past Medical History:   Diagnosis Date    Anticoagulant long-term use     Arthritis     Atrial fibrillation     Breast cyst     CHF (congestive heart failure)     Degenerative disc disease     Edema     HLD (hyperlipidemia)     Hx of psychiatric care     Hypertension     Obesity, morbid     HIEU (obstructive sleep apnea)     Other abnormal glucose     Psychiatric problem     Requires assistance with activities of daily living (ADL)     Sleep apnea     Smoker     SOB (shortness of breath)     SOB (shortness of breath)     Thyroid disease     TMJ (temporomandibular joint disorder)     Unsteady gait     Weakness generalized     Wears glasses                 PAST SURGICAL HISTORY:    Past Surgical History:   Procedure Laterality Date    BREAST BIOPSY Right     over 10 yrs ago/ benign    BREAST CYST EXCISION Right     COLONOSCOPY N/A 06/25/2019    Procedure: COLONOSCOPY;  Surgeon: Micheline Flores MD;  Location: [facility];  Service: Endoscopy;  Laterality: N/A;  RX XARELTO ok to hold (2  days) per Dr. Naik see scan 3/20/19    ENDOSCOPIC ULTRASOUND OF UPPER GASTROINTESTINAL TRACT N/A 01/26/2021    Procedure: ULTRASOUND-ENDOSCOPIC-UPPER;  Surgeon: Stevenson Philippe MD;  Location: [facility] ENDO;  Service: Endoscopy;  Laterality: N/A;    HYSTERECTOMY      JOINT REPLACEMENT Bilateral     knees    OOPHORECTOMY      rt.knee surgery 2016      TOTAL KNEE ARTHROPLASTY Left 02/19/2019    Procedure: ARTHROPLASTY, KNEE, TOTAL;  Surgeon: Med Hanson MD;  Location: [facility] OR;  Service: Orthopedics;  Laterality: Left;  10AM START PER  PER JAYLIN TEXT @ 8:34AM ON 2-18-19  SUPINE  HARRIS LOYA [phone] TEXTED HIM ON 1-24-19 @ 7:57AM  RN PRE OP 2-13-19--BMI--48.9    underarm gland\ Bilateral          FAMILY HISTORY:                Family History   Problem Relation Name Age of Onset    Diabetes Mother      Hypertension Mother      Cancer Mother      No Known Problems Father      No Known Problems Sister      Diabetes Brother      Cancer Brother      No Known Problems Maternal Aunt      No Known Problems Maternal Uncle      No Known Problems Paternal Aunt      No Known Problems Paternal Uncle      No Known Problems Maternal Grandmother      No Known Problems Maternal Grandfather      No Known Problems Paternal Grandmother      No Known Problems Paternal Grandfather      No Known Problems Other      Amblyopia Neg Hx      Blindness Neg Hx      Cataracts Neg Hx      Glaucoma Neg Hx      Macular degeneration Neg Hx      Retinal detachment Neg Hx      Strabismus Neg Hx      Stroke Neg Hx      Thyroid disease Neg Hx         SOCIAL HISTORY:          Tobacco:   Social History     Tobacco Use   Smoking Status Every Day    Current packs/day: 0.50    Average packs/day: 0.5 packs/day for 51.7 years (25.8 ttl pk-yrs)    Types: Cigarettes    Start date: 1973   Smokeless Tobacco Current       alcohol use:    Social History     Substance and Sexual Activity   Alcohol Use No                 Occupation:  [employer]  teacher    ALLERGIES:    Review of patient's allergies indicates:   Allergen Reactions    Ace inhibitors      Cough         CURRENT MEDICATIONS:    Current Outpatient Medications   Medication Sig Dispense Refill    albuterol (PROVENTIL/VENTOLIN HFA) 90 mcg/actuation inhaler 2 puffs every 6 (six) hours as needed.      albuterol-ipratropium (DUO-NEB) 2.5 mg-0.5 mg/3 mL nebulizer solution Take 3 mLs by nebulization every 6 (six) hours as needed for Wheezing. Rescue 75 mL 0    atorvastatin (LIPITOR) 40 MG tablet TAKE 1 TABLET(40 MG) BY MOUTH EVERY DAY 90 tablet 2    azelastine (ASTELIN) 137 mcg (0.1 %) nasal spray 1 spray (137 mcg total) by Nasal route 2 (two) times daily. 30 mL 3    buPROPion (WELLBUTRIN XL) 150 MG TB24 tablet Take 150 mg by mouth.      clobetasol 0.05% (TEMOVATE) 0.05 % Oint APPLY TOPICALLY TO THE AFFECTED AREA OF RASH ON BACK TWICE DAILY      estradioL (ESTRACE) 0.01 % (0.1 mg/gram) vaginal cream Place 1 g vaginally 3 (three) times a week. 42.5 g 3    fluticasone furoate-vilanteroL (BREO ELLIPTA) 100-25 mcg/dose diskus inhaler Inhale 1 puff into the lungs once daily. Controller 14 each 3    fluticasone propionate (FLONASE) 50 mcg/actuation nasal spray SHAKE LIQUID AND USE 2 SPRAYS(100 MCG) IN EACH NOSTRIL EVERY DAY 48 g 0    furosemide (LASIX) 40 MG tablet TAKE 1 TABLET(40 MG) BY MOUTH EVERY DAY 90 tablet 3    ketoconazole (NIZORAL) 2 % cream Apply topically once daily. 60 g 2    LIDOcaine-prilocaine (EMLA) cream Apply topically as needed. 30 g 3    losartan (COZAAR) 25 MG tablet Take 25 mg by mouth.      melatonin (MELATIN) 5 mg Take 1 tablet (5 mg total) by mouth nightly. 90 tablet 3    metoprolol succinate (TOPROL-XL) 100 MG 24 hr tablet Take 1 tablet (100 mg total) by mouth once daily. 90 tablet 3    sertraline (ZOLOFT) 100 MG tablet Take 1 tablet (100 mg total) by mouth once daily. 90 tablet 0    SPIRIVA RESPIMAT 2.5 mcg/actuation inhaler INHALE 2 PUFFS BY MOUTH DAILY 4 g 5    terbinafine HCL  "(LAMISIL) 250 mg tablet Take 250 mg by mouth.      traMADoL (ULTRAM) 50 mg tablet Take 50 mg by mouth every 12 (twelve) hours as needed.      triamcinolone acetonide 0.1% (KENALOG) 0.1 % ointment APPLY BETWEEN THE KNEES AND UPPER THIGHS TWICE DAILY FOR NO MORE THAN 7 TO 14 DAYS 454 g 1    XARELTO 20 mg Tab TAKE 1 TABLET(20 MG) BY MOUTH EVERY DAY 90 tablet 3    cetirizine (ZYRTEC) 10 MG tablet Take 1 tablet (10 mg total) by mouth once daily. 90 tablet 3    fluticasone-salmeterol diskus inhaler 250-50 mcg Inhale 1 puff into the lungs 2 (two) times daily.       No current facility-administered medications for this visit.                  REVIEW OF SYSTEMS:     Sleep related symptoms as per HPI.  CONST:Denies weight gain ; lost 40# since 2014   HEENT: Denies sinus congestion  PULM: Denies dyspnea  CARD:  +intermittent palpitations   GI:  Denies acid reflux; +recurring vomitting due to pancreatitis  : Denies polyuria  NEURO: Denies headaches  PSYCH: +anxious  HEME: Denies anemia   Otherwise, a balance of systems reviewed is negative.          PHYSICAL EXAM:  Vitals:    09/03/24 1134   BP: 106/69   Pulse: 79   SpO2: (!) 91%   Weight: 121.6 kg (268 lb 1.3 oz)   Height: 5' 3" (1.6 m)   PainSc:   6   PainLoc: Back         Body mass index is 47.49 kg/m².     GENERAL: Normal development, well groomed  HEENT:  Conjunctivae are non-erythematous; Pupils equal, round, and reactive to light; Nose is symmetrical; Nasal mucosa is pink and moist; Septum is midline; Inferior turbinates are normal; Nasal airflow is normal; Posterior pharynx is pink; Modified Mallampati: 2; Posterior palate is normal; Tonsils +1; Uvula is normal and pink;Tongue is normal; Dentition is fair; No TMJ tenderness; Jaw opening and protrusion without click and without discomfort.  NECK: Supple. Neck circumference is 15.5 inches. No thyromegaly. No palpable nodes.     SKIN: On face and neck: No abrasions, no rashes, no lesions.  No subcutaneous nodules are " palpable.  RESPIRATORY: Chest is clear to auscultation.  Normal chest expansion and non-labored breathing at rest.  CARDIOVASCULAR: Normal S1, S2.  Irregular rhythm.  No murmurs, gallops or rubs. No carotid bruits bilaterally.  EXTREMITIES: + edema. No clubbing. No cyanosis. Station normal. Gait normal.        NEURO/PSYCH: Oriented to time, place and person. Normal attention span and concentration. Affect is full. Mood is normal.                                              DATA   HSAT: 10-13/2020: AHI 90  Titration study 12/9/23 adequate control with bipap 21/16.      PFT 8/8/22 Ratio of 64%; FVC 2.37 L (99%); FEV1 1.52 L (81%); TLC 3.25 L (68%); dlco 18 (88%)   ABG 9/21/21 pH 7.3 pCO2 55.6 pO2 72 SpO2 94 on RA    CT scan (personally reviewed) 8/15/22 no septal reticulation.  No honeycombing.      Echo 11/13/23    AFib noted throughout exam.    Left Ventricle: The left ventricle is normal in size. Normal wall thickness. Normal wall motion. There is normal systolic function with a visually estimated ejection fraction of 55 - 60%. Unable to assess diastolic function due to atrial fibrillation.    Right Ventricle: Mild right ventricular enlargement. Systolic function is mildly reduced.    Mitral Valve: There is mild regurgitation.    Pulmonary Artery: The estimated pulmonary artery systolic pressure is 49 mmHg.  Lab Results   Component Value Date    TSH 3.459 08/06/2024     ASSESSMENT  Problem List Items Addressed This Visit       Chronic obstructive pulmonary disease with acute exacerbation    Overview     Fev1 of 81%  Spiriva and breo.         Chronic respiratory failure with hypoxia and hypercapnia    Overview     Combination of copd + obesity hypoventilation + pulmonary htn   Bipap and oxygen  Patient only used oxygen prn.          COPD exacerbation    HIEU treated with BiPAP - Primary    Overview     AHI 90  bipap st 21/16 after titration study.    Currently with [facility].  Patient is reassured that she has  bipap.  Recommend resumption.    Compliance has improved since hospitalization.  Main barrier is excessive leakage.  Encourage patient to bring bipap to clinic visit.            Other Visit Diagnoses       Acute respiratory failure with hypercapnia                  RLS - 4/4 criteria.  Patient deferred medical therapy.  Will monitor for now.      Education: During our discussion today, we talked about the etiology of obstructive sleep apnea as well as the potential ramifications of untreated sleep apnea, which could include daytime sleepiness, hypertension, heart disease and/or stroke.     Precautions: The patient was advised to abstain from driving should they feel sleepy or drowsy.       Patient will No follow-ups on file.    25 minutes of total time spent on the encounter, which includes face to face time and non-face to face time preparing to see the patient (eg, review of tests), Obtaining and/or reviewing separately obtained history, documenting clinical information in the electronic or other health record, independently interpreting results (not separately reported) and communicating results to the patient/family/caregiver, or Care coordination (not separately reported).

## 2024-09-04 ENCOUNTER — TELEPHONE (OUTPATIENT)
Dept: ENDOSCOPY | Facility: HOSPITAL | Age: 69
End: 2024-09-04
Payer: COMMERCIAL

## 2024-09-13 ENCOUNTER — PATIENT MESSAGE (OUTPATIENT)
Dept: ENDOSCOPY | Facility: HOSPITAL | Age: 69
End: 2024-09-13
Payer: COMMERCIAL

## 2024-09-13 ENCOUNTER — TELEPHONE (OUTPATIENT)
Dept: ENDOSCOPY | Facility: HOSPITAL | Age: 69
End: 2024-09-13
Payer: COMMERCIAL

## 2024-09-13 NOTE — TELEPHONE ENCOUNTER
Spoke to patient for pre-call to confirm scheduled Colonoscopy and patient verbalized understanding of the following:       Date of Procedure (s)  verified 9/20/24  Arrival Time 12:30 PM verified.  Location of Procedure(s) [address]  verified.  NPO status reinforced. Ok to continue clear liquids up until 4 hours prior to the Endoscopy procedure.     Pt is taking  Xarelto (rivaroxaban) , Pt instructed to stop taking Xarelto (rivaroxaban)  on:  9/18/24, per endoscopy protocol.  Approval to hold received from Dr. Avina.     Pt confirmed receipt of prep instructions and Rx prep (if applicable).  Instructions provided to patient via [facility]  Pt confirmed ride home after procedure if procedure requires anesthesia.   Pre-call screening questionnaire reviewed and completed with patient.   Appointment details are tentative, including check-in time.  If the patient begins taking any blood thinning medications, injectable weight loss/diabetes medications (other than insulin), or Adipex (phentermine) patient was instructed to contact the endoscopy scheduling department as soon as possible.  Patient was advised to call the endoscopy scheduling department if any questions or concerns arise.       [facility]oscopy Scheduling Department

## 2024-09-16 ENCOUNTER — LAB VISIT (OUTPATIENT)
Dept: LAB | Facility: HOSPITAL | Age: 69
End: 2024-09-16
Payer: COMMERCIAL

## 2024-09-16 DIAGNOSIS — I50.33 ACUTE ON CHRONIC DIASTOLIC CHF (CONGESTIVE HEART FAILURE): ICD-10-CM

## 2024-09-16 DIAGNOSIS — E78.2 MIXED HYPERLIPIDEMIA: ICD-10-CM

## 2024-09-16 DIAGNOSIS — I27.20 PULMONARY HTN: ICD-10-CM

## 2024-09-16 DIAGNOSIS — R06.09 DOE (DYSPNEA ON EXERTION): ICD-10-CM

## 2024-09-16 DIAGNOSIS — I48.11 LONGSTANDING PERSISTENT ATRIAL FIBRILLATION: ICD-10-CM

## 2024-09-16 DIAGNOSIS — I10 ESSENTIAL HYPERTENSION: ICD-10-CM

## 2024-09-16 LAB
ANION GAP SERPL CALC-SCNC: 8 MMOL/L (ref 8–16)
BNP SERPL-MCNC: 270 PG/ML (ref 0–99)
BUN SERPL-MCNC: 32 MG/DL (ref 8–23)
CALCIUM SERPL-MCNC: 9.5 MG/DL (ref 8.7–10.5)
CHLORIDE SERPL-SCNC: 100 MMOL/L (ref 95–110)
CO2 SERPL-SCNC: 34 MMOL/L (ref 23–29)
CREAT SERPL-MCNC: 1.3 MG/DL (ref 0.5–1.4)
EST. GFR  (NO RACE VARIABLE): 44.2 ML/MIN/1.73 M^2
GLUCOSE SERPL-MCNC: 86 MG/DL (ref 70–110)
POTASSIUM SERPL-SCNC: 4.1 MMOL/L (ref 3.5–5.1)
SODIUM SERPL-SCNC: 142 MMOL/L (ref 136–145)

## 2024-09-16 PROCEDURE — 80048 BASIC METABOLIC PNL TOTAL CA: CPT | Performed by: INTERNAL MEDICINE

## 2024-09-16 PROCEDURE — 83880 ASSAY OF NATRIURETIC PEPTIDE: CPT | Performed by: INTERNAL MEDICINE

## 2024-09-16 PROCEDURE — 36415 COLL VENOUS BLD VENIPUNCTURE: CPT | Mod: PO | Performed by: INTERNAL MEDICINE

## 2024-09-19 ENCOUNTER — HOSPITAL ENCOUNTER (OUTPATIENT)
Dept: RADIOLOGY | Facility: CLINIC | Age: 69
Discharge: HOME | End: 2024-09-19
Attending: FAMILY MEDICINE
Payer: COMMERCIAL

## 2024-09-19 ENCOUNTER — OFFICE VISIT (OUTPATIENT)
Dept: CARDIOLOGY | Facility: CLINIC | Age: 69
End: 2024-09-19
Payer: COMMERCIAL

## 2024-09-19 VITALS
OXYGEN SATURATION: 94 % | WEIGHT: 270.5 LBS | HEART RATE: 64 BPM | BODY MASS INDEX: 47.93 KG/M2 | SYSTOLIC BLOOD PRESSURE: 102 MMHG | DIASTOLIC BLOOD PRESSURE: 74 MMHG | HEIGHT: 63 IN

## 2024-09-19 DIAGNOSIS — E78.2 MIXED HYPERLIPIDEMIA: ICD-10-CM

## 2024-09-19 DIAGNOSIS — I50.33 ACUTE ON CHRONIC DIASTOLIC CHF (CONGESTIVE HEART FAILURE): ICD-10-CM

## 2024-09-19 DIAGNOSIS — M81.0 POSTMENOPAUSAL OSTEOPOROSIS OF MULTIPLE SITES: ICD-10-CM

## 2024-09-19 DIAGNOSIS — J96.02 ACUTE RESPIRATORY FAILURE WITH HYPERCAPNIA: ICD-10-CM

## 2024-09-19 DIAGNOSIS — J44.1 COPD EXACERBATION: ICD-10-CM

## 2024-09-19 DIAGNOSIS — R06.09 DOE (DYSPNEA ON EXERTION): ICD-10-CM

## 2024-09-19 DIAGNOSIS — I27.20 PULMONARY HTN: ICD-10-CM

## 2024-09-19 DIAGNOSIS — I10 ESSENTIAL HYPERTENSION: Chronic | ICD-10-CM

## 2024-09-19 DIAGNOSIS — I48.0 PAROXYSMAL ATRIAL FIBRILLATION: Primary | ICD-10-CM

## 2024-09-19 PROCEDURE — 77080 DXA BONE DENSITY AXIAL: CPT | Mod: TC,PO

## 2024-09-19 PROCEDURE — 3066F NEPHROPATHY DOC TX: CPT | Mod: CPTII,S$GLB,, | Performed by: INTERNAL MEDICINE

## 2024-09-19 PROCEDURE — 99999 PR PBB SHADOW E&M-EST. PATIENT-LVL V: CPT | Mod: PBBFAC,,, | Performed by: INTERNAL MEDICINE

## 2024-09-19 PROCEDURE — 99214 OFFICE O/P EST MOD 30 MIN: CPT | Mod: S$GLB,,, | Performed by: INTERNAL MEDICINE

## 2024-09-19 PROCEDURE — 3074F SYST BP LT 130 MM HG: CPT | Mod: CPTII,S$GLB,, | Performed by: INTERNAL MEDICINE

## 2024-09-19 PROCEDURE — 3288F FALL RISK ASSESSMENT DOCD: CPT | Mod: CPTII,S$GLB,, | Performed by: INTERNAL MEDICINE

## 2024-09-19 PROCEDURE — 3072F LOW RISK FOR RETINOPATHY: CPT | Mod: CPTII,S$GLB,, | Performed by: INTERNAL MEDICINE

## 2024-09-19 PROCEDURE — 1101F PT FALLS ASSESS-DOCD LE1/YR: CPT | Mod: CPTII,S$GLB,, | Performed by: INTERNAL MEDICINE

## 2024-09-19 PROCEDURE — 3062F POS MACROALBUMINURIA REV: CPT | Mod: CPTII,S$GLB,, | Performed by: INTERNAL MEDICINE

## 2024-09-19 PROCEDURE — 3008F BODY MASS INDEX DOCD: CPT | Mod: CPTII,S$GLB,, | Performed by: INTERNAL MEDICINE

## 2024-09-19 PROCEDURE — 1124F ACP DISCUSS-NO DSCNMKR DOCD: CPT | Mod: CPTII,S$GLB,, | Performed by: INTERNAL MEDICINE

## 2024-09-19 PROCEDURE — 3044F HG A1C LEVEL LT 7.0%: CPT | Mod: CPTII,S$GLB,, | Performed by: INTERNAL MEDICINE

## 2024-09-19 PROCEDURE — 3078F DIAST BP <80 MM HG: CPT | Mod: CPTII,S$GLB,, | Performed by: INTERNAL MEDICINE

## 2024-09-19 PROCEDURE — 1126F AMNT PAIN NOTED NONE PRSNT: CPT | Mod: CPTII,S$GLB,, | Performed by: INTERNAL MEDICINE

## 2024-09-19 PROCEDURE — 1159F MED LIST DOCD IN RCRD: CPT | Mod: CPTII,S$GLB,, | Performed by: INTERNAL MEDICINE

## 2024-09-19 PROCEDURE — 4010F ACE/ARB THERAPY RXD/TAKEN: CPT | Mod: CPTII,S$GLB,, | Performed by: INTERNAL MEDICINE

## 2024-09-19 NOTE — PROGRESS NOTES
Subjective   Patient ID:  Sandee Evans is a 68 y.o. female who presents for follow-up of No chief complaint on file.      HPI        Chronic A-fib on xarelto, Diastolic CHF, HTN, DM, HLD, PAD, tobacco abuse     Previously followed by Dr Naik  Patient is here for follow-up of paroxysmal atrial fibrillation.  She is labeled as chronic and has been in normal sinus rhythm and was last seen in 2016.  EKG today confirms this.  She was lost to follow-up and thought she was discharged because she felt everything was okay.  She has not taken her blood thinner but is only been on aspirin.  She gets shortness of breath on heavy activity but relieved with rest.  She does have some associated substernal heaviness.  She has had no breakthrough tachycardia palpitations.  She denies any PND, orthopnea has stable lower edema relieved with elevation.  She is preop for knee replacement and is unable to go up a flight of stairs due to her degenerative joint condition     Here for follow-up of paroxysmal atrial fibrillation.  She underwent testing as below for preop clearance.  We if cleared at low to intermediate risk and she is aware that anything can happen with the stress of surgery and in particular with her tobacco use disorder.  We discussed that this is the main things she needs to work on postoperatively as well as rehab.  She denies any further cardiopulmonary complaints.  We went over her medicines as well and discussed that she has to hold her blood thinner for at least 2 days prior to the procedure but continue her beta-blocker perioperatively.     Echo 8/6/24    Left Ventricle: The left ventricle is normal in size. There is mild concentric hypertrophy. There is normal systolic function with a visually estimated ejection fraction of 60 - 65%.    Right Ventricle: Mild right ventricular enlargement. Systolic function is normal.    Left Atrium: Left atrium is severely dilated.    Right Atrium: Right atrium is severely  dilated.    Mitral Valve: There is mild to moderate regurgitation.    Tricuspid Valve: There is mild to moderate regurgitation.    Pulmonary Artery: The estimated pulmonary artery systolic pressure is 65 mmHg.    IVC/SVC: Elevated venous pressure at 15 mmHg.     NST: 2/8/19  Normal [facility] MPI  The perfusion scan is free of evidence from myocardial ischemia or injury.  An ejection fraction of 55 % at rest  LV cavity size is normal at rest.  Resting wall motion is physiologic.     Holter: 3/1/21  Atrial fibrillation with ventricular rates varying between 61 and 188 bpm with an average of 104 bpm. Total time in atrial fibrillation was approximately 24 hours.  There were frequent PVCs totalling 2296 and averaging 95.75 per hour. There were 92 monomorphic couplets. There were 3 bigeminal cycles. There were 7 monomorphic triplets.  The diary was returned incomplete.     2/18/21 Denies CP. Mild stable ESQUIVEL  EKG A-fib 94 NSSTT changes - unaware of being back in A-fib - last EKG 2/8/19 NSR  Back in A-fib - duration unknown. Discussed LAURA/CV versus rate control - given lack of symptoms we are both in agreement for rate control.   Echo and holter to evaluate A-fib control  Continue Rx for PAD, HTN, DM, A-fib      3/8/21 Denies CP or SOB. Reports some issues with nausea and feels that she has a mass in her abdomen that moves around   Increase metoprolol 50 bid for better A-fib rate control  Continue Rx for HTN, HLD, PAD, DM  OV 6 months     7/16/21 Reports SOB and LE edema for 2 weeks. Was started on lasix 3 days ago without much improvement  EKG A-fib NSSTT changes  Echo, BNP, BMP for CHF  Continue current diuretic regimen - needs to go to the ER if symptoms worsen  Continue Rx for HTN, HLD, PAD, DM, A-fib, CHF     7/29/21 Feels better since discharge. Still with mild LE edema and ESQUIVEL  On lasix 40 bid     Continue Rx for HTN, HLD, PAD, DM, A-fib, CHF  OV 1 month with BNP, BMP        9/28/21 Denies CP. Less SOB and LE edema  improved  BP controlled      Continue Rx for HTN, HLD, PAD, DM, A-fib, CHF  OV 3 months with BNP, BMP     Admitted 5/20/22  Ms Sandee Evans is a 66 y.o. woman admitted with acute hypoxic/hypercapnic respiratory failure due to acute on chronic diastolic CHF, exacerbation of COPD, and untreated HIEU. She initially required BiPAP but was quickly weaned to O2 by NC. She states that she sometimes skips lasix doses due to urinary incontinence. She does not have BiPAP at home. She is still using tobacco. Started IV lasix for CHF exacerbation, steroids/nebs/levofloxacin for COPD exacerbation. Stepped down to floor on 5/21/22. Patient grew out E-coli on blood cultures- (S) to Cipro. Patient clinically improved and was discharged to home with out patient PT/OT on 5/24/22. Bi-pap will be arranged. Activity as tolerated. Diet- low NA/ADA 2000 yang diet    Already has pulmonary follow up.      6/28/22 Denies CP, SOB improved from discharge  BP controlled. Lasix has been held since discharge   Restart lasix 40 qd  OV 3 months with BNP, BMP      Continue Rx for HTN, HLD, PAD, DM, A-fib, CHF     9/28/22 Denies CP, ESQUIVEL improved after restarting lasix  Some dry cough that she thinks are allergies  BP controlled  OV 6 months with BNP, BMP      Continue Rx for HTN, HLD, PAD, DM, A-fib, CHF      Went to the ER 1/26/23  This 67-year-old female presents to the emergency room complaining of nausea that started yesterday.  Patient developed some chest tightness and shortness of breath that also started this morning at 7:00 a.m..  The patient denies dysuria.  She had 4 episodes of diarrhea in the last 24 hours.  The diarrhea started yesterday.  The patient also has weakness and pain in her left arm this started 3 weeks ago.  The patient has a history of arthritis.  She has been slightly lightheaded when she 1st stands up for about a week.  He had some left upper quadrant abdominal pain yesterday that has since resolved.  She has had chills  for 2 weeks and a cough for 2 weeks and headache that occurs only with coughing as well as some rhinorrhea but no sore throat.  She has a history of atrial fibrillation.  She has a history of congestive heart failure.  She smokes and has a history of morbid obesity.  She does not report a history of chronic obstructive pulmonary disease but she is on Spiriva inhaler.  She has a history thyroid disease.  She is maintained on Xarelto.  , troponin negative  EKG A-fib 108     3/7/23 CP has resolved. Still with cough and some abdominal discomfort  BP controlled  Discussed repeat stress test given recent CP - she feels like this is from URI and would like to hold off      Continue Rx for HTN, HLD, PAD, DM, A-fib, CHF  OV 3 months     Admitted 11/13/23  67 y.o. female, with a past medical history of A-fib on OAC,diastolic  CHF, COPD, HLD, HTN, hypothyroidism, morbid obesity admitted on 11/22/2023 for acute respiratory failure with hypoxia and hypercapnic (O2 sts 88% on RA,  PCO2 of 72) due to +RSV, COPD exacerbation, and acute on chronic diastolic heart failure. admitted to ICU on BIPAP. BNP > 400.  Chest imaging with bilateral effusions and peripheral edema. Hospital course complicated by agitation, required Precedex drip briefly.  Able to wean off Precedex drip. Pulmonology consulted-suspect chronic hypercapnic respiratory failure but not using nightly NIV.  RSV positive and on droplet precaution.  Patient completed treatment with DuoNebs and prednisone on 11/17/2023.  Continue IV diuresis and wean O2 as tolerated. Pt still wheezing, resumed steroids with plan for gradual taper. Given patient continues to have wheezing, SOB, and diminished breath sounds, will consult pulmonology again for input.       Labs 12/12/23  K 4.5  Cr 1.2         12/19/23 Still with cough since discharge but less SOB  SBP 90s but tolerated  Stop losartan with hypotension - needs to monitor home BP      Continue Rx for HTN, HLD, PAD,  DM, A-fib, CHF  OV 3 months     3/6/24 Denies CP or SOB  BP improved  EKG A-fib otherwise ok      Continue Rx for HTN, HLD, PAD, DM, A-fib, CHF  OV 6 months with BNP, BMP     Admitted 8/6/24  68-year-old morbidly obese female with past medical history of chronic AFib on Xarelto, CHF EF 55-60% 11/23, hypertension, diabetes, hyperlipidemia, PVD, tobacco abuse , COPD, HIEU, hypothyroidism, tobacco use who was admitted for Acute hypoxic hyper capneic respiratory failure secondary to CHF/COPD exacerbation/parainfluenza pneumonia. Noted to have AFib with RVR on presentation in ED, rate controlled with 1 dose of diltiazem weaned off BiPAP support transitioned to 3 L of oxygen with nasal cannula  continue BiPAP p.r.n. neb/Q HS. On Lasix/Solu-Medrol /nebs .  Received ceftriaxone/azithromycin for cap coverage x 3 days.  Respiratory cultures with normal respiratory elli.Net -8.3L since admit. On oral  Prednisone.  Decrease Lasix to 40 b.i.d..  PT/OT on board wean oxygen as tolerated. Pt improved prior to dc. She was dischrged in stable condition. Rec OP FU with PCP     9/19/24 Denies CP, stable ESQUIVEL since discharge  BP controlled  Mild ankle swelling    Review of Systems   Constitutional: Negative for decreased appetite.   HENT:  Negative for ear discharge.    Eyes:  Negative for blurred vision.   Respiratory:  Negative for hemoptysis.    Endocrine: Negative for polyphagia.   Hematologic/Lymphatic: Negative for adenopathy.   Skin:  Negative for color change.   Musculoskeletal:  Negative for joint swelling.   Genitourinary:  Negative for bladder incontinence.   Neurological:  Negative for brief paralysis.   Psychiatric/Behavioral:  Negative for hallucinations.    Allergic/Immunologic: Negative for hives.          Objective     Physical Exam  Constitutional:       Appearance: She is well-developed.   HENT:      Head: Normocephalic and atraumatic.   Eyes:      Conjunctiva/sclera: Conjunctivae normal.      Pupils: Pupils are equal,  round, and reactive to light.   Cardiovascular:      Rate and Rhythm: Normal rate. Rhythm irregular.      Pulses: Intact distal pulses.      Heart sounds: Normal heart sounds.   Pulmonary:      Effort: Pulmonary effort is normal.      Breath sounds: Normal breath sounds.   Abdominal:      General: Bowel sounds are normal.      Palpations: Abdomen is soft.   Musculoskeletal:         General: Normal range of motion.      Cervical back: Normal range of motion and neck supple.   Skin:     General: Skin is warm and dry.   Neurological:      Mental Status: She is alert and oriented to person, place, and time.            Assessment and Plan     1. Paroxysmal atrial fibrillation    2. Acute respiratory failure with hypercapnia    3. COPD exacerbation    4. Mixed hyperlipidemia    5. Essential hypertension    6. ESQUIVEL (dyspnea on exertion)    7. Acute on chronic diastolic CHF (congestive heart failure)    8. Pulmonary HTN        Plan:         Continue Rx for HTN, HLD, PAD, DM, A-fib, CHF  OV 3 months with BNP, BMP    Advance Care Planning     Date: 09/19/2024  Patient did not wish or was not able to name a surrogate decision maker or provide an Advance Care Plan.

## 2024-09-20 ENCOUNTER — ANESTHESIA EVENT (OUTPATIENT)
Dept: ENDOSCOPY | Facility: HOSPITAL | Age: 69
End: 2024-09-20
Payer: COMMERCIAL

## 2024-09-20 ENCOUNTER — ANESTHESIA (OUTPATIENT)
Dept: ENDOSCOPY | Facility: HOSPITAL | Age: 69
End: 2024-09-20
Payer: COMMERCIAL

## 2024-09-20 ENCOUNTER — HOSPITAL ENCOUNTER (OUTPATIENT)
Facility: HOSPITAL | Age: 69
Discharge: HOME | End: 2024-09-20
Attending: STUDENT IN AN ORGANIZED HEALTH CARE EDUCATION/TRAINING PROGRAM | Admitting: STUDENT IN AN ORGANIZED HEALTH CARE EDUCATION/TRAINING PROGRAM
Payer: COMMERCIAL

## 2024-09-20 VITALS
DIASTOLIC BLOOD PRESSURE: 80 MMHG | HEART RATE: 100 BPM | TEMPERATURE: 98 F | RESPIRATION RATE: 13 BRPM | SYSTOLIC BLOOD PRESSURE: 126 MMHG | OXYGEN SATURATION: 94 %

## 2024-09-20 DIAGNOSIS — Z12.11 COLON CANCER SCREENING: ICD-10-CM

## 2024-09-20 PROCEDURE — 99900035 HC TECH TIME PER 15 MIN (STAT)

## 2024-09-20 PROCEDURE — 88305 TISSUE EXAM BY PATHOLOGIST: CPT | Mod: 26,,, | Performed by: PATHOLOGY

## 2024-09-20 PROCEDURE — 94640 AIRWAY INHALATION TREATMENT: CPT

## 2024-09-20 PROCEDURE — 63600175 PHARM REV CODE 636 W HCPCS: Performed by: ANESTHESIOLOGY

## 2024-09-20 PROCEDURE — 99900031 HC PATIENT EDUCATION (STAT)

## 2024-09-20 PROCEDURE — 45380 COLONOSCOPY AND BIOPSY: CPT | Mod: 33,59,, | Performed by: STUDENT IN AN ORGANIZED HEALTH CARE EDUCATION/TRAINING PROGRAM

## 2024-09-20 PROCEDURE — D9220A PRA ANESTHESIA: Mod: CRNA,,, | Performed by: STUDENT IN AN ORGANIZED HEALTH CARE EDUCATION/TRAINING PROGRAM

## 2024-09-20 PROCEDURE — 45385 COLONOSCOPY W/LESION REMOVAL: CPT | Mod: 33 | Performed by: STUDENT IN AN ORGANIZED HEALTH CARE EDUCATION/TRAINING PROGRAM

## 2024-09-20 PROCEDURE — 25000003 PHARM REV CODE 250: Performed by: STUDENT IN AN ORGANIZED HEALTH CARE EDUCATION/TRAINING PROGRAM

## 2024-09-20 PROCEDURE — 37000009 HC ANESTHESIA EA ADD 15 MINS: Performed by: STUDENT IN AN ORGANIZED HEALTH CARE EDUCATION/TRAINING PROGRAM

## 2024-09-20 PROCEDURE — 88305 TISSUE EXAM BY PATHOLOGIST: CPT | Performed by: PATHOLOGY

## 2024-09-20 PROCEDURE — D9220A PRA ANESTHESIA: Mod: ANES,,, | Performed by: ANESTHESIOLOGY

## 2024-09-20 PROCEDURE — 27201089 HC SNARE, DISP (ANY): Performed by: STUDENT IN AN ORGANIZED HEALTH CARE EDUCATION/TRAINING PROGRAM

## 2024-09-20 PROCEDURE — 27201274 HC FORCEPS, SPYBITE: Performed by: STUDENT IN AN ORGANIZED HEALTH CARE EDUCATION/TRAINING PROGRAM

## 2024-09-20 PROCEDURE — 45380 COLONOSCOPY AND BIOPSY: CPT | Mod: 33,59 | Performed by: STUDENT IN AN ORGANIZED HEALTH CARE EDUCATION/TRAINING PROGRAM

## 2024-09-20 PROCEDURE — 63600175 PHARM REV CODE 636 W HCPCS: Performed by: STUDENT IN AN ORGANIZED HEALTH CARE EDUCATION/TRAINING PROGRAM

## 2024-09-20 PROCEDURE — 37000008 HC ANESTHESIA 1ST 15 MINUTES: Performed by: STUDENT IN AN ORGANIZED HEALTH CARE EDUCATION/TRAINING PROGRAM

## 2024-09-20 PROCEDURE — 94761 N-INVAS EAR/PLS OXIMETRY MLT: CPT

## 2024-09-20 PROCEDURE — 27000221 HC OXYGEN, UP TO 24 HOURS

## 2024-09-20 PROCEDURE — 25000242 PHARM REV CODE 250 ALT 637 W/ HCPCS: Performed by: ANESTHESIOLOGY

## 2024-09-20 PROCEDURE — 45385 COLONOSCOPY W/LESION REMOVAL: CPT | Mod: 33,,, | Performed by: STUDENT IN AN ORGANIZED HEALTH CARE EDUCATION/TRAINING PROGRAM

## 2024-09-20 RX ORDER — PROPOFOL 10 MG/ML
VIAL (ML) INTRAVENOUS
Status: DISCONTINUED
Start: 2024-09-20 | End: 2024-09-20 | Stop reason: HOSPADM

## 2024-09-20 RX ORDER — SODIUM CHLORIDE 9 MG/ML
INJECTION, SOLUTION INTRAVENOUS CONTINUOUS
Status: DISCONTINUED | OUTPATIENT
Start: 2024-09-20 | End: 2024-09-20 | Stop reason: HOSPADM

## 2024-09-20 RX ORDER — PROPOFOL 10 MG/ML
VIAL (ML) INTRAVENOUS
Status: DISCONTINUED | OUTPATIENT
Start: 2024-09-20 | End: 2024-09-20

## 2024-09-20 RX ORDER — FUROSEMIDE 10 MG/ML
INJECTION INTRAMUSCULAR; INTRAVENOUS
Status: COMPLETED
Start: 2024-09-20 | End: 2024-09-20

## 2024-09-20 RX ORDER — FUROSEMIDE 10 MG/ML
INJECTION INTRAMUSCULAR; INTRAVENOUS
Status: DISCONTINUED | OUTPATIENT
Start: 2024-09-20 | End: 2024-09-20

## 2024-09-20 RX ORDER — ALBUTEROL SULFATE 90 UG/1
INHALANT RESPIRATORY (INHALATION)
Status: DISCONTINUED
Start: 2024-09-20 | End: 2024-09-20 | Stop reason: HOSPADM

## 2024-09-20 RX ORDER — LIDOCAINE HYDROCHLORIDE 20 MG/ML
INJECTION, SOLUTION EPIDURAL; INFILTRATION; INTRACAUDAL; PERINEURAL
Status: DISCONTINUED
Start: 2024-09-20 | End: 2024-09-20 | Stop reason: HOSPADM

## 2024-09-20 RX ORDER — IPRATROPIUM BROMIDE AND ALBUTEROL SULFATE 2.5; .5 MG/3ML; MG/3ML
3 SOLUTION RESPIRATORY (INHALATION) ONCE
Status: COMPLETED | OUTPATIENT
Start: 2024-09-20 | End: 2024-09-20

## 2024-09-20 RX ORDER — LIDOCAINE HYDROCHLORIDE 20 MG/ML
INJECTION INTRAVENOUS
Status: DISCONTINUED | OUTPATIENT
Start: 2024-09-20 | End: 2024-09-20

## 2024-09-20 RX ADMIN — PROPOFOL 50 MG: 10 INJECTION, EMULSION INTRAVENOUS at 01:09

## 2024-09-20 RX ADMIN — LIDOCAINE HYDROCHLORIDE 100 MG: 20 INJECTION, SOLUTION INTRAVENOUS at 01:09

## 2024-09-20 RX ADMIN — IPRATROPIUM BROMIDE AND ALBUTEROL SULFATE 3 ML: 2.5; .5 SOLUTION RESPIRATORY (INHALATION) at 02:09

## 2024-09-20 RX ADMIN — FUROSEMIDE 20 MG: 10 INJECTION, SOLUTION INTRAVENOUS at 03:09

## 2024-09-20 RX ADMIN — SODIUM CHLORIDE: 0.9 INJECTION, SOLUTION INTRAVENOUS at 01:09

## 2024-09-20 NOTE — PROVATION PATIENT INSTRUCTIONS
Discharge Summary/Instructions after an Endoscopic Procedure  Patient Name: Sandee Evans  Patient MRN: [id number]  Patient YOB: 1955 Friday, September 20, 2024  Mario Sheridan MD  Dear patient,  As a result of recent federal legislation (The Federal Cures Act), you may   receive lab or pathology results from your procedure in your [facility]   account before your physician is able to contact you. Your physician or   their representative will relay the results to you with their   recommendations at their soonest availability.  Thank you,  RESTRICTIONS:  During your procedure today, you received medications for sedation.  These   medications may affect your judgment, balance and coordination.  Therefore,   for 24 hours, you have the following restrictions:   - DO NOT drive a car, operate machinery, make legal/financial decisions,   sign important papers or drink alcohol.    ACTIVITY:  Today: no heavy lifting, straining or running due to procedural   sedation/anesthesia.  The following day: return to full activity including work.  DIET:  Eat and drink normally unless instructed otherwise.     TREATMENT FOR COMMON SIDE EFFECTS:  - Mild abdominal pain, nausea, belching, bloating or excessive gas:  rest,   eat lightly and use a heating pad.  - Sore Throat: treat with throat lozenges and/or gargle with warm salt   water.  - Because air was used during the procedure, expelling large amounts of air   from your rectum or belching is normal.  - If a bowel prep was taken, you may not have a bowel movement for 1-3 days.    This is normal.  SYMPTOMS TO WATCH FOR AND REPORT TO YOUR PHYSICIAN:  1. Abdominal pain or bloating, other than gas cramps.  2. Chest pain.  3. Back pain.  4. Signs of infection such as: chills or fever occurring within 24 hours   after the procedure.  5. Rectal bleeding, which would show as bright red, maroon, or black stools.   (A tablespoon of blood from the rectum is not serious, especially if    hemorrhoids are present.)  6. Vomiting.  7. Weakness or dizziness.  GO DIRECTLY TO THE NEAREST EMERGENCY ROOM IF YOU HAVE ANY OF THE FOLLOWING:      Difficulty breathing              Chills and/or fever over 101 F   Persistent vomiting and/or vomiting blood   Severe abdominal pain   Severe chest pain   Black, tarry stools   Bleeding- more than one tablespoon   Any other symptom or condition that you feel may need urgent attention  Your doctor recommends these additional instructions:  If any biopsies were taken, your doctors clinic will contact you in 1 to 2   weeks with any results.  - Discharge patient to home (ambulatory).   - Patient has a contact number available for emergencies.  The signs and   symptoms of potential delayed complications were discussed with the   patient.  Return to normal activities tomorrow.  Written discharge   instructions were provided to the patient.   - Resume previous diet.   - Continue present medications.   - Return to primary care physician as previously scheduled.   - Repeat colonoscopy in 7 years for surveillance.  For questions, problems or results please call your physician - Mario Sheridan MD at Work:  [phone].  [facility] can be reached at [phone]     IF A COMPLICATION OR EMERGENCY SITUATION ARISES AND YOU ARE UNABLE TO REACH   YOUR PHYSICIAN - GO DIRECTLY TO THE EMERGENCY ROOM.  MD Mario Yancey MD  9/20/2024 2:09:37 PM  This report has been verified and signed electronically.  Dear patient,  As a result of recent federal legislation (The Federal Cures Act), you may   receive lab or pathology results from your procedure in your [facility]   account before your physician is able to contact you. Your physician or   their representative will relay the results to you with their   recommendations at their soonest availability.  Thank you,  PROVATION

## 2024-09-20 NOTE — H&P
Short Stay Endoscopy History and Physical    PCP - Kuldeep Miller MD    Procedure - Colonoscopy  ASA - per anesthesia  Mallampati - per anesthesia  History of Anesthesia problems - no  Family history Anesthesia problems -  no   Plan of anesthesia - General    HPI:  This is a 68 y.o. female here for evaluation of : surveillance colonoscopy.     ROS:  Constitutional: No fevers, chills  CV: No chest pain  Pulm: No shortness of breath  GI: see HPI  Derm: No rash    Medical History:  has a past medical history of Anticoagulant long-term use, Arthritis, Atrial fibrillation, Breast cyst, CHF (congestive heart failure), Degenerative disc disease, Edema, HLD (hyperlipidemia), psychiatric care, Hyperlipidemia, Hypertension, Hypothyroidism, Nuclear sclerosis, bilateral (12/18/2017), Obesity, morbid, HIEU (obstructive sleep apnea), Other abnormal glucose, Pre-diabetes, Psychiatric problem, Requires assistance with activities of daily living (ADL), Sleep apnea, Smoker, SOB (shortness of breath), SOB (shortness of breath), Thyroid disease, TMJ (temporomandibular joint disorder), Tobacco abuse, Unsteady gait, Urge incontinence, Weakness generalized, and Wears glasses.    Surgical History:  has a past surgical history that includes Hysterectomy; underarm gland\ (Bilateral); rt.knee surgery 2016; Oophorectomy; Breast cyst excision (Right); Breast biopsy (Right); Total knee arthroplasty (Left, 02/19/2019); Colonoscopy (N/A, 06/25/2019); Endoscopic ultrasound of upper gastrointestinal tract (N/A, 01/26/2021); and Joint replacement (Bilateral).    Family History: family history includes Cancer in her brother and mother; Diabetes in her brother and mother; Hypertension in her mother; No Known Problems in her father, maternal aunt, maternal grandfather, maternal grandmother, maternal uncle, paternal aunt, paternal grandfather, paternal grandmother, paternal uncle, sister, and another family member.. Otherwise no colon cancer,  inflammatory bowel disease, or GI malignancies.    Social History:  reports that she has been smoking cigarettes. She started smoking about 51 years ago. She has a 25.9 pack-year smoking history. She uses smokeless tobacco. She reports that she does not drink alcohol and does not use drugs.    Review of patient's allergies indicates:   Allergen Reactions    Ace inhibitors      Cough         Medications:   Medications Prior to Admission   Medication Sig Dispense Refill Last Dose    atorvastatin (LIPITOR) 40 MG tablet TAKE 1 TABLET(40 MG) BY MOUTH EVERY DAY 90 tablet 2 9/19/2024    losartan (COZAAR) 25 MG tablet Take 25 mg by mouth.   9/19/2024    metoprolol succinate (TOPROL-XL) 100 MG 24 hr tablet Take 1 tablet (100 mg total) by mouth once daily. 90 tablet 3 9/19/2024    albuterol (PROVENTIL/VENTOLIN HFA) 90 mcg/actuation inhaler 2 puffs every 6 (six) hours as needed.       azelastine (ASTELIN) 137 mcg (0.1 %) nasal spray 1 spray (137 mcg total) by Nasal route 2 (two) times daily. 30 mL 3     buPROPion (WELLBUTRIN XL) 150 MG TB24 tablet Take 150 mg by mouth. (Patient not taking: Reported on 9/19/2024)       cetirizine (ZYRTEC) 10 MG tablet Take 1 tablet (10 mg total) by mouth once daily. 90 tablet 3     clobetasol 0.05% (TEMOVATE) 0.05 % Oint APPLY TOPICALLY TO THE AFFECTED AREA OF RASH ON BACK TWICE DAILY       estradioL (ESTRACE) 0.01 % (0.1 mg/gram) vaginal cream Place 1 g vaginally 3 (three) times a week. 42.5 g 3     fluticasone furoate-vilanteroL (BREO ELLIPTA) 100-25 mcg/dose diskus inhaler Inhale 1 puff into the lungs once daily. Controller 14 each 3     fluticasone propionate (FLONASE) 50 mcg/actuation nasal spray SHAKE LIQUID AND USE 2 SPRAYS(100 MCG) IN EACH NOSTRIL EVERY DAY 48 g 0     fluticasone-salmeterol diskus inhaler 250-50 mcg Inhale 1 puff into the lungs 2 (two) times daily.       furosemide (LASIX) 40 MG tablet TAKE 1 TABLET(40 MG) BY MOUTH EVERY DAY 90 tablet 3     ketoconazole (NIZORAL) 2 %  cream Apply topically once daily. 60 g 2     LIDOcaine-prilocaine (EMLA) cream Apply topically as needed. 30 g 3     melatonin (MELATIN) 5 mg Take 1 tablet (5 mg total) by mouth nightly. 90 tablet 3     sertraline (ZOLOFT) 100 MG tablet Take 1 tablet (100 mg total) by mouth once daily. 90 tablet 0     SPIRIVA RESPIMAT 2.5 mcg/actuation inhaler INHALE 2 PUFFS BY MOUTH DAILY 4 g 5     terbinafine HCL (LAMISIL) 250 mg tablet Take 250 mg by mouth.       traMADoL (ULTRAM) 50 mg tablet Take 50 mg by mouth every 12 (twelve) hours as needed.       triamcinolone acetonide 0.1% (KENALOG) 0.1 % ointment APPLY BETWEEN THE KNEES AND UPPER THIGHS TWICE DAILY FOR NO MORE THAN 7 TO 14 DAYS 454 g 1     XARELTO 20 mg Tab TAKE 1 TABLET(20 MG) BY MOUTH EVERY DAY 90 tablet 3 9/17/2024         Physical Exam:    Vital Signs:   Vitals:    09/20/24 1236   BP: 126/71   Pulse: 99   Resp: (!) 22   Temp: 98.5 °F (36.9 °C)       General Appearance: Well appearing in no acute distress  Abdomen: non distended     Labs:  Lab Results   Component Value Date    WBC 7.49 08/12/2024    HGB 15.7 08/12/2024    HCT 49.4 (H) 08/12/2024     08/12/2024    CHOL 130 10/27/2023    TRIG 79 10/27/2023    HDL 43 10/27/2023    ALT 18 08/06/2024    AST 28 08/06/2024     09/16/2024    K 4.1 09/16/2024     09/16/2024    CREATININE 1.3 09/16/2024    BUN 32 (H) 09/16/2024    CO2 34 (H) 09/16/2024    TSH 3.459 08/06/2024    INR 1.0 11/13/2023    HGBA1C 5.6 08/06/2024       I have explained the risks and benefits of endoscopy procedures to the patient including but not limited to bleeding, perforation, infection, and death.  The patient was asked if they understand and allowed to ask any further questions to their satisfaction.    Sofia Short MD

## 2024-09-20 NOTE — PLAN OF CARE
Procedure and recovery complete. Awake and alert. Resp. Unlabored. SAT's 94% on room air. No c/o pain or discomfort.  at bedside. Discharge instructions given. Verbalized understanding. No acute distress noted.

## 2024-09-20 NOTE — ANESTHESIA POSTPROCEDURE EVALUATION
Anesthesia Post Evaluation    Patient: Sandee Evans    Procedure(s) Performed: Procedure(s) (LRB):  COLONOSCOPY (N/A)    Final Anesthesia Type: general      Patient location during evaluation: GI PACU  Patient participation: Yes- Able to Participate  Level of consciousness: awake and alert  Post-procedure vital signs: reviewed and stable  Pain management: adequate  Airway patency: patent    PONV status at discharge: No PONV  Anesthetic complications: yes  Perioperative Events: other periop events (see comments)        Cardiovascular status: hemodynamically stable  Respiratory status: unassisted and spontaneous ventilation  Hydration status: hypervolemic  Follow-up not needed.        Pt unable to be weaned off oxygen in recovery. Duonebs given without improvement. Pt takes Lasix bid, and has not taken it since Wednesday. Pt with crackles on exam. Lasix 20mg IV given. D/w patient that if oxygenation does not improve, she may need overnight observation. Pt understands and agrees with plan.       Vitals Value Taken Time   /81 09/20/24 1520   Temp 36.4 °C (97.5 °F) 09/20/24 1419   Pulse 95 09/20/24 1520   Resp 16 09/20/24 1520   SpO2 92 % 09/20/24 1520         No case tracking events are documented in the log.      Pain/Dianne Score: Dianne Score: 9 (9/20/2024  3:20 PM)

## 2024-09-20 NOTE — TRANSFER OF CARE
Anesthesia Transfer of Care Note    Patient: Sandee Evans    Procedure(s) Performed: Procedure(s) (LRB):  COLONOSCOPY (N/A)    Patient location: GI    Anesthesia Type: general    Transport from OR: Transported from OR on room air with adequate spontaneous ventilation    Post pain: adequate analgesia    Post assessment: no apparent anesthetic complications    Post vital signs: stable    Level of consciousness: awake and alert    Nausea/Vomiting: no nausea/vomiting    Complications: none    Transfer of care protocol was followed      Last vitals: Visit Vitals  BP (!) 150/71 (BP Location: Left arm, Patient Position: Lying)   Pulse 96   Temp 36.4 °C (97.5 °F) (Oral)   Resp (!) 24   SpO2 97%   Breastfeeding No

## 2024-09-20 NOTE — ANESTHESIA PREPROCEDURE EVALUATION
09/20/2024  Sandee Evans is a 68 y.o., female.      Pre-op Assessment    I have reviewed the Patient Summary Reports.     I have reviewed the Nursing Notes. I have reviewed the NPO Status.   I have reviewed the Medications.     Review of Systems  Anesthesia Hx:  No problems with previous Anesthesia                Social:  Smoker       Hematology/Oncology:  Hematology Normal   Oncology Normal                                   EENT/Dental:  EENT/Dental Normal           Cardiovascular:     Hypertension    Dysrhythmias atrial fibrillation      hyperlipidemia    Xarelto September 18, 2022                         Pulmonary:   COPD     Sleep Apnea                Hepatic/GI:  Hepatic/GI Normal                 Neurological:  Neurology Normal                                      Endocrine:  Diabetes Hypothyroidism        Obesity / BMI > 30, Morbid Obesity / BMI > 40  Psych:    depression                Physical Exam  General: Well nourished, Cooperative, Alert and Oriented    Airway:  Mallampati: II / II  Mouth Opening: Normal  TM Distance: Normal  Tongue: Normal  Neck ROM: Normal ROM    Dental:  Intact    Chest/Lungs:  Clear to auscultation, Normal Respiratory Rate    Heart:  Rate: Normal  Rhythm: Irregularly Irregular  Sounds: Normal        Anesthesia Plan  Type of Anesthesia, risks & benefits discussed:    Anesthesia Type: Gen Natural Airway, MAC  Intra-op Monitoring Plan: Standard ASA Monitors  Induction:  IV  Informed Consent: Informed consent signed with the Patient and all parties understand the risks and agree with anesthesia plan.  All questions answered.   ASA Score: 3  Day of Surgery Review of History & Physical: H&P Update referred to the surgeon/provider.    Ready For Surgery From Anesthesia Perspective.     .

## 2024-09-30 ENCOUNTER — OFFICE VISIT (OUTPATIENT)
Dept: FAMILY MEDICINE | Facility: CLINIC | Age: 69
End: 2024-09-30
Payer: COMMERCIAL

## 2024-09-30 VITALS
WEIGHT: 274.94 LBS | HEIGHT: 63 IN | BODY MASS INDEX: 48.71 KG/M2 | TEMPERATURE: 99 F | OXYGEN SATURATION: 95 % | HEART RATE: 87 BPM | DIASTOLIC BLOOD PRESSURE: 68 MMHG | SYSTOLIC BLOOD PRESSURE: 130 MMHG | RESPIRATION RATE: 16 BRPM

## 2024-09-30 DIAGNOSIS — Z00.00 ANNUAL PHYSICAL EXAM: Primary | ICD-10-CM

## 2024-09-30 DIAGNOSIS — Z23 INFLUENZA VACCINE NEEDED: ICD-10-CM

## 2024-09-30 DIAGNOSIS — E78.2 MIXED HYPERLIPIDEMIA: ICD-10-CM

## 2024-09-30 DIAGNOSIS — E11.36 TYPE 2 DIABETES MELLITUS WITH DIABETIC CATARACT, WITHOUT LONG-TERM CURRENT USE OF INSULIN: ICD-10-CM

## 2024-09-30 DIAGNOSIS — Z12.2 SCREENING FOR LUNG CANCER: ICD-10-CM

## 2024-09-30 DIAGNOSIS — Z23 NEED FOR PNEUMOCOCCAL VACCINE: ICD-10-CM

## 2024-09-30 DIAGNOSIS — Z72.0 TOBACCO ABUSE: ICD-10-CM

## 2024-09-30 DIAGNOSIS — R41.3 MEMORY DEFICIT: ICD-10-CM

## 2024-09-30 PROCEDURE — 3078F DIAST BP <80 MM HG: CPT | Mod: CPTII,S$GLB,,

## 2024-09-30 PROCEDURE — 3062F POS MACROALBUMINURIA REV: CPT | Mod: CPTII,S$GLB,,

## 2024-09-30 PROCEDURE — 90472 IMMUNIZATION ADMIN EACH ADD: CPT | Mod: S$GLB,,,

## 2024-09-30 PROCEDURE — 3044F HG A1C LEVEL LT 7.0%: CPT | Mod: CPTII,S$GLB,,

## 2024-09-30 PROCEDURE — 3008F BODY MASS INDEX DOCD: CPT | Mod: CPTII,S$GLB,,

## 2024-09-30 PROCEDURE — 3072F LOW RISK FOR RETINOPATHY: CPT | Mod: CPTII,S$GLB,,

## 2024-09-30 PROCEDURE — 4010F ACE/ARB THERAPY RXD/TAKEN: CPT | Mod: CPTII,S$GLB,,

## 2024-09-30 PROCEDURE — 90677 PCV20 VACCINE IM: CPT | Mod: S$GLB,,,

## 2024-09-30 PROCEDURE — 1101F PT FALLS ASSESS-DOCD LE1/YR: CPT | Mod: CPTII,S$GLB,,

## 2024-09-30 PROCEDURE — 1160F RVW MEDS BY RX/DR IN RCRD: CPT | Mod: CPTII,S$GLB,,

## 2024-09-30 PROCEDURE — 90471 IMMUNIZATION ADMIN: CPT | Mod: S$GLB,,,

## 2024-09-30 PROCEDURE — 3288F FALL RISK ASSESSMENT DOCD: CPT | Mod: CPTII,S$GLB,,

## 2024-09-30 PROCEDURE — 1159F MED LIST DOCD IN RCRD: CPT | Mod: CPTII,S$GLB,,

## 2024-09-30 PROCEDURE — 99999 PR PBB SHADOW E&M-EST. PATIENT-LVL V: CPT | Mod: PBBFAC,,,

## 2024-09-30 PROCEDURE — 90653 IIV ADJUVANT VACCINE IM: CPT | Mod: S$GLB,,,

## 2024-09-30 PROCEDURE — 99397 PER PM REEVAL EST PAT 65+ YR: CPT | Mod: 25,S$GLB,,

## 2024-09-30 PROCEDURE — 3066F NEPHROPATHY DOC TX: CPT | Mod: CPTII,S$GLB,,

## 2024-09-30 PROCEDURE — 3075F SYST BP GE 130 - 139MM HG: CPT | Mod: CPTII,S$GLB,,

## 2024-09-30 RX ORDER — SODIUM, POTASSIUM,MAG SULFATES 17.5-3.13G
SOLUTION, RECONSTITUTED, ORAL ORAL
COMMUNITY
Start: 2024-09-03

## 2024-09-30 NOTE — PROGRESS NOTES
Health Maintenance Due   Topic     RSV Vaccine (Age 60+ and Pregnant patients) (1 - Risk 60-74 years 1-dose series) Not given at this facility       Foot Exam  CONSULT WITH PCP    LDCT Lung Screen  CONSULT WITH PCP    Pneumococcal Vaccines (Age 65+) (3 of 3 - PPSV23 or PCV20)     Influenza Vaccine (1)     COVID-19 Vaccine (5 - 2024-25 season) Not given at this facility       Lipid Panel      Eye Exam  CONSULT WITH PCP

## 2024-09-30 NOTE — PROGRESS NOTES
HPI     Chief Complaint:  Chief Complaint   Patient presents with    Annual Exam       Sandee Evans is a 68 y.o. female with multiple medical diagnoses as listed in the medical history and problem list that presents for Annual exam    HPI    Diet: 3 meals,  meat   fruits and vegetables daily    snacks  Water:  3   8 oz bottles   Exercise: walking around the house  Alcohol: no  Smoke: down to 3 cigarettes a day;  still trying to wean herself off to quit  Work:   Sleep: naps in day, barely sleeps 4-7 hrs with CPAP  Stress: manageable  Dentist: need to see  Ophthalmologist: will  see  doctor soon    Medications:  does not need any refills today  Mentioned she did not take her flu pills yesterday or today.  Has some swelling in her legs and feet today     Expresses that she has a memory issue.  States that she forgets  days of the week, sometimes face is names, items and would like to be tested for Alzheimer's    Assessment & Plan      Annual labs today   Order placed for Low-dose CT scan for lung  cancer screening patient is a smoker   Patient would like to get influenza and pneumonia vaccine today for care gaps   Referral to neurology for memory loss   Information provided on AVS      Problem List Items Addressed This Visit          Neuro    Memory deficit    Relevant Orders    Ambulatory referral/consult to Neurology       Pulmonary    Screening for lung cancer    Relevant Orders    CT Chest Lung Screening Low Dose       Cardiac/Vascular    Hyperlipidemia    Relevant Orders    LIPID PANEL       ID    Influenza vaccine needed    Relevant Medications    influenza (adjuvanted) (Fluad) 45 mcg/0.5 mL IM vaccine (> or = 64 yo) 0.5 mL (Completed)    Need for pneumococcal vaccine    Relevant Medications    pneumoc 20-savannah conj-dip cr(PF) (PREVNAR-20 (PF)) injection Syrg 0.5 mL (Completed)       Endocrine    Type 2 diabetes mellitus with diabetic cataract, without long-term current use of insulin    Relevant Orders     "Hemoglobin A1C       Other    Tobacco abuse    Overview     Encourage smoking cessation  Declined smoking cessation referral.  "I've been there."         Relevant Orders    CT Chest Lung Screening Low Dose    Annual physical exam - Primary    Relevant Orders    CBC Auto Differential    Comprehensive Metabolic Panel    TSH    T4, Free         --------------------------------------------      Health Maintenance:  Health Maintenance         Date Due Completion Date    RSV Vaccine (Age 60+ and Pregnant patients) (1 - Risk 60-74 years 1-dose series) Never done ---    LDCT Lung Screen 09/29/2023 9/29/2022    COVID-19 Vaccine (5 - 2024-25 season) 09/01/2024 6/28/2022    Lipid Panel 10/27/2024 10/27/2023    Eye Exam 12/27/2024 12/27/2023    Hemoglobin A1c 02/06/2025 8/6/2024    Override on 7/22/2014: Not Clinically Appropriate    Foot Exam 04/18/2025 4/18/2024 (Done)    Override on 4/18/2024: Done    Diabetes Urine Screening 05/16/2025 5/16/2024    Mammogram 07/30/2025 7/30/2024    DEXA Scan 09/19/2026 9/19/2024    TETANUS VACCINE 05/28/2028 5/28/2018    Colorectal Cancer Screening 09/20/2031 9/20/2024            Health maintenance reviewed, Pneumonia vaccine ordered, and Flu vaccine ordered can get RSV and COVID vaccine at her local pharmacy    Follow Up:  Follow up if symptoms worsen or fail to improve, for  establish care with Dr. Richardson.    Exam     Review of Systems:  (as noted above)  Review of Systems   Constitutional:  Negative for fever.   Eyes:  Negative for visual disturbance.   Respiratory:  Negative for shortness of breath.    Cardiovascular:  Negative for chest pain.   Genitourinary:  Negative for difficulty urinating.   Neurological:  Negative for dizziness.   Psychiatric/Behavioral:  Positive for confusion, decreased concentration and sleep disturbance. Negative for agitation.        Physical Exam:   Physical Exam  Constitutional:       General: She is not in acute distress.     Appearance: Normal " "appearance. She is not ill-appearing, toxic-appearing or diaphoretic.   HENT:      Head: Normocephalic and atraumatic.   Cardiovascular:      Rate and Rhythm: Normal rate and regular rhythm.      Pulses: Normal pulses.      Heart sounds: No murmur heard.  Pulmonary:      Effort: Pulmonary effort is normal. No respiratory distress.   Musculoskeletal:      Right lower leg: Edema present.      Left lower leg: Edema present.   Neurological:      Mental Status: She is alert and oriented to person, place, and time.   Psychiatric:         Mood and Affect: Mood normal.       Vitals:    09/30/24 1012   BP: 130/68   BP Location: Right arm   Patient Position: Sitting   Pulse: 87   Resp: 16   Temp: 98.6 °F (37 °C)   TempSrc: Oral   SpO2: 95%   Weight: 124.7 kg (274 lb 14.6 oz)   Height: 5' 3" (1.6 m)      Body mass index is 48.7 kg/m².        History     Past Medical History:  Past Medical History:   Diagnosis Date    Anticoagulant long-term use     Xarelto    Arthritis     Atrial fibrillation     Breast cyst     CHF (congestive heart failure)     Degenerative disc disease     Edema     HLD (hyperlipidemia)     Hx of psychiatric care     Hyperlipidemia     Hypertension     Hypothyroidism     Nuclear sclerosis, bilateral 12/18/2017    Obesity, morbid     HIEU (obstructive sleep apnea)     Other abnormal glucose     pre-diabetes    Pre-diabetes     Psychiatric problem     Requires assistance with activities of daily living (ADL)     Sleep apnea     Smoker     SOB (shortness of breath)     SOB (shortness of breath)     Thyroid disease     on meds 8-9 years ago. hypothyroidism.no malignancy    TMJ (temporomandibular joint disorder)     jaw clicking    Tobacco abuse     Unsteady gait     Urge incontinence     Weakness generalized     Wears glasses        Past Surgical History:  Past Surgical History:   Procedure Laterality Date    BREAST BIOPSY Right     over 10 yrs ago/ benign    BREAST CYST EXCISION Right     COLONOSCOPY N/A " 06/25/2019    Procedure: COLONOSCOPY;  Surgeon: Micheline Flores MD;  Location: [facility] ENDO;  Service: Endoscopy;  Laterality: N/A;  RX XARELTO ok to hold (2 days) per Dr. Naik see scan 3/20/19    COLONOSCOPY N/A 9/20/2024    Procedure: COLONOSCOPY;  Surgeon: Mario Sheridan MD;  Location: [facility] ENDO;  Service: Endoscopy;  Laterality: N/A;  Ref by Dr HEMANT Miller, Pending Xarelto hold, Suprep, portal - PC  ok to hold Xarelto 2 days per Dr Grissom-GT  9/4 pt rescheduled. Xarelto hold x2 days, suprep and emailed to [email or web address]  9/13 precall complete-RB    ENDOSCOPIC ULTRASOUND OF UPPER GASTROINTESTINAL TRACT N/A 01/26/2021    Procedure: ULTRASOUND-ENDOSCOPIC-UPPER;  Surgeon: Stevenson Phiilppe MD;  Location: [facility] ENDO;  Service: Endoscopy;  Laterality: N/A;    HYSTERECTOMY      JOINT REPLACEMENT Bilateral     knees    OOPHORECTOMY      rt.knee surgery 2016      TOTAL KNEE ARTHROPLASTY Left 02/19/2019    Procedure: ARTHROPLASTY, KNEE, TOTAL;  Surgeon: Med Hanson MD;  Location: [facility] OR;  Service: Orthopedics;  Laterality: Left;  10AM START PER  PER JAYLIN TEXT @ 8:34AM ON 2-18-19  SUPINE  HARRIS LOYA [phone] TEXTED HIM ON 1-24-19 @ 7:57AM  RN PRE OP 2-13-19--BMI--48.9    underarm gland\ Bilateral        Social History:  Social History     Socioeconomic History    Marital status:    Tobacco Use    Smoking status: Every Day     Current packs/day: 0.50     Average packs/day: 0.5 packs/day for 51.7 years (25.9 ttl pk-yrs)     Types: Cigarettes     Start date: 1973    Smokeless tobacco: Current   Substance and Sexual Activity    Alcohol use: No    Drug use: No    Sexual activity: Yes     Partners: Male     Social Drivers of Health     Financial Resource Strain: Low Risk  (11/30/2023)    Overall Financial Resource Strain (CARDIA)     Difficulty of Paying Living Expenses: Not very hard   Food Insecurity: Unknown (11/30/2023)    Hunger Vital Sign     Worried About Running Out of Food in the  Last Year: Patient declined   Transportation Needs: No Transportation Needs (11/30/2023)    PRAPARE - Transportation     Lack of Transportation (Medical): No     Lack of Transportation (Non-Medical): No   Physical Activity: Unknown (11/30/2023)    Exercise Vital Sign     Days of Exercise per Week: 1 day   Recent Concern: Physical Activity - Insufficiently Active (11/30/2023)    Exercise Vital Sign     Days of Exercise per Week: 1 day     Minutes of Exercise per Session: 10 min   Stress: Stress Concern Present (5/14/2023)    [employer] - Occupational Stress Questionnaire     Feeling of Stress : Rather much   Housing Stability: Unknown (11/30/2023)    Housing Stability Vital Sign     Unable to Pay for Housing in the Last Year: Patient refused     Number of Places Lived in the Last Year: 2     Unstable Housing in the Last Year: Patient refused       Family History:  Family History   Problem Relation Name Age of Onset    Diabetes Mother      Hypertension Mother      Cancer Mother      No Known Problems Father      No Known Problems Sister      Diabetes Brother      Cancer Brother      No Known Problems Maternal Aunt      No Known Problems Maternal Uncle      No Known Problems Paternal Aunt      No Known Problems Paternal Uncle      No Known Problems Maternal Grandmother      No Known Problems Maternal Grandfather      No Known Problems Paternal Grandmother      No Known Problems Paternal Grandfather      No Known Problems Other      Amblyopia Neg Hx      Blindness Neg Hx      Cataracts Neg Hx      Glaucoma Neg Hx      Macular degeneration Neg Hx      Retinal detachment Neg Hx      Strabismus Neg Hx      Stroke Neg Hx      Thyroid disease Neg Hx         Allergies and Medications: (updated and reviewed)  Review of patient's allergies indicates:   Allergen Reactions    Ace inhibitors      Cough       Current Outpatient Medications   Medication Sig Dispense Refill    albuterol  (PROVENTIL/VENTOLIN HFA) 90 mcg/actuation inhaler 2 puffs every 6 (six) hours as needed.      atorvastatin (LIPITOR) 40 MG tablet TAKE 1 TABLET(40 MG) BY MOUTH EVERY DAY 90 tablet 2    azelastine (ASTELIN) 137 mcg (0.1 %) nasal spray 1 spray (137 mcg total) by Nasal route 2 (two) times daily. 30 mL 3    cetirizine (ZYRTEC) 10 MG tablet Take 1 tablet (10 mg total) by mouth once daily. 90 tablet 3    clobetasol 0.05% (TEMOVATE) 0.05 % Oint APPLY TOPICALLY TO THE AFFECTED AREA OF RASH ON BACK TWICE DAILY      estradioL (ESTRACE) 0.01 % (0.1 mg/gram) vaginal cream Place 1 g vaginally 3 (three) times a week. 42.5 g 3    fluticasone furoate-vilanteroL (BREO ELLIPTA) 100-25 mcg/dose diskus inhaler Inhale 1 puff into the lungs once daily. Controller 14 each 3    fluticasone propionate (FLONASE) 50 mcg/actuation nasal spray SHAKE LIQUID AND USE 2 SPRAYS(100 MCG) IN EACH NOSTRIL EVERY DAY 48 g 0    fluticasone-salmeterol diskus inhaler 250-50 mcg Inhale 1 puff into the lungs 2 (two) times daily.      furosemide (LASIX) 40 MG tablet TAKE 1 TABLET(40 MG) BY MOUTH EVERY DAY 90 tablet 3    ketoconazole (NIZORAL) 2 % cream Apply topically once daily. 60 g 2    LIDOcaine-prilocaine (EMLA) cream Apply topically as needed. 30 g 3    losartan (COZAAR) 25 MG tablet Take 25 mg by mouth.      melatonin (MELATIN) 5 mg Take 1 tablet (5 mg total) by mouth nightly. 90 tablet 3    metoprolol succinate (TOPROL-XL) 100 MG 24 hr tablet Take 1 tablet (100 mg total) by mouth once daily. 90 tablet 3    sertraline (ZOLOFT) 100 MG tablet Take 1 tablet (100 mg total) by mouth once daily. 90 tablet 0    sodium,potassium,mag sulfates (SUPREP BOWEL PREP KIT) 17.5-3.13-1.6 gram SolR TAKE 177 ML BY MOUTH DAILY FOR 2 DAYS      SPIRIVA RESPIMAT 2.5 mcg/actuation inhaler INHALE 2 PUFFS BY MOUTH DAILY 4 g 5    terbinafine HCL (LAMISIL) 250 mg tablet Take 250 mg by mouth.      traMADoL (ULTRAM) 50 mg tablet Take 50 mg by mouth every 12 (twelve) hours as needed.       triamcinolone acetonide 0.1% (KENALOG) 0.1 % ointment APPLY BETWEEN THE KNEES AND UPPER THIGHS TWICE DAILY FOR NO MORE THAN 7 TO 14 DAYS 454 g 1    XARELTO 20 mg Tab TAKE 1 TABLET(20 MG) BY MOUTH EVERY DAY 90 tablet 3    buPROPion (WELLBUTRIN XL) 150 MG TB24 tablet Take 150 mg by mouth. (Patient not taking: Reported on 9/30/2024)       No current facility-administered medications for this visit.       Patient Care Team:  Kuldeep Miller MD as PCP - General (Family Medicine)  Warren Hung MA as Care Coordinator         - The patient is given an After Visit Summary that lists all medications with directions, allergies, education, orders placed during this encounter and follow-up instructions.      - I have reviewed the patient's medical information including past medical, family, and social history sections including the medications and allergies.      - We discussed the patient's current medications.     This note was created by combination of typed  and MModal dictation.  Transcription errors may be present.  If there are any questions, please contact me.       Melanie Mojica PA-C

## 2024-10-01 ENCOUNTER — HOSPITAL ENCOUNTER (OUTPATIENT)
Dept: RADIOLOGY | Facility: HOSPITAL | Age: 69
Discharge: HOME | End: 2024-10-01
Payer: COMMERCIAL

## 2024-10-01 DIAGNOSIS — Z72.0 TOBACCO ABUSE: ICD-10-CM

## 2024-10-01 DIAGNOSIS — Z12.2 SCREENING FOR LUNG CANCER: ICD-10-CM

## 2024-10-01 PROCEDURE — 71271 CT THORAX LUNG CANCER SCR C-: CPT | Mod: 26,,, | Performed by: RADIOLOGY

## 2024-10-01 PROCEDURE — 71271 CT THORAX LUNG CANCER SCR C-: CPT | Mod: TC

## 2024-10-03 LAB
FINAL PATHOLOGIC DIAGNOSIS: NORMAL
GROSS: NORMAL
Lab: NORMAL

## 2024-10-03 NOTE — PROGRESS NOTES
Dear MsCarolina Cristina:    It was my pleasure seeing you at the time of your recent colonoscopy at [facility].    I am happy to report the polyp(s) we removed were benign. The polyp(s) were Tubular adenoma without evidence of cancer or suspicious change.    Based on these findings and the findings at the time of your procedure, I recommend you follow-up with a repeat colonoscopy in approximately 7 Years    I will forward a copy of this letter to your primary care/referring physician. Please do not hesitate to contact me if you have any questions regarding this letter.    Sincerely,    Mario Sheridan MD

## 2024-10-23 ENCOUNTER — HOSPITAL ENCOUNTER (OUTPATIENT)
Dept: RADIOLOGY | Facility: HOSPITAL | Age: 69
Discharge: HOME | End: 2024-10-23
Attending: UROLOGY | Admitting: STUDENT IN AN ORGANIZED HEALTH CARE EDUCATION/TRAINING PROGRAM
Payer: COMMERCIAL

## 2024-10-23 DIAGNOSIS — R30.0 DYSURIA: ICD-10-CM

## 2024-10-23 PROCEDURE — 76770 US EXAM ABDO BACK WALL COMP: CPT | Mod: 26,,, | Performed by: RADIOLOGY

## 2024-10-23 PROCEDURE — 76770 US EXAM ABDO BACK WALL COMP: CPT | Mod: TC

## 2024-10-28 ENCOUNTER — TELEPHONE (OUTPATIENT)
Dept: UROLOGY | Facility: CLINIC | Age: 69
End: 2024-10-28
Payer: COMMERCIAL

## 2024-10-28 DIAGNOSIS — I73.9 PVD (PERIPHERAL VASCULAR DISEASE): ICD-10-CM

## 2024-10-28 DIAGNOSIS — I25.10 CORONARY ARTERY DISEASE: ICD-10-CM

## 2024-10-28 DIAGNOSIS — I70.0 AORTIC ATHEROSCLEROSIS: ICD-10-CM

## 2024-10-28 RX ORDER — BUPROPION HYDROCHLORIDE 150 MG/1
150 TABLET ORAL
Qty: 90 TABLET | Refills: 0 | Status: SHIPPED | OUTPATIENT
Start: 2024-10-28

## 2024-10-28 RX ORDER — ATORVASTATIN CALCIUM 40 MG/1
TABLET, FILM COATED ORAL
Qty: 90 TABLET | Refills: 2 | Status: SHIPPED | OUTPATIENT
Start: 2024-10-28

## 2024-10-29 ENCOUNTER — OFFICE VISIT (OUTPATIENT)
Dept: UROLOGY | Facility: CLINIC | Age: 69
End: 2024-10-29
Payer: COMMERCIAL

## 2024-10-29 VITALS — WEIGHT: 270.06 LBS | BODY MASS INDEX: 47.84 KG/M2

## 2024-10-29 DIAGNOSIS — R30.0 DYSURIA: ICD-10-CM

## 2024-10-29 DIAGNOSIS — N30.20 CHRONIC CYSTITIS: Primary | ICD-10-CM

## 2024-10-29 PROCEDURE — 3062F POS MACROALBUMINURIA REV: CPT | Mod: CPTII,S$GLB,, | Performed by: UROLOGY

## 2024-10-29 PROCEDURE — 3008F BODY MASS INDEX DOCD: CPT | Mod: CPTII,S$GLB,, | Performed by: UROLOGY

## 2024-10-29 PROCEDURE — 3066F NEPHROPATHY DOC TX: CPT | Mod: CPTII,S$GLB,, | Performed by: UROLOGY

## 2024-10-29 PROCEDURE — 3072F LOW RISK FOR RETINOPATHY: CPT | Mod: CPTII,S$GLB,, | Performed by: UROLOGY

## 2024-10-29 PROCEDURE — 4010F ACE/ARB THERAPY RXD/TAKEN: CPT | Mod: CPTII,S$GLB,, | Performed by: UROLOGY

## 2024-10-29 PROCEDURE — 3044F HG A1C LEVEL LT 7.0%: CPT | Mod: CPTII,S$GLB,, | Performed by: UROLOGY

## 2024-10-29 PROCEDURE — 1160F RVW MEDS BY RX/DR IN RCRD: CPT | Mod: CPTII,S$GLB,, | Performed by: UROLOGY

## 2024-10-29 PROCEDURE — 99213 OFFICE O/P EST LOW 20 MIN: CPT | Mod: S$GLB,,, | Performed by: UROLOGY

## 2024-10-29 PROCEDURE — 99999 PR PBB SHADOW E&M-EST. PATIENT-LVL IV: CPT | Mod: PBBFAC,,, | Performed by: UROLOGY

## 2024-10-29 PROCEDURE — 1159F MED LIST DOCD IN RCRD: CPT | Mod: CPTII,S$GLB,, | Performed by: UROLOGY

## 2024-10-30 DIAGNOSIS — I48.20 CHRONIC ATRIAL FIBRILLATION: ICD-10-CM

## 2024-10-30 RX ORDER — RIVAROXABAN 20 MG/1
20 TABLET, FILM COATED ORAL DAILY
Qty: 90 TABLET | Refills: 0 | Status: SHIPPED | OUTPATIENT
Start: 2024-10-30

## 2024-11-25 ENCOUNTER — TELEPHONE (OUTPATIENT)
Dept: FAMILY MEDICINE | Facility: CLINIC | Age: 69
End: 2024-11-25
Payer: COMMERCIAL

## 2024-11-27 DIAGNOSIS — F41.9 ANXIETY: ICD-10-CM

## 2024-11-27 RX ORDER — SERTRALINE HYDROCHLORIDE 100 MG/1
TABLET, FILM COATED ORAL
Qty: 90 TABLET | Refills: 0 | Status: SHIPPED | OUTPATIENT
Start: 2024-11-27

## 2024-11-27 NOTE — TELEPHONE ENCOUNTER
Refill Routing Note   Medication(s) are not appropriate for processing by [facility] for the following reason(s):        Responsible provider unclear    [facility] action(s):  Defer             Appointments  past 12m or future 3m with PCP    Date Provider   Last Visit   Visit date not found Maricel Spence MD   Next Visit   Visit date not found Maricel Spence MD   ED visits in past 90 days: 0        Note composed:8:58 AM 11/27/2024

## 2024-12-09 ENCOUNTER — LAB VISIT (OUTPATIENT)
Dept: LAB | Facility: HOSPITAL | Age: 69
End: 2024-12-09
Attending: INTERNAL MEDICINE
Payer: COMMERCIAL

## 2024-12-09 DIAGNOSIS — J96.02 ACUTE RESPIRATORY FAILURE WITH HYPERCAPNIA: ICD-10-CM

## 2024-12-09 DIAGNOSIS — I50.33 ACUTE ON CHRONIC DIASTOLIC CHF (CONGESTIVE HEART FAILURE): ICD-10-CM

## 2024-12-09 DIAGNOSIS — E78.2 MIXED HYPERLIPIDEMIA: ICD-10-CM

## 2024-12-09 DIAGNOSIS — R06.09 DOE (DYSPNEA ON EXERTION): ICD-10-CM

## 2024-12-09 DIAGNOSIS — I10 ESSENTIAL HYPERTENSION: Chronic | ICD-10-CM

## 2024-12-09 DIAGNOSIS — J44.1 COPD EXACERBATION: ICD-10-CM

## 2024-12-09 DIAGNOSIS — I27.20 PULMONARY HTN: ICD-10-CM

## 2024-12-09 DIAGNOSIS — I48.0 PAROXYSMAL ATRIAL FIBRILLATION: ICD-10-CM

## 2024-12-09 LAB
ANION GAP SERPL CALC-SCNC: 9 MMOL/L (ref 8–16)
BNP SERPL-MCNC: 457 PG/ML (ref 0–99)
BUN SERPL-MCNC: 30 MG/DL (ref 8–23)
CALCIUM SERPL-MCNC: 9.5 MG/DL (ref 8.7–10.5)
CHLORIDE SERPL-SCNC: 102 MMOL/L (ref 95–110)
CO2 SERPL-SCNC: 30 MMOL/L (ref 23–29)
CREAT SERPL-MCNC: 1.1 MG/DL (ref 0.5–1.4)
EST. GFR  (NO RACE VARIABLE): 54 ML/MIN/1.73 M^2
GLUCOSE SERPL-MCNC: 96 MG/DL (ref 70–110)
POTASSIUM SERPL-SCNC: 4.2 MMOL/L (ref 3.5–5.1)
SODIUM SERPL-SCNC: 141 MMOL/L (ref 136–145)

## 2024-12-09 PROCEDURE — 36415 COLL VENOUS BLD VENIPUNCTURE: CPT | Performed by: INTERNAL MEDICINE

## 2024-12-09 PROCEDURE — 80048 BASIC METABOLIC PNL TOTAL CA: CPT | Performed by: INTERNAL MEDICINE

## 2024-12-09 PROCEDURE — 83880 ASSAY OF NATRIURETIC PEPTIDE: CPT | Performed by: INTERNAL MEDICINE

## 2024-12-13 DIAGNOSIS — J30.9 ALLERGIC RHINITIS, UNSPECIFIED SEASONALITY, UNSPECIFIED TRIGGER: ICD-10-CM

## 2024-12-13 DIAGNOSIS — I48.20 CHRONIC ATRIAL FIBRILLATION: ICD-10-CM

## 2024-12-16 RX ORDER — AZELASTINE 1 MG/ML
1 SPRAY, METERED NASAL 2 TIMES DAILY
Qty: 30 ML | Refills: 0 | Status: SHIPPED | OUTPATIENT
Start: 2024-12-16

## 2024-12-16 RX ORDER — RIVAROXABAN 20 MG/1
20 TABLET, FILM COATED ORAL DAILY
Qty: 90 TABLET | Refills: 0 | Status: SHIPPED | OUTPATIENT
Start: 2024-12-16

## 2024-12-17 ENCOUNTER — OFFICE VISIT (OUTPATIENT)
Dept: CARDIOLOGY | Facility: CLINIC | Age: 69
End: 2024-12-17
Payer: COMMERCIAL

## 2024-12-17 VITALS
HEART RATE: 103 BPM | WEIGHT: 267.63 LBS | BODY MASS INDEX: 47.42 KG/M2 | OXYGEN SATURATION: 94 % | SYSTOLIC BLOOD PRESSURE: 114 MMHG | DIASTOLIC BLOOD PRESSURE: 70 MMHG | HEIGHT: 63 IN

## 2024-12-17 DIAGNOSIS — I50.33 ACUTE ON CHRONIC DIASTOLIC CHF (CONGESTIVE HEART FAILURE): ICD-10-CM

## 2024-12-17 DIAGNOSIS — I10 ESSENTIAL HYPERTENSION: Primary | Chronic | ICD-10-CM

## 2024-12-17 DIAGNOSIS — E78.2 MIXED HYPERLIPIDEMIA: ICD-10-CM

## 2024-12-17 DIAGNOSIS — I48.0 PAROXYSMAL ATRIAL FIBRILLATION: ICD-10-CM

## 2024-12-17 DIAGNOSIS — R06.09 DOE (DYSPNEA ON EXERTION): ICD-10-CM

## 2024-12-17 DIAGNOSIS — I27.20 PULMONARY HTN: ICD-10-CM

## 2024-12-17 PROCEDURE — 3008F BODY MASS INDEX DOCD: CPT | Mod: CPTII,S$GLB,, | Performed by: INTERNAL MEDICINE

## 2024-12-17 PROCEDURE — 1126F AMNT PAIN NOTED NONE PRSNT: CPT | Mod: CPTII,S$GLB,, | Performed by: INTERNAL MEDICINE

## 2024-12-17 PROCEDURE — 3074F SYST BP LT 130 MM HG: CPT | Mod: CPTII,S$GLB,, | Performed by: INTERNAL MEDICINE

## 2024-12-17 PROCEDURE — 3288F FALL RISK ASSESSMENT DOCD: CPT | Mod: CPTII,S$GLB,, | Performed by: INTERNAL MEDICINE

## 2024-12-17 PROCEDURE — 1101F PT FALLS ASSESS-DOCD LE1/YR: CPT | Mod: CPTII,S$GLB,, | Performed by: INTERNAL MEDICINE

## 2024-12-17 PROCEDURE — 3066F NEPHROPATHY DOC TX: CPT | Mod: CPTII,S$GLB,, | Performed by: INTERNAL MEDICINE

## 2024-12-17 PROCEDURE — 3062F POS MACROALBUMINURIA REV: CPT | Mod: CPTII,S$GLB,, | Performed by: INTERNAL MEDICINE

## 2024-12-17 PROCEDURE — 99214 OFFICE O/P EST MOD 30 MIN: CPT | Mod: S$GLB,,, | Performed by: INTERNAL MEDICINE

## 2024-12-17 PROCEDURE — 3078F DIAST BP <80 MM HG: CPT | Mod: CPTII,S$GLB,, | Performed by: INTERNAL MEDICINE

## 2024-12-17 PROCEDURE — 3044F HG A1C LEVEL LT 7.0%: CPT | Mod: CPTII,S$GLB,, | Performed by: INTERNAL MEDICINE

## 2024-12-17 PROCEDURE — 3072F LOW RISK FOR RETINOPATHY: CPT | Mod: CPTII,S$GLB,, | Performed by: INTERNAL MEDICINE

## 2024-12-17 PROCEDURE — 99999 PR PBB SHADOW E&M-EST. PATIENT-LVL V: CPT | Mod: PBBFAC,,, | Performed by: INTERNAL MEDICINE

## 2024-12-17 PROCEDURE — 1124F ACP DISCUSS-NO DSCNMKR DOCD: CPT | Mod: CPTII,S$GLB,, | Performed by: INTERNAL MEDICINE

## 2024-12-17 PROCEDURE — 1159F MED LIST DOCD IN RCRD: CPT | Mod: CPTII,S$GLB,, | Performed by: INTERNAL MEDICINE

## 2024-12-17 PROCEDURE — 4010F ACE/ARB THERAPY RXD/TAKEN: CPT | Mod: CPTII,S$GLB,, | Performed by: INTERNAL MEDICINE

## 2024-12-17 NOTE — PROGRESS NOTES
Subjective   Patient ID:  Sandee Evans is a 68 y.o. female who presents for follow-up of No chief complaint on file.      HPI        Chronic A-fib on xarelto, Diastolic CHF, HTN, pulmonary HTN DM, HLD, PAD, tobacco abuse     Previously followed by Dr Naik  Patient is here for follow-up of paroxysmal atrial fibrillation.  She is labeled as chronic and has been in normal sinus rhythm and was last seen in 2016.  EKG today confirms this.  She was lost to follow-up and thought she was discharged because she felt everything was okay.  She has not taken her blood thinner but is only been on aspirin.  She gets shortness of breath on heavy activity but relieved with rest.  She does have some associated substernal heaviness.  She has had no breakthrough tachycardia palpitations.  She denies any PND, orthopnea has stable lower edema relieved with elevation.  She is preop for knee replacement and is unable to go up a flight of stairs due to her degenerative joint condition     Here for follow-up of paroxysmal atrial fibrillation.  She underwent testing as below for preop clearance.  We if cleared at low to intermediate risk and she is aware that anything can happen with the stress of surgery and in particular with her tobacco use disorder.  We discussed that this is the main things she needs to work on postoperatively as well as rehab.  She denies any further cardiopulmonary complaints.  We went over her medicines as well and discussed that she has to hold her blood thinner for at least 2 days prior to the procedure but continue her beta-blocker perioperatively.     Echo 8/6/24    Left Ventricle: The left ventricle is normal in size. There is mild concentric hypertrophy. There is normal systolic function with a visually estimated ejection fraction of 60 - 65%.    Right Ventricle: Mild right ventricular enlargement. Systolic function is normal.    Left Atrium: Left atrium is severely dilated.    Right Atrium: Right atrium  is severely dilated.    Mitral Valve: There is mild to moderate regurgitation.    Tricuspid Valve: There is mild to moderate regurgitation.    Pulmonary Artery: The estimated pulmonary artery systolic pressure is 65 mmHg.    IVC/SVC: Elevated venous pressure at 15 mmHg.     NST: 2/8/19  Normal [facility] MPI  The perfusion scan is free of evidence from myocardial ischemia or injury.  An ejection fraction of 55 % at rest  LV cavity size is normal at rest.  Resting wall motion is physiologic.     Holter: 3/1/21  Atrial fibrillation with ventricular rates varying between 61 and 188 bpm with an average of 104 bpm. Total time in atrial fibrillation was approximately 24 hours.  There were frequent PVCs totalling 2296 and averaging 95.75 per hour. There were 92 monomorphic couplets. There were 3 bigeminal cycles. There were 7 monomorphic triplets.  The diary was returned incomplete.     2/18/21 Denies CP. Mild stable ESQUIVEL  EKG A-fib 94 NSSTT changes - unaware of being back in A-fib - last EKG 2/8/19 NSR  Back in A-fib - duration unknown. Discussed LAURA/CV versus rate control - given lack of symptoms we are both in agreement for rate control.   Echo and holter to evaluate A-fib control  Continue Rx for PAD, HTN, DM, A-fib      3/8/21 Denies CP or SOB. Reports some issues with nausea and feels that she has a mass in her abdomen that moves around   Increase metoprolol 50 bid for better A-fib rate control  Continue Rx for HTN, HLD, PAD, DM  OV 6 months     7/16/21 Reports SOB and LE edema for 2 weeks. Was started on lasix 3 days ago without much improvement  EKG A-fib NSSTT changes  Echo, BNP, BMP for CHF  Continue current diuretic regimen - needs to go to the ER if symptoms worsen  Continue Rx for HTN, HLD, PAD, DM, A-fib, CHF     7/29/21 Feels better since discharge. Still with mild LE edema and ESQUIVEL  On lasix 40 bid     Continue Rx for HTN, HLD, PAD, DM, A-fib, CHF  OV 1 month with BNP, BMP        9/28/21 Denies CP. Less SOB and LE  edema improved  BP controlled      Continue Rx for HTN, HLD, PAD, DM, A-fib, CHF  OV 3 months with BNP, BMP     Admitted 5/20/22  Ms Sandee Evans is a 66 y.o. woman admitted with acute hypoxic/hypercapnic respiratory failure due to acute on chronic diastolic CHF, exacerbation of COPD, and untreated HIEU. She initially required BiPAP but was quickly weaned to O2 by NC. She states that she sometimes skips lasix doses due to urinary incontinence. She does not have BiPAP at home. She is still using tobacco. Started IV lasix for CHF exacerbation, steroids/nebs/levofloxacin for COPD exacerbation. Stepped down to floor on 5/21/22. Patient grew out E-coli on blood cultures- (S) to Cipro. Patient clinically improved and was discharged to home with out patient PT/OT on 5/24/22. Bi-pap will be arranged. Activity as tolerated. Diet- low NA/ADA 2000 yang diet    Already has pulmonary follow up.      6/28/22 Denies CP, SOB improved from discharge  BP controlled. Lasix has been held since discharge   Restart lasix 40 qd  OV 3 months with BNP, BMP      Continue Rx for HTN, HLD, PAD, DM, A-fib, CHF     9/28/22 Denies CP, ESQUIVEL improved after restarting lasix  Some dry cough that she thinks are allergies  BP controlled  OV 6 months with BNP, BMP      Continue Rx for HTN, HLD, PAD, DM, A-fib, CHF      Went to the ER 1/26/23  This 67-year-old female presents to the emergency room complaining of nausea that started yesterday.  Patient developed some chest tightness and shortness of breath that also started this morning at 7:00 a.m..  The patient denies dysuria.  She had 4 episodes of diarrhea in the last 24 hours.  The diarrhea started yesterday.  The patient also has weakness and pain in her left arm this started 3 weeks ago.  The patient has a history of arthritis.  She has been slightly lightheaded when she 1st stands up for about a week.  He had some left upper quadrant abdominal pain yesterday that has since resolved.  She has had  chills for 2 weeks and a cough for 2 weeks and headache that occurs only with coughing as well as some rhinorrhea but no sore throat.  She has a history of atrial fibrillation.  She has a history of congestive heart failure.  She smokes and has a history of morbid obesity.  She does not report a history of chronic obstructive pulmonary disease but she is on Spiriva inhaler.  She has a history thyroid disease.  She is maintained on Xarelto.  , troponin negative  EKG A-fib 108     3/7/23 CP has resolved. Still with cough and some abdominal discomfort  BP controlled  Discussed repeat stress test given recent CP - she feels like this is from URI and would like to hold off      Continue Rx for HTN, HLD, PAD, DM, A-fib, CHF  OV 3 months     Admitted 11/13/23  67 y.o. female, with a past medical history of A-fib on OAC,diastolic  CHF, COPD, HLD, HTN, hypothyroidism, morbid obesity admitted on 11/22/2023 for acute respiratory failure with hypoxia and hypercapnic (O2 sts 88% on RA,  PCO2 of 72) due to +RSV, COPD exacerbation, and acute on chronic diastolic heart failure. admitted to ICU on BIPAP. BNP > 400.  Chest imaging with bilateral effusions and peripheral edema. Hospital course complicated by agitation, required Precedex drip briefly.  Able to wean off Precedex drip. Pulmonology consulted-suspect chronic hypercapnic respiratory failure but not using nightly NIV.  RSV positive and on droplet precaution.  Patient completed treatment with DuoNebs and prednisone on 11/17/2023.  Continue IV diuresis and wean O2 as tolerated. Pt still wheezing, resumed steroids with plan for gradual taper. Given patient continues to have wheezing, SOB, and diminished breath sounds, will consult pulmonology again for input.           12/19/23 Still with cough since discharge but less SOB  SBP 90s but tolerated  Stop losartan with hypotension - needs to monitor home BP      Continue Rx for HTN, HLD, PAD, DM, A-fib, CHF  OV 3 months      3/6/24 Denies CP or SOB  BP improved  EKG A-fib otherwise ok      Continue Rx for HTN, HLD, PAD, DM, A-fib, CHF  OV 6 months with BNP, BMP     Admitted 8/6/24  68-year-old morbidly obese female with past medical history of chronic AFib on Xarelto, CHF EF 55-60% 11/23, hypertension, diabetes, hyperlipidemia, PVD, tobacco abuse , COPD, HIEU, hypothyroidism, tobacco use who was admitted for Acute hypoxic hyper capneic respiratory failure secondary to CHF/COPD exacerbation/parainfluenza pneumonia. Noted to have AFib with RVR on presentation in ED, rate controlled with 1 dose of diltiazem weaned off BiPAP support transitioned to 3 L of oxygen with nasal cannula  continue BiPAP p.r.n. neb/Q HS. On Lasix/Solu-Medrol /nebs .  Received ceftriaxone/azithromycin for cap coverage x 3 days.  Respiratory cultures with normal respiratory elli.Net -8.3L since admit. On oral  Prednisone.  Decrease Lasix to 40 b.i.d..  PT/OT on board wean oxygen as tolerated. Pt improved prior to dc. She was dischrged in stable condition. Rec OP FU with PCP      9/19/24 Denies CP, stable ESQUIVEL since discharge  BP controlled  Mild ankle swelling      Continue Rx for HTN, HLD, PAD, DM, A-fib, CHF  OV 3 months with BNP, BMP     Labs 12/9/24  K 4.2  Cr 1.1   up from 270    12/17/24 Denies CP or SOB  LE edema increased some after thanksgiving and hasn't gone down       Review of Systems   Constitutional: Negative for decreased appetite.   HENT:  Negative for ear discharge.    Eyes:  Negative for blurred vision.   Respiratory:  Negative for hemoptysis.    Endocrine: Negative for polyphagia.   Hematologic/Lymphatic: Negative for adenopathy.   Skin:  Negative for color change.   Musculoskeletal:  Negative for joint swelling.   Genitourinary:  Negative for bladder incontinence.   Neurological:  Negative for brief paralysis.   Psychiatric/Behavioral:  Negative for hallucinations.    Allergic/Immunologic: Negative for hives.          Objective      Physical Exam  Constitutional:       Appearance: She is well-developed.   HENT:      Head: Normocephalic and atraumatic.   Eyes:      Conjunctiva/sclera: Conjunctivae normal.      Pupils: Pupils are equal, round, and reactive to light.   Cardiovascular:      Rate and Rhythm: Normal rate. Rhythm irregular.      Pulses: Intact distal pulses.      Heart sounds: Normal heart sounds.   Pulmonary:      Effort: Pulmonary effort is normal.      Breath sounds: Normal breath sounds.   Abdominal:      General: Bowel sounds are normal.      Palpations: Abdomen is soft.   Musculoskeletal:         General: Normal range of motion.      Cervical back: Normal range of motion and neck supple.   Skin:     General: Skin is warm and dry.   Neurological:      Mental Status: She is alert and oriented to person, place, and time.            Assessment and Plan     1. Essential hypertension    2. Mixed hyperlipidemia    3. Paroxysmal atrial fibrillation    4. ESQUIVEL (dyspnea on exertion)    5. Acute on chronic diastolic CHF (congestive heart failure)    6. Pulmonary HTN        Plan:    Ok to increase lasix 40 bid until LE edema improved       Continue Rx for HTN, HLD, PAD, DM, A-fib, CHF  OV 3 months with BNP, BMP    Advance Care Planning     Date: 12/17/2024  Patient did not wish or was not able to name a surrogate decision maker or provide an Advance Care Plan.

## 2024-12-18 ENCOUNTER — OFFICE VISIT (OUTPATIENT)
Dept: OPTOMETRY | Facility: CLINIC | Age: 69
End: 2024-12-18
Payer: COMMERCIAL

## 2024-12-18 DIAGNOSIS — H04.123 DRY EYE SYNDROME, BILATERAL: ICD-10-CM

## 2024-12-18 DIAGNOSIS — H52.7 REFRACTIVE ERROR: ICD-10-CM

## 2024-12-18 DIAGNOSIS — E11.9 TYPE 2 DIABETES MELLITUS WITHOUT COMPLICATION, UNSPECIFIED WHETHER LONG TERM INSULIN USE: Primary | ICD-10-CM

## 2024-12-18 DIAGNOSIS — H25.13 NUCLEAR SCLEROSIS, BILATERAL: ICD-10-CM

## 2024-12-18 PROCEDURE — 3044F HG A1C LEVEL LT 7.0%: CPT | Mod: CPTII,S$GLB,, | Performed by: OPTOMETRIST

## 2024-12-18 PROCEDURE — 3288F FALL RISK ASSESSMENT DOCD: CPT | Mod: CPTII,S$GLB,, | Performed by: OPTOMETRIST

## 2024-12-18 PROCEDURE — 2023F DILAT RTA XM W/O RTNOPTHY: CPT | Mod: CPTII,S$GLB,, | Performed by: OPTOMETRIST

## 2024-12-18 PROCEDURE — 1160F RVW MEDS BY RX/DR IN RCRD: CPT | Mod: CPTII,S$GLB,, | Performed by: OPTOMETRIST

## 2024-12-18 PROCEDURE — 92014 COMPRE OPH EXAM EST PT 1/>: CPT | Mod: S$GLB,,, | Performed by: OPTOMETRIST

## 2024-12-18 PROCEDURE — 92015 DETERMINE REFRACTIVE STATE: CPT | Mod: S$GLB,,, | Performed by: OPTOMETRIST

## 2024-12-18 PROCEDURE — 3062F POS MACROALBUMINURIA REV: CPT | Mod: CPTII,S$GLB,, | Performed by: OPTOMETRIST

## 2024-12-18 PROCEDURE — 1101F PT FALLS ASSESS-DOCD LE1/YR: CPT | Mod: CPTII,S$GLB,, | Performed by: OPTOMETRIST

## 2024-12-18 PROCEDURE — 3066F NEPHROPATHY DOC TX: CPT | Mod: CPTII,S$GLB,, | Performed by: OPTOMETRIST

## 2024-12-18 PROCEDURE — 1159F MED LIST DOCD IN RCRD: CPT | Mod: CPTII,S$GLB,, | Performed by: OPTOMETRIST

## 2024-12-18 PROCEDURE — 1126F AMNT PAIN NOTED NONE PRSNT: CPT | Mod: CPTII,S$GLB,, | Performed by: OPTOMETRIST

## 2024-12-18 PROCEDURE — 4010F ACE/ARB THERAPY RXD/TAKEN: CPT | Mod: CPTII,S$GLB,, | Performed by: OPTOMETRIST

## 2024-12-18 PROCEDURE — 99999 PR PBB SHADOW E&M-EST. PATIENT-LVL III: CPT | Mod: PBBFAC,,, | Performed by: OPTOMETRIST

## 2024-12-18 NOTE — PROGRESS NOTES
Subjective:       Patient ID: Sandee Evans is a 68 y.o. female      Chief Complaint   Patient presents with    Concerns About Ocular Health    Diabetic Eye Exam     History of Present Illness  HPI Dls: 12/27/23 Dr. Hung     67 y/o female presents today for diabetic eye exam.   Pt states no changes in vision. Pt wears pal's   Pt wants a new rx for glasses.     LBS ?     + ou  tearing  + ou itching  No burning  No pain  +  ha's  + ou  floaters  No flashes    Eye meds  None    Pohx:   None    Fohx:   None    Hemoglobin A1C       Date                     Value               Ref Range             Status                10/01/2024               6.1 (H)             4.0 - 5.6 %           Final                 08/06/2024               5.6                 4.0 - 5.6 %           Final                  05/16/2024               6.0 (H)             4.0 - 5.6 %           Final                 Assessment/Plan:     1. Type 2 diabetes mellitus without complication, unspecified whether long term insulin use (Primary)  No diabetic retinopathy. Discussed with pt the effects of diabetes on vision, importance of good blood sugar control, compliance with meds, and follow up care with PCP. Return in 1 year for dilated eye exam, sooner PRN.      2. Nuclear sclerosis, bilateral  Educated pt on presence of cataracts and effects on vision. No surgery at this time. Recheck in one year, sooner PRN.    3. Dry eye syndrome, bilateral  Recommend Refresh/Systane BID - QID OU PRN.     4. Refractive error  Educated patient on refractive error and discussed lens options. Dispensed updated spectacle Rx. Educated about adaptation period to new specs.    Eyeglass Final Rx       Eyeglass Final Rx         Sphere Cylinder Axis Add    Right +0.75 +1.75 180 +2.75    Left +1.00 +1.00 175 +2.75      Expiration Date: 12/18/2025                      Follow up in about 1 year (around 12/18/2025) for Diabetic Eye Exam.

## 2024-12-20 ENCOUNTER — OFFICE VISIT (OUTPATIENT)
Dept: PULMONOLOGY | Facility: CLINIC | Age: 69
End: 2024-12-20
Payer: COMMERCIAL

## 2024-12-20 VITALS
HEART RATE: 92 BPM | WEIGHT: 268.31 LBS | OXYGEN SATURATION: 95 % | BODY MASS INDEX: 47.54 KG/M2 | HEIGHT: 63 IN | SYSTOLIC BLOOD PRESSURE: 139 MMHG | DIASTOLIC BLOOD PRESSURE: 79 MMHG

## 2024-12-20 DIAGNOSIS — G47.33 OSA TREATED WITH BIPAP: Primary | ICD-10-CM

## 2024-12-20 PROBLEM — J43.2 CENTRILOBULAR EMPHYSEMA: Status: ACTIVE | Noted: 2023-11-13

## 2024-12-20 PROBLEM — J44.89 COPD WITH CHRONIC BRONCHITIS: Status: RESOLVED | Noted: 2021-08-02 | Resolved: 2024-12-20

## 2024-12-20 PROBLEM — J44.1 COPD EXACERBATION: Status: RESOLVED | Noted: 2024-08-06 | Resolved: 2024-12-20

## 2024-12-20 PROCEDURE — 99999 PR PBB SHADOW E&M-EST. PATIENT-LVL IV: CPT | Mod: PBBFAC,,, | Performed by: INTERNAL MEDICINE

## 2024-12-20 NOTE — PROGRESS NOTES
"Sandee Evans  was seen as a follow up.    CHIEF COMPLAINT:    Chief Complaint   Patient presents with    Shortness of Breath       HISTORY OF PRESENT ILLNESS: Sandee Evans is a 68 y.o. female is here for sleep evaluation.   Patient with h/o chronic atrial fibrillation and was referred by cardiologist for jennie evaluation.      Our first encounter was 10/12/15.  At that time, patient with loud snoring.  No witnessed sleep apnea.  +fatigue upon awake most days (3-4 times per week).  No sleepiness a/w driving.  Occasional sleep talking.  No sleep walking.  Per , sleep with frequent toss and turn.  No cataplexy.      +recurring "jumping" sensation in legs at night.  Worse when laying down at night.  Does not occur during the day.  Improve with stretching and moving.      During our initial evaluation, patient was recommended sleep study.  Patient did not get sleep study until 2020.  S/p hsat 10/13/20 with ahi of 90 by SURJIT Bray.  Patient was set up with bpap during hospitalization 7/20.  Patient was set up with bipap ST 18/7 with rate of 12.  Patient was using nightly but stopped due to nasal congestion.  Another reason for poor compliance was due to lack of supplies.  +EDS without bipap.  New supplies was ordered during last appointment.  Still have not used bipap.      Recent hospitalization 8/6/24-8/14/24 for respiratory distress.  Patient also with afib and RVR.  Patient was started on continues bipap and IV lasix,admitted.  Breathing has improved since hospitalization.      Due to poor compliance, patient was brought back for retitration.  Patient bipap was adjusted to 21/16.    Using bipap most night.  Sleep is better with bpap      Linden Sleepiness Scale score during initial sleep evaluation was 18.    SLEEP ROUTINE:  Activity the hour prior to sleep: watch tv    Bed partner:    Time to bed:  10 pm - 12 am  Lights off:  usually  Sleep onset latency:  30 minutes       Disruptions or " awakenings:    2-3 times per night   Wakeup time:     around 8 am   Perceived sleep quality:  rested    Daytime naps:      Occasional 2 hours nap (drowsy)    Weekend sleep routine:      Same schedule  Caffeine use: none  exercise habit:   none      PAST MEDICAL HISTORY:    Active Ambulatory Problems     Diagnosis Date Noted    Urge incontinence 01/11/2013    DDD (degenerative disc disease), lumbar 01/11/2013    DJD (degenerative joint disease) of knee 01/11/2013    Hyperlipidemia 01/11/2013    Depressed 01/11/2013    Insomnia 01/11/2013    Vitamin D deficiency disease 01/11/2013    Lumbar radicular pain 01/11/2013    Essential hypertension 01/11/2013    Severe obesity (BMI >= 40)     Colon polyps 08/09/2013    Diverticulosis 08/09/2013    Chronic pain 08/09/2013    Pre-diabetes 08/09/2013    Paroxysmal atrial fibrillation 03/20/2015    Longstanding persistent atrial fibrillation 07/20/2015    History of pancreatitis 09/22/2015    Osteoarthritis of right knee 05/23/2016    Depression 05/30/2016    Type 2 diabetes mellitus with diabetic cataract, without long-term current use of insulin     Dry eye syndrome, bilateral 12/18/2017    Nuclear sclerosis, bilateral 12/18/2017    Refractive error 12/18/2017    Hidradenitis suppurativa of right axilla 12/31/2017    PVD (peripheral vascular disease) 05/28/2018    Slow transit constipation 02/22/2019    Goals of care, counseling/discussion 06/25/2019    Chronic bilateral low back pain without sciatica 09/14/2019    ESQUIVEL (dyspnea on exertion) 09/03/2020    Tobacco dependence 09/03/2020    HIEU treated with BiPAP 09/03/2020    Acute on chronic diastolic CHF (congestive heart failure) 07/16/2021    Pulmonary HTN 07/20/2021    Tobacco abuse 07/20/2021    Hypothyroidism 07/20/2021    Current mild episode of major depressive disorder without prior episode 07/29/2021    Atelectasis 10/26/2021    Chronic kidney disease, stage 3a 06/13/2022    Solitary pulmonary nodule 09/02/2022     Centrilobular emphysema 11/13/2023    RSV infection 11/22/2023    Chronic respiratory failure with hypoxia and hypercapnia 08/06/2024    Parainfluenzal pneumonia 08/08/2024    Follow-up exam 08/28/2024    Screening for lung cancer 09/30/2024    Encounter for screening for lung cancer 09/30/2024    Annual physical exam 09/30/2024    Influenza vaccine needed 09/30/2024    Need for pneumococcal vaccine 09/30/2024    Memory deficit 09/30/2024     Resolved Ambulatory Problems     Diagnosis Date Noted    Encounter for screening colonoscopy 08/02/2013    Dysphagia 06/22/2015    Nausea 09/22/2015    Gross hematuria 05/24/2016    Acute viral syndrome 12/31/2017    Chronic respiratory failure with hypercapnia 07/20/2021    Debility 07/20/2021    Acute metabolic encephalopathy 07/20/2021    ANNA MARIE (acute kidney injury) 07/22/2021    Acute bronchitis 08/02/2021    COPD with chronic bronchitis 08/02/2021    Acute respiratory failure with hypoxia and hypercarbia 05/20/2022    Dysuria 05/20/2022    Acute respiratory failure with hypoxia and hypercapnia 11/13/2023    CHF exacerbation 08/06/2024    COPD exacerbation 08/06/2024    Community acquired pneumonia 08/07/2024     Past Medical History:   Diagnosis Date    Anticoagulant long-term use     Arthritis     Atrial fibrillation     Breast cyst     CHF (congestive heart failure)     Degenerative disc disease     Edema     HLD (hyperlipidemia)     Hx of psychiatric care     Hypertension     Obesity, morbid     HIEU (obstructive sleep apnea)     Other abnormal glucose     Psychiatric problem     Requires assistance with activities of daily living (ADL)     Sleep apnea     Smoker     SOB (shortness of breath)     SOB (shortness of breath)     Thyroid disease     TMJ (temporomandibular joint disorder)     Unsteady gait     Weakness generalized     Wears glasses                 PAST SURGICAL HISTORY:    Past Surgical History:   Procedure Laterality Date    BREAST BIOPSY Right     over 10 yrs  ago/ benign    BREAST CYST EXCISION Right     COLONOSCOPY N/A 06/25/2019    Procedure: COLONOSCOPY;  Surgeon: Micheline Flores MD;  Location: [facility] ENDO;  Service: Endoscopy;  Laterality: N/A;  RX XARELTO ok to hold (2 days) per Dr. Naik see scan 3/20/19    COLONOSCOPY N/A 9/20/2024    Procedure: COLONOSCOPY;  Surgeon: Mario Sheridan MD;  Location: [facility] ENDO;  Service: Endoscopy;  Laterality: N/A;  Ref by Dr HEMANT Miller, Pending Xarelto hold, Suprep, portal - PC  ok to hold Xarelto 2 days per Dr Yuen  9/4 pt rescheduled. Xarelto hold x2 days, suprep and emailed to [email or web address]  9/13 precall complete-RB    ENDOSCOPIC ULTRASOUND OF UPPER GASTROINTESTINAL TRACT N/A 01/26/2021    Procedure: ULTRASOUND-ENDOSCOPIC-UPPER;  Surgeon: Stevenson Philippe MD;  Location: [facility] ENDO;  Service: Endoscopy;  Laterality: N/A;    HYSTERECTOMY      JOINT REPLACEMENT Bilateral     knees    OOPHORECTOMY      rt.knee surgery 2016      TOTAL KNEE ARTHROPLASTY Left 02/19/2019    Procedure: ARTHROPLASTY, KNEE, TOTAL;  Surgeon: Med Hanson MD;  Location: [facility] OR;  Service: Orthopedics;  Laterality: Left;  10AM START PER  PER JAYLIN TEXT @ 8:34AM ON 2-18-19  SUPINE  MAIKELPAWAN LOYA [phone] TEXTED HIM ON 1-24-19 @ 7:57AM  RN PRE OP 2-13-19--BMI--48.9    underarm gland\ Bilateral          FAMILY HISTORY:                Family History   Problem Relation Name Age of Onset    Diabetes Mother      Hypertension Mother      Cancer Mother      No Known Problems Father      No Known Problems Sister      Diabetes Brother      Cancer Brother      No Known Problems Maternal Aunt      No Known Problems Maternal Uncle      No Known Problems Paternal Aunt      No Known Problems Paternal Uncle      No Known Problems Maternal Grandmother      No Known Problems Maternal Grandfather      No Known Problems Paternal Grandmother      No Known Problems Paternal Grandfather      No Known Problems Other      Amblyopia Neg Hx      Blindness  Neg Hx      Cataracts Neg Hx      Glaucoma Neg Hx      Macular degeneration Neg Hx      Retinal detachment Neg Hx      Strabismus Neg Hx      Stroke Neg Hx      Thyroid disease Neg Hx         SOCIAL HISTORY:          Tobacco:   Social History     Tobacco Use   Smoking Status Every Day    Current packs/day: 0.50    Average packs/day: 0.5 packs/day for 52.0 years (26.0 ttl pk-yrs)    Types: Cigarettes    Start date: 1973   Smokeless Tobacco Current       alcohol use:    Social History     Substance and Sexual Activity   Alcohol Use No                 Occupation:  Retire     ALLERGIES:    Review of patient's allergies indicates:   Allergen Reactions    Ace inhibitors      Cough         CURRENT MEDICATIONS:    Current Outpatient Medications   Medication Sig Dispense Refill    albuterol (PROVENTIL/VENTOLIN HFA) 90 mcg/actuation inhaler 2 puffs every 6 (six) hours as needed.      atorvastatin (LIPITOR) 40 MG tablet TAKE 1 TABLET(40 MG) BY MOUTH EVERY DAY 90 tablet 2    azelastine (ASTELIN) 137 mcg (0.1 %) nasal spray 1 spray (137 mcg total) by Nasal route 2 (two) times daily. 30 mL 0    buPROPion (WELLBUTRIN XL) 150 MG TB24 tablet TAKE 1 TABLET(150 MG) BY MOUTH EVERY DAY 90 tablet 0    cetirizine (ZYRTEC) 10 MG tablet Take 1 tablet (10 mg total) by mouth once daily. 90 tablet 3    clobetasol 0.05% (TEMOVATE) 0.05 % Oint APPLY TOPICALLY TO THE AFFECTED AREA OF RASH ON BACK TWICE DAILY      fluticasone furoate-vilanteroL (BREO ELLIPTA) 100-25 mcg/dose diskus inhaler Inhale 1 puff into the lungs once daily. Controller 14 each 3    fluticasone propionate (FLONASE) 50 mcg/actuation nasal spray SHAKE LIQUID AND USE 2 SPRAYS(100 MCG) IN EACH NOSTRIL EVERY DAY 48 g 0    fluticasone-salmeterol diskus inhaler 250-50 mcg Inhale 1 puff into the lungs 2 (two) times daily.      furosemide (LASIX) 40 MG tablet TAKE 1 TABLET(40 MG) BY MOUTH EVERY DAY 90 tablet 3    ketoconazole (NIZORAL) 2 % cream Apply topically once  "daily. 60 g 2    LIDOcaine-prilocaine (EMLA) cream Apply topically as needed. 30 g 3    losartan (COZAAR) 25 MG tablet Take 25 mg by mouth.      melatonin (MELATIN) 5 mg Take 1 tablet (5 mg total) by mouth nightly. 90 tablet 3    metoprolol succinate (TOPROL-XL) 100 MG 24 hr tablet Take 1 tablet (100 mg total) by mouth once daily. 90 tablet 3    sertraline (ZOLOFT) 100 MG tablet TAKE 1 TABLET(100 MG) BY MOUTH DAILY 90 tablet 0    sodium,potassium,mag sulfates (SUPREP BOWEL PREP KIT) 17.5-3.13-1.6 gram SolR TAKE 177 ML BY MOUTH DAILY FOR 2 DAYS      SPIRIVA RESPIMAT 2.5 mcg/actuation inhaler INHALE 2 PUFFS BY MOUTH DAILY 4 g 5    terbinafine HCL (LAMISIL) 250 mg tablet Take 250 mg by mouth.      traMADoL (ULTRAM) 50 mg tablet Take 50 mg by mouth every 12 (twelve) hours as needed.      triamcinolone acetonide 0.1% (KENALOG) 0.1 % ointment APPLY BETWEEN THE KNEES AND UPPER THIGHS TWICE DAILY FOR NO MORE THAN 7 TO 14 DAYS 454 g 1    XARELTO 20 mg Tab Take 1 tablet (20 mg total) by mouth Daily. 90 tablet 0     No current facility-administered medications for this visit.                  REVIEW OF SYSTEMS:     Sleep related symptoms as per HPI.  CONST:Denies weight gain ; lost 40# since 2014   HEENT: Denies sinus congestion  PULM: Denies dyspnea  CARD:  +intermittent palpitations   GI:  Denies acid reflux; +recurring vomitting due to pancreatitis  : Denies polyuria  NEURO: Denies headaches  PSYCH: +anxious  HEME: Denies anemia   Otherwise, a balance of systems reviewed is negative.          PHYSICAL EXAM:  Vitals:    12/20/24 1129   BP: 139/79   Pulse: 92   SpO2: 95%   Weight: 121.7 kg (268 lb 4.8 oz)   Height: 5' 3" (1.6 m)   PainSc:   4   PainLoc: Back         Body mass index is 47.53 kg/m².     GENERAL: Normal development, well groomed  HEENT:  Conjunctivae are non-erythematous; Pupils equal, round, and reactive to light; Nose is symmetrical; Nasal mucosa is pink and moist; Septum is midline; Inferior turbinates are " normal; Nasal airflow is normal; Posterior pharynx is pink; Modified Mallampati: 2; Posterior palate is normal; Tonsils +1; Uvula is normal and pink;Tongue is normal; Dentition is fair; No TMJ tenderness; Jaw opening and protrusion without click and without discomfort.  NECK: Supple. Neck circumference is 15.5 inches. No thyromegaly. No palpable nodes.     SKIN: On face and neck: No abrasions, no rashes, no lesions.  No subcutaneous nodules are palpable.  RESPIRATORY: Chest is clear to auscultation.  Normal chest expansion and non-labored breathing at rest.  CARDIOVASCULAR: Normal S1, S2.  Irregular rhythm.  No murmurs, gallops or rubs. No carotid bruits bilaterally.  EXTREMITIES: + edema. No clubbing. No cyanosis. Station normal. Gait normal.        NEURO/PSYCH: Oriented to time, place and person. Normal attention span and concentration. Affect is full. Mood is normal.                                              DATA   HSAT: 10-13/2020: AHI 90  Titration study 12/9/23 adequate control with bipap 21/16.      PFT 8/8/22 Ratio of 64%; FVC 2.37 L (99%); FEV1 1.52 L (81%); TLC 3.25 L (68%); dlco 18 (88%)   ABG 9/21/21 pH 7.3 pCO2 55.6 pO2 72 SpO2 94 on RA    CT scan (personally reviewed) 8/15/22 no septal reticulation.  No honeycombing.      Echo 11/13/23    AFib noted throughout exam.    Left Ventricle: The left ventricle is normal in size. Normal wall thickness. Normal wall motion. There is normal systolic function with a visually estimated ejection fraction of 55 - 60%. Unable to assess diastolic function due to atrial fibrillation.    Right Ventricle: Mild right ventricular enlargement. Systolic function is mildly reduced.    Mitral Valve: There is mild regurgitation.    Pulmonary Artery: The estimated pulmonary artery systolic pressure is 49 mmHg.  Lab Results   Component Value Date    TSH 3.654 10/01/2024     ASSESSMENT  Problem List Items Addressed This Visit       HIEU treated with BiPAP - Primary    Overview      AHI 90  Currently with [facility]  bipap st 21/16 after titration study.    High leakage with F20.  Residual ahi of 14.  Donated N20 mask given to patient in hope of lowering leakage.  In addition, bipap was lowered to 16/9 in hope of driving pressure.            Relevant Orders    CPAP/BIPAP SUPPLIES           RLS - 4/4 criteria.  Patient deferred medical therapy.  Will monitor for now.      Education: During our discussion today, we talked about the etiology of obstructive sleep apnea as well as the potential ramifications of untreated sleep apnea, which could include daytime sleepiness, hypertension, heart disease and/or stroke.     Precautions: The patient was advised to abstain from driving should they feel sleepy or drowsy.       Patient will No follow-ups on file.    30 minutes of total time spent on the encounter, which includes face to face time and non-face to face time preparing to see the patient (eg, review of tests), Obtaining and/or reviewing separately obtained history, documenting clinical information in the electronic or other health record, independently interpreting results (not separately reported) and communicating results to the patient/family/caregiver, or Care coordination (not separately reported).    Visit today included increased complexity associated with the care of the episodic problem jennie addressed and managing the longitudinal care of the patient due to the serious and/or complex managed problem(s) jennie.

## 2024-12-30 DIAGNOSIS — B36.9 FUNGAL RASH OF TORSO: ICD-10-CM

## 2024-12-30 RX ORDER — KETOCONAZOLE 20 MG/G
CREAM TOPICAL
Qty: 60 G | Refills: 0 | Status: SHIPPED | OUTPATIENT
Start: 2024-12-30

## 2024-12-30 NOTE — TELEPHONE ENCOUNTER
Refill Routing Note   Medication(s) are not appropriate for processing by [facility] for the following reason(s):        Outside of protocol  Responsible provider unclear    [facility] action(s):  Route        Medication Therapy Plan: no pcp listed on profile      Appointments  past 12m or future 3m with PCP    Date Provider   Last Visit   4/18/2024 Kuldeep Miller MD   Next Visit   Visit date not found Kuldeep Miller MD   ED visits in past 90 days: 0        Note composed:6:34 AM 12/30/2024

## 2025-01-13 ENCOUNTER — OFFICE VISIT (OUTPATIENT)
Dept: NEUROLOGY | Facility: CLINIC | Age: 70
End: 2025-01-13
Payer: COMMERCIAL

## 2025-01-13 VITALS
DIASTOLIC BLOOD PRESSURE: 86 MMHG | HEIGHT: 63 IN | SYSTOLIC BLOOD PRESSURE: 131 MMHG | WEIGHT: 260.56 LBS | HEART RATE: 87 BPM | BODY MASS INDEX: 46.17 KG/M2

## 2025-01-13 DIAGNOSIS — R41.3 MEMORY DEFICIT: ICD-10-CM

## 2025-01-13 DIAGNOSIS — R41.89 COGNITIVE IMPAIRMENT: Primary | ICD-10-CM

## 2025-01-13 DIAGNOSIS — Z72.0 TOBACCO ABUSE: ICD-10-CM

## 2025-01-13 DIAGNOSIS — J96.12 CHRONIC RESPIRATORY FAILURE WITH HYPOXIA AND HYPERCAPNIA: ICD-10-CM

## 2025-01-13 DIAGNOSIS — I50.33 ACUTE ON CHRONIC DIASTOLIC CHF (CONGESTIVE HEART FAILURE): ICD-10-CM

## 2025-01-13 DIAGNOSIS — G47.33 OSA TREATED WITH BIPAP: ICD-10-CM

## 2025-01-13 DIAGNOSIS — J96.11 CHRONIC RESPIRATORY FAILURE WITH HYPOXIA AND HYPERCAPNIA: ICD-10-CM

## 2025-01-13 DIAGNOSIS — G47.00 INSOMNIA, UNSPECIFIED TYPE: ICD-10-CM

## 2025-01-13 DIAGNOSIS — E66.01 SEVERE OBESITY (BMI >= 40): ICD-10-CM

## 2025-01-13 PROCEDURE — 99205 OFFICE O/P NEW HI 60 MIN: CPT | Mod: S$GLB,,, | Performed by: STUDENT IN AN ORGANIZED HEALTH CARE EDUCATION/TRAINING PROGRAM

## 2025-01-13 PROCEDURE — 1159F MED LIST DOCD IN RCRD: CPT | Mod: CPTII,S$GLB,, | Performed by: STUDENT IN AN ORGANIZED HEALTH CARE EDUCATION/TRAINING PROGRAM

## 2025-01-13 PROCEDURE — 3075F SYST BP GE 130 - 139MM HG: CPT | Mod: CPTII,S$GLB,, | Performed by: STUDENT IN AN ORGANIZED HEALTH CARE EDUCATION/TRAINING PROGRAM

## 2025-01-13 PROCEDURE — 1126F AMNT PAIN NOTED NONE PRSNT: CPT | Mod: CPTII,S$GLB,, | Performed by: STUDENT IN AN ORGANIZED HEALTH CARE EDUCATION/TRAINING PROGRAM

## 2025-01-13 PROCEDURE — 99999 PR PBB SHADOW E&M-EST. PATIENT-LVL IV: CPT | Mod: PBBFAC,,, | Performed by: STUDENT IN AN ORGANIZED HEALTH CARE EDUCATION/TRAINING PROGRAM

## 2025-01-13 PROCEDURE — 3079F DIAST BP 80-89 MM HG: CPT | Mod: CPTII,S$GLB,, | Performed by: STUDENT IN AN ORGANIZED HEALTH CARE EDUCATION/TRAINING PROGRAM

## 2025-01-13 PROCEDURE — 1100F PTFALLS ASSESS-DOCD GE2>/YR: CPT | Mod: CPTII,S$GLB,, | Performed by: STUDENT IN AN ORGANIZED HEALTH CARE EDUCATION/TRAINING PROGRAM

## 2025-01-13 PROCEDURE — 3288F FALL RISK ASSESSMENT DOCD: CPT | Mod: CPTII,S$GLB,, | Performed by: STUDENT IN AN ORGANIZED HEALTH CARE EDUCATION/TRAINING PROGRAM

## 2025-01-13 PROCEDURE — 3008F BODY MASS INDEX DOCD: CPT | Mod: CPTII,S$GLB,, | Performed by: STUDENT IN AN ORGANIZED HEALTH CARE EDUCATION/TRAINING PROGRAM

## 2025-01-13 PROCEDURE — 3072F LOW RISK FOR RETINOPATHY: CPT | Mod: CPTII,S$GLB,, | Performed by: STUDENT IN AN ORGANIZED HEALTH CARE EDUCATION/TRAINING PROGRAM

## 2025-01-13 NOTE — PROGRESS NOTES
Chief Complaint and Duration     Chief Complaint   Patient presents with    Consult    Memory Loss     Referred by   ANKITA HASTINGS   memory       History of Present Illness     Sandee Evans is a 69 y.o. year old female extremely pleasant.  Patient with a history of chronic smoking use, history of diastolic heart failure, HIEU with adjustments off her BiPAP.  Paroxysmal AFib on anticoagulation.  Insomnia.  Patient is here to see me for cognitive impairment/memory loss.  Was a teacher.  Difficulties with spelling.    Able to perform her own ADLs.    Review of patient's allergies indicates:   Allergen Reactions    Ace inhibitors      Cough       Current Outpatient Medications   Medication Sig Dispense Refill    albuterol (PROVENTIL/VENTOLIN HFA) 90 mcg/actuation inhaler 2 puffs every 6 (six) hours as needed.      atorvastatin (LIPITOR) 40 MG tablet TAKE 1 TABLET(40 MG) BY MOUTH EVERY DAY 90 tablet 2    azelastine (ASTELIN) 137 mcg (0.1 %) nasal spray 1 spray (137 mcg total) by Nasal route 2 (two) times daily. 30 mL 0    clobetasol 0.05% (TEMOVATE) 0.05 % Oint APPLY TOPICALLY TO THE AFFECTED AREA OF RASH ON BACK TWICE DAILY      fluticasone furoate-vilanteroL (BREO ELLIPTA) 100-25 mcg/dose diskus inhaler Inhale 1 puff into the lungs once daily. Controller 14 each 3    fluticasone propionate (FLONASE) 50 mcg/actuation nasal spray SHAKE LIQUID AND USE 2 SPRAYS(100 MCG) IN EACH NOSTRIL EVERY DAY 48 g 0    fluticasone-salmeterol diskus inhaler 250-50 mcg Inhale 1 puff into the lungs 2 (two) times daily.      furosemide (LASIX) 40 MG tablet TAKE 1 TABLET(40 MG) BY MOUTH EVERY DAY 90 tablet 3    ketoconazole (NIZORAL) 2 % cream APPLY TOPICALLY TO THE AFFECTED AREA EVERY DAY 60 g 0    LIDOcaine-prilocaine (EMLA) cream Apply topically as needed. 30 g 3    losartan (COZAAR) 25 MG tablet Take 25 mg by mouth.      melatonin (MELATIN) 5 mg Take 1 tablet (5 mg total) by mouth nightly. 90 tablet 3    metoprolol  succinate (TOPROL-XL) 100 MG 24 hr tablet Take 1 tablet (100 mg total) by mouth once daily. 90 tablet 3    sertraline (ZOLOFT) 100 MG tablet TAKE 1 TABLET(100 MG) BY MOUTH DAILY 90 tablet 0    SPIRIVA RESPIMAT 2.5 mcg/actuation inhaler INHALE 2 PUFFS BY MOUTH DAILY 4 g 5    terbinafine HCL (LAMISIL) 250 mg tablet Take 250 mg by mouth.      traMADoL (ULTRAM) 50 mg tablet Take 50 mg by mouth every 12 (twelve) hours as needed.      triamcinolone acetonide 0.1% (KENALOG) 0.1 % ointment APPLY BETWEEN THE KNEES AND UPPER THIGHS TWICE DAILY FOR NO MORE THAN 7 TO 14 DAYS 454 g 1    XARELTO 20 mg Tab Take 1 tablet (20 mg total) by mouth Daily. 90 tablet 0    cetirizine (ZYRTEC) 10 MG tablet Take 1 tablet (10 mg total) by mouth once daily. 90 tablet 3     No current facility-administered medications for this visit.       Medical History     Past Medical History:   Diagnosis Date    Anticoagulant long-term use     Xarelto    Arthritis     Atrial fibrillation     Breast cyst     CHF (congestive heart failure)     Degenerative disc disease     Edema     HLD (hyperlipidemia)     Hx of psychiatric care     Hyperlipidemia     Hypertension     Hypothyroidism     Nuclear sclerosis, bilateral 12/18/2017    Obesity, morbid     HIEU (obstructive sleep apnea)     Other abnormal glucose     pre-diabetes    Pre-diabetes     Psychiatric problem     Requires assistance with activities of daily living (ADL)     Sleep apnea     Smoker     SOB (shortness of breath)     SOB (shortness of breath)     Thyroid disease     on meds 8-9 years ago. hypothyroidism.no malignancy    TMJ (temporomandibular joint disorder)     jaw clicking    Tobacco abuse     Unsteady gait     Urge incontinence     Weakness generalized     Wears glasses      Past Surgical History:   Procedure Laterality Date    BREAST BIOPSY Right     over 10 yrs ago/ benign    BREAST CYST EXCISION Right     COLONOSCOPY N/A 06/25/2019    Procedure: COLONOSCOPY;  Surgeon: Micheline Flores,  MD;  Location: [facility] ENDO;  Service: Endoscopy;  Laterality: N/A;  RX XARELTO ok to hold (2 days) per Dr. Naik see scan 3/20/19    COLONOSCOPY N/A 9/20/2024    Procedure: COLONOSCOPY;  Surgeon: Mario Sheridan MD;  Location: [facility] ENDO;  Service: Endoscopy;  Laterality: N/A;  Ref by Dr HEMANT Miller, Pending Xarelto hold, Suprep, portal - PC  ok to hold Xarelto 2 days per Dr Grissom-GT  9/4 pt rescheduled. Xarelto hold x2 days, suprep and emailed to [email or web address]  9/13 precall complete-RB    ENDOSCOPIC ULTRASOUND OF UPPER GASTROINTESTINAL TRACT N/A 01/26/2021    Procedure: ULTRASOUND-ENDOSCOPIC-UPPER;  Surgeon: Stevenson Philippe MD;  Location: [facility] ENDO;  Service: Endoscopy;  Laterality: N/A;    HYSTERECTOMY      JOINT REPLACEMENT Bilateral     knees    OOPHORECTOMY      rt.knee surgery 2016      TOTAL KNEE ARTHROPLASTY Left 02/19/2019    Procedure: ARTHROPLASTY, KNEE, TOTAL;  Surgeon: Med Hanson MD;  Location: [facility] OR;  Service: Orthopedics;  Laterality: Left;  10AM START PER  PER JAYLIN TEXT @ 8:34AM ON 2-18-19  SUPINE  HARRIS LOYA [phone] TEXTED HIM ON 1-24-19 @ 7:57AM  RN PRE OP 2-13-19--BMI--48.9    underarm gland\ Bilateral      Family History   Problem Relation Name Age of Onset    Diabetes Mother      Hypertension Mother      Cancer Mother      No Known Problems Father      No Known Problems Sister      Diabetes Brother      Cancer Brother      No Known Problems Maternal Aunt      No Known Problems Maternal Uncle      No Known Problems Paternal Aunt      No Known Problems Paternal Uncle      No Known Problems Maternal Grandmother      No Known Problems Maternal Grandfather      No Known Problems Paternal Grandmother      No Known Problems Paternal Grandfather      No Known Problems Other      Amblyopia Neg Hx      Blindness Neg Hx      Cataracts Neg Hx      Glaucoma Neg Hx      Macular degeneration Neg Hx      Retinal detachment Neg Hx      Strabismus Neg Hx      Stroke Neg Hx       Thyroid disease Neg Hx       Social History     Socioeconomic History    Marital status:    Tobacco Use    Smoking status: Every Day     Current packs/day: 0.50     Average packs/day: 0.5 packs/day for 52.0 years (26.0 ttl pk-yrs)     Types: Cigarettes     Start date: 1973    Smokeless tobacco: Current   Substance and Sexual Activity    Alcohol use: No    Drug use: No    Sexual activity: Yes     Partners: Male     Social Drivers of Health     Financial Resource Strain: Low Risk  (11/30/2023)    Overall Financial Resource Strain (CARDIA)     Difficulty of Paying Living Expenses: Not very hard   Food Insecurity: Unknown (11/30/2023)    Hunger Vital Sign     Worried About Running Out of Food in the Last Year: Patient declined   Transportation Needs: No Transportation Needs (11/30/2023)    PRAPARE - Transportation     Lack of Transportation (Medical): No     Lack of Transportation (Non-Medical): No   Physical Activity: Unknown (11/30/2023)    Exercise Vital Sign     Days of Exercise per Week: 1 day   Recent Concern: Physical Activity - Insufficiently Active (11/30/2023)    Exercise Vital Sign     Days of Exercise per Week: 1 day     Minutes of Exercise per Session: 10 min   Stress: Stress Concern Present (5/14/2023)    [employer] - Occupational Stress Questionnaire     Feeling of Stress : Rather much   Housing Stability: Unknown (11/30/2023)    Housing Stability Vital Sign     Unable to Pay for Housing in the Last Year: Patient refused     Number of Places Lived in the Last Year: 2     Unstable Housing in the Last Year: Patient refused       Exam     Vitals:    01/13/25 0915   BP: 131/86   Pulse: 87      Physical Exam:  General: Not in acute distress. Not ill-appearing.   HENT: Normocephalic and atraumatic. Moist mucous membranes.  Eyes: Conjunctivae normal.   Pulmonary: Pulmonary effort is normal.   Skin: Skin is warm and dry. No rashes.   Psychiatric: Mood normal.        Neurologic  "Exam   Mental status: oriented to person, place, and time  Attention: Normal. Concentration: normal.  Speech: speech is normal.  Cranial Nerves: EOMI intact, Symmetric facies. Hearing grossly intact. Palate and uvula midline, symmetric. No tongue deviation. Trapezius strength intact.     Motor exam: bulk and tone normal.  Strength 5/5 grossly in bilateral upper and lower extremities    Sensory exam: light touch intact    Gait exam: normal    Labs and Imaging     Labs: reviewed  No results found for this or any previous visit (from the past 24 hours).    Thyroid normal  HgA1C%:  6.1  LDL:  82    Imaging:   I have personally reviewed the images performed.   CTA of the head in 2022 with no acute intracranial abnormalities  Assessment and Plan     Problem List Items Addressed This Visit          Neuro    Cognitive impairment - Primary       Pulmonary    Chronic respiratory failure with hypoxia and hypercapnia    Overview     Combination of copd + obesity hypoventilation + pulmonary htn   Bipap and oxygen  Patient only used oxygen prn.             Cardiac/Vascular    Acute on chronic diastolic CHF (congestive heart failure)       Endocrine    Severe obesity (BMI >= 40)    Overview     Patient is aware of need for weight loss    Not interested in bariatric medicine              Other    Insomnia    Overview     difficulty with sleep initiation and maintenance.  May related to hyperarousal from untreated jennie + RLS + conditioned.    Will monitor with resumption of bipap.         JENNIE treated with BiPAP    Overview     AHI 90  Currently with Resmed  bipap st 21/16 after titration study.    High leakage with F20.  Residual ahi of 14.  Donated N20 mask given to patient in hope of lowering leakage.  In addition, bipap was lowered to 16/9 in hope of driving pressure.            Tobacco abuse    Overview     Encourage smoking cessation  Declined smoking cessation referral.  "I've been there."          This is a 69 extremely " pleasant patient with multitude medical contributing factors as listed above.  Here for cognitive impairment.  Able to do her own ADLs.  This time, discussed patient the multifactorial factors contributing to her memory.  Low suspicion as time for neurodegenerative disorder.  [place] completed today, 28/30.    Discussed lifestyle modifications including diet and exercise at length.  appreciate opportunity to care for this patient.    Follow-up:  4 months    Time spent on this encounter: 60 minutes. This includes face to face time and non-face to face time preparing to see the patient (eg, review of tests), obtaining and/or reviewing separately obtained history, documenting clinical information in the electronic or other health record, independently interpreting results and communicating results to the patient/family/caregiver, or care coordinator.     This note was created by combination of typed  and M-Modal dictation. Transcription and phonetic errors may be present.  If there are any questions, please contact me.

## 2025-03-14 ENCOUNTER — LAB VISIT (OUTPATIENT)
Dept: LAB | Facility: HOSPITAL | Age: 70
End: 2025-03-14
Attending: INTERNAL MEDICINE
Payer: COMMERCIAL

## 2025-03-14 DIAGNOSIS — I48.0 PAROXYSMAL ATRIAL FIBRILLATION: ICD-10-CM

## 2025-03-14 DIAGNOSIS — I27.20 PULMONARY HTN: ICD-10-CM

## 2025-03-14 DIAGNOSIS — I10 ESSENTIAL HYPERTENSION: Chronic | ICD-10-CM

## 2025-03-14 DIAGNOSIS — R06.09 DOE (DYSPNEA ON EXERTION): ICD-10-CM

## 2025-03-14 DIAGNOSIS — E78.2 MIXED HYPERLIPIDEMIA: ICD-10-CM

## 2025-03-14 DIAGNOSIS — I50.33 ACUTE ON CHRONIC DIASTOLIC CHF (CONGESTIVE HEART FAILURE): ICD-10-CM

## 2025-03-14 LAB
ANION GAP SERPL CALC-SCNC: 7 MMOL/L (ref 8–16)
BNP SERPL-MCNC: 270 PG/ML (ref 0–99)
BUN SERPL-MCNC: 39 MG/DL (ref 8–23)
CALCIUM SERPL-MCNC: 9.6 MG/DL (ref 8.7–10.5)
CHLORIDE SERPL-SCNC: 98 MMOL/L (ref 95–110)
CO2 SERPL-SCNC: 36 MMOL/L (ref 23–29)
CREAT SERPL-MCNC: 1.4 MG/DL (ref 0.5–1.4)
EST. GFR  (NO RACE VARIABLE): 40 ML/MIN/1.73 M^2
GLUCOSE SERPL-MCNC: 99 MG/DL (ref 70–110)
POTASSIUM SERPL-SCNC: 5.3 MMOL/L (ref 3.5–5.1)
SODIUM SERPL-SCNC: 141 MMOL/L (ref 136–145)

## 2025-03-14 PROCEDURE — 83880 ASSAY OF NATRIURETIC PEPTIDE: CPT | Performed by: INTERNAL MEDICINE

## 2025-03-14 PROCEDURE — 36415 COLL VENOUS BLD VENIPUNCTURE: CPT | Performed by: INTERNAL MEDICINE

## 2025-03-14 PROCEDURE — 80048 BASIC METABOLIC PNL TOTAL CA: CPT | Performed by: INTERNAL MEDICINE

## 2025-03-19 ENCOUNTER — TELEPHONE (OUTPATIENT)
Dept: PULMONOLOGY | Facility: CLINIC | Age: 70
End: 2025-03-19
Payer: COMMERCIAL

## 2025-03-19 NOTE — TELEPHONE ENCOUNTER
Patient will call back to r/s her appt when she is ready.            ----- Message from Tech Rosina sent at 3/19/2025  8:09 AM CDT -----  Regarding: Patient call back  .Type: Patient Call BackWho called:self What is the request in detail:caller states she does not have the face mask that the doctor ordered; asking if she should come in for her appointment?Can the clinic reply by [facility]?Would the patient rather a call back or a response via [facility]? Call Best call back number:.[phone] Information:

## 2025-03-27 DIAGNOSIS — R60.0 BILATERAL LOWER EXTREMITY EDEMA: ICD-10-CM

## 2025-03-28 RX ORDER — FUROSEMIDE 40 MG/1
40 TABLET ORAL DAILY
Qty: 90 TABLET | Refills: 3 | Status: SHIPPED | OUTPATIENT
Start: 2025-03-28

## 2025-03-31 ENCOUNTER — OFFICE VISIT (OUTPATIENT)
Dept: CARDIOLOGY | Facility: CLINIC | Age: 70
End: 2025-03-31
Payer: COMMERCIAL

## 2025-03-31 VITALS
BODY MASS INDEX: 45.04 KG/M2 | HEART RATE: 84 BPM | SYSTOLIC BLOOD PRESSURE: 120 MMHG | DIASTOLIC BLOOD PRESSURE: 60 MMHG | HEIGHT: 63 IN | OXYGEN SATURATION: 93 % | WEIGHT: 254.19 LBS

## 2025-03-31 DIAGNOSIS — I27.20 PULMONARY HTN: Primary | ICD-10-CM

## 2025-03-31 DIAGNOSIS — R06.09 DOE (DYSPNEA ON EXERTION): ICD-10-CM

## 2025-03-31 DIAGNOSIS — I50.33 ACUTE ON CHRONIC DIASTOLIC CHF (CONGESTIVE HEART FAILURE): ICD-10-CM

## 2025-03-31 DIAGNOSIS — E78.2 MIXED HYPERLIPIDEMIA: ICD-10-CM

## 2025-03-31 DIAGNOSIS — I48.0 PAROXYSMAL ATRIAL FIBRILLATION: ICD-10-CM

## 2025-03-31 DIAGNOSIS — I10 ESSENTIAL HYPERTENSION: Chronic | ICD-10-CM

## 2025-03-31 PROCEDURE — 99999 PR PBB SHADOW E&M-EST. PATIENT-LVL IV: CPT | Mod: PBBFAC,,, | Performed by: INTERNAL MEDICINE

## 2025-03-31 PROCEDURE — 3288F FALL RISK ASSESSMENT DOCD: CPT | Mod: CPTII,S$GLB,, | Performed by: INTERNAL MEDICINE

## 2025-03-31 PROCEDURE — 93000 ELECTROCARDIOGRAM COMPLETE: CPT | Mod: S$GLB,,, | Performed by: INTERNAL MEDICINE

## 2025-03-31 PROCEDURE — 3078F DIAST BP <80 MM HG: CPT | Mod: CPTII,S$GLB,, | Performed by: INTERNAL MEDICINE

## 2025-03-31 PROCEDURE — 1101F PT FALLS ASSESS-DOCD LE1/YR: CPT | Mod: CPTII,S$GLB,, | Performed by: INTERNAL MEDICINE

## 2025-03-31 PROCEDURE — 4010F ACE/ARB THERAPY RXD/TAKEN: CPT | Mod: CPTII,S$GLB,, | Performed by: INTERNAL MEDICINE

## 2025-03-31 PROCEDURE — 1159F MED LIST DOCD IN RCRD: CPT | Mod: CPTII,S$GLB,, | Performed by: INTERNAL MEDICINE

## 2025-03-31 PROCEDURE — 3072F LOW RISK FOR RETINOPATHY: CPT | Mod: CPTII,S$GLB,, | Performed by: INTERNAL MEDICINE

## 2025-03-31 PROCEDURE — 3074F SYST BP LT 130 MM HG: CPT | Mod: CPTII,S$GLB,, | Performed by: INTERNAL MEDICINE

## 2025-03-31 PROCEDURE — 1124F ACP DISCUSS-NO DSCNMKR DOCD: CPT | Mod: CPTII,S$GLB,, | Performed by: INTERNAL MEDICINE

## 2025-03-31 PROCEDURE — 99214 OFFICE O/P EST MOD 30 MIN: CPT | Mod: S$GLB,,, | Performed by: INTERNAL MEDICINE

## 2025-03-31 PROCEDURE — 3008F BODY MASS INDEX DOCD: CPT | Mod: CPTII,S$GLB,, | Performed by: INTERNAL MEDICINE

## 2025-03-31 PROCEDURE — 1126F AMNT PAIN NOTED NONE PRSNT: CPT | Mod: CPTII,S$GLB,, | Performed by: INTERNAL MEDICINE

## 2025-03-31 NOTE — PROGRESS NOTES
Subjective   Patient ID:  Sandee Evans is a 69 y.o. female who presents for follow-up of No chief complaint on file.      HPI        Chronic A-fib on xarelto, Diastolic CHF, HTN, pulmonary HTN DM, HLD, PAD, tobacco abuse     Previously followed by Dr Naik  Patient is here for follow-up of paroxysmal atrial fibrillation.  She is labeled as chronic and has been in normal sinus rhythm and was last seen in 2016.  EKG today confirms this.  She was lost to follow-up and thought she was discharged because she felt everything was okay.  She has not taken her blood thinner but is only been on aspirin.  She gets shortness of breath on heavy activity but relieved with rest.  She does have some associated substernal heaviness.  She has had no breakthrough tachycardia palpitations.  She denies any PND, orthopnea has stable lower edema relieved with elevation.  She is preop for knee replacement and is unable to go up a flight of stairs due to her degenerative joint condition     Here for follow-up of paroxysmal atrial fibrillation.  She underwent testing as below for preop clearance.  We if cleared at low to intermediate risk and she is aware that anything can happen with the stress of surgery and in particular with her tobacco use disorder.  We discussed that this is the main things she needs to work on postoperatively as well as rehab.  She denies any further cardiopulmonary complaints.  We went over her medicines as well and discussed that she has to hold her blood thinner for at least 2 days prior to the procedure but continue her beta-blocker perioperatively.     Echo 8/6/24    Left Ventricle: The left ventricle is normal in size. There is mild concentric hypertrophy. There is normal systolic function with a visually estimated ejection fraction of 60 - 65%.    Right Ventricle: Mild right ventricular enlargement. Systolic function is normal.    Left Atrium: Left atrium is severely dilated.    Right Atrium: Right atrium  is severely dilated.    Mitral Valve: There is mild to moderate regurgitation.    Tricuspid Valve: There is mild to moderate regurgitation.    Pulmonary Artery: The estimated pulmonary artery systolic pressure is 65 mmHg.    IVC/SVC: Elevated venous pressure at 15 mmHg.     NST: 2/8/19  Normal Marian MPI  The perfusion scan is free of evidence from myocardial ischemia or injury.  An ejection fraction of 55 % at rest  LV cavity size is normal at rest.  Resting wall motion is physiologic.     Holter: 3/1/21  Atrial fibrillation with ventricular rates varying between 61 and 188 bpm with an average of 104 bpm. Total time in atrial fibrillation was approximately 24 hours.  There were frequent PVCs totalling 2296 and averaging 95.75 per hour. There were 92 monomorphic couplets. There were 3 bigeminal cycles. There were 7 monomorphic triplets.  The diary was returned incomplete.     2/18/21 Denies CP. Mild stable ESQUIVEL  EKG A-fib 94 NSSTT changes - unaware of being back in A-fib - last EKG 2/8/19 NSR  Back in A-fib - duration unknown. Discussed LAURA/CV versus rate control - given lack of symptoms we are both in agreement for rate control.   Echo and holter to evaluate A-fib control  Continue Rx for PAD, HTN, DM, A-fib      3/8/21 Denies CP or SOB. Reports some issues with nausea and feels that she has a mass in her abdomen that moves around   Increase metoprolol 50 bid for better A-fib rate control  Continue Rx for HTN, HLD, PAD, DM  OV 6 months     7/16/21 Reports SOB and LE edema for 2 weeks. Was started on lasix 3 days ago without much improvement  EKG A-fib NSSTT changes  Echo, BNP, BMP for CHF  Continue current diuretic regimen - needs to go to the ER if symptoms worsen  Continue Rx for HTN, HLD, PAD, DM, A-fib, CHF     7/29/21 Feels better since discharge. Still with mild LE edema and ESQUIVEL  On lasix 40 bid     Continue Rx for HTN, HLD, PAD, DM, A-fib, CHF  OV 1 month with BNP, BMP        9/28/21 Denies CP. Less SOB and LE  edema improved  BP controlled      Continue Rx for HTN, HLD, PAD, DM, A-fib, CHF  OV 3 months with BNP, BMP     Admitted 5/20/22  Ms Sandee Evans is a 66 y.o. woman admitted with acute hypoxic/hypercapnic respiratory failure due to acute on chronic diastolic CHF, exacerbation of COPD, and untreated HIEU. She initially required BiPAP but was quickly weaned to O2 by NC. She states that she sometimes skips lasix doses due to urinary incontinence. She does not have BiPAP at home. She is still using tobacco. Started IV lasix for CHF exacerbation, steroids/nebs/levofloxacin for COPD exacerbation. Stepped down to floor on 5/21/22. Patient grew out E-coli on blood cultures- (S) to Cipro. Patient clinically improved and was discharged to home with out patient PT/OT on 5/24/22. Bi-pap will be arranged. Activity as tolerated. Diet- low NA/ADA 2000 yang diet    Already has pulmonary follow up.      6/28/22 Denies CP, SOB improved from discharge  BP controlled. Lasix has been held since discharge   Restart lasix 40 qd  OV 3 months with BNP, BMP      Continue Rx for HTN, HLD, PAD, DM, A-fib, CHF     9/28/22 Denies CP, ESQUIVEL improved after restarting lasix  Some dry cough that she thinks are allergies  BP controlled  OV 6 months with BNP, BMP      Continue Rx for HTN, HLD, PAD, DM, A-fib, CHF      Went to the ER 1/26/23  This 67-year-old female presents to the emergency room complaining of nausea that started yesterday.  Patient developed some chest tightness and shortness of breath that also started this morning at 7:00 a.m..  The patient denies dysuria.  She had 4 episodes of diarrhea in the last 24 hours.  The diarrhea started yesterday.  The patient also has weakness and pain in her left arm this started 3 weeks ago.  The patient has a history of arthritis.  She has been slightly lightheaded when she 1st stands up for about a week.  He had some left upper quadrant abdominal pain yesterday that has since resolved.  She has had  chills for 2 weeks and a cough for 2 weeks and headache that occurs only with coughing as well as some rhinorrhea but no sore throat.  She has a history of atrial fibrillation.  She has a history of congestive heart failure.  She smokes and has a history of morbid obesity.  She does not report a history of chronic obstructive pulmonary disease but she is on Spiriva inhaler.  She has a history thyroid disease.  She is maintained on Xarelto.  , troponin negative  EKG A-fib 108     3/7/23 CP has resolved. Still with cough and some abdominal discomfort  BP controlled  Discussed repeat stress test given recent CP - she feels like this is from URI and would like to hold off      Continue Rx for HTN, HLD, PAD, DM, A-fib, CHF  OV 3 months     Admitted 11/13/23  67 y.o. female, with a past medical history of A-fib on OAC,diastolic  CHF, COPD, HLD, HTN, hypothyroidism, morbid obesity admitted on 11/22/2023 for acute respiratory failure with hypoxia and hypercapnic (O2 sts 88% on RA,  PCO2 of 72) due to +RSV, COPD exacerbation, and acute on chronic diastolic heart failure. admitted to ICU on BIPAP. BNP > 400.  Chest imaging with bilateral effusions and peripheral edema. Hospital course complicated by agitation, required Precedex drip briefly.  Able to wean off Precedex drip. Pulmonology consulted-suspect chronic hypercapnic respiratory failure but not using nightly NIV.  RSV positive and on droplet precaution.  Patient completed treatment with DuoNebs and prednisone on 11/17/2023.  Continue IV diuresis and wean O2 as tolerated. Pt still wheezing, resumed steroids with plan for gradual taper. Given patient continues to have wheezing, SOB, and diminished breath sounds, will consult pulmonology again for input.           12/19/23 Still with cough since discharge but less SOB  SBP 90s but tolerated  Stop losartan with hypotension - needs to monitor home BP      Continue Rx for HTN, HLD, PAD, DM, A-fib, CHF  OV 3 months      3/6/24 Denies CP or SOB  BP improved  EKG A-fib otherwise ok      Continue Rx for HTN, HLD, PAD, DM, A-fib, CHF  OV 6 months with BNP, BMP     Admitted 8/6/24  68-year-old morbidly obese female with past medical history of chronic AFib on Xarelto, CHF EF 55-60% 11/23, hypertension, diabetes, hyperlipidemia, PVD, tobacco abuse , COPD, HIEU, hypothyroidism, tobacco use who was admitted for Acute hypoxic hyper capneic respiratory failure secondary to CHF/COPD exacerbation/parainfluenza pneumonia. Noted to have AFib with RVR on presentation in ED, rate controlled with 1 dose of diltiazem weaned off BiPAP support transitioned to 3 L of oxygen with nasal cannula  continue BiPAP p.r.n. neb/Q HS. On Lasix/Solu-Medrol /nebs .  Received ceftriaxone/azithromycin for cap coverage x 3 days.  Respiratory cultures with normal respiratory elli.Net -8.3L since admit. On oral  Prednisone.  Decrease Lasix to 40 b.i.d..  PT/OT on board wean oxygen as tolerated. Pt improved prior to dc. She was dischrged in stable condition. Rec OP FU with PCP      9/19/24 Denies CP, stable ESQUIVEL since discharge  BP controlled  Mild ankle swelling      Continue Rx for HTN, HLD, PAD, DM, A-fib, CHF  OV 3 months with BNP, BMP     Labs 12/9/24  K 4.2  Cr 1.1   up from 270     12/17/24 Denies CP or SOB  LE edema increased some after thanksgiving and hasn't gone down  Ok to increase lasix 40 bid until LE edema improved       Continue Rx for HTN, HLD, PAD, DM, A-fib, CHF  OV 3 months with BNP, BMP     Labs 4/14/25  K 5.3  Cr 1.4      3/31/25 Denies CP, mild stable ESQUIVEL and LE swelling  BP controlled  EKG A-fib NSSTT changes       Review of Systems   Constitutional: Negative for decreased appetite.   HENT:  Negative for ear discharge.    Eyes:  Negative for blurred vision.   Respiratory:  Negative for hemoptysis.    Endocrine: Negative for polyphagia.   Hematologic/Lymphatic: Negative for adenopathy.   Skin:  Negative for color change.    Musculoskeletal:  Negative for joint swelling.   Genitourinary:  Negative for bladder incontinence.   Neurological:  Negative for brief paralysis.   Psychiatric/Behavioral:  Negative for hallucinations.    Allergic/Immunologic: Negative for hives.          Objective     Physical Exam  Constitutional:       Appearance: She is well-developed.   HENT:      Head: Normocephalic and atraumatic.   Eyes:      Conjunctiva/sclera: Conjunctivae normal.      Pupils: Pupils are equal, round, and reactive to light.   Cardiovascular:      Rate and Rhythm: Normal rate. Rhythm irregular.      Pulses: Intact distal pulses.      Heart sounds: Normal heart sounds.   Pulmonary:      Effort: Pulmonary effort is normal.      Breath sounds: Normal breath sounds.   Abdominal:      General: Bowel sounds are normal.      Palpations: Abdomen is soft.   Musculoskeletal:         General: Normal range of motion.      Cervical back: Normal range of motion and neck supple.   Skin:     General: Skin is warm and dry.   Neurological:      Mental Status: She is alert and oriented to person, place, and time.            Assessment and Plan     1. Pulmonary HTN    2. Acute on chronic diastolic CHF (congestive heart failure)    3. Essential hypertension    4. Mixed hyperlipidemia    5. Paroxysmal atrial fibrillation    6. ESQUIVEL (dyspnea on exertion)        Plan:    Stop losartan with elevated K  If Cr remains elevated will discuss decreasing xarelto to 15 qd       Continue Rx for HTN, HLD, PAD, DM, A-fib, CHF  OV 3 months with BNP, BMP    Advance Care Planning     Date: 03/31/2025  Patient did not wish or was not able to name a surrogate decision maker or provide an Advance Care Plan.

## 2025-04-01 LAB
OHS QRS DURATION: 74 MS
OHS QTC CALCULATION: 464 MS

## 2025-04-07 ENCOUNTER — PATIENT MESSAGE (OUTPATIENT)
Dept: CARDIOLOGY | Facility: CLINIC | Age: 70
End: 2025-04-07
Payer: COMMERCIAL

## 2025-04-10 DIAGNOSIS — I10 ESSENTIAL HYPERTENSION: Primary | ICD-10-CM

## 2025-04-10 DIAGNOSIS — R60.0 BILATERAL LOWER EXTREMITY EDEMA: ICD-10-CM

## 2025-04-10 RX ORDER — LOSARTAN POTASSIUM 25 MG/1
25 TABLET ORAL DAILY
COMMUNITY
End: 2025-04-10 | Stop reason: SDUPTHER

## 2025-04-10 RX ORDER — LOSARTAN POTASSIUM 25 MG/1
25 TABLET ORAL DAILY
Qty: 90 TABLET | OUTPATIENT
Start: 2025-04-10

## 2025-04-10 RX ORDER — LOSARTAN POTASSIUM 25 MG/1
25 TABLET ORAL DAILY
Qty: 90 TABLET | Refills: 3 | Status: SHIPPED | OUTPATIENT
Start: 2025-04-10 | End: 2026-04-10

## 2025-04-10 RX ORDER — BUPROPION HYDROCHLORIDE 150 MG/1
150 TABLET ORAL
COMMUNITY
Start: 2024-12-25

## 2025-04-11 RX ORDER — FUROSEMIDE 40 MG/1
40 TABLET ORAL DAILY
Qty: 90 TABLET | Refills: 3 | Status: SHIPPED | OUTPATIENT
Start: 2025-04-11

## 2025-04-25 DIAGNOSIS — I48.20 CHRONIC ATRIAL FIBRILLATION: ICD-10-CM

## 2025-04-25 RX ORDER — METOPROLOL SUCCINATE 100 MG/1
TABLET, EXTENDED RELEASE ORAL
Qty: 90 TABLET | Refills: 0 | Status: SHIPPED | OUTPATIENT
Start: 2025-04-25

## 2025-04-25 NOTE — TELEPHONE ENCOUNTER
Refill Routing Note   Medication(s) are not appropriate for processing by [facility] for the following reason(s):        Patient not seen by provider within 15 months  ED/Hospital Visit since last OV with provider  Responsible provider unclear    [facility] action(s):  Defer             Appointments  past 12m or future 3m with PCP    Date Provider   Last Visit   4/18/2024 Kuldeep Miller MD   Next Visit   Visit date not found Kuldeep Miller MD   ED visits in past 90 days: 0        Note composed:7:53 AM 04/25/2025

## 2025-04-30 DIAGNOSIS — R60.0 BILATERAL LOWER EXTREMITY EDEMA: ICD-10-CM

## 2025-05-01 RX ORDER — FUROSEMIDE 40 MG/1
40 TABLET ORAL DAILY
Qty: 90 TABLET | Refills: 3 | Status: SHIPPED | OUTPATIENT
Start: 2025-05-01

## 2025-05-12 ENCOUNTER — OFFICE VISIT (OUTPATIENT)
Dept: NEUROLOGY | Facility: CLINIC | Age: 70
End: 2025-05-12
Payer: COMMERCIAL

## 2025-05-12 VITALS
BODY MASS INDEX: 45.11 KG/M2 | HEART RATE: 82 BPM | DIASTOLIC BLOOD PRESSURE: 74 MMHG | WEIGHT: 254.63 LBS | SYSTOLIC BLOOD PRESSURE: 149 MMHG

## 2025-05-12 DIAGNOSIS — G47.00 INSOMNIA, UNSPECIFIED TYPE: ICD-10-CM

## 2025-05-12 DIAGNOSIS — G47.33 OSA TREATED WITH BIPAP: ICD-10-CM

## 2025-05-12 DIAGNOSIS — R41.89 COGNITIVE IMPAIRMENT: Primary | ICD-10-CM

## 2025-05-12 DIAGNOSIS — Z72.0 TOBACCO ABUSE: ICD-10-CM

## 2025-05-12 DIAGNOSIS — J96.11 CHRONIC RESPIRATORY FAILURE WITH HYPOXIA AND HYPERCAPNIA: ICD-10-CM

## 2025-05-12 DIAGNOSIS — E66.01 SEVERE OBESITY (BMI >= 40): ICD-10-CM

## 2025-05-12 DIAGNOSIS — I50.33 ACUTE ON CHRONIC DIASTOLIC CHF (CONGESTIVE HEART FAILURE): ICD-10-CM

## 2025-05-12 DIAGNOSIS — J96.12 CHRONIC RESPIRATORY FAILURE WITH HYPOXIA AND HYPERCAPNIA: ICD-10-CM

## 2025-05-12 PROCEDURE — 99214 OFFICE O/P EST MOD 30 MIN: CPT | Mod: S$GLB,,, | Performed by: STUDENT IN AN ORGANIZED HEALTH CARE EDUCATION/TRAINING PROGRAM

## 2025-05-12 PROCEDURE — 3077F SYST BP >= 140 MM HG: CPT | Mod: CPTII,S$GLB,, | Performed by: STUDENT IN AN ORGANIZED HEALTH CARE EDUCATION/TRAINING PROGRAM

## 2025-05-12 PROCEDURE — 1159F MED LIST DOCD IN RCRD: CPT | Mod: CPTII,S$GLB,, | Performed by: STUDENT IN AN ORGANIZED HEALTH CARE EDUCATION/TRAINING PROGRAM

## 2025-05-12 PROCEDURE — 1100F PTFALLS ASSESS-DOCD GE2>/YR: CPT | Mod: CPTII,S$GLB,, | Performed by: STUDENT IN AN ORGANIZED HEALTH CARE EDUCATION/TRAINING PROGRAM

## 2025-05-12 PROCEDURE — 3072F LOW RISK FOR RETINOPATHY: CPT | Mod: CPTII,S$GLB,, | Performed by: STUDENT IN AN ORGANIZED HEALTH CARE EDUCATION/TRAINING PROGRAM

## 2025-05-12 PROCEDURE — 99999 PR PBB SHADOW E&M-EST. PATIENT-LVL III: CPT | Mod: PBBFAC,,, | Performed by: STUDENT IN AN ORGANIZED HEALTH CARE EDUCATION/TRAINING PROGRAM

## 2025-05-12 PROCEDURE — 3078F DIAST BP <80 MM HG: CPT | Mod: CPTII,S$GLB,, | Performed by: STUDENT IN AN ORGANIZED HEALTH CARE EDUCATION/TRAINING PROGRAM

## 2025-05-12 PROCEDURE — 3008F BODY MASS INDEX DOCD: CPT | Mod: CPTII,S$GLB,, | Performed by: STUDENT IN AN ORGANIZED HEALTH CARE EDUCATION/TRAINING PROGRAM

## 2025-05-12 PROCEDURE — 1125F AMNT PAIN NOTED PAIN PRSNT: CPT | Mod: CPTII,S$GLB,, | Performed by: STUDENT IN AN ORGANIZED HEALTH CARE EDUCATION/TRAINING PROGRAM

## 2025-05-12 PROCEDURE — 4010F ACE/ARB THERAPY RXD/TAKEN: CPT | Mod: CPTII,S$GLB,, | Performed by: STUDENT IN AN ORGANIZED HEALTH CARE EDUCATION/TRAINING PROGRAM

## 2025-05-12 PROCEDURE — 3288F FALL RISK ASSESSMENT DOCD: CPT | Mod: CPTII,S$GLB,, | Performed by: STUDENT IN AN ORGANIZED HEALTH CARE EDUCATION/TRAINING PROGRAM

## 2025-05-12 NOTE — PROGRESS NOTES
Chief Complaint and Duration     Memory followup    History of Present Illness     Sandee Evans is a 69 y.o. year old female extremely pleasant.  Patient with a history of chronic smoking use, history of diastolic heart failure, HIEU with adjustments off her BiPAP.  Paroxysmal AFib on anticoagulation.  Insomnia.  Patient is here to see me for cognitive impairment/memory loss.  Was a teacher.  Difficulties with spelling.    Able to perform her own ADLs.    Interim:  5/12/25 - here for followup. Doing well.   Lasix for diuretic. Fatigue.     Review of patient's allergies indicates:   Allergen Reactions    Ace inhibitors      Cough       Current Outpatient Medications   Medication Sig Dispense Refill    albuterol (PROVENTIL/VENTOLIN HFA) 90 mcg/actuation inhaler 2 puffs every 6 (six) hours as needed.      atorvastatin (LIPITOR) 40 MG tablet TAKE 1 TABLET(40 MG) BY MOUTH EVERY DAY 90 tablet 2    azelastine (ASTELIN) 137 mcg (0.1 %) nasal spray 1 spray (137 mcg total) by Nasal route 2 (two) times daily. 30 mL 0    clobetasol 0.05% (TEMOVATE) 0.05 % Oint APPLY TOPICALLY TO THE AFFECTED AREA OF RASH ON BACK TWICE DAILY      fluticasone furoate-vilanteroL (BREO ELLIPTA) 100-25 mcg/dose diskus inhaler Inhale 1 puff into the lungs once daily. Controller 14 each 3    fluticasone propionate (FLONASE) 50 mcg/actuation nasal spray SHAKE LIQUID AND USE 2 SPRAYS(100 MCG) IN EACH NOSTRIL EVERY DAY 48 g 0    fluticasone-salmeterol diskus inhaler 250-50 mcg Inhale 1 puff into the lungs 2 (two) times daily.      furosemide (LASIX) 40 MG tablet Take 1 tablet (40 mg total) by mouth once daily. 90 tablet 3    ketoconazole (NIZORAL) 2 % cream APPLY TOPICALLY TO THE AFFECTED AREA EVERY DAY 60 g 0    LIDOcaine-prilocaine (EMLA) cream Apply topically as needed. 30 g 3    losartan (COZAAR) 25 MG tablet Take 1 tablet (25 mg total) by mouth once daily. 90 tablet 3    melatonin (MELATIN) 5 mg Take 1 tablet (5 mg total) by mouth nightly. 90  tablet 3    metoprolol succinate (TOPROL-XL) 100 MG 24 hr tablet TAKE 1 TABLET(100 MG) BY MOUTH DAILY 90 tablet 0    sertraline (ZOLOFT) 100 MG tablet TAKE 1 TABLET(100 MG) BY MOUTH DAILY 90 tablet 0    SPIRIVA RESPIMAT 2.5 mcg/actuation inhaler INHALE 2 PUFFS BY MOUTH DAILY 4 g 5    terbinafine HCL (LAMISIL) 250 mg tablet Take 250 mg by mouth.      traMADoL (ULTRAM) 50 mg tablet Take 50 mg by mouth every 12 (twelve) hours as needed.      triamcinolone acetonide 0.1% (KENALOG) 0.1 % ointment APPLY BETWEEN THE KNEES AND UPPER THIGHS TWICE DAILY FOR NO MORE THAN 7 TO 14 DAYS 454 g 1    XARELTO 20 mg Tab Take 1 tablet (20 mg total) by mouth Daily. 90 tablet 0    buPROPion (WELLBUTRIN XL) 150 MG TB24 tablet Take 150 mg by mouth.      cetirizine (ZYRTEC) 10 MG tablet Take 1 tablet (10 mg total) by mouth once daily. 90 tablet 3     No current facility-administered medications for this visit.       Medical History     Past Medical History:   Diagnosis Date    Anticoagulant long-term use     Xarelto    Arthritis     Atrial fibrillation     Breast cyst     CHF (congestive heart failure)     Degenerative disc disease     Edema     HLD (hyperlipidemia)     Hx of psychiatric care     Hyperlipidemia     Hypertension     Hypothyroidism     Nuclear sclerosis, bilateral 12/18/2017    Obesity, morbid     HIEU (obstructive sleep apnea)     Other abnormal glucose     pre-diabetes    Pre-diabetes     Psychiatric problem     Requires assistance with activities of daily living (ADL)     Sleep apnea     Smoker     SOB (shortness of breath)     SOB (shortness of breath)     Thyroid disease     on meds 8-9 years ago. hypothyroidism.no malignancy    TMJ (temporomandibular joint disorder)     jaw clicking    Tobacco abuse     Unsteady gait     Urge incontinence     Weakness generalized     Wears glasses      Past Surgical History:   Procedure Laterality Date    BREAST BIOPSY Right     over 10 yrs ago/ benign    BREAST CYST EXCISION Right      COLONOSCOPY N/A 06/25/2019    Procedure: COLONOSCOPY;  Surgeon: Micheline Flores MD;  Location: [facility] ENDO;  Service: Endoscopy;  Laterality: N/A;  RX XARELTO ok to hold (2 days) per Dr. Naik see scan 3/20/19    COLONOSCOPY N/A 9/20/2024    Procedure: COLONOSCOPY;  Surgeon: Mario Sheridan MD;  Location: [facility] ENDO;  Service: Endoscopy;  Laterality: N/A;  Ref by Dr HEMANT Miller, Pending Xarelto hold, Suprep, portal - PC  ok to hold Xarelto 2 days per Dr Yuen  9/4 pt rescheduled. Xarelto hold x2 days, suprep and emailed to [email or web address]. [email or web address]  9/13 precall complete-RB    ENDOSCOPIC ULTRASOUND OF UPPER GASTROINTESTINAL TRACT N/A 01/26/2021    Procedure: ULTRASOUND-ENDOSCOPIC-UPPER;  Surgeon: Stevenson Philippe MD;  Location: [facility] ENDO;  Service: Endoscopy;  Laterality: N/A;    HYSTERECTOMY      JOINT REPLACEMENT Bilateral     knees    OOPHORECTOMY      rt.knee surgery 2016      TOTAL KNEE ARTHROPLASTY Left 02/19/2019    Procedure: ARTHROPLASTY, KNEE, TOTAL;  Surgeon: Med Hanson MD;  Location: [facility] OR;  Service: Orthopedics;  Laterality: Left;  10AM START PER  PER JAYLIN TEXT @ 8:34AM ON 2-18-19  SUPINE  HARRIS LOYA [phone] TEXTED HIM ON 1-24-19 @ 7:57AM  RN PRE OP 2-13-19--BMI--48.9    underarm gland\ Bilateral      Family History   Problem Relation Name Age of Onset    Diabetes Mother      Hypertension Mother      Cancer Mother      No Known Problems Father      No Known Problems Sister      Diabetes Brother      Cancer Brother      No Known Problems Maternal Aunt      No Known Problems Maternal Uncle      No Known Problems Paternal Aunt      No Known Problems Paternal Uncle      No Known Problems Maternal Grandmother      No Known Problems Maternal Grandfather      No Known Problems Paternal Grandmother      No Known Problems Paternal Grandfather      No Known Problems Other      Amblyopia Neg Hx      Blindness Neg Hx      Cataracts Neg Hx      Glaucoma Neg Hx      Macular degeneration Neg  Hx      Retinal detachment Neg Hx      Strabismus Neg Hx      Stroke Neg Hx      Thyroid disease Neg Hx       Social History     Socioeconomic History    Marital status:    Tobacco Use    Smoking status: Every Day     Current packs/day: 0.50     Average packs/day: 0.5 packs/day for 52.4 years (26.2 ttl pk-yrs)     Types: Cigarettes     Start date: 1973    Smokeless tobacco: Current   Substance and Sexual Activity    Alcohol use: No    Drug use: No    Sexual activity: Yes     Partners: Male     Social Drivers of Health     Financial Resource Strain: Low Risk  (3/12/2025)    Overall Financial Resource Strain (CARDIA)     Difficulty of Paying Living Expenses: Not hard at all   Food Insecurity: No Food Insecurity (3/12/2025)    Hunger Vital Sign     Worried About Running Out of Food in the Last Year: Never true     Ran Out of Food in the Last Year: Never true   Transportation Needs: Patient Declined (3/12/2025)    PRAPARE - Transportation     Lack of Transportation (Medical): Patient declined     Lack of Transportation (Non-Medical): Patient declined   Physical Activity: Insufficiently Active (3/12/2025)    Exercise Vital Sign     Days of Exercise per Week: 2 days     Minutes of Exercise per Session: 10 min   Stress: Stress Concern Present (3/12/2025)    [employer] [place] [employer] - Occupational Stress Questionnaire     Feeling of Stress : To some extent   Housing Stability: Low Risk  (3/12/2025)    Housing Stability Vital Sign     Unable to Pay for Housing in the Last Year: No     Number of Times Moved in the Last Year: 0     Homeless in the Last Year: No       Exam     Vitals:    05/12/25 0920   BP: (!) 149/74   Pulse: 82        Physical Exam:  General: Not in acute distress. Not ill-appearing.   HENT: Normocephalic and atraumatic. Moist mucous membranes.  Eyes: Conjunctivae normal.   Pulmonary: Pulmonary effort is normal.   Skin: Skin is warm and dry. No rashes.   Psychiatric: Mood normal.     "    Neurologic Exam   Mental status: oriented to person, place, and time  Attention: Normal. Concentration: normal.  Speech: speech is normal.  Cranial Nerves: EOMI intact, Symmetric facies. Hearing grossly intact. Palate and uvula midline, symmetric. No tongue deviation. Trapezius strength intact.     Motor exam: bulk and tone normal.  Strength 5/5 grossly in bilateral upper and lower extremities    Sensory exam: light touch intact    Gait exam: normal    Labs and Imaging     Labs: reviewed  No results found for this or any previous visit (from the past 24 hours).    Thyroid normal  HgA1C%:  6.1  LDL:  82    Imaging:   I have personally reviewed the images performed.   CTA of the head in 2022 with no acute intracranial abnormalities    Assessment and Plan     Problem List Items Addressed This Visit          Neuro    Cognitive impairment - Primary       Pulmonary    Chronic respiratory failure with hypoxia and hypercapnia    Overview   Combination of copd + obesity hypoventilation + pulmonary htn   Bipap and oxygen  Patient only used oxygen prn.             Cardiac/Vascular    Acute on chronic diastolic CHF (congestive heart failure)       Endocrine    Severe obesity (BMI >= 40)    Overview   Patient is aware of need for weight loss    Not interested in bariatric medicine              Other    Insomnia    Overview   difficulty with sleep initiation and maintenance.  May related to hyperarousal from untreated jennie + RLS + conditioned.    Will monitor with resumption of bipap.         JENNIE treated with BiPAP    Overview   AHI 90  Currently with [facility]  bipap st 21/16 after titration study.    High leakage with F20.  Residual ahi of 14.  Donated N20 mask given to patient in hope of lowering leakage.  In addition, bipap was lowered to 16/9 in hope of driving pressure.            Tobacco abuse    Overview   Encourage smoking cessation  Declined smoking cessation referral.  "I've been there."          This is a 69 extremely " pleasant patient with multitude medical contributing factors as listed above.  Here for cognitive impairment.  Able to do her own ADLs.  This time, discussed patient the multifactorial factors contributing to her memory.  Low suspicion as time for neurodegenerative disorder.  [place] completed today, 28/30.    Discussed lifestyle modifications including diet and exercise at length.  appreciate opportunity to care for this patient.    Has edema in legs - on lasix. Contributes to memory.     Follow-up:  check in w me, can check in 6 months. Recall placed. Doing well.     This note was created by combination of typed  and M-Modal dictation. Transcription and phonetic errors may be present.  If there are any questions, please contact me.    Time spent on this encounter: 30 minutes. This includes face to face time and non-face to face time preparing to see the patient (eg, review of tests), obtaining and/or reviewing separately obtained history, documenting clinical information in the electronic or other health record, independently interpreting results and communicating results to the patient/family/caregiver, or care coordinator.

## 2025-06-02 DIAGNOSIS — R60.0 BILATERAL LOWER EXTREMITY EDEMA: ICD-10-CM

## 2025-06-02 RX ORDER — FUROSEMIDE 40 MG/1
40 TABLET ORAL
Qty: 90 TABLET | Refills: 3 | Status: SHIPPED | OUTPATIENT
Start: 2025-06-02

## 2025-06-18 ENCOUNTER — HOSPITAL ENCOUNTER (INPATIENT)
Facility: HOSPITAL | Age: 70
LOS: 3 days | Discharge: HOME | DRG: 291 | End: 2025-06-21
Attending: EMERGENCY MEDICINE | Admitting: EMERGENCY MEDICINE
Payer: COMMERCIAL

## 2025-06-18 DIAGNOSIS — I50.9 ACUTE EXACERBATION OF CHF (CONGESTIVE HEART FAILURE): ICD-10-CM

## 2025-06-18 DIAGNOSIS — J96.22 ACUTE AND CHRONIC RESPIRATORY FAILURE WITH HYPERCAPNIA: Primary | ICD-10-CM

## 2025-06-18 DIAGNOSIS — R60.0 BILATERAL LOWER EXTREMITY EDEMA: ICD-10-CM

## 2025-06-18 DIAGNOSIS — R06.02 SHORTNESS OF BREATH: ICD-10-CM

## 2025-06-18 DIAGNOSIS — R44.1 VISUAL HALLUCINATIONS: ICD-10-CM

## 2025-06-18 PROBLEM — G47.33 OSA ON CPAP: Status: ACTIVE | Noted: 2025-06-18

## 2025-06-18 PROBLEM — Z72.0 TOBACCO USE: Status: ACTIVE | Noted: 2025-06-18

## 2025-06-18 PROBLEM — J96.01 ACUTE HYPOXIC ON CHRONIC HYPERCAPNIC RESPIRATORY FAILURE: Status: ACTIVE | Noted: 2025-06-18

## 2025-06-18 PROBLEM — J96.12 ACUTE HYPOXIC ON CHRONIC HYPERCAPNIC RESPIRATORY FAILURE: Status: ACTIVE | Noted: 2025-06-18

## 2025-06-18 PROBLEM — I16.1 HYPERTENSIVE EMERGENCY: Status: ACTIVE | Noted: 2025-06-18

## 2025-06-18 LAB
ABSOLUTE EOSINOPHIL (OHS): 0.09 K/UL
ABSOLUTE EOSINOPHIL (OHS): 0.09 K/UL
ABSOLUTE MONOCYTE (OHS): 0.54 K/UL (ref 0.3–1)
ABSOLUTE MONOCYTE (OHS): 0.56 K/UL (ref 0.3–1)
ABSOLUTE NEUTROPHIL COUNT (OHS): 3.77 K/UL (ref 1.8–7.7)
ABSOLUTE NEUTROPHIL COUNT (OHS): 4.12 K/UL (ref 1.8–7.7)
ALBUMIN SERPL BCP-MCNC: 3 G/DL (ref 3.5–5.2)
ALLENS TEST: ABNORMAL
ALLENS TEST: ABNORMAL
ALP SERPL-CCNC: 85 UNIT/L (ref 40–150)
ALT SERPL W/O P-5'-P-CCNC: 35 UNIT/L (ref 10–44)
AMMONIA PLAS-SCNC: 31 UMOL/L (ref 10–50)
AMPHET UR QL SCN: NEGATIVE
ANION GAP (OHS): 10 MMOL/L (ref 8–16)
AST SERPL-CCNC: 37 UNIT/L (ref 11–45)
BARBITURATE SCN PRESENT UR: NEGATIVE
BASOPHILS # BLD AUTO: 0.03 K/UL
BASOPHILS # BLD AUTO: 0.03 K/UL
BASOPHILS NFR BLD AUTO: 0.5 %
BASOPHILS NFR BLD AUTO: 0.5 %
BENZODIAZ UR QL SCN: NEGATIVE
BILIRUB SERPL-MCNC: 0.7 MG/DL (ref 0.1–1)
BNP SERPL-MCNC: 403 PG/ML (ref 0–99)
BUN SERPL-MCNC: 23 MG/DL (ref 8–23)
CALCIUM SERPL-MCNC: 8.8 MG/DL (ref 8.7–10.5)
CANNABINOIDS UR QL SCN: NEGATIVE
CHLORIDE SERPL-SCNC: 103 MMOL/L (ref 95–110)
CO2 SERPL-SCNC: 31 MMOL/L (ref 23–29)
COCAINE UR QL SCN: NEGATIVE
CREAT SERPL-MCNC: 0.9 MG/DL (ref 0.5–1.4)
CREAT UR-MCNC: 50.5 MG/DL (ref 15–325)
DELSYS: ABNORMAL
EP: 6
ERYTHROCYTE [DISTWIDTH] IN BLOOD BY AUTOMATED COUNT: 14.5 % (ref 11.5–14.5)
ERYTHROCYTE [DISTWIDTH] IN BLOOD BY AUTOMATED COUNT: 14.5 % (ref 11.5–14.5)
ERYTHROCYTE [SEDIMENTATION RATE] IN BLOOD BY WESTERGREN METHOD: 12 MM/H
ETHANOL SERPL-MCNC: <10 MG/DL
FIO2: 30
GFR SERPLBLD CREATININE-BSD FMLA CKD-EPI: >60 ML/MIN/1.73/M2
GLUCOSE SERPL-MCNC: 89 MG/DL (ref 70–110)
HCO3 UR-SCNC: 38.3 MMOL/L (ref 24–28)
HCO3 UR-SCNC: 38.5 MMOL/L (ref 24–28)
HCT VFR BLD AUTO: 38.2 % (ref 37–48.5)
HCT VFR BLD AUTO: 47.8 % (ref 37–48.5)
HGB BLD-MCNC: 11.8 GM/DL (ref 12–16)
HGB BLD-MCNC: 14.5 GM/DL (ref 12–16)
HOLD SPECIMEN: NORMAL
IMM GRANULOCYTES # BLD AUTO: 0.03 K/UL (ref 0–0.04)
IMM GRANULOCYTES # BLD AUTO: 0.03 K/UL (ref 0–0.04)
IMM GRANULOCYTES NFR BLD AUTO: 0.5 % (ref 0–0.5)
IMM GRANULOCYTES NFR BLD AUTO: 0.5 % (ref 0–0.5)
IP: 14
LYMPHOCYTES # BLD AUTO: 1.68 K/UL (ref 1–4.8)
LYMPHOCYTES # BLD AUTO: 2.07 K/UL (ref 1–4.8)
MCH RBC QN AUTO: 31.9 PG (ref 27–31)
MCH RBC QN AUTO: 32.1 PG (ref 27–31)
MCHC RBC AUTO-ENTMCNC: 30.3 G/DL (ref 32–36)
MCHC RBC AUTO-ENTMCNC: 30.9 G/DL (ref 32–36)
MCV RBC AUTO: 104 FL (ref 82–98)
MCV RBC AUTO: 105 FL (ref 82–98)
METHADONE UR QL SCN: NEGATIVE
MODE: ABNORMAL
NUCLEATED RBC (/100WBC) (OHS): 0 /100 WBC
NUCLEATED RBC (/100WBC) (OHS): 0 /100 WBC
OHS QRS DURATION: 80 MS
OHS QTC CALCULATION: 419 MS
OPIATES UR QL SCN: NEGATIVE
PCO2 BLDA: 76.6 MMHG (ref 35–45)
PCO2 BLDA: 81.6 MMHG (ref 35–45)
PCP UR QL: NEGATIVE
PH SMN: 7.28 [PH] (ref 7.35–7.45)
PH SMN: 7.31 [PH] (ref 7.35–7.45)
PLATELET # BLD AUTO: 204 K/UL (ref 150–450)
PLATELET # BLD AUTO: 217 K/UL (ref 150–450)
PMV BLD AUTO: 10.6 FL (ref 9.2–12.9)
PMV BLD AUTO: 10.8 FL (ref 9.2–12.9)
PO2 BLDA: 28 MMHG (ref 40–60)
PO2 BLDA: 29 MMHG (ref 40–60)
POC BE: 8 MMOL/L (ref -2–2)
POC BE: 9 MMOL/L (ref -2–2)
POC SATURATED O2: 41 % (ref 95–100)
POC SATURATED O2: 47 % (ref 95–100)
POC TCO2: 41 MMOL/L (ref 24–29)
POC TCO2: 41 MMOL/L (ref 24–29)
POTASSIUM SERPL-SCNC: 4.3 MMOL/L (ref 3.5–5.1)
PROCALCITONIN SERPL-MCNC: 0.02 NG/ML
PROT SERPL-MCNC: 6.7 GM/DL (ref 6–8.4)
RBC # BLD AUTO: 3.68 M/UL (ref 4–5.4)
RBC # BLD AUTO: 4.54 M/UL (ref 4–5.4)
RELATIVE EOSINOPHIL (OHS): 1.4 %
RELATIVE EOSINOPHIL (OHS): 1.4 %
RELATIVE LYMPHOCYTE (OHS): 25.8 % (ref 18–48)
RELATIVE LYMPHOCYTE (OHS): 31.7 % (ref 18–48)
RELATIVE MONOCYTE (OHS): 8.3 % (ref 4–15)
RELATIVE MONOCYTE (OHS): 8.6 % (ref 4–15)
RELATIVE NEUTROPHIL (OHS): 57.6 % (ref 38–73)
RELATIVE NEUTROPHIL (OHS): 63.2 % (ref 38–73)
SAMPLE: ABNORMAL
SAMPLE: ABNORMAL
SITE: ABNORMAL
SITE: ABNORMAL
SODIUM SERPL-SCNC: 144 MMOL/L (ref 136–145)
TROPONIN I SERPL DL<=0.01 NG/ML-MCNC: <0.006 NG/ML
TSH SERPL-ACNC: 3.41 UIU/ML (ref 0.4–4)
WBC # BLD AUTO: 6.51 K/UL (ref 3.9–12.7)
WBC # BLD AUTO: 6.53 K/UL (ref 3.9–12.7)

## 2025-06-18 PROCEDURE — 94660 CPAP INITIATION&MGMT: CPT

## 2025-06-18 PROCEDURE — 5A09357 ASSISTANCE WITH RESPIRATORY VENTILATION, LESS THAN 24 CONSECUTIVE HOURS, CONTINUOUS POSITIVE AIRWAY PRESSURE: ICD-10-PCS | Performed by: EMERGENCY MEDICINE

## 2025-06-18 PROCEDURE — 85025 COMPLETE CBC W/AUTO DIFF WBC: CPT | Performed by: EMERGENCY MEDICINE

## 2025-06-18 PROCEDURE — 94760 N-INVAS EAR/PLS OXIMETRY 1: CPT | Mod: XB

## 2025-06-18 PROCEDURE — 93010 ELECTROCARDIOGRAM REPORT: CPT | Mod: ,,, | Performed by: INTERNAL MEDICINE

## 2025-06-18 PROCEDURE — 82140 ASSAY OF AMMONIA: CPT | Performed by: EMERGENCY MEDICINE

## 2025-06-18 PROCEDURE — 94640 AIRWAY INHALATION TREATMENT: CPT

## 2025-06-18 PROCEDURE — 63600175 PHARM REV CODE 636 W HCPCS

## 2025-06-18 PROCEDURE — 0202U NFCT DS 22 TRGT SARS-COV-2: CPT

## 2025-06-18 PROCEDURE — 99291 CRITICAL CARE FIRST HOUR: CPT

## 2025-06-18 PROCEDURE — 25000242 PHARM REV CODE 250 ALT 637 W/ HCPCS: Performed by: EMERGENCY MEDICINE

## 2025-06-18 PROCEDURE — 84443 ASSAY THYROID STIM HORMONE: CPT | Performed by: EMERGENCY MEDICINE

## 2025-06-18 PROCEDURE — 36415 COLL VENOUS BLD VENIPUNCTURE: CPT

## 2025-06-18 PROCEDURE — 27000190 HC CPAP FULL FACE MASK W/VALVE

## 2025-06-18 PROCEDURE — 93005 ELECTROCARDIOGRAM TRACING: CPT

## 2025-06-18 PROCEDURE — 85025 COMPLETE CBC W/AUTO DIFF WBC: CPT

## 2025-06-18 PROCEDURE — 82077 ASSAY SPEC XCP UR&BREATH IA: CPT | Performed by: EMERGENCY MEDICINE

## 2025-06-18 PROCEDURE — 82803 BLOOD GASES ANY COMBINATION: CPT

## 2025-06-18 PROCEDURE — 83880 ASSAY OF NATRIURETIC PEPTIDE: CPT | Performed by: EMERGENCY MEDICINE

## 2025-06-18 PROCEDURE — 99900035 HC TECH TIME PER 15 MIN (STAT)

## 2025-06-18 PROCEDURE — 84075 ASSAY ALKALINE PHOSPHATASE: CPT | Performed by: EMERGENCY MEDICINE

## 2025-06-18 PROCEDURE — 80307 DRUG TEST PRSMV CHEM ANLYZR: CPT | Performed by: EMERGENCY MEDICINE

## 2025-06-18 PROCEDURE — 63600175 PHARM REV CODE 636 W HCPCS: Performed by: EMERGENCY MEDICINE

## 2025-06-18 PROCEDURE — 94799 UNLISTED PULMONARY SVC/PX: CPT

## 2025-06-18 PROCEDURE — 25000242 PHARM REV CODE 250 ALT 637 W/ HCPCS

## 2025-06-18 PROCEDURE — 20000000 HC ICU ROOM

## 2025-06-18 PROCEDURE — 84484 ASSAY OF TROPONIN QUANT: CPT | Performed by: EMERGENCY MEDICINE

## 2025-06-18 PROCEDURE — 84145 PROCALCITONIN (PCT): CPT

## 2025-06-18 PROCEDURE — 99406 BEHAV CHNG SMOKING 3-10 MIN: CPT

## 2025-06-18 PROCEDURE — 96374 THER/PROPH/DIAG INJ IV PUSH: CPT

## 2025-06-18 RX ORDER — POTASSIUM CHLORIDE 7.45 MG/ML
40 INJECTION INTRAVENOUS
Status: DISCONTINUED | OUTPATIENT
Start: 2025-06-18 | End: 2025-06-21 | Stop reason: HOSPADM

## 2025-06-18 RX ORDER — SODIUM CHLORIDE 0.9 % (FLUSH) 0.9 %
10 SYRINGE (ML) INJECTION
Status: DISCONTINUED | OUTPATIENT
Start: 2025-06-18 | End: 2025-06-21 | Stop reason: HOSPADM

## 2025-06-18 RX ORDER — POTASSIUM CHLORIDE 7.45 MG/ML
80 INJECTION INTRAVENOUS
Status: DISCONTINUED | OUTPATIENT
Start: 2025-06-18 | End: 2025-06-21 | Stop reason: HOSPADM

## 2025-06-18 RX ORDER — IPRATROPIUM BROMIDE AND ALBUTEROL SULFATE 2.5; .5 MG/3ML; MG/3ML
3 SOLUTION RESPIRATORY (INHALATION)
Status: COMPLETED | OUTPATIENT
Start: 2025-06-18 | End: 2025-06-18

## 2025-06-18 RX ORDER — FAMOTIDINE 10 MG/ML
20 INJECTION, SOLUTION INTRAVENOUS EVERY 12 HOURS
Status: DISCONTINUED | OUTPATIENT
Start: 2025-06-18 | End: 2025-06-20

## 2025-06-18 RX ORDER — FUROSEMIDE 10 MG/ML
40 INJECTION INTRAMUSCULAR; INTRAVENOUS EVERY 12 HOURS
Status: DISCONTINUED | OUTPATIENT
Start: 2025-06-18 | End: 2025-06-19

## 2025-06-18 RX ORDER — MAGNESIUM SULFATE HEPTAHYDRATE 40 MG/ML
2 INJECTION, SOLUTION INTRAVENOUS
Status: DISCONTINUED | OUTPATIENT
Start: 2025-06-18 | End: 2025-06-21 | Stop reason: HOSPADM

## 2025-06-18 RX ORDER — CALCIUM GLUCONATE 20 MG/ML
3 INJECTION, SOLUTION INTRAVENOUS
Status: DISCONTINUED | OUTPATIENT
Start: 2025-06-18 | End: 2025-06-21 | Stop reason: HOSPADM

## 2025-06-18 RX ORDER — LOSARTAN POTASSIUM 25 MG/1
25 TABLET ORAL DAILY
Status: DISCONTINUED | OUTPATIENT
Start: 2025-06-19 | End: 2025-06-21 | Stop reason: HOSPADM

## 2025-06-18 RX ORDER — SERTRALINE HYDROCHLORIDE 50 MG/1
100 TABLET, FILM COATED ORAL DAILY
Status: DISCONTINUED | OUTPATIENT
Start: 2025-06-19 | End: 2025-06-21 | Stop reason: HOSPADM

## 2025-06-18 RX ORDER — FUROSEMIDE 10 MG/ML
40 INJECTION INTRAMUSCULAR; INTRAVENOUS
Status: COMPLETED | OUTPATIENT
Start: 2025-06-18 | End: 2025-06-18

## 2025-06-18 RX ORDER — CALCIUM GLUCONATE 20 MG/ML
1 INJECTION, SOLUTION INTRAVENOUS
Status: DISCONTINUED | OUTPATIENT
Start: 2025-06-18 | End: 2025-06-21 | Stop reason: HOSPADM

## 2025-06-18 RX ORDER — HYDRALAZINE HYDROCHLORIDE 20 MG/ML
10 INJECTION INTRAMUSCULAR; INTRAVENOUS EVERY 4 HOURS PRN
Status: DISCONTINUED | OUTPATIENT
Start: 2025-06-18 | End: 2025-06-21 | Stop reason: HOSPADM

## 2025-06-18 RX ORDER — ACETAMINOPHEN 325 MG/1
650 TABLET ORAL EVERY 4 HOURS PRN
Status: DISCONTINUED | OUTPATIENT
Start: 2025-06-18 | End: 2025-06-21 | Stop reason: HOSPADM

## 2025-06-18 RX ORDER — TALC
6 POWDER (GRAM) TOPICAL NIGHTLY PRN
Status: DISCONTINUED | OUTPATIENT
Start: 2025-06-18 | End: 2025-06-21 | Stop reason: HOSPADM

## 2025-06-18 RX ORDER — CALCIUM GLUCONATE 20 MG/ML
2 INJECTION, SOLUTION INTRAVENOUS
Status: DISCONTINUED | OUTPATIENT
Start: 2025-06-18 | End: 2025-06-21 | Stop reason: HOSPADM

## 2025-06-18 RX ORDER — METOPROLOL SUCCINATE 50 MG/1
100 TABLET, EXTENDED RELEASE ORAL DAILY
Status: DISCONTINUED | OUTPATIENT
Start: 2025-06-19 | End: 2025-06-21 | Stop reason: HOSPADM

## 2025-06-18 RX ORDER — ONDANSETRON HYDROCHLORIDE 2 MG/ML
4 INJECTION, SOLUTION INTRAVENOUS EVERY 8 HOURS PRN
Status: DISCONTINUED | OUTPATIENT
Start: 2025-06-18 | End: 2025-06-21 | Stop reason: HOSPADM

## 2025-06-18 RX ORDER — IBUPROFEN 200 MG
1 TABLET ORAL DAILY
Status: DISCONTINUED | OUTPATIENT
Start: 2025-06-19 | End: 2025-06-21 | Stop reason: HOSPADM

## 2025-06-18 RX ORDER — POTASSIUM CHLORIDE 7.45 MG/ML
60 INJECTION INTRAVENOUS
Status: DISCONTINUED | OUTPATIENT
Start: 2025-06-18 | End: 2025-06-21 | Stop reason: HOSPADM

## 2025-06-18 RX ORDER — ATORVASTATIN CALCIUM 40 MG/1
40 TABLET, FILM COATED ORAL DAILY
Status: DISCONTINUED | OUTPATIENT
Start: 2025-06-19 | End: 2025-06-21 | Stop reason: HOSPADM

## 2025-06-18 RX ORDER — ARFORMOTEROL TARTRATE 15 UG/2ML
15 SOLUTION RESPIRATORY (INHALATION) 2 TIMES DAILY
Status: DISCONTINUED | OUTPATIENT
Start: 2025-06-18 | End: 2025-06-19

## 2025-06-18 RX ORDER — BUDESONIDE 0.5 MG/2ML
0.5 INHALANT ORAL EVERY 12 HOURS
Status: DISCONTINUED | OUTPATIENT
Start: 2025-06-18 | End: 2025-06-19

## 2025-06-18 RX ORDER — IPRATROPIUM BROMIDE AND ALBUTEROL SULFATE 2.5; .5 MG/3ML; MG/3ML
3 SOLUTION RESPIRATORY (INHALATION) EVERY 6 HOURS PRN
Status: DISCONTINUED | OUTPATIENT
Start: 2025-06-18 | End: 2025-06-21 | Stop reason: HOSPADM

## 2025-06-18 RX ADMIN — BUDESONIDE 0.5 MG: 0.5 INHALANT RESPIRATORY (INHALATION) at 08:06

## 2025-06-18 RX ADMIN — FAMOTIDINE 20 MG: 10 INJECTION, SOLUTION INTRAVENOUS at 09:06

## 2025-06-18 RX ADMIN — METHYLPREDNISOLONE SODIUM SUCCINATE 40 MG: 40 INJECTION, POWDER, FOR SOLUTION INTRAMUSCULAR; INTRAVENOUS at 05:06

## 2025-06-18 RX ADMIN — ARFORMOTEROL TARTRATE 15 MCG: 15 SOLUTION RESPIRATORY (INHALATION) at 08:06

## 2025-06-18 RX ADMIN — IPRATROPIUM BROMIDE AND ALBUTEROL SULFATE 3 ML: 2.5; .5 SOLUTION RESPIRATORY (INHALATION) at 02:06

## 2025-06-18 RX ADMIN — HYDRALAZINE HYDROCHLORIDE 10 MG: 20 INJECTION INTRAMUSCULAR; INTRAVENOUS at 06:06

## 2025-06-18 RX ADMIN — FUROSEMIDE 40 MG: 10 INJECTION, SOLUTION INTRAVENOUS at 09:06

## 2025-06-18 RX ADMIN — FUROSEMIDE 40 MG: 10 INJECTION, SOLUTION INTRAVENOUS at 03:06

## 2025-06-18 NOTE — ED NOTES
Pt remains on bipap, no acute changes. Updated pt and family on poc. Pt denies any other needs. Side rails upx2, call bell within reach. Will continue to monitor

## 2025-06-18 NOTE — Clinical Note
Diagnosis: Acute and chronic respiratory failure with hypercapnia [[id number]]   Future Attending Provider: ERIK MEAD [[id number]]   Reason for IP Medical Treatment  (Clinical interventions that can only be accomplished in the IP setting? ) :: Bipap, lasix

## 2025-06-18 NOTE — ASSESSMENT & PLAN NOTE
Patient's COPD is with exacerbation noted by continued dyspnea, use of accessory muscles for breathing, and worsening of baseline hypoxia currently.  Patient is currently on COPD Pathway. Continue scheduled inhalers Steroids, Antibiotics, and Supplemental oxygen and monitor respiratory status closely.   -unable to find formal PFT on chart  -obtain pulmonology consult

## 2025-06-18 NOTE — ASSESSMENT & PLAN NOTE
Patient has Diastolic (HFpEF) heart failure that is Acute on chronic. On presentation their CHF was decompensated. Evidence of decompensated CHF on presentation includes: crackles on lung auscultation, dyspnea on exertion (ESQUIVEL), and shortness of breath. The etiology of their decompensation is likely increased fluid intake. Most recent BNP and echo results are listed below.  Recent Labs     06/18/25  1325   *     Latest ECHO  Results for orders placed during the hospital encounter of 08/06/24    Echo    Interpretation Summary    Left Ventricle: The left ventricle is normal in size. There is mild concentric hypertrophy. There is normal systolic function with a visually estimated ejection fraction of 60 - 65%.    Right Ventricle: Mild right ventricular enlargement. Systolic function is normal.    Left Atrium: Left atrium is severely dilated.    Right Atrium: Right atrium is severely dilated.    Mitral Valve: There is mild to moderate regurgitation.    Tricuspid Valve: There is mild to moderate regurgitation.    Pulmonary Artery: The estimated pulmonary artery systolic pressure is 65 mmHg.    IVC/SVC: Elevated venous pressure at 15 mmHg.    Current Heart Failure Medications  losartan tablet 25 mg, Daily, Oral  metoprolol succinate (TOPROL-XL) 24 hr tablet 100 mg, Daily, Oral  furosemide injection 40 mg, Every 12 hours, Intravenous  hydrALAZINE injection 10 mg, Every 4 hours PRN, Intravenous    Plan  - Monitor strict I&Os and daily weights.    - Place on telemetry  - Low sodium diet  - Place on fluid restriction of 1.5 L.   - Cardiology has not been consulted  - The patient's volume status is worsening as indicated by dyspnea on exertion (ESQUIVEL) and shortness of breath. Will continue current treatment

## 2025-06-18 NOTE — ED PROVIDER NOTES
Encounter Date: 6/18/2025       History     Chief Complaint   Patient presents with    Psychiatric Evaluation    Shortness of Breath    Cough     Pt presents to ER stating that she is SOB, has a cough and is currently Hallucinating. Pt states she has been hallucinating for 2 years but today it is really bad. Pt says she is hearing voices, and seeing people standing around her. Pt can't recall the name of the psych meds she's on. Pt denies any other issues at this time.      69-year-old female with history of HIEU, insomnia, CHF, obesity, tobacco use, AFib, ?COPD (hx of Tobacco use), on home O2 2 L PRN, presents to the ED with family member with concern for increasing shortness of breath, increasing hallucinations, excessive daytime drowsiness.  Patient reports these symptoms are somewhat chronic but has worsened over the last week.  Patient's family member at bedside states the daytime drowsiness is so bad that she can fall asleep within sec, even mid conversation.  She has had intermittent visual hallucinations over the past year, worse over the past few days.  She describes seeing women in white dresses yesterday, in the past have seen children in her room.  She has no SI or HI.    Patient reports worsening shortness of breath, states she is winded with just getting into her bed, reports orthopnea, very short of breath when she tries to walk.  She states that she uses oxygen as needed but has been using it pretty much continuously every day.  She does endorse some increased swelling in her lower extremities.    Patient was asked questions regarding her CPAP usage.  She states that she typically uses it 3 to 4 times a week, however, when asked how long she uses it for, she states about 15-30 minutes.  She confirms she is not wearing her CPAP continuously at night.    The history is provided by the patient and a relative.     Review of patient's allergies indicates:   Allergen Reactions    Ace inhibitors      Cough        Past Medical History:   Diagnosis Date    Anticoagulant long-term use     Xarelto    Arthritis     Atrial fibrillation     Breast cyst     CHF (congestive heart failure)     Degenerative disc disease     Edema     HLD (hyperlipidemia)     Hx of psychiatric care     Hyperlipidemia     Hypertension     Hypothyroidism     Nuclear sclerosis, bilateral 12/18/2017    Obesity, morbid     HIEU (obstructive sleep apnea)     Other abnormal glucose     pre-diabetes    Pre-diabetes     Psychiatric problem     Requires assistance with activities of daily living (ADL)     Sleep apnea     Smoker     SOB (shortness of breath)     SOB (shortness of breath)     Thyroid disease     on meds 8-9 years ago. hypothyroidism.no malignancy    TMJ (temporomandibular joint disorder)     jaw clicking    Tobacco abuse     Unsteady gait     Urge incontinence     Weakness generalized     Wears glasses      Past Surgical History:   Procedure Laterality Date    BREAST BIOPSY Right     over 10 yrs ago/ benign    BREAST CYST EXCISION Right     COLONOSCOPY N/A 06/25/2019    Procedure: COLONOSCOPY;  Surgeon: Micheline Flores MD;  Location: [facility];  Service: Endoscopy;  Laterality: N/A;  RX XARELTO ok to hold (2 days) per Dr. Naik see scan 3/20/19    COLONOSCOPY N/A 9/20/2024    Procedure: COLONOSCOPY;  Surgeon: Mario Sheridan MD;  Location: [facility];  Service: Endoscopy;  Laterality: N/A;  Ref by Dr HEMANT Miller, Pending Xarelto hold, Suprep, portal - PC  ok to hold Xarelto 2 days per Dr Grissom-GT  9/4 pt rescheduled. Xarelto hold x2 days, suprep and emailed to [email or web address]  9/13 precall complete-RB    ENDOSCOPIC ULTRASOUND OF UPPER GASTROINTESTINAL TRACT N/A 01/26/2021    Procedure: ULTRASOUND-ENDOSCOPIC-UPPER;  Surgeon: Stevenson Philippe MD;  Location: [facility];  Service: Endoscopy;  Laterality: N/A;    HYSTERECTOMY      JOINT REPLACEMENT Bilateral     knees    OOPHORECTOMY      rt.knee surgery 2016      TOTAL KNEE ARTHROPLASTY  Left 02/19/2019    Procedure: ARTHROPLASTY, KNEE, TOTAL;  Surgeon: Med Hanson MD;  Location: [facility] OR;  Service: Orthopedics;  Laterality: Left;  10AM START PER  PER JAYLIN TEXT @ 8:34AM ON 2-18-19  SUPINE  HARRIS LOYA [phone] TEXTED HIM ON 1-24-19 @ 7:57AM  RN PRE OP 2-13-19--BMI--48.9    underarm gland\ Bilateral      Family History   Problem Relation Name Age of Onset    Diabetes Mother      Hypertension Mother      Cancer Mother      No Known Problems Father      No Known Problems Sister      Diabetes Brother      Cancer Brother      No Known Problems Maternal Aunt      No Known Problems Maternal Uncle      No Known Problems Paternal Aunt      No Known Problems Paternal Uncle      No Known Problems Maternal Grandmother      No Known Problems Maternal Grandfather      No Known Problems Paternal Grandmother      No Known Problems Paternal Grandfather      No Known Problems Other      Amblyopia Neg Hx      Blindness Neg Hx      Cataracts Neg Hx      Glaucoma Neg Hx      Macular degeneration Neg Hx      Retinal detachment Neg Hx      Strabismus Neg Hx      Stroke Neg Hx      Thyroid disease Neg Hx       Social History[1]  Review of Systems   Constitutional:  Negative for chills and fever.   HENT:  Negative for congestion and sore throat.    Eyes:  Negative for visual disturbance.   Respiratory:  Positive for cough (unchanged) and shortness of breath.    Cardiovascular:  Positive for leg swelling. Negative for chest pain.   Gastrointestinal:  Negative for abdominal pain, nausea and vomiting.   Genitourinary:  Negative for dysuria.   Skin:  Negative for rash.   Neurological:  Negative for headaches.   Psychiatric/Behavioral:  Positive for hallucinations. Negative for agitation, behavioral problems, decreased concentration, self-injury and suicidal ideas. The patient is not nervous/anxious and is not hyperactive.        Physical Exam     Initial Vitals [06/18/25 1245]   BP Pulse Resp Temp SpO2   (!)  158/95 81 20 98.4 °F (36.9 °C) (!) 89 %      MAP       --         Physical Exam    Nursing note and vitals reviewed.  Constitutional: She appears well-developed and well-nourished. She is not diaphoretic. No distress.   Body mass index is 45.17 kg/m².     HENT: Mouth/Throat: Oropharynx is clear and moist.   Eyes: Conjunctivae and EOM are normal. Pupils are equal, round, and reactive to light.   Neck: Neck supple.   Cardiovascular:  Normal rate. An irregularly irregular rhythm present.           Pulmonary/Chest: No respiratory distress. She has rhonchi. She has rales.   Abdominal: Abdomen is soft. There is no abdominal tenderness.   Musculoskeletal:         General: Edema present.      Cervical back: Neck supple.     Neurological: She is alert. GCS score is 15. GCS eye subscore is 4. GCS verbal subscore is 5. GCS motor subscore is 6.   Skin: Skin is warm and dry.   Psychiatric: She has a normal mood and affect.         ED Course   Critical Care    Date/Time: 6/18/2025 9:09 PM    Performed by: Keli Murray MD  Authorized by: Mateo Erickson MD  Total critical care time (exclusive of procedural time) : 0 minutes  Comments: Please put in 45 minutes of critical care due to patient having a high risk of respiratory failure.   Separate from teaching and exclusive of procedure and ekg time  Includes:  Time at bedside  Time reviewing test results  Time discussing case with staff  Time documenting the medical record  Time spent with family members  Management            Labs Reviewed   COMPREHENSIVE METABOLIC PANEL - Abnormal       Result Value    Sodium 144      Potassium 4.3      Chloride 103      CO2 31 (*)     Glucose 89      BUN 23      Creatinine 0.9      Calcium 8.8      Protein Total 6.7      Albumin 3.0 (*)     Bilirubin Total 0.7      ALP 85      AST 37      ALT 35      Anion Gap 10      eGFR >60      Narrative:     Specimen slightly hemolyzed.    B-TYPE NATRIURETIC PEPTIDE - Abnormal     (*)    CBC  WITH DIFFERENTIAL - Abnormal    WBC 6.51      RBC 3.68 (*)     HGB 11.8 (*)     HCT 38.2       (*)     MCH 32.1 (*)     MCHC 30.9 (*)     RDW 14.5      Platelet Count 204      MPV 10.6      Nucleated RBC 0      Neut % 63.2      Lymph % 25.8      Mono % 8.6      Eos % 1.4      Basophil % 0.5      Imm Grans % 0.5      Neut # 4.12      Lymph # 1.68      Mono # 0.56      Eos # 0.09      Baso # 0.03      Imm Grans # 0.03     ISTAT PROCEDURE - Abnormal    POC PH 7.309 (*)     POC PCO2 76.6 (*)     POC PO2 29 (*)     POC HCO3 38.5 (*)     POC BE 9 (*)     POC SATURATED O2 47      POC TCO2 41 (*)     Sample VENOUS      Site Other      Allens Test N/A     ISTAT PROCEDURE - Abnormal    POC PH 7.280 (*)     POC PCO2 81.6 (*)     POC PO2 28 (*)     POC HCO3 38.3 (*)     POC BE 8 (*)     POC SATURATED O2 41      POC TCO2 41 (*)     Rate 12      Sample VENOUS      Site Other      Allens Test N/A      DelSys CPAP/BiPAP      Mode BiPAP      FiO2 30      IP 14      EP 06     TROPONIN I - Normal    Troponin-I <0.006     AMMONIA - Normal    Ammonia 31      Narrative:     Specimen slightly hemolyzed.   TSH - Normal    TSH 3.411     DRUG SCREEN PANEL, URINE EMERGENCY - Normal    Benzodiazepine, Urine Negative      Methadone, Urine Negative      Cocaine, Urine Negative      Opiates, Urine Negative      Barbiturates, Urine Negative      Amphetamines, Urine Negative      THC Negative      Phencyclidine, Urine Negative      Urine Creatinine 50.5      Narrative:     This screen includes the following classes of drugs at the listed cut-off:     Benzodiazepines:        200 ng/ml   Methadone:              300 ng/ml   Cocaine metabolite:     300 ng/ml   Opiates:                300 ng/ml   Barbiturates:           200 ng/ml   Amphetamines:           1000 ng/ml   Marijuana metabs (THC): 50 ng/ml   Phencyclidine (PCP):    25 ng/ml     This is a screening test. If results do not correlate with clinical presentation, then a confirmatory send  "out test is advised.    This report is intended for use in clinical monitoring and management of patients. It is not intended for use in employment related drug testing."   ALCOHOL,MEDICAL (ETHANOL) - Normal    Alcohol, Serum <10     CBC W/ AUTO DIFFERENTIAL    Narrative:     The following orders were created for panel order CBC auto differential.  Procedure                               Abnormality         Status                     ---------                               -----------         ------                     CBC with Differential[[id number]]       Abnormal            Final result                 Please view results for these tests on the individual orders.     EKG Readings: (Independently Interpreted)   Atrial fibrillation, ventricular rate 97 beats per minute.  There is some baseline artifact in leads 1 and aVL and lead 3.  There is no STEMI.  QTC is 419 milliseconds.     ECG Results              EKG 12-lead (Final result)        Collection Time Result Time QRS Duration OHS QTC Calculation    06/18/25 13:27:11 06/18/25 16:17:27 80 419                     Final result by Interface, Lab In [facility] (06/18/25 16:17:31)                   Narrative:    Test Reason : R06.02,    Vent. Rate :  97 BPM     Atrial Rate : 120 BPM     P-R Int :    ms          QRS Dur :  80 ms      QT Int : 330 ms       P-R-T Axes :     88  53 degrees    QTcB Int : 419 ms    Atrial fibrillation  Septal infarct When compared with ECG of 31-Mar-2025 11:22,  Significant changes have occurred  Confirmed by Rafael Carr ([id number]) on 6/18/2025 4:17:25 PM    Referred By: AAAREFERRAL SELF           Confirmed By: Rafael Carr                                  Imaging Results              CT Head Without Contrast (Final result)  Result time 06/18/25 14:29:49      Final result by Macario Pagan MD (06/18/25 14:29:49)                   Impression:      1. Allowing for extensive motion artifact, no convincing acute intracranial " abnormalities noting sequela of chronic microvascular ischemic change and senescent change.      Electronically signed by: Macario Pagan MD  Date:    06/18/2025  Time:    14:29               Narrative:    EXAMINATION:  CT HEAD WITHOUT CONTRAST    CLINICAL HISTORY:  Mental status change, unknown cause;    TECHNIQUE:  Low dose axial images were obtained through the head.  Coronal and sagittal reformations were also performed. Contrast was not administered.    COMPARISON:  05/20/2022    FINDINGS:  There is motion artifact.    There is generalized cerebral volume loss.  There is hypoattenuation in a periventricular fashion, likely sequela of chronic microvascular ischemic change.  There is no evidence of acute major vascular territory infarct, hemorrhage, or mass.  There is no hydrocephalus.  There are no abnormal extra-axial fluid collections.  The paranasal sinuses and mastoid air cells are clear, and there is no evidence of calvarial fracture.  The visualized soft tissues are unremarkable.                                       X-Ray Chest AP Portable (Final result)  Result time 06/18/25 13:37:27      Final result by Steven Chauhan III, MD (06/18/25 13:37:27)                   Impression:      Cardiomegaly and possible mild early CHF.      Electronically signed by: Steven Chauhan MD  Date:    06/18/2025  Time:    13:37               Narrative:    EXAMINATION:  XR CHEST AP PORTABLE    CLINICAL HISTORY:  CHF;    FINDINGS:  Chest one view portable.    There is mild cardiomegaly aortic plaque and DJD.  Lungs are clear.                                       Medications   atorvastatin tablet 40 mg (has no administration in time range)   losartan tablet 25 mg (has no administration in time range)   metoprolol succinate (TOPROL-XL) 24 hr tablet 100 mg (has no administration in time range)   sertraline tablet 100 mg (has no administration in time range)   rivaroxaban tablet 20 mg (has no administration in time range)    sodium chloride 0.9% flush 10 mL (has no administration in time range)   potassium chloride 10 mEq in 100 mL IVPB (has no administration in time range)     And   potassium chloride 10 mEq in 100 mL IVPB (has no administration in time range)     And   potassium chloride 10 mEq in 100 mL IVPB (has no administration in time range)   magnesium sulfate 2g in water 50mL IVPB (premix) (has no administration in time range)   magnesium sulfate 2g in water 50mL IVPB (premix) (has no administration in time range)   sodium phosphate 15 mmol in D5W 250 mL IVPB (has no administration in time range)   sodium phosphate 20.1 mmol in D5W 250 mL IVPB (has no administration in time range)   sodium phosphate 30 mmol in D5W 250 mL IVPB (has no administration in time range)   calcium gluconate 1 g in NS IVPB (premixed) (has no administration in time range)   calcium gluconate 1 g in NS IVPB (premixed) (has no administration in time range)   calcium gluconate 1 g in NS IVPB (premixed) (has no administration in time range)   acetaminophen tablet 650 mg (has no administration in time range)   ondansetron injection 4 mg (has no administration in time range)   melatonin tablet 6 mg (has no administration in time range)   furosemide injection 40 mg (has no administration in time range)   sodium chloride 0.9% flush 10 mL (has no administration in time range)   famotidine (PF) injection 20 mg (has no administration in time range)   methylPREDNISolone sodium succinate injection 40 mg (40 mg Intravenous Given 6/18/25 1736)   albuterol-ipratropium 2.5 mg-0.5 mg/3 mL nebulizer solution 3 mL (has no administration in time range)   arformoteroL nebulizer solution 15 mcg (15 mcg Nebulization Given 6/18/25 2022)     And   budesonide nebulizer solution 0.5 mg (0.5 mg Nebulization Given 6/18/25 2022)   hydrALAZINE injection 10 mg (10 mg Intravenous Given 6/18/25 1806)   nicotine 14 mg/24 hr 1 patch (has no administration in time range)    albuterol-ipratropium 2.5 mg-0.5 mg/3 mL nebulizer solution 3 mL (3 mLs Nebulization Given 6/18/25 1411)   furosemide injection 40 mg (40 mg Intravenous Given 6/18/25 1500)     Medical Decision Making  69-year-old female presenting to the ED with increasing shortness of breath, visual hallucination this, increased oxygen usage.  The patient reports symptoms are chronic but have worsened over the last week.  She denies any SI or HI.  Inconsistent use of CPAP machine at night.  Continues to use tobacco products.  On exam, the patient is in no acute distress, she has an elevated BMI, rales and rhonchi heard on respiratory examination.  O2 sats on room air around 86%, increased to 96% with use of 2 L nasal cannula.  She has 2+ pitting edema in her lower extremities.  She has a GCS of 15.  Differential includes but not limited to hypercapnia, CHF exacerbation, electrolyte disturbance, thyroid abnormality, UTI.  Lower concern for CVA, toxidrome.  Workup initiated with labs including VBG, CT head, chest x-ray.  Reviewed importance of compliance with CPAP machine with patient and family member at bedside.    Amount and/or Complexity of Data Reviewed  Labs: ordered.  Radiology: ordered.    Risk  Prescription drug management.  Decision regarding hospitalization.    Patient with hypercapnic respiratory failure.  Suspect this is related to fluid overload as well as non adherence with CPAP.               ED Course as of 06/18/25 2110 Wed Jun 18, 2025   1530 Patient updated regarding test results and care plan.  Will admit to the floor with intermittent BiPAP, case reviewed with Dr. Tucker who accepts patient for admission. [LH]   1535 Repeat pCO2 81.  BiPAP settings increased.  Will admit to ICU. [LH]      ED Course User Index  [LH] Keli Murray MD                           Clinical Impression:  Final diagnoses:  [R06.02] Shortness of breath  [J96.22] Acute and chronic respiratory failure with hypercapnia (Primary)           ED Disposition Condition    Admit         This dictation has been generated using M-Modal Fluency Direct dictation; some phonetic errors may occur.                 [1]   Social History  Tobacco Use    Smoking status: Every Day     Current packs/day: 0.50     Average packs/day: 0.5 packs/day for 52.5 years (26.2 ttl pk-yrs)     Types: Cigarettes     Start date: 1973    Smokeless tobacco: Current   Substance Use Topics    Alcohol use: No    Drug use: No        Keli Murray MD  06/18/25 [id number]

## 2025-06-18 NOTE — EICU
"Virtual ICU Quality Rounds  Virtual ICU Admission    Admit Date: 2025  LOS: 0  Code Status: Full Code   : 1955  Bed: W268/W268 A:     Diagnosis: CHF/COPD Exac  Admit to ICU on BIPAP    Patient  has a past medical history of Anticoagulant long-term use, Arthritis, Atrial fibrillation, Breast cyst, CHF (congestive heart failure), Degenerative disc disease, Edema, HLD (hyperlipidemia), psychiatric care, Hyperlipidemia, Hypertension, Hypothyroidism, Nuclear sclerosis, bilateral, Obesity, morbid, HIEU (obstructive sleep apnea), Other abnormal glucose, Pre-diabetes, Psychiatric problem, Requires assistance with activities of daily living (ADL), Sleep apnea, Smoker, SOB (shortness of breath), SOB (shortness of breath), Thyroid disease, TMJ (temporomandibular joint disorder), Tobacco abuse, Unsteady gait, Urge incontinence, Weakness generalized, and Wears glasses.    Last VS: BP (!) 174/89   Pulse 77   Temp 98.4 °F (36.9 °C) (Oral)   Resp 18   Ht 5' 3" (1.6 m)   Wt 115.7 kg (255 lb)   SpO2 99%   BMI 45.17 kg/m²       VICU Review    VICU nurse assessment :  [place] completed, LDA documentation reconciliation completed, and VTE prophylaxis review    Pressure injury prevention panel (order)    Nursing orders placed : IP FLORECITA Peripheral IV Access          "

## 2025-06-18 NOTE — ASSESSMENT & PLAN NOTE
Patient has long standing persistent (>12 months) atrial fibrillation. Patient is currently in atrial fibrillation. [id number] Score: 3. The patients heart rate in the last 24 hours is as follows:  Pulse  Min: 77  Max: 99     Antiarrhythmics  metoprolol succinate (TOPROL-XL) 24 hr tablet 100 mg, Daily, Oral    Anticoagulants  rivaroxaban tablet 20 mg, Daily, Oral    Plan  - Replete lytes with a goal of K>4, Mg >2  - Patient is anticoagulated, see medications listed above.  - Patient's afib is currently controlled

## 2025-06-18 NOTE — ED NOTES
Report given to izabella hilario . Pt brought up to ICU via stretcher on NC . Pt placed back on bipap upon ICU arrival. No acute respiratory distress noted

## 2025-06-18 NOTE — ASSESSMENT & PLAN NOTE
Possibly group 2 and 3 secondary to HFpEF and HIEU  Noted elevated PSAP severely dilated right atrium on TTE from 08/2024  Treat underlying cause

## 2025-06-18 NOTE — ASSESSMENT & PLAN NOTE
Patient has a current diagnosis of Hypertensive emergency with end organ damage evidenced by acute pulmonary edema without heart failure which is uncontrolled.  Latest blood pressure and vitals reviewed-   Temp:  [98.4 °F (36.9 °C)-98.6 °F (37 °C)]   Pulse:  [77-99]   Resp:  [13-27]   BP: (142-192)/()   SpO2:  [86 %-99 %] .   Patient currently on IV antihypertensives.   Home meds for hypertension were reviewed and noted below.   Hypertension Medications              furosemide (LASIX) 40 MG tablet TAKE 1 TABLET(40 MG) BY MOUTH EVERY DAY    losartan (COZAAR) 25 MG tablet Take 1 tablet (25 mg total) by mouth once daily.    metoprolol succinate (TOPROL-XL) 100 MG 24 hr tablet TAKE 1 TABLET(100 MG) BY MOUTH DAILY            Medication adjustment for hospital antihypertensives is as follows-     Will aim for controlled BP reduction by medications noted above. Monitor and mitigate end organ damage as indicated.

## 2025-06-18 NOTE — ASSESSMENT & PLAN NOTE
Noted baseline cognitive impairment  Patient at risk of hospital delirium  Continue all psych medications

## 2025-06-18 NOTE — ED NOTES
Pt c/o shortness of breath, increasing hallucinations, excessive daytime drowsiness x 1 week. Pt 89% RA, placed on 2 L NC with improvement. Pt is on home O2 PRN. Pt reports that she has a hx of hallucinations but they have gotten worse over the past week. Pt denies SI/ HI, fever, abdominal pain, cp. Pt connected to continuous pulse ox, bp and cardiac monitor. Pt aaox4

## 2025-06-18 NOTE — SUBJECTIVE & OBJECTIVE
Past Medical History:   Diagnosis Date    Anticoagulant long-term use     Xarelto    Arthritis     Atrial fibrillation     Breast cyst     CHF (congestive heart failure)     Degenerative disc disease     Edema     HLD (hyperlipidemia)     Hx of psychiatric care     Hyperlipidemia     Hypertension     Hypothyroidism     Nuclear sclerosis, bilateral 12/18/2017    Obesity, morbid     HIEU (obstructive sleep apnea)     Other abnormal glucose     pre-diabetes    Pre-diabetes     Psychiatric problem     Requires assistance with activities of daily living (ADL)     Sleep apnea     Smoker     SOB (shortness of breath)     SOB (shortness of breath)     Thyroid disease     on meds 8-9 years ago. hypothyroidism.no malignancy    TMJ (temporomandibular joint disorder)     jaw clicking    Tobacco abuse     Unsteady gait     Urge incontinence     Weakness generalized     Wears glasses        Past Surgical History:   Procedure Laterality Date    BREAST BIOPSY Right     over 10 yrs ago/ benign    BREAST CYST EXCISION Right     COLONOSCOPY N/A 06/25/2019    Procedure: COLONOSCOPY;  Surgeon: Micheline Flores MD;  Location: [facility];  Service: Endoscopy;  Laterality: N/A;  RX XARELTO ok to hold (2 days) per Dr. Naik see scan 3/20/19    COLONOSCOPY N/A 9/20/2024    Procedure: COLONOSCOPY;  Surgeon: Mario Sheridan MD;  Location: [facility];  Service: Endoscopy;  Laterality: N/A;  Ref by Dr HEMANT Miller, Pending Xarelto hold, Suprep, portal - PC  ok to hold Xarelto 2 days per Dr Grissom-GT  9/4 pt rescheduled. Xarelto hold x2 days, suprep and emailed to [email or web address]  9/13 precall complete-RB    ENDOSCOPIC ULTRASOUND OF UPPER GASTROINTESTINAL TRACT N/A 01/26/2021    Procedure: ULTRASOUND-ENDOSCOPIC-UPPER;  Surgeon: Stevenson Philippe MD;  Location: [facility];  Service: Endoscopy;  Laterality: N/A;    HYSTERECTOMY      JOINT REPLACEMENT Bilateral     knees    OOPHORECTOMY      rt.knee surgery 2016      TOTAL KNEE ARTHROPLASTY Left  02/19/2019    Procedure: ARTHROPLASTY, KNEE, TOTAL;  Surgeon: Med Hanson MD;  Location: [facility];  Service: Orthopedics;  Laterality: Left;  10AM START PER  PER JAYLIN TEXT @ 8:34AM ON 2-18-19  SUPINE  HARRIS LOYA [phone] TEXTED HIM ON 1-24-19 @ 7:57AM  RN PRE OP 2-13-19--BMI--48.9    underarm gland\ Bilateral        Review of patient's allergies indicates:   Allergen Reactions    Ace inhibitors      Cough         No current facility-administered medications on file prior to encounter.     Current Outpatient Medications on File Prior to Encounter   Medication Sig    furosemide (LASIX) 40 MG tablet TAKE 1 TABLET(40 MG) BY MOUTH EVERY DAY    albuterol (PROVENTIL/VENTOLIN HFA) 90 mcg/actuation inhaler 2 puffs every 6 (six) hours as needed.    atorvastatin (LIPITOR) 40 MG tablet TAKE 1 TABLET(40 MG) BY MOUTH EVERY DAY    azelastine (ASTELIN) 137 mcg (0.1 %) nasal spray 1 spray (137 mcg total) by Nasal route 2 (two) times daily.    buPROPion (WELLBUTRIN XL) 150 MG TB24 tablet Take 150 mg by mouth.    cetirizine (ZYRTEC) 10 MG tablet Take 1 tablet (10 mg total) by mouth once daily.    clobetasol 0.05% (TEMOVATE) 0.05 % Oint APPLY TOPICALLY TO THE AFFECTED AREA OF RASH ON BACK TWICE DAILY    fluticasone furoate-vilanteroL (BREO ELLIPTA) 100-25 mcg/dose diskus inhaler Inhale 1 puff into the lungs once daily. Controller    fluticasone propionate (FLONASE) 50 mcg/actuation nasal spray SHAKE LIQUID AND USE 2 SPRAYS(100 MCG) IN EACH NOSTRIL EVERY DAY    fluticasone-salmeterol diskus inhaler 250-50 mcg Inhale 1 puff into the lungs 2 (two) times daily.    ketoconazole (NIZORAL) 2 % cream APPLY TOPICALLY TO THE AFFECTED AREA EVERY DAY    LIDOcaine-prilocaine (EMLA) cream Apply topically as needed.    losartan (COZAAR) 25 MG tablet Take 1 tablet (25 mg total) by mouth once daily.    melatonin (MELATIN) 5 mg Take 1 tablet (5 mg total) by mouth nightly.    metoprolol succinate (TOPROL-XL) 100 MG 24 hr tablet TAKE 1  TABLET(100 MG) BY MOUTH DAILY    sertraline (ZOLOFT) 100 MG tablet TAKE 1 TABLET(100 MG) BY MOUTH DAILY    SPIRIVA RESPIMAT 2.5 mcg/actuation inhaler INHALE 2 PUFFS BY MOUTH DAILY    terbinafine HCL (LAMISIL) 250 mg tablet Take 250 mg by mouth.    traMADoL (ULTRAM) 50 mg tablet Take 50 mg by mouth every 12 (twelve) hours as needed.    triamcinolone acetonide 0.1% (KENALOG) 0.1 % ointment APPLY BETWEEN THE KNEES AND UPPER THIGHS TWICE DAILY FOR NO MORE THAN 7 TO 14 DAYS    XARELTO 20 mg Tab Take 1 tablet (20 mg total) by mouth Daily.     Family History       Problem Relation (Age of Onset)    Cancer Mother, Brother    Diabetes Mother, Brother    Hypertension Mother    No Known Problems Father, Sister, Maternal Aunt, Maternal Uncle, Paternal Aunt, Paternal Uncle, Maternal Grandmother, Maternal Grandfather, Paternal Grandmother, Paternal Grandfather, Other          Tobacco Use    Smoking status: Every Day     Current packs/day: 0.50     Average packs/day: 0.5 packs/day for 52.5 years (26.2 ttl pk-yrs)     Types: Cigarettes     Start date: 1973    Smokeless tobacco: Current   Substance and Sexual Activity    Alcohol use: No    Drug use: No    Sexual activity: Yes     Partners: Male     Review of Systems   Reason unable to perform ROS: Cognitive impairment and on BiPAP.     Objective:     Vital Signs (Most Recent):  Temp: 98.6 °F (37 °C) (06/18/25 1730)  Pulse: 90 (06/18/25 1839)  Resp: (!) 27 (06/18/25 1839)  BP: (!) 184/86 (06/18/25 1839)  SpO2: (!) 94 % (06/18/25 1839) Vital Signs (24h Range):  Temp:  [98.4 °F (36.9 °C)-98.6 °F (37 °C)] 98.6 °F (37 °C)  Pulse:  [77-99] 90  Resp:  [13-27] 27  SpO2:  [86 %-99 %] 94 %  BP: (142-192)/() 184/86     Weight: 116.3 kg (256 lb 6.3 oz)  Body mass index is 45.42 kg/m².     Physical Exam  Constitutional:       General: She is in acute distress.      Appearance: She is not ill-appearing, toxic-appearing or diaphoretic.   Cardiovascular:      Rate and Rhythm: Normal rate.  Rhythm irregular.      Pulses: Normal pulses.      Heart sounds: Normal heart sounds. No murmur heard.  Pulmonary:      Breath sounds: No stridor. Wheezing present.      Comments: Increased work of breathing  Abdominal:      General: Bowel sounds are normal. There is no distension.      Palpations: There is mass.   Neurological:      Mental Status: She is disoriented.                Significant Labs: CBC:   Recent Labs   Lab 06/18/25  1325 06/18/25  1736   WBC 6.51 6.53   HGB 11.8* 14.5   HCT 38.2 47.8    217     CMP:   Recent Labs   Lab 06/18/25  1325      K 4.3      CO2 31*   GLU 89   BUN 23   CREATININE 0.9   CALCIUM 8.8   PROT 6.7   ALBUMIN 3.0*   BILITOT 0.7   ALKPHOS 85   AST 37   ALT 35   ANIONGAP 10       Significant Imaging: I have reviewed all pertinent imaging results/findings within the past 24 hours.

## 2025-06-18 NOTE — HPI
A 69-year-old F with medical history significant for chronic atrial fibrillation anticoagulation with Xarelto, HIEU on BiPAP, diastolic CHF, pulmonary HTN, tobacco use, hyperlipidemia, cognitive impairment and ?  COPD with chronic hypoxic respiratory failure on 2 LNC who presented to the ED with a chief complaint of lethargy and progressive shortness of breaths with daytime drowsiness over the past 1 week.    Per , patient was in her baseline state of health before the onset of the symptoms; however, she has had similar presentation last year.  There was associated cough but no fever or chills.  No recent travel history, or positive history of contact with anyone with a respiratory illness.    On presentation to the ED patient was hypertensive with increased work of breathing and somnolence.  Laboratory investigations were pertinent for pCO2 of 76, , troponin not elevated chest x-ray significant for cardiomegaly with early CHF changes.  Procalcitonin 0.02; UDS negative; VBG: PH 7.2, pCO2 81.6, PO2 28, patient received duo nebs and IV Lasix in the ED and was admitted to the ICU for further management.

## 2025-06-19 ENCOUNTER — DOCUMENTATION ONLY (OUTPATIENT)
Facility: CLINIC | Age: 70
End: 2025-06-19
Payer: COMMERCIAL

## 2025-06-19 DIAGNOSIS — I50.9 CONGESTIVE HEART FAILURE, UNSPECIFIED HF CHRONICITY, UNSPECIFIED HEART FAILURE TYPE: Primary | ICD-10-CM

## 2025-06-19 PROBLEM — J18.9 PNEUMONIA: Status: ACTIVE | Noted: 2025-06-19

## 2025-06-19 LAB
ALBUMIN SERPL BCP-MCNC: 3 G/DL (ref 3.5–5.2)
ALLENS TEST: ABNORMAL
ALP SERPL-CCNC: 83 UNIT/L (ref 40–150)
ALT SERPL W/O P-5'-P-CCNC: 33 UNIT/L (ref 10–44)
ANION GAP (OHS): 13 MMOL/L (ref 8–16)
AORTIC ROOT ANNULUS: 2.9 CM
AORTIC SIZE INDEX (SOV): 1.6 CM/M2
AORTIC SIZE INDEX: 1.4 CM/M2
AORTIC VALVE CUSP SEPERATION: 2.14 CM
ASCENDING AORTA: 2.9 CM
AST SERPL-CCNC: 29 UNIT/L (ref 11–45)
AV INDEX (PROSTH): 0.72
AV MEAN GRADIENT: 4 MMHG
AV PEAK GRADIENT: 8 MMHG
AV VALVE AREA BY VELOCITY RATIO: 2.5 CM²
AV VALVE AREA: 2.5 CM²
AV VELOCITY RATIO: 0.71
BILIRUB SERPL-MCNC: 1 MG/DL (ref 0.1–1)
BSA FOR ECHO PROCEDURE: 2.21 M2
BUN SERPL-MCNC: 20 MG/DL (ref 8–23)
CALCIUM SERPL-MCNC: 9 MG/DL (ref 8.7–10.5)
CHLORIDE SERPL-SCNC: 98 MMOL/L (ref 95–110)
CO2 SERPL-SCNC: 31 MMOL/L (ref 23–29)
CREAT SERPL-MCNC: 0.8 MG/DL (ref 0.5–1.4)
CV ECHO LV RWT: 0.44 CM
DELSYS: ABNORMAL
DOP CALC AO PEAK VEL: 1.4 M/S
DOP CALC AO VTI: 27.7 CM
DOP CALC LVOT AREA: 3.5 CM2
DOP CALC LVOT DIAMETER: 2.1 CM
DOP CALC LVOT PEAK VEL: 1 M/S
DOP CALC LVOT STROKE VOLUME: 69.2 CM3
DOP CALCLVOT PEAK VEL VTI: 20 CM
ECHO LV POSTERIOR WALL: 1.1 CM (ref 0.6–1.1)
EP: 8
ERYTHROCYTE [SEDIMENTATION RATE] IN BLOOD BY WESTERGREN METHOD: 18 MM/H
FIO2: 30
FRACTIONAL SHORTENING: 38 % (ref 28–44)
GFR SERPLBLD CREATININE-BSD FMLA CKD-EPI: >60 ML/MIN/1.73/M2
GLUCOSE SERPL-MCNC: 95 MG/DL (ref 70–110)
HCO3 UR-SCNC: 37.3 MMOL/L (ref 24–28)
HOLD SPECIMEN: NORMAL
INTERVENTRICULAR SEPTUM: 1.1 CM (ref 0.6–1.1)
IP: 18
IVC DIAMETER: 2.15 CM
IVRT: 96 MSEC
LA MAJOR: 8.8 CM
LA MINOR: 7.7 CM
LA WIDTH: 6.5 CM
LEFT ATRIUM SIZE: 4.7 CM
LEFT ATRIUM VOLUME INDEX: 102 ML/M2
LEFT ATRIUM VOLUME: 213 CM3
LEFT INTERNAL DIMENSION IN SYSTOLE: 3.1 CM (ref 2.1–4)
LEFT VENTRICLE DIASTOLIC VOLUME INDEX: 57.14 ML/M2
LEFT VENTRICLE DIASTOLIC VOLUME: 120 ML
LEFT VENTRICLE MASS INDEX: 98.6 G/M2
LEFT VENTRICLE SYSTOLIC VOLUME INDEX: 17.6 ML/M2
LEFT VENTRICLE SYSTOLIC VOLUME: 37 ML
LEFT VENTRICULAR INTERNAL DIMENSION IN DIASTOLE: 5 CM (ref 3.5–6)
LEFT VENTRICULAR MASS: 207.1 G
LVED V (TEICH): 120.2 ML
LVES V (TEICH): 37.31 ML
LVOT MG: 2.24 MMHG
LVOT MV: 0.71 CM/S
MAGNESIUM SERPL-MCNC: 1.6 MG/DL (ref 1.6–2.6)
MIN VOL: 5.4
MODE: ABNORMAL
OHS CV RV/LV RATIO: 0.84 CM
PCO2 BLDA: 46.3 MMHG (ref 35–45)
PH SMN: 7.51 [PH] (ref 7.35–7.45)
PHOSPHATE SERPL-MCNC: 3.5 MG/DL (ref 2.7–4.5)
PISA TR MAX VEL: 3.9 M/S
PO2 BLDA: 72 MMHG (ref 80–100)
POC BE: 13 MMOL/L (ref -2–2)
POC SATURATED O2: 96 % (ref 95–100)
POC TCO2: 39 MMOL/L (ref 23–27)
POTASSIUM SERPL-SCNC: 4.1 MMOL/L (ref 3.5–5.1)
PROT SERPL-MCNC: 6.7 GM/DL (ref 6–8.4)
PULM VEIN S/D RATIO: 1.1
PV PEAK D VEL: 0.7 M/S
PV PEAK GRADIENT: 5 MMHG
PV PEAK S VEL: 0.77 M/S
PV PEAK VELOCITY: 1.09 M/S
RA MAJOR: 6.26 CM
RA PRESSURE ESTIMATED: 15 MMHG
RA WIDTH: 5 CM
RIGHT VENTRICLE DIASTOLIC BASEL DIMENSION: 4.2 CM
RIGHT VENTRICULAR END-DIASTOLIC DIMENSION: 4.22 CM
RV TB RVSP: 19 MMHG
RV TISSUE DOPPLER FREE WALL SYSTOLIC VELOCITY 1 (APICAL 4 CHAMBER VIEW): 18.23 CM/S
SAMPLE: ABNORMAL
SINUS: 3.28 CM
SITE: ABNORMAL
SODIUM SERPL-SCNC: 142 MMOL/L (ref 136–145)
SP02: 95
SPONT RATE: 0
STJ: 2.2 CM
TR MAX PG: 59 MMHG
TRICUSPID ANNULAR PLANE SYSTOLIC EXCURSION: 1.9 CM
TV REST PULMONARY ARTERY PRESSURE: 76 MMHG
Z-SCORE OF LEFT VENTRICULAR DIMENSION IN END DIASTOLE: -2.67
Z-SCORE OF LEFT VENTRICULAR DIMENSION IN END SYSTOLE: -1.99

## 2025-06-19 PROCEDURE — 25000242 PHARM REV CODE 250 ALT 637 W/ HCPCS

## 2025-06-19 PROCEDURE — 25000242 PHARM REV CODE 250 ALT 637 W/ HCPCS: Performed by: INTERNAL MEDICINE

## 2025-06-19 PROCEDURE — 94799 UNLISTED PULMONARY SVC/PX: CPT

## 2025-06-19 PROCEDURE — 99291 CRITICAL CARE FIRST HOUR: CPT | Mod: ,,, | Performed by: INTERNAL MEDICINE

## 2025-06-19 PROCEDURE — 94640 AIRWAY INHALATION TREATMENT: CPT

## 2025-06-19 PROCEDURE — 94660 CPAP INITIATION&MGMT: CPT

## 2025-06-19 PROCEDURE — 80053 COMPREHEN METABOLIC PANEL: CPT

## 2025-06-19 PROCEDURE — 25000003 PHARM REV CODE 250

## 2025-06-19 PROCEDURE — 27000221 HC OXYGEN, UP TO 24 HOURS

## 2025-06-19 PROCEDURE — 63600175 PHARM REV CODE 636 W HCPCS

## 2025-06-19 PROCEDURE — 12000002 HC ACUTE/MED SURGE SEMI-PRIVATE ROOM

## 2025-06-19 PROCEDURE — 83735 ASSAY OF MAGNESIUM: CPT

## 2025-06-19 PROCEDURE — 63700000 PHARM REV CODE 250 ALT 637 W/O HCPCS

## 2025-06-19 PROCEDURE — 94761 N-INVAS EAR/PLS OXIMETRY MLT: CPT

## 2025-06-19 PROCEDURE — 63600175 PHARM REV CODE 636 W HCPCS: Performed by: INTERNAL MEDICINE

## 2025-06-19 PROCEDURE — 36600 WITHDRAWAL OF ARTERIAL BLOOD: CPT

## 2025-06-19 PROCEDURE — 99900035 HC TECH TIME PER 15 MIN (STAT)

## 2025-06-19 PROCEDURE — 99900031 HC PATIENT EDUCATION (STAT)

## 2025-06-19 PROCEDURE — 36415 COLL VENOUS BLD VENIPUNCTURE: CPT

## 2025-06-19 PROCEDURE — 84100 ASSAY OF PHOSPHORUS: CPT

## 2025-06-19 RX ORDER — CEFTRIAXONE 2 G/1
2 INJECTION, POWDER, FOR SOLUTION INTRAMUSCULAR; INTRAVENOUS
Status: DISCONTINUED | OUTPATIENT
Start: 2025-06-19 | End: 2025-06-20

## 2025-06-19 RX ORDER — IPRATROPIUM BROMIDE AND ALBUTEROL SULFATE 2.5; .5 MG/3ML; MG/3ML
3 SOLUTION RESPIRATORY (INHALATION) EVERY 8 HOURS
Status: COMPLETED | OUTPATIENT
Start: 2025-06-19 | End: 2025-06-19

## 2025-06-19 RX ORDER — FUROSEMIDE 10 MG/ML
40 INJECTION INTRAMUSCULAR; INTRAVENOUS EVERY 8 HOURS
Status: DISCONTINUED | OUTPATIENT
Start: 2025-06-19 | End: 2025-06-20

## 2025-06-19 RX ORDER — AZITHROMYCIN 250 MG/1
500 TABLET, FILM COATED ORAL DAILY
Status: DISCONTINUED | OUTPATIENT
Start: 2025-06-19 | End: 2025-06-21 | Stop reason: HOSPADM

## 2025-06-19 RX ORDER — ARFORMOTEROL TARTRATE 15 UG/2ML
15 SOLUTION RESPIRATORY (INHALATION) 2 TIMES DAILY
Status: DISCONTINUED | OUTPATIENT
Start: 2025-06-20 | End: 2025-06-19

## 2025-06-19 RX ORDER — IPRATROPIUM BROMIDE AND ALBUTEROL SULFATE 2.5; .5 MG/3ML; MG/3ML
3 SOLUTION RESPIRATORY (INHALATION) EVERY 6 HOURS
Status: DISCONTINUED | OUTPATIENT
Start: 2025-06-19 | End: 2025-06-19

## 2025-06-19 RX ORDER — BUDESONIDE 0.5 MG/2ML
0.5 INHALANT ORAL EVERY 12 HOURS
Status: DISCONTINUED | OUTPATIENT
Start: 2025-06-20 | End: 2025-06-19

## 2025-06-19 RX ORDER — FLUTICASONE FUROATE AND VILANTEROL 100; 25 UG/1; UG/1
1 POWDER RESPIRATORY (INHALATION) DAILY
Status: DISCONTINUED | OUTPATIENT
Start: 2025-06-20 | End: 2025-06-19

## 2025-06-19 RX ORDER — IPRATROPIUM BROMIDE 0.5 MG/2.5ML
0.5 SOLUTION RESPIRATORY (INHALATION) EVERY 6 HOURS
Status: DISCONTINUED | OUTPATIENT
Start: 2025-06-20 | End: 2025-06-19

## 2025-06-19 RX ORDER — MUPIROCIN 20 MG/G
OINTMENT TOPICAL 2 TIMES DAILY
Status: DISCONTINUED | OUTPATIENT
Start: 2025-06-19 | End: 2025-06-21 | Stop reason: HOSPADM

## 2025-06-19 RX ORDER — IPRATROPIUM BROMIDE AND ALBUTEROL SULFATE 2.5; .5 MG/3ML; MG/3ML
3 SOLUTION RESPIRATORY (INHALATION) EVERY 8 HOURS
Status: DISCONTINUED | OUTPATIENT
Start: 2025-06-19 | End: 2025-06-19

## 2025-06-19 RX ADMIN — MUPIROCIN: 20 OINTMENT TOPICAL at 10:06

## 2025-06-19 RX ADMIN — METHYLPREDNISOLONE SODIUM SUCCINATE 40 MG: 40 INJECTION, POWDER, FOR SOLUTION INTRAMUSCULAR; INTRAVENOUS at 06:06

## 2025-06-19 RX ADMIN — AZITHROMYCIN DIHYDRATE 500 MG: 250 TABLET ORAL at 10:06

## 2025-06-19 RX ADMIN — MUPIROCIN: 20 OINTMENT TOPICAL at 09:06

## 2025-06-19 RX ADMIN — IPRATROPIUM BROMIDE AND ALBUTEROL SULFATE 3 ML: 2.5; .5 SOLUTION RESPIRATORY (INHALATION) at 04:06

## 2025-06-19 RX ADMIN — IPRATROPIUM BROMIDE AND ALBUTEROL SULFATE 3 ML: 2.5; .5 SOLUTION RESPIRATORY (INHALATION) at 11:06

## 2025-06-19 RX ADMIN — RIVAROXABAN 20 MG: 20 TABLET, FILM COATED ORAL at 05:06

## 2025-06-19 RX ADMIN — BUDESONIDE 0.5 MG: 0.5 INHALANT RESPIRATORY (INHALATION) at 08:06

## 2025-06-19 RX ADMIN — LOSARTAN POTASSIUM 25 MG: 25 TABLET, FILM COATED ORAL at 08:06

## 2025-06-19 RX ADMIN — ATORVASTATIN CALCIUM 40 MG: 40 TABLET, FILM COATED ORAL at 08:06

## 2025-06-19 RX ADMIN — FUROSEMIDE 40 MG: 10 INJECTION, SOLUTION INTRAVENOUS at 03:06

## 2025-06-19 RX ADMIN — SERTRALINE HYDROCHLORIDE 100 MG: 50 TABLET ORAL at 08:06

## 2025-06-19 RX ADMIN — FAMOTIDINE 20 MG: 10 INJECTION, SOLUTION INTRAVENOUS at 08:06

## 2025-06-19 RX ADMIN — ARFORMOTEROL TARTRATE 15 MCG: 15 SOLUTION RESPIRATORY (INHALATION) at 08:06

## 2025-06-19 RX ADMIN — CEFTRIAXONE SODIUM 2 G: 2 INJECTION, POWDER, FOR SOLUTION INTRAMUSCULAR; INTRAVENOUS at 11:06

## 2025-06-19 RX ADMIN — MAGNESIUM SULFATE HEPTAHYDRATE 2 G: 40 INJECTION, SOLUTION INTRAVENOUS at 11:06

## 2025-06-19 RX ADMIN — METOPROLOL SUCCINATE 100 MG: 50 TABLET, EXTENDED RELEASE ORAL at 08:06

## 2025-06-19 RX ADMIN — METHYLPREDNISOLONE SODIUM SUCCINATE 40 MG: 40 INJECTION, POWDER, FOR SOLUTION INTRAMUSCULAR; INTRAVENOUS at 05:06

## 2025-06-19 RX ADMIN — FUROSEMIDE 40 MG: 10 INJECTION, SOLUTION INTRAVENOUS at 08:06

## 2025-06-19 RX ADMIN — FUROSEMIDE 40 MG: 10 INJECTION, SOLUTION INTRAVENOUS at 09:06

## 2025-06-19 RX ADMIN — FAMOTIDINE 20 MG: 10 INJECTION, SOLUTION INTRAVENOUS at 09:06

## 2025-06-19 NOTE — PLAN OF CARE
06/19/25 1155   Discharge Assessment   Assessment Type Discharge Planning Assessment   Confirmed/corrected address, phone number and insurance Yes   Confirmed Demographics Correct on Facesheet   Source of Information patient   People in Home spouse   Name(s) of People in Home Spouse Corwin [phone]   Do you expect to return to your current living situation? Yes   Do you have help at home or someone to help you manage your care at home? Yes   Who are your caregiver(s) and their phone number(s)? Spouse   Prior to hospitilization cognitive status: Alert/Oriented   Current cognitive status: Alert/Oriented   Walking or Climbing Stairs Difficulty yes   Walking or Climbing Stairs ambulation difficulty, requires equipment   Mobility Management Cane   Equipment Currently Used at Home cane, straight   Readmission within 30 days? No   Patient currently being followed by outpatient case management? No   Do you currently have service(s) that help you manage your care at home? No   Do you take prescription medications? Yes   Do you have prescription coverage? Yes   Do you have any problems affording any of your prescribed medications? No   Is the patient taking medications as prescribed? yes   Who is going to help you get home at discharge? Daughter Cece or Spouse   How do you get to doctors appointments? car, drives self;family or friend will provide   Are you on dialysis? No   Do you take coumadin? No   Discharge Plan A Home with family   DME Needed Upon Discharge    (TBD)   Discharge Plan discussed with: Patient   Transition of Care Barriers None   Physical Activity   On average, how many minutes do you engage in exercise at this level? 0 min   Financial Resource Strain   How hard is it for you to pay for the very basics like food, housing, medical care, and heating? Not very   Housing Stability   In the last 12 months, was there a time when you were not able to pay the mortgage or rent on time? N   At any time in the  past 12 months, were you homeless or living in a shelter (including now)? N   Transportation Needs   In the past 12 months, has lack of transportation kept you from medical appointments or from getting medications? no   In the past 12 months, has lack of transportation kept you from meetings, work, or from getting things needed for daily living? No   Food Insecurity   Within the past 12 months, you worried that your food would run out before you got the money to buy more. Never true   Alcohol Use   Q2: How many drinks containing alcohol do you have on a typical day when you are drinking? None   Engagorities   In the past 12 months has the electric, gas, oil, or water company threatened to shut off services in your home? No     [place] [facility] [place]    PCP: Kuldeep Miller MD    Pharmacy:   [id number]  [place] - [address] AT [facility] [address]  [address]  [place] [address]  Phone: [phone] Fax: [phone]      Patient Arrived From: Home   Existing Help at Home: Spouse    Barriers to Discharge: None    Discharge Plan:    A. Home with Family   B.     CM met with patient at bedside to discuss discharge planning. Patient is independent and live with spouse. Patient uses a cane to assist with mobility, and does not have any HH/CM services. Patient spouse will provide needed support and transportation home upon discharge.

## 2025-06-19 NOTE — ASSESSMENT & PLAN NOTE
Patient with Hypercapnic and Hypoxic Respiratory failure which is Acute on chronic.  she is on home oxygen at 2 LPM. Supplemental oxygen was provided and noted- Oxygen Concentration (%):  [30] 30    .   Signs/symptoms of respiratory failure include- increased work of breathing, respiratory distress, wheezing, and lethargy. Contributing diagnoses includes - CHF and COPD Labs and images were reviewed. Patient Has recent ABG, which has been reviewed. Will treat underlying causes and adjust management of respiratory failure as follows- BiPAP as tolerated; treat underlying cause

## 2025-06-19 NOTE — EICU
Intervention Initiated From:  [facility] / EICU    Video rounds not done to pt request forpt that is hallucinating.   24H Vital Sign Range:  Temp:  [97 °F (36.1 °C)-98.6 °F (37 °C)]   Pulse:  [77-99]   Resp:  [13-27]   BP: (130-192)/()   SpO2:  [86 %-99 %]

## 2025-06-19 NOTE — CONSULTS
[facility] - Intensive Care  Pulmonology  Consult Note    Patient Name: Sandee Evans  MRN: [id number]  Admission Date: 6/18/2025  Hospital Length of Stay: 1 days  Code Status: Full Code  Attending Physician: Mateo Erickson MD  Primary Care Provider: No primary care provider on file.   Principal Problem: <principal problem not specified>    Consults  Subjective:     HPI:  69-year-old female with history of HIEU, COPD, CHF, Afib, daily tobacco use, on home O2 2L PRN who presented to the ED yesterday d/t worsening shortness of breath.  Per ED note, also with increasing hallucinations and excessive daytime drowsiness.      Initial symptom onset x 2-3 weeks ago.  Started with increasing cough, increased production of sputum, and change in sputum color from white to green/yellow.  During this time period with overall increased shortness of breath.  Pt wears home O2 intermittently.  Decides when to use home O2 based on how she is feeling, for example - if she is feeling like she will pass out.  Attempts home CPAP use 2-3 times per week.  Has issues with mask fit.   Following with sleep medicine and pulm.  Prescribed daily inhalers, but does not know when to take them.  Does not take daily.        Past Medical History:   Diagnosis Date    Anticoagulant long-term use     Xarelto    Arthritis     Atrial fibrillation     Breast cyst     CHF (congestive heart failure)     Degenerative disc disease     Edema     HLD (hyperlipidemia)     Hx of psychiatric care     Hyperlipidemia     Hypertension     Hypothyroidism     Nuclear sclerosis, bilateral 12/18/2017    Obesity, morbid     HIEU (obstructive sleep apnea)     Other abnormal glucose     pre-diabetes    Pre-diabetes     Psychiatric problem     Requires assistance with activities of daily living (ADL)     Sleep apnea     Smoker     SOB (shortness of breath)     SOB (shortness of breath)     Thyroid disease     on meds 8-9 years ago. hypothyroidism.no malignancy    TMJ  (temporomandibular joint disorder)     jaw clicking    Tobacco abuse     Unsteady gait     Urge incontinence     Weakness generalized     Wears glasses        Past Surgical History:   Procedure Laterality Date    BREAST BIOPSY Right     over 10 yrs ago/ benign    BREAST CYST EXCISION Right     COLONOSCOPY N/A 06/25/2019    Procedure: COLONOSCOPY;  Surgeon: Micheline Flores MD;  Location: [facility] END[facility];  Service: Endoscopy;  Laterality: N/A;  RX XARELTO ok to hold (2 days) per Dr. Naik see scan 3/20/19    COLONOSCOPY N/A 9/20/2024    Procedure: COLONOSCOPY;  Surgeon: Mario Sheridan MD;  Location: [facility] END[facility];  Service: Endoscopy;  Laterality: N/A;  Ref by Dr HEMANT Miller, Pending Xarelto hold, Suprep, portal - PC  ok to hold Xarelto 2 days per Dr Grissom-GT  9/4 pt rescheduled. Xarelto hold x2 days, suprep and emailed to [email or web address]  9/13 precall complete-RB    ENDOSCOPIC ULTRASOUND OF UPPER GASTROINTESTINAL TRACT N/A 01/26/2021    Procedure: ULTRASOUND-ENDOSCOPIC-UPPER;  Surgeon: Stevenson Philippe MD;  Location: [facility];  Service: Endoscopy;  Laterality: N/A;    HYSTERECTOMY      JOINT REPLACEMENT Bilateral     knees    OOPHORECTOMY      rt.knee surgery 2016      TOTAL KNEE ARTHROPLASTY Left 02/19/2019    Procedure: ARTHROPLASTY, KNEE, TOTAL;  Surgeon: Med Hanson MD;  Location: [facility] OR;  Service: Orthopedics;  Laterality: Left;  10AM START PER  PER JAYLIN TEXT @ 8:34AM ON 2-18-19  SUPINE  HARRIS LOYA [phone] TEXTED HIM ON 1-24-19 @ 7:57AM  RN PRE OP 2-13-19--BMI--48.9    underarm gland\ Bilateral        Review of patient's allergies indicates:   Allergen Reactions    Ace inhibitors      Cough         Family History       Problem Relation (Age of Onset)    Cancer Mother, Brother    Diabetes Mother, Brother    Hypertension Mother    No Known Problems Father, Sister, Maternal Aunt, Maternal Uncle, Paternal Aunt, Paternal Uncle, Maternal Grandmother, Maternal Grandfather, Paternal  Grandmother, Paternal Grandfather, Other          Tobacco Use    Smoking status: Every Day     Current packs/day: 0.50     Average packs/day: 0.5 packs/day for 52.5 years (26.2 ttl pk-yrs)     Types: Cigarettes     Start date: 1973    Smokeless tobacco: Current   Substance and Sexual Activity    Alcohol use: No    Drug use: No    Sexual activity: Yes     Partners: Male         Review of Systems   Constitutional:  Negative for fever.   Respiratory:  Positive for cough, shortness of breath and wheezing.      Objective:     Vital Signs (Most Recent):  Temp: 97.8 °F (36.6 °C) (06/19/25 0800)  Pulse: 91 (06/19/25 1100)  Resp: (!) 22 (06/19/25 1100)  BP: 103/64 (06/19/25 1100)  SpO2: 96 % (06/19/25 1100) Vital Signs (24h Range):  Temp:  [97 °F (36.1 °C)-98.6 °F (37 °C)] 97.8 °F (36.6 °C)  Pulse:  [] 91  Resp:  [13-31] 22  SpO2:  [86 %-100 %] 96 %  BP: (103-192)/() 103/64     Weight: 109.8 kg (242 lb)  Body mass index is 42.87 kg/m².      Intake/Output Summary (Last 24 hours) at 6/19/2025 1145  Last data filed at 6/19/2025 1113  Gross per 24 hour   Intake 360 ml   Output 4750 ml   Net -4390 ml        Physical Exam  Constitutional:       General: She is not in acute distress.     Appearance: She is obese.   HENT:      Nose:      Comments: 2L NC  Cardiovascular:      Rate and Rhythm: Normal rate and regular rhythm.      Pulses: Normal pulses.      Heart sounds: Normal heart sounds.   Pulmonary:      Effort: Pulmonary effort is normal. No respiratory distress.      Breath sounds: Wheezing present.   Abdominal:      General: Abdomen is flat. Bowel sounds are normal.      Palpations: Abdomen is soft.   Musculoskeletal:      Right lower leg: Edema (1+) present.      Left lower leg: Edema (1+) present.   Skin:     General: Skin is warm and dry.      Capillary Refill: Capillary refill takes less than 2 seconds.   Neurological:      General: No focal deficit present.      Mental Status: She is alert and oriented to  person, place, and time.   Psychiatric:         Mood and Affect: Mood normal.         Behavior: Behavior normal.         Thought Content: Thought content normal.         Judgment: Judgment normal.          Vents:  Oxygen Concentration (%): 30 (06/19/25 0346)    Lines/Drains/Airways       Drain  Duration             Female External Urinary Catheter w/ Suction 06/18/25 1741 <1 day              Peripheral Intravenous Line  Duration                  Peripheral IV - Single Lumen 06/18/25 1326 20 G 1 in Right Forearm <1 day         Peripheral IV - Single Lumen 06/18/25 1653 22 G Left Hand <1 day                    Significant Labs:    CBC/Anemia Profile:  Recent Labs   Lab 06/18/25  1325 06/18/25  1736   WBC 6.51 6.53   HGB 11.8* 14.5   HCT 38.2 47.8    217   * 105*   RDW 14.5 14.5        Chemistries:  Recent Labs   Lab 06/18/25  1325 06/19/25  0329    142   K 4.3 4.1    98   CO2 31* 31*   BUN 23 20   CREATININE 0.9 0.8   CALCIUM 8.8 9.0   ALBUMIN 3.0* 3.0*   PROT 6.7 6.7   BILITOT 0.7 1.0   ALKPHOS 85 83   ALT 35 33   AST 37 29   MG  --  1.6   PHOS  --  3.5       All pertinent labs within the past 24 hours have been reviewed.    Significant Imaging:   I have reviewed all pertinent imaging results/findings within the past 24 hours.    ABG  Recent Labs   Lab 06/19/25  0550   PH 7.515*   PO2 72*   PCO2 46.3*   HCO3 37.3*   BE 13*     Assessment/Plan:     Pulmonary  Acute hypoxic on chronic hypercapnic respiratory failure  Patient with Hypercapnic and Hypoxic Respiratory failure which is Acute on chronic.  she is on home oxygen at 2 LPM. Supplemental oxygen was provided and noted- Oxygen Concentration (%):  [30] 30    .   Signs/symptoms of respiratory failure include- tachypnea, increased work of breathing, respiratory distress, and wheezing. Contributing diagnoses includes - COPD, HIEU, tobacco use. Labs and images were reviewed. Patient Has recent ABG, which has been reviewed. Will treat underlying  causes and adjust management of respiratory failure as follows-     - off BiPAP  - continue CPAP at night  - steroids for COPD exacerbation  - continue home O2    COPD exacerbation  - COPD with acute exacerbation, baseline hypoxia on chronic home O2  - CXR NAF, afebrile, without focal physical exam findings  - can likely discontinue antibiotics  - continue supplemental O2 2L   - continue steroids x 5 days  - continue duo-nebs q4h PRN  - order ICS-LABA and educate pt on use while inpatient  - order LAMA and educate pt on use while inpatient       Cardiac/Vascular  Acute on chronic diastolic CHF (congestive heart failure)  - Likely contributing to acute hypoxic resp failure, though majority driving factor COPD exacerbation  - Lasix 40 q12h ordered   - f/u I/O  - echo ordered, f/u results      Other  Tobacco use  - nicoderm patch  - counseled on smoking cessation    HIEU on CPAP  - Has not been routinely using CPAP at home d/t issue with mask fit.  Recently got new mask and is established with pulm and sleep medicine.  - counseled pt on use  - continue CPAP nightly while inpatient x at least 4 hrs QHS          Thank you for your consult. I will follow-up with patient. Please contact us if you have any additional questions.     Grace Donovan MD  [facility] Medicine, PGY-3  Pulmonology  [facility] - Intensive Care

## 2025-06-19 NOTE — EICU
Virtual ICU Quality Rounds    Admit Date: 6/18/2025  Hospital Day: 1    ICU Day: 15h    24H Vital Sign Range:  Temp:  [97 °F (36.1 °C)-98.6 °F (37 °C)]   Pulse:  []   Resp:  [13-27]   BP: (107-192)/()   SpO2:  [86 %-100 %]     VICU Surveillance Screening  LDA reconciliation : Yes

## 2025-06-19 NOTE — SUBJECTIVE & OBJECTIVE
Past Medical History:   Diagnosis Date    Anticoagulant long-term use     Xarelto    Arthritis     Atrial fibrillation     Breast cyst     CHF (congestive heart failure)     Degenerative disc disease     Edema     HLD (hyperlipidemia)     Hx of psychiatric care     Hyperlipidemia     Hypertension     Hypothyroidism     Nuclear sclerosis, bilateral 12/18/2017    Obesity, morbid     HIEU (obstructive sleep apnea)     Other abnormal glucose     pre-diabetes    Pre-diabetes     Psychiatric problem     Requires assistance with activities of daily living (ADL)     Sleep apnea     Smoker     SOB (shortness of breath)     SOB (shortness of breath)     Thyroid disease     on meds 8-9 years ago. hypothyroidism.no malignancy    TMJ (temporomandibular joint disorder)     jaw clicking    Tobacco abuse     Unsteady gait     Urge incontinence     Weakness generalized     Wears glasses        Past Surgical History:   Procedure Laterality Date    BREAST BIOPSY Right     over 10 yrs ago/ benign    BREAST CYST EXCISION Right     COLONOSCOPY N/A 06/25/2019    Procedure: COLONOSCOPY;  Surgeon: Micheline Flroes MD;  Location: [facility];  Service: Endoscopy;  Laterality: N/A;  RX XARELTO ok to hold (2 days) per Dr. Naik see scan 3/20/19    COLONOSCOPY N/A 9/20/2024    Procedure: COLONOSCOPY;  Surgeon: Mario Sheridan MD;  Location: [facility];  Service: Endoscopy;  Laterality: N/A;  Ref by Dr HEMANT Miller, Pending Xarelto hold, Suprep, portal - PC  ok to hold Xarelto 2 days per Dr Grissom-GT  9/4 pt rescheduled. Xarelto hold x2 days, suprep and emailed to [email or web address]  9/13 precall complete-RB    ENDOSCOPIC ULTRASOUND OF UPPER GASTROINTESTINAL TRACT N/A 01/26/2021    Procedure: ULTRASOUND-ENDOSCOPIC-UPPER;  Surgeon: Stevenson Philippe MD;  Location: [facility];  Service: Endoscopy;  Laterality: N/A;    HYSTERECTOMY      JOINT REPLACEMENT Bilateral     knees    OOPHORECTOMY      rt.knee surgery 2016      TOTAL KNEE ARTHROPLASTY Left  02/19/2019    Procedure: ARTHROPLASTY, KNEE, TOTAL;  Surgeon: Med Hanson MD;  Location: [facility];  Service: Orthopedics;  Laterality: Left;  10AM START PER  PER JAYLIN TEXT @ 8:34AM ON 2-18-19  SUPINE  HARRIS LOYA [id number] TEXTED HIM ON 1-24-19 @ 7:57AM  RN PRE OP 2-13-19--BMI--48.9    underarm gland\ Bilateral        Review of patient's allergies indicates:   Allergen Reactions    Ace inhibitors      Cough         Family History       Problem Relation (Age of Onset)    Cancer Mother, Brother    Diabetes Mother, Brother    Hypertension Mother    No Known Problems Father, Sister, Maternal Aunt, Maternal Uncle, Paternal Aunt, Paternal Uncle, Maternal Grandmother, Maternal Grandfather, Paternal Grandmother, Paternal Grandfather, Other          Tobacco Use    Smoking status: Every Day     Current packs/day: 0.50     Average packs/day: 0.5 packs/day for 52.5 years (26.2 ttl pk-yrs)     Types: Cigarettes     Start date: 1973    Smokeless tobacco: Current   Substance and Sexual Activity    Alcohol use: No    Drug use: No    Sexual activity: Yes     Partners: Male         Review of Systems   Constitutional:  Negative for fever.   Respiratory:  Positive for cough, shortness of breath and wheezing.      Objective:     Vital Signs (Most Recent):  Temp: 97.8 °F (36.6 °C) (06/19/25 0800)  Pulse: 91 (06/19/25 1100)  Resp: (!) 22 (06/19/25 1100)  BP: 103/64 (06/19/25 1100)  SpO2: 96 % (06/19/25 1100) Vital Signs (24h Range):  Temp:  [97 °F (36.1 °C)-98.6 °F (37 °C)] 97.8 °F (36.6 °C)  Pulse:  [] 91  Resp:  [13-31] 22  SpO2:  [86 %-100 %] 96 %  BP: (103-192)/() 103/64     Weight: 109.8 kg (242 lb)  Body mass index is 42.87 kg/m².      Intake/Output Summary (Last 24 hours) at 6/19/2025 1145  Last data filed at 6/19/2025 1113  Gross per 24 hour   Intake 360 ml   Output 4750 ml   Net -4390 ml        Physical Exam  Constitutional:       General: She is not in acute distress.     Appearance: She is obese.    HENT:      Nose:      Comments: 2L NC  Cardiovascular:      Rate and Rhythm: Normal rate and regular rhythm.      Pulses: Normal pulses.      Heart sounds: Normal heart sounds.   Pulmonary:      Effort: Pulmonary effort is normal. No respiratory distress.      Breath sounds: Wheezing present.   Abdominal:      General: Abdomen is flat. Bowel sounds are normal.      Palpations: Abdomen is soft.   Musculoskeletal:      Right lower leg: Edema (1+) present.      Left lower leg: Edema (1+) present.   Skin:     General: Skin is warm and dry.      Capillary Refill: Capillary refill takes less than 2 seconds.   Neurological:      General: No focal deficit present.      Mental Status: She is alert and oriented to person, place, and time.   Psychiatric:         Mood and Affect: Mood normal.         Behavior: Behavior normal.         Thought Content: Thought content normal.         Judgment: Judgment normal.          Vents:  Oxygen Concentration (%): 30 (06/19/25 0346)    Lines/Drains/Airways       Drain  Duration             Female External Urinary Catheter w/ Suction 06/18/25 1741 <1 day              Peripheral Intravenous Line  Duration                  Peripheral IV - Single Lumen 06/18/25 1326 20 G 1 in Right Forearm <1 day         Peripheral IV - Single Lumen 06/18/25 1653 22 G Left Hand <1 day                    Significant Labs:    CBC/Anemia Profile:  Recent Labs   Lab 06/18/25  1325 06/18/25  1736   WBC 6.51 6.53   HGB 11.8* 14.5   HCT 38.2 47.8    217   * 105*   RDW 14.5 14.5        Chemistries:  Recent Labs   Lab 06/18/25  1325 06/19/25  0329    142   K 4.3 4.1    98   CO2 31* 31*   BUN 23 20   CREATININE 0.9 0.8   CALCIUM 8.8 9.0   ALBUMIN 3.0* 3.0*   PROT 6.7 6.7   BILITOT 0.7 1.0   ALKPHOS 85 83   ALT 35 33   AST 37 29   MG  --  1.6   PHOS  --  3.5       All pertinent labs within the past 24 hours have been reviewed.    Significant Imaging:   I have reviewed all pertinent imaging  results/findings within the past 24 hours.

## 2025-06-19 NOTE — SUBJECTIVE & OBJECTIVE
Interval History:  No acute event overnight.  Patient seen in bed this morning; currently off BiPAP.  Oriented, and asking for food.      Review of Systems  Objective:     Vital Signs (Most Recent):  Temp: 97.8 °F (36.6 °C) (06/19/25 0800)  Pulse: 91 (06/19/25 1000)  Resp: (!) 28 (06/19/25 1000)  BP: (!) 157/75 (06/19/25 1000)  SpO2: (!) 94 % (06/19/25 1000) Vital Signs (24h Range):  Temp:  [97 °F (36.1 °C)-98.6 °F (37 °C)] 97.8 °F (36.6 °C)  Pulse:  [] 91  Resp:  [13-31] 28  SpO2:  [86 %-100 %] 94 %  BP: (107-192)/() 157/75     Weight: 109.8 kg (242 lb)  Body mass index is 42.87 kg/m².    Intake/Output Summary (Last 24 hours) at 6/19/2025 1055  Last data filed at 6/19/2025 0500  Gross per 24 hour   Intake --   Output 4750 ml   Net -4750 ml         Physical Exam  Constitutional:       General: She is not in acute distress.     Appearance: She is not ill-appearing, toxic-appearing or diaphoretic.   Cardiovascular:      Rate and Rhythm: Normal rate. Rhythm irregular.      Pulses: Normal pulses.      Heart sounds: Normal heart sounds.   Pulmonary:      Breath sounds: Wheezing present.      Comments: Markedly improved work of breathing; with increase air entry compared to previous examination  Abdominal:      General: There is no distension.      Palpations: Abdomen is soft. There is no mass.   Musculoskeletal:      Right lower leg: Edema present.      Left lower leg: Edema present.   Neurological:      Mental Status: She is oriented to person, place, and time. Mental status is at baseline.               Significant Labs: CBC:   Recent Labs   Lab 06/18/25  1325 06/18/25  1736   WBC 6.51 6.53   HGB 11.8* 14.5   HCT 38.2 47.8    217     CMP:   Recent Labs   Lab 06/18/25  1325 06/19/25  0329    142   K 4.3 4.1    98   CO2 31* 31*   GLU 89 95   BUN 23 20   CREATININE 0.9 0.8   CALCIUM 8.8 9.0   PROT 6.7 6.7   ALBUMIN 3.0* 3.0*   BILITOT 0.7 1.0   ALKPHOS 85 83   AST 37 29   ALT 35 33    ANIONGAP 10 13       Significant Imaging:   CT Head Without Contrast   Final Result      1. Allowing for extensive motion artifact, no convincing acute intracranial abnormalities noting sequela of chronic microvascular ischemic change and senescent change.         Electronically signed by: Macario Pagan MD   Date:    06/18/2025   Time:    14:29      X-Ray Chest AP Portable   Final Result      Cardiomegaly and possible mild early CHF.         Electronically signed by: Steven Chauhan MD   Date:    06/18/2025   Time:    13:37

## 2025-06-19 NOTE — PLAN OF CARE
Problem: Adult Inpatient Plan of Care  Goal: Optimal Comfort and Wellbeing  Outcome: Progressing     Problem: Adult Inpatient Plan of Care  Goal: Absence of Hospital-Acquired Illness or Injury  Outcome: Progressing     Problem: Adult Inpatient Plan of Care  Goal: Readiness for Transition of Care  Outcome: Progressing     Problem: Bariatric Environmental Safety  Goal: Safety Maintained with Care  Outcome: Progressing     Problem: Diabetes Comorbidity  Goal: Blood Glucose Level Within Targeted Range  Outcome: Progressing     Problem: Pneumonia  Goal: Fluid Balance  Outcome: Progressing     Problem: Pneumonia  Goal: Effective Oxygenation and Ventilation  Outcome: Progressing

## 2025-06-19 NOTE — ASSESSMENT & PLAN NOTE
Patient has long standing persistent (>12 months) atrial fibrillation. Patient is currently in atrial fibrillation. [id number] Score: 3. The patients heart rate in the last 24 hours is as follows:  Pulse  Min: 71  Max: 105     Antiarrhythmics  metoprolol succinate (TOPROL-XL) 24 hr tablet 100 mg, Daily, Oral    Anticoagulants  rivaroxaban tablet 20 mg, Daily, Oral    Plan  - Replete lytes with a goal of K>4, Mg >2  - Patient is anticoagulated, see medications listed above.  - Patient's afib is currently controlled

## 2025-06-19 NOTE — PROGRESS NOTES
[facility] Provider Note (Initial Clinic) Consult Note    Age: 69 y.o.  Gender: female  Ethnicity:    Number of admissions for CHF within the preceding year: 1   Type of Congestive Heart Failure: Diastolic   Social history: Smoking    Diagnostic Labs:   EKG - 06/18/2025  CXR - 06/18/2025  ECHO - 06/19/2025  Stress test -   Stress echo - 02/08/2019  Pharmacologic stress -   Cardiac catheterization -    Cardiac MRI -     Lab Results   Component Value Date     06/19/2025     06/18/2025    K 4.1 06/19/2025    K 4.3 06/18/2025    CL 98 06/19/2025     06/18/2025    CO2 31 (H) 06/19/2025    CO2 31 (H) 06/18/2025    GLU 95 06/19/2025    GLU 89 06/18/2025    BUN 20 06/19/2025    BUN 23 06/18/2025    CREATININE 0.8 06/19/2025    CREATININE 0.9 06/18/2025    CALCIUM 9.0 06/19/2025    CALCIUM 8.8 06/18/2025    PROT 6.7 06/19/2025    PROT 6.7 06/18/2025    ALBUMIN 3.0 (L) 06/19/2025    ALBUMIN 3.0 (L) 06/18/2025    BILITOT 1.0 06/19/2025    BILITOT 0.7 06/18/2025    ALKPHOS 83 06/19/2025    ALKPHOS 85 06/18/2025    AST 29 06/19/2025    AST 37 06/18/2025    ALT 33 06/19/2025    ALT 35 06/18/2025    ANIONGAP 13 06/19/2025    ANIONGAP 10 06/18/2025    ESTGFRAFRICA 59.6 (A) 06/10/2022    ESTGFRAFRICA 41 (A) 05/26/2022    EGFRNONAA 51.7 (A) 06/10/2022    EGFRNONAA 36 (A) 05/26/2022       Lab Results   Component Value Date    WBC 6.53 06/18/2025    WBC 6.51 06/18/2025    RBC 4.54 06/18/2025    RBC 3.68 (L) 06/18/2025    HGB 14.5 06/18/2025    HGB 11.8 (L) 06/18/2025    HCT 47.8 06/18/2025    HCT 38.2 06/18/2025     (H) 06/18/2025     (H) 06/18/2025    MCH 31.9 (H) 06/18/2025    MCH 32.1 (H) 06/18/2025    MCHC 30.3 (L) 06/18/2025    MCHC 30.9 (L) 06/18/2025    RDW 14.5 06/18/2025    RDW 14.5 06/18/2025     06/18/2025     06/18/2025    MPV 10.8 06/18/2025    MPV 10.6 06/18/2025    IMMGR 0.5 06/18/2025    IMMGR 0.5 06/18/2025    IGABS 0.03 06/18/2025    IGABS 0.03 06/18/2025     LYMPH 31.7 06/18/2025    LYMPH 2.07 06/18/2025    MONO 8.3 06/18/2025    MONO 0.54 06/18/2025    EOS 1.4 06/18/2025    EOS 0.09 06/18/2025    BASO 0.02 10/01/2024    BASO 0.01 08/12/2024    NRBC 0 06/18/2025    NRBC 0 06/18/2025    GRAN 4.7 10/01/2024    GRAN 67.7 10/01/2024    EOSINOPHIL 1.6 10/01/2024    EOSINOPHIL 0.5 08/12/2024    BASOPHIL 0.5 06/18/2025    BASOPHIL 0.03 06/18/2025    PLTEST Appears normal 07/26/2021    PLTEST Sl increased (A) 04/04/2006    ANISO Slight 07/26/2021    ANISO sl 04/04/2006    HYPO Occasional 07/26/2021       Lab Results   Component Value Date     (H) 06/18/2025     (H) 03/14/2025    MG 1.6 06/19/2025    MG 2.2 08/12/2024    PHOS 3.5 06/19/2025    PHOS 2.9 08/12/2024    TROPONINI <0.006 06/18/2025    TROPONINI <0.006 08/06/2024    HGBA1C 6.1 (H) 10/01/2024    HGBA1C 5.6 08/06/2024    TSH 3.411 06/18/2025    TSH 3.654 10/01/2024    FREET4 0.92 10/01/2024    FREET4 1.13 10/27/2023    H8BCAPM 8.1 04/23/2009    L3WUGLC 10.6 11/06/2004       Lab Results   Component Value Date    FERRITIN 289 10/12/2015    CHOL 155 10/01/2024    TRIG 63 10/01/2024    HDL 60 10/01/2024    LDLCALC 82.4 10/01/2024    CHOLHDL 38.7 10/01/2024    TOTALCHOLEST 2.6 10/01/2024    NONHDLCHOL 95 10/01/2024    COLORU Yellow 01/02/2024    APPEARANCEUA Clear 01/02/2024    PHUR 6.0 01/02/2024    SPECGRAV 1.015 01/02/2024    PROTEINUA Negative 01/02/2024    GLUCUA Negative 01/02/2024    KETONESU Negative 01/02/2024    BILIRUBINUA Negative 01/02/2024    OCCULTUA Negative 01/02/2024    NITRITE Negative 01/02/2024    LEUKOCYTESUR Negative 01/02/2024       List all implanted cardiac devices:   denies    Current Facility-Administered Medications on File Prior to Visit   Medication Dose Route Frequency Provider Last Rate Last Admin    acetaminophen tablet 650 mg  650 mg Oral Q4H PRN Mateo Erickson MD        albuterol-ipratropium 2.5 mg-0.5 mg/3 mL nebulizer solution 3 mL  3 mL Nebulization Q6H PRN Dre  Mateo SPRINGER MD        albuterol-ipratropium 2.5 mg-0.5 mg/3 mL nebulizer solution 3 mL  3 mL Nebulization Q8H Brien Abbasi MD        atorvastatin tablet 40 mg  40 mg Oral Daily Mateo Erickson MD   40 mg at 06/19/25 0829    azithromycin tablet 500 mg  500 mg Oral Daily Mateo Erickson MD   500 mg at 06/19/25 1000    calcium gluconate 1 g in NS IVPB (premixed)  1 g Intravenous PRN Mateo Erickson MD        calcium gluconate 1 g in NS IVPB (premixed)  2 g Intravenous PRN Mateo Erickson MD        calcium gluconate 1 g in NS IVPB (premixed)  3 g Intravenous PRN Mateo Erickson MD        cefTRIAXone injection 2 g  2 g Intravenous Q24H Mateo Erickson MD   2 g at 06/19/25 1148    famotidine (PF) injection 20 mg  20 mg Intravenous Q12H Mateo Erickson MD   20 mg at 06/19/25 0829    [COMPLETED] furosemide injection 40 mg  40 mg Intravenous ED 1 Time Keli Murray MD   40 mg at 06/18/25 1500    furosemide injection 40 mg  40 mg Intravenous Q8H Brien Abbasi MD        hydrALAZINE injection 10 mg  10 mg Intravenous Q4H PRN Mateo Erickson MD   10 mg at 06/18/25 1806    losartan tablet 25 mg  25 mg Oral Daily Mateo Erickson MD   25 mg at 06/19/25 0830    magnesium sulfate 2g in water 50mL IVPB (premix)  2 g Intravenous PRN Mateo Erickson MD   Stopped at 06/19/25 1352    magnesium sulfate 2g in water 50mL IVPB (premix)  2 g Intravenous PRN Mateo Erickson MD        melatonin tablet 6 mg  6 mg Oral Nightly PRN Mateo Erickson MD        methylPREDNISolone sodium succinate injection 40 mg  40 mg Intravenous Q12H Mateo Erickson MD   40 mg at 06/19/25 0545    metoprolol succinate (TOPROL-XL) 24 hr tablet 100 mg  100 mg Oral Daily Mateo Erickson MD   100 mg at 06/19/25 0829    mupirocin 2 % ointment   Nasal BID Mateo Erickson MD   Given at 06/19/25 1000    nicotine 14 mg/24 hr 1 patch  1 patch Transdermal Daily Mateo Erickson MD        ondansetron injection 4 mg  4 mg  Intravenous Q8H PRN Mateo Erickson MD        potassium chloride 10 mEq in 100 mL IVPB  40 mEq Intravenous PRN Mateo Erickson MD        And    potassium chloride 10 mEq in 100 mL IVPB  60 mEq Intravenous PRN Mateo Erickson MD        And    potassium chloride 10 mEq in 100 mL IVPB  80 mEq Intravenous PRN Mateo Erickson MD        rivaroxaban tablet 20 mg  20 mg Oral Daily Mateo Erickson MD        sertraline tablet 100 mg  100 mg Oral Daily Mateo Erickson MD   100 mg at 06/19/25 0829    sodium chloride 0.9% flush 10 mL  10 mL Intravenous PRN Mateo Erickson MD        sodium chloride 0.9% flush 10 mL  10 mL Intravenous PRN Mateo Erickson MD        sodium phosphate 15 mmol in D5W 250 mL IVPB  15 mmol Intravenous PRN Mateo Erickson MD        sodium phosphate 20.1 mmol in D5W 250 mL IVPB  20.1 mmol Intravenous PRN Mateo Erickson MD        sodium phosphate 30 mmol in D5W 250 mL IVPB  30 mmol Intravenous PRN Mateo Erickson MD        [DISCONTINUED] albuterol-ipratropium 2.5 mg-0.5 mg/3 mL nebulizer solution 3 mL  3 mL Nebulization Q6H Brien Abbasi MD        [DISCONTINUED] albuterol-ipratropium 2.5 mg-0.5 mg/3 mL nebulizer solution 3 mL  3 mL Nebulization Q8H Brien Abbasi MD        [DISCONTINUED] arformoteroL nebulizer solution 15 mcg  15 mcg Nebulization BID Mateo Erickson MD   15 mcg at 06/19/25 0813    [DISCONTINUED] arformoteroL nebulizer solution 15 mcg  15 mcg Nebulization BID Brien Abbasi MD        [DISCONTINUED] budesonide nebulizer solution 0.5 mg  0.5 mg Nebulization Q12H Mateo Erickson MD   0.5 mg at 06/19/25 0813    [DISCONTINUED] budesonide nebulizer solution 0.5 mg  0.5 mg Nebulization Q12H Brien Abbasi MD        [DISCONTINUED] fluticasone furoate-vilanteroL 100-25 mcg/dose diskus inhaler 1 puff  1 puff Inhalation Daily Brien Abbasi MD        [DISCONTINUED] furosemide injection 40 mg  40 mg Intravenous Q12H Mateo Erickson MD   40  mg at 06/19/25 0829    [DISCONTINUED] ipratropium 0.02 % nebulizer solution 0.5 mg  0.5 mg Nebulization Q6H Brien Abbasi MD        [DISCONTINUED] tiotropium bromide 2.5 mcg/actuation inhaler 2 puff  2 puff Inhalation Daily Brien Abbasi MD         Current Outpatient Medications on File Prior to Visit   Medication Sig Dispense Refill    furosemide (LASIX) 40 MG tablet TAKE 1 TABLET(40 MG) BY MOUTH EVERY DAY 90 tablet 3    albuterol (PROVENTIL/VENTOLIN HFA) 90 mcg/actuation inhaler 2 puffs every 6 (six) hours as needed.      atorvastatin (LIPITOR) 40 MG tablet TAKE 1 TABLET(40 MG) BY MOUTH EVERY DAY 90 tablet 2    azelastine (ASTELIN) 137 mcg (0.1 %) nasal spray 1 spray (137 mcg total) by Nasal route 2 (two) times daily. 30 mL 0    buPROPion (WELLBUTRIN XL) 150 MG TB24 tablet Take 150 mg by mouth.      cetirizine (ZYRTEC) 10 MG tablet Take 1 tablet (10 mg total) by mouth once daily. 90 tablet 3    clobetasol 0.05% (TEMOVATE) 0.05 % Oint APPLY TOPICALLY TO THE AFFECTED AREA OF RASH ON BACK TWICE DAILY      fluticasone furoate-vilanteroL (BREO ELLIPTA) 100-25 mcg/dose diskus inhaler Inhale 1 puff into the lungs once daily. Controller 14 each 3    fluticasone propionate (FLONASE) 50 mcg/actuation nasal spray SHAKE LIQUID AND USE 2 SPRAYS(100 MCG) IN EACH NOSTRIL EVERY DAY 48 g 0    fluticasone-salmeterol diskus inhaler 250-50 mcg Inhale 1 puff into the lungs 2 (two) times daily.      ketoconazole (NIZORAL) 2 % cream APPLY TOPICALLY TO THE AFFECTED AREA EVERY DAY 60 g 0    LIDOcaine-prilocaine (EMLA) cream Apply topically as needed. 30 g 3    losartan (COZAAR) 25 MG tablet Take 1 tablet (25 mg total) by mouth once daily. 90 tablet 3    melatonin (MELATIN) 5 mg Take 1 tablet (5 mg total) by mouth nightly. 90 tablet 3    metoprolol succinate (TOPROL-XL) 100 MG 24 hr tablet TAKE 1 TABLET(100 MG) BY MOUTH DAILY 90 tablet 0    sertraline (ZOLOFT) 100 MG tablet TAKE 1 TABLET(100 MG) BY MOUTH DAILY 90 tablet 0     SPIRIVA RESPIMAT 2.5 mcg/actuation inhaler INHALE 2 PUFFS BY MOUTH DAILY 4 g 5    terbinafine HCL (LAMISIL) 250 mg tablet Take 250 mg by mouth.      traMADoL (ULTRAM) 50 mg tablet Take 50 mg by mouth every 12 (twelve) hours as needed.      triamcinolone acetonide 0.1% (KENALOG) 0.1 % ointment APPLY BETWEEN THE KNEES AND UPPER THIGHS TWICE DAILY FOR NO MORE THAN 7 TO 14 DAYS 454 g 1    XARELTO 20 mg Tab Take 1 tablet (20 mg total) by mouth Daily. 90 tablet 0         HPI:  Patient can walk with SOB on ambulation   Patient sleeps on 4 number of pillows   Patient wakes up SOB, has to get out of bed, associated cough, sputum and color-denies   Palpitations - denies   Dizzy, light-headed, pre-syncope or syncope-denies   Since discharge frequency of performing weights, home weight and weight change-denies   Other information felt pertinent to HPI: 69-year-old F with medical history significant for chronic atrial fibrillation anticoagulation with Xarelto, HIEU on BiPAP, diastolic CHF, pulmonary HTN, tobacco use, hyperlipidemia, cognitive impairment and ?  COPD with chronic hypoxic respiratory failure on 2 LNC who presented to the ED with a chief complaint of lethargy and progressive shortness of breaths with daytime drowsiness over the past 1 week.     Per , patient was in her baseline state of health before the onset of the symptoms; however, she has had similar presentation last year.  There was associated cough but no fever or chills.  No recent travel history, or positive history of contact with anyone with a respiratory illness.     On presentation to the ED patient was hypertensive with increased work of breathing and somnolence.  Laboratory investigations were pertinent for pCO2 of 76, , troponin not elevated chest x-ray significant for cardiomegaly with early CHF changes.  Procalcitonin 0.02; UDS negative; VBG: PH 7.2, pCO2 81.6, PO2 28, patient received duo nebs and IV Lasix in the ED and was  admitted to the ICU for further management.     * No surgery found *       Hospital Course:   A 69-year-old F with medical history significant for chronic atrial fibrillation anticoagulation with Xarelto, HIEU on BiPAP, diastolic CHF, pulmonary HTN, tobacco use, hyperlipidemia, cognitive impairment and ?  COPD with chronic hypoxic respiratory failure on 2 LNC who presented to the ED with a chief complaint of lethargy and progressive shortness of breaths with daytime drowsiness over the past 1 week; admitted to the ICU for acute on chronic hypoxic hypercarbic respiratory failure on BiPAP; currently weaned off and stable for transfer to telemetry   Patient was diuresed while on transitioned to p.o. Lasix.  Continues to wear BiPAP at night.  Plan to complete azithromycin and prednisone for a total of 5 days.  Overall, she is doing well and feeling back to her baseline.  She does report that she was not taking her Lasix every single day so plan to continue with once daily at discharge.  Patient was discharged home in stable condition.  She has home oxygen and home CPAP.  Azithromycin and prednisone prescription sent to pharmacy of choice.  Patient discharged home.     Patient states she has taken lasix daily since she has been home. She presents with improved shortness of breath but still has shortness of breath with ambulation and trying to do housework. She states this is not new and has been happening for years. She does endorse her leg swelling has worsened over the last few days. She states her legs have chronic non pitting edema, but are more swollen this week. Patient declined referrals to bariatric medicine and smoking cessation. She was educated about the risk of obesity and smoking in relation to heart health.      PHYSICAL:   Vitals:    06/26/25 0947   BP: 132/68   Pulse: 90   Resp: 17        JVD: difficult to assess d/t body habitus   Heart rhythm: irregular-afib  Cardiac murmur: No    S3: no  S4: no  Lungs:  clear  Hepatojugular reflux: difficult to assess d/t body habitus  Edema: yes, chronic non pitting edema      6/18/25 echo:      Left Ventricle: The left ventricle is normal in size. Mildly increased wall thickness. There is mild concentric hypertrophy. There is normal systolic function with a visually estimated ejection fraction of 60 - 65%. Unable to assess diastolic function due to atrial fibrillation.    Right Ventricle: The right ventricle is mildly dilated Systolic function is mildly reduced.    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    Pulmonary Artery: The estimated pulmonary artery systolic pressure is 76 mmHg.    Pt appears to be in AFib throughout exam.    1. Heart failure with preserved ejection fraction, unspecified HF chronicity      Recommend 2-3 gram sodium restriction and 1500cc- 2000cc fluid restriction.  Call clinic for increased shortness of breath, or increased swelling.  Weigh yourself every day and call the office if your weight increases by more than 3 lbs in 1 day or 5 lbs in 3 days.  Defer addition of SGLT 2 d/t frequent urinary tract infections  Today, tomorrow and Saturday increase furosemide (lasix) 40mg to twice a day. On Sunday resume 40mg once a day. You may take an extra 40mg in the afternoon for weight gain of 2-3lbs in a day, increasing shortness of breath or increased swelling in your legs.    2. Pulmonary HTN  As above    3. Essential hypertension      Continue antihypertensives    4. Mixed hyperlipidemia       On statin    5. Paroxysmal atrial fibrillation      On beta blocker and xarelto    6. Chronic kidney disease, stage 3a      GFR 48    7. Severe obesity (BMI >= 40)  Overview:  Patient is aware of need for weight loss    Not interested in bariatric medicine  Counseled on healthy diet and weight loss

## 2025-06-19 NOTE — NURSING
[facility]  Nurses Note -- 4 Eyes      6/19/2025       Skin assessed on: Q Shift      [x] No Pressure Injuries Present    [x]Prevention Measures Documented    [] Yes LDA  for Pressure Injury Previously documented     [] Yes New Pressure Injury Discovered   [] LDA for New Pressure Injury Added      Attending RN:  Elaine Rossi RN     Second RN:  CLAUDIA Fernandez

## 2025-06-19 NOTE — PLAN OF CARE
Patient remains in the ICU. Remains on 30% BIPAP currently and vital signs are stable. Remains in atrial fibrillation. Patient does appear to be less drowsy and more alert. Patient has had good urine output throughout the shift. Plan of care reviewed with the patient and her daughter, Cece, who was present at the bedside at the beginning of the shift. Patient remains free of falls, injury, or further breakdown.     Problem: Adult Inpatient Plan of Care  Goal: Plan of Care Review  Outcome: Progressing  Goal: Patient-Specific Goal (Individualized)  Outcome: Progressing  Goal: Absence of Hospital-Acquired Illness or Injury  Outcome: Progressing  Goal: Optimal Comfort and Wellbeing  Outcome: Progressing  Goal: Readiness for Transition of Care  Outcome: Progressing     Problem: Bariatric Environmental Safety  Goal: Safety Maintained with Care  Outcome: Progressing     Problem: Diabetes Comorbidity  Goal: Blood Glucose Level Within Targeted Range  Outcome: Progressing

## 2025-06-19 NOTE — ASSESSMENT & PLAN NOTE
Patient has a current diagnosis of Hypertensive emergency with end organ damage evidenced by acute pulmonary edema without heart failure which is uncontrolled.  Latest blood pressure and vitals reviewed-   Temp:  [97 °F (36.1 °C)-98.6 °F (37 °C)]   Pulse:  []   Resp:  [13-31]   BP: (107-192)/()   SpO2:  [86 %-100 %] .   Patient currently on IV antihypertensives.   Home meds for hypertension were reviewed and noted below.   Hypertension Medications              furosemide (LASIX) 40 MG tablet TAKE 1 TABLET(40 MG) BY MOUTH EVERY DAY    losartan (COZAAR) 25 MG tablet Take 1 tablet (25 mg total) by mouth once daily.    metoprolol succinate (TOPROL-XL) 100 MG 24 hr tablet TAKE 1 TABLET(100 MG) BY MOUTH DAILY            Medication adjustment for hospital antihypertensives is as follows-     Will aim for controlled BP reduction by medications noted above. Monitor and mitigate end organ damage as indicated.

## 2025-06-19 NOTE — ASSESSMENT & PLAN NOTE
- Has not been routinely using CPAP at home d/t issue with mask fit.  Recently got new mask and is established with pulm and sleep medicine.  - counseled pt on use  - continue CPAP nightly while inpatient x at least 4 hrs QHS

## 2025-06-19 NOTE — ASSESSMENT & PLAN NOTE
- COPD with acute exacerbation, baseline hypoxia on chronic home O2  - CXR NAF, afebrile, without focal physical exam findings  - can likely discontinue antibiotics  - continue supplemental O2 2L   - continue steroids x 5 days  - continue duo-nebs q4h PRN  - order ICS-LABA and educate pt on use while inpatient  - order LAMA and educate pt on use while inpatient

## 2025-06-19 NOTE — PROGRESS NOTES
[facility] Medicine  Progress Note    Patient Name: Sandee Evans  MRN: [id number]  Patient Class: IP- Inpatient   Admission Date: 6/18/2025  Length of Stay: 1 days  Attending Physician: Mateo Erickson MD  Primary Care Provider: No primary care provider on file.        Subjective     Principal Problem:<principal problem not specified>        HPI:  A 69-year-old F with medical history significant for chronic atrial fibrillation anticoagulation with Xarelto, HIEU on BiPAP, diastolic CHF, pulmonary HTN, tobacco use, hyperlipidemia, cognitive impairment and ?  COPD with chronic hypoxic respiratory failure on 2 LNC who presented to the ED with a chief complaint of lethargy and progressive shortness of breaths with daytime drowsiness over the past 1 week.    Per , patient was in her baseline state of health before the onset of the symptoms; however, she has had similar presentation last year.  There was associated cough but no fever or chills.  No recent travel history, or positive history of contact with anyone with a respiratory illness.    On presentation to the ED patient was hypertensive with increased work of breathing and somnolence.  Laboratory investigations were pertinent for pCO2 of 76, , troponin not elevated chest x-ray significant for cardiomegaly with early CHF changes.  Procalcitonin 0.02; UDS negative; VBG: PH 7.2, pCO2 81.6, PO2 28, patient received duo nebs and IV Lasix in the ED and was admitted to the ICU for further management.    Overview/Hospital Course:  A 69-year-old F with medical history significant for chronic atrial fibrillation anticoagulation with Xarelto, HIEU on BiPAP, diastolic CHF, pulmonary HTN, tobacco use, hyperlipidemia, cognitive impairment and ?  COPD with chronic hypoxic respiratory failure on 2 LNC who presented to the ED with a chief complaint of lethargy and progressive shortness of breaths with daytime drowsiness over the past 1 week;  admitted to the ICU for acute on chronic hypoxic hypercarbic respiratory failure on BiPAP; currently weaned off and stable for transfer to telemetry       Interval History:  No acute event overnight.  Patient seen in bed this morning; currently off BiPAP.  Oriented, and asking for food.      Review of Systems  Objective:     Vital Signs (Most Recent):  Temp: 97.8 °F (36.6 °C) (06/19/25 0800)  Pulse: 91 (06/19/25 1000)  Resp: (!) 28 (06/19/25 1000)  BP: (!) 157/75 (06/19/25 1000)  SpO2: (!) 94 % (06/19/25 1000) Vital Signs (24h Range):  Temp:  [97 °F (36.1 °C)-98.6 °F (37 °C)] 97.8 °F (36.6 °C)  Pulse:  [] 91  Resp:  [13-31] 28  SpO2:  [86 %-100 %] 94 %  BP: (107-192)/() 157/75     Weight: 109.8 kg (242 lb)  Body mass index is 42.87 kg/m².    Intake/Output Summary (Last 24 hours) at 6/19/2025 1055  Last data filed at 6/19/2025 0500  Gross per 24 hour   Intake --   Output 4750 ml   Net -4750 ml         Physical Exam  Constitutional:       General: She is not in acute distress.     Appearance: She is not ill-appearing, toxic-appearing or diaphoretic.   Cardiovascular:      Rate and Rhythm: Normal rate. Rhythm irregular.      Pulses: Normal pulses.      Heart sounds: Normal heart sounds.   Pulmonary:      Breath sounds: Wheezing present.      Comments: Markedly improved work of breathing; with increase air entry compared to previous examination  Abdominal:      General: There is no distension.      Palpations: Abdomen is soft. There is no mass.   Musculoskeletal:      Right lower leg: Edema present.      Left lower leg: Edema present.   Neurological:      Mental Status: She is oriented to person, place, and time. Mental status is at baseline.               Significant Labs: CBC:   Recent Labs   Lab 06/18/25  1325 06/18/25  1736   WBC 6.51 6.53   HGB 11.8* 14.5   HCT 38.2 47.8    217     CMP:   Recent Labs   Lab 06/18/25  1325 06/19/25  0329    142   K 4.3 4.1    98   CO2 31* 31*   GLU 89  95   BUN 23 20   CREATININE 0.9 0.8   CALCIUM 8.8 9.0   PROT 6.7 6.7   ALBUMIN 3.0* 3.0*   BILITOT 0.7 1.0   ALKPHOS 85 83   AST 37 29   ALT 35 33   ANIONGAP 10 13       Significant Imaging:   CT Head Without Contrast   Final Result      1. Allowing for extensive motion artifact, no convincing acute intracranial abnormalities noting sequela of chronic microvascular ischemic change and senescent change.         Electronically signed by: Macario Pagan MD   Date:    06/18/2025   Time:    14:29      X-Ray Chest AP Portable   Final Result      Cardiomegaly and possible mild early CHF.         Electronically signed by: Steven Chauhan MD   Date:    06/18/2025   Time:    13:37              Assessment & Plan  Hyperlipidemia  Continue statin    Longstanding persistent atrial fibrillation  Patient has long standing persistent (>12 months) atrial fibrillation. Patient is currently in atrial fibrillation. [id number] Score: 3. The patients heart rate in the last 24 hours is as follows:  Pulse  Min: 71  Max: 105     Antiarrhythmics  metoprolol succinate (TOPROL-XL) 24 hr tablet 100 mg, Daily, Oral    Anticoagulants  rivaroxaban tablet 20 mg, Daily, Oral    Plan  - Replete lytes with a goal of K>4, Mg >2  - Patient is anticoagulated, see medications listed above.  - Patient's afib is currently controlled  Acute on chronic diastolic CHF (congestive heart failure)  Patient has Diastolic (HFpEF) heart failure that is Acute on chronic. On presentation their CHF was decompensated. Evidence of decompensated CHF on presentation includes: crackles on lung auscultation, dyspnea on exertion (ESQUIVEL), and shortness of breath. The etiology of their decompensation is likely increased fluid intake. Most recent BNP and echo results are listed below.  Recent Labs     06/18/25  1325   *     Latest ECHO  Results for orders placed during the hospital encounter of 08/06/24    Echo    Interpretation Summary    Left Ventricle: The left ventricle  is normal in size. There is mild concentric hypertrophy. There is normal systolic function with a visually estimated ejection fraction of 60 - 65%.    Right Ventricle: Mild right ventricular enlargement. Systolic function is normal.    Left Atrium: Left atrium is severely dilated.    Right Atrium: Right atrium is severely dilated.    Mitral Valve: There is mild to moderate regurgitation.    Tricuspid Valve: There is mild to moderate regurgitation.    Pulmonary Artery: The estimated pulmonary artery systolic pressure is 65 mmHg.    IVC/SVC: Elevated venous pressure at 15 mmHg.    Current Heart Failure Medications  losartan tablet 25 mg, Daily, Oral  metoprolol succinate (TOPROL-XL) 24 hr tablet 100 mg, Daily, Oral  furosemide injection 40 mg, Every 12 hours, Intravenous  hydrALAZINE injection 10 mg, Every 4 hours PRN, Intravenous    Plan  - Monitor strict I&Os and daily weights.    - Place on telemetry  - Low sodium diet  - Place on fluid restriction of 1.5 L.   - Cardiology has not been consulted  - The patient's volume status is worsening as indicated by dyspnea on exertion (ESQUIVEL) and shortness of breath. Will continue current treatment      Pulmonary HTN  Possibly group 2 and 3 secondary to HFpEF and HIEU  Noted elevated PSAP severely dilated right atrium on TTE from 08/2024  Treat underlying cause    COPD exacerbation  Patient's COPD is with exacerbation noted by continued dyspnea, use of accessory muscles for breathing, and worsening of baseline hypoxia currently.  Patient is currently on COPD Pathway. Continue scheduled inhalers Steroids, Antibiotics, and Supplemental oxygen and monitor respiratory status closely.   -unable to find formal PFT on chart  -obtain pulmonology consult  Cognitive impairment  Noted baseline cognitive impairment  Patient at risk of hospital delirium  Continue all psych medications    Hypertensive emergency  Patient has a current diagnosis of Hypertensive emergency with end organ damage  evidenced by acute pulmonary edema without heart failure which is uncontrolled.  Latest blood pressure and vitals reviewed-   Temp:  [97 °F (36.1 °C)-98.6 °F (37 °C)]   Pulse:  []   Resp:  [13-31]   BP: (107-192)/()   SpO2:  [86 %-100 %] .   Patient currently on IV antihypertensives.   Home meds for hypertension were reviewed and noted below.   Hypertension Medications              furosemide (LASIX) 40 MG tablet TAKE 1 TABLET(40 MG) BY MOUTH EVERY DAY    losartan (COZAAR) 25 MG tablet Take 1 tablet (25 mg total) by mouth once daily.    metoprolol succinate (TOPROL-XL) 100 MG 24 hr tablet TAKE 1 TABLET(100 MG) BY MOUTH DAILY            Medication adjustment for hospital antihypertensives is as follows-     Will aim for controlled BP reduction by medications noted above. Monitor and mitigate end organ damage as indicated.  Acute hypoxic on chronic hypercapnic respiratory failure  Patient with Hypercapnic and Hypoxic Respiratory failure which is Acute on chronic.  she is on home oxygen at 2 LPM. Supplemental oxygen was provided and noted- Oxygen Concentration (%):  [30] 30    .   Signs/symptoms of respiratory failure include- increased work of breathing, respiratory distress, wheezing, and lethargy. Contributing diagnoses includes - CHF and COPD Labs and images were reviewed. Patient Has recent ABG, which has been reviewed. Will treat underlying causes and adjust management of respiratory failure as follows- BiPAP as tolerated; treat underlying cause  HIEU on CPAP  History of noncompliance  Start CPAP per home routine  Counseled patient on compliance before discharge    Tobacco use  Counseled patient on cessation  Nicotine patch ordered  Viral Pneumonia  Likely precipitating COPD exacerbation above  Respiratory panel positive for parainfluenza virus  Sputum culture positive for many Gram-positive cocci; unknown at this time if this represent normal respiratory consult vs superadded bacterial infection; we  will continue empiric antibiotics  Antibiotics (From admission, onward)      Start     Stop Route Frequency Ordered    06/19/25 1215  cefTRIAXone injection 2 g         -- IV Every 24 hours (non-standard times) 06/19/25 1101    06/19/25 1045  mupirocin 2 % ointment         06/24/25 0859 Nasl 2 times daily 06/19/25 0940    06/19/25 0945  azithromycin tablet 500 mg         -- Oral Daily 06/19/25 0939            Microbiology Results (last 7 days)       Procedure Component Value Units Date/Time    Respiratory Infection Panel (PCR), Nasopharyngeal [[id number]] Collected: 06/18/25 1759    Order Status: Sent Specimen: Nasopharyngeal Swab Updated: 06/18/25 1810          VTE Risk Mitigation (From admission, onward)           Ordered     rivaroxaban tablet 20 mg  Daily         06/18/25 1711                    Discharge Planning   JACQUELINE:      Code Status: Full Code   Medical Readiness for Discharge Date:                Critical care time spent on the evaluation and treatment of severe organ dysfunction, review of pertinent labs and imaging studies, discussions with consulting providers and discussions with patient/family: more than 35 minutes.            Mateo Erickson MD  Department of Hospital Medicine   [facility] - Intensive Care

## 2025-06-19 NOTE — ASSESSMENT & PLAN NOTE
- Likely contributing to acute hypoxic resp failure, though majority driving factor COPD exacerbation  - Lasix 40 q12h ordered   - f/u I/O  - echo ordered, f/u results

## 2025-06-19 NOTE — ASSESSMENT & PLAN NOTE
Likely precipitating COPD exacerbation above  Respiratory panel positive for parainfluenza virus  Sputum culture positive for many Gram-positive cocci; unknown at this time if this represent normal respiratory consult vs superadded bacterial infection; we will continue empiric antibiotics  Antibiotics (From admission, onward)      Start     Stop Route Frequency Ordered    06/19/25 1215  cefTRIAXone injection 2 g         -- IV Every 24 hours (non-standard times) 06/19/25 1101    06/19/25 1045  mupirocin 2 % ointment         06/24/25 0859 Nasl 2 times daily 06/19/25 0940    06/19/25 0945  azithromycin tablet 500 mg         -- Oral Daily 06/19/25 0939            Microbiology Results (last 7 days)       Procedure Component Value Units Date/Time    Respiratory Infection Panel (PCR), Nasopharyngeal [[id number]] Collected: 06/18/25 8836    Order Status: Sent Specimen: Nasopharyngeal Swab Updated: 06/18/25 5835

## 2025-06-19 NOTE — HPI
69-year-old female with history of HIEU, COPD, CHF, Afib, daily tobacco use, on home O2 2L PRN who presented to the ED yesterday d/t worsening shortness of breath.  Per ED note, also with increasing hallucinations and excessive daytime drowsiness.      Initial symptom onset x 2-3 weeks ago.  Started with increasing cough, increased production of sputum, and change in sputum color from white to green/yellow.  During this time period with overall increased shortness of breath.  Pt wears home O2 intermittently.  Decides when to use home O2 based on how she is feeling, for example - if she is feeling like she will pass out.  Attempts home CPAP use 2-3 times per week.  Has issues with mask fit.   Following with sleep medicine and pulm.  Prescribed daily inhalers, but does not know when to take them.  Does not take daily.

## 2025-06-19 NOTE — HOSPITAL COURSE
A 69-year-old F with medical history significant for chronic atrial fibrillation anticoagulation with Xarelto, HIEU on BiPAP, diastolic CHF, pulmonary HTN, tobacco use, hyperlipidemia, cognitive impairment and ?  COPD with chronic hypoxic respiratory failure on 2 LNC who presented to the ED with a chief complaint of lethargy and progressive shortness of breaths with daytime drowsiness over the past 1 week; admitted to the ICU for acute on chronic hypoxic hypercarbic respiratory failure on BiPAP; currently weaned off and stable for transfer to telemetry   Patient was diuresed while on transitioned to p.o. Lasix.  Continues to wear BiPAP at night.  Plan to complete azithromycin and prednisone for a total of 5 days.  Overall, she is doing well and feeling back to her baseline.  She does report that she was not taking her Lasix every single day so plan to continue with once daily at discharge.  Patient was discharged home in stable condition.  She has home oxygen and home CPAP.  Azithromycin and prednisone prescription sent to pharmacy of choice.  Patient discharged home.

## 2025-06-19 NOTE — H&P
[facility]  History & Physical    Patient Name: Sandee Evans  MRN: [id number]  Patient Class: IP- Inpatient  Admission Date: 6/18/2025  Attending Physician: Mateo Erickson MD   Primary Care Provider: No primary care provider on file.         Patient information was obtained from spouse/SO and ER records.     Subjective:     Principal Problem:<principal problem not specified>    Chief Complaint:   Chief Complaint   Patient presents with    Psychiatric Evaluation    Shortness of Breath    Cough     Pt presents to ER stating that she is SOB, has a cough and is currently Hallucinating. Pt states she has been hallucinating for 2 years but today it is really bad. Pt says she is hearing voices, and seeing people standing around her. Pt can't recall the name of the psych meds she's on. Pt denies any other issues at this time.         HPI: A 69-year-old F with medical history significant for chronic atrial fibrillation anticoagulation with Xarelto, HIEU on BiPAP, diastolic CHF, pulmonary HTN, tobacco use, hyperlipidemia, cognitive impairment and ?  COPD with chronic hypoxic respiratory failure on 2 LNC who presented to the ED with a chief complaint of lethargy and progressive shortness of breaths with daytime drowsiness over the past 1 week.    Per , patient was in her baseline state of health before the onset of the symptoms; however, she has had similar presentation last year.  There was associated cough but no fever or chills.  No recent travel history, or positive history of contact with anyone with a respiratory illness.    On presentation to the ED patient was hypertensive with increased work of breathing and somnolence.  Laboratory investigations were pertinent for pCO2 of 76, , troponin not elevated chest x-ray significant for cardiomegaly with early CHF changes.  Procalcitonin 0.02; UDS negative; VBG: PH 7.2, pCO2 81.6, PO2 28, patient received duo nebs and IV  Lasix in the ED and was admitted to the ICU for further management.    Past Medical History:   Diagnosis Date    Anticoagulant long-term use     Xarelto    Arthritis     Atrial fibrillation     Breast cyst     CHF (congestive heart failure)     Degenerative disc disease     Edema     HLD (hyperlipidemia)     Hx of psychiatric care     Hyperlipidemia     Hypertension     Hypothyroidism     Nuclear sclerosis, bilateral 12/18/2017    Obesity, morbid     HIEU (obstructive sleep apnea)     Other abnormal glucose     pre-diabetes    Pre-diabetes     Psychiatric problem     Requires assistance with activities of daily living (ADL)     Sleep apnea     Smoker     SOB (shortness of breath)     SOB (shortness of breath)     Thyroid disease     on meds 8-9 years ago. hypothyroidism.no malignancy    TMJ (temporomandibular joint disorder)     jaw clicking    Tobacco abuse     Unsteady gait     Urge incontinence     Weakness generalized     Wears glasses        Past Surgical History:   Procedure Laterality Date    BREAST BIOPSY Right     over 10 yrs ago/ benign    BREAST CYST EXCISION Right     COLONOSCOPY N/A 06/25/2019    Procedure: COLONOSCOPY;  Surgeon: Micheline Flores MD;  Location: [facility];  Service: Endoscopy;  Laterality: N/A;  RX XARELTO ok to hold (2 days) per Dr. Naik see scan 3/20/19    COLONOSCOPY N/A 9/20/2024    Procedure: COLONOSCOPY;  Surgeon: Mario Sheridan MD;  Location: [facility];  Service: Endoscopy;  Laterality: N/A;  Ref by Dr HEMANT Miller, Pending Xarelto hold, Suprep, portal - PC  ok to hold Xarelto 2 days per Dr Grissom-GT  9/4 pt rescheduled. Xarelto hold x2 days, suprep and emailed to [email or web address]. venita  9/13 precall complete-RB    ENDOSCOPIC ULTRASOUND OF UPPER GASTROINTESTINAL TRACT N/A 01/26/2021    Procedure: ULTRASOUND-ENDOSCOPIC-UPPER;  Surgeon: Stevenson Philippe MD;  Location: [facility];  Service: Endoscopy;  Laterality: N/A;    HYSTERECTOMY      JOINT REPLACEMENT Bilateral     knees     OOPHORECTOMY      rt.knee surgery 2016      TOTAL KNEE ARTHROPLASTY Left 02/19/2019    Procedure: ARTHROPLASTY, KNEE, TOTAL;  Surgeon: Med Hanson MD;  Location: [facility];  Service: Orthopedics;  Laterality: Left;  10AM START PER  PER JAYLIN TEXT @ 8:34AM ON 2-18-19  SUPINE  HARRIS LOYA [phone] TEXTED HIM ON 1-24-19 @ 7:57AM  RN PRE OP 2-13-19--BMI--48.9    underarm gland\ Bilateral        Review of patient's allergies indicates:   Allergen Reactions    Ace inhibitors      Cough         No current facility-administered medications on file prior to encounter.     Current Outpatient Medications on File Prior to Encounter   Medication Sig    furosemide (LASIX) 40 MG tablet TAKE 1 TABLET(40 MG) BY MOUTH EVERY DAY    albuterol (PROVENTIL/VENTOLIN HFA) 90 mcg/actuation inhaler 2 puffs every 6 (six) hours as needed.    atorvastatin (LIPITOR) 40 MG tablet TAKE 1 TABLET(40 MG) BY MOUTH EVERY DAY    azelastine (ASTELIN) 137 mcg (0.1 %) nasal spray 1 spray (137 mcg total) by Nasal route 2 (two) times daily.    buPROPion (WELLBUTRIN XL) 150 MG TB24 tablet Take 150 mg by mouth.    cetirizine (ZYRTEC) 10 MG tablet Take 1 tablet (10 mg total) by mouth once daily.    clobetasol 0.05% (TEMOVATE) 0.05 % Oint APPLY TOPICALLY TO THE AFFECTED AREA OF RASH ON BACK TWICE DAILY    fluticasone furoate-vilanteroL (BREO ELLIPTA) 100-25 mcg/dose diskus inhaler Inhale 1 puff into the lungs once daily. Controller    fluticasone propionate (FLONASE) 50 mcg/actuation nasal spray SHAKE LIQUID AND USE 2 SPRAYS(100 MCG) IN EACH NOSTRIL EVERY DAY    fluticasone-salmeterol diskus inhaler 250-50 mcg Inhale 1 puff into the lungs 2 (two) times daily.    ketoconazole (NIZORAL) 2 % cream APPLY TOPICALLY TO THE AFFECTED AREA EVERY DAY    LIDOcaine-prilocaine (EMLA) cream Apply topically as needed.    losartan (COZAAR) 25 MG tablet Take 1 tablet (25 mg total) by mouth once daily.    melatonin (MELATIN) 5 mg Take 1 tablet (5 mg total) by  mouth nightly.    metoprolol succinate (TOPROL-XL) 100 MG 24 hr tablet TAKE 1 TABLET(100 MG) BY MOUTH DAILY    sertraline (ZOLOFT) 100 MG tablet TAKE 1 TABLET(100 MG) BY MOUTH DAILY    SPIRIVA RESPIMAT 2.5 mcg/actuation inhaler INHALE 2 PUFFS BY MOUTH DAILY    terbinafine HCL (LAMISIL) 250 mg tablet Take 250 mg by mouth.    traMADoL (ULTRAM) 50 mg tablet Take 50 mg by mouth every 12 (twelve) hours as needed.    triamcinolone acetonide 0.1% (KENALOG) 0.1 % ointment APPLY BETWEEN THE KNEES AND UPPER THIGHS TWICE DAILY FOR NO MORE THAN 7 TO 14 DAYS    XARELTO 20 mg Tab Take 1 tablet (20 mg total) by mouth Daily.     Family History       Problem Relation (Age of Onset)    Cancer Mother, Brother    Diabetes Mother, Brother    Hypertension Mother    No Known Problems Father, Sister, Maternal Aunt, Maternal Uncle, Paternal Aunt, Paternal Uncle, Maternal Grandmother, Maternal Grandfather, Paternal Grandmother, Paternal Grandfather, Other          Tobacco Use    Smoking status: Every Day     Current packs/day: 0.50     Average packs/day: 0.5 packs/day for 52.5 years (26.2 ttl pk-yrs)     Types: Cigarettes     Start date: 1973    Smokeless tobacco: Current   Substance and Sexual Activity    Alcohol use: No    Drug use: No    Sexual activity: Yes     Partners: Male     Review of Systems   Reason unable to perform ROS: Cognitive impairment and on BiPAP.     Objective:     Vital Signs (Most Recent):  Temp: 98.6 °F (37 °C) (06/18/25 1730)  Pulse: 90 (06/18/25 1839)  Resp: (!) 27 (06/18/25 1839)  BP: (!) 184/86 (06/18/25 1839)  SpO2: (!) 94 % (06/18/25 1839) Vital Signs (24h Range):  Temp:  [98.4 °F (36.9 °C)-98.6 °F (37 °C)] 98.6 °F (37 °C)  Pulse:  [77-99] 90  Resp:  [13-27] 27  SpO2:  [86 %-99 %] 94 %  BP: (142-192)/() 184/86     Weight: 116.3 kg (256 lb 6.3 oz)  Body mass index is 45.42 kg/m².     Physical Exam  Constitutional:       General: She is in acute distress.      Appearance: She is not ill-appearing,  toxic-appearing or diaphoretic.   Cardiovascular:      Rate and Rhythm: Normal rate. Rhythm irregular.      Pulses: Normal pulses.      Heart sounds: Normal heart sounds. No murmur heard.  Pulmonary:      Breath sounds: No stridor. Wheezing present.      Comments: Increased work of breathing  Abdominal:      General: Bowel sounds are normal. There is no distension.      Palpations: There is mass.   Neurological:      Mental Status: She is disoriented.                Significant Labs: CBC:   Recent Labs   Lab 06/18/25  1325 06/18/25  1736   WBC 6.51 6.53   HGB 11.8* 14.5   HCT 38.2 47.8    217     CMP:   Recent Labs   Lab 06/18/25  1325      K 4.3      CO2 31*   GLU 89   BUN 23   CREATININE 0.9   CALCIUM 8.8   PROT 6.7   ALBUMIN 3.0*   BILITOT 0.7   ALKPHOS 85   AST 37   ALT 35   ANIONGAP 10       Significant Imaging: I have reviewed all pertinent imaging results/findings within the past 24 hours.  Assessment/Plan:     Assessment & Plan  Hyperlipidemia  Continue statin    Longstanding persistent atrial fibrillation  Patient has long standing persistent (>12 months) atrial fibrillation. Patient is currently in atrial fibrillation. [id number] Score: 3. The patients heart rate in the last 24 hours is as follows:  Pulse  Min: 77  Max: 99     Antiarrhythmics  metoprolol succinate (TOPROL-XL) 24 hr tablet 100 mg, Daily, Oral    Anticoagulants  rivaroxaban tablet 20 mg, Daily, Oral    Plan  - Replete lytes with a goal of K>4, Mg >2  - Patient is anticoagulated, see medications listed above.  - Patient's afib is currently controlled  Acute on chronic diastolic CHF (congestive heart failure)  Patient has Diastolic (HFpEF) heart failure that is Acute on chronic. On presentation their CHF was decompensated. Evidence of decompensated CHF on presentation includes: crackles on lung auscultation, dyspnea on exertion (ESQUIVEL), and shortness of breath. The etiology of their decompensation is likely increased fluid  intake. Most recent BNP and echo results are listed below.  Recent Labs     06/18/25  1325   *     Latest ECHO  Results for orders placed during the hospital encounter of 08/06/24    Echo    Interpretation Summary    Left Ventricle: The left ventricle is normal in size. There is mild concentric hypertrophy. There is normal systolic function with a visually estimated ejection fraction of 60 - 65%.    Right Ventricle: Mild right ventricular enlargement. Systolic function is normal.    Left Atrium: Left atrium is severely dilated.    Right Atrium: Right atrium is severely dilated.    Mitral Valve: There is mild to moderate regurgitation.    Tricuspid Valve: There is mild to moderate regurgitation.    Pulmonary Artery: The estimated pulmonary artery systolic pressure is 65 mmHg.    IVC/SVC: Elevated venous pressure at 15 mmHg.    Current Heart Failure Medications  losartan tablet 25 mg, Daily, Oral  metoprolol succinate (TOPROL-XL) 24 hr tablet 100 mg, Daily, Oral  furosemide injection 40 mg, Every 12 hours, Intravenous  hydrALAZINE injection 10 mg, Every 4 hours PRN, Intravenous    Plan  - Monitor strict I&Os and daily weights.    - Place on telemetry  - Low sodium diet  - Place on fluid restriction of 1.5 L.   - Cardiology has not been consulted  - The patient's volume status is worsening as indicated by dyspnea on exertion (ESQUIVEL) and shortness of breath. Will continue current treatment      Pulmonary HTN  Possibly group 2 and 3 secondary to HFpEF and HIEU  Noted elevated PSAP severely dilated right atrium on TTE from 08/2024  Treat underlying cause    COPD exacerbation  Patient's COPD is with exacerbation noted by continued dyspnea, use of accessory muscles for breathing, and worsening of baseline hypoxia currently.  Patient is currently on COPD Pathway. Continue scheduled inhalers Steroids, Antibiotics, and Supplemental oxygen and monitor respiratory status closely.   -unable to find formal PFT on  chart  -obtain pulmonology consult  Cognitive impairment  Noted baseline cognitive impairment  Patient at risk of hospital delirium  Continue all psych medications    Hypertensive emergency  Patient has a current diagnosis of Hypertensive emergency with end organ damage evidenced by acute pulmonary edema without heart failure which is uncontrolled.  Latest blood pressure and vitals reviewed-   Temp:  [98.4 °F (36.9 °C)-98.6 °F (37 °C)]   Pulse:  [77-99]   Resp:  [13-27]   BP: (142-192)/()   SpO2:  [86 %-99 %] .   Patient currently on IV antihypertensives.   Home meds for hypertension were reviewed and noted below.   Hypertension Medications              furosemide (LASIX) 40 MG tablet TAKE 1 TABLET(40 MG) BY MOUTH EVERY DAY    losartan (COZAAR) 25 MG tablet Take 1 tablet (25 mg total) by mouth once daily.    metoprolol succinate (TOPROL-XL) 100 MG 24 hr tablet TAKE 1 TABLET(100 MG) BY MOUTH DAILY            Medication adjustment for hospital antihypertensives is as follows-     Will aim for controlled BP reduction by medications noted above. Monitor and mitigate end organ damage as indicated.  Acute hypoxic on chronic hypercapnic respiratory failure  Patient with Hypercapnic and Hypoxic Respiratory failure which is Acute on chronic.  she is on home oxygen at 2 LPM. Supplemental oxygen was provided and noted- Oxygen Concentration (%):  [30] 30    .   Signs/symptoms of respiratory failure include- increased work of breathing, respiratory distress, wheezing, and lethargy. Contributing diagnoses includes - CHF and COPD Labs and images were reviewed. Patient Has recent ABG, which has been reviewed. Will treat underlying causes and adjust management of respiratory failure as follows- BiPAP as tolerated; treat underlying cause  HIEU on CPAP  History of noncompliance  Start CPAP per home routine  Counseled patient on compliance before discharge    Tobacco use  Counseled patient on cessation  Nicotine patch ordered  VTE  Risk Mitigation (From admission, onward)           Ordered     rivaroxaban tablet 20 mg  Daily         06/18/25 1627                  Critical care time spent on the evaluation and treatment of severe organ dysfunction, review of pertinent labs and imaging studies, discussions with consulting providers and discussions with patient/family: more than 35 minutes.                  Mateo Erickson MD  Department of Hospital Medicine  [facility] [place] Int[place] Care

## 2025-06-19 NOTE — RESPIRATORY THERAPY
Latest Reference Range & Units 06/19/25 05:50   POC PH 7.35 - 7.45  7.515 (H)   POC PCO2 35 - 45 mmHg 46.3 (H)   POC PO2 80 - 100 mmHg 72 (L)   POC HCO3 24 - 28 mmol/L 37.3 (H)   POC SATURATED O2 95 - 100 % 96   Sample  ARTERIAL   POC TCO2 23 - 27 mmol/L 39 (H)   POC BE -2 to 2 mmol/L 13 (H)   FiO2  30   DelSys  CPAP/BiPAP   Site  RR   Mode  BiPAP   Rate  18   (H): Data is abnormally high  (L): Data is abnormally low

## 2025-06-19 NOTE — PROGRESS NOTES
"  [facility]) nurse navigator notified of [facility] candidate in need of education and introduction to 4-6 week program.      PT aao x 3 while lying in bed supine . Introduced self to pt as [facility] nurse navigator.     Patient given "[facility] Home Care Guide" which includes "Daily weight and symptom tracker".  Encouraged pt to review information.      Reviewed the following key points of [facility] program with pt and family:   1.) Take your medications as directed.    2.) Weight yourself daily   3.) Follow low salt and limited fluid diet.    4.) Stop smoking and start exercising   5.) Go to your appointments and call your team.      Pt reminded to follow Symptom tracker and to call at the onset of symptoms according to tracker.     Reviewed plan for follow up once discharged to include phone calls, in person and virtual visits to assist pt optimizing their heart failure medication regimen and encouraging healthy lifestyle modifications.  Reminded pt that program will assist them over the next 4-6 weeks and then patient will be transferred to long term care provider .  Reminded pt how to contact H[facility] navigator via phone and or via [employer].     Pt instructed appointment will be printed on hospital discharge paperwork.     Pt also reminded RN will call 48-72 hours after discharge to check on them.     PT verbalize read back of some of the information given.  Encouraged pt and family to read over information often and contact team with any questions or concerns.    "

## 2025-06-19 NOTE — ASSESSMENT & PLAN NOTE
History of noncompliance  Start CPAP per home routine  Counseled patient on compliance before discharge

## 2025-06-19 NOTE — ASSESSMENT & PLAN NOTE
Patient with Hypercapnic and Hypoxic Respiratory failure which is Acute on chronic.  she is on home oxygen at 2 LPM. Supplemental oxygen was provided and noted- Oxygen Concentration (%):  [30] 30    .   Signs/symptoms of respiratory failure include- tachypnea, increased work of breathing, respiratory distress, and wheezing. Contributing diagnoses includes - COPD, HIEU, tobacco use. Labs and images were reviewed. Patient Has recent ABG, which has been reviewed. Will treat underlying causes and adjust management of respiratory failure as follows-     - off BiPAP  - continue CPAP at night  - steroids for COPD exacerbation  - continue home O2

## 2025-06-20 PROBLEM — J18.9 PNEUMONIA: Status: RESOLVED | Noted: 2025-06-19 | Resolved: 2025-06-20

## 2025-06-20 PROBLEM — J96.22 ACUTE ON CHRONIC RESPIRATORY FAILURE WITH HYPOXIA AND HYPERCAPNIA: Status: ACTIVE | Noted: 2025-06-18

## 2025-06-20 PROBLEM — J96.21 ACUTE ON CHRONIC RESPIRATORY FAILURE WITH HYPOXIA AND HYPERCAPNIA: Status: ACTIVE | Noted: 2025-06-18

## 2025-06-20 LAB
ALBUMIN SERPL BCP-MCNC: 3 G/DL (ref 3.5–5.2)
ALP SERPL-CCNC: 84 UNIT/L (ref 40–150)
ALT SERPL W/O P-5'-P-CCNC: 28 UNIT/L (ref 10–44)
ANION GAP (OHS): 11 MMOL/L (ref 8–16)
AST SERPL-CCNC: 23 UNIT/L (ref 11–45)
B PERT DNA NPH QL NAA+PROBE: NOT DETECTED
BILIRUB SERPL-MCNC: 0.5 MG/DL (ref 0.1–1)
BUN SERPL-MCNC: 34 MG/DL (ref 8–23)
C PNEUM DNA LOWER RESP QL NAA+NON-PROBE: NOT DETECTED
CALCIUM SERPL-MCNC: 9 MG/DL (ref 8.7–10.5)
CHLORIDE SERPL-SCNC: 94 MMOL/L (ref 95–110)
CO2 SERPL-SCNC: 34 MMOL/L (ref 23–29)
CREAT SERPL-MCNC: 1.4 MG/DL (ref 0.5–1.4)
FLUAV RNA NPH QL NAA+NON-PROBE: NOT DETECTED
FLUBV RNA NPH QL NAA+NON-PROBE: NOT DETECTED
GFR SERPLBLD CREATININE-BSD FMLA CKD-EPI: 40 ML/MIN/1.73/M2
GLUCOSE SERPL-MCNC: 153 MG/DL (ref 70–110)
HADV DNA NPH QL NAA+NON-PROBE: NOT DETECTED
HCOV 229E RNA NPH QL NAA+NON-PROBE: NOT DETECTED
HCOV HKU1 RNA NPH QL NAA+NON-PROBE: NOT DETECTED
HCOV NL63 RNA NPH QL NAA+NON-PROBE: NOT DETECTED
HCOV OC43 RNA NPH QL NAA+NON-PROBE: NOT DETECTED
HMPV RNA LOWER RESP QL NAA+NON-PROBE: NOT DETECTED
HMPV RNA NPH QL NAA+NON-PROBE: NOT DETECTED
HPIV1 RNA NPH QL NAA+NON-PROBE: NOT DETECTED
HPIV2 RNA NPH QL NAA+NON-PROBE: NOT DETECTED
HPIV3 RNA NPH QL NAA+NON-PROBE: NOT DETECTED
HPIV4 RNA NPH QL NAA+NON-PROBE: NOT DETECTED
MAGNESIUM SERPL-MCNC: 2 MG/DL (ref 1.6–2.6)
PHOSPHATE SERPL-MCNC: 3.8 MG/DL (ref 2.7–4.5)
POTASSIUM SERPL-SCNC: 4.1 MMOL/L (ref 3.5–5.1)
PROT SERPL-MCNC: 6.8 GM/DL (ref 6–8.4)
RSV RNA NPH QL NAA+NON-PROBE: NOT DETECTED
RSV RNA NPH QL NAA+NON-PROBE: NOT DETECTED
RV+EV RNA NPH QL NAA+NON-PROBE: NOT DETECTED
SARS-COV-2 RNA RESP QL NAA+PROBE: NOT DETECTED
SODIUM SERPL-SCNC: 139 MMOL/L (ref 136–145)
SPECIMEN SOURCE: NORMAL

## 2025-06-20 PROCEDURE — 27000221 HC OXYGEN, UP TO 24 HOURS

## 2025-06-20 PROCEDURE — S4991 NICOTINE PATCH NONLEGEND: HCPCS

## 2025-06-20 PROCEDURE — 25000003 PHARM REV CODE 250

## 2025-06-20 PROCEDURE — 83735 ASSAY OF MAGNESIUM: CPT

## 2025-06-20 PROCEDURE — 94761 N-INVAS EAR/PLS OXIMETRY MLT: CPT

## 2025-06-20 PROCEDURE — 84100 ASSAY OF PHOSPHORUS: CPT

## 2025-06-20 PROCEDURE — 11000001 HC ACUTE MED/SURG PRIVATE ROOM

## 2025-06-20 PROCEDURE — 94799 UNLISTED PULMONARY SVC/PX: CPT

## 2025-06-20 PROCEDURE — 63600175 PHARM REV CODE 636 W HCPCS: Mod: JZ,TB

## 2025-06-20 PROCEDURE — 80053 COMPREHEN METABOLIC PANEL: CPT

## 2025-06-20 PROCEDURE — 63700000 PHARM REV CODE 250 ALT 637 W/O HCPCS

## 2025-06-20 PROCEDURE — 25000003 PHARM REV CODE 250: Performed by: STUDENT IN AN ORGANIZED HEALTH CARE EDUCATION/TRAINING PROGRAM

## 2025-06-20 PROCEDURE — 63600175 PHARM REV CODE 636 W HCPCS: Performed by: INTERNAL MEDICINE

## 2025-06-20 PROCEDURE — 36415 COLL VENOUS BLD VENIPUNCTURE: CPT

## 2025-06-20 PROCEDURE — 99900031 HC PATIENT EDUCATION (STAT)

## 2025-06-20 PROCEDURE — 63600175 PHARM REV CODE 636 W HCPCS: Performed by: STUDENT IN AN ORGANIZED HEALTH CARE EDUCATION/TRAINING PROGRAM

## 2025-06-20 PROCEDURE — 99900035 HC TECH TIME PER 15 MIN (STAT)

## 2025-06-20 RX ORDER — FAMOTIDINE 10 MG/ML
20 INJECTION, SOLUTION INTRAVENOUS DAILY
Status: DISCONTINUED | OUTPATIENT
Start: 2025-06-20 | End: 2025-06-20

## 2025-06-20 RX ORDER — FUROSEMIDE 40 MG/1
40 TABLET ORAL DAILY
Status: DISCONTINUED | OUTPATIENT
Start: 2025-06-21 | End: 2025-06-21 | Stop reason: HOSPADM

## 2025-06-20 RX ADMIN — FUROSEMIDE 40 MG: 10 INJECTION, SOLUTION INTRAVENOUS at 05:06

## 2025-06-20 RX ADMIN — RIVAROXABAN 15 MG: 15 TABLET, FILM COATED ORAL at 05:06

## 2025-06-20 RX ADMIN — MUPIROCIN: 20 OINTMENT TOPICAL at 09:06

## 2025-06-20 RX ADMIN — LOSARTAN POTASSIUM 25 MG: 25 TABLET, FILM COATED ORAL at 08:06

## 2025-06-20 RX ADMIN — METOPROLOL SUCCINATE 100 MG: 50 TABLET, EXTENDED RELEASE ORAL at 08:06

## 2025-06-20 RX ADMIN — NICOTINE 1 PATCH: 14 PATCH TRANSDERMAL at 08:06

## 2025-06-20 RX ADMIN — FAMOTIDINE 20 MG: 10 INJECTION, SOLUTION INTRAVENOUS at 11:06

## 2025-06-20 RX ADMIN — ATORVASTATIN CALCIUM 40 MG: 40 TABLET, FILM COATED ORAL at 08:06

## 2025-06-20 RX ADMIN — METHYLPREDNISOLONE SODIUM SUCCINATE 40 MG: 40 INJECTION, POWDER, FOR SOLUTION INTRAMUSCULAR; INTRAVENOUS at 05:06

## 2025-06-20 RX ADMIN — AZITHROMYCIN DIHYDRATE 500 MG: 250 TABLET ORAL at 08:06

## 2025-06-20 RX ADMIN — CEFTRIAXONE SODIUM 2 G: 2 INJECTION, POWDER, FOR SOLUTION INTRAMUSCULAR; INTRAVENOUS at 11:06

## 2025-06-20 RX ADMIN — SERTRALINE HYDROCHLORIDE 100 MG: 50 TABLET ORAL at 08:06

## 2025-06-20 RX ADMIN — Medication 6 MG: at 09:06

## 2025-06-20 RX ADMIN — MUPIROCIN: 20 OINTMENT TOPICAL at 08:06

## 2025-06-20 NOTE — ASSESSMENT & PLAN NOTE
Patient has Diastolic (HFpEF) heart failure that is Acute on chronic. On presentation their CHF was decompensated. Evidence of decompensated CHF on presentation includes: crackles on lung auscultation, dyspnea on exertion (ESQUIVEL), and shortness of breath. The etiology of their decompensation is likely increased fluid intake. Most recent BNP and echo results are listed below.  Recent Labs     06/18/25  1325   *     Latest ECHO  Results for orders placed during the hospital encounter of 08/06/24    Echo    Interpretation Summary    Left Ventricle: The left ventricle is normal in size. There is mild concentric hypertrophy. There is normal systolic function with a visually estimated ejection fraction of 60 - 65%.    Right Ventricle: Mild right ventricular enlargement. Systolic function is normal.    Left Atrium: Left atrium is severely dilated.    Right Atrium: Right atrium is severely dilated.    Mitral Valve: There is mild to moderate regurgitation.    Tricuspid Valve: There is mild to moderate regurgitation.    Pulmonary Artery: The estimated pulmonary artery systolic pressure is 65 mmHg.    IVC/SVC: Elevated venous pressure at 15 mmHg.    Current Heart Failure Medications  losartan tablet 25 mg, Daily, Oral  metoprolol succinate (TOPROL-XL) 24 hr tablet 100 mg, Daily, Oral  hydrALAZINE injection 10 mg, Every 4 hours PRN, Intravenous  furosemide tablet 40 mg, Daily, Oral    Plan  - Monitor strict I&Os and daily weights.    - Place on telemetry  - Low sodium diet  - Place on fluid restriction of 1.5 L.   - Cardiology has not been consulted  - The patient's volume status is improving but not at their baseline as indicated by edema  - transition to PO lasix in AM, recheck renal function in AM

## 2025-06-20 NOTE — ASSESSMENT & PLAN NOTE
History of noncompliance  Start CPAP per home routine  Counseled patient on compliance before discharge     No

## 2025-06-20 NOTE — PROGRESS NOTES
[facility] Medicine  Progress Note    Patient Name: Sandee Evans  MRN: [id number]  Patient Class: IP- Inpatient   Admission Date: 6/18/2025  Length of Stay: 2 days  Attending Physician: Tim Spence MD  Primary Care Provider: Kuldeep Miller MD        Subjective     Principal Problem:<principal problem not specified>        HPI:  A 69-year-old F with medical history significant for chronic atrial fibrillation anticoagulation with Xarelto, HIEU on BiPAP, diastolic CHF, pulmonary HTN, tobacco use, hyperlipidemia, cognitive impairment and ?  COPD with chronic hypoxic respiratory failure on 2 LNC who presented to the ED with a chief complaint of lethargy and progressive shortness of breaths with daytime drowsiness over the past 1 week.    Per , patient was in her baseline state of health before the onset of the symptoms; however, she has had similar presentation last year.  There was associated cough but no fever or chills.  No recent travel history, or positive history of contact with anyone with a respiratory illness.    On presentation to the ED patient was hypertensive with increased work of breathing and somnolence.  Laboratory investigations were pertinent for pCO2 of 76, , troponin not elevated chest x-ray significant for cardiomegaly with early CHF changes.  Procalcitonin 0.02; UDS negative; VBG: PH 7.2, pCO2 81.6, PO2 28, patient received duo nebs and IV Lasix in the ED and was admitted to the ICU for further management.    Overview/Hospital Course:  A 69-year-old F with medical history significant for chronic atrial fibrillation anticoagulation with Xarelto, HIEU on BiPAP, diastolic CHF, pulmonary HTN, tobacco use, hyperlipidemia, cognitive impairment and ?  COPD with chronic hypoxic respiratory failure on 2 LNC who presented to the ED with a chief complaint of lethargy and progressive shortness of breaths with daytime drowsiness over the past 1 week; admitted to  the ICU for acute on chronic hypoxic hypercarbic respiratory failure on BiPAP; currently weaned off and stable for transfer to telemetry       Interval History:  no acute issues, she reports feeling well today with improvement in breathing and lower extremity edema. I reviewed her labs and echocardiogram with her and her daughter at bedside. She reports getting out of bed this morning.     Review of Systems  Objective:     Vital Signs (Most Recent):  Temp: 97.2 °F (36.2 °C) (06/20/25 1230)  Pulse: 84 (06/20/25 1230)  Resp: (!) 25 (06/20/25 1230)  BP: (!) 146/58 (06/20/25 1200)  SpO2: 97 % (06/20/25 1230) Vital Signs (24h Range):  Temp:  [97.2 °F (36.2 °C)-98.4 °F (36.9 °C)] 97.2 °F (36.2 °C)  Pulse:  [] 84  Resp:  [18-35] 25  SpO2:  [94 %-98 %] 97 %  BP: (114-155)/(56-97) 146/58     Weight: 109.8 kg (242 lb)  Body mass index is 42.87 kg/m².    Intake/Output Summary (Last 24 hours) at 6/20/2025 1328  Last data filed at 6/20/2025 0215  Gross per 24 hour   Intake 527.45 ml   Output 1500 ml   Net -972.55 ml         Physical Exam  Constitutional:       General: She is not in acute distress.     Appearance: She is not ill-appearing, toxic-appearing or diaphoretic.   Cardiovascular:      Rate and Rhythm: Normal rate. Rhythm irregular.      Pulses: Normal pulses.      Heart sounds: Normal heart sounds.   Pulmonary:      Breath sounds: No wheezing.      Comments: On nasal cannula, no wheezing  Abdominal:      General: There is no distension.      Palpations: Abdomen is soft. There is no mass.   Musculoskeletal:      Right lower leg: Edema present.      Left lower leg: Edema present.      Comments: Edema improving   Neurological:      Mental Status: She is oriented to person, place, and time. Mental status is at baseline.               Significant Labs: CBC:   Recent Labs   Lab 06/18/25  1736   WBC 6.53   HGB 14.5   HCT 47.8        CMP:   Recent Labs   Lab 06/19/25  0329 06/20/25  0234    139   K 4.1 4.1    CL 98 94*   CO2 31* 34*   GLU 95 153*   BUN 20 34*   CREATININE 0.8 1.4   CALCIUM 9.0 9.0   PROT 6.7 6.8   ALBUMIN 3.0* 3.0*   BILITOT 1.0 0.5   ALKPHOS 83 84   AST 29 23   ALT 33 28   ANIONGAP 13 11       Significant Imaging:   CT Head Without Contrast   Final Result      1. Allowing for extensive motion artifact, no convincing acute intracranial abnormalities noting sequela of chronic microvascular ischemic change and senescent change.         Electronically signed by: Macario Pagan MD   Date:    06/18/2025   Time:    14:29      X-Ray Chest AP Portable   Final Result      Cardiomegaly and possible mild early CHF.         Electronically signed by: Steven Chauhan MD   Date:    06/18/2025   Time:    13:37              Assessment & Plan  Hyperlipidemia  Continue statin    Longstanding persistent atrial fibrillation  Patient has long standing persistent (>12 months) atrial fibrillation. Patient is currently in atrial fibrillation. [id number] Score: 3. The patients heart rate in the last 24 hours is as follows:  Pulse  Min: 72  Max: 107     Antiarrhythmics  metoprolol succinate (TOPROL-XL) 24 hr tablet 100 mg, Daily, Oral    Anticoagulants  rivaroxaban tablet 15 mg, Daily, Oral    Plan  - Replete lytes with a goal of K>4, Mg >2  - Patient is anticoagulated, see medications listed above.  - Patient's afib is currently controlled  Acute on chronic diastolic CHF (congestive heart failure)  Patient has Diastolic (HFpEF) heart failure that is Acute on chronic. On presentation their CHF was decompensated. Evidence of decompensated CHF on presentation includes: crackles on lung auscultation, dyspnea on exertion (ESQUIVEL), and shortness of breath. The etiology of their decompensation is likely increased fluid intake. Most recent BNP and echo results are listed below.  Recent Labs     06/18/25  1325   *     Latest ECHO  Results for orders placed during the hospital encounter of 08/06/24    Echo    Interpretation Summary     Left Ventricle: The left ventricle is normal in size. There is mild concentric hypertrophy. There is normal systolic function with a visually estimated ejection fraction of 60 - 65%.    Right Ventricle: Mild right ventricular enlargement. Systolic function is normal.    Left Atrium: Left atrium is severely dilated.    Right Atrium: Right atrium is severely dilated.    Mitral Valve: There is mild to moderate regurgitation.    Tricuspid Valve: There is mild to moderate regurgitation.    Pulmonary Artery: The estimated pulmonary artery systolic pressure is 65 mmHg.    IVC/SVC: Elevated venous pressure at 15 mmHg.    Current Heart Failure Medications  losartan tablet 25 mg, Daily, Oral  metoprolol succinate (TOPROL-XL) 24 hr tablet 100 mg, Daily, Oral  hydrALAZINE injection 10 mg, Every 4 hours PRN, Intravenous  furosemide tablet 40 mg, Daily, Oral    Plan  - Monitor strict I&Os and daily weights.    - Place on telemetry  - Low sodium diet  - Place on fluid restriction of 1.5 L.   - Cardiology has not been consulted  - The patient's volume status is improving but not at their baseline as indicated by edema  - transition to PO lasix in AM, recheck renal function in AM      Pulmonary HTN  Possibly group 2 and 3 secondary to HFpEF and HIEU  Noted elevated PSAP severely dilated right atrium on TTE from 08/2024 and continues to be elevated on repeat this admission.  Treat underlying cause    COPD exacerbation  Patient's COPD is with exacerbation noted by continued dyspnea, use of accessory muscles for breathing, and worsening of baseline hypoxia currently.  Patient is currently on COPD Pathway. Continue scheduled inhalers Steroids, Antibiotics, and Supplemental oxygen and monitor respiratory status closely.   - steroids & azithromycin x5 days  Cognitive impairment  Noted baseline cognitive impairment  Patient at risk of hospital delirium  Continue all psych medications    Hypertensive emergency  Patient has a current  diagnosis of Hypertensive emergency with end organ damage evidenced by acute pulmonary edema without heart failure which is uncontrolled.  Latest blood pressure and vitals reviewed-   Temp:  [97.2 °F (36.2 °C)-98.4 °F (36.9 °C)]   Pulse:  []   Resp:  [18-35]   BP: (114-155)/(56-97)   SpO2:  [94 %-98 %] .   Patient currently on IV antihypertensives.   Home meds for hypertension were reviewed and noted below.   Hypertension Medications              furosemide (LASIX) 40 MG tablet TAKE 1 TABLET(40 MG) BY MOUTH EVERY DAY    losartan (COZAAR) 25 MG tablet Take 1 tablet (25 mg total) by mouth once daily.    metoprolol succinate (TOPROL-XL) 100 MG 24 hr tablet TAKE 1 TABLET(100 MG) BY MOUTH DAILY            Medication adjustment for hospital antihypertensives is as follows-     Will aim for controlled BP reduction by medications noted above. Monitor and mitigate end organ damage as indicated.  Acute on chronic respiratory failure with hypoxia and hypercapnia  Patient with Hypercapnic and Hypoxic Respiratory failure which is Acute on chronic.  she is on home oxygen at 2 LPM. Supplemental oxygen was provided and noted- Oxygen Concentration (%):  [30] 30    .   Signs/symptoms of respiratory failure include- increased work of breathing, respiratory distress, wheezing, and lethargy. Contributing diagnoses includes - CHF and COPD Labs and images were reviewed. Patient Has recent ABG, which has been reviewed. Will treat underlying causes and adjust management of respiratory failure as follows- BiPAP as tolerated; treat underlying cause.    - acute issues resolved, now at baseline O2  HIEU on CPAP  History of noncompliance  Start CPAP per home routine  Counseled patient on compliance before discharge    Tobacco use  Counseled patient on cessation  Nicotine patch ordered  VTE Risk Mitigation (From admission, onward)           Ordered     rivaroxaban tablet 15 mg  Daily         06/20/25 1331                    Discharge  Planning   JACQUELINE: 6/21/2025     Code Status: Full Code   Medical Readiness for Discharge Date:   Discharge Plan A: Home with family          SDOH Screening:  The patient was screened for utility difficulties, food insecurity, transport difficulties, housing insecurity, and interpersonal safety and there were no concerns identified this admission.                Tim Spence MD  Department of Hospital Medicine   [facility] - [place] - Intensive Care

## 2025-06-20 NOTE — ASSESSMENT & PLAN NOTE
Patient has a current diagnosis of Hypertensive emergency with end organ damage evidenced by acute pulmonary edema without heart failure which is uncontrolled.  Latest blood pressure and vitals reviewed-   Temp:  [97.2 °F (36.2 °C)-98.4 °F (36.9 °C)]   Pulse:  []   Resp:  [18-35]   BP: (114-155)/(56-97)   SpO2:  [94 %-98 %] .   Patient currently on IV antihypertensives.   Home meds for hypertension were reviewed and noted below.   Hypertension Medications              furosemide (LASIX) 40 MG tablet TAKE 1 TABLET(40 MG) BY MOUTH EVERY DAY    losartan (COZAAR) 25 MG tablet Take 1 tablet (25 mg total) by mouth once daily.    metoprolol succinate (TOPROL-XL) 100 MG 24 hr tablet TAKE 1 TABLET(100 MG) BY MOUTH DAILY            Medication adjustment for hospital antihypertensives is as follows-     Will aim for controlled BP reduction by medications noted above. Monitor and mitigate end organ damage as indicated.

## 2025-06-20 NOTE — ASSESSMENT & PLAN NOTE
Patient has long standing persistent (>12 months) atrial fibrillation. Patient is currently in atrial fibrillation. [id number] Score: 3. The patients heart rate in the last 24 hours is as follows:  Pulse  Min: 72  Max: 107     Antiarrhythmics  metoprolol succinate (TOPROL-XL) 24 hr tablet 100 mg, Daily, Oral    Anticoagulants  rivaroxaban tablet 15 mg, Daily, Oral    Plan  - Replete lytes with a goal of K>4, Mg >2  - Patient is anticoagulated, see medications listed above.  - Patient's afib is currently controlled

## 2025-06-20 NOTE — EICU
Virtual ICU Quality Rounds    Admit Date: 6/18/2025  Hospital Day: 2    ICU Day: 1d 14h    24H Vital Sign Range:  Temp:  [97.4 °F (36.3 °C)-98.4 °F (36.9 °C)]   Pulse:  []   Resp:  [18-35]   BP: (103-177)/(56-97)   SpO2:  [94 %-98 %]     VICU Surveillance Screening  LDA reconciliation : Yes

## 2025-06-20 NOTE — NURSING
Report called to ethan wilcox to transfer to rm [id number], placed on tel # [phone], [facility] room made aware

## 2025-06-20 NOTE — ASSESSMENT & PLAN NOTE
Possibly group 2 and 3 secondary to HFpEF and HIEU  Noted elevated PSAP severely dilated right atrium on TTE from 08/2024 and continues to be elevated on repeat this admission.  Treat underlying cause

## 2025-06-20 NOTE — PLAN OF CARE
Problem: Adult Inpatient Plan of Care  Goal: Plan of Care Review  Outcome: Progressing  Goal: Patient-Specific Goal (Individualized)  Outcome: Progressing  Goal: Absence of Hospital-Acquired Illness or Injury  Outcome: Progressing  Goal: Optimal Comfort and Wellbeing  Outcome: Progressing  Goal: Readiness for Transition of Care  Outcome: Progressing     Problem: Bariatric Environmental Safety  Goal: Safety Maintained with Care  Outcome: Progressing     Problem: Pneumonia  Goal: Fluid Balance  Outcome: Progressing  Goal: Resolution of Infection Signs and Symptoms  Outcome: Progressing  Goal: Effective Oxygenation and Ventilation  Outcome: Progressing

## 2025-06-20 NOTE — SUBJECTIVE & OBJECTIVE
Interval History:  no acute issues, she reports feeling well today with improvement in breathing and lower extremity edema. I reviewed her labs and echocardiogram with her and her daughter at bedside. She reports getting out of bed this morning.     Review of Systems  Objective:     Vital Signs (Most Recent):  Temp: 97.2 °F (36.2 °C) (06/20/25 1230)  Pulse: 84 (06/20/25 1230)  Resp: (!) 25 (06/20/25 1230)  BP: (!) 146/58 (06/20/25 1200)  SpO2: 97 % (06/20/25 1230) Vital Signs (24h Range):  Temp:  [97.2 °F (36.2 °C)-98.4 °F (36.9 °C)] 97.2 °F (36.2 °C)  Pulse:  [] 84  Resp:  [18-35] 25  SpO2:  [94 %-98 %] 97 %  BP: (114-155)/(56-97) 146/58     Weight: 109.8 kg (242 lb)  Body mass index is 42.87 kg/m².    Intake/Output Summary (Last 24 hours) at 6/20/2025 1328  Last data filed at 6/20/2025 0215  Gross per 24 hour   Intake 527.45 ml   Output 1500 ml   Net -972.55 ml         Physical Exam  Constitutional:       General: She is not in acute distress.     Appearance: She is not ill-appearing, toxic-appearing or diaphoretic.   Cardiovascular:      Rate and Rhythm: Normal rate. Rhythm irregular.      Pulses: Normal pulses.      Heart sounds: Normal heart sounds.   Pulmonary:      Breath sounds: No wheezing.      Comments: On nasal cannula, no wheezing  Abdominal:      General: There is no distension.      Palpations: Abdomen is soft. There is no mass.   Musculoskeletal:      Right lower leg: Edema present.      Left lower leg: Edema present.      Comments: Edema improving   Neurological:      Mental Status: She is oriented to person, place, and time. Mental status is at baseline.               Significant Labs: CBC:   Recent Labs   Lab 06/18/25  1736   WBC 6.53   HGB 14.5   HCT 47.8        CMP:   Recent Labs   Lab 06/19/25  0329 06/20/25  0234    139   K 4.1 4.1   CL 98 94*   CO2 31* 34*   GLU 95 153*   BUN 20 34*   CREATININE 0.8 1.4   CALCIUM 9.0 9.0   PROT 6.7 6.8   ALBUMIN 3.0* 3.0*   BILITOT 1.0 0.5    ALKPHOS 83 84   AST 29 23   ALT 33 28   ANIONGAP 13 11       Significant Imaging:   CT Head Without Contrast   Final Result      1. Allowing for extensive motion artifact, no convincing acute intracranial abnormalities noting sequela of chronic microvascular ischemic change and senescent change.         Electronically signed by: Macario Pagan MD   Date:    06/18/2025   Time:    14:29      X-Ray Chest AP Portable   Final Result      Cardiomegaly and possible mild early CHF.         Electronically signed by: Steven Chauhan MD   Date:    06/18/2025   Time:    13:37

## 2025-06-20 NOTE — PROGRESS NOTES
[facility] [place] 4 [place]      6/19/2025       Skin assessed on: Q Shift      [x] No Pressure Injuries Present    [x]Prevention Measures Documented    [] Yes LDA  for Pressure Injury Previously documented     [] Yes New Pressure Injury Discovered   [] LDA for New Pressure Injury Added      Attending RN:  Minda Pennington RN     Second RN:  CLAUDIA Craft

## 2025-06-20 NOTE — ASSESSMENT & PLAN NOTE
Patient with Hypercapnic and Hypoxic Respiratory failure which is Acute on chronic.  she is on home oxygen at 2 LPM. Supplemental oxygen was provided and noted- Oxygen Concentration (%):  [30] 30    .   Signs/symptoms of respiratory failure include- increased work of breathing, respiratory distress, wheezing, and lethargy. Contributing diagnoses includes - CHF and COPD Labs and images were reviewed. Patient Has recent ABG, which has been reviewed. Will treat underlying causes and adjust management of respiratory failure as follows- BiPAP as tolerated; treat underlying cause.    - acute issues resolved, now at baseline O2

## 2025-06-20 NOTE — PLAN OF CARE
06/20/25 0900   Rounds   Attendance Provider;Nurse ;Assigned nurse;   Discharge Plan A Home with family   Why the patient remains in the hospital Requires continued medical care   Transition of Care Barriers None

## 2025-06-20 NOTE — PLAN OF CARE
Problem: Adult Inpatient Plan of Care  Goal: Plan of Care Review  Outcome: Progressing  Goal: Patient-Specific Goal (Individualized)  Outcome: Progressing  Goal: Absence of Hospital-Acquired Illness or Injury  Outcome: Progressing  Goal: Optimal Comfort and Wellbeing  Outcome: Progressing  Goal: Readiness for Transition of Care  Outcome: Progressing     Problem: Bariatric Environmental Safety  Goal: Safety Maintained with Care  Outcome: Progressing     Problem: Diabetes Comorbidity  Goal: Blood Glucose Level Within Targeted Range  Outcome: Progressing     Problem: Pneumonia  Goal: Fluid Balance  Outcome: Progressing  Goal: Resolution of Infection Signs and Symptoms  Outcome: Progressing  Goal: Effective Oxygenation and Ventilation  Outcome: Progressing

## 2025-06-20 NOTE — ASSESSMENT & PLAN NOTE
Patient's COPD is with exacerbation noted by continued dyspnea, use of accessory muscles for breathing, and worsening of baseline hypoxia currently.  Patient is currently on COPD Pathway. Continue scheduled inhalers Steroids, Antibiotics, and Supplemental oxygen and monitor respiratory status closely.   - steroids & azithromycin x5 days

## 2025-06-20 NOTE — EICU
[facility] Night Rounds Checklist  24H Vital Sign Range:  Temp:  [97.4 °F (36.3 °C)-98.4 °F (36.9 °C)]   Pulse:  []   Resp:  [18-35]   BP: (103-177)/(56-97)   SpO2:  [92 %-100 %]     Video rounds and LDA reconciliation

## 2025-06-20 NOTE — PROGRESS NOTES
Pharmacist Renal Dose Adjustment Note    Sandee Evans is a 69 y.o. female being treated with the medication famotidine    Patient Data:    Vital Signs (Most Recent):  Temp: 97.7 °F (36.5 °C) (06/20/25 0400)  Pulse: 72 (06/20/25 0600)  Resp: 19 (06/20/25 0600)  BP: 134/65 (06/20/25 0600)  SpO2: 95 % (06/20/25 0744) Vital Signs (72h Range):  Temp:  [97 °F (36.1 °C)-98.6 °F (37 °C)]   Pulse:  []   Resp:  [13-35]   BP: (103-192)/()   SpO2:  [86 %-100 %]      Recent Labs   Lab 06/18/25  1325 06/19/25  0329 06/20/25  0234   CREATININE 0.9 0.8 1.4     Serum creatinine: 1.4 mg/dL 06/20/25 0234  Estimated creatinine clearance: 43.9 mL/min    Medication:famotidine 20 mg every 12 hours will be changed to famotidine 20 mg daily    Pharmacist's Name: Fermin Lord Jr  Pharmacist's Extension: [phone]

## 2025-06-21 VITALS
BODY MASS INDEX: 42.88 KG/M2 | SYSTOLIC BLOOD PRESSURE: 123 MMHG | TEMPERATURE: 99 F | DIASTOLIC BLOOD PRESSURE: 85 MMHG | HEART RATE: 79 BPM | RESPIRATION RATE: 16 BRPM | OXYGEN SATURATION: 96 % | HEIGHT: 63 IN | WEIGHT: 242 LBS

## 2025-06-21 LAB
ALBUMIN SERPL BCP-MCNC: 3.2 G/DL (ref 3.5–5.2)
ALP SERPL-CCNC: 83 UNIT/L (ref 40–150)
ALT SERPL W/O P-5'-P-CCNC: 30 UNIT/L (ref 10–44)
ANION GAP (OHS): 13 MMOL/L (ref 8–16)
AST SERPL-CCNC: 21 UNIT/L (ref 11–45)
BILIRUB SERPL-MCNC: 0.5 MG/DL (ref 0.1–1)
BUN SERPL-MCNC: 38 MG/DL (ref 8–23)
CALCIUM SERPL-MCNC: 9.5 MG/DL (ref 8.7–10.5)
CHLORIDE SERPL-SCNC: 94 MMOL/L (ref 95–110)
CO2 SERPL-SCNC: 33 MMOL/L (ref 23–29)
CREAT SERPL-MCNC: 1.2 MG/DL (ref 0.5–1.4)
GFR SERPLBLD CREATININE-BSD FMLA CKD-EPI: 48 ML/MIN/1.73/M2
GLUCOSE SERPL-MCNC: 111 MG/DL (ref 70–110)
HOLD SPECIMEN: NORMAL
POTASSIUM SERPL-SCNC: 4 MMOL/L (ref 3.5–5.1)
PROT SERPL-MCNC: 7.1 GM/DL (ref 6–8.4)
SODIUM SERPL-SCNC: 140 MMOL/L (ref 136–145)

## 2025-06-21 PROCEDURE — S4991 NICOTINE PATCH NONLEGEND: HCPCS

## 2025-06-21 PROCEDURE — 63700000 PHARM REV CODE 250 ALT 637 W/O HCPCS

## 2025-06-21 PROCEDURE — 25000003 PHARM REV CODE 250: Performed by: STUDENT IN AN ORGANIZED HEALTH CARE EDUCATION/TRAINING PROGRAM

## 2025-06-21 PROCEDURE — 99900035 HC TECH TIME PER 15 MIN (STAT)

## 2025-06-21 PROCEDURE — 63600175 PHARM REV CODE 636 W HCPCS: Performed by: STUDENT IN AN ORGANIZED HEALTH CARE EDUCATION/TRAINING PROGRAM

## 2025-06-21 PROCEDURE — 27000221 HC OXYGEN, UP TO 24 HOURS

## 2025-06-21 PROCEDURE — 94660 CPAP INITIATION&MGMT: CPT

## 2025-06-21 PROCEDURE — 25000003 PHARM REV CODE 250

## 2025-06-21 PROCEDURE — 63600175 PHARM REV CODE 636 W HCPCS: Mod: JZ,TB

## 2025-06-21 PROCEDURE — 94761 N-INVAS EAR/PLS OXIMETRY MLT: CPT

## 2025-06-21 PROCEDURE — 80053 COMPREHEN METABOLIC PANEL: CPT

## 2025-06-21 PROCEDURE — 36415 COLL VENOUS BLD VENIPUNCTURE: CPT

## 2025-06-21 RX ORDER — AZITHROMYCIN 500 MG/1
500 TABLET, FILM COATED ORAL DAILY
Qty: 3 TABLET | Refills: 0 | Status: SHIPPED | OUTPATIENT
Start: 2025-06-22

## 2025-06-21 RX ORDER — PREDNISONE 20 MG/1
40 TABLET ORAL DAILY
Status: DISCONTINUED | OUTPATIENT
Start: 2025-06-21 | End: 2025-06-21 | Stop reason: HOSPADM

## 2025-06-21 RX ORDER — PREDNISONE 20 MG/1
40 TABLET ORAL DAILY
Qty: 6 TABLET | Refills: 0 | Status: SHIPPED | OUTPATIENT
Start: 2025-06-22 | End: 2025-06-25

## 2025-06-21 RX ORDER — SODIUM CHLORIDE 0.9 % (FLUSH) 0.9 %
10 SYRINGE (ML) INJECTION
Status: DISCONTINUED | OUTPATIENT
Start: 2025-06-21 | End: 2025-06-21 | Stop reason: HOSPADM

## 2025-06-21 RX ADMIN — AZITHROMYCIN DIHYDRATE 500 MG: 250 TABLET ORAL at 08:06

## 2025-06-21 RX ADMIN — LOSARTAN POTASSIUM 25 MG: 25 TABLET, FILM COATED ORAL at 08:06

## 2025-06-21 RX ADMIN — METOPROLOL SUCCINATE 100 MG: 50 TABLET, EXTENDED RELEASE ORAL at 08:06

## 2025-06-21 RX ADMIN — MUPIROCIN: 20 OINTMENT TOPICAL at 08:06

## 2025-06-21 RX ADMIN — NICOTINE 1 PATCH: 14 PATCH TRANSDERMAL at 08:06

## 2025-06-21 RX ADMIN — SERTRALINE HYDROCHLORIDE 100 MG: 50 TABLET ORAL at 08:06

## 2025-06-21 RX ADMIN — FUROSEMIDE 40 MG: 40 TABLET ORAL at 08:06

## 2025-06-21 RX ADMIN — ATORVASTATIN CALCIUM 40 MG: 40 TABLET, FILM COATED ORAL at 08:06

## 2025-06-21 RX ADMIN — PREDNISONE 40 MG: 20 TABLET ORAL at 09:06

## 2025-06-21 RX ADMIN — METHYLPREDNISOLONE SODIUM SUCCINATE 40 MG: 40 INJECTION, POWDER, FOR SOLUTION INTRAMUSCULAR; INTRAVENOUS at 05:06

## 2025-06-21 NOTE — ASSESSMENT & PLAN NOTE
Patient has long standing persistent (>12 months) atrial fibrillation. Patient is currently in atrial fibrillation. [id number] Score: 3. The patients heart rate in the last 24 hours is as follows:  Pulse  Min: 67  Max: 88     Antiarrhythmics  metoprolol succinate (TOPROL-XL) 24 hr tablet 100 mg, Daily, Oral    Anticoagulants  rivaroxaban tablet 15 mg, Daily, Oral    Plan  - Replete lytes with a goal of K>4, Mg >2  - Patient is anticoagulated, see medications listed above.  - Patient's afib is currently controlled

## 2025-06-21 NOTE — ASSESSMENT & PLAN NOTE
Patient has a current diagnosis of Hypertensive emergency with end organ damage evidenced by acute pulmonary edema without heart failure which is uncontrolled.  Latest blood pressure and vitals reviewed-   Temp:  [97.5 °F (36.4 °C)-98.6 °F (37 °C)]   Pulse:  [67-88]   Resp:  [15-20]   BP: (109-149)/(72-87)   SpO2:  [92 %-99 %] .   Patient currently on IV antihypertensives.   Home meds for hypertension were reviewed and noted below.   Hypertension Medications              furosemide (LASIX) 40 MG tablet TAKE 1 TABLET(40 MG) BY MOUTH EVERY DAY    losartan (COZAAR) 25 MG tablet Take 1 tablet (25 mg total) by mouth once daily.    metoprolol succinate (TOPROL-XL) 100 MG 24 hr tablet TAKE 1 TABLET(100 MG) BY MOUTH DAILY            Medication adjustment for hospital antihypertensives is as follows-     Will aim for controlled BP reduction by medications noted above. Monitor and mitigate end organ damage as indicated.

## 2025-06-21 NOTE — PLAN OF CARE
Problem: Adult Inpatient Plan of Care  Goal: Plan of Care Review  Outcome: Met  Goal: Patient-Specific Goal (Individualized)  Outcome: Met  Goal: Absence of Hospital-Acquired Illness or Injury  Outcome: Met  Goal: Optimal Comfort and Wellbeing  Outcome: Met  Goal: Readiness for Transition of Care  Outcome: Met     Problem: Bariatric Environmental Safety  Goal: Safety Maintained with Care  Outcome: Met     Problem: Diabetes Comorbidity  Goal: Blood Glucose Level Within Targeted Range  Outcome: Met     Problem: Pneumonia  Goal: Fluid Balance  Outcome: Met  Goal: Resolution of Infection Signs and Symptoms  Outcome: Met  Goal: Effective Oxygenation and Ventilation  Outcome: Met     Problem: Comorbidity Management  Goal: Maintenance of COPD Symptom Control  Outcome: Met  Goal: Maintenance of Heart Failure Symptom Control  Outcome: Met  Goal: Blood Pressure in Desired Range  Outcome: Met     Problem: Fall Injury Risk  Goal: Absence of Fall and Fall-Related Injury  Outcome: Met     Problem: Skin Injury Risk Increased  Goal: Skin Health and Integrity  Outcome: Met     Problem: Infection  Goal: Absence of Infection Signs and Symptoms  Outcome: Met

## 2025-06-21 NOTE — ASSESSMENT & PLAN NOTE
Patient with Hypercapnic and Hypoxic Respiratory failure which is Acute on chronic.  she is on home oxygen at 2 LPM. Supplemental oxygen was provided and noted- Oxygen Concentration (%):  [28] 28    .   Signs/symptoms of respiratory failure include- increased work of breathing, respiratory distress, wheezing, and lethargy. Contributing diagnoses includes - CHF and COPD Labs and images were reviewed. Patient Has recent ABG, which has been reviewed. Will treat underlying causes and adjust management of respiratory failure as follows- BiPAP as tolerated; treat underlying cause.    - acute issues resolved, now at baseline O2

## 2025-06-21 NOTE — NURSING
PER handoff received from CLAUDIA Ruiz.  Pt resting in bed quietly. NAD noted. No c/o pain. Fall and safety precautions maintained. Bed alarm activated and audible.. Bed locked in lowest position, with side rails up x2. Call bell and personal items within reach

## 2025-06-21 NOTE — ASSESSMENT & PLAN NOTE
"Patient has Diastolic (HFpEF) heart failure that is Acute on chronic. On presentation their CHF was decompensated. Evidence of decompensated CHF on presentation includes: crackles on lung auscultation, dyspnea on exertion (ESQUIVEL), and shortness of breath. The etiology of their decompensation is likely increased fluid intake. Most recent BNP and echo results are listed below.  No results for input(s): "BNP" in the last 72 hours.    Latest ECHO  Results for orders placed during the hospital encounter of 08/06/24    Echo    Interpretation Summary    Left Ventricle: The left ventricle is normal in size. There is mild concentric hypertrophy. There is normal systolic function with a visually estimated ejection fraction of 60 - 65%.    Right Ventricle: Mild right ventricular enlargement. Systolic function is normal.    Left Atrium: Left atrium is severely dilated.    Right Atrium: Right atrium is severely dilated.    Mitral Valve: There is mild to moderate regurgitation.    Tricuspid Valve: There is mild to moderate regurgitation.    Pulmonary Artery: The estimated pulmonary artery systolic pressure is 65 mmHg.    IVC/SVC: Elevated venous pressure at 15 mmHg.    Current Heart Failure Medications  losartan tablet 25 mg, Daily, Oral  metoprolol succinate (TOPROL-XL) 24 hr tablet 100 mg, Daily, Oral  hydrALAZINE injection 10 mg, Every 4 hours PRN, Intravenous  furosemide tablet 40 mg, Daily, Oral    Plan  - Monitor strict I&Os and daily weights.    - Place on telemetry  - Low sodium diet  - Place on fluid restriction of 1.5 L.   - Cardiology has not been consulted  - The patient's volume status is improving but not at their baseline as indicated by edema  - transition to PO lasix in AM, recheck renal function in AM      "

## 2025-06-21 NOTE — NURSING
[facility], [facility] - [place], [place]  Nurses Note -- 4 Eyes      6/20/2025       Skin assessed on: Q Shift      [x] No Pressure Injuries Present    []Prevention Measures Documented    [] Yes LDA  for Pressure Injury Previously documented     [] Yes New Pressure Injury Discovered   [] LDA for New Pressure Injury Added      Attending RN:  Joseph Jackson RN     Second RN:  Syed TAYLOR

## 2025-06-21 NOTE — DISCHARGE SUMMARY
[facility] Medicine  Discharge Summary      Patient Name: Sandee Evans  MRN: [id number]  [facility]: [id number]  Patient Class: IP- Inpatient  Admission Date: 6/18/2025  Hospital Length of Stay: 3 days  Discharge Date and Time: 6/21/2025  2:12 PM  Attending Physician: No att. providers found   Discharging Provider: Tim Spence MD  Primary Care Provider: Kuldeep Miller MD    Primary Care Team: Networked reference to record PCT     HPI:   A 69-year-old F with medical history significant for chronic atrial fibrillation anticoagulation with Xarelto, HIEU on BiPAP, diastolic CHF, pulmonary HTN, tobacco use, hyperlipidemia, cognitive impairment and ?  COPD with chronic hypoxic respiratory failure on 2 LNC who presented to the ED with a chief complaint of lethargy and progressive shortness of breaths with daytime drowsiness over the past 1 week.    Per , patient was in her baseline state of health before the onset of the symptoms; however, she has had similar presentation last year.  There was associated cough but no fever or chills.  No recent travel history, or positive history of contact with anyone with a respiratory illness.    On presentation to the ED patient was hypertensive with increased work of breathing and somnolence.  Laboratory investigations were pertinent for pCO2 of 76, , troponin not elevated chest x-ray significant for cardiomegaly with early CHF changes.  Procalcitonin 0.02; UDS negative; VBG: PH 7.2, pCO2 81.6, PO2 28, patient received duo nebs and IV Lasix in the ED and was admitted to the ICU for further management.    * No surgery found *      Hospital Course:   A 69-year-old F with medical history significant for chronic atrial fibrillation anticoagulation with Xarelto, HIEU on BiPAP, diastolic CHF, pulmonary HTN, tobacco use, hyperlipidemia, cognitive impairment and ?  COPD with chronic hypoxic respiratory failure on 2 LNC who presented to the ED with a chief  complaint of lethargy and progressive shortness of breaths with daytime drowsiness over the past 1 week; admitted to the ICU for acute on chronic hypoxic hypercarbic respiratory failure on BiPAP; currently weaned off and stable for transfer to telemetry   Patient was diuresed while on transitioned to p.o. Lasix.  Continues to wear BiPAP at night.  Plan to complete azithromycin and prednisone for a total of 5 days.  Overall, she is doing well and feeling back to her baseline.  She does report that she was not taking her Lasix every single day so plan to continue with once daily at discharge.  Patient was discharged home in stable condition.  She has home oxygen and home CPAP.  Azithromycin and prednisone prescription sent to pharmacy of choice.  Patient discharged home.     Goals of Care Treatment Preferences:  Code Status: Full Code         Consults:   Consults (From admission, onward)          Status Ordering Provider     Consult to Pulmonology  Once        Provider:  Brien Abbasi MD    Completed TIFFANIE GOFF     Pulmonology  Once        Provider:  Brien Abbasi MD    Completed TIFFANIE GOFF            Assessment & Plan  Hyperlipidemia  Continue statin    Longstanding persistent atrial fibrillation  Patient has long standing persistent (>12 months) atrial fibrillation. Patient is currently in atrial fibrillation. [id number] Score: 3. The patients heart rate in the last 24 hours is as follows:  Pulse  Min: 67  Max: 88     Antiarrhythmics  metoprolol succinate (TOPROL-XL) 24 hr tablet 100 mg, Daily, Oral    Anticoagulants  rivaroxaban tablet 15 mg, Daily, Oral    Plan  - Replete lytes with a goal of K>4, Mg >2  - Patient is anticoagulated, see medications listed above.  - Patient's afib is currently controlled  Acute on chronic diastolic CHF (congestive heart failure)  Patient has Diastolic (HFpEF) heart failure that is Acute on chronic. On presentation their CHF was decompensated. Evidence of  "decompensated CHF on presentation includes: crackles on lung auscultation, dyspnea on exertion (ESQUIVEL), and shortness of breath. The etiology of their decompensation is likely increased fluid intake. Most recent BNP and echo results are listed below.  No results for input(s): "BNP" in the last 72 hours.    Latest ECHO  Results for orders placed during the hospital encounter of 08/06/24    Echo    Interpretation Summary    Left Ventricle: The left ventricle is normal in size. There is mild concentric hypertrophy. There is normal systolic function with a visually estimated ejection fraction of 60 - 65%.    Right Ventricle: Mild right ventricular enlargement. Systolic function is normal.    Left Atrium: Left atrium is severely dilated.    Right Atrium: Right atrium is severely dilated.    Mitral Valve: There is mild to moderate regurgitation.    Tricuspid Valve: There is mild to moderate regurgitation.    Pulmonary Artery: The estimated pulmonary artery systolic pressure is 65 mmHg.    IVC/SVC: Elevated venous pressure at 15 mmHg.    Current Heart Failure Medications  losartan tablet 25 mg, Daily, Oral  metoprolol succinate (TOPROL-XL) 24 hr tablet 100 mg, Daily, Oral  hydrALAZINE injection 10 mg, Every 4 hours PRN, Intravenous  furosemide tablet 40 mg, Daily, Oral    Plan  - Monitor strict I&Os and daily weights.    - Place on telemetry  - Low sodium diet  - Place on fluid restriction of 1.5 L.   - Cardiology has not been consulted  - The patient's volume status is improving but not at their baseline as indicated by edema  - transition to PO lasix in AM, recheck renal function in AM      Pulmonary HTN  Possibly group 2 and 3 secondary to HFpEF and HIEU  Noted elevated PSAP severely dilated right atrium on TTE from 08/2024 and continues to be elevated on repeat this admission.  Treat underlying cause    COPD exacerbation  Patient's COPD is with exacerbation noted by continued dyspnea, use of accessory muscles for " breathing, and worsening of baseline hypoxia currently.  Patient is currently on COPD Pathway. Continue scheduled inhalers Steroids, Antibiotics, and Supplemental oxygen and monitor respiratory status closely.   - steroids & azithromycin x5 days  Cognitive impairment  Noted baseline cognitive impairment  Patient at risk of hospital delirium  Continue all psych medications    Hypertensive emergency  Patient has a current diagnosis of Hypertensive emergency with end organ damage evidenced by acute pulmonary edema without heart failure which is uncontrolled.  Latest blood pressure and vitals reviewed-   Temp:  [97.5 °F (36.4 °C)-98.6 °F (37 °C)]   Pulse:  [67-88]   Resp:  [15-20]   BP: (109-149)/(72-87)   SpO2:  [92 %-99 %] .   Patient currently on IV antihypertensives.   Home meds for hypertension were reviewed and noted below.   Hypertension Medications              furosemide (LASIX) 40 MG tablet TAKE 1 TABLET(40 MG) BY MOUTH EVERY DAY    losartan (COZAAR) 25 MG tablet Take 1 tablet (25 mg total) by mouth once daily.    metoprolol succinate (TOPROL-XL) 100 MG 24 hr tablet TAKE 1 TABLET(100 MG) BY MOUTH DAILY            Medication adjustment for hospital antihypertensives is as follows-     Will aim for controlled BP reduction by medications noted above. Monitor and mitigate end organ damage as indicated.  Acute on chronic respiratory failure with hypoxia and hypercapnia  Patient with Hypercapnic and Hypoxic Respiratory failure which is Acute on chronic.  she is on home oxygen at 2 LPM. Supplemental oxygen was provided and noted- Oxygen Concentration (%):  [28] 28    .   Signs/symptoms of respiratory failure include- increased work of breathing, respiratory distress, wheezing, and lethargy. Contributing diagnoses includes - CHF and COPD Labs and images were reviewed. Patient Has recent ABG, which has been reviewed. Will treat underlying causes and adjust management of respiratory failure as follows- BiPAP as  tolerated; treat underlying cause.    - acute issues resolved, now at baseline O2  HIEU on CPAP  History of noncompliance  Start CPAP per home routine  Counseled patient on compliance before discharge    Tobacco use  Counseled patient on cessation  Nicotine patch ordered  Final Active Diagnoses:    Diagnosis Date Noted POA    PRINCIPAL PROBLEM:  Acute on chronic diastolic CHF (congestive heart failure) [I50.33] 07/16/2021 Yes    Hypertensive emergency [I16.1] 06/18/2025 Yes    Acute on chronic respiratory failure with hypoxia and hypercapnia [J96.21, J96.22] 06/18/2025 Yes    HIEU on CPAP [G47.33] 06/18/2025 Yes    Tobacco use [Z72.0] 06/18/2025 Yes    Cognitive impairment [R41.89] 09/30/2024 Yes    COPD exacerbation [J44.1] 08/06/2024 Yes    Pulmonary HTN [I27.20] 07/20/2021 Yes    Longstanding persistent atrial fibrillation [I48.11] 07/20/2015 Yes    Hyperlipidemia [E78.5] 01/11/2013 Yes      Problems Resolved During this Admission:    Diagnosis Date Noted Date Resolved POA    Viral Pneumonia [J18.9] 06/19/2025 06/20/2025 Yes       Discharged Condition: stable    Disposition: Home or Self Care    Follow Up:   Follow-up Information       Kuldeep Miller MD. Schedule an appointment as soon as possible for a visit in 1 week(s).    Specialty: Family Medicine  Contact information:  [facility] [place] [address]  [phone]                           Patient Instructions:      Diet Cardiac     Call MD for:  temperature >100.4     Call MD for:  persistent nausea and vomiting or diarrhea     Call MD for:  severe uncontrolled pain     Call MD for:  redness, tenderness, or signs of infection (pain, swelling, redness, odor or green/yellow discharge around incision site)     Call MD for:  difficulty breathing or increased cough     Call MD for:  severe persistent headache     Call MD for:  worsening rash     Call MD for:  persistent dizziness, light-headedness, or visual disturbances     Call MD for:  increased  confusion or weakness     Activity as tolerated     Results for orders placed during the hospital encounter of 06/18/25    Echo    Interpretation Summary    Left Ventricle: The left ventricle is normal in size. Mildly increased wall thickness. There is mild concentric hypertrophy. There is normal systolic function with a visually estimated ejection fraction of 60 - 65%. Unable to assess diastolic function due to atrial fibrillation.    Right Ventricle: The right ventricle is mildly dilated Systolic function is mildly reduced.    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    Pulmonary Artery: The estimated pulmonary artery systolic pressure is 76 mmHg.    Pt appears to be in AFib throughout exam.    Significant Diagnostic Studies: Labs: All labs within the past 24 hours have been reviewed    Pending Diagnostic Studies:       None           Medications:  Reconciled Home Medications:      Medication List        START taking these medications      azithromycin 500 MG tablet  Commonly known as: ZITHROMAX  Take 1 tablet (500 mg total) by mouth once daily.  Start taking on: June 22, 2025     predniSONE 20 MG tablet  Commonly known as: DELTASONE  Take 2 tablets (40 mg total) by mouth once daily. for 3 days  Start taking on: June 22, 2025            CONTINUE taking these medications      albuterol 90 mcg/actuation inhaler  Commonly known as: PROVENTIL/VENTOLIN HFA  2 puffs every 6 (six) hours as needed.     atorvastatin 40 MG tablet  Commonly known as: LIPITOR  TAKE 1 TABLET(40 MG) BY MOUTH EVERY DAY     azelastine 137 mcg (0.1 %) nasal spray  Commonly known as: ASTELIN  1 spray (137 mcg total) by Nasal route 2 (two) times daily.     cetirizine 10 MG tablet  Commonly known as: ZYRTEC  Take 1 tablet (10 mg total) by mouth once daily.     fluticasone furoate-vilanteroL 100-25 mcg/dose diskus inhaler  Commonly known as: BREO ELLIPTA  Inhale 1 puff into the lungs  once daily. Controller     fluticasone propionate 50 mcg/actuation nasal spray  Commonly known as: FLONASE  SHAKE LIQUID AND USE 2 SPRAYS(100 MCG) IN EACH NOSTRIL EVERY DAY     furosemide 40 MG tablet  Commonly known as: LASIX  TAKE 1 TABLET(40 MG) BY MOUTH EVERY DAY     losartan 25 MG tablet  Commonly known as: COZAAR  Take 1 tablet (25 mg total) by mouth once daily.     melatonin 5 mg  Commonly known as: MELATIN  Take 1 tablet (5 mg total) by mouth nightly.     metoprolol succinate 100 MG 24 hr tablet  Commonly known as: TOPROL-XL  TAKE 1 TABLET(100 MG) BY MOUTH DAILY     sertraline 100 MG tablet  Commonly known as: ZOLOFT  TAKE 1 TABLET(100 MG) BY MOUTH DAILY     SPIRIVA RESPIMAT 2.5 mcg/actuation inhaler  Generic drug: tiotropium bromide  INHALE 2 PUFFS BY MOUTH DAILY     triamcinolone acetonide 0.1% 0.1 % ointment  Commonly known as: KENALOG  APPLY BETWEEN THE KNEES AND UPPER THIGHS TWICE DAILY FOR NO MORE THAN 7 TO 14 DAYS     XARELTO 20 mg Tab  Generic drug: rivaroxaban  Take 1 tablet (20 mg total) by mouth Daily.            STOP taking these medications      buPROPion 150 MG TB24 tablet  Commonly known as: WELLBUTRIN XL     clobetasol 0.05% 0.05 % Oint  Commonly known as: TEMOVATE     fluticasone-salmeterol 250-50 mcg/dose 250-50 mcg/dose diskus inhaler  Commonly known as: ADVAIR     ketoconazole 2 % cream  Commonly known as: NIZORAL     LIDOcaine-prilocaine cream  Commonly known as: EMLA     terbinafine  mg tablet  Commonly known as: LAMISIL     traMADoL 50 mg tablet  Commonly known as: ULTRAM              Indwelling Lines/Drains at time of discharge:   Lines/Drains/Airways       None                       Time spent on the discharge of patient: >35 minutes         Tim Spence MD  Department of Hospital Medicine  [facility]

## 2025-06-21 NOTE — DISCHARGE INSTRUCTIONS
Our goal at [facility] is to always give you outstanding care and exceptional service. You may receive a survey from [employer] by mail, text or e-mail in the next 24-48 hours asking about the care you received with us. The survey should only take 5-10 minutes to complete and is very important to us.     Your feedback provides us with a way to recognize our staff who work tirelessly to provide the best care! Also, your responses help us learn how to improve when your experience was below our aspiration of excellence. We are always looking for ways to improve your stay. We WILL use your feedback to continue making improvements to help us provide the highest quality care. We keep your personal information and feedback confidential. We appreciate your time completing this survey and can't wait to hear from you!!!    We look forward to your continued care with us! Thanks so much for choosing [facility] for your healthcare needs!

## 2025-06-21 NOTE — PLAN OF CARE
Virtual Nurse:Discharge orders noted; additional clinical references attached. Patient's discharge instruction packet given by bedside RN.    Cued into patient's room.  Permission received per patient to turn camera to view patient.  Introduced as VN that will be instructing on discharge instructions.   at bedside.  Educated patient on smoking cessation, CHF, reason for admission; medications to hold, continue, and start, appointment to follow-up with doctor, and when to return to ED. Teach back method used. Verbalized understanding  Number given for 24/7 Nurse Line. Opportunity given for questions and questions answered.  Bedside nurse updated. Transport to [facility] requested.

## 2025-06-21 NOTE — PLAN OF CARE
Case Management Final Discharge Note    *Discharge Disposition: Home with Family     *New DME ordered/ Company name:n/a    *Relevant SDOH/ Transition of Care Barriers:n/a    *Primary Caretaker and contact information: [place]- spouse at [phone]    *Scheduled Followup Appointment: Follow up with Dr. Light within 1 week     *Referrals Placed: n/a    *Transportation: Private Vehicle     TN educated patient on discharge plan and services. Bedside nurse notified. Patient clear to discharge from Case Management standpoint.        06/21/25 1032   Final Note   Assessment Type Final Discharge Note   Anticipated Discharge Disposition Home   Hospital Resources/Appts/Education Provided Appointments scheduled and added to AVS

## 2025-06-21 NOTE — NURSING
[facility] - [place]  Nurses Note -- 4 Eyes      6/20/2025       Skin assessed on: Q Shift      [x] No Pressure Injuries Present    []Prevention Measures Documented    [] Yes LDA  for Pressure Injury Previously documented     [] Yes New Pressure Injury Discovered   [] LDA for New Pressure Injury Added      Attending RN:  Joseph Jackson RN     Second RN:  Syed TAYLOR

## 2025-06-22 LAB
OHS QRS DURATION: 80 MS
OHS QTC CALCULATION: 466 MS

## 2025-06-24 ENCOUNTER — PATIENT OUTREACH (OUTPATIENT)
Dept: ADMINISTRATIVE | Facility: HOSPITAL | Age: 70
End: 2025-06-24
Payer: COMMERCIAL

## 2025-06-24 ENCOUNTER — TELEPHONE (OUTPATIENT)
Dept: FAMILY MEDICINE | Facility: CLINIC | Age: 70
End: 2025-06-24
Payer: COMMERCIAL

## 2025-06-24 DIAGNOSIS — E11.36 TYPE 2 DIABETES MELLITUS WITH DIABETIC CATARACT, WITHOUT LONG-TERM CURRENT USE OF INSULIN: Primary | ICD-10-CM

## 2025-06-24 NOTE — PROGRESS NOTES
HM and immunization's reviewed and updated.    Health Maintenance Due   Topic Date Due    RSV Vaccine (Age 60+ and Pregnant patients) (1 - Risk 60-74 years 1-dose series) Never done    COVID-19 Vaccine (5 - 2024-25 season) 09/01/2024    Hemoglobin A1c  04/01/2025    Foot Exam  04/18/2025    Diabetes Urine Screening  05/16/2025    Mammogram  07/30/2025   Panel report due for physical after 9/30/2025 with new provider. Need to establish care with another provider.  Unable to reach. Sent portal message.      If you are a smoker, it is important for your health to stop smoking. Please be aware that second hand smoke is also harmful.

## 2025-06-24 NOTE — TELEPHONE ENCOUNTER
Spoke with patient and patient's daughter. Patient states that she feeling very tired and can just go back to sleep. Patient's daughter states that she's having the same symptoms she had when her CO2 was elevated. Advised to get pt to ED, pt daughter verbalized understanding.

## 2025-06-24 NOTE — TELEPHONE ENCOUNTER
No answer and voicemail not setup; per previous message pt will need to schedule an appointment        Copied from CRM #[id number]. Topic: General Inquiry - Return Call  >> Jun 24, 2025  1:10 PM Johanna wrote:  Type:  Patient Returning Call    Who Called: Marce - daughter    Who Left Message for Patient: Aydee Koroma LPN    Does the patient know what this is regarding?: Yes     Would the patient rather a call back or a response via [facility]? Call Back    Best Call Back Number:[phone]    Additional Information:

## 2025-06-24 NOTE — TELEPHONE ENCOUNTER
Called to inform daughter pt will need an appointment for home health orders to be placed, Dr Miller no longer seeing patients in office and she is not established with another PCP; last seen August 2024 by beverly; unable to leave message since voicemail not setup              Copied from CRM #[id number]. Topic: General Inquiry - Patient Advice  >> Jun 24, 2025 11:46 AM Bird Almaguer wrote:  Type: Patient Call Back    Who called:DaughterMargo     What is the request in detail:asking for an order for home health care for patient. Caller states patient was recently in hospital and was sent home. Caller feels the patient may need a nurse to check on her. States previous times the patient was released from the hospital a nurse came to the patients home     Can the clinic reply by [facility]?no     Would the patient rather a call back or a response via [facility]? Call     Best call back number:.[phone]-Marce       Additional Information:

## 2025-06-24 NOTE — TELEPHONE ENCOUNTER
Copied from CRM #[id number]. Topic: General Inquiry - Return Call  >> Jun 24, 2025  1:17 PM Tim wrote:  Type: Patient Call Back    Who called: daughter    What is the request in detail: Daughter called into the call center wanting to speak to DR Miller because she could not wake her mother up. States she opens her eyes for a brief moment and then goes right back to sleep. Patients daughter states this was what she was just discharge for the hospital for. Completed a symptom screen on patient via daughter, because daughter asked me to, and symptom screen stated: color red    Symptom: Unconscious Now  Outcome: Instruct patient to Call 911 NOW!  Reason: This is the only possible outcome for this symptom    Patients daughter accepted this outcome and called 911.       Best call back number: [phone]      Additional Information: Told the daughter that I would notify this office asap of situation.

## 2025-06-25 ENCOUNTER — TELEPHONE (OUTPATIENT)
Facility: CLINIC | Age: 70
End: 2025-06-25
Payer: COMMERCIAL

## 2025-06-25 ENCOUNTER — DOCUMENTATION ONLY (OUTPATIENT)
Facility: CLINIC | Age: 70
End: 2025-06-25
Payer: COMMERCIAL

## 2025-06-26 ENCOUNTER — OFFICE VISIT (OUTPATIENT)
Facility: CLINIC | Age: 70
End: 2025-06-26
Payer: COMMERCIAL

## 2025-06-26 ENCOUNTER — LAB VISIT (OUTPATIENT)
Dept: LAB | Facility: HOSPITAL | Age: 70
End: 2025-06-26
Payer: COMMERCIAL

## 2025-06-26 VITALS
HEART RATE: 90 BPM | BODY MASS INDEX: 43.89 KG/M2 | HEIGHT: 63 IN | WEIGHT: 247.69 LBS | SYSTOLIC BLOOD PRESSURE: 132 MMHG | RESPIRATION RATE: 17 BRPM | DIASTOLIC BLOOD PRESSURE: 68 MMHG | OXYGEN SATURATION: 93 %

## 2025-06-26 DIAGNOSIS — N18.31 CHRONIC KIDNEY DISEASE, STAGE 3A: ICD-10-CM

## 2025-06-26 DIAGNOSIS — I50.9 CONGESTIVE HEART FAILURE, UNSPECIFIED HF CHRONICITY, UNSPECIFIED HEART FAILURE TYPE: ICD-10-CM

## 2025-06-26 DIAGNOSIS — I27.20 PULMONARY HTN: ICD-10-CM

## 2025-06-26 DIAGNOSIS — I48.0 PAROXYSMAL ATRIAL FIBRILLATION: ICD-10-CM

## 2025-06-26 DIAGNOSIS — E66.01 SEVERE OBESITY (BMI >= 40): ICD-10-CM

## 2025-06-26 DIAGNOSIS — I10 ESSENTIAL HYPERTENSION: Chronic | ICD-10-CM

## 2025-06-26 DIAGNOSIS — E78.2 MIXED HYPERLIPIDEMIA: ICD-10-CM

## 2025-06-26 DIAGNOSIS — I50.30 HEART FAILURE WITH PRESERVED EJECTION FRACTION, UNSPECIFIED HF CHRONICITY: Primary | ICD-10-CM

## 2025-06-26 LAB
ABSOLUTE EOSINOPHIL (OHS): 0.16 K/UL
ABSOLUTE MONOCYTE (OHS): 0.91 K/UL (ref 0.3–1)
ABSOLUTE NEUTROPHIL COUNT (OHS): 7.55 K/UL (ref 1.8–7.7)
ALBUMIN SERPL BCP-MCNC: 3.4 G/DL (ref 3.5–5.2)
ALP SERPL-CCNC: 85 UNIT/L (ref 40–150)
ALT SERPL W/O P-5'-P-CCNC: 33 UNIT/L (ref 10–44)
ANION GAP (OHS): 11 MMOL/L (ref 8–16)
AST SERPL-CCNC: 25 UNIT/L (ref 11–45)
BASOPHILS # BLD AUTO: 0.01 K/UL
BASOPHILS NFR BLD AUTO: 0.1 %
BILIRUB SERPL-MCNC: 0.7 MG/DL (ref 0.1–1)
BNP SERPL-MCNC: 273 PG/ML (ref 0–99)
BUN SERPL-MCNC: 36 MG/DL (ref 8–23)
CALCIUM SERPL-MCNC: 9.2 MG/DL (ref 8.7–10.5)
CHLORIDE SERPL-SCNC: 99 MMOL/L (ref 95–110)
CO2 SERPL-SCNC: 35 MMOL/L (ref 23–29)
CREAT SERPL-MCNC: 1.3 MG/DL (ref 0.5–1.4)
ERYTHROCYTE [DISTWIDTH] IN BLOOD BY AUTOMATED COUNT: 14.4 % (ref 11.5–14.5)
GFR SERPLBLD CREATININE-BSD FMLA CKD-EPI: 44 ML/MIN/1.73/M2
GLUCOSE SERPL-MCNC: 93 MG/DL (ref 70–110)
HCT VFR BLD AUTO: 44.8 % (ref 37–48.5)
HGB BLD-MCNC: 13.9 GM/DL (ref 12–16)
IMM GRANULOCYTES # BLD AUTO: 0.06 K/UL (ref 0–0.04)
IMM GRANULOCYTES NFR BLD AUTO: 0.5 % (ref 0–0.5)
LYMPHOCYTES # BLD AUTO: 3.69 K/UL (ref 1–4.8)
MAGNESIUM SERPL-MCNC: 1.9 MG/DL (ref 1.6–2.6)
MCH RBC QN AUTO: 31.9 PG (ref 27–31)
MCHC RBC AUTO-ENTMCNC: 31 G/DL (ref 32–36)
MCV RBC AUTO: 103 FL (ref 82–98)
NUCLEATED RBC (/100WBC) (OHS): 0 /100 WBC
PLATELET # BLD AUTO: 270 K/UL (ref 150–450)
PMV BLD AUTO: 10.6 FL (ref 9.2–12.9)
POTASSIUM SERPL-SCNC: 4.5 MMOL/L (ref 3.5–5.1)
PROT SERPL-MCNC: 7.1 GM/DL (ref 6–8.4)
RBC # BLD AUTO: 4.36 M/UL (ref 4–5.4)
RELATIVE EOSINOPHIL (OHS): 1.3 %
RELATIVE LYMPHOCYTE (OHS): 29.8 % (ref 18–48)
RELATIVE MONOCYTE (OHS): 7.4 % (ref 4–15)
RELATIVE NEUTROPHIL (OHS): 60.9 % (ref 38–73)
SODIUM SERPL-SCNC: 145 MMOL/L (ref 136–145)
WBC # BLD AUTO: 12.38 K/UL (ref 3.9–12.7)

## 2025-06-26 PROCEDURE — 82040 ASSAY OF SERUM ALBUMIN: CPT

## 2025-06-26 PROCEDURE — 83735 ASSAY OF MAGNESIUM: CPT

## 2025-06-26 PROCEDURE — 99999 PR PBB SHADOW E&M-EST. PATIENT-LVL V: CPT | Mod: PBBFAC,,,

## 2025-06-26 PROCEDURE — 83880 ASSAY OF NATRIURETIC PEPTIDE: CPT

## 2025-06-26 PROCEDURE — 36415 COLL VENOUS BLD VENIPUNCTURE: CPT

## 2025-06-26 PROCEDURE — 85025 COMPLETE CBC W/AUTO DIFF WBC: CPT

## 2025-06-26 NOTE — PATIENT INSTRUCTIONS
Activity and Diet restrictions:   Recommend 2-3 gram sodium restriction and 1500cc- 2000cc fluid restriction.  Call clinic for increased shortness of breath, or increased swelling.  Weigh yourself every day and call the office if your weight increases by more than 3 lbs in 1 day or 5 lbs in 3 days.     Return to clinic for labs and follow up appointment in one week.      Today, tomorrow and Saturday increase furosemide (lasix) 40mg to twice a day. On Sunday resume 40mg once a day. You may take an extra 40mg in the afternoon for weight gain of 2-3lbs in a day, increasing shortness of breath or increased swelling in your legs.

## 2025-06-26 NOTE — PROGRESS NOTES
[facility] Provider Note (Follow-up) Consult Note      HPI:  Patient can walk without limitation   Patient sleeps on 3 number of pillows   Patient wakes up SOB, has to get out of bed, associated cough, sputum and color-denies   Palpitations - denies   Dizzy, light-headed, pre-syncope or syncope-denies   Since discharge frequency of performing weights, home weight and weight change-denies   Other information felt pertinent to HPI:  69-year-old F with medical history significant for chronic atrial fibrillation anticoagulation with Xarelto, HIEU on BiPAP, diastolic CHF, pulmonary HTN, tobacco use, hyperlipidemia, cognitive impairment and ?  COPD with chronic hypoxic respiratory failure on 2 LNC who presented to the ED with a chief complaint of lethargy and progressive shortness of breaths with daytime drowsiness over the past 1 week.     Per , patient was in her baseline state of health before the onset of the symptoms; however, she has had similar presentation last year.  There was associated cough but no fever or chills.  No recent travel history, or positive history of contact with anyone with a respiratory illness.     On presentation to the ED patient was hypertensive with increased work of breathing and somnolence.  Laboratory investigations were pertinent for pCO2 of 76, , troponin not elevated chest x-ray significant for cardiomegaly with early CHF changes.  Procalcitonin 0.02; UDS negative; VBG: PH 7.2, pCO2 81.6, PO2 28, patient received duo nebs and IV Lasix in the ED and was admitted to the ICU for further management.     * No surgery found *       Hospital Course:   A 69-year-old F with medical history significant for chronic atrial fibrillation anticoagulation with Xarelto, HIEU on BiPAP, diastolic CHF, pulmonary HTN, tobacco use, hyperlipidemia, cognitive impairment and ?  COPD with chronic hypoxic respiratory failure on 2 LNC who presented to the ED with a chief complaint of lethargy and  progressive shortness of breaths with daytime drowsiness over the past 1 week; admitted to the ICU for acute on chronic hypoxic hypercarbic respiratory failure on BiPAP; currently weaned off and stable for transfer to telemetry   Patient was diuresed while on transitioned to p.o. Lasix.  Continues to wear BiPAP at night.  Plan to complete azithromycin and prednisone for a total of 5 days.  Overall, she is doing well and feeling back to her baseline.  She does report that she was not taking her Lasix every single day so plan to continue with once daily at discharge.  Patient was discharged home in stable condition.  She has home oxygen and home CPAP.  Azithromycin and prednisone prescription sent to pharmacy of choice.  Patient discharged home.      Patient reports to [facility] today and reports improvement in SOB, she is down weight since last visit at clinic and endorses increasing lasix for a few days after last clinic visit but reports she now is back on 40mg once a day. She reports improvement in swelling in legs, but has chronic swelling that is greater in the left than her right at her baseline. She endorses compliance with medication, diet, and fluid restriction and overall feels she has improved greatly since her hospital stay.     PHYSICAL:   Vitals:    07/02/25 0906   BP: 104/70   Pulse: 89        JVD: no   Heart rhythm: irregular-afib  Cardiac murmur: No    S3: no  S4: no  Lungs: clear  Hepatojugular reflux: no  Edema: yes, Chronic swelling in BLE that patient reports is back at her baseline today      6/18/25 echo:       Left Ventricle: The left ventricle is normal in size. Mildly increased wall thickness. There is mild concentric hypertrophy. There is normal systolic function with a visually estimated ejection fraction of 60 - 65%. Unable to assess diastolic function due to atrial fibrillation.    Right Ventricle: The right ventricle is mildly dilated Systolic function is mildly reduced.    Left Atrium:  There is an atrial septal aneurysm bowing into RA    Mitral Valve: There is mild regurgitation.    Pulmonary Artery: The estimated pulmonary artery systolic pressure is 76 mmHg.    Pt appears to be in AFib throughout exam.     1. Heart failure with preserved ejection fraction, unspecified HF chronicity      Recommend 2-3 gram sodium restriction and 1500cc- 2000cc fluid restriction.  Call clinic for increased shortness of breath, or increased swelling.  Weigh yourself every day and call the office if your weight increases by more than 3 lbs in 1 day or 5 lbs in 3 days.  Defer addition of SGLT 2 d/t frequent urinary tract infections  Continue GDMT, continue 40mg lasix once daily. You may take an extra 40mg in the afternoon for weight gain of 2-3lbs in a day, increasing shortness of breath or increased swelling in your legs.     2. Pulmonary HTN  As above     3. Essential hypertension      Continue antihypertensives     4. Mixed hyperlipidemia       On statin     5. Paroxysmal atrial fibrillation      On beta blocker and xarelto     6. Chronic kidney disease, stage 3a      GFR 48     7. Severe obesity (BMI >= 40)  Overview:  Patient is aware of need for weight loss    Not interested in bariatric medicine  Counseled on healthy diet and weight loss

## 2025-07-01 ENCOUNTER — TELEPHONE (OUTPATIENT)
Facility: CLINIC | Age: 70
End: 2025-07-01
Payer: COMMERCIAL

## 2025-07-02 ENCOUNTER — LAB VISIT (OUTPATIENT)
Dept: LAB | Facility: HOSPITAL | Age: 70
End: 2025-07-02
Payer: COMMERCIAL

## 2025-07-02 ENCOUNTER — RESULTS FOLLOW-UP (OUTPATIENT)
Facility: CLINIC | Age: 70
End: 2025-07-02

## 2025-07-02 ENCOUNTER — OFFICE VISIT (OUTPATIENT)
Facility: CLINIC | Age: 70
End: 2025-07-02
Payer: COMMERCIAL

## 2025-07-02 VITALS
BODY MASS INDEX: 42.7 KG/M2 | HEIGHT: 63 IN | HEART RATE: 89 BPM | DIASTOLIC BLOOD PRESSURE: 70 MMHG | WEIGHT: 241 LBS | SYSTOLIC BLOOD PRESSURE: 104 MMHG | OXYGEN SATURATION: 97 %

## 2025-07-02 DIAGNOSIS — I50.9 CONGESTIVE HEART FAILURE, UNSPECIFIED HF CHRONICITY, UNSPECIFIED HEART FAILURE TYPE: ICD-10-CM

## 2025-07-02 DIAGNOSIS — I48.0 PAROXYSMAL ATRIAL FIBRILLATION: ICD-10-CM

## 2025-07-02 DIAGNOSIS — I50.30 HEART FAILURE WITH PRESERVED EJECTION FRACTION, UNSPECIFIED HF CHRONICITY: Primary | ICD-10-CM

## 2025-07-02 DIAGNOSIS — E78.2 MIXED HYPERLIPIDEMIA: ICD-10-CM

## 2025-07-02 DIAGNOSIS — E66.01 SEVERE OBESITY (BMI >= 40): ICD-10-CM

## 2025-07-02 DIAGNOSIS — I10 ESSENTIAL HYPERTENSION: Chronic | ICD-10-CM

## 2025-07-02 DIAGNOSIS — N18.31 CHRONIC KIDNEY DISEASE, STAGE 3A: ICD-10-CM

## 2025-07-02 DIAGNOSIS — I27.20 PULMONARY HTN: ICD-10-CM

## 2025-07-02 LAB
ALBUMIN SERPL BCP-MCNC: 3.2 G/DL (ref 3.5–5.2)
ALP SERPL-CCNC: 83 UNIT/L (ref 40–150)
ALT SERPL W/O P-5'-P-CCNC: 25 UNIT/L (ref 10–44)
ANION GAP (OHS): 10 MMOL/L (ref 8–16)
AST SERPL-CCNC: 26 UNIT/L (ref 11–45)
BILIRUB SERPL-MCNC: 0.8 MG/DL (ref 0.1–1)
BNP SERPL-MCNC: 210 PG/ML (ref 0–99)
BUN SERPL-MCNC: 29 MG/DL (ref 8–23)
CALCIUM SERPL-MCNC: 9.3 MG/DL (ref 8.7–10.5)
CHLORIDE SERPL-SCNC: 99 MMOL/L (ref 95–110)
CO2 SERPL-SCNC: 33 MMOL/L (ref 23–29)
CREAT SERPL-MCNC: 1.3 MG/DL (ref 0.5–1.4)
GFR SERPLBLD CREATININE-BSD FMLA CKD-EPI: 44 ML/MIN/1.73/M2
GLUCOSE SERPL-MCNC: 100 MG/DL (ref 70–110)
MAGNESIUM SERPL-MCNC: 1.8 MG/DL (ref 1.6–2.6)
POTASSIUM SERPL-SCNC: 4.1 MMOL/L (ref 3.5–5.1)
PROT SERPL-MCNC: 7 GM/DL (ref 6–8.4)
SODIUM SERPL-SCNC: 142 MMOL/L (ref 136–145)

## 2025-07-02 PROCEDURE — 1160F RVW MEDS BY RX/DR IN RCRD: CPT | Mod: CPTII,S$GLB,,

## 2025-07-02 PROCEDURE — 3074F SYST BP LT 130 MM HG: CPT | Mod: CPTII,S$GLB,,

## 2025-07-02 PROCEDURE — 80053 COMPREHEN METABOLIC PANEL: CPT

## 2025-07-02 PROCEDURE — 1101F PT FALLS ASSESS-DOCD LE1/YR: CPT | Mod: CPTII,S$GLB,,

## 2025-07-02 PROCEDURE — 4010F ACE/ARB THERAPY RXD/TAKEN: CPT | Mod: CPTII,S$GLB,,

## 2025-07-02 PROCEDURE — 3288F FALL RISK ASSESSMENT DOCD: CPT | Mod: CPTII,S$GLB,,

## 2025-07-02 PROCEDURE — 36415 COLL VENOUS BLD VENIPUNCTURE: CPT

## 2025-07-02 PROCEDURE — 3072F LOW RISK FOR RETINOPATHY: CPT | Mod: CPTII,S$GLB,,

## 2025-07-02 PROCEDURE — 1159F MED LIST DOCD IN RCRD: CPT | Mod: CPTII,S$GLB,,

## 2025-07-02 PROCEDURE — 83880 ASSAY OF NATRIURETIC PEPTIDE: CPT

## 2025-07-02 PROCEDURE — 3008F BODY MASS INDEX DOCD: CPT | Mod: CPTII,S$GLB,,

## 2025-07-02 PROCEDURE — 1111F DSCHRG MED/CURRENT MED MERGE: CPT | Mod: CPTII,S$GLB,,

## 2025-07-02 PROCEDURE — 83735 ASSAY OF MAGNESIUM: CPT

## 2025-07-02 PROCEDURE — 1126F AMNT PAIN NOTED NONE PRSNT: CPT | Mod: CPTII,S$GLB,,

## 2025-07-02 PROCEDURE — 99214 OFFICE O/P EST MOD 30 MIN: CPT | Mod: S$GLB,,,

## 2025-07-02 PROCEDURE — 3078F DIAST BP <80 MM HG: CPT | Mod: CPTII,S$GLB,,

## 2025-07-02 PROCEDURE — 99999 PR PBB SHADOW E&M-EST. PATIENT-LVL IV: CPT | Mod: PBBFAC,,,

## 2025-07-02 NOTE — PATIENT INSTRUCTIONS
Activity and Diet restrictions:   Recommend 2-3 gram sodium restriction and 1500cc- 2000cc fluid restriction.  Call clinic for increased shortness of breath, or increased swelling.  Weigh yourself every day and call the office if your weight increases by more than 3 lbs in 1 day or 5 lbs in 3 days.     Follow up with clinic as needed.       Continue medications as ordered. Continue lasix (furosemide) 40mg once daily, You may take an extra 40mg in the afternoon for weight gain of 2-3lbs in a day, increasing shortness of breath, or increased swelling in your legs.

## 2025-07-11 ENCOUNTER — DOCUMENTATION ONLY (OUTPATIENT)
Dept: CARDIOLOGY | Facility: HOSPITAL | Age: 70
End: 2025-07-11
Payer: COMMERCIAL

## 2025-07-11 NOTE — PROGRESS NOTES
"[facility]    Attempted to call pt to complete 1 week "check in" call. Unable to reach pt at listed phone numbers.  Was unable to leave message with family nor on voicemail. Voicemail not set up or full. .     Will continue to try to reach patient.      "

## 2025-07-14 ENCOUNTER — DOCUMENTATION ONLY (OUTPATIENT)
Facility: CLINIC | Age: 70
End: 2025-07-14
Payer: COMMERCIAL

## 2025-07-14 NOTE — PROGRESS NOTES
"[facility]) weekly phone follow up / triage call completed.     TCC RN Navigator spoke with Mrs. Ignacio    Current Patient reported weight: 247 lbs   Patient Goal Weight: ()  Recent Patient reported blood pressure and heart rate: 104/68    Pt reports the following:  []  Shortness of Breath with Activity  []  Shortness of Breath at rest   []  Fatigue  []  Edema   [] Chest pain or tightness  [] Weight Increase since discharge  [x] None of the above    Pt reports using "Daily weight and symptom tracker".    Pt reports being in the green  Zone. If in yellow/red, reminded that they should be calling [facility] today or now.     Medications:   Medication compliance reviewed with pt.  Pt reports having medication list available and has all medications at home for use per list.     Education:   Confirmed pt still has "[facility] Home Care Guide"  .     Reminded of key points as listed below.     Recommend 2 -3 gram sodium restriction and 1500 cc-2000 cc fluid restriction.  Encourage physical activity with graded exercise program.  Requested patient to weigh themselves daily, and to notify us if their weight increases by more than 3 lbs in 1 day or 5 lbs in 3 days.   Reminded to use "Daily weight and symptom tracker".  Even if pt does not have a scale, to use symptom tracker.       Watch for these Signs and Symptoms: If any of these occur, contact [facility] immediately:   Increase in shortness of breath with movement   Increase in swelling in your legs and ankles   Weight gain of more than 3 pounds in a day or 5 pounds in 3 days.   Difficulty breathing when you are lying down   Worsening fatigue or tiredness   Stomach bloating, a full feeling or a loss of appetite   Increased coughing--especially when you are lying down      Pt was able to verbalize back to RN in their own words correct diet/fluid restrictions, necessity for exercise, warning signs and symptoms, when and " how to contact their [facility] team.      Pt reminded of upcoming appointment.  PT reports they will attend.       Pt reminded of how and when to contact [facility]:  [phone](Mon-Fri, 8a-5p) & for urgent issues on the weekend to page the Heart Transplant MD on call.  Pt also encouraged utilize [facility] messaging as well.      Pt  verbalized understanding and in agreement of plan.       Will follow up with pt at next clinic visit and RN navigator available for pt questions, issues or concerns.

## 2025-07-18 ENCOUNTER — LAB VISIT (OUTPATIENT)
Dept: LAB | Facility: HOSPITAL | Age: 70
End: 2025-07-18
Attending: INTERNAL MEDICINE
Payer: COMMERCIAL

## 2025-07-18 DIAGNOSIS — I27.20 PULMONARY HTN: ICD-10-CM

## 2025-07-18 DIAGNOSIS — I48.0 PAROXYSMAL ATRIAL FIBRILLATION: ICD-10-CM

## 2025-07-18 DIAGNOSIS — R06.09 DOE (DYSPNEA ON EXERTION): ICD-10-CM

## 2025-07-18 DIAGNOSIS — I50.33 ACUTE ON CHRONIC DIASTOLIC CHF (CONGESTIVE HEART FAILURE): ICD-10-CM

## 2025-07-18 DIAGNOSIS — E78.2 MIXED HYPERLIPIDEMIA: ICD-10-CM

## 2025-07-18 DIAGNOSIS — I10 ESSENTIAL HYPERTENSION: Chronic | ICD-10-CM

## 2025-07-18 LAB
ANION GAP (OHS): 12 MMOL/L (ref 8–16)
BNP SERPL-MCNC: 325 PG/ML (ref 0–99)
BUN SERPL-MCNC: 29 MG/DL (ref 8–23)
CALCIUM SERPL-MCNC: 9.2 MG/DL (ref 8.7–10.5)
CHLORIDE SERPL-SCNC: 100 MMOL/L (ref 95–110)
CO2 SERPL-SCNC: 31 MMOL/L (ref 23–29)
CREAT SERPL-MCNC: 1.1 MG/DL (ref 0.5–1.4)
GFR SERPLBLD CREATININE-BSD FMLA CKD-EPI: 54 ML/MIN/1.73/M2
GLUCOSE SERPL-MCNC: 98 MG/DL (ref 70–110)
POTASSIUM SERPL-SCNC: 4 MMOL/L (ref 3.5–5.1)
SODIUM SERPL-SCNC: 143 MMOL/L (ref 136–145)

## 2025-07-18 PROCEDURE — 83880 ASSAY OF NATRIURETIC PEPTIDE: CPT

## 2025-07-18 PROCEDURE — 82310 ASSAY OF CALCIUM: CPT

## 2025-07-18 PROCEDURE — 36415 COLL VENOUS BLD VENIPUNCTURE: CPT

## 2025-07-21 DIAGNOSIS — I48.20 CHRONIC ATRIAL FIBRILLATION: ICD-10-CM

## 2025-07-21 RX ORDER — RIVAROXABAN 20 MG/1
TABLET, FILM COATED ORAL
Qty: 90 TABLET | Refills: 3 | Status: SHIPPED | OUTPATIENT
Start: 2025-07-21

## 2025-07-21 NOTE — TELEPHONE ENCOUNTER
No care due was identified.  [facility] Embedded Care Due Messages. Reference number: [id number].   7/21/2025 7:55:49 AM CDT

## 2025-07-21 NOTE — TELEPHONE ENCOUNTER
Care Due:                  Date            Visit Type   Department     Provider  --------------------------------------------------------------------------------                                EP -                              PRIMARY      [facility]  Last Visit: 04-      CARE ([facility])   MEDICINE       Kuldeep Miller  Next Visit: None Scheduled  None         None Found                                                            Last  Test          Frequency    Reason                     Performed    Due Date  --------------------------------------------------------------------------------    Office Visit  15 months..  XARELTO, azelastine,       04- 07-                             losartan, sertraline.....    [employer] Embedded Care Due Messages. Reference number: [id number].   7/21/2025 5:25:59 AM CDT

## 2025-07-22 ENCOUNTER — OFFICE VISIT (OUTPATIENT)
Dept: CARDIOLOGY | Facility: CLINIC | Age: 70
End: 2025-07-22
Payer: COMMERCIAL

## 2025-07-22 VITALS
OXYGEN SATURATION: 95 % | SYSTOLIC BLOOD PRESSURE: 136 MMHG | BODY MASS INDEX: 43.96 KG/M2 | HEART RATE: 109 BPM | RESPIRATION RATE: 15 BRPM | WEIGHT: 248.13 LBS | HEIGHT: 63 IN | DIASTOLIC BLOOD PRESSURE: 76 MMHG

## 2025-07-22 DIAGNOSIS — I48.0 PAROXYSMAL ATRIAL FIBRILLATION: ICD-10-CM

## 2025-07-22 DIAGNOSIS — I10 ESSENTIAL HYPERTENSION: Primary | Chronic | ICD-10-CM

## 2025-07-22 DIAGNOSIS — I27.20 PULMONARY HTN: ICD-10-CM

## 2025-07-22 DIAGNOSIS — I50.33 ACUTE ON CHRONIC DIASTOLIC CHF (CONGESTIVE HEART FAILURE): ICD-10-CM

## 2025-07-22 DIAGNOSIS — E78.2 MIXED HYPERLIPIDEMIA: ICD-10-CM

## 2025-07-22 DIAGNOSIS — I73.9 PVD (PERIPHERAL VASCULAR DISEASE): ICD-10-CM

## 2025-07-22 DIAGNOSIS — R06.09 DOE (DYSPNEA ON EXERTION): ICD-10-CM

## 2025-07-22 PROCEDURE — 99214 OFFICE O/P EST MOD 30 MIN: CPT | Mod: S$GLB,,, | Performed by: INTERNAL MEDICINE

## 2025-07-22 PROCEDURE — 1126F AMNT PAIN NOTED NONE PRSNT: CPT | Mod: CPTII,S$GLB,, | Performed by: INTERNAL MEDICINE

## 2025-07-22 PROCEDURE — 1124F ACP DISCUSS-NO DSCNMKR DOCD: CPT | Mod: CPTII,S$GLB,, | Performed by: INTERNAL MEDICINE

## 2025-07-22 PROCEDURE — 3288F FALL RISK ASSESSMENT DOCD: CPT | Mod: CPTII,S$GLB,, | Performed by: INTERNAL MEDICINE

## 2025-07-22 PROCEDURE — 3075F SYST BP GE 130 - 139MM HG: CPT | Mod: CPTII,S$GLB,, | Performed by: INTERNAL MEDICINE

## 2025-07-22 PROCEDURE — 1100F PTFALLS ASSESS-DOCD GE2>/YR: CPT | Mod: CPTII,S$GLB,, | Performed by: INTERNAL MEDICINE

## 2025-07-22 PROCEDURE — 99999 PR PBB SHADOW E&M-EST. PATIENT-LVL IV: CPT | Mod: PBBFAC,,, | Performed by: INTERNAL MEDICINE

## 2025-07-22 PROCEDURE — 3008F BODY MASS INDEX DOCD: CPT | Mod: CPTII,S$GLB,, | Performed by: INTERNAL MEDICINE

## 2025-07-22 PROCEDURE — 1159F MED LIST DOCD IN RCRD: CPT | Mod: CPTII,S$GLB,, | Performed by: INTERNAL MEDICINE

## 2025-07-22 PROCEDURE — 4010F ACE/ARB THERAPY RXD/TAKEN: CPT | Mod: CPTII,S$GLB,, | Performed by: INTERNAL MEDICINE

## 2025-07-22 PROCEDURE — 3072F LOW RISK FOR RETINOPATHY: CPT | Mod: CPTII,S$GLB,, | Performed by: INTERNAL MEDICINE

## 2025-07-22 PROCEDURE — 3078F DIAST BP <80 MM HG: CPT | Mod: CPTII,S$GLB,, | Performed by: INTERNAL MEDICINE

## 2025-07-22 NOTE — PROGRESS NOTES
Subjective   Patient ID:  Sandee Evans is a 69 y.o. female who presents for follow-up of No chief complaint on file.      HPI      Chronic A-fib on xarelto, Diastolic CHF, HTN, pulmonary HTN DM, HLD, PAD, tobacco abuse     Previously followed by Dr Naik  Patient is here for follow-up of paroxysmal atrial fibrillation.  She is labeled as chronic and has been in normal sinus rhythm and was last seen in 2016.  EKG today confirms this.  She was lost to follow-up and thought she was discharged because she felt everything was okay.  She has not taken her blood thinner but is only been on aspirin.  She gets shortness of breath on heavy activity but relieved with rest.  She does have some associated substernal heaviness.  She has had no breakthrough tachycardia palpitations.  She denies any PND, orthopnea has stable lower edema relieved with elevation.  She is preop for knee replacement and is unable to go up a flight of stairs due to her degenerative joint condition     Here for follow-up of paroxysmal atrial fibrillation.  She underwent testing as below for preop clearance.  We if cleared at low to intermediate risk and she is aware that anything can happen with the stress of surgery and in particular with her tobacco use disorder.  We discussed that this is the main things she needs to work on postoperatively as well as rehab.  She denies any further cardiopulmonary complaints.  We went over her medicines as well and discussed that she has to hold her blood thinner for at least 2 days prior to the procedure but continue her beta-blocker perioperatively.    Echo 6/19/25    Left Ventricle: The left ventricle is normal in size. Mildly increased wall thickness. There is mild concentric hypertrophy. There is normal systolic function with a visually estimated ejection fraction of 60 - 65%. Unable to assess diastolic function due to atrial fibrillation.    Right Ventricle: The right ventricle is mildly dilated Systolic  function is mildly reduced.    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    Pulmonary Artery: The estimated pulmonary artery systolic pressure is 76 mmHg.    Pt appears to be in AFib throughout exam.     Echo 8/6/24    Left Ventricle: The left ventricle is normal in size. There is mild concentric hypertrophy. There is normal systolic function with a visually estimated ejection fraction of 60 - 65%.    Right Ventricle: Mild right ventricular enlargement. Systolic function is normal.    Left Atrium: Left atrium is severely dilated.    Right Atrium: Right atrium is severely dilated.    Mitral Valve: There is mild to moderate regurgitation.    Tricuspid Valve: There is mild to moderate regurgitation.    Pulmonary Artery: The estimated pulmonary artery systolic pressure is 65 mmHg.    IVC/SVC: Elevated venous pressure at 15 mmHg.     NST: 2/8/19  Normal [facility] MPI  The perfusion scan is free of evidence from myocardial ischemia or injury.  An ejection fraction of 55 % at rest  LV cavity size is normal at rest.  Resting wall motion is physiologic.     Holter: 3/1/21  Atrial fibrillation with ventricular rates varying between 61 and 188 bpm with an average of 104 bpm. Total time in atrial fibrillation was approximately 24 hours.  There were frequent PVCs totalling 2296 and averaging 95.75 per hour. There were 92 monomorphic couplets. There were 3 bigeminal cycles. There were 7 monomorphic triplets.  The diary was returned incomplete.     2/18/21 Denies CP. Mild stable ESQUIVEL  EKG A-fib 94 NSSTT changes - unaware of being back in A-fib - last EKG 2/8/19 NSR  Back in A-fib - duration unknown. Discussed LAURA/CV versus rate control - given lack of symptoms we are both in agreement for rate control.   Echo and holter to evaluate A-fib control  Continue Rx for PAD, HTN, DM, A-fib      3/8/21 Denies CP or SOB. Reports some issues with nausea and feels that she has a  mass in her abdomen that moves around   Increase metoprolol 50 bid for better A-fib rate control  Continue Rx for HTN, HLD, PAD, DM  OV 6 months     7/16/21 Reports SOB and LE edema for 2 weeks. Was started on lasix 3 days ago without much improvement  EKG A-fib NSSTT changes  Echo, BNP, BMP for CHF  Continue current diuretic regimen - needs to go to the ER if symptoms worsen  Continue Rx for HTN, HLD, PAD, DM, A-fib, CHF     7/29/21 Feels better since discharge. Still with mild LE edema and ESQUIVEL  On lasix 40 bid     Continue Rx for HTN, HLD, PAD, DM, A-fib, CHF  OV 1 month with BNP, BMP        9/28/21 Denies CP. Less SOB and LE edema improved  BP controlled      Continue Rx for HTN, HLD, PAD, DM, A-fib, CHF  OV 3 months with BNP, BMP     Admitted 5/20/22  Ms Sandee Evans is a 66 y.o. woman admitted with acute hypoxic/hypercapnic respiratory failure due to acute on chronic diastolic CHF, exacerbation of COPD, and untreated HIEU. She initially required BiPAP but was quickly weaned to O2 by NC. She states that she sometimes skips lasix doses due to urinary incontinence. She does not have BiPAP at home. She is still using tobacco. Started IV lasix for CHF exacerbation, steroids/nebs/levofloxacin for COPD exacerbation. Stepped down to floor on 5/21/22. Patient grew out E-coli on blood cultures- (S) to Cipro. Patient clinically improved and was discharged to home with out patient PT/OT on 5/24/22. Bi-pap will be arranged. Activity as tolerated. Diet- low NA/ADA 2000 yang diet    Already has pulmonary follow up.      6/28/22 Denies CP, SOB improved from discharge  BP controlled. Lasix has been held since discharge   Restart lasix 40 qd  OV 3 months with BNP, BMP      Continue Rx for HTN, HLD, PAD, DM, A-fib, CHF     9/28/22 Denies CP, ESQUIVEL improved after restarting lasix  Some dry cough that she thinks are allergies  BP controlled  OV 6 months with BNP, BMP      Continue Rx for HTN, HLD, PAD, DM, A-fib, CHF      Went to  the ER 1/26/23  This 67-year-old female presents to the emergency room complaining of nausea that started yesterday.  Patient developed some chest tightness and shortness of breath that also started this morning at 7:00 a.m..  The patient denies dysuria.  She had 4 episodes of diarrhea in the last 24 hours.  The diarrhea started yesterday.  The patient also has weakness and pain in her left arm this started 3 weeks ago.  The patient has a history of arthritis.  She has been slightly lightheaded when she 1st stands up for about a week.  He had some left upper quadrant abdominal pain yesterday that has since resolved.  She has had chills for 2 weeks and a cough for 2 weeks and headache that occurs only with coughing as well as some rhinorrhea but no sore throat.  She has a history of atrial fibrillation.  She has a history of congestive heart failure.  She smokes and has a history of morbid obesity.  She does not report a history of chronic obstructive pulmonary disease but she is on Spiriva inhaler.  She has a history thyroid disease.  She is maintained on Xarelto.  , troponin negative  EKG A-fib 108     3/7/23 CP has resolved. Still with cough and some abdominal discomfort  BP controlled  Discussed repeat stress test given recent CP - she feels like this is from URI and would like to hold off      Continue Rx for HTN, HLD, PAD, DM, A-fib, CHF  OV 3 months     Admitted 11/13/23  67 y.o. female, with a past medical history of A-fib on OAC,diastolic  CHF, COPD, HLD, HTN, hypothyroidism, morbid obesity admitted on 11/22/2023 for acute respiratory failure with hypoxia and hypercapnic (O2 sts 88% on RA,  PCO2 of 72) due to +RSV, COPD exacerbation, and acute on chronic diastolic heart failure. admitted to ICU on BIPAP. BNP > 400.  Chest imaging with bilateral effusions and peripheral edema. Hospital course complicated by agitation, required Precedex drip briefly.  Able to wean off Precedex drip. Pulmonology  consulted-suspect chronic hypercapnic respiratory failure but not using nightly NIV.  RSV positive and on droplet precaution.  Patient completed treatment with DuoNebs and prednisone on 11/17/2023.  Continue IV diuresis and wean O2 as tolerated. Pt still wheezing, resumed steroids with plan for gradual taper. Given patient continues to have wheezing, SOB, and diminished breath sounds, will consult pulmonology again for input.           12/19/23 Still with cough since discharge but less SOB  SBP 90s but tolerated  Stop losartan with hypotension - needs to monitor home BP      Continue Rx for HTN, HLD, PAD, DM, A-fib, CHF  OV 3 months     3/6/24 Denies CP or SOB  BP improved  EKG A-fib otherwise ok      Continue Rx for HTN, HLD, PAD, DM, A-fib, CHF  OV 6 months with BNP, BMP     Admitted 8/6/24  68-year-old morbidly obese female with past medical history of chronic AFib on Xarelto, CHF EF 55-60% 11/23, hypertension, diabetes, hyperlipidemia, PVD, tobacco abuse , COPD, HIEU, hypothyroidism, tobacco use who was admitted for Acute hypoxic hyper capneic respiratory failure secondary to CHF/COPD exacerbation/parainfluenza pneumonia. Noted to have AFib with RVR on presentation in ED, rate controlled with 1 dose of diltiazem weaned off BiPAP support transitioned to 3 L of oxygen with nasal cannula  continue BiPAP p.r.n. neb/Q HS. On Lasix/Solu-Medrol /nebs .  Received ceftriaxone/azithromycin for cap coverage x 3 days.  Respiratory cultures with normal respiratory elli.Net -8.3L since admit. On oral  Prednisone.  Decrease Lasix to 40 b.i.d..  PT/OT on board wean oxygen as tolerated. Pt improved prior to dc. She was dischrged in stable condition. Rec OP FU with PCP      9/19/24 Denies CP, stable ESQUIVEL since discharge  BP controlled  Mild ankle swelling      Continue Rx for HTN, HLD, PAD, DM, A-fib, CHF  OV 3 months with BNP, BMP     Labs 12/9/24  K 4.2  Cr 1.1   up from 270     12/17/24 Denies CP or SOB  LE edema  increased some after thanksgiving and hasn't gone down  Ok to increase lasix 40 bid until LE edema improved       Continue Rx for HTN, HLD, PAD, DM, A-fib, CHF  OV 3 months with BNP, BMP     Labs 4/14/25  K 5.3  Cr 1.4       3/31/25 Denies CP, mild stable ESQUIVEL and LE swelling  BP controlled  EKG A-fib NSSTT changes  Stop losartan with elevated K  If Cr remains elevated will discuss decreasing xarelto to 15 qd      Continue Rx for HTN, HLD, PAD, DM, A-fib, CHF  OV 3 months with BNP BMP     Admitted 6/18/25  A 69-year-old F with medical history significant for chronic atrial fibrillation anticoagulation with Xarelto, HIEU on BiPAP, diastolic CHF, pulmonary HTN, tobacco use, hyperlipidemia, cognitive impairment and ?  COPD with chronic hypoxic respiratory failure on 2 LNC who presented to the ED with a chief complaint of lethargy and progressive shortness of breaths with daytime drowsiness over the past 1 week; admitted to the ICU for acute on chronic hypoxic hypercarbic respiratory failure on BiPAP; currently weaned off and stable for transfer to telemetry   Patient was diuresed while on transitioned to p.o. Lasix.  Continues to wear BiPAP at night.  Plan to complete azithromycin and prednisone for a total of 5 days.  Overall, she is doing well and feeling back to her baseline.  She does report that she was not taking her Lasix every single day so plan to continue with once daily at discharge.  Patient was discharged home in stable condition.  She has home oxygen and home CPAP.  Azithromycin and prednisone prescription sent to pharmacy of choice.  Patient discharged home.      Labs 7/18/25  K 4.0  Cr 1.1      7/22/25 Less SOB since discharge. Denies CP  BP controlled  EKG A-fib 100 NSSTT changes       Review of Systems   Constitutional: Negative for decreased appetite.   HENT:  Negative for ear discharge.    Eyes:  Negative for blurred vision.   Respiratory:  Negative for hemoptysis.    Endocrine:  Negative for polyphagia.   Hematologic/Lymphatic: Negative for adenopathy.   Skin:  Negative for color change.   Musculoskeletal:  Negative for joint swelling.   Genitourinary:  Negative for bladder incontinence.   Neurological:  Negative for brief paralysis.   Psychiatric/Behavioral:  Negative for hallucinations.    Allergic/Immunologic: Negative for hives.          Objective     Physical Exam  Constitutional:       Appearance: She is well-developed.   HENT:      Head: Normocephalic and atraumatic.   Eyes:      Conjunctiva/sclera: Conjunctivae normal.      Pupils: Pupils are equal, round, and reactive to light.   Cardiovascular:      Rate and Rhythm: Normal rate. Rhythm irregular.      Pulses: Intact distal pulses.      Heart sounds: Normal heart sounds.   Pulmonary:      Effort: Pulmonary effort is normal.      Breath sounds: Normal breath sounds.   Abdominal:      General: Bowel sounds are normal.      Palpations: Abdomen is soft.   Musculoskeletal:         General: Normal range of motion.      Cervical back: Normal range of motion and neck supple.   Skin:     General: Skin is warm and dry.   Neurological:      Mental Status: She is alert and oriented to person, place, and time.            Assessment and Plan     1. Essential hypertension    2. Mixed hyperlipidemia    3. Acute on chronic diastolic CHF (congestive heart failure)    4. Paroxysmal atrial fibrillation    5. PVD (peripheral vascular disease)    6. ESQUIVEL (dyspnea on exertion)    7. Pulmonary HTN        Plan:    Volume status fairly stable. A-fib rates up some - monitor for now       Continue Rx for HTN, HLD, PAD, DM, A-fib, CHF  OV 2 months with BNP, BMP    Advance Care Planning     Date: 07/22/2025  Patient did not wish or was not able to name a surrogate decision maker or provide an Advance Care Plan.

## 2025-07-23 RX ORDER — METOPROLOL SUCCINATE 100 MG/1
TABLET, EXTENDED RELEASE ORAL
Qty: 90 TABLET | Refills: 3 | Status: SHIPPED | OUTPATIENT
Start: 2025-07-23

## 2025-07-30 ENCOUNTER — DOCUMENTATION ONLY (OUTPATIENT)
Facility: CLINIC | Age: 70
End: 2025-07-30
Payer: COMMERCIAL

## 2025-07-30 NOTE — PROGRESS NOTES
"[facility]) WEEKLY TRIAGE DISCHARGE VISIT - PHONE     Called and spoke to Ms. Ignacio    Most Recent Hospital Discharge Date: 06/21/2025  Last admission Diagnosis/chief complaint: CHF    Pt discharge completed by phone related to patient's preference  Stable on GDMT  No signs of CHF   Weight 247  /70 HR 80        Pt reports the following:  []  Shortness of Breath with activity  []  Shortness of Breath at rest   []  Fatigue  []  Edema   [] Chest pain or tightness  [] Weight Increase since discharge  [x] None of the above    Medications:   Medication reconciliation completed today per RN.  Pt reports having all medications available and understands how to take them appropriately. Reminded pt to call prior to making any changes to medications. Patient has all medications prescribed for CHF and states compliance.    Education:   [x] Confirmed pt still has  "[facility] Home Care Guide" .   Reviewed key points as listed below.     Recommend 2 gram sodium restriction and 1500cc fluid restriction.  Encourage physical activity with graded exercise program.  Requested patient to weigh themselves daily, and to notify us if their weight increases by more than 3 lbs in 1 day or 5 lbs in 3 days.     [x] Reviewed completed "Daily Weight and Symptom Tracker".  Reviewed with patient when and how to call [facility] according to "Yellow Zone" and "Red Zone".       Watch for these Signs and Symptoms: If any of these occur, contact [facility] immediately:   Increase in shortness of breath with movement   Increase in swelling in your legs and ankles   Weight gain of more than 3 pounds in a night or 5 pounds in 3 days.   Difficulty breathing when you are lying down   Worsening fatigue or tiredness   Stomach bloating, a full feeling or a loss of appetite   Increased coughing--especially when you are lying down    MyChart and Care Companion:   Patient active on myChart? Yes, patient " uses regularly.    HF TCC Program Plan:  Pt has successfully completed HFT[facility] program.  Pt care to be transferred to Dr. Garcia for long term care.     Pt educated on how to call their offices and how to call [facility] On call in the event of an after hour issue.    PT reminded to continue to follow recommendations made during the [facility] program to include monitoring daily weights, taking medications according to list, following up to appointments per provider recommendations, stop smoking/ start exercising and following a heart friendly low salt, low fluid diet.      Pt was able to verbalize back to RN in their own words correct diet/fluid restrictions, necessity for exercise, warning signs and symptoms, when and how to contact their  Long term care team .      Plan: long term follow up with Dr. Garcia        [x]  Discussed upcoming appointments and/or plan for follow-up care with his/her Cardiology    Electronic hand off completed : To Dr. garcia by routing epic note per Chayito Valdes, [facility]  Cardiology  Please refer to provider note for additional details and assessment.

## 2025-07-31 ENCOUNTER — HOSPITAL ENCOUNTER (OUTPATIENT)
Dept: RADIOLOGY | Facility: HOSPITAL | Age: 70
Discharge: HOME | End: 2025-07-31
Attending: FAMILY MEDICINE
Payer: COMMERCIAL

## 2025-07-31 DIAGNOSIS — Z12.31 ENCOUNTER FOR SCREENING MAMMOGRAM FOR BREAST CANCER: ICD-10-CM

## 2025-07-31 PROCEDURE — 77063 BREAST TOMOSYNTHESIS BI: CPT | Mod: TC

## 2025-08-06 ENCOUNTER — OFFICE VISIT (OUTPATIENT)
Dept: PULMONOLOGY | Facility: CLINIC | Age: 70
End: 2025-08-06
Payer: COMMERCIAL

## 2025-08-06 VITALS
HEART RATE: 105 BPM | BODY MASS INDEX: 44.67 KG/M2 | SYSTOLIC BLOOD PRESSURE: 123 MMHG | OXYGEN SATURATION: 92 % | HEIGHT: 63 IN | WEIGHT: 252.13 LBS | DIASTOLIC BLOOD PRESSURE: 82 MMHG

## 2025-08-06 DIAGNOSIS — G47.01 INSOMNIA DUE TO MEDICAL CONDITION: ICD-10-CM

## 2025-08-06 DIAGNOSIS — G25.81 RESTLESS LEGS SYNDROME (RLS): ICD-10-CM

## 2025-08-06 DIAGNOSIS — G47.33 OSA TREATED WITH BIPAP: Primary | ICD-10-CM

## 2025-08-06 DIAGNOSIS — E66.01 SEVERE OBESITY (BMI >= 40): ICD-10-CM

## 2025-08-06 PROCEDURE — 3079F DIAST BP 80-89 MM HG: CPT | Mod: CPTII,S$GLB,, | Performed by: INTERNAL MEDICINE

## 2025-08-06 PROCEDURE — 3074F SYST BP LT 130 MM HG: CPT | Mod: CPTII,S$GLB,, | Performed by: INTERNAL MEDICINE

## 2025-08-06 PROCEDURE — 99999 PR PBB SHADOW E&M-EST. PATIENT-LVL IV: CPT | Mod: PBBFAC,,, | Performed by: INTERNAL MEDICINE

## 2025-08-06 PROCEDURE — 4010F ACE/ARB THERAPY RXD/TAKEN: CPT | Mod: CPTII,S$GLB,, | Performed by: INTERNAL MEDICINE

## 2025-08-06 PROCEDURE — 3288F FALL RISK ASSESSMENT DOCD: CPT | Mod: CPTII,S$GLB,, | Performed by: INTERNAL MEDICINE

## 2025-08-06 PROCEDURE — 3008F BODY MASS INDEX DOCD: CPT | Mod: CPTII,S$GLB,, | Performed by: INTERNAL MEDICINE

## 2025-08-06 PROCEDURE — 1100F PTFALLS ASSESS-DOCD GE2>/YR: CPT | Mod: CPTII,S$GLB,, | Performed by: INTERNAL MEDICINE

## 2025-08-06 PROCEDURE — 99214 OFFICE O/P EST MOD 30 MIN: CPT | Mod: S$GLB,,, | Performed by: INTERNAL MEDICINE

## 2025-08-06 PROCEDURE — G2211 COMPLEX E/M VISIT ADD ON: HCPCS | Mod: S$GLB,,, | Performed by: INTERNAL MEDICINE

## 2025-08-06 PROCEDURE — 3072F LOW RISK FOR RETINOPATHY: CPT | Mod: CPTII,S$GLB,, | Performed by: INTERNAL MEDICINE

## 2025-08-06 PROCEDURE — 1159F MED LIST DOCD IN RCRD: CPT | Mod: CPTII,S$GLB,, | Performed by: INTERNAL MEDICINE

## 2025-08-06 PROCEDURE — 1126F AMNT PAIN NOTED NONE PRSNT: CPT | Mod: CPTII,S$GLB,, | Performed by: INTERNAL MEDICINE

## 2025-08-06 RX ORDER — GABAPENTIN 300 MG/1
300 CAPSULE ORAL NIGHTLY
Qty: 30 CAPSULE | Refills: 11 | Status: SHIPPED | OUTPATIENT
Start: 2025-08-06 | End: 2026-08-06

## 2025-08-06 NOTE — PROGRESS NOTES
"Sandee Evans  was seen as a follow up.    CHIEF COMPLAINT:    Chief Complaint   Patient presents with    Apnea       HISTORY OF PRESENT ILLNESS: Sandee Evans is a 69 y.o. female is here for sleep evaluation.   Patient with h/o chronic atrial fibrillation and was referred by cardiologist for jennie evaluation.      Our first encounter was 10/12/15.  At that time, patient with loud snoring.  No witnessed sleep apnea.  +fatigue upon awake most days (3-4 times per week).  No sleepiness a/w driving.  Occasional sleep talking.  No sleep walking.  Per , sleep with frequent toss and turn.  No cataplexy.      +recurring "jumping" sensation in legs at night.  Worse when laying down at night.  Does not occur during the day.  Improve with stretching and moving.    Patient deferred medical treatment.  Mental alerting activities d/w patent.      During our initial evaluation, patient was recommended sleep study.  Patient did not get sleep study until 2020.  S/p hsat 10/13/20 with ahi of 90 by SURJIT Bray.  Patient was set up with bpap during hospitalization 7/20.  Patient was set up with bipap ST 18/7 with rate of 12.  Patient was using nightly but stopped due to nasal congestion.  Another reason for poor compliance was due to lack of supplies.  +EDS without bipap.  New supplies was ordered during last appointment.  Still have not used bipap.    Due to poor compliance, patient was brought back for retitration.  Patient bipap was adjusted to 21/16.    Doing better with FFM.  Nasal mask often comes off in the middle of the night.          Charlotte Sleepiness Scale score during initial sleep evaluation was 18.    SLEEP ROUTINE:  Activity the hour prior to sleep: watch tv    Bed partner:    Time to bed:  10 pm - 12 am  Lights off:  usually  Sleep onset latency:  30 minutes       Disruptions or awakenings:    2-3 times per night   Wakeup time:     around 8 am   Perceived sleep quality:  rested    Daytime naps:      " Occasional 2 hours nap (drowsy)    Weekend sleep routine:      Same schedule  Caffeine use: none  exercise habit:   none      PAST MEDICAL HISTORY:    Active Ambulatory Problems     Diagnosis Date Noted    Urge incontinence 01/11/2013    DDD (degenerative disc disease), lumbar 01/11/2013    DJD (degenerative joint disease) of knee 01/11/2013    Hyperlipidemia 01/11/2013    Depressed 01/11/2013    Insomnia 01/11/2013    Vitamin D deficiency disease 01/11/2013    Lumbar radicular pain 01/11/2013    Essential hypertension 01/11/2013    Severe obesity (BMI >= 40)     Colon polyps 08/09/2013    Diverticulosis 08/09/2013    Chronic pain 08/09/2013    Pre-diabetes 08/09/2013    Paroxysmal atrial fibrillation 03/20/2015    Longstanding persistent atrial fibrillation 07/20/2015    History of pancreatitis 09/22/2015    Osteoarthritis of right knee 05/23/2016    Depression 05/30/2016    Type 2 diabetes mellitus with diabetic cataract, without long-term current use of insulin     Dry eye syndrome, bilateral 12/18/2017    Nuclear sclerosis, bilateral 12/18/2017    Refractive error 12/18/2017    Hidradenitis suppurativa of right axilla 12/31/2017    PVD (peripheral vascular disease) 05/28/2018    Slow transit constipation 02/22/2019    Goals of care, counseling/discussion 06/25/2019    Chronic bilateral low back pain without sciatica 09/14/2019    ESQUIVEL (dyspnea on exertion) 09/03/2020    Tobacco dependence 09/03/2020    HIEU treated with BiPAP 09/03/2020    Acute on chronic diastolic CHF (congestive heart failure) 07/16/2021    Pulmonary HTN 07/20/2021    Tobacco abuse 07/20/2021    Hypothyroidism 07/20/2021    Current mild episode of major depressive disorder without prior episode 07/29/2021    Atelectasis 10/26/2021    Chronic kidney disease, stage 3a 06/13/2022    Solitary pulmonary nodule 09/02/2022    Centrilobular emphysema 11/13/2023    RSV infection 11/22/2023    Chronic respiratory failure with hypoxia and hypercapnia  08/06/2024    COPD exacerbation 08/06/2024    Parainfluenzal pneumonia 08/08/2024    Follow-up exam 08/28/2024    Screening for lung cancer 09/30/2024    Encounter for screening for lung cancer 09/30/2024    Annual physical exam 09/30/2024    Influenza vaccine needed 09/30/2024    Need for pneumococcal vaccine 09/30/2024    Cognitive impairment 09/30/2024    Hypertensive emergency 06/18/2025    Acute on chronic respiratory failure with hypoxia and hypercapnia 06/18/2025    HIEU on CPAP 06/18/2025    Tobacco use 06/18/2025    Restless legs syndrome (RLS) 08/06/2025     Resolved Ambulatory Problems     Diagnosis Date Noted    Encounter for screening colonoscopy 08/02/2013    Dysphagia 06/22/2015    Nausea 09/22/2015    Gross hematuria 05/24/2016    Acute viral syndrome 12/31/2017    Chronic respiratory failure with hypercapnia 07/20/2021    Debility 07/20/2021    Acute metabolic encephalopathy 07/20/2021    ANNA MARIE (acute kidney injury) 07/22/2021    Acute bronchitis 08/02/2021    COPD with chronic bronchitis 08/02/2021    Acute respiratory failure with hypoxia and hypercarbia 05/20/2022    Dysuria 05/20/2022    Acute respiratory failure with hypoxia and hypercapnia 11/13/2023    CHF exacerbation 08/06/2024    Community acquired pneumonia 08/07/2024    Viral Pneumonia 06/19/2025     Past Medical History:   Diagnosis Date    Anticoagulant long-term use     Arthritis     Atrial fibrillation     Breast cyst     CHF (congestive heart failure)     Degenerative disc disease     Edema     HLD (hyperlipidemia)     Hx of psychiatric care     Hypertension     Obesity, morbid     HIEU (obstructive sleep apnea)     Other abnormal glucose     Psychiatric problem     Requires assistance with activities of daily living (ADL)     Sleep apnea     Smoker     SOB (shortness of breath)     SOB (shortness of breath)     Thyroid disease     TMJ (temporomandibular joint disorder)     Unsteady gait     Weakness generalized     Wears glasses                  PAST SURGICAL HISTORY:    Past Surgical History:   Procedure Laterality Date    BREAST BIOPSY Right     over 10 yrs ago/ benign    BREAST CYST EXCISION Right     COLONOSCOPY N/A 06/25/2019    Procedure: COLONOSCOPY;  Surgeon: Micheline Flores MD;  Location: [facility] ENDO;  Service: Endoscopy;  Laterality: N/A;  RX XARELTO ok to hold (2 days) per Dr. Naik see scan 3/20/19    COLONOSCOPY N/A 9/20/2024    Procedure: COLONOSCOPY;  Surgeon: Mario Sheridan MD;  Location: [facility] ENDO;  Service: Endoscopy;  Laterality: N/A;  Ref by Dr HEMANT Miller, Pending Xarelto hold, Suprep, portal - PC  ok to hold Xarelto 2 days per Dr Grissom-GT  9/4 pt rescheduled. Xarelto hold x2 days, suprep and emailed to [email or web address]  9/13 precall complete-RB    ENDOSCOPIC ULTRASOUND OF UPPER GASTROINTESTINAL TRACT N/A 01/26/2021    Procedure: ULTRASOUND-ENDOSCOPIC-UPPER;  Surgeon: Stevenson Philippe MD;  Location: [facility];  Service: Endoscopy;  Laterality: N/A;    HYSTERECTOMY      JOINT REPLACEMENT Bilateral     knees    OOPHORECTOMY      rt.knee surgery 2016      TOTAL KNEE ARTHROPLASTY Left 02/19/2019    Procedure: ARTHROPLASTY, KNEE, TOTAL;  Surgeon: Med Hanson MD;  Location: [facility] OR;  Service: Orthopedics;  Laterality: Left;  10AM START PER  PER JAYLIN TEXT @ 8:34AM ON 2-18-19  SUPINE  HARRIS LOYA [phone] TEXTED HIM ON 1-24-19 @ 7:57AM  RN PRE OP 2-13-19--BMI--48.9    underarm gland\ Bilateral          FAMILY HISTORY:                Family History   Problem Relation Name Age of Onset    Diabetes Mother      Hypertension Mother      Cancer Mother      No Known Problems Father      No Known Problems Sister      Diabetes Brother      Cancer Brother      No Known Problems Maternal Aunt      No Known Problems Maternal Uncle      No Known Problems Paternal Aunt      No Known Problems Paternal Uncle      No Known Problems Maternal Grandmother      No Known Problems Maternal Grandfather      No Known Problems  Paternal Grandmother      No Known Problems Paternal Grandfather      No Known Problems Other      Amblyopia Neg Hx      Blindness Neg Hx      Cataracts Neg Hx      Glaucoma Neg Hx      Macular degeneration Neg Hx      Retinal detachment Neg Hx      Strabismus Neg Hx      Stroke Neg Hx      Thyroid disease Neg Hx         SOCIAL HISTORY:          Tobacco:   Social History     Tobacco Use   Smoking Status Every Day    Current packs/day: 0.50    Average packs/day: 0.5 packs/day for 52.6 years (26.3 ttl pk-yrs)    Types: Cigarettes    Start date: 1973   Smokeless Tobacco Current       alcohol use:    Social History     Substance and Sexual Activity   Alcohol Use No                 Occupation:  Retire     ALLERGIES:    Review of patient's allergies indicates:   Allergen Reactions    Ace inhibitors      Cough         CURRENT MEDICATIONS:    Current Outpatient Medications   Medication Sig Dispense Refill    albuterol (PROVENTIL/VENTOLIN HFA) 90 mcg/actuation inhaler 2 puffs every 6 (six) hours as needed.      atorvastatin (LIPITOR) 40 MG tablet TAKE 1 TABLET(40 MG) BY MOUTH EVERY DAY 90 tablet 2    azelastine (ASTELIN) 137 mcg (0.1 %) nasal spray 1 spray (137 mcg total) by Nasal route 2 (two) times daily. 30 mL 0    azithromycin (ZITHROMAX) 500 MG tablet Take 1 tablet (500 mg total) by mouth once daily. 3 tablet 0    fluticasone furoate-vilanteroL (BREO ELLIPTA) 100-25 mcg/dose diskus inhaler Inhale 1 puff into the lungs once daily. Controller 14 each 3    fluticasone propionate (FLONASE) 50 mcg/actuation nasal spray SHAKE LIQUID AND USE 2 SPRAYS(100 MCG) IN EACH NOSTRIL EVERY DAY 48 g 0    furosemide (LASIX) 40 MG tablet TAKE 1 TABLET(40 MG) BY MOUTH EVERY DAY 90 tablet 3    losartan (COZAAR) 25 MG tablet Take 1 tablet (25 mg total) by mouth once daily. 90 tablet 3    melatonin (MELATIN) 5 mg Take 1 tablet (5 mg total) by mouth nightly. 90 tablet 3    metoprolol succinate (TOPROL-XL) 100 MG 24 hr tablet  "TAKE 1 TABLET(100 MG) BY MOUTH DAILY 90 tablet 3    sertraline (ZOLOFT) 100 MG tablet TAKE 1 TABLET(100 MG) BY MOUTH DAILY 90 tablet 0    SPIRIVA RESPIMAT 2.5 mcg/actuation inhaler INHALE 2 PUFFS BY MOUTH DAILY 4 g 5    triamcinolone acetonide 0.1% (KENALOG) 0.1 % ointment APPLY BETWEEN THE KNEES AND UPPER THIGHS TWICE DAILY FOR NO MORE THAN 7 TO 14 DAYS 454 g 1    XARELTO 20 mg Tab TAKE 1 TABLET(20 MG) BY MOUTH DAILY 90 tablet 3    cetirizine (ZYRTEC) 10 MG tablet Take 1 tablet (10 mg total) by mouth once daily. 90 tablet 3    gabapentin (NEURONTIN) 300 MG capsule Take 1 capsule (300 mg total) by mouth every evening. 30 capsule 11     No current facility-administered medications for this visit.                  REVIEW OF SYSTEMS:     Sleep related symptoms as per HPI.  CONST:Denies weight gain ; lost 40# since 2014   HEENT: Denies sinus congestion  PULM: Denies dyspnea  CARD:  +intermittent palpitations   GI:  Denies acid reflux; +recurring vomitting due to pancreatitis  : Denies polyuria  NEURO: Denies headaches  PSYCH: +anxious  HEME: Denies anemia   Otherwise, a balance of systems reviewed is negative.          PHYSICAL EXAM:  Vitals:    08/06/25 0910   BP: 123/82   Pulse: 105   SpO2: (!) 92%   Weight: 114.4 kg (252 lb 1.5 oz)   Height: 5' 3" (1.6 m)   PainSc: 0-No pain         Body mass index is 44.66 kg/m².     GENERAL: Normal development, well groomed  HEENT:  Conjunctivae are non-erythematous; Pupils equal, round, and reactive to light; Nose is symmetrical; Nasal mucosa is pink and moist; Septum is midline; Inferior turbinates are normal; Nasal airflow is normal; Posterior pharynx is pink; Modified Mallampati: 2; Posterior palate is normal; Tonsils +1; Uvula is normal and pink;Tongue is normal; Dentition is fair; No TMJ tenderness; Jaw opening and protrusion without click and without discomfort.  NECK: Supple. Neck circumference is 15.5 inches. No thyromegaly. No palpable nodes.     SKIN: On face and neck: " No abrasions, no rashes, no lesions.  No subcutaneous nodules are palpable.  RESPIRATORY: Chest is clear to auscultation.  Normal chest expansion and non-labored breathing at rest.  CARDIOVASCULAR: Normal S1, S2.  Irregular rhythm.  No murmurs, gallops or rubs. No carotid bruits bilaterally.  EXTREMITIES: + edema. No clubbing. No cyanosis. Station normal. Gait normal.        NEURO/PSYCH: Oriented to time, place and person. Normal attention span and concentration. Affect is full. Mood is normal.                                              DATA   HSAT: 10-13/2020: AHI 90  Titration study 12/9/23 adequate control with bipap 21/16.      PFT 8/8/22 Ratio of 64%; FVC 2.37 L (99%); FEV1 1.52 L (81%); TLC 3.25 L (68%); dlco 18 (88%)   ABG 9/21/21 pH 7.3 pCO2 55.6 pO2 72 SpO2 94 on RA    CT scan (personally reviewed) 8/15/22 no septal reticulation.  No honeycombing.      Echo 11/13/23    AFib noted throughout exam.    Left Ventricle: The left ventricle is normal in size. Normal wall thickness. Normal wall motion. There is normal systolic function with a visually estimated ejection fraction of 55 - 60%. Unable to assess diastolic function due to atrial fibrillation.    Right Ventricle: Mild right ventricular enlargement. Systolic function is mildly reduced.    Mitral Valve: There is mild regurgitation.    Pulmonary Artery: The estimated pulmonary artery systolic pressure is 49 mmHg.  Lab Results   Component Value Date    TSH 3.411 06/18/2025     ASSESSMENT  Problem List Items Addressed This Visit       Insomnia    Overview   difficulty with sleep initiation and maintenance.  May related to hyperarousal from untreated jennie + RLS + conditioned.    Will monitor with bipap + requip.           JENNIE treated with BiPAP - Primary    Overview   AHI 90  Currently with [facility]  bipap st 21/16 after titration study.    High leakage with F20.  Residual ahi of 14.  Residual ahi increased from 14 to 30 when bipap decreased to 18/6  Did not  tolerate N20.  Will increase back to 21/15.  Proper mask placement d/w           Relevant Orders    CPAP/BIPAP SUPPLIES    Restless legs syndrome (RLS)    Overview   4/4 criteria.  Initially, patient deferred medical therapy.  Given persistent difficulty with sleep initiation and maintenance, will start gabapentin.  SE d/w patient including increase sedation           Relevant Medications    gabapentin (NEURONTIN) 300 MG capsule    Severe obesity (BMI >= 40)    Overview   Patient is aware of need for weight loss    Not interested in GLP1 analog.                Education: During our discussion today, we talked about the etiology of obstructive sleep apnea as well as the potential ramifications of untreated sleep apnea, which could include daytime sleepiness, hypertension, heart disease and/or stroke.     Precautions: The patient was advised to abstain from driving should they feel sleepy or drowsy.       Patient will No follow-ups on file.    30 minutes of total time spent on the encounter, which includes face to face time and non-face to face time preparing to see the patient (eg, review of tests), Obtaining and/or reviewing separately obtained history, documenting clinical information in the electronic or other health record, independently interpreting results (not separately reported) and communicating results to the patient/family/caregiver, or Care coordination (not separately reported).    Visit today included increased complexity associated with the care of the episodic problem jennie addressed and managing the longitudinal care of the patient due to the serious and/or complex managed problem(s) jennie.

## (undated) DEVICE — ELECTRODE REM PLYHSV RETURN 9

## (undated) DEVICE — Device

## (undated) DEVICE — DRILL BIT AND STEINMAN PIN PACKET

## (undated) DEVICE — TOURNIQUET SB QC DP 34X4IN

## (undated) DEVICE — UNDERGLOVES BIOGEL PI SIZE 8

## (undated) DEVICE — SOL IRR NACL .9% 3000ML

## (undated) DEVICE — SUT ETHIBOND EXCEL 1 CTX 18

## (undated) DEVICE — DRESSING AQUACEL AG 3.5X10IN

## (undated) DEVICE — SEE MEDLINE ITEM 154981

## (undated) DEVICE — SUT VICRYL PLUS ANTIBACT

## (undated) DEVICE — PIN THREADED STERILE
Type: IMPLANTABLE DEVICE | Site: KNEE | Status: NON-FUNCTIONAL
Removed: 2019-02-19

## (undated) DEVICE — DURAPREP SURG SCRUB 26ML

## (undated) DEVICE — BLADE SAW OSCILLATING

## (undated) DEVICE — KIT TOTAL HIP OPTIVAC

## (undated) DEVICE — STOCKING KNEE HIGH MED REGULAR

## (undated) DEVICE — SUT VICRYL 2-0 36 CT-1

## (undated) DEVICE — TRAY FOLEY 16FR INFECTION CONT

## (undated) DEVICE — SYR ONLY LUER LOCK 20CC

## (undated) DEVICE — SYR 50CC LL

## (undated) DEVICE — GLOVE BIOGEL ORTHOPEDIC 7.5

## (undated) DEVICE — BLANKET UPPER BODY 78.7X29.9IN

## (undated) DEVICE — PAD COLD THERAPY KNEE WRAP ON

## (undated) DEVICE — COVER OVERHEAD SURG LT BLUE

## (undated) DEVICE — PUMP COLD THERAPY

## (undated) DEVICE — UNDERGLOVES BIOGEL PI SIZE 7.5

## (undated) DEVICE — CUFF TOURNIQUET STER DIS 42

## (undated) DEVICE — SOL SOD CHLORIDE 0.9% 10ML

## (undated) DEVICE — GLOVE SURGICAL LATEX SZ 8

## (undated) DEVICE — NDL SPINAL 20GX3.5 HUB

## (undated) DEVICE — SHEET DRAPE FAN-FOLDED 3/4

## (undated) DEVICE — NDL 18GA X1 1/2 REG BEVEL

## (undated) DEVICE — PULSAVAC ZIMMER

## (undated) DEVICE — BLADE SAW OSC 25.4X90X1.27 ST